# Patient Record
Sex: FEMALE | Race: WHITE | NOT HISPANIC OR LATINO | Employment: FULL TIME | URBAN - METROPOLITAN AREA
[De-identification: names, ages, dates, MRNs, and addresses within clinical notes are randomized per-mention and may not be internally consistent; named-entity substitution may affect disease eponyms.]

---

## 2022-02-15 ENCOUNTER — HOME CARE VISIT (OUTPATIENT)
Dept: HOME HEALTH SERVICES | Facility: HOME HEALTHCARE | Age: 47
End: 2022-02-15

## 2022-03-28 ENCOUNTER — HOSPITAL ENCOUNTER (INPATIENT)
Facility: HOSPITAL | Age: 47
LOS: 1 days | DRG: 598 | End: 2022-03-29
Attending: EMERGENCY MEDICINE | Admitting: INTERNAL MEDICINE
Payer: COMMERCIAL

## 2022-03-28 ENCOUNTER — APPOINTMENT (EMERGENCY)
Dept: RADIOLOGY | Facility: HOSPITAL | Age: 47
DRG: 598 | End: 2022-03-28
Payer: COMMERCIAL

## 2022-03-28 DIAGNOSIS — C79.51 OSSEOUS METASTASIS (HCC): ICD-10-CM

## 2022-03-28 DIAGNOSIS — C79.51 METASTATIC CANCER TO SPINE (HCC): ICD-10-CM

## 2022-03-28 DIAGNOSIS — N63.0 BREAST MASS IN FEMALE: Primary | ICD-10-CM

## 2022-03-28 LAB
BASOPHILS # BLD AUTO: 0.04 THOUSANDS/ΜL (ref 0–0.1)
BASOPHILS NFR BLD AUTO: 1 % (ref 0–1)
EOSINOPHIL # BLD AUTO: 0.06 THOUSAND/ΜL (ref 0–0.61)
EOSINOPHIL NFR BLD AUTO: 1 % (ref 0–6)
ERYTHROCYTE [DISTWIDTH] IN BLOOD BY AUTOMATED COUNT: 12.4 % (ref 11.6–15.1)
HCT VFR BLD AUTO: 40.4 % (ref 34.8–46.1)
HGB BLD-MCNC: 13.5 G/DL (ref 11.5–15.4)
IMM GRANULOCYTES # BLD AUTO: 0.05 THOUSAND/UL (ref 0–0.2)
IMM GRANULOCYTES NFR BLD AUTO: 1 % (ref 0–2)
LYMPHOCYTES # BLD AUTO: 2.5 THOUSANDS/ΜL (ref 0.6–4.47)
LYMPHOCYTES NFR BLD AUTO: 30 % (ref 14–44)
MCH RBC QN AUTO: 30.2 PG (ref 26.8–34.3)
MCHC RBC AUTO-ENTMCNC: 33.4 G/DL (ref 31.4–37.4)
MCV RBC AUTO: 90 FL (ref 82–98)
MONOCYTES # BLD AUTO: 0.59 THOUSAND/ΜL (ref 0.17–1.22)
MONOCYTES NFR BLD AUTO: 7 % (ref 4–12)
NEUTROPHILS # BLD AUTO: 5.2 THOUSANDS/ΜL (ref 1.85–7.62)
NEUTS SEG NFR BLD AUTO: 60 % (ref 43–75)
NRBC BLD AUTO-RTO: 0 /100 WBCS
PLATELET # BLD AUTO: 346 THOUSANDS/UL (ref 149–390)
PMV BLD AUTO: 10 FL (ref 8.9–12.7)
RBC # BLD AUTO: 4.47 MILLION/UL (ref 3.81–5.12)
WBC # BLD AUTO: 8.44 THOUSAND/UL (ref 4.31–10.16)

## 2022-03-28 PROCEDURE — 84703 CHORIONIC GONADOTROPIN ASSAY: CPT

## 2022-03-28 PROCEDURE — 80053 COMPREHEN METABOLIC PANEL: CPT

## 2022-03-28 PROCEDURE — 71046 X-RAY EXAM CHEST 2 VIEWS: CPT

## 2022-03-28 PROCEDURE — 99285 EMERGENCY DEPT VISIT HI MDM: CPT

## 2022-03-28 PROCEDURE — 93005 ELECTROCARDIOGRAM TRACING: CPT

## 2022-03-28 PROCEDURE — 99285 EMERGENCY DEPT VISIT HI MDM: CPT | Performed by: EMERGENCY MEDICINE

## 2022-03-28 PROCEDURE — 36415 COLL VENOUS BLD VENIPUNCTURE: CPT

## 2022-03-28 PROCEDURE — 82550 ASSAY OF CK (CPK): CPT

## 2022-03-28 PROCEDURE — 85025 COMPLETE CBC W/AUTO DIFF WBC: CPT

## 2022-03-28 PROCEDURE — 96374 THER/PROPH/DIAG INJ IV PUSH: CPT

## 2022-03-28 RX ORDER — KETOROLAC TROMETHAMINE 30 MG/ML
30 INJECTION, SOLUTION INTRAMUSCULAR; INTRAVENOUS ONCE
Status: COMPLETED | OUTPATIENT
Start: 2022-03-28 | End: 2022-03-28

## 2022-03-28 RX ADMIN — KETOROLAC TROMETHAMINE 30 MG: 30 INJECTION, SOLUTION INTRAMUSCULAR at 23:32

## 2022-03-29 ENCOUNTER — APPOINTMENT (INPATIENT)
Dept: CT IMAGING | Facility: HOSPITAL | Age: 47
DRG: 598 | End: 2022-03-29
Payer: COMMERCIAL

## 2022-03-29 ENCOUNTER — APPOINTMENT (INPATIENT)
Dept: MRI IMAGING | Facility: HOSPITAL | Age: 47
DRG: 598 | End: 2022-03-29
Payer: COMMERCIAL

## 2022-03-29 ENCOUNTER — APPOINTMENT (EMERGENCY)
Dept: CT IMAGING | Facility: HOSPITAL | Age: 47
DRG: 598 | End: 2022-03-29
Payer: COMMERCIAL

## 2022-03-29 ENCOUNTER — HOSPITAL ENCOUNTER (INPATIENT)
Facility: HOSPITAL | Age: 47
LOS: 6 days | Discharge: HOME HEALTH | DRG: 598 | End: 2022-04-04
Attending: INTERNAL MEDICINE | Admitting: INTERNAL MEDICINE
Payer: COMMERCIAL

## 2022-03-29 ENCOUNTER — TELEPHONE (OUTPATIENT)
Dept: HEMATOLOGY ONCOLOGY | Facility: CLINIC | Age: 47
End: 2022-03-29

## 2022-03-29 VITALS
HEIGHT: 64 IN | OXYGEN SATURATION: 90 % | WEIGHT: 207.23 LBS | TEMPERATURE: 97.4 F | HEART RATE: 100 BPM | SYSTOLIC BLOOD PRESSURE: 145 MMHG | RESPIRATION RATE: 17 BRPM | DIASTOLIC BLOOD PRESSURE: 86 MMHG | BODY MASS INDEX: 35.38 KG/M2

## 2022-03-29 DIAGNOSIS — N63.0 BREAST MASS IN FEMALE: ICD-10-CM

## 2022-03-29 DIAGNOSIS — C79.51 SECONDARY MALIGNANT NEOPLASM OF BONE AND BONE MARROW (HCC): ICD-10-CM

## 2022-03-29 DIAGNOSIS — G89.3 CANCER ASSOCIATED PAIN: ICD-10-CM

## 2022-03-29 DIAGNOSIS — C79.51 OSSEOUS METASTASIS (HCC): ICD-10-CM

## 2022-03-29 DIAGNOSIS — C79.49 METASTASIS TO SPINAL CORD (HCC): Primary | ICD-10-CM

## 2022-03-29 DIAGNOSIS — R74.01 TRANSAMINITIS: ICD-10-CM

## 2022-03-29 DIAGNOSIS — T40.2X5A THERAPEUTIC OPIOID-INDUCED CONSTIPATION (OIC): ICD-10-CM

## 2022-03-29 DIAGNOSIS — K59.03 THERAPEUTIC OPIOID-INDUCED CONSTIPATION (OIC): ICD-10-CM

## 2022-03-29 DIAGNOSIS — R03.0 ELEVATED BP WITHOUT DIAGNOSIS OF HYPERTENSION: ICD-10-CM

## 2022-03-29 DIAGNOSIS — Z51.5 PALLIATIVE CARE PATIENT: ICD-10-CM

## 2022-03-29 DIAGNOSIS — K21.9 GASTROESOPHAGEAL REFLUX DISEASE WITHOUT ESOPHAGITIS: ICD-10-CM

## 2022-03-29 DIAGNOSIS — C79.52 SECONDARY MALIGNANT NEOPLASM OF BONE AND BONE MARROW (HCC): ICD-10-CM

## 2022-03-29 PROBLEM — N63.10 BREAST MASS, RIGHT: Status: ACTIVE | Noted: 2022-03-29

## 2022-03-29 PROBLEM — E87.6 HYPOKALEMIA: Status: ACTIVE | Noted: 2022-03-29

## 2022-03-29 LAB
ALBUMIN SERPL BCP-MCNC: 3.4 G/DL (ref 3.5–5)
ALBUMIN SERPL BCP-MCNC: 3.6 G/DL (ref 3.5–5)
ALP SERPL-CCNC: 311 U/L (ref 46–116)
ALP SERPL-CCNC: 314 U/L (ref 46–116)
ALT SERPL W P-5'-P-CCNC: 128 U/L (ref 12–78)
ALT SERPL W P-5'-P-CCNC: 130 U/L (ref 12–78)
ANION GAP SERPL CALCULATED.3IONS-SCNC: 10 MMOL/L (ref 4–13)
ANION GAP SERPL CALCULATED.3IONS-SCNC: 13 MMOL/L (ref 4–13)
AST SERPL W P-5'-P-CCNC: 78 U/L (ref 5–45)
AST SERPL W P-5'-P-CCNC: 80 U/L (ref 5–45)
ATRIAL RATE: 0 BPM
ATRIAL RATE: 103 BPM
BASOPHILS # BLD AUTO: 0.03 THOUSANDS/ΜL (ref 0–0.1)
BASOPHILS NFR BLD AUTO: 0 % (ref 0–1)
BILIRUB SERPL-MCNC: 0.49 MG/DL (ref 0.2–1)
BILIRUB SERPL-MCNC: 0.52 MG/DL (ref 0.2–1)
BUN SERPL-MCNC: 11 MG/DL (ref 5–25)
BUN SERPL-MCNC: 9 MG/DL (ref 5–25)
CALCIUM ALBUM COR SERPL-MCNC: 9.4 MG/DL (ref 8.3–10.1)
CALCIUM SERPL-MCNC: 8.9 MG/DL (ref 8.3–10.1)
CALCIUM SERPL-MCNC: 9.3 MG/DL (ref 8.3–10.1)
CANCER AG27-29 SERPL-ACNC: 110.3 U/ML (ref 0–42.3)
CEA SERPL-MCNC: 6.5 NG/ML (ref 0–3)
CHLORIDE SERPL-SCNC: 101 MMOL/L (ref 100–108)
CHLORIDE SERPL-SCNC: 99 MMOL/L (ref 100–108)
CK SERPL-CCNC: 35 U/L (ref 26–192)
CO2 SERPL-SCNC: 22 MMOL/L (ref 21–32)
CO2 SERPL-SCNC: 26 MMOL/L (ref 21–32)
CREAT SERPL-MCNC: 0.72 MG/DL (ref 0.6–1.3)
CREAT SERPL-MCNC: 0.74 MG/DL (ref 0.6–1.3)
EOSINOPHIL # BLD AUTO: 0.03 THOUSAND/ΜL (ref 0–0.61)
EOSINOPHIL NFR BLD AUTO: 0 % (ref 0–6)
ERYTHROCYTE [DISTWIDTH] IN BLOOD BY AUTOMATED COUNT: 12.3 % (ref 11.6–15.1)
GFR SERPL CREATININE-BSD FRML MDRD: 100 ML/MIN/1.73SQ M
GFR SERPL CREATININE-BSD FRML MDRD: 96 ML/MIN/1.73SQ M
GLUCOSE SERPL-MCNC: 105 MG/DL (ref 65–140)
GLUCOSE SERPL-MCNC: 113 MG/DL (ref 65–140)
HAV IGM SER QL: NORMAL
HBV CORE IGM SER QL: NORMAL
HBV SURFACE AG SER QL: NORMAL
HCG SERPL QL: NEGATIVE
HCT VFR BLD AUTO: 38.6 % (ref 34.8–46.1)
HCV AB SER QL: NORMAL
HGB BLD-MCNC: 12.9 G/DL (ref 11.5–15.4)
IMM GRANULOCYTES # BLD AUTO: 0.08 THOUSAND/UL (ref 0–0.2)
IMM GRANULOCYTES NFR BLD AUTO: 1 % (ref 0–2)
LACTATE SERPL-SCNC: 1.1 MMOL/L (ref 0.5–2)
LYMPHOCYTES # BLD AUTO: 1.42 THOUSANDS/ΜL (ref 0.6–4.47)
LYMPHOCYTES NFR BLD AUTO: 15 % (ref 14–44)
MAGNESIUM SERPL-MCNC: 1.8 MG/DL (ref 1.6–2.6)
MCH RBC QN AUTO: 30.5 PG (ref 26.8–34.3)
MCHC RBC AUTO-ENTMCNC: 33.4 G/DL (ref 31.4–37.4)
MCV RBC AUTO: 91 FL (ref 82–98)
MONOCYTES # BLD AUTO: 0.22 THOUSAND/ΜL (ref 0.17–1.22)
MONOCYTES NFR BLD AUTO: 2 % (ref 4–12)
NEUTROPHILS # BLD AUTO: 7.85 THOUSANDS/ΜL (ref 1.85–7.62)
NEUTS SEG NFR BLD AUTO: 82 % (ref 43–75)
NRBC BLD AUTO-RTO: 0 /100 WBCS
P AXIS: 45 DEGREES
PLATELET # BLD AUTO: 328 THOUSANDS/UL (ref 149–390)
PMV BLD AUTO: 9.7 FL (ref 8.9–12.7)
POTASSIUM SERPL-SCNC: 3.3 MMOL/L (ref 3.5–5.3)
POTASSIUM SERPL-SCNC: 4.1 MMOL/L (ref 3.5–5.3)
PR INTERVAL: 146 MS
PROT SERPL-MCNC: 7.7 G/DL (ref 6.4–8.2)
PROT SERPL-MCNC: 8.1 G/DL (ref 6.4–8.2)
QRS AXIS: -1 DEGREES
QRS AXIS: 0 DEGREES
QRSD INTERVAL: 0 MS
QRSD INTERVAL: 72 MS
QT INTERVAL: 0 MS
QT INTERVAL: 338 MS
QTC INTERVAL: 0 MS
QTC INTERVAL: 442 MS
RBC # BLD AUTO: 4.23 MILLION/UL (ref 3.81–5.12)
SODIUM SERPL-SCNC: 135 MMOL/L (ref 136–145)
SODIUM SERPL-SCNC: 136 MMOL/L (ref 136–145)
T WAVE AXIS: 0 DEGREES
T WAVE AXIS: 48 DEGREES
VENTRICULAR RATE: 0 BPM
VENTRICULAR RATE: 103 BPM
WBC # BLD AUTO: 9.63 THOUSAND/UL (ref 4.31–10.16)

## 2022-03-29 PROCEDURE — 85025 COMPLETE CBC W/AUTO DIFF WBC: CPT | Performed by: FAMILY MEDICINE

## 2022-03-29 PROCEDURE — 74177 CT ABD & PELVIS W/CONTRAST: CPT

## 2022-03-29 PROCEDURE — G1004 CDSM NDSC: HCPCS

## 2022-03-29 PROCEDURE — 80053 COMPREHEN METABOLIC PANEL: CPT | Performed by: FAMILY MEDICINE

## 2022-03-29 PROCEDURE — 93010 ELECTROCARDIOGRAM REPORT: CPT | Performed by: INTERNAL MEDICINE

## 2022-03-29 PROCEDURE — 88341 IMHCHEM/IMCYTCHM EA ADD ANTB: CPT | Performed by: PATHOLOGY

## 2022-03-29 PROCEDURE — 71260 CT THORAX DX C+: CPT

## 2022-03-29 PROCEDURE — 88342 IMHCHEM/IMCYTCHM 1ST ANTB: CPT | Performed by: PATHOLOGY

## 2022-03-29 PROCEDURE — 87040 BLOOD CULTURE FOR BACTERIA: CPT

## 2022-03-29 PROCEDURE — 86300 IMMUNOASSAY TUMOR CA 15-3: CPT | Performed by: PHYSICIAN ASSISTANT

## 2022-03-29 PROCEDURE — 99223 1ST HOSP IP/OBS HIGH 75: CPT | Performed by: INTERNAL MEDICINE

## 2022-03-29 PROCEDURE — 36415 COLL VENOUS BLD VENIPUNCTURE: CPT

## 2022-03-29 PROCEDURE — 72158 MRI LUMBAR SPINE W/O & W/DYE: CPT

## 2022-03-29 PROCEDURE — 88305 TISSUE EXAM BY PATHOLOGIST: CPT | Performed by: PATHOLOGY

## 2022-03-29 PROCEDURE — 88361 TUMOR IMMUNOHISTOCHEM/COMPUT: CPT | Performed by: PATHOLOGY

## 2022-03-29 PROCEDURE — A9585 GADOBUTROL INJECTION: HCPCS

## 2022-03-29 PROCEDURE — RECHECK: Performed by: INTERNAL MEDICINE

## 2022-03-29 PROCEDURE — NC001 PR NO CHARGE: Performed by: RADIOLOGY

## 2022-03-29 PROCEDURE — 99222 1ST HOSP IP/OBS MODERATE 55: CPT | Performed by: SURGERY

## 2022-03-29 PROCEDURE — 99223 1ST HOSP IP/OBS HIGH 75: CPT | Performed by: RADIOLOGY

## 2022-03-29 PROCEDURE — 80074 ACUTE HEPATITIS PANEL: CPT | Performed by: FAMILY MEDICINE

## 2022-03-29 PROCEDURE — 83735 ASSAY OF MAGNESIUM: CPT

## 2022-03-29 PROCEDURE — 99222 1ST HOSP IP/OBS MODERATE 55: CPT | Performed by: PHYSICIAN ASSISTANT

## 2022-03-29 PROCEDURE — NC001 PR NO CHARGE: Performed by: SURGERY

## 2022-03-29 PROCEDURE — 99221 1ST HOSP IP/OBS SF/LOW 40: CPT | Performed by: PHYSICIAN ASSISTANT

## 2022-03-29 PROCEDURE — 72125 CT NECK SPINE W/O DYE: CPT

## 2022-03-29 PROCEDURE — 0HBT3ZX EXCISION OF RIGHT BREAST, PERCUTANEOUS APPROACH, DIAGNOSTIC: ICD-10-PCS | Performed by: SURGERY

## 2022-03-29 PROCEDURE — 82378 CARCINOEMBRYONIC ANTIGEN: CPT | Performed by: PHYSICIAN ASSISTANT

## 2022-03-29 PROCEDURE — 72157 MRI CHEST SPINE W/O & W/DYE: CPT

## 2022-03-29 PROCEDURE — 72156 MRI NECK SPINE W/O & W/DYE: CPT

## 2022-03-29 PROCEDURE — 83605 ASSAY OF LACTIC ACID: CPT

## 2022-03-29 PROCEDURE — 97760 ORTHOTIC MGMT&TRAING 1ST ENC: CPT

## 2022-03-29 RX ORDER — ACETAMINOPHEN 325 MG/1
975 TABLET ORAL EVERY 8 HOURS SCHEDULED
Status: DISCONTINUED | OUTPATIENT
Start: 2022-03-29 | End: 2022-03-29 | Stop reason: HOSPADM

## 2022-03-29 RX ORDER — MORPHINE SULFATE 4 MG/ML
4 INJECTION, SOLUTION INTRAMUSCULAR; INTRAVENOUS ONCE
Status: COMPLETED | OUTPATIENT
Start: 2022-03-29 | End: 2022-03-29

## 2022-03-29 RX ORDER — OXYCODONE HYDROCHLORIDE 5 MG/1
5 TABLET ORAL EVERY 4 HOURS PRN
Status: CANCELLED | OUTPATIENT
Start: 2022-03-29

## 2022-03-29 RX ORDER — ACETAMINOPHEN 325 MG/1
975 TABLET ORAL EVERY 8 HOURS SCHEDULED
Status: CANCELLED | OUTPATIENT
Start: 2022-03-29

## 2022-03-29 RX ORDER — PANTOPRAZOLE SODIUM 40 MG/1
40 TABLET, DELAYED RELEASE ORAL
Status: DISCONTINUED | OUTPATIENT
Start: 2022-03-29 | End: 2022-03-29 | Stop reason: HOSPADM

## 2022-03-29 RX ORDER — PANTOPRAZOLE SODIUM 40 MG/1
40 TABLET, DELAYED RELEASE ORAL
Status: DISCONTINUED | OUTPATIENT
Start: 2022-03-30 | End: 2022-04-04 | Stop reason: HOSPADM

## 2022-03-29 RX ORDER — POTASSIUM CHLORIDE 20 MEQ/1
20 TABLET, EXTENDED RELEASE ORAL ONCE
Status: COMPLETED | OUTPATIENT
Start: 2022-03-29 | End: 2022-03-29

## 2022-03-29 RX ORDER — ONDANSETRON 2 MG/ML
4 INJECTION INTRAMUSCULAR; INTRAVENOUS EVERY 4 HOURS PRN
Status: DISCONTINUED | OUTPATIENT
Start: 2022-03-29 | End: 2022-03-29 | Stop reason: HOSPADM

## 2022-03-29 RX ORDER — OXYCODONE HYDROCHLORIDE 10 MG/1
10 TABLET ORAL EVERY 4 HOURS PRN
Status: DISCONTINUED | OUTPATIENT
Start: 2022-03-29 | End: 2022-03-29 | Stop reason: HOSPADM

## 2022-03-29 RX ORDER — DEXAMETHASONE SODIUM PHOSPHATE 4 MG/ML
4 INJECTION, SOLUTION INTRA-ARTICULAR; INTRALESIONAL; INTRAMUSCULAR; INTRAVENOUS; SOFT TISSUE EVERY 6 HOURS SCHEDULED
Status: DISCONTINUED | OUTPATIENT
Start: 2022-03-29 | End: 2022-04-04 | Stop reason: HOSPADM

## 2022-03-29 RX ORDER — AMLODIPINE BESYLATE 5 MG/1
5 TABLET ORAL DAILY
Status: DISCONTINUED | OUTPATIENT
Start: 2022-03-29 | End: 2022-04-04 | Stop reason: HOSPADM

## 2022-03-29 RX ORDER — PANTOPRAZOLE SODIUM 40 MG/1
40 TABLET, DELAYED RELEASE ORAL
Status: CANCELLED | OUTPATIENT
Start: 2022-03-30

## 2022-03-29 RX ORDER — DEXAMETHASONE SODIUM PHOSPHATE 4 MG/ML
4 INJECTION, SOLUTION INTRA-ARTICULAR; INTRALESIONAL; INTRAMUSCULAR; INTRAVENOUS; SOFT TISSUE EVERY 4 HOURS
Status: DISCONTINUED | OUTPATIENT
Start: 2022-03-29 | End: 2022-03-29

## 2022-03-29 RX ORDER — DEXAMETHASONE SODIUM PHOSPHATE 4 MG/ML
4 INJECTION, SOLUTION INTRA-ARTICULAR; INTRALESIONAL; INTRAMUSCULAR; INTRAVENOUS; SOFT TISSUE EVERY 6 HOURS SCHEDULED
Status: CANCELLED | OUTPATIENT
Start: 2022-03-29

## 2022-03-29 RX ORDER — LIDOCAINE HYDROCHLORIDE 10 MG/ML
30 INJECTION, SOLUTION EPIDURAL; INFILTRATION; INTRACAUDAL; PERINEURAL ONCE
Status: DISCONTINUED | OUTPATIENT
Start: 2022-03-29 | End: 2022-03-29 | Stop reason: HOSPADM

## 2022-03-29 RX ORDER — LORAZEPAM 1 MG/1
1 TABLET ORAL ONCE
Status: COMPLETED | OUTPATIENT
Start: 2022-03-29 | End: 2022-03-29

## 2022-03-29 RX ORDER — AMOXICILLIN 250 MG
1 CAPSULE ORAL
Status: DISCONTINUED | OUTPATIENT
Start: 2022-03-29 | End: 2022-03-29 | Stop reason: HOSPADM

## 2022-03-29 RX ORDER — HYDROMORPHONE HCL/PF 1 MG/ML
0.5 SYRINGE (ML) INJECTION
Status: DISCONTINUED | OUTPATIENT
Start: 2022-03-29 | End: 2022-03-30

## 2022-03-29 RX ORDER — HYDROMORPHONE HCL/PF 1 MG/ML
0.5 SYRINGE (ML) INJECTION
Status: DISCONTINUED | OUTPATIENT
Start: 2022-03-29 | End: 2022-03-29 | Stop reason: HOSPADM

## 2022-03-29 RX ORDER — ACETAMINOPHEN 325 MG/1
975 TABLET ORAL EVERY 8 HOURS SCHEDULED
Status: DISCONTINUED | OUTPATIENT
Start: 2022-03-29 | End: 2022-04-04 | Stop reason: HOSPADM

## 2022-03-29 RX ORDER — ACETAMINOPHEN 325 MG/1
650 TABLET ORAL EVERY 4 HOURS PRN
Status: DISCONTINUED | OUTPATIENT
Start: 2022-03-29 | End: 2022-03-29

## 2022-03-29 RX ORDER — AMOXICILLIN 250 MG
1 CAPSULE ORAL
Status: CANCELLED | OUTPATIENT
Start: 2022-03-29

## 2022-03-29 RX ORDER — OXYCODONE HYDROCHLORIDE 5 MG/1
5 TABLET ORAL EVERY 4 HOURS PRN
Status: DISCONTINUED | OUTPATIENT
Start: 2022-03-29 | End: 2022-03-29 | Stop reason: HOSPADM

## 2022-03-29 RX ORDER — LIDOCAINE HYDROCHLORIDE AND EPINEPHRINE 10; 10 MG/ML; UG/ML
20 INJECTION, SOLUTION INFILTRATION; PERINEURAL ONCE
Status: COMPLETED | OUTPATIENT
Start: 2022-03-29 | End: 2022-03-29

## 2022-03-29 RX ORDER — OXYCODONE HYDROCHLORIDE 5 MG/1
5 TABLET ORAL EVERY 4 HOURS PRN
Status: DISCONTINUED | OUTPATIENT
Start: 2022-03-29 | End: 2022-04-04 | Stop reason: HOSPADM

## 2022-03-29 RX ORDER — HYDROMORPHONE HCL/PF 1 MG/ML
0.5 SYRINGE (ML) INJECTION
Status: CANCELLED | OUTPATIENT
Start: 2022-03-29

## 2022-03-29 RX ORDER — ONDANSETRON 2 MG/ML
4 INJECTION INTRAMUSCULAR; INTRAVENOUS EVERY 4 HOURS PRN
Status: DISCONTINUED | OUTPATIENT
Start: 2022-03-29 | End: 2022-04-04 | Stop reason: HOSPADM

## 2022-03-29 RX ORDER — AMOXICILLIN 250 MG
1 CAPSULE ORAL
Status: DISCONTINUED | OUTPATIENT
Start: 2022-03-29 | End: 2022-03-30

## 2022-03-29 RX ORDER — OXYCODONE HYDROCHLORIDE 10 MG/1
10 TABLET ORAL EVERY 4 HOURS PRN
Status: DISCONTINUED | OUTPATIENT
Start: 2022-03-29 | End: 2022-04-04 | Stop reason: HOSPADM

## 2022-03-29 RX ORDER — DOCUSATE SODIUM 100 MG/1
100 CAPSULE, LIQUID FILLED ORAL 2 TIMES DAILY
Status: DISCONTINUED | OUTPATIENT
Start: 2022-03-29 | End: 2022-03-29

## 2022-03-29 RX ORDER — LABETALOL 20 MG/4 ML (5 MG/ML) INTRAVENOUS SYRINGE
10 EVERY 6 HOURS PRN
Status: CANCELLED | OUTPATIENT
Start: 2022-03-29

## 2022-03-29 RX ORDER — LABETALOL 20 MG/4 ML (5 MG/ML) INTRAVENOUS SYRINGE
10 EVERY 6 HOURS PRN
Status: DISCONTINUED | OUTPATIENT
Start: 2022-03-29 | End: 2022-04-04 | Stop reason: HOSPADM

## 2022-03-29 RX ORDER — DEXAMETHASONE SODIUM PHOSPHATE 4 MG/ML
4 INJECTION, SOLUTION INTRA-ARTICULAR; INTRALESIONAL; INTRAMUSCULAR; INTRAVENOUS; SOFT TISSUE EVERY 6 HOURS SCHEDULED
Status: DISCONTINUED | OUTPATIENT
Start: 2022-03-29 | End: 2022-03-29 | Stop reason: HOSPADM

## 2022-03-29 RX ORDER — OXYCODONE HYDROCHLORIDE 10 MG/1
10 TABLET ORAL EVERY 4 HOURS PRN
Status: CANCELLED | OUTPATIENT
Start: 2022-03-29

## 2022-03-29 RX ORDER — NORGESTIMATE AND ETHINYL ESTRADIOL 0.25-0.035
1 KIT ORAL DAILY
COMMUNITY
End: 2022-04-07

## 2022-03-29 RX ORDER — LABETALOL 20 MG/4 ML (5 MG/ML) INTRAVENOUS SYRINGE
10 EVERY 6 HOURS PRN
Status: DISCONTINUED | OUTPATIENT
Start: 2022-03-29 | End: 2022-03-29 | Stop reason: HOSPADM

## 2022-03-29 RX ORDER — ONDANSETRON 2 MG/ML
4 INJECTION INTRAMUSCULAR; INTRAVENOUS EVERY 4 HOURS PRN
Status: CANCELLED | OUTPATIENT
Start: 2022-03-29

## 2022-03-29 RX ADMIN — ONDANSETRON 4 MG: 2 INJECTION INTRAMUSCULAR; INTRAVENOUS at 08:40

## 2022-03-29 RX ADMIN — OXYCODONE HYDROCHLORIDE 10 MG: 10 TABLET ORAL at 15:51

## 2022-03-29 RX ADMIN — ACETAMINOPHEN 975 MG: 325 TABLET ORAL at 20:40

## 2022-03-29 RX ADMIN — AMLODIPINE BESYLATE 5 MG: 5 TABLET ORAL at 20:20

## 2022-03-29 RX ADMIN — ENOXAPARIN SODIUM 40 MG: 40 INJECTION SUBCUTANEOUS at 20:37

## 2022-03-29 RX ADMIN — LABETALOL HYDROCHLORIDE 10 MG: 5 INJECTION, SOLUTION INTRAVENOUS at 03:57

## 2022-03-29 RX ADMIN — POTASSIUM CHLORIDE 20 MEQ: 1500 TABLET, EXTENDED RELEASE ORAL at 01:57

## 2022-03-29 RX ADMIN — IOHEXOL 100 ML: 350 INJECTION, SOLUTION INTRAVENOUS at 00:36

## 2022-03-29 RX ADMIN — MORPHINE SULFATE 4 MG: 4 INJECTION INTRAVENOUS at 01:57

## 2022-03-29 RX ADMIN — ACETAMINOPHEN 975 MG: 325 TABLET ORAL at 03:57

## 2022-03-29 RX ADMIN — GADOBUTROL 9 ML: 604.72 INJECTION INTRAVENOUS at 12:05

## 2022-03-29 RX ADMIN — DEXAMETHASONE SODIUM PHOSPHATE 4 MG: 4 INJECTION INTRA-ARTICULAR; INTRALESIONAL; INTRAMUSCULAR; INTRAVENOUS; SOFT TISSUE at 20:47

## 2022-03-29 RX ADMIN — OXYCODONE HYDROCHLORIDE 10 MG: 10 TABLET ORAL at 20:39

## 2022-03-29 RX ADMIN — DEXAMETHASONE SODIUM PHOSPHATE 4 MG: 4 INJECTION INTRA-ARTICULAR; INTRALESIONAL; INTRAMUSCULAR; INTRAVENOUS; SOFT TISSUE at 11:21

## 2022-03-29 RX ADMIN — OXYCODONE HYDROCHLORIDE 10 MG: 10 TABLET ORAL at 08:40

## 2022-03-29 RX ADMIN — PANTOPRAZOLE SODIUM 40 MG: 40 TABLET, DELAYED RELEASE ORAL at 09:18

## 2022-03-29 RX ADMIN — HYDROMORPHONE HYDROCHLORIDE 0.5 MG: 1 INJECTION, SOLUTION INTRAMUSCULAR; INTRAVENOUS; SUBCUTANEOUS at 11:22

## 2022-03-29 RX ADMIN — LORAZEPAM 1 MG: 1 TABLET ORAL at 10:51

## 2022-03-29 RX ADMIN — OXYCODONE HYDROCHLORIDE 5 MG: 5 TABLET ORAL at 05:18

## 2022-03-29 RX ADMIN — HYDROMORPHONE HYDROCHLORIDE 0.5 MG: 1 INJECTION, SOLUTION INTRAMUSCULAR; INTRAVENOUS; SUBCUTANEOUS at 21:00

## 2022-03-29 RX ADMIN — LIDOCAINE HYDROCHLORIDE,EPINEPHRINE BITARTRATE 20 ML: 10; .01 INJECTION, SOLUTION INFILTRATION; PERINEURAL at 21:52

## 2022-03-29 NOTE — ED ATTENDING ATTESTATION
3/28/2022  IShade MD, saw and evaluated the patient  I have discussed the patient with the resident/non-physician practitioner and agree with the resident's/non-physician practitioner's findings, Plan of Care, and MDM as documented in the resident's/non-physician practitioner's note, except where noted  All available labs and Radiology studies were reviewed  I was present for key portions of any procedure(s) performed by the resident/non-physician practitioner and I was immediately available to provide assistance  At this point I agree with the current assessment done in the Emergency Department  I have conducted an independent evaluation of this patient a history and physical is as follows:  Patient is a 51year old female with 2 weeks of lower back pain and now upper back pain with radiation into the neck  Sits at a computer at home for work 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  No trauma  No numbness or weakness  No incontinence  No fever  Is on birth control and LMP - about 3 weeks ago  Has been using advil and last used at about 1400 today  No recent old records from this ED seen on computer system  [facility] checked on this patient and no Rx found  (+) upper back paravertebral and posterior shoulder tenderness with spasms  NVI  Lungs clear  Heart regular without murmur  Nontender abdomen  Good bowel sounds  Strength and sensation intact  No rash noted  DDx including but not limited to: herniated disc, arthritis, spinal stenosis, spasm, strain, sprain, HTN; doubt ACS or MI; doubt PE, PTX, pneumonia, rhabdomyolysis; doubt fracture, epidural abscess, AAA, dissection  Will check EKG, CXR and labs and give toradol for pain and monitor BP        ED Course         Critical Care Time  Procedures

## 2022-03-29 NOTE — ASSESSMENT & PLAN NOTE
Right-sided fungating breast mass with metastasis to axilla  Surgery Oncology, Medical Oncology, Radiation Oncology, Neurosurgery aware of patient and following  See osseous metastasis for further details      Plan:  · Wound culture and gram stain ordered and pending  · Surgical-oncology consulted for biopsy

## 2022-03-29 NOTE — ED PROVIDER NOTES
History  Chief Complaint   Patient presents with    Neck Pain     pt c/o neck/back/stiffness xcouple days, denies any prior injury/trauma     60-year-old female with no past medical history presenting with increasing upper and lower back pain  Patient states that she has been working from home for the past 3 months and spends anywhere between 6 and 14 hours per day at her desk  Admits that her table and desk chair are probably misalignment leading to her slouching over her desk for multiple hours at a time  For past couple months she has been having intermittent lower back pain that radiates to her hips and upper thighs  For past couple weeks is been having upper back and neck pain that radiates to her shoulders  Pain is worse when she moves after being seated for a long period of time or when she overuses the affected extremities  Pain was initially relieved by Advil and topical heat/pascale's vapor rub, but has becoming less and less effective  No distal numbness or weakness  No saddle anesthesia  No urinary or fecal incontinence/difficulty  Denies possibility of trauma  On arrival to ED instantly noticed that patient's blood pressure was elevated at 202/99  Patient does not regularly follow with a PCP and does not have any known diagnosis of hypertension but says is she often gets anxious around healthcare providers which has increased her blood pressure the past   Denies any headache or blurry vision  Prior to Admission Medications   Prescriptions Last Dose Informant Patient Reported? Taking?   norgestimate-ethinyl estradiol (ORTHO-CYCLEN) 0 25-35 MG-MCG per tablet 3/28/2022 at Unknown time  Yes Yes   Sig: Take 1 tablet by mouth daily      Facility-Administered Medications: None       History reviewed  No pertinent past medical history  History reviewed  No pertinent surgical history  History reviewed  No pertinent family history    I have reviewed and agree with the history as documented  E-Cigarette/Vaping    E-Cigarette Use Never User      E-Cigarette/Vaping Substances     Social History     Tobacco Use    Smoking status: Never Smoker    Smokeless tobacco: Not on file   Vaping Use    Vaping Use: Never used   Substance Use Topics    Alcohol use: Yes     Comment: rare, social     Drug use: Never        Review of Systems   Constitutional: Negative for activity change, fever and unexpected weight change  HENT: Negative for postnasal drip and rhinorrhea  Eyes: Negative for visual disturbance  Respiratory: Negative for cough, chest tightness and shortness of breath  Cardiovascular: Negative for chest pain and palpitations  Gastrointestinal: Negative for abdominal pain, blood in stool, constipation, diarrhea, nausea and vomiting  Endocrine: Negative for cold intolerance and heat intolerance  Genitourinary: Negative for difficulty urinating and hematuria  Musculoskeletal: Positive for arthralgias, back pain, neck pain and neck stiffness  Negative for joint swelling  Skin: Negative for color change and wound  Neurological: Negative for dizziness, syncope, weakness and headaches  Psychiatric/Behavioral: Negative for dysphoric mood  The patient is not nervous/anxious  All other systems reviewed and are negative        Physical Exam  ED Triage Vitals   Temperature Pulse Respirations Blood Pressure SpO2   03/28/22 1936 03/28/22 1936 03/28/22 1936 03/28/22 1936 03/28/22 1936   97 7 °F (36 5 °C) 105 18 (!) 202/99 96 %      Temp Source Heart Rate Source Patient Position - Orthostatic VS BP Location FiO2 (%)   03/28/22 1936 03/28/22 1936 03/28/22 1936 03/28/22 1936 --   Oral Monitor Sitting Left arm       Pain Score       03/28/22 1937       8             Orthostatic Vital Signs  Vitals:    03/28/22 1936 03/29/22 0052 03/29/22 0356 03/29/22 0510   BP: (!) 202/99 (!) 177/97 (!) 166/102 141/82   Pulse: 105 100 101 83   Patient Position - Orthostatic VS: Sitting Physical Exam  Vitals and nursing note reviewed  Chaperone present: Declined offer of chaperone for breast exam    Constitutional:       General: She is not in acute distress  Appearance: She is obese  She is not diaphoretic  HENT:      Head: Normocephalic and atraumatic  Right Ear: External ear normal       Left Ear: External ear normal       Nose: Nose normal       Mouth/Throat:      Mouth: Mucous membranes are moist    Eyes:      General: No scleral icterus  Extraocular Movements: Extraocular movements intact  Conjunctiva/sclera: Conjunctivae normal    Cardiovascular:      Rate and Rhythm: Normal rate and regular rhythm  Pulses: Normal pulses  Radial pulses are 2+ on the right side  Dorsalis pedis pulses are 2+ on the right side and 2+ on the left side  Heart sounds: Normal heart sounds, S1 normal and S2 normal  No murmur heard  Pulmonary:      Effort: Pulmonary effort is normal  No respiratory distress  Breath sounds: Normal breath sounds  No stridor  No wheezing  Chest:      Chest wall: Mass present  Breasts: Monster Score is 5  Breasts are asymmetrical       Left: Normal         Comments: Right breast has large malodorous fungating mass with open area measuring 5 cm in diameter however hard mass palpated an additional 5 cm laterally  Right areola is thickened with rough surface  No nipple drainage or discharge  No lumps or abnormalities of left breast   See pictures below  Abdominal:      General: Bowel sounds are normal       Palpations: Abdomen is soft  Tenderness: There is no abdominal tenderness  Musculoskeletal:         General: Normal range of motion  Cervical back: Normal range of motion  Comments: Paraspinal muscle tenderness and tightness throughout and back  No bony tenderness or step-offs  Muscle knots palpated in her upper and lower back   B/l decreased shoulder abduction strength 2/2 pain, otherwise no neuro weakness/numbness   Skin:     General: Skin is warm and dry  Coloration: Skin is not jaundiced  Neurological:      General: No focal deficit present  Mental Status: She is alert and oriented to person, place, and time  Psychiatric:         Mood and Affect: Mood normal               ED Medications  Medications   Labetalol HCl (NORMODYNE) injection 10 mg (10 mg Intravenous Given 3/29/22 0357)   ondansetron (ZOFRAN) injection 4 mg (has no administration in time range)   oxyCODONE (ROXICODONE) IR tablet 5 mg (5 mg Oral Given 3/29/22 0518)   HYDROmorphone (DILAUDID) injection 0 5 mg (has no administration in time range)   oxyCODONE (ROXICODONE) immediate release tablet 10 mg (has no administration in time range)   acetaminophen (TYLENOL) tablet 975 mg (975 mg Oral Given 3/29/22 0357)   senna-docusate sodium (SENOKOT S) 8 6-50 mg per tablet 1 tablet (has no administration in time range)   dexamethasone (DECADRON) injection 4 mg (has no administration in time range)   ketorolac (TORADOL) injection 30 mg (30 mg Intravenous Given 3/28/22 2332)   potassium chloride (K-DUR,KLOR-CON) CR tablet 20 mEq (20 mEq Oral Given 3/29/22 0157)   iohexol (OMNIPAQUE) 350 MG/ML injection (SINGLE-DOSE) 100 mL (100 mL Intravenous Given 3/29/22 0036)   morphine (PF) 4 mg/mL injection 4 mg (4 mg Intravenous Given 3/29/22 0157)       Diagnostic Studies  Results Reviewed     Procedure Component Value Units Date/Time    Blood culture #1 [[id number]] Collected: 03/29/22 0206    Lab Status: Preliminary result Specimen: Blood from Arm, Left Updated: 03/29/22 0801     Blood Culture Received in Microbiology Lab  Culture in Progress  Blood culture #2 [[id number]] Collected: 03/29/22 0206    Lab Status: Preliminary result Specimen: Blood from Arm, Left Updated: 03/29/22 0801     Blood Culture Received in Microbiology Lab  Culture in Progress      Magnesium [[id number]]  (Normal) Collected: 03/29/22 0642    Lab Status: Final result Specimen: Blood from Arm, Right Updated: 03/29/22 0722     Magnesium 1 8 mg/dL     CBC and differential [[id number]]     Lab Status: No result Specimen: Blood     Comprehensive metabolic panel [[id number]]     Lab Status: No result Specimen: Blood     Hepatitis panel, acute [[id number]]     Lab Status: No result Specimen: Blood     Platelet count [[id number]]     Lab Status: No result Specimen: Blood     Lactic acid [[id number]]  (Normal) Collected: 03/29/22 0206    Lab Status: Final result Specimen: Blood from Arm, Left Updated: 03/29/22 0242     LACTIC ACID 1 1 mmol/L     Narrative:      Result may be elevated if tourniquet was used during collection      Wound culture and Gram stain [[id number]]     Lab Status: No result Specimen: Wound from Breast, Right     CK Total with Reflex CKMB [[id number]]  (Normal) Collected: 03/28/22 2330    Lab Status: Final result Specimen: Blood from Arm, Left Updated: 03/29/22 0024     Total CK 35 U/L     hCG, qualitative pregnancy [[id number]]  (Normal) Collected: 03/28/22 2330    Lab Status: Final result Specimen: Blood from Arm, Left Updated: 03/29/22 0013     Preg, Serum Negative    Comprehensive metabolic panel [[id number]]  (Abnormal) Collected: 03/28/22 2330    Lab Status: Final result Specimen: Blood from Arm, Left Updated: 03/29/22 0001     Sodium 136 mmol/L      Potassium 3 3 mmol/L      Chloride 101 mmol/L      CO2 22 mmol/L      ANION GAP 13 mmol/L      BUN 9 mg/dL      Creatinine 0 74 mg/dL      Glucose 105 mg/dL      Calcium 9 3 mg/dL      AST 80 U/L       U/L      Alkaline Phosphatase 314 U/L      Total Protein 8 1 g/dL      Albumin 3 6 g/dL      Total Bilirubin 0 49 mg/dL      eGFR 96 ml/min/1 73sq m     Narrative:      Brenda guidelines for Chronic Kidney Disease (CKD):     Stage 1 with normal or high GFR (GFR > 90 mL/min/1 73 square meters)    Stage 2 Mild CKD (GFR = 60-89 mL/min/1 73 square meters)    Stage 3A Moderate CKD (GFR = 45-59 mL/min/1 73 square meters)    Stage 3B Moderate CKD (GFR = 30-44 mL/min/1 73 square meters)    Stage 4 Severe CKD (GFR = 15-29 mL/min/1 73 square meters)    Stage 5 End Stage CKD (GFR <15 mL/min/1 73 square meters)  Note: GFR calculation is accurate only with a steady state creatinine    CBC and differential [[id number]] Collected: 03/28/22 2330    Lab Status: Final result Specimen: Blood from Arm, Left Updated: 03/28/22 2351     WBC 8 44 Thousand/uL      RBC 4 47 Million/uL      Hemoglobin 13 5 g/dL      Hematocrit 40 4 %      MCV 90 fL      MCH 30 2 pg      MCHC 33 4 g/dL      RDW 12 4 %      MPV 10 0 fL      Platelets 313 Thousands/uL      nRBC 0 /100 WBCs      Neutrophils Relative 60 %      Immat GRANS % 1 %      Lymphocytes Relative 30 %      Monocytes Relative 7 %      Eosinophils Relative 1 %      Basophils Relative 1 %      Neutrophils Absolute 5 20 Thousands/µL      Immature Grans Absolute 0 05 Thousand/uL      Lymphocytes Absolute 2 50 Thousands/µL      Monocytes Absolute 0 59 Thousand/µL      Eosinophils Absolute 0 06 Thousand/µL      Basophils Absolute 0 04 Thousands/µL                  CT chest abdomen pelvis w contrast   ED Interpretation by Bladimir Thomson MD (03/29 0045)   Right breast mass with increased contrast uptake in axillary nodes bilaterally  Left renal cyst       Final Result by Nelly Burger MD (03/29 0128)      Large right breast mass measuring up to 6 9 cm  Right axillary metastatic adenopathy  Surgical consult recommended  Innumerable osseous metastasis  7 cm septated cystic left adnexal structure  According to current guidelines Larraine Sour Radiol 2020; 00:364-259) in this premenopausal woman, this should be followed up in 6 to 12 months by pelvic ultrasound  Workstation performed: [id number]         XR chest 2 views   ED Interpretation by Bladimir Thomson MD (03/28 2340)   No acute cardiopulmonary disease    No apparent spinal deformities      Final Result by Kathy Thompson MD (03/29 6341)      No acute cardiopulmonary disease  Workstation performed: [id number]         MRI cervical spine w wo contrast    (Results Pending)   MRI thoracic spine w wo contrast    (Results Pending)   MRI lumbar spine w wo contrast    (Results Pending)   MRI inpatient order    (Results Pending)         Procedures  ECG 12 Lead Documentation Only    Date/Time: 3/28/2022 11:54 PM  Performed by: Kristina Triplett MD  Authorized by: Kristnia Triplett MD     ECG reviewed by me, the ED Provider: yes    Patient location:  ED  Previous ECG:     Previous ECG:  Unavailable  Interpretation:     Interpretation: non-specific    Rate:     ECG rate:  103    ECG rate assessment: tachycardic    Rhythm:     Rhythm: sinus tachycardia    Ectopy:     Ectopy: none    QRS:     QRS axis:  Left    QRS intervals:  Normal  Conduction:     Conduction: normal    ST segments:     ST segments:  Normal  T waves:     T waves: normal            ED Course  ED Course as of 03/29/22 0834   Mon Mar 28, 2022   2247 Blood Pressure(!): 202/99   2340 XR chest 2 views  No acute cardiopulmonary disease  No apparent spinal deformities     2355 WBC: 8 44   Tue Mar 29, 2022   0019 PREGNANCY, SERUM: Negative   0019 Potassium(!): 3 3   0037 When coming to reassess patient, she discusses that she has been having a growing mass on her right breast for the past 4 months  Mass never had any pain but in the past couple days has started having malodorous drainage  On assessment, patient does have a large fungating mass  Inform patient that she likely has breast cancer  Will get CT chest abdomen pelvis with contrast to evaluate for possible metastases and consult surgery for further evaluation   0123 Blood Pressure(!): 177/97   0134 CT chest abdomen pelvis w contrast     Large right breast mass measuring up to 6 9 cm  Right axillary metastatic adenopathy   Surgical consult recommended      Innumerable osseous metastasis      7 cm septated cystic left adnexal structure  According to current guidelines Jagdish Joseph Radiol 2020; 95:337-925) in this premenopausal woman, this should be followed up in 6 to 12 months by pelvic ultrasound  MDM  Number of Diagnoses or Management Options  Breast mass in female: new and requires workup  Metastatic cancer to [facility]): new and requires workup  Diagnosis management comments: Initial impression:  Most likely does have being musculoskeletal tightness and pain from poor positioning during her long work hours  No sign of cauda equina or other acute spinal issue  In terms of her high blood pressure, possible she is having white coat syndrome will recheck her blood pressure after she sits for a bit  Even if her blood pressure does remain elevated this would be classified as asymptomatic hypertension  I everted discussed with her that she needs to establish primary care and will need further evaluation of her high blood pressure    Initial work up:  EKG, CK, pregnancy test, x-ray    Final impression:  EKG revealed sinus tachycardia, CK normal, chest x-ray showed no signs of cardiopulmonary disease  Fungating breast mass concerning for breast cancer  CT scan revealed metastases to the right axilla and numerous Mets to the spine which is probably what is causing her pain  Patient is requiring IV opioids to help control pain  Will have patient admitted for further evaluation and care  Will need slim to help coordinate oncology care  Explained all this to patient who agrees with our assessment and plan            Disposition  Final diagnoses:   Breast mass in female   Metastatic cancer to [facility])     Time reflects when diagnosis was documented in both MDM as applicable and the Disposition within this note     Time User Action Codes Description Comment    3/29/2022 12:30 AM Gino Paget Add [N63 0] Breast mass in female     3/29/2022  1:41 AM Gino Paget Add [C79 51] Metastatic cancer to spine [facility])       ED Disposition     ED Disposition Condition Date/Time Comment    Admit Stable Tue Mar 29, 2022  1:41 AM Case was discussed with [facility] resident and the patient's admission status was agreed to be Admission Status: inpatient status to the service of Dr Gerry Sánchez   Follow-up Information    None         Patient's Medications   Discharge Prescriptions    No medications on file     No discharge procedures on file  PDMP Review       Value Time User    PDMP Reviewed  Yes 3/28/2022 10:55 PM Erick English MD           ED Provider  Attending physically available and evaluated Sylvester Ormond I managed the patient along with the ED Attending      Electronically Signed by         Aaron Whitney MD  03/29/22 9128

## 2022-03-29 NOTE — ASSESSMENT & PLAN NOTE
Lab Results   Component Value Date    K 3 3 (L) 03/28/2022    MG 1 8 03/29/2022     Plan:  · K/Mg repletion completed on admission  · Replete as needed  · Monitor with BMP and repeat Mg2+

## 2022-03-29 NOTE — CONSULTS
Consultation - Radiation Oncology   Joy Henson 51 y o  female MRN: [id number]  Unit/Bed#: FT 03 Encounter: [id number]        History of Present Illness   Physician Requesting Consult: Bernarda Feliz MD  Reason for Consult / Principal Problem: Breast mass, axillary adenopathy and spine metastases, pathology is pending  Hx and PE limited by:   HPI: Joy Henson is a 51y o  year old female who presented after experiencing pain radiating along the posterior left shoulder into the lower neck and down the right shoulder for approximately 2 months  This was sporadic and would resolve with acetaminophen or ibuprofen  She attributed this to neck positioning while working at her desk  However, now the pain has progressed and is not relenting  It is worsened by coughing and particularly sneezing, traveling down both arms  She denies numbness, weakness or tingling at rest   She is continent of bowel and bladder  When standing from supine position, she does have tingling "all over my body "  This resolves quickly after movement  She otherwise detected a lump in the medial aspect of the right breast around 12/2021  This grew but began to ulcerate only over the past week  It is nonpainful  She does not have routine mammograms  LMP was 1 month ago  She denies significant weight loss  She is without additional acute concerns  CT chest/abdomen and pelvis on 3/29/2022 demonstrated innumerable lytic lesions throughout the osseous structures/spine  The largest was within the sacrum measuring 4 4 cm with destructive changes and encroachment upon the presacral space  There was a 6 9 cm large right breast mass and right axillary adenopathy  There was also a 7 cm septated cystic left adnexal structured  Consults    Review of Systems Negative except as described above  Historical Information   Previous Oncology History: As above  History reviewed  No pertinent past medical history    History reviewed  No pertinent surgical history  OB/GYN History:   First menstrual period around age 5  Last menstrual period: 1 month ago  OCPs: Yes for 5  Years  , first live birth around age [age]  No history of breast cancer  Prior mammogram was in her 25s  History reviewed  No pertinent family history  Social History   Social History     Substance and Sexual Activity   Alcohol Use Yes    Comment: rare, social      Social History     Substance and Sexual Activity   Drug Use Never     Social History     Tobacco Use   Smoking Status Never Smoker   Smokeless Tobacco Not on file   She works for [employer] [address]  She is a never smoker  She drinks alcohol occasionally      Meds/Allergies     Current Facility-Administered Medications:     acetaminophen (TYLENOL) tablet 975 mg, 975 mg, Oral, Q8H [facility], Beverly Hassan MD, 975 mg at 22 0357    dexamethasone (DECADRON) injection 4 mg, 4 mg, Intravenous, Q4H, Tara Olivas DO    HYDROmorphone (DILAUDID) injection 0 5 mg, 0 5 mg, Intravenous, Q1H PRN, Beverly Hassan MD    Labetalol HCl (NORMODYNE) injection 10 mg, 10 mg, Intravenous, Q6H PRN, Beverly Hassan MD, 10 mg at 22 0357    ondansetron (ZOFRAN) injection 4 mg, 4 mg, Intravenous, Q4H PRN, Beverly Hassan MD    oxyCODONE (ROXICODONE) immediate release tablet 10 mg, 10 mg, Oral, Q4H PRN, Beverly Hassan MD    oxyCODONE (ROXICODONE) IR tablet 5 mg, 5 mg, Oral, Q4H PRN, Beverly Hassan MD, 5 mg at 22 0518    senna-docusate sodium (SENOKOT S) 8 6-50 mg per tablet 1 tablet, 1 tablet, Oral, HS PRN, Beverly Hassan MD    Current Outpatient Medications:     norgestimate-ethinyl estradiol (ORTHO-CYCLEN) 0 25-35 MG-MCG per tablet, Take 1 tablet by mouth daily, Disp: , Rfl:     No Known Allergies    Objective   No intake or output data in the 24 hours ending 22 0806  Invasive Devices  Report    Peripheral Intravenous Line            Peripheral IV 22 Left Antecubital <1 day              Physical Exam  Constitutional:       General: She is not in acute distress  Appearance: She is not toxic-appearing  HENT:      Head: Normocephalic and atraumatic  Nose: Nose normal       Mouth/Throat:      Mouth: Mucous membranes are moist       Pharynx: Oropharynx is clear  Eyes:      General: No scleral icterus  Extraocular Movements: Extraocular movements intact  Pupils: Pupils are equal, round, and reactive to light  Cardiovascular:      Rate and Rhythm: Normal rate  Pulmonary:      Effort: Pulmonary effort is normal    Abdominal:      General: Abdomen is flat  There is no distension  Musculoskeletal:         General: Normal range of motion  Cervical back: Normal range of motion  Lymphadenopathy:      Cervical: No cervical adenopathy  Skin:     General: Skin is warm and dry  Neurological:      Mental Status: She is alert  Comments: CN II-XII intact  5/5 strength in bilateral upper and lower extremities  Sensation intact to light touch throughout  Psychiatric:         Mood and Affect: Mood normal             Lab Results: I have personally reviewed pertinent reports  Imaging Studies: I have personally reviewed pertinent films in PACS  EKG, Pathology, and Other Studies: I have personally reviewed pertinent reports  Assessment/Plan     Assessment and Plan:  Ms Missy Harley is a 51year old woman presenting with upper back pain and an ulcerated/fungating right breast mass with axillary adenopathy and innumerable lytic osseous lesions  This constellation of findings is most congruent with metastatic disease, 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  I reviewed her imaging and concerns regarding encroachment upon the spinal canal in the upper thoracic spine  There is also extensive involvement of the cervical spine  While she is neurologically intact at rest, she does have radicular pain likely attributable to these areas of concern    I have requested dexamethasone 4 mg every 6 hours with PPI   MRI of the total spine/sacrum and neurosurgery consult are also recommended  In regards to establishing a diagnosis, she has a known breast mass which would likely be amenable to biopsy  I have asked the inpatient team to expedite obtaining a pathologic diagnosis  I reviewed the treatment of spinal metastases  I stated that surgery is sometimes performed to relieve compression or provide stabilization  If surgery is not an option, radiotherapy can be provided in the palliative setting in an attempt to relieve pain and prevent additional neurologic deficits or progression  I described the logistics of radiotherapy to the spine including the need for CT simulation and an anticipated 10 fraction (daily M-F) treatment course  If she does proceed to surgery, postoperative spine XRT is recommended  She has agreed to obtaining additional information including MRI and histologic diagnosis  She should have serial neurologic examinations and the radiation oncology service should be notified with new findings or deficits  I discussed that patient's requiring transport via stretcher typically receive inpatient XRT at [facility]  All questions were answered to the patient's satisfaction and recommendations were communicated to the inpatient team, Drs  Cardio and [employer]  Thank you for allowing me to participate in Ms Love's care  Code Status: Level 1 - Full Code  Advance Directive and Living Will:      Power of :    POLST:      Counseling / Coordination of Care  Total floor / unit time spent today 50 minutes  Greater than 50% of total time was spent with the patient and / or family counseling and / or coordination of care   A description of the counseling / coordination of care: Counseled regarding likely diagnosis, need for additional imaging and path confirmation and potential treatment of spinal disease

## 2022-03-29 NOTE — ASSESSMENT & PLAN NOTE
Blood Pressure: 141/77    No history of hypertension but patient self reports higher blood pressures in hospital/clinic setting    Anxiety/stress also contributing factor as new diagnoses of cancer with osseous metastasis     Plan:  · Monitor blood pressure while inpatient  · Labetalol PRN and will be continued on transfer

## 2022-03-29 NOTE — PROGRESS NOTES
I reviewed MRI findings and discussed with neurosurgery  The patient is being transferred to [facility] with plans for surgical stabilization/decompression of the cervical and upper thoracic spine  Postoperative XRT will be recommended after appropriate time for healing

## 2022-03-29 NOTE — CONSULTS
Consultation - Surgical Oncology  Sylvester Ormond 51 y o  female MRN: [id number]  Unit/Bed#: [facility] Encounter: [id number]        Assessment/Plan     Assessment:  51 F with fungating right breast mass and likely metastatic breast cancer    Plan:  Admission to medicine  Will obtain biopsy for tissue diagnosis  Recommend medical oncology and radiation oncology consultation    History of Present Illness     HPI:  Sylvester Ormond is a 51 y o  female who presents to the hospital with back pain  She states it started a few months ago and became progressively worse  It is located in the upper back  She also notes radiation to her bilateral shoulders  She states that when she coughs or sneezes, she has a lightning-like sensation down her bilateral arms and legs  Of note, she also noted a right breast mass which has been enlarging since January  The mass is not painful  However it has eroded through the skin and is now malodorous and weeps  She has not sought medical attention for these  She states she is otherwise healthy  Denies any family history of breast, uterine, ovarian, or any other cancers, aside from possibly an aunt with skin cancer  Review of Systems   Constitutional: Negative for appetite change, chills, fever and unexpected weight change  HENT: Negative  Eyes: Negative  Respiratory: Negative  Cardiovascular: Negative  Gastrointestinal: Negative  Endocrine: Negative  Genitourinary: Negative  Musculoskeletal: Positive for back pain  Skin: Positive for wound  Allergic/Immunologic: Negative  Neurological: Negative  Negative for dizziness, tremors, syncope, weakness, light-headedness, numbness and headaches  Hematological: Negative  Psychiatric/Behavioral: Negative  Historical Information   History reviewed  No pertinent past medical history  History reviewed  No pertinent surgical history    Social History   Social History     Substance and Sexual Activity   Alcohol Use None     Social History     Substance and Sexual Activity   Drug Use Not on file     Social History     Tobacco Use   Smoking Status Not on file   Smokeless Tobacco Not on file     Family History: History reviewed  No pertinent family history  Meds/Allergies   PTA meds:   None     No Known Allergies    Objective   First Vitals:   Blood Pressure: (!) 202/99 (03/28/22 1936)  Pulse: 105 (03/28/22 1936)  Temperature: 97 7 °F (36 5 °C) (03/28/22 1936)  Temp Source: Oral (03/28/22 1936)  Respirations: 18 (03/28/22 1936)  SpO2: 96 % (03/28/22 1936)    Current Vitals:   Blood Pressure: (!) 177/97 (03/29/22 0052)  Pulse: 100 (03/29/22 0052)  Temperature: 97 7 °F (36 5 °C) (03/28/22 1936)  Temp Source: Oral (03/28/22 1936)  Respirations: 18 (03/29/22 0052)  SpO2: 100 % (03/29/22 0052)    No intake or output data in the 24 hours ending 03/29/22 0154    Invasive Devices  Report    Peripheral Intravenous Line            Peripheral IV 03/28/22 Left Antecubital <1 day                Physical Exam  Constitutional:       General: She is not in acute distress  Appearance: She is not toxic-appearing  HENT:      Head: Normocephalic  Nose: Nose normal       Mouth/Throat:      Mouth: Mucous membranes are moist    Eyes:      Pupils: Pupils are equal, round, and reactive to light  Cardiovascular:      Rate and Rhythm: Normal rate  Pulmonary:      Effort: Pulmonary effort is normal  No respiratory distress  Chest:      Chest wall: Mass present  Breasts:      Right: Axillary adenopathy present  Comments: 8 cm fungating right breast mass, no significant erythema    Abdominal:      General: There is no distension  Palpations: Abdomen is soft  Tenderness: There is no abdominal tenderness  Musculoskeletal:         General: No tenderness  Cervical back: Neck supple  Lymphadenopathy:      Upper Body:      Right upper body: Axillary adenopathy present     Skin:     General: Skin is warm and dry       Capillary Refill: Capillary refill takes less than 2 seconds  Neurological:      General: No focal deficit present  Mental Status: She is alert  Psychiatric:         Mood and Affect: Mood normal          Behavior: Behavior normal          Lab Results:   CBC:   Lab Results   Component Value Date    WBC 8 44 03/28/2022    HGB 13 5 03/28/2022    HCT 40 4 03/28/2022    MCV 90 03/28/2022     03/28/2022    MCH 30 2 03/28/2022    MCHC 33 4 03/28/2022    RDW 12 4 03/28/2022    MPV 10 0 03/28/2022    NRBC 0 03/28/2022   , CMP:   Lab Results   Component Value Date    SODIUM 136 03/28/2022    K 3 3 (L) 03/28/2022     03/28/2022    CO2 22 03/28/2022    BUN 9 03/28/2022    CREATININE 0 74 03/28/2022    CALCIUM 9 3 03/28/2022    AST 80 (H) 03/28/2022     (H) 03/28/2022    ALKPHOS 314 (H) 03/28/2022    EGFR 96 03/28/2022     Imaging: I have personally reviewed pertinent reports  EKG, Pathology, and Other Studies: I have personally reviewed pertinent reports        Code Status: No Order  Advance Directive and Living Will:      Power of :    POLST:

## 2022-03-29 NOTE — ASSESSMENT & PLAN NOTE
Recent Labs     03/28/22  2330   AST 80*   *   ALKPHOS 314*   TBILI 0 49     Etiology:  Likely secondary to metastatic cancer, newly discovered    EtOH: rare, socially   Drug use: none   Acetaminophen: none, has been taking motrin as outpt    Imaging  CT Abd/Pev: see above     Plan:  - trend as needed  - avoid hepatotoxic agents

## 2022-03-29 NOTE — ASSESSMENT & PLAN NOTE
Right-sided fungating breast mass with Mets to axilla  Surgery aware of patient and following  See osseous metastasis for further details      Plan:  - Wound culture and gram stain ordered   - F/u surgery rec

## 2022-03-29 NOTE — ASSESSMENT & PLAN NOTE
Patient presented to the ED for back pain  Workup revealed findings listed below  Patient notified and aware  Discussed patient's medical history in detail  No maternal or paternal history of malignancy of any kind  Patient never smoker, does take birth control "mono-linyah" that she receives from [employer]  For her support, boyfriend gym at bedside; also has multiple siblings and her 59-year-old son  Suspected primary:  Breast cancer, tissue not yet obtained, staging TBD    CT chest abdomen pelvis w contrast: Large right breast mass measuring up to 6 9 cm  Right axillary metastatic adenopathy  Surgical consult recommended  Innumerable osseous metastasis  7 cm septated cystic left adnexal structure  According to current guidelines in this premenopausal woman, this should be followed up in 6 to 12 months by pelvic ultrasound  CT C spine: Innumerable bony metastases, Displaced fracture through the right posterior arch of C1, C4 metastasis causing a severe compression fracture and extension into the canal causing severe central canal stenosis and likely cord compression , T2 metastasis causing a moderate compression fracture and extension into the canal causing severe central canal stenosis and likely cord compression      Plan:  · Reassurance and support provided  · Pain regimen ordered and will be continued on transfer  · Surgery-oncology following, appreciate recommendations  · Radiation oncology consult placed; recommend dexamethasone 4 mg Q6H, rush biopsy, neurosurgical consult, and MRIs of C/T/L spine  · Neurosurgery consulted; recommend CT of C spine, MRI w/wo contrast of brain, C/T/L spine  · Radiology contacted primary team with acute findings on CT C spine including compression fracture, neck brace ordered and applied to patient   · Case management consulted; appreciate recommendations  · Monitor vitals

## 2022-03-29 NOTE — ASSESSMENT & PLAN NOTE
Patient presented to the ED for back pain  Workup revealed findings listed below  Patient notified and aware  Discussed patient's medical history in detail  No maternal or paternal history of malignancy of any kind  Patient never smoker, does take birth control "mono-linyah" that she receives from [employer]  For her support, boyfriend gym at bedside; also has multiple siblings and her 26-year-old son  Suspected primary:  Breast cancer, tissue not yet obtained, staging TBD    CT chest abdomen pelvis w contrast  Result Date: 3/29/2022  Impression: Large right breast mass measuring up to 6 9 cm  Right axillary metastatic adenopathy  Surgical consult recommended  Innumerable osseous metastasis  7 cm septated cystic left adnexal structure  According to current guidelines [place] [facility] 2020; [id number]) in this premenopausal woman, this should be followed up in 6 to 12 months by pelvic ultrasound   Workstation performed: [id number]     Plan:  - reassurance and support provided  - pain regimen  - surgery following, appreciate recommendations  - radiation oncology consult placed  - case management consult  - monitor vitals

## 2022-03-29 NOTE — ASSESSMENT & PLAN NOTE
Blood Pressure: (!) 177/97    No history of hypertension but pt self reports higher bps in hospital/clinic setting    Anxiety/stress also contributing factor as new diagnoses of cancer with osseous metastasis     Plan:  - Monitor blood pressure  - Labetalol PRN

## 2022-03-29 NOTE — PROGRESS NOTES
[facility]  Progress Note - Bradford Leyden 1975, 51 y o  female MRN: [id number]  Unit/Bed#: FT 03 Encounter: [id number]  Primary Care Provider: No primary care provider on file  Date and time admitted to hospital: 3/28/2022 10:45 PM    Transaminitis  Assessment & Plan  Recent Labs     03/28/22  2330   AST 80*   *   ALKPHOS 314*   TBILI 0 49     Etiology:  Likely secondary to metastatic cancer, newly discovered    EtOH: rare, socially   Drug use: none   Acetaminophen: none, has been taking motrin as outpt    Imaging  CT Abd/Pev: see above     Plan:  - trend as needed  - avoid hepatotoxic agents    Hypokalemia  Assessment & Plan  Recent Labs     03/28/22  2330   K 3 3*     Plan:  - K/Mg repletion  - Check magnesium, BMP tomorrow a m  Elevated BP without diagnosis of hypertension  Assessment & Plan  Blood Pressure: 159/95    No history of hypertension but pt self reports higher bps in hospital/clinic setting  Anxiety/stress also contributing factor as new diagnoses of cancer with osseous metastasis     Plan:  - Monitor blood pressure  - Labetalol PRN       Osseous metastasis ([address] )  Assessment & Plan  Patient presented to the ED for back pain  Workup revealed findings listed below  Patient notified and aware  Discussed patient's medical history in detail  No maternal or paternal history of malignancy of any kind  Patient never smoker, does take birth control "mono-linyah" that she receives from [employer] website  For her support, boyfriend gym at bedside; also has multiple siblings and her 51-year-old son  Suspected primary:  Breast cancer, tissue not yet obtained, staging TBD    CT chest abdomen pelvis w contrast  Result Date: 3/29/2022  Impression: Large right breast mass measuring up to 6 9 cm  Right axillary metastatic adenopathy  Surgical consult recommended  Innumerable osseous metastasis  7 cm septated cystic left adnexal structure   According to current guidelines Jagdish Joseph GVZZKW 6839; 06:819-807) in this premenopausal woman, this should be followed up in 6 to 12 months by pelvic ultrasound  Workstation performed: [id number]     Plan:  - reassurance and support provided  - pain regimen  - surgery following, appreciate recommendations  - radiation oncology consult placed  - case management consult  - monitor vitals    * Breast mass  Assessment & Plan  Right-sided fungating breast mass with Mets to axilla  Surgery aware of patient and following  See osseous metastasis for further details  Plan:  - Wound culture and gram stain ordered   - F/u surgery rec          VTE Pharmacologic Prophylaxis: VTE Score: 3 {VTE Risk:11089}    Patient Centered Rounds: {Pt Centered Care Rounds:10781}  Discussions with Specialists or Other Care Team Provider: ***    Education and Discussions with Family / Patient: {Family Communication:83948}    Current Length of Stay: 0 day(s)  Current Patient Status: Inpatient   Discharge Plan: {Discharge APSW:88873}    Code Status: Level 1 - Full Code    Subjective:   Elza Nieves is resting comfortably in bed upon examination  Boyfriend Zulema Preciado joined during the examination  Patient reports her pain is starting to return in her neck and across her shoulders  The lower back pain is worse when she goes to stand up  She also experiences a whole body "tingling" feeling when she stands up and sometimes has pain shoot down her legs  These symptoms lessen as she walks more  She denies saddle anesthesia and does not have incontinence of bowels or urine  She also describes numbness and tingling for a few seconds in her hands and feet when she sneezes  Patient shows understanding of medical findings so far       Objective:     Vitals:   Temp (24hrs), Av 7 °F (36 5 °C), Min:97 7 °F (36 5 °C), Max:97 7 °F (36 5 °C)    Temp:  [97 7 °F (36 5 °C)] 97 7 °F (36 5 °C)  HR:  [] 77  Resp:  [16-18] 16  BP: (141-202)/() 159/95  SpO2:  [93 %-100 %] 93 %  Body mass index is 35 57 kg/m²  Input and Output Summary (last 24 hours):   No intake or output data in the 24 hours ending 03/29/22 0930    Physical Exam:   Physical Exam  Vitals and nursing note reviewed  Exam conducted with a chaperone present  Constitutional:       General: She is not in acute distress  Appearance: She is obese  She is not diaphoretic  HENT:      Head: Normocephalic and atraumatic  Mouth/Throat:      Mouth: Mucous membranes are moist       Pharynx: Oropharynx is clear  No posterior oropharyngeal erythema  Eyes:      General: No scleral icterus  Extraocular Movements: Extraocular movements intact  Conjunctiva/sclera: Conjunctivae normal       Pupils: Pupils are equal, round, and reactive to light  Cardiovascular:      Rate and Rhythm: Normal rate and regular rhythm  Pulses: Normal pulses  Heart sounds: Normal heart sounds  No murmur heard  Pulmonary:      Effort: Pulmonary effort is normal       Breath sounds: Normal breath sounds  Chest:   Breasts:      Right: Mass, skin change and axillary adenopathy present  Comments: Large, ulcerated mass on medial aspect of R breast    Abdominal:      General: Abdomen is flat  Bowel sounds are normal  There is no distension  Palpations: Abdomen is soft  Tenderness: There is no abdominal tenderness  Musculoskeletal:      Cervical back: Neck supple  Lymphadenopathy:      Cervical: No cervical adenopathy  Upper Body:      Right upper body: Axillary adenopathy present  Skin:     General: Skin is warm and dry  Neurological:      General: No focal deficit present  Mental Status: She is alert and oriented to person, place, and time  Sensory: No sensory deficit  Motor: No weakness  Psychiatric:         Mood and Affect: Mood normal          Behavior: Behavior normal          Thought Content:  Thought content normal           Additional Data:     Labs:  Results from last 7 days   Lab Units 03/28/22  2330   WBC Thousand/uL 8 44   HEMOGLOBIN g/dL 13 5   HEMATOCRIT % 40 4   PLATELETS Thousands/uL 346   NEUTROS PCT % 60   LYMPHS PCT % 30   MONOS PCT % 7   EOS PCT % 1     Results from last 7 days   Lab Units 03/28/22  2330   SODIUM mmol/L 136   POTASSIUM mmol/L 3 3*   CHLORIDE mmol/L 101   CO2 mmol/L 22   BUN mg/dL 9   CREATININE mg/dL 0 74   ANION GAP mmol/L 13   CALCIUM mg/dL 9 3   ALBUMIN g/dL 3 6   TOTAL BILIRUBIN mg/dL 0 49   ALK PHOS U/L 314*   ALT U/L 128*   AST U/L 80*   GLUCOSE RANDOM mg/dL 105                 Results from last 7 days   Lab Units 03/29/22  0206   LACTIC ACID mmol/L 1 1       Lines/Drains:  Invasive Devices  Report    Peripheral Intravenous Line            Peripheral IV 03/28/22 Left Antecubital <1 day                      Imaging: {Radiology Review:62019}    Recent Cultures (last 7 days):   Results from last 7 days   Lab Units 03/29/22  0206   BLOOD CULTURE  Received in Microbiology Lab  Culture in Progress  Received in Microbiology Lab  Culture in Progress  Last 24 Hours Medication List:   Current Facility-Administered Medications   Medication Dose Route Frequency Provider Last Rate    acetaminophen  975 mg Oral [facility] Elena Hills MD      dexamethasone  4 mg Intravenous Q6H [address] Tara Olivas,       HYDROmorphone  0 5 mg Intravenous Q1H PRN Elena Hills MD      Labetalol HCl  10 mg Intravenous Q6H PRN Elena Hills MD      ondansetron  4 mg Intravenous Q4H PRN Elena Hills MD      oxyCODONE  10 mg Oral Q4H PRN Elena Hills MD      oxyCODONE  5 mg Oral Q4H PRN Elena Hills MD      pantoprazole  40 mg Oral Early Morning Jae Meals, DO      senna-docusate sodium  1 tablet Oral HS PRN Elena Hills MD          Today, Patient Was Seen By: Zondra Bence    **Please Note: This note may have been constructed using a voice recognition system  **

## 2022-03-29 NOTE — PLAN OF CARE
Problem: PAIN - ADULT  Goal: Verbalizes/displays adequate comfort level or baseline comfort level  Description: Interventions:  - Encourage patient to monitor pain and request assistance  - Assess pain using appropriate pain scale  - Administer analgesics based on type and severity of pain and evaluate response  - Implement non-pharmacological measures as appropriate and evaluate response  - Consider cultural and social influences on pain and pain management  - Notify physician/advanced practitioner if interventions unsuccessful or patient reports new pain  Outcome: Progressing     Problem: INFECTION - ADULT  Goal: Absence or prevention of progression during hospitalization  Description: INTERVENTIONS:  - Assess and monitor for signs and symptoms of infection  - Monitor lab/diagnostic results  - Monitor all insertion sites, i e  indwelling lines, tubes, and drains  - Monitor endotracheal if appropriate and nasal secretions for changes in amount and color  - Benedict appropriate cooling/warming therapies per order  - Administer medications as ordered  - Instruct and encourage patient and family to use good hand hygiene technique  - Identify and instruct in appropriate isolation precautions for identified infection/condition  Outcome: Progressing  Goal: Absence of fever/infection during neutropenic period  Description: INTERVENTIONS:  - Monitor WBC    Outcome: Progressing     Problem: SAFETY ADULT  Goal: Patient will remain free of falls  Description: INTERVENTIONS:  - Educate patient/family on patient safety including physical limitations  - Instruct patient to call for assistance with activity   - Consult OT/PT to assist with strengthening/mobility   - Keep Call bell within reach  - Keep bed low and locked with side rails adjusted as appropriate  - Keep care items and personal belongings within reach  - Initiate and maintain comfort rounds  - Make Fall Risk Sign visible to staff  - Apply yellow socks and bracelet for high fall risk patients  - Consider moving patient to room near nurses station  Outcome: Progressing  Goal: Maintain or return to baseline ADL function  Description: INTERVENTIONS:  -  Assess patient's ability to carry out ADLs; assess patient's baseline for ADL function and identify physical deficits which impact ability to perform ADLs (bathing, care of mouth/teeth, toileting, grooming, dressing, etc )  - Assess/evaluate cause of self-care deficits   - Assess range of motion  - Assess patient's mobility; develop plan if impaired  - Assess patient's need for assistive devices and provide as appropriate  - Encourage maximum independence but intervene and supervise when necessary  - Involve family in performance of ADLs  - Assess for home care needs following discharge   - Consider OT consult to assist with ADL evaluation and planning for discharge  - Provide patient education as appropriate  Outcome: Progressing  Goal: Maintains/Returns to pre admission functional level  Description: INTERVENTIONS:  - Perform BMAT or MOVE assessment daily    - Set and communicate daily mobility goal to care team and patient/family/caregiver     - Collaborate with rehabilitation services on mobility goals if consulted  - Out of bed for toileting  - Record patient progress and toleration of activity level   Outcome: Progressing     Problem: DISCHARGE PLANNING  Goal: Discharge to home or other facility with appropriate resources  Description: INTERVENTIONS:  - Identify barriers to discharge w/patient and caregiver  - Arrange for needed discharge resources and transportation as appropriate  - Identify discharge learning needs (meds, wound care, etc )  - Arrange for interpretive services to assist at discharge as needed  - Refer to Case Management Department for coordinating discharge planning if the patient needs post-hospital services based on physician/advanced practitioner order or complex needs related to functional status, cognitive ability, or social support system  Outcome: Progressing     Problem: Knowledge Deficit  Goal: Patient/family/caregiver demonstrates understanding of disease process, treatment plan, medications, and discharge instructions  Description: Complete learning assessment and assess knowledge base    Interventions:  - Provide teaching at level of understanding  - Provide teaching via preferred learning methods  Outcome: Progressing

## 2022-03-29 NOTE — H&P
[facility]  H&P- Jackie Plant 1975, 51 y o  female MRN: [id number]  Unit/Bed#: FT 03 Encounter: [id number]  Primary Care Provider: No primary care provider on file  Date and time admitted to hospital: 3/28/2022 10:45 PM    * Osseous metastasis [facility])  Assessment & Plan  Patient presented to the ED for back pain  Workup revealed findings listed below  Patient notified and aware  Discussed patient's medical history in detail  No maternal or paternal history of malignancy of any kind  Patient never smoker, does take birth control "mono-linyah" that she receives from [employer]  For her support, boyfriend gym at bedside; also has multiple siblings and her 26-year-old son  Suspected primary:  Breast cancer, tissue not yet obtained, staging TBD    CT chest abdomen pelvis w contrast  Result Date: 3/29/2022  Impression: Large right breast mass measuring up to 6 9 cm  Right axillary metastatic adenopathy  Surgical consult recommended  Innumerable osseous metastasis  7 cm septated cystic left adnexal structure  According to current guidelines Ashley Edwards Radiol 2020; 94:189-521) in this premenopausal woman, this should be followed up in 6 to 12 months by pelvic ultrasound  Workstation performed: [id number]     Plan:  - reassurance and support provided  - pain regimen  - surgery following, appreciate recommendations  - radiation oncology consult placed  - case management consult  - monitor vitals    Breast mass  Assessment & Plan  Right-sided fungating breast mass with Mets to axilla  Surgery aware of patient and following  See osseous metastasis for further details      Plan:  - Wound culture and gram stain ordered   - F/u surgery rec        Transaminitis  Assessment & Plan  Recent Labs     03/28/22  2330   AST 80*   *   ALKPHOS 314*   TBILI 0 49     Etiology:  Likely secondary to metastatic cancer, newly discovered    EtOH: rare, socially   Drug use: none   Acetaminophen: none, has been taking motrin as outpt    Imaging  CT Abd/Pev: see above     Plan:  - trend as needed  - avoid hepatotoxic agents    Hypokalemia  Assessment & Plan  Recent Labs     03/28/22  2330   K 3 3*     Plan:  - K/Mg repletion  - Check magnesium, BMP tomorrow a m  Elevated BP without diagnosis of hypertension  Assessment & Plan  Blood Pressure: (!) 177/97    No history of hypertension but pt self reports higher bps in hospital/clinic setting  Anxiety/stress also contributing factor as new diagnoses of cancer with osseous metastasis     Plan:  - Monitor blood pressure  - Labetalol PRN       VTE Pharmacologic Prophylaxis: VTE Score: 3 Moderate Risk (Score 3-4) - Pharmacological DVT Prophylaxis Ordered: enoxaparin (Lovenox)  Code Status: Level 1 - Full Code   Discussion with family: Updated  (significant other) at bedside  Anticipated Length of Stay: Patient will be admitted on an inpatient basis with an anticipated length of stay of greater than 2 midnights secondary to Newly diagnosed breast cancer, osseous metastases  Chief Complaint:  Back pain    History of Present Illness:  Sheree Mayorga is a 51 y o  female with no known pmhx who is presenting with back and neck pain for about a week and a half  Patient reports over the past few months, around Lizette time,  she noticed intermittent upper and lower back , neck pain that resolved with a Motrin/Tylenol/heating pad/topical agents  However, this past week and a half, pain has become severe and not responding to above measures which prompted her to come to ER  States she initially thought it was from hunching over her computer/desk, poor posture, etc  Also within this last week, she noticed that whenever she coughed or sneezed, she felt a lightening sensation" that radiated down her shoulders/arms  Otherwise no neurologic symptoms, no bowel or bladder incontinence      On ED arrival, patient noted to have elevated blood pressure 200/99  Patient with history of inconsistent medical follow-up  Does not have PCP and has been many years since she has been to doctors  Only medication has been Mono-Linyah birth control that she obtains from the "[employer]" website  Patient's social, family history reviewed in detail  Upon further workup in emergency department, laboratory studies revealed elevated ALP and ALT, slightly low potassium which was repleted  Imaging studies revealed new right fungating breast mass with Mets to axilla in addition to innumerable osseous mets  Patient informed of this by ED team   Patient then reported right breast mass which has been enlarging since January  Mass initially was a lump under the skin and patient thought it was usual for her as she has always had fibrous/"lumpy" breasts  However, it was not until the past 3 weeks that she noticed the lump erode the skin becoming malodorous and weeping  For her support, myroniensubha Crocker at bedside; also has multiple siblings and her 20-year-old son  Review of Systems:  Review of Systems   Constitutional: Negative for fever and unexpected weight change  Cardiovascular: Negative for chest pain, palpitations and leg swelling  Gastrointestinal: Negative for blood in stool, nausea and vomiting  Genitourinary: Negative for pelvic pain and vaginal bleeding  Musculoskeletal: Positive for arthralgias and back pain  "Lightening" sensation down b/l arms when she sneezes or coughs   Neurological: Negative for headaches  Psychiatric/Behavioral: The patient is nervous/anxious  All other systems reviewed and are negative  Past Medical and Surgical History:   History reviewed  No pertinent past medical history  History reviewed  No pertinent surgical history  Meds/Allergies:  Prior to Admission medications    Not on File     I have reviewed home medications with patient personally      Allergies: No Known Allergies    Social History:  Marital Status: Single (has ARTHUR Santos who was at bedside)   Occupation: HR at [address]   Patient Pre-hospital Living Situation: Home  Patient Pre-hospital Level of Mobility: walks  Patient Pre-hospital Diet Restrictions: None   Substance Use History:   Social History     Substance and Sexual Activity   Alcohol Use Yes    Comment: rare, social      Social History     Tobacco Use   Smoking Status Never Smoker   Smokeless Tobacco Not on file     Social History     Substance and Sexual Activity   Drug Use Never       Family History:  History reviewed  No pertinent family history  Physical Exam:     Vitals:   Blood Pressure: (!) 166/102 (03/29/22 0356)  Pulse: 101 (03/29/22 0356)  Temperature: 97 7 °F (36 5 °C) (03/28/22 1936)  Temp Source: Oral (03/28/22 1936)  Respirations: 18 (03/29/22 0356)  SpO2: 98 % (03/29/22 0356)    Physical Exam  Vitals reviewed  Constitutional:       General: She is not in acute distress  Appearance: She is not ill-appearing, toxic-appearing or diaphoretic  HENT:      Head: Normocephalic and atraumatic  Eyes:      General: No scleral icterus  Conjunctiva/sclera: Conjunctivae normal       Pupils: Pupils are equal, round, and reactive to light  Cardiovascular:      Rate and Rhythm: Normal rate and regular rhythm  Pulses: Normal pulses  Heart sounds: Normal heart sounds  Pulmonary:      Effort: Pulmonary effort is normal  No respiratory distress  Breath sounds: Normal breath sounds  No wheezing or rales  Abdominal:      General: Bowel sounds are normal  There is no distension  Palpations: Abdomen is soft  Tenderness: There is no abdominal tenderness  There is no guarding  Musculoskeletal:      Cervical back: Bony tenderness present  Thoracic back: No swelling, signs of trauma, tenderness or bony tenderness  Lumbar back: Bony tenderness present  No swelling or signs of trauma   Negative right straight leg raise test and negative left straight leg raise test       Right lower leg: No edema  Left lower leg: No edema  Comments: Patient with muscle fatigue when holding arms in front of her though when asked to hold against resistance strength intact    Midline tenderness C-spine  Slight paraspinal tenderness thoracic and lumbar spine    Skin:     General: Skin is warm  Coloration: Skin is not jaundiced  Comments: Fungating mass R breast (see clinical image obtained by ed provider)    Neurological:      Mental Status: She is alert and oriented to person, place, and time  Sensory: No sensory deficit  Psychiatric:         Mood and Affect: Mood normal          Behavior: Behavior normal       Comments: Mood appropriate to situation mild anxiety          Additional Data:     Lab Results:  Results from last 7 days   Lab Units 03/28/22  2330   WBC Thousand/uL 8 44   HEMOGLOBIN g/dL 13 5   HEMATOCRIT % 40 4   PLATELETS Thousands/uL 346   NEUTROS PCT % 60   LYMPHS PCT % 30   MONOS PCT % 7   EOS PCT % 1     Results from last 7 days   Lab Units 03/28/22  2330   SODIUM mmol/L 136   POTASSIUM mmol/L 3 3*   CHLORIDE mmol/L 101   CO2 mmol/L 22   BUN mg/dL 9   CREATININE mg/dL 0 74   ANION GAP mmol/L 13   CALCIUM mg/dL 9 3   ALBUMIN g/dL 3 6   TOTAL BILIRUBIN mg/dL 0 49   ALK PHOS U/L 314*   ALT U/L 128*   AST U/L 80*   GLUCOSE RANDOM mg/dL 105                 Results from last 7 days   Lab Units 03/29/22  0206   LACTIC ACID mmol/L 1 1       Imaging: Reviewed radiology reports from this admission including: chest xray and chest CT scan  CT chest abdomen pelvis w contrast   ED Interpretation by Mandy Knight MD (03/29 0045)   Right breast mass with increased contrast uptake in axillary nodes bilaterally  Left renal cyst       Final Result by Wallace Enamorado MD (03/29 0128)      Large right breast mass measuring up to 6 9 cm  Right axillary metastatic adenopathy  Surgical consult recommended  Innumerable osseous metastasis        7 cm septated cystic left adnexal structure  According to current guidelines Velton Elks Radiol 2020; 73:026-910) in this premenopausal woman, this should be followed up in 6 to 12 months by pelvic ultrasound  Workstation performed: [id number]         XR chest 2 views   ED Interpretation by Pilar Loya MD (03/28 2340)   No acute cardiopulmonary disease  No apparent spinal deformities          EKG and Other Studies Reviewed on Admission:   · EKG: pending   ** Please Note: This note has been constructed using a voice recognition system   **

## 2022-03-29 NOTE — ASSESSMENT & PLAN NOTE
Recent Labs     03/28/22  2330   AST 80*   *   ALKPHOS 314*   TBILI 0 49     Etiology:  Likely secondary to metastatic cancer, newly discovered    EtOH: rare, socially   Drug use: none   Acetaminophen: none, has been taking motrin as outpt    Imaging  CT Abd/Pev: see above     Plan:  · Trend as needed  · Avoid hepatotoxic agents

## 2022-03-29 NOTE — INCIDENTAL FINDINGS
The following findings require follow up:  Radiographic finding   Finding: Large right breast mass measuring up to 6 9 cm  Right axillary metastatic  adenopathy  Surgical consult recommended  Innumerable osseous metastasis  7 cm  septated cystic left adnexal structure  According to current guidelines Trang Crow Radiol 2020;  27:989-927) in this premenopausal woman, this should be followed up in 6 to 12 months by  pelvic ultrasound  Follow up required: yes    Follow up should be done within 6 month(s)    Please notify the following clinician to assist with the follow up: PCP, will place referral pawn eventual discharge  Can also discuss with provider team that she saw during this admission

## 2022-03-29 NOTE — PLAN OF CARE
Problem: Prexisting or High Potential for Compromised Skin Integrity  Goal: Skin integrity is maintained or improved  Description: INTERVENTIONS:  - Identify patients at risk for skin breakdown  - Assess and monitor skin integrity  - Assess and monitor nutrition and hydration status  - Monitor labs   - Assess for incontinence   - Turn and reposition patient  - Assist with mobility/ambulation  - Relieve pressure over bony prominences  - Avoid friction and shearing  - Provide appropriate hygiene as needed including keeping skin clean and dry  - Evaluate need for skin moisturizer/barrier cream  - Collaborate with interdisciplinary team   - Patient/family teaching  - Consider wound care consult   Outcome: Progressing     Problem: COPING  Goal: Pt/Family able to verbalize concerns and demonstrate effective coping strategies  Description: INTERVENTIONS:  - Assist patient/family to identify coping skills, available support systems and cultural and spiritual values  - Provide emotional support, including active listening and acknowledgement of concerns of patient and caregivers  - Reduce environmental stimuli, as able  - Provide patient education  - Assess for spiritual pain/suffering and initiate spiritual care, including notification of Pastoral Care or alivia based community as needed  - Assess effectiveness of coping strategies  Outcome: Progressing     Problem: DECISION MAKING  Goal: Pt/Family able to effectively weigh alternatives and participate in decision making related to treatment and care  Description: INTERVENTIONS:  - Identify decision maker  - Determine when there are differences among patient's view, family's view, and healthcare provider's view of patient condition and care goals  - Facilitate patient/family articulation of goals for care  - Help patient/family identify pros/cons of alternative solutions  - Provide information as requested by patient/family  - Respect patient/family rights related to privacy and sharing information   - Serve as a liaison between patient, family and health care team  - Initiate consults as appropriate (Ethics Team, Palliative Care, Family Care Conference, etc )  Outcome: Progressing     Problem: PAIN - ADULT  Goal: Verbalizes/displays adequate comfort level or baseline comfort level  Description: Interventions:  - Encourage patient to monitor pain and request assistance  - Assess pain using appropriate pain scale  - Administer analgesics based on type and severity of pain and evaluate response  - Implement non-pharmacological measures as appropriate and evaluate response  - Consider cultural and social influences on pain and pain management  - Notify physician/advanced practitioner if interventions unsuccessful or patient reports new pain  Outcome: Progressing     Problem: SAFETY ADULT  Goal: Patient will remain free of falls  Description: INTERVENTIONS:  - Educate patient/family on patient safety including physical limitations  - Instruct patient to call for assistance with activity   - Consult OT/PT to assist with strengthening/mobility   - Keep Call bell within reach  - Keep bed low and locked with side rails adjusted as appropriate  - Keep care items and personal belongings within reach  - Initiate and maintain comfort rounds  - Make Fall Risk Sign visible to staff  - Offer Toileting  in advance of need  - Apply yellow socks and bracelet for high fall risk patients  - Consider moving patient to room near nurses station  Outcome: Progressing     Problem: Knowledge Deficit  Goal: Patient/family/caregiver demonstrates understanding of disease process, treatment plan, medications, and discharge instructions  Description: Complete learning assessment and assess knowledge base    Interventions:  - Provide teaching at level of understanding  - Provide teaching via preferred learning methods  Outcome: Progressing

## 2022-03-29 NOTE — ASSESSMENT & PLAN NOTE
7cm right fungating breast mass with associated axillary adenopathy  · Patient discovered mass in January which steadily grew, was initially not concerned due to h/o cystic breasts  · Denies weight loss, abdominal pain, nausea, vomiting, diarrhea  · Presented with worsening neck and shoulder pain    Plan:  · Med onc and rad onc following  · Plan for emergent breast biopsy to establish diagnosis  · Further treatment pending biopsy results

## 2022-03-29 NOTE — PHYSICAL THERAPY NOTE
PHYSICAL THERAPY ORTHOTIC    Patient Name: Joy Henson  [id number] Date: 3/29/2022     03/29/22 1557   PT Last Visit   PT Visit Date 03/29/22   Note Type   Note type Orthotic Management/Training   Type of Brace   Brace Applied [place]   Additional Brace Ordered No   Patient Position When Brace Applied Supine   Bracing Recommendations None   Education   Education Provided Yes  (handout provided; spinal precautions)   End of Consult   Patient Position at End of Consult Seated edge of bed  (with nursing staff)   Nurse Communication Nurse aware of consult, application of brace   Assessment   Assessment Pt fit with [email or web address]  Anterior chin height left at lowest setting  Pt reports feeling better, however reports pain during donning  Handout provided and education provided on spinal precautions  All questions answered at bedside        Alycia Apodaca, PT,DPT

## 2022-03-29 NOTE — UTILIZATION REVIEW
Initial Clinical Review    Admission: Date/Time/Statement:   Admission Orders (From admission, onward)     Ordered        03/29/22 0141  Inpatient Admission  Once                      Orders Placed This Encounter   Procedures    Inpatient Admission     Standing Status:   Standing     Number of Occurrences:   1     Order Specific Question:   Level of Care     Answer:   Med Surg [16]     Order Specific Question:   Estimated length of stay     Answer:   More than 2 Midnights     Order Specific Question:   Certification     Answer:   I certify that inpatient services are medically necessary for this patient for a duration of greater than two midnights  See H&P and MD Progress Notes for additional information about the patient's course of treatment  ED Arrival Information     Expected Arrival Acuity    - 3/28/2022 19:02 Urgent         Means of arrival Escorted by Service Admission type    Walk-In Friend Hospitalist Urgent         Arrival complaint    neck/backshoulder pain        Chief Complaint   Patient presents with    Neck Pain     pt c/o neck/back/stiffness xcouple days, denies any prior injury/trauma       Initial Presentation: 51 y o  female from home to ED 3/28/22 and inpatient order placed 3/29/22 due to Osseous metastasis/breast mass/transaminitis  Presented due to worsening back pain starting about 2 months ago, initially lower back and for last 2 weeks upper back and neck pain  Admits to right breast mass for last 4 months, with foul drainage last 2 days  On exam hypertensive  Obese  Right breat has large malodorous fungating mass with open area measuring 5 cm in diameter and hard mass palpated additional 5 cm laterally  Right areola is thickened with rough surface  Paraspinal muscle tenderness and tightness throughout back  Bilateral decreased shoulder abduction secondary to pain  K 3 3  AST 80    Ct chest and abdomen showed large right breast mass, metastatic adenopathy    Osseous metastasis  Ct cervical spine showed bony metastases  Displaced fracture through right posterior arch of C1  C4 and T 2  metastasis  Causing cord compression  In the ED given Toradol, KCL,  2 does IV analgesia, Labetalol and Zofran  Plan is pain control, consult surgical oncology, radiation oncology  Trend LFTs, replete K  Trend BP with no history of hpt      3/29/22 per surgical oncology - patient has fungating right breast mass and likely metastatic breast cancer  Plan is biopsy of mass  Medical and radiation oncology consults  3/29/22 per radiation oncology - patient with metastatic disease and suspect breast primary  Plan is start Decadron  MRI spine, consult neuro surgery  If no surgery then radiotherapy in attempt to relieve pain  If surgery then post op spine XRT  Serial neuro exams  3/29/22 per neuro surgery - patient has osseous metastasis  Disease burden is largest in cervical and upper thoracic spine  Recommend MRI brain, cervical, thoracic, lumbar and sacrum  Decadron  [place] collar  Pain control  Neuro checks  Recommend transfer to [facility] as will need cervical fusion prior to start of radiation due to disease/cord compression     3/29/22 per oncology medical - patient has fungating breast mass - needs emergent breast biopsy  Dc OCP    Need path to determine treatment        ED Triage Vitals   Temperature Pulse Respirations Blood Pressure SpO2   03/28/22 1936 03/28/22 1936 03/28/22 1936 03/28/22 1936 03/28/22 1936   97 7 °F (36 5 °C) 105 18 (!) 202/99 96 %      Temp Source Heart Rate Source Patient Position - Orthostatic VS BP Location FiO2 (%)   03/28/22 1936 03/28/22 1936 03/28/22 1936 03/28/22 1936 --   Oral Monitor Sitting Left arm       Pain Score       03/28/22 1937       8          Wt Readings from Last 1 Encounters:   03/29/22 94 kg (207 lb 3 7 oz)     Additional Vital Signs:   03/29/22 12:41:24 -- 82 16 141/77 -- 90 % -- --   03/29/22 0906 -- 77 16 159/95 -- 93 % None (Room air) Sitting   03/29/22 0510 -- 83 18 141/82 -- 95 % -- --   03/29/22 0356 -- 101 18 166/102 Abnormal  -- 98 % -- --   03/29/22 0052 -- 100 18 177/97 Abnormal  -- 100 %         Pertinent Labs/Diagnostic Test Results:   MRI cervical spine w wo contrast   Final Result by Gen Senior DO (03/29 1301)      Diffuse, primarily lytic osseous metastatic disease is seen throughout the cervical vertebral bodies from C1 into the upper thoracic spine  There is a significant amount of epidural tumor arising from pathologic compression fractures of C4 and T2  At    the C4 level there is moderate to severe canal stenosis with flattening of the cord which maintains normal signal   Moderate canal stenosis at the T2 level  There is corresponding foraminal narrowing as a result of epidural and foraminal tumor  Workstation performed: [id number]         MRI thoracic spine w wo contrast   Final Result by Gen Senior DO (03/29 1307)      Diffuse, lytic, osseous metastasis is seen throughout the thoracic spine more prominently within the upper thoracic spine where there is epidural tumor and involvement of the posterior elements from T2 through T5  Moderate canal stenosis at the T2    level  Foraminal narrowing as described above  No abnormal cord signal identified  Workstation performed: [id number]         MRI lumbar spine w wo contrast   Final Result by Gen Senior DO (03/29 1316)      Diffuse lytic osseous metastasis within the lower thoracic spine, lumbar spine and sacrum  There is a moderate amount of epidural tumor as a result of the expansile sacral lesion resulting in moderate sacral canal stenosis  Tumor extends into the S2-3    and S3-4 neural foramen on the left  Correlate for corresponding radiculopathy  Bilobed cystic mass within the left pelvis incompletely evaluated on this examination    Follow-up pelvic ultrasound imaging is recommended  Workstation performed: [id number]         CT spine cervical wo contrast   Final Result by Gunjan Triplett MD (03/29 1002)      1  Innumerable bony metastases  2   Displaced fracture through the right posterior arch of C1    3   C4 metastasis causing a severe compression fracture and extension into the canal causing severe central canal stenosis and likely cord compression  4   T2 metastasis causing a moderate compression fracture and extension into the canal causing severe central canal stenosis and likely cord compression  I sent a tiger text to Bernarda Feliz on 3/29/2022 at 9:57 AM       The study was marked in [facility] for immediate notification  Workstation performed: [id number]         CT chest abdomen pelvis w contrast   ED Interpretation by Prasad Chávez MD (03/29 0045)   Right breast mass with increased contrast uptake in axillary nodes bilaterally  Left renal cyst       Final Result by Mariela Melo MD (03/29 0128)      Large right breast mass measuring up to 6 9 cm  Right axillary metastatic adenopathy  Surgical consult recommended  Innumerable osseous metastasis  7 cm septated cystic left adnexal structure  According to current guidelines [employer] Radiol 2020; 18:563-269) in this premenopausal woman, this should be followed up in 6 to 12 months by pelvic ultrasound  Workstation performed: [email or web address]         XR chest 2 views   ED Interpretation by Prasad Chváez MD (03/28 2340)   No acute cardiopulmonary disease  No apparent spinal deformities      Final Result by Ruby Carr MD (03/29 1178)      No acute cardiopulmonary disease                    Workstation performed: [id number]           3/28/22 ecg Sinus tachycardia   Minimal voltage criteria for LVH, may be normal variant   Borderline ECG   Results from last 7 days   Lab Units 03/29/22  1430 03/28/22  2330   WBC Thousand/uL 9 63 8 44   HEMOGLOBIN g/dL 12 9 13  5   HEMATOCRIT % 38 6 40 4   PLATELETS Thousands/uL 328 346   NEUTROS ABS Thousands/µL 7 85* 5 20     Results from last 7 days   Lab Units 03/29/22  1430 03/29/22  0642 03/28/22  2330   SODIUM mmol/L 135*  --  136   POTASSIUM mmol/L 4 1  --  3 3*   CHLORIDE mmol/L 99*  --  101   CO2 mmol/L 26  --  22   ANION GAP mmol/L 10  --  13   BUN mg/dL 11  --  9   CREATININE mg/dL 0 72  --  0 74   EGFR ml/min/1 73sq m 100  --  96   CALCIUM mg/dL 8 9  --  9 3   MAGNESIUM mg/dL  --  1 8  --      Results from last 7 days   Lab Units 03/29/22  1430 03/28/22  2330   AST U/L 78* 80*   ALT U/L 130* 128*   ALK PHOS U/L 311* 314*   TOTAL PROTEIN g/dL 7 7 8 1   ALBUMIN g/dL 3 4* 3 6   TOTAL BILIRUBIN mg/dL 0 52 0 49     Results from last 7 days   Lab Units 03/29/22  1430 03/28/22  2330   GLUCOSE RANDOM mg/dL 113 105     Results from last 7 days   Lab Units 03/28/22  2330   CK TOTAL U/L 35     Results from last 7 days   Lab Units 03/29/22  0206   LACTIC ACID mmol/L 1 1     Results from last 7 days   Lab Units 03/29/22  0206   BLOOD CULTURE  Received in Microbiology Lab  Culture in Progress  Received in Microbiology Lab  Culture in Progress         ED Treatment:   Medication Administration from 03/28/2022 1902 to 03/29/2022 1240       Date/Time Order Dose Route Action Comments     03/28/2022 2332 ketorolac (TORADOL) injection 30 mg 30 mg Intravenous Given      03/29/2022 0157 potassium chloride (K-DUR,KLOR-CON) CR tablet 20 mEq 20 mEq Oral Given      03/29/2022 0157 morphine (PF) 4 mg/mL injection 4 mg 4 mg Intravenous Given      03/29/2022 0357 Labetalol HCl (NORMODYNE) injection 10 mg 10 mg Intravenous Given      03/29/2022 0840 ondansetron (ZOFRAN) injection 4 mg 4 mg Intravenous Given      03/29/2022 0518 oxyCODONE (ROXICODONE) IR tablet 5 mg 5 mg Oral Given      03/29/2022 1122 HYDROmorphone (DILAUDID) injection 0 5 mg 0 5 mg Intravenous Given      03/29/2022 0840 oxyCODONE (ROXICODONE) immediate release tablet 10 mg 10 mg Oral Given      03/29/2022 0357 acetaminophen (TYLENOL) tablet 975 mg 975 mg Oral Given      03/29/2022 1121 dexamethasone (DECADRON) injection 4 mg 4 mg Intravenous Given      03/29/2022 0918 pantoprazole (PROTONIX) EC tablet 40 mg 40 mg Oral Given      03/29/2022 1051 LORazepam (ATIVAN) tablet 1 mg 1 mg Oral Given         History reviewed  No pertinent past medical history  Present on Admission:   Breast mass, right      Admitting Diagnosis: Neck pain [M54 2]  Back pain [M54 9]  Osseous metastasis (HCC) [C79 51]  Metastatic cancer to spine ([facility] [address] ) [C79 51]  Breast mass in female [N63 0]  Age/Sex: 51 y o  female  Admission Orders: 3/29/22 0141 inpatient   Scheduled Medications:  acetaminophen, 975 mg, Oral, Q8H [facility]  dexamethasone, 4 mg, Intravenous, Q6H FABIANA  lidocaine (PF), 30 mL, Infiltration, Once  pantoprazole, 40 mg, Oral, Early Morning      Continuous IV Infusions:  No current facility-administered medications for this encounter  PRN Meds:  HYDROmorphone, 0 5 mg, Intravenous, Q1H PRN - used x 1 3/29  Labetalol HCl, 10 mg, Intravenous, Q6H PRN - used x 1 3/29  ondansetron, 4 mg, Intravenous, Q4H PRN - used x 1 3/29  oxyCODONE, 10 mg, Oral, Q4H PRN - used x 1 3/29  oxyCODONE, 5 mg, Oral, Q4H PRN - used x 1 3/29  senna-docusate sodium, 1 tablet, Oral, HS PRN    Cervical brace - [place]     IP CONSULT TO RADIATION ONCOLOGY  IP CONSULT TO NEUROSURGERY  IP CONSULT TO ONCOLOGY  IP CONSULT TO SURGICAL ONCOLOGY    Network Utilization Review Department  ATTENTION: Please call with any questions or concerns to [phone] and carefully listen to the prompts so that you are directed to the right person  All voicemails are confidential   Kim Rodrigues all requests for admission clinical reviews, approved or denied determinations and any other requests to dedicated fax number below belonging to the campus where the patient is receiving treatment   List of dedicated fax numbers for the Facilities:  [facility] ADMISSION DENIALS (Administrative/Medical Necessity) [phone]   [address] (Maternity/NICU/Pediatrics) 261 [facility] [facility] 40 125 [facility]  [phone]   Lance Savage 50 [address] [place] [id number] [address] Vaibhav Reed [address] [facility] [address] [facility] [phone]

## 2022-03-29 NOTE — ASSESSMENT & PLAN NOTE
Recent Labs     03/28/22  2330   K 3 3*     Plan:  - K/Mg repletion  - Check magnesium, BMP tomorrow a m

## 2022-03-29 NOTE — CONSULTS
Medical Oncology/Hematology Consult Note  Emily Grady, female, 51 y o , 1975,  FT 03/FT 03, [id number]     Reason for admission: Breast mass  Reason for consultation: Breast mass      ASSESSMENT AND PLAN:     1  Fungating breast mass   Please see media tab for reference, approximately 4cm surface lesion with approx  6cm mass deeper with irregular borders   CT C AP with contrast 03/29/2022 significant for 6 9 x 6 2 x 6 4 cm mass extending no overlying skin surface and right pectoralis major muscle with overlying skin induration  Right axillary adenopathy measuring up to 3 8 cm  Innumerable lytic lesions throughout the visualized osseous structures largest within left sacrum measuring up to 4 4 cm with destructive changes and encroachment upon presacral space  Additional findings 7 cm septated cystic left adnexal structure recommend follow-up 6-12 months by pelvic ultrasound   Radiation oncology input appreciated, recommended dexamethasone 4 mg q 6 hours with PPI for GI prophylaxis as well as MRI total spine/sacrum and Neurosurgery consultation  If patient is a surgical candidate they recommended postoperative spine XRT, if not a surgical candidate recommend palliative radiation therapy   Appreciate neurosurgery input, patient will be transferred to [facility] for further surgical discussion, pending MRI believe patient will likely need cervical fusion prior to RT given extent of cord compression   Plan for emergent breast biopsy to expedite diagnosis and treatment due to extent of disease   Hopeful if surgical intervention is needed to also obtain intraoperative bone bx for confirmation of metastatic disease    -Educated about discontinuing OCP and pursuing non-hormonal contraceptive methods   -notify team covering [facility] the patient will be transferred to their care  -[facility] so plan to get patient scheduled [facility] in approximately 2 weeks    Will adjust as necessary  Patient understands and is in agreement with this plan  Boyfriend in room for entire visit with patient's permission  Thank you for the opportunity to participate in this patient's care  Interval History: Feeling understandably overwhelmed but handling likely diagnosis well  Stated our therapy from medical oncology perspective is dependent on pathology results  Educated patient on my MRI brain was necessary and why patient needs to avoid estrogen/progeterone/derivatives  Patient denies any headaches, changes in vision including blurred or double vision, lightheadedness, weakness, gait changes, falls, personal history of any prior malignancies or bleeding or clotting disorders  Rest of 14 point ROS as below  ECO prior to arrival     History of present illness:  Patient is a 43-year-old female presenting to the hospital with chief complaint of back pain  Stated that this started a few months prior and has become progressively worse  Used to have relief with over-the-counter acetaminophen ibuprofen, bengay, tiger balm, heat packs but last few days cannot control it regardless of what she used  Pain radiates to bilateral shoulders  When coughing or sneezing she has tingling mostly b/l upper extremities, occasionally b/l lower extremities  Right breast mass has been enlarging since December, thought it was related to hx of fibrocystic breast disease  Now ulcerative, malodorous, discharge patient has never been evaluated for this breast mass previously  Patient has not been receiving yearly mammograms  No family history of breast, ovarian, uterine, endometrial, colon, melanoma, thyroid, etc      No family history of any bleeding or clotting disorders  LMP - about a month ago end of      On [place] combination OCP counseled patient to stop this medication       Smoking History: Never    ETOH history: Social     Review of Systems:   Review of Systems Constitutional: Negative for appetite change, chills, fatigue, fever and unexpected weight change  HENT: Negative for ear pain and sore throat  Eyes: Negative for pain and visual disturbance  Respiratory: Negative for cough and shortness of breath  Cardiovascular: Negative for chest pain and palpitations  Gastrointestinal: Negative for abdominal pain, blood in stool, constipation, diarrhea, nausea and vomiting  Genitourinary: Negative for dysuria and hematuria  Musculoskeletal: Positive for back pain  Negative for arthralgias  Skin: Positive for wound (Right breast)  Negative for color change and rash  Neurological: Negative for dizziness, seizures, syncope, facial asymmetry, weakness, light-headedness and headaches  Hematological: Positive for adenopathy (right axillary)  Does not bruise/bleed easily  All other systems reviewed and are negative  PHYSICAL EXAM:    /95 (BP Location: Right arm)   Pulse 77   Temp 97 7 °F (36 5 °C) (Oral)   Resp 16   Ht 5' 4" (1 626 m)   Wt 94 kg (207 lb 3 7 oz)   LMP 02/28/2022   SpO2 93%   BMI 35 57 kg/m²     Physical Exam  Vitals and nursing note reviewed  Constitutional:       General: She is not in acute distress  Appearance: Normal appearance  She is not ill-appearing  HENT:      Head: Normocephalic and atraumatic  Eyes:      General: No scleral icterus  Cardiovascular:      Rate and Rhythm: Normal rate and regular rhythm  Pulses: Normal pulses  Heart sounds: No murmur heard  Pulmonary:      Effort: Pulmonary effort is normal       Breath sounds: Normal breath sounds  No wheezing or rhonchi  Chest:   Breasts:      Right: Inverted nipple, mass, nipple discharge, skin change and axillary adenopathy present  No tenderness or supraclavicular adenopathy  Left: No inverted nipple, mass, nipple discharge, skin change, tenderness, axillary adenopathy or supraclavicular adenopathy              Comments: Right breast mass, ulcerative, surrounding ecchymosis, right axillary lymphadenopathy present  Abdominal:      General: Abdomen is flat  Bowel sounds are normal       Palpations: Abdomen is soft  Tenderness: There is no abdominal tenderness  There is no guarding  Musculoskeletal:      Cervical back: Normal range of motion and neck supple  Right lower leg: No edema  Left lower leg: No edema  Lymphadenopathy:      Cervical: No cervical adenopathy  Upper Body:      Right upper body: Axillary adenopathy present  No supraclavicular or pectoral adenopathy  Left upper body: No supraclavicular, axillary or pectoral adenopathy  Skin:     General: Skin is warm and dry  Findings: No bruising  Neurological:      Mental Status: She is alert and oriented to person, place, and time           LABS:     Recent Results (from the past 48 hour(s))   CBC and differential    Collection Time: 03/28/22 11:30 PM   Result Value Ref Range    WBC 8 44 4 31 - 10 16 Thousand/uL    RBC 4 47 3 81 - 5 12 Million/uL    Hemoglobin 13 5 11 5 - 15 4 g/dL    Hematocrit 40 4 34 8 - 46 1 %    MCV 90 82 - 98 fL    MCH 30 2 26 8 - 34 3 pg    MCHC 33 4 31 4 - 37 4 g/dL    RDW 12 4 11 6 - 15 1 %    MPV 10 0 8 9 - 12 7 fL    Platelets 560 437 - 661 Thousands/uL    nRBC 0 /100 WBCs    Neutrophils Relative 60 43 - 75 %    Immat GRANS % 1 0 - 2 %    Lymphocytes Relative 30 14 - 44 %    Monocytes Relative 7 4 - 12 %    Eosinophils Relative 1 0 - 6 %    Basophils Relative 1 0 - 1 %    Neutrophils Absolute 5 20 1 85 - 7 62 Thousands/µL    Immature Grans Absolute 0 05 0 00 - 0 20 Thousand/uL    Lymphocytes Absolute 2 50 0 60 - 4 47 Thousands/µL    Monocytes Absolute 0 59 0 17 - 1 22 Thousand/µL    Eosinophils Absolute 0 06 0 00 - 0 61 Thousand/µL    Basophils Absolute 0 04 0 00 - 0 10 Thousands/µL   Comprehensive metabolic panel    Collection Time: 03/28/22 11:30 PM   Result Value Ref Range    Sodium 136 136 - 145 mmol/L    Potassium 3 3 (L) 3 5 - 5 3 mmol/L    Chloride 101 100 - 108 mmol/L    CO2 22 21 - 32 mmol/L    ANION GAP 13 4 - 13 mmol/L    BUN 9 5 - 25 mg/dL    Creatinine 0 74 0 60 - 1 30 mg/dL    Glucose 105 65 - 140 mg/dL    Calcium 9 3 8 3 - 10 1 mg/dL    AST 80 (H) 5 - 45 U/L     (H) 12 - 78 U/L    Alkaline Phosphatase 314 (H) 46 - 116 U/L    Total Protein 8 1 6 4 - 8 2 g/dL    Albumin 3 6 3 5 - 5 0 g/dL    Total Bilirubin 0 49 0 20 - 1 00 mg/dL    eGFR 96 ml/min/1 73sq m   CK Total with Reflex CKMB    Collection Time: 03/28/22 11:30 PM   Result Value Ref Range    Total CK 35 26 - 192 U/L   hCG, qualitative pregnancy    Collection Time: 03/28/22 11:30 PM   Result Value Ref Range    Preg, Serum Negative Negative   ECG 12 lead    Collection Time: 03/28/22 11:49 PM   Result Value Ref Range    Ventricular Rate 0 BPM    Atrial Rate 0 BPM    MS Interval  ms    QRSD Interval 0 ms    QT Interval 0 ms    QTC Interval 0 ms    P Axis  degrees    QRS Axis 0 degrees    T Wave Axis 0 degrees   ECG 12 lead    Collection Time: 03/28/22 11:54 PM   Result Value Ref Range    Ventricular Rate 103 BPM    Atrial Rate 103 BPM    MS Interval 146 ms    QRSD Interval 72 ms    QT Interval 338 ms    QTC Interval 442 ms    P Axis 45 degrees    QRS Axis -1 degrees    T Wave Axis 48 degrees   Blood culture #1    Collection Time: 03/29/22  2:06 AM    Specimen: Arm, Left; Blood   Result Value Ref Range    Blood Culture Received in Microbiology Lab  Culture in Progress  Blood culture #2    Collection Time: 03/29/22  2:06 AM    Specimen: Arm, Left; Blood   Result Value Ref Range    Blood Culture Received in Microbiology Lab  Culture in Progress      Lactic acid    Collection Time: 03/29/22  2:06 AM   Result Value Ref Range    LACTIC ACID 1 1 0 5 - 2 0 mmol/L   Magnesium    Collection Time: 03/29/22  6:42 AM   Result Value Ref Range    Magnesium 1 8 1 6 - 2 6 mg/dL       XR chest 2 views    Result Date: 3/29/2022  Narrative: CHEST INDICATION:   htn, upper back pain  COMPARISON:  None EXAM PERFORMED/VIEWS:  XR CHEST PA & LATERAL FINDINGS: Cardiomediastinal silhouette appears unremarkable  The lungs are clear  No pneumothorax or pleural effusion  Osseous structures appear within normal limits for patient age  Impression: No acute cardiopulmonary disease  Workstation performed: [id number]     CT chest abdomen pelvis w contrast    Result Date: 3/29/2022  Narrative: CT CHEST, ABDOMEN AND PELVIS WITH IV CONTRAST INDICATION:   Evaluate for metastases  COMPARISON:  None  TECHNIQUE: CT examination of the chest, abdomen and pelvis was performed  Axial, sagittal, and coronal 2D reformatted images were created from the source data and submitted for interpretation  Radiation dose length product (DLP) for this visit:  1086 mGy-cm   This examination, like all CT scans performed in the [facility], was performed utilizing techniques to minimize radiation dose exposure, including the use of iterative reconstruction and automated exposure control  IV Contrast:  100 mL of iohexol (OMNIPAQUE) Enteric Contrast: Enteric contrast was administered  FINDINGS: CHEST LUNGS:  Lungs are clear  There is no tracheal or endobronchial lesion  PLEURA:  Unremarkable  HEART/GREAT VESSELS: Heart is unremarkable for patient's age  No thoracic aortic aneurysm  MEDIASTINUM AND ALISON:  Unremarkable  CHEST WALL AND LOWER NECK:   6 9 x 6 2 x 6 4 cm mass extending to the overlying skin surface and right pectoralis major muscle with overlying skin induration  Right axillary adenopathy measuring up to 3 8 cm  ABDOMEN LIVER/BILIARY TREE:  Unremarkable  GALLBLADDER:  No calcified gallstones  No pericholecystic inflammatory change  SPLEEN:  Unremarkable  PANCREAS:  Unremarkable  ADRENAL GLANDS:  Unremarkable  KIDNEYS/URETERS:  One or more simple renal cyst(s) is noted  Nonobstructive calculus at the left inferior pole     No hydronephrosis  STOMACH AND BOWEL:  Unremarkable   APPENDIX:  No findings to suggest appendicitis  ABDOMINOPELVIC CAVITY:  No ascites  No pneumoperitoneum  No lymphadenopathy  VESSELS:  Unremarkable for patient's age  PELVIS REPRODUCTIVE ORGANS:  7 cm septated cystic left adnexal structure  URINARY BLADDER:  Unremarkable  ABDOMINAL WALL/INGUINAL REGIONS:  Unremarkable  OSSEOUS STRUCTURES:  Innumerable lytic lesions throughout the visualized osseous structures  The largest within the left sacrum measures up to 4 4 cm with destructive changes and encroachment upon the presacral space  Impression: Large right breast mass measuring up to 6 9 cm  Right axillary metastatic adenopathy  Surgical consult recommended  Innumerable osseous metastasis  7 cm septated cystic left adnexal structure  According to current guidelines [place] Jackelyn Radiol 2020; 96:396-512) in this premenopausal woman, this should be followed up in 6 to 12 months by pelvic ultrasound  Workstation performed: [id number]         HISTORY:    History reviewed  No pertinent past medical history  History reviewed  No pertinent surgical history  History reviewed  No pertinent family history      Social History     Socioeconomic History    Marital status: Single     Spouse name: None    Number of children: None    Years of education: None    Highest education level: None   Occupational History    None   Tobacco Use    Smoking status: Never Smoker    Smokeless tobacco: None   Vaping Use    Vaping Use: Never used   Substance and Sexual Activity    Alcohol use: Yes     Comment: rare, social     Drug use: Never    Sexual activity: None   Other Topics Concern    None   Social History Narrative    None     Social Determinants of Health     Financial Resource Strain: Not on file   Food Insecurity: Not on file   Transportation Needs: Not on file   Physical Activity: Not on file   Stress: Not on file   Social Connections: Not on file   Intimate Partner Violence: Not on file   Housing Stability: Not on file         Current Facility-Administered Medications:     acetaminophen (TYLENOL) tablet 975 mg, 975 mg, Oral, Q8H [facility], Elena Hills MD, 975 mg at 03/29/22 0357    dexamethasone (DECADRON) injection 4 mg, 4 mg, Intravenous, Q6H [facility], Tara Olivas,     HYDROmorphone (DILAUDID) injection 0 5 mg, 0 5 mg, Intravenous, Q1H PRN, Elena Hills MD    Labetalol HCl (NORMODYNE) injection 10 mg, 10 mg, Intravenous, Q6H PRN, Elena Hills MD, 10 mg at 03/29/22 0357    ondansetron (ZOFRAN) injection 4 mg, 4 mg, Intravenous, Q4H PRN, Elena Hills MD, 4 mg at 03/29/22 0840    oxyCODONE (ROXICODONE) immediate release tablet 10 mg, 10 mg, Oral, Q4H PRN, Elena Hills MD, 10 mg at 03/29/22 0840    oxyCODONE (ROXICODONE) IR tablet 5 mg, 5 mg, Oral, Q4H PRN, Elena Hills MD, 5 mg at 03/29/22 0518    pantoprazole (PROTONIX) EC tablet 40 mg, 40 mg, Oral, [facility], Jae Meals, DO    senna-docusate sodium (SENOKOT S) 8 6-50 mg per tablet 1 tablet, 1 tablet, Oral, HS PRN, Elena Hills MD    Current Outpatient Medications:     norgestimate-ethinyl estradiol (ORTHO-CYCLEN) 0 25-35 MG-MCG per tablet, Take 1 tablet by mouth daily, Disp: , Rfl:     (Not in a hospital admission)      No Known Allergies    Labs and pertinent reports reviewed  This note has been generated by voice recognition software system  Therefore, there may be spelling, grammar, and or syntax errors  Please contact if questions arise

## 2022-03-29 NOTE — CONSULTS
Please see Consult Note by Briseyda Martin MD on 3/29/22 for full consultation details      Jacey Limon MD  PGY-4, General Surgery  3/29/2022

## 2022-03-29 NOTE — CONSULTS
Aydee 43 1975, 51 y o  female MRN: [id number]  Unit/Bed#: S -01 Encounter: [id number]  Primary Care Provider: No primary care provider on file  Date and time admitted to hospital: 3/28/2022 10:45 PM    Inpatient consult to Neurosurgery  Consult performed by: Nataliia Bajwa PA-C  Consult ordered by: Sharda Aj DO      Imaging personally reviewed with attending 3/29/22 at 8:30am  Patient examined at bedside 03/29/2022 at 10:45 am    * Osseous metastasis [facility])  Assessment & Plan  Diffuse osseous mets, 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  Largest disease burden in cervical and upper thoracic spine  · Patient presented with worsening neck pain and shoulder pain for the past 3 days, not relieved with medication  · Pain started several weeks ago  · Found to have large fungating right breast mass  · No FH cancer, no unintentional weight loss  · On exam bilateral deltoid weakness, otherwise nonfocal  · SIN score 14-16    Imaging:  · CTCAP w/o, 3/29/22: Innumerable lytic lesions throughout the visualized osseous structures  The largest within the left sacrum measures up to 4 4 cm with destructive changes and encroachment upon the presacral space  · CT cervical spine w/o, 3/29/22: Innumerable bony metastases  Displaced fracture through the right posterior arch of C1  C4 metastasis causing a severe compression fracture and extension into the canal causing severe central canal stenosis and likely cord compression  T2 metastasis causing a moderate compression fracture and extension into the canal causing severe central canal stenosis and likely cord compression      Plan:  · MRI brain, cervical, thoracic, lumbar, and sacrum w/wo contrast pending for staging and to evaluate for extent of disease  · Will order cervical flexion/extension imaging for surgical planning  · Agree with decadron 4q6  · VISTA tx collar at all times  · Pain control per primary team  · Routine neurological checks  · Medical management per primary team  · Will ultimately need PT/OT pending treatment  · Medical oncology following, will obtain biopsy to determine pathology  · Radiation oncology following, will need palliative radiation pending MRI results  · DVT ppx: SCDs, ok for pharm ppx if indicated    Neurosurgery will continue to closely follow  Recommend transfer to the [address] [address] for further surgical discussion - patient will likely need cervical fusion prior to initiating radiation treatment given extent of disease/cord compression  Elevated BP without diagnosis of hypertension  Assessment & Plan  Medical management per primary team    Breast mass, right  Assessment & Plan  7cm right fungating breast mass with associated axillary adenopathy  · Patient discovered mass in January which steadily grew, was initially not concerned due to h/o cystic breasts  · Denies weight loss, abdominal pain, nausea, vomiting, diarrhea  · Presented with worsening neck and shoulder pain    Plan:  · Med onc and rad onc following  · Plan for emergent breast biopsy to establish diagnosis  · Further treatment pending biopsy results    History of Present Illness     HPI: Brady Hein is a 51y o  year old female with no significant PMH who presents with complaint of worsening neck and shoulder pain for 3 days  She states that she has had neck and shoulder pain along with occasional back pain for the past several months  It is generally relieved with over-the-counter medications but over the past 3 days has not been relieved with medication or position changes  She states that she has pain both with movement and at rest   She needs to change positions frequently  She admits to bilateral deltoid weakness and left greater than right shoulder pain  When she sneezes she describes electric shock sensation radiating down her bilateral arms and legs    Otherwise, she denies numbness, tingling, pain, or weakness in her bilateral arms or legs  She denies urinary incontinence, bowel incontinence, or saddle anesthesia  She denies difficulty with balance or falling  She denies any difficulty with fine motor skills of her hands including typing, buttons, zippers, or using a knife and fork to eat  She works in front of a computer all day and has had no difficulty with typing  She denies any cognitive deficits such as short-term memory loss, word-finding difficulty, difficulty with adding or spelling, or visual complaints  Her boyfriend also denies any abnormal behavior  CT imaging was obtained of her spine which was significant for diffuse osseous metastasis as well as a large 7 cm right fungating breast mass  Picture is scanned into the media tab of the breast mass  She states that she noticed a lump in her breast in January but thought it was a cyst she has "lumpy, cystic breasts " She did not seek evaluation for this  She denies any unintentional weight loss, abdominal pain, diarrhea, nausea, vomiting  She has no family history of cancer  She has 1 son who is 25years old and is in college in [place]  Review of Systems   Constitutional: Negative for chills, fever and unexpected weight change  HENT: Negative for ear pain and sore throat  Eyes: Negative for photophobia, pain and visual disturbance  Respiratory: Negative for cough and shortness of breath  Cardiovascular: Negative for chest pain and palpitations  Gastrointestinal: Negative for abdominal pain, diarrhea, nausea and vomiting  Genitourinary: Negative for dysuria and hematuria  Musculoskeletal: Positive for back pain and neck pain  Negative for arthralgias  Skin: Positive for color change  Negative for rash  Right lateral breast ulceration   Neurological: Positive for weakness (bilateral shoulder)  Negative for dizziness, seizures, syncope, speech difficulty, light-headedness, numbness and headaches     Psychiatric/Behavioral: Negative  All other systems reviewed and are negative  Historical Information   History reviewed  No pertinent past medical history  History reviewed  No pertinent surgical history  Social History     Substance and Sexual Activity   Alcohol Use Yes    Comment: rare, social      Social History     Substance and Sexual Activity   Drug Use Never     Social History     Tobacco Use   Smoking Status Never Smoker   Smokeless Tobacco Not on file     History reviewed  No pertinent family history  Meds/Allergies   all current active meds have been reviewed, current meds:   Current Facility-Administered Medications   Medication Dose Route Frequency    acetaminophen (TYLENOL) tablet 975 mg  975 mg Oral Q8H [facility]    dexamethasone (DECADRON) injection 4 mg  4 mg Intravenous Q6H [facility]    HYDROmorphone (DILAUDID) injection 0 5 mg  0 5 mg Intravenous Q1H PRN    Labetalol HCl (NORMODYNE) injection 10 mg  10 mg Intravenous Q6H PRN    lidocaine (PF) (XYLOCAINE-MPF) 1 % injection 30 mL  30 mL Infiltration Once    ondansetron (ZOFRAN) injection 4 mg  4 mg Intravenous Q4H PRN    oxyCODONE (ROXICODONE) immediate release tablet 10 mg  10 mg Oral Q4H PRN    oxyCODONE (ROXICODONE) IR tablet 5 mg  5 mg Oral Q4H PRN    pantoprazole (PROTONIX) EC tablet 40 mg  40 mg Oral Early Morning    senna-docusate sodium (SENOKOT S) 8 6-50 mg per tablet 1 tablet  1 tablet Oral HS PRN    and PTA meds:   Prior to Admission Medications   Prescriptions Last Dose Informant Patient Reported? Taking?   norgestimate-ethinyl estradiol (ORTHO-CYCLEN) 0 25-35 MG-MCG per tablet 3/28/2022 at Unknown time  Yes Yes   Sig: Take 1 tablet by mouth daily      Facility-Administered Medications: None     No Known Allergies    Objective   I/O     None          Physical Exam  Vitals and nursing note reviewed  Constitutional:       Appearance: Normal appearance  She is well-developed and normal weight  HENT:      Head: Normocephalic and atraumatic     Eyes: Extraocular Movements: Extraocular movements intact  Pupils: Pupils are equal, round, and reactive to light  Neck:      Comments: Pain with cervical ROM  Cardiovascular:      Rate and Rhythm: Normal rate  Pulmonary:      Effort: Pulmonary effort is normal  No respiratory distress  Abdominal:      Palpations: Abdomen is soft  Musculoskeletal:         General: Normal range of motion  Cervical back: Normal range of motion  Skin:     General: Skin is warm and dry  Neurological:      Mental Status: She is alert and oriented to person, place, and time  Coordination: Finger-Nose-Finger Test and Heel to [facility] Test normal       Deep Tendon Reflexes:      Reflex Scores:       Brachioradialis reflexes are 2+ on the right side and 2+ on the left side  Patellar reflexes are 2+ on the right side and 2+ on the left side  Psychiatric:         Mood and Affect: Mood normal          Speech: Speech normal          Behavior: Behavior normal          Thought Content: Thought content normal          Judgment: Judgment normal        Neurologic Exam     Mental Status   Oriented to person, place, and time  Follows 2 step commands  Attention: normal  Concentration: normal    Speech: speech is normal   Level of consciousness: alert  Knowledge: good  Able to perform simple calculations  Able to name object  Able to repeat  Normal comprehension  Cranial Nerves   Cranial nerves II through XII intact  CN II   Visual fields full to confrontation  CN III, IV, VI   Pupils are equal, round, and reactive to light  Motor Exam   Muscle bulk: normal  Overall muscle tone: normal  Right arm pronator drift: absent  Left arm pronator drift: absent    Strength   Strength 5/5 except as noted     Right deltoid: 3/5  Left deltoid: 3/5    Sensory Exam   Light touch normal    DST and JPS intact bilaterally     Gait, Coordination, and Reflexes     Coordination   Finger to nose coordination: normal  Heel to shin coordination: normal    Tremor   Resting tremor: absent    Reflexes   Right brachioradialis: 2+  Left brachioradialis: 2+  Right patellar: 2+  Left patellar: 2+  Right Luke: absent  Left Luke: absent  Right ankle clonus: absent  Left ankle clonus: absent        Vitals:Blood pressure 141/77, pulse 82, temperature 97 7 °F (36 5 °C), temperature source Oral, resp  rate 16, height 5' 4" (1 626 m), weight 94 kg (207 lb 3 7 oz), last menstrual period 02/28/2022, SpO2 90 %  ,Body mass index is 35 57 kg/m²  Lab Results:   Results from last 7 days   Lab Units 03/28/22  2330   WBC Thousand/uL 8 44   HEMOGLOBIN g/dL 13 5   HEMATOCRIT % 40 4   PLATELETS Thousands/uL 346   NEUTROS PCT % 60   MONOS PCT % 7     Results from last 7 days   Lab Units 03/28/22  2330   POTASSIUM mmol/L 3 3*   CHLORIDE mmol/L 101   CO2 mmol/L 22   BUN mg/dL 9   CREATININE mg/dL 0 74   CALCIUM mg/dL 9 3   ALK PHOS U/L 314*   ALT U/L 128*   AST U/L 80*     Results from last 7 days   Lab Units 03/29/22  0642   MAGNESIUM mg/dL 1 8             No results found for: TROPONINT  ABG:No results found for: PHART, GLB1XTF, PO2ART, DKJ7MVY, O2YVYCIL, BEART, SOURCE    Imaging Studies: I have personally reviewed pertinent reports  and I have personally reviewed pertinent films in PACS     XR chest 2 views    Result Date: 3/29/2022  Narrative: CHEST INDICATION:   htn, upper back pain  COMPARISON:  None EXAM PERFORMED/VIEWS:  XR CHEST PA & LATERAL FINDINGS: Cardiomediastinal silhouette appears unremarkable  The lungs are clear  No pneumothorax or pleural effusion  Osseous structures appear within normal limits for patient age  Impression: No acute cardiopulmonary disease  Workstation performed: [id number]     CT spine cervical wo contrast    Result Date: 3/29/2022  Narrative: CT CERVICAL SPINE - WITHOUT CONTRAST INDICATION:   Bone mass or bone pain, cervical spine, aggressive features on xray osseous mets  COMPARISON:  None   TECHNIQUE:  CT examination of the cervical spine was performed without intravenous contrast   Contiguous axial images were obtained  Sagittal and coronal reconstructions were performed  Radiation dose length product (DLP) for this visit:  718 mGy-cm   This examination, like all CT scans performed in the [facility], was performed utilizing techniques to minimize radiation dose exposure, including the use of iterative reconstruction and automated exposure control  IMAGE QUALITY:  Diagnostic  FINDINGS: ALIGNMENT:  There is reversal of normal cervical lordosis  There is no significant subluxation  No compression deformity  VERTEBRAL BODIES:  Innumerable lytic lesions throughout the cervical spine  There is a displaced fracture through the right posterior arch of C1 (series 2 image 46)  Displacement is approximately 4 mm  Large lytic process involving the right lateral mass of C1 is noted with expansion and extension involving the anterior arch  There is severe compression of the C4 vertebral body and moderate compression of the T2 vertebral body  The soft tissue windows demonstrate a large soft tissue mass extending from the C4 level into the spinal canal measuring approximately 2 2 x 1 3 cm (series 603 image 57)  This causes a severe central canal stenosis and likely cord compression  This extends from the mid C3 level to the lower C5 level  An additional large soft tissue mass extending from the T2 level into the spinal canal measures approximate 1 1 cm and likely causes severe central canal stenosis /cord compression  (Series 3 image 59)  DEGENERATIVE CHANGES:  No significant cervical degenerative changes are noted  PREVERTEBRAL AND PARASPINAL SOFT TISSUES:  Unremarkable  THORACIC INLET:  Normal      Impression: 1  Innumerable bony metastases   2   Displaced fracture through the right posterior arch of C1  3   C4 metastasis causing a severe compression fracture and extension into the canal causing severe central canal stenosis and likely cord compression  4   T2 metastasis causing a moderate compression fracture and extension into the canal causing severe central canal stenosis and likely cord compression  I sent a tiger text to Kristy Kuo on 3/29/2022 at 9:57 AM  The study was marked in [facility] for immediate notification  Workstation performed: [id number]     CT chest abdomen pelvis w contrast    Result Date: 3/29/2022  Narrative: CT CHEST, ABDOMEN AND PELVIS WITH IV CONTRAST INDICATION:   Evaluate for metastases  COMPARISON:  None  TECHNIQUE: CT examination of the chest, abdomen and pelvis was performed  Axial, sagittal, and coronal 2D reformatted images were created from the source data and submitted for interpretation  Radiation dose length product (DLP) for this visit:  1086 mGy-cm   This examination, like all CT scans performed in the [facility], was performed utilizing techniques to minimize radiation dose exposure, including the use of iterative reconstruction and automated exposure control  IV Contrast:  100 mL of iohexol (OMNIPAQUE) Enteric Contrast: Enteric contrast was administered  FINDINGS: CHEST LUNGS:  Lungs are clear  There is no tracheal or endobronchial lesion  PLEURA:  Unremarkable  HEART/GREAT VESSELS: Heart is unremarkable for patient's age  No thoracic aortic aneurysm  MEDIASTINUM AND ALISON:  Unremarkable  CHEST WALL AND LOWER NECK:   6 9 x 6 2 x 6 4 cm mass extending to the overlying skin surface and right pectoralis major muscle with overlying skin induration  Right axillary adenopathy measuring up to 3 8 cm  ABDOMEN LIVER/BILIARY TREE:  Unremarkable  GALLBLADDER:  No calcified gallstones  No pericholecystic inflammatory change  SPLEEN:  Unremarkable  PANCREAS:  Unremarkable  ADRENAL GLANDS:  Unremarkable  KIDNEYS/URETERS:  One or more simple renal cyst(s) is noted  Nonobstructive calculus at the left inferior pole     No hydronephrosis   STOMACH AND BOWEL: Unremarkable  APPENDIX:  No findings to suggest appendicitis  ABDOMINOPELVIC CAVITY:  No ascites  No pneumoperitoneum  No lymphadenopathy  VESSELS:  Unremarkable for patient's age  PELVIS REPRODUCTIVE ORGANS:  7 cm septated cystic left adnexal structure  URINARY BLADDER:  Unremarkable  ABDOMINAL WALL/INGUINAL REGIONS:  Unremarkable  OSSEOUS STRUCTURES:  Innumerable lytic lesions throughout the visualized osseous structures  The largest within the left sacrum measures up to 4 4 cm with destructive changes and encroachment upon the presacral space  Impression: Large right breast mass measuring up to 6 9 cm  Right axillary metastatic adenopathy  Surgical consult recommended  Innumerable osseous metastasis  7 cm septated cystic left adnexal structure  According to current guidelines Dorothey Gull Radiol 2020; 30:584-656) in this premenopausal woman, this should be followed up in 6 to 12 months by pelvic ultrasound  Workstation performed: [id number]     EKG, Pathology, and Other Studies: I have personally reviewed pertinent reports  VTE Prophylaxis: Sequential compression device Lis Duke)     Code Status: Level 1 - Full Code  Advance Directive and Living Will:      Power of :    POLST:      Counseling / Coordination of Care  I spent 45 minutes with the patient

## 2022-03-29 NOTE — PROGRESS NOTES
Reviewed MRI findings with the patient via phone  Also discussed neurosurgical evaluation with Dr Vianney Joseph  The neurosurgical team at [facility] will evaluate and likely offer surgery if patient is agreeable  Ms Sharifa Rubio stated she would entertain potential surgical intervention followed by XRT  Will await final decision

## 2022-03-29 NOTE — TELEPHONE ENCOUNTER
New Patient Intake Form   Patient Details:    Papa Delgadillo  1975    Appointment Information   Who is calling to schedule? In-Basket   If not self, what is the caller's name? Please put name of RBC nurse as well  In-[place]   Referring provider Hospital Follow up    What is the diagnosis? Fungating breast mass  Likely metastatic breast cancer - bx pending      Is there a confirmed tissue diagnosis? No   Is there a biopsy ordered or pending? Please specify dates   Patient currently admitted in the hospital   Biopsy pending      Is patient aware of diagnosis? Did Not Speak to Patient / Received via In Basket   Have you had any imaging or labs done? If yes, where? (If imaging done outside of [facility], please remind patient to bring a disk ) Did Not Speak to Patient / Received via In Basket     If imaging done at outside facility, did you instruct patient to obtain discs and bring to visit? N/a   Have you been seen by another Oncologist/Hematologist?  If so, who and where? no   Are the records in [facility] or Care Everywhere? yes   Does the patient have records at another facility/hospital? no   If yes, Name of facility, city and state where facility is located  Did you instruct patient to have records faxed to [facility] and provide rightfax number? N/A   Preferred [facility]   Is the patient willing to be seen by another provider? (This is for breast patients only) N/A     Did you send new patient paperwork? Email or mail? N/A   Miscellaneous Information: Hospital follow up   [facility]:  [place]   Provider: Minda Burgos or any physician (metastatic breast ca likely)   Timeframe: 2 weeks   Diagnosis: Likely metastatic breast cancer - bx pending    Joe Mendoza  Scheduled appointment 4/13/22 11:20 am

## 2022-03-29 NOTE — DISCHARGE SUMMARY
[facility]  Discharge- Marina [address] 1975, 51 y o  female MRN: [id number]  Unit/Bed#: S -01 Encounter: [id number]  Primary Care Provider: No primary care provider on file  Date and time admitted to hospital: 3/28/2022 10:45 PM    Transaminitis  Assessment & Plan  Recent Labs     03/28/22  2330   AST 80*   *   ALKPHOS 314*   TBILI 0 49     Etiology:  Likely secondary to metastatic cancer, newly discovered    EtOH: rare, socially   Drug use: none   Acetaminophen: none, has been taking motrin as outpt    Imaging  CT Abd/Pev: see above     Plan:  · Trend as needed  · Avoid hepatotoxic agents    Hypokalemia  Assessment & Plan  Lab Results   Component Value Date    K 3 3 (L) 03/28/2022    MG 1 8 03/29/2022     Plan:  · K/Mg repletion completed on admission  · Replete as needed  · Monitor with BMP and repeat Mg2+      Elevated BP without diagnosis of hypertension  Assessment & Plan  Blood Pressure: 141/77    No history of hypertension but patient self reports higher blood pressures in hospital/clinic setting  Anxiety/stress also contributing factor as new diagnoses of cancer with osseous metastasis     Plan:  · Monitor blood pressure while inpatient  · Labetalol PRN and will be continued on transfer      Breast mass, right  Assessment & Plan  Right-sided fungating breast mass with metastasis to axilla  Surgery Oncology, Medical Oncology, Radiation Oncology, Neurosurgery aware of patient and following  See osseous metastasis for further details  Plan:  · Wound culture and gram stain ordered and pending  · Surgical-oncology consulted for biopsy        * Osseous metastasis [facility])  Assessment & Plan  Patient presented to the ED for back pain  Workup revealed findings listed below  Patient notified and aware  Discussed patient's medical history in detail  No maternal or paternal history of malignancy of any kind    Patient never smoker, does take birth control "mono-linyah" that she receives from [facility]  For her support, boyfriend gym at bedside; also has multiple siblings and her 51-year-old son  Suspected primary:  Breast cancer, tissue not yet obtained, staging TBD    CT chest abdomen pelvis w contrast: Large right breast mass measuring up to 6 9 cm  Right axillary metastatic adenopathy  Surgical consult recommended  Innumerable osseous metastasis  7 cm septated cystic left adnexal structure  According to current guidelines in this premenopausal woman, this should be followed up in 6 to 12 months by pelvic ultrasound  CT C spine: Innumerable bony metastases, Displaced fracture through the right posterior arch of C1, C4 metastasis causing a severe compression fracture and extension into the canal causing severe central canal stenosis and likely cord compression , T2 metastasis causing a moderate compression fracture and extension into the canal causing severe central canal stenosis and likely cord compression  Plan:  · Reassurance and support provided  · Pain regimen ordered and will be continued on transfer  · Surgery-oncology following, appreciate recommendations  · Radiation oncology consult placed; recommend dexamethasone 4 mg Q6H, rush biopsy, neurosurgical consult, and MRIs of C/T/L spine  · Neurosurgery consulted; recommend CT of C spine, MRI w/wo contrast of brain, C/T/L spine  · Radiology contacted primary team with acute findings on CT C spine including compression fracture, neck brace ordered and applied to patient   · Case management consulted; appreciate recommendations  · Monitor vitals    Medical Problems             Resolved Problems  Date Reviewed: 3/29/2022    None              Discharging Resident: Trav Zhu MD  Discharging Attending: Jose Cali MD  PCP: No primary care provider on file    Admission Date:   Admission Orders (From admission, onward)     Ordered        03/29/22 0141  Inpatient Admission  Once Discharge/Transfer Date: 03/29/22    Disposition:   Transfer to: [facility]  Reason for Transfer: Patient with osteous metastasis and at risk for spinal cord compression, transfer requested by radiation oncology for possible inpatient radiation and by neurosurgery for possible surgical intervention  Accepting Provider at Receiving [place]: Dr Rivero Stage Stay:  · Radiation oncology  · Neuro surgery  · Medical oncology  · Surgical Oncology  · Case management    Procedures Performed:   · None    Significant Findings / Test Results:   MRI cervical spine w wo contrast   Final Result by Gen Trejo DO (03/29 1301)      Diffuse, primarily lytic osseous metastatic disease is seen throughout the cervical vertebral bodies from C1 into the upper thoracic spine  There is a significant amount of epidural tumor arising from pathologic compression fractures of C4 and T2  At    the C4 level there is moderate to severe canal stenosis with flattening of the cord which maintains normal signal   Moderate canal stenosis at the T2 level  There is corresponding foraminal narrowing as a result of epidural and foraminal tumor  MRI thoracic spine w wo contrast   Final Result by Gen Trejo DO (03/29 1307)      Diffuse, lytic, osseous metastasis is seen throughout the thoracic spine more prominently within the upper thoracic spine where there is epidural tumor and involvement of the posterior elements from T2 through T5  Moderate canal stenosis at the T2    level  Foraminal narrowing as described above  No abnormal cord signal identified  MRI lumbar spine w wo contrast   Final Result by eGn Trejo DO (03/29 1316)      Diffuse lytic osseous metastasis within the lower thoracic spine, lumbar spine and sacrum    There is a moderate amount of epidural tumor as a result of the expansile sacral lesion resulting in moderate sacral canal stenosis  Tumor extends into the S2-3    and S3-4 neural foramen on the left  Correlate for corresponding radiculopathy  Bilobed cystic mass within the left pelvis incompletely evaluated on this examination  Follow-up pelvic ultrasound imaging is recommended  CT spine cervical wo contrast   Final Result by Worley Hamman, MD (03/29 1002)      1  Innumerable bony metastases  2   Displaced fracture through the right posterior arch of C1    3   C4 metastasis causing a severe compression fracture and extension into the canal causing severe central canal stenosis and likely cord compression  4   T2 metastasis causing a moderate compression fracture and extension into the canal causing severe central canal stenosis and likely cord compression  I sent a tiger text to [employer] on 3/29/2022 at 9:57 AM       The study was marked in [facility] for immediate notification  CT chest abdomen pelvis w contrast   ED Interpretation by Yeison Riddle MD (03/29 0045)   Right breast mass with increased contrast uptake in axillary nodes bilaterally  Left renal cyst       Final Result by Kadie Rangel MD (03/29 0128)      Large right breast mass measuring up to 6 9 cm  Right axillary metastatic adenopathy  Surgical consult recommended  Innumerable osseous metastasis  7 cm septated cystic left adnexal structure  According to current guidelines Jagdish Joseph Radiol 2020; 22:015-399) in this premenopausal woman, this should be followed up in 6 to 12 months by pelvic ultrasound  XR chest 2 views   ED Interpretation by Yeison Riddle MD (03/28 1981)   No acute cardiopulmonary disease  No apparent spinal deformities      Final Result by Ruddy Bae MD (03/29 1490)      No acute cardiopulmonary disease          · Patient found to be hypokalemic, replete as needed  · Patient found to have elevated liver function enzymes, likely in setting of malignancy, continue to monitor after transfer    Incidental Findings:   ·  CT chest, abdomen, pelvis:  7 cm septated cystic left adnexal structure  Should be followed up in 6-12 months by pelvic ultrasound  · Incidental finding no completed    Test Results Pending at Discharge (will require follow up):   · X-ray cervical spine - order continued on transfer  · MRI brain without contrast - order continued on transfer  · Cancer antigen 15-3, 27 29, Ca, hepatitis panel  · Wound culture and Gram stain  · Blood cultures in process     Complications:  None    Reason for Admission:  Right breast mass, neck and back pain    Hospital Course:   Marylou Bridges is a 51 y o  female patient with no significant past medical history who originally presented to the hospital on 3/28/2022 due to neck and back pain  Patient reported neck and back pain for about 1 and half weeks  Initially, this was the only reported complaint of patient, on further evaluation review of systems, patient reported that over the past few months, around end of December/January, she noticed a hard breast mass in her right breast as well as intermittent upper and lower back pain  The pain resolved with Motrin, Tylenol, heating pad, topical agents  She initially thought it was from hunting over the computer, poor posture while she is working long hours at home  The patient also noted a lightening sensation, and that would radiate down her shoulders and into her arms whenever she coughs or sneezes during the last week  Patient reported that the lump in her breast began to appear red about 1 month ago, and reports that 1 week ago the lumbar rotate through the skin and had become malodorous and was weeping  Patient has not seen an outpatient or inpatient provider for this mass prior to this admission  In the emergency room, patient was hypertensive at 200/99 mm per mercury  Laboratory studies revealed elevated ALP and ALT, and decreased potassium which was repleted orally    Magnesium was assessed and was within normal limits  Imaging studies revealed right fungating breast mass with metastasis to axilla in addition to numeral osseous metastasis  Radiation oncology was consulted, upon review of patient's chart I recommended dexamethasone 4 mg q 6 hours as well as proton pump inhibitor prophylaxis  Patient was started on both  They also recommended additional imaging of the spine including MRI with and without contrast of the C-spine, T-spine, and L-spine  They also recommended neurosurgical consultation  Neurosurgery recommended additional imaging of MRI brain with and without contrast   They agreed with steroid treatment and recommended patient be transferred to [facility] for possible surgical intervention as well as a likely inpatient radiation therapy  General surgery and surgical Oncology were consulted to evaluate patient due to right breast mass and for possible biopsy  Interventional Radiology was notified of patient and upon review of chart the right felt that the most appropriate service for biopsy of right breast mass was surgery as mass had eroded through the skin  The biopsy was not completed prior to transfer to [facility] and will need to be completed once she arrives at [facility]  During admission at [facility], patient remained stable with vital signs within normal limits  Patient did present with some hypertension on arrival to the emergency room, patient reported no history of diagnosed hypertension but has noted increased blood pressure readings when in doctor's offices or on home checks in the past  DVT prophylaxis was not initiated as patient was for possible biopsy/surgery, will need to be started if indicated after transfer  Please see above list of diagnoses and related plan for additional information  Condition at Discharge: stable    Discharge Day Visit / Exam:   Subjective:  Patient seen and evaluated at bedside    Patient reported continued neck pain, noted that p r n  Oxycodone helped to alleviate the pain  Patient reported some anxiety concerning recent likely breast cancer diagnosis  Reviewed image results that were available with patient and answered all questions to the best of my ability  Discussed consultations pending and recommendations by consultants  Patient was also seen and evaluated by attending physician who had continued conversations about treatment plan and transfer with patient  Vitals: Blood Pressure: 145/86 (03/29/22 1520)  Pulse: 100 (03/29/22 1520)  Temperature: (!) 97 4 °F (36 3 °C) (03/29/22 1520)  Temp Source: Axillary (03/29/22 1520)  Respirations: 17 (03/29/22 1520)  Height: 5' 4" (162 6 cm) (03/29/22 0512)  Weight - Scale: 94 kg (207 lb 3 7 oz) (03/29/22 0512)  SpO2: 90 % (03/29/22 1520)      Discussion with Family: Patient declined call to   ** Please Note: This note may have been constructed using a voice recognition system  **

## 2022-03-29 NOTE — QUICK NOTE
Patient seen and evaluated at bedside  Patient was also seen by attending physician during rounds  Radiation oncology was consulted, upon review of patient's chart they recommended dexamethasone 4 mg q 6 hours as well as proton pump inhibitor prophylaxis  Patient was started on both  They also recommended additional imaging of the spine including MRI with and without contrast of the C-spine, T-spine, and L-spine  They also recommended neurosurgical consultation  Neurosurgery recommended additional imaging of MRI brain with and without contrast   They agreed with steroid treatment and recommended patient be transferred to [facility] for possible surgical intervention as well as a likely inpatient radiation therapy  Medical oncology was consulted and assess patient  They recommended additional testing for cancer and insight were ordered and are pending  They agreed with emergent breast biopsy next today diagnosis and treatment due to extent of disease  There indicating of surgical intervention is needed it may be appropriate and helpful to obtain intraoperative bone sample for confirmation of metastatic disease  General surgery and surgical Oncology were consulted to evaluate patient due to right breast mass and for possible biopsy  Will attempt to complete biopsy prior to transfer  Interventional Radiology was notified of patient and upon review of chart the right felt that the most appropriate service for biopsy of right breast mass was surgery as mass had eroded through the skin  Patient accepted to [facility] under the service of Dr Bryson Fung  Discussed pending transfer with patient

## 2022-03-29 NOTE — ASSESSMENT & PLAN NOTE
Diffuse osseous mets, 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  Largest disease burden in cervical and upper thoracic spine  · Patient presented with worsening neck pain and shoulder pain for the past 3 days, not relieved with medication  · Pain started several weeks ago  · Found to have large fungating right breast mass  · No FH cancer, no unintentional weight loss  · On exam bilateral deltoid weakness, otherwise nonfocal  · SIN score 14-16    Imaging:  · CTCAP w/o, 3/29/22: Innumerable lytic lesions throughout the visualized osseous structures  The largest within the left sacrum measures up to 4 4 cm with destructive changes and encroachment upon the presacral space  · CT cervical spine w/o, 3/29/22: Innumerable bony metastases  Displaced fracture through the right posterior arch of C1  C4 metastasis causing a severe compression fracture and extension into the canal causing severe central canal stenosis and likely cord compression  T2 metastasis causing a moderate compression fracture and extension into the canal causing severe central canal stenosis and likely cord compression  Plan:  · MRI brain, cervical, thoracic, lumbar, and sacrum w/wo contrast pending for staging and to evaluate for extent of disease  · Will order cervical flexion/extension imaging for surgical planning  · Agree with decadron 4q6  · VISTA tx collar at all times  · Pain control per primary team  · Routine neurological checks  · Medical management per primary team  · Will ultimately need PT/OT pending treatment  · Medical oncology following, will obtain biopsy to determine pathology  · Radiation oncology following, will need palliative radiation pending MRI results  · DVT ppx: SCDs, ok for pharm ppx if indicated    Neurosurgery will continue to closely follow  Recommend transfer to the [facility] for further surgical discussion - patient will likely need cervical fusion prior to initiating radiation treatment given extent of disease/cord compression

## 2022-03-30 ENCOUNTER — TELEPHONE (OUTPATIENT)
Dept: RADIOLOGY | Facility: HOSPITAL | Age: 47
End: 2022-03-30

## 2022-03-30 ENCOUNTER — APPOINTMENT (INPATIENT)
Dept: RADIOLOGY | Facility: HOSPITAL | Age: 47
DRG: 598 | End: 2022-03-30
Payer: COMMERCIAL

## 2022-03-30 ENCOUNTER — TRANSCRIBE ORDERS (OUTPATIENT)
Dept: RADIATION ONCOLOGY | Facility: HOSPITAL | Age: 47
End: 2022-03-30

## 2022-03-30 ENCOUNTER — DOCUMENTATION (OUTPATIENT)
Dept: NEUROSURGERY | Facility: CLINIC | Age: 47
End: 2022-03-30

## 2022-03-30 DIAGNOSIS — C79.51 SECONDARY MALIGNANT NEOPLASM OF BONE AND BONE MARROW (HCC): Primary | ICD-10-CM

## 2022-03-30 DIAGNOSIS — C79.52 SECONDARY MALIGNANT NEOPLASM OF BONE AND BONE MARROW (HCC): Primary | ICD-10-CM

## 2022-03-30 DIAGNOSIS — N63.0 BREAST MASS IN FEMALE: Primary | ICD-10-CM

## 2022-03-30 LAB — CANCER AG15-3 SERPL-ACNC: 91.6 U/ML (ref 0–25)

## 2022-03-30 PROCEDURE — G1004 CDSM NDSC: HCPCS

## 2022-03-30 PROCEDURE — NC001 PR NO CHARGE: Performed by: INTERNAL MEDICINE

## 2022-03-30 PROCEDURE — 99232 SBSQ HOSP IP/OBS MODERATE 35: CPT | Performed by: NURSE PRACTITIONER

## 2022-03-30 PROCEDURE — 77263 THER RADIOLOGY TX PLNG CPLX: CPT | Performed by: RADIOLOGY

## 2022-03-30 PROCEDURE — 99222 1ST HOSP IP/OBS MODERATE 55: CPT | Performed by: INTERNAL MEDICINE

## 2022-03-30 PROCEDURE — NC001 PR NO CHARGE: Performed by: SURGERY

## 2022-03-30 PROCEDURE — 99223 1ST HOSP IP/OBS HIGH 75: CPT | Performed by: SURGERY

## 2022-03-30 PROCEDURE — NC001 PR NO CHARGE: Performed by: RADIOLOGY

## 2022-03-30 PROCEDURE — 97163 PT EVAL HIGH COMPLEX 45 MIN: CPT

## 2022-03-30 PROCEDURE — NC001 PR NO CHARGE: Performed by: NEUROLOGICAL SURGERY

## 2022-03-30 PROCEDURE — 97167 OT EVAL HIGH COMPLEX 60 MIN: CPT

## 2022-03-30 PROCEDURE — 70553 MRI BRAIN STEM W/O & W/DYE: CPT

## 2022-03-30 PROCEDURE — 99232 SBSQ HOSP IP/OBS MODERATE 35: CPT | Performed by: INTERNAL MEDICINE

## 2022-03-30 PROCEDURE — A9585 GADOBUTROL INJECTION: HCPCS | Performed by: NURSE PRACTITIONER

## 2022-03-30 PROCEDURE — 99233 SBSQ HOSP IP/OBS HIGH 50: CPT | Performed by: NEUROLOGICAL SURGERY

## 2022-03-30 RX ORDER — LISINOPRIL 10 MG/1
10 TABLET ORAL DAILY
Status: DISCONTINUED | OUTPATIENT
Start: 2022-03-30 | End: 2022-04-02

## 2022-03-30 RX ORDER — POLYETHYLENE GLYCOL 3350 17 G/17G
17 POWDER, FOR SOLUTION ORAL DAILY PRN
Status: DISCONTINUED | OUTPATIENT
Start: 2022-03-30 | End: 2022-04-04 | Stop reason: HOSPADM

## 2022-03-30 RX ORDER — HYDROMORPHONE HCL/PF 1 MG/ML
0.5 SYRINGE (ML) INJECTION
Status: DISCONTINUED | OUTPATIENT
Start: 2022-03-30 | End: 2022-04-04 | Stop reason: HOSPADM

## 2022-03-30 RX ORDER — HYDROMORPHONE HCL/PF 1 MG/ML
0.5 SYRINGE (ML) INJECTION ONCE
Status: COMPLETED | OUTPATIENT
Start: 2022-03-30 | End: 2022-03-30

## 2022-03-30 RX ORDER — SENNOSIDES 8.6 MG
2 TABLET ORAL 2 TIMES DAILY
Status: DISCONTINUED | OUTPATIENT
Start: 2022-03-30 | End: 2022-03-31

## 2022-03-30 RX ADMIN — ACETAMINOPHEN 975 MG: 325 TABLET ORAL at 21:36

## 2022-03-30 RX ADMIN — HYDROMORPHONE HYDROCHLORIDE 0.5 MG: 1 INJECTION, SOLUTION INTRAMUSCULAR; INTRAVENOUS; SUBCUTANEOUS at 11:27

## 2022-03-30 RX ADMIN — ACETAMINOPHEN 975 MG: 325 TABLET ORAL at 03:19

## 2022-03-30 RX ADMIN — DEXAMETHASONE SODIUM PHOSPHATE 4 MG: 4 INJECTION INTRA-ARTICULAR; INTRALESIONAL; INTRAMUSCULAR; INTRAVENOUS; SOFT TISSUE at 14:50

## 2022-03-30 RX ADMIN — ENOXAPARIN SODIUM 40 MG: 40 INJECTION SUBCUTANEOUS at 08:22

## 2022-03-30 RX ADMIN — DEXAMETHASONE SODIUM PHOSPHATE 4 MG: 4 INJECTION INTRA-ARTICULAR; INTRALESIONAL; INTRAMUSCULAR; INTRAVENOUS; SOFT TISSUE at 21:36

## 2022-03-30 RX ADMIN — OXYCODONE HYDROCHLORIDE 5 MG: 5 TABLET ORAL at 03:18

## 2022-03-30 RX ADMIN — OXYCODONE HYDROCHLORIDE 5 MG: 5 TABLET ORAL at 23:08

## 2022-03-30 RX ADMIN — PANTOPRAZOLE SODIUM 40 MG: 40 TABLET, DELAYED RELEASE ORAL at 05:18

## 2022-03-30 RX ADMIN — LISINOPRIL 10 MG: 10 TABLET ORAL at 14:50

## 2022-03-30 RX ADMIN — AMLODIPINE BESYLATE 5 MG: 5 TABLET ORAL at 08:22

## 2022-03-30 RX ADMIN — STANDARDIZED SENNA CONCENTRATE 17.2 MG: 8.6 TABLET ORAL at 18:22

## 2022-03-30 RX ADMIN — ACETAMINOPHEN 975 MG: 325 TABLET ORAL at 14:47

## 2022-03-30 RX ADMIN — DEXAMETHASONE SODIUM PHOSPHATE 4 MG: 4 INJECTION INTRA-ARTICULAR; INTRALESIONAL; INTRAMUSCULAR; INTRAVENOUS; SOFT TISSUE at 08:21

## 2022-03-30 RX ADMIN — GADOBUTROL 9 ML: 604.72 INJECTION INTRAVENOUS at 12:08

## 2022-03-30 RX ADMIN — DEXAMETHASONE SODIUM PHOSPHATE 4 MG: 4 INJECTION INTRA-ARTICULAR; INTRALESIONAL; INTRAMUSCULAR; INTRAVENOUS; SOFT TISSUE at 03:00

## 2022-03-30 RX ADMIN — HYDROMORPHONE HYDROCHLORIDE 0.5 MG: 1 INJECTION, SOLUTION INTRAMUSCULAR; INTRAVENOUS; SUBCUTANEOUS at 13:02

## 2022-03-30 RX ADMIN — OXYCODONE HYDROCHLORIDE 5 MG: 5 TABLET ORAL at 14:50

## 2022-03-30 NOTE — ASSESSMENT & PLAN NOTE
Patient initially noted a right-sided heart breast mass in the beginning of this year  Patient states that the mass in her breast began to PE read about a month ago and the mass break through the skin and has become malodorous and weeping about a week ago  Patient was also associated with intermittent upper back pain  Pain was relieved by Motrin and Tylenol  Patient initially thought back pain was due to her position while using her computer  CT chest revealed  Large right breast mass measuring up to 6 9 cm  Right axillary metastatic adenopathy  Innumerable osseous metastasis     Surgical Oncology and Medical Oncology consulted  Plan for biopsy  Inpatient consult to palliative care to discuss goal of care

## 2022-03-30 NOTE — PLAN OF CARE
Problem: OCCUPATIONAL THERAPY ADULT  Goal: Performs self-care activities at highest level of function for planned discharge setting  See evaluation for individualized goals  Description: Treatment Interventions: ADL retraining,Functional transfer training,UE strengthening/ROM,Endurance training,Patient/family training,Equipment evaluation/education,Compensatory technique education,Continued evaluation,Energy conservation,Activityengagement          See flowsheet documentation for full assessment, interventions and recommendations  Note: Limitation: Decreased ADL status,Decreased UE strength,Decreased endurance,Decreased self-care trans,Decreased high-level ADLs  Prognosis: Good  Assessment: Pt is a 51 y o  female admitted to [facility] on 3/29/2022 w/ Breast mass, right with 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  Pt found to have pathologic compression fxs to C4 and T2, as well as tumor involvement of T2-T5 and S2-3 and S3-4  Pt also with transaminitis  has no past medical history on file  Pt with active OT orders and up and OOB as tolerated orders  Pt seen as a co-evaluation with PT due to the patient's co-morbidities, clinically unstable presentation/clinical complexity, and present impairments  As per pt report, pta, resides with her boyfriend in a 2STH, 3STE  Pt was I w/  ADLS and IADLS, (+) drove  Upon evaluation, pt currently requires MIN A with RW for transfers and mobility  Pt currently requires S eating, S grooming, MIN A UB ADLs, MOD A LB ADLs, and MOD A toileting  Pt is limited at this time 2*: pain, endurance, activity tolerance, functional mobility, balance, functional standing tolerance, decreased I w/ ADLS/IADLS and strength  The following Occupational Performance Areas to address include: eating, grooming, bathing/shower, toilet hygiene, dressing, health maintenance, functional mobility, community mobility and clothing management   Pt to benefit from immediate acute skilled OT to address above deficits, improve overall functional independence, maximize fnxl mobility and reduce caregiver burden  From OT standpoint, recommendation at time of d/c would be home with skilled therapy and increased social support - pending progress  Pt was left after session with all current needs met  The patient's raw score on the AM-PAC Daily Activity inpatient short form is 15, standardized score is 34 69, less than 39 4  Patients at this level are likely to benefit from discharge to post-acute rehabilitation services  Please refer to the recommendation of the Occupational Therapist for safe discharge planning       OT Discharge Recommendation: Home with home health rehabilitation (and increased social support - pending progress)

## 2022-03-30 NOTE — ASSESSMENT & PLAN NOTE
Surgery following  · States she has always had "lumpy" breasts secondary to fibroids but noticed around Lizette time that this was worsening with gradual fungating mass that remained pain less until recently with malodorous discharge which somewhat prompted the ED visit along with complaints of neck and back pain  · Underwent breast bx 3/29  · Pathology sent STAT, pending results

## 2022-03-30 NOTE — CASE MANAGEMENT
Case Management Progress Note    Patient name Farzaneh Parcel  Location [address] 9[address] 836-96 MRN [id number]  : 1975 Date 3/30/2022       LOS (days): 1  Geometric Mean LOS (GMLOS) (days):   Days to GMLOS:        OBJECTIVE:        Current admission status: Inpatient  Preferred Pharmacy:   [id number] [place], [address],   [address]  [place] [address]  Phone: [phone] Fax: [phone]    Primary Care Provider: No primary care provider on file  Primary Insurance: BLUE CROSS  Secondary Insurance:     PROGRESS NOTE:        CM attempted to complete initial assessment, however, pt was not in room  CM will follow

## 2022-03-30 NOTE — PHYSICAL THERAPY NOTE
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 [facility])       Past Medical History  No past medical history on file  Past Surgical History  No past surgical history on file  03/30/22 1524   PT Last Visit   PT Visit Date 03/30/22   Note Type   Note type Evaluation   End of Consult   Patient Position at End of Consult Supine; All needs within reach   Pain Assessment   Pain Assessment Tool 0-10   Pain Score 7   Pain Location/Orientation Location: Neck; Location: Shoulder   Hospital Pain Intervention(s) Ambulation/increased activity;Repositioned   Restrictions/Precautions   Weight Bearing Precautions Per Order No   Braces or Orthoses C/S Collar  ( )   Other Precautions Pain; Fall Risk;Spinal precautions   Home Living   Type of Home House  (3 CARLA )   Home Layout Two level;1/2 bath on main level;Bed/bath upstairs   Bathroom Shower/Tub Tub/shower unit   Bathroom Toilet Standard   Home Equipment   (none )   Prior Function   Level of [place] Independent with ADLs and functional mobility   Lives With Significant other   Receives Help From Family;Friend(s)   ADL Assistance Independent   IADLs Independent   Falls in the last 6 months 0   Vocational Full time employment   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    General   Family/Caregiver Present Yes  (boyfriend )   Cognition   Overall Cognitive Status WFL   Arousal/Participation Cooperative   Orientation Level Oriented X4   Following Commands Follows all commands and directions without difficulty   Comments pt very pleasant and cooperative t/o, apprehensive to move 2* to pain   RLE Assessment   RLE Assessment WFL   LLE Assessment   LLE Assessment WFL   Light Touch   RLE Light Touch Grossly intact   LLE Light Touch Grossly intact   Bed Mobility   Supine to Sit 4  Minimal assistance   Additional items Assist x 1;HOB elevated; Bedrails   Sit to Supine 4  Minimal assistance   Additional items Assist x 1;HOB elevated; Bedrails; Increased time required   Additional Comments pt educated on bed mobility with spinal precautions    Transfers   Sit to Stand 4  Minimal assistance   Additional items Assist x 1; Increased time required;Verbal cues   Stand to Sit 4  Minimal assistance   Additional items Assist x 1; Increased time required;Verbal cues   Additional Comments transfers with RW    Ambulation/Elevation   Gait pattern Narrow KIKA;Shuffling; Step to;Short stride; Excessively slow   Gait Assistance 4  Minimal assist   Additional items Assist x 1   Assistive Device Rolling walker   Distance 3 ft fwd, 3 ft backwards; limited 2* to pain    Balance   Static Sitting Fair   Dynamic Sitting Fair   Static Standing Fair -   Dynamic Standing Poor +   Ambulatory Poor   Endurance Deficit   Endurance Deficit Yes   Endurance Deficit Description pain   Activity Tolerance   Activity Tolerance Patient limited by pain   Medical Staff Made Aware OT, restorative    Nurse Made Aware RN cleared pt to participate in therapy session   Assessment   Prognosis Good   Problem List Decreased mobility;Pain   Assessment Pt seen for high complexity PT evaluation to assess potential discharge needs  Pt has active PT eval/treatment orders as well as up OOB as tolerated orders  Pt is a 52 y/o female admitted on 3/29/2022 with back pain, which was followed up with MRI showing metastatic lesions  Comorbidities affecting pt upon evaluation are as follows: R breast mass, osseous metastasis, elevated BP w/o diagnosis of HTN, transaminitis, and 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  Personal factors affecting patient at time of initial evaluation include steps to enter home and limited home support   Prior to admission, patient was residing with significant other in a 2 SH with 3 steps to enter and was performing ADLs/IADLS independently  Upon evaluation, they have current limitations with strength, endurance, balance, range of motion, and overall functional mobility, putting patient at increased risk for falls  Pt currently performing bed mobility tasks with min A x1, functional transfers with min A x1, and ambulation with min A x1  Ambulation distance was limited today 2* to pain  Pt was educated on spinal precautions, and she verbalized understanding  Pt would benefit from continued PT services while in hospital to address the remaining deficits  PT to continue to follow pt and recommends home with HHPT upon discharge  The patient's AM-PAC Basic Mobility Inpatient Short Form Raw Score is 16  A Raw score of less than or equal to 16 suggests the patient may benefit from discharge to post-acute rehabilitation services  Please refer to the recommendation of the Physical Therapist for safe discharge planning  Barriers to Discharge Inaccessible home environment   Goals   Patient Goals to sit up    STG Expiration Date 04/13/22   Short Term Goal #1 STG 1  Pt will be able to perform bed mobility independently to improve independence and functional mobility  STG 2  Pt will be able to sit EOB for 25 min independently to strengthen abdominal musculature and assist in transfers  STG 3  Pt will be able to perform transfers independently with least restrictive AD to improve safety and functional mobility  STG 4  Pt will demonstrate improved endurance by ambulating 400 feet with least restrictive AD to improve functional mobility and assist in safe d/c STG 5  Pt will improve LE strength as demonstrated by negotiating 3 stairs with modified independence and least restrictive AD to improve overall function and improve safety for d/c      PT Treatment Day 0   Plan   Treatment/Interventions Functional transfer training;Patient/family training;Equipment eval/education; Bed mobility;Spoke to nursing;OT   PT Frequency 3-5x/wk   Recommendation   PT Discharge Recommendation Home with home health rehabilitation  (pending progress )   Equipment Recommended Walker   Additional Comments not cleared for d/c home, pending further ambulation + stair trial    AM-PAC Basic Mobility Inpatient   Turning in Bed Without Bedrails 3   Lying on Back to Sitting on Edge of Flat Bed 3   Moving Bed to Chair 3   Standing Up From Chair 3   Walk in Room 2   Climb 3-5 Stairs 2   Basic Mobility Inpatient Raw Score 16   Basic Mobility Standardized Score 38 32   Highest Level Of Mobility   -HL Goal 5: Stand one or more mins   -[facility] Highest Level of Mobility 6: Walk 10 steps or more   -HLM Goal Achieved Yes   Modified Margaret Scale   Modified Margaret Scale 4   End of Consult   Patient Position at End of Consult Supine; All needs within reach       [facility], [facility]

## 2022-03-30 NOTE — CONSULTS
Inpatient Consult to Surgical Oncology  Consult performed by: Ag Burger MD  Consult ordered by: Vin Peter MD        Surgical Oncology Consult    Assessment:    Aleisha Rivera is a 51 y o  female who presents with fungating right breast mass  Plan:  - Bedside breast biopsy  - Follow up pathology  - No additional acute surgical oncology intervention  - Rest of care per primary team    H&P:   Aleisha Rivera is a 51 y o  female who presents with fungating right breast mass  Patient states she first noted the mass this past December  She states she has a history of dense breasts with fibrocystic disease so it was not particularly alarming to her  She states the mass slowly increased in size and ultimately protruded through the skin  In the past week she states it began draining fluid  Additionally she began experiencing severe back pain with radiation of pain down her bilateral arms  She was evaluated at [facility] where she was found to have additional right axillary lymphadenopathy, diffuse lytic lesions, C4/T2 compression fractures, as well as septated cystic left adnexal mass  Plan from surgical oncology perspective was to perform breast biopsy, however, the patient was transferred to [facility] for neurosurgical evaluation the same day before a biopsy could be obtained  Right breast lesion is fungating, approx 8cm, fixed and firm, without active drainage or erythema/induration/fluctuance  Photo in patient's chart for reference  Additional palpable right axillary nontender lymphadenopathy  VSS  Afebrile  CA27 29: 111 3, CEA: 6 5    Please see full consult note obtained from surgical team prior as below         -------------------------------------------------------------------------------------------------      Consultation - Surgical Oncology  Aleisha Rivera 51 y o  female MRN: [id number]  Unit/Bed#: [facility]PHP 928-01 Encounter: [id number]        Assessment/Plan     Assessment:  51 F with fungating right breast mass and likely metastatic breast cancer    Plan:  Admission to medicine  Will obtain biopsy for tissue diagnosis  Recommend medical oncology and radiation oncology consultation    History of Present Illness     HPI:  Zina Fontaine is a 51 y o  female who presents to the hospital with back pain  She states it started a few months ago and became progressively worse  It is located in the upper back  She also notes radiation to her bilateral shoulders  She states that when she coughs or sneezes, she has a lightning-like sensation down her bilateral arms and legs  Of note, she also noted a right breast mass which has been enlarging since January  The mass is not painful  However it has eroded through the skin and is now malodorous and weeps  She has not sought medical attention for these  She states she is otherwise healthy  Denies any family history of breast, uterine, ovarian, or any other cancers, aside from possibly an aunt with skin cancer  Review of Systems   Constitutional: Negative for appetite change, chills, fever and unexpected weight change  HENT: Negative  Eyes: Negative  Respiratory: Negative  Cardiovascular: Negative  Gastrointestinal: Negative  Endocrine: Negative  Genitourinary: Negative  Musculoskeletal: Positive for back pain  Skin: Positive for wound (right breast)  Allergic/Immunologic: Negative  Neurological: Negative  Negative for dizziness, tremors, syncope, weakness, light-headedness, numbness and headaches  Hematological: Negative  Psychiatric/Behavioral: Negative  All other systems reviewed and are negative  Historical Information   No past medical history on file  No past surgical history on file    Social History   Social History     Substance and Sexual Activity   Alcohol Use Yes    Comment: rare, social      Social History     Substance and Sexual Activity   Drug Use Never     Social History     Tobacco Use Smoking Status Never Smoker   Smokeless Tobacco Not on file     Family History:   Family History   Problem Relation Age of Onset    Coronary artery disease Mother     Heart attack Father     Diabetes type II Father        Meds/Allergies   PTA meds:   Prior to Admission Medications   Prescriptions Last Dose Informant Patient Reported? Taking?   norgestimate-ethinyl estradiol (ORTHO-CYCLEN) 0 25-35 MG-MCG per tablet   Yes No   Sig: Take 1 tablet by mouth daily      Facility-Administered Medications: None     No Known Allergies    Objective   First Vitals:   Blood Pressure: (!) 154/113 (03/29/22 1729)  Pulse: 105 (03/29/22 1729)  Temperature: 97 8 °F (36 6 °C) (03/29/22 1729)  Height: 5' 4" (162 6 cm) (03/29/22 1729)  Weight - Scale: 94 kg (207 lb 3 7 oz) (03/29/22 1729)  SpO2: 91 % (03/29/22 1729)    Current Vitals:   Blood Pressure: (!) 156/110 (03/29/22 2011)  Pulse: (!) 108 (03/29/22 2011)  Temperature: 97 8 °F (36 6 °C) (03/29/22 1800)  Height: 5' 4" (162 6 cm) (03/29/22 1800)  Weight - Scale: 94 kg (207 lb 3 7 oz) (03/29/22 1800)  SpO2: 94 % (03/29/22 2011)    No intake or output data in the 24 hours ending 03/29/22 2200    Invasive Devices  Report    Peripheral Intravenous Line            Peripheral IV 03/28/22 Left Antecubital <1 day                Physical Exam  Vitals reviewed  Constitutional:       General: She is not in acute distress  Appearance: She is not toxic-appearing  HENT:      Head: Normocephalic  Nose: Nose normal       Mouth/Throat:      Mouth: Mucous membranes are moist    Eyes:      Extraocular Movements: Extraocular movements intact  Conjunctiva/sclera: Conjunctivae normal    Cardiovascular:      Rate and Rhythm: Normal rate  Pulmonary:      Effort: Pulmonary effort is normal  No respiratory distress  Chest:      Chest wall: Mass present  Breasts:      Right: Axillary adenopathy present              Comments: 8 cm fungating right breast mass, no significant erythema, nontender to palpation, firm and fixed    Abdominal:      General: There is no distension  Palpations: Abdomen is soft  Tenderness: There is no abdominal tenderness  Musculoskeletal:         General: No tenderness  Cervical back: Neck supple  Lymphadenopathy:      Upper Body:      Right upper body: Axillary adenopathy present  Skin:     General: Skin is warm and dry  Capillary Refill: Capillary refill takes less than 2 seconds  Neurological:      General: No focal deficit present  Mental Status: She is alert  Psychiatric:         Mood and Affect: Mood normal          Behavior: Behavior normal          Lab Results:   CBC:   Lab Results   Component Value Date    WBC 9 63 03/29/2022    HGB 12 9 03/29/2022    HCT 38 6 03/29/2022    MCV 91 03/29/2022     03/29/2022    MCH 30 5 03/29/2022    MCHC 33 4 03/29/2022    RDW 12 3 03/29/2022    MPV 9 7 03/29/2022    NRBC 0 03/29/2022   , CMP:   Lab Results   Component Value Date    SODIUM 135 (L) 03/29/2022    K 4 1 03/29/2022    CL 99 (L) 03/29/2022    CO2 26 03/29/2022    BUN 11 03/29/2022    CREATININE 0 72 03/29/2022    CALCIUM 8 9 03/29/2022    AST 78 (H) 03/29/2022     (H) 03/29/2022    ALKPHOS 311 (H) 03/29/2022    EGFR 100 03/29/2022     Imaging: I have personally reviewed pertinent reports  EKG, Pathology, and Other Studies: I have personally reviewed pertinent reports        Code Status: Level 1 - Full Code  Advance Directive and Living Will:      Power of :    POLST:

## 2022-03-30 NOTE — PROGRESS NOTES
[address] [facility]  Progress Note - Sheree Tierra 1975, 51 y o  female MRN: [id number]  Unit/Bed#: [facility] 928-01 Encounter: [id number]  Primary Care Provider: No primary care provider on file  Date and time admitted to hospital: 3/29/2022  5:26 PM    Osseous metastasis (HCC)  Assessment & Plan  Diffuse osseous mets, 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  Largest disease burden in cervical and upper thoracic spine  · Patient presented with worsening neck pain and shoulder pain for the past 3 days, not relieved with medication  · Pain started several weeks ago  · Found to have large fungating right breast mass  · No FH cancer, no unintentional weight loss  · On exam bilateral deltoid weakness, otherwise nonfocal  · SIN score 14-16    Imaging:  · MRI cervical spine with and without contrast 03/29/2022: Diffuse, primarily lytic osseous metastatic disease is seen throughout the cervical vertebral bodies from C1 into the upper thoracic spine  There is a significant amount of epidural tumor arising from pathologic compression fractures of C4 and T2  At the C4 level there is moderate to severe canal stenosis with flattening of the cord which maintains normal signal   Moderate canal stenosis at the T2 level  There is corresponding foraminal narrowing as a result of epidural and foraminal tumor  · MRI thoracic spine with and without contrast 03/29/2022: Diffuse, lytic, osseous metastasis is seen throughout the thoracic spine more prominently within the upper thoracic spine where there is epidural tumor and involvement of the posterior elements from T2 through T5  Moderate canal stenosis at the T2 level  Foraminal narrowing as described above  No abnormal cord signal identified  · MRI lumbar spine with and without contrast 03/29/2022:  Diffuse lytic osseous metastasis within the lower thoracic spine, lumbar spine and sacrum    There is a moderate amount of epidural tumor as a result of the expansile sacral lesion resulting in moderate sacral canal stenosis  Tumor extends into the S2-3 and S3-4 neural foramen on the left  Correlate for corresponding radiculopathy  Bilobed cystic mass within the left pelvis incompletely evaluated on this examination  Follow-up pelvic ultrasound imaging is recommended  Plan:  · Continue to monitor neurological exam  · Discussion with Neurosurgery attendings via [address] [place] [address], Radiation Oncology, Medical Oncology, SOD and palliative care:  · At this time no surgical intervention is warranted  Unfortunately patient's tumor burden is significant throughout the entire spine and it is very likely that an extensive spine surgery for fixation and likely tumor debulking will result in hardware failure and worsening complications  It is recommended at this time that patient undergo palliative radiation therapy to control disease burden    · Will order MRI brain with and without contrast to further evaluate possible intracranial disease/staging  · Will also order CTA head and neck with and without contrast to better evaluate vasculature/vertebral artery involvement given tumor burden the cervical spine  · She underwent breast biopsy yesterday, stat pathology sent, pending results  · Radiation Oncology plans for CT simulation likely today with tentative plan for 1st course of radiation therapy tomorrow, would defer the management of steroids to their team, for now she continues on Decadron 4 mg q 6 hours  · Palliative Care is consulted  · For added stability, patient will be placed in a  brace, to be fit today prior to imaging  · Medical management and pain control per primary team  · DVT ppx: SCDs, lovenox  · Mobilize as tolerated with  brace in place, PT/OT evaluation pending now that surgical intervention is not warranted  · Patient is okay to eat from my standpoint as there is no surgery warranted  · She is also okay to shower with assistance    Neurosurgery will follow in the periphery and review updated imaging once completed  Oncology nurse navigator aware of patient and will assist with outpatient follow-up if warranted  Signed off for now, please call with questions or concerns  * Breast mass, right  Assessment & Plan  Surgery following  · States she has always had "lumpy" breasts secondary to fibroids but noticed around [place] time that this was worsening with gradual fungating mass that remained pain less until recently with malodorous discharge which somewhat prompted the ED visit along with complaints of neck and back pain  · Underwent breast bx 3/29  · Pathology sent STAT, pending results      Subjective/Objective   Chief Complaint: "I am okay today"     Subjective:  Patient states she is doing well today  She has some soreness in her neck in bilateral deltoids but states this is a little bit better with the collar in place as it allows her to have some more stability and not cause pain down into her arms and her legs  She continues with weakness in the deltoids due to soreness  She is urinating per baseline  Passing gas but no bowel movement  No saddle anesthesia, bowel or bladder issues  She has no numbness or tingling in arms or her legs  She states Radiation Oncology came to see her this morning with plans for radiation therapy hopefully tomorrow  She is aware that surgical intervention is not warranted at this time secondary to diffuse osseous Mets and poor bone quality  She seems to understand that her prognosis is not good however she still wants to remain hopeful  She does admit that she has always had "lumpy" breasts but noticed that things were getting worse in December although there was no skin lesions noted  This gradually began to worsen and only became painful recently with weeping which somewhat prompted her visit to the emergency room along with severe neck and back pain      Objective:  Sitting up in bed, collar in place, NAD    I/O 03/28 0701  03/29 0700 03/29 0701  03/30 0700 03/30 0701  03/31 0700    Urine (mL/kg/hr)  950     Total Output  950     Net  -950            Unmeasured Urine Occurrence  1 x           Invasive Devices  Report    Peripheral Intravenous Line            Peripheral IV 03/28/22 Left Antecubital 1 day                Physical Exam:  Vitals: Blood pressure (!) 158/110, pulse 95, temperature 98 2 °F (36 8 °C), resp  rate 18, height 5' 4" (1 626 m), weight 94 kg (207 lb 3 7 oz), last menstrual period 02/28/2022, SpO2 93 %  ,Body mass index is 35 57 kg/m²        General appearance: alert, appears stated age, cooperative and no distress  Head: Normocephalic, without obvious abnormality, atraumatic  Eyes:  Conjugate  Neck: supple, symmetrical, trachea midline, collar in place  Lungs: non labored breathing  Heart: regular heart rate  Neurologic:   Mental status: Alert, oriented x3, follows commands, odd affect in that she is smiling in room and is very objective about everything going on although her prognosis may be very poor but did not seek immediate medical attention when she noted breast changes  Cranial nerves: grossly intact (Cranial nerves II-XII)  Sensory: normal to light touch and pinprick  Motor: moving all extremities with about 3/5 deltoid weakness bilaterally  Reflexes: 2-3+ throughout without [place] or clonus noted  Coordination: finger to nose normal bilaterally, no drift bilaterally      Lab Results:  Results from last 7 days   Lab Units 03/29/22  1430 03/28/22  2330   WBC Thousand/uL 9 63 8 44   HEMOGLOBIN g/dL 12 9 13 5   HEMATOCRIT % 38 6 40 4   PLATELETS Thousands/uL 328 346   NEUTROS PCT % 82* 60   MONOS PCT % 2* 7     Results from last 7 days   Lab Units 03/29/22  1430 03/28/22  2330   POTASSIUM mmol/L 4 1 3 3*   CHLORIDE mmol/L 99* 101   CO2 mmol/L 26 22   BUN mg/dL 11 9   CREATININE mg/dL 0 72 0 74   CALCIUM mg/dL 8 9 9 3   ALK PHOS U/L 311* 314*   ALT U/L 130* 128*   AST U/L 78* 80*     Results from last 7 days   Lab Units 03/29/22  0642   MAGNESIUM mg/dL 1 8       Imaging Studies: I have personally reviewed pertinent reports  and I have personally reviewed pertinent films in PACS    CT spine cervical wo contrast    Result Date: 3/29/2022  Impression: 1  Innumerable bony metastases  2   Displaced fracture through the right posterior arch of C1  3   C4 metastasis causing a severe compression fracture and extension into the canal causing severe central canal stenosis and likely cord compression  4   T2 metastasis causing a moderate compression fracture and extension into the canal causing severe central canal stenosis and likely cord compression  I sent a tiger text to [employer] on 3/29/2022 at 9:57 AM  The study was marked in [facility] for immediate notification  Workstation performed: [id number]     MRI cervical spine w wo contrast    Result Date: 3/29/2022  Impression: Diffuse, primarily lytic osseous metastatic disease is seen throughout the cervical vertebral bodies from C1 into the upper thoracic spine  There is a significant amount of epidural tumor arising from pathologic compression fractures of C4 and T2  At the C4 level there is moderate to severe canal stenosis with flattening of the cord which maintains normal signal   Moderate canal stenosis at the T2 level  There is corresponding foraminal narrowing as a result of epidural and foraminal tumor  Workstation performed: [id number]     MRI thoracic spine w wo contrast    Result Date: 3/29/2022  Impression: Diffuse, lytic, osseous metastasis is seen throughout the thoracic spine more prominently within the upper thoracic spine where there is epidural tumor and involvement of the posterior elements from T2 through T5  Moderate canal stenosis at the T2 level  Foraminal narrowing as described above  No abnormal cord signal identified   Workstation performed: [id number]     MRI lumbar spine w wo contrast    Result Date: 3/29/2022  Impression: Diffuse lytic osseous metastasis within the lower thoracic spine, lumbar spine and sacrum  There is a moderate amount of epidural tumor as a result of the expansile sacral lesion resulting in moderate sacral canal stenosis  Tumor extends into the S2-3 and S3-4 neural foramen on the left  Correlate for corresponding radiculopathy  Bilobed cystic mass within the left pelvis incompletely evaluated on this examination  Follow-up pelvic ultrasound imaging is recommended  Workstation performed: [id number]     CT chest abdomen pelvis w contrast    Result Date: 3/29/2022  Impression: Large right breast mass measuring up to 6 9 cm  Right axillary metastatic adenopathy  Surgical consult recommended  Innumerable osseous metastasis  7 cm septated cystic left adnexal structure  According to current guidelines Mahin Janey Radiol 2020; 74:468-675) in this premenopausal woman, this should be followed up in 6 to 12 months by pelvic ultrasound  Workstation performed: [id number]       EKG, Pathology, and Other Studies: I have personally reviewed pertinent reports        VTE Pharmacologic Prophylaxis: Enoxaparin (Lovenox)    VTE Mechanical Prophylaxis: sequential compression device

## 2022-03-30 NOTE — PLAN OF CARE
Problem: PHYSICAL THERAPY ADULT  Goal: Performs mobility at highest level of function for planned discharge setting  See evaluation for individualized goals  Description: Treatment/Interventions: Functional transfer training,Patient/family training,Equipment eval/education,Bed mobility,Spoke to nursing,OT  Equipment Recommended: Mat Prader       See flowsheet documentation for full assessment, interventions and recommendations  3/30/2022 1549 by Sebastián Rudolph  Note: Prognosis: Good  Problem List: Decreased mobility,Pain  Assessment: Pt seen for high complexity PT evaluation to assess potential discharge needs  Pt has active PT eval/treatment orders as well as up OOB as tolerated orders  Pt is a 50 y/o female admitted on 3/29/2022 with back pain, which was followed up with MRI showing metastatic lesions  Comorbidities affecting pt upon evaluation are as follows: R breast mass, osseous metastasis, elevated BP w/o diagnosis of HTN, transaminitis, and 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  Personal factors affecting patient at time of initial evaluation include steps to enter home and limited home support  Prior to admission, patient was residing with significant other in a 2  with 3 steps to enter and was performing ADLs/IADLS independently  Upon evaluation, they have current limitations with strength, endurance, balance, range of motion, and overall functional mobility, putting patient at increased risk for falls  Pt currently performing bed mobility tasks with min A x1, functional transfers with min A x1, and ambulation with min A x1  Ambulation distance was limited today 2* to pain  Pt was educated on spinal precautions, and she verbalized understanding  Pt would benefit from continued PT services while in hospital to address the remaining deficits  PT to continue to follow pt and recommends home with HHPT upon discharge  The patient's AM-PAC Basic Mobility Inpatient Short Form Raw Score is 16   A Raw score of less than or equal to 16 suggests the patient may benefit from discharge to post-acute rehabilitation services  Please refer to the recommendation of the Physical Therapist for safe discharge planning  Barriers to Discharge: Inaccessible home environment        PT Discharge Recommendation: Home with home health rehabilitation (pending progress )          See flowsheet documentation for full assessment

## 2022-03-30 NOTE — OCCUPATIONAL THERAPY NOTE
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[facility])    Past Medical History  No past medical history on file  Past Surgical History  No past surgical history on file  03/30/22 1523   OT Last Visit   OT Visit Date 03/30/22   Note Type   Note type Evaluation   Restrictions/Precautions   Weight Bearing Precautions Per Order No   Braces or Orthoses C/S Collar  ( brace)   Other Precautions Fall Risk;Pain;Spinal precautions   Pain Assessment   Pain Assessment Tool 0-10   Pain Score 7   Pain Location/Orientation Location: Neck   Hospital Pain Intervention(s) Repositioned; Ambulation/increased activity   Home Living   Type of [address] [place] [address] Two level;1/2 bath on main level  (3STE)   Bathroom Shower/Tub Tub/shower unit   Bathroom Toilet Standard   Additional Comments Denies AD/DME    Prior Function   Level of [place] Independent with ADLs and functional mobility   Lives With Significant other   Receives Help From Family;Friend(s)   ADL Assistance Independent   IADLs Independent   Falls in the last 6 months 0   Vocational Full time employment   Lifestyle   Autonomy PTA, pt reports being I with ADLs/IADLs, fnxl mobility, (+)    Reciprocal Relationships Boyfriend   Service to Others FTE - works as a  for [employer] Enjoys walking her dogs, going for rides, breweries/wineries   Psychosocial   Psychosocial (WDL) WDL   ADL   Where Assessed Edge of bed   Eating Assistance 5  Supervision/Setup   Grooming Assistance 5  [address] 3  Moderate Assistance   [address] 3  Moderate Assistance   Toileting Assistance  3  Moderate Assistance   Bed Mobility   Supine to Sit 4  Minimal assistance   Additional items Assist x 1;HOB elevated; Bedrails; Increased time required;LE management   Sit to Supine 4  Minimal assistance   Additional items Assist x 1;HOB elevated; Bedrails; Increased time required;LE management   Transfers   Sit to Stand 4  Minimal assistance   Additional items Assist x 1; Increased time required;Verbal cues   Stand to Sit 4  Minimal assistance   Additional items Assist x 1; Increased time required;Verbal cues   Additional Comments Transfers with RW    Functional Mobility   Functional Mobility 4  Minimal assistance   Additional Comments Ax1, ~ 2-3 steps fwd and back    Additional items Rolling walker   Balance   Static Sitting Fair   Dynamic Sitting Fair   Static Standing Fair -   Dynamic Standing Fair -   Ambulatory Poor +   Activity Tolerance   Activity Tolerance Patient limited by fatigue;Patient limited by pain   Medical Staff Made Aware PT, restorative   Nurse Made Aware RN clearance for session    RUE Assessment   RUE Assessment WFL   LUE Assessment   LUE Assessment WFL   Hand Function   Gross Motor Coordination Functional   Fine Motor Coordination Functional   Sensation   Light Touch No apparent deficits   Vision - Complex Assessment   Ocular Range of Motion WFL   Head Position WDL   Tracking Able to track stimulus in all quads without difficulty   Cognition   Overall Cognitive Status [facility]   Arousal/Participation Alert; Responsive; Cooperative   Attention Within functional limits   Orientation Level Oriented X4   Memory Within functional limits   Following Commands Follows all commands and directions without difficulty   Comments Pt very pleasant and cooperative t/o session    Assessment   Limitation Decreased ADL status; Decreased UE strength;Decreased endurance;Decreased self-care trans;Decreased high-level ADLs   Prognosis Good   Assessment Pt is a 51 y o  female admitted to [facility] on 3/29/2022 w/ Breast mass, right with 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  Pt found to have pathologic compression fxs to C4 and T2, as well as tumor involvement of T2-T5 and S2-3 and S3-4  Pt also with transaminitis  has no past medical history on file  Pt with active OT orders and up and OOB as tolerated orders  Pt seen as a co-evaluation with PT due to the patient's co-morbidities, clinically unstable presentation/clinical complexity, and present impairments  As per pt report, pta, resides with her boyfriend in a 2STH, 3STE  Pt was I w/  ADLS and IADLS, (+) drove  Upon evaluation, pt currently requires MIN A with RW for transfers and mobility  Pt currently requires S eating, S grooming, MIN A UB ADLs, MOD A LB ADLs, and MOD A toileting  Pt is limited at this time 2*: pain, endurance, activity tolerance, functional mobility, balance, functional standing tolerance, decreased I w/ ADLS/IADLS and strength  The following Occupational Performance Areas to address include: eating, grooming, bathing/shower, toilet hygiene, dressing, health maintenance, functional mobility, community mobility and clothing management  Pt to benefit from immediate acute skilled OT to address above deficits, improve overall functional independence, maximize fnxl mobility and reduce caregiver burden  From OT standpoint, recommendation at time of d/c would be home with skilled therapy and increased social support - pending progress  Pt was left after session with all current needs met  The patient's raw score on the AM-PAC Daily Activity inpatient short form is 15, standardized score is 34 69, less than 39 4  Patients at this level are likely to benefit from discharge to post-acute rehabilitation services  Please refer to the recommendation of the Occupational Therapist for safe discharge planning     Goals   Patient Goals to decrease her pain and sit up   LTG Time Frame 10-14   Plan   Treatment Interventions ADL retraining;Functional transfer training;UE strengthening/ROM; Endurance training;Patient/family training;Equipment evaluation/education; Compensatory technique education;Continued evaluation; Energy conservation; Activityengagement   Goal Expiration Date 04/13/22   OT Frequency 3-5x/wk   Recommendation   OT Discharge Recommendation Home with home health rehabilitation  (and increased social support - pending progress)   AM-PAC Daily Activity Inpatient   Lower Body Dressing 2   Bathing 2   Toileting 2   Upper Body Dressing 3   Grooming 3   Eating 3   Daily Activity Raw Score 15   Daily Activity Standardized Score (Calc for Raw Score >=11) 34 69   AM-PAC Applied Cognition Inpatient   Following a Speech/Presentation 4   Understanding Ordinary Conversation 4   Taking Medications 4   Remembering Where Things Are Placed or Put Away 4   Remembering List of 4-5 Errands 4   Taking Care of Complicated Tasks 4   Applied Cognition Raw Score 24   Applied Cognition Standardized Score 62 21      GOALS    - Pt will complete UB dressing/self care/bathing w/ mod I using adaptive device and DME as needed    - Pt will complete LB dressing/self care/bathing w/ mod I using adaptive device and DME as needed    - Pt will complete toileting w/ mod I w/ G hygiene/thoroughness using DME as needed    - Pt will improve functional transfers to Mod I on/off all surfaces using DME as needed w/ G balance/safety     - Pt will improve functional mobility during ADL/IADL/leisure tasks to Mod I using DME as needed w/ G balance/safety     - Pt will demonstrate G carryover of pt/caregiver education and training as appropriate      - Pt will demonstrate 100% carryover of energy conservation techniques t/o functional I/ADL/leisure tasks w/o cues s/p skilled education    - Pt will increase static and dynamic standing/sitting balance to F+  using AD/DME as needed to increase safety and I during fnxl transfers and ADLs    - Pt will maintain upright sitting position for at least 20 min during dynamic fnxl activity with F+  balance and endurance to improve ADL performance and independence, as well as reduce risk of falls     - Pt will participate in simulated IADL management task with DME as needed to increase independence to  w/ G safety and endurance      Barb Merino MS, OTR/L

## 2022-03-30 NOTE — ASSESSMENT & PLAN NOTE
Patient presented with right breast mass associated with upper back pain  Patient also complained of needle like sensation on her upper extremities  Patient denies weakness or numbness in extremities  No neuro deficits on examination  MRI cervical, thoracic and lumbar spine revealed Diffuse, primarily lytic osseous metastatic disease is seen throughout the cervical vertebral bodies from C1 into the upper thoracic spine  [place] Cue is a significant amount of epidural tumor arising from pathologic compression fractures of C4 and T2  Diffuse, lytic, osseous metastasis is seen throughout the thoracic spine more prominently within the upper thoracic spine where there is epidural tumor and involvement of the posterior elements from T2 through T5   Moderate canal stenosis at the T2  There is a moderate amount of epidural tumor as a result of the expansile sacral lesion resulting in moderate sacral canal stenosis   Tumor extends into the S2-3 and S3-4 neural foramen on the left  Inpatient consult to Neurosurgery and input appreciated  No surgical intervention at this time due to extensive spinal involvement  Plan to have palliative radiation by radiation oncology  Risks and benefits have been discussed with patient by radiation oncology  Patient agree to proceed     Continue patient on Decadron 4 mg q 6h  Neuro check q 4 hours

## 2022-03-30 NOTE — PROGRESS NOTES
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 (most marked at C4 and T2)  There is also significant involvement of multiple vertebral levels including C1  I reviewed the importance of wearing the brace provided by neurosurgery  In addition, there is a prominent, expansile lesion in the sacrum with involvement of the S2-3 and S3-4 neural foramina on the left  Tissue is in process to establish a pathologic diagnosis, 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  I reviewed the management of spinal cord compression/spine bone metastases  Radiotherapy is provided for palliation rather than cure, with the intent of delaying further neurologic compromise  I discussed the risks of radiotherapy to the spine which include but are not limited to fatigue, cytopenias, esophagitis, oral cavity mucositis, parotitis, nausea/vomiting, diarrhea, skin irritation , shortness of breath/cough, skin pigment changes, loss of appetite, fracture, esophageal stricture, nerve or spinal cord injury, or transient radiation myelopathy  There is also a risk of secondary malignancy  I discussed that in some scenarios treatment of the tumor may lead to pathologic fracture which could result in spinal cord injury and/or paralysis  I also reviewed the findings of disease in the sacrum with involving of the neural foramina  I have also proposed palliative treatment to this site    I discussed the risks of pelvic radiotherapy, which include but are not limited to fatigue, cytopenias, skin reaction/dermatitis, hair loss in the treated area, hyperpigmentation, dysuria, diarrhea, nausea/vomiting and abdominal fullness/bloating  In the long term, she could experience a permanent change in bowel habits, bowel obstruction, premature menopause, infertility, vaginal stenosis, cystitis, proctitis, risk of fistulas/adhesions, fracture, injury to nerves, and risk of secondary malignancy  After considering the risks, benefits and alternatives to palliative radiotherapy, she has elected to proceed  I have proposed treatment of the cervical and upper thoracic spine as well as the sacrum, 30 Gy in 10 fractions  Informed consent was obtained today  CT simulation will be arranged today  She would prefer to wait for a preliminary pathologic diagnosis and brain MRI before proceeding with treatment, and I will aim for starting treatment on 3/31/2022  There is a risk of neurologic compromise in the interim, but she is willing to accept this risk  She will continue ongoing neurologic checks and dexamethasone 4 mg every 6 hours  All questions were answered to her satisfaction

## 2022-03-30 NOTE — CONSULTS
Medical Oncology/Hematology Consult Note  Chana Ridley, female, 51 y o , 1975,  [facility], [id number]     Assessment and Plan    1  Likely metastatic breast cancer (bones)   2  Spinal cord compression, cervical  - [facility]    - Pt transferred here for neursurgery/radiation evaluation    - Neurosurgery evaluated patient for surgery  Likely no surgical intervention  Continue C-collar  - agree with continuation of dexamethasone 4 mg q 6  - radiation oncology to radiate ASAP   - surgical oncology obtained biopsy on 03/29/2022; treatment for this likely breast cancer would be palliative intent, given in oupatient setting  - Reviewed imaging and staging at this time; we still need MRI of brain to r/o brain metastases  - I reviewed that this is likely metastatic disease, as it as moved into the bones  It is likely treatable but not curable, and she likely will be living with this for the rest of her life  She is motivated to get treatment and get this under control; she understands this is a poor diagnosis to have, and that maybe she should have tried to get care sooner  She wants to make sure her care team is not giving up on her  - I did inform her all systemic directed therapy would be initiated in the outpatien setting, after radiation was completed  - Patient should be seen by genetics, ambulatory referral placed  - Given her bone metastases, patient will likely get bisphosphonate in outpatient settin gas well; no need to give inpatient as no hypercalcemia at this time  - Of note, LFTs are elevated (AST 78, , Alk Phos 311)  Alk phos probably from bone metastases, no evidence of liver metastases on imaging, but these LFTs should be monitored from outpatient setting     - Pt should be presented at tumor board- I will let Breast Nurse Navigator Conchis Lawson know about this patient and her expected course        Outpatient follow up plan: Follow up with Dr Brooke Pichardo in [place] in 7-14 days  Communication with patient/family:  I did update the patient, as well as her boyfriend at bedside  Communication with team:  Did communicate with Dr Dante Worthy with Palliative Care, and Emre Stokes with Neurosurg  I did review this patient with Dr Roderick Velazquez  and he is in agreement with this plan  Ita Rodrigez, [facility]-BC  Hematology/Oncology Nurse Practitioner       Reason for consultation: metastatic treatment    History of present illness:  Aleisha Rivera is a 51 y o  female with no significant past medical history- has been very healthy  She is , has a 21year old son  No known history of cancer  No family history of cancer  She presented to ER with neck pain, for about 2 weeks- originally alleviated with ibuprofen and tiger balm  She also notes she has always had "lumpy" breasts, and didn't think anything of it until her skin opened up about 2 weeks ago  She denies pain anywhere  Denies any fevers, chills, nausea, vomiting, headaches, abdominal pain, chest pain, shortness of breath, cough, or genitourinary complaints  She has no significant weakness in bilateral arms, just some deltoid weakness  She knows she maybe should have sought care sooner, she thought maybe this would be cancer, but didn't think it would be this extensive  She understands this is likely a bad news, but she is motivated to get treatment and get this under control; she understands this is a poor diagnosis to have, but she wants to make sure her care team is not giving up on her  Patient has not been receiving yearly mammograms  [facility] end of 2022, knows not to take OCP any longer  Review of Systems:    A complete review of systems is negative other than that noted in the HPI  Oncology History:   Cancer Staging  No matching staging information was found for the patient  Oncology History    No history exists         No past medical history on file     No past surgical history on file      Family History   Problem Relation Age of Onset    Coronary artery disease Mother     Heart attack Father     Diabetes type II Father        Social History     Socioeconomic History    Marital status: Single     Spouse name: Not on file    Number of children: Not on file    Years of education: Not on file    Highest education level: Not on file   Occupational History    Not on file   Tobacco Use    Smoking status: Never Smoker    Smokeless tobacco: Not on file   Vaping Use    Vaping Use: Never used   Substance and Sexual Activity    Alcohol use: Yes     Comment: rare, social     Drug use: Never    Sexual activity: Not on file   Other Topics Concern    Not on file   Social History Narrative    Not on file     Social Determinants of Health     Financial Resource Strain: Not on file   Food Insecurity: Not on file   Transportation Needs: Not on file   Physical Activity: Not on file   Stress: Not on file   Social Connections: Not on file   Intimate Partner Violence: Not on file   Housing Stability: Not on file         Current Facility-Administered Medications:     acetaminophen (TYLENOL) tablet 975 mg, 975 mg, Oral, Q8H [address], Tara Olivas DO, 975 mg at 03/30/22 0319    amLODIPine (NORVASC) tablet 5 mg, 5 mg, Oral, Daily, Jodee Torres MD, 5 mg at 03/30/22 0822    dexamethasone (DECADRON) injection 4 mg, 4 mg, Intravenous, Q6H [address], Tara Olivas DO, 4 mg at 03/30/22 0821    enoxaparin (LOVENOX) subcutaneous injection 40 mg, 40 mg, Subcutaneous, Q24H [address], Jodee Torres MD, 40 mg at 03/30/22 5200    HYDROmorphone (DILAUDID) injection 0 5 mg, 0 5 mg, Intravenous, Q1H PRN, Tara Olivas DO, 0 5 mg at 03/29/22 2100    Labetalol HCl (NORMODYNE) injection 10 mg, 10 mg, Intravenous, Q6H PRN, Tara Olivas DO    ondansetron (ZOFRAN) injection 4 mg, 4 mg, Intravenous, Q4H PRN, Tara Olivas DO    oxyCODONE (ROXICODONE) immediate release tablet 10 mg, 10 mg, Oral, Q4H PRN, Tara Spojerilyni, DO, 10 mg at 03/29/22 2039    oxyCODONE (ROXICODONE) IR tablet 5 mg, 5 mg, Oral, Q4H PRN, Tara Spoleti, DO, 5 mg at 03/30/22 0318    pantoprazole (PROTONIX) EC tablet 40 mg, 40 mg, Oral, Early Morning, Tara Rollei, DO, 40 mg at 03/30/22 0518    senna-docusate sodium (SENOKOT S) 8 6-50 mg per tablet 1 tablet, 1 tablet, Oral, HS PRN, Tara Spoleti, DO    Medications Prior to Admission   Medication    norgestimate-ethinyl estradiol (ORTHO-CYCLEN) 0 25-35 MG-MCG per tablet       No Known Allergies      Physical Exam:     BP (!) 158/110   Pulse 95   Temp 98 2 °F (36 8 °C)   Resp 18   Ht 5' 4" (1 626 m)   Wt 94 kg (207 lb 3 7 oz)   LMP 02/28/2022   SpO2 93%   BMI 35 57 kg/m²     Physical Exam  General: Appearance: alert, cooperative, no distress  HEENT: Normocephalic, atraumatic  No scleral icterus  conjunctivae clear  No sinus drainage or tenderness  Chest: Right breast with fungating breast mass, medial right upper quadrant, about 3cmx3 skin break through, 7cm mass below  Difficult to assess right axillary adenopathy  Lungs:  Respirations unlabored  Cardiac: RRR  Abdomen: Soft, non-tender, non-distended  Bowel sounds are normal  No masses, no organomegaly  Extremities:  No edema, cyanosis, clubbing  Skin: Skin color, turgor are normal  No rashes  Lymphatics: no palpable adenopathy  Neurologic: Alert and oriented X 3  No focal neuro deficits       Recent Results (from the past 48 hour(s))   CBC and differential    Collection Time: 03/28/22 11:30 PM   Result Value Ref Range    WBC 8 44 4 31 - 10 16 Thousand/uL    RBC 4 47 3 81 - 5 12 Million/uL    Hemoglobin 13 5 11 5 - 15 4 g/dL    Hematocrit 40 4 34 8 - 46 1 %    MCV 90 82 - 98 fL    MCH 30 2 26 8 - 34 3 pg    MCHC 33 4 31 4 - 37 4 g/dL    RDW 12 4 11 6 - 15 1 %    MPV 10 0 8 9 - 12 7 fL    Platelets 187 141 - 943 Thousands/uL    nRBC 0 /100 WBCs    Neutrophils Relative 60 43 - 75 % Immat GRANS % 1 0 - 2 %    Lymphocytes Relative 30 14 - 44 %    Monocytes Relative 7 4 - 12 %    Eosinophils Relative 1 0 - 6 %    Basophils Relative 1 0 - 1 %    Neutrophils Absolute 5 20 1 85 - 7 62 Thousands/µL    Immature Grans Absolute 0 05 0 00 - 0 20 Thousand/uL    Lymphocytes Absolute 2 50 0 60 - 4 47 Thousands/µL    Monocytes Absolute 0 59 0 17 - 1 22 Thousand/µL    Eosinophils Absolute 0 06 0 00 - 0 61 Thousand/µL    Basophils Absolute 0 04 0 00 - 0 10 Thousands/µL   Comprehensive metabolic panel    Collection Time: 03/28/22 11:30 PM   Result Value Ref Range    Sodium 136 136 - 145 mmol/L    Potassium 3 3 (L) 3 5 - 5 3 mmol/L    Chloride 101 100 - 108 mmol/L    CO2 22 21 - 32 mmol/L    ANION GAP 13 4 - 13 mmol/L    BUN 9 5 - 25 mg/dL    Creatinine 0 74 0 60 - 1 30 mg/dL    Glucose 105 65 - 140 mg/dL    Calcium 9 3 8 3 - 10 1 mg/dL    AST 80 (H) 5 - 45 U/L     (H) 12 - 78 U/L    Alkaline Phosphatase 314 (H) 46 - 116 U/L    Total Protein 8 1 6 4 - 8 2 g/dL    Albumin 3 6 3 5 - 5 0 g/dL    Total Bilirubin 0 49 0 20 - 1 00 mg/dL    eGFR 96 ml/min/1 73sq m   CK Total with Reflex CKMB    Collection Time: 03/28/22 11:30 PM   Result Value Ref Range    Total CK 35 26 - 192 U/L   hCG, qualitative pregnancy    Collection Time: 03/28/22 11:30 PM   Result Value Ref Range    Preg, Serum Negative Negative   ECG 12 lead    Collection Time: 03/28/22 11:49 PM   Result Value Ref Range    Ventricular Rate 0 BPM    Atrial Rate 0 BPM    MT Interval  ms    QRSD Interval 0 ms    QT Interval 0 ms    QTC Interval 0 ms    P Axis  degrees    QRS Axis 0 degrees    T Wave Axis 0 degrees   ECG 12 lead    Collection Time: 03/28/22 11:54 PM   Result Value Ref Range    Ventricular Rate 103 BPM    Atrial Rate 103 BPM    MT Interval 146 ms    QRSD Interval 72 ms    QT Interval 338 ms    QTC Interval 442 ms    P Axis 45 degrees    QRS Axis -1 degrees    T Wave Axis 48 degrees   Blood culture #1    Collection Time: 03/29/22  2:06 AM Specimen: Arm, Left; Blood   Result Value Ref Range    Blood Culture No Growth at 24 hrs  Blood culture #2    Collection Time: 03/29/22  2:06 AM    Specimen: Arm, Left; Blood   Result Value Ref Range    Blood Culture No Growth at 24 hrs      Lactic acid    Collection Time: 03/29/22  2:06 AM   Result Value Ref Range    LACTIC ACID 1 1 0 5 - 2 0 mmol/L   Magnesium    Collection Time: 03/29/22  6:42 AM   Result Value Ref Range    Magnesium 1 8 1 6 - 2 6 mg/dL   CBC and differential    Collection Time: 03/29/22  2:30 PM   Result Value Ref Range    WBC 9 63 4 31 - 10 16 Thousand/uL    RBC 4 23 3 81 - 5 12 Million/uL    Hemoglobin 12 9 11 5 - 15 4 g/dL    Hematocrit 38 6 34 8 - 46 1 %    MCV 91 82 - 98 fL    MCH 30 5 26 8 - 34 3 pg    MCHC 33 4 31 4 - 37 4 g/dL    RDW 12 3 11 6 - 15 1 %    MPV 9 7 8 9 - 12 7 fL    Platelets 517 688 - 052 Thousands/uL    nRBC 0 /100 WBCs    Neutrophils Relative 82 (H) 43 - 75 %    Immat GRANS % 1 0 - 2 %    Lymphocytes Relative 15 14 - 44 %    Monocytes Relative 2 (L) 4 - 12 %    Eosinophils Relative 0 0 - 6 %    Basophils Relative 0 0 - 1 %    Neutrophils Absolute 7 85 (H) 1 85 - 7 62 Thousands/µL    Immature Grans Absolute 0 08 0 00 - 0 20 Thousand/uL    Lymphocytes Absolute 1 42 0 60 - 4 47 Thousands/µL    Monocytes Absolute 0 22 0 17 - 1 22 Thousand/µL    Eosinophils Absolute 0 03 0 00 - 0 61 Thousand/µL    Basophils Absolute 0 03 0 00 - 0 10 Thousands/µL   Comprehensive metabolic panel    Collection Time: 03/29/22  2:30 PM   Result Value Ref Range    Sodium 135 (L) 136 - 145 mmol/L    Potassium 4 1 3 5 - 5 3 mmol/L    Chloride 99 (L) 100 - 108 mmol/L    CO2 26 21 - 32 mmol/L    ANION GAP 10 4 - 13 mmol/L    BUN 11 5 - 25 mg/dL    Creatinine 0 72 0 60 - 1 30 mg/dL    Glucose 113 65 - 140 mg/dL    Calcium 8 9 8 3 - 10 1 mg/dL    Corrected Calcium 9 4 8 3 - 10 1 mg/dL    AST 78 (H) 5 - 45 U/L     (H) 12 - 78 U/L    Alkaline Phosphatase 311 (H) 46 - 116 U/L    Total Protein 7 7 6 4 - 8 2 g/dL    Albumin 3 4 (L) 3 5 - 5 0 g/dL    Total Bilirubin 0 52 0 20 - 1 00 mg/dL    eGFR 100 ml/min/1 73sq m   Hepatitis panel, acute    Collection Time: 03/29/22  2:30 PM   Result Value Ref Range    Hepatitis B Surface Ag Non-reactive Non-reactive, NonReactive - Confirmed    Hep A IgM Non-reactive Non-reactive, Equivocal-Suggest Recollect    Hepatitis C Ab Non-reactive Non-reactive    Hep B C IgM Non-reactive Non-reactive   CEA    Collection Time: 03/29/22  2:30 PM   Result Value Ref Range    CEA 6 5 (H) 0 0 - 3 0 ng/mL   Cancer antigen 15-3    Collection Time: 03/29/22  2:30 PM   Result Value Ref Range    CA 15-3 91 6 (H) 0 0 - 25 0 U/mL   Cancer antigen 27 29    Collection Time: 03/29/22  2:30 PM   Result Value Ref Range    CA 27 29 110 3 (H) 0 0 - 42 3 U/mL       XR chest 2 views    Result Date: 3/29/2022  Narrative: CHEST INDICATION:   htn, upper back pain  COMPARISON:  None EXAM PERFORMED/VIEWS:  XR CHEST PA & LATERAL FINDINGS: Cardiomediastinal silhouette appears unremarkable  The lungs are clear  No pneumothorax or pleural effusion  Osseous structures appear within normal limits for patient age  Impression: No acute cardiopulmonary disease  Workstation performed: [id number]     CT spine cervical wo contrast    Result Date: 3/29/2022  Narrative: CT CERVICAL SPINE - WITHOUT CONTRAST INDICATION:   Bone mass or bone pain, cervical spine, aggressive features on xray osseous mets  COMPARISON:  None  TECHNIQUE:  CT examination of the cervical spine was performed without intravenous contrast   Contiguous axial images were obtained  Sagittal and coronal reconstructions were performed  Radiation dose length product (DLP) for this visit:  718 mGy-cm   This examination, like all CT scans performed in the [facility], was performed utilizing techniques to minimize radiation dose exposure, including the use of iterative reconstruction and automated exposure control    IMAGE QUALITY: Diagnostic  FINDINGS: ALIGNMENT:  There is reversal of normal cervical lordosis  There is no significant subluxation  No compression deformity  VERTEBRAL BODIES:  Innumerable lytic lesions throughout the cervical spine  There is a displaced fracture through the right posterior arch of C1 (series 2 image 46)  Displacement is approximately 4 mm  Large lytic process involving the right lateral mass of C1 is noted with expansion and extension involving the anterior arch  There is severe compression of the C4 vertebral body and moderate compression of the T2 vertebral body  The soft tissue windows demonstrate a large soft tissue mass extending from the C4 level into the spinal canal measuring approximately 2 2 x 1 3 cm (series 603 image 57)  This causes a severe central canal stenosis and likely cord compression  This extends from the mid C3 level to the lower C5 level  An additional large soft tissue mass extending from the T2 level into the spinal canal measures approximate 1 1 cm and likely causes severe central canal stenosis /cord compression  (Series 3 image 59)  DEGENERATIVE CHANGES:  No significant cervical degenerative changes are noted  PREVERTEBRAL AND PARASPINAL SOFT TISSUES:  Unremarkable  THORACIC INLET:  Normal      Impression: 1  Innumerable bony metastases  2   Displaced fracture through the right posterior arch of C1  3   C4 metastasis causing a severe compression fracture and extension into the canal causing severe central canal stenosis and likely cord compression  4   T2 metastasis causing a moderate compression fracture and extension into the canal causing severe central canal stenosis and likely cord compression  I sent a tiger text to [employer] on 3/29/2022 at 9:57 AM  The study was marked in [facility] for immediate notification    Workstation performed: [id number]     MRI cervical spine w wo contrast    Result Date: 3/29/2022  Narrative: MRI CERVICAL SPINE WITH AND WITHOUT CONTRAST INDICATION: Breast carcinoma with osseous metastasis    COMPARISON:  CT scan dated 3/29/2022  TECHNIQUE:  Sagittal T1, sagittal T2, sagittal inversion recovery, axial T2  Sagittal T1 and axial T1 postcontrast   IV Contrast:  9 mL of Gadobutrol injection (SINGLE-DOSE) IMAGE QUALITY:  Diagnostic  FINDINGS: ALIGNMENT:  There is reversal of the normal cervical lordosis centered at C4  There is anterior subluxation of C3 in relation to C4  There is a severe pathologic compression fracture of the C4 vertebral body  There is a moderate pathologic compression fracture of T2  MARROW SIGNAL:  Diffusely heterogeneous marrow signal in particular on T1-weighted imaging consistent with diffuse lytic metastasis  This involves nearly every visualized vertebral body within the cervical and upper thoracic spine  There is involvement  of the posterior elements as well, in particular on the left at the C4, C5 and C6 levels  There is posterior element involvement noted within the upper thoracic spine as well including T2 on the left as well as T3 and T4 on the left  Osseous metastatic disease is seen within the right aspect of the C1 vertebral body replacing the lateral mass and anterior ring of C1  There is extensive epidural tumor present at the C4 level extending posteriorly from the vertebral body into the anterior epidural space and into both neural foramen  Similar findings to a slightly lesser degree at the level of T2 with areas of epidural  tumor extending into the anterior epidural space bilaterally  CERVICAL AND VISUALIZED UPPER THORACIC CORD:  Although the cord is compressed in particular at the level of the C4 epidural tumor, the cord appears to maintain normal signal with no definite cord edema present  PREVERTEBRAL AND PARASPINAL SOFT TISSUES:  Mild anterior /prevertebral tumor noted at the C4 and T2 levels   VISUALIZED POSTERIOR FOSSA:  The visualized posterior fossa demonstrates no abnormal signal  CERVICAL DISC SPACES: C2-C3:  Normal  C3-C4:  Anterior subluxation of C3 upon C4  Loss of disc height with annular bulging  As described above there is extensive epidural tumor arising C4 vertebral body into the anterior epidural space and neural foramen bilaterally  Moderate canal stenosis with mild flattening of the cord  Moderate to severe bilateral foraminal narrowing with epidural tumor extending into the neural foramen  C4-C5:  Findings are similar to the C3-4 level with epidural tumor resulting in moderate canal stenosis and severe bilateral foraminal narrowing  C5-C6:  Mild annular bulging with a small central disc protrusion  Mild canal stenosis and left foraminal narrowing  C6-C7:  Mild annular bulging  No disc herniation, canal stenosis or foraminal narrowing  C7-T1:  Normal  UPPER THORACIC DISC SPACES:  Moderate canal stenosis at the level of T2 as a result of epidural tumor bilaterally  Mild to moderate foraminal narrowing at both T1-T2 and T2-T3 levels  POSTCONTRAST IMAGING:  The tumor replacing the vertebral bodies and posterior elements as well as the tumor extending into the epidural space is described above demonstrates relatively homogeneous enhancement  Impression: Diffuse, primarily lytic osseous metastatic disease is seen throughout the cervical vertebral bodies from C1 into the upper thoracic spine  There is a significant amount of epidural tumor arising from pathologic compression fractures of C4 and T2  At the C4 level there is moderate to severe canal stenosis with flattening of the cord which maintains normal signal   Moderate canal stenosis at the T2 level  There is corresponding foraminal narrowing as a result of epidural and foraminal tumor  Workstation performed: [id number]     MRI thoracic spine w wo contrast    Result Date: 3/29/2022  Narrative: MRI THORACIC SPINE WITH AND WITHOUT CONTRAST INDICATION: Breast carcinoma with diffuse osseous metastasis    COMPARISON:  None   TECHNIQUE: Sagittal T1, sagittal T2, sagittal inversion recovery, axial T2,  axial 2D MERGE  Sagittal and axial T1 postcontrast  IV Contrast:  9 mL of Gadobutrol injection (SINGLE-DOSE) IMAGE QUALITY:  Diagnostic  FINDINGS: ALIGNMENT:  Slightly exaggerated smooth kyphosis of the thoracic spine  No scoliosis  MARROW SIGNAL: Diffuse osseous metastatic disease is seen throughout the thoracic spine  There is a pathologic compression fracture of T2 with moderate collapse of the vertebral body and epidural tumor extending into the anterior epidural space and into  the neural foramen at the T1-2 and T2-3 levels  Additional osseous metastasis is seen within upper thoracic vertebral bodies and posterior elements including T3, T4  The posterior element involvement within these vertebral bodies are primarily left-sided  There is moderate resulting foraminal narrowing and a small amount of epidural tumor noted within the left posterior lateral aspect of the canal at the T3-4 level  Additional osseous metastasis are scattered within the mid to lower thoracic spine with no other pathologic compression fractures  No other epidural tumor is seen within the mid or lower thoracic levels  THORACIC CORD:  Normal signal within the thoracic cord  PREVERTEBRAL AND PARASPINAL SOFT TISSUES:   2 well-circumscribed nodules are seen adjacent to the spleen which are isointense to splenic tissue, possibly splenule's  THORACIC DEGENERATIVE CHANGE:  A few small disc herniations are seen within the thoracic spine  See above description of canal stenosis and foraminal narrowing in the upper thoracic spine primarily result of epidural tumor   POSTCONTRAST:  The osseous metastasis demonstrate relatively homogeneous enhancement after administration of contrast      Impression: Diffuse, lytic, osseous metastasis is seen throughout the thoracic spine more prominently within the upper thoracic spine where there is epidural tumor and involvement of the posterior elements from T2 through T5  Moderate canal stenosis at the T2 level  Foraminal narrowing as described above  No abnormal cord signal identified  Workstation performed: [id number]     MRI lumbar spine w wo contrast    Result Date: 3/29/2022  Narrative: MRI LUMBAR SPINE WITH AND WITHOUT CONTRAST INDICATION: Breast carcinoma with osseous metastasis  COMPARISON:  None  TECHNIQUE:  Sagittal T1, sagittal T2, sagittal inversion recovery, axial T2, coronal T2  Sagittal and axial T1 postcontrast  IV Contrast:  9 mL of Gadobutrol injection (SINGLE-DOSE) IMAGE QUALITY:  Diagnostic FINDINGS: VERTEBRAL BODIES:  There are 5 lumbar type vertebral bodies  Mild smooth levoscoliosis of the lumbar spine  No spondylolisthesis or spondylolysis  Diffuse lytic metastasis are identified within the lower thoracic spine, and lumbar spine demonstrating homogeneous enhancement after administration of contrast   There is a minor compression deformity of the T12 superior endplate which may represent a pathologic compression fracture  SACRUM:  There is an expansile lytic/destructive metastatic lesion within the sacrum involving the S2-S5 segments with tumor extending anteriorly into the presacral space and posteriorly into the sacral canal resulting in severe sacral canal stenosis  DISTAL CORD AND CONUS:  Normal size and signal within the distal cord and conus  PARASPINAL SOFT TISSUES:  Numerous nabothian cysts are seen within the lower uterine canal   2 larger cysts are seen within the left posterior pelvis, likely adnexal in origin  See prior CT abdomen pelvis result  LOWER THORACIC DISC SPACES:  No lower thoracic disc herniation, canal stenosis or foraminal narrowing  No lower thoracic epidural tumor  LUMBAR DISC SPACES:  No evidence of epidural tumor within the lumbar canal   At L5-S1 there is annular bulging with a small left foraminal disc protrusion  There is no canal stenosis and only mild left foraminal narrowing   As described above there is severe sacral canal stenosis as a result of epidural tumor  Tumor appears to extend into the left sacral foramen at S2-3 and S3-4  POSTCONTRAST IMAGING:  The lytic osseous metastasis demonstrate relatively homogeneous enhancement  Impression: Diffuse lytic osseous metastasis within the lower thoracic spine, lumbar spine and sacrum  There is a moderate amount of epidural tumor as a result of the expansile sacral lesion resulting in moderate sacral canal stenosis  Tumor extends into the S2-3 and S3-4 neural foramen on the left  Correlate for corresponding radiculopathy  Bilobed cystic mass within the left pelvis incompletely evaluated on this examination  Follow-up pelvic ultrasound imaging is recommended  Workstation performed: [id number]     CT chest abdomen pelvis w contrast    Result Date: 3/29/2022  Narrative: CT CHEST, ABDOMEN AND PELVIS WITH IV CONTRAST INDICATION:   Evaluate for metastases  COMPARISON:  None  TECHNIQUE: CT examination of the chest, abdomen and pelvis was performed  Axial, sagittal, and coronal 2D reformatted images were created from the source data and submitted for interpretation  Radiation dose length product (DLP) for this visit:  1086 mGy-cm   This examination, like all CT scans performed in the [facility], was performed utilizing techniques to minimize radiation dose exposure, including the use of iterative reconstruction and automated exposure control  IV Contrast:  100 mL of iohexol (OMNIPAQUE) Enteric Contrast: Enteric contrast was administered  FINDINGS: CHEST LUNGS:  Lungs are clear  There is no tracheal or endobronchial lesion  PLEURA:  Unremarkable  HEART/GREAT VESSELS: Heart is unremarkable for patient's age  No thoracic aortic aneurysm  MEDIASTINUM AND ALISON:  Unremarkable  CHEST WALL AND LOWER NECK:   6 9 x 6 2 x 6 4 cm mass extending to the overlying skin surface and right pectoralis major muscle with overlying skin induration   Right axillary adenopathy measuring up to 3 8 cm  ABDOMEN LIVER/BILIARY TREE:  Unremarkable  GALLBLADDER:  No calcified gallstones  No pericholecystic inflammatory change  SPLEEN:  Unremarkable  PANCREAS:  Unremarkable  ADRENAL GLANDS:  Unremarkable  KIDNEYS/URETERS:  One or more simple renal cyst(s) is noted  Nonobstructive calculus at the left inferior pole     No hydronephrosis  STOMACH AND BOWEL:  Unremarkable  APPENDIX:  No findings to suggest appendicitis  ABDOMINOPELVIC CAVITY:  No ascites  No pneumoperitoneum  No lymphadenopathy  VESSELS:  Unremarkable for patient's age  PELVIS REPRODUCTIVE ORGANS:  7 cm septated cystic left adnexal structure  URINARY BLADDER:  Unremarkable  ABDOMINAL WALL/INGUINAL REGIONS:  Unremarkable  OSSEOUS STRUCTURES:  Innumerable lytic lesions throughout the visualized osseous structures  The largest within the left sacrum measures up to 4 4 cm with destructive changes and encroachment upon the presacral space  Impression: Large right breast mass measuring up to 6 9 cm  Right axillary metastatic adenopathy  Surgical consult recommended  Innumerable osseous metastasis  7 cm septated cystic left adnexal structure  According to current guidelines [employer] Radiol 2020; 69:180-059) in this premenopausal woman, this should be followed up in 6 to 12 months by pelvic ultrasound  Workstation performed: [id number]       Labs and pertinent reports reviewed  This note has been generated by voice recognition software system  Therefore, there may be spelling, grammar, and or syntax errors  Please contact if questions arise

## 2022-03-30 NOTE — ASSESSMENT & PLAN NOTE
Patient presents with right breast mass associated with back pain  CT chest, abdomen and pelvis revealed innumerable osseous metastasis    CT C spine: Innumerable bony metastases, Displaced fracture through the right posterior arch of C1, C4 metastasis causing a severe compression fracture and extension into the canal causing severe central canal stenosis and likely cord compression , T2 metastasis causing a moderate compression fracture and extension into the canal causing severe central canal stenosis and likely cord compression    Radiation oncology consulted and recommend dexamethasone 4 mg q 6h  Plan for palliative radiation tomorrow  Pain management

## 2022-03-30 NOTE — PROGRESS NOTES
Progress Note - Zina Fontaine 51 y o  female MRN: [id number]    [facility] Encounter: [id number]      Assessment:  Zina Fontaine is a 51 y o  female with fungating right breast mass in setting of septated adnexal mass and diffuse lytic lesions  S/p bedside biopsy of breast mass last night    Plan:  F/u pathology - sent as STAT  Wound care PRN, gauze for drainage to R breast  No additional surgical oncology intervention  Appreciate NSG/med-onc/rad-onc/palliative recs  Rest of care per primary team    Subjective:   States she did not sleep well due to back pain and continued numbness down mostly left arm  No pain with breast mass  No bleeding overnight s/p bx  Objective:     Vitals: Blood pressure (!) 156/110, pulse (!) 108, temperature 97 8 °F (36 6 °C), resp  rate 18, height 5' 4" (1 626 m), weight 94 kg (207 lb 3 7 oz), last menstrual period 02/28/2022, SpO2 94 %  ,Body mass index is 35 57 kg/m²  Intake/Output Summary (Last 24 hours) at 3/30/2022 0615  Last data filed at 3/30/2022 0501  Gross per 24 hour   Intake --   Output 600 ml   Net -600 ml       Physical Exam:   General - no acute distress, responsive and cooperative  CV - warm, regular rate  Pulm - normal work of breathing, no respiratory distress  Breast - right fungating breast lesion with minimal drainage  Neuro - m/s grossly intact, cn grossly intact  Ext - moving all extremities, C collar in place       Invasive Devices  Report    Peripheral Intravenous Line            Peripheral IV 03/28/22 Left Antecubital 1 day                Lab, Imaging and other studies: I have personally reviewed pertinent reports

## 2022-03-30 NOTE — CONSULTS
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[facility])     Active issues specifically addressed today include:   Breast mass  Osseous metastases  Uncontrolled pain  Goals of care  Palliative care patient    Plan:  1  Symptom management -    Pain  35mg of oxycodone in last 24 hours  1mg of iv dilaudid in last 24 hours  Total OME is 72 5  Pt states that pain is controlled right now  All she did have high amount of bony yesterday, she also had medical procedure as well as imaging done  Will see how she does in the next 24 hours  Encouraged use of her pain medication as needed  Will continue current pain regimen    -continue oxycodone 5mg/10mg prn for moderate and sever pain respectively   -05  mg of dilaudid IV q 3 hours prn for BT    OIC  -start senna  -Miralax prn     Nausea and vomiting  Not an issue  -continue Zofran p r n  Anxiety  Currently not an issue  She is on dexamethasone, this can cause increased anxiety as can her new diagnosis  -defer treatment at this time  -continue to monitor, low threshold to start p r n  lorazepam      2  Goals -   - patient is currently disease focused care  In her own words she states she has a fighter  That being said she is realistic of her illness  Be goal for her is to have her pain better controlled, and her goal is to treat her illness  Patient does have a large family including a 70-year-old son, 3 sisters, 2 brothers, a mother, stepmother with 3 stepsiblings  She currently has a significant other named Russell Edmond  They are not     Discussed with patient that she could does at night because she wants to make her medical decisions however if anything happened to her, we discussed that it would be her son who is make her medical decisions  Encourage patient to have ongoing discussion within the family regarding who would be her medical decision maker  Discussed with patient that we could fill out paperwork prior to discharge does admitting who she wanted to be her medical decision maker  Goals of care discussion will be ongoing  Oncology is still reviewing imaging, she has MRI ordered  Biopsies done but has yet to come back  Prognosis and treatment options still unclear secondary to this  Patient is aware that this will take time  Patient is aware that there will be ongoing discussion regarding what she truly wants in the setting of the medical realities that will be uncovered with these test results  Code Status: Full Code - Level 1   Decisional apparatus:  Patient is competent on my exam today  If competence is lost, patient's substitute decision maker would default to Son by [place] 169  Advance Directive / Living Will / POLST:  Not on file     I have reviewed the patient's controlled substance dispensing history in the Prescription Drug Monitoring Program in compliance with the [facility] regulations before prescribing any controlled substances  We appreciate the invitation to be involved in this patient's care  We will continue to follow  Please do not hesitate to reach our on call provider through our clinic answering service at  should you have acute symptom control concerns  Lillian Fraga MD  Palliative and Supportive Care  Clinic/Answering Service: [phone]  You can find me on [employer]!        IDENTIFICATION:  Inpatient consult to Palliative Care  Consult performed by: Lillian Fraga MD  Consult ordered by: Bijal Edmonds MD        Physician Requesting Consult: Karrie Jewell MD  Reason for Consult / Principal Problem:  Likely stage IV breast cancer  Hx and PE limited by:  Nothing    HISTORY OF PRESENT ILLNESS:       Norm Valentine is a 51 y o  female who presented to the hospital yesterday with increasing neck and shoulder pain  In the course of that workup, she was found to have fungating breast mass with what looks like osseous metastases  Patient states that she has always had lumpy breasts since her pregnancy  She noticed a lump that began growing around December January timeframe  Is got worse, and then she noticed that it broke the skin a few days ago  She was initially attributing her pain in her neck and shoulders 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  On workup in the emergency department, patient was found to have multiple bony metastases with various fractures including displaced C1 fracture and multiple compression fractures  Neuro surgery:  Recommendation to keep patient in collar and brace  No surgical intervention    Surg-Onc: No additional oncology intervention    Surgical Oncology:  Consulted, will need further imaging an biopsy results to understand what treatment options are available  Radiation oncology:  Recommend radiation treatments however will need more information from imaging prior to starting treatment  Visit was started with the oncology advanced practitioner  Patient was able to explain her story wants to both of us  Oncology advanced practitioner explained that there will be more information needed prior to initiation of specific treatments or therapies  Introduced palliative medicine to the patient, explained our role in the patient's care including symptom management goals of care  Symptom management:   Patient is having ongoing pain in her neck and shoulders  She is having a hard time lying flat both for scans as well as in the hospital bed  She states that morphine that was given in the emergency department did help but did not last long enough  Since she has been here, the oxycodone with intermittent use of Dilaudid has been helpful    She has been reluctant to ask for pain medication as she is worried about addiction to opioid pain medication  Discussed with patient that opioids are a safe treatment when taken appropriately  Also discussed with patient that bone metastases often very painful  She has no chest pain she has no shortness of breath  She has no nausea vomiting, but she does endorse constipation  Last BM was 4 days ago  She normally goes 1-2 times per day  Discussed bowel regimen options with her  She is reluctant to start any aggressive bowel regimen  From a mood standpoint, patient is currently doing well  Goals:  Patient states that she is currently disease focused care  Unclear who her decision maker would be  Discussed [place] 169 and by law if she does not make a choice if she lost the ability to tell us, would default to her son  She leaning towards making her significant other, Emmie Rosas, her decision maker however she does note this time  Decision will be made later, discussion will be ongoing  Review of Systems   Constitutional: Negative for chills and decreased appetite  HENT: Negative for congestion and ear discharge  Eyes: Negative for blurred vision and discharge  Cardiovascular: Negative for chest pain and claudication  Respiratory: Negative for cough  Endocrine: Negative for cold intolerance and heat intolerance  Hematologic/Lymphatic: Negative for adenopathy  Skin: Negative for color change and nail changes  Musculoskeletal: Positive for back pain and neck pain  Gastrointestinal: Positive for constipation  Negative for bloating, abdominal pain, nausea and vomiting  Genitourinary: Negative for bladder incontinence, hematuria and pelvic pain  Neurological: Negative for aphonia and brief paralysis  Psychiatric/Behavioral: Negative for altered mental status and depression  No past medical history on file  No past surgical history on file    Social History     Socioeconomic History    Marital status: Single     Spouse name: Not on file    Number of children: Not on file    Years of education: Not on file    Highest education level: Not on file   Occupational History    Not on file   Tobacco Use    Smoking status: Never Smoker    Smokeless tobacco: Not on file   Vaping Use    Vaping Use: Never used   Substance and Sexual Activity    Alcohol use: Yes     Comment: rare, social     Drug use: Never    Sexual activity: Not on file   Other Topics Concern    Not on file   Social History Narrative    Not on file     Social Determinants of Health     Financial Resource Strain: Not on file   Food Insecurity: Not on file   Transportation Needs: Not on file   Physical Activity: Not on file   Stress: Not on file   Social Connections: Not on file   Intimate Partner Violence: Not on file   Housing Stability: Not on file     Family History   Problem Relation Age of Onset    Coronary artery disease Mother     Heart attack Father     Diabetes type II Father        MEDICATIONS / ALLERGIES:    all current active meds have been reviewed and current meds:   Current Facility-Administered Medications   Medication Dose Route Frequency    acetaminophen (TYLENOL) tablet 975 mg  975 mg Oral Q8H [facility]    amLODIPine (NORVASC) tablet 5 mg  5 mg Oral Daily    dexamethasone (DECADRON) injection 4 mg  4 mg Intravenous Q6H [facility]    enoxaparin (LOVENOX) subcutaneous injection 40 mg  40 mg Subcutaneous Q24H [facility]    HYDROmorphone (DILAUDID) injection 0 5 mg  0 5 mg Intravenous Q1H PRN    Labetalol HCl (NORMODYNE) injection 10 mg  10 mg Intravenous Q6H PRN    ondansetron (ZOFRAN) injection 4 mg  4 mg Intravenous Q4H PRN    oxyCODONE (ROXICODONE) immediate release tablet 10 mg  10 mg Oral Q4H PRN    oxyCODONE (ROXICODONE) IR tablet 5 mg  5 mg Oral Q4H PRN    pantoprazole (PROTONIX) EC tablet 40 mg  40 mg Oral Early Morning    senna-docusate sodium (SENOKOT S) 8 6-50 mg per tablet 1 tablet  1 tablet Oral HS PRN       No Known Allergies    OBJECTIVE:    Physical Exam  Physical Exam  Constitutional:       Appearance: She is not ill-appearing  HENT:      Head: Normocephalic and atraumatic  Right Ear: External ear normal       Left Ear: External ear normal       Nose: Nose normal       Mouth/Throat:      Mouth: Mucous membranes are moist    Eyes:      General:         Right eye: No discharge  Left eye: No discharge  Pupils: Pupils are equal, round, and reactive to light  Neck:      Comments: Neck brace  Cardiovascular:      Rate and Rhythm: Normal rate  Pulmonary:      Effort: Pulmonary effort is normal  No respiratory distress  Abdominal:      General: Abdomen is flat  There is no distension  Musculoskeletal:         General: No deformity  Normal range of motion  Skin:     General: Skin is warm and dry  Neurological:      General: No focal deficit present  Mental Status: She is alert  Mental status is at baseline  Psychiatric:         Mood and Affect: Mood normal          Lab Results:   I have personally reviewed pertinent labs  , CBC:   Lab Results   Component Value Date    WBC 9 63 03/29/2022    HGB 12 9 03/29/2022    HCT 38 6 03/29/2022    MCV 91 03/29/2022     03/29/2022    MCH 30 5 03/29/2022    MCHC 33 4 03/29/2022    RDW 12 3 03/29/2022    MPV 9 7 03/29/2022    NRBC 0 03/29/2022   , CMP:   Lab Results   Component Value Date    SODIUM 135 (L) 03/29/2022    K 4 1 03/29/2022    CL 99 (L) 03/29/2022    CO2 26 03/29/2022    BUN 11 03/29/2022    CREATININE 0 72 03/29/2022    CALCIUM 8 9 03/29/2022    AST 78 (H) 03/29/2022     (H) 03/29/2022    ALKPHOS 311 (H) 03/29/2022    EGFR 100 03/29/2022   , BMP:  Lab Results   Component Value Date    SODIUM 135 (L) 03/29/2022    K 4 1 03/29/2022    CL 99 (L) 03/29/2022    CO2 26 03/29/2022    BUN 11 03/29/2022    CREATININE 0 72 03/29/2022    GLUC 113 03/29/2022    CALCIUM 8 9 03/29/2022    AGAP 10 03/29/2022    EGFR 100 03/29/2022   , PT/PTT:No results found for: PT, PTT  Imaging Studies: Reviewed  EKG, Pathology, and Other Studies:  Reviewed    Counseling / Coordination of Care    Total floor / unit time spent today 60+ minutes  Greater than 50% of total time was spent with the patient and / or family counseling and / or coordination of care  A description of the counseling / coordination of care:  Symptom management, goals of care, compassionate listening, support

## 2022-03-30 NOTE — RESTORATIVE TECHNICIAN NOTE
Restorative Technician Note      Patient Name: Zina Fontaine     Attempted to don pt in , pt is currently meeting with providers  Please call #[phone] when pt is able to be fit  Please call Mobility Coordinator at ext  [phone] in regards to bracing instruction and/or adjustment    Edwardo Glass Restorative Technician, BS

## 2022-03-30 NOTE — ASSESSMENT & PLAN NOTE
Patient presents with right breast mass associated with back pain  CT chest, abdomen and pelvis revealed innumerable osseous metastasis    CT C spine: Innumerable bony metastases, Displaced fracture through the right posterior arch of C1, C4 metastasis causing a severe compression fracture and extension into the canal causing severe central canal stenosis and likely cord compression , T2 metastasis causing a moderate compression fracture and extension into the canal causing severe central canal stenosis and likely cord compression    Radiation oncology consulted and recommend dexamethasone 4 mg q 6h  Will likely need to have palliative radiation  Pain management

## 2022-03-30 NOTE — PROGRESS NOTES
[address] 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 [facility])  Assessment & Plan  Patient presented with right breast mass associated with upper back pain  Patient also complained of needle like sensation on her upper extremities  Patient denies weakness or numbness in extremities  No neuro deficits on examination  MRI cervical, thoracic and lumbar spine revealed Diffuse, primarily lytic osseous metastatic disease is seen throughout the cervical vertebral bodies from C1 into the upper thoracic spine  Shay Rower is a significant amount of epidural tumor arising from pathologic compression fractures of C4 and T2  Diffuse, lytic, osseous metastasis is seen throughout the thoracic spine more prominently within the upper thoracic spine where there is epidural tumor and involvement of the posterior elements from T2 through T5   Moderate canal stenosis at the T2  There is a moderate amount of epidural tumor as a result of the expansile sacral lesion resulting in moderate sacral canal stenosis   Tumor extends into the S2-3 and S3-4 neural foramen on the left  Inpatient consult to Neurosurgery and input appreciated  No surgical intervention at this time due to extensive spinal involvement  Plan to have palliative radiation by radiation oncology  Risks and benefits have been discussed with patient by radiation oncology  Patient agree to proceed     Continue patient on Decadron 4 mg q 6h  Neuro check q 4 hours    Transaminitis  Assessment & Plan  Results from last 7 days   Lab Units 03/29/22  1430 03/28/22  2330   AST U/L 78* 80*   ALT U/L 130* 128*   ALK PHOS U/L 311* 314*     Rarely use alcohol, no drug use, acute hepatitis panel negative  Likely due to metastatic cancer  CT abdomen reviewed unremarkable liver  Trend as needed  Avoid hepatotoxic agents      Elevated BP without diagnosis of hypertension  Assessment & Plan  No known history of hypertension  Patient self reports that she has high blood pressure in hospital/clinical setting  Possible due to white coat syndrome or anxiety/stress  Blood pressure has been consistently high  Continue amlodipine 5 mg p o  Daily  Will initiate lisinopril 10 mg po qd    Osseous metastasis ([address] )  Assessment & Plan  Patient presents with right breast mass associated with back pain  CT chest, abdomen and pelvis revealed innumerable osseous metastasis  CT C spine: Innumerable bony metastases, Displaced fracture through the right posterior arch of C1, C4 metastasis causing a severe compression fracture and extension into the canal causing severe central canal stenosis and likely cord compression , T2 metastasis causing a moderate compression fracture and extension into the canal causing severe central canal stenosis and likely cord compression    Radiation oncology consulted and recommend dexamethasone 4 mg q 6h  Plan for palliative radiation tomorrow  Pain management    * Breast mass, right  Assessment & Plan  Patient initially noted a right-sided heart breast mass in the beginning of this year  Patient states that the mass in her breast began to PE read about a month ago and the mass break through the skin and has become malodorous and weeping about a week ago  Patient was also associated with intermittent upper back pain  Pain was relieved by Motrin and Tylenol  Patient initially thought back pain was due to her position while using her computer  CT chest revealed  Large right breast mass measuring up to 6 9 cm  Right axillary metastatic adenopathy  Innumerable osseous metastasis  Surgical Oncology performed breast mass biopsy yesterday   Stat tissue exam has been placed  MRI brain and CTA of head to investigate the tumor burden Medical Oncology consulted  Inpatient consult to palliative care to discuss goal of care        VTE Pharmacologic Prophylaxis: VTE Score: 4  Enoxaparin (Lovenox)  / sequential compression device     Patient Centered Rounds: I performed bedside rounds with nursing staff today  Discussions with Specialists or Other Care Team Provider: yes    Education and Discussions with Family / Patient:    Current Length of Stay: 1 day(s)  Current Patient Status: Inpatient   Discharge Plan: To Be Determined    Code Status: Level 1 - Full Code    Subjective:   Patient has back pain, especially move around  Objective:     Vitals:   Temp (24hrs), Av 8 °F (36 6 °C), Min:97 4 °F (36 3 °C), Max:98 2 °F (36 8 °C)    Temp:  [97 4 °F (36 3 °C)-98 2 °F (36 8 °C)] 98 2 °F (36 8 °C)  HR:  [] 95  Resp:  [16-18] 18  BP: (141-158)/() 158/110  SpO2:  [90 %-94 %] 93 %  Body mass index is 35 57 kg/m²  Input and Output Summary (last 24 hours): Intake/Output Summary (Last 24 hours) at 3/30/2022 1205  Last data filed at 3/30/2022 0601  Gross per 24 hour   Intake --   Output 950 ml   Net -950 ml       Physical Exam:   Physical Exam  Constitutional:       Appearance: She is obese  HENT:      Head: Normocephalic  Eyes:      Pupils: Pupils are equal, round, and reactive to light  Neck:      Comments: Cervical collar in place  Cardiovascular:      Rate and Rhythm: Normal rate and regular rhythm  Heart sounds: No murmur heard  Pulmonary:      Effort: Pulmonary effort is normal  No respiratory distress  Breath sounds: No wheezing or rales  Chest:   Breasts:      Right: Mass and skin change present  Left: Normal          Abdominal:      Palpations: Abdomen is soft  Tenderness: There is no abdominal tenderness  Musculoskeletal:         General: No swelling  Normal range of motion  Cervical back: Normal range of motion  Skin:     General: Skin is warm     Neurological:      General: No focal deficit present  Mental Status: She is alert and oriented to person, place, and time  Sensory: No sensory deficit  Motor: No weakness  Psychiatric:         Mood and Affect: Mood normal           Additional Data:     Labs:  Results from last 7 days   Lab Units 03/29/22  1430   WBC Thousand/uL 9 63   HEMOGLOBIN g/dL 12 9   HEMATOCRIT % 38 6   PLATELETS Thousands/uL 328   NEUTROS PCT % 82*   LYMPHS PCT % 15   MONOS PCT % 2*   EOS PCT % 0     Results from last 7 days   Lab Units 03/29/22  1430   SODIUM mmol/L 135*   POTASSIUM mmol/L 4 1   CHLORIDE mmol/L 99*   CO2 mmol/L 26   BUN mg/dL 11   CREATININE mg/dL 0 72   ANION GAP mmol/L 10   CALCIUM mg/dL 8 9   ALBUMIN g/dL 3 4*   TOTAL BILIRUBIN mg/dL 0 52   ALK PHOS U/L 311*   ALT U/L 130*   AST U/L 78*   GLUCOSE RANDOM mg/dL 113                 Results from last 7 days   Lab Units 03/29/22  0206   LACTIC ACID mmol/L 1 1       Lines/Drains:  Invasive Devices  Report    Peripheral Intravenous Line            Peripheral IV 03/28/22 Left Antecubital 1 day                      Imaging: Reviewed radiology reports from this admission including: chest CT scan, abdominal/pelvic CT, MRI spine and CT Cervical Spine wo contrast    Recent Cultures (last 7 days):   Results from last 7 days   Lab Units 03/29/22  0206   BLOOD CULTURE  No Growth at 24 hrs  No Growth at 24 hrs         Last 24 Hours Medication List:   Current Facility-Administered Medications   Medication Dose Route Frequency Provider Last Rate    acetaminophen  975 mg Oral Q8H [address] Tara Olivas DO      amLODIPine  5 mg Oral Daily Tesfaye Dang MD      dexamethasone  4 mg Intravenous Q6H [address] Tara Olivas DO      enoxaparin  40 mg Subcutaneous Q24H [address] Tesfaye Dang MD      HYDROmorphone  0 5 mg Intravenous Q3H PRN Ima Dancer, MD      Labetalol HCl  10 mg Intravenous Q6H PRN Tara Olivas DO      lisinopril  10 mg Oral Daily Tesfaye Dang MD      ondansetron  4 mg Intravenous Q4H PRN Tara Olivas, DO      oxyCODONE  10 mg Oral Q4H PRN Tara Olivas, DO      oxyCODONE  5 mg Oral Q4H PRN Tara Olivas, DO      pantoprazole  40 mg Oral Early Morning Tara Olivas, DO      polyethylene glycol  17 g Oral Daily PRN Ashlyn Lepe MD      senna  2 tablet Oral BID Ashlyn Lepe MD          Today, Patient Was Seen By: Clemente Garcia MD    **Please Note: This note may have been constructed using a voice recognition system  **

## 2022-03-30 NOTE — ASSESSMENT & PLAN NOTE
Results from last 7 days   Lab Units 03/29/22  1430 03/28/22  2330   AST U/L 78* 80*   ALT U/L 130* 128*   ALK PHOS U/L 311* 314*     Rarely use alcohol, no drug use, acute hepatitis panel negative  Likely due to metastatic cancer  CT abdomen reviewed unremarkable liver  Trend as needed  Avoid hepatotoxic agents

## 2022-03-30 NOTE — ASSESSMENT & PLAN NOTE
Results from last 7 days   Lab Units 03/29/22  1430 03/28/22  2330   AST U/L 78* 80*   ALT U/L 130* 128*   ALK PHOS U/L 311* 314*     Rarely use alcohol, no drug use  Likely due to metastatic cancer  CT abdomen reviewed unremarkable liver  Trend as needed  Avoid hepatotoxic agents

## 2022-03-30 NOTE — ASSESSMENT & PLAN NOTE
Patient presented with right breast mass associated with upper back pain  Patient also complained of needle like sensation on her upper extremities  Patient denies weakness or numbness in extremities  No neuro deficits on examination  MRI cervical, thoracic and lumbar spine revealed Diffuse, primarily lytic osseous metastatic disease is seen throughout the cervical vertebral bodies from C1 into the upper thoracic spine  Teryl Block is a significant amount of epidural tumor arising from pathologic compression fractures of C4 and T2  Diffuse, lytic, osseous metastasis is seen throughout the thoracic spine more prominently within the upper thoracic spine where there is epidural tumor and involvement of the posterior elements from T2 through T5   Moderate canal stenosis at the T2  There is a moderate amount of epidural tumor as a result of the expansile sacral lesion resulting in moderate sacral canal stenosis   Tumor extends into the S2-3 and S3-4 neural foramen on the left       Inpatient consult to Neurosurgery  Continue patient on Decadron 4 mg q 6h  Neuro check q 4 hours

## 2022-03-30 NOTE — ASSESSMENT & PLAN NOTE
Diffuse osseous mets, 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  Largest disease burden in cervical and upper thoracic spine  · Patient presented with worsening neck pain and shoulder pain for the past 3 days, not relieved with medication  · Pain started several weeks ago  · Found to have large fungating right breast mass  · No FH cancer, no unintentional weight loss  · On exam bilateral deltoid weakness, otherwise nonfocal  · SIN score 14-16    Imaging:  · MRI cervical spine with and without contrast 03/29/2022: Diffuse, primarily lytic osseous metastatic disease is seen throughout the cervical vertebral bodies from C1 into the upper thoracic spine  There is a significant amount of epidural tumor arising from pathologic compression fractures of C4 and T2  At the C4 level there is moderate to severe canal stenosis with flattening of the cord which maintains normal signal   Moderate canal stenosis at the T2 level  There is corresponding foraminal narrowing as a result of epidural and foraminal tumor  · MRI thoracic spine with and without contrast 03/29/2022: Diffuse, lytic, osseous metastasis is seen throughout the thoracic spine more prominently within the upper thoracic spine where there is epidural tumor and involvement of the posterior elements from T2 through T5  Moderate canal stenosis at the T2 level  Foraminal narrowing as described above  No abnormal cord signal identified  · MRI lumbar spine with and without contrast 03/29/2022:  Diffuse lytic osseous metastasis within the lower thoracic spine, lumbar spine and sacrum  There is a moderate amount of epidural tumor as a result of the expansile sacral lesion resulting in moderate sacral canal stenosis  Tumor extends into the S2-3 and S3-4 neural foramen on the left  Correlate for corresponding radiculopathy  Bilobed cystic mass within the left pelvis incompletely evaluated on this examination  Follow-up pelvic ultrasound imaging is recommended      Plan:  · Continue to monitor neurological exam  · Discussion with Neurosurgery attendings via [address] [place] [address], Radiation Oncology, Medical Oncology, SOD and palliative care:  · At this time no surgical intervention is warranted  Unfortunately patient's tumor burden is significant throughout the entire spine and it is very likely that an extensive spine surgery for fixation and likely tumor debulking will result in hardware failure and worsening complications  It is recommended at this time that patient undergo palliative radiation therapy to control disease burden  · Will order MRI brain with and without contrast to further evaluate possible intracranial disease/staging  · Will also order CTA head and neck with and without contrast to better evaluate vasculature/vertebral artery involvement given tumor burden the cervical spine  · She underwent breast biopsy yesterday, stat pathology sent, pending results  · Radiation Oncology plans for CT simulation likely today with tentative plan for 1st course of radiation therapy tomorrow, would defer the management of steroids to their team, for now she continues on Decadron 4 mg q 6 hours  · Palliative Care is consulted  · For added stability, patient will be placed in a  brace, to be fit today prior to imaging  · Medical management and pain control per primary team  · DVT ppx: SCDs, lovenox  · Mobilize as tolerated with  brace in place, PT/OT evaluation pending now that surgical intervention is not warranted  · Patient is okay to eat from my standpoint as there is no surgery warranted  · She is also okay to shower with assistance    Neurosurgery will follow in the periphery and review updated imaging once completed  Oncology nurse navigator aware of patient and will assist with outpatient follow-up if warranted  Signed off for now, please call with questions or concerns

## 2022-03-30 NOTE — UTILIZATION REVIEW
Inpatient Admission Authorization Request   NOTIFICATION OF INPATIENT ADMISSION/INPATIENT AUTHORIZATION REQUEST   SERVICING FACILITY:   [facility]  Address: [address] [place] [address], [address] [place], [place] [id number]  Tax ID: [id number]  NPI: [id number]  Place of Service: Inpatient [address] [facility]  [address]  Place of Service Code: 24     ATTENDING PROVIDER:  Attending Name and NPI#: Marcus Martin Md [[id number]]  Address: [address] [place] [address], [address] [place] [address]  Phone: [phone]     UTILIZATION REVIEW CONTACT:  [facility] Utilization   Network Utilization Review Department  Phone: [phone]  Fax: [phone]  Email: [email or web address]     PHYSICIAN ADVISORY SERVICES:  FOR [facility] REVIEW - MEDICAL NECESSITY DENIAL  Phone: [phone]  Fax: [phone]  Email: [email or web address]     TYPE OF REQUEST:  Inpatient Status     ADMISSION INFORMATION:  ADMISSION DATE/TIME: 3/29/22  5:26 PM  PATIENT DIAGNOSIS CODE/DESCRIPTION:  Osseous metastasis ([facility] [address] ) [C79 51]  DISCHARGE DATE/TIME: No discharge date for patient encounter  IMPORTANT INFORMATION:  Please contact the [facility] directly with any questions or concerns regarding this request  Department voicemails are confidential     Send requests for admission clinical reviews, concurrent reviews, approvals, and administrative denials due to lack of clinical to fax [phone]

## 2022-03-30 NOTE — PROGRESS NOTES
Patient's case was presented this morning at the Neuro Oncology Case Review as requested by ANA PAYNE  After discussion the group advised against surgery at this time and suggested patient continue in a cervical collar and complete RT  The group also recommended patient be started on systemic therapy  MRI brain was also suggested for further workup  The final treatment plan will be left at the discretion of the patient and the treating physician  DISCLAIMERS:  TO THE TREATING PHYSICIAN:  This conference is a meeting of clinicians from various specialty areas who evaluate and discuss patients for whom a multidisciplinary treatment approach is being considered  Please note that the above opinion was a consensus of the conference attendees and is intended only to assist in quality care of the discussed patient  The responsibility for follow up on the input given during the conference, along with any final decisions regarding plan of care, is that of the patient and the patient's provider  Accordingly, appointments have only been recommended based on this information; and have NOT been scheduled unless otherwise noted  TO THE PATIENT:  This summary is a brief record of major aspects of your cancer treatment  You may choose to can share a your copy with any of your doctors or nurses  However, this is not a detailed or comprehensive record of your care

## 2022-03-30 NOTE — ASSESSMENT & PLAN NOTE
Patient initially noted a right-sided heart breast mass in the beginning of this year  Patient states that the mass in her breast began to PE read about a month ago and the mass break through the skin and has become malodorous and weeping about a week ago  Patient was also associated with intermittent upper back pain  Pain was relieved by Motrin and Tylenol  Patient initially thought back pain was due to her position while using her computer  CT chest revealed  Large right breast mass measuring up to 6 9 cm  Right axillary metastatic adenopathy  Innumerable osseous metastasis  Surgical Oncology performed breast mass biopsy yesterday   Stat tissue exam has been placed  MRI brain and CTA of head to investigate the tumor burden   Medical Oncology consulted  Inpatient consult to palliative care to discuss goal of care

## 2022-03-30 NOTE — PROCEDURES
Surgical Oncology Procedure Note:    Informed consent was obtained pre-procedure  The right breast was prepped and draped in the usual sterile fashion  The area was injected with 1% lidocaine with epi  Five core needle biopsies were obtained and submitted for pathology in formalin  Tissue exam was ordered STAT and tubed to the lab after confirming the correct orders were placed as well as the correct recipient location  Patient tolerated procedure well with no acute complications      Alexandra Opitz, MD

## 2022-03-30 NOTE — RESTORATIVE TECHNICIAN NOTE
Restorative Technician Note      Patient Name: Rajiv Dior     Restorative Tech Visit Date: 03/30/22  Note Type: Bracing, Initial consult  Patient Position Upon Consult: Other (Comment) (long sitting in the bed)  Brace Applied: [place]  (chin plate at level 2)  Additional Brace Ordered: No  Patient Position When Brace Applied: Seated  Education Provided: Family or social support of family present for education by provider; Yes  Patient Position at End of Consult: Seated edge of bed  Nurse Communication: Nurse aware of consult, application of brace    Doffed [place] collar and donned  with pt seated EOB ( part of PT/OT eval)  Reviewed all elements of the brace with patient , pt's  and pt's nurse, Maxi Blakely  Stressed importance of removing the back metal bar when pt is laying lower than 45* in the bed  Pt continued PT/OT eval  Once back in the bed the pt stated the neck collar was pushing up into her ears/cheeks and was not comfortable  Had the pt lean for and made sure the waist belt in the back was pulled down onto the pt's hips for the metal bar did not push the back portion of the neck brace upwards  Lowered the front dial to level 11 so the front bar did not push the anterior portion of the neck brace upwards  Pt states she feels much more comfortable  Will follow pt daily  Please call the [employer] tech, ext [phone], with any bracing questions and/or adjustments       [employer],

## 2022-03-30 NOTE — ASSESSMENT & PLAN NOTE
No known history of hypertension  Patient self reports that she has high blood pressure in hospital/clinical setting  Possible due to white coat syndrome or anxiety/stress  Blood pressure has been consistently high  Continue amlodipine 5 mg p o   Daily  Will initiate lisinopril 10 mg po qd

## 2022-03-30 NOTE — ASSESSMENT & PLAN NOTE
No known history of hypertension  Patient self reports that she has high blood pressure in hospital/clinical setting  Possible due to white coat syndrome or anxiety/stress  Blood pressure has been consistently high  Will initiate amlodipine 5 mg p o   Daily

## 2022-03-30 NOTE — RESTORATIVE TECHNICIAN NOTE
Restorative Technician Note      Patient Name: Zach Robledo     Attempted to see pt for fitting of   Pt currently off the floor will cotinue to follow  Please call #[phone] when pt is able to be fit  Please call Mobility Coordinator at ext  [phone] in regards to bracing instruction and/or adjustment    Juaquin Xie Restorative Technician, BS

## 2022-03-30 NOTE — H&P
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 [facility])  Assessment & Plan  Patient presented with right breast mass associated with upper back pain  Patient also complained of needle like sensation on her upper extremities  Patient denies weakness or numbness in extremities  No neuro deficits on examination  MRI cervical, thoracic and lumbar spine revealed Diffuse, primarily lytic osseous metastatic disease is seen throughout the cervical vertebral bodies from C1 into the upper thoracic spine  Verla Mix is a significant amount of epidural tumor arising from pathologic compression fractures of C4 and T2  Diffuse, lytic, osseous metastasis is seen throughout the thoracic spine more prominently within the upper thoracic spine where there is epidural tumor and involvement of the posterior elements from T2 through T5   Moderate canal stenosis at the T2  There is a moderate amount of epidural tumor as a result of the expansile sacral lesion resulting in moderate sacral canal stenosis   Tumor extends into the S2-3 and S3-4 neural foramen on the left  Inpatient consult to Neurosurgery  Continue patient on Decadron 4 mg q 6h  Neuro check q 4 hours    Transaminitis  Assessment & Plan  Results from last 7 days   Lab Units 03/29/22  1430 03/28/22  2330   AST U/L 78* 80*   ALT U/L 130* 128*   ALK PHOS U/L 311* 314*     Rarely use alcohol, no drug use  Likely due to metastatic cancer  CT abdomen reviewed unremarkable liver  Trend as needed  Avoid hepatotoxic agents      Elevated BP without diagnosis of hypertension  Assessment & Plan  No known history of hypertension  Patient self reports that she has high blood pressure in hospital/clinical setting    Possible due to white coat syndrome or anxiety/stress  Blood pressure has been consistently high  Will initiate amlodipine 5 mg p o  Daily    Osseous metastasis ([facility] [address] )  Assessment & Plan  Patient presents with right breast mass associated with back pain  CT chest, abdomen and pelvis revealed innumerable osseous metastasis  CT C spine: Innumerable bony metastases, Displaced fracture through the right posterior arch of C1, C4 metastasis causing a severe compression fracture and extension into the canal causing severe central canal stenosis and likely cord compression , T2 metastasis causing a moderate compression fracture and extension into the canal causing severe central canal stenosis and likely cord compression    Radiation oncology consulted and recommend dexamethasone 4 mg q 6h  Will likely need to have palliative radiation  Pain management    * Breast mass, right  Assessment & Plan  Patient initially noted a right-sided heart breast mass in the beginning of this year  Patient states that the mass in her breast began to PE read about a month ago and the mass break through the skin and has become malodorous and weeping about a week ago  Patient was also associated with intermittent upper back pain  Pain was relieved by Motrin and Tylenol  Patient initially thought back pain was due to her position while using her computer  CT chest revealed  Large right breast mass measuring up to 6 9 cm  Right axillary metastatic adenopathy  Innumerable osseous metastasis  Surgical Oncology and Medical Oncology consulted  Plan for biopsy  Inpatient consult to palliative care to discuss goal of care      VTE Prophylaxis: Enoxaparin (Lovenox)  / sequential compression device   Code Status:  Level 1  POLST: There is no POLST form on file for this patient (pre-hospital)  Discussion with family:  Patient    Anticipated Length of Stay:  Patient will be admitted on an Inpatient basis with an anticipated length of stay of 2 midnights     Justification for Hospital Stay:  Breast mass with spinal metastasis        Chief Complaint:   Right breast mass and upper back pain    History of Present Illness:    Clay Pollard is a 51 y o  female without known past medical history who presents with right breast mass and upper back pain  Patient initially noted a right-sided heart breast mass in the beginning of this year  Patient states that the mass in her breast began to PE read about a month ago and the mass break through the skin and has become malodorous and weeping about a week ago  Patient was also associated with intermittent upper back pain and needle like sensation on her upper extremities  Pain was relieved by Motrin and Tylenol  Patient initially thought back pain was due to her position while using her computer  Patient denies fever, chills, chest pain or abdominal pain  Pain denies dysuria or bowel movement changes  Patient denies weakness or numbness in extremities  Patient was initially presented to [place] [address]  Patient was found to have right mass likely breast cancer with extensive osseous metastasis and spinal metastasis  Surgical Oncology, medical oncology and neurosurgery was consulted  Decision was made to have breast mass biopsy done and follow-up with neurosurgery intervention due to spinal cord metastasis  Review of Systems:    Review of Systems   Musculoskeletal: Positive for back pain  Past Medical and Surgical History:     No past medical history on file  No past surgical history on file  Meds/Allergies:    Prior to Admission medications    Medication Sig Start Date End Date Taking? Authorizing Provider   norgestimate-ethinyl estradiol (ORTHO-CYCLEN) 0 25-35 MG-MCG per tablet Take 1 tablet by mouth daily    Historical Provider, MD     I have reviewed home medications with patient personally      Allergies: No Known Allergies    Social History:     Marital Status: Single   Occupation:  Works at [address] [employer] [address]  Patient Pre-hospital Living Situation:   Patient Pre-hospital Level of Mobility:  Independent  Patient Pre-hospital Diet Restrictions:  None  Substance Use History:   Social History     Substance and Sexual Activity   Alcohol Use Yes    Comment: rare, social      Social History     Tobacco Use   Smoking Status Never Smoker   Smokeless Tobacco Not on file     Social History     Substance and Sexual Activity   Drug Use Never       Family History:    Family History   Problem Relation Age of Onset    Coronary artery disease Mother     Heart attack Father     Diabetes type II Father        Physical Exam:     Vitals:   Blood Pressure: (!) 156/110 (03/29/22 2011)  Pulse: (!) 108 (03/29/22 2011)  Temperature: 97 8 °F (36 6 °C) (03/29/22 1800)  Height: 5' 4" (162 6 cm) (03/29/22 1800)  Weight - Scale: 94 kg (207 lb 3 7 oz) (03/29/22 1800)  SpO2: 94 % (03/29/22 2011)    Physical Exam  Constitutional:       Appearance: She is obese  HENT:      Head: Normocephalic  Eyes:      Pupils: Pupils are equal, round, and reactive to light  Cardiovascular:      Rate and Rhythm: Normal rate and regular rhythm  Heart sounds: No murmur heard  Pulmonary:      Effort: Pulmonary effort is normal  No respiratory distress  Breath sounds: No wheezing or rales  Chest:   Breasts:      Right: Mass and skin change present  Left: Normal          Abdominal:      Palpations: Abdomen is soft  Tenderness: There is no abdominal tenderness  Musculoskeletal:         General: No swelling  Normal range of motion  Cervical back: Normal range of motion  Skin:     General: Skin is warm  Neurological:      General: No focal deficit present  Mental Status: She is alert and oriented to person, place, and time  Sensory: No sensory deficit  Motor: No weakness     Psychiatric:         Mood and Affect: Mood normal            Additional Data:     Lab Results: I have personally reviewed pertinent reports  Results from last 7 days   Lab Units 03/29/22  1430   WBC Thousand/uL 9 63   HEMOGLOBIN g/dL 12 9   HEMATOCRIT % 38 6   PLATELETS Thousands/uL 328   NEUTROS PCT % 82*   LYMPHS PCT % 15   MONOS PCT % 2*   EOS PCT % 0     Results from last 7 days   Lab Units 03/29/22  1430   SODIUM mmol/L 135*   POTASSIUM mmol/L 4 1   CHLORIDE mmol/L 99*   CO2 mmol/L 26   BUN mg/dL 11   CREATININE mg/dL 0 72   ANION GAP mmol/L 10   CALCIUM mg/dL 8 9   ALBUMIN g/dL 3 4*   TOTAL BILIRUBIN mg/dL 0 52   ALK PHOS U/L 311*   ALT U/L 130*   AST U/L 78*   GLUCOSE RANDOM mg/dL 113                 Results from last 7 days   Lab Units 03/29/22  0206   LACTIC ACID mmol/L 1 1       Imaging: I have personally reviewed pertinent reports  No orders to display       EKG, Pathology, and Other Studies Reviewed on Admission:   · EKG:  Sinus tachycardia    Allscripts / Epic Records Reviewed: Yes     ** Please Note: This note has been constructed using a voice recognition system   **

## 2022-03-30 NOTE — PROGRESS NOTES
Order placed for oncology genetics consult, so that this can be arranged outpatient  Sent message to [employer] regarding this       BRIAN CallahanP-BC  Hematology/Oncology Nurse Practitioner

## 2022-03-31 ENCOUNTER — DOCUMENTATION (OUTPATIENT)
Dept: HEMATOLOGY ONCOLOGY | Facility: CLINIC | Age: 47
End: 2022-03-31

## 2022-03-31 ENCOUNTER — APPOINTMENT (INPATIENT)
Dept: RADIOLOGY | Facility: HOSPITAL | Age: 47
DRG: 598 | End: 2022-03-31
Payer: COMMERCIAL

## 2022-03-31 LAB
ALBUMIN SERPL BCP-MCNC: 3.6 G/DL (ref 3.5–5)
ALP SERPL-CCNC: 338 U/L (ref 46–116)
ALT SERPL W P-5'-P-CCNC: 96 U/L (ref 12–78)
ANION GAP SERPL CALCULATED.3IONS-SCNC: 7 MMOL/L (ref 4–13)
AST SERPL W P-5'-P-CCNC: 50 U/L (ref 5–45)
BASOPHILS # BLD AUTO: 0.02 THOUSANDS/ΜL (ref 0–0.1)
BASOPHILS NFR BLD AUTO: 0 % (ref 0–1)
BILIRUB DIRECT SERPL-MCNC: 0.1 MG/DL (ref 0–0.2)
BILIRUB SERPL-MCNC: 0.52 MG/DL (ref 0.2–1)
BUN SERPL-MCNC: 18 MG/DL (ref 5–25)
CALCIUM SERPL-MCNC: 9.8 MG/DL (ref 8.3–10.1)
CHLORIDE SERPL-SCNC: 104 MMOL/L (ref 100–108)
CO2 SERPL-SCNC: 24 MMOL/L (ref 21–32)
CREAT SERPL-MCNC: 0.68 MG/DL (ref 0.6–1.3)
EOSINOPHIL # BLD AUTO: 0.01 THOUSAND/ΜL (ref 0–0.61)
EOSINOPHIL NFR BLD AUTO: 0 % (ref 0–6)
ERYTHROCYTE [DISTWIDTH] IN BLOOD BY AUTOMATED COUNT: 12.6 % (ref 11.6–15.1)
GFR SERPL CREATININE-BSD FRML MDRD: 104 ML/MIN/1.73SQ M
GLUCOSE SERPL-MCNC: 113 MG/DL (ref 65–140)
HCT VFR BLD AUTO: 39.7 % (ref 34.8–46.1)
HGB BLD-MCNC: 13.1 G/DL (ref 11.5–15.4)
IMM GRANULOCYTES # BLD AUTO: 0.16 THOUSAND/UL (ref 0–0.2)
IMM GRANULOCYTES NFR BLD AUTO: 1 % (ref 0–2)
LYMPHOCYTES # BLD AUTO: 1.96 THOUSANDS/ΜL (ref 0.6–4.47)
LYMPHOCYTES NFR BLD AUTO: 16 % (ref 14–44)
MCH RBC QN AUTO: 30 PG (ref 26.8–34.3)
MCHC RBC AUTO-ENTMCNC: 33 G/DL (ref 31.4–37.4)
MCV RBC AUTO: 91 FL (ref 82–98)
MONOCYTES # BLD AUTO: 0.42 THOUSAND/ΜL (ref 0.17–1.22)
MONOCYTES NFR BLD AUTO: 3 % (ref 4–12)
NEUTROPHILS # BLD AUTO: 9.66 THOUSANDS/ΜL (ref 1.85–7.62)
NEUTS SEG NFR BLD AUTO: 80 % (ref 43–75)
NRBC BLD AUTO-RTO: 0 /100 WBCS
PLATELET # BLD AUTO: 422 THOUSANDS/UL (ref 149–390)
PMV BLD AUTO: 10.1 FL (ref 8.9–12.7)
POTASSIUM SERPL-SCNC: 4 MMOL/L (ref 3.5–5.3)
PROT SERPL-MCNC: 8.1 G/DL (ref 6.4–8.2)
RBC # BLD AUTO: 4.36 MILLION/UL (ref 3.81–5.12)
SODIUM SERPL-SCNC: 135 MMOL/L (ref 136–145)
WBC # BLD AUTO: 12.23 THOUSAND/UL (ref 4.31–10.16)

## 2022-03-31 PROCEDURE — 80076 HEPATIC FUNCTION PANEL: CPT | Performed by: INTERNAL MEDICINE

## 2022-03-31 PROCEDURE — 99232 SBSQ HOSP IP/OBS MODERATE 35: CPT | Performed by: INTERNAL MEDICINE

## 2022-03-31 PROCEDURE — 70498 CT ANGIOGRAPHY NECK: CPT

## 2022-03-31 PROCEDURE — 80048 BASIC METABOLIC PNL TOTAL CA: CPT | Performed by: INTERNAL MEDICINE

## 2022-03-31 PROCEDURE — G6002 STEREOSCOPIC X-RAY GUIDANCE: HCPCS | Performed by: RADIOLOGY

## 2022-03-31 PROCEDURE — 77300 RADIATION THERAPY DOSE PLAN: CPT | Performed by: RADIOLOGY

## 2022-03-31 PROCEDURE — 77290 THER RAD SIMULAJ FIELD CPLX: CPT | Performed by: RADIOLOGY

## 2022-03-31 PROCEDURE — 77427 RADIATION TX MANAGEMENT X5: CPT | Performed by: RADIOLOGY

## 2022-03-31 PROCEDURE — G1004 CDSM NDSC: HCPCS

## 2022-03-31 PROCEDURE — 70496 CT ANGIOGRAPHY HEAD: CPT

## 2022-03-31 PROCEDURE — 77334 RADIATION TREATMENT AID(S): CPT | Performed by: RADIOLOGY

## 2022-03-31 PROCEDURE — 85025 COMPLETE CBC W/AUTO DIFF WBC: CPT | Performed by: INTERNAL MEDICINE

## 2022-03-31 PROCEDURE — 77412 RADIATION TX DELIVERY LVL 3: CPT | Performed by: RADIOLOGY

## 2022-03-31 PROCEDURE — 77295 3-D RADIOTHERAPY PLAN: CPT | Performed by: RADIOLOGY

## 2022-03-31 PROCEDURE — 77387 GUIDANCE FOR RADJ TX DLVR: CPT | Performed by: RADIOLOGY

## 2022-03-31 RX ORDER — POLYETHYLENE GLYCOL 3350 17 G/17G
17 POWDER, FOR SOLUTION ORAL ONCE
Status: COMPLETED | OUTPATIENT
Start: 2022-03-31 | End: 2022-03-31

## 2022-03-31 RX ORDER — SENNOSIDES 8.6 MG
3 TABLET ORAL 2 TIMES DAILY
Status: DISCONTINUED | OUTPATIENT
Start: 2022-03-31 | End: 2022-04-04 | Stop reason: HOSPADM

## 2022-03-31 RX ADMIN — HYDROMORPHONE HYDROCHLORIDE 0.5 MG: 1 INJECTION, SOLUTION INTRAMUSCULAR; INTRAVENOUS; SUBCUTANEOUS at 21:18

## 2022-03-31 RX ADMIN — OXYCODONE HYDROCHLORIDE 10 MG: 10 TABLET ORAL at 23:31

## 2022-03-31 RX ADMIN — PANTOPRAZOLE SODIUM 40 MG: 40 TABLET, DELAYED RELEASE ORAL at 05:01

## 2022-03-31 RX ADMIN — OXYCODONE HYDROCHLORIDE 10 MG: 10 TABLET ORAL at 05:00

## 2022-03-31 RX ADMIN — DEXAMETHASONE SODIUM PHOSPHATE 4 MG: 4 INJECTION INTRA-ARTICULAR; INTRALESIONAL; INTRAMUSCULAR; INTRAVENOUS; SOFT TISSUE at 02:37

## 2022-03-31 RX ADMIN — ENOXAPARIN SODIUM 40 MG: 40 INJECTION SUBCUTANEOUS at 10:57

## 2022-03-31 RX ADMIN — HYDROMORPHONE HYDROCHLORIDE 0.5 MG: 1 INJECTION, SOLUTION INTRAMUSCULAR; INTRAVENOUS; SUBCUTANEOUS at 08:16

## 2022-03-31 RX ADMIN — OXYCODONE HYDROCHLORIDE 10 MG: 10 TABLET ORAL at 19:21

## 2022-03-31 RX ADMIN — DEXAMETHASONE SODIUM PHOSPHATE 4 MG: 4 INJECTION INTRA-ARTICULAR; INTRALESIONAL; INTRAMUSCULAR; INTRAVENOUS; SOFT TISSUE at 15:54

## 2022-03-31 RX ADMIN — STANDARDIZED SENNA CONCENTRATE 17.2 MG: 8.6 TABLET ORAL at 10:57

## 2022-03-31 RX ADMIN — AMLODIPINE BESYLATE 5 MG: 5 TABLET ORAL at 10:56

## 2022-03-31 RX ADMIN — DEXAMETHASONE SODIUM PHOSPHATE 4 MG: 4 INJECTION INTRA-ARTICULAR; INTRALESIONAL; INTRAMUSCULAR; INTRAVENOUS; SOFT TISSUE at 08:20

## 2022-03-31 RX ADMIN — ACETAMINOPHEN 975 MG: 325 TABLET ORAL at 04:55

## 2022-03-31 RX ADMIN — POLYETHYLENE GLYCOL 3350 17 G: 17 POWDER, FOR SOLUTION ORAL at 21:18

## 2022-03-31 RX ADMIN — LISINOPRIL 10 MG: 10 TABLET ORAL at 10:56

## 2022-03-31 RX ADMIN — ACETAMINOPHEN 975 MG: 325 TABLET ORAL at 13:47

## 2022-03-31 RX ADMIN — SENNOSIDES 25.8 MG: 8.6 TABLET, FILM COATED ORAL at 21:18

## 2022-03-31 RX ADMIN — DEXAMETHASONE SODIUM PHOSPHATE 4 MG: 4 INJECTION INTRA-ARTICULAR; INTRALESIONAL; INTRAMUSCULAR; INTRAVENOUS; SOFT TISSUE at 21:18

## 2022-03-31 RX ADMIN — ACETAMINOPHEN 975 MG: 325 TABLET ORAL at 21:18

## 2022-03-31 RX ADMIN — HYDROMORPHONE HYDROCHLORIDE 0.5 MG: 1 INJECTION, SOLUTION INTRAMUSCULAR; INTRAVENOUS; SUBCUTANEOUS at 18:03

## 2022-03-31 RX ADMIN — IOHEXOL 85 ML: 350 INJECTION, SOLUTION INTRAVENOUS at 11:17

## 2022-03-31 RX ADMIN — OXYCODONE HYDROCHLORIDE 10 MG: 10 TABLET ORAL at 13:47

## 2022-03-31 NOTE — PROGRESS NOTES
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 [facility])     Active issues specifically addressed today include:   Breast mass  Osseous metastases  Uncontrolled pain  Goals of care  Palliative care patient    Plan:  1  Symptom management -     Pain  Patient states that pain is controlled  She is having incident pain with movement especially when going down for imaging or for radiation  She does stated she had times her opioid use to prior to movement, pain is controlled  -continue oxycodone 5 mg/10 mg for moderate and severe pain respectively  -continue 0 5 mg hydromorphone q 3 hours p r n  breakthrough pain  -continue scheduled Tylenol    OIC  Is constipated  Last BM 5 days ago  Will up titrate bowel regimen   -senna 3 tablets twice daily  -MiraLax today  -MiraLax daily PRN    Anxiety  Will continue monitor as patient is on steroids  She was anxious yesterday but attributed it to drinking coffee late   -continue to monitor    N/v  -zofran prn    2  Goals -  Treat ment focused  Pt would want her son and partner Nimo Coffey to make her decisions  Code Status: Full code - Level 1   Decisional apparatus:  Patient is competent on my exam today  If competence is lost, patient's substitute decision maker would default to Son by [place]  Advance Directive / Living Will / POLST:  Not on file  Pt wants to do 5 wishes  Interval history:       No acute events overnight  Incident pain with movement and transport to and from radiation  MEDICATIONS / ALLERGIES:     all current active meds have been reviewed    No Known Allergies    OBJECTIVE:    Physical Exam  Physical Exam  Constitutional:       Appearance: She is not ill-appearing     HENT:      Head: Normocephalic and atraumatic  Right Ear: External ear normal       Left Ear: External ear normal       Nose: Nose normal       Mouth/Throat:      Mouth: Mucous membranes are moist    Eyes:      General:         Right eye: No discharge  Left eye: No discharge  Pupils: Pupils are equal, round, and reactive to light  Cardiovascular:      Rate and Rhythm: Normal rate  Pulmonary:      Effort: Pulmonary effort is normal  No respiratory distress  Abdominal:      General: Abdomen is flat  There is no distension  Musculoskeletal:         General: No deformity  Normal range of motion  Cervical back: Normal range of motion and neck supple  Skin:     General: Skin is warm and dry  Neurological:      General: No focal deficit present  Mental Status: She is alert  Mental status is at baseline  Psychiatric:         Mood and Affect: Mood normal          Lab Results:   I have personally reviewed pertinent labs  , CBC:   Lab Results   Component Value Date    WBC 12 23 (H) 03/31/2022    HGB 13 1 03/31/2022    HCT 39 7 03/31/2022    MCV 91 03/31/2022     (H) 03/31/2022    MCH 30 0 03/31/2022    MCHC 33 0 03/31/2022    RDW 12 6 03/31/2022    MPV 10 1 03/31/2022    NRBC 0 03/31/2022   , CMP:   Lab Results   Component Value Date    SODIUM 135 (L) 03/31/2022    K 4 0 03/31/2022     03/31/2022    CO2 24 03/31/2022    BUN 18 03/31/2022    CREATININE 0 68 03/31/2022    CALCIUM 9 8 03/31/2022    AST 50 (H) 03/31/2022    ALT 96 (H) 03/31/2022    ALKPHOS 338 (H) 03/31/2022    EGFR 104 03/31/2022     Imaging Studies: reviewed  EKG, Pathology, and Other Studies: reviewed     Counseling / Coordination of Care    Total floor / unit time spent today 45 minutes  Greater than 50% of total time was spent with the patient and / or family counseling and / or coordination of care  A description of the counseling / coordination of care: symptom management and goals of care

## 2022-03-31 NOTE — ASSESSMENT & PLAN NOTE
Patient initially noted a right-sided heart breast mass in the beginning of this year  Patient states that the mass in her breast began to PE read about a month ago and the mass break through the skin and has become malodorous and weeping about a week ago  Patient was also associated with intermittent upper back pain  Pain was relieved by Motrin and Tylenol  Patient initially thought back pain was due to her position while using her computer  CT chest revealed  Large right breast mass measuring up to 6 9 cm  Right axillary metastatic adenopathy  Innumerable osseous metastasis  Surgical Oncology performed breast mass biopsy  Stat tissue exam has been placed, pending results  MRI brain revealed No evidence of intracranial metastasis  Left parietal and right frontal calvarial enhancing lesions, suspicious for calvarial osseous metastasis     CTA of head and neck pending  Medical Oncology consulted and will initiate systemic treatment outpatient and referred for cancer genetics  Inpatient consult to palliative care to discuss goal of care and currently patient is currently disease focused care

## 2022-03-31 NOTE — RESTORATIVE TECHNICIAN NOTE
Restorative Technician Note      Patient Name: Nelson Mancilla     Restorative Tech Visit Date: 03/31/22  Note Type: Bracing, Follow-up fitting  Brace Applied: [employer]   Education Provided: Family or social support of family present for education by provider; Yes; Other (comment) (Educated and reviewed donning and doffing procedure with pt  Pt had all questions answered at this time )    Yarelis deal at bedside  Please call Mobility Coordinator at ext  [phone] in regards to bracing instruction and/or adjustment    Kirsten Riley Restorative Technician, BS

## 2022-03-31 NOTE — ASSESSMENT & PLAN NOTE
No known history of hypertension  Patient self reports that she has high blood pressure in hospital/clinical setting  Possible due to white coat syndrome or anxiety/stress  Blood pressure has been improved  Continue amlodipine 5 mg p o   Daily  Continue lisinopril 10 mg po qd

## 2022-03-31 NOTE — OCCUPATIONAL THERAPY NOTE
OT CANCEL NOTE:    Unable to see patient for OT treatment this AM as she was off the floor  Will continue to follow and treat as able

## 2022-03-31 NOTE — RESTORATIVE TECHNICIAN NOTE
Restorative Technician Note      Patient Name: Marian Lane     Restorative Tech Visit Date: 03/31/22  Note Type: Bracing, Follow-up fitting  Brace Applied: Pura Kenyon       Follow up with pt's  that was fit yesterday  Pt currently off the unit  Will continue to follow  Please call the ortho tech, ext [phone], with any bracing questions and/or adjustments       [employer],

## 2022-03-31 NOTE — TREATMENT PLAN
Neurosurgery treatment plan  Osseous metastasis ([address] )  Assessment & Plan  Diffuse osseous mets, 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  Largest disease burden in cervical and upper thoracic spine  · Patient presented with worsening neck pain and shoulder pain for the past 3 days, not relieved with medication  · Pain started several weeks ago  · Found to have large fungating right breast mass  · No FH cancer, no unintentional weight loss  · On exam bilateral deltoid weakness, otherwise nonfocal  · SIN score 14-16     Imaging:  · MRI cervical spine with and without contrast 03/29/2022: Diffuse, primarily lytic osseous metastatic disease is seen throughout the cervical vertebral bodies from C1 into the upper thoracic spine   There is a significant amount of epidural tumor arising from pathologic compression fractures of C4 and T2   At the C4 level there is moderate to severe canal stenosis with flattening of the cord which maintains normal signal   Moderate canal stenosis at the T2 level   There is corresponding foraminal narrowing as a result of epidural and foraminal tumor  · MRI thoracic spine with and without contrast 03/29/2022: Diffuse, lytic, osseous metastasis is seen throughout the thoracic spine more prominently within the upper thoracic spine where there is epidural tumor and involvement of the posterior elements from T2 through T5   Moderate canal stenosis at the T2 level   Foraminal narrowing as described above  No abnormal cord signal identified  · MRI lumbar spine with and without contrast 03/29/2022:  Diffuse lytic osseous metastasis within the lower thoracic spine, lumbar spine and sacrum  Tayler Rich is a moderate amount of epidural tumor as a result of the expansile sacral lesion resulting in moderate sacral canal stenosis   Tumor extends into the S2-3 and S3-4 neural foramen on the left   Correlate for corresponding radiculopathy   Bilobed cystic mass within the left pelvis incompletely evaluated on this examination   Follow-up pelvic ultrasound imaging is recommended  · MRI brain w wo 3/30/22: Left parietal and right frontal calvarial enhancing lesions, suspicious for calvarial osseous metastasis  · CTA head and neck w wo 3/30/22:  Patent bilateral vertebral arteries without high-grade stenosis  There are multilevel abutment/mild displacement or encasement of the vertebral arteries by extraosseous tumor  Redemonstrated diffuse cervical and visualized thoracic spine metastatic lesions with severe C4 and T2 pathologic fractures  Multilevel extraosseous tumor resulting in severe canal stenosis at C4 and moderate canal stenosis at T2, better evaluated on recent cervical spine MRI  Redemonstrated left parietal and right frontal calvarial lesions         Plan:  · Continue to monitor neurological exam  · Discussion with Neurosurgery attendings via [address] [place] [address], Radiation Oncology, Medical Oncology, SOD and palliative care:  ? At this time no surgical intervention is warranted  Unfortunately patient's tumor burden is significant throughout the entire spine and it is very likely that an extensive spine surgery for fixation and likely tumor debulking will result in hardware failure and worsening complications  It is recommended at this time that patient undergo palliative radiation therapy to control disease burden  ? She underwent breast biopsy on 3/29/22, stat pathology sent, pending results  ? Radiation Oncology  began 1st course of radiation therapy today, would defer the management of steroids to their team, for now she continues on Decadron 4 mg q 6 hours  ? Palliative Care is consulted  · For added stability, patient will be placed in a  brace  · Medical management and pain control per primary team  · DVT ppx: SCDs, lovenox  · Mobilize as tolerated with  brace in place,  · PT/OT evaluation and treat       Neurosurgery will see pt PRN during the remainder of this hospitalization   Neuro-Oncology nurse navigator aware of patient and will assist with outpatient follow-up if warranted    Signed off for now, please call with questions or concerns      * Breast mass, right  Assessment & Plan  · States she has always had "lumpy" breasts secondary to fibroids but noticed around Lizette time that this was worsening with gradual fungating mass that remained pain less until recently with malodorous discharge which somewhat prompted the ED visit along with complaints of neck and back pain  · Underwent breast bx 3/29  · Pathology sent STAT, pending results

## 2022-03-31 NOTE — ASSESSMENT & PLAN NOTE
Patient arrives for Covid-19/SARS testing.    Swab collected using [facility] and CDC guidelines.    Patient given information on receiving results and left with mask in place.   Patient presented with right breast mass associated with upper back pain  Patient also complained of needle like sensation on her upper extremities  Patient denies weakness or numbness in extremities  No neuro deficits on examination  MRI cervical, thoracic and lumbar spine revealed Diffuse, primarily lytic osseous metastatic disease is seen throughout the cervical vertebral bodies from C1 into the upper thoracic spine  Teryl Block is a significant amount of epidural tumor arising from pathologic compression fractures of C4 and T2  Diffuse, lytic, osseous metastasis is seen throughout the thoracic spine more prominently within the upper thoracic spine where there is epidural tumor and involvement of the posterior elements from T2 through T5   Moderate canal stenosis at the T2  There is a moderate amount of epidural tumor as a result of the expansile sacral lesion resulting in moderate sacral canal stenosis   Tumor extends into the S2-3 and S3-4 neural foramen on the left  Inpatient consult to Neurosurgery and input appreciated  No surgical intervention at this time due to extensive spinal involvement  palliative radiation by radiation oncology  Risks and benefits have been discussed with patient by radiation oncology  Patient agree to proceed     Continue patient on Decadron 4 mg q 6h  Neuro check q 4 hours  Continue

## 2022-03-31 NOTE — ASSESSMENT & PLAN NOTE
Results from last 7 days   Lab Units 03/31/22  0532 03/29/22  1430 03/28/22  2330   AST U/L 50* 78* 80*   ALT U/L 96* 130* 128*   ALK PHOS U/L 338* 311* 314*     Rarely use alcohol, no drug use, acute hepatitis panel negative  Likely due to metastatic cancer  CT abdomen reviewed unremarkable liver  Trend as needed  Avoid hepatotoxic agents

## 2022-03-31 NOTE — PROGRESS NOTES
[address] 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 [facility])  Assessment & Plan  Patient presented with right breast mass associated with upper back pain  Patient also complained of needle like sensation on her upper extremities  Patient denies weakness or numbness in extremities  No neuro deficits on examination  MRI cervical, thoracic and lumbar spine revealed Diffuse, primarily lytic osseous metastatic disease is seen throughout the cervical vertebral bodies from C1 into the upper thoracic spine  Shay Rower is a significant amount of epidural tumor arising from pathologic compression fractures of C4 and T2  Diffuse, lytic, osseous metastasis is seen throughout the thoracic spine more prominently within the upper thoracic spine where there is epidural tumor and involvement of the posterior elements from T2 through T5   Moderate canal stenosis at the T2  There is a moderate amount of epidural tumor as a result of the expansile sacral lesion resulting in moderate sacral canal stenosis   Tumor extends into the S2-3 and S3-4 neural foramen on the left  Inpatient consult to Neurosurgery and input appreciated  No surgical intervention at this time due to extensive spinal involvement  palliative radiation by radiation oncology  Risks and benefits have been discussed with patient by radiation oncology  Patient agree to proceed     Continue patient on Decadron 4 mg q 6h  Neuro check q 4 hours  Continue      Transaminitis  Assessment & Plan  Results from last 7 days   Lab Units 03/31/22  0532 03/29/22  1430 03/28/22  2330   AST U/L 50* 78* 80*   ALT U/L 96* 130* 128*   ALK PHOS U/L 338* 311* 314*     Rarely use alcohol, no drug use, acute hepatitis panel negative  Likely due to metastatic cancer  CT abdomen reviewed unremarkable liver  Trend as needed  Avoid hepatotoxic agents      Elevated BP without diagnosis of hypertension  Assessment & Plan  No known history of hypertension  Patient self reports that she has high blood pressure in hospital/clinical setting  Possible due to white coat syndrome or anxiety/stress  Blood pressure has been improved  Continue amlodipine 5 mg p o  Daily  Continue lisinopril 10 mg po qd    Osseous metastasis ([address] )  Assessment & Plan  Patient presents with right breast mass associated with back pain  CT chest, abdomen and pelvis revealed innumerable osseous metastasis  CT C spine: Innumerable bony metastases, Displaced fracture through the right posterior arch of C1, C4 metastasis causing a severe compression fracture and extension into the canal causing severe central canal stenosis and likely cord compression , T2 metastasis causing a moderate compression fracture and extension into the canal causing severe central canal stenosis and likely cord compression    Radiation oncology consulted and recommend dexamethasone 4 mg q 6h  palliative radiation today  Pain management    * Breast mass, right  Assessment & Plan  Patient initially noted a right-sided heart breast mass in the beginning of this year  Patient states that the mass in her breast began to PE read about a month ago and the mass break through the skin and has become malodorous and weeping about a week ago  Patient was also associated with intermittent upper back pain  Pain was relieved by Motrin and Tylenol  Patient initially thought back pain was due to her position while using her computer  CT chest revealed  Large right breast mass measuring up to 6 9 cm  Right axillary metastatic adenopathy  Innumerable osseous metastasis  Surgical Oncology performed breast mass biopsy   Stat tissue exam has been placed, pending results  MRI brain revealed No evidence of intracranial metastasis  Left parietal and right frontal calvarial enhancing lesions, suspicious for calvarial osseous metastasis  CTA of head and neck pending  Medical Oncology consulted and will initiate systemic treatment outpatient and referred for cancer genetics  Inpatient consult to palliative care to discuss goal of care and currently patient is currently disease focused care           VTE Pharmacologic Prophylaxis:   VTE Score: 4 Moderate Risk (Score 3-4) - Pharmacological DVT Prophylaxis Ordered: Enoxaparin (Lovenox)  Mechanical VTE Prophylaxis in Place: Yes    Patient Centered Rounds: I have performed bedside rounds with nursing staff today  Discussions with Specialists or Other Care Team Provider:       Current Length of Stay: 2 day(s)    Current Patient Status: Inpatient     Discharge Plan / Estimated Discharge Date: To be determined    Code Status: Level 1 - Full Code      Subjective:   Patient complained of back pain when she moves around or lie flat    Objective:     Vitals:   Temp (24hrs), Av °F (36 7 °C), Min:97 9 °F (36 6 °C), Max:98 1 °F (36 7 °C)    Temp:  [97 9 °F (36 6 °C)-98 1 °F (36 7 °C)] 97 9 °F (36 6 °C)  HR:  [] 88  Resp:  [18-20] 19  BP: (132-157)/() 157/113  SpO2:  [94 %] 94 %  Body mass index is 35 57 kg/m²  Input and Output Summary (last 24 hours): Intake/Output Summary (Last 24 hours) at 3/31/2022 1109  Last data filed at 3/30/2022 2150  Gross per 24 hour   Intake --   Output 900 ml   Net -900 ml       Physical Exam:     Physical Exam  Constitutional:       Appearance: She is obese  HENT:      Head: Normocephalic  Eyes:      Pupils: Pupils are equal, round, and reactive to light  Neck:      Comments: Cervical collar and  in place  Cardiovascular:      Rate and Rhythm: Normal rate and regular rhythm  Heart sounds: No murmur heard  Pulmonary:      Effort: Pulmonary effort is normal  No respiratory distress  Breath sounds:  No wheezing or rales  Chest:   Breasts:      Right: Mass and skin change present  Left: Normal          Abdominal:      Palpations: Abdomen is soft  Tenderness: There is no abdominal tenderness  Musculoskeletal:         General: No swelling  Normal range of motion  Cervical back: Normal range of motion  Skin:     General: Skin is warm  Neurological:      General: No focal deficit present  Mental Status: She is alert and oriented to person, place, and time  Sensory: No sensory deficit  Motor: No weakness  Psychiatric:         Mood and Affect: Mood normal           Additional Data:     Labs:  Results from last 7 days   Lab Units 03/31/22  0532   WBC Thousand/uL 12 23*   HEMOGLOBIN g/dL 13 1   HEMATOCRIT % 39 7   PLATELETS Thousands/uL 422*   NEUTROS PCT % 80*   LYMPHS PCT % 16   MONOS PCT % 3*   EOS PCT % 0     Results from last 7 days   Lab Units 03/31/22  0532   SODIUM mmol/L 135*   POTASSIUM mmol/L 4 0   CHLORIDE mmol/L 104   CO2 mmol/L 24   BUN mg/dL 18   CREATININE mg/dL 0 68   ANION GAP mmol/L 7   CALCIUM mg/dL 9 8   ALBUMIN g/dL 3 6   TOTAL BILIRUBIN mg/dL 0 52   ALK PHOS U/L 338*   ALT U/L 96*   AST U/L 50*   GLUCOSE RANDOM mg/dL 113                 Results from last 7 days   Lab Units 03/29/22  0206   LACTIC ACID mmol/L 1 1       Imaging: Reviewed radiology reports from this admission including: MRI brain    Recent Cultures (last 7 days):     Results from last 7 days   Lab Units 03/29/22  0206   BLOOD CULTURE  No Growth at 48 hrs  No Growth at 48 hrs         Lines/Drains:  Invasive Devices  Report    Peripheral Intravenous Line            Peripheral IV 03/28/22 Left Antecubital 2 days                Telemetry:        Last 24 Hours Medication List:   Current Facility-Administered Medications   Medication Dose Route Frequency Provider Last Rate    acetaminophen  975 mg Oral Q8H [address] Tara Olivas DO      amLODIPine  5 mg Oral Daily Tesfaye Dang MD      dexamethasone 4 mg Intravenous Q6H [facility] Tara Olivas, DO      enoxaparin  40 mg Subcutaneous Q24H [facility] Valarie Goyal MD      HYDROmorphone  0 5 mg Intravenous Q3H PRN Dick Ramsey MD      Labetalol HCl  10 mg Intravenous Q6H PRN Tara Olivas, DO      lisinopril  10 mg Oral Daily Valarie Goyal MD      ondansetron  4 mg Intravenous Q4H PRN Tara Olivas, DO      oxyCODONE  10 mg Oral Q4H PRN Tara Olivas, DO      oxyCODONE  5 mg Oral Q4H PRN Tara Olivas, DO      pantoprazole  40 mg Oral Early Morning Tara Olivas, DO      polyethylene glycol  17 g Oral Daily PRN Dick Ramsey MD      senna  2 tablet Oral BID Dick Ramsey MD          Today, Patient Was Seen By: Valarie Goyal MD    ** Please Note: This note has been constructed using a voice recognition system   **

## 2022-03-31 NOTE — ASSESSMENT & PLAN NOTE
Patient presents with right breast mass associated with back pain  CT chest, abdomen and pelvis revealed innumerable osseous metastasis    CT C spine: Innumerable bony metastases, Displaced fracture through the right posterior arch of C1, C4 metastasis causing a severe compression fracture and extension into the canal causing severe central canal stenosis and likely cord compression , T2 metastasis causing a moderate compression fracture and extension into the canal causing severe central canal stenosis and likely cord compression    Radiation oncology consulted and recommend dexamethasone 4 mg q 6h  palliative radiation today  Pain management

## 2022-04-01 ENCOUNTER — APPOINTMENT (OUTPATIENT)
Dept: RADIATION ONCOLOGY | Facility: HOSPITAL | Age: 47
End: 2022-04-01
Attending: RADIOLOGY

## 2022-04-01 ENCOUNTER — APPOINTMENT (INPATIENT)
Dept: RADIOLOGY | Facility: HOSPITAL | Age: 47
DRG: 598 | End: 2022-04-01
Payer: COMMERCIAL

## 2022-04-01 LAB
ANION GAP SERPL CALCULATED.3IONS-SCNC: 7 MMOL/L (ref 4–13)
BASOPHILS # BLD AUTO: 0.01 THOUSANDS/ΜL (ref 0–0.1)
BASOPHILS NFR BLD AUTO: 0 % (ref 0–1)
BUN SERPL-MCNC: 19 MG/DL (ref 5–25)
CALCIUM SERPL-MCNC: 9.4 MG/DL (ref 8.3–10.1)
CHLORIDE SERPL-SCNC: 105 MMOL/L (ref 100–108)
CO2 SERPL-SCNC: 24 MMOL/L (ref 21–32)
CREAT SERPL-MCNC: 0.6 MG/DL (ref 0.6–1.3)
EOSINOPHIL # BLD AUTO: 0 THOUSAND/ΜL (ref 0–0.61)
EOSINOPHIL NFR BLD AUTO: 0 % (ref 0–6)
ERYTHROCYTE [DISTWIDTH] IN BLOOD BY AUTOMATED COUNT: 12.7 % (ref 11.6–15.1)
GFR SERPL CREATININE-BSD FRML MDRD: 108 ML/MIN/1.73SQ M
GLUCOSE SERPL-MCNC: 107 MG/DL (ref 65–140)
HCT VFR BLD AUTO: 38.5 % (ref 34.8–46.1)
HGB BLD-MCNC: 12.2 G/DL (ref 11.5–15.4)
IMM GRANULOCYTES # BLD AUTO: 0.13 THOUSAND/UL (ref 0–0.2)
IMM GRANULOCYTES NFR BLD AUTO: 1 % (ref 0–2)
LYMPHOCYTES # BLD AUTO: 1.59 THOUSANDS/ΜL (ref 0.6–4.47)
LYMPHOCYTES NFR BLD AUTO: 14 % (ref 14–44)
MCH RBC QN AUTO: 29.6 PG (ref 26.8–34.3)
MCHC RBC AUTO-ENTMCNC: 31.7 G/DL (ref 31.4–37.4)
MCV RBC AUTO: 93 FL (ref 82–98)
MONOCYTES # BLD AUTO: 0.45 THOUSAND/ΜL (ref 0.17–1.22)
MONOCYTES NFR BLD AUTO: 4 % (ref 4–12)
NEUTROPHILS # BLD AUTO: 9.25 THOUSANDS/ΜL (ref 1.85–7.62)
NEUTS SEG NFR BLD AUTO: 81 % (ref 43–75)
NRBC BLD AUTO-RTO: 0 /100 WBCS
PLATELET # BLD AUTO: 428 THOUSANDS/UL (ref 149–390)
PMV BLD AUTO: 10 FL (ref 8.9–12.7)
POTASSIUM SERPL-SCNC: 4.3 MMOL/L (ref 3.5–5.3)
RBC # BLD AUTO: 4.12 MILLION/UL (ref 3.81–5.12)
SODIUM SERPL-SCNC: 136 MMOL/L (ref 136–145)
WBC # BLD AUTO: 11.43 THOUSAND/UL (ref 4.31–10.16)

## 2022-04-01 PROCEDURE — 97110 THERAPEUTIC EXERCISES: CPT

## 2022-04-01 PROCEDURE — 99232 SBSQ HOSP IP/OBS MODERATE 35: CPT | Performed by: INTERNAL MEDICINE

## 2022-04-01 PROCEDURE — G6002 STEREOSCOPIC X-RAY GUIDANCE: HCPCS | Performed by: INTERNAL MEDICINE

## 2022-04-01 PROCEDURE — 97530 THERAPEUTIC ACTIVITIES: CPT

## 2022-04-01 PROCEDURE — 85025 COMPLETE CBC W/AUTO DIFF WBC: CPT | Performed by: INTERNAL MEDICINE

## 2022-04-01 PROCEDURE — 99232 SBSQ HOSP IP/OBS MODERATE 35: CPT

## 2022-04-01 PROCEDURE — 97535 SELF CARE MNGMENT TRAINING: CPT

## 2022-04-01 PROCEDURE — 77387 GUIDANCE FOR RADJ TX DLVR: CPT | Performed by: INTERNAL MEDICINE

## 2022-04-01 PROCEDURE — 72040 X-RAY EXAM NECK SPINE 2-3 VW: CPT

## 2022-04-01 PROCEDURE — 80048 BASIC METABOLIC PNL TOTAL CA: CPT | Performed by: INTERNAL MEDICINE

## 2022-04-01 PROCEDURE — 77412 RADIATION TX DELIVERY LVL 3: CPT | Performed by: INTERNAL MEDICINE

## 2022-04-01 RX ORDER — SENNOSIDES 8.6 MG
25.8 TABLET ORAL 2 TIMES DAILY
Qty: 180 TABLET | Refills: 0 | Status: SHIPPED | OUTPATIENT
Start: 2022-04-01 | End: 2022-04-03

## 2022-04-01 RX ORDER — SENNOSIDES 8.6 MG
25.8 TABLET ORAL 2 TIMES DAILY
Qty: 180 TABLET | Refills: 0 | Status: CANCELLED | OUTPATIENT
Start: 2022-04-01 | End: 2022-05-01

## 2022-04-01 RX ORDER — OXYCODONE HYDROCHLORIDE 10 MG/1
10 TABLET ORAL EVERY 4 HOURS PRN
Qty: 180 TABLET | Refills: 0 | Status: SHIPPED | OUTPATIENT
Start: 2022-04-01 | End: 2022-05-02 | Stop reason: SDUPTHER

## 2022-04-01 RX ORDER — POLYETHYLENE GLYCOL 3350 17 G/17G
17 POWDER, FOR SOLUTION ORAL DAILY PRN
Qty: 20 EACH | Refills: 0 | Status: CANCELLED | OUTPATIENT
Start: 2022-04-01 | End: 2022-04-21

## 2022-04-01 RX ORDER — POLYETHYLENE GLYCOL 3350 17 G/17G
17 POWDER, FOR SOLUTION ORAL DAILY
Qty: 30 EACH | Refills: 0 | Status: SHIPPED | OUTPATIENT
Start: 2022-04-01 | End: 2022-04-03

## 2022-04-01 RX ADMIN — OXYCODONE HYDROCHLORIDE 10 MG: 10 TABLET ORAL at 13:15

## 2022-04-01 RX ADMIN — SENNOSIDES 25.8 MG: 8.6 TABLET, FILM COATED ORAL at 17:37

## 2022-04-01 RX ADMIN — DEXAMETHASONE SODIUM PHOSPHATE 4 MG: 4 INJECTION INTRA-ARTICULAR; INTRALESIONAL; INTRAMUSCULAR; INTRAVENOUS; SOFT TISSUE at 20:49

## 2022-04-01 RX ADMIN — PANTOPRAZOLE SODIUM 40 MG: 40 TABLET, DELAYED RELEASE ORAL at 04:47

## 2022-04-01 RX ADMIN — ACETAMINOPHEN 975 MG: 325 TABLET ORAL at 04:10

## 2022-04-01 RX ADMIN — HYDROMORPHONE HYDROCHLORIDE 0.5 MG: 1 INJECTION, SOLUTION INTRAMUSCULAR; INTRAVENOUS; SUBCUTANEOUS at 11:54

## 2022-04-01 RX ADMIN — ENOXAPARIN SODIUM 40 MG: 40 INJECTION SUBCUTANEOUS at 08:45

## 2022-04-01 RX ADMIN — AMLODIPINE BESYLATE 5 MG: 5 TABLET ORAL at 08:45

## 2022-04-01 RX ADMIN — OXYCODONE HYDROCHLORIDE 10 MG: 10 TABLET ORAL at 08:45

## 2022-04-01 RX ADMIN — OXYCODONE HYDROCHLORIDE 10 MG: 10 TABLET ORAL at 04:04

## 2022-04-01 RX ADMIN — OXYCODONE HYDROCHLORIDE 10 MG: 10 TABLET ORAL at 23:09

## 2022-04-01 RX ADMIN — LISINOPRIL 10 MG: 10 TABLET ORAL at 08:45

## 2022-04-01 RX ADMIN — OXYCODONE HYDROCHLORIDE 10 MG: 10 TABLET ORAL at 18:22

## 2022-04-01 RX ADMIN — HYDROMORPHONE HYDROCHLORIDE 0.5 MG: 1 INJECTION, SOLUTION INTRAMUSCULAR; INTRAVENOUS; SUBCUTANEOUS at 02:06

## 2022-04-01 RX ADMIN — ACETAMINOPHEN 975 MG: 325 TABLET ORAL at 13:15

## 2022-04-01 RX ADMIN — DEXAMETHASONE SODIUM PHOSPHATE 4 MG: 4 INJECTION INTRA-ARTICULAR; INTRALESIONAL; INTRAMUSCULAR; INTRAVENOUS; SOFT TISSUE at 08:45

## 2022-04-01 RX ADMIN — ACETAMINOPHEN 975 MG: 325 TABLET ORAL at 21:04

## 2022-04-01 RX ADMIN — HYDROMORPHONE HYDROCHLORIDE 0.5 MG: 1 INJECTION, SOLUTION INTRAMUSCULAR; INTRAVENOUS; SUBCUTANEOUS at 15:29

## 2022-04-01 RX ADMIN — HYDROMORPHONE HYDROCHLORIDE 0.5 MG: 1 INJECTION, SOLUTION INTRAMUSCULAR; INTRAVENOUS; SUBCUTANEOUS at 20:43

## 2022-04-01 RX ADMIN — DEXAMETHASONE SODIUM PHOSPHATE 4 MG: 4 INJECTION INTRA-ARTICULAR; INTRALESIONAL; INTRAMUSCULAR; INTRAVENOUS; SOFT TISSUE at 15:29

## 2022-04-01 RX ADMIN — DEXAMETHASONE SODIUM PHOSPHATE 4 MG: 4 INJECTION INTRA-ARTICULAR; INTRALESIONAL; INTRAMUSCULAR; INTRAVENOUS; SOFT TISSUE at 02:06

## 2022-04-01 RX ADMIN — SENNOSIDES 25.8 MG: 8.6 TABLET, FILM COATED ORAL at 08:45

## 2022-04-01 NOTE — OCCUPATIONAL THERAPY NOTE
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 [facility])          04/01/22 1222   OT Last Visit   OT Visit Date 04/01/22   Note Type   Note Type Treatment   Restrictions/Precautions   Weight Bearing Precautions Per Order No   Braces or Orthoses   ( brace)   Other Precautions Fall Risk;Pain;Spinal precautions   Lifestyle   Autonomy PTA, pt reports being I with ADLs/IADLs, fnxl mobility, (+)    Reciprocal Relationships Boyfriend   Service to Others FTE - works as a  for [employer] Enjoys walking her dogs, going for rides, breweries/wineries   Pain Assessment   Pain Assessment Tool 0-10   Pain Score 5   Pain Location/Orientation Location: Neck;Orientation: Bilateral;Location: Shoulder   Hospital Pain Intervention(s) Repositioned; Ambulation/increased activity   ADL   Where Assessed Edge of bed   LB Dressing Assistance 4  Minimal Assistance   LB Dressing Deficit Don/doff R sock; Don/doff L sock; Thread RLE into pants; Thread LLE into pants;Pull up over hips;Use of adaptive equipment   LB Dressing Comments [address]  4  Minimal Assistance   Toileting Deficit Clothing management up;Clothing management down   Toileting Comments S for perineal hygiene    Bed Mobility   Supine to Sit 5  Supervision   Additional items Assist x 1;HOB elevated; Bedrails; Increased time required   Sit to Supine 4  Minimal assistance   Additional items Assist x 1; Increased time required; Bedrails;HOB elevated;LE management   Transfers   Sit to Stand 4  Minimal assistance   Additional items Assist x 1; Increased time required   Stand to Sit 4  Minimal assistance   Additional items Assist x 1; Increased time required   Toilet transfer 4 Minimal assistance   Additional items Assist x 1; Increased time required   Additional Comments Transfers with RW    Functional Mobility   Functional Mobility   (CGA)   Additional Comments Ax1   Additional items Rolling walker   Cognition   Overall Cognitive Status WFL   Arousal/Participation Alert; Responsive; Cooperative   Attention Within functional limits   Orientation Level Oriented X4   Memory Within functional limits;Decreased recall of precautions   Following Commands Follows all commands and directions without difficulty   Comments Pt very pleasant and cooperative t/o session  Did require repetition of spinal precautions    Activity Tolerance   Activity Tolerance Patient limited by pain   Medical Staff Made Aware RN clearance for session    Assessment   Assessment Patient participated in Skilled OT session this date with interventions consisting of ADL re training with the use of correct body mechnaics, Energy Conservation techniques, deep breathing technique, safety awareness and fall prevention techniques,  long handle equipment,  therapeutic activities to: increase activity tolerance, increase dynamic sit/ stand balance during functional activity  and increase OOB/ sitting tolerance   Upon arrival patient was found supine in bed  Pt demonstrated the following tasks: S sup to sit, MIN A STS and toilet transfers, CGA fnxl mobility with RW  Pt required repetition of spinal precautions, also educated on [address]  Pt performed LBD using LHAE with MIN A, MIN A for toileting  Pt with increased pain when upright  Patient continues to be functioning below baseline level, occupational performance remains limited secondary to factors listed above and increased risk for falls and injury  From OT standpoint, recommendation at time of d/c would be home with skilled therapy and increased social support    Patient to benefit from continued Occupational Therapy treatment while in the hospital to address deficits as defined above and maximize level of functional independence with ADLs and functional mobility  Pt was left after session with all current needs met  The patient's raw score on the AM-PAC Daily Activity inpatient short form is 18, standardized score is 38 66, less than 39 4  Patients at this level are likely to benefit from discharge to post-acute rehabilitation services  Please refer to the recommendation of the Occupational Therapist for safe discharge planning  Plan   Treatment Interventions ADL retraining;Functional transfer training;UE strengthening/ROM; Endurance training;Patient/family training;Equipment evaluation/education; Fine motor coordination activities;Continued evaluation; Energy conservation; Activityengagement   Goal Expiration Date 04/13/22   OT Treatment Day 1   OT Frequency 3-5x/wk   Recommendation   OT Discharge Recommendation Home with home health rehabilitation  (and increased social support)   AM-PAC Daily Activity Inpatient   Lower Body Dressing 3   Bathing 3   Toileting 3   Upper Body Dressing 3   Grooming 3   Eating 3   Daily Activity Raw Score 18   Daily Activity Standardized Score (Calc for Raw Score >=11) 38 66   AM-[facility] Applied Cognition Inpatient   Following a Speech/Presentation 4   Understanding Ordinary Conversation 4   Taking Medications 4   Remembering Where Things Are Placed or Put Away 4   Remembering List of 4-5 Errands 4   Taking Care of Complicated Tasks 4   Applied Cognition Raw Score 24   Applied Cognition Standardized Score 62 21       Edd Kim MS, OTR/L

## 2022-04-01 NOTE — ASSESSMENT & PLAN NOTE
Patient presents with right breast mass associated with back pain  CT chest, abdomen and pelvis revealed innumerable osseous metastasis    CT C spine: Innumerable bony metastases, Displaced fracture through the right posterior arch of C1, C4 metastasis causing a severe compression fracture and extension into the canal causing severe central canal stenosis and likely cord compression , T2 metastasis causing a moderate compression fracture and extension into the canal causing severe central canal stenosis and likely cord compression    Radiation oncology consulted and recommend dexamethasone 4 mg q 6h  palliative radiation (day2)  Pain management

## 2022-04-01 NOTE — PHYSICAL THERAPY NOTE
PHYSICAL THERAPY NOTE          Patient Name: Alex CUTLER Date: 4/1/2022 04/01/22 1002   PT Last Visit   PT Visit Date 04/01/22   Note Type   Note Type Treatment   End of Consult   Patient Position at End of Consult Supine; All needs within reach   Pain Assessment   Pain Assessment Tool 0-10   Pain Score 5   Pain Location/Orientation Orientation: Bilateral;Location: Neck; Location: Back; Location: Arm   Hospital Pain Intervention(s) Ambulation/increased activity;Repositioned   Restrictions/Precautions   Weight Bearing Precautions Per Order No   Braces or Orthoses C/S Collar  ( )   Other Precautions Pain; Fall Risk;Spinal precautions   General   Chart Reviewed Yes   Response to Previous Treatment Patient with no complaints from previous session  Family/Caregiver Present No   Cognition   Overall Cognitive Status WFL   Arousal/Participation Alert; Responsive; Cooperative   Attention Within functional limits   Orientation Level Oriented X4   Memory Within functional limits   Following Commands Follows all commands and directions without difficulty   Comments Pt pleasant and cooperative t/o, very motivated but guarded due to pain   Bed Mobility   Supine to Sit 4  Minimal assistance   Additional items Assist x 1;HOB elevated; Bedrails; Increased time required   Sit to Supine 4  Minimal assistance   Additional items Assist x 1;HOB elevated;LE management; Increased time required   Transfers   Sit to Stand 4  Minimal assistance   Additional items Assist x 1; Increased time required   Stand to Sit 4  Minimal assistance   Additional items Assist x 1; Increased time required   Additional Comments transfers with RW    Ambulation/Elevation   Gait pattern Narrow KIKA;Shuffling;Excessively slow; Short stride; Step to   Gait Assistance 4  Minimal assist   Additional items Assist x 1   Assistive Device Rolling walker   Distance 8 ft fwd, 8 ft backwards   Stair Management Assistance   (pt declined to trial 2* to pain)   Balance   Static Sitting Fair   Dynamic Sitting Fair   Static Standing Fair -   Dynamic Standing Poor +   Ambulatory Poor +   Endurance Deficit   Endurance Deficit Yes   Endurance Deficit Description pain   Activity Tolerance   Activity Tolerance Patient limited by pain   Nurse Made Aware RN cleared pt to participate in therapy session   Exercises   Knee AROM Long Arc Quad Sitting;10 reps;AROM; Bilateral  (2x)   Ankle Pumps Sitting;10 reps;AROM; Bilateral  (2x)   Marching Sitting;10 reps;Bilateral;AROM  (2x)   Assessment   Prognosis Good   Problem List Decreased mobility;Pain   Assessment Pt seen for PT session today  Treatment session included bed mobility, functional transfers, therapeutic exercises, gait training, and pt education to improve overall mobility and independence  Pt requires min Ax1 for bed mobility, transfers, and ambulation 2* to pain  Pt is making slow towards goals, as demonstrated by decreased activity tolerance and increased pain when standing  When standing, pt was only able to walk ~8 feet before needing to return to sitting, and chose to ambulate backwards as opposed to turning with RW  Pt was reluctant to try wheelchair mobility, as she says she is motivated to walk and does not want to become reliant on w/c at home  Pt was educated on repositioning in bed and in chair while maintaining spinal precautions, which she verbalized and demonstrated understanding  Pt was left supine in bed at end of session, all needs within reach  Pt would benefit from continued skilled physical therapy to address remaining limitations  PT to continue to follow pt, recommending HHPT vs rehab pending progress  The patient's AM-PAC Basic Mobility Inpatient Short Form Raw Score is 15  A Raw score of less than or equal to 16 suggests the patient may benefit from discharge to home   Please also refer to the recommendation of the Physical Therapist for safe discharge planning  Barriers to Discharge Inaccessible home environment   Goals   Patient Goals to have less pain    STG Expiration Date 04/13/22   PT Treatment Day 1   Plan   Treatment/Interventions Functional transfer training;LE strengthening/ROM; Therapeutic exercise;Patient/family training;Equipment eval/education; Bed mobility;Gait training;Spoke to nursing;Spoke to MD   Progress Slow progress, decreased activity tolerance  (2* to pain)   PT Frequency 3-5x/wk   Recommendation   PT Discharge Recommendation Home with home health rehabilitation  (vs rehab pending progress )   Equipment Recommended Walker   Additional Comments not cleared for D/C home pending stair trial + further ambulation    AM-PAC Basic Mobility Inpatient   Turning in Bed Without Bedrails 3   Lying on Back to Sitting on Edge of Flat Bed 3   Moving Bed to Chair 3   Standing Up From Chair 3   Walk in Room 2   Climb 3-5 Stairs 1   Basic Mobility Inpatient Raw Score 15   Basic Mobility Standardized Score 36 97   Highest Level Of Mobility   -HL Goal 4: Move to chair/commode   JH-HLM Highest Level of Mobility 5: Stand (1 or more minutes)   -HL Goal Achieved Yes   Education   Education Provided Mobility training;Home exercise program;Assistive device   Patient Demonstrates acceptance/verbal understanding   End of Consult   Patient Position at End of Consult Supine; All needs within reach     [facility], [facility]

## 2022-04-01 NOTE — PROGRESS NOTES
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 [facility])  Assessment & Plan  Patient presented with right breast mass associated with upper back pain  Patient also complained of needle like sensation on her upper extremities  Patient denies weakness or numbness in extremities  No neuro deficits on examination  MRI cervical, thoracic and lumbar spine revealed Diffuse, primarily lytic osseous metastatic disease is seen throughout the cervical vertebral bodies from C1 into the upper thoracic spine  [place]ill Dais is a significant amount of epidural tumor arising from pathologic compression fractures of C4 and T2  Diffuse, lytic, osseous metastasis is seen throughout the thoracic spine more prominently within the upper thoracic spine where there is epidural tumor and involvement of the posterior elements from T2 through T5   Moderate canal stenosis at the T2  There is a moderate amount of epidural tumor as a result of the expansile sacral lesion resulting in moderate sacral canal stenosis   Tumor extends into the S2-3 and S3-4 neural foramen on the left  Inpatient consult to Neurosurgery and input appreciated  No surgical intervention at this time due to extensive spinal involvement  palliative radiation by radiation oncology  Risks and benefits have been discussed with patient by radiation oncology  Patient agree to proceed     Continue patient on Decadron 4 mg q 6h  Neuro check q 4 hours  Continue      Transaminitis  Assessment & Plan  Results from last 7 days   Lab Units 03/31/22  0532 03/29/22  1430 03/28/22  2330   AST U/L 50* 78* 80*   ALT U/L 96* 130* 128*   ALK PHOS U/L 338* 311* 314*     Rarely use alcohol, no drug use, acute hepatitis panel negative  Likely due to metastatic cancer  CT abdomen reviewed unremarkable liver  Trend as needed  Avoid hepatotoxic agents      Elevated BP without diagnosis of hypertension  Assessment & Plan  No known history of hypertension  Patient self reports that she has high blood pressure in hospital/clinical setting  Possible due to white coat syndrome or anxiety/stress  Blood pressure has been improved  Continue amlodipine 5 mg p o  Daily  Continue lisinopril 10 mg po qd    Osseous metastasis ([address] )  Assessment & Plan  Patient presents with right breast mass associated with back pain  CT chest, abdomen and pelvis revealed innumerable osseous metastasis  CT C spine: Innumerable bony metastases, Displaced fracture through the right posterior arch of C1, C4 metastasis causing a severe compression fracture and extension into the canal causing severe central canal stenosis and likely cord compression , T2 metastasis causing a moderate compression fracture and extension into the canal causing severe central canal stenosis and likely cord compression    Radiation oncology consulted and recommend dexamethasone 4 mg q 6h  palliative radiation (day2)  Pain management    * Breast mass, right  Assessment & Plan  Patient initially noted a right-sided heart breast mass in the beginning of this year  Patient states that the mass in her breast began to PE read about a month ago and the mass break through the skin and has become malodorous and weeping about a week ago  Patient was also associated with intermittent upper back pain  Pain was relieved by Motrin and Tylenol  Patient initially thought back pain was due to her position while using her computer  CT chest revealed  Large right breast mass measuring up to 6 9 cm  Right axillary metastatic adenopathy  Innumerable osseous metastasis  Surgical Oncology performed breast mass biopsy  Stat tissue exam has been placed, pending results   No further surgical intervention  MRI brain revealed No evidence of intracranial metastasis  Left parietal and right frontal calvarial enhancing lesions, suspicious for calvarial osseous metastasis  CTA of head and neck revealed Patent bilateral vertebral arteries without high-grade stenosis   There are multilevel abutment/mild displacement or encasement of the vertebral arteries by extraosseous tumor as described  No hemodynamically significant stenosis of cervical carotid arteries  Medical Oncology consulted and will initiate systemic treatment outpatient and referred for cancer genetics  Inpatient consult to palliative care to discuss goal of care and currently patient is currently disease focused care  Patient is on palliative radiation for osseous metastasis  Pain management           VTE Pharmacologic Prophylaxis:   VTE Score: 4 Moderate Risk (Score 3-4) - Pharmacological DVT Prophylaxis Ordered: Enoxaparin (Lovenox)  Mechanical VTE Prophylaxis in Place: Yes    Patient Centered Rounds: I have performed bedside rounds with nursing staff today  Discussions with Specialists or Other Care Team Provider:     Education and Discussions with Family / Patient:     Current Length of Stay: 3 day(s)    Current Patient Status: Inpatient     Discharge Plan / Estimated Discharge Date: To be determined    Code Status: Level 1 - Full Code      Subjective:   Patient complained of pain in the upper back and neck  Objective:     Vitals:   Temp (24hrs), Av 7 °F (37 1 °C), Min:98 3 °F (36 8 °C), Max:99 °F (37 2 °C)    Temp:  [98 3 °F (36 8 °C)-99 °F (37 2 °C)] 98 3 °F (36 8 °C)  HR:  [106] 106  BP: (109-154)/(80-98) 154/98  SpO2:  [94 %] 94 %  Body mass index is 35 57 kg/m²  Input and Output Summary (last 24 hours):        Intake/Output Summary (Last 24 hours) at 2022 1100  Last data filed at 2022 0350  Gross per 24 hour   Intake --   Output 500 ml   Net -500 ml       Physical Exam:     Physical Exam  Constitutional:       Appearance: She is obese  She is not ill-appearing  HENT:      Head: Normocephalic and atraumatic  Right Ear: External ear normal       Left Ear: External ear normal       Nose: Nose normal       Mouth/Throat:      Mouth: Mucous membranes are moist    Eyes:      General:         Right eye: No discharge  Left eye: No discharge  Pupils: Pupils are equal, round, and reactive to light  Neck:      Comments: Cervical collar and  in place  Cardiovascular:      Rate and Rhythm: Normal rate and regular rhythm  Heart sounds: No murmur heard  Pulmonary:      Effort: Pulmonary effort is normal  No respiratory distress  Breath sounds: No wheezing or rales  Chest:   Breasts:      Right: Mass and skin change present  Left: Normal          Abdominal:      General: Abdomen is flat  There is no distension  Palpations: Abdomen is soft  Tenderness: There is no abdominal tenderness  Musculoskeletal:         General: No swelling or deformity  Normal range of motion  Cervical back: Normal range of motion and neck supple  Skin:     General: Skin is warm and dry  Neurological:      General: No focal deficit present  Mental Status: She is alert and oriented to person, place, and time  Mental status is at baseline  Sensory: No sensory deficit  Motor: No weakness     Psychiatric:         Mood and Affect: Mood normal           Additional Data:     Labs:  Results from last 7 days   Lab Units 03/31/22  0532   WBC Thousand/uL 12 23*   HEMOGLOBIN g/dL 13 1   HEMATOCRIT % 39 7   PLATELETS Thousands/uL 422*   NEUTROS PCT % 80*   LYMPHS PCT % 16   MONOS PCT % 3*   EOS PCT % 0     Results from last 7 days   Lab Units 04/01/22  0526 03/31/22  0532 03/31/22  0532   SODIUM mmol/L 136   < > 135*   POTASSIUM mmol/L 4 3   < > 4 0   CHLORIDE mmol/L 105   < > 104   CO2 mmol/L 24   < > 24   BUN mg/dL 19   < > 18   CREATININE mg/dL 0 60   < > 0 68   ANION GAP mmol/L 7   < > 7   CALCIUM mg/dL 9 4   < > 9 8   ALBUMIN g/dL  --   --  3 6   TOTAL BILIRUBIN mg/dL  --   --  0 52   ALK PHOS U/L  --   --  338*   ALT U/L  --   --  96*   AST U/L  --   --  50*   GLUCOSE RANDOM mg/dL 107   < > 113    < > = values in this interval not displayed  Results from last 7 days   Lab Units 03/29/22  0206   LACTIC ACID mmol/L 1 1       Imaging: Reviewed radiology reports from this admission including: CTA head and neck    Recent Cultures (last 7 days):     Results from last 7 days   Lab Units 03/29/22  0206   BLOOD CULTURE  No Growth at 72 hrs  No Growth at 72 hrs  Lines/Drains:  Invasive Devices  Report    Peripheral Intravenous Line            Peripheral IV 03/31/22 Dorsal (posterior); Right Hand <1 day                Telemetry:        Last 24 Hours Medication List:   Current Facility-Administered Medications   Medication Dose Route Frequency Provider Last Rate    acetaminophen  975 mg Oral Q8H [facility] Tara Olivas, DO      amLODIPine  5 mg Oral Daily Vin Peter MD      dexamethasone  4 mg Intravenous Q6H [facility] Tara Olivas, DO      enoxaparin  40 mg Subcutaneous Q24H [facility] Vin Peter MD      HYDROmorphone  0 5 mg Intravenous Q3H PRN Vi Palma MD      Labetalol HCl  10 mg Intravenous Q6H PRN Tara Olivas, DO      lisinopril  10 mg Oral Daily Vin Peter MD      ondansetron  4 mg Intravenous Q4H PRN Tara Olivas, DO      oxyCODONE  10 mg Oral Q4H PRN Tara Olivas, DO      oxyCODONE  5 mg Oral Q4H PRN Tara Olivas, DO      pantoprazole  40 mg Oral Early Morning Tara Olivas, DO      polyethylene glycol  17 g Oral Daily PRN Vi Palma MD      senna  3 tablet Oral BID Vi Palma MD          Today, Patient Was Seen By: Vin Peter MD    ** Please Note: This note has been constructed using a voice recognition system   **

## 2022-04-01 NOTE — ASSESSMENT & PLAN NOTE
Patient presents with right breast mass associated with back pain  CT chest, abdomen and pelvis revealed innumerable osseous metastasis    CT C spine: Innumerable bony metastases, Displaced fracture through the right posterior arch of C1, C4 metastasis causing a severe compression fracture and extension into the canal causing severe central canal stenosis and likely cord compression , T2 metastasis causing a moderate compression fracture and extension into the canal causing severe central canal stenosis and likely cord compression    Radiation oncology consulted and recommend dexamethasone 4 mg q 6h  palliative radiation (day2), with have radiation outpatient next week  Pain management per palliative care

## 2022-04-01 NOTE — DISCHARGE SUMMARY
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)  Assessment & Plan  Patient presented with right breast mass associated with upper back pain  Patient also complained of needle like sensation on her upper extremities  Patient denies weakness or numbness in extremities  No neuro deficits on examination  MRI cervical, thoracic and lumbar spine revealed Diffuse, primarily lytic osseous metastatic disease is seen throughout the cervical vertebral bodies from C1 into the upper thoracic spine  [place] is a significant amount of epidural tumor arising from pathologic compression fractures of C4 and T2  Diffuse, lytic, osseous metastasis is seen throughout the thoracic spine more prominently within the upper thoracic spine where there is epidural tumor and involvement of the posterior elements from T2 through T5   Moderate canal stenosis at the T2  There is a moderate amount of epidural tumor as a result of the expansile sacral lesion resulting in moderate sacral canal stenosis   Tumor extends into the S2-3 and S3-4 neural foramen on the left  Inpatient consult to Neurosurgery and input appreciated  No surgical intervention at this time due to extensive spinal involvement  palliative radiation by radiation oncology  Risks and benefits have been discussed with patient by radiation oncology  Patient agree to proceed  Spoke to radiation oncology, patient can have radiation outpatient    Discharge patient with Decadron 4 mg q 8h, if symptoms worsened, increased to q6h  Continue      Transaminitis  Assessment & Plan  Results from last 7 days   Lab Units 03/31/22  0532 03/29/22  1430 03/28/22  2330   AST U/L 50* 78* 80*   ALT U/L 96* 130* 128*   ALK PHOS U/L 338* 311* 314*     Rarely use alcohol, no drug use, acute hepatitis panel negative  Likely due to metastatic cancer  CT abdomen reviewed unremarkable liver  Trend as needed outpatient  Avoid hepatotoxic agents      Elevated BP without diagnosis of hypertension  Assessment & Plan  No known history of hypertension  Patient self reports that she has high blood pressure in hospital/clinical setting  Possible due to white coat syndrome or anxiety/stress  Blood pressure has been improved  Continue amlodipine 5 mg p o  Daily  Increased lisinopril to 20 mg po qd  Advised patient to measure BP at home    Osseous metastasis [facility])  Assessment & Plan  Patient presents with right breast mass associated with back pain  CT chest, abdomen and pelvis revealed innumerable osseous metastasis  CT C spine: Innumerable bony metastases, Displaced fracture through the right posterior arch of C1, C4 metastasis causing a severe compression fracture and extension into the canal causing severe central canal stenosis and likely cord compression , T2 metastasis causing a moderate compression fracture and extension into the canal causing severe central canal stenosis and likely cord compression    Radiation oncology consulted and recommend dexamethasone 4 mg q 6h  palliative radiation (day2), with have radiation outpatient next week  Pain management per palliative care    * Breast mass, right  Assessment & Plan  Patient initially noted a right-sided heart breast mass in the beginning of this year  Patient states that the mass in her breast began to PE read about a month ago and the mass break through the skin and has become malodorous and weeping about a week ago  Patient was also associated with intermittent upper back pain  Pain was relieved by Motrin and Tylenol  Patient initially thought back pain was due to her position while using her computer  CT chest revealed  Large right breast mass measuring up to 6 9 cm   Right axillary metastatic adenopathy  Innumerable osseous metastasis  Surgical Oncology performed breast mass biopsy  No further surgical intervention  MRI brain revealed No evidence of intracranial metastasis  Left parietal and right frontal calvarial enhancing lesions, suspicious for calvarial osseous metastasis  CTA of head and neck revealed Patent bilateral vertebral arteries without high-grade stenosis   There are multilevel abutment/mild displacement or encasement of the vertebral arteries by extraosseous tumor as described  No hemodynamically significant stenosis of cervical carotid arteries  Breast biopsy revealed Invasive mammary carcinoma of no special type  Further immunohistochemistry pending  Medical Oncology consulted and will initiate systemic treatment outpatient and referred for cancer genetics  Inpatient consult to palliative care to discuss goal of care and currently patient is currently disease focused care  Patient is on palliative radiation for osseous metastasis  Will have radiation next week outpatient  Pain management per palliative care  Will give oxycodone 10 mg p o  Q 4h and Dilaudid tablet 4 mg p o  Post radiation   Patient states that after radiation therapy yesterday, she has worsening pain  She wants to stay overnight  Plan to discharge today  Outpatient follow-up with Neurosurgery, Oncology and palliative Care          Discharging Resident Physician: Jodee Torres MD  Attending: Laura Becerril MD  PCP: Marisela Pham MD  Admission Date: 3/29/2022  Discharge Date: 04/02/22    Disposition:     Home    Reason for Admission: right breast mass and neck pain    Consultations During Hospital Stay:  · Neurosurgery, Medical oncology, radiation oncology, surgical oncology  Procedures Performed:     · FNA biopsy    Significant Findings / Test Results:     XR spine cervical 2 or 3 vw injury   Final Result by Yossi Friend MD (04/01 6146)      No acute change compared to recent prior studies  Workstation performed: [id number]         CTA head and neck w wo contrast   Final Result by Danny Canada MD (03/31 1213)      1  No acute intracranial CT abnormality  2   Patent bilateral vertebral arteries without high-grade stenosis  There are multilevel abutment/mild displacement or encasement of the vertebral arteries by extraosseous tumor as described  3   No hemodynamically significant stenosis of cervical carotid arteries  4   Redemonstrated diffuse cervical and visualized thoracic spine metastatic lesions with severe C4 and T2 pathologic fractures  Multilevel extraosseous tumor resulting in severe canal stenosis at C4 and moderate canal stenosis at T2, better evaluated    on recent cervical spine MRI  5   Redemonstrated left parietal and right frontal calvarial lesions  Workstation performed: [id number]         CT RADIATION THERAPY   Final Result by Rebeca Higgins MD (03/31 1626)   No acute findings in the brain, neck, chest, abdomen or pelvis compared to recent prior studies  Study for radiation planning  Workstation performed: [id number]         MRI brain w wo contrast   Final Result by Lakshmi Keating MD (03/30 1246)      No evidence of intracranial metastasis  Left parietal and right frontal calvarial enhancing lesions, suspicious for calvarial osseous metastasis  Unchanged enhancing osseous metastatic lesions of C1, C2, C4, and partially imaged C5 vertebra with pathologic compression fracture of C4 vertebral body that also has surrounding anterior paravertebral disease and anterior epidural extension of disease    resulting in severe canal stenosis at C4 vertebral body level as described on yesterday's MRI cervical spine with and without contrast       The study was marked in EPIC for immediate notification           Workstation performed: [id number]         XR spine cervical 2 or 3 vw injury    (Results Pending)       Incidental Findings:   · None    Test Results Pending at Discharge (will require follow up): · None     Outpatient Tests Requested:  · None    Complications:  None    Hospital Course:     Rohan Haywood is a 51 y o  female patient who originally presented to the hospital on 3/29/2022 due to breast mass and upper back pain  Patient was initially presented to [facility]  Patient was transferred to [address] due to extensive osseous metastasis to the vertebrates  Neurosurgery was consulted  No further surgical intervention indicated at this time due to extensive spinal involvement  Patient was placed on  collar  Surgical Oncology was consulted and performed right breast mass biopsy and reviewed invasive mammary carcinoma and no surgical intervention was performed  Medical Oncology was consulted  Patient was referred for cancer genetics  Patient will follow by me Oncology outpatient to initiate systemic therapy or immunotherapy  Radiation Oncology was consulted and recommended palliative radiation  Risks and benefits has been discussed with patient by radiation oncology  Patient underwent 2 radiation therapy on her neck during this hospitalization  Patient will have 10 radiation therapy on her sacral regions  Palliative Care was consulted for go over care and pain management  Patient wants disease focused care right now  Patient is stable for discharge  Will discharge patient with oxycodone 10 mg p o  Q 4 and Dilaudid 4 mg post radiation along with bowel regimen  Advised patient to outpatient follow-up with PCP, Neurosurgery, Medical Oncology, Radiation Oncology and palliative Care      Condition at Discharge: stable     Discharge Day Visit / Exam:     Subjective:  Patient complained of neck pain and upper back pain after radiation  Vitals: Blood Pressure: 141/84 (04/02/22 0845)  Pulse: 79 (04/02/22 0845)  Temperature: 98 °F (36 7 °C) (04/02/22 0845)  Respirations: 18 (04/02/22 0845)  Height: 5' 4" (162 6 cm) (03/29/22 1800)  Weight - Scale: 94 kg (207 lb 3 7 oz) (03/29/22 1800)  SpO2: 93 % (04/02/22 0845)  Exam:   Physical Exam  Physical Exam  Constitutional:       Appearance: She is obese  She is not ill-appearing  HENT:      Head: Normocephalic and atraumatic  Right Ear: External ear normal       Left Ear: External ear normal       Nose: Nose normal       Mouth/Throat:      Mouth: Mucous membranes are moist    Eyes:      General:         Right eye: No discharge  Left eye: No discharge  Pupils: Pupils are equal, round, and reactive to light  Neck:      Comments: Cervical collar and  in place  Cardiovascular:      Rate and Rhythm: Normal rate and regular rhythm  Heart sounds: No murmur heard        Pulmonary:      Effort: Pulmonary effort is normal  No respiratory distress  Breath sounds: No wheezing or rales  Chest:   Breasts:      Right: Mass and skin change present  Left: Normal           Abdominal:      General: Abdomen is flat  There is no distension  Palpations: Abdomen is soft  Tenderness: There is no abdominal tenderness  Musculoskeletal:         General: No swelling or deformity  Normal range of motion  Cervical back: Normal range of motion and neck supple  Skin:     General: Skin is warm and dry  Neurological:      General: No focal deficit present  Mental Status: She is alert and oriented to person, place, and time  Mental status is at baseline  Sensory: No sensory deficit  Motor: No weakness  Psychiatric:         Mood and Affect: Mood normal      Discussion with Family: Yes    Discharge instructions/Information to patient and family:   See after visit summary for information provided to patient and family  Provisions for Follow-Up Care:  See after visit summary for information related to follow-up care and any pertinent home health orders        Planned Readmission: No     Discharge Medications:  See after visit summary for reconciled discharge medications provided to patient and family        ** Please Note: This note has been constructed using a voice recognition system **

## 2022-04-01 NOTE — UTILIZATION REVIEW
Notification of Discharge   This is a Notification of Discharge from our facility [address]  Please be advised that this patient has been discharge from our facility  Below you will find the admission and discharge date and time including the patients disposition  UTILIZATION REVIEW CONTACT:  Beatriz Oliva  Utilization   Network Utilization Review Department  Phone: [phone] x carefully listen to the prompts  All voicemails are confidential   Email: [email or web address]     PHYSICIAN ADVISORY SERVICES:  FOR CBDO-OC-WAIJ REVIEW - MEDICAL NECESSITY DENIAL  Phone: [phone]  Fax: [phone]  Email: [email or web address]     PRESENTATION DATE: 3/28/2022 10:45 PM  OBERVATION ADMISSION DATE:   INPATIENT ADMISSION DATE: 3/29/22  1:41 AM   DISCHARGE DATE: 3/29/2022  5:07 PM  DISPOSITION: [address] [facility]      IMPORTANT INFORMATION:  Send all requests for admission clinical reviews, approved or denied determinations and any other requests to dedicated fax number below belonging to the campus where the patient is receiving treatment   List of dedicated fax numbers:  [address] DENIALS (Administrative/Medical Necessity) [phone]   [address]  (Maternity/NICU/Pediatrics) [phone]   Ruchi Silvestre [phone]   [address] [facility] [phone]   [address] [place] [address] [phone]   [address] [place] [address] [phone]   [facility]  [phone]   [address] [facility]  [address] [facility] [address] [facility] [phone]

## 2022-04-01 NOTE — ASSESSMENT & PLAN NOTE
Patient initially noted a right-sided heart breast mass in the beginning of this year  Patient states that the mass in her breast began to PE read about a month ago and the mass break through the skin and has become malodorous and weeping about a week ago  Patient was also associated with intermittent upper back pain  Pain was relieved by Motrin and Tylenol  Patient initially thought back pain was due to her position while using her computer  CT chest revealed  Large right breast mass measuring up to 6 9 cm  Right axillary metastatic adenopathy  Innumerable osseous metastasis  Surgical Oncology performed breast mass biopsy  Stat tissue exam has been placed, pending results  No further surgical intervention  MRI brain revealed No evidence of intracranial metastasis  Left parietal and right frontal calvarial enhancing lesions, suspicious for calvarial osseous metastasis  CTA of head and neck revealed Patent bilateral vertebral arteries without high-grade stenosis   There are multilevel abutment/mild displacement or encasement of the vertebral arteries by extraosseous tumor as described   No hemodynamically significant stenosis of cervical carotid arteries  Medical Oncology consulted and will initiate systemic treatment outpatient and referred for cancer genetics  Inpatient consult to palliative care to discuss goal of care and currently patient is currently disease focused care  Patient is on palliative radiation for osseous metastasis  Pain management

## 2022-04-01 NOTE — PLAN OF CARE
Problem: OCCUPATIONAL THERAPY ADULT  Goal: Performs self-care activities at highest level of function for planned discharge setting  See evaluation for individualized goals  Description: Treatment Interventions: ADL retraining,Functional transfer training,UE strengthening/ROM,Endurance training,Patient/family training,Equipment evaluation/education,Compensatory technique education,Continued evaluation,Energy conservation,Activityengagement          See flowsheet documentation for full assessment, interventions and recommendations  Outcome: Progressing  Note: Limitation: Decreased ADL status,Decreased UE strength,Decreased endurance,Decreased self-care trans,Decreased high-level ADLs  Prognosis: Good  Assessment: Patient participated in Skilled OT session this date with interventions consisting of ADL re training with the use of correct body mechnaics, Energy Conservation techniques, deep breathing technique, safety awareness and fall prevention techniques,  long handle equipment,  therapeutic activities to: increase activity tolerance, increase dynamic sit/ stand balance during functional activity  and increase OOB/ sitting tolerance   Upon arrival patient was found supine in bed  Pt demonstrated the following tasks: S sup to sit, MIN A STS and toilet transfers, CGA fnxl mobility with RW  Pt required repetition of spinal precautions, also educated on [address]  Pt performed LBD using LHAE with MIN A, MIN A for toileting  Pt with increased pain when upright  Patient continues to be functioning below baseline level, occupational performance remains limited secondary to factors listed above and increased risk for falls and injury  From OT standpoint, recommendation at time of d/c would be home with skilled therapy and increased social support    Patient to benefit from continued Occupational Therapy treatment while in the hospital to address deficits as defined above and maximize level of functional independence with ADLs and functional mobility  Pt was left after session with all current needs met  The patient's raw score on the AM-PAC Daily Activity inpatient short form is 18, standardized score is 38 66, less than 39 4  Patients at this level are likely to benefit from discharge to post-acute rehabilitation services  Please refer to the recommendation of the Occupational Therapist for safe discharge planning       OT Discharge Recommendation: Home with home health rehabilitation (and increased social support)

## 2022-04-01 NOTE — PLAN OF CARE
Problem: PHYSICAL THERAPY ADULT  Goal: Performs mobility at highest level of function for planned discharge setting  See evaluation for individualized goals  Description: Treatment/Interventions: Functional transfer training,Patient/family training,Equipment eval/education,Bed mobility,Spoke to nursing,OT  Equipment Recommended: Yeimy Ramirez       See flowsheet documentation for full assessment, interventions and recommendations  Note: Prognosis: Good  Problem List: Decreased mobility,Pain  Assessment: Pt seen for PT session today  Treatment session included bed mobility, functional transfers, therapeutic exercises, gait training, and pt education to improve overall mobility and independence  Pt requires min Ax1 for bed mobility, transfers, and ambulation 2* to pain  Pt is making slow towards goals, as demonstrated by decreased activity tolerance and increased pain when standing  When standing, pt was only able to walk ~8 feet before needing to return to sitting, and chose to ambulate backwards as opposed to turning with RW  Pt was reluctant to try wheelchair mobility, as she says she is motivated to walk and does not want to become reliant on w/c at home  Pt was educated on repositioning in bed and in chair while maintaining spinal precautions, which she verbalized and demonstrated understanding  Pt was left supine in bed at end of session, all needs within reach  Pt would benefit from continued skilled physical therapy to address remaining limitations  PT to continue to follow pt, recommending HHPT vs rehab pending progress  The patient's AM-PAC Basic Mobility Inpatient Short Form Raw Score is 15  A Raw score of less than or equal to 16 suggests the patient may benefit from discharge to home  Please also refer to the recommendation of the Physical Therapist for safe discharge planning    Barriers to Discharge: Inaccessible home environment        PT Discharge Recommendation: Home with home health rehabilitation (vs rehab pending progress )          See flowsheet documentation for full assessment

## 2022-04-01 NOTE — ASSESSMENT & PLAN NOTE
Patient presented with right breast mass associated with upper back pain  Patient also complained of needle like sensation on her upper extremities  Patient denies weakness or numbness in extremities  No neuro deficits on examination  MRI cervical, thoracic and lumbar spine revealed Diffuse, primarily lytic osseous metastatic disease is seen throughout the cervical vertebral bodies from C1 into the upper thoracic spine  Verla Mix is a significant amount of epidural tumor arising from pathologic compression fractures of C4 and T2  Diffuse, lytic, osseous metastasis is seen throughout the thoracic spine more prominently within the upper thoracic spine where there is epidural tumor and involvement of the posterior elements from T2 through T5   Moderate canal stenosis at the T2  There is a moderate amount of epidural tumor as a result of the expansile sacral lesion resulting in moderate sacral canal stenosis   Tumor extends into the S2-3 and S3-4 neural foramen on the left  Inpatient consult to Neurosurgery and input appreciated  No surgical intervention at this time due to extensive spinal involvement  palliative radiation by radiation oncology  Risks and benefits have been discussed with patient by radiation oncology  Patient agree to proceed  Spoke to radiation oncology, patient can have radiation outpatient    Discharge patient with Decadron 4 mg q 8h, if symptoms worsened, increased to q6h  Continue

## 2022-04-01 NOTE — ASSESSMENT & PLAN NOTE
Results from last 7 days   Lab Units 03/31/22  0532 03/29/22  1430 03/28/22  2330   AST U/L 50* 78* 80*   ALT U/L 96* 130* 128*   ALK PHOS U/L 338* 311* 314*     Rarely use alcohol, no drug use, acute hepatitis panel negative  Likely due to metastatic cancer  CT abdomen reviewed unremarkable liver  Trend as needed outpatient  Avoid hepatotoxic agents

## 2022-04-01 NOTE — RESTORATIVE TECHNICIAN NOTE
Restorative Technician Note      Patient Name: Verona Haddad     Note Type: Bracing, Follow-up fitting  Brace Applied: [employer]   Education Provided: Yes; Family or social support of family present for education by provider; Other (comment) (Provided education on adjustments of  to pt  All questions answered to pt satisfaction )            Please call Mobility Coordinator at ext  [phone] in regards to bracing instruction and/or adjustment    Ellis Novak Restorative Technician, BS

## 2022-04-01 NOTE — ASSESSMENT & PLAN NOTE
Patient initially noted a right-sided heart breast mass in the beginning of this year  Patient states that the mass in her breast began to PE read about a month ago and the mass break through the skin and has become malodorous and weeping about a week ago  Patient was also associated with intermittent upper back pain  Pain was relieved by Motrin and Tylenol  Patient initially thought back pain was due to her position while using her computer  CT chest revealed  Large right breast mass measuring up to 6 9 cm  Right axillary metastatic adenopathy  Innumerable osseous metastasis  Surgical Oncology performed breast mass biopsy  Stat tissue exam has been placed, pending results  No further surgical intervention  MRI brain revealed No evidence of intracranial metastasis  Left parietal and right frontal calvarial enhancing lesions, suspicious for calvarial osseous metastasis  CTA of head and neck revealed Patent bilateral vertebral arteries without high-grade stenosis   There are multilevel abutment/mild displacement or encasement of the vertebral arteries by extraosseous tumor as described  No hemodynamically significant stenosis of cervical carotid arteries  Medical Oncology consulted and will initiate systemic treatment outpatient and referred for cancer genetics  Inpatient consult to palliative care to discuss goal of care and currently patient is currently disease focused care  Patient is on palliative radiation for osseous metastasis  Will have radiation next week outpatient  Pain management per palliative care  Patient states that after radiation therapy yesterday, she has worsening pain  She wants to stay overnight    Plan to discharge tomorrow  Outpatient follow-up with Neurosurgery, Oncology and palliative Care

## 2022-04-01 NOTE — PROGRESS NOTES
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[facility])     Active issues specifically addressed today include:   Breast mass  Osseous metastases  Uncontrolled pain  Goals of care  Palliative care patient    Plan:  1  Symptom management -     Pain  - Oxycodone 5mg po q 4h PRN  - Oxycodone 10 mg po q 4h PRN  - Dilaudid 0 5mg IV q 3 h PRN breakthrough pain  - Tylenol 975 mg po q 8 h    OIC  - Continue Senna 25 8 mg tab 3 tabs BID  - Continue Miralax 17 grams packet daily     Anxiety - possibly r/t steroids - continue to monitor  N/V  - Continue ondansetron 4mg IV q 4h PRN n/v available but pt has not taken any  2  Goals -   Disease focused care  Will continue discussions regarding [address] if patient's clinical presentation evolves  Code Status: Full Code - Level 1   Decisional apparatus:  Patient is competent on my exam today  If competence is lost, patient's substitute decision maker would default to her son by [place]  She has also verbalized her desire to have her partner Braydon Hannon involved with any care decisions if she would lose competence  Advance Directive / Living Will / POLST:  She requested 5 Wishes and this document was provided to her today to consider and complete when she is ready to put her preferences in writing  D/C to home planned for 4/2 or 4/3 (if pain well managed) with outpatient RT  Referral placed for outpatient palliative follow up - discussed with pt and she agrees  Palliative medications sent to pharmacy for home use  Interval history:      Nayeli's prior pain level had hovered between 7- 8/10  However, today she reports pain at 2-3/10 when in bed    She had just completed PT prior to my visit and pain has remained at 2-3/10  Last evening was difficult and painful due to RT moving from estimated treatment time of 3 pm to 6:30 PM, so her timed premedication effectiveness had lapsed  She reports current regimen to be effective but includes PRN dilaudid IV prior to RT and for BT pain  Since discharge is anticipated 4/2, she was encouraged to request the 10mg oxycodone as she had done on 3/31 vs 5 mg to be sure po meds will hold her pain level down once home  OME 3/31 = 100  PRN 3/31 = hydromorphone 0 5 mg x4 and oxycodone 10 mg x 4  Location: neck, back  Exacerbated by movement      Paloma reports holding back on the Miralax for fear of having diarrhea when at RT or not being able to make it to the bathroom  She has NOT had a bowel movement in the past 4 days  Encouragement and reassurance provided to use full dose of Miralax  Mechanism of action for senna-ducosate and miralax described to allay fears  She verbalizes understanding  Bowel sounds present with flatus  Extensive supportive listening provided with acknowledgement of her many healthy coping skills, self awareness, boundaries and gathering support from significant others while trying to maintain her focus and energy level as she navigates this new diagnosis and treatment plan  She welcomes outpatient follow up with [facility]       MEDICATIONS / ALLERGIES:     all current active meds have been reviewed and current meds:   Current Facility-Administered Medications   Medication Dose Route Frequency    acetaminophen (TYLENOL) tablet 975 mg  975 mg Oral Q8H [facility]    amLODIPine (NORVASC) tablet 5 mg  5 mg Oral Daily    dexamethasone (DECADRON) injection 4 mg  4 mg Intravenous Q6H [facility]    enoxaparin (LOVENOX) subcutaneous injection 40 mg  40 mg Subcutaneous Q24H [facility]    HYDROmorphone (DILAUDID) injection 0 5 mg  0 5 mg Intravenous Q3H PRN    Labetalol HCl (NORMODYNE) injection 10 mg  10 mg Intravenous Q6H PRN    lisinopril (ZESTRIL) tablet 10 mg  10 mg Oral Daily    ondansetron (ZOFRAN) injection 4 mg  4 mg Intravenous Q4H PRN    oxyCODONE (ROXICODONE) immediate release tablet 10 mg  10 mg Oral Q4H PRN    oxyCODONE (ROXICODONE) IR tablet 5 mg  5 mg Oral Q4H PRN    pantoprazole (PROTONIX) EC tablet 40 mg  40 mg Oral Early Morning    polyethylene glycol (MIRALAX) packet 17 g  17 g Oral Daily PRN    senna (SENOKOT) tablet 25 8 mg  3 tablet Oral BID       No Known Allergies    OBJECTIVE:    Physical Exam  Physical Exam  Constitutional:       General: She is not in acute distress  HENT:      Head: Normocephalic and atraumatic  Right Ear: External ear normal       Left Ear: External ear normal       Nose: Nose normal    Eyes:      Extraocular Movements: Extraocular movements intact  Neck:      Comments: Wearing supportive neck brace at all times  Cardiovascular:      Rate and Rhythm: Normal rate and regular rhythm  Pulses: Normal pulses  Pulmonary:      Effort: Pulmonary effort is normal    Abdominal:      General: Bowel sounds are normal  There is no distension  Tenderness: There is no abdominal tenderness  Musculoskeletal:      Comments: Movement limited with neck brace  Able to lift arms to shoulder level only but can not sustain that position for very long  Skin:     General: Skin is warm and dry  Neurological:      Mental Status: She is alert and oriented to person, place, and time  Psychiatric:         Mood and Affect: Mood normal          Behavior: Behavior normal          Thought Content: Thought content normal          Judgment: Judgment normal          Lab Results:   I have personally reviewed pertinent labs  , CBC: No results found for: WBC, HGB, HCT, MCV, PLT, ADJUSTEDWBC, MCH, MCHC, RDW, MPV, NRBC, BMP:  Lab Results   Component Value Date    SODIUM 136 04/01/2022    K 4 3 04/01/2022     04/01/2022    CO2 24 04/01/2022    BUN 19 04/01/2022    CREATININE 0 60 04/01/2022    GLUC 107 04/01/2022    CALCIUM 9 4 04/01/2022    AGAP 7 04/01/2022    EGFR 108 04/01/2022     Imaging Studies: I have personally reviewed pertinent imaging for 3/31 and 3/30/2022  EKG, Pathology, and Other Studies: No new studies  Counseling / Coordination of Care    Total floor / unit time spent today 50 minutes  Greater than 50% of total time was spent with the patient and / or family counseling and / or coordination of care  A description of the counseling / coordination of care: Chart reviewed,  provided medical updates, determined goals, discussed palliative care and symptom management, determined competency and POA/HCA, determined social/family support, provided psychosocial support  Reviewed with VENANCIO and RN        Huy Cornell, MSN, [address] [place] [address]

## 2022-04-01 NOTE — ASSESSMENT & PLAN NOTE
Patient presented with right breast mass associated with upper back pain  Patient also complained of needle like sensation on her upper extremities  Patient denies weakness or numbness in extremities  No neuro deficits on examination  MRI cervical, thoracic and lumbar spine revealed Diffuse, primarily lytic osseous metastatic disease is seen throughout the cervical vertebral bodies from C1 into the upper thoracic spine  Akshataysha Jurado is a significant amount of epidural tumor arising from pathologic compression fractures of C4 and T2  Diffuse, lytic, osseous metastasis is seen throughout the thoracic spine more prominently within the upper thoracic spine where there is epidural tumor and involvement of the posterior elements from T2 through T5   Moderate canal stenosis at the T2  There is a moderate amount of epidural tumor as a result of the expansile sacral lesion resulting in moderate sacral canal stenosis   Tumor extends into the S2-3 and S3-4 neural foramen on the left  Inpatient consult to Neurosurgery and input appreciated  No surgical intervention at this time due to extensive spinal involvement  palliative radiation by radiation oncology  Risks and benefits have been discussed with patient by radiation oncology  Patient agree to proceed     Continue patient on Decadron 4 mg q 6h  Neuro check q 4 hours  Continue

## 2022-04-01 NOTE — CASE MANAGEMENT
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 ([facility] ) 2022      LOS (days): 3  Geometric Mean LOS (GMLOS) (days):   Days to GMLOS:     OBJECTIVE:  Risk of Unplanned Readmission Score: 12         Current admission status: Inpatient   Preferred Pharmacy:   [id number] [facility], [facility] [address],  [address]  [address] [place] [address]  Phone: [phone] Fax: [phone]    Primary Care Provider: No primary care provider on file  Primary Insurance: BLUE CROSS  Secondary Insurance:     DISCHARGE DETAILS:    Discharge planning discussed with[de-identified] Pt and SO  Freedom of Choice: Yes  Comments - Freedom of Choice: Pt requested VNA and DME ordered  CM contacted family/caregiver?: Yes  Were Treatment Team discharge recommendations reviewed with patient/caregiver?: Yes  Did patient/caregiver verbalize understanding of patient care needs?: Yes  Were patient/caregiver advised of the risks associated with not following Treatment Team discharge recommendations?: Yes    Contacts  Patient Contacts: Anjelica Reynaga (SO)  Relationship to Patient[de-identified] Other (Comment) (SO)  Contact Method:  In Person  Reason/Outcome: Discharge Planning,Continuity of [facility]         Is the patient interested in [address] at discharge?: Yes  [address] requested[de-identified] [address] Name[de-identified] Other  [address] Provider[de-identified] PCP  Home Health Services Needed[de-identified] Evaluate Functional Status and Safety,Gait/ADL Training,Strengthening/Theraputic Exercises to Improve Function,Post-Op Care and Assessment  Homebound Criteria Met[de-identified] Requires the Assistance of Another Person for Safe Ambulation or to Leave the Home,Uses an Assist Device (i e  cane, walker, etc)  Supporting Clincal Findings[de-identified] Limited Endurance    DME Referral Provided  Referral made for DME?: Yes  DME referral completed for the following items[de-identified] Mine Joseph  DME Supplier Name[de-identified] [facility]    Other Referral/Resources/Interventions Provided:  Interventions: Bakari Pepper         Treatment Team Recommendation: Home with [address] [place] [address]  Discharge Destination Plan[de-identified] Home with [place] at Discharge : Family

## 2022-04-01 NOTE — ASSESSMENT & PLAN NOTE
No known history of hypertension  Patient self reports that she has high blood pressure in hospital/clinical setting  Possible due to white coat syndrome or anxiety/stress  Blood pressure has been improved  Continue amlodipine 5 mg p o   Daily  Increased lisinopril to 20 mg po qd  Advised patient to measure BP at home

## 2022-04-01 NOTE — CASE MANAGEMENT
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 ([facility] [address] ) 2022      LOS (days): 3  Geometric Mean LOS (GMLOS) (days):   Days to GMLOS:     OBJECTIVE:  PATIENT READMITTED TO HOSPITAL  Risk of Unplanned Readmission Score: 12         Current admission status: Inpatient       Preferred Pharmacy:   [id number] [address] [facility], [facility] [address],  [address]  [address] [place] [address]  Phone: [phone] Fax: [phone]    Primary Care Provider: No primary care provider on file  Primary Insurance: BLUE CROSS  Secondary Insurance:     ASSESSMENT:  Active Health Care Proxies    There are no active Health Care Proxies on file  Advance Directives  Does patient have a Health Care POA?: Yes (per pt she made a 5 wishes)  Does patient currently have a Health Care decision maker?: Yes, please see Health Care Proxy section  Does patient have Advance Directives?: Yes  Advance Directives: Living will (Per pt she made a 5 wishes)         Readmission Root Cause  30 Day Readmission: No    Patient Information  Admitted from[de-identified] Home  Mental Status: Alert  During Assessment patient was accompanied by: Not accompanied during assessment  Assessment information provided by[de-identified] Patient  Primary Caregiver: Self  Support Systems: Self,Spouse/significant other,Family members  [place] of Residence: [address] [place] [address] do you live in?: Antavo entry access options   Select all that apply : Stairs  Number of steps to enter home : 3  Type of Current Residence: 2 story home  Upon entering residence, is there a bedroom on the main floor (no further steps)?: No  A bedroom is located on the following floor levels of residence (select all that apply):: 2nd Floor  Upon entering residence, is there a bathroom on the main floor (no further steps)?: Yes (1/2 bath 1st floor; full bath 2nd floor)  Number of steps to 2nd floor from main floor: One Flight  In the last 12 months, was there a time when you were not able to pay the mortgage or rent on time?: No  In the last 12 months, how many places have you lived?: 1  In the last 12 months, was there a time when you did not have a steady place to sleep or slept in a shelter (including now)?: No  Homeless/housing insecurity resource given?: N/A  Living Arrangements: Lives w/ Spouse/significant other    Activities of Daily Living Prior to Admission  Functional Status: Independent  Completes ADLs independently?: Yes  Ambulates independently?: Yes  Does patient use assisted devices?: No  Does patient currently own DME?: No  Does patient have a history of Outpatient Therapy (PT/OT)?: No  Does the patient have a history of Short-Term Rehab?: No  Does patient have a history of HHC?: No  Does patient currently have [employer] ?: No       Patient Information Continued  Income Source: Employed  Does patient have prescription coverage?: Yes  Within the past 12 months, you worried that your food would run out before you got the money to buy more : Never true  Within the past 12 months, the food you bought just didnt last and you didnt have money to get more : Never true  Food insecurity resource given?: N/A  Does patient receive dialysis treatments?: No  Does patient have a history of substance abuse?: No  Does patient have a history of Mental Health Diagnosis?: No    Means of Transportation  Means of Transport to Appts[de-identified] Drives Self  In the past 12 months, has lack of transportation kept you from medical appointments or from getting medications?: No  In the past 12 months, has lack of transportation kept you from meetings, work, or from getting things needed for daily living?: No  Was application for public transport provided?: N/A        DISCHARGE DETAILS:    Discharge planning discussed with[de-identified] Pt  Freedom of Choice: Yes       Patient/caregiver received discharge checklist   Content reviewed  Patient/caregiver encouraged to participate in discharge plan of care prior to discharge home  CM reviewed d/c planning process including the following: identifying help at home, patient preference for d/c planning needs, Discharge Lounge, [facility] Meds to Bed program, availability of treatment team to discuss questions or concerns patient and/or family may have regarding understanding medications and recognizing signs and symptoms once discharged  CM also encouraged patient to follow up with all recommended appointments after discharge  Patient advised of importance for patient and family to participate in managing patients medical well being

## 2022-04-02 LAB
ANION GAP SERPL CALCULATED.3IONS-SCNC: 6 MMOL/L (ref 4–13)
BUN SERPL-MCNC: 20 MG/DL (ref 5–25)
CALCIUM SERPL-MCNC: 9.4 MG/DL (ref 8.3–10.1)
CHLORIDE SERPL-SCNC: 105 MMOL/L (ref 100–108)
CO2 SERPL-SCNC: 24 MMOL/L (ref 21–32)
CREAT SERPL-MCNC: 0.6 MG/DL (ref 0.6–1.3)
ERYTHROCYTE [DISTWIDTH] IN BLOOD BY AUTOMATED COUNT: 12.8 % (ref 11.6–15.1)
GFR SERPL CREATININE-BSD FRML MDRD: 108 ML/MIN/1.73SQ M
GLUCOSE SERPL-MCNC: 90 MG/DL (ref 65–140)
HCT VFR BLD AUTO: 39.1 % (ref 34.8–46.1)
HGB BLD-MCNC: 12.5 G/DL (ref 11.5–15.4)
MCH RBC QN AUTO: 29.6 PG (ref 26.8–34.3)
MCHC RBC AUTO-ENTMCNC: 32 G/DL (ref 31.4–37.4)
MCV RBC AUTO: 92 FL (ref 82–98)
PLATELET # BLD AUTO: 458 THOUSANDS/UL (ref 149–390)
PMV BLD AUTO: 9.9 FL (ref 8.9–12.7)
POTASSIUM SERPL-SCNC: 4.2 MMOL/L (ref 3.5–5.3)
RBC # BLD AUTO: 4.23 MILLION/UL (ref 3.81–5.12)
SODIUM SERPL-SCNC: 135 MMOL/L (ref 136–145)
WBC # BLD AUTO: 11.08 THOUSAND/UL (ref 4.31–10.16)

## 2022-04-02 PROCEDURE — 99232 SBSQ HOSP IP/OBS MODERATE 35: CPT | Performed by: INTERNAL MEDICINE

## 2022-04-02 PROCEDURE — 85027 COMPLETE CBC AUTOMATED: CPT | Performed by: INTERNAL MEDICINE

## 2022-04-02 PROCEDURE — 80048 BASIC METABOLIC PNL TOTAL CA: CPT | Performed by: INTERNAL MEDICINE

## 2022-04-02 RX ORDER — LISINOPRIL 10 MG/1
10 TABLET ORAL DAILY
Qty: 30 TABLET | Refills: 0 | Status: CANCELLED | OUTPATIENT
Start: 2022-04-02 | End: 2022-05-02

## 2022-04-02 RX ORDER — LISINOPRIL 20 MG/1
20 TABLET ORAL DAILY
Status: DISCONTINUED | OUTPATIENT
Start: 2022-04-02 | End: 2022-04-04 | Stop reason: HOSPADM

## 2022-04-02 RX ORDER — DEXAMETHASONE SODIUM PHOSPHATE 4 MG/ML
4 INJECTION, SOLUTION INTRA-ARTICULAR; INTRALESIONAL; INTRAMUSCULAR; INTRAVENOUS; SOFT TISSUE EVERY 8 HOURS SCHEDULED
Qty: 90 ML | Refills: 0 | Status: CANCELLED | OUTPATIENT
Start: 2022-04-02 | End: 2022-05-02

## 2022-04-02 RX ORDER — PANTOPRAZOLE SODIUM 40 MG/1
40 TABLET, DELAYED RELEASE ORAL
Qty: 30 TABLET | Refills: 0 | Status: SHIPPED | OUTPATIENT
Start: 2022-04-02 | End: 2022-04-03

## 2022-04-02 RX ORDER — DEXAMETHASONE 4 MG/1
4 TABLET ORAL 3 TIMES DAILY
Qty: 45 TABLET | Refills: 0 | Status: SHIPPED | OUTPATIENT
Start: 2022-04-02 | End: 2022-04-03 | Stop reason: SDUPTHER

## 2022-04-02 RX ORDER — LISINOPRIL 20 MG/1
20 TABLET ORAL DAILY
Qty: 30 TABLET | Refills: 0 | Status: SHIPPED | OUTPATIENT
Start: 2022-04-03 | End: 2022-04-03

## 2022-04-02 RX ORDER — HYDROMORPHONE HYDROCHLORIDE 4 MG/1
TABLET ORAL
Qty: 10 TABLET | Refills: 0 | Status: SHIPPED | OUTPATIENT
Start: 2022-04-02 | End: 2022-04-11 | Stop reason: SDUPTHER

## 2022-04-02 RX ORDER — AMLODIPINE BESYLATE 5 MG/1
5 TABLET ORAL DAILY
Qty: 30 TABLET | Refills: 0 | Status: SHIPPED | OUTPATIENT
Start: 2022-04-02 | End: 2022-04-03

## 2022-04-02 RX ADMIN — DEXAMETHASONE SODIUM PHOSPHATE 4 MG: 4 INJECTION INTRA-ARTICULAR; INTRALESIONAL; INTRAMUSCULAR; INTRAVENOUS; SOFT TISSUE at 16:08

## 2022-04-02 RX ADMIN — ENOXAPARIN SODIUM 40 MG: 40 INJECTION SUBCUTANEOUS at 08:51

## 2022-04-02 RX ADMIN — PANTOPRAZOLE SODIUM 40 MG: 40 TABLET, DELAYED RELEASE ORAL at 05:34

## 2022-04-02 RX ADMIN — POLYETHYLENE GLYCOL 3350 17 G: 17 POWDER, FOR SOLUTION ORAL at 16:08

## 2022-04-02 RX ADMIN — ACETAMINOPHEN 975 MG: 325 TABLET ORAL at 13:21

## 2022-04-02 RX ADMIN — HYDROMORPHONE HYDROCHLORIDE 0.5 MG: 1 INJECTION, SOLUTION INTRAMUSCULAR; INTRAVENOUS; SUBCUTANEOUS at 06:52

## 2022-04-02 RX ADMIN — AMLODIPINE BESYLATE 5 MG: 5 TABLET ORAL at 08:51

## 2022-04-02 RX ADMIN — ACETAMINOPHEN 975 MG: 325 TABLET ORAL at 21:42

## 2022-04-02 RX ADMIN — HYDROMORPHONE HYDROCHLORIDE 0.5 MG: 1 INJECTION, SOLUTION INTRAMUSCULAR; INTRAVENOUS; SUBCUTANEOUS at 23:20

## 2022-04-02 RX ADMIN — SENNOSIDES 25.8 MG: 8.6 TABLET, FILM COATED ORAL at 17:47

## 2022-04-02 RX ADMIN — OXYCODONE HYDROCHLORIDE 10 MG: 10 TABLET ORAL at 08:51

## 2022-04-02 RX ADMIN — OXYCODONE HYDROCHLORIDE 5 MG: 5 TABLET ORAL at 13:22

## 2022-04-02 RX ADMIN — HYDROMORPHONE HYDROCHLORIDE 0.5 MG: 1 INJECTION, SOLUTION INTRAMUSCULAR; INTRAVENOUS; SUBCUTANEOUS at 12:25

## 2022-04-02 RX ADMIN — LISINOPRIL 20 MG: 20 TABLET ORAL at 08:50

## 2022-04-02 RX ADMIN — DEXAMETHASONE SODIUM PHOSPHATE 4 MG: 4 INJECTION INTRA-ARTICULAR; INTRALESIONAL; INTRAMUSCULAR; INTRAVENOUS; SOFT TISSUE at 21:42

## 2022-04-02 RX ADMIN — SENNOSIDES 25.8 MG: 8.6 TABLET, FILM COATED ORAL at 08:51

## 2022-04-02 RX ADMIN — DEXAMETHASONE SODIUM PHOSPHATE 4 MG: 4 INJECTION INTRA-ARTICULAR; INTRALESIONAL; INTRAMUSCULAR; INTRAVENOUS; SOFT TISSUE at 04:00

## 2022-04-02 RX ADMIN — HYDROMORPHONE HYDROCHLORIDE 0.5 MG: 1 INJECTION, SOLUTION INTRAMUSCULAR; INTRAVENOUS; SUBCUTANEOUS at 01:34

## 2022-04-02 RX ADMIN — DEXAMETHASONE SODIUM PHOSPHATE 4 MG: 4 INJECTION INTRA-ARTICULAR; INTRALESIONAL; INTRAMUSCULAR; INTRAVENOUS; SOFT TISSUE at 08:51

## 2022-04-02 RX ADMIN — OXYCODONE HYDROCHLORIDE 10 MG: 10 TABLET ORAL at 21:41

## 2022-04-02 RX ADMIN — OXYCODONE HYDROCHLORIDE 10 MG: 10 TABLET ORAL at 04:04

## 2022-04-02 RX ADMIN — ACETAMINOPHEN 975 MG: 325 TABLET ORAL at 05:34

## 2022-04-02 RX ADMIN — OXYCODONE HYDROCHLORIDE 10 MG: 10 TABLET ORAL at 17:10

## 2022-04-02 NOTE — CASE MANAGEMENT
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 ([facility] [address] ) 2022      LOS (days): 4  Geometric Mean LOS (GMLOS) (days):   Days to GMLOS:     OBJECTIVE:  Risk of Unplanned Readmission Score: 13         Current admission status: Inpatient   Preferred Pharmacy:   [id number] [place], [facility] [address],   [address]  [place] [address]  Phone: [phone] Fax: [phone]    Primary Care Provider: No primary care provider on file  Primary Insurance: BLUE CROSS  Secondary Insurance:     DISCHARGE DETAILS:    Discharge planning discussed with[de-identified] Pt  Freedom of Choice: Yes  Comments - Freedom of Choice: Pt requested wheelchair to help with far distances       DME Referral Provided  DME referral completed for the following items[de-identified] Wheelchair  DME Supplier Name[de-identified] [employer]    Other Referral/Resources/Interventions Provided:  Referral Comments: Pt requesting wheelchair order to assist with mobilizing far distances  Cm advised would most likely not receive on the weekend and [employer] would follow-up on monday with status on hospital bed as well   Cm to deliver Rw bedside

## 2022-04-02 NOTE — DISCHARGE INSTR - AVS FIRST PAGE
Please outpatient follow-up with PCP, Neurosurgery, Medical Oncology and Radiation Oncology and palliative Care  Please measure your blood pressure at home  We start you on 2 new medications for blood pressure amlodipine and lisinopril  Please take dilaudid tablet 4 mg post radiation  Outpatient follow-up with palliative Care in a week for pain management

## 2022-04-02 NOTE — PLAN OF CARE
Problem: DECISION MAKING  Goal: Pt/Family able to effectively weigh alternatives and participate in decision making related to treatment and care  Description: INTERVENTIONS:  - Identify decision maker  - Determine when there are differences among patient's view, family's view, and healthcare provider's view of patient condition and care goals  - Facilitate patient/family articulation of goals for care  - Help patient/family identify pros/cons of alternative solutions  - Provide information as requested by patient/family  - Respect patient/family rights related to privacy and sharing information   - Serve as a liaison between patient, family and health care team  - Initiate consults as appropriate (Ethics Team, Palliative Care, [address], etc )  Outcome: Progressing

## 2022-04-02 NOTE — ASSESSMENT & PLAN NOTE
Patient presented with right breast mass associated with upper back pain  Patient also complained of needle like sensation on her upper extremities  Patient denies weakness or numbness in extremities  No neuro deficits on examination  MRI cervical, thoracic and lumbar spine revealed Diffuse, primarily lytic osseous metastatic disease is seen throughout the cervical vertebral bodies from C1 into the upper thoracic spine  Shay Rower is a significant amount of epidural tumor arising from pathologic compression fractures of C4 and T2  Diffuse, lytic, osseous metastasis is seen throughout the thoracic spine more prominently within the upper thoracic spine where there is epidural tumor and involvement of the posterior elements from T2 through T5   Moderate canal stenosis at the T2  There is a moderate amount of epidural tumor as a result of the expansile sacral lesion resulting in moderate sacral canal stenosis   Tumor extends into the S2-3 and S3-4 neural foramen on the left  · Inpatient consult to Neurosurgery and input appreciated  No surgical intervention at this time due to extensive spinal involvement  palliative radiation by radiation oncology  Risks and benefits have been discussed with patient by radiation oncology  Patient agree to proceed  · Spoke to radiation oncology, patient can have radiation outpatient    · Discharge patient with Decadron 4 mg q 8h, if symptoms worsened, increased to q6h  Continue

## 2022-04-02 NOTE — ASSESSMENT & PLAN NOTE
Patient initially noted a right-sided heart breast mass in the beginning of this year  Patient states that the mass in her breast began to PE read about a month ago and the mass break through the skin and has become malodorous and weeping about a week ago  Patient was also associated with intermittent upper back pain  Pain was relieved by Motrin and Tylenol  Patient initially thought back pain was due to her position while using her computer  CT chest revealed  Large right breast mass measuring up to 6 9 cm  Right axillary metastatic adenopathy  Innumerable osseous metastasis  MRI brain revealed No evidence of intracranial metastasis  Left parietal and right frontal calvarial enhancing lesions, suspicious for calvarial osseous metastasis  CTA of head and neck revealed Patent bilateral vertebral arteries without high-grade stenosis   There are multilevel abutment/mild displacement or encasement of the vertebral arteries by extraosseous tumor as described  No hemodynamically significant stenosis of cervical carotid arteries  Breast biopsy revealed Invasive mammary carcinoma of no special type  Further immunohistochemistry pending  · Surgical Oncology performed breast mass biopsy  No further surgical intervention  · Medical Oncology consulted and will initiate systemic treatment outpatient and referred for cancer genetics  · Inpatient consult to palliative care to discuss goal of care and currently patient is currently disease focused care  · Patient is on palliative radiation for osseous metastasis  Will have radiation next week outpatient  · Pain management per palliative care  Will give oxycodone 10 mg p o  Q 4h and Dilaudid tablet 4 mg p o   Post radiation   · Plan to discharge today  · Outpatient follow-up with Neurosurgery, Oncology and palliative Care

## 2022-04-02 NOTE — PROGRESS NOTES
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 [facility])  Assessment & Plan  Patient presented with right breast mass associated with upper back pain  Patient also complained of needle like sensation on her upper extremities  Patient denies weakness or numbness in extremities  No neuro deficits on examination  MRI cervical, thoracic and lumbar spine revealed Diffuse, primarily lytic osseous metastatic disease is seen throughout the cervical vertebral bodies from C1 into the upper thoracic spine  Peggy Sox is a significant amount of epidural tumor arising from pathologic compression fractures of C4 and T2  Diffuse, lytic, osseous metastasis is seen throughout the thoracic spine more prominently within the upper thoracic spine where there is epidural tumor and involvement of the posterior elements from T2 through T5   Moderate canal stenosis at the T2  There is a moderate amount of epidural tumor as a result of the expansile sacral lesion resulting in moderate sacral canal stenosis   Tumor extends into the S2-3 and S3-4 neural foramen on the left  Inpatient consult to Neurosurgery and input appreciated  No surgical intervention at this time due to extensive spinal involvement  palliative radiation by radiation oncology  Risks and benefits have been discussed with patient by radiation oncology  Patient agree to proceed  Spoke to radiation oncology, patient can have radiation outpatient    Discharge patient with Decadron 4 mg q 8h, if symptoms worsened, increased to q6h  Continue      Transaminitis  Assessment & Plan  Results from last 7 days   Lab Units 03/31/22  0532 03/29/22  1430 03/28/22  2330   AST U/L 50* 78* 80*   ALT U/L 96* 130* 128*   ALK PHOS U/L 338* 311* 314*     Rarely use alcohol, no drug use, acute hepatitis panel negative  Likely due to metastatic cancer  CT abdomen reviewed unremarkable liver  Trend as needed outpatient  Avoid hepatotoxic agents      Elevated BP without diagnosis of hypertension  Assessment & Plan  No known history of hypertension  Patient self reports that she has high blood pressure in hospital/clinical setting  Possible due to white coat syndrome or anxiety/stress  Blood pressure has been improved  Continue amlodipine 5 mg p o  Daily  Increased lisinopril to 20 mg po qd  Advised patient to measure BP at home    Osseous metastasis [facility])  Assessment & Plan  Patient presents with right breast mass associated with back pain  CT chest, abdomen and pelvis revealed innumerable osseous metastasis  CT C spine: Innumerable bony metastases, Displaced fracture through the right posterior arch of C1, C4 metastasis causing a severe compression fracture and extension into the canal causing severe central canal stenosis and likely cord compression , T2 metastasis causing a moderate compression fracture and extension into the canal causing severe central canal stenosis and likely cord compression    Radiation oncology consulted and recommend dexamethasone 4 mg q 6h  palliative radiation (day2), with have radiation outpatient next week  Pain management per palliative care    * Breast mass, right  Assessment & Plan  Patient initially noted a right-sided heart breast mass in the beginning of this year  Patient states that the mass in her breast began to PE read about a month ago and the mass break through the skin and has become malodorous and weeping about a week ago  Patient was also associated with intermittent upper back pain  Pain was relieved by Motrin and Tylenol  Patient initially thought back pain was due to her position while using her computer  CT chest revealed  Large right breast mass measuring up to 6 9 cm   Right axillary metastatic adenopathy  Innumerable osseous metastasis  Surgical Oncology performed breast mass biopsy  Stat tissue exam has been placed, pending results  No further surgical intervention  MRI brain revealed No evidence of intracranial metastasis  Left parietal and right frontal calvarial enhancing lesions, suspicious for calvarial osseous metastasis  CTA of head and neck revealed Patent bilateral vertebral arteries without high-grade stenosis   There are multilevel abutment/mild displacement or encasement of the vertebral arteries by extraosseous tumor as described  No hemodynamically significant stenosis of cervical carotid arteries  Medical Oncology consulted and will initiate systemic treatment outpatient and referred for cancer genetics  Inpatient consult to palliative care to discuss goal of care and currently patient is currently disease focused care  Patient is on palliative radiation for osseous metastasis  Will have radiation next week outpatient  Pain management per palliative care  Patient states that after radiation therapy yesterday, she has worsening pain  She wants to stay overnight  Plan to discharge tomorrow  Outpatient follow-up with Neurosurgery, Oncology and palliative Care              VTE Pharmacologic Prophylaxis:   VTE Score: 4 Moderate Risk (Score 3-4) - Pharmacological DVT Prophylaxis Ordered: Enoxaparin (Lovenox)  Mechanical VTE Prophylaxis in Place: Yes    Patient Centered Rounds: I have performed bedside rounds with nursing staff today  Discussions with Specialists or Other Care Team Provider:  Palliative care    Education and Discussions with Family / Patient: Updated  (significant other) at bedside  Current Length of Stay: 4 day(s)    Current Patient Status: Inpatient     Discharge Plan / Estimated Discharge Date: Anticipate discharge tomorrow to home with home services      Code Status: Level 1 - Full Code      Subjective:   Patient complained of pain, especially she moves around  7/10 in intensity in upper back    Objective:     Vitals:   Temp (24hrs), Av 9 °F (36 6 °C), Min:97 7 °F (36 5 °C), Max:98 °F (36 7 °C)    Temp:  [97 7 °F (36 5 °C)-98 °F (36 7 °C)] 98 °F (36 7 °C)  HR:  [79-84] 79  Resp:  [18] 18  BP: (141-150)/(84-96) 141/84  SpO2:  [93 %-97 %] 93 %  Body mass index is 35 57 kg/m²  Input and Output Summary (last 24 hours): Intake/Output Summary (Last 24 hours) at 2022 1324  Last data filed at 2022 0301  Gross per 24 hour   Intake 240 ml   Output 950 ml   Net -710 ml       Physical Exam:     Physical Exam  Constitutional:       Appearance: She is obese  She is not ill-appearing  HENT:      Head: Normocephalic and atraumatic  Right Ear: External ear normal       Left Ear: External ear normal       Nose: Nose normal       Mouth/Throat:      Mouth: Mucous membranes are moist    Eyes:      General:         Right eye: No discharge  Left eye: No discharge  Pupils: Pupils are equal, round, and reactive to light  Neck:      Comments: Cervical collar and  in place  Cardiovascular:      Rate and Rhythm: Normal rate and regular rhythm  Heart sounds: No murmur heard  Pulmonary:      Effort: Pulmonary effort is normal  No respiratory distress  Breath sounds: No wheezing or rales  Chest:   Breasts:      Right: Mass and skin change present  Left: Normal          Abdominal:      General: Abdomen is flat  There is no distension  Palpations: Abdomen is soft  Tenderness: There is no abdominal tenderness  Musculoskeletal:         General: No swelling or deformity  Normal range of motion  Cervical back: Normal range of motion and neck supple  Skin:     General: Skin is warm and dry  Neurological:      General: No focal deficit present  Mental Status: She is alert and oriented to person, place, and time  Mental status is at baseline  Sensory: No sensory deficit        Motor: No weakness  Psychiatric:         Mood and Affect: Mood normal           Additional Data:     Labs:  Results from last 7 days   Lab Units 04/02/22 0445 04/01/22  0526 04/01/22  0526   WBC Thousand/uL 11 08*   < > 11 43*   HEMOGLOBIN g/dL 12 5   < > 12 2   HEMATOCRIT % 39 1   < > 38 5   PLATELETS Thousands/uL 458*   < > 428*   NEUTROS PCT %  --   --  81*   LYMPHS PCT %  --   --  14   MONOS PCT %  --   --  4   EOS PCT %  --   --  0    < > = values in this interval not displayed  Results from last 7 days   Lab Units 04/02/22 0445 04/01/22  0526 03/31/22  0532   SODIUM mmol/L 135*   < > 135*   POTASSIUM mmol/L 4 2   < > 4 0   CHLORIDE mmol/L 105   < > 104   CO2 mmol/L 24   < > 24   BUN mg/dL 20   < > 18   CREATININE mg/dL 0 60   < > 0 68   ANION GAP mmol/L 6   < > 7   CALCIUM mg/dL 9 4   < > 9 8   ALBUMIN g/dL  --   --  3 6   TOTAL BILIRUBIN mg/dL  --   --  0 52   ALK PHOS U/L  --   --  338*   ALT U/L  --   --  96*   AST U/L  --   --  50*   GLUCOSE RANDOM mg/dL 90   < > 113    < > = values in this interval not displayed  Results from last 7 days   Lab Units 03/29/22  0206   LACTIC ACID mmol/L 1 1       Imaging: No pertinent imaging reviewed  Recent Cultures (last 7 days):     Results from last 7 days   Lab Units 03/29/22  0206   BLOOD CULTURE  No Growth After 4 Days  No Growth After 4 Days  Lines/Drains:  Invasive Devices  Report    Peripheral Intravenous Line            Peripheral IV 03/31/22 Dorsal (posterior); Right Hand 1 day                Telemetry:        Last 24 Hours Medication List:   Current Facility-Administered Medications   Medication Dose Route Frequency Provider Last Rate    acetaminophen  975 mg Oral Q8H [address] Tara Olivas DO      amLODIPine  5 mg Oral Daily Toyin Esteban MD      dexamethasone  4 mg Intravenous Q6H [address] Tara Olivas DO      enoxaparin  40 mg Subcutaneous Q24H [address] Toyin Esteban MD      HYDROmorphone  0 5 mg Intravenous Q3H PRN Walker Acevedo MD      Labetalol HCl  10 mg Intravenous Q6H PRN Tara Olivas, DO      lisinopril  20 mg Oral Daily Vin Peter MD      ondansetron  4 mg Intravenous Q4H PRN Tara Olivas, DO      oxyCODONE  10 mg Oral Q4H PRN Tara Olivas, DO      oxyCODONE  5 mg Oral Q4H PRN Tara Olivas, DO      pantoprazole  40 mg Oral Early Morning Tara Olivas, DO      polyethylene glycol  17 g Oral Daily PRN Vi Palma MD      senna  3 tablet Oral BID Vi Palma MD          Today, Patient Was Seen By: Vin Peter MD    ** Please Note: This note has been constructed using a voice recognition system   **

## 2022-04-02 NOTE — ASSESSMENT & PLAN NOTE
Patient presents with right breast mass associated with back pain  CT chest, abdomen and pelvis revealed innumerable osseous metastasis    CT C spine: Innumerable bony metastases, Displaced fracture through the right posterior arch of C1, C4 metastasis causing a severe compression fracture and extension into the canal causing severe central canal stenosis and likely cord compression , T2 metastasis causing a moderate compression fracture and extension into the canal causing severe central canal stenosis and likely cord compression  · Radiation oncology consulted and recommend dexamethasone 4 mg q 6h  · Palliative radiation (day2), with have radiation outpatient next week  · Pain management per palliative care

## 2022-04-02 NOTE — INCIDENTAL FINDINGS
The following findings require follow up:  Radiographic finding   Finding: Left parietal and right frontal calvarial enhancing lesions, suspicious for calvarial osseous metastasis  , Unchanged enhancing osseous metastatic lesions of C1, C2, C4, and partially imaged C5 vertebra with pathologic compression fracture of C4 vertebral body that also has surrounding anterior paravertebral disease and anterior epidural extension of disease , resulting in severe canal stenosis at C4 vertebral body level    Follow up required:  Yes   Follow up should be done within 1 month(s)    Please notify the following clinician to assist with the follow up:   With neurosurgery

## 2022-04-02 NOTE — ASSESSMENT & PLAN NOTE
No known history of hypertension  Patient self reports that she has high blood pressure in hospital/clinical setting  Possible due to white coat syndrome or anxiety/stress  Currently stable  · Continue amlodipine 5 mg p o   Daily  · Continue lisinopril to 20 mg po qd  · Advised patient to measure BP at home

## 2022-04-03 VITALS
DIASTOLIC BLOOD PRESSURE: 76 MMHG | HEART RATE: 84 BPM | OXYGEN SATURATION: 93 % | BODY MASS INDEX: 35.38 KG/M2 | SYSTOLIC BLOOD PRESSURE: 110 MMHG | RESPIRATION RATE: 16 BRPM | TEMPERATURE: 97.5 F | HEIGHT: 64 IN | WEIGHT: 207.23 LBS

## 2022-04-03 LAB
ANION GAP SERPL CALCULATED.3IONS-SCNC: 3 MMOL/L (ref 4–13)
BACTERIA BLD CULT: NORMAL
BACTERIA BLD CULT: NORMAL
BUN SERPL-MCNC: 20 MG/DL (ref 5–25)
CALCIUM SERPL-MCNC: 9.5 MG/DL (ref 8.3–10.1)
CHLORIDE SERPL-SCNC: 105 MMOL/L (ref 100–108)
CO2 SERPL-SCNC: 26 MMOL/L (ref 21–32)
CREAT SERPL-MCNC: 0.62 MG/DL (ref 0.6–1.3)
ERYTHROCYTE [DISTWIDTH] IN BLOOD BY AUTOMATED COUNT: 12.7 % (ref 11.6–15.1)
GFR SERPL CREATININE-BSD FRML MDRD: 107 ML/MIN/1.73SQ M
GLUCOSE SERPL-MCNC: 97 MG/DL (ref 65–140)
HCT VFR BLD AUTO: 39.5 % (ref 34.8–46.1)
HGB BLD-MCNC: 12.4 G/DL (ref 11.5–15.4)
MCH RBC QN AUTO: 29.5 PG (ref 26.8–34.3)
MCHC RBC AUTO-ENTMCNC: 31.4 G/DL (ref 31.4–37.4)
MCV RBC AUTO: 94 FL (ref 82–98)
PLATELET # BLD AUTO: 446 THOUSANDS/UL (ref 149–390)
PMV BLD AUTO: 9.9 FL (ref 8.9–12.7)
POTASSIUM SERPL-SCNC: 4.5 MMOL/L (ref 3.5–5.3)
RBC # BLD AUTO: 4.2 MILLION/UL (ref 3.81–5.12)
SODIUM SERPL-SCNC: 134 MMOL/L (ref 136–145)
WBC # BLD AUTO: 10.83 THOUSAND/UL (ref 4.31–10.16)

## 2022-04-03 PROCEDURE — 99232 SBSQ HOSP IP/OBS MODERATE 35: CPT | Performed by: INTERNAL MEDICINE

## 2022-04-03 PROCEDURE — 80048 BASIC METABOLIC PNL TOTAL CA: CPT | Performed by: INTERNAL MEDICINE

## 2022-04-03 PROCEDURE — 85027 COMPLETE CBC AUTOMATED: CPT | Performed by: INTERNAL MEDICINE

## 2022-04-03 PROCEDURE — NC001 PR NO CHARGE: Performed by: INTERNAL MEDICINE

## 2022-04-03 RX ORDER — POLYETHYLENE GLYCOL 3350 17 G/17G
17 POWDER, FOR SOLUTION ORAL DAILY
Qty: 30 EACH | Refills: 0 | Status: SHIPPED | OUTPATIENT
Start: 2022-04-03

## 2022-04-03 RX ORDER — PANTOPRAZOLE SODIUM 40 MG/1
40 TABLET, DELAYED RELEASE ORAL
Qty: 30 TABLET | Refills: 0 | Status: SHIPPED | OUTPATIENT
Start: 2022-04-03 | End: 2022-04-27

## 2022-04-03 RX ORDER — DEXAMETHASONE 4 MG/1
4 TABLET ORAL 3 TIMES DAILY
Qty: 45 TABLET | Refills: 0 | Status: SHIPPED | OUTPATIENT
Start: 2022-04-03 | End: 2022-07-15 | Stop reason: ALTCHOICE

## 2022-04-03 RX ORDER — SENNOSIDES 8.6 MG
25.8 TABLET ORAL 2 TIMES DAILY
Qty: 180 TABLET | Refills: 0 | Status: SHIPPED | OUTPATIENT
Start: 2022-04-03

## 2022-04-03 RX ORDER — LISINOPRIL 20 MG/1
20 TABLET ORAL DAILY
Qty: 30 TABLET | Refills: 0 | Status: SHIPPED | OUTPATIENT
Start: 2022-04-03 | End: 2022-04-27

## 2022-04-03 RX ORDER — AMLODIPINE BESYLATE 5 MG/1
5 TABLET ORAL DAILY
Qty: 30 TABLET | Refills: 0 | Status: SHIPPED | OUTPATIENT
Start: 2022-04-03 | End: 2022-04-27

## 2022-04-03 RX ADMIN — SENNOSIDES 25.8 MG: 8.6 TABLET, FILM COATED ORAL at 18:02

## 2022-04-03 RX ADMIN — PANTOPRAZOLE SODIUM 40 MG: 40 TABLET, DELAYED RELEASE ORAL at 05:00

## 2022-04-03 RX ADMIN — DEXAMETHASONE SODIUM PHOSPHATE 4 MG: 4 INJECTION INTRA-ARTICULAR; INTRALESIONAL; INTRAMUSCULAR; INTRAVENOUS; SOFT TISSUE at 21:16

## 2022-04-03 RX ADMIN — DEXAMETHASONE SODIUM PHOSPHATE 4 MG: 4 INJECTION INTRA-ARTICULAR; INTRALESIONAL; INTRAMUSCULAR; INTRAVENOUS; SOFT TISSUE at 08:52

## 2022-04-03 RX ADMIN — OXYCODONE HYDROCHLORIDE 10 MG: 10 TABLET ORAL at 22:14

## 2022-04-03 RX ADMIN — AMLODIPINE BESYLATE 5 MG: 5 TABLET ORAL at 08:52

## 2022-04-03 RX ADMIN — ACETAMINOPHEN 975 MG: 325 TABLET ORAL at 21:16

## 2022-04-03 RX ADMIN — ACETAMINOPHEN 975 MG: 325 TABLET ORAL at 05:00

## 2022-04-03 RX ADMIN — OXYCODONE HYDROCHLORIDE 10 MG: 10 TABLET ORAL at 02:53

## 2022-04-03 RX ADMIN — SENNOSIDES 25.8 MG: 8.6 TABLET, FILM COATED ORAL at 08:52

## 2022-04-03 RX ADMIN — OXYCODONE HYDROCHLORIDE 5 MG: 5 TABLET ORAL at 18:03

## 2022-04-03 RX ADMIN — HYDROMORPHONE HYDROCHLORIDE 0.5 MG: 1 INJECTION, SOLUTION INTRAMUSCULAR; INTRAVENOUS; SUBCUTANEOUS at 05:01

## 2022-04-03 RX ADMIN — ENOXAPARIN SODIUM 40 MG: 40 INJECTION SUBCUTANEOUS at 08:51

## 2022-04-03 RX ADMIN — OXYCODONE HYDROCHLORIDE 10 MG: 10 TABLET ORAL at 08:52

## 2022-04-03 RX ADMIN — HYDROMORPHONE HYDROCHLORIDE 0.5 MG: 1 INJECTION, SOLUTION INTRAMUSCULAR; INTRAVENOUS; SUBCUTANEOUS at 15:11

## 2022-04-03 RX ADMIN — HYDROMORPHONE HYDROCHLORIDE 0.5 MG: 1 INJECTION, SOLUTION INTRAMUSCULAR; INTRAVENOUS; SUBCUTANEOUS at 10:01

## 2022-04-03 RX ADMIN — LISINOPRIL 20 MG: 20 TABLET ORAL at 08:52

## 2022-04-03 RX ADMIN — DEXAMETHASONE SODIUM PHOSPHATE 4 MG: 4 INJECTION INTRA-ARTICULAR; INTRALESIONAL; INTRAMUSCULAR; INTRAVENOUS; SOFT TISSUE at 15:12

## 2022-04-03 RX ADMIN — ACETAMINOPHEN 975 MG: 325 TABLET ORAL at 12:55

## 2022-04-03 RX ADMIN — DEXAMETHASONE SODIUM PHOSPHATE 4 MG: 4 INJECTION INTRA-ARTICULAR; INTRALESIONAL; INTRAMUSCULAR; INTRAVENOUS; SOFT TISSUE at 02:53

## 2022-04-03 RX ADMIN — OXYCODONE HYDROCHLORIDE 10 MG: 10 TABLET ORAL at 12:55

## 2022-04-03 NOTE — ASSESSMENT & PLAN NOTE
Results from last 7 days   Lab Units 03/31/22  0532 03/29/22  1430 03/28/22  2330   AST U/L 50* 78* 80*   ALT U/L 96* 130* 128*   ALK PHOS U/L 338* 311* 314*     Rarely use alcohol, no drug use, acute hepatitis panel negative  Likely due to metastatic cancer  CT abdomen reviewed unremarkable liver  · Trend as needed outpatient  · Follow-up CMP in 1 week  · Avoid hepatotoxic agents

## 2022-04-03 NOTE — CASE MANAGEMENT
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      LOS (days): 5  Geometric Mean LOS (GMLOS) (days):   Days to GMLOS:     OBJECTIVE:  Risk of Unplanned Readmission Score: 13         Current admission status: Inpatient   Preferred Pharmacy:   [id number] [place], [address] [place] [address],   [address]  [facility] [address]  Phone: [phone] Fax: [phone]    Primary Care Provider: Koki Eaton MD    Primary Insurance: BLUE CROSS  Secondary Insurance:     DISCHARGE DETAILS:    DME Referral Provided  Referral made for DME?: Yes  DME referral completed for the following items[de-identified] Walker,Wheelchair,Hospital Bed  DME Supplier Name[de-identified] [employer] Back delivered to pt room  Additional Comments: [facility] Stains delivered to pt bedside  CM reviewed DME orders again at pts request  Confirmed order for [facility] and hospital bed were placed through [employer]  [employer] to follow up w pt on Monday to determine any out of pocket costs that may be associated and delivery expectation  Pt stated she's still pending the date for her next RAD/ONC appointment which was scheduled for noon Monday at [facility] but with dc today, she is unsure if this will still be in place  No additional concerns or needs identified at this time

## 2022-04-03 NOTE — DISCHARGE INSTRUCTIONS
Neurosurgery discharge instructions following neck fracture:       brace at all times except for showering change to carla (peach) collar   No bending, twisting or heavy lifting  No pushing or pulling over 10lbs  No strenuous activities  NO DRIVING  **Please notify MD immediately if you have increased neck or arm pain  New numbness and/or weakness in your arm  Difficulty swallowing or breathing especially while lying down  Numbness or weakness in arms or legs  Increased difficulty walking **    Obtain blood work in 1 week  Follow-up with Medical and Radiation Oncology, Neurosurgery, palliative Care  Breast Cancer   WHAT YOU SHOULD KNOW:   Breast cancer is a cancer that starts in the tissue of the female or male breast  It can cause a lump, deformity of the breast, or discharge from the nipple  Breast cancer cells may spread to other parts of the body, such as the liver, lung, and brain  INSTRUCTIONS:   Medicines:   · Antinausea medicine: This medicine may be given to calm your stomach and prevent vomiting  · Pain medicine: You may be given a prescription medicine to decrease pain  Do not wait until the pain is severe before you take this medicine  · Take your medicine as directed  Call your healthcare provider if you think your medicine is not helping or if you have side effects  Tell him if you are allergic to any medicine  Keep a list of the medicines, vitamins, and herbs you take  Include the amounts, and when and why you take them  Bring the list or the pill bottles to follow-up visits  Carry your medicine list with you in case of an emergency  Follow up with your oncologist as directed: You will need to see your oncologist for ongoing treatment and follow-up  Write down your questions so you remember to ask them during your visits  Drink liquids as directed:  Ask how much liquid to drink each day and which liquids are best for you  Drink extra liquids to avoid dehydration   You will also need to replace fluid if you are vomiting or have diarrhea from cancer treatments  Limit alcohol:  Your oncologist may tell you to limit or not drink alcohol  Alcohol may increase the risk that your breast cancer will come back  Do not smoke: If you smoke, it is never too late to quit  Smoking may increase the risk that your breast cancer will come back  Ask for information if you need help quitting  Contact your oncologist if:   · You have a fever  · You cannot make it to your radiation or chemotherapy visit  · You are vomiting and cannot keep food or liquids down  · You are depressed and you feel that you cannot cope any longer  · You have questions or concerns about your condition or care  Return to the emergency department if:   · Your arm swells up and is painful  · Your arm or leg feels warm, tender, and painful  It may look swollen and red  · You suddenly feel lightheaded or short of breath  · You have chest pain when you take a deep breath or cough  You may cough up blood  © 2014 [address] is for End User's use only and may not be sold, redistributed or otherwise used for commercial purposes  All illustrations and images included in CareNotes® are the copyrighted property of [employer]  or Reji Matos  The above information is an  only  It is not intended as medical advice for individual conditions or treatments  Talk to your doctor, nurse or pharmacist before following any medical regimen to see if it is safe and effective for you

## 2022-04-03 NOTE — ASSESSMENT & PLAN NOTE
Patient presented with right breast mass associated with upper back pain  Patient also complained of needle like sensation on her upper extremities  Patient denies weakness or numbness in extremities  No neuro deficits on examination  MRI cervical, thoracic and lumbar spine revealed Diffuse, primarily lytic osseous metastatic disease is seen throughout the cervical vertebral bodies from C1 into the upper thoracic spine  Shalini Spice is a significant amount of epidural tumor arising from pathologic compression fractures of C4 and T2  Diffuse, lytic, osseous metastasis is seen throughout the thoracic spine more prominently within the upper thoracic spine where there is epidural tumor and involvement of the posterior elements from T2 through T5   Moderate canal stenosis at the T2  There is a moderate amount of epidural tumor as a result of the expansile sacral lesion resulting in moderate sacral canal stenosis   Tumor extends into the S2-3 and S3-4 neural foramen on the left  · Inpatient consult to Neurosurgery and input appreciated  No surgical intervention at this time due to extensive spinal involvement  palliative radiation by radiation oncology  Risks and benefits have been discussed with patient by radiation oncology  Patient agree to proceed  · Spoke to radiation oncology, patient can have radiation outpatient    · Discharge patient with Decadron 4 mg q 8h, if symptoms worsened, increased to q6h  Continue

## 2022-04-03 NOTE — PLAN OF CARE
Problem: Prexisting or High Potential for Compromised Skin Integrity  Goal: Skin integrity is maintained or improved  Description: INTERVENTIONS:  - Identify patients at risk for skin breakdown  - Assess and monitor skin integrity  - Assess and monitor nutrition and hydration status  - Monitor labs   - Assess for incontinence   - Turn and reposition patient  - Assist with mobility/ambulation  - Relieve pressure over bony prominences  - Avoid friction and shearing  - Provide appropriate hygiene as needed including keeping skin clean and dry  - Evaluate need for skin moisturizer/barrier cream  - Collaborate with interdisciplinary team   - Patient/family teaching  - Consider wound care consult   Outcome: Progressing     Problem: COPING  Goal: Pt/Family able to verbalize concerns and demonstrate effective coping strategies  Description: INTERVENTIONS:  - Assist patient/family to identify coping skills, available support systems and cultural and spiritual values  - Provide emotional support, including active listening and acknowledgement of concerns of patient and caregivers  - Reduce environmental stimuli, as able  - Provide patient education  - Assess for spiritual pain/suffering and initiate spiritual care, including notification of Pastoral Care or alivia based community as needed  - Assess effectiveness of coping strategies  Outcome: Progressing     Problem: DECISION MAKING  Goal: Pt/Family able to effectively weigh alternatives and participate in decision making related to treatment and care  Description: INTERVENTIONS:  - Identify decision maker  - Determine when there are differences among patient's view, family's view, and healthcare provider's view of patient condition and care goals  - Facilitate patient/family articulation of goals for care  - Help patient/family identify pros/cons of alternative solutions  - Provide information as requested by patient/family  - Respect patient/family rights related to privacy and sharing information   - Serve as a liaison between patient, family and health care team  - Initiate consults as appropriate (Ethics Team, Palliative Care, Family Care Conference, etc )  Outcome: Progressing

## 2022-04-03 NOTE — PROGRESS NOTES
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)  Assessment & Plan  Patient presented with right breast mass associated with upper back pain  Patient also complained of needle like sensation on her upper extremities  Patient denies weakness or numbness in extremities  No neuro deficits on examination  MRI cervical, thoracic and lumbar spine revealed Diffuse, primarily lytic osseous metastatic disease is seen throughout the cervical vertebral bodies from C1 into the upper thoracic spine  Teryl Block is a significant amount of epidural tumor arising from pathologic compression fractures of C4 and T2  Diffuse, lytic, osseous metastasis is seen throughout the thoracic spine more prominently within the upper thoracic spine where there is epidural tumor and involvement of the posterior elements from T2 through T5   Moderate canal stenosis at the T2  There is a moderate amount of epidural tumor as a result of the expansile sacral lesion resulting in moderate sacral canal stenosis   Tumor extends into the S2-3 and S3-4 neural foramen on the left  · Inpatient consult to Neurosurgery and input appreciated  No surgical intervention at this time due to extensive spinal involvement  palliative radiation by radiation oncology  Risks and benefits have been discussed with patient by radiation oncology  Patient agree to proceed  · Spoke to radiation oncology, patient can have radiation outpatient    · Discharge patient with Decadron 4 mg q 8h, if symptoms worsened, increased to q6h  Continue      Transaminitis  Assessment & Plan  Results from last 7 days   Lab Units 03/31/22  0532 03/29/22  1430 03/28/22  2330   AST U/L 50* 78* 80*   ALT U/L 96* 130* 128*   ALK PHOS U/L 338* 311* 314*     Rarely use alcohol, no drug use, acute hepatitis panel negative  Likely due to metastatic cancer  CT abdomen reviewed unremarkable liver  · Trend as needed outpatient  · Follow-up CMP in 1 week  · Avoid hepatotoxic agents      Elevated BP without diagnosis of hypertension  Assessment & Plan  No known history of hypertension  Patient self reports that she has high blood pressure in hospital/clinical setting  Possible due to white coat syndrome or anxiety/stress  Currently stable  · Continue amlodipine 5 mg p o  Daily  · Continue lisinopril to 20 mg po qd  · Advised patient to measure BP at home    Osseous metastasis [facility])  Assessment & Plan  Patient presents with right breast mass associated with back pain  CT chest, abdomen and pelvis revealed innumerable osseous metastasis  CT C spine: Innumerable bony metastases, Displaced fracture through the right posterior arch of C1, C4 metastasis causing a severe compression fracture and extension into the canal causing severe central canal stenosis and likely cord compression , T2 metastasis causing a moderate compression fracture and extension into the canal causing severe central canal stenosis and likely cord compression  · Radiation oncology consulted and recommend dexamethasone 4 mg q 6h  · Palliative radiation (day2), with have radiation outpatient next week  · Pain management per palliative care    * Breast mass, right  Assessment & Plan  Patient initially noted a right-sided heart breast mass in the beginning of this year  Patient states that the mass in her breast began to PE read about a month ago and the mass break through the skin and has become malodorous and weeping about a week ago    Patient was also associated with intermittent upper back pain  Pain was relieved by Motrin and Tylenol  Patient initially thought back pain was due to her position while using her computer  CT chest revealed  Large right breast mass measuring up to 6 9 cm  Right axillary metastatic adenopathy  Innumerable osseous metastasis  MRI brain revealed No evidence of intracranial metastasis  Left parietal and right frontal calvarial enhancing lesions, suspicious for calvarial osseous metastasis  CTA of head and neck revealed Patent bilateral vertebral arteries without high-grade stenosis   There are multilevel abutment/mild displacement or encasement of the vertebral arteries by extraosseous tumor as described  No hemodynamically significant stenosis of cervical carotid arteries  Breast biopsy revealed Invasive mammary carcinoma of no special type  Further immunohistochemistry pending  · Surgical Oncology performed breast mass biopsy  No further surgical intervention  · Medical Oncology consulted and will initiate systemic treatment outpatient and referred for cancer genetics  · Inpatient consult to palliative care to discuss goal of care and currently patient is currently disease focused care  · Patient is on palliative radiation for osseous metastasis  Will have radiation next week outpatient  · Pain management per palliative care  Will give oxycodone 10 mg p o  Q 4h and Dilaudid tablet 4 mg p o  Post radiation   · Plan to discharge today  · Outpatient follow-up with Neurosurgery, Oncology and palliative Care            Disposition: Medically stable for discharge home tomorrow as patient's pharmacy is closed  SUBJECTIVE     Patient seen and examined  No acute events overnight  Continues to have neck pain but controlled on medications  Denies chest pain, SOB, N/V  Wants to have radiation treatments moved to [facility]  Rest of ROS negative      OBJECTIVE     Vitals:    04/02/22 1459 04/02/22 2310 04/03/22 0248 04/03/22 0733   BP: 135/83 129/86 118/70 104/63   Pulse: 82 86 66 72   Resp: 18 18 16 18   Temp: 97 9 °F (36 6 °C) (!) 97 °F (36 1 °C) 97 6 °F (36 4 °C) 97 8 °F (36 6 °C)   SpO2: 92% 94% 94% 93%   Weight:       Height:          Temperature:   Temp (24hrs), Av 5 °F (36 4 °C), Min:97 °F (36 1 °C), Max:97 8 °F (36 6 °C)    Temperature: 97 8 °F (36 6 °C)  Intake & Output:  I/O       04/ 0701  04/02 0700 04/ 0701  / 0700 / 0701  / 0700    P  O  360 700 240    Total Intake(mL/kg) 360 (3 8) 700 (7 4) 240 (2 6)    Urine (mL/kg/hr) 950 (0 4)      Total Output 950      Net -590 +700 +240           Unmeasured Urine Occurrence 3 x 2 x 1 x    Unmeasured Stool Occurrence  1 x         Weights:        Body mass index is 35 57 kg/m²  Weight (last 2 days)     None        Physical Exam  Vitals and nursing note reviewed  Constitutional:       General: She is not in acute distress  Appearance: Normal appearance  She is well-developed  She is obese  She is not ill-appearing  Interventions: Cervical collar in place  HENT:      Head: Normocephalic and atraumatic  Mouth/Throat:      Mouth: Mucous membranes are moist    Eyes:      Extraocular Movements: Extraocular movements intact  Conjunctiva/sclera: Conjunctivae normal       Pupils: Pupils are equal, round, and reactive to light  Cardiovascular:      Rate and Rhythm: Normal rate and regular rhythm  Pulses: Normal pulses  Heart sounds: Normal heart sounds  No murmur heard  No friction rub  No gallop  Pulmonary:      Effort: Pulmonary effort is normal  No respiratory distress  Breath sounds: Normal breath sounds  No wheezing or rales  Abdominal:      General: Abdomen is flat  Bowel sounds are normal  There is no distension  Palpations: Abdomen is soft  Tenderness: There is no abdominal tenderness  There is no guarding  Musculoskeletal:      Cervical back: Neck supple  Right lower leg: No edema  Left lower leg: No edema  Skin:     General: Skin is warm and dry  Neurological:      General: No focal deficit present  Mental Status: She is alert and oriented to person, place, and time  Psychiatric:         Mood and Affect: Mood normal          Behavior: Behavior normal        LABORATORY DATA     Labs: I have personally reviewed pertinent reports  Results from last 7 days   Lab Units 04/03/22  0641 04/02/22 0445 04/01/22  0526 03/31/22  0532 03/31/22  0532 03/29/22  1430 03/29/22  1430   WBC Thousand/uL 10 83* 11 08* 11 43*   < > 12 23*   < > 9 63   HEMOGLOBIN g/dL 12 4 12 5 12 2   < > 13 1   < > 12 9   HEMATOCRIT % 39 5 39 1 38 5   < > 39 7   < > 38 6   PLATELETS Thousands/uL 446* 458* 428*   < > 422*   < > 328   NEUTROS PCT %  --   --  81*  --  80*  --  82*   MONOS PCT %  --   --  4  --  3*  --  2*    < > = values in this interval not displayed  Results from last 7 days   Lab Units 04/03/22  0641 04/02/22 0445 04/01/22  0526 03/31/22  0532 03/31/22  0532 03/29/22  1430 03/29/22  1430 03/28/22  2330 03/28/22  2330   POTASSIUM mmol/L 4 5 4 2 4 3   < > 4 0   < > 4 1   < > 3 3*   CHLORIDE mmol/L 105 105 105   < > 104   < > 99*   < > 101   CO2 mmol/L 26 24 24   < > 24   < > 26   < > 22   BUN mg/dL 20 20 19   < > 18   < > 11   < > 9   CREATININE mg/dL 0 62 0 60 0 60   < > 0 68   < > 0 72   < > 0 74   CALCIUM mg/dL 9 5 9 4 9 4   < > 9 8   < > 8 9   < > 9 3   ALK PHOS U/L  --   --   --   --  338*  --  311*  --  314*   ALT U/L  --   --   --   --  96*  --  130*  --  128*   AST U/L  --   --   --   --  50*  --  78*  --  80*    < > = values in this interval not displayed  Results from last 7 days   Lab Units 03/29/22  0642   MAGNESIUM mg/dL 1 8              Results from last 7 days   Lab Units 03/29/22  0206   LACTIC ACID mmol/L 1 1           IMAGING & DIAGNOSTIC TESTING     Radiology Results: I have personally reviewed pertinent reports  CTA head and neck w wo contrast    Result Date: 3/31/2022  Impression: 1    No acute intracranial CT abnormality  2   Patent bilateral vertebral arteries without high-grade stenosis  There are multilevel abutment/mild displacement or encasement of the vertebral arteries by extraosseous tumor as described  3   No hemodynamically significant stenosis of cervical carotid arteries  4   Redemonstrated diffuse cervical and visualized thoracic spine metastatic lesions with severe C4 and T2 pathologic fractures  Multilevel extraosseous tumor resulting in severe canal stenosis at C4 and moderate canal stenosis at T2, better evaluated on recent cervical spine MRI  5   Redemonstrated left parietal and right frontal calvarial lesions  Workstation performed: [id number]     XR spine cervical 2 or 3 vw injury    Result Date: 4/1/2022  Impression: No acute change compared to recent prior studies  Workstation performed: [id number]     CT spine cervical wo contrast    Result Date: 3/29/2022  Impression: 1  Innumerable bony metastases  2   Displaced fracture through the right posterior arch of C1  3   C4 metastasis causing a severe compression fracture and extension into the canal causing severe central canal stenosis and likely cord compression  4   T2 metastasis causing a moderate compression fracture and extension into the canal causing severe central canal stenosis and likely cord compression  I sent a tiger text to Jose Cali on 3/29/2022 at 9:57 AM  The study was marked in [facility] for immediate notification  Workstation performed: [id number]     MRI brain w wo contrast    Result Date: 3/30/2022  Impression: No evidence of intracranial metastasis  Left parietal and right frontal calvarial enhancing lesions, suspicious for calvarial osseous metastasis   Unchanged enhancing osseous metastatic lesions of C1, C2, C4, and partially imaged C5 vertebra with pathologic compression fracture of C4 vertebral body that also has surrounding anterior paravertebral disease and anterior epidural extension of disease resulting in severe canal stenosis at C4 vertebral body level as described on yesterday's MRI cervical spine with and without contrast  The study was marked in EPIC for immediate notification  Workstation performed: [id number]     MRI cervical spine w wo contrast    Result Date: 3/29/2022  Impression: Diffuse, primarily lytic osseous metastatic disease is seen throughout the cervical vertebral bodies from C1 into the upper thoracic spine  There is a significant amount of epidural tumor arising from pathologic compression fractures of C4 and T2  At the C4 level there is moderate to severe canal stenosis with flattening of the cord which maintains normal signal   Moderate canal stenosis at the T2 level  There is corresponding foraminal narrowing as a result of epidural and foraminal tumor  Workstation performed: [id number]     MRI thoracic spine w wo contrast    Result Date: 3/29/2022  Impression: Diffuse, lytic, osseous metastasis is seen throughout the thoracic spine more prominently within the upper thoracic spine where there is epidural tumor and involvement of the posterior elements from T2 through T5  Moderate canal stenosis at the T2 level  Foraminal narrowing as described above  No abnormal cord signal identified  Workstation performed: [id number]     MRI lumbar spine w wo contrast    Result Date: 3/29/2022  Impression: Diffuse lytic osseous metastasis within the lower thoracic spine, lumbar spine and sacrum  There is a moderate amount of epidural tumor as a result of the expansile sacral lesion resulting in moderate sacral canal stenosis  Tumor extends into the S2-3 and S3-4 neural foramen on the left  Correlate for corresponding radiculopathy  Bilobed cystic mass within the left pelvis incompletely evaluated on this examination  Follow-up pelvic ultrasound imaging is recommended   Workstation performed: [id number]     CT chest abdomen pelvis w contrast    Result Date: 3/29/2022  Impression: Large right breast mass measuring up to 6 9 cm  Right axillary metastatic adenopathy  Surgical consult recommended  Innumerable osseous metastasis  7 cm septated cystic left adnexal structure  According to current guidelines [employer] Radiol 2020; 03:175-807) in this premenopausal woman, this should be followed up in 6 to 12 months by pelvic ultrasound  Workstation performed: [id number]     CT RADIATION THERAPY    Result Date: 3/31/2022  Impression: No acute findings in the brain, neck, chest, abdomen or pelvis compared to recent prior studies  Study for radiation planning  Workstation performed: [id number]     Other Diagnostic Testing: I have personally reviewed pertinent reports  ACTIVE MEDICATIONS     Current Facility-Administered Medications   Medication Dose Route Frequency    acetaminophen (TYLENOL) tablet 975 mg  975 mg Oral Q8H [facility]    amLODIPine (NORVASC) tablet 5 mg  5 mg Oral Daily    dexamethasone (DECADRON) injection 4 mg  4 mg Intravenous Q6H [facility]    enoxaparin (LOVENOX) subcutaneous injection 40 mg  40 mg Subcutaneous Q24H FABIANA    HYDROmorphone (DILAUDID) injection 0 5 mg  0 5 mg Intravenous Q3H PRN    Labetalol HCl (NORMODYNE) injection 10 mg  10 mg Intravenous Q6H PRN    lisinopril (ZESTRIL) tablet 20 mg  20 mg Oral Daily    ondansetron (ZOFRAN) injection 4 mg  4 mg Intravenous Q4H PRN    oxyCODONE (ROXICODONE) immediate release tablet 10 mg  10 mg Oral Q4H PRN    oxyCODONE (ROXICODONE) IR tablet 5 mg  5 mg Oral Q4H PRN    pantoprazole (PROTONIX) EC tablet 40 mg  40 mg Oral Early Morning    polyethylene glycol (MIRALAX) packet 17 g  17 g Oral Daily PRN    senna (SENOKOT) tablet 25 8 mg  3 tablet Oral BID       VTE Pharmacologic Prophylaxis: Enoxaparin (Lovenox)  VTE Mechanical Prophylaxis: sequential compression device    Portions of the record may have been created with voice recognition software    Occasional wrong word or "sound a like" substitutions may have occurred due to the inherent limitations of voice recognition software    Read the chart carefully and recognize, using context, where substitutions have occurred   ==  Ava Antunez, [address] [facility]  Internal Medicine Residency PGY-3

## 2022-04-03 NOTE — DISCHARGE SUMMARY
INTERNAL MEDICINE RESIDENCY DISCHARGE SUMMARY     Chana Ridley   51 y o  female  MRN: [id number]  Room/Bed: [place] [address]/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 ([facility] [address] )      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 [facility])  Assessment & Plan  Patient presented with right breast mass associated with upper back pain  Patient also complained of needle like sensation on her upper extremities  Patient denies weakness or numbness in extremities  No neuro deficits on examination  MRI cervical, thoracic and lumbar spine revealed Diffuse, primarily lytic osseous metastatic disease is seen throughout the cervical vertebral bodies from C1 into the upper thoracic spine  Cathren Garrard is a significant amount of epidural tumor arising from pathologic compression fractures of C4 and T2  Diffuse, lytic, osseous metastasis is seen throughout the thoracic spine more prominently within the upper thoracic spine where there is epidural tumor and involvement of the posterior elements from T2 through T5   Moderate canal stenosis at the T2  There is a moderate amount of epidural tumor as a result of the expansile sacral lesion resulting in moderate sacral canal stenosis   Tumor extends into the S2-3 and S3-4 neural foramen on the left  · Inpatient consult to Neurosurgery and input appreciated  No surgical intervention at this time due to extensive spinal involvement  palliative radiation by radiation oncology  Risks and benefits have been discussed with patient by radiation oncology  Patient agree to proceed  · Spoke to radiation oncology, patient can have radiation outpatient    · Discharge patient with Decadron 4 mg q 8h, if symptoms worsened, increased to q6h  Continue      Transaminitis  Assessment & Plan  Results from last 7 days   Lab Units 03/31/22  0532 03/29/22  1430 03/28/22  2330   AST U/L 50* 78* 80*   ALT U/L 96* 130* 128*   ALK PHOS U/L 338* 311* 314*     Rarely use alcohol, no drug use, acute hepatitis panel negative  Likely due to metastatic cancer  CT abdomen reviewed unremarkable liver  · Trend as needed outpatient  · Follow-up CMP in 1 week  · Avoid hepatotoxic agents      Elevated BP without diagnosis of hypertension  Assessment & Plan  No known history of hypertension  Patient self reports that she has high blood pressure in hospital/clinical setting  Possible due to white coat syndrome or anxiety/stress  Currently stable  · Continue amlodipine 5 mg p o  Daily  · Continue lisinopril to 20 mg po qd  · Advised patient to measure BP at home    Osseous metastasis [facility])  Assessment & Plan  Patient presents with right breast mass associated with back pain  CT chest, abdomen and pelvis revealed innumerable osseous metastasis  CT C spine: Innumerable bony metastases, Displaced fracture through the right posterior arch of C1, C4 metastasis causing a severe compression fracture and extension into the canal causing severe central canal stenosis and likely cord compression , T2 metastasis causing a moderate compression fracture and extension into the canal causing severe central canal stenosis and likely cord compression  · Radiation oncology consulted and recommend dexamethasone 4 mg q 6h  · Palliative radiation (day2), with have radiation outpatient next week  · Pain management per palliative care    * Breast mass, right  Assessment & Plan  Patient initially noted a right-sided heart breast mass in the beginning of this year  Patient states that the mass in her breast began to PE read about a month ago and the mass break through the skin and has become malodorous and weeping about a week ago  Patient was also associated with intermittent upper back pain  Pain was relieved by Motrin and Tylenol  Patient initially thought back pain was due to her position while using her computer  CT chest revealed  Large right breast mass measuring up to 6 9 cm  Right axillary metastatic adenopathy  Innumerable osseous metastasis  MRI brain revealed No evidence of intracranial metastasis  Left parietal and right frontal calvarial enhancing lesions, suspicious for calvarial osseous metastasis  CTA of head and neck revealed Patent bilateral vertebral arteries without high-grade stenosis   There are multilevel abutment/mild displacement or encasement of the vertebral arteries by extraosseous tumor as described  No hemodynamically significant stenosis of cervical carotid arteries  Breast biopsy revealed Invasive mammary carcinoma of no special type  Further immunohistochemistry pending  · Surgical Oncology performed breast mass biopsy  No further surgical intervention  · Medical Oncology consulted and will initiate systemic treatment outpatient and referred for cancer genetics  · Inpatient consult to palliative care to discuss goal of care and currently patient is currently disease focused care  · Patient is on palliative radiation for osseous metastasis  Will have radiation next week outpatient  · Pain management per palliative care  Will give oxycodone 10 mg p o  Q 4h and Dilaudid tablet 4 mg p o  Post radiation   · Plan to discharge today  · Outpatient follow-up with Neurosurgery, Oncology and [address] [place] [address]     Marylou Bridges is a 51 y o  female patient who originally presented to the hospital on 3/29/2022 due to breast mass and upper back pain  Patient was initially presented to [facility] 8  Patient was transferred to [address] due to extensive osseous metastasis to the [facility]  Neurosurgery was consulted  No further surgical intervention indicated at this time due to extensive spinal involvement  Patient was placed on  collar  Surgical Oncology was consulted and performed right breast mass biopsy and reviewed invasive mammary carcinoma and no surgical intervention was performed  Medical Oncology was consulted  Patient was referred for cancer genetics  Patient will follow by me Oncology outpatient to initiate systemic therapy or immunotherapy  Radiation Oncology was consulted and recommended palliative radiation  Risks and benefits has been discussed with patient by radiation oncology  Patient underwent 2 radiation therapy on her neck during this hospitalization  Patient will have 10 radiation therapy on her sacral regions  Palliative Care was consulted for go over care and pain management  Patient wants disease focused care right now  Patient is stable for discharge  Will discharge patient with oxycodone 10 mg p o  Q 4 and Dilaudid 4 mg post radiation along with bowel regimen  Advised patient to outpatient follow-up with PCP, Neurosurgery, Medical Oncology, Radiation Oncology and palliative Care  DISCHARGE INFORMATION     PCP at Discharge: Deepa Kearns MD    Admitting Provider: Lisa Cruz MD  Admission Date: 3/29/2022    Discharge Provider: Lisa Cruz MD  Discharge Date: 4/3/2022    [facility]  Discharge Condition: stable  Discharge with Lines: no    Discharge Diet: cardiac diet  Activity Restrictions: none  Test Results Pending at Discharge: [address]    Discharge Diagnoses:  Principal Problem:    Breast mass, right  Active Problems:    Osseous metastasis (HCC)    Elevated BP without diagnosis of hypertension    Transaminitis    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 [facility])  Resolved Problems:    * No resolved hospital problems  *      Consulting Providers:      Diagnostic & Therapeutic Procedures Performed:  CTA head and neck w wo contrast    Result Date: 3/31/2022  Impression: 1  No acute intracranial CT abnormality   2   Patent bilateral vertebral arteries without high-grade stenosis  There are multilevel abutment/mild displacement or encasement of the vertebral arteries by extraosseous tumor as described  3   No hemodynamically significant stenosis of cervical carotid arteries  4   Redemonstrated diffuse cervical and visualized thoracic spine metastatic lesions with severe C4 and T2 pathologic fractures  Multilevel extraosseous tumor resulting in severe canal stenosis at C4 and moderate canal stenosis at T2, better evaluated on recent cervical spine MRI  5   Redemonstrated left parietal and right frontal calvarial lesions  Workstation performed: [id number]     XR spine cervical 2 or 3 vw injury    Result Date: 4/1/2022  Impression: No acute change compared to recent prior studies  Workstation performed: [id number]     CT spine cervical wo contrast    Result Date: 3/29/2022  Impression: 1  Innumerable bony metastases  2   Displaced fracture through the right posterior arch of C1  3   C4 metastasis causing a severe compression fracture and extension into the canal causing severe central canal stenosis and likely cord compression  4   T2 metastasis causing a moderate compression fracture and extension into the canal causing severe central canal stenosis and likely cord compression  I sent a tiger text to Rashida Rivera on 3/29/2022 at 9:57 AM  The study was marked in [facility] for immediate notification  Workstation performed: [id number]     MRI brain w wo contrast    Result Date: 3/30/2022  Impression: No evidence of intracranial metastasis  Left parietal and right frontal calvarial enhancing lesions, suspicious for calvarial osseous metastasis   Unchanged enhancing osseous metastatic lesions of C1, C2, C4, and partially imaged C5 vertebra with pathologic compression fracture of C4 vertebral body that also has surrounding anterior paravertebral disease and anterior epidural extension of disease resulting in severe canal stenosis at C4 vertebral body level as described on yesterday's MRI cervical spine with and without contrast  The study was marked in [employer] for immediate notification  Workstation performed: [id number]     MRI cervical spine w wo contrast    Result Date: 3/29/2022  Impression: Diffuse, primarily lytic osseous metastatic disease is seen throughout the cervical vertebral bodies from C1 into the upper thoracic spine  There is a significant amount of epidural tumor arising from pathologic compression fractures of C4 and T2  At the C4 level there is moderate to severe canal stenosis with flattening of the cord which maintains normal signal   Moderate canal stenosis at the T2 level  There is corresponding foraminal narrowing as a result of epidural and foraminal tumor  Workstation performed: [id number]     MRI thoracic spine w wo contrast    Result Date: 3/29/2022  Impression: Diffuse, lytic, osseous metastasis is seen throughout the thoracic spine more prominently within the upper thoracic spine where there is epidural tumor and involvement of the posterior elements from T2 through T5  Moderate canal stenosis at the T2 level  Foraminal narrowing as described above  No abnormal cord signal identified  Workstation performed: [id number]     MRI lumbar spine w wo contrast    Result Date: 3/29/2022  Impression: Diffuse lytic osseous metastasis within the lower thoracic spine, lumbar spine and sacrum  There is a moderate amount of epidural tumor as a result of the expansile sacral lesion resulting in moderate sacral canal stenosis  Tumor extends into the S2-3 and S3-4 neural foramen on the left  Correlate for corresponding radiculopathy  Bilobed cystic mass within the left pelvis incompletely evaluated on this examination  Follow-up pelvic ultrasound imaging is recommended  Workstation performed: [id number]     CT chest abdomen pelvis w contrast    Result Date: 3/29/2022  Impression: Large right breast mass measuring up to 6 9 cm  Right axillary metastatic adenopathy   Surgical consult recommended  Innumerable osseous metastasis  7 cm septated cystic left adnexal structure  According to current guidelines Trang Crow Radiol 2020; 57:829-047) in this premenopausal woman, this should be followed up in 6 to 12 months by pelvic ultrasound  Workstation performed: [id number]     CT RADIATION THERAPY    Result Date: 3/31/2022  Impression: No acute findings in the brain, neck, chest, abdomen or pelvis compared to recent prior studies  Study for radiation planning  Workstation performed: [id number]       Code Status: Level 1 - Full Code  Advance Directive & Living Will: <no information>  Power of :    POLST:      Medications:  Current Discharge Medication List        Current Discharge Medication List      START taking these medications    Details   amLODIPine (NORVASC) 5 mg tablet Take 1 tablet (5 mg total) by mouth daily  Qty: 30 tablet, Refills: 0    Associated Diagnoses: Elevated BP without diagnosis of hypertension      dexamethasone (DECADRON) 4 mg tablet Take 1 tablet (4 mg total) by mouth 3 (three) times a day  Qty: 45 tablet, Refills: 0    Associated Diagnoses: Secondary malignant neoplasm of bone and bone marrow ([facility] [address] ); Palliative care patient; Cancer associated pain      HYDROmorphone (DILAUDID) 4 mg tablet Take 1 tablet daily as needed following radiation therapy  Qty: 10 tablet, Refills: 0    Associated Diagnoses: Secondary malignant neoplasm of bone and bone marrow ([address] ); 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 ([facility] [address] ); Palliative care patient; Cancer associated pain      lisinopril (ZESTRIL) 20 mg tablet Take 1 tablet (20 mg total) by mouth daily  Qty: 30 tablet, Refills: 0    Associated Diagnoses: Elevated BP without diagnosis of hypertension      oxyCODONE (ROXICODONE) 10 MG TABS Take 1 tablet (10 mg total) by mouth every 4 (four) hours as needed for severe pain May split pill in half (5 mg) every 4 hours as needed for moderate pain (5 mg)   Max Daily Amount: 60 mg  Qty: 180 tablet, Refills: 0 Associated Diagnoses: 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 (HCC)      pantoprazole (PROTONIX) 40 mg tablet Take 1 tablet (40 mg total) by mouth daily in the early morning  Qty: 30 tablet, Refills: 0    Associated Diagnoses: Gastroesophageal reflux disease without esophagitis      polyethylene glycol (MIRALAX) 17 g packet Take 17 g by mouth daily  Qty: 30 each, Refills: 0    Associated Diagnoses: Therapeutic opioid-induced constipation (OIC)      senna (SENOKOT) 8 6 mg Take 3 tablets (25 8 mg total) by mouth 2 (two) times a day Hold for loose stool  Qty: 180 tablet, Refills: 0    Associated Diagnoses: Therapeutic opioid-induced constipation (OIC)           Current Discharge Medication List      CONTINUE these medications which have NOT CHANGED    Details   norgestimate-ethinyl estradiol (ORTHO-CYCLEN) 0 25-35 MG-MCG per tablet Take 1 tablet by mouth daily             Allergies:   No Known Allergies       FOLLOW-UP     PCP Outpatient Follow-up:  Follow-up in 1-2 weeks  Consulting Providers Follow-up:  Follow-up with Radiation Oncology, Medical Oncology, palliative Care, Neurosurgery    Active Issues Requiring Follow-up:   CMP in 1 week  Radiation    Discharge Statement:   I spent 30 minutes minutes discharging the patient  This time was spent on the day of discharge  I had direct contact with the patient on the day of discharge  Additional documentation is required if more than 30 minutes were spent on discharge  Portions of the record may have been created with voice recognition software  Occasional wrong word or "sound a like" substitutions may have occurred due to the inherent limitations of voice recognition software    Read the chart carefully and recognize, using context, where substitutions have occurred     ==  Sonia Sawyer, [facility]  Internal Medicine Resident PGY-3

## 2022-04-04 LAB
DME PARACHUTE DELIVERY DATE ACTUAL: NORMAL
DME PARACHUTE DELIVERY DATE EXPECTED: NORMAL
DME PARACHUTE DELIVERY DATE REQUESTED: NORMAL
DME PARACHUTE ITEM DESCRIPTION: NORMAL
DME PARACHUTE ORDER STATUS: NORMAL
DME PARACHUTE SUPPLIER NAME: NORMAL
DME PARACHUTE SUPPLIER PHONE: NORMAL

## 2022-04-04 PROCEDURE — G6002 STEREOSCOPIC X-RAY GUIDANCE: HCPCS | Performed by: INTERNAL MEDICINE

## 2022-04-04 PROCEDURE — 77412 RADIATION TX DELIVERY LVL 3: CPT | Performed by: INTERNAL MEDICINE

## 2022-04-04 PROCEDURE — 99238 HOSP IP/OBS DSCHRG MGMT 30/<: CPT | Performed by: INTERNAL MEDICINE

## 2022-04-04 PROCEDURE — 77387 GUIDANCE FOR RADJ TX DLVR: CPT | Performed by: INTERNAL MEDICINE

## 2022-04-04 RX ADMIN — OXYCODONE HYDROCHLORIDE 10 MG: 10 TABLET ORAL at 12:16

## 2022-04-04 RX ADMIN — ACETAMINOPHEN 975 MG: 325 TABLET ORAL at 14:46

## 2022-04-04 RX ADMIN — ENOXAPARIN SODIUM 40 MG: 40 INJECTION SUBCUTANEOUS at 08:32

## 2022-04-04 RX ADMIN — OXYCODONE HYDROCHLORIDE 10 MG: 10 TABLET ORAL at 15:33

## 2022-04-04 RX ADMIN — OXYCODONE HYDROCHLORIDE 5 MG: 5 TABLET ORAL at 07:30

## 2022-04-04 RX ADMIN — ACETAMINOPHEN 975 MG: 325 TABLET ORAL at 05:00

## 2022-04-04 RX ADMIN — LISINOPRIL 20 MG: 20 TABLET ORAL at 08:32

## 2022-04-04 RX ADMIN — DEXAMETHASONE SODIUM PHOSPHATE 4 MG: 4 INJECTION INTRA-ARTICULAR; INTRALESIONAL; INTRAMUSCULAR; INTRAVENOUS; SOFT TISSUE at 08:32

## 2022-04-04 RX ADMIN — HYDROMORPHONE HYDROCHLORIDE 0.5 MG: 1 INJECTION, SOLUTION INTRAMUSCULAR; INTRAVENOUS; SUBCUTANEOUS at 04:57

## 2022-04-04 RX ADMIN — AMLODIPINE BESYLATE 5 MG: 5 TABLET ORAL at 08:32

## 2022-04-04 RX ADMIN — HYDROMORPHONE HYDROCHLORIDE 0.5 MG: 1 INJECTION, SOLUTION INTRAMUSCULAR; INTRAVENOUS; SUBCUTANEOUS at 13:40

## 2022-04-04 RX ADMIN — SENNOSIDES 25.8 MG: 8.6 TABLET, FILM COATED ORAL at 08:32

## 2022-04-04 RX ADMIN — DEXAMETHASONE SODIUM PHOSPHATE 4 MG: 4 INJECTION INTRA-ARTICULAR; INTRALESIONAL; INTRAMUSCULAR; INTRAVENOUS; SOFT TISSUE at 02:38

## 2022-04-04 RX ADMIN — OXYCODONE HYDROCHLORIDE 10 MG: 10 TABLET ORAL at 02:38

## 2022-04-04 RX ADMIN — PANTOPRAZOLE SODIUM 40 MG: 40 TABLET, DELAYED RELEASE ORAL at 05:00

## 2022-04-04 RX ADMIN — DEXAMETHASONE SODIUM PHOSPHATE 4 MG: 4 INJECTION INTRA-ARTICULAR; INTRALESIONAL; INTRAMUSCULAR; INTRAVENOUS; SOFT TISSUE at 14:46

## 2022-04-04 NOTE — CASE MANAGEMENT
Case Management Discharge Planning Note    [facility] name Sheree Tierra  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 ([facility] [address] ) 2022      LOS (days): 6  Geometric Mean LOS (GMLOS) (days):   Days to GMLOS:     OBJECTIVE:  Risk of Unplanned Readmission Score: 13         Current admission status: Inpatient   Preferred Pharmacy:   [id number] [address] [place] [address],  [address]  [address] [place] [address]  Phone: [phone] Fax: [phone]    Primary Care Provider: Bhavya Oliva MD    Primary Insurance: BLUE CROSS  Secondary Insurance:     DISCHARGE DETAILS:       Additional Comments: CM was notified that pt is medically cleared for d/c today  CM spoke to pt about DME and reached out to [facility] to confirm DME delivery   Per adapt DME will be delivered between -, CM notified pt and SO

## 2022-04-04 NOTE — ASSESSMENT & PLAN NOTE
Patient presented with right breast mass associated with upper back pain  Patient also complained of needle like sensation on her upper extremities  Patient denies weakness or numbness in extremities  No neuro deficits on examination  MRI cervical, thoracic and lumbar spine revealed Diffuse, primarily lytic osseous metastatic disease is seen throughout the cervical vertebral bodies from C1 into the upper thoracic spine  Fayne Footman is a significant amount of epidural tumor arising from pathologic compression fractures of C4 and T2  Diffuse, lytic, osseous metastasis is seen throughout the thoracic spine more prominently within the upper thoracic spine where there is epidural tumor and involvement of the posterior elements from T2 through T5   Moderate canal stenosis at the T2  There is a moderate amount of epidural tumor as a result of the expansile sacral lesion resulting in moderate sacral canal stenosis   Tumor extends into the S2-3 and S3-4 neural foramen on the left  · Inpatient consult to Neurosurgery and input appreciated  No surgical intervention at this time due to extensive spinal involvement  palliative radiation by radiation oncology  Risks and benefits have been discussed with patient by radiation oncology  Patient agree to proceed  · Spoke to radiation oncology, patient can have radiation outpatient    · Discharge patient with Decadron 4 mg q 8h, if symptoms worsened, increased to q6h  Continue

## 2022-04-04 NOTE — DISCHARGE SUMMARY
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 [facility])  Assessment & Plan  Patient presented with right breast mass associated with upper back pain  Patient also complained of needle like sensation on her upper extremities  Patient denies weakness or numbness in extremities  No neuro deficits on examination  MRI cervical, thoracic and lumbar spine revealed Diffuse, primarily lytic osseous metastatic disease is seen throughout the cervical vertebral bodies from C1 into the upper thoracic spine  Devere Jovon is a significant amount of epidural tumor arising from pathologic compression fractures of C4 and T2  Diffuse, lytic, osseous metastasis is seen throughout the thoracic spine more prominently within the upper thoracic spine where there is epidural tumor and involvement of the posterior elements from T2 through T5   Moderate canal stenosis at the T2  There is a moderate amount of epidural tumor as a result of the expansile sacral lesion resulting in moderate sacral canal stenosis   Tumor extends into the S2-3 and S3-4 neural foramen on the left  · Inpatient consult to Neurosurgery and input appreciated  No surgical intervention at this time due to extensive spinal involvement  palliative radiation by radiation oncology  Risks and benefits have been discussed with patient by radiation oncology  Patient agree to proceed  · Spoke to radiation oncology, patient can have radiation outpatient    · Discharge patient with Decadron 4 mg q 8h, if symptoms worsened, increased to q6h  Continue      Transaminitis  Assessment & Plan  Results from last 7 days   Lab Units 03/31/22  0532 03/29/22  1430 03/28/22  2330   AST U/L 50* 78* 80*   ALT U/L 96* 130* 128*   ALK PHOS U/L 338* 311* 314*     Rarely use alcohol, no drug use, acute hepatitis panel negative  Likely due to metastatic cancer  CT abdomen reviewed unremarkable liver  · Trend as needed outpatient  · Follow-up CMP in 1 week  · Avoid hepatotoxic agents      Elevated BP without diagnosis of hypertension  Assessment & Plan  No known history of hypertension  Patient self reports that she has high blood pressure in hospital/clinical setting  Possible due to white coat syndrome or anxiety/stress  Currently stable  · Continue amlodipine 5 mg p o  Daily  · Continue lisinopril to 20 mg po qd  · Advised patient to measure BP at home    Osseous metastasis [facility])  Assessment & Plan  Patient presents with right breast mass associated with back pain  CT chest, abdomen and pelvis revealed innumerable osseous metastasis  CT C spine: Innumerable bony metastases, Displaced fracture through the right posterior arch of C1, C4 metastasis causing a severe compression fracture and extension into the canal causing severe central canal stenosis and likely cord compression , T2 metastasis causing a moderate compression fracture and extension into the canal causing severe central canal stenosis and likely cord compression  · Radiation oncology consulted and recommend dexamethasone 4 mg q 6h  · Palliative radiation (day2), with have radiation outpatient next week  · Pain management per palliative care    * Breast mass, right  Assessment & Plan  Patient initially noted a right-sided heart breast mass in the beginning of this year  Patient states that the mass in her breast began to PE read about a month ago and the mass break through the skin and has become malodorous and weeping about a week ago  Patient was also associated with intermittent upper back pain  Pain was relieved by Motrin and Tylenol  Patient initially thought back pain was due to her position while using her computer  CT chest revealed  Large right breast mass measuring up to 6 9 cm  Right axillary metastatic adenopathy  Innumerable osseous metastasis  MRI brain revealed No evidence of intracranial metastasis  Left parietal and right frontal calvarial enhancing lesions, suspicious for calvarial osseous metastasis  CTA of head and neck revealed Patent bilateral vertebral arteries without high-grade stenosis   There are multilevel abutment/mild displacement or encasement of the vertebral arteries by extraosseous tumor as described  No hemodynamically significant stenosis of cervical carotid arteries  Breast biopsy revealed Invasive mammary carcinoma of no special type  Further immunohistochemistry pending  · Surgical Oncology performed breast mass biopsy  No further surgical intervention  · Medical Oncology consulted and will initiate systemic treatment outpatient and referred for cancer genetics  · Inpatient consult to palliative care to discuss goal of care and currently patient is currently disease focused care  · Patient is on palliative radiation for osseous metastasis  Will have radiation next week outpatient  · Pain management per palliative care  Will give oxycodone 10 mg p o  Q 4h and Dilaudid tablet 4 mg p o  Post radiation   · Plan to discharge today  · Outpatient follow-up with Neurosurgery, Oncology and palliative Care            Discharging Resident Physician: Valarie Goyal MD  Attending: Jaycee Leiva MD  PCP: Benji Encinas MD  Admission Date: 3/29/2022  Discharge Date: 04/04/22    Disposition:     Home with [facility] Services (Reminder: Complete face to face encounter)    Reason for Admission:  Breast mass and upper back pain    Consultations During Hospital Stay:  · Neurosurgery, palliative care, medical oncology and radiation oncology    Procedures Performed:     · Breast biopsy    Significant Findings / Test Results:     XR spine cervical 2 or 3 vw injury   Final Result by Keyon Solorio MD (04/01 4167)      No acute change compared to recent prior studies  Workstation performed: [id number]         CTA head and neck w wo contrast   Final Result by Molly Cabrera MD (03/31 1213)      1  No acute intracranial CT abnormality  2   Patent bilateral vertebral arteries without high-grade stenosis  There are multilevel abutment/mild displacement or encasement of the vertebral arteries by extraosseous tumor as described  3   No hemodynamically significant stenosis of cervical carotid arteries  4   Redemonstrated diffuse cervical and visualized thoracic spine metastatic lesions with severe C4 and T2 pathologic fractures  Multilevel extraosseous tumor resulting in severe canal stenosis at C4 and moderate canal stenosis at T2, better evaluated    on recent cervical spine MRI  5   Redemonstrated left parietal and right frontal calvarial lesions  Workstation performed: [id number]         CT RADIATION THERAPY   Final Result by Eric Ruiz MD (03/31 1626)   No acute findings in the brain, neck, chest, abdomen or pelvis compared to recent prior studies  Study for radiation planning  Workstation performed: [id number]         MRI brain w wo contrast   Final Result by Calvin Whitaker MD (03/30 1246)      No evidence of intracranial metastasis  Left parietal and right frontal calvarial enhancing lesions, suspicious for calvarial osseous metastasis  Unchanged enhancing osseous metastatic lesions of C1, C2, C4, and partially imaged C5 vertebra with pathologic compression fracture of C4 vertebral body that also has surrounding anterior paravertebral disease and anterior epidural extension of disease    resulting in severe canal stenosis at C4 vertebral body level as described on yesterday's MRI cervical spine with and without contrast       The study was marked in EPIC for immediate notification           Workstation performed: [id number]         XR spine cervical 2 or 3 vw injury    (Results Pending) ·     Incidental Findings:   · Documented    Test Results Pending at Discharge (will require follow up): · None     Outpatient Tests Requested:  · None    Complications:  None    Hospital Course:     Megan Henry a 51 y o  female patient who originally presented to the hospital on 3/29/2022 due to breast mass and upper back pain   Patient was initially presented to sent [facility] was transferred to sent [facility] due to extensive osseous metastasis to the vertebrates   Neurosurgery was consulted   No further surgical intervention indicated at this time due to extensive spinal involvement   Patient was placed on  collar   Surgical Oncology was consulted and performed right breast mass biopsy and reviewed invasive mammary carcinoma and no surgical intervention was performed  [facility] Oncology was consulted  Camila Denney was referred for cancer genetics   Patient will follow by me Oncology outpatient to initiate systemic therapy or immunotherapy   Radiation Oncology was consulted and recommended palliative radiation   Risks and benefits has been discussed with patient by radiation oncology  Camila Denney underwent 2 radiation therapy on her neck during this hospitalization   Patient will have 10 radiation therapy on her sacral regions   Palliative Care was consulted for go over care and pain management   Patient wants disease focused care right now    Patient is stable for discharge   Will discharge patient with oxycodone 10 mg p o  Q 4 and Dilaudid 4 mg post radiation along with bowel regimen   Advised patient to outpatient follow-up with PCP, Neurosurgery, Medical Oncology, Radiation Oncology and palliative Care  Condition at Discharge: stable     Discharge Day Visit / Exam:     Subjective:  Patient complained of mild tingling in her hands  Patient said that pain is under control    Vitals: Blood Pressure: 110/76 (04/03/22 2208)  Pulse: 84 (04/03/22 2208)  Temperature: 97 5 °F (36 4 °C) (04/03/22 2208)  Respirations: 16 (04/03/22 2208)  Height: 5' 4" (162 6 cm) (03/29/22 1800)  Weight - Scale: 94 kg (207 lb 3 7 oz) (03/29/22 1800)  SpO2: 93 % (04/03/22 0733)  Exam:   Physical Exam  Physical Exam  Constitutional:       Appearance: She is obese  She is not ill-appearing  HENT:      Head: Normocephalic and atraumatic  Right Ear: External ear normal       Left Ear: External ear normal       Nose: Nose normal       Mouth/Throat:      Mouth: Mucous membranes are moist    Eyes:      General:         Right eye: No discharge  Left eye: No discharge  Pupils: Pupils are equal, round, and reactive to light  Neck:      Comments: Cervical collar and  in place  Cardiovascular:      Rate and Rhythm: Normal rate and regular rhythm  Heart sounds: No murmur heard        Pulmonary:      Effort: Pulmonary effort is normal  No respiratory distress  Breath sounds: No wheezing or rales  Chest:   Breasts:      Right: Mass and skin change present  Left: Normal           Abdominal:      General: Abdomen is flat  There is no distension  Palpations: Abdomen is soft  Tenderness: There is no abdominal tenderness  Musculoskeletal:         General: No swelling or deformity  Normal range of motion  Cervical back: Normal range of motion and neck supple  Skin:     General: Skin is warm and dry  Neurological:      General: No focal deficit present  Mental Status: She is alert and oriented to person, place, and time  Mental status is at baseline  Sensory: No sensory deficit  Motor: No weakness  Psychiatric:         Mood and Affect: Mood normal      Discussion with Family:  Yes    Discharge instructions/Information to patient and family:   See after visit summary for information provided to patient and family        Provisions for Follow-Up Care:  See after visit summary for information related to follow-up care and any pertinent home health orders  Planned Readmission:  No     Discharge Medications:  See after visit summary for reconciled discharge medications provided to patient and family        ** Please Note: This note has been constructed using a voice recognition system **

## 2022-04-04 NOTE — UTILIZATION REVIEW
Continued Stay Review    Date: 4/3/2022                        Current Patient Class: inpatient  Current Level of Care: med/surg  HPI:46 y o  female initially admitted on 3/29 with R breast mass with mets to spinal cord  Assessment/Plan: No acute events overnight  Continues to have neck pain but controlled on medications  [facility] delivered to pts room  Plan for [facility] and hospital bed to be delivered to pt home  Pt medially stable for d/c but pharmacy not open today (Sunday) to get meds  Plan to continue current tx plan; po meds, supportive care with d/c home tomorrow 4/4  Will continue op tx with palliative radiation by radiation oncology  Continue IV decadron with change to po on d/c  Vital Signs:   Date/Time Temp Pulse Resp BP MAP (mmHg) SpO2 O2 Device   04/03/22 22:08:11 97 5 °F (36 4 °C) 84 16 110/76 87 -- --   04/03/22 19:18:50 97 5 °F (36 4 °C) 88 20 128/77 94 -- --   04/03/22 1917 -- -- -- -- -- -- None (Room air)   04/03/22 0845 -- -- -- -- -- -- None (Room air)   04/03/22 07:33:09 97 8 °F (36 6 °C) 72 18 104/63 77 93 % --   04/03/22 02:48:08 97 6 °F (36 4 °C) 66 16 118/70 86 94 % --   04/02/22 23:10:31 97 °F (36 1 °C) Abnormal  86 18 129/86 100 94 % --   04/02/22 1925 -- -- -- -- -- -- None (Room air)   04/02/22 14:59:44 97 9 °F (36 6 °C) 82 18 135/83 100 92 % --   04/02/22 08:45:47 98 °F (36 7 °C) 79 18 141/84 103 93 % --   04/02/22 0730 -- -- -- -- -- -- None (Room air)       Pertinent Labs/Diagnostic Results:     Results from last 7 days   Lab Units 04/03/22  0641 04/02/22  0445 04/01/22  0526 03/31/22  0532 03/31/22  0532 03/29/22  1430 03/29/22  1430   WBC Thousand/uL 10 83* 11 08* 11 43*   < > 12 23*   < > 9 63   HEMOGLOBIN g/dL 12 4 12 5 12 2  --  13 1  --  12 9   HEMATOCRIT % 39 5 39 1 38 5  --  39 7  --  38 6   PLATELETS Thousands/uL 446* 458* 428*   < > 422*   < > 328   NEUTROS ABS Thousands/µL  --   --  9 25*  --  9 66*  --  7 85*    < > = values in this interval not displayed       Results from last 7 days   Lab Units 04/03/22  0641 04/02/22  0445 04/01/22  0526 03/31/22  0532 03/29/22  1430 03/29/22  0642 03/28/22  2330   SODIUM mmol/L 134* 135* 136 135* 135*  --    < >   POTASSIUM mmol/L 4 5 4 2 4 3 4 0 4 1  --    < >   CHLORIDE mmol/L 105 105 105 104 99*  --    < >   CO2 mmol/L 26 24 24 24 26  --    < >   ANION GAP mmol/L 3* 6 7 7 10  --    < >   BUN mg/dL 20 20 19 18 11  --    < >   CREATININE mg/dL 0 62 0 60 0 60 0 68 0 72  --    < >   EGFR ml/min/1 73sq m 107 108 108 104 100  --    < >   CALCIUM mg/dL 9 5 9 4 9 4 9 8 8 9  --    < >   MAGNESIUM mg/dL  --   --   --   --   --  1 8  --     < > = values in this interval not displayed  Results from last 7 days   Lab Units 03/31/22  0532 03/29/22  1430 03/28/22  2330   AST U/L 50* 78* 80*   ALT U/L 96* 130* 128*   ALK PHOS U/L 338* 311* 314*   TOTAL PROTEIN g/dL 8 1 7 7 8 1   ALBUMIN g/dL 3 6 3 4* 3 6   TOTAL BILIRUBIN mg/dL 0 52 0 52 0 49   BILIRUBIN DIRECT mg/dL 0 10  --   --      Results from last 7 days   Lab Units 04/03/22  0641 04/02/22  0445 04/01/22  0526 03/31/22  0532 03/29/22  1430 03/28/22  2330   GLUCOSE RANDOM mg/dL 97 90 107 113 113 105     Results from last 7 days   Lab Units 03/29/22  0206   BLOOD CULTURE  No Growth After 5 Days  No Growth After 5 Days         Medications:   Scheduled Medications:  acetaminophen, 975 mg, Oral, Q8H [address]  amLODIPine, 5 mg, Oral, Daily  dexamethasone, 4 mg, Intravenous, Q6H FABIANA  enoxaparin, 40 mg, Subcutaneous, Q24H FABIANA  lisinopril, 20 mg, Oral, Daily  pantoprazole, 40 mg, Oral, Early Morning  senna, 3 tablet, Oral, BID     PRN Meds:  HYDROmorphone, 0 5 mg, Intravenous, Q3H PRN 4/2 x4, 4/3 x3  Labetalol HCl, 10 mg, Intravenous, Q6H PRN  ondansetron, 4 mg, Intravenous, Q4H PRN  oxyCODONE, 10 mg, Oral, Q4H PRN 4/2 x4, 4/3 x4  oxyCODONE, 5 mg, Oral, Q4H PRN 4/2 x1, 4/3 x1  polyethylene glycol, 17 g, Oral, Daily PRN 4/2 x1        Discharge Plan: home    Network Utilization Review Department  ATTENTION: Please call with any questions or concerns to [phone] and carefully listen to the prompts so that you are directed to the right person  All voicemails are confidential   Stefano Keys all requests for admission clinical reviews, approved or denied determinations and any other requests to dedicated fax number below belonging to the campus where the patient is receiving treatment   List of dedicated fax numbers for the Facilities:  [address] DENIALS (Administrative/Medical Necessity) [phone]   [address] (Maternity/NICU/Pediatrics) [phone]   [address]  [address] [facility] [place] [id number] [address] [place] [address] [phone]

## 2022-04-05 ENCOUNTER — TELEPHONE (OUTPATIENT)
Dept: HEMATOLOGY ONCOLOGY | Facility: CLINIC | Age: 47
End: 2022-04-05

## 2022-04-05 ENCOUNTER — TRANSITIONAL CARE MANAGEMENT (OUTPATIENT)
Dept: INTERNAL MEDICINE CLINIC | Facility: OTHER | Age: 47
End: 2022-04-05

## 2022-04-05 ENCOUNTER — APPOINTMENT (OUTPATIENT)
Dept: RADIATION ONCOLOGY | Facility: HOSPITAL | Age: 47
End: 2022-04-05
Attending: RADIOLOGY
Payer: COMMERCIAL

## 2022-04-05 ENCOUNTER — TELEPHONE (OUTPATIENT)
Dept: NEUROSURGERY | Facility: CLINIC | Age: 47
End: 2022-04-05

## 2022-04-05 ENCOUNTER — APPOINTMENT (OUTPATIENT)
Dept: RADIATION ONCOLOGY | Facility: HOSPITAL | Age: 47
End: 2022-04-05

## 2022-04-05 ENCOUNTER — APPOINTMENT (OUTPATIENT)
Dept: RADIATION ONCOLOGY | Facility: HOSPITAL | Age: 47
End: 2022-04-05
Payer: COMMERCIAL

## 2022-04-05 ENCOUNTER — TRANSCRIBE ORDERS (OUTPATIENT)
Dept: RADIATION ONCOLOGY | Facility: HOSPITAL | Age: 47
End: 2022-04-05

## 2022-04-05 ENCOUNTER — TELEPHONE (OUTPATIENT)
Dept: PALLIATIVE MEDICINE | Age: 47
End: 2022-04-05

## 2022-04-05 ENCOUNTER — TELEPHONE (OUTPATIENT)
Dept: INTERNAL MEDICINE CLINIC | Facility: OTHER | Age: 47
End: 2022-04-05

## 2022-04-05 DIAGNOSIS — C79.49 METASTASIS TO SPINAL CORD (HCC): Primary | ICD-10-CM

## 2022-04-05 PROCEDURE — 77280 THER RAD SIMULAJ FIELD SMPL: CPT | Performed by: RADIOLOGY

## 2022-04-05 PROCEDURE — 77387 GUIDANCE FOR RADJ TX DLVR: CPT | Performed by: RADIOLOGY

## 2022-04-05 PROCEDURE — 77412 RADIATION TX DELIVERY LVL 3: CPT | Performed by: RADIOLOGY

## 2022-04-05 NOTE — TELEPHONE ENCOUNTER
ARTUROM to patient in regards to scheduling her an appointment with Hematology Office  Instructed patient to give the office a call back to schedule at [phone]

## 2022-04-05 NOTE — UTILIZATION REVIEW
Notification of Discharge   This is a Notification of Discharge from our facility [address]  Please be advised that this patient has been discharge from our facility  Below you will find the admission and discharge date and time including the patients disposition  UTILIZATION REVIEW CONTACT:  Malissa Ledezma  Utilization   Network Utilization Review Department  Phone: [phone] x carefully listen to the prompts  All voicemails are confidential   Email: [email or web address]     PHYSICIAN ADVISORY SERVICES:  FOR PWRD-RD-RHOW REVIEW - MEDICAL NECESSITY DENIAL  Phone: [phone]  Fax: [phone]  Email: [email or web address]     PRESENTATION DATE: 3/29/2022  5:26 PM  OBERVATION ADMISSION DATE:   INPATIENT ADMISSION DATE: 3/29/22  5:26 PM   DISCHARGE DATE: 4/4/2022  5:50 PM  DISPOSITION: Home with [place] with [address] INFORMATION:  Send all requests for admission clinical reviews, approved or denied determinations and any other requests to dedicated fax number below belonging to the campus where the patient is receiving treatment   List of dedicated fax numbers:  [address] [place] [address] DENIALS (Administrative/Medical Necessity) [phone]   [address] (Maternity/NICU/Pediatrics) [phone]   [facility] [phone]   130 [facility] [phone]   [address] [place] [address] [phone]   2000 [facility] [address] [place] [address],[address] [phone]   [facility]  [phone]   [address] [place] [address], [place] [address] [facility] 1000 [facility] [phone]

## 2022-04-05 NOTE — TELEPHONE ENCOUNTER
4/5/22- PT New Pratik F/U SCHEDULED 4/15/22  PT WILL NEED CSPINE XRAYS  *LMOM TO CONFIRM F/U APT AND XRAYS 4-5 DAYS PRIOR    ----- Message from Kira Hopper sent at 4/1/2022  3:49 PM EDT -----  Hi ladies,    This lady will need a 2-4 week post hospital follow up as snpx with spine surgeon (Wil Sotelo)  I will order x-rays  Thanks!

## 2022-04-05 NOTE — TELEPHONE ENCOUNTER
Left message for patient to return call to the office to schedule a hospital follow up appointment with Palliative care

## 2022-04-06 ENCOUNTER — APPOINTMENT (OUTPATIENT)
Dept: RADIATION ONCOLOGY | Facility: HOSPITAL | Age: 47
End: 2022-04-06

## 2022-04-06 ENCOUNTER — OFFICE VISIT (OUTPATIENT)
Dept: PALLIATIVE MEDICINE | Facility: CLINIC | Age: 47
End: 2022-04-06
Payer: COMMERCIAL

## 2022-04-06 ENCOUNTER — TELEPHONE (OUTPATIENT)
Dept: PALLIATIVE MEDICINE | Facility: CLINIC | Age: 47
End: 2022-04-06

## 2022-04-06 ENCOUNTER — PATIENT OUTREACH (OUTPATIENT)
Dept: CASE MANAGEMENT | Facility: HOSPITAL | Age: 47
End: 2022-04-06

## 2022-04-06 ENCOUNTER — APPOINTMENT (OUTPATIENT)
Dept: RADIATION ONCOLOGY | Facility: HOSPITAL | Age: 47
End: 2022-04-06
Attending: RADIOLOGY
Payer: COMMERCIAL

## 2022-04-06 ENCOUNTER — TELEPHONE (OUTPATIENT)
Dept: SURGICAL ONCOLOGY | Facility: CLINIC | Age: 47
End: 2022-04-06

## 2022-04-06 ENCOUNTER — TELEPHONE (OUTPATIENT)
Dept: GENETICS | Facility: CLINIC | Age: 47
End: 2022-04-06

## 2022-04-06 VITALS
HEART RATE: 116 BPM | SYSTOLIC BLOOD PRESSURE: 116 MMHG | DIASTOLIC BLOOD PRESSURE: 80 MMHG | OXYGEN SATURATION: 98 % | TEMPERATURE: 99.3 F | RESPIRATION RATE: 16 BRPM

## 2022-04-06 DIAGNOSIS — Z51.5 PALLIATIVE CARE PATIENT: ICD-10-CM

## 2022-04-06 DIAGNOSIS — G89.3 CANCER ASSOCIATED PAIN: ICD-10-CM

## 2022-04-06 DIAGNOSIS — C79.49 METASTASIS TO SPINAL CORD (HCC): ICD-10-CM

## 2022-04-06 DIAGNOSIS — N63.10 BREAST MASS, RIGHT: Primary | ICD-10-CM

## 2022-04-06 DIAGNOSIS — K13.79 MOUTH PAIN: Primary | ICD-10-CM

## 2022-04-06 DIAGNOSIS — C79.51 OSSEOUS METASTASIS (HCC): ICD-10-CM

## 2022-04-06 PROCEDURE — 77387 GUIDANCE FOR RADJ TX DLVR: CPT | Performed by: STUDENT IN AN ORGANIZED HEALTH CARE EDUCATION/TRAINING PROGRAM

## 2022-04-06 PROCEDURE — 1111F DSCHRG MED/CURRENT MED MERGE: CPT | Performed by: INTERNAL MEDICINE

## 2022-04-06 PROCEDURE — G6002 STEREOSCOPIC X-RAY GUIDANCE: HCPCS | Performed by: STUDENT IN AN ORGANIZED HEALTH CARE EDUCATION/TRAINING PROGRAM

## 2022-04-06 PROCEDURE — 77412 RADIATION TX DELIVERY LVL 3: CPT | Performed by: STUDENT IN AN ORGANIZED HEALTH CARE EDUCATION/TRAINING PROGRAM

## 2022-04-06 PROCEDURE — 99215 OFFICE O/P EST HI 40 MIN: CPT | Performed by: INTERNAL MEDICINE

## 2022-04-06 PROCEDURE — 77336 RADIATION PHYSICS CONSULT: CPT | Performed by: STUDENT IN AN ORGANIZED HEALTH CARE EDUCATION/TRAINING PROGRAM

## 2022-04-06 RX ORDER — MORPHINE SULFATE 15 MG/1
15 TABLET, FILM COATED, EXTENDED RELEASE ORAL 3 TIMES DAILY
Qty: 90 TABLET | Refills: 0 | Status: SHIPPED | OUTPATIENT
Start: 2022-04-06 | End: 2022-04-28 | Stop reason: SDUPTHER

## 2022-04-06 NOTE — PROGRESS NOTES
LSW received referral from [address] [place] [address], no specific reasons noted  LSW attempted to contact pt via telephone, no answer  LSW left message with contact information and requested a call back  LSW will attempt 2nd outreach

## 2022-04-06 NOTE — TELEPHONE ENCOUNTER
I introduced myself to Estela Chávez who who was with her (Red) and I reviewed the following:     While the majority of cancer occurs by chance, approximately 5-10% of breast cancer has an underlying genetic cause  Genetic testing is available which can determine if there is an underlying genetic cause to your cancer  Understanding if there is an underlying genetic cause can:  o Provide your surgeon with additional information to help with surgical decisions, treatment decisions and eligibility for clinical trials  o It can determine if you have an increased risk for any additional cancers  o Help family members understand their cancer risk   We work closely with the [place] [address] breast surgeons and are reaching out to see if you have interest in genetic testing  The reason we are reaching out at this time is that this result may help your surgeon determine the appropriate type of surgery (i e  lumpectomy vs mastectomy)  This test is not a requirement but can take 5-10 days to complete so we would like to start the process as soon as possible so the results are ready for your appointment with your surgeon   If you are interested in genetic testing, I can collect your family history and initiate genetic testing for you     Patient elected to pursue testing      Diagnosis Details:  o Invasive mammary carcinoma   o Right  o ER/TN negative, HER2 positive   Personal History:  o Do you have a personal history of any other cancer? No  - If yes type/age of diagnosis:    Family history:   o Do you have Ashkenazi Evangelical ancestry? No  - If yes, maternal, paternal, or both?  o Do you have any children? Yes  - How many sons? 1  - How many daughters? 0  - Do any of your children have a history of cancer? No  - If yes type/age of diagnosis:     o Do you have any brothers or sisters? Yes  - How many brothers? 2  - How many sisters? 3  - Are they from the same parents?  No  - If no how maternal/paternal half-siblings: maternal half brothers x 2  - Do any of your brothers or sisters have a history of cancer? No  - If yes who and the type/age of diagnosis:           Do you have nieces or nephews? Yes  - Do any of them have a history of cancer? No  - If yes type/age of diagnosis:    o Does your mother have a history of cancer? No  - If yes, cancer type and age of diagnosis:   - Is your mother still living? Yes  - Age/Age of death: 79    o Thinking about your mother's family (aunts, uncles, cousins, grandparents) is there anyone with a history of cancer? No  - If yes, list relationship, cancer type and age of diagnosis:    o Does your father have a history of cancer? No  - If yes, cancer type and age of diagnosis:  - Is your father still living? No  - Age/age of death: 67    o Thinking about your father's family (aunts, uncles, cousins, grandparents) is there anyone with a history of cancer? No  - If yes, list relationship, cancer type and age of diagnosis:       Types of Results- positive, negative, VUS  o Positive result- may explain personal diagnosis/family history  Can give surgeon information on treatment plan, inform future screening/management or tell a person about other possible risks  Positive results can initiate testing for other family members who may be at risk (children, siblings, etc)  o Negative result- does not give an explanation  Surgical/treatment plan will be based on clinical presentation and will be part of discussion with surgeon  Negative result cannot be passed down to children, but they are still at elevated risk  o Uncertain result- common, but treated like a negative result clinically  90% are downgraded over time  [facility] billing policy   o Most insurance plans cover the cost of genetic testing    The out-of-pocket cost varies due to the differences in deductibles, co-payments and co-insurance defined by individual plans but 90% of people pay $100 or less for a genetic test     We have genetic counselors available, if you have any additional questions or would like to speak with them we can schedule you a 15 minute phone appointment  Plan:  A blood kit was collected and shipped  Genetic Testing Preformed: [facility] BRCAplus STAT Panel (8 genes): MISBAH, BRCA1, BRCA2, CDH1, CHEK2, PALB2, PTEN, TP53 with reflex to [facility] CancerNext (28 additional genes): APC, MISBAH, AXIN2 BARD1, BRCA1, BRCA2, BRIP1, BMPR1A, CDH1, CDK4, CDKN2A, CHEK2, DICER1, EPCAM, GREM1, HOXB13, MLH1, MSH2, MSH3, MSH6, MUTYH, NBN, NF1, NTHL1, PALB2, PMS2, POLD1, POLE, PTEN, RAD51C, RAD51D, RECQL SMAD4, SMARCA4, STK11, TP53    Initial results will take approximately 5-12 days to return     Additional results may take up to 2-3 weeks to complete once test is started  Patient does not have any further questions, declined meeting with genetic counselor    When your results are ready, someone from the genetics team will call you, review the results, and contact your breast surgeon  You will be contacted with any type of result- positive, negative, or uncertain

## 2022-04-06 NOTE — PROGRESS NOTES
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 ([facility] [address] )    4  Cancer associated pain    5  Palliative care patient      Requested Prescriptions      No prescriptions requested or ordered in this encounter     There are no discontinued medications  PLAN:  1  Symptom management-    -start MS Contin 15 mg t i d    -continue oxycodone 10 mg p r n  with instructions to continue to keep a pain log   -continue Dilaudid 4 mg post radiation treatments   -continue Decadron at current dosing and will start to wean after radiation is completed   -senna and MiraLax for opioid induced constipation   -provided information on medical marijuana   -provided information on nutrition     2  Goals of care: Full cares at this time  Patient is currently undergoing palliative radiation to her cervical spine and sacrum and she will meet with Medical Oncology next week to discuss disease focus cares  3    Psychosocial support:  Time spent providing supportive listening  Will have social work team reach out to her as she has many questions about services and support  4    ACP:  Did not addressed at this visit    5  Return to care:  2 weeks, call with uncontrolled symptoms  Representatives have queried the patient's controlled substance dispensing history in the Prescription Drug Monitoring Program in compliance with regulations before I have prescribed any controlled substances  The prescription history is consistent with prescribed therapy and our practice policies  No follow-ups on file  Subjective: In attendance at this visit: Sylvester Ormond and her   Chief Complaint  Follow up visit for:  symptom management, pain, neoplasm related, assessment of goals of care, disease process education and discussion of prognosis, emotional support  HPI     Sylvester Ormond is a 51 y o  female with a new diagnosis of metastatic breast cancer  She had presented to [facility] for worsening neck pain and was found to have a fungating right breast mass and diffuse metastatic disease  Is undergoing palliative radiation and will be meeting with Medical Oncology to discuss treatment options  In the hospital she was started on oxycodone and steroids who presents today in outpatient clinic for follow-up on symptoms related to her breast cancer  Since hospital discharge she has continued issues with ongoing pain  She states that she is using her oxycodone almost every 4 hours consistently  She states her pain is worse at night and ranks it as 8/10 on a pain scale  She is unable to determine if the oxycodone with the Dilaudid works better for her but she does note that they both provide improvement in her symptoms  She feels that some of the best relief she is getting is from the steroids but no she cannot be on this long-term  She does admit to feeling very overwhelmed with all the changes in her life  She feels that the support she has is not enough  Her mother-in-law continues to help her but she does not feel that she is able to meet her care needs at this time  She has questions about other services that are available in is also understanding that she may need to go to a rehab facility for short term while she undergoes her treatments  She has several questions about nutrition and will refer her to see Oncology nutrition  She also has questions about medical marijuana and provided information and instructed her to get a state ID  Finally, she asked about handicap placard and form was completed by myself and presented to them to take to the [facility]  Oncology History    No history exists  The following portions of the medical history were reviewed: past medical history, problem list, medication list, and social history      Current Outpatient Medications:     amLODIPine (NORVASC) 5 mg tablet, Take 1 tablet (5 mg total) by mouth daily, Disp: 30 tablet, Rfl: 0    dexamethasone (DECADRON) 4 mg tablet, Take 1 tablet (4 mg total) by mouth 3 (three) times a day, Disp: 45 tablet, Rfl: 0    HYDROmorphone (DILAUDID) 4 mg tablet, Take 1 tablet daily as needed following radiation therapy  , Disp: 10 tablet, Rfl: 0    lisinopril (ZESTRIL) 20 mg tablet, Take 1 tablet (20 mg total) by mouth daily, Disp: 30 tablet, Rfl: 0    norgestimate-ethinyl estradiol (ORTHO-CYCLEN) 0 25-35 MG-MCG per tablet, Take 1 tablet by mouth daily, Disp: , Rfl:     oxyCODONE (ROXICODONE) 10 MG TABS, Take 1 tablet (10 mg total) by mouth every 4 (four) hours as needed for severe pain May split pill in half (5 mg) every 4 hours as needed for moderate pain (5 mg)  Max Daily Amount: 60 mg, Disp: 180 tablet, Rfl: 0    pantoprazole (PROTONIX) 40 mg tablet, Take 1 tablet (40 mg total) by mouth daily in the early morning, Disp: 30 tablet, Rfl: 0    polyethylene glycol (MIRALAX) 17 g packet, Take 17 g by mouth daily, Disp: 30 each, Rfl: 0    senna (SENOKOT) 8 6 mg, Take 3 tablets (25 8 mg total) by mouth 2 (two) times a day Hold for loose stool , Disp: 180 tablet, Rfl: 0  Review of Systems   Constitutional: Positive for activity change  Gastrointestinal: Positive for constipation  Musculoskeletal: Positive for back pain, gait problem, myalgias, neck pain and neck stiffness  Neurological: Positive for weakness  Psychiatric/Behavioral: The patient is nervous/anxious  All other systems reviewed and are negative  All other systems negative    Objective:  Vital Signs  There were no vitals taken for this visit  Physical Exam    Constitutional: Appears chronically ill   In no acute physical or emotional distress  Head: Normocephalic and atraumatic  Eyes: EOM are normal  No ocular discharge  No scleral icterus     Neck: No visible adenopathy or masses  Cardiovascular: Regular rate and rhythm, palpable distal pulses   Respiratory: Effort normal  No stridor  No respiratory distress  Gastrointestinal: No abdominal distension  Abdomen is soft  Musculoskeletal: No edema  Neurological: Alert, oriented and appropriately conversant  Skin: No diaphoresis, dry and warm to touch, no rashes seen on exposed areas of skin  Psychiatric: Displays a normal mood and affect  Behavior, judgement and thought content appear normal      Portions of this document may have been created using dictation software and as such some "sound alike" terms may have been generated by the system  Do not hesitate to contact me with any questions or clarifications

## 2022-04-07 ENCOUNTER — APPOINTMENT (OUTPATIENT)
Dept: RADIATION ONCOLOGY | Facility: HOSPITAL | Age: 47
End: 2022-04-07
Attending: RADIOLOGY

## 2022-04-07 ENCOUNTER — OFFICE VISIT (OUTPATIENT)
Dept: INTERNAL MEDICINE CLINIC | Facility: CLINIC | Age: 47
End: 2022-04-07
Payer: COMMERCIAL

## 2022-04-07 ENCOUNTER — RADIATION THERAPY TREATMENT (OUTPATIENT)
Dept: RADIATION ONCOLOGY | Facility: HOSPITAL | Age: 47
End: 2022-04-07
Attending: RADIOLOGY
Payer: COMMERCIAL

## 2022-04-07 ENCOUNTER — APPOINTMENT (OUTPATIENT)
Dept: RADIATION ONCOLOGY | Facility: HOSPITAL | Age: 47
End: 2022-04-07

## 2022-04-07 VITALS
BODY MASS INDEX: 35.34 KG/M2 | TEMPERATURE: 98 F | HEIGHT: 64 IN | HEART RATE: 88 BPM | OXYGEN SATURATION: 99 % | SYSTOLIC BLOOD PRESSURE: 134 MMHG | WEIGHT: 207 LBS | DIASTOLIC BLOOD PRESSURE: 82 MMHG

## 2022-04-07 DIAGNOSIS — N63.10 MASS OF RIGHT BREAST, UNSPECIFIED QUADRANT: ICD-10-CM

## 2022-04-07 DIAGNOSIS — I10 PRIMARY HYPERTENSION: ICD-10-CM

## 2022-04-07 DIAGNOSIS — Z76.89 ENCOUNTER FOR SUPPORT AND COORDINATION OF TRANSITION OF CARE: Primary | ICD-10-CM

## 2022-04-07 DIAGNOSIS — C79.51 OSSEOUS METASTASIS (HCC): ICD-10-CM

## 2022-04-07 PROCEDURE — 99496 TRANSJ CARE MGMT HIGH F2F 7D: CPT | Performed by: INTERNAL MEDICINE

## 2022-04-07 PROCEDURE — 77427 RADIATION TX MANAGEMENT X5: CPT | Performed by: RADIOLOGY

## 2022-04-07 PROCEDURE — 77387 GUIDANCE FOR RADJ TX DLVR: CPT | Performed by: RADIOLOGY

## 2022-04-07 PROCEDURE — G6002 STEREOSCOPIC X-RAY GUIDANCE: HCPCS | Performed by: RADIOLOGY

## 2022-04-07 PROCEDURE — 1111F DSCHRG MED/CURRENT MED MERGE: CPT | Performed by: INTERNAL MEDICINE

## 2022-04-07 PROCEDURE — 77412 RADIATION TX DELIVERY LVL 3: CPT | Performed by: RADIOLOGY

## 2022-04-07 RX ORDER — DIPHENHYDRAMINE HYDROCHLORIDE AND LIDOCAINE HYDROCHLORIDE AND ALUMINUM HYDROXIDE AND MAGNESIUM HYDRO
KIT
COMMUNITY
Start: 2022-04-06

## 2022-04-07 NOTE — PROGRESS NOTES
Assessment/Plan:     No problem-specific Assessment & Plan notes found for this encounter  Diagnoses and all orders for this visit:    Encounter for support and coordination of transition of care    Mass of right breast, unspecified quadrant  Comments:  ER/[place] negative her 2 positive  Scheduled to see medical and surgical oncology the next week    Osseous metastasis ([facility] [address] )  Comments:  Receiving radiotherapy    Primary hypertension  Comments: Well controlled, continue amlodipine and lisinopril    Other orders  -     DPH-Lido-AlHydr-MgHydr-Simeth ([facility] BLM) SUSP         Subjective:     Patient ID: Saida Cabral is a 51 y o  female  Patient come for transition of care following recent hospitalization for right breast mass, with metal stasis to see spine and sacrum, receiving radiation therapy, patient is scheduled to see Medical and Surgical Oncology in the next week  Palliative care has been established and patient is on pain medications with adequate pain control  Review of Systems   Constitutional: Negative for appetite change, chills, diaphoresis, fatigue, fever and unexpected weight change  Respiratory: Negative for apnea, cough, choking, chest tightness, shortness of breath, wheezing and stridor  Cardiovascular: Negative for chest pain, palpitations and leg swelling  Gastrointestinal: Positive for constipation  Negative for abdominal distention, abdominal pain, anal bleeding, blood in stool, diarrhea, nausea and vomiting  Genitourinary: Negative for decreased urine volume, difficulty urinating, frequency and urgency  Musculoskeletal: Positive for back pain and neck pain  Negative for arthralgias and myalgias  Neurological: Negative for dizziness, light-headedness, numbness and headaches  Objective:     Physical Exam  Vitals reviewed  Constitutional:       General: She is not in acute distress  Appearance: Normal appearance   She is not ill-appearing, toxic-appearing or diaphoretic  Cardiovascular:      Rate and Rhythm: Normal rate and regular rhythm  Pulses: Normal pulses  Heart sounds: Normal heart sounds  No murmur heard  No friction rub  No gallop  Pulmonary:      Effort: Pulmonary effort is normal  No respiratory distress  Breath sounds: Normal breath sounds  No stridor  No wheezing, rhonchi or rales  Chest:      Chest wall: No tenderness  Musculoskeletal:         General: No swelling or tenderness  Right lower leg: No edema  Left lower leg: No edema  Skin:     General: Skin is warm and dry  Findings: No lesion or rash  Neurological:      Mental Status: She is alert and oriented to person, place, and time  Vitals:    04/07/22 0916   BP: 134/82   BP Location: Left arm   Patient Position: Sitting   Cuff Size: Large   Pulse: 88   Temp: 98 °F (36 7 °C)   SpO2: 99%   Weight: 93 9 kg (207 lb)   Height: 5' 4" (1 626 m)       Transitional Care Management Review:  Clay Pollard is a 51 y o  female here for TCM follow up  During the TCM phone call patient stated:    TCM Call (since 3/7/2022)     Date and time call was made  4/5/2022 10:17 AM    Hospital care reviewed  Records reviewed        Patient was hospitial[facility] at  [facility]        Date of Admission  03/29/22    Date of discharge  04/04/22    Diagnosis  right breast mass    Disposition  Home    Current Symptoms  Constipation  breast pain      TCM Call (since 3/7/2022)     Post hospital issues  Reduced activity; Poor ADL (Activities of Daily Living) performance    Scheduled for follow up?   Yes    Did you obtain your prescribed medications  Yes    Do you need help managing your prescriptions or medications  Yes    Why type of assitance do you need  someone helps    I have advised the patient to call PCP with any new or worsening symptoms  Wilma Page MD

## 2022-04-08 ENCOUNTER — APPOINTMENT (OUTPATIENT)
Dept: RADIATION ONCOLOGY | Facility: HOSPITAL | Age: 47
End: 2022-04-08

## 2022-04-08 ENCOUNTER — TELEPHONE (OUTPATIENT)
Dept: PALLIATIVE MEDICINE | Facility: CLINIC | Age: 47
End: 2022-04-08

## 2022-04-08 ENCOUNTER — APPOINTMENT (OUTPATIENT)
Dept: RADIATION ONCOLOGY | Facility: HOSPITAL | Age: 47
End: 2022-04-08
Attending: RADIOLOGY
Payer: COMMERCIAL

## 2022-04-08 LAB
DME PARACHUTE DELIVERY DATE ACTUAL: NORMAL
DME PARACHUTE DELIVERY DATE REQUESTED: NORMAL
DME PARACHUTE ITEM DESCRIPTION: NORMAL
DME PARACHUTE ORDER STATUS: NORMAL
DME PARACHUTE SUPPLIER NAME: NORMAL
DME PARACHUTE SUPPLIER PHONE: NORMAL

## 2022-04-08 PROCEDURE — 77412 RADIATION TX DELIVERY LVL 3: CPT | Performed by: RADIOLOGY

## 2022-04-08 PROCEDURE — 77387 GUIDANCE FOR RADJ TX DLVR: CPT | Performed by: RADIOLOGY

## 2022-04-08 PROCEDURE — G6002 STEREOSCOPIC X-RAY GUIDANCE: HCPCS | Performed by: RADIOLOGY

## 2022-04-08 NOTE — TELEPHONE ENCOUNTER
[facility] called they will see Cherelle Owens for the next 2 to 3 week twice a week also need verbal order for   Looking in the file looks like it was done by Dr Hi Joseph  But the therapist asked for verbal order

## 2022-04-09 ENCOUNTER — LAB REQUISITION (OUTPATIENT)
Dept: LAB | Facility: HOSPITAL | Age: 47
End: 2022-04-09
Payer: COMMERCIAL

## 2022-04-09 DIAGNOSIS — C79.49 SECONDARY MALIGNANT NEOPLASM OF OTHER PARTS OF NERVOUS SYSTEM (HCC): ICD-10-CM

## 2022-04-09 LAB
ALBUMIN SERPL BCP-MCNC: 3.2 G/DL (ref 3.5–5)
ALP SERPL-CCNC: 406 U/L (ref 46–116)
ALT SERPL W P-5'-P-CCNC: 47 U/L (ref 12–78)
ANION GAP SERPL CALCULATED.3IONS-SCNC: 9 MMOL/L (ref 4–13)
AST SERPL W P-5'-P-CCNC: 27 U/L (ref 5–45)
BILIRUB SERPL-MCNC: 1.26 MG/DL (ref 0.2–1)
BUN SERPL-MCNC: 18 MG/DL (ref 5–25)
CALCIUM ALBUM COR SERPL-MCNC: 9.8 MG/DL (ref 8.3–10.1)
CALCIUM SERPL-MCNC: 9.2 MG/DL (ref 8.3–10.1)
CHLORIDE SERPL-SCNC: 99 MMOL/L (ref 100–108)
CO2 SERPL-SCNC: 24 MMOL/L (ref 21–32)
CREAT SERPL-MCNC: 0.55 MG/DL (ref 0.6–1.3)
GFR SERPL CREATININE-BSD FRML MDRD: 112 ML/MIN/1.73SQ M
GLUCOSE SERPL-MCNC: 90 MG/DL (ref 65–140)
POTASSIUM SERPL-SCNC: 5.2 MMOL/L (ref 3.5–5.3)
PROT SERPL-MCNC: 6.8 G/DL (ref 6.4–8.2)
SODIUM SERPL-SCNC: 132 MMOL/L (ref 136–145)

## 2022-04-09 PROCEDURE — 80053 COMPREHEN METABOLIC PANEL: CPT | Performed by: STUDENT IN AN ORGANIZED HEALTH CARE EDUCATION/TRAINING PROGRAM

## 2022-04-11 ENCOUNTER — RADIATION THERAPY TREATMENT (OUTPATIENT)
Dept: RADIATION ONCOLOGY | Facility: HOSPITAL | Age: 47
End: 2022-04-11
Attending: RADIOLOGY
Payer: COMMERCIAL

## 2022-04-11 ENCOUNTER — APPOINTMENT (OUTPATIENT)
Dept: RADIATION ONCOLOGY | Facility: HOSPITAL | Age: 47
End: 2022-04-11

## 2022-04-11 DIAGNOSIS — G89.3 CANCER ASSOCIATED PAIN: ICD-10-CM

## 2022-04-11 DIAGNOSIS — Z51.5 PALLIATIVE CARE PATIENT: ICD-10-CM

## 2022-04-11 DIAGNOSIS — C79.52 SECONDARY MALIGNANT NEOPLASM OF BONE AND BONE MARROW (HCC): ICD-10-CM

## 2022-04-11 DIAGNOSIS — C79.49 METASTASIS TO SPINAL CORD (HCC): ICD-10-CM

## 2022-04-11 DIAGNOSIS — C79.51 SECONDARY MALIGNANT NEOPLASM OF BONE AND BONE MARROW (HCC): ICD-10-CM

## 2022-04-11 PROCEDURE — 77387 GUIDANCE FOR RADJ TX DLVR: CPT | Performed by: RADIOLOGY

## 2022-04-11 PROCEDURE — G6002 STEREOSCOPIC X-RAY GUIDANCE: HCPCS | Performed by: RADIOLOGY

## 2022-04-11 PROCEDURE — 77412 RADIATION TX DELIVERY LVL 3: CPT | Performed by: RADIOLOGY

## 2022-04-11 RX ORDER — HYDROMORPHONE HYDROCHLORIDE 4 MG/1
TABLET ORAL
Qty: 10 TABLET | Refills: 0 | Status: ON HOLD | OUTPATIENT
Start: 2022-04-11 | End: 2022-04-20

## 2022-04-11 NOTE — TELEPHONE ENCOUNTER
Primary palliative medicine provider: Dr Viet Fernando  Medication requested:  Dilaudid 4 mg     If for pain, how has the patient been taking their pain medicine? Last appointment: 4/6     Next scheduled appointment: 4/20    PDMP review:    [id number] 04/06/2022 04/06/2022 Morphine Sulfate (Tablet, Extended Release) 90 0 30 15 MG 45 0 [facility]  [facility]  00 / 0 Michellema     2 [id number] 04/02/2022 04/02/2022 HYDROmorphone HCL (Tablet) 10 0 7 4 MG 22 86 [facility] Commercial Insurance 00 / 00 [place]    2 [id number] 04/01/2022 04/01/2022 oxyCODONE HCL (Tablet) 180 0 30 10 MG 90 0 181 [facility] [facility] Private Pay 00 / 00 PA        Patient stated "I had enough to last me  until my last treatment on wednesday but now they schedule more treatments and will need medication, if is not okay for the Dr to fill this medication I will like to speak to the Dr "    Please advise

## 2022-04-11 NOTE — TELEPHONE ENCOUNTER
Hammad from our [facility] PT is asking for the verbal order for a Medical SW to come out to the home for this patient  Marcelino Galindo states the original PT order was from an  inpatient hospitalist and he would take a verbal, you could just let me know

## 2022-04-12 ENCOUNTER — TELEMEDICINE (OUTPATIENT)
Dept: PALLIATIVE MEDICINE | Age: 47
End: 2022-04-12

## 2022-04-12 ENCOUNTER — APPOINTMENT (OUTPATIENT)
Dept: RADIATION ONCOLOGY | Facility: HOSPITAL | Age: 47
End: 2022-04-12
Attending: RADIOLOGY
Payer: COMMERCIAL

## 2022-04-12 ENCOUNTER — HOSPITAL ENCOUNTER (OUTPATIENT)
Dept: RADIOLOGY | Facility: HOSPITAL | Age: 47
Discharge: HOME | End: 2022-04-12
Payer: COMMERCIAL

## 2022-04-12 ENCOUNTER — APPOINTMENT (OUTPATIENT)
Dept: RADIATION ONCOLOGY | Facility: HOSPITAL | Age: 47
End: 2022-04-12

## 2022-04-12 DIAGNOSIS — Z71.89 COUNSELING AND COORDINATION OF CARE: Primary | ICD-10-CM

## 2022-04-12 DIAGNOSIS — C79.51 OSSEOUS METASTASIS (HCC): ICD-10-CM

## 2022-04-12 PROCEDURE — 77387 GUIDANCE FOR RADJ TX DLVR: CPT | Performed by: RADIOLOGY

## 2022-04-12 PROCEDURE — NC001 PR NO CHARGE

## 2022-04-12 PROCEDURE — 77412 RADIATION TX DELIVERY LVL 3: CPT | Performed by: RADIOLOGY

## 2022-04-12 PROCEDURE — G6002 STEREOSCOPIC X-RAY GUIDANCE: HCPCS | Performed by: RADIOLOGY

## 2022-04-12 PROCEDURE — 72040 X-RAY EXAM NECK SPINE 2-3 VW: CPT

## 2022-04-12 NOTE — PROGRESS NOTES
Palliative Outpatient Assessment of Need    MSW completed an assessment of need which was completed with patient in the via phone  Family dynamics: Supportive Network  Relationship status: Engaged   Duration of relationship:   Name of significant other:  Children and Ages: 1 son  Nura-age 21  Pets: 2 dogs   Other important family information:  Living situation: With fiance    Patient's primary caregiver:  Self and future MIL is assisting  Any limitations of caregiver:  Environmental concerns or barriers:   history: N/A  Employment history/source of income: Works at [employer] [address] in [place]  Disability:  Currently on Ritot  Spirituality/ Yazdanism:    Patient's strengths, social supports, and resources: Has great support from family and her fiance  We spoke about private caregiver agencies and I sent out a resource guide  She is also going to utilize care com  I also suggested [employer]   Cultural information:   Mental Health current or previous: positive- taking things day by day  Substance use or history:   Sleep: Poor  Exercise: Physical and occupational therapy  Diet/nutrition: Adequate  Durable Medical Equipment needs: None- she has a walker, wheelchair and a hospital bed  Transportation: family  Financial concerns:  Advanced Directive: Other medical or social work providers involved: Colleen Stephens called and left a message  Patient/caregiver current level of coping: Pt is coping appropriately  Understanding: Pt is understanding of her dx  Patient/family concerns and areas of need: Pts main concern was resources  Patient's Interests: Patient enjoys spending time with her dogs, cooking, and going on traveling adventures    *All questions may not be answered due to constraints    Follow-up discussions may need to occur

## 2022-04-13 ENCOUNTER — OFFICE VISIT (OUTPATIENT)
Dept: HEMATOLOGY ONCOLOGY | Facility: CLINIC | Age: 47
End: 2022-04-13
Payer: COMMERCIAL

## 2022-04-13 ENCOUNTER — TELEPHONE (OUTPATIENT)
Dept: HEMATOLOGY ONCOLOGY | Facility: CLINIC | Age: 47
End: 2022-04-13

## 2022-04-13 ENCOUNTER — APPOINTMENT (OUTPATIENT)
Dept: RADIATION ONCOLOGY | Facility: HOSPITAL | Age: 47
End: 2022-04-13
Attending: RADIOLOGY
Payer: COMMERCIAL

## 2022-04-13 ENCOUNTER — APPOINTMENT (OUTPATIENT)
Dept: RADIATION ONCOLOGY | Facility: HOSPITAL | Age: 47
End: 2022-04-13

## 2022-04-13 VITALS
OXYGEN SATURATION: 98 % | DIASTOLIC BLOOD PRESSURE: 80 MMHG | SYSTOLIC BLOOD PRESSURE: 110 MMHG | RESPIRATION RATE: 14 BRPM | TEMPERATURE: 97.6 F | HEART RATE: 120 BPM

## 2022-04-13 DIAGNOSIS — C50.811 MALIGNANT NEOPLASM OF OVERLAPPING SITES OF RIGHT BREAST IN FEMALE, ESTROGEN RECEPTOR NEGATIVE (HCC): Primary | ICD-10-CM

## 2022-04-13 DIAGNOSIS — C79.51 OSSEOUS METASTASIS (HCC): Primary | ICD-10-CM

## 2022-04-13 DIAGNOSIS — Z17.1 MALIGNANT NEOPLASM OF OVERLAPPING SITES OF RIGHT BREAST IN FEMALE, ESTROGEN RECEPTOR NEGATIVE (HCC): Primary | ICD-10-CM

## 2022-04-13 DIAGNOSIS — C79.51 OSSEOUS METASTASIS (HCC): ICD-10-CM

## 2022-04-13 DIAGNOSIS — Z17.1 MALIGNANT NEOPLASM OF OVERLAPPING SITES OF RIGHT BREAST IN FEMALE, ESTROGEN RECEPTOR NEGATIVE (HCC): ICD-10-CM

## 2022-04-13 DIAGNOSIS — C50.811 MALIGNANT NEOPLASM OF OVERLAPPING SITES OF RIGHT BREAST IN FEMALE, ESTROGEN RECEPTOR NEGATIVE (HCC): ICD-10-CM

## 2022-04-13 DIAGNOSIS — Z95.828 PORT-A-CATH IN PLACE: Primary | ICD-10-CM

## 2022-04-13 DIAGNOSIS — C79.49 METASTASIS TO SPINAL CORD (HCC): ICD-10-CM

## 2022-04-13 PROCEDURE — 77412 RADIATION TX DELIVERY LVL 3: CPT | Performed by: STUDENT IN AN ORGANIZED HEALTH CARE EDUCATION/TRAINING PROGRAM

## 2022-04-13 PROCEDURE — 77336 RADIATION PHYSICS CONSULT: CPT | Performed by: RADIOLOGY

## 2022-04-13 PROCEDURE — 99205 OFFICE O/P NEW HI 60 MIN: CPT | Performed by: INTERNAL MEDICINE

## 2022-04-13 PROCEDURE — 77387 GUIDANCE FOR RADJ TX DLVR: CPT | Performed by: STUDENT IN AN ORGANIZED HEALTH CARE EDUCATION/TRAINING PROGRAM

## 2022-04-13 PROCEDURE — G6002 STEREOSCOPIC X-RAY GUIDANCE: HCPCS | Performed by: STUDENT IN AN ORGANIZED HEALTH CARE EDUCATION/TRAINING PROGRAM

## 2022-04-13 PROCEDURE — 1111F DSCHRG MED/CURRENT MED MERGE: CPT | Performed by: INTERNAL MEDICINE

## 2022-04-13 RX ORDER — SODIUM CHLORIDE 9 MG/ML
20 INJECTION, SOLUTION INTRAVENOUS ONCE
Status: CANCELLED | OUTPATIENT
Start: 2022-04-28

## 2022-04-13 RX ORDER — SODIUM CHLORIDE 9 MG/ML
20 INJECTION, SOLUTION INTRAVENOUS ONCE
Status: CANCELLED | OUTPATIENT
Start: 2022-06-09

## 2022-04-13 RX ORDER — LIDOCAINE AND PRILOCAINE 25; 25 MG/G; MG/G
CREAM TOPICAL AS NEEDED
Qty: 30 G | Refills: 0 | Status: SHIPPED | OUTPATIENT
Start: 2022-04-13 | End: 2022-05-18

## 2022-04-13 RX ORDER — SODIUM CHLORIDE 9 MG/ML
20 INJECTION, SOLUTION INTRAVENOUS ONCE
Status: CANCELLED | OUTPATIENT
Start: 2022-05-19

## 2022-04-13 NOTE — PROGRESS NOTES
Hematology / Oncology Outpatient Consult Note    Bradford Leyden 51 y o  female [id number]         Date:  4/13/2022    Assessment / Plan:  A 79-year-old premenopausal woman with newly diagnosed metastatic breast cancer, grade 2, ER negative, VA negative, HER2 3+ disease  She has large fungating mass in her right breast, palpable right axillary adenopathy as well as diffuse osseous metastasis with C4 stenosis  She is currently on palliative radiation therapy to her neck, shoulder as well as lower back which will be completed in early next week  She presents today with her mother and fiancee to discuss systemic therapy  We had extensive discussion regarding the diagnosis, stage IV disease, highly treatable but incurable disease, prognosis as well as treatment options  I recommended her to have Taxotere, trastuzumab and pertuzumab  Side effects of this regimen was thoroughly discussed, including but not limited to alopecia, nausea, small chance of vomiting, neutropenia, risk of infection, cardiotoxicity, allergic reaction as well as fatigue  She understood and wished to proceed  She may be eligible for clinical trial which has standard care of treatment with addition of checkpoint inhibitor  She is interested in clinical trial if she is eligible  I am going to contact clinical trial office  She has appointment with Dr Shelly Jordan tomorrow  I would like to ask him to place a Port-A-Cath  She is in agreement with my recommendation  All the patient and her family's questions were answered to their satisfaction  I will see her again toward end of May 2022 for follow-up  She was instructed to give us a call immediately if she has fever above 100 4  Subjective:     HPI:  A 79-year-old premenopausal woman who was recently hospitalized because of the progressive neck pain which radiated to the bilateral upper extremity as well as enlarging right breast mass    She was found to have fungating right breast mass  CT scan of chest abdomen pelvis showed diffuse bony metastasis, especially with C4 stenosis  In addition, CT scan showed 6 9 cm of right breast mass  She had right breast biopsy which showed invasive ductal carcinoma, grade 2, ER negative, SD negative, HER2 3+ disease  Her tumor marker were find to be mildly elevated  CT of the head was negative for brain metastasis  She is currently on palliative radiation to her neck, shoulder as well as lower back  She is going to complete palliative radiation therapy in early next week  She presents today with her mother and fiancee to discuss medical treatment for metastatic breast cancer  Her pain is reasonably controlled with narcotics medications  She has no recent weight loss  She denied any respiratory symptoms  She has no significant past medical history  Therefore, prior to this hospitalization, she was not on any medications  She denied family history of breast or ovarian cancer  She is a lifetime never smoker  Her performance status is 1/4 on ECOG scale  Interval History:          Objective:     Primary Diagnosis:    Metastatic breast cancer, grade 2, ER negative, SD negative, HER2 3+ disease  Diagnosed in March 2022  Cancer Staging:  Cancer Staging  No matching staging information was found for the patient  Previous Hematologic/ Oncologic Treatment:         Current Hematologic/ Oncologic Treatment:      Systemic chemotherapy with Taxotere, trastuzumab of and pertuzumab to be started in April 28, 2022  Or possibly clinical trial with addition of checkpoint inhibitor  Disease Status:     Not evaluated at this time yet  Test Results:    Pathology:    Right breast biopsy showed invasive ductal carcinoma, grade 2 ER negative, SD negative, HER2 3+ disease  Radiology:    CT scan of head was negative for metastatic disease in her brain      CT scan of chest abdomen pelvis showed C4 metastasis with diffuse osseous metastasis especially with C4 stenosis  6 9 cm of right breast mass  Laboratory:    See below for CBC and CMP  CA 27, 29 was 110  CA 15-3 was 91 6  Physical Exam:      General Appearance:    Alert, oriented        Eyes:    PERRL   Ears:    Normal external ear canals, both ears   Nose:   Nares normal, septum midline   Throat:   Mucosa moist  Pharynx without injection  Neck:   Supple       Lungs:     Clear to auscultation bilaterally   Chest Wall:    No tenderness or deformity    Heart:    Regular rate and rhythm       Abdomen:     Soft, non-tender, bowel sounds +, no organomegaly           Extremities:   Extremities no cyanosis or edema       Skin:   no rash or icterus  Lymph nodes:   Cervical, supraclavicular, and axillary nodes normal   Neurologic:   CNII-XII intact, normal strength, sensation and reflexes     Throughout          Breast exam:   11 x 11 cm of palpable mass at in the upper quadrant of her right breast with 5 cm of central ulceration  2 5 cm of palpable right axillary adenopathy  Left breast exam is negative  ROS: Review of Systems   All other systems reviewed and are negative  Imaging: CTA head and neck w wo contrast    Result Date: 3/31/2022  Narrative: CTA NECK AND BRAIN WITH AND WITHOUT CONTRAST INDICATION: eval vert given cervical mets and brain for mets and osseous lesions COMPARISON:   Cervical spine MRI 3/29/2022, brain MRI 3/30/2022 TECHNIQUE:  Routine CT imaging of the Brain without contrast   Post contrast imaging was performed after administration of iodinated contrast through the neck and brain  Post contrast axial 0 625 mm images timed to opacify the arterial system  3D rendering was performed on an independent workstation  MIP reconstructions performed  Coronal reconstructions were performed of the noncontrast portion of the brain  Radiation dose length product (DLP) for this visit:  1508 75 mGy-cm     This examination, like all CT scans performed in the [facility], was performed utilizing techniques to minimize radiation dose exposure, including the use of iterative reconstruction and automated exposure control  IV Contrast:  85 mL of iohexol (OMNIPAQUE)  IMAGE QUALITY:   Diagnostic FINDINGS: NONCONTRAST BRAIN PARENCHYMA:  No intracranial mass, mass effect or midline shift  No CT signs of acute infarction  No acute parenchymal hemorrhage  VENTRICLES AND EXTRA-AXIAL SPACES:  Normal for the patient's age  VISUALIZED ORBITS AND PARANASAL SINUSES:  Unremarkable  CERVICAL VASCULATURE AORTIC ARCH AND GREAT VESSELS: Aortic arch and great vessel origins are unremarkable  RIGHT VERTEBRAL ARTERY CERVICAL SEGMENT:The vessel origin is unremarkable  The vessel is patent throughout the neck without high-grade stenosis  At level C4 extraosseous tumor laterally displaces the vessel  The vessel is encased by tumor within C3 right transverse process  There is mild displacement of the vessel by extraosseous tumor at level C1-2  The tumor encases the vessel within the right transverse foramen of C1  LEFT VERTEBRAL ARTERY CERVICAL SEGMENT: The vessel origin is unremarkable  The vessel is patent throughout the neck without high-grade stenosis  At level C6 the extraosseous tumor contacts and mildly displaces the vessel  There is encasement and mild narrowing of the vessel by tumor at level C4  The tumor abuts the vessel where it exits left C2 transverse foramen  RIGHT EXTRACRANIAL CAROTID SEGMENT:The carotid bifurcation and cervical internal carotid artery are unremarkable  No stenosis or dissection  LEFT EXTRACRANIAL CAROTID SEGMENT: The carotid bifurcation and cervical internal carotid artery are unremarkable  No stenosis or dissection  INTRACRANIAL VASCULATURE INTERNAL CAROTID ARTERIES: The intracranial portions of the internal carotid arteries are unremarkable  Normal ophthalmic artery origins  ANTERIOR CIRCULATION:  Normal A1 segments  Bilateral anterior cerebral arteries are unremarkable  Normal anterior comminuting artery  MIDDLE CEREBRAL ARTERY CIRCULATION:  Bilateral M1 segments and major M2 branches are patent without significant stenosis  DISTAL VERTEBRAL ARTERIES:  Distal vertebral arteries are patent without high-grade stenosis  Posterior inferior cerebellar artery origins are patent  BASILAR ARTERY:  Basilar artery is unremarkable  Normal superior cerebellar arteries  POSTERIOR CEREBRAL ARTERIES:    Bilateral posterior cerebral arteries are patent without critical stenosis  Normal right posterior communicating artery  Hypoplastic left posterior communicating artery  DURAL VENOUS SINUSES:  Unremarkable  NON VASCULAR ANATOMY BONY STRUCTURES: Redemonstrated diffuse lytic metastatic lesions throughout cervical and visualized thoracic spine to the level of T5 with severe C4 and T2 pathologic fractures and multilevel extraosseous tumor resulting in severe canal stenosis at C4 and moderate canal stenosis at T2  Redemonstrated left parietal and right frontal calvarial lesions  SOFT TISSUES OF THE NECK:  Unremarkable  THORACIC INLET:  Unremarkable  Impression: 1  No acute intracranial CT abnormality  2   Patent bilateral vertebral arteries without high-grade stenosis  There are multilevel abutment/mild displacement or encasement of the vertebral arteries by extraosseous tumor as described  3   No hemodynamically significant stenosis of cervical carotid arteries  4   Redemonstrated diffuse cervical and visualized thoracic spine metastatic lesions with severe C4 and T2 pathologic fractures  Multilevel extraosseous tumor resulting in severe canal stenosis at C4 and moderate canal stenosis at T2, better evaluated on recent cervical spine MRI  5   Redemonstrated left parietal and right frontal calvarial lesions    Workstation performed: [id number]     XR chest 2 views    Result Date: 3/29/2022  Narrative: CHEST INDICATION:   htn, upper back pain  COMPARISON:  None EXAM PERFORMED/VIEWS:  XR CHEST PA & LATERAL FINDINGS: Cardiomediastinal silhouette appears unremarkable  The lungs are clear  No pneumothorax or pleural effusion  Osseous structures appear within normal limits for patient age  Impression: No acute cardiopulmonary disease  Workstation performed: [id number]     XR spine cervical 2 or 3 vw injury    Result Date: 4/1/2022  Narrative: CERVICAL SPINE INDICATION:   baseline uprights  diffuse osseous mets  COMPARISON:  3/29/2022 MR and CT will VIEWS:  XR SPINE CERVICAL 2 OR 3 VW INJURY FINDINGS: Numerous lytic lesions are better seen on the prior studies  Calvarial lucencies most compatible with metastases  Described in prior MR brain report  Similar marked C4 loss of height secondary to lytic lesion and pathologic fracture  Similar mildly reversed curvature centered on the fracture  Atlantoaxial interval and craniocervical junction are preserved  No new spondylolisthesis  Similar degenerative change  No acute soft tissue pathology  Visualized lungs are clear  Impression: No acute change compared to recent prior studies  Workstation performed: [id number]     CT spine cervical wo contrast    Result Date: 3/29/2022  Narrative: CT CERVICAL SPINE - WITHOUT CONTRAST INDICATION:   Bone mass or bone pain, cervical spine, aggressive features on xray osseous mets  COMPARISON:  None  TECHNIQUE:  CT examination of the cervical spine was performed without intravenous contrast   Contiguous axial images were obtained  Sagittal and coronal reconstructions were performed  Radiation dose length product (DLP) for this visit:  718 mGy-cm   This examination, like all CT scans performed in the [facility], was performed utilizing techniques to minimize radiation dose exposure, including the use of iterative reconstruction and automated exposure control  IMAGE QUALITY:  Diagnostic   FINDINGS: ALIGNMENT:  There is reversal of normal cervical lordosis  There is no significant subluxation  No compression deformity  VERTEBRAL BODIES:  Innumerable lytic lesions throughout the cervical spine  There is a displaced fracture through the right posterior arch of C1 (series 2 image 46)  Displacement is approximately 4 mm  Large lytic process involving the right lateral mass of C1 is noted with expansion and extension involving the anterior arch  There is severe compression of the C4 vertebral body and moderate compression of the T2 vertebral body  The soft tissue windows demonstrate a large soft tissue mass extending from the C4 level into the spinal canal measuring approximately 2 2 x 1 3 cm (series 603 image 57)  This causes a severe central canal stenosis and likely cord compression  This extends from the mid C3 level to the lower C5 level  An additional large soft tissue mass extending from the T2 level into the spinal canal measures approximate 1 1 cm and likely causes severe central canal stenosis /cord compression  (Series 3 image 59)  DEGENERATIVE CHANGES:  No significant cervical degenerative changes are noted  PREVERTEBRAL AND PARASPINAL SOFT TISSUES:  Unremarkable  THORACIC INLET:  Normal      Impression: 1  Innumerable bony metastases  2   Displaced fracture through the right posterior arch of C1  3   C4 metastasis causing a severe compression fracture and extension into the canal causing severe central canal stenosis and likely cord compression  4   T2 metastasis causing a moderate compression fracture and extension into the canal causing severe central canal stenosis and likely cord compression  I sent a tiger text to Maria Victoria Martinez on 3/29/2022 at 9:57 AM  The study was marked in [facility] for immediate notification  Workstation performed: [id number]     MRI brain w wo contrast    Result Date: 3/30/2022  Narrative: MRI BRAIN WITH AND WITHOUT CONTRAST INDICATION: breast ca, extensive mets, staging   COMPARISON:  MRI cervical, thoracic, and lumbar spine with and without contrast March 29, 2022  TECHNIQUE: Sagittal T1, axial T2, axial FLAIR, axial T1, axial Trinity, axial diffusion  Sagittal, axial T1 postcontrast   Axial bravo postcontrast with coronal reconstructions  IV Contrast:  9 mL of Gadobutrol injection (SINGLE-DOSE)  IMAGE QUALITY:   Diagnostic  FINDINGS: BRAIN PARENCHYMA:  There is no discrete mass, mass effect or midline shift  There is no intracranial hemorrhage  Normal posterior fossa  No diffusion-weighted signal abnormality to suggest acute infarction  There are no white matter changes in the cerebral hemispheres  Postcontrast imaging of the brain demonstrates no abnormal enhancement  VENTRICLES:  Normal for the patient's age  SELLA AND PITUITARY GLAND:  Normal  ORBITS:  Normal  PARANASAL SINUSES:  Normal  VASCULATURE:  Evaluation of the major intracranial vasculature demonstrates appropriate flow voids  CALVARIUM AND SKULL BASE:  Enhancing lesions in left parietal calvarium, right frontal calvarium, or suspicious for osseous calvarial metastasis  For reference: 1 5 cm left parietal calvarial enhancing lesion (12:109)  0 5 cm right frontal calvarial enhancing lesion (12:99)  EXTRACRANIAL SOFT TISSUES:  Normal  VISUALIZED CERVICAL SPINE: Unchanged enhancing osseous metastatic lesions of C1, C2, C4, and partially imaged C5 vertebra with pathologic compression fracture of C4 vertebral body that also has surrounding anterior paravertebral disease and anterior epidural extension of disease resulting in severe canal stenosis at C4 vertebral body level as described on yesterday's MRI cervical spine with and without contrast      Impression: No evidence of intracranial metastasis  Left parietal and right frontal calvarial enhancing lesions, suspicious for calvarial osseous metastasis   Unchanged enhancing osseous metastatic lesions of C1, C2, C4, and partially imaged C5 vertebra with pathologic compression fracture of C4 vertebral body that also has surrounding anterior paravertebral disease and anterior epidural extension of disease resulting in severe canal stenosis at C4 vertebral body level as described on yesterday's MRI cervical spine with and without contrast  The study was marked in [employer] for immediate notification  Workstation performed: [id number]     MRI cervical spine w wo contrast    Result Date: 3/29/2022  Narrative: MRI CERVICAL SPINE WITH AND WITHOUT CONTRAST INDICATION: Breast carcinoma with osseous metastasis    COMPARISON:  CT scan dated 3/29/2022  TECHNIQUE:  Sagittal T1, sagittal T2, sagittal inversion recovery, axial T2  Sagittal T1 and axial T1 postcontrast   IV Contrast:  9 mL of Gadobutrol injection (SINGLE-DOSE) IMAGE QUALITY:  Diagnostic  FINDINGS: ALIGNMENT:  There is reversal of the normal cervical lordosis centered at C4  There is anterior subluxation of C3 in relation to C4  There is a severe pathologic compression fracture of the C4 vertebral body  There is a moderate pathologic compression fracture of T2  MARROW SIGNAL:  Diffusely heterogeneous marrow signal in particular on T1-weighted imaging consistent with diffuse lytic metastasis  This involves nearly every visualized vertebral body within the cervical and upper thoracic spine  There is involvement  of the posterior elements as well, in particular on the left at the C4, C5 and C6 levels  There is posterior element involvement noted within the upper thoracic spine as well including T2 on the left as well as T3 and T4 on the left  Osseous metastatic disease is seen within the right aspect of the C1 vertebral body replacing the lateral mass and anterior ring of C1  There is extensive epidural tumor present at the C4 level extending posteriorly from the vertebral body into the anterior epidural space and into both neural foramen    Similar findings to a slightly lesser degree at the level of T2 with areas of epidural  tumor extending into the anterior epidural space bilaterally  CERVICAL AND VISUALIZED UPPER THORACIC CORD:  Although the cord is compressed in particular at the level of the C4 epidural tumor, the cord appears to maintain normal signal with no definite cord edema present  PREVERTEBRAL AND PARASPINAL SOFT TISSUES:  Mild anterior /prevertebral tumor noted at the C4 and T2 levels  VISUALIZED POSTERIOR FOSSA:  The visualized posterior fossa demonstrates no abnormal signal  CERVICAL DISC SPACES: C2-C3:  Normal  C3-C4:  Anterior subluxation of C3 upon C4  Loss of disc height with annular bulging  As described above there is extensive epidural tumor arising C4 vertebral body into the anterior epidural space and neural foramen bilaterally  Moderate canal stenosis with mild flattening of the cord  Moderate to severe bilateral foraminal narrowing with epidural tumor extending into the neural foramen  C4-C5:  Findings are similar to the C3-4 level with epidural tumor resulting in moderate canal stenosis and severe bilateral foraminal narrowing  C5-C6:  Mild annular bulging with a small central disc protrusion  Mild canal stenosis and left foraminal narrowing  C6-C7:  Mild annular bulging  No disc herniation, canal stenosis or foraminal narrowing  C7-T1:  Normal  UPPER THORACIC DISC SPACES:  Moderate canal stenosis at the level of T2 as a result of epidural tumor bilaterally  Mild to moderate foraminal narrowing at both T1-T2 and T2-T3 levels  POSTCONTRAST IMAGING:  The tumor replacing the vertebral bodies and posterior elements as well as the tumor extending into the epidural space is described above demonstrates relatively homogeneous enhancement  Impression: Diffuse, primarily lytic osseous metastatic disease is seen throughout the cervical vertebral bodies from C1 into the upper thoracic spine  There is a significant amount of epidural tumor arising from pathologic compression fractures of C4 and T2    At the C4 level there is moderate to severe canal stenosis with flattening of the cord which maintains normal signal   Moderate canal stenosis at the T2 level  There is corresponding foraminal narrowing as a result of epidural and foraminal tumor  Workstation performed: [id number]     MRI thoracic spine w wo contrast    Result Date: 3/29/2022  Narrative: MRI THORACIC SPINE WITH AND WITHOUT CONTRAST INDICATION: Breast carcinoma with diffuse osseous metastasis    COMPARISON:  None  TECHNIQUE:  Sagittal T1, sagittal T2, sagittal inversion recovery, axial T2,  axial 2D MERGE  Sagittal and axial T1 postcontrast  IV Contrast:  9 mL of Gadobutrol injection (SINGLE-DOSE) IMAGE QUALITY:  Diagnostic  FINDINGS: ALIGNMENT:  Slightly exaggerated smooth kyphosis of the thoracic spine  No scoliosis  MARROW SIGNAL: Diffuse osseous metastatic disease is seen throughout the thoracic spine  There is a pathologic compression fracture of T2 with moderate collapse of the vertebral body and epidural tumor extending into the anterior epidural space and into  the neural foramen at the T1-2 and T2-3 levels  Additional osseous metastasis is seen within upper thoracic vertebral bodies and posterior elements including T3, T4  The posterior element involvement within these vertebral bodies are primarily left-sided  There is moderate resulting foraminal narrowing and a small amount of epidural tumor noted within the left posterior lateral aspect of the canal at the T3-4 level  Additional osseous metastasis are scattered within the mid to lower thoracic spine with no other pathologic compression fractures  No other epidural tumor is seen within the mid or lower thoracic levels  THORACIC CORD:  Normal signal within the thoracic cord  PREVERTEBRAL AND PARASPINAL SOFT TISSUES:   2 well-circumscribed nodules are seen adjacent to the spleen which are isointense to splenic tissue, possibly splenule's   THORACIC DEGENERATIVE CHANGE:  A few small disc herniations are seen within the thoracic spine  See above description of canal stenosis and foraminal narrowing in the upper thoracic spine primarily result of epidural tumor  POSTCONTRAST:  The osseous metastasis demonstrate relatively homogeneous enhancement after administration of contrast      Impression: Diffuse, lytic, osseous metastasis is seen throughout the thoracic spine more prominently within the upper thoracic spine where there is epidural tumor and involvement of the posterior elements from T2 through T5  Moderate canal stenosis at the T2 level  Foraminal narrowing as described above  No abnormal cord signal identified  Workstation performed: [id number]     MRI lumbar spine w wo contrast    Result Date: 3/29/2022  Narrative: MRI LUMBAR SPINE WITH AND WITHOUT CONTRAST INDICATION: Breast carcinoma with osseous metastasis  COMPARISON:  None  TECHNIQUE:  Sagittal T1, sagittal T2, sagittal inversion recovery, axial T2, coronal T2  Sagittal and axial T1 postcontrast  IV Contrast:  9 mL of Gadobutrol injection (SINGLE-DOSE) IMAGE QUALITY:  Diagnostic FINDINGS: VERTEBRAL BODIES:  There are 5 lumbar type vertebral bodies  Mild smooth levoscoliosis of the lumbar spine  No spondylolisthesis or spondylolysis  Diffuse lytic metastasis are identified within the lower thoracic spine, and lumbar spine demonstrating homogeneous enhancement after administration of contrast   There is a minor compression deformity of the T12 superior endplate which may represent a pathologic compression fracture  SACRUM:  There is an expansile lytic/destructive metastatic lesion within the sacrum involving the S2-S5 segments with tumor extending anteriorly into the presacral space and posteriorly into the sacral canal resulting in severe sacral canal stenosis  DISTAL CORD AND CONUS:  Normal size and signal within the distal cord and conus   PARASPINAL SOFT TISSUES:  Numerous nabothian cysts are seen within the lower uterine canal   2 larger cysts are seen within the left posterior pelvis, likely adnexal in origin  See prior CT abdomen pelvis result  LOWER THORACIC DISC SPACES:  No lower thoracic disc herniation, canal stenosis or foraminal narrowing  No lower thoracic epidural tumor  LUMBAR DISC SPACES:  No evidence of epidural tumor within the lumbar canal   At L5-S1 there is annular bulging with a small left foraminal disc protrusion  There is no canal stenosis and only mild left foraminal narrowing  As described above there is severe sacral canal stenosis as a result of epidural tumor  Tumor appears to extend into the left sacral foramen at S2-3 and S3-4  POSTCONTRAST IMAGING:  The lytic osseous metastasis demonstrate relatively homogeneous enhancement  Impression: Diffuse lytic osseous metastasis within the lower thoracic spine, lumbar spine and sacrum  There is a moderate amount of epidural tumor as a result of the expansile sacral lesion resulting in moderate sacral canal stenosis  Tumor extends into the S2-3 and S3-4 neural foramen on the left  Correlate for corresponding radiculopathy  Bilobed cystic mass within the left pelvis incompletely evaluated on this examination  Follow-up pelvic ultrasound imaging is recommended  Workstation performed: [id number]     CT chest abdomen pelvis w contrast    Result Date: 3/29/2022  Narrative: CT CHEST, ABDOMEN AND PELVIS WITH IV CONTRAST INDICATION:   Evaluate for metastases  COMPARISON:  None  TECHNIQUE: CT examination of the chest, abdomen and pelvis was performed  Axial, sagittal, and coronal 2D reformatted images were created from the source data and submitted for interpretation  Radiation dose length product (DLP) for this visit:  1086 mGy-cm   This examination, like all CT scans performed in the [facility], was performed utilizing techniques to minimize radiation dose exposure, including the use of iterative reconstruction and automated exposure control   IV Contrast:  100 mL of iohexol (OMNIPAQUE) Enteric Contrast: Enteric contrast was administered  FINDINGS: CHEST LUNGS:  Lungs are clear  There is no tracheal or endobronchial lesion  PLEURA:  Unremarkable  HEART/GREAT VESSELS: Heart is unremarkable for patient's age  No thoracic aortic aneurysm  MEDIASTINUM AND ALISON:  Unremarkable  CHEST WALL AND LOWER NECK:   6 9 x 6 2 x 6 4 cm mass extending to the overlying skin surface and right pectoralis major muscle with overlying skin induration  Right axillary adenopathy measuring up to 3 8 cm  ABDOMEN LIVER/BILIARY TREE:  Unremarkable  GALLBLADDER:  No calcified gallstones  No pericholecystic inflammatory change  SPLEEN:  Unremarkable  PANCREAS:  Unremarkable  ADRENAL GLANDS:  Unremarkable  KIDNEYS/URETERS:  One or more simple renal cyst(s) is noted  Nonobstructive calculus at the left inferior pole     No hydronephrosis  STOMACH AND BOWEL:  Unremarkable  APPENDIX:  No findings to suggest appendicitis  ABDOMINOPELVIC CAVITY:  No ascites  No pneumoperitoneum  No lymphadenopathy  VESSELS:  Unremarkable for patient's age  PELVIS REPRODUCTIVE ORGANS:  7 cm septated cystic left adnexal structure  URINARY BLADDER:  Unremarkable  ABDOMINAL WALL/INGUINAL REGIONS:  Unremarkable  OSSEOUS STRUCTURES:  Innumerable lytic lesions throughout the visualized osseous structures  The largest within the left sacrum measures up to 4 4 cm with destructive changes and encroachment upon the presacral space  Impression: Large right breast mass measuring up to 6 9 cm  Right axillary metastatic adenopathy  Surgical consult recommended  Innumerable osseous metastasis  7 cm septated cystic left adnexal structure  According to current guidelines Dorothey Gull Radiol 2020; 56:396-341) in this premenopausal woman, this should be followed up in 6 to 12 months by pelvic ultrasound   Workstation performed: [id number]     CT RADIATION THERAPY    Result Date: 3/31/2022  Narrative: A CT scan of the brain, neck, chest, abdomen and pelvis was performed without intravenous contrast  This examination, like all CT scans performed in the [facility], was performed utilizing techniques to minimize radiation dose exposure, including the use of iterative reconstruction and automated exposure control  Examination was performed according to a protocol specifically designed for the purposes of radiation therapy planning and is of limited diagnostic sensitivity  CLINICAL HISTORY: Breast cancer with extensive metastases  COMPARISON: 3/30/2022 MR brain, 3/29/2022 CT C-Spine and 3/29/2022 CT of the chest, abdomen and pelvis FINDINGS: BRAIN: No acute parenchymal finding  No hydrocephalus  No lucent skull metastases  Globes and orbits are within normal limits  NECK: Similar multifocal bone metastases  Evidence of central canal and foraminal compromise  No acute finding  CHEST: Low lung volumes with atelectasis  Similar cardiomegaly  Similar large right breast mass with levels 1 2 and 3 right axillary lymphadenopathy  Similar multifocal bone metastases  ABDOMEN AND PELVIS: Similar left greater than right adnexal cyst and multifocal bone metastases  Impression: No acute findings in the brain, neck, chest, abdomen or pelvis compared to recent prior studies  Study for radiation planning  Workstation performed: [id number]         Labs:   Lab Results   Component Value Date    WBC 10 83 (H) 04/03/2022    HGB 12 4 04/03/2022    HCT 39 5 04/03/2022    MCV 94 04/03/2022     (H) 04/03/2022     Lab Results   Component Value Date    K 5 2 04/09/2022    CL 99 (L) 04/09/2022    CO2 24 04/09/2022    BUN 18 04/09/2022    CREATININE 0 55 (L) 04/09/2022    CALCIUM 9 2 04/09/2022    CORRECTEDCA 9 8 04/09/2022    AST 27 04/09/2022    ALT 47 04/09/2022    ALKPHOS 406 (H) 04/09/2022    EGFR 112 04/09/2022       Lab Results   Component Value Date    CEA 6 5 (H) 03/29/2022           Vital Sign:    There is no height or weight on file to calculate BSA      Wt Readings from Last 3 Encounters:   04/07/22 93 9 kg (207 lb)   03/29/22 94 kg (207 lb 3 7 oz)   03/29/22 94 kg (207 lb 3 7 oz)        Temp Readings from Last 3 Encounters:   04/07/22 98 °F (36 7 °C)   04/06/22 99 3 °F (37 4 °C) (Temporal)   04/03/22 97 5 °F (36 4 °C)        BP Readings from Last 3 Encounters:   04/07/22 134/82   04/06/22 116/80   04/03/22 110/76         Pulse Readings from Last 3 Encounters:   04/07/22 88   04/06/22 (!) 116   04/03/22 84     @LASTSAO2(3)@    Active Problems:   Patient Active Problem List   Diagnosis    Malignant neoplasm of overlapping sites of right breast in female, estrogen receptor negative ([facility] [address] )    Osseous metastasis ([facility] [address] )    Elevated BP without diagnosis of hypertension    Hypokalemia    Transaminitis    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 (HCC)    Cancer associated pain    Palliative care patient       Past Medical History:   Past Medical History:   Diagnosis Date    Cancer [facility])        Surgical History: No past surgical history on file  Family History:    Family History   Problem Relation Age of Onset    Coronary artery disease Mother     Heart attack Father     Diabetes type II Father        Cancer-related family history is not on file      Social History:   Social History     Socioeconomic History    Marital status: Single     Spouse name: Not on file    Number of children: Not on file    Years of education: Not on file    Highest education level: Not on file   Occupational History    Not on file   Tobacco Use    Smoking status: Never Smoker    Smokeless tobacco: Never Used   Vaping Use    Vaping Use: Never used   Substance and Sexual Activity    Alcohol use: Yes     Comment: rare, social     Drug use: Never    Sexual activity: Not on file   Other Topics Concern    Not on file   Social History Narrative    Not on file     Social Determinants of Health     Financial Resource Strain: Not on file   Food Insecurity: No Food Insecurity    Worried About Running Out of Food in the Last Year: Never true    [address] in the Last Year: Never true   Transportation Needs: No Transportation Needs    Lack of Transportation (Medical): No    Lack of Transportation (Non-Medical): No   Physical Activity: Not on file   Stress: Not on file   Social Connections: Not on file   Intimate Partner Violence: Not on file   Housing Stability: Low Risk     Unable to Pay for Housing in the Last Year: No    Number of Places Lived in the Last Year: 1    Unstable Housing in the Last Year: No       Current Medications:   Current Outpatient Medications   Medication Sig Dispense Refill    al mag oxide-diphenhydramine-lidocaine viscous (MAGIC MOUTHWASH) 1:1:1 suspension Swish and spit 10 mL every 4 (four) hours as needed for mouth pain or discomfort 500 mL 2    amLODIPine (NORVASC) 5 mg tablet Take 1 tablet (5 mg total) by mouth daily 30 tablet 0    dexamethasone (DECADRON) 4 mg tablet Take 1 tablet (4 mg total) by mouth 3 (three) times a day 45 tablet 0    DPH-Lido-AlHydr-MgHydr-Simeth (First-Mouthwash BLM) SUSP       HYDROmorphone (DILAUDID) 4 mg tablet Take 1 tablet daily as needed following radiation therapy  10 tablet 0    lisinopril (ZESTRIL) 20 mg tablet Take 1 tablet (20 mg total) by mouth daily 30 tablet 0    morphine (MS CONTIN) 15 mg 12 hr tablet Take 1 tablet (15 mg total) by mouth 3 (three) times a day Max Daily Amount: 45 mg 90 tablet 0    oxyCODONE (ROXICODONE) 10 MG TABS Take 1 tablet (10 mg total) by mouth every 4 (four) hours as needed for severe pain May split pill in half (5 mg) every 4 hours as needed for moderate pain (5 mg)   Max Daily Amount: 60 mg 180 tablet 0    pantoprazole (PROTONIX) 40 mg tablet Take 1 tablet (40 mg total) by mouth daily in the early morning 30 tablet 0    polyethylene glycol (MIRALAX) 17 g packet Take 17 g by mouth daily (Patient not taking: Reported on 4/7/2022 ) 30 each 0    senna (SENOKOT) 8 6 mg Take 3 tablets (25 8 mg total) by mouth 2 (two) times a day Hold for loose stool  180 tablet 0     No current facility-administered medications for this visit         Allergies: No Known Allergies

## 2022-04-14 ENCOUNTER — APPOINTMENT (OUTPATIENT)
Dept: RADIATION ONCOLOGY | Facility: HOSPITAL | Age: 47
End: 2022-04-14

## 2022-04-14 ENCOUNTER — APPOINTMENT (OUTPATIENT)
Dept: RADIATION ONCOLOGY | Facility: HOSPITAL | Age: 47
End: 2022-04-14
Attending: RADIOLOGY
Payer: COMMERCIAL

## 2022-04-14 ENCOUNTER — PATIENT OUTREACH (OUTPATIENT)
Dept: HEMATOLOGY ONCOLOGY | Facility: CLINIC | Age: 47
End: 2022-04-14

## 2022-04-14 ENCOUNTER — PATIENT OUTREACH (OUTPATIENT)
Dept: CASE MANAGEMENT | Facility: HOSPITAL | Age: 47
End: 2022-04-14

## 2022-04-14 PROCEDURE — 77387 GUIDANCE FOR RADJ TX DLVR: CPT | Performed by: STUDENT IN AN ORGANIZED HEALTH CARE EDUCATION/TRAINING PROGRAM

## 2022-04-14 PROCEDURE — 77412 RADIATION TX DELIVERY LVL 3: CPT | Performed by: STUDENT IN AN ORGANIZED HEALTH CARE EDUCATION/TRAINING PROGRAM

## 2022-04-14 PROCEDURE — G6002 STEREOSCOPIC X-RAY GUIDANCE: HCPCS | Performed by: STUDENT IN AN ORGANIZED HEALTH CARE EDUCATION/TRAINING PROGRAM

## 2022-04-14 NOTE — PROGRESS NOTES
Breast Oncology Nurse Navigator    Called patient for initial outreach from nurse navigator  Left a VM message with my contact information and requested a call back

## 2022-04-15 ENCOUNTER — PATIENT OUTREACH (OUTPATIENT)
Dept: HEMATOLOGY ONCOLOGY | Facility: CLINIC | Age: 47
End: 2022-04-15

## 2022-04-15 ENCOUNTER — HOSPITAL ENCOUNTER (OUTPATIENT)
Dept: NON INVASIVE DIAGNOSTICS | Facility: HOSPITAL | Age: 47
Discharge: HOME | End: 2022-04-15
Payer: COMMERCIAL

## 2022-04-15 ENCOUNTER — OFFICE VISIT (OUTPATIENT)
Dept: SURGICAL ONCOLOGY | Facility: CLINIC | Age: 47
End: 2022-04-15
Payer: COMMERCIAL

## 2022-04-15 ENCOUNTER — APPOINTMENT (OUTPATIENT)
Dept: RADIATION ONCOLOGY | Facility: HOSPITAL | Age: 47
End: 2022-04-15
Attending: RADIOLOGY
Payer: COMMERCIAL

## 2022-04-15 ENCOUNTER — OFFICE VISIT (OUTPATIENT)
Dept: NEUROSURGERY | Facility: CLINIC | Age: 47
End: 2022-04-15
Payer: COMMERCIAL

## 2022-04-15 VITALS
WEIGHT: 207 LBS | BODY MASS INDEX: 35.34 KG/M2 | HEIGHT: 64 IN | SYSTOLIC BLOOD PRESSURE: 134 MMHG | DIASTOLIC BLOOD PRESSURE: 86 MMHG | HEART RATE: 102 BPM

## 2022-04-15 VITALS
BODY MASS INDEX: 35.53 KG/M2 | HEART RATE: 107 BPM | RESPIRATION RATE: 16 BRPM | TEMPERATURE: 96.7 F | HEIGHT: 64 IN | OXYGEN SATURATION: 96 % | DIASTOLIC BLOOD PRESSURE: 80 MMHG | SYSTOLIC BLOOD PRESSURE: 130 MMHG

## 2022-04-15 VITALS
SYSTOLIC BLOOD PRESSURE: 134 MMHG | HEART RATE: 84 BPM | TEMPERATURE: 97.9 F | BODY MASS INDEX: 35.53 KG/M2 | HEIGHT: 64 IN | RESPIRATION RATE: 18 BRPM | DIASTOLIC BLOOD PRESSURE: 86 MMHG

## 2022-04-15 DIAGNOSIS — C79.49 METASTASIS TO SPINAL CORD (HCC): ICD-10-CM

## 2022-04-15 DIAGNOSIS — Z17.1 MALIGNANT NEOPLASM OF OVERLAPPING SITES OF RIGHT BREAST IN FEMALE, ESTROGEN RECEPTOR NEGATIVE (HCC): Primary | ICD-10-CM

## 2022-04-15 DIAGNOSIS — C50.811 MALIGNANT NEOPLASM OF OVERLAPPING SITES OF RIGHT BREAST IN FEMALE, ESTROGEN RECEPTOR NEGATIVE (HCC): Primary | ICD-10-CM

## 2022-04-15 DIAGNOSIS — S12.300A C4 CERVICAL FRACTURE (HCC): Primary | ICD-10-CM

## 2022-04-15 DIAGNOSIS — C79.51 OSSEOUS METASTASIS (HCC): ICD-10-CM

## 2022-04-15 DIAGNOSIS — C50.811 MALIGNANT NEOPLASM OF OVERLAPPING SITES OF RIGHT BREAST IN FEMALE, ESTROGEN RECEPTOR NEGATIVE (HCC): ICD-10-CM

## 2022-04-15 DIAGNOSIS — Z17.1 MALIGNANT NEOPLASM OF OVERLAPPING SITES OF RIGHT BREAST IN FEMALE, ESTROGEN RECEPTOR NEGATIVE (HCC): ICD-10-CM

## 2022-04-15 LAB
AORTIC ROOT: 2.2 CM
APICAL FOUR CHAMBER EJECTION FRACTION: 66 %
E WAVE DECELERATION TIME: 246 MS
FRACTIONAL SHORTENING: 37 % (ref 28–44)
INTERVENTRICULAR SEPTUM IN DIASTOLE (PARASTERNAL SHORT AXIS VIEW): 0.9 CM
INTERVENTRICULAR SEPTUM: 0.9 CM (ref 0.54–1.02)
LAAS-AP4: 17.9 CM2
LEFT ATRIUM SIZE: 2.4 CM
LEFT INTERNAL DIMENSION IN SYSTOLE: 2.4 CM (ref 3.24–4.91)
LEFT VENTRICULAR INTERNAL DIMENSION IN DIASTOLE: 3.8 CM (ref 5.35–7.97)
LEFT VENTRICULAR POSTERIOR WALL IN END DIASTOLE: 0.9 CM (ref 0.53–1)
LEFT VENTRICULAR STROKE VOLUME: 42 ML
LVSV (TEICH): 42 ML
MV E'TISSUE VEL-SEP: 9 CM/S
MV PEAK A VEL: 0.73 M/S
MV PEAK E VEL: 80 CM/S
MV STENOSIS PRESSURE HALF TIME: 71 MS
MV VALVE AREA P 1/2 METHOD: 3.1 CM2
RIGHT ATRIUM AREA SYSTOLE A4C: 10.6 CM2
RIGHT VENTRICLE ID DIMENSION: 3.4 CM
SL CV LV EF: 65
SL CV PED ECHO LEFT VENTRICLE DIASTOLIC VOLUME (MOD BIPLANE) 2D: 63 ML
SL CV PED ECHO LEFT VENTRICLE SYSTOLIC VOLUME (MOD BIPLANE) 2D: 21 ML
Z-SCORE OF INTERVENTRICULAR SEPTUM IN END DIASTOLE: 0.99
Z-SCORE OF LEFT VENTRICULAR DIMENSION IN END DIASTOLE: -5.41
Z-SCORE OF LEFT VENTRICULAR DIMENSION IN END SYSTOLE: -4.02
Z-SCORE OF LEFT VENTRICULAR POSTERIOR WALL IN END DIASTOLE: 1.1

## 2022-04-15 PROCEDURE — 93306 TTE W/DOPPLER COMPLETE: CPT | Performed by: INTERNAL MEDICINE

## 2022-04-15 PROCEDURE — 1111F DSCHRG MED/CURRENT MED MERGE: CPT | Performed by: SURGERY

## 2022-04-15 PROCEDURE — 99214 OFFICE O/P EST MOD 30 MIN: CPT | Performed by: SURGERY

## 2022-04-15 PROCEDURE — 93306 TTE W/DOPPLER COMPLETE: CPT

## 2022-04-15 PROCEDURE — G6002 STEREOSCOPIC X-RAY GUIDANCE: HCPCS | Performed by: RADIOLOGY

## 2022-04-15 PROCEDURE — 77387 GUIDANCE FOR RADJ TX DLVR: CPT | Performed by: RADIOLOGY

## 2022-04-15 PROCEDURE — 99213 OFFICE O/P EST LOW 20 MIN: CPT | Performed by: NEUROLOGICAL SURGERY

## 2022-04-15 PROCEDURE — 77412 RADIATION TX DELIVERY LVL 3: CPT | Performed by: RADIOLOGY

## 2022-04-15 NOTE — LETTER
April 15, 2022     Kennedy Arambula, [address] [address]    Patient: Braydon Yo   YOB: 1975   Date of Visit: 4/15/2022       Dear Dr Valente King: Thank you for referring Braydon Yo to me for evaluation  Below are my notes for this consultation  If you have questions, please do not hesitate to call me  I look forward to following your patient along with you  Sincerely,        Jose Bravo MD        CC: Reanna Franco DO  [place], [place]  4/15/2022  8:38 PM  Cosign Needed Addendum  Neurosurgery Office Note  Braydon Yo 51 y o  female MRN: [id number]      Assessment/Plan      Patient is a 51years old pleasant woman recently diagnosed with metastatic invasive breast carcinoma-grade 2 with cervical and thoracic including S3 bone metastasis and pathologic fracture of C4 and also Collapsed T2 #  Patient is here today for 2 weeks follow-up  Patient wearing , following up with Oncology & palliative care  Had 10 rounds of radiation therapy to her cervical and upper thoracic including the S3 region  She is to start immuno and chemotherapy on April 28/2022  Patient reports persistent moderate bilateral upper arm spasm and radicular symptoms, otherwise denies any numbness, weakness or paresthesia in the upper extremities  Denies fine motor dysfunction or dropping objects  No gait issues or bladder/dysfunction, except  constipations in the setting of taking narcotics  She is doing PT,  uses walker for ambulation  Exam-A&OX3, EOMI 2 mm conjugate bilaterally  SF  Moves all extremities  Finger-to-nose tests normal and without drift bilaterally  Sensation to light touch intact throughout  DTR 3+ but no clonus bilaterally  Tenderness noted on palpation of posterior cervical spine  Patient in wheelchair, wearing   Hx, PEx and images with the patient and her significant other  Management plan discussed    Patient's regular symptoms appears stable tolerable with pain meds  Motor and sensory function intact  No Significant gait issues or fall tendency  Normal bowel bladder function except occasional constipation due to narcotic medication  Imaging demonstrates C4 and T2 pathological fractures  I don't think patient need any procedure at this juncture  Advised to follow-up in 4 weeks with another upright cervical spine x-rays in   Continue wearing  all the time  Continue follow-up with palliative Care, Oncology, and physical therapy  Fall precaution, avoid lifting heavy objects, excessive bending or twisting  Questions and concerns were answered  Patient and her significant other expressed their understandings and agreed with the plan  Plan:  1  Upright cervical spine x-rays, in [place]/4 weeks  2  Continue wearing  all the time  3  Continue follow-up with oncology  4  Continue physical therapy  5  Fall precaution, avoid lifting heavy objects, excessive bending or twisting  6  Note to Dr Michael De Guzman - for muscle relaxer & membrane stabilizing agent scripts  7  Follow-up in 4 weeks, SNPx  8  Call with question or concern    C/C: " Here for follow up of C4 and T2 pathological fracture"    Chief Complaint   Patient presents with    Neck Pain           History of Present Illness     Patient is a 51years old pleasant woman recently diagnosed with metastatic invasive breast carcinoma-grade 2 with cervical and thoracic including S3 bone metastasis and pathologic fracture of C4 and also Collapsed T2 #  Patient is here today for 2 weeks follow-up  Patient wearing , following up with Oncology & palliative care  Had 10 rounds of radiation therapy to her cervical and upper thoracic including the S3 region  She is to start immuno and chemotherapy on April 28/2022  Patient reports permanent moderate bilateral upper arm spasm and radicular symptoms, otherwise denies any numbness, weakness or paresthesia in the upper extremity  Denies fine motor dysfunction or dropping objects  No gait issues or bladder/dysfunction, except  constipations in the setting of taking narcotics  She is doing PT,  uses walker for ambulation  Patient denies any fever, chills, rigors, cough or chest pain  Image findings as described in the assessment section above  REVIEW OF SYSTEMS  Review of system personally reviewed and updated  Review of Systems   Constitutional: Negative  HENT: Negative  Eyes: Negative  Respiratory: Negative  Cardiovascular: Negative  Gastrointestinal: Negative  H/o GERD   Endocrine: Negative  Genitourinary: Negative  Musculoskeletal: Positive for neck pain (b/l nack pain radiates to b/l shouders/arms ) and neck stiffness (has collar)  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   On Dilaudid/Oxycodone- Good relief   Skin: Negative  Allergic/Immunologic: Negative  Neurological: Negative  Hematological: Negative  Psychiatric/Behavioral: Positive for sleep disturbance (occasional due to pain)  All other systems reviewed and are negative  Meds/Allergies     Current Outpatient Medications   Medication Sig Dispense Refill    al mag oxide-diphenhydramine-lidocaine viscous (MAGIC MOUTHWASH) 1:1:1 suspension Swish and spit 10 mL every 4 (four) hours as needed for mouth pain or discomfort 500 mL 2    amLODIPine (NORVASC) 5 mg tablet Take 1 tablet (5 mg total) by mouth daily 30 tablet 0    dexamethasone (DECADRON) 4 mg tablet Take 1 tablet (4 mg total) by mouth 3 (three) times a day 45 tablet 0    DPH-Lido-AlHydr-MgHydr-Simeth (First-Mouthwash BLM) SUSP       HYDROmorphone (DILAUDID) 4 mg tablet Take 1 tablet daily as needed following radiation therapy   10 tablet 0    lidocaine-prilocaine (EMLA) cream Apply topically as needed for mild pain 30 g 0    lisinopril (ZESTRIL) 20 mg tablet Take 1 tablet (20 mg total) by mouth daily 30 tablet 0    morphine (MS CONTIN) 15 mg 12 hr tablet Take 1 tablet (15 mg total) by mouth 3 (three) times a day Max Daily Amount: 45 mg 90 tablet 0    oxyCODONE (ROXICODONE) 10 MG TABS Take 1 tablet (10 mg total) by mouth every 4 (four) hours as needed for severe pain May split pill in half (5 mg) every 4 hours as needed for moderate pain (5 mg)  Max Daily Amount: 60 mg 180 tablet 0    pantoprazole (PROTONIX) 40 mg tablet Take 1 tablet (40 mg total) by mouth daily in the early morning 30 tablet 0    polyethylene glycol (MIRALAX) 17 g packet Take 17 g by mouth daily (Patient not taking: Reported on 4/7/2022 ) 30 each 0    senna (SENOKOT) 8 6 mg Take 3 tablets (25 8 mg total) by mouth 2 (two) times a day Hold for loose stool  180 tablet 0     No current facility-administered medications for this visit  No Known Allergies    PAST HISTORY    Past Medical History:   Diagnosis Date    Cancer [facility])        History reviewed  No pertinent surgical history  Social History     Tobacco Use    Smoking status: Never Smoker    Smokeless tobacco: Never Used   Vaping Use    Vaping Use: Never used   Substance Use Topics    Alcohol use: Yes     Comment: rare, social     Drug use: Never       Family History   Problem Relation Age of Onset    Coronary artery disease Mother     Heart attack Father     Diabetes type II Father          Above history personally reviewed  EXAM    Vitals:not currently breastfeeding  ,There is no height or weight on file to calculate BMI  Physical Exam  Constitutional:       Appearance: She is obese  HENT:      Head: Normocephalic and atraumatic  Eyes:      Extraocular Movements: Extraocular movements intact  Cardiovascular:      Rate and Rhythm: Normal rate and regular rhythm  Musculoskeletal:      Cervical back: Tenderness present  Neurological:      General: No focal deficit present  Mental Status: She is alert and oriented to person, place, and time  GCS: GCS eye subscore is 4  GCS verbal subscore is 5   GCS motor subscore is 6       Cranial Nerves: Cranial nerves are intact  Sensory: Sensation is intact  Motor: Motor function is intact  Coordination: Finger-Nose-Finger Test normal       Deep Tendon Reflexes: Reflexes are normal and symmetric  Reflex Scores:       Tricep reflexes are 2+ on the right side and 2+ on the left side  Bicep reflexes are 2+ on the right side and 2+ on the left side  Brachioradialis reflexes are 2+ on the right side and 2+ on the left side  Patellar reflexes are 2+ on the right side and 2+ on the left side  Achilles reflexes are 2+ on the right side and 2+ on the left side  Psychiatric:         Speech: Speech normal          Neurologic Exam     Mental Status   Oriented to person, place, and time  Speech: speech is normal   Level of consciousness: alert    Cranial Nerves     CN III, IV, VI   Right pupil: Size: 2 mm  Shape: regular  Left pupil: Size: 2 mm  Shape: regular       Motor Exam   Muscle bulk: normal  Overall muscle tone: normal  Right arm tone: normal  Left arm tone: normal  Right arm pronator drift: absent  Left arm pronator drift: absent  Right leg tone: normal  Left leg tone: normal    Sensory Exam   Light touch normal      Gait, Coordination, and Reflexes     Coordination   Finger to nose coordination: normal    Reflexes   Right brachioradialis: 2+  Left brachioradialis: 2+  Right biceps: 2+  Left biceps: 2+  Right triceps: 2+  Left triceps: 2+  Right patellar: 2+  Left patellar: 2+  Right achilles: 2+  Left achilles: 2+  Right : 2+  Left : 2+  Right Luke: absent  Left Luke: absent  Right ankle clonus: absent  Left pendular knee jerk: absent        MEDICAL DECISION MAKING    Imaging Studies:     CTA head and neck w wo contrast    Result Date: 3/31/2022  Narrative: CTA NECK AND BRAIN WITH AND WITHOUT CONTRAST INDICATION: eval vert given cervical mets and brain for mets and osseous lesions COMPARISON:   Cervical spine MRI 3/29/2022, brain MRI 3/30/2022 TECHNIQUE:  Routine CT imaging of the Brain without contrast   Post contrast imaging was performed after administration of iodinated contrast through the neck and brain  Post contrast axial 0 625 mm images timed to opacify the arterial system  3D rendering was performed on an independent workstation  MIP reconstructions performed  Coronal reconstructions were performed of the noncontrast portion of the brain  Radiation dose length product (DLP) for this visit:  1508 75 mGy-cm   This examination, like all CT scans performed in the [facility], was performed utilizing techniques to minimize radiation dose exposure, including the use of iterative reconstruction and automated exposure control  IV Contrast:  85 mL of iohexol (OMNIPAQUE)  IMAGE QUALITY:   Diagnostic FINDINGS: NONCONTRAST BRAIN PARENCHYMA:  No intracranial mass, mass effect or midline shift  No CT signs of acute infarction  No acute parenchymal hemorrhage  VENTRICLES AND EXTRA-AXIAL SPACES:  Normal for the patient's age  VISUALIZED ORBITS AND PARANASAL SINUSES:  Unremarkable  CERVICAL VASCULATURE AORTIC ARCH AND GREAT VESSELS: Aortic arch and great vessel origins are unremarkable  RIGHT VERTEBRAL ARTERY CERVICAL SEGMENT:The vessel origin is unremarkable  The vessel is patent throughout the neck without high-grade stenosis  At level C4 extraosseous tumor laterally displaces the vessel  The vessel is encased by tumor within C3 right transverse process  There is mild displacement of the vessel by extraosseous tumor at level C1-2  The tumor encases the vessel within the right transverse foramen of C1  LEFT VERTEBRAL ARTERY CERVICAL SEGMENT: The vessel origin is unremarkable  The vessel is patent throughout the neck without high-grade stenosis  At level C6 the extraosseous tumor contacts and mildly displaces the vessel  There is encasement and mild narrowing of the vessel by tumor at level C4    The tumor abuts the vessel where it exits left C2 transverse foramen  RIGHT EXTRACRANIAL CAROTID SEGMENT:The carotid bifurcation and cervical internal carotid artery are unremarkable  No stenosis or dissection  LEFT EXTRACRANIAL CAROTID SEGMENT: The carotid bifurcation and cervical internal carotid artery are unremarkable  No stenosis or dissection  INTRACRANIAL VASCULATURE INTERNAL CAROTID ARTERIES: The intracranial portions of the internal carotid arteries are unremarkable  Normal ophthalmic artery origins  ANTERIOR CIRCULATION:  Normal A1 segments  Bilateral anterior cerebral arteries are unremarkable  Normal anterior comminuting artery  MIDDLE CEREBRAL ARTERY CIRCULATION:  Bilateral M1 segments and major M2 branches are patent without significant stenosis  DISTAL VERTEBRAL ARTERIES:  Distal vertebral arteries are patent without high-grade stenosis  Posterior inferior cerebellar artery origins are patent  BASILAR ARTERY:  Basilar artery is unremarkable  Normal superior cerebellar arteries  POSTERIOR CEREBRAL ARTERIES:    Bilateral posterior cerebral arteries are patent without critical stenosis  Normal right posterior communicating artery  Hypoplastic left posterior communicating artery  DURAL VENOUS SINUSES:  Unremarkable  NON VASCULAR ANATOMY BONY STRUCTURES: Redemonstrated diffuse lytic metastatic lesions throughout cervical and visualized thoracic spine to the level of T5 with severe C4 and T2 pathologic fractures and multilevel extraosseous tumor resulting in severe canal stenosis at C4 and moderate canal stenosis at T2  Redemonstrated left parietal and right frontal calvarial lesions  SOFT TISSUES OF THE NECK:  Unremarkable  THORACIC INLET:  Unremarkable  Impression: 1  No acute intracranial CT abnormality  2   Patent bilateral vertebral arteries without high-grade stenosis  There are multilevel abutment/mild displacement or encasement of the vertebral arteries by extraosseous tumor as described   3   No hemodynamically significant stenosis of cervical carotid arteries  4   Redemonstrated diffuse cervical and visualized thoracic spine metastatic lesions with severe C4 and T2 pathologic fractures  Multilevel extraosseous tumor resulting in severe canal stenosis at C4 and moderate canal stenosis at T2, better evaluated on recent cervical spine MRI  5   Redemonstrated left parietal and right frontal calvarial lesions  Workstation performed: [id number]     XR chest 2 views    Result Date: 3/29/2022  Narrative: CHEST INDICATION:   htn, upper back pain  COMPARISON:  None EXAM PERFORMED/VIEWS:  XR CHEST PA & LATERAL FINDINGS: Cardiomediastinal silhouette appears unremarkable  The lungs are clear  No pneumothorax or pleural effusion  Osseous structures appear within normal limits for patient age  Impression: No acute cardiopulmonary disease  Workstation performed: [id number]     XR spine cervical 2 or 3 vw injury    Result Date: 4/1/2022  Narrative: CERVICAL SPINE INDICATION:   baseline uprights  diffuse osseous mets  COMPARISON:  3/29/2022 MR and CT will VIEWS:  XR SPINE CERVICAL 2 OR 3 VW INJURY FINDINGS: Numerous lytic lesions are better seen on the prior studies  Calvarial lucencies most compatible with metastases  Described in prior MR brain report  Similar marked C4 loss of height secondary to lytic lesion and pathologic fracture  Similar mildly reversed curvature centered on the fracture  Atlantoaxial interval and craniocervical junction are preserved  No new spondylolisthesis  Similar degenerative change  No acute soft tissue pathology  Visualized lungs are clear  Impression: No acute change compared to recent prior studies  Workstation performed: [id number]     CT spine cervical wo contrast    Result Date: 3/29/2022  Narrative: CT CERVICAL SPINE - WITHOUT CONTRAST INDICATION:   Bone mass or bone pain, cervical spine, aggressive features on xray osseous mets  COMPARISON:  None   TECHNIQUE:  CT examination of the cervical spine was performed without intravenous contrast   Contiguous axial images were obtained  Sagittal and coronal reconstructions were performed  Radiation dose length product (DLP) for this visit:  718 mGy-cm   This examination, like all CT scans performed in the [facility], was performed utilizing techniques to minimize radiation dose exposure, including the use of iterative reconstruction and automated exposure control  IMAGE QUALITY:  Diagnostic  FINDINGS: ALIGNMENT:  There is reversal of normal cervical lordosis  There is no significant subluxation  No compression deformity  VERTEBRAL BODIES:  Innumerable lytic lesions throughout the cervical spine  There is a displaced fracture through the right posterior arch of C1 (series 2 image 46)  Displacement is approximately 4 mm  Large lytic process involving the right lateral mass of C1 is noted with expansion and extension involving the anterior arch  There is severe compression of the C4 vertebral body and moderate compression of the T2 vertebral body  The soft tissue windows demonstrate a large soft tissue mass extending from the C4 level into the spinal canal measuring approximately 2 2 x 1 3 cm (series 603 image 57)  This causes a severe central canal stenosis and likely cord compression  This extends from the mid C3 level to the lower C5 level  An additional large soft tissue mass extending from the T2 level into the spinal canal measures approximate 1 1 cm and likely causes severe central canal stenosis /cord compression  (Series 3 image 59)  DEGENERATIVE CHANGES:  No significant cervical degenerative changes are noted  PREVERTEBRAL AND PARASPINAL SOFT TISSUES:  Unremarkable  THORACIC INLET:  Normal      Impression: 1  Innumerable bony metastases   2   Displaced fracture through the right posterior arch of C1  3   C4 metastasis causing a severe compression fracture and extension into the canal causing severe central canal stenosis and likely cord compression  4   T2 metastasis causing a moderate compression fracture and extension into the canal causing severe central canal stenosis and likely cord compression  I sent a tiger text to Edward Dunbar on 3/29/2022 at 9:57 AM  The study was marked in [facility] for immediate notification  Workstation performed: [id number]     MRI brain w wo contrast    Result Date: 3/30/2022  Narrative: MRI BRAIN WITH AND WITHOUT CONTRAST INDICATION: breast ca, extensive mets, staging  COMPARISON:  MRI cervical, thoracic, and lumbar spine with and without contrast March 29, 2022  TECHNIQUE: Sagittal T1, axial T2, axial FLAIR, axial T1, axial [place], axial diffusion  Sagittal, axial T1 postcontrast   Axial bravo postcontrast with coronal reconstructions  IV Contrast:  9 mL of Gadobutrol injection (SINGLE-DOSE)  IMAGE QUALITY:   Diagnostic  FINDINGS: BRAIN PARENCHYMA:  There is no discrete mass, mass effect or midline shift  There is no intracranial hemorrhage  Normal posterior fossa  No diffusion-weighted signal abnormality to suggest acute infarction  There are no white matter changes in the cerebral hemispheres  Postcontrast imaging of the brain demonstrates no abnormal enhancement  VENTRICLES:  Normal for the patient's age  SELLA AND PITUITARY GLAND:  Normal  ORBITS:  Normal  PARANASAL SINUSES:  Normal  VASCULATURE:  Evaluation of the major intracranial vasculature demonstrates appropriate flow voids  CALVARIUM AND SKULL BASE:  Enhancing lesions in left parietal calvarium, right frontal calvarium, or suspicious for osseous calvarial metastasis  For reference: 1 5 cm left parietal calvarial enhancing lesion (12:109)  0 5 cm right frontal calvarial enhancing lesion (12:99)   EXTRACRANIAL SOFT TISSUES:  Normal  VISUALIZED CERVICAL SPINE: Unchanged enhancing osseous metastatic lesions of C1, C2, C4, and partially imaged C5 vertebra with pathologic compression fracture of C4 vertebral body that also has surrounding anterior paravertebral disease and anterior epidural extension of disease resulting in severe canal stenosis at C4 vertebral body level as described on yesterday's MRI cervical spine with and without contrast      Impression: No evidence of intracranial metastasis  Left parietal and right frontal calvarial enhancing lesions, suspicious for calvarial osseous metastasis  Unchanged enhancing osseous metastatic lesions of C1, C2, C4, and partially imaged C5 vertebra with pathologic compression fracture of C4 vertebral body that also has surrounding anterior paravertebral disease and anterior epidural extension of disease resulting in severe canal stenosis at C4 vertebral body level as described on yesterday's MRI cervical spine with and without contrast  The study was marked in EPIC for immediate notification  Workstation performed: [id number]     MRI cervical spine w wo contrast    Result Date: 3/29/2022  Narrative: MRI CERVICAL SPINE WITH AND WITHOUT CONTRAST INDICATION: Breast carcinoma with osseous metastasis    COMPARISON:  CT scan dated 3/29/2022  TECHNIQUE:  Sagittal T1, sagittal T2, sagittal inversion recovery, axial T2  Sagittal T1 and axial T1 postcontrast   IV Contrast:  9 mL of Gadobutrol injection (SINGLE-DOSE) IMAGE QUALITY:  Diagnostic  FINDINGS: ALIGNMENT:  There is reversal of the normal cervical lordosis centered at C4  There is anterior subluxation of C3 in relation to C4  There is a severe pathologic compression fracture of the C4 vertebral body  There is a moderate pathologic compression fracture of T2  MARROW SIGNAL:  Diffusely heterogeneous marrow signal in particular on T1-weighted imaging consistent with diffuse lytic metastasis  This involves nearly every visualized vertebral body within the cervical and upper thoracic spine  There is involvement  of the posterior elements as well, in particular on the left at the C4, C5 and C6 levels    There is posterior element involvement noted within the upper thoracic spine as well including T2 on the left as well as T3 and T4 on the left  Osseous metastatic disease is seen within the right aspect of the C1 vertebral body replacing the lateral mass and anterior ring of C1  There is extensive epidural tumor present at the C4 level extending posteriorly from the vertebral body into the anterior epidural space and into both neural foramen  Similar findings to a slightly lesser degree at the level of T2 with areas of epidural  tumor extending into the anterior epidural space bilaterally  CERVICAL AND VISUALIZED UPPER THORACIC CORD:  Although the cord is compressed in particular at the level of the C4 epidural tumor, the cord appears to maintain normal signal with no definite cord edema present  PREVERTEBRAL AND PARASPINAL SOFT TISSUES:  Mild anterior /prevertebral tumor noted at the C4 and T2 levels  VISUALIZED POSTERIOR FOSSA:  The visualized posterior fossa demonstrates no abnormal signal  CERVICAL DISC SPACES: C2-C3:  Normal  C3-C4:  Anterior subluxation of C3 upon C4  Loss of disc height with annular bulging  As described above there is extensive epidural tumor arising C4 vertebral body into the anterior epidural space and neural foramen bilaterally  Moderate canal stenosis with mild flattening of the cord  Moderate to severe bilateral foraminal narrowing with epidural tumor extending into the neural foramen  C4-C5:  Findings are similar to the C3-4 level with epidural tumor resulting in moderate canal stenosis and severe bilateral foraminal narrowing  C5-C6:  Mild annular bulging with a small central disc protrusion  Mild canal stenosis and left foraminal narrowing  C6-C7:  Mild annular bulging  No disc herniation, canal stenosis or foraminal narrowing  C7-T1:  Normal  UPPER THORACIC DISC SPACES:  Moderate canal stenosis at the level of T2 as a result of epidural tumor bilaterally    Mild to moderate foraminal narrowing at both T1-T2 and T2-T3 levels  POSTCONTRAST IMAGING:  The tumor replacing the vertebral bodies and posterior elements as well as the tumor extending into the epidural space is described above demonstrates relatively homogeneous enhancement  Impression: Diffuse, primarily lytic osseous metastatic disease is seen throughout the cervical vertebral bodies from C1 into the upper thoracic spine  There is a significant amount of epidural tumor arising from pathologic compression fractures of C4 and T2  At the C4 level there is moderate to severe canal stenosis with flattening of the cord which maintains normal signal   Moderate canal stenosis at the T2 level  There is corresponding foraminal narrowing as a result of epidural and foraminal tumor  Workstation performed: [id number]     MRI thoracic spine w wo contrast    Result Date: 3/29/2022  Narrative: MRI THORACIC SPINE WITH AND WITHOUT CONTRAST INDICATION: Breast carcinoma with diffuse osseous metastasis    COMPARISON:  None  TECHNIQUE:  Sagittal T1, sagittal T2, sagittal inversion recovery, axial T2,  axial 2D MERGE  Sagittal and axial T1 postcontrast  IV Contrast:  9 mL of Gadobutrol injection (SINGLE-DOSE) IMAGE QUALITY:  Diagnostic  FINDINGS: ALIGNMENT:  Slightly exaggerated smooth kyphosis of the thoracic spine  No scoliosis  MARROW SIGNAL: Diffuse osseous metastatic disease is seen throughout the thoracic spine  There is a pathologic compression fracture of T2 with moderate collapse of the vertebral body and epidural tumor extending into the anterior epidural space and into  the neural foramen at the T1-2 and T2-3 levels  Additional osseous metastasis is seen within upper thoracic vertebral bodies and posterior elements including T3, T4  The posterior element involvement within these vertebral bodies are primarily left-sided    There is moderate resulting foraminal narrowing and a small amount of epidural tumor noted within the left posterior lateral aspect of the canal at the T3-4 level  Additional osseous metastasis are scattered within the mid to lower thoracic spine with no other pathologic compression fractures  No other epidural tumor is seen within the mid or lower thoracic levels  THORACIC CORD:  Normal signal within the thoracic cord  PREVERTEBRAL AND PARASPINAL SOFT TISSUES:   2 well-circumscribed nodules are seen adjacent to the spleen which are isointense to splenic tissue, possibly splenule's  THORACIC DEGENERATIVE CHANGE:  A few small disc herniations are seen within the thoracic spine  See above description of canal stenosis and foraminal narrowing in the upper thoracic spine primarily result of epidural tumor  POSTCONTRAST:  The osseous metastasis demonstrate relatively homogeneous enhancement after administration of contrast      Impression: Diffuse, lytic, osseous metastasis is seen throughout the thoracic spine more prominently within the upper thoracic spine where there is epidural tumor and involvement of the posterior elements from T2 through T5  Moderate canal stenosis at the T2 level  Foraminal narrowing as described above  No abnormal cord signal identified  Workstation performed: [id number]     MRI lumbar spine w wo contrast    Result Date: 3/29/2022  Narrative: MRI LUMBAR SPINE WITH AND WITHOUT CONTRAST INDICATION: Breast carcinoma with osseous metastasis  COMPARISON:  None  TECHNIQUE:  Sagittal T1, sagittal T2, sagittal inversion recovery, axial T2, coronal T2  Sagittal and axial T1 postcontrast  IV Contrast:  9 mL of Gadobutrol injection (SINGLE-DOSE) IMAGE QUALITY:  Diagnostic FINDINGS: VERTEBRAL BODIES:  There are 5 lumbar type vertebral bodies  Mild smooth levoscoliosis of the lumbar spine  No spondylolisthesis or spondylolysis    Diffuse lytic metastasis are identified within the lower thoracic spine, and lumbar spine demonstrating homogeneous enhancement after administration of contrast   There is a minor compression deformity of the T12 superior endplate which may represent a pathologic compression fracture  SACRUM:  There is an expansile lytic/destructive metastatic lesion within the sacrum involving the S2-S5 segments with tumor extending anteriorly into the presacral space and posteriorly into the sacral canal resulting in severe sacral canal stenosis  DISTAL CORD AND CONUS:  Normal size and signal within the distal cord and conus  PARASPINAL SOFT TISSUES:  Numerous nabothian cysts are seen within the lower uterine canal   2 larger cysts are seen within the left posterior pelvis, likely adnexal in origin  See prior CT abdomen pelvis result  LOWER THORACIC DISC SPACES:  No lower thoracic disc herniation, canal stenosis or foraminal narrowing  No lower thoracic epidural tumor  LUMBAR DISC SPACES:  No evidence of epidural tumor within the lumbar canal   At L5-S1 there is annular bulging with a small left foraminal disc protrusion  There is no canal stenosis and only mild left foraminal narrowing  As described above there is severe sacral canal stenosis as a result of epidural tumor  Tumor appears to extend into the left sacral foramen at S2-3 and S3-4  POSTCONTRAST IMAGING:  The lytic osseous metastasis demonstrate relatively homogeneous enhancement  Impression: Diffuse lytic osseous metastasis within the lower thoracic spine, lumbar spine and sacrum  There is a moderate amount of epidural tumor as a result of the expansile sacral lesion resulting in moderate sacral canal stenosis  Tumor extends into the S2-3 and S3-4 neural foramen on the left  Correlate for corresponding radiculopathy  Bilobed cystic mass within the left pelvis incompletely evaluated on this examination  Follow-up pelvic ultrasound imaging is recommended  Workstation performed: [id number]     CT chest abdomen pelvis w contrast    Result Date: 3/29/2022  Narrative: CT CHEST, ABDOMEN AND PELVIS WITH IV CONTRAST INDICATION:   Evaluate for metastases   COMPARISON: None  TECHNIQUE: CT examination of the chest, abdomen and pelvis was performed  Axial, sagittal, and coronal 2D reformatted images were created from the source data and submitted for interpretation  Radiation dose length product (DLP) for this visit:  1086 mGy-cm   This examination, like all CT scans performed in the [facility], was performed utilizing techniques to minimize radiation dose exposure, including the use of iterative reconstruction and automated exposure control  IV Contrast:  100 mL of iohexol (OMNIPAQUE) Enteric Contrast: Enteric contrast was administered  FINDINGS: CHEST LUNGS:  Lungs are clear  There is no tracheal or endobronchial lesion  PLEURA:  Unremarkable  HEART/GREAT VESSELS: Heart is unremarkable for patient's age  No thoracic aortic aneurysm  MEDIASTINUM AND ALISON:  Unremarkable  CHEST WALL AND LOWER NECK:   6 9 x 6 2 x 6 4 cm mass extending to the overlying skin surface and right pectoralis major muscle with overlying skin induration  Right axillary adenopathy measuring up to 3 8 cm  ABDOMEN LIVER/BILIARY TREE:  Unremarkable  GALLBLADDER:  No calcified gallstones  No pericholecystic inflammatory change  SPLEEN:  Unremarkable  PANCREAS:  Unremarkable  ADRENAL GLANDS:  Unremarkable  KIDNEYS/URETERS:  One or more simple renal cyst(s) is noted  Nonobstructive calculus at the left inferior pole     No hydronephrosis  STOMACH AND BOWEL:  Unremarkable  APPENDIX:  No findings to suggest appendicitis  ABDOMINOPELVIC CAVITY:  No ascites  No pneumoperitoneum  No lymphadenopathy  VESSELS:  Unremarkable for patient's age  PELVIS REPRODUCTIVE ORGANS:  7 cm septated cystic left adnexal structure  URINARY BLADDER:  Unremarkable  ABDOMINAL WALL/INGUINAL REGIONS:  Unremarkable  OSSEOUS STRUCTURES:  Innumerable lytic lesions throughout the visualized osseous structures   The largest within the left sacrum measures up to 4 4 cm with destructive changes and encroachment upon the presacral space  Impression: Large right breast mass measuring up to 6 9 cm  Right axillary metastatic adenopathy  Surgical consult recommended  Innumerable osseous metastasis  7 cm septated cystic left adnexal structure  According to current guidelines Lexy Fret Radiol 2020; [id number]) in this premenopausal woman, this should be followed up in 6 to 12 months by pelvic ultrasound  Workstation performed: [id number]     CT RADIATION THERAPY    Result Date: 3/31/2022  Narrative: A CT scan of the brain, neck, chest, abdomen and pelvis was performed without intravenous contrast  This examination, like all CT scans performed in the [facility], was performed utilizing techniques to minimize radiation dose exposure, including the use of iterative reconstruction and automated exposure control  Examination was performed according to a protocol specifically designed for the purposes of radiation therapy planning and is of limited diagnostic sensitivity  CLINICAL HISTORY: Breast cancer with extensive metastases  COMPARISON: 3/30/2022 MR brain, 3/29/2022 CT C-Spine and 3/29/2022 CT of the chest, abdomen and pelvis FINDINGS: BRAIN: No acute parenchymal finding  No hydrocephalus  No lucent skull metastases  Globes and orbits are within normal limits  NECK: Similar multifocal bone metastases  Evidence of central canal and foraminal compromise  No acute finding  CHEST: Low lung volumes with atelectasis  Similar cardiomegaly  Similar large right breast mass with levels 1 2 and 3 right axillary lymphadenopathy  Similar multifocal bone metastases  ABDOMEN AND PELVIS: Similar left greater than right adnexal cyst and multifocal bone metastases  Impression: No acute findings in the brain, neck, chest, abdomen or pelvis compared to recent prior studies  Study for radiation planning   Workstation performed: [id number]       I have personally reviewed pertinent reports   , I have personally reviewed pertinent films in PACS and I have personally reviewed pertinent films in PACS with a Radiologist

## 2022-04-15 NOTE — PROGRESS NOTES
Breast Oncology Nurse Navigator    Patient returned my phone call yesterday afternoon while I was off site  Called her back and left a , requesting a call back

## 2022-04-15 NOTE — PROGRESS NOTES
Neurosurgery Office Note  Ivan Chan 51 y o  female MRN: [id number]      Assessment/Plan      Patient is a 51years old pleasant woman recently diagnosed with metastatic invasive breast carcinoma-grade 2 with cervical and thoracic including S3 bone metastasis and pathologic fracture of C4 and also Collapsed T2 #  Patient is here today for 2 weeks follow-up  Patient wearing , following up with Oncology & palliative care  Had 10 rounds of radiation therapy to her cervical and upper thoracic including the S3 region  She is to start immuno and chemotherapy on April 28/2022  Patient reports persistent moderate bilateral upper arm spasm and radicular symptoms, otherwise denies any numbness, weakness or paresthesia in the upper extremities  Denies fine motor dysfunction or dropping objects  No gait issues or bladder/dysfunction, except  constipations in the setting of taking narcotics  She is doing PT,  uses walker for ambulation  Exam-A&OX3, EOMI 2 mm conjugate bilaterally  SF  Moves all extremities  Finger-to-nose tests normal and without drift bilaterally  Sensation to light touch intact throughout  DTR 3+ but no clonus bilaterally  Tenderness noted on palpation of posterior cervical spine  Patient in wheelchair, wearing   Hx, PEx and images with the patient and her significant other  Management plan discussed  Patient's regular symptoms appears stable tolerable with pain meds  Motor and sensory function intact  No Significant gait issues or fall tendency  Normal bowel bladder function except occasional constipation due to narcotic medication  Imaging demonstrates C4 and T2 pathological fractures  I don't think patient need any procedure at this juncture  Advised to follow-up in 4 weeks with another upright cervical spine x-rays in   Continue wearing  all the time  Continue follow-up with palliative Care, Oncology, and physical therapy    Fall precaution, avoid lifting heavy objects, excessive bending or twisting  Questions and concerns were answered  Patient and her significant other expressed their understandings and agreed with the plan  Plan:  1  Upright cervical spine x-rays, in [place]/4 weeks  2  Continue wearing  all the time  3  Continue follow-up with oncology  4  Continue physical therapy  5  Fall precaution, avoid lifting heavy objects, excessive bending or twisting  6  Note to Dr Kelin Pete - for muscle relaxer & membrane stabilizing agent scripts  7  Follow-up in 4 weeks, SNPx  8  Call with question or concern    C/C: " Here for follow up of C4 and T2 pathological fracture"    Chief Complaint   Patient presents with    Neck Pain           History of Present Illness     Patient is a 51years old pleasant woman recently diagnosed with metastatic invasive breast carcinoma-grade 2 with cervical and thoracic including S3 bone metastasis and pathologic fracture of C4 and also Collapsed T2 #  Patient is here today for 2 weeks follow-up  Patient wearing , following up with Oncology & palliative care  Had 10 rounds of radiation therapy to her cervical and upper thoracic including the S3 region  She is to start immuno and chemotherapy on April 28/2022  Patient reports permanent moderate bilateral upper arm spasm and radicular symptoms, otherwise denies any numbness, weakness or paresthesia in the upper extremity  Denies fine motor dysfunction or dropping objects  No gait issues or bladder/dysfunction, except  constipations in the setting of taking narcotics  She is doing PT,  uses walker for ambulation  Patient denies any fever, chills, rigors, cough or chest pain  Image findings as described in the assessment section above  REVIEW OF SYSTEMS  Review of system personally reviewed and updated  Review of Systems   Constitutional: Negative  HENT: Negative  Eyes: Negative  Respiratory: Negative  Cardiovascular: Negative      Gastrointestinal: Negative  H/o GERD   Endocrine: Negative  Genitourinary: Negative  Musculoskeletal: Positive for neck pain (b/l nack pain radiates to b/l shouders/arms ) and neck stiffness (has collar)  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   On Dilaudid/Oxycodone- Good relief   Skin: Negative  Allergic/Immunologic: Negative  Neurological: Negative  Hematological: Negative  Psychiatric/Behavioral: Positive for sleep disturbance (occasional due to pain)  All other systems reviewed and are negative  Meds/Allergies     Current Outpatient Medications   Medication Sig Dispense Refill    al mag oxide-diphenhydramine-lidocaine viscous (MAGIC MOUTHWASH) 1:1:1 suspension Swish and spit 10 mL every 4 (four) hours as needed for mouth pain or discomfort 500 mL 2    amLODIPine (NORVASC) 5 mg tablet Take 1 tablet (5 mg total) by mouth daily 30 tablet 0    dexamethasone (DECADRON) 4 mg tablet Take 1 tablet (4 mg total) by mouth 3 (three) times a day 45 tablet 0    DPH-Lido-AlHydr-MgHydr-Simeth (First-Mouthwash BLM) SUSP       HYDROmorphone (DILAUDID) 4 mg tablet Take 1 tablet daily as needed following radiation therapy  10 tablet 0    lidocaine-prilocaine (EMLA) cream Apply topically as needed for mild pain 30 g 0    lisinopril (ZESTRIL) 20 mg tablet Take 1 tablet (20 mg total) by mouth daily 30 tablet 0    morphine (MS CONTIN) 15 mg 12 hr tablet Take 1 tablet (15 mg total) by mouth 3 (three) times a day Max Daily Amount: 45 mg 90 tablet 0    oxyCODONE (ROXICODONE) 10 MG TABS Take 1 tablet (10 mg total) by mouth every 4 (four) hours as needed for severe pain May split pill in half (5 mg) every 4 hours as needed for moderate pain (5 mg)   Max Daily Amount: 60 mg 180 tablet 0    pantoprazole (PROTONIX) 40 mg tablet Take 1 tablet (40 mg total) by mouth daily in the early morning 30 tablet 0    polyethylene glycol (MIRALAX) 17 g packet Take 17 g by mouth daily (Patient not taking: Reported on 4/7/2022 ) 30 each 0    senna (SENOKOT) 8 6 mg Take 3 tablets (25 8 mg total) by mouth 2 (two) times a day Hold for loose stool  180 tablet 0     No current facility-administered medications for this visit  No Known Allergies    PAST HISTORY    Past Medical History:   Diagnosis Date    Cancer [facility])        History reviewed  No pertinent surgical history  Social History     Tobacco Use    Smoking status: Never Smoker    Smokeless tobacco: Never Used   Vaping Use    Vaping Use: Never used   Substance Use Topics    Alcohol use: Yes     Comment: rare, social     Drug use: Never       Family History   Problem Relation Age of Onset    Coronary artery disease Mother     Heart attack Father     Diabetes type II Father          Above history personally reviewed  EXAM    Vitals:not currently breastfeeding  ,There is no height or weight on file to calculate BMI  Physical Exam  Constitutional:       Appearance: She is obese  HENT:      Head: Normocephalic and atraumatic  Eyes:      Extraocular Movements: Extraocular movements intact  Cardiovascular:      Rate and Rhythm: Normal rate and regular rhythm  Musculoskeletal:      Cervical back: Tenderness present  Neurological:      General: No focal deficit present  Mental Status: She is alert and oriented to person, place, and time  GCS: GCS eye subscore is 4  GCS verbal subscore is 5  GCS motor subscore is 6  Cranial Nerves: Cranial nerves are intact  Sensory: Sensation is intact  Motor: Motor function is intact  Coordination: Finger-Nose-Finger Test normal       Deep Tendon Reflexes: Reflexes are normal and symmetric  Reflex Scores:       Tricep reflexes are 2+ on the right side and 2+ on the left side  Bicep reflexes are 2+ on the right side and 2+ on the left side  Brachioradialis reflexes are 2+ on the right side and 2+ on the left side  Patellar reflexes are 2+ on the right side and 2+ on the left side  Achilles reflexes are 2+ on the right side and 2+ on the left side  Psychiatric:         Speech: Speech normal          Neurologic Exam     Mental Status   Oriented to person, place, and time  Speech: speech is normal   Level of consciousness: alert    Cranial Nerves     CN III, IV, VI   Right pupil: Size: 2 mm  Shape: regular  Left pupil: Size: 2 mm  Shape: regular  Motor Exam   Muscle bulk: normal  Overall muscle tone: normal  Right arm tone: normal  Left arm tone: normal  Right arm pronator drift: absent  Left arm pronator drift: absent  Right leg tone: normal  Left leg tone: normal    Sensory Exam   Light touch normal      Gait, Coordination, and Reflexes     Coordination   Finger to nose coordination: normal    Reflexes   Right brachioradialis: 2+  Left brachioradialis: 2+  Right biceps: 2+  Left biceps: 2+  Right triceps: 2+  Left triceps: 2+  Right patellar: 2+  Left patellar: 2+  Right achilles: 2+  Left achilles: 2+  Right : 2+  Left : 2+  Right Luke: absent  Left Luke: absent  Right ankle clonus: absent  Left pendular knee jerk: absent        MEDICAL DECISION MAKING    Imaging Studies:     CTA head and neck w wo contrast    Result Date: 3/31/2022  Narrative: CTA NECK AND BRAIN WITH AND WITHOUT CONTRAST INDICATION: eval vert given cervical mets and brain for mets and osseous lesions COMPARISON:   Cervical spine MRI 3/29/2022, brain MRI 3/30/2022 TECHNIQUE:  Routine CT imaging of the Brain without contrast   Post contrast imaging was performed after administration of iodinated contrast through the neck and brain  Post contrast axial 0 625 mm images timed to opacify the arterial system  3D rendering was performed on an independent workstation  MIP reconstructions performed  Coronal reconstructions were performed of the noncontrast portion of the brain  Radiation dose length product (DLP) for this visit:  1508 75 mGy-cm     This examination, like all CT scans performed in the [facility]  [address], was performed utilizing techniques to minimize radiation dose exposure, including the use of iterative reconstruction and automated exposure control  IV Contrast:  85 mL of iohexol (OMNIPAQUE)  IMAGE QUALITY:   Diagnostic FINDINGS: NONCONTRAST BRAIN PARENCHYMA:  No intracranial mass, mass effect or midline shift  No CT signs of acute infarction  No acute parenchymal hemorrhage  VENTRICLES AND EXTRA-AXIAL SPACES:  Normal for the patient's age  VISUALIZED ORBITS AND PARANASAL SINUSES:  Unremarkable  CERVICAL VASCULATURE AORTIC ARCH AND GREAT VESSELS: Aortic arch and great vessel origins are unremarkable  RIGHT VERTEBRAL ARTERY CERVICAL SEGMENT:The vessel origin is unremarkable  The vessel is patent throughout the neck without high-grade stenosis  At level C4 extraosseous tumor laterally displaces the vessel  The vessel is encased by tumor within C3 right transverse process  There is mild displacement of the vessel by extraosseous tumor at level C1-2  The tumor encases the vessel within the right transverse foramen of C1  LEFT VERTEBRAL ARTERY CERVICAL SEGMENT: The vessel origin is unremarkable  The vessel is patent throughout the neck without high-grade stenosis  At level C6 the extraosseous tumor contacts and mildly displaces the vessel  There is encasement and mild narrowing of the vessel by tumor at level C4  The tumor abuts the vessel where it exits left C2 transverse foramen  RIGHT EXTRACRANIAL CAROTID SEGMENT:The carotid bifurcation and cervical internal carotid artery are unremarkable  No stenosis or dissection  LEFT EXTRACRANIAL CAROTID SEGMENT: The carotid bifurcation and cervical internal carotid artery are unremarkable  No stenosis or dissection  INTRACRANIAL VASCULATURE INTERNAL CAROTID ARTERIES: The intracranial portions of the internal carotid arteries are unremarkable  Normal ophthalmic artery origins  ANTERIOR CIRCULATION:  Normal A1 segments    Bilateral anterior cerebral arteries are unremarkable  Normal anterior comminuting artery  MIDDLE CEREBRAL ARTERY CIRCULATION:  Bilateral M1 segments and major M2 branches are patent without significant stenosis  DISTAL VERTEBRAL ARTERIES:  Distal vertebral arteries are patent without high-grade stenosis  Posterior inferior cerebellar artery origins are patent  BASILAR ARTERY:  Basilar artery is unremarkable  Normal superior cerebellar arteries  POSTERIOR CEREBRAL ARTERIES:    Bilateral posterior cerebral arteries are patent without critical stenosis  Normal right posterior communicating artery  Hypoplastic left posterior communicating artery  DURAL VENOUS SINUSES:  Unremarkable  NON VASCULAR ANATOMY BONY STRUCTURES: Redemonstrated diffuse lytic metastatic lesions throughout cervical and visualized thoracic spine to the level of T5 with severe C4 and T2 pathologic fractures and multilevel extraosseous tumor resulting in severe canal stenosis at C4 and moderate canal stenosis at T2  Redemonstrated left parietal and right frontal calvarial lesions  SOFT TISSUES OF THE NECK:  Unremarkable  THORACIC INLET:  Unremarkable  Impression: 1  No acute intracranial CT abnormality  2   Patent bilateral vertebral arteries without high-grade stenosis  There are multilevel abutment/mild displacement or encasement of the vertebral arteries by extraosseous tumor as described  3   No hemodynamically significant stenosis of cervical carotid arteries  4   Redemonstrated diffuse cervical and visualized thoracic spine metastatic lesions with severe C4 and T2 pathologic fractures  Multilevel extraosseous tumor resulting in severe canal stenosis at C4 and moderate canal stenosis at T2, better evaluated on recent cervical spine MRI  5   Redemonstrated left parietal and right frontal calvarial lesions  Workstation performed: [id number]     XR chest 2 views    Result Date: 3/29/2022  Narrative: CHEST INDICATION:   htn, upper back pain  COMPARISON:  None EXAM PERFORMED/VIEWS:  XR CHEST PA & LATERAL FINDINGS: Cardiomediastinal silhouette appears unremarkable  The lungs are clear  No pneumothorax or pleural effusion  Osseous structures appear within normal limits for patient age  Impression: No acute cardiopulmonary disease  Workstation performed: [id number]     XR spine cervical 2 or 3 vw injury    Result Date: 4/1/2022  Narrative: CERVICAL SPINE INDICATION:   baseline uprights  diffuse osseous mets  COMPARISON:  3/29/2022 MR and CT will VIEWS:  XR SPINE CERVICAL 2 OR 3 VW INJURY FINDINGS: Numerous lytic lesions are better seen on the prior studies  Calvarial lucencies most compatible with metastases  Described in prior MR brain report  Similar marked C4 loss of height secondary to lytic lesion and pathologic fracture  Similar mildly reversed curvature centered on the fracture  Atlantoaxial interval and craniocervical junction are preserved  No new spondylolisthesis  Similar degenerative change  No acute soft tissue pathology  Visualized lungs are clear  Impression: No acute change compared to recent prior studies  Workstation performed: [id number]     CT spine cervical wo contrast    Result Date: 3/29/2022  Narrative: CT CERVICAL SPINE - WITHOUT CONTRAST INDICATION:   Bone mass or bone pain, cervical spine, aggressive features on xray osseous mets  COMPARISON:  None  TECHNIQUE:  CT examination of the cervical spine was performed without intravenous contrast   Contiguous axial images were obtained  Sagittal and coronal reconstructions were performed  Radiation dose length product (DLP) for this visit:  718 mGy-cm   This examination, like all CT scans performed in the [facility], was performed utilizing techniques to minimize radiation dose exposure, including the use of iterative reconstruction and automated exposure control  IMAGE QUALITY:  Diagnostic   FINDINGS: ALIGNMENT:  There is reversal of normal cervical lordosis  There is no significant subluxation  No compression deformity  VERTEBRAL BODIES:  Innumerable lytic lesions throughout the cervical spine  There is a displaced fracture through the right posterior arch of C1 (series 2 image 46)  Displacement is approximately 4 mm  Large lytic process involving the right lateral mass of C1 is noted with expansion and extension involving the anterior arch  There is severe compression of the C4 vertebral body and moderate compression of the T2 vertebral body  The soft tissue windows demonstrate a large soft tissue mass extending from the C4 level into the spinal canal measuring approximately 2 2 x 1 3 cm (series 603 image 57)  This causes a severe central canal stenosis and likely cord compression  This extends from the mid C3 level to the lower C5 level  An additional large soft tissue mass extending from the T2 level into the spinal canal measures approximate 1 1 cm and likely causes severe central canal stenosis /cord compression  (Series 3 image 59)  DEGENERATIVE CHANGES:  No significant cervical degenerative changes are noted  PREVERTEBRAL AND PARASPINAL SOFT TISSUES:  Unremarkable  THORACIC INLET:  Normal      Impression: 1  Innumerable bony metastases  2   Displaced fracture through the right posterior arch of C1  3   C4 metastasis causing a severe compression fracture and extension into the canal causing severe central canal stenosis and likely cord compression  4   T2 metastasis causing a moderate compression fracture and extension into the canal causing severe central canal stenosis and likely cord compression  I sent a tiger text to Emanuel Piña on 3/29/2022 at 9:57 AM  The study was marked in [facility] for immediate notification  Workstation performed: [id number]     MRI brain w wo contrast    Result Date: 3/30/2022  Narrative: MRI BRAIN WITH AND WITHOUT CONTRAST INDICATION: breast ca, extensive mets, staging   COMPARISON:  MRI cervical, thoracic, and lumbar spine with and without contrast March 29, 2022  TECHNIQUE: Sagittal T1, axial T2, axial FLAIR, axial T1, axial [place], axial diffusion  Sagittal, axial T1 postcontrast   Axial bravo postcontrast with coronal reconstructions  IV Contrast:  9 mL of Gadobutrol injection (SINGLE-DOSE)  IMAGE QUALITY:   Diagnostic  FINDINGS: BRAIN PARENCHYMA:  There is no discrete mass, mass effect or midline shift  There is no intracranial hemorrhage  Normal posterior fossa  No diffusion-weighted signal abnormality to suggest acute infarction  There are no white matter changes in the cerebral hemispheres  Postcontrast imaging of the brain demonstrates no abnormal enhancement  VENTRICLES:  Normal for the patient's age  SELLA AND PITUITARY GLAND:  Normal  ORBITS:  Normal  PARANASAL SINUSES:  Normal  VASCULATURE:  Evaluation of the major intracranial vasculature demonstrates appropriate flow voids  CALVARIUM AND SKULL BASE:  Enhancing lesions in left parietal calvarium, right frontal calvarium, or suspicious for osseous calvarial metastasis  For reference: 1 5 cm left parietal calvarial enhancing lesion (12:109)  0 5 cm right frontal calvarial enhancing lesion (12:99)  EXTRACRANIAL SOFT TISSUES:  Normal  VISUALIZED CERVICAL SPINE: Unchanged enhancing osseous metastatic lesions of C1, C2, C4, and partially imaged C5 vertebra with pathologic compression fracture of C4 vertebral body that also has surrounding anterior paravertebral disease and anterior epidural extension of disease resulting in severe canal stenosis at C4 vertebral body level as described on yesterday's MRI cervical spine with and without contrast      Impression: No evidence of intracranial metastasis  Left parietal and right frontal calvarial enhancing lesions, suspicious for calvarial osseous metastasis   Unchanged enhancing osseous metastatic lesions of C1, C2, C4, and partially imaged C5 vertebra with pathologic compression fracture of C4 vertebral body that also has surrounding anterior paravertebral disease and anterior epidural extension of disease resulting in severe canal stenosis at C4 vertebral body level as described on yesterday's MRI cervical spine with and without contrast  The study was marked in [facility] for immediate notification  Workstation performed: [id number]     MRI cervical spine w wo contrast    Result Date: 3/29/2022  Narrative: MRI CERVICAL SPINE WITH AND WITHOUT CONTRAST INDICATION: Breast carcinoma with osseous metastasis    COMPARISON:  CT scan dated 3/29/2022  TECHNIQUE:  Sagittal T1, sagittal T2, sagittal inversion recovery, axial T2  Sagittal T1 and axial T1 postcontrast   IV Contrast:  9 mL of Gadobutrol injection (SINGLE-DOSE) IMAGE QUALITY:  Diagnostic  FINDINGS: ALIGNMENT:  There is reversal of the normal cervical lordosis centered at C4  There is anterior subluxation of C3 in relation to C4  There is a severe pathologic compression fracture of the C4 vertebral body  There is a moderate pathologic compression fracture of T2  MARROW SIGNAL:  Diffusely heterogeneous marrow signal in particular on T1-weighted imaging consistent with diffuse lytic metastasis  This involves nearly every visualized vertebral body within the cervical and upper thoracic spine  There is involvement  of the posterior elements as well, in particular on the left at the C4, C5 and C6 levels  There is posterior element involvement noted within the upper thoracic spine as well including T2 on the left as well as T3 and T4 on the left  Osseous metastatic disease is seen within the right aspect of the C1 vertebral body replacing the lateral mass and anterior ring of C1  There is extensive epidural tumor present at the C4 level extending posteriorly from the vertebral body into the anterior epidural space and into both neural foramen    Similar findings to a slightly lesser degree at the level of T2 with areas of epidural  tumor extending into the anterior epidural space bilaterally  CERVICAL AND VISUALIZED UPPER THORACIC CORD:  Although the cord is compressed in particular at the level of the C4 epidural tumor, the cord appears to maintain normal signal with no definite cord edema present  PREVERTEBRAL AND PARASPINAL SOFT TISSUES:  Mild anterior /prevertebral tumor noted at the C4 and T2 levels  VISUALIZED POSTERIOR FOSSA:  The visualized posterior fossa demonstrates no abnormal signal  CERVICAL DISC SPACES: C2-C3:  Normal  C3-C4:  Anterior subluxation of C3 upon C4  Loss of disc height with annular bulging  As described above there is extensive epidural tumor arising C4 vertebral body into the anterior epidural space and neural foramen bilaterally  Moderate canal stenosis with mild flattening of the cord  Moderate to severe bilateral foraminal narrowing with epidural tumor extending into the neural foramen  C4-C5:  Findings are similar to the C3-4 level with epidural tumor resulting in moderate canal stenosis and severe bilateral foraminal narrowing  C5-C6:  Mild annular bulging with a small central disc protrusion  Mild canal stenosis and left foraminal narrowing  C6-C7:  Mild annular bulging  No disc herniation, canal stenosis or foraminal narrowing  C7-T1:  Normal  UPPER THORACIC DISC SPACES:  Moderate canal stenosis at the level of T2 as a result of epidural tumor bilaterally  Mild to moderate foraminal narrowing at both T1-T2 and T2-T3 levels  POSTCONTRAST IMAGING:  The tumor replacing the vertebral bodies and posterior elements as well as the tumor extending into the epidural space is described above demonstrates relatively homogeneous enhancement  Impression: Diffuse, primarily lytic osseous metastatic disease is seen throughout the cervical vertebral bodies from C1 into the upper thoracic spine  There is a significant amount of epidural tumor arising from pathologic compression fractures of C4 and T2    At the C4 level there is moderate to severe canal stenosis with flattening of the cord which maintains normal signal   Moderate canal stenosis at the T2 level  There is corresponding foraminal narrowing as a result of epidural and foraminal tumor  Workstation performed: [id number]     MRI thoracic spine w wo contrast    Result Date: 3/29/2022  Narrative: MRI THORACIC SPINE WITH AND WITHOUT CONTRAST INDICATION: Breast carcinoma with diffuse osseous metastasis    COMPARISON:  None  TECHNIQUE:  Sagittal T1, sagittal T2, sagittal inversion recovery, axial T2,  axial 2D MERGE  Sagittal and axial T1 postcontrast  IV Contrast:  9 mL of Gadobutrol injection (SINGLE-DOSE) IMAGE QUALITY:  Diagnostic  FINDINGS: ALIGNMENT:  Slightly exaggerated smooth kyphosis of the thoracic spine  No scoliosis  MARROW SIGNAL: Diffuse osseous metastatic disease is seen throughout the thoracic spine  There is a pathologic compression fracture of T2 with moderate collapse of the vertebral body and epidural tumor extending into the anterior epidural space and into  the neural foramen at the T1-2 and T2-3 levels  Additional osseous metastasis is seen within upper thoracic vertebral bodies and posterior elements including T3, T4  The posterior element involvement within these vertebral bodies are primarily left-sided  There is moderate resulting foraminal narrowing and a small amount of epidural tumor noted within the left posterior lateral aspect of the canal at the T3-4 level  Additional osseous metastasis are scattered within the mid to lower thoracic spine with no other pathologic compression fractures  No other epidural tumor is seen within the mid or lower thoracic levels  THORACIC CORD:  Normal signal within the thoracic cord  PREVERTEBRAL AND PARASPINAL SOFT TISSUES:   2 well-circumscribed nodules are seen adjacent to the spleen which are isointense to splenic tissue, possibly splenule's  THORACIC DEGENERATIVE CHANGE:  A few small disc herniations are seen within the thoracic spine   See above description of canal stenosis and foraminal narrowing in the upper thoracic spine primarily result of epidural tumor  POSTCONTRAST:  The osseous metastasis demonstrate relatively homogeneous enhancement after administration of contrast      Impression: Diffuse, lytic, osseous metastasis is seen throughout the thoracic spine more prominently within the upper thoracic spine where there is epidural tumor and involvement of the posterior elements from T2 through T5  Moderate canal stenosis at the T2 level  Foraminal narrowing as described above  No abnormal cord signal identified  Workstation performed: [id number]     MRI lumbar spine w wo contrast    Result Date: 3/29/2022  Narrative: MRI LUMBAR SPINE WITH AND WITHOUT CONTRAST INDICATION: Breast carcinoma with osseous metastasis  COMPARISON:  None  TECHNIQUE:  Sagittal T1, sagittal T2, sagittal inversion recovery, axial T2, coronal T2  Sagittal and axial T1 postcontrast  IV Contrast:  9 mL of Gadobutrol injection (SINGLE-DOSE) IMAGE QUALITY:  Diagnostic FINDINGS: VERTEBRAL BODIES:  There are 5 lumbar type vertebral bodies  Mild smooth levoscoliosis of the lumbar spine  No spondylolisthesis or spondylolysis  Diffuse lytic metastasis are identified within the lower thoracic spine, and lumbar spine demonstrating homogeneous enhancement after administration of contrast   There is a minor compression deformity of the T12 superior endplate which may represent a pathologic compression fracture  SACRUM:  There is an expansile lytic/destructive metastatic lesion within the sacrum involving the S2-S5 segments with tumor extending anteriorly into the presacral space and posteriorly into the sacral canal resulting in severe sacral canal stenosis  DISTAL CORD AND CONUS:  Normal size and signal within the distal cord and conus   PARASPINAL SOFT TISSUES:  Numerous nabothian cysts are seen within the lower uterine canal   2 larger cysts are seen within the left posterior pelvis, likely adnexal in origin  See prior CT abdomen pelvis result  LOWER THORACIC DISC SPACES:  No lower thoracic disc herniation, canal stenosis or foraminal narrowing  No lower thoracic epidural tumor  LUMBAR DISC SPACES:  No evidence of epidural tumor within the lumbar canal   At L5-S1 there is annular bulging with a small left foraminal disc protrusion  There is no canal stenosis and only mild left foraminal narrowing  As described above there is severe sacral canal stenosis as a result of epidural tumor  Tumor appears to extend into the left sacral foramen at S2-3 and S3-4  POSTCONTRAST IMAGING:  The lytic osseous metastasis demonstrate relatively homogeneous enhancement  Impression: Diffuse lytic osseous metastasis within the lower thoracic spine, lumbar spine and sacrum  There is a moderate amount of epidural tumor as a result of the expansile sacral lesion resulting in moderate sacral canal stenosis  Tumor extends into the S2-3 and S3-4 neural foramen on the left  Correlate for corresponding radiculopathy  Bilobed cystic mass within the left pelvis incompletely evaluated on this examination  Follow-up pelvic ultrasound imaging is recommended  Workstation performed: [id number]     CT chest abdomen pelvis w contrast    Result Date: 3/29/2022  Narrative: CT CHEST, ABDOMEN AND PELVIS WITH IV CONTRAST INDICATION:   Evaluate for metastases  COMPARISON:  None  TECHNIQUE: CT examination of the chest, abdomen and pelvis was performed  Axial, sagittal, and coronal 2D reformatted images were created from the source data and submitted for interpretation  Radiation dose length product (DLP) for this visit:  1086 mGy-cm   This examination, like all CT scans performed in the [facility], was performed utilizing techniques to minimize radiation dose exposure, including the use of iterative reconstruction and automated exposure control   IV Contrast:  100 mL of iohexol (OMNIPAQUE) Enteric Contrast: Enteric contrast was administered  FINDINGS: CHEST LUNGS:  Lungs are clear  There is no tracheal or endobronchial lesion  PLEURA:  Unremarkable  HEART/GREAT VESSELS: Heart is unremarkable for patient's age  No thoracic aortic aneurysm  MEDIASTINUM AND ALISON:  Unremarkable  CHEST WALL AND LOWER NECK:   6 9 x 6 2 x 6 4 cm mass extending to the overlying skin surface and right pectoralis major muscle with overlying skin induration  Right axillary adenopathy measuring up to 3 8 cm  ABDOMEN LIVER/BILIARY TREE:  Unremarkable  GALLBLADDER:  No calcified gallstones  No pericholecystic inflammatory change  SPLEEN:  Unremarkable  PANCREAS:  Unremarkable  ADRENAL GLANDS:  Unremarkable  KIDNEYS/URETERS:  One or more simple renal cyst(s) is noted  Nonobstructive calculus at the left inferior pole     No hydronephrosis  STOMACH AND BOWEL:  Unremarkable  APPENDIX:  No findings to suggest appendicitis  ABDOMINOPELVIC CAVITY:  No ascites  No pneumoperitoneum  No lymphadenopathy  VESSELS:  Unremarkable for patient's age  PELVIS REPRODUCTIVE ORGANS:  7 cm septated cystic left adnexal structure  URINARY BLADDER:  Unremarkable  ABDOMINAL WALL/INGUINAL REGIONS:  Unremarkable  OSSEOUS STRUCTURES:  Innumerable lytic lesions throughout the visualized osseous structures  The largest within the left sacrum measures up to 4 4 cm with destructive changes and encroachment upon the presacral space  Impression: Large right breast mass measuring up to 6 9 cm  Right axillary metastatic adenopathy  Surgical consult recommended  Innumerable osseous metastasis  7 cm septated cystic left adnexal structure  According to current guidelines [employer] 2020; [id number]) in this premenopausal woman, this should be followed up in 6 to 12 months by pelvic ultrasound   Workstation performed: [id number]     CT RADIATION THERAPY    Result Date: 3/31/2022  Narrative: A CT scan of the brain, neck, chest, abdomen and pelvis was performed without intravenous contrast  This examination, like all CT scans performed in the [facility], was performed utilizing techniques to minimize radiation dose exposure, including the use of iterative reconstruction and automated exposure control  Examination was performed according to a protocol specifically designed for the purposes of radiation therapy planning and is of limited diagnostic sensitivity  CLINICAL HISTORY: Breast cancer with extensive metastases  COMPARISON: 3/30/2022 MR brain, 3/29/2022 CT C-Spine and 3/29/2022 CT of the chest, abdomen and pelvis FINDINGS: BRAIN: No acute parenchymal finding  No hydrocephalus  No lucent skull metastases  Globes and orbits are within normal limits  NECK: Similar multifocal bone metastases  Evidence of central canal and foraminal compromise  No acute finding  CHEST: Low lung volumes with atelectasis  Similar cardiomegaly  Similar large right breast mass with levels 1 2 and 3 right axillary lymphadenopathy  Similar multifocal bone metastases  ABDOMEN AND PELVIS: Similar left greater than right adnexal cyst and multifocal bone metastases  Impression: No acute findings in the brain, neck, chest, abdomen or pelvis compared to recent prior studies  Study for radiation planning   Workstation performed: [id number]       I have personally reviewed pertinent reports   , I have personally reviewed pertinent films in PACS and I have personally reviewed pertinent films in PACS with a Radiologist

## 2022-04-15 NOTE — PROGRESS NOTES
Surgical Oncology Follow Up       [address] [facility] [place]  [address] [facility] [address]  [facility] [address]  [phone]    Joy Henson  1975  [id number]  [address] [facility] CANCER [facility] [address] [id number] [phone]    Diagnoses and all orders for this visit:    Malignant neoplasm of overlapping sites of right breast in female, estrogen receptor negative [facility])        Chief Complaint   Patient presents with    Follow-up       Return in about 4 months (around 8/15/2022) for Office Visit  Oncology History   Malignant neoplasm of overlapping sites of right breast in female, estrogen receptor negative ([facility] [address] )   3/29/2022 Initial Diagnosis    Malignant neoplasm of overlapping sites of right breast in female, estrogen receptor negative ([facility] [address] )     4/28/2022 -  Chemotherapy    pertuzumab (PERJETA) IVPB, 840 mg, Intravenous, Once, 0 of 6 cycles  DOCEtaxel (TAXOTERE) chemo infusion, 75 mg/m2 = 148 6 mg, Intravenous, Once, 0 of 6 cycles  trastuzumab (HERCEPTIN) chemo infusion, 8 mg/kg = 751 mg, Intravenous, Once, 0 of 6 cycles     Osseous metastasis ([facility] [facility] )   3/29/2022 Initial Diagnosis    Osseous metastasis ([facility] )     4/28/2022 -  Chemotherapy    pertuzumab (PERJETA) IVPB, 840 mg, Intravenous, Once, 0 of 6 cycles  DOCEtaxel (TAXOTERE) chemo infusion, 75 mg/m2 = 148 6 mg, Intravenous, Once, 0 of 6 cycles  trastuzumab (HERCEPTIN) chemo infusion, 8 mg/kg = 751 mg, Intravenous, Once, 0 of 6 cycles         Staging:  metastatic breast cancer to the bones  ER/VT negative, HER2 Kelle positive  Treatment history:  Chemotherapy pending  Current treatment:  Chemotherapy pending  Disease status:      History of Present Illness:  Patient returns in follow-up of her hospitalization  She was found to have metastatic breast cancer to the bones    She has already met with Medical Oncology and is starting chemotherapy within the next 2 weeks  She has already set up for a port with IR secondary to my absence  Review of Systems  Complete ROS Surg Onc:   Complete ROS Surg Onc:   Constitutional: The patient denies new or recent history of general fatigue, no recent weight loss, no change in appetite  Eyes: No complaints of visual problems, no scleral icterus  ENT: no complaints of ear pain, no hoarseness, no difficulty swallowing,  no tinnitus and no new masses in head, oral cavity, or neck  Cardiovascular: No complaints of chest pain, no palpitations, no ankle edema  Respiratory: No complaints of shortness of breath, no cough  Gastrointestinal: No complaints of jaundice, no bloody stools, no pale stools  Genitourinary: No complaints of dysuria, no hematuria, no nocturia, no frequent urination, no urethral discharge  Musculoskeletal: No complaints of weakness, paralysis, joint stiffness or arthralgias  Integumentary: No complaints of rash, no new lesions  Neurological: No complaints of convulsions, no seizures, no dizziness  Hematologic/Lymphatic: No complaints of easy bruising  Endocrine:  No hot or cold intolerance  No polydipsia, polyphagia, or polyuria  Allergy/immunology:  No environmental allergies  No food allergies  Not immunocompromised  Skin:  No pallor or rash  No wound  Patient Active Problem List   Diagnosis    Malignant neoplasm of overlapping sites of right breast in female, estrogen receptor negative ([address] )    Osseous metastasis ([address] )    Elevated BP without diagnosis of hypertension    Hypokalemia    Transaminitis    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)      History reviewed  No pertinent surgical history    Family History   Problem Relation Age of Onset    Coronary artery disease Mother     Heart attack Father     Diabetes type II Father Social History     Socioeconomic History    Marital status: Single     Spouse name: Not on file    Number of children: Not on file    Years of education: Not on file    Highest education level: Not on file   Occupational History    Not on file   Tobacco Use    Smoking status: Never Smoker    Smokeless tobacco: Never Used   Vaping Use    Vaping Use: Never used   Substance and Sexual Activity    Alcohol use: Yes     Comment: rare, social     Drug use: Never    Sexual activity: Not on file   Other Topics Concern    Not on file   Social History Narrative    Not on file     Social Determinants of Health     Financial Resource Strain: Not on file   Food Insecurity: No Food Insecurity    Worried About Running Out of Food in the Last Year: Never true    Miguel of Food in the Last Year: Never true   Transportation Needs: No Transportation Needs    Lack of Transportation (Medical): No    Lack of Transportation (Non-Medical): No   Physical Activity: Not on file   Stress: Not on file   Social Connections: Not on file   Intimate Partner Violence: Not on file   Housing Stability: Low Risk     Unable to Pay for Housing in the Last Year: No    Number of Places Lived in the Last Year: 1    Unstable Housing in the Last Year: No       Current Outpatient Medications:     al mag oxide-diphenhydramine-lidocaine viscous (MAGIC MOUTHWASH) 1:1:1 suspension, Swish and spit 10 mL every 4 (four) hours as needed for mouth pain or discomfort, Disp: 500 mL, Rfl: 2    amLODIPine (NORVASC) 5 mg tablet, Take 1 tablet (5 mg total) by mouth daily, Disp: 30 tablet, Rfl: 0    dexamethasone (DECADRON) 4 mg tablet, Take 1 tablet (4 mg total) by mouth 3 (three) times a day, Disp: 45 tablet, Rfl: 0    DPH-Lido-AlHydr-MgHydr-Simeth (First-Mouthwash BLM) SUSP, , Disp: , Rfl:     HYDROmorphone (DILAUDID) 4 mg tablet, Take 1 tablet daily as needed following radiation therapy  , Disp: 10 tablet, Rfl: 0    lidocaine-prilocaine (EMLA) cream, Apply topically as needed for mild pain, Disp: 30 g, Rfl: 0    lisinopril (ZESTRIL) 20 mg tablet, Take 1 tablet (20 mg total) by mouth daily, Disp: 30 tablet, Rfl: 0    morphine (MS CONTIN) 15 mg 12 hr tablet, Take 1 tablet (15 mg total) by mouth 3 (three) times a day Max Daily Amount: 45 mg, Disp: 90 tablet, Rfl: 0    oxyCODONE (ROXICODONE) 10 MG TABS, Take 1 tablet (10 mg total) by mouth every 4 (four) hours as needed for severe pain May split pill in half (5 mg) every 4 hours as needed for moderate pain (5 mg)  Max Daily Amount: 60 mg, Disp: 180 tablet, Rfl: 0    pantoprazole (PROTONIX) 40 mg tablet, Take 1 tablet (40 mg total) by mouth daily in the early morning, Disp: 30 tablet, Rfl: 0    polyethylene glycol (MIRALAX) 17 g packet, Take 17 g by mouth daily, Disp: 30 each, Rfl: 0    senna (SENOKOT) 8 6 mg, Take 3 tablets (25 8 mg total) by mouth 2 (two) times a day Hold for loose stool , Disp: 180 tablet, Rfl: 0  No Known Allergies  Vitals:    04/15/22 1455   BP: 130/80   Pulse: (!) 107   Resp: 16   Temp: (!) 96 7 °F (35 9 °C)   SpO2: 96%       Physical Exam  Constitutional: General appearance: The Patient is well-developed and well-nourished who appears the stated age in no acute distress  Patient is pleasant and talkative  HEENT:  Normocephalic  Sclerae are anicteric  Mucous membranes are moist      Extremities: There is no clubbing or cyanosis  There is no edema  Symmetric  Neuro:  She is in a cervical brace and in a wheelchair  Skin: Warm, anicteric  Psych:  Patient is pleasant and talkative    Breasts:        Pathology:  [unfilled]    Labs:      Imaging  CTA head and neck w wo contrast    Result Date: 3/31/2022  Narrative: CTA NECK AND BRAIN WITH AND WITHOUT CONTRAST INDICATION: eval vert given cervical mets and brain for mets and osseous lesions COMPARISON:   Cervical spine MRI 3/29/2022, brain MRI 3/30/2022 TECHNIQUE:  Routine CT imaging of the Brain without contrast   Post contrast imaging was performed after administration of iodinated contrast through the neck and brain  Post contrast axial 0 625 mm images timed to opacify the arterial system  3D rendering was performed on an independent workstation  MIP reconstructions performed  Coronal reconstructions were performed of the noncontrast portion of the brain  Radiation dose length product (DLP) for this visit:  1508 75 mGy-cm   This examination, like all CT scans performed in the [facility], was performed utilizing techniques to minimize radiation dose exposure, including the use of iterative reconstruction and automated exposure control  IV Contrast:  85 mL of iohexol (OMNIPAQUE)  IMAGE QUALITY:   Diagnostic FINDINGS: NONCONTRAST BRAIN PARENCHYMA:  No intracranial mass, mass effect or midline shift  No CT signs of acute infarction  No acute parenchymal hemorrhage  VENTRICLES AND EXTRA-AXIAL SPACES:  Normal for the patient's age  VISUALIZED ORBITS AND PARANASAL SINUSES:  Unremarkable  CERVICAL VASCULATURE AORTIC ARCH AND GREAT VESSELS: Aortic arch and great vessel origins are unremarkable  RIGHT VERTEBRAL ARTERY CERVICAL SEGMENT:The vessel origin is unremarkable  The vessel is patent throughout the neck without high-grade stenosis  At level C4 extraosseous tumor laterally displaces the vessel  The vessel is encased by tumor within C3 right transverse process  There is mild displacement of the vessel by extraosseous tumor at level C1-2  The tumor encases the vessel within the right transverse foramen of C1  LEFT VERTEBRAL ARTERY CERVICAL SEGMENT: The vessel origin is unremarkable  The vessel is patent throughout the neck without high-grade stenosis  At level C6 the extraosseous tumor contacts and mildly displaces the vessel  There is encasement and mild narrowing of the vessel by tumor at level C4  The tumor abuts the vessel where it exits left C2 transverse foramen   RIGHT EXTRACRANIAL CAROTID SEGMENT:The carotid bifurcation and cervical internal carotid artery are unremarkable  No stenosis or dissection  LEFT EXTRACRANIAL CAROTID SEGMENT: The carotid bifurcation and cervical internal carotid artery are unremarkable  No stenosis or dissection  INTRACRANIAL VASCULATURE INTERNAL CAROTID ARTERIES: The intracranial portions of the internal carotid arteries are unremarkable  Normal ophthalmic artery origins  ANTERIOR CIRCULATION:  Normal A1 segments  Bilateral anterior cerebral arteries are unremarkable  Normal anterior comminuting artery  MIDDLE CEREBRAL ARTERY CIRCULATION:  Bilateral M1 segments and major M2 branches are patent without significant stenosis  DISTAL VERTEBRAL ARTERIES:  Distal vertebral arteries are patent without high-grade stenosis  Posterior inferior cerebellar artery origins are patent  BASILAR ARTERY:  Basilar artery is unremarkable  Normal superior cerebellar arteries  POSTERIOR CEREBRAL ARTERIES:    Bilateral posterior cerebral arteries are patent without critical stenosis  Normal right posterior communicating artery  Hypoplastic left posterior communicating artery  DURAL VENOUS SINUSES:  Unremarkable  NON VASCULAR ANATOMY BONY STRUCTURES: Redemonstrated diffuse lytic metastatic lesions throughout cervical and visualized thoracic spine to the level of T5 with severe C4 and T2 pathologic fractures and multilevel extraosseous tumor resulting in severe canal stenosis at C4 and moderate canal stenosis at T2  Redemonstrated left parietal and right frontal calvarial lesions  SOFT TISSUES OF THE NECK:  Unremarkable  THORACIC INLET:  Unremarkable  Impression: 1  No acute intracranial CT abnormality  2   Patent bilateral vertebral arteries without high-grade stenosis  There are multilevel abutment/mild displacement or encasement of the vertebral arteries by extraosseous tumor as described  3   No hemodynamically significant stenosis of cervical carotid arteries   4  Redemonstrated diffuse cervical and visualized thoracic spine metastatic lesions with severe C4 and T2 pathologic fractures  Multilevel extraosseous tumor resulting in severe canal stenosis at C4 and moderate canal stenosis at T2, better evaluated on recent cervical spine MRI  5   Redemonstrated left parietal and right frontal calvarial lesions  Workstation performed: [id number]     XR chest 2 views    Result Date: 3/29/2022  Narrative: CHEST INDICATION:   htn, upper back pain  COMPARISON:  None EXAM PERFORMED/VIEWS:  XR CHEST PA & LATERAL FINDINGS: Cardiomediastinal silhouette appears unremarkable  The lungs are clear  No pneumothorax or pleural effusion  Osseous structures appear within normal limits for patient age  Impression: No acute cardiopulmonary disease  Workstation performed: [id number]     XR spine cervical 2 or 3 vw injury    Result Date: 4/1/2022  Narrative: CERVICAL SPINE INDICATION:   baseline uprights  diffuse osseous mets  COMPARISON:  3/29/2022 MR and CT will VIEWS:  XR SPINE CERVICAL 2 OR 3 VW INJURY FINDINGS: Numerous lytic lesions are better seen on the prior studies  Calvarial lucencies most compatible with metastases  Described in prior MR brain report  Similar marked C4 loss of height secondary to lytic lesion and pathologic fracture  Similar mildly reversed curvature centered on the fracture  Atlantoaxial interval and craniocervical junction are preserved  No new spondylolisthesis  Similar degenerative change  No acute soft tissue pathology  Visualized lungs are clear  Impression: No acute change compared to recent prior studies  Workstation performed: [id number]     CT spine cervical wo contrast    Result Date: 3/29/2022  Narrative: CT CERVICAL SPINE - WITHOUT CONTRAST INDICATION:   Bone mass or bone pain, cervical spine, aggressive features on xray osseous mets  COMPARISON:  None   TECHNIQUE:  CT examination of the cervical spine was performed without intravenous contrast  Contiguous axial images were obtained  Sagittal and coronal reconstructions were performed  Radiation dose length product (DLP) for this visit:  718 mGy-cm   This examination, like all CT scans performed in the [facility], was performed utilizing techniques to minimize radiation dose exposure, including the use of iterative reconstruction and automated exposure control  IMAGE QUALITY:  Diagnostic  FINDINGS: ALIGNMENT:  There is reversal of normal cervical lordosis  There is no significant subluxation  No compression deformity  VERTEBRAL BODIES:  Innumerable lytic lesions throughout the cervical spine  There is a displaced fracture through the right posterior arch of C1 (series 2 image 46)  Displacement is approximately 4 mm  Large lytic process involving the right lateral mass of C1 is noted with expansion and extension involving the anterior arch  There is severe compression of the C4 vertebral body and moderate compression of the T2 vertebral body  The soft tissue windows demonstrate a large soft tissue mass extending from the C4 level into the spinal canal measuring approximately 2 2 x 1 3 cm (series 603 image 57)  This causes a severe central canal stenosis and likely cord compression  This extends from the mid C3 level to the lower C5 level  An additional large soft tissue mass extending from the T2 level into the spinal canal measures approximate 1 1 cm and likely causes severe central canal stenosis /cord compression  (Series 3 image 59)  DEGENERATIVE CHANGES:  No significant cervical degenerative changes are noted  PREVERTEBRAL AND PARASPINAL SOFT TISSUES:  Unremarkable  THORACIC INLET:  Normal      Impression: 1  Innumerable bony metastases  2   Displaced fracture through the right posterior arch of C1  3   C4 metastasis causing a severe compression fracture and extension into the canal causing severe central canal stenosis and likely cord compression   4   T2 metastasis causing a moderate compression fracture and extension into the canal causing severe central canal stenosis and likely cord compression  I sent a tiger text to Lainey Alex on 3/29/2022 at 9:57 AM  The study was marked in [facility] for immediate notification  Workstation performed: [id number]     MRI brain w wo contrast    Result Date: 3/30/2022  Narrative: MRI BRAIN WITH AND WITHOUT CONTRAST INDICATION: breast ca, extensive mets, staging  COMPARISON:  MRI cervical, thoracic, and lumbar spine with and without contrast March 29, 2022  TECHNIQUE: Sagittal T1, axial T2, axial FLAIR, axial T1, axial [place], axial diffusion  Sagittal, axial T1 postcontrast   Axial bravo postcontrast with coronal reconstructions  IV Contrast:  9 mL of Gadobutrol injection (SINGLE-DOSE)  IMAGE QUALITY:   Diagnostic  FINDINGS: BRAIN PARENCHYMA:  There is no discrete mass, mass effect or midline shift  There is no intracranial hemorrhage  Normal posterior fossa  No diffusion-weighted signal abnormality to suggest acute infarction  There are no white matter changes in the cerebral hemispheres  Postcontrast imaging of the brain demonstrates no abnormal enhancement  VENTRICLES:  Normal for the patient's age  SELLA AND PITUITARY GLAND:  Normal  ORBITS:  Normal  PARANASAL SINUSES:  Normal  VASCULATURE:  Evaluation of the major intracranial vasculature demonstrates appropriate flow voids  CALVARIUM AND SKULL BASE:  Enhancing lesions in left parietal calvarium, right frontal calvarium, or suspicious for osseous calvarial metastasis  For reference: 1 5 cm left parietal calvarial enhancing lesion (12:109)  0 5 cm right frontal calvarial enhancing lesion (12:99)   EXTRACRANIAL SOFT TISSUES:  Normal  VISUALIZED CERVICAL SPINE: Unchanged enhancing osseous metastatic lesions of C1, C2, C4, and partially imaged C5 vertebra with pathologic compression fracture of C4 vertebral body that also has surrounding anterior paravertebral disease and anterior epidural extension of disease resulting in severe canal stenosis at C4 vertebral body level as described on yesterday's MRI cervical spine with and without contrast      Impression: No evidence of intracranial metastasis  Left parietal and right frontal calvarial enhancing lesions, suspicious for calvarial osseous metastasis  Unchanged enhancing osseous metastatic lesions of C1, C2, C4, and partially imaged C5 vertebra with pathologic compression fracture of C4 vertebral body that also has surrounding anterior paravertebral disease and anterior epidural extension of disease resulting in severe canal stenosis at C4 vertebral body level as described on yesterday's MRI cervical spine with and without contrast  The study was marked in EPIC for immediate notification  Workstation performed: [id number]     MRI cervical spine w wo contrast    Result Date: 3/29/2022  Narrative: MRI CERVICAL SPINE WITH AND WITHOUT CONTRAST INDICATION: Breast carcinoma with osseous metastasis    COMPARISON:  CT scan dated 3/29/2022  TECHNIQUE:  Sagittal T1, sagittal T2, sagittal inversion recovery, axial T2  Sagittal T1 and axial T1 postcontrast   IV Contrast:  9 mL of Gadobutrol injection (SINGLE-DOSE) IMAGE QUALITY:  Diagnostic  FINDINGS: ALIGNMENT:  There is reversal of the normal cervical lordosis centered at C4  There is anterior subluxation of C3 in relation to C4  There is a severe pathologic compression fracture of the C4 vertebral body  There is a moderate pathologic compression fracture of T2  MARROW SIGNAL:  Diffusely heterogeneous marrow signal in particular on T1-weighted imaging consistent with diffuse lytic metastasis  This involves nearly every visualized vertebral body within the cervical and upper thoracic spine  There is involvement  of the posterior elements as well, in particular on the left at the C4, C5 and C6 levels    There is posterior element involvement noted within the upper thoracic spine as well including T2 on the left as well as T3 and T4 on the left  Osseous metastatic disease is seen within the right aspect of the C1 vertebral body replacing the lateral mass and anterior ring of C1  There is extensive epidural tumor present at the C4 level extending posteriorly from the vertebral body into the anterior epidural space and into both neural foramen  Similar findings to a slightly lesser degree at the level of T2 with areas of epidural  tumor extending into the anterior epidural space bilaterally  CERVICAL AND VISUALIZED UPPER THORACIC CORD:  Although the cord is compressed in particular at the level of the C4 epidural tumor, the cord appears to maintain normal signal with no definite cord edema present  PREVERTEBRAL AND PARASPINAL SOFT TISSUES:  Mild anterior /prevertebral tumor noted at the C4 and T2 levels  VISUALIZED POSTERIOR FOSSA:  The visualized posterior fossa demonstrates no abnormal signal  CERVICAL DISC SPACES: C2-C3:  Normal  C3-C4:  Anterior subluxation of C3 upon C4  Loss of disc height with annular bulging  As described above there is extensive epidural tumor arising C4 vertebral body into the anterior epidural space and neural foramen bilaterally  Moderate canal stenosis with mild flattening of the cord  Moderate to severe bilateral foraminal narrowing with epidural tumor extending into the neural foramen  C4-C5:  Findings are similar to the C3-4 level with epidural tumor resulting in moderate canal stenosis and severe bilateral foraminal narrowing  C5-C6:  Mild annular bulging with a small central disc protrusion  Mild canal stenosis and left foraminal narrowing  C6-C7:  Mild annular bulging  No disc herniation, canal stenosis or foraminal narrowing  C7-T1:  Normal  UPPER THORACIC DISC SPACES:  Moderate canal stenosis at the level of T2 as a result of epidural tumor bilaterally  Mild to moderate foraminal narrowing at both T1-T2 and T2-T3 levels   POSTCONTRAST IMAGING:  The tumor replacing the vertebral bodies and posterior elements as well as the tumor extending into the epidural space is described above demonstrates relatively homogeneous enhancement  Impression: Diffuse, primarily lytic osseous metastatic disease is seen throughout the cervical vertebral bodies from C1 into the upper thoracic spine  There is a significant amount of epidural tumor arising from pathologic compression fractures of C4 and T2  At the C4 level there is moderate to severe canal stenosis with flattening of the cord which maintains normal signal   Moderate canal stenosis at the T2 level  There is corresponding foraminal narrowing as a result of epidural and foraminal tumor  Workstation performed: [id number]     MRI thoracic spine w wo contrast    Result Date: 3/29/2022  Narrative: MRI THORACIC SPINE WITH AND WITHOUT CONTRAST INDICATION: Breast carcinoma with diffuse osseous metastasis    COMPARISON:  None  TECHNIQUE:  Sagittal T1, sagittal T2, sagittal inversion recovery, axial T2,  axial 2D MERGE  Sagittal and axial T1 postcontrast  IV Contrast:  9 mL of Gadobutrol injection (SINGLE-DOSE) IMAGE QUALITY:  Diagnostic  FINDINGS: ALIGNMENT:  Slightly exaggerated smooth kyphosis of the thoracic spine  No scoliosis  MARROW SIGNAL: Diffuse osseous metastatic disease is seen throughout the thoracic spine  There is a pathologic compression fracture of T2 with moderate collapse of the vertebral body and epidural tumor extending into the anterior epidural space and into  the neural foramen at the T1-2 and T2-3 levels  Additional osseous metastasis is seen within upper thoracic vertebral bodies and posterior elements including T3, T4  The posterior element involvement within these vertebral bodies are primarily left-sided  There is moderate resulting foraminal narrowing and a small amount of epidural tumor noted within the left posterior lateral aspect of the canal at the T3-4 level    Additional osseous metastasis are scattered within the mid to lower thoracic spine with no other pathologic compression fractures  No other epidural tumor is seen within the mid or lower thoracic levels  THORACIC CORD:  Normal signal within the thoracic cord  PREVERTEBRAL AND PARASPINAL SOFT TISSUES:   2 well-circumscribed nodules are seen adjacent to the spleen which are isointense to splenic tissue, possibly splenule's  THORACIC DEGENERATIVE CHANGE:  A few small disc herniations are seen within the thoracic spine  See above description of canal stenosis and foraminal narrowing in the upper thoracic spine primarily result of epidural tumor  POSTCONTRAST:  The osseous metastasis demonstrate relatively homogeneous enhancement after administration of contrast      Impression: Diffuse, lytic, osseous metastasis is seen throughout the thoracic spine more prominently within the upper thoracic spine where there is epidural tumor and involvement of the posterior elements from T2 through T5  Moderate canal stenosis at the T2 level  Foraminal narrowing as described above  No abnormal cord signal identified  Workstation performed: [id number]     MRI lumbar spine w wo contrast    Result Date: 3/29/2022  Narrative: MRI LUMBAR SPINE WITH AND WITHOUT CONTRAST INDICATION: Breast carcinoma with osseous metastasis  COMPARISON:  None  TECHNIQUE:  Sagittal T1, sagittal T2, sagittal inversion recovery, axial T2, coronal T2  Sagittal and axial T1 postcontrast  IV Contrast:  9 mL of Gadobutrol injection (SINGLE-DOSE) IMAGE QUALITY:  Diagnostic FINDINGS: VERTEBRAL BODIES:  There are 5 lumbar type vertebral bodies  Mild smooth levoscoliosis of the lumbar spine  No spondylolisthesis or spondylolysis    Diffuse lytic metastasis are identified within the lower thoracic spine, and lumbar spine demonstrating homogeneous enhancement after administration of contrast   There is a minor compression deformity of the T12 superior endplate which may represent a pathologic compression fracture  SACRUM:  There is an expansile lytic/destructive metastatic lesion within the sacrum involving the S2-S5 segments with tumor extending anteriorly into the presacral space and posteriorly into the sacral canal resulting in severe sacral canal stenosis  DISTAL CORD AND CONUS:  Normal size and signal within the distal cord and conus  PARASPINAL SOFT TISSUES:  Numerous nabothian cysts are seen within the lower uterine canal   2 larger cysts are seen within the left posterior pelvis, likely adnexal in origin  See prior CT abdomen pelvis result  LOWER THORACIC DISC SPACES:  No lower thoracic disc herniation, canal stenosis or foraminal narrowing  No lower thoracic epidural tumor  LUMBAR DISC SPACES:  No evidence of epidural tumor within the lumbar canal   At L5-S1 there is annular bulging with a small left foraminal disc protrusion  There is no canal stenosis and only mild left foraminal narrowing  As described above there is severe sacral canal stenosis as a result of epidural tumor  Tumor appears to extend into the left sacral foramen at S2-3 and S3-4  POSTCONTRAST IMAGING:  The lytic osseous metastasis demonstrate relatively homogeneous enhancement  Impression: Diffuse lytic osseous metastasis within the lower thoracic spine, lumbar spine and sacrum  There is a moderate amount of epidural tumor as a result of the expansile sacral lesion resulting in moderate sacral canal stenosis  Tumor extends into the S2-3 and S3-4 neural foramen on the left  Correlate for corresponding radiculopathy  Bilobed cystic mass within the left pelvis incompletely evaluated on this examination  Follow-up pelvic ultrasound imaging is recommended  Workstation performed: [id number]     CT chest abdomen pelvis w contrast    Result Date: 3/29/2022  Narrative: CT CHEST, ABDOMEN AND PELVIS WITH IV CONTRAST INDICATION:   Evaluate for metastases  COMPARISON:  None   TECHNIQUE: CT examination of the chest, abdomen and pelvis was performed  Axial, sagittal, and coronal 2D reformatted images were created from the source data and submitted for interpretation  Radiation dose length product (DLP) for this visit:  1086 mGy-cm   This examination, like all CT scans performed in the [facility], was performed utilizing techniques to minimize radiation dose exposure, including the use of iterative reconstruction and automated exposure control  IV Contrast:  100 mL of iohexol (OMNIPAQUE) Enteric Contrast: Enteric contrast was administered  FINDINGS: CHEST LUNGS:  Lungs are clear  There is no tracheal or endobronchial lesion  PLEURA:  Unremarkable  HEART/GREAT VESSELS: Heart is unremarkable for patient's age  No thoracic aortic aneurysm  MEDIASTINUM AND ALISON:  Unremarkable  CHEST WALL AND LOWER NECK:   6 9 x 6 2 x 6 4 cm mass extending to the overlying skin surface and right pectoralis major muscle with overlying skin induration  Right axillary adenopathy measuring up to 3 8 cm  ABDOMEN LIVER/BILIARY TREE:  Unremarkable  GALLBLADDER:  No calcified gallstones  No pericholecystic inflammatory change  SPLEEN:  Unremarkable  PANCREAS:  Unremarkable  ADRENAL GLANDS:  Unremarkable  KIDNEYS/URETERS:  One or more simple renal cyst(s) is noted  Nonobstructive calculus at the left inferior pole     No hydronephrosis  STOMACH AND BOWEL:  Unremarkable  APPENDIX:  No findings to suggest appendicitis  ABDOMINOPELVIC CAVITY:  No ascites  No pneumoperitoneum  No lymphadenopathy  VESSELS:  Unremarkable for patient's age  PELVIS REPRODUCTIVE ORGANS:  7 cm septated cystic left adnexal structure  URINARY BLADDER:  Unremarkable  ABDOMINAL WALL/INGUINAL REGIONS:  Unremarkable  OSSEOUS STRUCTURES:  Innumerable lytic lesions throughout the visualized osseous structures  The largest within the left sacrum measures up to 4 4 cm with destructive changes and encroachment upon the presacral space       Impression: Large right breast mass measuring up to 6 9 cm  Right axillary metastatic adenopathy  Surgical consult recommended  Innumerable osseous metastasis  7 cm septated cystic left adnexal structure  According to current guidelines Sherley Esquiveltune Radiol 2020; [id number]) in this premenopausal woman, this should be followed up in 6 to 12 months by pelvic ultrasound  Workstation performed: [id number]     CT RADIATION THERAPY    Result Date: 3/31/2022  Narrative: A CT scan of the brain, neck, chest, abdomen and pelvis was performed without intravenous contrast  This examination, like all CT scans performed in the [facility], was performed utilizing techniques to minimize radiation dose exposure, including the use of iterative reconstruction and automated exposure control  Examination was performed according to a protocol specifically designed for the purposes of radiation therapy planning and is of limited diagnostic sensitivity  CLINICAL HISTORY: Breast cancer with extensive metastases  COMPARISON: 3/30/2022 MR brain, 3/29/2022 CT C-Spine and 3/29/2022 CT of the chest, abdomen and pelvis FINDINGS: BRAIN: No acute parenchymal finding  No hydrocephalus  No lucent skull metastases  Globes and orbits are within normal limits  NECK: Similar multifocal bone metastases  Evidence of central canal and foraminal compromise  No acute finding  CHEST: Low lung volumes with atelectasis  Similar cardiomegaly  Similar large right breast mass with levels 1 2 and 3 right axillary lymphadenopathy  Similar multifocal bone metastases  ABDOMEN AND PELVIS: Similar left greater than right adnexal cyst and multifocal bone metastases  Impression: No acute findings in the brain, neck, chest, abdomen or pelvis compared to recent prior studies  Study for radiation planning  Workstation performed: [id number]     I reviewed the above laboratory and imaging data  Discussion/Summary:  50-year-old female with metastatic breast cancer, ER/AK negative, HER2 positive  She has a palpable upper inner right breast maths with axillary adenopathy and multiple osseous metastasis  She is going to be completing radiation shortly  I have recommended that she start chemotherapy  We discussed that there is conflicting data regarding surgical treatment of the primary with bone only metastasis  Some studies show improve survival, while other show no change in survival   I have recommended that she start her chemotherapy  I will see her again in 3 months  If her disease show stability, I would consider resection of the primary site if this can be done with a less extensive surgery  She is agreeable to this plan  All her questions were answered

## 2022-04-15 NOTE — LETTER
April 15, 2022     Bo Murray MD  [address]   [address]  [address] [facility] [address]    Patient: Norm Valentine   YOB: 1975   Date of Visit: 4/15/2022       Dear Dr Chelsi Spann: Thank you for referring Norm Valentine to me for evaluation  Below are my notes for this consultation  If you have questions, please do not hesitate to call me  I look forward to following your patient along with you  Sincerely,        Olivia Anderson MD        CC: DO Luis Daniel Esqueda MD Kay Sin, MD  4/15/2022  3:13 PM  Sign when Signing Visit               Surgical Oncology Follow Up       [facility] [address] [address]  [phone]    Norm Valentine  1975  [id number]  [facility] [address] [facility]  [phone]    Diagnoses and all orders for this visit:    Malignant neoplasm of overlapping sites of right breast in female, estrogen receptor negative [facility])        Chief Complaint   Patient presents with    Follow-up       Return in about 4 months (around 8/15/2022) for Office Visit        Oncology History   Malignant neoplasm of overlapping sites of right breast in female, estrogen receptor negative ([facility] [address] )   3/29/2022 Initial Diagnosis    Malignant neoplasm of overlapping sites of right breast in female, estrogen receptor negative ([facility] [address] )     4/28/2022 -  Chemotherapy    pertuzumab (PERJETA) IVPB, 840 mg, Intravenous, Once, 0 of 6 cycles  DOCEtaxel (TAXOTERE) chemo infusion, 75 mg/m2 = 148 6 mg, Intravenous, Once, 0 of 6 cycles  trastuzumab (HERCEPTIN) chemo infusion, 8 mg/kg = 751 mg, Intravenous, Once, 0 of 6 cycles     Osseous metastasis ([address] )   3/29/2022 Initial Diagnosis    Osseous metastasis ([facility] [address] )     4/28/2022 -  Chemotherapy    pertuzumab (PERJETA) IVPB, 840 mg, Intravenous, Once, 0 of 6 cycles  DOCEtaxel (TAXOTERE) chemo infusion, 75 mg/m2 = 148 6 mg, Intravenous, Once, 0 of 6 cycles  trastuzumab (HERCEPTIN) chemo infusion, 8 mg/kg = 751 mg, Intravenous, Once, 0 of 6 cycles         Staging:  metastatic breast cancer to the bones  ER/DE negative, HER2 Kelle positive  Treatment history:  Chemotherapy pending  Current treatment:  Chemotherapy pending  Disease status:      History of Present Illness:  Patient returns in follow-up of her hospitalization  She was found to have metastatic breast cancer to the bones  She has already met with Medical Oncology and is starting chemotherapy within the next 2 weeks  She has already set up for a port with IR secondary to my absence  Review of Systems  Complete ROS Surg Onc:   Complete ROS Surg Onc:   Constitutional: The patient denies new or recent history of general fatigue, no recent weight loss, no change in appetite  Eyes: No complaints of visual problems, no scleral icterus  ENT: no complaints of ear pain, no hoarseness, no difficulty swallowing,  no tinnitus and no new masses in head, oral cavity, or neck  Cardiovascular: No complaints of chest pain, no palpitations, no ankle edema  Respiratory: No complaints of shortness of breath, no cough  Gastrointestinal: No complaints of jaundice, no bloody stools, no pale stools  Genitourinary: No complaints of dysuria, no hematuria, no nocturia, no frequent urination, no urethral discharge  Musculoskeletal: No complaints of weakness, paralysis, joint stiffness or arthralgias  Integumentary: No complaints of rash, no new lesions  Neurological: No complaints of convulsions, no seizures, no dizziness  Hematologic/Lymphatic: No complaints of easy bruising  Endocrine:  No hot or cold intolerance  No polydipsia, polyphagia, or polyuria  Allergy/immunology:  No environmental allergies  No food allergies  Not immunocompromised  Skin:  No pallor or rash  No wound          Patient Active Problem List   Diagnosis    Malignant neoplasm of overlapping sites of right breast in female, estrogen receptor negative ([facility] [address] )    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    Cancer associated pain    Palliative care patient     Past Medical History:   Diagnosis Date    Cancer [facility])      History reviewed  No pertinent surgical history  Family History   Problem Relation Age of Onset    Coronary artery disease Mother     Heart attack Father     Diabetes type II Father      Social History     Socioeconomic History    Marital status: Single     Spouse name: Not on file    Number of children: Not on file    Years of education: Not on file    Highest education level: Not on file   Occupational History    Not on file   Tobacco Use    Smoking status: Never Smoker    Smokeless tobacco: Never Used   Vaping Use    Vaping Use: Never used   Substance and Sexual Activity    Alcohol use: Yes     Comment: rare, social     Drug use: Never    Sexual activity: Not on file   Other Topics Concern    Not on file   Social History Narrative    Not on file     Social Determinants of Health     Financial Resource Strain: Not on file   Food Insecurity: No Food Insecurity    Worried About Running Out of Food in the Last Year: Never true    Miguel of Food in the Last Year: Never true   Transportation Needs: No Transportation Needs    Lack of Transportation (Medical): No    Lack of Transportation (Non-Medical):  No   Physical Activity: Not on file   Stress: Not on file   Social Connections: Not on file   Intimate Partner Violence: Not on file   Housing Stability: Low Risk     Unable to Pay for Housing in the Last Year: No    Number of Places Lived in the Last Year: 1    Unstable Housing in the Last Year: No       Current Outpatient Medications:     al mag oxide-diphenhydramine-lidocaine viscous (MAGIC MOUTHWASH) 1:1:1 suspension, Swish and spit 10 mL every 4 (four) hours as needed for mouth pain or discomfort, Disp: 500 mL, Rfl: 2    amLODIPine (NORVASC) 5 mg tablet, Take 1 tablet (5 mg total) by mouth daily, Disp: 30 tablet, Rfl: 0    dexamethasone (DECADRON) 4 mg tablet, Take 1 tablet (4 mg total) by mouth 3 (three) times a day, Disp: 45 tablet, Rfl: 0    DPH-Lido-AlHydr-MgHydr-Simeth (First-Mouthwash BLM) SUSP, , Disp: , Rfl:     HYDROmorphone (DILAUDID) 4 mg tablet, Take 1 tablet daily as needed following radiation therapy  , Disp: 10 tablet, Rfl: 0    lidocaine-prilocaine (EMLA) cream, Apply topically as needed for mild pain, Disp: 30 g, Rfl: 0    lisinopril (ZESTRIL) 20 mg tablet, Take 1 tablet (20 mg total) by mouth daily, Disp: 30 tablet, Rfl: 0    morphine (MS CONTIN) 15 mg 12 hr tablet, Take 1 tablet (15 mg total) by mouth 3 (three) times a day Max Daily Amount: 45 mg, Disp: 90 tablet, Rfl: 0    oxyCODONE (ROXICODONE) 10 MG TABS, Take 1 tablet (10 mg total) by mouth every 4 (four) hours as needed for severe pain May split pill in half (5 mg) every 4 hours as needed for moderate pain (5 mg)  Max Daily Amount: 60 mg, Disp: 180 tablet, Rfl: 0    pantoprazole (PROTONIX) 40 mg tablet, Take 1 tablet (40 mg total) by mouth daily in the early morning, Disp: 30 tablet, Rfl: 0    polyethylene glycol (MIRALAX) 17 g packet, Take 17 g by mouth daily, Disp: 30 each, Rfl: 0    senna (SENOKOT) 8 6 mg, Take 3 tablets (25 8 mg total) by mouth 2 (two) times a day Hold for loose stool , Disp: 180 tablet, Rfl: 0  No Known Allergies  Vitals:    04/15/22 1455   BP: 130/80   Pulse: (!) 107   Resp: 16   Temp: (!) 96 7 °F (35 9 °C)   SpO2: 96%       Physical Exam  Constitutional: General appearance: The Patient is well-developed and well-nourished who appears the stated age in no acute distress  Patient is pleasant and talkative  HEENT:  Normocephalic  Sclerae are anicteric   Mucous membranes are moist  Extremities: There is no clubbing or cyanosis  There is no edema  Symmetric  Neuro:  She is in a cervical brace and in a wheelchair  Skin: Warm, anicteric  Psych:  Patient is pleasant and talkative  Breasts:        Pathology:  [unfilled]    Labs:      Imaging  CTA head and neck w wo contrast    Result Date: 3/31/2022  Narrative: CTA NECK AND BRAIN WITH AND WITHOUT CONTRAST INDICATION: eval vert given cervical mets and brain for mets and osseous lesions COMPARISON:   Cervical spine MRI 3/29/2022, brain MRI 3/30/2022 TECHNIQUE:  Routine CT imaging of the Brain without contrast   Post contrast imaging was performed after administration of iodinated contrast through the neck and brain  Post contrast axial 0 625 mm images timed to opacify the arterial system  3D rendering was performed on an independent workstation  MIP reconstructions performed  Coronal reconstructions were performed of the noncontrast portion of the brain  Radiation dose length product (DLP) for this visit:  1508 75 mGy-cm   This examination, like all CT scans performed in the [facility], was performed utilizing techniques to minimize radiation dose exposure, including the use of iterative reconstruction and automated exposure control  IV Contrast:  85 mL of iohexol (OMNIPAQUE)  IMAGE QUALITY:   Diagnostic FINDINGS: NONCONTRAST BRAIN PARENCHYMA:  No intracranial mass, mass effect or midline shift  No CT signs of acute infarction  No acute parenchymal hemorrhage  VENTRICLES AND EXTRA-AXIAL SPACES:  Normal for the patient's age  VISUALIZED ORBITS AND PARANASAL SINUSES:  Unremarkable  CERVICAL VASCULATURE AORTIC ARCH AND GREAT VESSELS: Aortic arch and great vessel origins are unremarkable  RIGHT VERTEBRAL ARTERY CERVICAL SEGMENT:The vessel origin is unremarkable  The vessel is patent throughout the neck without high-grade stenosis  At level C4 extraosseous tumor laterally displaces the vessel    The vessel is encased by tumor within C3 right transverse process  There is mild displacement of the vessel by extraosseous tumor at level C1-2  The tumor encases the vessel within the right transverse foramen of C1  LEFT VERTEBRAL ARTERY CERVICAL SEGMENT: The vessel origin is unremarkable  The vessel is patent throughout the neck without high-grade stenosis  At level C6 the extraosseous tumor contacts and mildly displaces the vessel  There is encasement and mild narrowing of the vessel by tumor at level C4  The tumor abuts the vessel where it exits left C2 transverse foramen  RIGHT EXTRACRANIAL CAROTID SEGMENT:The carotid bifurcation and cervical internal carotid artery are unremarkable  No stenosis or dissection  LEFT EXTRACRANIAL CAROTID SEGMENT: The carotid bifurcation and cervical internal carotid artery are unremarkable  No stenosis or dissection  INTRACRANIAL VASCULATURE INTERNAL CAROTID ARTERIES: The intracranial portions of the internal carotid arteries are unremarkable  Normal ophthalmic artery origins  ANTERIOR CIRCULATION:  Normal A1 segments  Bilateral anterior cerebral arteries are unremarkable  Normal anterior comminuting artery  MIDDLE CEREBRAL ARTERY CIRCULATION:  Bilateral M1 segments and major M2 branches are patent without significant stenosis  DISTAL VERTEBRAL ARTERIES:  Distal vertebral arteries are patent without high-grade stenosis  Posterior inferior cerebellar artery origins are patent  BASILAR ARTERY:  Basilar artery is unremarkable  Normal superior cerebellar arteries  POSTERIOR CEREBRAL ARTERIES:    Bilateral posterior cerebral arteries are patent without critical stenosis  Normal right posterior communicating artery  Hypoplastic left posterior communicating artery  DURAL VENOUS SINUSES:  Unremarkable   NON VASCULAR ANATOMY BONY STRUCTURES: Redemonstrated diffuse lytic metastatic lesions throughout cervical and visualized thoracic spine to the level of T5 with severe C4 and T2 pathologic fractures and multilevel extraosseous tumor resulting in severe canal stenosis at C4 and moderate canal stenosis at T2  Redemonstrated left parietal and right frontal calvarial lesions  SOFT TISSUES OF THE NECK:  Unremarkable  THORACIC INLET:  Unremarkable  Impression: 1  No acute intracranial CT abnormality  2   Patent bilateral vertebral arteries without high-grade stenosis  There are multilevel abutment/mild displacement or encasement of the vertebral arteries by extraosseous tumor as described  3   No hemodynamically significant stenosis of cervical carotid arteries  4   Redemonstrated diffuse cervical and visualized thoracic spine metastatic lesions with severe C4 and T2 pathologic fractures  Multilevel extraosseous tumor resulting in severe canal stenosis at C4 and moderate canal stenosis at T2, better evaluated on recent cervical spine MRI  5   Redemonstrated left parietal and right frontal calvarial lesions  Workstation performed: [id number]     XR chest 2 views    Result Date: 3/29/2022  Narrative: CHEST INDICATION:   htn, upper back pain  COMPARISON:  None EXAM PERFORMED/VIEWS:  XR CHEST PA & LATERAL FINDINGS: Cardiomediastinal silhouette appears unremarkable  The lungs are clear  No pneumothorax or pleural effusion  Osseous structures appear within normal limits for patient age  Impression: No acute cardiopulmonary disease  Workstation performed: [id number]     XR spine cervical 2 or 3 vw injury    Result Date: 4/1/2022  Narrative: CERVICAL SPINE INDICATION:   baseline uprights  diffuse osseous mets  COMPARISON:  3/29/2022 MR and CT will VIEWS:  XR SPINE CERVICAL 2 OR 3 VW INJURY FINDINGS: Numerous lytic lesions are better seen on the prior studies  Calvarial lucencies most compatible with metastases  Described in prior MR brain report  Similar marked C4 loss of height secondary to lytic lesion and pathologic fracture   Similar mildly reversed curvature centered on the fracture  Atlantoaxial interval and craniocervical junction are preserved  No new spondylolisthesis  Similar degenerative change  No acute soft tissue pathology  Visualized lungs are clear  Impression: No acute change compared to recent prior studies  Workstation performed: [id number]     CT spine cervical wo contrast    Result Date: 3/29/2022  Narrative: CT CERVICAL SPINE - WITHOUT CONTRAST INDICATION:   Bone mass or bone pain, cervical spine, aggressive features on xray osseous mets  COMPARISON:  None  TECHNIQUE:  CT examination of the cervical spine was performed without intravenous contrast   Contiguous axial images were obtained  Sagittal and coronal reconstructions were performed  Radiation dose length product (DLP) for this visit:  718 mGy-cm   This examination, like all CT scans performed in the [facility], was performed utilizing techniques to minimize radiation dose exposure, including the use of iterative reconstruction and automated exposure control  IMAGE QUALITY:  Diagnostic  FINDINGS: ALIGNMENT:  There is reversal of normal cervical lordosis  There is no significant subluxation  No compression deformity  VERTEBRAL BODIES:  Innumerable lytic lesions throughout the cervical spine  There is a displaced fracture through the right posterior arch of C1 (series 2 image 46)  Displacement is approximately 4 mm  Large lytic process involving the right lateral mass of C1 is noted with expansion and extension involving the anterior arch  There is severe compression of the C4 vertebral body and moderate compression of the T2 vertebral body  The soft tissue windows demonstrate a large soft tissue mass extending from the C4 level into the spinal canal measuring approximately 2 2 x 1 3 cm (series 603 image 57)  This causes a severe central canal stenosis and likely cord compression  This extends from the mid C3 level to the lower C5 level    An additional large soft tissue mass extending from the T2 level into the spinal canal measures approximate 1 1 cm and likely causes severe central canal stenosis /cord compression  (Series 3 image 59)  DEGENERATIVE CHANGES:  No significant cervical degenerative changes are noted  PREVERTEBRAL AND PARASPINAL SOFT TISSUES:  Unremarkable  THORACIC INLET:  Normal      Impression: 1  Innumerable bony metastases  2   Displaced fracture through the right posterior arch of C1  3   C4 metastasis causing a severe compression fracture and extension into the canal causing severe central canal stenosis and likely cord compression  4   T2 metastasis causing a moderate compression fracture and extension into the canal causing severe central canal stenosis and likely cord compression  I sent a tiger text to Prince Watkins on 3/29/2022 at 9:57 AM  The study was marked in [facility] for immediate notification  Workstation performed: [id number]     MRI brain w wo contrast    Result Date: 3/30/2022  Narrative: MRI BRAIN WITH AND WITHOUT CONTRAST INDICATION: breast ca, extensive mets, staging  COMPARISON:  MRI cervical, thoracic, and lumbar spine with and without contrast March 29, 2022  TECHNIQUE: Sagittal T1, axial T2, axial FLAIR, axial T1, axial Cosmos, axial diffusion  Sagittal, axial T1 postcontrast   Axial bravo postcontrast with coronal reconstructions  IV Contrast:  9 mL of Gadobutrol injection (SINGLE-DOSE)  IMAGE QUALITY:   Diagnostic  FINDINGS: BRAIN PARENCHYMA:  There is no discrete mass, mass effect or midline shift  There is no intracranial hemorrhage  Normal posterior fossa  No diffusion-weighted signal abnormality to suggest acute infarction  There are no white matter changes in the cerebral hemispheres  Postcontrast imaging of the brain demonstrates no abnormal enhancement  VENTRICLES:  Normal for the patient's age   SELLA AND PITUITARY GLAND:  Normal  ORBITS:  Normal  PARANASAL SINUSES:  Normal  VASCULATURE:  Evaluation of the major intracranial vasculature demonstrates appropriate flow voids  CALVARIUM AND SKULL BASE:  Enhancing lesions in left parietal calvarium, right frontal calvarium, or suspicious for osseous calvarial metastasis  For reference: 1 5 cm left parietal calvarial enhancing lesion (12:109)  0 5 cm right frontal calvarial enhancing lesion (12:99)  EXTRACRANIAL SOFT TISSUES:  Normal  VISUALIZED CERVICAL SPINE: Unchanged enhancing osseous metastatic lesions of C1, C2, C4, and partially imaged C5 vertebra with pathologic compression fracture of C4 vertebral body that also has surrounding anterior paravertebral disease and anterior epidural extension of disease resulting in severe canal stenosis at C4 vertebral body level as described on yesterday's MRI cervical spine with and without contrast      Impression: No evidence of intracranial metastasis  Left parietal and right frontal calvarial enhancing lesions, suspicious for calvarial osseous metastasis  Unchanged enhancing osseous metastatic lesions of C1, C2, C4, and partially imaged C5 vertebra with pathologic compression fracture of C4 vertebral body that also has surrounding anterior paravertebral disease and anterior epidural extension of disease resulting in severe canal stenosis at C4 vertebral body level as described on yesterday's MRI cervical spine with and without contrast  The study was marked in [employer] for immediate notification  Workstation performed: [id number]     MRI cervical spine w wo contrast    Result Date: 3/29/2022  Narrative: MRI CERVICAL SPINE WITH AND WITHOUT CONTRAST INDICATION: Breast carcinoma with osseous metastasis    COMPARISON:  CT scan dated 3/29/2022  TECHNIQUE:  Sagittal T1, sagittal T2, sagittal inversion recovery, axial T2  Sagittal T1 and axial T1 postcontrast   IV Contrast:  9 mL of Gadobutrol injection (SINGLE-DOSE) IMAGE QUALITY:  Diagnostic  FINDINGS: ALIGNMENT:  There is reversal of the normal cervical lordosis centered at C4    There is anterior subluxation of C3 in relation to C4  There is a severe pathologic compression fracture of the C4 vertebral body  There is a moderate pathologic compression fracture of T2  MARROW SIGNAL:  Diffusely heterogeneous marrow signal in particular on T1-weighted imaging consistent with diffuse lytic metastasis  This involves nearly every visualized vertebral body within the cervical and upper thoracic spine  There is involvement  of the posterior elements as well, in particular on the left at the C4, C5 and C6 levels  There is posterior element involvement noted within the upper thoracic spine as well including T2 on the left as well as T3 and T4 on the left  Osseous metastatic disease is seen within the right aspect of the C1 vertebral body replacing the lateral mass and anterior ring of C1  There is extensive epidural tumor present at the C4 level extending posteriorly from the vertebral body into the anterior epidural space and into both neural foramen  Similar findings to a slightly lesser degree at the level of T2 with areas of epidural  tumor extending into the anterior epidural space bilaterally  CERVICAL AND VISUALIZED UPPER THORACIC CORD:  Although the cord is compressed in particular at the level of the C4 epidural tumor, the cord appears to maintain normal signal with no definite cord edema present  PREVERTEBRAL AND PARASPINAL SOFT TISSUES:  Mild anterior /prevertebral tumor noted at the C4 and T2 levels  VISUALIZED POSTERIOR FOSSA:  The visualized posterior fossa demonstrates no abnormal signal  CERVICAL DISC SPACES: C2-C3:  Normal  C3-C4:  Anterior subluxation of C3 upon C4  Loss of disc height with annular bulging  As described above there is extensive epidural tumor arising C4 vertebral body into the anterior epidural space and neural foramen bilaterally  Moderate canal stenosis with mild flattening of the cord  Moderate to severe bilateral foraminal narrowing with epidural tumor extending into the neural foramen  C4-C5: Findings are similar to the C3-4 level with epidural tumor resulting in moderate canal stenosis and severe bilateral foraminal narrowing  C5-C6:  Mild annular bulging with a small central disc protrusion  Mild canal stenosis and left foraminal narrowing  C6-C7:  Mild annular bulging  No disc herniation, canal stenosis or foraminal narrowing  C7-T1:  Normal  UPPER THORACIC DISC SPACES:  Moderate canal stenosis at the level of T2 as a result of epidural tumor bilaterally  Mild to moderate foraminal narrowing at both T1-T2 and T2-T3 levels  POSTCONTRAST IMAGING:  The tumor replacing the vertebral bodies and posterior elements as well as the tumor extending into the epidural space is described above demonstrates relatively homogeneous enhancement  Impression: Diffuse, primarily lytic osseous metastatic disease is seen throughout the cervical vertebral bodies from C1 into the upper thoracic spine  There is a significant amount of epidural tumor arising from pathologic compression fractures of C4 and T2  At the C4 level there is moderate to severe canal stenosis with flattening of the cord which maintains normal signal   Moderate canal stenosis at the T2 level  There is corresponding foraminal narrowing as a result of epidural and foraminal tumor  Workstation performed: [id number]     MRI thoracic spine w wo contrast    Result Date: 3/29/2022  Narrative: MRI THORACIC SPINE WITH AND WITHOUT CONTRAST INDICATION: Breast carcinoma with diffuse osseous metastasis    COMPARISON:  None  TECHNIQUE:  Sagittal T1, sagittal T2, sagittal inversion recovery, axial T2,  axial 2D MERGE  Sagittal and axial T1 postcontrast  IV Contrast:  9 mL of Gadobutrol injection (SINGLE-DOSE) IMAGE QUALITY:  Diagnostic  FINDINGS: ALIGNMENT:  Slightly exaggerated smooth kyphosis of the thoracic spine  No scoliosis  MARROW SIGNAL: Diffuse osseous metastatic disease is seen throughout the thoracic spine    There is a pathologic compression fracture of T2 with moderate collapse of the vertebral body and epidural tumor extending into the anterior epidural space and into  the neural foramen at the T1-2 and T2-3 levels  Additional osseous metastasis is seen within upper thoracic vertebral bodies and posterior elements including T3, T4  The posterior element involvement within these vertebral bodies are primarily left-sided  There is moderate resulting foraminal narrowing and a small amount of epidural tumor noted within the left posterior lateral aspect of the canal at the T3-4 level  Additional osseous metastasis are scattered within the mid to lower thoracic spine with no other pathologic compression fractures  No other epidural tumor is seen within the mid or lower thoracic levels  THORACIC CORD:  Normal signal within the thoracic cord  PREVERTEBRAL AND PARASPINAL SOFT TISSUES:   2 well-circumscribed nodules are seen adjacent to the spleen which are isointense to splenic tissue, possibly splenule's  THORACIC DEGENERATIVE CHANGE:  A few small disc herniations are seen within the thoracic spine  See above description of canal stenosis and foraminal narrowing in the upper thoracic spine primarily result of epidural tumor  POSTCONTRAST:  The osseous metastasis demonstrate relatively homogeneous enhancement after administration of contrast      Impression: Diffuse, lytic, osseous metastasis is seen throughout the thoracic spine more prominently within the upper thoracic spine where there is epidural tumor and involvement of the posterior elements from T2 through T5  Moderate canal stenosis at the T2 level  Foraminal narrowing as described above  No abnormal cord signal identified  Workstation performed: [id number]     MRI lumbar spine w wo contrast    Result Date: 3/29/2022  Narrative: MRI LUMBAR SPINE WITH AND WITHOUT CONTRAST INDICATION: Breast carcinoma with osseous metastasis  COMPARISON:  None   TECHNIQUE:  Sagittal T1, sagittal T2, sagittal inversion recovery, axial T2, coronal T2  Sagittal and axial T1 postcontrast  IV Contrast:  9 mL of Gadobutrol injection (SINGLE-DOSE) IMAGE QUALITY:  Diagnostic FINDINGS: VERTEBRAL BODIES:  There are 5 lumbar type vertebral bodies  Mild smooth levoscoliosis of the lumbar spine  No spondylolisthesis or spondylolysis  Diffuse lytic metastasis are identified within the lower thoracic spine, and lumbar spine demonstrating homogeneous enhancement after administration of contrast   There is a minor compression deformity of the T12 superior endplate which may represent a pathologic compression fracture  SACRUM:  There is an expansile lytic/destructive metastatic lesion within the sacrum involving the S2-S5 segments with tumor extending anteriorly into the presacral space and posteriorly into the sacral canal resulting in severe sacral canal stenosis  DISTAL CORD AND CONUS:  Normal size and signal within the distal cord and conus  PARASPINAL SOFT TISSUES:  Numerous nabothian cysts are seen within the lower uterine canal   2 larger cysts are seen within the left posterior pelvis, likely adnexal in origin  See prior CT abdomen pelvis result  LOWER THORACIC DISC SPACES:  No lower thoracic disc herniation, canal stenosis or foraminal narrowing  No lower thoracic epidural tumor  LUMBAR DISC SPACES:  No evidence of epidural tumor within the lumbar canal   At L5-S1 there is annular bulging with a small left foraminal disc protrusion  There is no canal stenosis and only mild left foraminal narrowing  As described above there is severe sacral canal stenosis as a result of epidural tumor  Tumor appears to extend into the left sacral foramen at S2-3 and S3-4  POSTCONTRAST IMAGING:  The lytic osseous metastasis demonstrate relatively homogeneous enhancement  Impression: Diffuse lytic osseous metastasis within the lower thoracic spine, lumbar spine and sacrum    There is a moderate amount of epidural tumor as a result of the expansile sacral lesion resulting in moderate sacral canal stenosis  Tumor extends into the S2-3 and S3-4 neural foramen on the left  Correlate for corresponding radiculopathy  Bilobed cystic mass within the left pelvis incompletely evaluated on this examination  Follow-up pelvic ultrasound imaging is recommended  Workstation performed: [id number]     CT chest abdomen pelvis w contrast    Result Date: 3/29/2022  Narrative: CT CHEST, ABDOMEN AND PELVIS WITH IV CONTRAST INDICATION:   Evaluate for metastases  COMPARISON:  None  TECHNIQUE: CT examination of the chest, abdomen and pelvis was performed  Axial, sagittal, and coronal 2D reformatted images were created from the source data and submitted for interpretation  Radiation dose length product (DLP) for this visit:  1086 mGy-cm   This examination, like all CT scans performed in the [facility], was performed utilizing techniques to minimize radiation dose exposure, including the use of iterative reconstruction and automated exposure control  IV Contrast:  100 mL of iohexol (OMNIPAQUE) Enteric Contrast: Enteric contrast was administered  FINDINGS: CHEST LUNGS:  Lungs are clear  There is no tracheal or endobronchial lesion  PLEURA:  Unremarkable  HEART/GREAT VESSELS: Heart is unremarkable for patient's age  No thoracic aortic aneurysm  MEDIASTINUM AND ALISON:  Unremarkable  CHEST WALL AND LOWER NECK:   6 9 x 6 2 x 6 4 cm mass extending to the overlying skin surface and right pectoralis major muscle with overlying skin induration  Right axillary adenopathy measuring up to 3 8 cm  ABDOMEN LIVER/BILIARY TREE:  Unremarkable  GALLBLADDER:  No calcified gallstones  No pericholecystic inflammatory change  SPLEEN:  Unremarkable  PANCREAS:  Unremarkable  ADRENAL GLANDS:  Unremarkable  KIDNEYS/URETERS:  One or more simple renal cyst(s) is noted  Nonobstructive calculus at the left inferior pole     No hydronephrosis   STOMACH AND BOWEL: Unremarkable  APPENDIX:  No findings to suggest appendicitis  ABDOMINOPELVIC CAVITY:  No ascites  No pneumoperitoneum  No lymphadenopathy  VESSELS:  Unremarkable for patient's age  PELVIS REPRODUCTIVE ORGANS:  7 cm septated cystic left adnexal structure  URINARY BLADDER:  Unremarkable  ABDOMINAL WALL/INGUINAL REGIONS:  Unremarkable  OSSEOUS STRUCTURES:  Innumerable lytic lesions throughout the visualized osseous structures  The largest within the left sacrum measures up to 4 4 cm with destructive changes and encroachment upon the presacral space  Impression: Large right breast mass measuring up to 6 9 cm  Right axillary metastatic adenopathy  Surgical consult recommended  Innumerable osseous metastasis  7 cm septated cystic left adnexal structure  According to current guidelines Doyce Monday Radiol 2020; 86:657-661) in this premenopausal woman, this should be followed up in 6 to 12 months by pelvic ultrasound  Workstation performed: [id number]     CT RADIATION THERAPY    Result Date: 3/31/2022  Narrative: A CT scan of the brain, neck, chest, abdomen and pelvis was performed without intravenous contrast  This examination, like all CT scans performed in the [facility], was performed utilizing techniques to minimize radiation dose exposure, including the use of iterative reconstruction and automated exposure control  Examination was performed according to a protocol specifically designed for the purposes of radiation therapy planning and is of limited diagnostic sensitivity  CLINICAL HISTORY: Breast cancer with extensive metastases  COMPARISON: 3/30/2022 MR brain, 3/29/2022 CT C-Spine and 3/29/2022 CT of the chest, abdomen and pelvis FINDINGS: BRAIN: No acute parenchymal finding  No hydrocephalus  No lucent skull metastases  Globes and orbits are within normal limits  NECK: Similar multifocal bone metastases  Evidence of central canal and foraminal compromise  No acute finding   CHEST: Low lung volumes with atelectasis  Similar cardiomegaly  Similar large right breast mass with levels 1 2 and 3 right axillary lymphadenopathy  Similar multifocal bone metastases  ABDOMEN AND PELVIS: Similar left greater than right adnexal cyst and multifocal bone metastases  Impression: No acute findings in the brain, neck, chest, abdomen or pelvis compared to recent prior studies  Study for radiation planning  Workstation performed: [id number]     I reviewed the above laboratory and imaging data  Discussion/Summary:  76-year-old female with metastatic breast cancer, ER/IL negative, HER2 positive  She has a palpable upper inner right breast maths with axillary adenopathy and multiple osseous metastasis  She is going to be completing radiation shortly  I have recommended that she start chemotherapy  We discussed that there is conflicting data regarding surgical treatment of the primary with bone only metastasis  Some studies show improve survival, while other show no change in survival   I have recommended that she start her chemotherapy  I will see her again in 3 months  If her disease show stability, I would consider resection of the primary site if this can be done with a less extensive surgery  She is agreeable to this plan  All her questions were answered

## 2022-04-16 ENCOUNTER — PATIENT MESSAGE (OUTPATIENT)
Dept: PALLIATIVE MEDICINE | Facility: CLINIC | Age: 47
End: 2022-04-16

## 2022-04-16 DIAGNOSIS — K13.79 MOUTH PAIN: ICD-10-CM

## 2022-04-18 ENCOUNTER — DOCUMENTATION (OUTPATIENT)
Dept: OTHER | Facility: HOSPITAL | Age: 47
End: 2022-04-18

## 2022-04-18 ENCOUNTER — PATIENT OUTREACH (OUTPATIENT)
Dept: HEMATOLOGY ONCOLOGY | Facility: CLINIC | Age: 47
End: 2022-04-18

## 2022-04-18 ENCOUNTER — PATIENT OUTREACH (OUTPATIENT)
Dept: CASE MANAGEMENT | Facility: HOSPITAL | Age: 47
End: 2022-04-18

## 2022-04-18 ENCOUNTER — TELEPHONE (OUTPATIENT)
Dept: PALLIATIVE MEDICINE | Facility: CLINIC | Age: 47
End: 2022-04-18

## 2022-04-18 ENCOUNTER — APPOINTMENT (OUTPATIENT)
Dept: RADIATION ONCOLOGY | Facility: HOSPITAL | Age: 47
End: 2022-04-18
Attending: RADIOLOGY
Payer: COMMERCIAL

## 2022-04-18 DIAGNOSIS — Z17.1 MALIGNANT NEOPLASM OF OVERLAPPING SITES OF RIGHT BREAST IN FEMALE, ESTROGEN RECEPTOR NEGATIVE (HCC): Primary | ICD-10-CM

## 2022-04-18 DIAGNOSIS — C50.811 MALIGNANT NEOPLASM OF OVERLAPPING SITES OF RIGHT BREAST IN FEMALE, ESTROGEN RECEPTOR NEGATIVE (HCC): Primary | ICD-10-CM

## 2022-04-18 PROBLEM — I10 HTN (HYPERTENSION): Status: ACTIVE | Noted: 2022-04-18

## 2022-04-18 PROCEDURE — G6002 STEREOSCOPIC X-RAY GUIDANCE: HCPCS | Performed by: RADIOLOGY

## 2022-04-18 PROCEDURE — 77412 RADIATION TX DELIVERY LVL 3: CPT | Performed by: RADIOLOGY

## 2022-04-18 PROCEDURE — 77387 GUIDANCE FOR RADJ TX DLVR: CPT | Performed by: RADIOLOGY

## 2022-04-18 PROCEDURE — 77336 RADIATION PHYSICS CONSULT: CPT | Performed by: RADIOLOGY

## 2022-04-18 NOTE — PROGRESS NOTES
LSW attempted contact with pt, no answer  LSW is closing referral and will remain available for new referrals as needed

## 2022-04-18 NOTE — TELEPHONE ENCOUNTER
Medical Marijuana Pre-Visit Screening for Palliative & Supportive Care    Referral Source:  Dr Michelle Jones  Diagnosis:  Cancer  Is the diagnosis an approved serious medical condition as outlined by [place] Act [place]: Yes    History/Symptoms: Cancer pain    Does the patient's diagnosis fall within the current scope of our Palliative & Supportive Care practice - Yes     Does the patient intend to use MMJ with palliative intent- Yes    Does the patient currently have a signed controlled substances contract with another provider : no    Is the patient a resident of [place] with a valid state ID or 's license:  Yes    Has the patient registered on the [address] [place] [address]  website: Yes  [email or web address]     Prior to any scheduled visit, the patient has been informed of the following:  · [address] [place] [address] providers are knowledgeable about many ways to help people  A visit to discuss MMJ does not mean that the provider agrees that this is the best way to help you and may make other recommendations  · There is an expectation and requirement by the [place] MMJ law for continuity of care if your certification is completed  · You will be expected to sign an informed consent  · A PDMP report will be reviewed before your visit  · This may effect your ability to purchase a handgun  · Medical marijuana products are not covered by insurance  The dispensaries do not accept credit cards  · The certification does not exempt you from any employer based drug screening programs and may effect your ability to participate in federally funded programs  · [facility] does not allow for medical marijuana possession or use at any of it's inpatient facilities  If it is brought it you will be asked to send it home with a designated representative (friend or family member)  If not one is available to take the product(s) home they will be stored in a secure location until you are discharged    · Please spend time becoming familiar with the information on the website before your visit: [employer]    Schedule Visit with Dr Prudence Hernández 4/22/22

## 2022-04-18 NOTE — PROGRESS NOTES
Breast Oncology Nurse Navigator    Called patient for initial outreach from nurse navigator  Left voicemail message with contact information  Requested a call back  This is the third attempt to reach patient  Patient's mother, Ayla Later, called back about 10 minutes later  Asked if I could call back around 2pm today  Will attempt a phone call then

## 2022-04-18 NOTE — PROGRESS NOTES
Breast Oncology Nurse Navigator    Called patient for initial outreach from nurse navigator  Introduced myself and my role  Will mail out our business cards  Patient looking to speak with oncology social worker  Looking for resources such as a wig, a head covering, meals, someone to cut her long hair short while she is wearing a neck brace, yoga classes, etc   Will mail out information from Magdalene Mack, and any other information available  Reached out to [employer] for more resources  Will place another referral to OSW, since patient is asking to speak with one  She states she needs support with home care, too  Patient lives with her supportive fisara Ross  Has a supportive family as well  States she filled out application for [employer] transport last week while at Dr Bela Holden office  Patient states she completed RT today  Had palliative treatments to several areas of the spine  States when she completed treatment to the cervical spine, she started with difficulties swallowing/eating/sore throat  States she receives some relief from MMW and home health nurse placed refill today  Advised her to call me if she has any difficulty obtaining the refill for MMW  Patient to see palliative care on 4/20/22  States she is interested in obtaining MMJ to decreased the amount of pain medications she is taking  Patient states she has pain down both arms, in her neck and her back  Rates at 3-4/10 at this time with use of pain medication  Knows of all upcoming appointments  Will await response from OSW and advised patient that someone would call her back today or tomorrow  Patient has my contact information and knows she can reach out with questions

## 2022-04-18 NOTE — PROGRESS NOTES
Pt was seen by Dr Edenilson Roque on 4/13 at [facility], he spoke to her regarding the NRG- trial that she may be eligible for  Pt signed consent today, and HIPPA form  Subject ID is --[id number]  Questions were answered  Screening materials and timelines were discussed  Screening labs and tests were ordered  Pt will have a procedure for a port on 4/19  Pt will start treatment on 4/28 at [facility] infusion  Will send tissue sample for central testing  Pt will call me with any questions or concerns

## 2022-04-18 NOTE — TELEPHONE ENCOUNTER
Patient called our Office stating she has successfully registered for Medical Marijuana- I did leave her a message to return my call

## 2022-04-18 NOTE — PROGRESS NOTES
Documentation of Informed Consent Process for Clinical Research Study    Study Title: NRG-  Patient Name: Purvi Greenwood  YOB: 1975    This signed and dated document shall serve as certification that all of the below listed required elements of informed consent were provided to the subject or legally authorized representative signing the actual Informed Consent Document, both in written and verbal format  The HIPAA consent is contained within the Informed Consent document, and has also been discussed  A copy of the actual signed and dated Informed Consent has also been provided to the subject or legally authorized representative, and the original signed document shall be maintained in the research shadow chart  Element of Informed Consent Discussed Date Initials/ Person obtaining consent   A statement that the study involves research, an explanation of the purposes of the research and the expected duration of the subject's participation, a description of the procedures to be followed, and identification of any procedures which are experimental 4/18/2022 HS   A description of any reasonably foreseeable risks or discomforts to the subject  4/18/2022 HS   A description of any benefits to the subject or to other4/18/2022s which may reasonably be expected from the research  4/18/2022 HS   A disclosure of appropriate alternative procedures or courses of treatment, if any, that might be advantageous to the subject   4/18/2022 HS   A statement describing the extent, if any, to which confidentiality of records identifying the subject will be maintained and that notes the possibility that the Food and Drug Administration may inspect the records 4/18/2022 HS   For research involving more than minimal risk, an explanation as to whether any compensation and an explanation as to whether any medical treatments are available if injury occurs and, if so, what they consist of, or where further information may be obtained  4/18/2022    An explanation of whom to contact for answers to pertinent questions about the research and research subjects' rights, and whom to contact in the event of a research-related injury to the subject  4/18/2022 HS   A statement that participation is voluntary, that refusal to participate will involve no penalty or loss of benefits to which the subject is otherwise entitled, and that the subject may discontinue participation at any time without penalty or loss of benefits to which the subject is otherwise entitled  4/18/2022    A statement that the particular treatment or procedure may involve risks to the subject (or to the embryo or fetus, if the subject is or may become pregnant) which are currently unforeseeable 4/18/2022    Anticipated circumstances under which the subject's participation may be terminated by the investigator without regard to the subject's consent  4/18/2022    Any additional costs to the subject that may result from participation in the research 4/18/2022 HS   The consequences of a subjects' decision to withdraw from the research and procedures for orderly termination of participation by the subject  4/18/2022 HS   A statement that significant new findings developed during the course of the research which may relate to the subject's willingness to continue participation will be provided to the subject  4/18/2022 HS   The approximate number of subjects involved in the study   4/18/2022 HS

## 2022-04-19 ENCOUNTER — APPOINTMENT (OUTPATIENT)
Dept: RADIATION ONCOLOGY | Facility: HOSPITAL | Age: 47
End: 2022-04-19
Payer: COMMERCIAL

## 2022-04-19 ENCOUNTER — HOSPITAL ENCOUNTER (OUTPATIENT)
Facility: AMBULARY SURGERY CENTER | Age: 47
Setting detail: OUTPATIENT SURGERY
Discharge: HOME | End: 2022-04-19
Attending: RADIOLOGY | Admitting: RADIOLOGY
Payer: COMMERCIAL

## 2022-04-19 ENCOUNTER — APPOINTMENT (OUTPATIENT)
Dept: LAB | Facility: CLINIC | Age: 47
End: 2022-04-19
Payer: COMMERCIAL

## 2022-04-19 ENCOUNTER — APPOINTMENT (OUTPATIENT)
Dept: RADIOLOGY | Facility: AMBULARY SURGERY CENTER | Age: 47
End: 2022-04-19
Payer: COMMERCIAL

## 2022-04-19 DIAGNOSIS — Z17.1 MALIGNANT NEOPLASM OF OVERLAPPING SITES OF RIGHT BREAST IN FEMALE, ESTROGEN RECEPTOR NEGATIVE (HCC): ICD-10-CM

## 2022-04-19 DIAGNOSIS — C50.811 MALIGNANT NEOPLASM OF OVERLAPPING SITES OF RIGHT BREAST IN FEMALE, ESTROGEN RECEPTOR NEGATIVE (HCC): ICD-10-CM

## 2022-04-19 DIAGNOSIS — Z17.1 MALIGNANT NEOPLASM OF OVERLAPPING SITES OF RIGHT BREAST IN FEMALE, ESTROGEN RECEPTOR NEGATIVE (HCC): Primary | ICD-10-CM

## 2022-04-19 DIAGNOSIS — C50.811 MALIGNANT NEOPLASM OF OVERLAPPING SITES OF RIGHT BREAST IN FEMALE, ESTROGEN RECEPTOR NEGATIVE (HCC): Primary | ICD-10-CM

## 2022-04-19 LAB
ALBUMIN SERPL BCP-MCNC: 2.9 G/DL (ref 3.5–5)
ALP SERPL-CCNC: 195 U/L (ref 46–116)
ALT SERPL W P-5'-P-CCNC: 31 U/L (ref 12–78)
ANION GAP SERPL CALCULATED.3IONS-SCNC: 14 MMOL/L (ref 4–13)
AST SERPL W P-5'-P-CCNC: 15 U/L (ref 5–45)
BASOPHILS # BLD MANUAL: 0 THOUSAND/UL (ref 0–0.1)
BASOPHILS NFR MAR MANUAL: 0 % (ref 0–1)
BILIRUB SERPL-MCNC: 0.83 MG/DL (ref 0.2–1)
BUN SERPL-MCNC: 27 MG/DL (ref 5–25)
CALCIUM ALBUM COR SERPL-MCNC: 10.3 MG/DL (ref 8.3–10.1)
CALCIUM SERPL-MCNC: 9.4 MG/DL (ref 8.3–10.1)
CHLORIDE SERPL-SCNC: 103 MMOL/L (ref 100–108)
CO2 SERPL-SCNC: 23 MMOL/L (ref 21–32)
CREAT SERPL-MCNC: 0.79 MG/DL (ref 0.6–1.3)
EOSINOPHIL # BLD MANUAL: 0.02 THOUSAND/UL (ref 0–0.4)
EOSINOPHIL NFR BLD MANUAL: 1 % (ref 0–6)
ERYTHROCYTE [DISTWIDTH] IN BLOOD BY AUTOMATED COUNT: 13.4 % (ref 11.6–15.1)
GFR SERPL CREATININE-BSD FRML MDRD: 89 ML/MIN/1.73SQ M
GLUCOSE P FAST SERPL-MCNC: 97 MG/DL (ref 65–99)
HBV CORE AB SER QL: NORMAL
HBV CORE IGM SER QL: NORMAL
HBV SURFACE AG SER QL: NORMAL
HCT VFR BLD AUTO: 36.2 % (ref 34.8–46.1)
HCV AB SER QL: NORMAL
HGB BLD-MCNC: 11.4 G/DL (ref 11.5–15.4)
INR PPP: 1.08 (ref 0.84–1.19)
LYMPHOCYTES # BLD AUTO: 0.09 THOUSAND/UL (ref 0.6–4.47)
LYMPHOCYTES # BLD AUTO: 4 % (ref 14–44)
MCH RBC QN AUTO: 30 PG (ref 26.8–34.3)
MCHC RBC AUTO-ENTMCNC: 31.5 G/DL (ref 31.4–37.4)
MCV RBC AUTO: 95 FL (ref 82–98)
MONOCYTES # BLD AUTO: 0.44 THOUSAND/UL (ref 0–1.22)
MONOCYTES NFR BLD: 19 % (ref 4–12)
NEUTROPHILS # BLD MANUAL: 1.76 THOUSAND/UL (ref 1.85–7.62)
NEUTS BAND NFR BLD MANUAL: 9 % (ref 0–8)
NEUTS SEG NFR BLD AUTO: 67 % (ref 43–75)
PLATELET # BLD AUTO: 185 THOUSANDS/UL (ref 149–390)
PLATELET BLD QL SMEAR: ADEQUATE
PMV BLD AUTO: 10.2 FL (ref 8.9–12.7)
POTASSIUM SERPL-SCNC: 4.4 MMOL/L (ref 3.5–5.3)
PROT SERPL-MCNC: 7.3 G/DL (ref 6.4–8.2)
PROTHROMBIN TIME: 14 SECONDS (ref 11.6–14.5)
RBC # BLD AUTO: 3.8 MILLION/UL (ref 3.81–5.12)
RBC MORPH BLD: NORMAL
SODIUM SERPL-SCNC: 140 MMOL/L (ref 136–145)
T4 FREE SERPL-MCNC: 1.94 NG/DL (ref 0.76–1.46)
TSH SERPL DL<=0.05 MIU/L-ACNC: 0.04 UIU/ML (ref 0.45–4.5)
WBC # BLD AUTO: 2.32 THOUSAND/UL (ref 4.31–10.16)

## 2022-04-19 PROCEDURE — 86803 HEPATITIS C AB TEST: CPT

## 2022-04-19 PROCEDURE — 84439 ASSAY OF FREE THYROXINE: CPT

## 2022-04-19 PROCEDURE — 87340 HEPATITIS B SURFACE AG IA: CPT

## 2022-04-19 PROCEDURE — 36415 COLL VENOUS BLD VENIPUNCTURE: CPT

## 2022-04-19 PROCEDURE — 85027 COMPLETE CBC AUTOMATED: CPT

## 2022-04-19 PROCEDURE — 85007 BL SMEAR W/DIFF WBC COUNT: CPT

## 2022-04-19 PROCEDURE — 86704 HEP B CORE ANTIBODY TOTAL: CPT

## 2022-04-19 PROCEDURE — 86705 HEP B CORE ANTIBODY IGM: CPT

## 2022-04-19 PROCEDURE — 84443 ASSAY THYROID STIM HORMONE: CPT

## 2022-04-19 PROCEDURE — 80053 COMPREHEN METABOLIC PANEL: CPT

## 2022-04-19 PROCEDURE — 85610 PROTHROMBIN TIME: CPT

## 2022-04-19 RX ORDER — CEFAZOLIN SODIUM 2 G/50ML
2000 SOLUTION INTRAVENOUS ONCE
Status: DISCONTINUED | OUTPATIENT
Start: 2022-04-19 | End: 2022-04-20 | Stop reason: HOSPADM

## 2022-04-19 NOTE — TELEPHONE ENCOUNTER
I left patient a message to return our call to Shun today and I will be notified to reach out to her for the Screening process

## 2022-04-20 ENCOUNTER — OFFICE VISIT (OUTPATIENT)
Dept: PALLIATIVE MEDICINE | Facility: CLINIC | Age: 47
End: 2022-04-20

## 2022-04-20 ENCOUNTER — TELEPHONE (OUTPATIENT)
Dept: GENETICS | Facility: CLINIC | Age: 47
End: 2022-04-20

## 2022-04-20 ENCOUNTER — TELEMEDICINE (OUTPATIENT)
Dept: INTERVENTIONAL RADIOLOGY/VASCULAR | Facility: CLINIC | Age: 47
End: 2022-04-20
Payer: COMMERCIAL

## 2022-04-20 VITALS
HEART RATE: 107 BPM | SYSTOLIC BLOOD PRESSURE: 90 MMHG | DIASTOLIC BLOOD PRESSURE: 70 MMHG | OXYGEN SATURATION: 96 % | RESPIRATION RATE: 16 BRPM | TEMPERATURE: 97.4 F

## 2022-04-20 DIAGNOSIS — M62.838 MUSCLE SPASMS OF NECK: ICD-10-CM

## 2022-04-20 DIAGNOSIS — B37.0 ORAL THRUSH: ICD-10-CM

## 2022-04-20 DIAGNOSIS — G89.3 CANCER ASSOCIATED PAIN: ICD-10-CM

## 2022-04-20 DIAGNOSIS — Z51.5 PALLIATIVE CARE PATIENT: ICD-10-CM

## 2022-04-20 DIAGNOSIS — C79.49 METASTASIS TO SPINAL CORD (HCC): ICD-10-CM

## 2022-04-20 DIAGNOSIS — Z17.1 MALIGNANT NEOPLASM OF OVERLAPPING SITES OF RIGHT BREAST IN FEMALE, ESTROGEN RECEPTOR NEGATIVE (HCC): Primary | ICD-10-CM

## 2022-04-20 DIAGNOSIS — C79.51 OSSEOUS METASTASIS (HCC): Primary | ICD-10-CM

## 2022-04-20 DIAGNOSIS — T40.2X5A THERAPEUTIC OPIOID INDUCED CONSTIPATION: ICD-10-CM

## 2022-04-20 DIAGNOSIS — K59.03 THERAPEUTIC OPIOID INDUCED CONSTIPATION: ICD-10-CM

## 2022-04-20 DIAGNOSIS — C50.811 MALIGNANT NEOPLASM OF OVERLAPPING SITES OF RIGHT BREAST IN FEMALE, ESTROGEN RECEPTOR NEGATIVE (HCC): Primary | ICD-10-CM

## 2022-04-20 PROCEDURE — 1111F DSCHRG MED/CURRENT MED MERGE: CPT | Performed by: INTERNAL MEDICINE

## 2022-04-20 PROCEDURE — 99214 OFFICE O/P EST MOD 30 MIN: CPT | Performed by: INTERNAL MEDICINE

## 2022-04-20 PROCEDURE — 99448 NTRPROF PH1/NTRNET/EHR 21-30: CPT | Performed by: RADIOLOGY

## 2022-04-20 RX ORDER — LACTULOSE 10 G/10G
10 SOLUTION ORAL 3 TIMES DAILY PRN
Qty: 30 EACH | Refills: 0 | Status: SHIPPED | OUTPATIENT
Start: 2022-04-20

## 2022-04-20 RX ORDER — BACLOFEN 10 MG/1
5 TABLET ORAL 3 TIMES DAILY PRN
Qty: 45 TABLET | Refills: 0 | Status: SHIPPED | OUTPATIENT
Start: 2022-04-20 | End: 2022-05-17 | Stop reason: SDUPTHER

## 2022-04-20 NOTE — PROGRESS NOTES
INTERPROFESSIONAL (ELECTRONIC OR PHONE) CONSULTATION - Interventional Radiology  Darryle Reding 51 y o  female MRN: [id number]  Unit/Bed#:  Encounter: [id number]    IR has been consulted regarding the care of Darryle Reding  We were consulted by Dr Mandeep Kumar concerning this patient with cancer and upcoming chemotherapy    Consults  04/20/22      Assessment/Recommendation:   51 w/ metastatic breast cancer, plans for upcoming chemo  unfortunately due to osseous disease has a cervical collar which cannot come off per neurosurgery  Pt arrived for port but jugular access not possible due to collar, procedure cancelled  Other options for ports:  - arm ports not available at this time  - Subclavian port cannot be placed due to access from collar (cannot prep area)    Options include:  - peripheral IV  - PICC    D/w Dr Mandeep Kumar via [facility], he will plan for peripheral access  Also discussed with multiple IR colleagues  - please recontact IR when patient   Has collar off  Can move neck left and right to allow jugular access    Total time spent in review of data, discussion with requesting provider and rendering advice was 27 minutes    I did not examine or speak to the patient     Thank you for allowing Interventional Radiology to participate in the care of Darryle Reding  Please don't hesitate to call or TigerText us with any questions  Nav Salas MD      Past Medical History:  Past Medical History:   Diagnosis Date    Cancer [facility])    breast cancer  Cervical mets      Past Surgical History:  No past surgical history on file      Social History:  Social History     Substance and Sexual Activity   Alcohol Use Yes    Comment: rare, social      Social History     Substance and Sexual Activity   Drug Use Never     Social History     Tobacco Use   Smoking Status Never Smoker   Smokeless Tobacco Never Used       Family History:  Family History   Problem Relation Age of Onset    Coronary artery disease Mother     Heart attack Father     Diabetes type II Father        Allergies:  No Known Allergies    Medications:  (Not in a hospital admission)    No current facility-administered medications for this visit  Facility-Administered Medications Ordered in Other Visits   Medication Dose Route Frequency    ceFAZolin (ANCEF) IVPB (premix in dextrose) 2,000 mg 50 mL  2,000 mg Intravenous Once       Vitals: There were no vitals taken for this visit  There is no height or weight on file to calculate BMI  Weight (last 2 days)     None          I/Os:  [unfilled]    Lab Results and Cultures:   CBC with diff:   Lab Results   Component Value Date    WBC 2 32 (L) 04/19/2022    HGB 11 4 (L) 04/19/2022    HCT 36 2 04/19/2022    MCV 95 04/19/2022     04/19/2022    MCH 30 0 04/19/2022    MCHC 31 5 04/19/2022    RDW 13 4 04/19/2022    MPV 10 2 04/19/2022    NRBC 0 04/01/2022      BMP/CMP:  Lab Results   Component Value Date    K 4 4 04/19/2022     04/19/2022    CO2 23 04/19/2022    BUN 27 (H) 04/19/2022    CREATININE 0 79 04/19/2022    CALCIUM 9 4 04/19/2022    AST 15 04/19/2022    ALT 31 04/19/2022    ALKPHOS 195 (H) 04/19/2022    EGFR 89 04/19/2022   ,     Coags:   Lab Results   Component Value Date    INR 1 08 04/19/2022   ,   Results from last 7 days   Lab Units 04/19/22  1149   INR  1 08        HgbA1c: No results found for: HGBA1C    Blood Culture:   Lab Results   Component Value Date    BLOODCX No Growth After 5 Days  03/29/2022    BLOODCX No Growth After 5 Days   03/29/2022   ,   Urinalysis: No results found for: Doris Safer, SPECGRAV, PHUR, LEUKOCYTESUR, NITRITE, PROTEINUA, GLUCOSEU, KETONESU, BILIRUBINUR, BLOODU,   Urine Culture: No results found for: URINECX,   Wound Culure:  No results found for: WOUNDCULT    Imaging Studies: I have personally reviewed pertinent films in PACS

## 2022-04-20 NOTE — PROGRESS NOTES
Palliative and Supportive Care   Prudence Sy 51 y o  female [id number]    Assessment/Plan:  1  Malignant neoplasm of overlapping sites of right breast in female, estrogen receptor negative ([address] )    2  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 (HCC)    3  Cancer associated pain    4  Palliative care patient    5  Muscle spasms of neck    6  Oral thrush    7  Therapeutic opioid induced constipation      Requested Prescriptions     Signed Prescriptions Disp Refills    nystatin (MYCOSTATIN) 500,000 units/5 mL suspension 200 mL 0     Sig: Apply 5 mL (500,000 Units total) to the mouth or throat 4 (four) times a day    baclofen 10 mg tablet 45 tablet 0     Sig: Take 0 5 tablets (5 mg total) by mouth 3 (three) times a day as needed for muscle spasms    lactulose (CEPHULAC) 10 g packet 30 each 0     Sig: Take 1 packet (10 g total) by mouth 3 (three) times a day as needed (constipation)     There are no discontinued medications  PLAN:  1  Symptom management-   Pain:  Continue MS Contin and oxycodone with no changes in dosing  Will DC oral Dilaudid as radiation is complete  Muscles spasms:  Will add baclofen 5 mg t i d  p r n  Opioid induced constipation:  Will add lactulose t i d  p r n  Oral thrush:  Initiate nystatin treatment for 10 days    2  Goals of care:  Patient was unable to get her port placed but plans to start her chemotherapy with either IV or PICC line    3  Psychosocial support:  Time spent providing supportive listening  All questions and concerns were addressed to her satisfaction  4    ACP:  Did not address    5  Return to care:  1 month    Representatives have queried the patient's controlled substance dispensing history in the Prescription Drug Monitoring Program in compliance with regulations before I have prescribed any controlled substances  The prescription history is consistent with prescribed therapy and our practice policies  No follow-ups on file  Subjective:   In attendance at this visit: Radha Devine and her stepmother  Chief Complaint  Follow up visit for:  symptom management, pain, neoplasm related, assessment of goals of care, disease process education and discussion of prognosis, advance care planning  HPI     Radha Devine is a 51 y o  female with new diagnosis of metastatic breast cancer with osseous Mets  Patient seen today in follow-up for her cancer related symptoms  Patient has recently completed her radiation treatments and is starting chemotherapy on April 28th  In regards to her pain control she is doing much better at this time  She feels the regimen is working well for her and does not feel like she has any changes at this time  She is no longer using the Dilaudid as she is on with radiation in this we will stop this medication  She does note some muscle spasms along her neck and down her arms the left side is worse than the right  She also does continue to struggle with constipation and has started using prune juice in addition to her other laxative therapies  She notes a weak voice and mouth pain and on examination it does appear that she has oral thrush  Discussed importance of maintaining good hydration and good nutrition especially as she is starting her chemotherapy treatments next week      Oncology History   Malignant neoplasm of overlapping sites of right breast in female, estrogen receptor negative ([facility] [address] )   3/29/2022 Initial Diagnosis    Malignant neoplasm of overlapping sites of right breast in female, estrogen receptor negative ([address] )     4/28/2022 -  Chemotherapy    pertuzumab (PERJETA) IVPB, 840 mg, Intravenous, Once, 0 of 6 cycles  DOCEtaxel (TAXOTERE) chemo infusion, 75 mg/m2 = 148 6 mg, Intravenous, Once, 0 of 6 cycles  trastuzumab (HERCEPTIN) chemo infusion, 8 mg/kg = 751 mg, Intravenous, Once, 0 of 6 cycles     Osseous metastasis ([facility] [address] )   3/29/2022 Initial Diagnosis    Osseous metastasis ([facility] [address] )     4/28/2022 -  Chemotherapy pertuzumab (PERJETA) IVPB, 840 mg, Intravenous, Once, 0 of 6 cycles  DOCEtaxel (TAXOTERE) chemo infusion, 75 mg/m2 = 148 6 mg, Intravenous, Once, 0 of 6 cycles  trastuzumab (HERCEPTIN) chemo infusion, 8 mg/kg = 751 mg, Intravenous, Once, 0 of 6 cycles         The following portions of the medical history were reviewed: past medical history, problem list, medication list, and social history  No current facility-administered medications for this visit  Current Outpatient Medications:     baclofen 10 mg tablet, Take 0 5 tablets (5 mg total) by mouth 3 (three) times a day as needed for muscle spasms, Disp: 45 tablet, Rfl: 0    lactulose (CEPHULAC) 10 g packet, Take 1 packet (10 g total) by mouth 3 (three) times a day as needed (constipation), Disp: 30 each, Rfl: 0    nystatin (MYCOSTATIN) 500,000 units/5 mL suspension, Apply 5 mL (500,000 Units total) to the mouth or throat 4 (four) times a day, Disp: 200 mL, Rfl: 0    Facility-Administered Medications Ordered in Other Visits:     ceFAZolin (ANCEF) IVPB (premix in dextrose) 2,000 mg 50 mL, 2,000 mg, Intravenous, Once, [place],   Review of Systems   Constitutional: Positive for appetite change  HENT: Positive for mouth sores and voice change  Gastrointestinal: Positive for constipation  Musculoskeletal: Positive for myalgias, neck pain and neck stiffness  Neurological: Positive for weakness  All other systems reviewed and are negative  All other systems negative    Objective:  Vital Signs  BP 90/70 (BP Location: Right arm, Patient Position: Sitting, Cuff Size: Standard)   Pulse (!) 107   Temp (!) 97 4 °F (36 3 °C) (Temporal)   Resp 16   SpO2 96%    Physical Exam    Constitutional: Appears chronically ill   In no acute physical or emotional distress  Head: Normocephalic and atraumatic  Mouth: oral thrush  Eyes: EOM are normal  No ocular discharge  No scleral icterus     Neck: No visible adenopathy or masses,C-collar in place  Cardiovascular: Regular rate and rhythm, palpable distal pulses   Respiratory: Effort normal  No stridor  No respiratory distress  Gastrointestinal: No abdominal distension  Abdomen is soft  Musculoskeletal: No edema  Tissue texture changes along cervical spine and right upper extremity   Neurological: Alert, oriented and appropriately conversant  Skin: No diaphoresis, dry and warm to touch, no rashes seen on exposed areas of skin  Psychiatric: Displays a normal mood and affect  Behavior, judgement and thought content appear normal      Portions of this document may have been created using dictation software and as such some "sound alike" terms may have been generated by the system  Do not hesitate to contact me with any questions or clarifications

## 2022-04-21 ENCOUNTER — PATIENT OUTREACH (OUTPATIENT)
Dept: HEMATOLOGY ONCOLOGY | Facility: CLINIC | Age: 47
End: 2022-04-21

## 2022-04-21 ENCOUNTER — TELEPHONE (OUTPATIENT)
Dept: GENETICS | Facility: CLINIC | Age: 47
End: 2022-04-21

## 2022-04-21 ENCOUNTER — TELEPHONE (OUTPATIENT)
Dept: RADIATION ONCOLOGY | Facility: HOSPITAL | Age: 47
End: 2022-04-21

## 2022-04-21 ENCOUNTER — TELEMEDICINE (OUTPATIENT)
Dept: INTERNAL MEDICINE CLINIC | Facility: CLINIC | Age: 47
End: 2022-04-21
Payer: COMMERCIAL

## 2022-04-21 DIAGNOSIS — C79.49 METASTASIS TO SPINAL CORD (HCC): ICD-10-CM

## 2022-04-21 DIAGNOSIS — G89.3 CANCER ASSOCIATED PAIN: ICD-10-CM

## 2022-04-21 DIAGNOSIS — C50.811 MALIGNANT NEOPLASM OF OVERLAPPING SITES OF RIGHT BREAST IN FEMALE, ESTROGEN RECEPTOR NEGATIVE (HCC): ICD-10-CM

## 2022-04-21 DIAGNOSIS — Z17.1 MALIGNANT NEOPLASM OF OVERLAPPING SITES OF RIGHT BREAST IN FEMALE, ESTROGEN RECEPTOR NEGATIVE (HCC): ICD-10-CM

## 2022-04-21 PROCEDURE — 1111F DSCHRG MED/CURRENT MED MERGE: CPT | Performed by: INTERNAL MEDICINE

## 2022-04-21 PROCEDURE — 99214 OFFICE O/P EST MOD 30 MIN: CPT | Performed by: INTERNAL MEDICINE

## 2022-04-21 RX ORDER — NORGESTIMATE AND ETHINYL ESTRADIOL 0.25-0.035
KIT ORAL
COMMUNITY
Start: 2022-04-18 | End: 2022-05-18

## 2022-04-21 NOTE — TELEPHONE ENCOUNTER
----- Message from Carla Shepherd sent at 4/21/2022 10:28 AM EDT -----  Regarding: complete  GC Completed Chart     Result Type: Negative    Result Delivery: [email or web address] Message    Monthly Review: Does not need monthly review- COMPLETE

## 2022-04-21 NOTE — PROGRESS NOTES
MSW received referral from BAR Thomas   MSW made outreach this day and received the pt's voicemail  A detailed message was left, along with a contact number and the pt was encouraged to return the call

## 2022-04-21 NOTE — PROGRESS NOTES
Virtual Regular Visit    Verification of patient location:    Patient is located in the following state in which I hold an active license [place]      Assessment/Plan:    Problem List Items Addressed This Visit        Nervous and Auditory    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 ([facility] [address] ) - Primary       Other    Malignant neoplasm of overlapping sites of right breast in female, estrogen receptor negative ([facility] [address] )    Cancer associated pain               Reason for visit is   Chief Complaint   Patient presents with    Follow-up     Pt is here for 2 weeks follow-up   Virtual Regular Visit     face time          Encounter provider Kurtis Yang MD    Provider located at [address]  [facility] [place] [address] [phone]      Recent Visits  No visits were found meeting these conditions  Showing recent visits within past 7 days and meeting all other requirements  Today's Visits  Date Type Provider Dept   04/21/22 Telemedicine Kurtis Yang MD [facility] Internal Med   Showing today's visits and meeting all other requirements  Future Appointments  No visits were found meeting these conditions  Showing future appointments within next 150 days and meeting all other requirements       The patient was identified by name and date of birth  Socorro Melton was informed that this is a telemedicine visit and that the visit is being conducted through [employer]   and patient was informed that this is not a secure, HIPAA-compliant platform  She agrees to proceed     My office door was closed  No one else was in the room  She acknowledged consent and understanding of privacy and security of the video platform  The patient has agreed to participate and understands they can discontinue the visit at any time  Patient is aware this is a billable service  Subjective  Socorro Melton is a 51 y o  female    Pt c/o for follow up   Pain is improved and completed RT, will start chemo next week  Past Medical History:   Diagnosis Date    Cancer [facility])        History reviewed  No pertinent surgical history  Current Outpatient Medications   Medication Sig Dispense Refill    al mag oxide-diphenhydramine-lidocaine viscous (MAGIC MOUTHWASH) 1:1:1 suspension Swish and spit 10 mL every 4 (four) hours as needed for mouth pain or discomfort 500 mL 2    amLODIPine (NORVASC) 5 mg tablet Take 1 tablet (5 mg total) by mouth daily 30 tablet 0    baclofen 10 mg tablet Take 0 5 tablets (5 mg total) by mouth 3 (three) times a day as needed for muscle spasms 45 tablet 0    dexamethasone (DECADRON) 4 mg tablet Take 1 tablet (4 mg total) by mouth 3 (three) times a day 45 tablet 0    DPH-Lido-AlHydr-MgHydr-Simeth (First-Mouthwash BLM) SUSP       lactulose (CEPHULAC) 10 g packet Take 1 packet (10 g total) by mouth 3 (three) times a day as needed (constipation) 30 each 0    lidocaine-prilocaine (EMLA) cream Apply topically as needed for mild pain (Patient taking differently: Apply topically as needed for mild pain Not using it yet ) 30 g 0    lisinopril (ZESTRIL) 20 mg tablet Take 1 tablet (20 mg total) by mouth daily 30 tablet 0    Mono-Linyah 0 25-35 MG-MCG per tablet       morphine (MS CONTIN) 15 mg 12 hr tablet Take 1 tablet (15 mg total) by mouth 3 (three) times a day Max Daily Amount: 45 mg 90 tablet 0    nystatin (MYCOSTATIN) 500,000 units/5 mL suspension Apply 5 mL (500,000 Units total) to the mouth or throat 4 (four) times a day 200 mL 0    oxyCODONE (ROXICODONE) 10 MG TABS Take 1 tablet (10 mg total) by mouth every 4 (four) hours as needed for severe pain May split pill in half (5 mg) every 4 hours as needed for moderate pain (5 mg)   Max Daily Amount: 60 mg 180 tablet 0    pantoprazole (PROTONIX) 40 mg tablet Take 1 tablet (40 mg total) by mouth daily in the early morning 30 tablet 0    polyethylene glycol (MIRALAX) 17 g packet Take 17 g by mouth daily 30 each 0    senna (SENOKOT) 8 6 mg Take 3 tablets (25 8 mg total) by mouth 2 (two) times a day Hold for loose stool  180 tablet 0     No current facility-administered medications for this visit  No Known Allergies    Review of Systems   Constitutional: Negative for appetite change, chills, diaphoresis, fatigue, fever and unexpected weight change  Respiratory: Negative for apnea, cough, choking, chest tightness, shortness of breath, wheezing and stridor  Cardiovascular: Negative for chest pain, palpitations and leg swelling  Gastrointestinal: Negative for abdominal distention, abdominal pain, anal bleeding, blood in stool, constipation, diarrhea, nausea and vomiting  Genitourinary: Negative for decreased urine volume, difficulty urinating, frequency and urgency  Musculoskeletal: Positive for back pain and neck pain  Negative for arthralgias and myalgias  Neurological: Negative for dizziness, light-headedness, numbness and headaches  Video Exam    Vitals:       Physical Exam  Constitutional:       General: She is not in acute distress  Appearance: Normal appearance  She is obese  She is not toxic-appearing or diaphoretic  Neurological:      Mental Status: She is alert and oriented to person, place, and time  I spent 25 minutes directly with the patient during this visit    VIRTUAL VISIT [facility] verbally agrees to participate in [employer]  Pt is aware that [employer] could be limited without vital signs or the ability to perform a full hands-on physical exam  Nayeli Whatley understands she or the provider may request at any time to terminate the video visit and request the patient to seek care or treatment in person

## 2022-04-21 NOTE — TELEPHONE ENCOUNTER
Genetic Test Result Note:    Today I left a voicemail for [facility] reviewing the results of her genetic test for hereditary cancer  She underwent genetic testing through our program on 4/6 due to her recent diagnosis of breast cancer  I encouraged her to call [phone] to discuss this result further  A copy of this consult note and test result will be shared with the patient  SUMMARY:    Test(s): [facility] BRCAplus STAT Panel (8 genes): MISBAH, BRCA1, BRCA2, CDH1, CHEK2, PALB2, PTEN, TP53 with reflex to [employer] CancerNext (28 additional genes): APC, MISBAH, AXIN2 BARD1, BRCA1, BRCA2, BRIP1, BMPR1A, CDH1, CDK4, CDKN2A, CHEK2, DICER1, EPCAM, GREM1, HOXB13, MLH1, MSH2, MSH3, MSH6, MUTYH, NBN, NF1, NTHL1, PALB2, PMS2, POLD1, POLE, PTEN, RAD51C, RAD51D, RECQL SMAD4, SMARCA4, STK11, TP53    Result: Negative - No Clinically Significant Variants Detected      Assessment:   A negative result significantly reduces the likelihood that [facility] has a hereditary cancer syndrome  However, this testing is unable to completely rule out the presence of hereditary cancer  It remains possible that:  - There is a variant in an area of a gene which was not tested or there is a variant not detectable due to technical limitations of this test      - There is a variant in another gene that was not included in this test or in a gene not known to be linked to cancer or tumors  - A family member has a genetic variant that the patient did not inherit  - The cancer in the family is sporadic and is related to non-hereditary factors  Risks and Testing for Family Members:  [facility] was made aware that all of her first-degree relatives are at increased risk to develop breast cancer based on her recent diagnosis  We recommend that her first-degree relatives make their healthcare providers aware of the family history and discuss their options for screening and risk-reduction      At this time we do not recommend testing for [facility] 's son based on her negative test result  Alverto Beckwith 's son still needs to consider the history of cancer on the other side of their family when determining their risks  If Alverto Beckwith has any affected family members with a cancer diagnosis, especially at a young age, they may still consider genetic testing  Relatives who wish to pursue genetic testing can reach out to the [facility] [id number]) to schedule an appointment or visit www [facility] org to identify a local genetic counselor  Plan:     Negative Result: This result does not implications for medical management and treatment for Alverto Beckwith  This result will be shared with her oncologist Dr Oscar Escalona

## 2022-04-21 NOTE — TELEPHONE ENCOUNTER
Pt's spouse Cassius Diaz called back, he confirmed she is taking Dex 4 mg in AM, 2 mg in PM but stopped taking it yesterday  Provided steroid taper per Dr Shane Ibrahim, she can take 2 mg in AM, 2 mg in PM x 5 days, then 2 mg in AM only x 5 days  Then stop  Cassius Diaz said she has 15, 4 mg tabs at home  She will take 1/2 pill BID starting today, and follow taper as instructed  They will call with any concerns

## 2022-04-22 ENCOUNTER — OFFICE VISIT (OUTPATIENT)
Dept: PALLIATIVE MEDICINE | Facility: CLINIC | Age: 47
End: 2022-04-22
Payer: COMMERCIAL

## 2022-04-22 ENCOUNTER — TELEPHONE (OUTPATIENT)
Dept: LAB | Facility: HOSPITAL | Age: 47
End: 2022-04-22

## 2022-04-22 VITALS
HEART RATE: 104 BPM | DIASTOLIC BLOOD PRESSURE: 75 MMHG | TEMPERATURE: 97.2 F | RESPIRATION RATE: 18 BRPM | OXYGEN SATURATION: 96 % | SYSTOLIC BLOOD PRESSURE: 118 MMHG

## 2022-04-22 DIAGNOSIS — Z17.1 MALIGNANT NEOPLASM OF OVERLAPPING SITES OF RIGHT BREAST IN FEMALE, ESTROGEN RECEPTOR NEGATIVE (HCC): ICD-10-CM

## 2022-04-22 DIAGNOSIS — Z79.899 MEDICAL MARIJUANA USE: ICD-10-CM

## 2022-04-22 DIAGNOSIS — C79.51 OSSEOUS METASTASIS (HCC): ICD-10-CM

## 2022-04-22 DIAGNOSIS — G89.3 CANCER ASSOCIATED PAIN: ICD-10-CM

## 2022-04-22 DIAGNOSIS — C50.811 MALIGNANT NEOPLASM OF OVERLAPPING SITES OF RIGHT BREAST IN FEMALE, ESTROGEN RECEPTOR NEGATIVE (HCC): ICD-10-CM

## 2022-04-22 DIAGNOSIS — C79.49 METASTASIS TO SPINAL CORD (HCC): Primary | ICD-10-CM

## 2022-04-22 DIAGNOSIS — Z51.5 PALLIATIVE CARE PATIENT: ICD-10-CM

## 2022-04-22 PROCEDURE — 99214 OFFICE O/P EST MOD 30 MIN: CPT | Performed by: INTERNAL MEDICINE

## 2022-04-22 NOTE — TELEPHONE ENCOUNTER
4/22 called LM @118pm that we cannot do that test through the mobile lab - told pt to call back if she had any further questions

## 2022-04-22 NOTE — PROGRESS NOTES
Outpatient Follow-Up - Palliative and Supportive Care   Daved Cover 51 y o  female [id number]    Assessment & Plan  Problem List Items Addressed This Visit                                  Malignant neoplasm of overlapping sites of right breast in female, estrogen receptor negative ([facility] [address] )  Osseous metastasis ([facility] [address] )  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 [facility]) - Primary    Cancer associated pain    Palliative care patient  Medical marijuana use         The patient qualifies for use of MMJ in the state [facility] by having the following medical condition - breast cancer  From a palliative care stand point the patient is suffering with cancer related pain  These symptoms and side effects might be alleviated with use of MMJ products  The patient registered online  The patient read and I reviewed the informed consent document with the patient  I answered all questions related to it before the patient signed it  The patient's medical certification was completed on this date  The patient was given a signed copy of the informed consent and medical certification  I issued a certification for MMJ use with palliative intent -  To help alleviate cancer related symptoms and cancer treatment related side effects  I do not endorse the belief that MMJ can treat cancer and strongly encouraged the patient to continue treatments and surveillance as recommend by cancer specialists  Medications adjusted this encounter:  Requested Prescriptions      No prescriptions requested or ordered in this encounter     No orders of the defined types were placed in this encounter  There are no discontinued medications  Daved Cover was seen today for symptoms and planning cares related to above illnesses  I have reviewed the patient's controlled substance dispensing history in the Prescription Drug Monitoring Program in compliance with the [facility] regulations before prescribing any controlled substances      They are invited to continue to follow with us  If there are questions or concerns, please contact us through our clinic/answering service 24 hours a day, seven days a week  Amarjit Paniagua MD  [facility] Palliative and Supportive Care  [phone]      History of Present Illness      Radha Devine is a 51 y o  female who presents in follow up of symptoms related to metastatic breast cancer  Pertinent issues include: symptom management, pain, neoplasm related, disease process education and discussion of prognosis  Patient presents to office for initial [employer] certification  She follows with Dr Ambrose Waters for palliative care  She is accompanied with her fiance Víctor Santos today and reports she is hoping to use MMJ for pain control along with opioid meds  She is also hoping to get relief in symptoms of anxiety, depression, nausea and appetite stimulation  We discussed the process of obtaining MMJ in-depth and answered all her questions  She is hoping her fiance would be here caregiver and we discussed the process of registering him as a caregiver as well  In regards to [employer] products, advised her for a consultation with pharmacist at the dispensary  Review of Systems   Constitutional: Positive for decreased appetite  Musculoskeletal: Positive for back pain and myalgias  Gastrointestinal: Positive for change in bowel habit and nausea  Vital Signs    /75 (BP Location: Left arm, Patient Position: Sitting, Cuff Size: Standard)   Pulse 104   Temp (!) 97 2 °F (36 2 °C) (Temporal)   Resp 18   SpO2 96%     Physical Exam and Objective Data  Physical Exam  Constitutional:       General: She is not in acute distress  Appearance: Normal appearance  She is not ill-appearing  Comments: Wheelchair bound, wearing a brace, pleasant and interactive   HENT:      Head: Normocephalic and atraumatic  Mouth/Throat:      Mouth: Mucous membranes are moist       Pharynx: No oropharyngeal exudate or posterior oropharyngeal erythema  Eyes:      General:         Right eye: No discharge  Left eye: No discharge  Pulmonary:      Effort: No respiratory distress  Skin:     Capillary Refill: Capillary refill takes less than 2 seconds  Coloration: Skin is not jaundiced or pale  Neurological:      Mental Status: She is alert and oriented to person, place, and time     Psychiatric:         Mood and Affect: Mood normal

## 2022-04-23 ENCOUNTER — APPOINTMENT (OUTPATIENT)
Dept: LAB | Facility: CLINIC | Age: 47
End: 2022-04-23
Payer: COMMERCIAL

## 2022-04-23 DIAGNOSIS — R74.01 TRANSAMINITIS: ICD-10-CM

## 2022-04-23 DIAGNOSIS — C50.811 MALIGNANT NEOPLASM OF OVERLAPPING SITES OF RIGHT BREAST IN FEMALE, ESTROGEN RECEPTOR NEGATIVE (HCC): ICD-10-CM

## 2022-04-23 DIAGNOSIS — C79.51 OSSEOUS METASTASIS (HCC): ICD-10-CM

## 2022-04-23 DIAGNOSIS — C79.49 METASTASIS TO SPINAL CORD (HCC): ICD-10-CM

## 2022-04-23 DIAGNOSIS — Z17.1 MALIGNANT NEOPLASM OF OVERLAPPING SITES OF RIGHT BREAST IN FEMALE, ESTROGEN RECEPTOR NEGATIVE (HCC): ICD-10-CM

## 2022-04-23 LAB
ALBUMIN SERPL BCP-MCNC: 2.9 G/DL (ref 3.5–5)
ALP SERPL-CCNC: 183 U/L (ref 46–116)
ALT SERPL W P-5'-P-CCNC: 45 U/L (ref 12–78)
ANION GAP SERPL CALCULATED.3IONS-SCNC: 9 MMOL/L (ref 4–13)
AST SERPL W P-5'-P-CCNC: 22 U/L (ref 5–45)
BILIRUB SERPL-MCNC: 0.65 MG/DL (ref 0.2–1)
BUN SERPL-MCNC: 48 MG/DL (ref 5–25)
CALCIUM ALBUM COR SERPL-MCNC: 10.9 MG/DL (ref 8.3–10.1)
CALCIUM SERPL-MCNC: 10 MG/DL (ref 8.3–10.1)
CHLORIDE SERPL-SCNC: 102 MMOL/L (ref 100–108)
CO2 SERPL-SCNC: 26 MMOL/L (ref 21–32)
CORTIS AM PEAK SERPL-MCNC: 1.1 UG/DL (ref 4.2–22.4)
CREAT SERPL-MCNC: 0.85 MG/DL (ref 0.6–1.3)
GFR SERPL CREATININE-BSD FRML MDRD: 81 ML/MIN/1.73SQ M
GLUCOSE SERPL-MCNC: 100 MG/DL (ref 65–140)
POTASSIUM SERPL-SCNC: 5.1 MMOL/L (ref 3.5–5.3)
PROT SERPL-MCNC: 7.3 G/DL (ref 6.4–8.2)
SODIUM SERPL-SCNC: 137 MMOL/L (ref 136–145)

## 2022-04-23 PROCEDURE — 36415 COLL VENOUS BLD VENIPUNCTURE: CPT

## 2022-04-23 PROCEDURE — 80053 COMPREHEN METABOLIC PANEL: CPT

## 2022-04-23 PROCEDURE — 82533 TOTAL CORTISOL: CPT

## 2022-04-25 ENCOUNTER — HOSPITAL ENCOUNTER (OUTPATIENT)
Dept: NUCLEAR MEDICINE | Facility: HOSPITAL | Age: 47
Discharge: HOME | End: 2022-04-25
Attending: INTERNAL MEDICINE
Payer: COMMERCIAL

## 2022-04-25 DIAGNOSIS — Z17.1 MALIGNANT NEOPLASM OF OVERLAPPING SITES OF RIGHT BREAST IN FEMALE, ESTROGEN RECEPTOR NEGATIVE (HCC): ICD-10-CM

## 2022-04-25 DIAGNOSIS — C50.811 MALIGNANT NEOPLASM OF OVERLAPPING SITES OF RIGHT BREAST IN FEMALE, ESTROGEN RECEPTOR NEGATIVE (HCC): ICD-10-CM

## 2022-04-25 PROCEDURE — 78306 BONE IMAGING WHOLE BODY: CPT

## 2022-04-25 PROCEDURE — G1004 CDSM NDSC: HCPCS

## 2022-04-25 PROCEDURE — A9503 TC99M MEDRONATE: HCPCS

## 2022-04-26 ENCOUNTER — APPOINTMENT (OUTPATIENT)
Dept: LAB | Facility: CLINIC | Age: 47
End: 2022-04-26
Payer: COMMERCIAL

## 2022-04-26 DIAGNOSIS — C50.811 MALIGNANT NEOPLASM OF OVERLAPPING SITES OF RIGHT BREAST IN FEMALE, ESTROGEN RECEPTOR NEGATIVE (HCC): ICD-10-CM

## 2022-04-26 DIAGNOSIS — K21.9 GASTROESOPHAGEAL REFLUX DISEASE WITHOUT ESOPHAGITIS: ICD-10-CM

## 2022-04-26 DIAGNOSIS — Z17.1 MALIGNANT NEOPLASM OF OVERLAPPING SITES OF RIGHT BREAST IN FEMALE, ESTROGEN RECEPTOR NEGATIVE (HCC): ICD-10-CM

## 2022-04-26 DIAGNOSIS — R03.0 ELEVATED BP WITHOUT DIAGNOSIS OF HYPERTENSION: ICD-10-CM

## 2022-04-26 LAB
ALBUMIN SERPL BCP-MCNC: 2.8 G/DL (ref 3.5–5)
ALP SERPL-CCNC: 212 U/L (ref 46–116)
ALT SERPL W P-5'-P-CCNC: 45 U/L (ref 12–78)
ANION GAP SERPL CALCULATED.3IONS-SCNC: 8 MMOL/L (ref 4–13)
AST SERPL W P-5'-P-CCNC: 25 U/L (ref 5–45)
BASOPHILS # BLD AUTO: 0 THOUSANDS/ΜL (ref 0–0.1)
BASOPHILS NFR BLD AUTO: 0 % (ref 0–1)
BILIRUB SERPL-MCNC: 0.33 MG/DL (ref 0.2–1)
BUN SERPL-MCNC: 18 MG/DL (ref 5–25)
CALCIUM ALBUM COR SERPL-MCNC: 10.1 MG/DL (ref 8.3–10.1)
CALCIUM SERPL-MCNC: 9.1 MG/DL (ref 8.3–10.1)
CHLORIDE SERPL-SCNC: 102 MMOL/L (ref 100–108)
CO2 SERPL-SCNC: 26 MMOL/L (ref 21–32)
CREAT SERPL-MCNC: 0.69 MG/DL (ref 0.6–1.3)
EOSINOPHIL # BLD AUTO: 0 THOUSAND/ΜL (ref 0–0.61)
EOSINOPHIL NFR BLD AUTO: 0 % (ref 0–6)
ERYTHROCYTE [DISTWIDTH] IN BLOOD BY AUTOMATED COUNT: 13.3 % (ref 11.6–15.1)
GFR SERPL CREATININE-BSD FRML MDRD: 103 ML/MIN/1.73SQ M
GLUCOSE SERPL-MCNC: 88 MG/DL (ref 65–140)
HCG SERPL QL: NEGATIVE
HCT VFR BLD AUTO: 34.1 % (ref 34.8–46.1)
HGB BLD-MCNC: 11 G/DL (ref 11.5–15.4)
IMM GRANULOCYTES # BLD AUTO: 0.08 THOUSAND/UL (ref 0–0.2)
IMM GRANULOCYTES NFR BLD AUTO: 2 % (ref 0–2)
LYMPHOCYTES # BLD AUTO: 0.32 THOUSANDS/ΜL (ref 0.6–4.47)
LYMPHOCYTES NFR BLD AUTO: 8 % (ref 14–44)
MCH RBC QN AUTO: 30.8 PG (ref 26.8–34.3)
MCHC RBC AUTO-ENTMCNC: 32.3 G/DL (ref 31.4–37.4)
MCV RBC AUTO: 96 FL (ref 82–98)
MONOCYTES # BLD AUTO: 0.5 THOUSAND/ΜL (ref 0.17–1.22)
MONOCYTES NFR BLD AUTO: 13 % (ref 4–12)
NEUTROPHILS # BLD AUTO: 2.96 THOUSANDS/ΜL (ref 1.85–7.62)
NEUTS SEG NFR BLD AUTO: 77 % (ref 43–75)
NRBC BLD AUTO-RTO: 0 /100 WBCS
PLATELET # BLD AUTO: 191 THOUSANDS/UL (ref 149–390)
PMV BLD AUTO: 9.5 FL (ref 8.9–12.7)
POTASSIUM SERPL-SCNC: 4.4 MMOL/L (ref 3.5–5.3)
PROT SERPL-MCNC: 6.6 G/DL (ref 6.4–8.2)
RBC # BLD AUTO: 3.57 MILLION/UL (ref 3.81–5.12)
SODIUM SERPL-SCNC: 136 MMOL/L (ref 136–145)
WBC # BLD AUTO: 3.86 THOUSAND/UL (ref 4.31–10.16)

## 2022-04-26 PROCEDURE — 36415 COLL VENOUS BLD VENIPUNCTURE: CPT

## 2022-04-26 PROCEDURE — 85025 COMPLETE CBC W/AUTO DIFF WBC: CPT

## 2022-04-26 PROCEDURE — 84703 CHORIONIC GONADOTROPIN ASSAY: CPT

## 2022-04-26 PROCEDURE — 80053 COMPREHEN METABOLIC PANEL: CPT

## 2022-04-27 ENCOUNTER — DOCUMENTATION (OUTPATIENT)
Dept: OTHER | Facility: HOSPITAL | Age: 47
End: 2022-04-27

## 2022-04-27 ENCOUNTER — OFFICE VISIT (OUTPATIENT)
Dept: HEMATOLOGY ONCOLOGY | Facility: CLINIC | Age: 47
End: 2022-04-27
Payer: COMMERCIAL

## 2022-04-27 VITALS
HEIGHT: 64 IN | WEIGHT: 189.15 LBS | BODY MASS INDEX: 32.29 KG/M2 | OXYGEN SATURATION: 97 % | DIASTOLIC BLOOD PRESSURE: 74 MMHG | RESPIRATION RATE: 18 BRPM | TEMPERATURE: 96.6 F | HEART RATE: 113 BPM | SYSTOLIC BLOOD PRESSURE: 136 MMHG

## 2022-04-27 DIAGNOSIS — C50.811 MALIGNANT NEOPLASM OF OVERLAPPING SITES OF RIGHT BREAST IN FEMALE, ESTROGEN RECEPTOR NEGATIVE (HCC): Primary | ICD-10-CM

## 2022-04-27 DIAGNOSIS — C79.51 OSSEOUS METASTASIS (HCC): Primary | ICD-10-CM

## 2022-04-27 DIAGNOSIS — Z17.1 MALIGNANT NEOPLASM OF OVERLAPPING SITES OF RIGHT BREAST IN FEMALE, ESTROGEN RECEPTOR NEGATIVE (HCC): ICD-10-CM

## 2022-04-27 DIAGNOSIS — C50.811 MALIGNANT NEOPLASM OF OVERLAPPING SITES OF RIGHT BREAST IN FEMALE, ESTROGEN RECEPTOR NEGATIVE (HCC): ICD-10-CM

## 2022-04-27 DIAGNOSIS — Z17.1 MALIGNANT NEOPLASM OF OVERLAPPING SITES OF RIGHT BREAST IN FEMALE, ESTROGEN RECEPTOR NEGATIVE (HCC): Primary | ICD-10-CM

## 2022-04-27 PROCEDURE — 3008F BODY MASS INDEX DOCD: CPT | Performed by: INTERNAL MEDICINE

## 2022-04-27 PROCEDURE — 99214 OFFICE O/P EST MOD 30 MIN: CPT | Performed by: INTERNAL MEDICINE

## 2022-04-27 PROCEDURE — 1036F TOBACCO NON-USER: CPT | Performed by: INTERNAL MEDICINE

## 2022-04-27 RX ORDER — LISINOPRIL 20 MG/1
TABLET ORAL
Qty: 30 TABLET | Refills: 0 | Status: SHIPPED | OUTPATIENT
Start: 2022-04-27 | End: 2022-04-29 | Stop reason: SDUPTHER

## 2022-04-27 RX ORDER — ONDANSETRON 4 MG/1
4 TABLET, FILM COATED ORAL EVERY 8 HOURS PRN
Qty: 30 TABLET | Refills: 1 | Status: SHIPPED | OUTPATIENT
Start: 2022-04-27 | End: 2022-05-18 | Stop reason: SDUPTHER

## 2022-04-27 RX ORDER — AMLODIPINE BESYLATE 5 MG/1
TABLET ORAL
Qty: 30 TABLET | Refills: 0 | Status: SHIPPED | OUTPATIENT
Start: 2022-04-27 | End: 2022-04-29 | Stop reason: SDUPTHER

## 2022-04-27 RX ORDER — PANTOPRAZOLE SODIUM 40 MG/1
TABLET, DELAYED RELEASE ORAL
Qty: 30 TABLET | Refills: 0 | Status: SHIPPED | OUTPATIENT
Start: 2022-04-27 | End: 2022-04-29 | Stop reason: SDUPTHER

## 2022-04-27 RX ORDER — SODIUM CHLORIDE 9 MG/ML
20 INJECTION, SOLUTION INTRAVENOUS CONTINUOUS
Status: DISCONTINUED | OUTPATIENT
Start: 2022-04-28 | End: 2022-05-01 | Stop reason: HOSPADM

## 2022-04-27 NOTE — PROGRESS NOTES
Labs have been reviewed and signed by Dr Pippa Lr on 4/27/2022  Per protocol patient is ok to proceed with cycle 1 day 1 treatment on 4/28/2022

## 2022-04-27 NOTE — PROGRESS NOTES
Hematology / Oncology Outpatient Follow Up Note    Veronica Moon 51 y o  female Noam Husbands YHL:[id number]         Date:  4/27/2022    Assessment / Plan:  A 54-year-old premenopausal woman with metastatic breast cancer, grade 2, ER negative, SD negative, HER2 3+ disease  She has large fungating mass in her right breast, palpable right axillary adenopathy as well as diffuse osseous metastasis with C4 stenosis  She completed palliative radiation therapy to the spine  She is agreeable to participate in clinical trial with THP with a without atezolizumab  She is going to start 1st cycle of palliative chemotherapy with THP tomorrow  After the central review of pathology, she will be a sign to have either atezolizumab or not  We discussed the risk of checkpoint inhibitor which include immune mediated organ toxicity as well as very small risk of treatment related mortality  She understood and wished to proceed  She was instructed to give us a call immediately if she has fever above 100 4  She will be seen by our physician assistant in 3 weeks prior to the 2nd cycle of systemic chemotherapy           Subjective:      HPI:  A 54-year-old premenopausal woman who was recently hospitalized because of the progressive neck pain which radiated to the bilateral upper extremity as well as enlarging right breast mass  She was found to have fungating right breast mass  CT scan of chest abdomen pelvis showed diffuse bony metastasis, especially with C4 stenosis  In addition, CT scan showed 6 9 cm of right breast mass  She had right breast biopsy which showed invasive ductal carcinoma, grade 2, ER negative, SD negative, HER2 3+ disease  Her tumor marker were find to be mildly elevated  CT of the head was negative for brain metastasis  She is currently on palliative radiation to her neck, shoulder as well as lower back  She is going to complete palliative radiation therapy in early next week    She presents today with her mother and fiancee to discuss medical treatment for metastatic breast cancer  Her pain is reasonably controlled with narcotics medications  She has no recent weight loss  She denied any respiratory symptoms  She has no significant past medical history  Therefore, prior to this hospitalization, she was not on any medications  She denied family history of breast or ovarian cancer  She is a lifetime never smoker  Her performance status is 1/4 on ECOG scale            Interval History:  A 69-year-old premenopausal woman with metastatic breast cancer, grade 2, ER negative, NJ negative, HER2 3+ disease  She has large fungating mass in her right breast, palpable right axillary adenopathy as well as diffuse osseous metastasis with C4 stenosis  She completed palliative radiation therapy to the spine  We previously had extensive discussion with standard chemotherapy with Taxotere, trastuzumab and pertuzumab versus clinical trial with same regimen with or without atezolizumab  She chose to be on clinical trial   She presents today prior to the 1st cycle of palliative chemotherapy which is set up for tomorrow  She feels well  She completed palliative radiation therapy to the spine approximately 10 days ago  Her back pain is well controlled with narcotic pain medications  She has no respiratory symptoms  Her activity has improved somewhat  Her performance status is 1/4 on ECOG scale            Objective:      Primary Diagnosis:     Metastatic breast cancer, grade 2, ER negative, NJ negative, HER2 3+ disease  Diagnosed in March 2022      Cancer Staging:  Cancer Staging  No matching staging information was found for the patient         Previous Hematologic/ Oncologic Treatment:            Current Hematologic/ Oncologic Treatment:       Clinical trial with Taxotere, trastuzumab of and pertuzumab with or without atezolizumab    1st cycle to be given in April 28, 2022       Disease Status:      Not evaluated at this time yet      Test Results:     Pathology:     Right breast biopsy showed invasive ductal carcinoma, grade 2 ER negative, NM negative, HER2 3+ disease      Radiology:     CT scan of head was negative for metastatic disease in her brain      CT scan of chest abdomen pelvis showed C4 metastasis with diffuse osseous metastasis especially with C4 stenosis  6 9 cm of right breast mass  Bone scan showed widespread osseous metastasis      Laboratory:     See below for CBC and CMP  CA 27, 29 was 110  CA 15-3 was 91 6      Physical Exam:        General Appearance:    Alert, oriented          Eyes:    PERRL   Ears:    Normal external ear canals, both ears   Nose:   Nares normal, septum midline   Throat:   Mucosa moist  Pharynx without injection  Neck:   Supple         Lungs:     Clear to auscultation bilaterally   Chest Wall:    No tenderness or deformity    Heart:    Regular rate and rhythm         Abdomen:     Soft, non-tender, bowel sounds +, no organomegaly               Extremities:   Extremities no cyanosis or edema         Skin:   no rash or icterus  Lymph nodes:   Cervical, supraclavicular, and axillary nodes normal   Neurologic:   CNII-XII intact, normal strength, sensation and reflexes     Throughout             Breast exam:   11 x 11 cm of palpable mass at in the upper quadrant of her right breast with 5 cm of central ulceration  2 5 cm of palpable right axillary adenopathy  Left breast exam is negative  ROS: Review of Systems   All other systems reviewed and are negative            Imaging: CTA head and neck w wo contrast    Result Date: 3/31/2022  Narrative: CTA NECK AND BRAIN WITH AND WITHOUT CONTRAST INDICATION: eval vert given cervical mets and brain for mets and osseous lesions COMPARISON:   Cervical spine MRI 3/29/2022, brain MRI 3/30/2022 TECHNIQUE:  Routine CT imaging of the Brain without contrast   Post contrast imaging was performed after administration of iodinated contrast through the neck and brain  Post contrast axial 0 625 mm images timed to opacify the arterial system  3D rendering was performed on an independent workstation  MIP reconstructions performed  Coronal reconstructions were performed of the noncontrast portion of the brain  Radiation dose length product (DLP) for this visit:  1508 75 mGy-cm   This examination, like all CT scans performed in the [facility], was performed utilizing techniques to minimize radiation dose exposure, including the use of iterative reconstruction and automated exposure control  IV Contrast:  85 mL of iohexol (OMNIPAQUE)  IMAGE QUALITY:   Diagnostic FINDINGS: NONCONTRAST BRAIN PARENCHYMA:  No intracranial mass, mass effect or midline shift  No CT signs of acute infarction  No acute parenchymal hemorrhage  VENTRICLES AND EXTRA-AXIAL SPACES:  Normal for the patient's age  VISUALIZED ORBITS AND PARANASAL SINUSES:  Unremarkable  CERVICAL VASCULATURE AORTIC ARCH AND GREAT VESSELS: Aortic arch and great vessel origins are unremarkable  RIGHT VERTEBRAL ARTERY CERVICAL SEGMENT:The vessel origin is unremarkable  The vessel is patent throughout the neck without high-grade stenosis  At level C4 extraosseous tumor laterally displaces the vessel  The vessel is encased by tumor within C3 right transverse process  There is mild displacement of the vessel by extraosseous tumor at level C1-2  The tumor encases the vessel within the right transverse foramen of C1  LEFT VERTEBRAL ARTERY CERVICAL SEGMENT: The vessel origin is unremarkable  The vessel is patent throughout the neck without high-grade stenosis  At level C6 the extraosseous tumor contacts and mildly displaces the vessel  There is encasement and mild narrowing of the vessel by tumor at level C4  The tumor abuts the vessel where it exits left C2 transverse foramen   RIGHT EXTRACRANIAL CAROTID SEGMENT:The carotid bifurcation and cervical internal carotid artery are unremarkable  No stenosis or dissection  LEFT EXTRACRANIAL CAROTID SEGMENT: The carotid bifurcation and cervical internal carotid artery are unremarkable  No stenosis or dissection  INTRACRANIAL VASCULATURE INTERNAL CAROTID ARTERIES: The intracranial portions of the internal carotid arteries are unremarkable  Normal ophthalmic artery origins  ANTERIOR CIRCULATION:  Normal A1 segments  Bilateral anterior cerebral arteries are unremarkable  Normal anterior comminuting artery  MIDDLE CEREBRAL ARTERY CIRCULATION:  Bilateral M1 segments and major M2 branches are patent without significant stenosis  DISTAL VERTEBRAL ARTERIES:  Distal vertebral arteries are patent without high-grade stenosis  Posterior inferior cerebellar artery origins are patent  BASILAR ARTERY:  Basilar artery is unremarkable  Normal superior cerebellar arteries  POSTERIOR CEREBRAL ARTERIES:    Bilateral posterior cerebral arteries are patent without critical stenosis  Normal right posterior communicating artery  Hypoplastic left posterior communicating artery  DURAL VENOUS SINUSES:  Unremarkable  NON VASCULAR ANATOMY BONY STRUCTURES: Redemonstrated diffuse lytic metastatic lesions throughout cervical and visualized thoracic spine to the level of T5 with severe C4 and T2 pathologic fractures and multilevel extraosseous tumor resulting in severe canal stenosis at C4 and moderate canal stenosis at T2  Redemonstrated left parietal and right frontal calvarial lesions  SOFT TISSUES OF THE NECK:  Unremarkable  THORACIC INLET:  Unremarkable  Impression: 1  No acute intracranial CT abnormality  2   Patent bilateral vertebral arteries without high-grade stenosis  There are multilevel abutment/mild displacement or encasement of the vertebral arteries by extraosseous tumor as described  3   No hemodynamically significant stenosis of cervical carotid arteries   4   Redemonstrated diffuse cervical and visualized thoracic spine metastatic lesions with severe C4 and T2 pathologic fractures  Multilevel extraosseous tumor resulting in severe canal stenosis at C4 and moderate canal stenosis at T2, better evaluated on recent cervical spine MRI  5   Redemonstrated left parietal and right frontal calvarial lesions  Workstation performed: [id number]     XR chest 2 views    Result Date: 3/29/2022  Narrative: CHEST INDICATION:   htn, upper back pain  COMPARISON:  None EXAM PERFORMED/VIEWS:  XR CHEST PA & LATERAL FINDINGS: Cardiomediastinal silhouette appears unremarkable  The lungs are clear  No pneumothorax or pleural effusion  Osseous structures appear within normal limits for patient age  Impression: No acute cardiopulmonary disease  Workstation performed: [id number]     XR spine cervical 2 or 3 vw injury    Result Date: 4/15/2022  Narrative: CERVICAL SPINE INDICATION:   C79 51: Secondary malignant neoplasm of bone  COMPARISON:  April 1, 2022 VIEWS:  XR SPINE CERVICAL 2 OR 3 VW INJURY Images: 3 FINDINGS: No fracture or subluxation  Alignment is anatomic Severe right compression deformity of the C4 vertebra seen Lucency seen in the C3 vertebral,, unchanged The prevertebral soft tissues are within normal limits  Reversal of the cervical lordosis The lung apices are clear  Metastatic disease C-spine the    Impression: Severe wedge compression deformity of the C4 vertebra with stable alignment Workstation performed: [id number]     XR spine cervical 2 or 3 vw injury    Result Date: 4/1/2022  Narrative: CERVICAL SPINE INDICATION:   baseline uprights  diffuse osseous mets  COMPARISON:  3/29/2022 MR and CT will VIEWS:  XR SPINE CERVICAL 2 OR 3 VW INJURY FINDINGS: Numerous lytic lesions are better seen on the prior studies  Calvarial lucencies most compatible with metastases  Described in prior MR brain report  Similar marked C4 loss of height secondary to lytic lesion and pathologic fracture   Similar mildly reversed curvature centered on the fracture  Atlantoaxial interval and craniocervical junction are preserved  No new spondylolisthesis  Similar degenerative change  No acute soft tissue pathology  Visualized lungs are clear  Impression: No acute change compared to recent prior studies  Workstation performed: [id number]     CT spine cervical wo contrast    Result Date: 3/29/2022  Narrative: CT CERVICAL SPINE - WITHOUT CONTRAST INDICATION:   Bone mass or bone pain, cervical spine, aggressive features on xray osseous mets  COMPARISON:  None  TECHNIQUE:  CT examination of the cervical spine was performed without intravenous contrast   Contiguous axial images were obtained  Sagittal and coronal reconstructions were performed  Radiation dose length product (DLP) for this visit:  718 mGy-cm   This examination, like all CT scans performed in the [facility], was performed utilizing techniques to minimize radiation dose exposure, including the use of iterative reconstruction and automated exposure control  IMAGE QUALITY:  Diagnostic  FINDINGS: ALIGNMENT:  There is reversal of normal cervical lordosis  There is no significant subluxation  No compression deformity  VERTEBRAL BODIES:  Innumerable lytic lesions throughout the cervical spine  There is a displaced fracture through the right posterior arch of C1 (series 2 image 46)  Displacement is approximately 4 mm  Large lytic process involving the right lateral mass of C1 is noted with expansion and extension involving the anterior arch  There is severe compression of the C4 vertebral body and moderate compression of the T2 vertebral body  The soft tissue windows demonstrate a large soft tissue mass extending from the C4 level into the spinal canal measuring approximately 2 2 x 1 3 cm (series 603 image 57)  This causes a severe central canal stenosis and likely cord compression  This extends from the mid C3 level to the lower C5 level    An additional large soft tissue mass extending from the T2 level into the spinal canal measures approximate 1 1 cm and likely causes severe central canal stenosis /cord compression  (Series 3 image 59)  DEGENERATIVE CHANGES:  No significant cervical degenerative changes are noted  PREVERTEBRAL AND PARASPINAL SOFT TISSUES:  Unremarkable  THORACIC INLET:  Normal      Impression: 1  Innumerable bony metastases  2   Displaced fracture through the right posterior arch of C1  3   C4 metastasis causing a severe compression fracture and extension into the canal causing severe central canal stenosis and likely cord compression  4   T2 metastasis causing a moderate compression fracture and extension into the canal causing severe central canal stenosis and likely cord compression  I sent a tiger text to [employer] on 3/29/2022 at 9:57 AM  The study was marked in [facility] for immediate notification  Workstation performed: [id number]     MRI brain w wo contrast    Result Date: 3/30/2022  Narrative: MRI BRAIN WITH AND WITHOUT CONTRAST INDICATION: breast ca, extensive mets, staging  COMPARISON:  MRI cervical, thoracic, and lumbar spine with and without contrast March 29, 2022  TECHNIQUE: Sagittal T1, axial T2, axial FLAIR, axial T1, axial [place], axial diffusion  Sagittal, axial T1 postcontrast   Axial bravo postcontrast with coronal reconstructions  IV Contrast:  9 mL of Gadobutrol injection (SINGLE-DOSE)  IMAGE QUALITY:   Diagnostic  FINDINGS: BRAIN PARENCHYMA:  There is no discrete mass, mass effect or midline shift  There is no intracranial hemorrhage  Normal posterior fossa  No diffusion-weighted signal abnormality to suggest acute infarction  There are no white matter changes in the cerebral hemispheres  Postcontrast imaging of the brain demonstrates no abnormal enhancement  VENTRICLES:  Normal for the patient's age   SELLA AND PITUITARY GLAND:  Normal  ORBITS:  Normal  PARANASAL SINUSES:  Normal  VASCULATURE:  Evaluation of the major intracranial vasculature demonstrates appropriate flow voids  CALVARIUM AND SKULL BASE:  Enhancing lesions in left parietal calvarium, right frontal calvarium, or suspicious for osseous calvarial metastasis  For reference: 1 5 cm left parietal calvarial enhancing lesion (12:109)  0 5 cm right frontal calvarial enhancing lesion (12:99)  EXTRACRANIAL SOFT TISSUES:  Normal  VISUALIZED CERVICAL SPINE: Unchanged enhancing osseous metastatic lesions of C1, C2, C4, and partially imaged C5 vertebra with pathologic compression fracture of C4 vertebral body that also has surrounding anterior paravertebral disease and anterior epidural extension of disease resulting in severe canal stenosis at C4 vertebral body level as described on yesterday's MRI cervical spine with and without contrast      Impression: No evidence of intracranial metastasis  Left parietal and right frontal calvarial enhancing lesions, suspicious for calvarial osseous metastasis  Unchanged enhancing osseous metastatic lesions of C1, C2, C4, and partially imaged C5 vertebra with pathologic compression fracture of C4 vertebral body that also has surrounding anterior paravertebral disease and anterior epidural extension of disease resulting in severe canal stenosis at C4 vertebral body level as described on yesterday's MRI cervical spine with and without contrast  The study was marked in [employer] for immediate notification  Workstation performed: [id number]     MRI cervical spine w wo contrast    Result Date: 3/29/2022  Narrative: MRI CERVICAL SPINE WITH AND WITHOUT CONTRAST INDICATION: Breast carcinoma with osseous metastasis    COMPARISON:  CT scan dated 3/29/2022  TECHNIQUE:  Sagittal T1, sagittal T2, sagittal inversion recovery, axial T2  Sagittal T1 and axial T1 postcontrast   IV Contrast:  9 mL of Gadobutrol injection (SINGLE-DOSE) IMAGE QUALITY:  Diagnostic  FINDINGS: ALIGNMENT:  There is reversal of the normal cervical lordosis centered at C4    There is anterior subluxation of C3 in relation to C4  There is a severe pathologic compression fracture of the C4 vertebral body  There is a moderate pathologic compression fracture of T2  MARROW SIGNAL:  Diffusely heterogeneous marrow signal in particular on T1-weighted imaging consistent with diffuse lytic metastasis  This involves nearly every visualized vertebral body within the cervical and upper thoracic spine  There is involvement  of the posterior elements as well, in particular on the left at the C4, C5 and C6 levels  There is posterior element involvement noted within the upper thoracic spine as well including T2 on the left as well as T3 and T4 on the left  Osseous metastatic disease is seen within the right aspect of the C1 vertebral body replacing the lateral mass and anterior ring of C1  There is extensive epidural tumor present at the C4 level extending posteriorly from the vertebral body into the anterior epidural space and into both neural foramen  Similar findings to a slightly lesser degree at the level of T2 with areas of epidural  tumor extending into the anterior epidural space bilaterally  CERVICAL AND VISUALIZED UPPER THORACIC CORD:  Although the cord is compressed in particular at the level of the C4 epidural tumor, the cord appears to maintain normal signal with no definite cord edema present  PREVERTEBRAL AND PARASPINAL SOFT TISSUES:  Mild anterior /prevertebral tumor noted at the C4 and T2 levels  VISUALIZED POSTERIOR FOSSA:  The visualized posterior fossa demonstrates no abnormal signal  CERVICAL DISC SPACES: C2-C3:  Normal  C3-C4:  Anterior subluxation of C3 upon C4  Loss of disc height with annular bulging  As described above there is extensive epidural tumor arising C4 vertebral body into the anterior epidural space and neural foramen bilaterally  Moderate canal stenosis with mild flattening of the cord  Moderate to severe bilateral foraminal narrowing with epidural tumor extending into the neural foramen  C4-C5: Findings are similar to the C3-4 level with epidural tumor resulting in moderate canal stenosis and severe bilateral foraminal narrowing  C5-C6:  Mild annular bulging with a small central disc protrusion  Mild canal stenosis and left foraminal narrowing  C6-C7:  Mild annular bulging  No disc herniation, canal stenosis or foraminal narrowing  C7-T1:  Normal  UPPER THORACIC DISC SPACES:  Moderate canal stenosis at the level of T2 as a result of epidural tumor bilaterally  Mild to moderate foraminal narrowing at both T1-T2 and T2-T3 levels  POSTCONTRAST IMAGING:  The tumor replacing the vertebral bodies and posterior elements as well as the tumor extending into the epidural space is described above demonstrates relatively homogeneous enhancement  Impression: Diffuse, primarily lytic osseous metastatic disease is seen throughout the cervical vertebral bodies from C1 into the upper thoracic spine  There is a significant amount of epidural tumor arising from pathologic compression fractures of C4 and T2  At the C4 level there is moderate to severe canal stenosis with flattening of the cord which maintains normal signal   Moderate canal stenosis at the T2 level  There is corresponding foraminal narrowing as a result of epidural and foraminal tumor  Workstation performed: [id number]     MRI thoracic spine w wo contrast    Result Date: 3/29/2022  Narrative: MRI THORACIC SPINE WITH AND WITHOUT CONTRAST INDICATION: Breast carcinoma with diffuse osseous metastasis    COMPARISON:  None  TECHNIQUE:  Sagittal T1, sagittal T2, sagittal inversion recovery, axial T2,  axial 2D MERGE  Sagittal and axial T1 postcontrast  IV Contrast:  9 mL of Gadobutrol injection (SINGLE-DOSE) IMAGE QUALITY:  Diagnostic  FINDINGS: ALIGNMENT:  Slightly exaggerated smooth kyphosis of the thoracic spine  No scoliosis  MARROW SIGNAL: Diffuse osseous metastatic disease is seen throughout the thoracic spine    There is a pathologic compression fracture of T2 with moderate collapse of the vertebral body and epidural tumor extending into the anterior epidural space and into  the neural foramen at the T1-2 and T2-3 levels  Additional osseous metastasis is seen within upper thoracic vertebral bodies and posterior elements including T3, T4  The posterior element involvement within these vertebral bodies are primarily left-sided  There is moderate resulting foraminal narrowing and a small amount of epidural tumor noted within the left posterior lateral aspect of the canal at the T3-4 level  Additional osseous metastasis are scattered within the mid to lower thoracic spine with no other pathologic compression fractures  No other epidural tumor is seen within the mid or lower thoracic levels  THORACIC CORD:  Normal signal within the thoracic cord  PREVERTEBRAL AND PARASPINAL SOFT TISSUES:   2 well-circumscribed nodules are seen adjacent to the spleen which are isointense to splenic tissue, possibly splenule's  THORACIC DEGENERATIVE CHANGE:  A few small disc herniations are seen within the thoracic spine  See above description of canal stenosis and foraminal narrowing in the upper thoracic spine primarily result of epidural tumor  POSTCONTRAST:  The osseous metastasis demonstrate relatively homogeneous enhancement after administration of contrast      Impression: Diffuse, lytic, osseous metastasis is seen throughout the thoracic spine more prominently within the upper thoracic spine where there is epidural tumor and involvement of the posterior elements from T2 through T5  Moderate canal stenosis at the T2 level  Foraminal narrowing as described above  No abnormal cord signal identified  Workstation performed: [id number]     MRI lumbar spine w wo contrast    Result Date: 3/29/2022  Narrative: MRI LUMBAR SPINE WITH AND WITHOUT CONTRAST INDICATION: Breast carcinoma with osseous metastasis  COMPARISON:  None   TECHNIQUE:  Sagittal T1, sagittal T2, sagittal inversion recovery, axial T2, coronal T2  Sagittal and axial T1 postcontrast  IV Contrast:  9 mL of Gadobutrol injection (SINGLE-DOSE) IMAGE QUALITY:  Diagnostic FINDINGS: VERTEBRAL BODIES:  There are 5 lumbar type vertebral bodies  Mild smooth levoscoliosis of the lumbar spine  No spondylolisthesis or spondylolysis  Diffuse lytic metastasis are identified within the lower thoracic spine, and lumbar spine demonstrating homogeneous enhancement after administration of contrast   There is a minor compression deformity of the T12 superior endplate which may represent a pathologic compression fracture  SACRUM:  There is an expansile lytic/destructive metastatic lesion within the sacrum involving the S2-S5 segments with tumor extending anteriorly into the presacral space and posteriorly into the sacral canal resulting in severe sacral canal stenosis  DISTAL CORD AND CONUS:  Normal size and signal within the distal cord and conus  PARASPINAL SOFT TISSUES:  Numerous nabothian cysts are seen within the lower uterine canal   2 larger cysts are seen within the left posterior pelvis, likely adnexal in origin  See prior CT abdomen pelvis result  LOWER THORACIC DISC SPACES:  No lower thoracic disc herniation, canal stenosis or foraminal narrowing  No lower thoracic epidural tumor  LUMBAR DISC SPACES:  No evidence of epidural tumor within the lumbar canal   At L5-S1 there is annular bulging with a small left foraminal disc protrusion  There is no canal stenosis and only mild left foraminal narrowing  As described above there is severe sacral canal stenosis as a result of epidural tumor  Tumor appears to extend into the left sacral foramen at S2-3 and S3-4  POSTCONTRAST IMAGING:  The lytic osseous metastasis demonstrate relatively homogeneous enhancement  Impression: Diffuse lytic osseous metastasis within the lower thoracic spine, lumbar spine and sacrum    There is a moderate amount of epidural tumor as a result of the expansile sacral lesion resulting in moderate sacral canal stenosis  Tumor extends into the S2-3 and S3-4 neural foramen on the left  Correlate for corresponding radiculopathy  Bilobed cystic mass within the left pelvis incompletely evaluated on this examination  Follow-up pelvic ultrasound imaging is recommended  Workstation performed: [id number]     NM bone scan whole body    Result Date: 4/26/2022  Narrative: BONE SCAN  WHOLE BODY INDICATION: C50 811: Malignant neoplasm of overlapping sites of right female breast Z17 1: Estrogen receptor negative status (ER-) PREVIOUS FILM CORRELATION:    CT chest abdomen pelvis 3/29/2022 TECHNIQUE:   This study was performed following the intravenous administration of 26 8 mCi Tc-99m labeled MDP  Delayed, anterior and posterior whole body images were acquired, 2-3 hours after radiopharmaceutical administration  Additional delayed oblique static images acquired of the bilateral ribs and pelvis  FINDINGS:  Multiple foci of abnormally increased radiotracer uptake noted in the axial and appendicular skeleton compatible with widespread osseous metastasis  Many of these foci correspond to lytic lesions on CT  Scattered calvarial foci of uptake noted bilaterally  Small focus of radiotracer uptake at the right proximal humerus suspicious for metastasis  Focal increased radiotracer uptake at the inferior sternum compatible with metastasis  Multiple foci of increased radiotracer uptake in the spine most extensive in the cervical and upper thoracic spine compatible with metastasis  Numerous bilateral rib foci compatible with metastasis  Multiple foci of abnormal radiotracer uptake at the sacrum and bony pelvis  This is most extensive at the sacrum centrally  Symmetric physiologic renal activity  Impression: 1  Multiple foci of abnormally increased radiotracer uptake noted in the axial and appendicular skeleton compatible with widespread osseous metastasis  Workstation performed: [id number]     CT chest abdomen pelvis w contrast    Result Date: 3/29/2022  Narrative: CT CHEST, ABDOMEN AND PELVIS WITH IV CONTRAST INDICATION:   Evaluate for metastases  COMPARISON:  None  TECHNIQUE: CT examination of the chest, abdomen and pelvis was performed  Axial, sagittal, and coronal 2D reformatted images were created from the source data and submitted for interpretation  Radiation dose length product (DLP) for this visit:  1086 mGy-cm   This examination, like all CT scans performed in the [facility], was performed utilizing techniques to minimize radiation dose exposure, including the use of iterative reconstruction and automated exposure control  IV Contrast:  100 mL of iohexol (OMNIPAQUE) Enteric Contrast: Enteric contrast was administered  FINDINGS: CHEST LUNGS:  Lungs are clear  There is no tracheal or endobronchial lesion  PLEURA:  Unremarkable  HEART/GREAT VESSELS: Heart is unremarkable for patient's age  No thoracic aortic aneurysm  MEDIASTINUM AND ALISON:  Unremarkable  CHEST WALL AND LOWER NECK:   6 9 x 6 2 x 6 4 cm mass extending to the overlying skin surface and right pectoralis major muscle with overlying skin induration  Right axillary adenopathy measuring up to 3 8 cm  ABDOMEN LIVER/BILIARY TREE:  Unremarkable  GALLBLADDER:  No calcified gallstones  No pericholecystic inflammatory change  SPLEEN:  Unremarkable  PANCREAS:  Unremarkable  ADRENAL GLANDS:  Unremarkable  KIDNEYS/URETERS:  One or more simple renal cyst(s) is noted  Nonobstructive calculus at the left inferior pole     No hydronephrosis  STOMACH AND BOWEL:  Unremarkable  APPENDIX:  No findings to suggest appendicitis  ABDOMINOPELVIC CAVITY:  No ascites  No pneumoperitoneum  No lymphadenopathy  VESSELS:  Unremarkable for patient's age  PELVIS REPRODUCTIVE ORGANS:  7 cm septated cystic left adnexal structure  URINARY BLADDER:  Unremarkable  ABDOMINAL WALL/INGUINAL REGIONS:  Unremarkable  OSSEOUS STRUCTURES:  Innumerable lytic lesions throughout the visualized osseous structures  The largest within the left sacrum measures up to 4 4 cm with destructive changes and encroachment upon the presacral space  Impression: Large right breast mass measuring up to 6 9 cm  Right axillary metastatic adenopathy  Surgical consult recommended  Innumerable osseous metastasis  7 cm septated cystic left adnexal structure  According to current guidelines Jagdish Joseph Radiol 2020; 23:350-629) in this premenopausal woman, this should be followed up in 6 to 12 months by pelvic ultrasound  Workstation performed: [id number]     CT RADIATION THERAPY    Result Date: 3/31/2022  Narrative: A CT scan of the brain, neck, chest, abdomen and pelvis was performed without intravenous contrast  This examination, like all CT scans performed in the [facility], was performed utilizing techniques to minimize radiation dose exposure, including the use of iterative reconstruction and automated exposure control  Examination was performed according to a protocol specifically designed for the purposes of radiation therapy planning and is of limited diagnostic sensitivity  CLINICAL HISTORY: Breast cancer with extensive metastases  COMPARISON: 3/30/2022 MR brain, 3/29/2022 CT C-Spine and 3/29/2022 CT of the chest, abdomen and pelvis FINDINGS: BRAIN: No acute parenchymal finding  No hydrocephalus  No lucent skull metastases  Globes and orbits are within normal limits  NECK: Similar multifocal bone metastases  Evidence of central canal and foraminal compromise  No acute finding  CHEST: Low lung volumes with atelectasis  Similar cardiomegaly  Similar large right breast mass with levels 1 2 and 3 right axillary lymphadenopathy  Similar multifocal bone metastases  ABDOMEN AND PELVIS: Similar left greater than right adnexal cyst and multifocal bone metastases       Impression: No acute findings in the brain, neck, chest, abdomen or pelvis compared to recent prior studies  Study for radiation planning  Workstation performed: [id number]     Echo complete w/ contrast if indicated    Result Date: 4/15/2022  Narrative: [place] [facility]  Left Ventricle: Left ventricular cavity size is normal  Wall thickness is normal  The left ventricular ejection fraction is 65%  Systolic function is normal  Wall motion is normal  Diastolic function is normal    Right Ventricle: Right ventricular cavity size is normal  Systolic function is normal          Labs:   Lab Results   Component Value Date    WBC 3 86 (L) 04/26/2022    HGB 11 0 (L) 04/26/2022    HCT 34 1 (L) 04/26/2022    MCV 96 04/26/2022     04/26/2022     Lab Results   Component Value Date    K 4 4 04/26/2022     04/26/2022    CO2 26 04/26/2022    BUN 18 04/26/2022    CREATININE 0 69 04/26/2022    GLUF 97 04/19/2022    CALCIUM 9 1 04/26/2022    CORRECTEDCA 10 1 04/26/2022    AST 25 04/26/2022    ALT 45 04/26/2022    ALKPHOS 212 (H) 04/26/2022    EGFR 103 04/26/2022           Lab Results   Component Value Date    CEA 6 5 (H) 03/29/2022         Current Medications: Reviewed  Allergies: Reviewed  PMH/FH/SH:  Reviewed      Vital Sign:    Body surface area is 1 98 meters squared      Wt Readings from Last 3 Encounters:   04/15/22 93 9 kg (207 lb)   04/07/22 93 9 kg (207 lb)   03/29/22 94 kg (207 lb 3 7 oz)        Temp Readings from Last 3 Encounters:   04/27/22 (!) 96 6 °F (35 9 °C)   04/22/22 (!) 97 2 °F (36 2 °C) (Temporal)   04/20/22 (!) 97 4 °F (36 3 °C) (Temporal)        BP Readings from Last 3 Encounters:   04/27/22 136/74   04/22/22 118/75   04/20/22 90/70         Pulse Readings from Last 3 Encounters:   04/27/22 (!) 113   04/22/22 104   04/20/22 (!) 107     @XPJPWVX9(5)@

## 2022-04-28 ENCOUNTER — HOSPITAL ENCOUNTER (OUTPATIENT)
Dept: INFUSION CENTER | Facility: CLINIC | Age: 47
Discharge: HOME | End: 2022-04-28
Payer: COMMERCIAL

## 2022-04-28 ENCOUNTER — DOCUMENTATION (OUTPATIENT)
Dept: OTHER | Facility: HOSPITAL | Age: 47
End: 2022-04-28

## 2022-04-28 ENCOUNTER — HOSPITAL ENCOUNTER (OUTPATIENT)
Dept: INFUSION CENTER | Facility: HOSPITAL | Age: 47
Discharge: HOME | End: 2022-04-28
Attending: INTERNAL MEDICINE

## 2022-04-28 VITALS
DIASTOLIC BLOOD PRESSURE: 64 MMHG | TEMPERATURE: 98.1 F | SYSTOLIC BLOOD PRESSURE: 94 MMHG | BODY MASS INDEX: 31.24 KG/M2 | RESPIRATION RATE: 18 BRPM | HEART RATE: 112 BPM | WEIGHT: 182.98 LBS | HEIGHT: 64 IN

## 2022-04-28 DIAGNOSIS — G89.3 CANCER ASSOCIATED PAIN: ICD-10-CM

## 2022-04-28 DIAGNOSIS — Z51.5 PALLIATIVE CARE PATIENT: ICD-10-CM

## 2022-04-28 DIAGNOSIS — C50.811 MALIGNANT NEOPLASM OF OVERLAPPING SITES OF RIGHT BREAST IN FEMALE, ESTROGEN RECEPTOR NEGATIVE (HCC): Primary | ICD-10-CM

## 2022-04-28 DIAGNOSIS — B37.0 ORAL THRUSH: ICD-10-CM

## 2022-04-28 DIAGNOSIS — C79.49 METASTASIS TO SPINAL CORD (HCC): ICD-10-CM

## 2022-04-28 DIAGNOSIS — Z17.1 MALIGNANT NEOPLASM OF OVERLAPPING SITES OF RIGHT BREAST IN FEMALE, ESTROGEN RECEPTOR NEGATIVE (HCC): Primary | ICD-10-CM

## 2022-04-28 DIAGNOSIS — C79.51 OSSEOUS METASTASIS (HCC): ICD-10-CM

## 2022-04-28 PROCEDURE — 96417 CHEMO IV INFUS EACH ADDL SEQ: CPT

## 2022-04-28 PROCEDURE — 96367 TX/PROPH/DG ADDL SEQ IV INF: CPT

## 2022-04-28 PROCEDURE — 96413 CHEMO IV INFUSION 1 HR: CPT

## 2022-04-28 RX ORDER — MORPHINE SULFATE 15 MG/1
15 TABLET, FILM COATED, EXTENDED RELEASE ORAL 3 TIMES DAILY
Qty: 90 TABLET | Refills: 0 | Status: SHIPPED | OUTPATIENT
Start: 2022-04-28 | End: 2022-05-06 | Stop reason: SDUPTHER

## 2022-04-28 RX ORDER — SODIUM CHLORIDE 9 MG/ML
20 INJECTION, SOLUTION INTRAVENOUS ONCE
Status: CANCELLED | OUTPATIENT
Start: 2022-07-21

## 2022-04-28 RX ORDER — SODIUM CHLORIDE 9 MG/ML
20 INJECTION, SOLUTION INTRAVENOUS ONCE
Status: DISCONTINUED | OUTPATIENT
Start: 2022-04-28 | End: 2022-05-01 | Stop reason: HOSPADM

## 2022-04-28 RX ADMIN — SODIUM CHLORIDE 20 ML/HR: 0.9 INJECTION, SOLUTION INTRAVENOUS at 09:17

## 2022-04-28 RX ADMIN — ZOLEDRONIC ACID 4 MG: 4 INJECTION INTRAVENOUS at 14:31

## 2022-04-28 RX ADMIN — FAMOTIDINE 20 MG: 10 INJECTION INTRAVENOUS at 10:11

## 2022-04-28 RX ADMIN — TRASTUZUMAB 686 MG: 150 INJECTION, POWDER, LYOPHILIZED, FOR SOLUTION INTRAVENOUS at 11:52

## 2022-04-28 RX ADMIN — DIPHENHYDRAMINE HYDROCHLORIDE 25 MG: 50 INJECTION, SOLUTION INTRAMUSCULAR; INTRAVENOUS at 09:48

## 2022-04-28 RX ADMIN — DOCETAXEL 143 MG: 20 INJECTION, SOLUTION, CONCENTRATE INTRAVENOUS at 13:31

## 2022-04-28 RX ADMIN — PERTUZUMAB 840 MG: 30 INJECTION, SOLUTION, CONCENTRATE INTRAVENOUS at 10:47

## 2022-04-28 RX ADMIN — DEXAMETHASONE SODIUM PHOSPHATE 8 MG: 10 INJECTION, SOLUTION INTRAMUSCULAR; INTRAVENOUS at 09:18

## 2022-04-28 NOTE — TELEPHONE ENCOUNTER
Primary palliative medicine provider:   Ayala    Medication requested:  MS Contin 15 mg  Nystatin  [id number] units/5 ml     If for pain, how has the patient been taking their pain medicine? Last appointment: 4/22/22    Next scheduled appointment: 5/17/22    PDMP review:    04/14/2022 04/14/2022 oxyCODONE HCL (Solution) 473 0 12 5 MG/5  [address] [facility] DUTTON [facility]         Per note in separate encounter pt requesting refills on   Lisinopril  Amlodipine  Pantoprazole  All wer ordered by pcp 4/27/22      Also asking for Nystatin

## 2022-04-28 NOTE — PROGRESS NOTES
Patient was seen in [address] [place] [address] for Cycle 1 Day 1 treatment  Patient returned baseline QOL  Patient denied any AE'S at the moment and conmeds have not changed   She states that she is feeling well overall  Patient was told to call with any questions or concerns  Patient is  tolerating treatment

## 2022-04-28 NOTE — PROGRESS NOTES
Patient arrived to unit without concern  Patient tolerated chemotherapy and Zometa infusion without incident  AVS provided and patient aware of next appointment  Patient left in stable condition

## 2022-04-29 ENCOUNTER — TELEPHONE (OUTPATIENT)
Dept: INTERNAL MEDICINE CLINIC | Facility: CLINIC | Age: 47
End: 2022-04-29

## 2022-04-29 ENCOUNTER — PATIENT OUTREACH (OUTPATIENT)
Dept: HEMATOLOGY ONCOLOGY | Facility: CLINIC | Age: 47
End: 2022-04-29

## 2022-04-29 ENCOUNTER — PATIENT MESSAGE (OUTPATIENT)
Dept: INTERNAL MEDICINE CLINIC | Facility: CLINIC | Age: 47
End: 2022-04-29

## 2022-04-29 DIAGNOSIS — K21.9 GASTROESOPHAGEAL REFLUX DISEASE WITHOUT ESOPHAGITIS: ICD-10-CM

## 2022-04-29 DIAGNOSIS — R03.0 ELEVATED BP WITHOUT DIAGNOSIS OF HYPERTENSION: ICD-10-CM

## 2022-04-29 NOTE — TELEPHONE ENCOUNTER
[facility] OT calling to let you know that pt has been discharged from home Occupational Therapy, she has met the goals

## 2022-04-29 NOTE — PROGRESS NOTES
MSW made 2nd attempt to reach pt this day and received her voicemail  A detailed message was left, along with a contact number and the pt was encouraged to return the call

## 2022-05-02 DIAGNOSIS — C79.49 METASTASIS TO SPINAL CORD (HCC): ICD-10-CM

## 2022-05-02 DIAGNOSIS — R21 RASH: Primary | ICD-10-CM

## 2022-05-02 RX ORDER — LISINOPRIL 20 MG/1
20 TABLET ORAL DAILY
Qty: 90 TABLET | Refills: 1 | Status: SHIPPED | OUTPATIENT
Start: 2022-05-02 | End: 2022-05-31 | Stop reason: SDUPTHER

## 2022-05-02 RX ORDER — OXYCODONE HYDROCHLORIDE 10 MG/1
10 TABLET ORAL EVERY 4 HOURS PRN
Qty: 180 TABLET | Refills: 0 | Status: SHIPPED | OUTPATIENT
Start: 2022-05-02 | End: 2022-06-01

## 2022-05-02 RX ORDER — AMLODIPINE BESYLATE 5 MG/1
5 TABLET ORAL DAILY
Qty: 90 TABLET | Refills: 1 | Status: SHIPPED | OUTPATIENT
Start: 2022-05-02 | End: 2022-05-17 | Stop reason: ALTCHOICE

## 2022-05-02 RX ORDER — NYSTATIN 100000 [USP'U]/G
POWDER TOPICAL 3 TIMES DAILY
Qty: 15 G | Refills: 0 | Status: SHIPPED | OUTPATIENT
Start: 2022-05-02 | End: 2022-05-17 | Stop reason: SDUPTHER

## 2022-05-02 RX ORDER — PANTOPRAZOLE SODIUM 40 MG/1
40 TABLET, DELAYED RELEASE ORAL
Qty: 90 TABLET | Refills: 1 | Status: SHIPPED | OUTPATIENT
Start: 2022-05-02 | End: 2022-05-06 | Stop reason: SDUPTHER

## 2022-05-02 NOTE — TELEPHONE ENCOUNTER
Primary palliative medicine provider:   Ayala     Medication requested:  Oxycodone 10 mg IR     If for pain, how has the patient been taking their pain medicine? 1 tablet every 4 hours as needed   Hydromorphone has been stopped per last office visit note  Last appointment: 4/22/22    Next scheduled appointment: 5/17/22    PDMP review:    [id number] 04/11/2022 04/11/2022 HYDROmorphone HCL (Tablet) 10 0 10 4 MG 16 0 MANUEL [facility] Us 00 / 0 Michellema     2 [id number] 04/06/2022 04/06/2022 Morphine Sulfate (Tablet, Extended Release) 90 0 30 15 MG 45 0 MANUEL, [facility] Us 00 / 0 [place]    1 [id number] 04/02/2022 04/02/2022 HYDROmorphone HCL (Tablet) 10 0 7 4 MG 22 86 Kimberlee Ziegler [facility] [employer] 00 / 00 PA    1 [id number] 04/01/2022 04/01/2022 oxyCODONE HCL (Tablet) 180 0 30 10 MG 90 0 FRANCO MARIANO [facility]         Will be due end of week based on count of 57 tablets 04/28/22

## 2022-05-03 ENCOUNTER — PATIENT OUTREACH (OUTPATIENT)
Dept: HEMATOLOGY ONCOLOGY | Facility: CLINIC | Age: 47
End: 2022-05-03

## 2022-05-03 NOTE — PROGRESS NOTES
MSW made 3rd attempt to reach pt  MSW was requested to call pt as she reported had questions about resources for wigs and head coverings  Pt's voicemail was received and MSW again left detailed message and contact information  Given this is the third time this information was left for the pt, this referral will now be closed

## 2022-05-04 ENCOUNTER — TELEPHONE (OUTPATIENT)
Dept: INTERNAL MEDICINE CLINIC | Facility: CLINIC | Age: 47
End: 2022-05-04

## 2022-05-04 NOTE — TELEPHONE ENCOUNTER
Called pt and left vm explaining the the 3 medications that were sent to her pharmacy that got denied were because the insurance company wont authorize the refill because it was too soon to fill until the 19th

## 2022-05-05 ENCOUNTER — TELEMEDICINE (OUTPATIENT)
Dept: INTERNAL MEDICINE CLINIC | Facility: CLINIC | Age: 47
End: 2022-05-05
Payer: COMMERCIAL

## 2022-05-05 ENCOUNTER — APPOINTMENT (OUTPATIENT)
Dept: LAB | Facility: CLINIC | Age: 47
End: 2022-05-05
Payer: COMMERCIAL

## 2022-05-05 ENCOUNTER — TELEPHONE (OUTPATIENT)
Dept: INTERNAL MEDICINE CLINIC | Facility: CLINIC | Age: 47
End: 2022-05-05

## 2022-05-05 VITALS — WEIGHT: 182 LBS | HEIGHT: 63 IN | BODY MASS INDEX: 32.25 KG/M2 | TEMPERATURE: 99.7 F

## 2022-05-05 DIAGNOSIS — I10 PRIMARY HYPERTENSION: ICD-10-CM

## 2022-05-05 DIAGNOSIS — N39.0 URINARY TRACT INFECTION WITHOUT HEMATURIA, SITE UNSPECIFIED: Primary | ICD-10-CM

## 2022-05-05 DIAGNOSIS — G89.3 CANCER ASSOCIATED PAIN: ICD-10-CM

## 2022-05-05 DIAGNOSIS — B37.81 CANDIDA INFECTION, ESOPHAGEAL (HCC): ICD-10-CM

## 2022-05-05 LAB
BACTERIA UR QL AUTO: ABNORMAL /HPF
BILIRUB UR QL STRIP: ABNORMAL
CLARITY UR: ABNORMAL
COLOR UR: YELLOW
GLUCOSE UR STRIP-MCNC: NEGATIVE MG/DL
HGB UR QL STRIP.AUTO: ABNORMAL
KETONES UR STRIP-MCNC: ABNORMAL MG/DL
LEUKOCYTE ESTERASE UR QL STRIP: ABNORMAL
NITRITE UR QL STRIP: NEGATIVE
NON-SQ EPI CELLS URNS QL MICRO: ABNORMAL /HPF
PH UR STRIP.AUTO: 5.5 [PH]
PROT UR STRIP-MCNC: >=300 MG/DL
RBC #/AREA URNS AUTO: ABNORMAL /HPF
SP GR UR STRIP.AUTO: >=1.03 (ref 1–1.03)
URINE COMMENT: ABNORMAL
UROBILINOGEN UR QL STRIP.AUTO: 1 E.U./DL
WBC #/AREA URNS AUTO: ABNORMAL /HPF

## 2022-05-05 PROCEDURE — 81001 URINALYSIS AUTO W/SCOPE: CPT | Performed by: INTERNAL MEDICINE

## 2022-05-05 PROCEDURE — 99214 OFFICE O/P EST MOD 30 MIN: CPT | Performed by: INTERNAL MEDICINE

## 2022-05-05 RX ORDER — AMOXICILLIN 500 MG/1
500 CAPSULE ORAL EVERY 8 HOURS SCHEDULED
Qty: 15 CAPSULE | Refills: 0 | Status: SHIPPED | OUTPATIENT
Start: 2022-05-05 | End: 2022-05-10

## 2022-05-05 RX ORDER — FLUCONAZOLE 200 MG/1
200 TABLET ORAL DAILY
Qty: 7 TABLET | Refills: 0 | Status: SHIPPED | OUTPATIENT
Start: 2022-05-05 | End: 2022-05-12 | Stop reason: SDUPTHER

## 2022-05-05 NOTE — PROGRESS NOTES
Virtual Regular Visit    Verification of patient location:    Patient is located in the following state in which I hold an active license [place]      Assessment/Plan:    Problem List Items Addressed This Visit        Digestive    Candida infection, esophageal ([address] )      Oropharyngeal lesions improved with nystatin mouthwash, has sore throat and lost her voice for 2 days  Will empirically treat for esophageal candidiasis, fup in 1 week    Relevant Medications    fluconazole (DIFLUCAN) 200 mg tablet       Cardiovascular and Mediastinum    HTN (hypertension)    Needs refills  Continue home monitoring and follow-up in 1 week       Genitourinary    Urinary tract infection without hematuria - Primary    Relevant Medications    amoxicillin (AMOXIL) 500 mg capsule    Other Relevant Orders    UA w Reflex to Microscopic w Reflex to Culture       Other    Cancer associated pain            Follow-up  virtually in 1 week for symptom resolution      BMI Counseling: Body mass index is 32 24 kg/m²  The BMI is above normal  Nutrition recommendations include decreasing portion sizes, encouraging healthy choices of fruits and vegetables, decreasing fast food intake, consuming healthier snacks, limiting drinks that contain sugar, moderation in carbohydrate intake, increasing intake of lean protein, reducing intake of saturated and trans fat and reducing intake of cholesterol  Exercise recommendations include moderate physical activity 150 minutes/week  No pharmacotherapy was ordered  Rationale for BMI follow-up plan is due to patient being overweight or obese           Reason for visit is   Chief Complaint   Patient presents with    Medication Problem     Pt is here to discuss med refill - MA did call pharmacy BP meds are ready for      Urinary Tract Infection     possible urine infection    Sore Throat     lost voice in last two days     Virtual Regular Visit     facetime [phone]    Virtual Regular Visit        Encounter provider Taylor Cordero MD    Provider located at [address] [address] [phone]      Recent Visits  Date Type Provider Dept   05/04/22 Telephone MD [address] Internal Med   04/29/22 Telephone [address] Internal Med   Showing recent visits within past 7 days and meeting all other requirements  Today's Visits  Date Type Provider Dept   05/05/22 Telemedicine MD [address] Internal Med   Showing today's visits and meeting all other requirements  Future Appointments  No visits were found meeting these conditions  Showing future appointments within next 150 days and meeting all other requirements       The patient was identified by name and date of birth  Boo Wills was informed that this is a telemedicine visit and that the visit is being conducted through [address] [place] [address] and patient was informed that this is not a secure, HIPAA-compliant platform  She agrees to proceed     My office door was closed  No one else was in the room  She acknowledged consent and understanding of privacy and security of the video platform  The patient has agreed to participate and understands they can discontinue the visit at any time  Patient is aware this is a billable service  Subjective  Boo Wills is a 51 y o  female    Patient complains decreased urination increased urinary frequency and burning since yesterday  Also her oral candidiasis has improved but notes that her throat is sore and she has lost her voice in the last few day  Denies any fever chills shortness of breath belly pain or flank pain chest pain blood in her urine, painful swallowing  Able to eat and drink okay       Past Medical History:   Diagnosis Date    Cancer [facility])        History reviewed  No pertinent surgical history      Current Outpatient Medications   Medication Sig Dispense Refill    amLODIPine (NORVASC) 5 mg tablet Take 1 tablet (5 mg total) by mouth daily 90 tablet 1    baclofen 10 mg tablet Take 0 5 tablets (5 mg total) by mouth 3 (three) times a day as needed for muscle spasms 45 tablet 0    lactulose (CEPHULAC) 10 g packet Take 1 packet (10 g total) by mouth 3 (three) times a day as needed (constipation) 30 each 0    lisinopril (ZESTRIL) 20 mg tablet Take 1 tablet (20 mg total) by mouth daily 90 tablet 1    morphine (MS CONTIN) 15 mg 12 hr tablet Take 1 tablet (15 mg total) by mouth 3 (three) times a day Max Daily Amount: 45 mg 90 tablet 0    nystatin (MYCOSTATIN) 500,000 units/5 mL suspension Apply 5 mL (500,000 Units total) to the mouth or throat 4 (four) times a day 200 mL 0    nystatin (MYCOSTATIN) powder Apply topically 3 (three) times a day 15 g 0    oxyCODONE (ROXICODONE) 10 MG TABS Take 1 tablet (10 mg total) by mouth every 4 (four) hours as needed for severe pain May split pill in half (5 mg) every 4 hours as needed for moderate pain (5 mg)  Max Daily Amount: 60 mg 180 tablet 0    pantoprazole (PROTONIX) 40 mg tablet Take 1 tablet (40 mg total) by mouth daily in the early morning 90 tablet 1    senna (SENOKOT) 8 6 mg Take 3 tablets (25 8 mg total) by mouth 2 (two) times a day Hold for loose stool   180 tablet 0    al mag oxide-diphenhydramine-lidocaine viscous (MAGIC MOUTHWASH) 1:1:1 suspension Swish and spit 10 mL every 4 (four) hours as needed for mouth pain or discomfort (Patient not taking: Reported on 5/5/2022 ) 500 mL 2    amoxicillin (AMOXIL) 500 mg capsule Take 1 capsule (500 mg total) by mouth every 8 (eight) hours for 5 days 15 capsule 0    dexamethasone (DECADRON) 4 mg tablet Take 1 tablet (4 mg total) by mouth 3 (three) times a day (Patient not taking: Reported on 4/27/2022 ) 45 tablet 0    DPH-Lido-AlHydr-MgHydr-Simeth (First-Mouthwash BLM) SUSP       fluconazole (DIFLUCAN) 200 mg tablet Take 1 tablet (200 mg total) by mouth daily for 7 days 7 tablet 0    lidocaine-prilocaine (EMLA) cream Apply topically as needed for mild pain (Patient not taking: Reported on 5/5/2022 ) 30 g 0    Mono-Linyah 0 25-35 MG-MCG per tablet  (Patient not taking: Reported on 4/28/2022 )      ondansetron (ZOFRAN) 4 mg tablet Take 1 tablet (4 mg total) by mouth every 8 (eight) hours as needed for nausea or vomiting (Patient not taking: Reported on 5/5/2022 ) 30 tablet 1    polyethylene glycol (MIRALAX) 17 g packet Take 17 g by mouth daily (Patient not taking: Reported on 4/22/2022 ) 30 each 0     No current facility-administered medications for this visit  No Known Allergies    Review of Systems   Constitutional: Negative for activity change, appetite change, chills, diaphoresis, fatigue, fever and unexpected weight change  HENT: Positive for voice change  Negative for congestion, drooling, ear discharge, ear pain, facial swelling, hearing loss, postnasal drip, rhinorrhea, sinus pressure, sinus pain, sneezing, sore throat, tinnitus and trouble swallowing  Eyes: Negative for discharge  Respiratory: Negative for apnea, cough, choking, chest tightness, shortness of breath, wheezing and stridor  Cardiovascular: Negative for chest pain, palpitations and leg swelling  Gastrointestinal: Negative for abdominal distention, abdominal pain, anal bleeding, blood in stool, constipation, diarrhea, nausea and vomiting  Genitourinary: Positive for dysuria  Negative for decreased urine volume, difficulty urinating, flank pain, frequency, hematuria, pelvic pain and urgency  Musculoskeletal: Negative for arthralgias, back pain and myalgias  Skin: Negative for color change  Neurological: Negative for dizziness, light-headedness, numbness and headaches         Video Exam    Vitals:    05/05/22 1126   Temp: 99 7 °F (37 6 °C)   TempSrc: Temporal   Weight: 82 6 kg (182 lb)   Height: 5' 3" (1 6 m)       Physical Exam  Constitutional:       General: She is not in acute distress  Appearance: She is well-developed  She is obese  She is not ill-appearing, toxic-appearing or diaphoretic  HENT:      Mouth/Throat:      Mouth: Mucous membranes are moist       Pharynx: Oropharynx is clear  Skin:     Capillary Refill: Capillary refill takes more than 3 seconds  Neurological:      Mental Status: She is alert  I spent 25 minutes directly with the patient during this visit    VIRTUAL VISIT [address] verbally agrees to participate in [employer]  Pt is aware that [employer] could be limited without vital signs or the ability to perform a full hands-on physical exam  Nayeli Whatley understands she or the provider may request at any time to terminate the video visit and request the patient to seek care or treatment in person

## 2022-05-06 ENCOUNTER — TELEPHONE (OUTPATIENT)
Dept: PALLIATIVE MEDICINE | Facility: CLINIC | Age: 47
End: 2022-05-06

## 2022-05-06 DIAGNOSIS — Z51.5 PALLIATIVE CARE PATIENT: ICD-10-CM

## 2022-05-06 DIAGNOSIS — C79.49 METASTASIS TO SPINAL CORD (HCC): ICD-10-CM

## 2022-05-06 DIAGNOSIS — K21.9 GASTROESOPHAGEAL REFLUX DISEASE WITHOUT ESOPHAGITIS: ICD-10-CM

## 2022-05-06 DIAGNOSIS — G89.3 CANCER ASSOCIATED PAIN: ICD-10-CM

## 2022-05-06 RX ORDER — MORPHINE SULFATE 15 MG/1
15 TABLET, FILM COATED, EXTENDED RELEASE ORAL 3 TIMES DAILY
Qty: 90 TABLET | Refills: 0 | Status: SHIPPED | OUTPATIENT
Start: 2022-05-06 | End: 2022-05-06 | Stop reason: SDUPTHER

## 2022-05-06 NOTE — TELEPHONE ENCOUNTER
Patient requesting refill Morphine 15mg 12 hour tablet    Patient has 5 tablets left    PDMP last fill 4/6/22 Qty 90    Next scheduled appt 5/17/2022

## 2022-05-06 NOTE — TELEPHONE ENCOUNTER
Gerard calling to ask if Dr Arthur Coleman can call to give a verbal order physical therapy for next week for 2 times a week for the next 2 weeks  He can be reached at [phone]

## 2022-05-06 NOTE — TELEPHONE ENCOUNTER
Patient states that the Pharmacy they usually use doesn't have this medication in stock and would like it to be sent to [facility]

## 2022-05-09 DIAGNOSIS — B37.0 ORAL THRUSH: ICD-10-CM

## 2022-05-09 RX ORDER — PANTOPRAZOLE SODIUM 40 MG/1
40 TABLET, DELAYED RELEASE ORAL
Qty: 90 TABLET | Refills: 1 | Status: SHIPPED | OUTPATIENT
Start: 2022-05-09

## 2022-05-09 NOTE — TELEPHONE ENCOUNTER
Dr Howell Means can I close out this task? Was this taken care of whoever was covering her on Friday? Please advise  Jadyn Pond

## 2022-05-09 NOTE — TELEPHONE ENCOUNTER
Hammad from [address] [place] [address] [facility] called office requesting a verbal order for PT for 2x a week for 2 weeks     Ramya Saab [phone]

## 2022-05-09 NOTE — TELEPHONE ENCOUNTER
Patient stated she has none left, asking for it to be done today  Reminded patient of our Palliative care refill policy

## 2022-05-12 ENCOUNTER — OFFICE VISIT (OUTPATIENT)
Dept: INTERNAL MEDICINE CLINIC | Facility: CLINIC | Age: 47
End: 2022-05-12
Payer: COMMERCIAL

## 2022-05-12 ENCOUNTER — HOSPITAL ENCOUNTER (OUTPATIENT)
Dept: RADIOLOGY | Facility: HOSPITAL | Age: 47
Discharge: HOME | End: 2022-05-12
Payer: COMMERCIAL

## 2022-05-12 ENCOUNTER — TELEPHONE (OUTPATIENT)
Dept: NEUROSURGERY | Facility: CLINIC | Age: 47
End: 2022-05-12

## 2022-05-12 VITALS
BODY MASS INDEX: 32.24 KG/M2 | DIASTOLIC BLOOD PRESSURE: 60 MMHG | HEIGHT: 63 IN | HEART RATE: 107 BPM | TEMPERATURE: 97.7 F | SYSTOLIC BLOOD PRESSURE: 104 MMHG | OXYGEN SATURATION: 97 %

## 2022-05-12 DIAGNOSIS — M79.89 LEG SWELLING: ICD-10-CM

## 2022-05-12 DIAGNOSIS — Z17.1 MALIGNANT NEOPLASM OF OVERLAPPING SITES OF RIGHT BREAST IN FEMALE, ESTROGEN RECEPTOR NEGATIVE (HCC): ICD-10-CM

## 2022-05-12 DIAGNOSIS — C79.51 OSSEOUS METASTASIS (HCC): ICD-10-CM

## 2022-05-12 DIAGNOSIS — R21 RASH: ICD-10-CM

## 2022-05-12 DIAGNOSIS — F11.20 CONTINUOUS OPIOID DEPENDENCE (HCC): ICD-10-CM

## 2022-05-12 DIAGNOSIS — B37.81 CANDIDA INFECTION, ESOPHAGEAL (HCC): Primary | ICD-10-CM

## 2022-05-12 DIAGNOSIS — S12.300A C4 CERVICAL FRACTURE (HCC): ICD-10-CM

## 2022-05-12 DIAGNOSIS — I10 PRIMARY HYPERTENSION: ICD-10-CM

## 2022-05-12 DIAGNOSIS — C50.811 MALIGNANT NEOPLASM OF OVERLAPPING SITES OF RIGHT BREAST IN FEMALE, ESTROGEN RECEPTOR NEGATIVE (HCC): ICD-10-CM

## 2022-05-12 DIAGNOSIS — B37.81 ESOPHAGEAL CANDIDIASIS (HCC): ICD-10-CM

## 2022-05-12 PROCEDURE — 72040 X-RAY EXAM NECK SPINE 2-3 VW: CPT

## 2022-05-12 PROCEDURE — 99214 OFFICE O/P EST MOD 30 MIN: CPT | Performed by: INTERNAL MEDICINE

## 2022-05-12 RX ORDER — FLUCONAZOLE 200 MG/1
200 TABLET ORAL DAILY
Qty: 3 TABLET | Refills: 0 | Status: SHIPPED | OUTPATIENT
Start: 2022-05-12 | End: 2022-05-17 | Stop reason: SDUPTHER

## 2022-05-12 NOTE — PROGRESS NOTES
Assessment/Plan:    Diagnoses and all orders for this visit:    Candida infection, esophageal ([facility] )  Comments:  Or lesions improved with nystatin mouthwash, has sore throat and lost her voice for 2 days  Will empirically treat for esophageal candidiasis, fup in 1 week  Orders:  -     fluconazole (DIFLUCAN) 200 mg tablet; Take 1 tablet (200 mg total) by mouth in the morning for 7 days  Primary hypertension  Comments: Well controlled, continue current medications advised low-salt diet    Osseous metastasis (HCC)  Comments:  Wears spinal brace    Malignant neoplasm of overlapping sites of right breast in female, estrogen receptor negative ([facility] )  Comments: Follows up with Oncology  Esophageal candidiasis (HCC)  Comments:  Complete fluconazole course for total of 10 days  Symptoms are now improved  No oral lesions on exam     Rash  Comments:  Involving bilateral upper extremity of 1 week, unclear etiology  Orders:  -     Ambulatory Referral to Dermatology; Future    Leg swelling  Comments:  Euvolemic, possible due to chemotherapy  Will check with Oncology  Encourage ambulation as tolerated, limb elevation              There are no Patient Instructions on file for this visit  Subjective:      Patient ID: Trung Larson is a 51 y o  female    Patient comes for follow-up of esophageal candidiasis  Her symptoms have now improved and she is able to eat and drink okay  She has new onset macular rash on her upper extremity and also bilateral leg for the last 1 week  Denies any fever chills chest pain shortness of breath or palpitations          Current Outpatient Medications:     amLODIPine (NORVASC) 5 mg tablet, Take 1 tablet (5 mg total) by mouth daily, Disp: 90 tablet, Rfl: 1    baclofen 10 mg tablet, Take 0 5 tablets (5 mg total) by mouth 3 (three) times a day as needed for muscle spasms, Disp: 45 tablet, Rfl: 0    DPH-Lido-AlHydr-MgHydr-Simeth (First-Mouthwash BLM) SUSP, , Disp: , Rfl:    fluconazole (DIFLUCAN) 200 mg tablet, Take 1 tablet (200 mg total) by mouth in the morning for 7 days  , Disp: 3 tablet, Rfl: 0    lactulose (CEPHULAC) 10 g packet, Take 1 packet (10 g total) by mouth 3 (three) times a day as needed (constipation), Disp: 30 each, Rfl: 0    lisinopril (ZESTRIL) 20 mg tablet, Take 1 tablet (20 mg total) by mouth daily, Disp: 90 tablet, Rfl: 1    morphine (MS CONTIN) 15 mg 12 hr tablet, Take 1 tablet (15 mg total) by mouth 3 (three) times a day Max Daily Amount: 45 mg, Disp: 90 tablet, Rfl: 0    nystatin (MYCOSTATIN) 500,000 units/5 mL suspension, Apply 5 mL (500,000 Units total) to the mouth or throat 4 (four) times a day, Disp: 200 mL, Rfl: 0    nystatin (MYCOSTATIN) powder, Apply topically 3 (three) times a day, Disp: 15 g, Rfl: 0    oxyCODONE (ROXICODONE) 10 MG TABS, Take 1 tablet (10 mg total) by mouth every 4 (four) hours as needed for severe pain May split pill in half (5 mg) every 4 hours as needed for moderate pain (5 mg)   Max Daily Amount: 60 mg, Disp: 180 tablet, Rfl: 0    pantoprazole (PROTONIX) 40 mg tablet, Take 1 tablet (40 mg total) by mouth daily in the early morning, Disp: 90 tablet, Rfl: 1    senna (SENOKOT) 8 6 mg, Take 3 tablets (25 8 mg total) by mouth 2 (two) times a day Hold for loose stool , Disp: 180 tablet, Rfl: 0    al mag oxide-diphenhydramine-lidocaine viscous (MAGIC MOUTHWASH) 1:1:1 suspension, Swish and spit 10 mL every 4 (four) hours as needed for mouth pain or discomfort (Patient not taking: No sig reported), Disp: 500 mL, Rfl: 2    dexamethasone (DECADRON) 4 mg tablet, Take 1 tablet (4 mg total) by mouth 3 (three) times a day (Patient not taking: No sig reported), Disp: 45 tablet, Rfl: 0    lidocaine-prilocaine (EMLA) cream, Apply topically as needed for mild pain (Patient not taking: Reported on 5/5/2022 ), Disp: 30 g, Rfl: 0    Mono-Linyah 0 25-35 MG-MCG per tablet, , Disp: , Rfl:     ondansetron (ZOFRAN) 4 mg tablet, Take 1 tablet (4 mg total) by mouth every 8 (eight) hours as needed for nausea or vomiting (Patient not taking: No sig reported), Disp: 30 tablet, Rfl: 1    polyethylene glycol (MIRALAX) 17 g packet, Take 17 g by mouth daily (Patient not taking: No sig reported), Disp: 30 each, Rfl: 0    Recent Results (from the past 1008 hour(s))   Basic metabolic panel    Collection Time: 04/01/22  5:26 AM   Result Value Ref Range    Sodium 136 136 - 145 mmol/L    Potassium 4 3 3 5 - 5 3 mmol/L    Chloride 105 100 - 108 mmol/L    CO2 24 21 - 32 mmol/L    ANION GAP 7 4 - 13 mmol/L    BUN 19 5 - 25 mg/dL    Creatinine 0 60 0 60 - 1 30 mg/dL    Glucose 107 65 - 140 mg/dL    Calcium 9 4 8 3 - 10 1 mg/dL    eGFR 108 ml/min/1 73sq m   CBC and differential    Collection Time: 04/01/22  5:26 AM   Result Value Ref Range    WBC 11 43 (H) 4 31 - 10 16 Thousand/uL    RBC 4 12 3 81 - 5 12 Million/uL    Hemoglobin 12 2 11 5 - 15 4 g/dL    Hematocrit 38 5 34 8 - 46 1 %    MCV 93 82 - 98 fL    MCH 29 6 26 8 - 34 3 pg    MCHC 31 7 31 4 - 37 4 g/dL    RDW 12 7 11 6 - 15 1 %    MPV 10 0 8 9 - 12 7 fL    Platelets 318 (H) 828 - 390 Thousands/uL    nRBC 0 /100 WBCs    Neutrophils Relative 81 (H) 43 - 75 %    Immat GRANS % 1 0 - 2 %    Lymphocytes Relative 14 14 - 44 %    Monocytes Relative 4 4 - 12 %    Eosinophils Relative 0 0 - 6 %    Basophils Relative 0 0 - 1 %    Neutrophils Absolute 9 25 (H) 1 85 - 7 62 Thousands/µL    Immature Grans Absolute 0 13 0 00 - 0 20 Thousand/uL    Lymphocytes Absolute 1 59 0 60 - 4 47 Thousands/µL    Monocytes Absolute 0 45 0 17 - 1 22 Thousand/µL    Eosinophils Absolute 0 00 0 00 - 0 61 Thousand/µL    Basophils Absolute 0 01 0 00 - 0 10 Thousands/µL   Wheeled Walker    Collection Time: 04/01/22  5:20 PM   Result Value Ref Range    Supplier Name [facility]     Supplier Phone Number [phone]     Order Status Delivery Successful     Delivery Note      Delivery Request Date 04/01/2022     Date Delivered 04/03/2022     Item Description Mary Damon, Adult    Semi Electric Bed Package    Collection Time: 04/01/22  5:20 PM   Result Value Ref Range    Supplier Name [address]     Supplier Phone Number [phone]     Order Status Delivery Successful     Delivery Note      Delivery Request Date 04/01/2022     Date Delivered  04/04/2022     Supplier Name 04/04/2022     Item Description Semi-Electric Hospital Bed, Gel Overlay     Item Description Standard Mattress     Item Description Gel Overlay     Item Description Half Bed Rails    Basic metabolic panel    Collection Time: 04/02/22  4:45 AM   Result Value Ref Range    Sodium 135 (L) 136 - 145 mmol/L    Potassium 4 2 3 5 - 5 3 mmol/L    Chloride 105 100 - 108 mmol/L    CO2 24 21 - 32 mmol/L    ANION GAP 6 4 - 13 mmol/L    BUN 20 5 - 25 mg/dL    Creatinine 0 60 0 60 - 1 30 mg/dL    Glucose 90 65 - 140 mg/dL    Calcium 9 4 8 3 - 10 1 mg/dL    eGFR 108 ml/min/1 73sq m   CBC    Collection Time: 04/02/22  4:45 AM   Result Value Ref Range    WBC 11 08 (H) 4 31 - 10 16 Thousand/uL    RBC 4 23 3 81 - 5 12 Million/uL    Hemoglobin 12 5 11 5 - 15 4 g/dL    Hematocrit 39 1 34 8 - 46 1 %    MCV 92 82 - 98 fL    MCH 29 6 26 8 - 34 3 pg    MCHC 32 0 31 4 - 37 4 g/dL    RDW 12 8 11 6 - 15 1 %    Platelets 308 (H) 555 - 390 Thousands/uL    MPV 9 9 8 9 - 12 7 fL   Standard Wheelchair Package (Under 250 lbs)    Collection Time: 04/02/22 12:34 PM   Result Value Ref Range    Supplier Name [address]     Supplier Phone Number [phone]     Order Status Delivery Successful     Delivery Note      Delivery Request Date 04/04/2022     Date Delivered  04/05/2022     Supplier Name 04/05/2022     Item Description Standard Wheelchair, 16 in x 16 in     Item Description Standard Seat Width, 16 in     Item Description Standard Seat Depth, 16 in     Item Description Wheelchair Anti Tippers     Item Description       Wheelchair Armrests, Adjustable Height, Desk Length    Item Description Wheelchair Back Cushion, 16 in     Item Description Wheelchair Brake Extenders     Item Description Wheelchair Heel Dubose / Loops     Item Description Swing-Away Foot Rests     Item Description Wheelchair Seat Cushion, 16 in, General Use    Basic metabolic panel    Collection Time: 04/03/22  6:41 AM   Result Value Ref Range    Sodium 134 (L) 136 - 145 mmol/L    Potassium 4 5 3 5 - 5 3 mmol/L    Chloride 105 100 - 108 mmol/L    CO2 26 21 - 32 mmol/L    ANION GAP 3 (L) 4 - 13 mmol/L    BUN 20 5 - 25 mg/dL    Creatinine 0 62 0 60 - 1 30 mg/dL    Glucose 97 65 - 140 mg/dL    Calcium 9 5 8 3 - 10 1 mg/dL    eGFR 107 ml/min/1 73sq m   CBC    Collection Time: 04/03/22  6:41 AM   Result Value Ref Range    WBC 10 83 (H) 4 31 - 10 16 Thousand/uL    RBC 4 20 3 81 - 5 12 Million/uL    Hemoglobin 12 4 11 5 - 15 4 g/dL    Hematocrit 39 5 34 8 - 46 1 %    MCV 94 82 - 98 fL    MCH 29 5 26 8 - 34 3 pg    MCHC 31 4 31 4 - 37 4 g/dL    RDW 12 7 11 6 - 15 1 %    Platelets 662 (H) 214 - 390 Thousands/uL    MPV 9 9 8 9 - 12 7 fL   Comprehensive metabolic panel    Collection Time: 04/09/22 11:15 AM   Result Value Ref Range    Sodium 132 (L) 136 - 145 mmol/L    Potassium 5 2 3 5 - 5 3 mmol/L    Chloride 99 (L) 100 - 108 mmol/L    CO2 24 21 - 32 mmol/L    ANION GAP 9 4 - 13 mmol/L    BUN 18 5 - 25 mg/dL    Creatinine 0 55 (L) 0 60 - 1 30 mg/dL    Glucose 90 65 - 140 mg/dL    Calcium 9 2 8 3 - 10 1 mg/dL    Corrected Calcium 9 8 8 3 - 10 1 mg/dL    AST 27 5 - 45 U/L    ALT 47 12 - 78 U/L    Alkaline Phosphatase 406 (H) 46 - 116 U/L    Total Protein 6 8 6 4 - 8 2 g/dL    Albumin 3 2 (L) 3 5 - 5 0 g/dL    Total Bilirubin 1 26 (H) 0 20 - 1 00 mg/dL    eGFR 112 ml/min/1 73sq m   Echo complete w/ contrast if indicated    Collection Time: 04/15/22  2:31 PM   Result Value Ref Range    LA size 2 4 cm    LVPWd 0 90 0 53 - 1 00 cm    Left Atrium Area-systolic Four Chamber 73 3 cm2    MV E' Tissue Velocity Septal 9 cm/s    IVSd 3 47 cm    LV DIASTOLIC VOLUME (MOD BIPLANE) 2D 63 mL    LEFT VENTRICLE SYSTOLIC VOLUME (MOD BIPLANE) 2D 21 mL    Left ventricular stroke volume (2D) 42 00 mL    A4C EF 66 %    LVIDd 3 80 5 35 - 7 97 cm    IVS 0 9 0 54 - 1 02 cm    LVIDS 2 40 3 24 - 4 91 cm    FS 37 28 - 44 %    Ao root 2 20 cm    RVID d 3 4 cm    MV valve area p 1/2 method 3 10 cm2    E wave deceleration time 246 ms    MV Peak E Spike 80 cm/s    MV Peak A Spike 0 73 m/s    RAA A4C 10 6 cm2    MV stenosis pressure 1/2 time 71 ms    LVSV, 2D 42 mL    ZLVPWD 1 10     ZLVIDS -4 02     ZLVIDD -5 41     ZIVSD 0 99     LV EF 65    CBC and differential    Collection Time: 04/19/22 11:49 AM   Result Value Ref Range    WBC 2 32 (L) 4 31 - 10 16 Thousand/uL    RBC 3 80 (L) 3 81 - 5 12 Million/uL    Hemoglobin 11 4 (L) 11 5 - 15 4 g/dL    Hematocrit 36 2 34 8 - 46 1 %    MCV 95 82 - 98 fL    MCH 30 0 26 8 - 34 3 pg    MCHC 31 5 31 4 - 37 4 g/dL    RDW 13 4 11 6 - 15 1 %    MPV 10 2 8 9 - 12 7 fL    Platelets 280 331 - 275 Thousands/uL   Comprehensive metabolic panel    Collection Time: 04/19/22 11:49 AM   Result Value Ref Range    Sodium 140 136 - 145 mmol/L    Potassium 4 4 3 5 - 5 3 mmol/L    Chloride 103 100 - 108 mmol/L    CO2 23 21 - 32 mmol/L    ANION GAP 14 (H) 4 - 13 mmol/L    BUN 27 (H) 5 - 25 mg/dL    Creatinine 0 79 0 60 - 1 30 mg/dL    Glucose, Fasting 97 65 - 99 mg/dL    Calcium 9 4 8 3 - 10 1 mg/dL    Corrected Calcium 10 3 (H) 8 3 - 10 1 mg/dL    AST 15 5 - 45 U/L    ALT 31 12 - 78 U/L    Alkaline Phosphatase 195 (H) 46 - 116 U/L    Total Protein 7 3 6 4 - 8 2 g/dL    Albumin 2 9 (L) 3 5 - 5 0 g/dL    Total Bilirubin 0 83 0 20 - 1 00 mg/dL    eGFR 89 ml/min/1 73sq m   TSH, 3rd generation with Free T4 reflex    Collection Time: 04/19/22 11:49 AM   Result Value Ref Range    TSH 3RD GENERATON 0 035 (L) 0 450 - 4 500 uIU/mL   Protime-INR    Collection Time: 04/19/22 11:49 AM   Result Value Ref Range    Protime 14 0 11 6 - 14 5 seconds    INR 1 08 0 84 - 1 19   Chronic Hepatitis Panel    Collection Time: 04/19/22 11:49 AM   Result Value Ref Range    Hepatitis B Surface Ag Non-reactive Non-reactive, NonReactive - Confirmed    Hepatitis C Ab Non-reactive Non-reactive    Hep B C IgM Non-reactive Non-reactive    Hep B Core Total Ab Non-reactive Non-reactive   Manual Differential(PHLEBS Do Not Order)    Collection Time: 04/19/22 11:49 AM   Result Value Ref Range    Segmented % 67 43 - 75 %    Bands % 9 (H) 0 - 8 %    Lymphocytes % 4 (L) 14 - 44 %    Monocytes % 19 (H) 4 - 12 %    Eosinophils, % 1 0 - 6 %    Basophils % 0 0 - 1 %    Absolute Neutrophils 1 76 (L) 1 85 - 7 62 Thousand/uL    Lymphocytes Absolute 0 09 (L) 0 60 - 4 47 Thousand/uL    Monocytes Absolute 0 44 0 00 - 1 22 Thousand/uL    Eosinophils Absolute 0 02 0 00 - 0 40 Thousand/uL    Basophils Absolute 0 00 0 00 - 0 10 Thousand/uL    Total Counted      RBC Morphology Normal     Platelet Estimate Adequate Adequate   T4, free    Collection Time: 04/19/22 11:49 AM   Result Value Ref Range    Free T4 1 94 (H) 0 76 - 1 46 ng/dL   Cortisol Level, AM Specimen    Collection Time: 04/23/22  8:14 AM   Result Value Ref Range    Cortisol - AM 1 1 (LL) 4 2 - 22 4 ug/dL   Comprehensive metabolic panel    Collection Time: 04/23/22  8:14 AM   Result Value Ref Range    Sodium 137 136 - 145 mmol/L    Potassium 5 1 3 5 - 5 3 mmol/L    Chloride 102 100 - 108 mmol/L    CO2 26 21 - 32 mmol/L    ANION GAP 9 4 - 13 mmol/L    BUN 48 (H) 5 - 25 mg/dL    Creatinine 0 85 0 60 - 1 30 mg/dL    Glucose 100 65 - 140 mg/dL    Calcium 10 0 8 3 - 10 1 mg/dL    Corrected Calcium 10 9 (H) 8 3 - 10 1 mg/dL    AST 22 5 - 45 U/L    ALT 45 12 - 78 U/L    Alkaline Phosphatase 183 (H) 46 - 116 U/L    Total Protein 7 3 6 4 - 8 2 g/dL    Albumin 2 9 (L) 3 5 - 5 0 g/dL    Total Bilirubin 0 65 0 20 - 1 00 mg/dL    eGFR 81 ml/min/1 73sq m   CBC and differential    Collection Time: 04/26/22 11:24 AM   Result Value Ref Range    WBC 3 86 (L) 4 31 - 10 16 Thousand/uL    RBC 3 57 (L) 3 81 - 5 12 Million/uL    Hemoglobin 11 0 (L) 11 5 - 15 4 g/dL    Hematocrit 34 1 (L) 34 8 - 46 1 %    MCV 96 82 - 98 fL    MCH 30 8 26 8 - 34 3 pg    MCHC 32 3 31 4 - 37 4 g/dL    RDW 13 3 11 6 - 15 1 %    MPV 9 5 8 9 - 12 7 fL    Platelets 447 334 - 187 Thousands/uL    nRBC 0 /100 WBCs    Neutrophils Relative 77 (H) 43 - 75 %    Immat GRANS % 2 0 - 2 %    Lymphocytes Relative 8 (L) 14 - 44 %    Monocytes Relative 13 (H) 4 - 12 %    Eosinophils Relative 0 0 - 6 %    Basophils Relative 0 0 - 1 %    Neutrophils Absolute 2 96 1 85 - 7 62 Thousands/µL    Immature Grans Absolute 0 08 0 00 - 0 20 Thousand/uL    Lymphocytes Absolute 0 32 (L) 0 60 - 4 47 Thousands/µL    Monocytes Absolute 0 50 0 17 - 1 22 Thousand/µL    Eosinophils Absolute 0 00 0 00 - 0 61 Thousand/µL    Basophils Absolute 0 00 0 00 - 0 10 Thousands/µL   Comprehensive metabolic panel    Collection Time: 04/26/22 11:24 AM   Result Value Ref Range    Sodium 136 136 - 145 mmol/L    Potassium 4 4 3 5 - 5 3 mmol/L    Chloride 102 100 - 108 mmol/L    CO2 26 21 - 32 mmol/L    ANION GAP 8 4 - 13 mmol/L    BUN 18 5 - 25 mg/dL    Creatinine 0 69 0 60 - 1 30 mg/dL    Glucose 88 65 - 140 mg/dL    Calcium 9 1 8 3 - 10 1 mg/dL    Corrected Calcium 10 1 8 3 - 10 1 mg/dL    AST 25 5 - 45 U/L    ALT 45 12 - 78 U/L    Alkaline Phosphatase 212 (H) 46 - 116 U/L    Total Protein 6 6 6 4 - 8 2 g/dL    Albumin 2 8 (L) 3 5 - 5 0 g/dL    Total Bilirubin 0 33 0 20 - 1 00 mg/dL    eGFR 103 ml/min/1 73sq m   Pregnancy Test (HCG Qualitative)    Collection Time: 04/26/22 11:24 AM   Result Value Ref Range    Preg, Serum Negative Negative   UA w Reflex to Microscopic w Reflex to Culture    Collection Time: 05/05/22  2:32 PM    Specimen: Urine   Result Value Ref Range    Color, UA Yellow     Clarity, UA Cloudy     Specific Gravity, UA >=1 030 1 003 - 1 030    pH, UA 5 5 4 5, 5 0, 5 5, 6 0, 6 5, 7 0, 7 5, 8 0    Leukocytes, UA Trace (A) Negative    Nitrite, UA Negative Negative    Protein, UA >=300 (A) Negative mg/dl    Glucose, UA Negative Negative mg/dl    Ketones, UA Trace (A) Negative mg/dl    Urobilinogen, UA 1 0 0 2, 1 0 E U /dl E U /dl    Bilirubin, UA Moderate (A) Negative    Blood, UA Small (A) Negative   Urine Microscopic    Collection Time: 05/05/22  2:32 PM   Result Value Ref Range    RBC, UA 0-1 None Seen, 0-1, 1-2, 2-4, 0-5 /hpf    WBC, UA 4-10 (A) None Seen, 0-1, 1-2, 0-5, 2-4 /hpf    Epithelial Cells Moderate (A) None Seen, Occasional /hpf    Bacteria, UA Innumerable (A) None Seen, Occasional /hpf    URINE COMMENT Concentrated microscopic on low volume urine  The following portions of the patient's history were reviewed and updated as appropriate: allergies, current medications, past family history, past medical history, past social history, past surgical history and problem list      Review of Systems   Constitutional: Negative for appetite change, chills, diaphoresis, fatigue, fever and unexpected weight change  Respiratory: Negative for apnea, cough, choking, chest tightness, shortness of breath, wheezing and stridor  Cardiovascular: Negative for chest pain, palpitations and leg swelling  Gastrointestinal: Negative for abdominal distention, abdominal pain, anal bleeding, blood in stool, constipation, diarrhea, nausea and vomiting  Genitourinary: Negative for decreased urine volume, difficulty urinating, frequency and urgency  Musculoskeletal: Negative for arthralgias, back pain and myalgias  Skin: Positive for rash  Neurological: Negative for dizziness, light-headedness, numbness and headaches  Objective:      Vitals:    05/12/22 1421   BP: 104/60   Pulse: (!) 107   Temp: 97 7 °F (36 5 °C)   SpO2: 97%                      Physical Exam  Vitals reviewed  Constitutional:       General: She is not in acute distress  Appearance: Normal appearance   She is not ill-appearing, toxic-appearing or diaphoretic  HENT:      Mouth/Throat:      Mouth: Mucous membranes are moist    Cardiovascular:      Rate and Rhythm: Normal rate and regular rhythm  Pulses: Normal pulses  Heart sounds: Normal heart sounds  No murmur heard  No friction rub  No gallop  Pulmonary:      Effort: Pulmonary effort is normal  No respiratory distress  Breath sounds: Normal breath sounds  No stridor  No wheezing, rhonchi or rales  Chest:      Chest wall: No tenderness  Abdominal:      General: There is no distension  Palpations: Abdomen is soft  There is no mass  Tenderness: There is no abdominal tenderness  There is no rebound  Musculoskeletal:         General: No swelling or tenderness  Right lower leg: No edema  Left lower leg: No edema  Skin:     General: Skin is warm and dry  Findings: Rash present  No lesion  Neurological:      Mental Status: She is alert and oriented to person, place, and time

## 2022-05-13 ENCOUNTER — OFFICE VISIT (OUTPATIENT)
Dept: NEUROSURGERY | Facility: CLINIC | Age: 47
End: 2022-05-13
Payer: COMMERCIAL

## 2022-05-13 VITALS
WEIGHT: 182 LBS | HEART RATE: 68 BPM | TEMPERATURE: 97.9 F | DIASTOLIC BLOOD PRESSURE: 72 MMHG | RESPIRATION RATE: 18 BRPM | BODY MASS INDEX: 32.25 KG/M2 | HEIGHT: 63 IN | SYSTOLIC BLOOD PRESSURE: 110 MMHG

## 2022-05-13 DIAGNOSIS — R21 RASH: ICD-10-CM

## 2022-05-13 DIAGNOSIS — Z17.1 MALIGNANT NEOPLASM OF OVERLAPPING SITES OF RIGHT BREAST IN FEMALE, ESTROGEN RECEPTOR NEGATIVE (HCC): Primary | ICD-10-CM

## 2022-05-13 DIAGNOSIS — C79.51 OSSEOUS METASTASIS (HCC): ICD-10-CM

## 2022-05-13 DIAGNOSIS — C79.49 METASTASIS TO SPINAL CORD (HCC): Primary | ICD-10-CM

## 2022-05-13 DIAGNOSIS — C50.811 MALIGNANT NEOPLASM OF OVERLAPPING SITES OF RIGHT BREAST IN FEMALE, ESTROGEN RECEPTOR NEGATIVE (HCC): Primary | ICD-10-CM

## 2022-05-13 PROCEDURE — 99213 OFFICE O/P EST LOW 20 MIN: CPT | Performed by: PHYSICIAN ASSISTANT

## 2022-05-13 RX ORDER — PREDNISONE 10 MG/1
10 TABLET ORAL DAILY
Qty: 18 TABLET | Refills: 0 | Status: ON HOLD | OUTPATIENT
Start: 2022-05-13 | End: 2022-05-29 | Stop reason: ALTCHOICE

## 2022-05-13 NOTE — PROGRESS NOTES
Neurosurgery office note      Osseous Metastatis  Assessment and plan     · Pt presents for follow up for osseous mets to cervical/thoracic spine with pathologic fractures at C4/T2  Presents for approximately 4 week follow up  ·  Pt with metastatic invasive breast carcinoma   · Pt is s/p 10 sessions of palliative radiations therapy to the cervical, thoracic and S3 region  · S/p one session of chemotherapy  · Imaging reviewed personally and by attending  Final results below discussed with the patient  · Xray Cervical spine 5/12/22: Severe wedge compression deformity at C4 with stable alignment  Plan  · Pain control with prescribed medications as needed  · Pt should have cervical/thoracic spine precautions and safety precautions  · Pt to continue to use the  brace at this time  Cervical brace portion to be worn at all times, for showers switch to [place] collar  Thoracic portion to be worn when OOB/HOB > 45 degrees  · Ongoing management and follow up by Heme Onc, Rad Onc, Surgical Oncology and Palliative care  · At this time, no neurosurgical intervention is anticipated  Neurosurgery will continue to monitor patient  Patient will have a follow-up appointment with neurosurgery in 4- 6 weeks with repeat xrays of the Cervical spine  · Discussed plan of care with patient  Patient is agreeable  · Patient made aware to contact neurosurgery with any questions or concerns         Diagnosis ICD-10-CM Associated Orders   1  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 (HCC)  C79 49 XR spine cervical 2 or 3 vw injury   2  Osseous metastasis (HCC)  C79 51 XR spine cervical 2 or 3 vw injury          Chief Complaint   Patient presents with    Follow-up    Neck Pain      History of Present Illness    Patient is a pleasant 14-year-old female recently diagnosed with metastatic invasive breast carcinoma cervical, thoracic, S3 bone metastases and pathologic fractures at C4 and T2    Pt is s/p 10 sessions of palliative radiations therapy to the cervical, thoracic and S3 region  Today patient reports neck and upper back pain rated as 3/10 on the pain scale  Reports pain is located in BUE posteriorly up to elbows mainly  Reports her pain has improved with XRT and pain management  Reports had initial session of Chemo therapy about 2 weeks ago and got a rash that will be treated with prednisone  Reports occasional numbness in BUE  Pt denies any new weakness in BUE/BLE  Pt reports that she has been progressing well with home PT  She reports that at home she use a walker for mobility and reports that per PT she could probably just use a cane  She reports that she is able to walk up the stairs that lead to the 2nd floor  She denies bowel or bladder incontinence  She reports she takes dilaudid and Oxycodone that provide her good pain relief  Pt reports that she has been compliant with use of the  brace  Pt was accompanied by her fiance to this visit  Review of Systems   Constitutional: Negative  HENT: Negative  Eyes: Negative  Respiratory: Negative  Cardiovascular: Negative  Gastrointestinal: Negative  H/o GERD   Endocrine: Negative  Genitourinary: Negative  Musculoskeletal: Positive for neck pain (b/l nack pain radiates to b/l shouders/arms ) and neck stiffness (has collar)  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   On Dilaudid/Oxycodone- Good relief   Skin: Negative  Allergic/Immunologic: Negative  Neurological: Negative  Hematological: Negative  Psychiatric/Behavioral: Positive for sleep disturbance (occasional due to pain)  All other systems reviewed and are negative  Vitals:    /72   Pulse 68   Temp 97 9 °F (36 6 °C) (Temporal)   Resp 18   Ht 5' 3" (1 6 m)   Wt 82 6 kg (182 lb)   BMI 32 24 kg/m²       MEDICAL HISTORY  Past Medical History:   Diagnosis Date    Cancer [facility])      History reviewed  No pertinent surgical history    Social History     Tobacco Use    Smoking status: Never Smoker    Smokeless tobacco: Never Used   Vaping Use    Vaping Use: Never used   Substance Use Topics    Alcohol use: Yes     Comment: rare, social     Drug use: Never        Current Outpatient Medications:     amLODIPine (NORVASC) 5 mg tablet, Take 1 tablet (5 mg total) by mouth daily, Disp: 90 tablet, Rfl: 1    baclofen 10 mg tablet, Take 0 5 tablets (5 mg total) by mouth 3 (three) times a day as needed for muscle spasms, Disp: 45 tablet, Rfl: 0    DPH-Lido-AlHydr-MgHydr-Simeth (First-Mouthwash BLM) SUSP, , Disp: , Rfl:     fluconazole (DIFLUCAN) 200 mg tablet, Take 1 tablet (200 mg total) by mouth in the morning for 7 days  , Disp: 3 tablet, Rfl: 0    lactulose (CEPHULAC) 10 g packet, Take 1 packet (10 g total) by mouth 3 (three) times a day as needed (constipation), Disp: 30 each, Rfl: 0    lidocaine-prilocaine (EMLA) cream, Apply topically as needed for mild pain, Disp: 30 g, Rfl: 0    lisinopril (ZESTRIL) 20 mg tablet, Take 1 tablet (20 mg total) by mouth daily, Disp: 90 tablet, Rfl: 1    Mono-Linyah 0 25-35 MG-MCG per tablet, , Disp: , Rfl:     morphine (MS CONTIN) 15 mg 12 hr tablet, Take 1 tablet (15 mg total) by mouth 3 (three) times a day Max Daily Amount: 45 mg, Disp: 90 tablet, Rfl: 0    nystatin (MYCOSTATIN) 500,000 units/5 mL suspension, Apply 5 mL (500,000 Units total) to the mouth or throat 4 (four) times a day, Disp: 200 mL, Rfl: 0    nystatin (MYCOSTATIN) powder, Apply topically 3 (three) times a day, Disp: 15 g, Rfl: 0    ondansetron (ZOFRAN) 4 mg tablet, Take 1 tablet (4 mg total) by mouth every 8 (eight) hours as needed for nausea or vomiting, Disp: 30 tablet, Rfl: 1    oxyCODONE (ROXICODONE) 10 MG TABS, Take 1 tablet (10 mg total) by mouth every 4 (four) hours as needed for severe pain May split pill in half (5 mg) every 4 hours as needed for moderate pain (5 mg)   Max Daily Amount: 60 mg, Disp: 180 tablet, Rfl: 0    pantoprazole (PROTONIX) 40 mg tablet, Take 1 tablet (40 mg total) by mouth daily in the early morning, Disp: 90 tablet, Rfl: 1    polyethylene glycol (MIRALAX) 17 g packet, Take 17 g by mouth daily, Disp: 30 each, Rfl: 0    senna (SENOKOT) 8 6 mg, Take 3 tablets (25 8 mg total) by mouth 2 (two) times a day Hold for loose stool , Disp: 180 tablet, Rfl: 0    al mag oxide-diphenhydramine-lidocaine viscous (MAGIC MOUTHWASH) 1:1:1 suspension, Swish and spit 10 mL every 4 (four) hours as needed for mouth pain or discomfort (Patient not taking: No sig reported), Disp: 500 mL, Rfl: 2    dexamethasone (DECADRON) 4 mg tablet, Take 1 tablet (4 mg total) by mouth 3 (three) times a day (Patient not taking: No sig reported), Disp: 45 tablet, Rfl: 0    predniSONE 10 mg tablet, Take 1 tablet (10 mg total) by mouth in the morning , Disp: 18 tablet, Rfl: 0   No Known Allergies     The following portions of the patient's history were updated by MA and reviewed by AP: allergies, current medications, past family history, past medical history, past social history, past surgical history and problem list       Physical Exam  Constitutional:       General: She is not in acute distress  Appearance: She is well-developed  Comments: Mobilizing with a wheelchair   HENT:      Head: Normocephalic and atraumatic  Eyes:      Pupils: Pupils are equal, round, and reactive to light  Neck:      Trachea: No tracheal deviation  Cardiovascular:      Rate and Rhythm: Normal rate  Pulmonary:      Effort: Pulmonary effort is normal    Abdominal:      Palpations: Abdomen is soft  Tenderness: There is no abdominal tenderness  There is no guarding  Musculoskeletal:      Cervical back: Neck supple  Comments:  brace in place  Skin:     General: Skin is warm and dry  Coloration: Skin is not pale  Findings: No rash  Neurological:      Mental Status: She is alert and oriented to person, place, and time        Comments: GCS 15, Awake, Alert, Oriented x 3    Motor: JERONIMO, strength BUE SF/EF 4+/5, EE 4/5, IO 5/5  BLE 4+/5  Sensation:  intact to LT/PP X 4     Reflexes: 2+ and symmetric BUE, 3+ BLE, no magallon's, bilateral clonus  Coordination: no drift bilateral upper extremities   Psychiatric:         Behavior: Behavior normal            RESULTS/DATA  I have personally reviewed pertinent reports     and I have personally reviewed pertinent films in PACS

## 2022-05-13 NOTE — ASSESSMENT & PLAN NOTE
· Pt presents for follow up for osseous mets to cervical/thoracic spine with pathologic fractures at C4/T2  Presents for approximately 4 week follow up  ·  Pt with metastatic invasive breast carcinoma   · Pt is s/p 10 sessions of palliative radiations therapy to the cervical, thoracic and S3 region  · S/p one session of chemotherapy  · Imaging reviewed personally and by attending  Final results below discussed with the patient  · Xray Cervical spine 5/12/22: Severe wedge compression deformity at C4 with stable alignment  Plan  · Pain control with prescribed medications as needed  · Pt should have cervical/thoracic spine precautions and safety precautions  · Pt to continue to use the  brace at this time  Cervical brace portion to be worn at all times, for showers switch to [place] collar  Thoracic portion to be worn when OOB/HOB > 45 degrees  · Ongoing management and follow up by Heme Onc, Rad Onc, Surgical Oncology and Palliative care  · At this time, no neurosurgical intervention is anticipated  Neurosurgery will continue to monitor patient  Patient will have a follow-up appointment with neurosurgery in 4- 6 weeks with repeat xrays of the Cervical spine  · Discussed plan of care with patient  Patient is agreeable    · Patient made aware to contact neurosurgery with any questions or concerns

## 2022-05-15 ENCOUNTER — PATIENT MESSAGE (OUTPATIENT)
Dept: HEMATOLOGY ONCOLOGY | Facility: CLINIC | Age: 47
End: 2022-05-15

## 2022-05-16 ENCOUNTER — HOSPITAL ENCOUNTER (OUTPATIENT)
Dept: VASCULAR ULTRASOUND | Facility: HOSPITAL | Age: 47
Discharge: HOME | End: 2022-05-16
Attending: INTERNAL MEDICINE
Payer: COMMERCIAL

## 2022-05-16 ENCOUNTER — TELEPHONE (OUTPATIENT)
Dept: PALLIATIVE MEDICINE | Facility: CLINIC | Age: 47
End: 2022-05-16

## 2022-05-16 ENCOUNTER — TELEPHONE (OUTPATIENT)
Dept: HEMATOLOGY ONCOLOGY | Facility: CLINIC | Age: 47
End: 2022-05-16

## 2022-05-16 ENCOUNTER — APPOINTMENT (OUTPATIENT)
Dept: LAB | Facility: CLINIC | Age: 47
End: 2022-05-16
Payer: COMMERCIAL

## 2022-05-16 DIAGNOSIS — D50.9 IRON DEFICIENCY ANEMIA, UNSPECIFIED IRON DEFICIENCY ANEMIA TYPE: ICD-10-CM

## 2022-05-16 DIAGNOSIS — B37.0 ORAL THRUSH: ICD-10-CM

## 2022-05-16 DIAGNOSIS — M79.89 LEG SWELLING: Primary | ICD-10-CM

## 2022-05-16 DIAGNOSIS — M79.89 LEG SWELLING: ICD-10-CM

## 2022-05-16 DIAGNOSIS — C50.811 MALIGNANT NEOPLASM OF OVERLAPPING SITES OF RIGHT BREAST IN FEMALE, ESTROGEN RECEPTOR NEGATIVE (HCC): ICD-10-CM

## 2022-05-16 DIAGNOSIS — Z17.1 MALIGNANT NEOPLASM OF OVERLAPPING SITES OF RIGHT BREAST IN FEMALE, ESTROGEN RECEPTOR NEGATIVE (HCC): ICD-10-CM

## 2022-05-16 LAB
ALBUMIN SERPL BCP-MCNC: 2.5 G/DL (ref 3.5–5)
ALP SERPL-CCNC: 152 U/L (ref 46–116)
ALT SERPL W P-5'-P-CCNC: 25 U/L (ref 12–78)
ANION GAP SERPL CALCULATED.3IONS-SCNC: 0 MMOL/L (ref 4–13)
AST SERPL W P-5'-P-CCNC: 24 U/L (ref 5–45)
BASOPHILS # BLD MANUAL: 0 THOUSAND/UL (ref 0–0.1)
BASOPHILS NFR MAR MANUAL: 0 % (ref 0–1)
BILIRUB SERPL-MCNC: 0.36 MG/DL (ref 0.2–1)
BUN SERPL-MCNC: 10 MG/DL (ref 5–25)
CALCIUM ALBUM COR SERPL-MCNC: 10.2 MG/DL (ref 8.3–10.1)
CALCIUM SERPL-MCNC: 9 MG/DL (ref 8.3–10.1)
CHLORIDE SERPL-SCNC: 107 MMOL/L (ref 100–108)
CO2 SERPL-SCNC: 29 MMOL/L (ref 21–32)
CREAT SERPL-MCNC: 0.65 MG/DL (ref 0.6–1.3)
EOSINOPHIL # BLD MANUAL: 0 THOUSAND/UL (ref 0–0.4)
EOSINOPHIL NFR BLD MANUAL: 0 % (ref 0–6)
ERYTHROCYTE [DISTWIDTH] IN BLOOD BY AUTOMATED COUNT: 16.2 % (ref 11.6–15.1)
GFR SERPL CREATININE-BSD FRML MDRD: 106 ML/MIN/1.73SQ M
GLUCOSE P FAST SERPL-MCNC: 104 MG/DL (ref 65–99)
HCT VFR BLD AUTO: 28.4 % (ref 34.8–46.1)
HGB BLD-MCNC: 8.4 G/DL (ref 11.5–15.4)
LYMPHOCYTES # BLD AUTO: 0.35 THOUSAND/UL (ref 0.6–4.47)
LYMPHOCYTES # BLD AUTO: 6 % (ref 14–44)
MCH RBC QN AUTO: 28.7 PG (ref 26.8–34.3)
MCHC RBC AUTO-ENTMCNC: 29.6 G/DL (ref 31.4–37.4)
MCV RBC AUTO: 97 FL (ref 82–98)
MONOCYTES # BLD AUTO: 0.23 THOUSAND/UL (ref 0–1.22)
MONOCYTES NFR BLD: 4 % (ref 4–12)
NEUTROPHILS # BLD MANUAL: 5.1 THOUSAND/UL (ref 1.85–7.62)
NEUTS BAND NFR BLD MANUAL: 3 % (ref 0–8)
NEUTS SEG NFR BLD AUTO: 85 % (ref 43–75)
PLATELET # BLD AUTO: 491 THOUSANDS/UL (ref 149–390)
PLATELET BLD QL SMEAR: ABNORMAL
PMV BLD AUTO: 9.7 FL (ref 8.9–12.7)
POLYCHROMASIA BLD QL SMEAR: PRESENT
POTASSIUM SERPL-SCNC: 4.6 MMOL/L (ref 3.5–5.3)
PROT SERPL-MCNC: 6.9 G/DL (ref 6.4–8.2)
RBC # BLD AUTO: 2.93 MILLION/UL (ref 3.81–5.12)
RBC MORPH BLD: PRESENT
SODIUM SERPL-SCNC: 136 MMOL/L (ref 136–145)
VARIANT LYMPHS # BLD AUTO: 2 %
WBC # BLD AUTO: 5.8 THOUSAND/UL (ref 4.31–10.16)

## 2022-05-16 PROCEDURE — 83550 IRON BINDING TEST: CPT

## 2022-05-16 PROCEDURE — 80053 COMPREHEN METABOLIC PANEL: CPT

## 2022-05-16 PROCEDURE — 85007 BL SMEAR W/DIFF WBC COUNT: CPT

## 2022-05-16 PROCEDURE — 85027 COMPLETE CBC AUTOMATED: CPT

## 2022-05-16 PROCEDURE — 83540 ASSAY OF IRON: CPT

## 2022-05-16 PROCEDURE — 93970 EXTREMITY STUDY: CPT | Performed by: SURGERY

## 2022-05-16 PROCEDURE — 36415 COLL VENOUS BLD VENIPUNCTURE: CPT

## 2022-05-16 PROCEDURE — 82728 ASSAY OF FERRITIN: CPT

## 2022-05-16 PROCEDURE — 93970 EXTREMITY STUDY: CPT

## 2022-05-16 NOTE — TELEPHONE ENCOUNTER
Called and spoke with patient  She is aware of b/l doppler today at 2pm at [facility]  She is aware we will call with results

## 2022-05-16 NOTE — TELEPHONE ENCOUNTER
Appointment Confirmation (to confirm pre existing appointments - ONLY)     Appointment with  Phil Tan   Appointment date & time 5/18/22 @ 11:30am   Location [place]   Patient verbilized Understanding yes

## 2022-05-16 NOTE — TELEPHONE ENCOUNTER
Pt LMOM main office at 402 pm  Will wait for Alejandra to call to discuss symptoms and schedule appointment  No return call by this nurse

## 2022-05-16 NOTE — TELEPHONE ENCOUNTER
I called Cherelle Owens to reschedule her 5/17/22 apt with Dr Melissa Evans that is scheduled   Patient does want an apt this week preferably Tuesday 5/17 or wed 5/17 before her upcoming infusion  I could only find an apt with Dr Lenin Velez at [facility] this week that I wanted to discuss with her, I will try patient again Am of Tuesday 5/17/22

## 2022-05-17 ENCOUNTER — TELEMEDICINE (OUTPATIENT)
Dept: INTERNAL MEDICINE CLINIC | Facility: CLINIC | Age: 47
End: 2022-05-17
Payer: COMMERCIAL

## 2022-05-17 VITALS — TEMPERATURE: 97.1 F

## 2022-05-17 DIAGNOSIS — I10 PRIMARY HYPERTENSION: ICD-10-CM

## 2022-05-17 DIAGNOSIS — B37.81 CANDIDA INFECTION, ESOPHAGEAL (HCC): ICD-10-CM

## 2022-05-17 DIAGNOSIS — M62.838 MUSCLE SPASMS OF NECK: ICD-10-CM

## 2022-05-17 DIAGNOSIS — G89.3 CANCER ASSOCIATED PAIN: ICD-10-CM

## 2022-05-17 DIAGNOSIS — R21 RASH: ICD-10-CM

## 2022-05-17 DIAGNOSIS — B37.81 CANDIDA INFECTION, ESOPHAGEAL (HCC): Primary | ICD-10-CM

## 2022-05-17 DIAGNOSIS — R60.0 LEG EDEMA: ICD-10-CM

## 2022-05-17 PROBLEM — Z79.899 MEDICAL MARIJUANA USE: Status: ACTIVE | Noted: 2022-05-17

## 2022-05-17 PROCEDURE — 99214 OFFICE O/P EST MOD 30 MIN: CPT | Performed by: INTERNAL MEDICINE

## 2022-05-17 RX ORDER — NYSTATIN 100000 [USP'U]/G
POWDER TOPICAL 3 TIMES DAILY
Qty: 15 G | Refills: 0 | Status: SHIPPED | OUTPATIENT
Start: 2022-05-17 | End: 2022-06-18 | Stop reason: SDUPTHER

## 2022-05-17 RX ORDER — FUROSEMIDE 40 MG/1
40 TABLET ORAL DAILY
Qty: 30 TABLET | Refills: 1 | Status: SHIPPED | OUTPATIENT
Start: 2022-05-17 | End: 2022-07-21 | Stop reason: SDUPTHER

## 2022-05-17 RX ORDER — BACLOFEN 10 MG/1
5 TABLET ORAL 3 TIMES DAILY PRN
Qty: 45 TABLET | Refills: 0 | Status: SHIPPED | OUTPATIENT
Start: 2022-05-17 | End: 2022-06-09

## 2022-05-17 RX ORDER — FLUCONAZOLE 200 MG/1
200 TABLET ORAL DAILY
Qty: 4 TABLET | Refills: 0 | Status: SHIPPED | OUTPATIENT
Start: 2022-05-17 | End: 2022-05-21

## 2022-05-17 NOTE — PROGRESS NOTES
Virtual Regular Visit    Verification of patient location:    Patient is located in the following state in which I hold an active license [place]      Assessment/Plan:    Problem List Items Addressed This Visit        Digestive    Candida infection, esophageal ([facility] [address] ) - Primary    Relevant Medications    fluconazole (DIFLUCAN) 200 mg tablet    Other Relevant Orders    Ambulatory Referral to Gastroenterology       Cardiovascular and Mediastinum    HTN (hypertension)    Relevant Medications    furosemide (LASIX) 40 mg tablet    Other Relevant Orders    Basic metabolic panel      Other Visit Diagnoses     Leg edema        Likely due to amlodipine, bilateralLE duplex negative for DVT  Will stop amlodipine and start on Lasix 40 mg daily, encourage limb elevation    Relevant Medications    furosemide (LASIX) 40 mg tablet    Other Relevant Orders    Basic metabolic panel    Rash        Likely chemotherapy related  Prescribed tapering prednisone by Oncology with improvement of rash               Reason for visit is   Chief Complaint   Patient presents with    Follow-up     4 week follow up -Candida infection, esophageal ([facility] [address] )    Virtual Brief Visit     face time          Encounter provider Lasha Tipton MD    Provider located at [address] [address] [phone]      Recent Visits  Date Type Provider Dept   05/12/22 Office Visit Lasha Tipton MD [address] [facility] Internal Med   Showing recent visits within past 7 days and meeting all other requirements  Today's Visits  Date Type Provider Dept   05/17/22 Telemedicine MD Errol Moore [facility] Internal Med   Showing today's visits and meeting all other requirements  Future Appointments  No visits were found meeting these conditions    Showing future appointments within next 150 days and meeting all other requirements       The patient was identified by name and date of birth  Zhen Lane was informed that this is a telemedicine visit and that the visit is being conducted through [facility] and patient was informed that this is not a secure, HIPAA-compliant platform  She agrees to proceed     My office door was closed  No one else was in the room  She acknowledged consent and understanding of privacy and security of the video platform  The patient has agreed to participate and understands they can discontinue the visit at any time  Patient is aware this is a billable service  Subjective  Zhen Lane is a 51 y o  female    Patient presents for rash, oropharyngeal Candida, and leg swelling       Past Medical History:   Diagnosis Date    Cancer [facility])        History reviewed  No pertinent surgical history  Current Outpatient Medications   Medication Sig Dispense Refill    baclofen 10 mg tablet Take 0 5 tablets (5 mg total) by mouth as needed in the morning and 0 5 tablets (5 mg total) as needed at noon and 0 5 tablets (5 mg total) as needed in the evening for muscle spasms  45 tablet 0    DPH-Lido-AlHydr-MgHydr-Simeth (First-Mouthwash BLM) SUSP       fluconazole (DIFLUCAN) 200 mg tablet Take 1 tablet (200 mg total) by mouth in the morning for 4 days  4 tablet 0    furosemide (LASIX) 40 mg tablet Take 1 tablet (40 mg total) by mouth in the morning   30 tablet 1    lactulose (CEPHULAC) 10 g packet Take 1 packet (10 g total) by mouth 3 (three) times a day as needed (constipation) 30 each 0    lidocaine-prilocaine (EMLA) cream Apply topically as needed for mild pain 30 g 0    lisinopril (ZESTRIL) 20 mg tablet Take 1 tablet (20 mg total) by mouth daily 90 tablet 1    Mono-Linyah 0 25-35 MG-MCG per tablet       morphine (MS CONTIN) 15 mg 12 hr tablet Take 1 tablet (15 mg total) by mouth 3 (three) times a day Max Daily Amount: 45 mg 90 tablet 0    nystatin (MYCOSTATIN) 500,000 units/5 mL suspension Apply 5 mL (500,000 Units total) to the mouth or throat in the morning and 5 mL (500,000 Units total) at noon and 5 mL (500,000 Units total) in the evening and 5 mL (500,000 Units total) before bedtime  200 mL 0    nystatin (MYCOSTATIN) powder Apply topically 3 (three) times a day 15 g 0    ondansetron (ZOFRAN) 4 mg tablet Take 1 tablet (4 mg total) by mouth every 8 (eight) hours as needed for nausea or vomiting 30 tablet 1    oxyCODONE (ROXICODONE) 10 MG TABS Take 1 tablet (10 mg total) by mouth every 4 (four) hours as needed for severe pain May split pill in half (5 mg) every 4 hours as needed for moderate pain (5 mg)  Max Daily Amount: 60 mg 180 tablet 0    pantoprazole (PROTONIX) 40 mg tablet Take 1 tablet (40 mg total) by mouth daily in the early morning 90 tablet 1    polyethylene glycol (MIRALAX) 17 g packet Take 17 g by mouth daily 30 each 0    predniSONE 10 mg tablet Take 1 tablet (10 mg total) by mouth in the morning  18 tablet 0    senna (SENOKOT) 8 6 mg Take 3 tablets (25 8 mg total) by mouth 2 (two) times a day Hold for loose stool  180 tablet 0    al mag oxide-diphenhydramine-lidocaine viscous (MAGIC MOUTHWASH) 1:1:1 suspension Swish and spit 10 mL every 4 (four) hours as needed for mouth pain or discomfort (Patient not taking: No sig reported) 500 mL 2    dexamethasone (DECADRON) 4 mg tablet Take 1 tablet (4 mg total) by mouth 3 (three) times a day (Patient not taking: No sig reported) 45 tablet 0     No current facility-administered medications for this visit  No Known Allergies    Review of Systems   Constitutional: Negative for activity change, appetite change, chills, diaphoresis, fatigue, fever and unexpected weight change  HENT: Positive for sore throat and trouble swallowing  Negative for congestion, drooling, ear discharge, ear pain, facial swelling, hearing loss, postnasal drip, rhinorrhea, sinus pressure, sinus pain, sneezing, tinnitus and voice change  Eyes: Negative for discharge     Respiratory: Negative for apnea, cough, choking, chest tightness, shortness of breath, wheezing and stridor  Cardiovascular: Positive for leg swelling  Negative for chest pain and palpitations  Gastrointestinal: Negative for abdominal distention, abdominal pain, anal bleeding, blood in stool, constipation, diarrhea, nausea and vomiting  Genitourinary: Negative for decreased urine volume, difficulty urinating, frequency and urgency  Musculoskeletal: Negative for arthralgias, back pain and myalgias  Skin: Negative for color change  Neurological: Negative for dizziness, light-headedness, numbness and headaches  Video Exam    Vitals:    05/17/22 1142   Temp: (!) 97 1 °F (36 2 °C)   TempSrc: Temporal       Physical Exam  Constitutional:       General: She is not in acute distress  Appearance: Normal appearance  She is normal weight  She is not toxic-appearing or diaphoretic  Neurological:      Mental Status: She is alert and oriented to person, place, and time  I spent 30 minutes directly with the patient during this visit    VIRTUAL VISIT [address] verbally agrees to participate in [employer]  Pt is aware that [employer] could be limited without vital signs or the ability to perform a full hands-on physical exam  Nayeli Barnes understands she or the provider may request at any time to terminate the video visit and request the patient to seek care or treatment in person

## 2022-05-18 ENCOUNTER — OFFICE VISIT (OUTPATIENT)
Dept: HEMATOLOGY ONCOLOGY | Facility: CLINIC | Age: 47
End: 2022-05-18
Payer: COMMERCIAL

## 2022-05-18 ENCOUNTER — DOCUMENTATION (OUTPATIENT)
Dept: OTHER | Facility: HOSPITAL | Age: 47
End: 2022-05-18

## 2022-05-18 ENCOUNTER — OFFICE VISIT (OUTPATIENT)
Dept: PALLIATIVE MEDICINE | Facility: CLINIC | Age: 47
End: 2022-05-18
Payer: COMMERCIAL

## 2022-05-18 ENCOUNTER — TELEPHONE (OUTPATIENT)
Dept: INFUSION CENTER | Facility: CLINIC | Age: 47
End: 2022-05-18

## 2022-05-18 VITALS
WEIGHT: 185 LBS | HEIGHT: 63 IN | OXYGEN SATURATION: 96 % | RESPIRATION RATE: 14 BRPM | SYSTOLIC BLOOD PRESSURE: 102 MMHG | DIASTOLIC BLOOD PRESSURE: 70 MMHG | BODY MASS INDEX: 32.78 KG/M2 | TEMPERATURE: 97.4 F | HEART RATE: 85 BPM

## 2022-05-18 VITALS
RESPIRATION RATE: 16 BRPM | TEMPERATURE: 98.1 F | SYSTOLIC BLOOD PRESSURE: 132 MMHG | BODY MASS INDEX: 32.96 KG/M2 | HEART RATE: 88 BPM | DIASTOLIC BLOOD PRESSURE: 80 MMHG | WEIGHT: 186 LBS | HEIGHT: 63 IN

## 2022-05-18 DIAGNOSIS — Z17.1 MALIGNANT NEOPLASM OF OVERLAPPING SITES OF RIGHT BREAST IN FEMALE, ESTROGEN RECEPTOR NEGATIVE (HCC): ICD-10-CM

## 2022-05-18 DIAGNOSIS — Z17.1 MALIGNANT NEOPLASM OF OVERLAPPING SITES OF RIGHT BREAST IN FEMALE, ESTROGEN RECEPTOR NEGATIVE (HCC): Primary | ICD-10-CM

## 2022-05-18 DIAGNOSIS — T45.1X5A ANEMIA DUE TO CHEMOTHERAPY: ICD-10-CM

## 2022-05-18 DIAGNOSIS — C79.51 OSSEOUS METASTASIS (HCC): ICD-10-CM

## 2022-05-18 DIAGNOSIS — G89.3 CANCER ASSOCIATED PAIN: ICD-10-CM

## 2022-05-18 DIAGNOSIS — R53.0 NEOPLASTIC MALIGNANT RELATED FATIGUE: ICD-10-CM

## 2022-05-18 DIAGNOSIS — Z51.5 PALLIATIVE CARE PATIENT: ICD-10-CM

## 2022-05-18 DIAGNOSIS — C79.49 METASTASIS TO SPINAL CORD (HCC): ICD-10-CM

## 2022-05-18 DIAGNOSIS — C50.811 MALIGNANT NEOPLASM OF OVERLAPPING SITES OF RIGHT BREAST IN FEMALE, ESTROGEN RECEPTOR NEGATIVE (HCC): ICD-10-CM

## 2022-05-18 DIAGNOSIS — D64.81 ANEMIA DUE TO CHEMOTHERAPY: ICD-10-CM

## 2022-05-18 DIAGNOSIS — K11.7 XEROSTOMIA: ICD-10-CM

## 2022-05-18 DIAGNOSIS — C50.811 MALIGNANT NEOPLASM OF OVERLAPPING SITES OF RIGHT BREAST IN FEMALE, ESTROGEN RECEPTOR NEGATIVE (HCC): Primary | ICD-10-CM

## 2022-05-18 DIAGNOSIS — Z79.899 MEDICAL MARIJUANA USE: ICD-10-CM

## 2022-05-18 DIAGNOSIS — D50.9 IRON DEFICIENCY ANEMIA, UNSPECIFIED IRON DEFICIENCY ANEMIA TYPE: Primary | ICD-10-CM

## 2022-05-18 PROBLEM — R11.0 CHEMOTHERAPY-INDUCED NAUSEA: Status: ACTIVE | Noted: 2022-05-18

## 2022-05-18 LAB
FERRITIN SERPL-MCNC: 212 NG/ML (ref 8–388)
IRON SATN MFR SERPL: 40 % (ref 15–50)
IRON SERPL-MCNC: 83 UG/DL (ref 50–170)
TIBC SERPL-MCNC: 206 UG/DL (ref 250–450)

## 2022-05-18 PROCEDURE — 99215 OFFICE O/P EST HI 40 MIN: CPT | Performed by: PHYSICIAN ASSISTANT

## 2022-05-18 PROCEDURE — 99214 OFFICE O/P EST MOD 30 MIN: CPT | Performed by: INTERNAL MEDICINE

## 2022-05-18 RX ORDER — SODIUM CHLORIDE 9 MG/ML
20 INJECTION, SOLUTION INTRAVENOUS CONTINUOUS
Status: DISCONTINUED | OUTPATIENT
Start: 2022-05-19 | End: 2022-05-22 | Stop reason: HOSPADM

## 2022-05-18 RX ORDER — MORPHINE SULFATE 15 MG/1
15 TABLET, FILM COATED, EXTENDED RELEASE ORAL EVERY 12 HOURS SCHEDULED
Qty: 60 TABLET | Refills: 0
Start: 2022-05-18 | End: 2022-06-15 | Stop reason: SDUPTHER

## 2022-05-18 RX ORDER — ONDANSETRON 4 MG/1
4 TABLET, FILM COATED ORAL EVERY 8 HOURS PRN
Qty: 40 TABLET | Refills: 2 | Status: SHIPPED | OUTPATIENT
Start: 2022-05-18 | End: 2022-06-29 | Stop reason: SDUPTHER

## 2022-05-18 NOTE — PROGRESS NOTES
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 ([facility] [address] )    5  Neoplastic malignant related fatigue    6  Xerostomia    7  Medical marijuana use    8  Palliative care patient           Continue disease-directed cares   Patient has reduced her MSER dosing to q12h as it was causing some CNS depression; continue 15mg q12h  OxyIR 10mg remains effective for breakthrough pain; continue   Counseled on bowel regimen for OIC   Continue Zofran PRN N/V; may take qAM on day of cancer treatments and for 4-5 days afterwards, prophylactically  Instructed pharmacy NOT to dispense #9 tabs with 6 refills, as they had done; rather, dispense #40 tabs with 2 refills   Continue MMJ products for symptom relief   Patient has received 2 [address] vaccinations   Reviewed notes (SW, Medical Oncology, Surgical Oncology, Neurosurgery), labs (5/16/22 Cr 0 65, alb 2 5, Hb 8 4), imaging + procedures (5/16/22 lower limb US showing no DVTs, 5/12/22 XR C-spine, 4/25/22 bone scan)   Return in about 1 month (around 6/18/2022)   Emotional support provided   Medication safety issues addressed - no driving under the influence of narcotics, watch for adverse effects including AMS and respiratory depression, keep medications stored in a safe/locked environment        Requested Prescriptions     Signed Prescriptions Disp Refills    ondansetron (ZOFRAN) 4 mg tablet 40 tablet 2     Sig: Take 1 tablet (4 mg total) by mouth every 8 (eight) hours as needed for nausea or vomiting    morphine (MS CONTIN) 15 mg 12 hr tablet 60 tablet 0     Sig: Take 1 tablet (15 mg total) by mouth every 12 (twelve) hours Max Daily Amount: 30 mg       Medications Discontinued During This Encounter Medication Reason    ondansetron (ZOFRAN) 4 mg tablet Reorder    morphine (MS CONTIN) 15 mg 12 hr tablet Reorder       Representatives have queried the patient's controlled substance dispensing history in the Prescription Drug Monitoring Program in compliance with regulations before I have prescribed any controlled substances  The prescription history is consistent with prescribed therapy and our practice policies  40 minutes were spent in this ambulatory visit with greater than 50% of the time spent face to face with patient and family (significant other Tennie Fillers) in counseling or coordination of care including discussions of symptom assessment and management, medication review and adjustment, psychosocial support, chart review, imaging review, lab review, medical marijuana, supportive listening and anticipatory guidance  All of the patient's questions were answered during this discussion  SUBJECTIVE:  Chief Complaint   Patient presents with    Follow-up    Medication Refill    Cancer    Cancer Pain    Counseling    Fatigue    Nausea        HPI    Teena Duong is a 51 y o  female w/ ER-CA-HER2+ carcinoma of the R breast (diagnosed 3/29/22) metastatic to bone, s/p RT to apine; cancer-related pain  She follows w/ Dr Pippa Lr (Medical Oncology)  Certified for SureFire in the [facility] on 4/22/22 by Dr Umberto Helms, for cancer-related pain  She has had one round of systemic therapy with pertuzumab + trastuzumab + docetaxel  Patient is known to [facility] clinic; last seen 4/52/27 for SureFire certification  Patient is managing fairly well considering her significant health challenges  She c/o ongoing cancer-related pain, but this is fairly well controlled  Her baseline pain with her current regimen is about 3/10  She reduced frequency of her MSER 15mg dosing from q8h to q12h, as she felt is was causing "some grogginess" and fatigue; these symptoms improved with the change   She is taking oxyIR 10mg (never really using a half-tablet, always a full tablet) every 4-5 hours for breakthrough pain  Patient endorse chemo-induced nausea which improved w/ Zofran  She is taking Zofran PRN, and will take a prophylactic dose qAM on day of and for 4-5 days after her systemic treatments, with the result being well-managed nausea  Unfortunately her pharmacy filled the "30 tabs 1 refill" prescription as "9 tabs 6 refills"  Patient endorses constipation; she has not needed lactulose recently and is managing w/ OTC products  She is having bowel movements about every 2 days  Patient has been trying Dr Celi Beach TD MMJ, which helps "a little" with myalgia and arthralgia  She has purchased two PO tincture products ("Doze" and one other) which she has not tried yet as she felt she has not needed them  Sleep has improved w/ better pain control and w/ change in steroids  She is considering MMJ for fatigue  PDMP shows no concerns  The following portions of the medical history were reviewed: past medical history, surgical history, problem list, medication list, family history, and social history  Current Outpatient Medications:     al mag oxide-diphenhydramine-lidocaine viscous (MAGIC MOUTHWASH) 1:1:1 suspension, Swish and spit 10 mL every 4 (four) hours as needed for mouth pain or discomfort, Disp: 500 mL, Rfl: 2    baclofen 10 mg tablet, Take 0 5 tablets (5 mg total) by mouth as needed in the morning and 0 5 tablets (5 mg total) as needed at noon and 0 5 tablets (5 mg total) as needed in the evening for muscle spasms  , Disp: 45 tablet, Rfl: 0    DPH-Lido-AlHydr-MgHydr-Simeth (First-Mouthwash BLM) SUSP, , Disp: , Rfl:     fluconazole (DIFLUCAN) 200 mg tablet, Take 1 tablet (200 mg total) by mouth in the morning for 4 days  , Disp: 4 tablet, Rfl: 0    furosemide (LASIX) 40 mg tablet, Take 1 tablet (40 mg total) by mouth in the morning , Disp: 30 tablet, Rfl: 1    lactulose (CEPHULAC) 10 g packet, Take 1 packet (10 g total) by mouth 3 (three) times a day as needed (constipation), Disp: 30 each, Rfl: 0    lisinopril (ZESTRIL) 20 mg tablet, Take 1 tablet (20 mg total) by mouth daily, Disp: 90 tablet, Rfl: 1    morphine (MS CONTIN) 15 mg 12 hr tablet, Take 1 tablet (15 mg total) by mouth every 12 (twelve) hours Max Daily Amount: 30 mg, Disp: 60 tablet, Rfl: 0    nystatin (MYCOSTATIN) 500,000 units/5 mL suspension, Apply 5 mL (500,000 Units total) to the mouth or throat in the morning and 5 mL (500,000 Units total) at noon and 5 mL (500,000 Units total) in the evening and 5 mL (500,000 Units total) before bedtime  , Disp: 200 mL, Rfl: 0    nystatin (MYCOSTATIN) powder, Apply topically 3 (three) times a day, Disp: 15 g, Rfl: 0    ondansetron (ZOFRAN) 4 mg tablet, Take 1 tablet (4 mg total) by mouth every 8 (eight) hours as needed for nausea or vomiting, Disp: 40 tablet, Rfl: 2    oxyCODONE (ROXICODONE) 10 MG TABS, Take 1 tablet (10 mg total) by mouth every 4 (four) hours as needed for severe pain May split pill in half (5 mg) every 4 hours as needed for moderate pain (5 mg)   Max Daily Amount: 60 mg, Disp: 180 tablet, Rfl: 0    pantoprazole (PROTONIX) 40 mg tablet, Take 1 tablet (40 mg total) by mouth daily in the early morning, Disp: 90 tablet, Rfl: 1    polyethylene glycol (MIRALAX) 17 g packet, Take 17 g by mouth daily, Disp: 30 each, Rfl: 0    predniSONE 10 mg tablet, Take 1 tablet (10 mg total) by mouth in the morning , Disp: 18 tablet, Rfl: 0    senna (SENOKOT) 8 6 mg, Take 3 tablets (25 8 mg total) by mouth 2 (two) times a day Hold for loose stool , Disp: 180 tablet, Rfl: 0    dexamethasone (DECADRON) 4 mg tablet, Take 1 tablet (4 mg total) by mouth 3 (three) times a day (Patient not taking: No sig reported), Disp: 45 tablet, Rfl: 0    lidocaine-prilocaine (EMLA) cream, Apply topically as needed for mild pain (Patient not taking: Reported on 5/18/2022), Disp: 30 g, Rfl: 0    Mono-Linyah 0 25-35 MG-MCG per tablet, , Disp: , Rfl:     Review of Systems   Constitutional: Positive for activity change and fatigue  Eyes:        Xerophthalmia  Cardiovascular: Positive for leg swelling  Gastrointestinal: Positive for nausea and vomiting  Xerostomia  Candidiasis  Musculoskeletal: Positive for arthralgias, back pain, gait problem and myalgias  Painful muscle spasms  Neurological: Positive for weakness  Psychiatric/Behavioral: Positive for sleep disturbance (improved)  All other systems reviewed and are negative  OBJECTIVE:  /70 (BP Location: Left arm, Patient Position: Sitting, Cuff Size: Standard)   Pulse 85   Temp (!) 97 4 °F (36 3 °C) (Temporal)   Resp 14   Ht 5' 3" (1 6 m)   Wt 83 9 kg (185 lb)   SpO2 96%   BMI 32 77 kg/m²   Physical Exam  Vitals reviewed  Constitutional:       General: She is not in acute distress  Appearance: She is well-developed, well-groomed and overweight  She is ill-appearing (chronically)  She is not toxic-appearing  HENT:      Head: Normocephalic and atraumatic  Right Ear: External ear normal       Left Ear: External ear normal    Eyes:      General: No scleral icterus  Right eye: No discharge  Left eye: No discharge  Extraocular Movements: Extraocular movements intact  Conjunctiva/sclera: Conjunctivae normal       Pupils: Pupils are equal, round, and reactive to light  Cardiovascular:      Rate and Rhythm: Normal rate  Pulmonary:      Effort: Pulmonary effort is normal  No tachypnea, bradypnea, accessory muscle usage or respiratory distress  Abdominal:      General: There is no distension  Tenderness: There is no guarding  Musculoskeletal:      Cervical back: Decreased range of motion  Right lower leg: Edema present  Left lower leg: Edema present  Comments: Wearing cervical spine + back brace  Wearing compression stockings on b/l LE  Skin:     General: Skin is dry  Coloration: Skin is not pale  Neurological:      Mental Status: She is alert and oriented to person, place, and time  Cranial Nerves: No dysarthria or facial asymmetry  Gait: Gait abnormal (seen in wheelchair)  Psychiatric:         Attention and Perception: Attention normal          Mood and Affect: Mood and affect normal          Speech: Speech normal          Behavior: Behavior normal  Behavior is cooperative  Thought Content: Thought content normal          Cognition and Memory: Cognition and memory normal          Judgment: Judgment normal         Hayley Esquivel MD  [facility]      Portions of this document may have been created using dictation software and as such some "sound alike" terms may have been generated by the system  Do not hesitate to contact me with any questions or clarifications

## 2022-05-18 NOTE — TELEPHONE ENCOUNTER
Patient's clinical trials tx for tomorrow 5/19 had more medications added  Called pt to have her come in at 0730 am instead of 0830 am due to last minute changes  Left voicemail asking for call back to discuss

## 2022-05-18 NOTE — PATIENT INSTRUCTIONS
It was good to see you today  Thank you for coming in  Use the Zofran as needed for nausea or vomiting - I agree with the plan to take this medication the morning of your infusion, and once per day on the 4-5 days after infusion; may use 2 more times in a day as needed, about every 8 hours as needed  The medications I typically recommend for dry mouth are all over-the-counter:  Oracoat Xylimelts  Biotene dental products (they have drops and sprays)  Mouth Kote  Lemon drops (which stimulate saliva production)  Chewing gum (also stimulates saliva)  ACT Dry Mouth lozenges  Sridhar spray  Look for products containing xylitol or carboxymethylcellulose  Many of these can be found in most pharmacies, or the OTC pharmaceutical sections in stores like [employer], or online at [employer]  "Natural tears" style eye drops are a great idea  For constipation:  Drinking enough water is important to keep the gut moving  Try to front-load hydration in the day to reduce overnight urination  Some people have success w/ using prunes, prune juice, certain fruits, wheat germ, or fiber gummies  Try a probiotic  This could be yogurt or kefir, or fermented beverages such as kombucha, but probiotics are also available in capsule form  Aim for 10-15 billion colony-forming-units, w/ bacteria such as Lactobacillus / Saccharomyces / Actinomyces  Take Benefiber or Miralax (or Citrucel or Metamucil or Bananatrol, etc) daily  Colace is good for softening hard stools, but does not stimulate the bowel to move things along  You can use senna, 1 to 2 tabs, once or twice daily as needed for constipation  Use as directed on the box/bottle  Should that not be enough for your constipation, you can try Dulcolax, Milk of Magnesia, and/or magnesium citrate  Should that not be enough, consider an enema  All of these medications are available over-the-counter  Return in about 1 month  Call us for refills on medications that we supply, as needed    If something changes and you need to come in sooner, please call our office  PRESCRIPTION REFILL REMINDER:  All medication refills should be requested prior to [facility] on Friday  Any refill requests after noon on Friday would be addressed the following Monday

## 2022-05-18 NOTE — PROGRESS NOTES
Hematology/Oncology Outpatient Follow- up Note  Veronica Moon 51 y o  female MRN: @ Encounter: [id number]        Date:  5/18/2022        Assessment / Plan:   Metastatic breast cancer, primary right breast, grade 2, ER negative, MI negative, HER2 3+ disease diagnosed 3/22 when she presented with neck and back pain  She has large fungating mass in her right breast, palpable right axillary adenopathy as well as diffuse osseous metastasis with C4 stenosis  She completed palliative radiation therapy to the spine  She enrolled in clinical trial C1D1 of [address] [place] [address] 4/28/22  This is a 6 week cycle  Study drug with atezolizumab/placebo due C1D22 5/19/22  2   For bone metastases, she is receiving Zometa  3   Chemo induced anemia  Hemoglobin decreased from 11 4/26/22 to 8 4 5/16/22  Added iron panel  - normal     Due to her extent of disease, would like to proceed with chemotherapy as scheduled  4   Discussed utilization of Aranesp  She is agreeable  300 micrograms every 3 weeks requested  We discussed the possibility of transfusion of packed red blood cells  If she doesn't have improvement of hemoglobin with Aranesp, transfusion prbc could be considered  Signed informed consent obtained   5  History of thrush and cutaneous candida  She has nystatin powder, nystatin mouth rinse and oral Diflucan available         HPI: Jackie Braden is a 70-year-old premenopausal woman who presented to the hospital on 3/28/2022 due to 1 ½ weeks of neck and back pain  Pain would radiate down her shoulders and into her arms whenever she coughs or sneezes  Patient also noted a hard breast mass in her right breast around 12/2021, it became red about 1 month prior to presentation and 1 week prior had malodorous discharge from the breast mass  She was found to have fungating right breast mass  CT scan of chest abdomen pelvis 3/29/22 showed diffuse bony metastasis, largest 4 4cm in left sacrum    In addition, CT scan showed 6 9 cm of right breast mass  She had right breast biopsy 3/29/22 which showed invasive ductal carcinoma, grade 2, ER negative, IA negative, HER2 3+ disease  3/29/22 CA 27 29 was 110 3, CA 15 3 was 91 6, CEA 6 5  MRI cervical, thoracic and lumbar spine performed  At the C4 level there is moderate to severe canal stenosis with flattening of the cord which maintains normal signal  Diffuse metastases throughout spine  severe sacral canal stenosis as a result of epidural tumor  Small amount of epidural tumor noted within the left posterior lateral aspect of the canal at the T3-4 level  No evidence of intracranial metastasis on brain MRI 3/30/22  CT of the head was negative for brain metastasis  Evaluated by neurosurgery  Not a surgical candidate  She received palliative radiation to her neck, shoulder as well as lower back  She works for [employer]  She is a never smoker  She drinks alcohol occasionally  She denied family history of breast or ovarian cancer  Standard chemotherapy with Taxotere, trastuzumab and pertuzumab versus clinical trial with same regimen with or without atezolizumab discussed  She chose to be on clinical trial    C1D1 of [address] [place] [address] 4/28/22  Study drug with atezolizumab/placebo due C1D22 5/19/22    Interval History:  Here with her significant other, Fanta Lozano  She did have thrush as well as cutaneous Candida  Found benefit with Diflucan orally, nystatin powder and nystatin mouth rinse  She did have a rash on the back of her hands she is on a steroid taper  She has had mild lower extremity edema  She saw her PCP  She was given a diuretic but she has not yet taken it  Pain is currently controlled  No evidence of bleeding/ bruising  Moving bowels without difficulty  Denies any melena or hematochezia  Minimal nausea with 1st cycle of chemotherapy          Test Results:        Labs:   Lab Results   Component Value Date    HGB 8 4 (L) 05/16/2022    HCT 28 4 (L) 05/16/2022    MCV 97 05/16/2022     (H) 05/16/2022    WBC 5 80 05/16/2022    NRBC 0 04/26/2022    BANDSPCT 3 05/16/2022    ATYLMPCT 2 (H) 05/16/2022     Lab Results   Component Value Date    K 4 6 05/16/2022     05/16/2022    CO2 29 05/16/2022    BUN 10 05/16/2022    CREATININE 0 65 05/16/2022    GLUF 104 (H) 05/16/2022    CALCIUM 9 0 05/16/2022    CORRECTEDCA 10 2 (H) 05/16/2022    AST 24 05/16/2022    ALT 25 05/16/2022    ALKPHOS 152 (H) 05/16/2022    EGFR 106 05/16/2022       Imaging: XR spine cervical 2 or 3 vw injury    Result Date: 5/15/2022  Narrative: CERVICAL SPINE INDICATION:   S12 300A: Unspecified displaced fracture of fourth cervical vertebra, initial encounter for closed fracture  Osseous metastasis  COMPARISON: Radiograph performed April 12, 2022  CT performed March 31, 2022  VIEWS:  XR SPINE CERVICAL 2 OR 3 VW INJURY FINDINGS: Examination is performed with the patient in a brace, slightly limiting evaluation  There is cortical bony erosion involving the C3, C4, and C5 vertebral bodies, and this appears slightly progressed from April 12, 2022  There is reversal of cervical lordosis centered about this cortical lucency but alignment is not significantly changed from prior examination  C5-C6 disc space narrowing and endplate degenerative change is noted  Lung apices are clear  Incompletely visualized calvarial lucency also most consistent with bony metastatic disease  Impression: Apparent subtle progression of cortical bony erosion related to lytic metastatic disease most radiographically evident at the C3, C4, and C5 levels about which there is reversal of cervical lordosis  No significant change in alignment   Workstation performed: [id number]     NM bone scan whole body    Result Date: 4/26/2022  Narrative: BONE SCAN  WHOLE BODY INDICATION: C50 811: Malignant neoplasm of overlapping sites of right female breast Z17 1: Estrogen receptor negative status (ER-) PREVIOUS FILM CORRELATION:    CT chest abdomen pelvis 3/29/2022 TECHNIQUE:   This study was performed following the intravenous administration of 26 8 mCi Tc-99m labeled MDP  Delayed, anterior and posterior whole body images were acquired, 2-3 hours after radiopharmaceutical administration  Additional delayed oblique static images acquired of the bilateral ribs and pelvis  FINDINGS:  Multiple foci of abnormally increased radiotracer uptake noted in the axial and appendicular skeleton compatible with widespread osseous metastasis  Many of these foci correspond to lytic lesions on CT  Scattered calvarial foci of uptake noted bilaterally  Small focus of radiotracer uptake at the right proximal humerus suspicious for metastasis  Focal increased radiotracer uptake at the inferior sternum compatible with metastasis  Multiple foci of increased radiotracer uptake in the spine most extensive in the cervical and upper thoracic spine compatible with metastasis  Numerous bilateral rib foci compatible with metastasis  Multiple foci of abnormal radiotracer uptake at the sacrum and bony pelvis  This is most extensive at the sacrum centrally  Symmetric physiologic renal activity  Impression: 1  Multiple foci of abnormally increased radiotracer uptake noted in the axial and appendicular skeleton compatible with widespread osseous metastasis  Workstation performed: [id number]     VAS lower limb venous duplex study, complete bilateral    Result Date: 5/16/2022  Narrative:  [facility] REPORT CLINICAL: Indications: Patient presents with worsening swelling of bilateral lower extremities over the past 2-3 weeks  Operative History: Patient denies any cardiovascular procedures Risk Factors The patient has history of HTN  CONCLUSION: Impression: RIGHT LOWER LIMB: No evidence of acute or chronic deep vein thrombosis  No evidence of superficial thrombophlebitis noted   Doppler evaluation shows a normal response to augmentation maneuvers  Popliteal, posterior tibial and anterior tibial arterial Doppler waveforms are triphasic  LEFT LOWER LIMB: No evidence of acute or chronic deep vein thrombosis  No evidence of superficial thrombophlebitis noted  Doppler evaluation shows a normal response to augmentation maneuvers  Popliteal, posterior tibial and anterior tibial arterial Doppler waveforms are triphasic  Technical findings were given to Dr Onesimo Hanna via tiger text  SIGNATURE: Electronically Signed by: Anna Hall MD, RPVI on 2022-05-16 04:05:26 PM          ROS:  As mentioned in HPI & Interval History otherwise 14 point ROS negative  Allergies: No Known Allergies  Current Medications: Reviewed  PMH/FH/SH:  Reviewed      Physical Exam:    There is no height or weight on file to calculate BSA  Ht Readings from Last 3 Encounters:   05/18/22 5' 3" (1 6 m)   05/13/22 5' 3" (1 6 m)   05/12/22 5' 3" (1 6 m)        Wt Readings from Last 3 Encounters:   05/18/22 83 9 kg (185 lb)   05/13/22 82 6 kg (182 lb)   05/05/22 82 6 kg (182 lb)        Temp Readings from Last 3 Encounters:   05/18/22 (!) 97 4 °F (36 3 °C) (Temporal)   05/17/22 (!) 97 1 °F (36 2 °C) (Temporal)   05/13/22 97 9 °F (36 6 °C) (Temporal)        BP Readings from Last 3 Encounters:   05/18/22 102/70   05/13/22 110/72   05/12/22 104/60           Physical Exam  Vitals reviewed  Constitutional:       General: She is not in acute distress  Appearance: She is well-developed  She is not diaphoretic  HENT:      Head: Normocephalic and atraumatic  Eyes:      Conjunctiva/sclera: Conjunctivae normal    Neck:      Trachea: No tracheal deviation  Cardiovascular:      Rate and Rhythm: Normal rate and regular rhythm  Heart sounds: No murmur heard  No friction rub  No gallop  Pulmonary:      Effort: Pulmonary effort is normal  No respiratory distress  Breath sounds: Normal breath sounds  No wheezing or rales     Chest:      Chest wall: No tenderness  Abdominal:      General: There is no distension  Palpations: Abdomen is soft  Tenderness: There is no abdominal tenderness  Musculoskeletal:      Cervical back: Normal range of motion and neck supple  Comments: Aspen collar   Lymphadenopathy:      Cervical: No cervical adenopathy  Skin:     General: Skin is warm and dry  Coloration: Skin is pale  Findings: No erythema  Neurological:      Mental Status: She is alert and oriented to person, place, and time  Psychiatric:         Behavior: Behavior normal          Thought Content:  Thought content normal          Judgment: Judgment normal          ECO    Emergency Contacts:    Extended Emergency Contact Information  Primary Emergency Contact: Red Phan  Address: [address] Phone: [phone]  Mobile Phone: [phone]  Relation: Significant Other  Secondary Emergency Contact: Virginie Santos  Mobile Phone: [phone]  Relation: Son

## 2022-05-18 NOTE — PROGRESS NOTES
Labs have been reviewed and signed by Micah FREEMAN on 5/17/2022  Per protocol patient is ok to proceed with cycle 1 day 22 treatment on 05/19/2022

## 2022-05-19 ENCOUNTER — PATIENT OUTREACH (OUTPATIENT)
Dept: CASE MANAGEMENT | Facility: HOSPITAL | Age: 47
End: 2022-05-19

## 2022-05-19 ENCOUNTER — HOSPITAL ENCOUNTER (OUTPATIENT)
Dept: INFUSION CENTER | Facility: CLINIC | Age: 47
Discharge: HOME | End: 2022-05-19
Payer: COMMERCIAL

## 2022-05-19 ENCOUNTER — PATIENT MESSAGE (OUTPATIENT)
Dept: INTERNAL MEDICINE CLINIC | Facility: CLINIC | Age: 47
End: 2022-05-19

## 2022-05-19 ENCOUNTER — DOCUMENTATION (OUTPATIENT)
Dept: OTHER | Facility: HOSPITAL | Age: 47
End: 2022-05-19

## 2022-05-19 ENCOUNTER — TELEPHONE (OUTPATIENT)
Dept: OTHER | Facility: OTHER | Age: 47
End: 2022-05-19

## 2022-05-19 VITALS
WEIGHT: 183 LBS | OXYGEN SATURATION: 97 % | BODY MASS INDEX: 32.43 KG/M2 | RESPIRATION RATE: 22 BRPM | SYSTOLIC BLOOD PRESSURE: 101 MMHG | HEART RATE: 117 BPM | HEIGHT: 63 IN | TEMPERATURE: 97.4 F | DIASTOLIC BLOOD PRESSURE: 58 MMHG

## 2022-05-19 DIAGNOSIS — Z17.1 MALIGNANT NEOPLASM OF OVERLAPPING SITES OF RIGHT BREAST IN FEMALE, ESTROGEN RECEPTOR NEGATIVE (HCC): Primary | ICD-10-CM

## 2022-05-19 DIAGNOSIS — C50.811 MALIGNANT NEOPLASM OF OVERLAPPING SITES OF RIGHT BREAST IN FEMALE, ESTROGEN RECEPTOR NEGATIVE (HCC): Primary | ICD-10-CM

## 2022-05-19 DIAGNOSIS — T45.1X5A ANEMIA DUE TO CHEMOTHERAPY: Primary | ICD-10-CM

## 2022-05-19 DIAGNOSIS — D64.81 ANEMIA DUE TO CHEMOTHERAPY: Primary | ICD-10-CM

## 2022-05-19 PROCEDURE — 96376 TX/PRO/DX INJ SAME DRUG ADON: CPT

## 2022-05-19 PROCEDURE — 96413 CHEMO IV INFUSION 1 HR: CPT

## 2022-05-19 PROCEDURE — 96372 THER/PROPH/DIAG INJ SC/IM: CPT

## 2022-05-19 PROCEDURE — 96417 CHEMO IV INFUS EACH ADDL SEQ: CPT

## 2022-05-19 PROCEDURE — J9999Q0 INV ATEZOLIZUMAB OR PLACEBO 1200MG/20ML 20 ML: Performed by: INTERNAL MEDICINE

## 2022-05-19 PROCEDURE — 96367 TX/PROPH/DG ADDL SEQ IV INF: CPT

## 2022-05-19 PROCEDURE — 96375 TX/PRO/DX INJ NEW DRUG ADDON: CPT

## 2022-05-19 RX ORDER — DIPHENHYDRAMINE HYDROCHLORIDE 50 MG/ML
50 INJECTION INTRAMUSCULAR; INTRAVENOUS ONCE
Status: COMPLETED | OUTPATIENT
Start: 2022-05-19 | End: 2022-05-19

## 2022-05-19 RX ORDER — FAMOTIDINE 20 MG/50ML
20 INJECTION, SOLUTION INTRAVENOUS ONCE
Status: COMPLETED | OUTPATIENT
Start: 2022-05-19 | End: 2022-05-19

## 2022-05-19 RX ORDER — FAMOTIDINE 10 MG/ML
INJECTION, SOLUTION INTRAVENOUS
Status: DISPENSED
Start: 2022-05-19 | End: 2022-05-19

## 2022-05-19 RX ADMIN — FAMOTIDINE 20 MG: 20 INJECTION, SOLUTION INTRAVENOUS at 11:08

## 2022-05-19 RX ADMIN — Medication 1200 MG: at 13:12

## 2022-05-19 RX ADMIN — FAMOTIDINE 20 MG: 10 INJECTION INTRAVENOUS at 09:21

## 2022-05-19 RX ADMIN — PERTUZUMAB 420 MG: 30 INJECTION, SOLUTION, CONCENTRATE INTRAVENOUS at 09:48

## 2022-05-19 RX ADMIN — DIPHENHYDRAMINE HYDROCHLORIDE 25 MG: 50 INJECTION, SOLUTION INTRAMUSCULAR; INTRAVENOUS at 09:00

## 2022-05-19 RX ADMIN — DARBEPOETIN ALFA 300 MCG: 300 INJECTION, SOLUTION INTRAVENOUS; SUBCUTANEOUS at 13:21

## 2022-05-19 RX ADMIN — Medication 515 MG: at 10:21

## 2022-05-19 RX ADMIN — SODIUM CHLORIDE 20 ML/HR: 0.9 INJECTION, SOLUTION INTRAVENOUS at 08:28

## 2022-05-19 RX ADMIN — DEXAMETHASONE SODIUM PHOSPHATE 8 MG: 10 INJECTION, SOLUTION INTRAMUSCULAR; INTRAVENOUS at 08:28

## 2022-05-19 RX ADMIN — DIPHENHYDRAMINE HYDROCHLORIDE 50 MG: 50 INJECTION, SOLUTION INTRAMUSCULAR; INTRAVENOUS at 11:05

## 2022-05-19 RX ADMIN — DOCETAXEL 143 MG: 20 INJECTION, SOLUTION INTRAVENOUS at 10:53

## 2022-05-19 RX ADMIN — HYDROCORTISONE SODIUM SUCCINATE 100 MG: 100 INJECTION, POWDER, FOR SOLUTION INTRAMUSCULAR; INTRAVENOUS at 11:07

## 2022-05-19 NOTE — PROGRESS NOTES
[facility]  Patient returned Juju Trejo denied any AE'S at the moment and conmeds have not changed   She states that she is feeling well overall  Patient was told to call with any questions or concerns  Patient is tolerating treatment

## 2022-05-19 NOTE — PROGRESS NOTES
Pt here for Clinical trial treatment, C1, D22  Pt reports developing a rash on her upper extremities after her last treatment, addressed by provider, pt reports that this rash is improving with prescribed medications  Vitals stable upon admission  Labs reviewed from 5/16, reviewed by provider per Clinical Trials note    Call bell in reach, will continue to monitor

## 2022-05-19 NOTE — PROGRESS NOTES
Pt notified nurse @ 11am with warmth and tightness in her face, lips, and arms  Pt was flush upon assessment  Taxotere stopped promptly, hypersensitivity protocol initiated  Pt also developed back pain; 6/10, nausea and a raw/dry feeling in the back of her throat  Pt was temporarily on 2L NC for SpO2, low 90's  Has since improved on room air  Notified Andrew Llanes RN who s/w Udell Dubin PA-C- received order to re-challenge with resolution of hypersensitivity reaction  Pt is aware of this and agrees with plan of care

## 2022-05-19 NOTE — PROGRESS NOTES
Email sent to pt for local resources including Cancer Support Community and Look Good Feel Better programs, pt had requested some educational or self care type classes or events earlier today while meeting with Oncology MSW Gabe Boyd in the infusion center  No other needs at this time

## 2022-05-19 NOTE — PROGRESS NOTES
Remainder of treatment tolerated well without complications  Aranesp given in R arm today  Pt reports feeling well upon discharge today, just tired  Aware of next infusion appointment  AVS provided

## 2022-05-19 NOTE — PROGRESS NOTES
Biopsychosocial and Barriers Assessment    Cancer Diagnosis:Malignant neoplasm of overlapping sites of right breast in female, estrogen receptor negative [facility])      Home/Cell Phone: [phone]    Emergency Contact: KENNETH Tanner-[phone]  Marital Status:Single: single  Interpretation concerns, speaks another language, preferred language: English  Cultural concerns: none  Ability to read or write: yes    Caregiver/Support: Luanne Hemphill, Mother and siblings  Children: adult son Tj Conception [phone]  Child/Elder care: son is independent and lives in [facility]  Housing: house in [address]: 3 steps , two story house  Lives With: Luanne Hemphill  Daily Living Activities: has assistance from fiance - Anne-Marie Chain and family  Durable Medical Equipment: wears neck brace due to cancer related tumors in back of neck  Ambulation: independently     Preferred [facility]acy: [facility], [place] Pop co-pays with insurance: n/a  High co-pays with medication coverage:n/a  No medication coverage: n/a    Primary Care Provider: Dr Gudelia Lange  Hx of Home Health Care:no  Hx of Short term rehab: no  Mental Health Hx: no  Substance Abuse Hx: no  Employment: [employer] Status/Location:n/a  Ability to pay bills:yes applied for disability in [place]  POA/LW/AD: pt made 5 wishes  Transportation Plan/Concerns: none  Family transports to appointment  What do you know about your Cancer Diagnosis    What has your doctor told you about your cancer diagnosis: on clinical trial     What has your doctor told you about your cancer treatment: Breast CA mets to neck and has to wear neck brace 24/7 for a few months  What specific concerns do you have about your diagnosis and treatment: hair loss    Have you been made aware of any hair loss associated with treatment: yes    Additional Comments: This MSW and MSW- Romeo Duane met with patient in the infusion clinic   Patient is in good spirits and conversant  Belinda Blunt is present  Pt would like additional resources on activities to do related classes on education  Pt is encouraged to schedule nail appointment as part of self care  Pt is agreeable to this MSW checking in periodically  MSW to follow up on pt and continue supportive services

## 2022-05-20 DIAGNOSIS — U07.1 COVID: ICD-10-CM

## 2022-05-20 DIAGNOSIS — U07.1 COVID: Primary | ICD-10-CM

## 2022-05-20 PROCEDURE — U0003 INFECTIOUS AGENT DETECTION BY NUCLEIC ACID (DNA OR RNA); SEVERE ACUTE RESPIRATORY SYNDROME CORONAVIRUS 2 (SARS-COV-2) (CORONAVIRUS DISEASE [COVID-19]), AMPLIFIED PROBE TECHNIQUE, MAKING USE OF HIGH THROUGHPUT TECHNOLOGIES AS DESCRIBED BY CMS-2020-01-R: HCPCS | Performed by: INTERNAL MEDICINE

## 2022-05-20 PROCEDURE — U0005 INFEC AGEN DETEC AMPLI PROBE: HCPCS | Performed by: INTERNAL MEDICINE

## 2022-05-22 LAB — SARS-COV-2 RNA RESP QL NAA+PROBE: NEGATIVE

## 2022-05-23 ENCOUNTER — HOSPITAL ENCOUNTER (EMERGENCY)
Facility: HOSPITAL | Age: 47
Discharge: HOME | End: 2022-05-24
Attending: EMERGENCY MEDICINE
Payer: COMMERCIAL

## 2022-05-23 ENCOUNTER — TELEPHONE (OUTPATIENT)
Dept: OTHER | Facility: OTHER | Age: 47
End: 2022-05-23

## 2022-05-23 ENCOUNTER — APPOINTMENT (EMERGENCY)
Dept: RADIOLOGY | Facility: HOSPITAL | Age: 47
End: 2022-05-23
Payer: COMMERCIAL

## 2022-05-23 ENCOUNTER — TELEPHONE (OUTPATIENT)
Dept: INTERNAL MEDICINE CLINIC | Facility: CLINIC | Age: 47
End: 2022-05-23

## 2022-05-23 VITALS
HEART RATE: 100 BPM | RESPIRATION RATE: 18 BRPM | SYSTOLIC BLOOD PRESSURE: 106 MMHG | DIASTOLIC BLOOD PRESSURE: 56 MMHG | OXYGEN SATURATION: 96 % | TEMPERATURE: 98.3 F

## 2022-05-23 DIAGNOSIS — U07.1 COVID: Primary | ICD-10-CM

## 2022-05-23 DIAGNOSIS — Z20.822 CLOSE EXPOSURE TO COVID-19 VIRUS: Primary | ICD-10-CM

## 2022-05-23 LAB
ANION GAP SERPL CALCULATED.3IONS-SCNC: 6 MMOL/L (ref 4–13)
ANISOCYTOSIS BLD QL SMEAR: PRESENT
BACTERIA UR QL AUTO: ABNORMAL /HPF
BASOPHILS # BLD MANUAL: 0 THOUSAND/UL (ref 0–0.1)
BASOPHILS NFR MAR MANUAL: 0 % (ref 0–1)
BILIRUB UR QL STRIP: NEGATIVE
BUN SERPL-MCNC: 10 MG/DL (ref 5–25)
CALCIUM SERPL-MCNC: 8.2 MG/DL (ref 8.4–10.2)
CHLORIDE SERPL-SCNC: 98 MMOL/L (ref 96–108)
CLARITY UR: CLEAR
CO2 SERPL-SCNC: 28 MMOL/L (ref 21–32)
COLOR UR: YELLOW
CREAT SERPL-MCNC: 0.56 MG/DL (ref 0.6–1.3)
EOSINOPHIL # BLD MANUAL: 0 THOUSAND/UL (ref 0–0.4)
EOSINOPHIL NFR BLD MANUAL: 0 % (ref 0–6)
ERYTHROCYTE [DISTWIDTH] IN BLOOD BY AUTOMATED COUNT: 17.2 % (ref 11.6–15.1)
FLUAV RNA RESP QL NAA+PROBE: NEGATIVE
FLUBV RNA RESP QL NAA+PROBE: NEGATIVE
GFR SERPL CREATININE-BSD FRML MDRD: 111 ML/MIN/1.73SQ M
GLUCOSE SERPL-MCNC: 91 MG/DL (ref 65–140)
GLUCOSE UR STRIP-MCNC: NEGATIVE MG/DL
HCT VFR BLD AUTO: 29.5 % (ref 34.8–46.1)
HGB BLD-MCNC: 9 G/DL (ref 11.5–15.4)
HGB UR QL STRIP.AUTO: NEGATIVE
HYPERCHROMIA BLD QL SMEAR: PRESENT
KETONES UR STRIP-MCNC: NEGATIVE MG/DL
LEUKOCYTE ESTERASE UR QL STRIP: ABNORMAL
LYMPHOCYTES # BLD AUTO: 0.08 THOUSAND/UL (ref 0.6–4.47)
LYMPHOCYTES # BLD AUTO: 4 % (ref 14–44)
MCH RBC QN AUTO: 29.3 PG (ref 26.8–34.3)
MCHC RBC AUTO-ENTMCNC: 30.5 G/DL (ref 31.4–37.4)
MCV RBC AUTO: 96 FL (ref 82–98)
MONOCYTES # BLD AUTO: 0.1 THOUSAND/UL (ref 0–1.22)
MONOCYTES NFR BLD: 5 % (ref 4–12)
MUCOUS THREADS UR QL AUTO: ABNORMAL
MYELOCYTES NFR BLD MANUAL: 1 % (ref 0–1)
NEUTROPHILS # BLD MANUAL: 1.84 THOUSAND/UL (ref 1.85–7.62)
NEUTS BAND NFR BLD MANUAL: 20 % (ref 0–8)
NEUTS SEG NFR BLD AUTO: 70 % (ref 43–75)
NITRITE UR QL STRIP: NEGATIVE
NON-SQ EPI CELLS URNS QL MICRO: ABNORMAL /HPF
PH UR STRIP.AUTO: 6 [PH]
PLATELET # BLD AUTO: 303 THOUSANDS/UL (ref 149–390)
PLATELET BLD QL SMEAR: ADEQUATE
PMV BLD AUTO: 9.7 FL (ref 8.9–12.7)
POLYCHROMASIA BLD QL SMEAR: PRESENT
POTASSIUM SERPL-SCNC: 4.3 MMOL/L (ref 3.5–5.3)
PROT UR STRIP-MCNC: ABNORMAL MG/DL
RBC # BLD AUTO: 3.07 MILLION/UL (ref 3.81–5.12)
RBC #/AREA URNS AUTO: ABNORMAL /HPF
RSV RNA RESP QL NAA+PROBE: NEGATIVE
SARS-COV-2 RNA RESP QL NAA+PROBE: POSITIVE
SODIUM SERPL-SCNC: 132 MMOL/L (ref 135–147)
SP GR UR STRIP.AUTO: 1.01 (ref 1–1.03)
UROBILINOGEN UR QL STRIP.AUTO: 0.2 E.U./DL
WBC # BLD AUTO: 2.04 THOUSAND/UL (ref 4.31–10.16)
WBC #/AREA URNS AUTO: ABNORMAL /HPF

## 2022-05-23 PROCEDURE — 80048 BASIC METABOLIC PNL TOTAL CA: CPT | Performed by: EMERGENCY MEDICINE

## 2022-05-23 PROCEDURE — 87077 CULTURE AEROBIC IDENTIFY: CPT | Performed by: EMERGENCY MEDICINE

## 2022-05-23 PROCEDURE — 99291 CRITICAL CARE FIRST HOUR: CPT | Performed by: EMERGENCY MEDICINE

## 2022-05-23 PROCEDURE — 87186 SC STD MICRODIL/AGAR DIL: CPT | Performed by: EMERGENCY MEDICINE

## 2022-05-23 PROCEDURE — 87040 BLOOD CULTURE FOR BACTERIA: CPT | Performed by: EMERGENCY MEDICINE

## 2022-05-23 PROCEDURE — 85027 COMPLETE CBC AUTOMATED: CPT | Performed by: EMERGENCY MEDICINE

## 2022-05-23 PROCEDURE — 96366 THER/PROPH/DIAG IV INF ADDON: CPT

## 2022-05-23 PROCEDURE — 99284 EMERGENCY DEPT VISIT MOD MDM: CPT

## 2022-05-23 PROCEDURE — 0241U HB NFCT DS VIR RESP RNA 4 TRGT: CPT | Performed by: EMERGENCY MEDICINE

## 2022-05-23 PROCEDURE — 71045 X-RAY EXAM CHEST 1 VIEW: CPT

## 2022-05-23 PROCEDURE — 36415 COLL VENOUS BLD VENIPUNCTURE: CPT | Performed by: EMERGENCY MEDICINE

## 2022-05-23 PROCEDURE — 93005 ELECTROCARDIOGRAM TRACING: CPT

## 2022-05-23 PROCEDURE — 81001 URINALYSIS AUTO W/SCOPE: CPT | Performed by: EMERGENCY MEDICINE

## 2022-05-23 PROCEDURE — 85007 BL SMEAR W/DIFF WBC COUNT: CPT | Performed by: EMERGENCY MEDICINE

## 2022-05-23 PROCEDURE — 87086 URINE CULTURE/COLONY COUNT: CPT | Performed by: EMERGENCY MEDICINE

## 2022-05-23 PROCEDURE — 96365 THER/PROPH/DIAG IV INF INIT: CPT

## 2022-05-23 RX ORDER — ACETAMINOPHEN 325 MG/1
650 TABLET ORAL ONCE
Status: COMPLETED | OUTPATIENT
Start: 2022-05-23 | End: 2022-05-23

## 2022-05-23 RX ADMIN — SODIUM CHLORIDE, SODIUM LACTATE, POTASSIUM CHLORIDE, AND CALCIUM CHLORIDE 500 ML: .6; .31; .03; .02 INJECTION, SOLUTION INTRAVENOUS at 20:02

## 2022-05-23 RX ADMIN — SODIUM CHLORIDE, SODIUM LACTATE, POTASSIUM CHLORIDE, AND CALCIUM CHLORIDE 500 ML: .6; .31; .03; .02 INJECTION, SOLUTION INTRAVENOUS at 22:09

## 2022-05-23 RX ADMIN — ACETAMINOPHEN 650 MG: 325 TABLET ORAL at 20:00

## 2022-05-23 NOTE — TELEPHONE ENCOUNTER
She did a home COVID test today, looks like it may be positive  Would like to have a PCR test done to confirm  Can you please order one  She has one done on Friday that was negative

## 2022-05-23 NOTE — TELEPHONE ENCOUNTER
I have ordered the test   But if she had and exposure and now home COVID test is positive it may be a true positive    Please schedule virtual appointment

## 2022-05-23 NOTE — TELEPHONE ENCOUNTER
Patient just took her temperature and it was 100 6, she is going to the ER  She was told when she started cancer treatments to go to ER if her temperature goes above 100

## 2022-05-24 ENCOUNTER — TELEMEDICINE (OUTPATIENT)
Dept: INTERNAL MEDICINE CLINIC | Facility: CLINIC | Age: 47
End: 2022-05-24
Payer: COMMERCIAL

## 2022-05-24 VITALS — BODY MASS INDEX: 30.82 KG/M2 | HEIGHT: 65 IN | WEIGHT: 185 LBS | TEMPERATURE: 97.7 F

## 2022-05-24 DIAGNOSIS — Z17.1 MALIGNANT NEOPLASM OF OVERLAPPING SITES OF RIGHT BREAST IN FEMALE, ESTROGEN RECEPTOR NEGATIVE (HCC): ICD-10-CM

## 2022-05-24 DIAGNOSIS — C50.811 MALIGNANT NEOPLASM OF OVERLAPPING SITES OF RIGHT BREAST IN FEMALE, ESTROGEN RECEPTOR NEGATIVE (HCC): ICD-10-CM

## 2022-05-24 DIAGNOSIS — B37.81 CANDIDA INFECTION, ESOPHAGEAL (HCC): ICD-10-CM

## 2022-05-24 DIAGNOSIS — U07.1 COVID: Primary | ICD-10-CM

## 2022-05-24 LAB
ATRIAL RATE: 113 BPM
P AXIS: 20 DEGREES
PR INTERVAL: 126 MS
QRS AXIS: -9 DEGREES
QRSD INTERVAL: 68 MS
QT INTERVAL: 308 MS
QTC INTERVAL: 422 MS
T WAVE AXIS: 22 DEGREES
VENTRICULAR RATE: 113 BPM

## 2022-05-24 PROCEDURE — 93010 ELECTROCARDIOGRAM REPORT: CPT | Performed by: INTERNAL MEDICINE

## 2022-05-24 PROCEDURE — 99213 OFFICE O/P EST LOW 20 MIN: CPT | Performed by: INTERNAL MEDICINE

## 2022-05-24 RX ORDER — ALBUTEROL SULFATE 90 UG/1
3 AEROSOL, METERED RESPIRATORY (INHALATION) ONCE AS NEEDED
Status: CANCELLED | OUTPATIENT
Start: 2022-05-25

## 2022-05-24 RX ORDER — ONDANSETRON 2 MG/ML
4 INJECTION INTRAMUSCULAR; INTRAVENOUS ONCE AS NEEDED
Status: CANCELLED | OUTPATIENT
Start: 2022-05-25

## 2022-05-24 RX ORDER — ACETAMINOPHEN 325 MG/1
650 TABLET ORAL ONCE AS NEEDED
Status: CANCELLED | OUTPATIENT
Start: 2022-05-25

## 2022-05-24 RX ORDER — BEBTELOVIMAB 87.5 MG/ML
175 INJECTION, SOLUTION INTRAVENOUS ONCE
Status: CANCELLED | OUTPATIENT
Start: 2022-05-25

## 2022-05-24 RX ORDER — SODIUM CHLORIDE 9 MG/ML
20 INJECTION, SOLUTION INTRAVENOUS ONCE
Status: CANCELLED | OUTPATIENT
Start: 2022-05-25

## 2022-05-24 NOTE — PROGRESS NOTES
COVID-19 Outpatient Progress Note    Assessment/Plan:    Problem List Items Addressed This Visit        Digestive    Candida infection, esophageal ([facility] )       Other    Malignant neoplasm of overlapping sites of right breast in female, estrogen receptor negative ([facility] [address] )    Relevant Orders    CBC and differential    COVID - Primary    Relevant Orders    CBC and differential         Disposition:     Patient is fully vaccinated and I recommended self quarantine for 5 days followed by strict mask use for an additional 5 days  If patient were to develop symptoms, they should immediately self isolate and call our office for further guidance  Discussed symptom directed medication options with patient  Discussed vitamin D, vitamin C, and/or zinc supplementation with patient  Patient meets criteria for Bebtelovimab infusion  They were counseled in regards to risks, benefits, and side effects of this infusion  [place] is an investigational medicine used to treat mild-to-moderate symptoms of COVID-19 in adults and children (15years of age and older weighing at least 80 pounds (40 kg)) with positive results of direct SARS-CoV-2 viral testing, and who are at high risk of progression to severe COVID-19, including hospitalization or death, and for whom other COVID-19 treatment options approved or authorized by FDA are not available or clinically appropriate  Bebtelovimab is investigational because it is still being studied  There is limited information about the safety and effectiveness of using bebtelovimab to treat people with mild-to-moderate COVID-19  The FDA has authorized the emergency use of bebtelovimab for the treatment of COVID-19 under an Emergency Use Authorization (EUA)       [place] is not authorized for use in people who:  - are likely to be infected with a SARS-CoV-2 variant that is not able to be treated by bebtelovimab based on the circulating variants in your area (ask your health care provider about FDA and CDCs latest information on circulating variants by geographic area), or  - are hospitalized due to COVID-19, or  - require oxygen therapy and/or respiratory support due to COVID-19, or  - require an increase in baseline oxygen flow rate and/or respiratory support due to [address] and are on chronic oxygen therapy and/or respiratory support due to underlying nonCOVID-19 related comorbidity  How will I receive Bebtelovimab? Ada Lemon will be given as an injection through a vein (intravenously or IV) over at least 30 seconds  You will be observed by your healthcare provider for at least 1 hour after you receive bebtelovimab  Possible side effects of Bebtelovimab: Allergic reactions can happen during and after infusion with bebtelovimab  Possible reactions include: fever, chills, nausea, headache, shortness of breath, low or high blood pressure, rapid or slow heart rate, chest discomfort or pain, weakness, confusion, feeling tired, wheezing, swelling of your lips, face, or throat, rash including hives, itching, muscle aches, dizziness, and sweating  These reactions may be severe or life threatening  Worsening symptoms after treatment: You may experience new or worsening symptoms after infusion, including fever, difficulty breathing, rapid or slow heart rate, tiredness, weakness or confusion  If these occur, contact your healthcare provider or seek immediate medical attention as some of these events have required hospitalization  It is unknown if these events are related to treatment or are due to the progression of COVID19  The side effects of getting any medicine by vein may include brief pain, bleeding, bruising of the skin, soreness, swelling, and possible infection at the infusion site  These are not all the possible side effects of bebtelovimab  Not a lot of people have been given bebtelovimab  Serious and unexpected side effects may happen   Bebtelovimab are still being studied so it is possible that all of the risks are not known at this time  It is possible that bebtelovimab could interfere with your body's own ability to fight off a future infection of SARS-CoV-2  Similarly, bebtelovimab may reduce your bodys immune response to a vaccine for SARS-CoV-2  Specific studies have not been conducted to address these possible risks  Talk to your healthcare provider if you have any questions  Emergency Use Authorization:    The [facility] FDA has made bebtelovimab available under an emergency access mechanism called an EUA  The EUA is supported by a  of Health and Human Service ([facility]) declaration that circumstances exist to justify the emergency use of drugs and biological products during the COVID-19 pandemic  Bebtelovimab have not undergone the same type of review as an FDA-approved or cleared product  The FDA may issue an EUA when certain criteria are met, which includes that there are no adequate, approved, and available alternatives  In addition, the FDA decision is based on the totality of scientific evidence available showing that it is reasonable to believe that the product meets certain criteria for safety, performance, and labeling and may be effective in treatment of patients during the COVID-19 pandemic  All of these criteria must be met to allow for the product to be used in the treatment of patients during the COVID-19 pandemic  The EUA for bebtelovimab together is in effect for the duration of the COVID-19 declaration justifying emergency use of these products, unless terminated or revoked (after which the product may no longer be used)  What if I am pregnant or breastfeeding? There is no experience treating pregnant women or breastfeeding mothers with bebtelovimab  For a mother and unborn baby, the benefit of receiving bebtelovimab may be greater than the risk from the treatment   If you are pregnant or breastfeeding, discuss your options and specific situation with your healthcare provider  How do I report side effects with Bebtelovimab? Contact your healthcare provider if you have any side effects that bother you or do not go away  Report side effects to FDA MedWatch at www fda gov/medwatch, or call 9-527-ZFB-9461 or to [address]  as shown below  Email: Inna@[employer]   Fax number: 8-671.410.8362   Telephone number: 0-957-MXUQST88 ([phone])    Full fact sheet document for patients can be found at: Elvira hercules    The patient consents to proceed with bebtelovimab infusion  I have spent 25 minutes directly with the patient  Greater than 50% of this time was spent in counseling/coordination of care regarding: diagnostic results, prognosis, risks and benefits of treatment options, instructions for management, patient and family education, importance of treatment compliance, risk factor reductions and impressions  Encounter provider Kelvin Nicole MD    Provider located at [address] [address] [phone]    Recent Visits  Date Type Provider Dept   05/23/22 Telephone MD Errol Higgins [facility]  [facility] Internal Med   05/17/22 Telemedicine Kelvin Nicole MD  [address] [facility] Internal Med   Showing recent visits within past 7 days and meeting all other requirements  Today's Visits  Date Type Provider Dept   05/24/22 Telemedicine MD Errol Higgins [facility]  [facility] Internal Med   Showing today's visits and meeting all other requirements  Future Appointments  No visits were found meeting these conditions  Showing future appointments within next 150 days and meeting all other requirements       Patient agrees to participate in a virtual check in via telephone or video visit instead of presenting to the office to address urgent/immediate medical needs  Patient is aware this is a billable service  After connecting through [facility], the patient was identified by name and date of birth  Huyen Che was informed that this was a telemedicine visit and that the exam was being conducted confidentially over secure lines  My office door was closed  No one else was in the room  Huyen Che acknowledged consent and understanding of privacy and security of the telemedicine visit  I informed the patient that I have reviewed her record in Epic and presented the opportunity for her to ask any questions regarding the visit today  The patient agreed to participate  Verification of patient location:  Patient is located in the following state in which I hold an active license: [place]    Subjective:   Huyen Che is a 51 y o  female who is concerned about COVID-19  Patient's symptoms include fever and sore throat  Patient denies chills, fatigue, malaise, congestion, rhinorrhea, anosmia, loss of taste, cough, shortness of breath, chest tightness, abdominal pain, nausea, vomiting, diarrhea, myalgias and headaches       - Date of symptom onset: 5/23/2022  - Date of exposure: 5/19/2022      COVID-19 vaccination status: Fully vaccinated (primary series)    Exposure:   Contact with a person who is under investigation (PUI) for or who is positive for COVID-19 within the last 14 days?: Yes    Hospitalized recently for fever and/or lower respiratory symptoms?: No      Currently a healthcare worker that is involved in direct patient care?: No      Works in a special setting where the risk of COVID-19 transmission may be high? (this may include long-term care, correctional and long term facilities; homeless shelters; assisted-living facilities and group homes ): No      Resident in a special setting where the risk of COVID-19 transmission may be high? (this may include long-term care, correctional and long term facilities; homeless shelters; assisted-living facilities and group homes ): No      Lab Results   Component Value Date    SARSCOV2 Positive (A) 05/23/2022     Past Medical History:   Diagnosis Date    Cancer [facility])      History reviewed  No pertinent surgical history  Current Outpatient Medications   Medication Sig Dispense Refill    al mag oxide-diphenhydramine-lidocaine viscous (MAGIC MOUTHWASH) 1:1:1 suspension Swish and spit 10 mL every 4 (four) hours as needed for mouth pain or discomfort 500 mL 2    baclofen 10 mg tablet Take 0 5 tablets (5 mg total) by mouth as needed in the morning and 0 5 tablets (5 mg total) as needed at noon and 0 5 tablets (5 mg total) as needed in the evening for muscle spasms  45 tablet 0    dexamethasone (DECADRON) 4 mg tablet Take 1 tablet (4 mg total) by mouth 3 (three) times a day 45 tablet 0    DPH-Lido-AlHydr-MgHydr-Simeth (First-Mouthwash BLM) SUSP       furosemide (LASIX) 40 mg tablet Take 1 tablet (40 mg total) by mouth in the morning  30 tablet 1    lactulose (CEPHULAC) 10 g packet Take 1 packet (10 g total) by mouth 3 (three) times a day as needed (constipation) 30 each 0    lisinopril (ZESTRIL) 20 mg tablet Take 1 tablet (20 mg total) by mouth daily 90 tablet 1    morphine (MS CONTIN) 15 mg 12 hr tablet Take 1 tablet (15 mg total) by mouth every 12 (twelve) hours Max Daily Amount: 30 mg 60 tablet 0    nystatin (MYCOSTATIN) 500,000 units/5 mL suspension Apply 5 mL (500,000 Units total) to the mouth or throat in the morning and 5 mL (500,000 Units total) at noon and 5 mL (500,000 Units total) in the evening and 5 mL (500,000 Units total) before bedtime   200 mL 0    nystatin (MYCOSTATIN) powder Apply topically 3 (three) times a day 15 g 0    ondansetron (ZOFRAN) 4 mg tablet Take 1 tablet (4 mg total) by mouth every 8 (eight) hours as needed for nausea or vomiting 40 tablet 2    oxyCODONE (ROXICODONE) 10 MG TABS Take 1 tablet (10 mg total) by mouth every 4 (four) hours as needed for severe pain May split pill in half (5 mg) every 4 hours as needed for moderate pain (5 mg)  Max Daily Amount: 60 mg 180 tablet 0    pantoprazole (PROTONIX) 40 mg tablet Take 1 tablet (40 mg total) by mouth daily in the early morning 90 tablet 1    polyethylene glycol (MIRALAX) 17 g packet Take 17 g by mouth daily 30 each 0    predniSONE 10 mg tablet Take 1 tablet (10 mg total) by mouth in the morning  18 tablet 0    senna (SENOKOT) 8 6 mg Take 3 tablets (25 8 mg total) by mouth 2 (two) times a day Hold for loose stool  180 tablet 0     No current facility-administered medications for this visit  No Known Allergies    Review of Systems   Constitutional: Positive for fever  Negative for activity change, appetite change, chills, diaphoresis, fatigue and unexpected weight change  HENT: Positive for sore throat  Negative for congestion, drooling, ear discharge, ear pain, facial swelling, hearing loss, postnasal drip, rhinorrhea, sinus pressure, sinus pain, sneezing, tinnitus, trouble swallowing and voice change  Eyes: Negative for discharge  Respiratory: Negative for apnea, cough, choking, chest tightness, shortness of breath, wheezing and stridor  Cardiovascular: Negative for chest pain, palpitations and leg swelling  Gastrointestinal: Negative for abdominal distention, abdominal pain, anal bleeding, blood in stool, constipation, diarrhea, nausea and vomiting  Genitourinary: Negative for decreased urine volume, difficulty urinating, frequency and urgency  Musculoskeletal: Negative for arthralgias, back pain and myalgias  Skin: Negative for color change  Neurological: Negative for dizziness, light-headedness, numbness and headaches  Objective:    Vitals:    05/24/22 1009   Temp: 97 7 °F (36 5 °C)   TempSrc: Oral   Weight: 83 9 kg (185 lb)   Height: 5' 4 8" (1 646 m)       Physical Exam  Constitutional:       General: She is not in acute distress  Appearance: Normal appearance   She is not ill-appearing, toxic-appearing or diaphoretic  Neurological:      Mental Status: She is alert and oriented to person, place, and time  VIRTUAL VISIT DISCLAIMER    Boo Elma verbally agrees to participate in [employer]  Pt is aware that [employer] could be limited without vital signs or the ability to perform a full hands-on physical exam  Nayeli Wolff understands she or the provider may request at any time to terminate the video visit and request the patient to seek care or treatment in person

## 2022-05-24 NOTE — ED PROCEDURE NOTE
PROCEDURE  CriticalCare Time  Performed by: Cyrus Fuller MD  Authorized by: Cyrus Fuller MD     Critical care provider statement:     Critical care time (minutes):  35    Critical care start time:  5/23/2022 10:00 PM    Critical care end time:  5/23/2022 10:35 PM    Critical care time was exclusive of:  Separately billable procedures and treating other patients and teaching time    Critical care was necessary to treat or prevent imminent or life-threatening deterioration of the following conditions: fever in cancer -chemo pt- covid pos      Critical care was time spent personally by me on the following activities:  Examination of patient, evaluation of patient's response to treatment, development of treatment plan with patient or surrogate, obtaining history from patient or surrogate, discussions with primary provider, review of old charts, re-evaluation of patient's condition, ordering and review of laboratory studies, ordering and performing treatments and interventions and ordering and review of radiographic studies    I assumed direction of critical care for this patient from another provider in my specialty: no    Comments:      Discuss of treatment options with pt- all of pt medications- chemo tx checked for interactions with paxlovid-arrangments made with pmd for outpt covid antibody treatment          Cyrus Fuller MD  05/23/22 8020

## 2022-05-24 NOTE — DISCHARGE INSTRUCTIONS
Diagnosis; covid    - activity as tolerated     - please keep hydrated     - for any fevers- temp > 100 4-  over the counter generic tylenol 500 mg every 4 hrs     - if your urine or blood cultures grow out  any bacteria we will contact you     - I d id text dr Kanwal Wing and was able to get a hold of dr Enrique Combs - she will contact you tomorrow t o discuss antibody treatment     - please return to  the er for any increasing difficulty breathing or any new/ worsening/concerning symptoms to you

## 2022-05-24 NOTE — TELEPHONE ENCOUNTER
----- Message from Yandy Alexander sent at 3/16/2022  3:24 PM CDT -----  Regarding: Refill  Hi,  I'd like a refill on my Adderall please.    Yandy Alexander   Noted   Patient went to ED

## 2022-05-24 NOTE — ED PROVIDER NOTES
History  Chief Complaint   Patient presents with    Fever - 9 weeks to 74 years     Pt receiving chemo and had temp 100 9 and was told to come in with any fever higher than 100 4  patients  tested positive      51 yr female with hx of metastatic breast ca to sc-- wears cervical and back brace-- s/p investigational chemo 5/19-- covid vaccinated and boosted- boyfriend test pos for covid  On thursday - pt tested neg  Friday  And over  Weekend- -- pt being treated for oral thrush -- states yesterday and today / mild increase in sore throat- today with fever to 100 9  -- home pos covid test --  No cp/sog/cough - no other comps- no gu/gyn comps- no new ski n rash pt has rash believed to be from chemo recently  finished pred for this       History provided by:  Patient   used: No    Fever - 9 weeks to 74 years  Temp source:  Oral  Severity:  Mild  Associated symptoms: sore throat    Associated symptoms: no chills, no congestion, no ear pain and no rhinorrhea        Prior to Admission Medications   Prescriptions Last Dose Informant Patient Reported? Taking? DPH-Lido-AlHydr-MgHydr-Simeth (First-Mouthwash BLM) SUSP  Self Yes No   al mag oxide-diphenhydramine-lidocaine viscous (MAGIC MOUTHWASH) 1:1:1 suspension  Self No No   Sig: Swish and spit 10 mL every 4 (four) hours as needed for mouth pain or discomfort   baclofen 10 mg tablet  Self No No   Sig: Take 0 5 tablets (5 mg total) by mouth as needed in the morning and 0 5 tablets (5 mg total) as needed at noon and 0 5 tablets (5 mg total) as needed in the evening for muscle spasms  dexamethasone (DECADRON) 4 mg tablet  Self No No   Sig: Take 1 tablet (4 mg total) by mouth 3 (three) times a day   furosemide (LASIX) 40 mg tablet  Self No No   Sig: Take 1 tablet (40 mg total) by mouth in the morning     Patient not taking: Reported on 5/18/2022   lactulose (CEPHULAC) 10 g packet  Self No No   Sig: Take 1 packet (10 g total) by mouth 3 (three) times a day as needed (constipation)   lisinopril (ZESTRIL) 20 mg tablet  Self No No   Sig: Take 1 tablet (20 mg total) by mouth daily   morphine (MS CONTIN) 15 mg 12 hr tablet  Self No No   Sig: Take 1 tablet (15 mg total) by mouth every 12 (twelve) hours Max Daily Amount: 30 mg   nystatin (MYCOSTATIN) 500,000 units/5 mL suspension  Self No No   Sig: Apply 5 mL (500,000 Units total) to the mouth or throat in the morning and 5 mL (500,000 Units total) at noon and 5 mL (500,000 Units total) in the evening and 5 mL (500,000 Units total) before bedtime  nystatin (MYCOSTATIN) powder  Self No No   Sig: Apply topically 3 (three) times a day   ondansetron (ZOFRAN) 4 mg tablet  Self No No   Sig: Take 1 tablet (4 mg total) by mouth every 8 (eight) hours as needed for nausea or vomiting   oxyCODONE (ROXICODONE) 10 MG TABS  Self No No   Sig: Take 1 tablet (10 mg total) by mouth every 4 (four) hours as needed for severe pain May split pill in half (5 mg) every 4 hours as needed for moderate pain (5 mg)  Max Daily Amount: 60 mg   pantoprazole (PROTONIX) 40 mg tablet  Self No No   Sig: Take 1 tablet (40 mg total) by mouth daily in the early morning   polyethylene glycol (MIRALAX) 17 g packet  Self No No   Sig: Take 17 g by mouth daily   predniSONE 10 mg tablet  Self No No   Sig: Take 1 tablet (10 mg total) by mouth in the morning  senna (SENOKOT) 8 6 mg  Self No No   Sig: Take 3 tablets (25 8 mg total) by mouth 2 (two) times a day Hold for loose stool  Facility-Administered Medications: None       Past Medical History:   Diagnosis Date    Cancer [facility])        History reviewed  No pertinent surgical history  Family History   Problem Relation Age of Onset    Coronary artery disease Mother     Heart attack Father     Diabetes type II Father      I have reviewed and agree with the history as documented      E-Cigarette/Vaping    E-Cigarette Use Never User      E-Cigarette/Vaping Substances    Nicotine No     THC No     CBD No     Flavoring No     Other No     Unknown No      Social History     Tobacco Use    Smoking status: Never Smoker    Smokeless tobacco: Never Used   Vaping Use    Vaping Use: Never used   Substance Use Topics    Alcohol use: Yes     Comment: rare, social     Drug use: Never       Review of Systems   Constitutional: Positive for fatigue and fever  Negative for activity change, appetite change, chills, diaphoresis and unexpected weight change  HENT: Positive for sore throat  Negative for congestion, dental problem, drooling, ear discharge, ear pain, facial swelling, hearing loss, mouth sores, nosebleeds, postnasal drip, rhinorrhea, sinus pressure, sinus pain, sneezing, tinnitus, trouble swallowing and voice change  Eyes: Negative  Respiratory: Negative  Cardiovascular: Negative  Gastrointestinal: Negative  Endocrine: Negative  Genitourinary: Negative  Musculoskeletal: Negative  Skin: Negative  Allergic/Immunologic: Negative  Neurological: Negative  Hematological: Negative  Psychiatric/Behavioral: Negative  Physical Exam  Physical Exam  Vitals and nursing note reviewed  Constitutional:       General: She is not in acute distress  Appearance: Normal appearance  She is not ill-appearing, toxic-appearing or diaphoretic  Comments: avss- tachy which resolved in er- febrile- pulse ox  97 % on ra- interpretation is normal- no intervention-    HENT:      Head: Normocephalic and atraumatic  Right Ear: Tympanic membrane, ear canal and external ear normal       Left Ear: Tympanic membrane, ear canal and external ear normal       Nose: Nose normal  No congestion or rhinorrhea  Mouth/Throat:      Pharynx: Oropharynx is clear  No oropharyngeal exudate or posterior oropharyngeal erythema  Eyes:      General: No scleral icterus  Right eye: No discharge  Left eye: No discharge  Extraocular Movements: Extraocular movements intact  Conjunctiva/sclera: Conjunctivae normal       Pupils: Pupils are equal, round, and reactive to light  Comments: Mm pink   Neck:      Vascular: No carotid bruit  Comments: Pt in neck and low bck brAce- no new pain   Cardiovascular:      Rate and Rhythm: Regular rhythm  Tachycardia present  Pulses: Normal pulses  Heart sounds: Normal heart sounds  No murmur heard  No friction rub  No gallop  Pulmonary:      Effort: Pulmonary effort is normal  No respiratory distress  Breath sounds: Normal breath sounds  No stridor  No wheezing, rhonchi or rales  Chest:      Chest wall: No tenderness  Abdominal:      General: Bowel sounds are normal  There is no distension  Palpations: Abdomen is soft  There is no mass  Tenderness: There is no abdominal tenderness  There is no right CVA tenderness, left CVA tenderness, guarding or rebound  Hernia: No hernia is present  Comments: Soft nt/nd- no hsm- no cva tenderness- no ascites- no peritoneal signs-    Musculoskeletal:         General: No swelling, tenderness, deformity or signs of injury  Normal range of motion  Cervical back: No rigidity or tenderness  Right lower leg: No edema  Left lower leg: No edema  Comments: Equal bilateral radial/dp pulses- no ble edema/calf tenderness/asym/ erythema   Lymphadenopathy:      Cervical: No cervical adenopathy  Skin:     Capillary Refill: Capillary refill takes less than 2 seconds  Coloration: Skin is pale  Skin is not jaundiced  Findings: No bruising, erythema, lesion or rash  Neurological:      General: No focal deficit present  Mental Status: She is alert and oriented to person, place, and time  Mental status is at baseline  Cranial Nerves: No cranial nerve deficit  Sensory: No sensory deficit  Motor: No weakness        Coordination: Coordination normal       Comments: Non focal neuro exam    Psychiatric:         Mood and Affect: Mood normal          Behavior: Behavior normal          Thought Content: Thought content normal          Judgment: Judgment normal          Vital Signs  ED Triage Vitals   Temperature Pulse Respirations Blood Pressure SpO2   05/23/22 1824 05/23/22 1824 05/23/22 1824 05/23/22 1824 05/23/22 1824   (!) 100 9 °F (38 3 °C) (!) 128 18 108/55 97 %      Temp Source Heart Rate Source Patient Position - Orthostatic VS BP Location FiO2 (%)   05/23/22 1824 05/23/22 1824 05/23/22 1824 05/23/22 1824 --   Oral Monitor Standing Left arm       Pain Score       05/23/22 1901       No Pain           Vitals:    05/23/22 1959 05/23/22 2029 05/23/22 2129 05/23/22 2229   BP: 123/65 102/57 94/54 106/56   Pulse: (!) 112 (!) 108 104 100   Patient Position - Orthostatic VS:             Visual Acuity      ED Medications  Medications   acetaminophen (TYLENOL) tablet 650 mg (650 mg Oral Given 5/23/22 2000)   lactated ringers bolus 500 mL (0 mL Intravenous Stopped 5/23/22 2153)   lactated ringers bolus 500 mL (500 mL Intravenous New Bag 5/23/22 2209)       Diagnostic Studies  Results Reviewed     Procedure Component Value Units Date/Time    COVID/FLU/RSV [[id number]]  (Abnormal) Collected: 05/23/22 1949    Lab Status: Final result Specimen: Nares from Nose Updated: 05/23/22 2156     SARS-CoV-2 Positive     INFLUENZA A PCR Negative     INFLUENZA B PCR Negative     RSV PCR Negative    Narrative:      FOR PEDIATRIC PATIENTS - copy/paste COVID Guidelines URL to browser: [email or web address]    SARS-CoV-2 assay is a Nucleic Acid Amplification assay intended for the  qualitative detection of nucleic acid from SARS-CoV-2 in nasopharyngeal  swabs  Results are for the presumptive identification of SARS-CoV-2 RNA  Positive results are indicative of infection with SARS-CoV-2, the virus  causing COVID-19, but do not rule out bacterial infection or co-infection  with other viruses   Laboratories within the [facility] and its  [facility] are required to report all positive results to the appropriate  public health authorities  Negative results do not preclude SARS-CoV-2  infection and should not be used as the sole basis for treatment or other  patient management decisions  Negative results must be combined with  clinical observations, patient history, and epidemiological information  This test has not been FDA cleared or approved  This test has been authorized by FDA under an Emergency Use Authorization  (EUA)  This test is only authorized for the duration of time the  declaration that circumstances exist justifying the authorization of the  emergency use of an in vitro diagnostic tests for detection of SARS-CoV-2  virus and/or diagnosis of COVID-19 infection under section 564(b)(1) of  the Act, 21 U  S C  670YWM-2(V)(8), unless the authorization is terminated  or revoked sooner  The test has been validated but independent review by FDA  and CLIA is pending  Test performed using [employer] GeneXpert: This RT-PCR assay targets N2,  a region unique to SARS-CoV-2  A conserved region in the E-gene was chosen  for pan-Sarbecovirus detection which includes SARS-CoV-2  CBC and differential [[id number]]  (Abnormal) Collected: 05/23/22 1949    Lab Status: Final result Specimen: Blood from Arm, Left Updated: 05/23/22 2153     WBC 2 04 Thousand/uL      RBC 3 07 Million/uL      Hemoglobin 9 0 g/dL      Hematocrit 29 5 %      MCV 96 fL      MCH 29 3 pg      MCHC 30 5 g/dL      RDW 17 2 %      MPV 9 7 fL      Platelets 920 Thousands/uL     Narrative: This is an appended report  These results have been appended to a previously verified report      Manual Differential(PHLEBS Do Not Order) [[id number]]  (Abnormal) Collected: 05/23/22 1949    Lab Status: Final result Specimen: Blood from Arm, Left Updated: 05/23/22 2153     Segmented % 70 %      Bands % 20 %      Lymphocytes % 4 %      Monocytes % 5 %      Eosinophils, % 0 %      Basophils % 0 %      Myelocytes % 1 %      Absolute Neutrophils 1 84 Thousand/uL      Lymphocytes Absolute 0 08 Thousand/uL      Monocytes Absolute 0 10 Thousand/uL      Eosinophils Absolute 0 00 Thousand/uL      Basophils Absolute 0 00 Thousand/uL      Total Counted --     Anisocytosis Present     Hypochromia Present     Polychromasia Present     Platelet Estimate Adequate    Urine Microscopic [[id number]]  (Abnormal) Collected: 05/23/22 1954    Lab Status: Final result Specimen: Urine, Clean Catch Updated: 05/23/22 2037     RBC, UA 0-1 /hpf      WBC, UA 20-30 /hpf      Epithelial Cells Occasional /hpf      Bacteria, UA Occasional /hpf      MUCUS THREADS Occasional    Urine culture [[id number]] Collected: 05/23/22 1954    Lab Status:  In process Specimen: Urine, Clean Catch Updated: 05/23/22 5076    Basic metabolic panel [[id number]]  (Abnormal) Collected: 05/23/22 1949    Lab Status: Final result Specimen: Blood from Arm, Left Updated: 05/23/22 2025     Sodium 132 mmol/L      Potassium 4 3 mmol/L      Chloride 98 mmol/L      CO2 28 mmol/L      ANION GAP 6 mmol/L      BUN 10 mg/dL      Creatinine 0 56 mg/dL      Glucose 91 mg/dL      Calcium 8 2 mg/dL      eGFR 111 ml/min/1 73sq m     Narrative:      [place] guidelines for Chronic Kidney Disease (CKD):     Stage 1 with normal or high GFR (GFR > 90 mL/min/1 73 square meters)    Stage 2 Mild CKD (GFR = 60-89 mL/min/1 73 square meters)    Stage 3A Moderate CKD (GFR = 45-59 mL/min/1 73 square meters)    Stage 3B Moderate CKD (GFR = 30-44 mL/min/1 73 square meters)    Stage 4 Severe CKD (GFR = 15-29 mL/min/1 73 square meters)    Stage 5 End Stage CKD (GFR <15 mL/min/1 73 square meters)  Note: GFR calculation is accurate only with a steady state creatinine    UA w Reflex to Microscopic w Reflex to Culture [[id number]]  (Abnormal) Collected: 05/23/22 1954    Lab Status: Final result Specimen: Urine, Clean Catch Updated: 05/23/22 2007 Color, UA Yellow     Clarity, UA Clear     Specific Gravity, UA 1 015     pH, UA 6 0     Leukocytes, UA Small     Nitrite, UA Negative     Protein, UA Trace mg/dl      Glucose, UA Negative mg/dl      Ketones, UA Negative mg/dl      Urobilinogen, UA 0 2 E U /dl      Bilirubin, UA Negative     Blood, UA Negative    Blood culture #1 [[id number]] Collected: 05/23/22 1957    Lab Status: In process Specimen: Blood from Hand, Left Updated: 05/23/22 2003    Blood culture #2 [[id number]] Collected: 05/23/22 1949    Lab Status: In process Specimen: Blood from Arm, Left Updated: 05/23/22 2003                 XR chest 1 view portable    (Results Pending)              Procedures  Procedures         ED Course  ED Course as of 05/24/22 0003   Mon May 23, 2022   2137 Cxr portable- compared to previous 3/22- no sign changes- no infiltrate    2202 - er md note-  c bc diff reviewed - pt is not neutropenic    2220 - er md note- pt - re-evaluated - resting sbp in 110's- pulse in low 100's-  discussed covid pos status  -- main reaction with paxlovid  is with pt oxy and ms ir  for  bone mets -- today is presumed day one -   discussed  with pt that antbx- tx- bebtelovimab might be better option -- pt agrees with this is will alert pt  hem/onc physician and covid  team    02 40 12 20 89 Er md note- er md tried to contact covid response team - would not accept-  [facility]- he will contact pt and  discuss and arrange antibody tx -- pt is aware of this    2347 Er md note- - tiger text message left for pt oncologist -  dr Kareen Weinstein  about covid pos and antibody treatment being set up by pmd                                SBIRT 20yo+    Flowsheet Row Most Recent Value   SBIRT (25 yo +)    In order to provide better care to our patients, we are screening all of our patients for alcohol and drug use  Would it be okay to ask you these screening questions?  No Filed at: 05/23/2022 1916 MDM    Disposition  Final diagnoses:   COVID     Time reflects when diagnosis was documented in both MDM as applicable and the Disposition within this note     Time User Action Codes Description Comment    5/23/2022 11:49 PM Otto Stafford [U07 1] [facility]       ED Disposition     ED Disposition   Discharge    Condition   Stable    Date/Time   Mon May 23, 2022 [address] discharge to home/self care  Follow-up Information    None         Patient's Medications   Discharge Prescriptions    No medications on file       No discharge procedures on file      PDMP Review       Value Time User    PDMP Reviewed  Yes 5/18/2022  8:10 AM Iván Ellison MD          ED Provider  Electronically Signed by           Elvira Powell MD  05/24/22 0010       Elvira Powell MD  05/24/22 1229

## 2022-05-24 NOTE — ED PROCEDURE NOTE
PROCEDURE  ECG 12 Lead Documentation Only    Date/Time: 5/23/2022 9:56 PM  Performed by: Valentin Jimenez MD  Authorized by: Valentin Jimenez MD     Indications / Diagnosis:  Tachy   ECG reviewed by me, the ED Provider: yes    Patient location:  ED and bedside  Previous ECG:     Previous ECG:  Compared to current    Comparison ECG info:  3/28/22 no sign changes    Similarity:  No change    Comparison to cardiac monitor: Yes    Interpretation:     Interpretation: non-specific    Rate:     ECG rate:  113    ECG rate assessment: tachycardic    Rhythm:     Rhythm: sinus tachycardia    Ectopy:     Ectopy: none    QRS:     QRS axis:  Normal    QRS intervals:  Normal  Conduction:     Conduction: normal    ST segments:     ST segments:  Normal  T waves:     T waves: flattening      Flattening:  III, aVF and V1  Q waves:     Q waves:  V1  Other findings:     Other findings: LVH    Comments:      No ecg signs of ischemia/ injury          Valentin Jimenez MD  05/23/22 6723

## 2022-05-25 ENCOUNTER — HOSPITAL ENCOUNTER (OUTPATIENT)
Dept: INFUSION CENTER | Facility: HOSPITAL | Age: 47
Discharge: HOME | End: 2022-05-25
Payer: COMMERCIAL

## 2022-05-25 ENCOUNTER — TELEPHONE (OUTPATIENT)
Dept: INTERNAL MEDICINE CLINIC | Facility: CLINIC | Age: 47
End: 2022-05-25

## 2022-05-25 VITALS
DIASTOLIC BLOOD PRESSURE: 58 MMHG | TEMPERATURE: 97.4 F | SYSTOLIC BLOOD PRESSURE: 94 MMHG | HEART RATE: 91 BPM | OXYGEN SATURATION: 97 % | RESPIRATION RATE: 18 BRPM

## 2022-05-25 DIAGNOSIS — U07.1 COVID: ICD-10-CM

## 2022-05-25 DIAGNOSIS — B37.81 CANDIDA INFECTION, ESOPHAGEAL (HCC): Primary | ICD-10-CM

## 2022-05-25 LAB — BACTERIA UR CULT: ABNORMAL

## 2022-05-25 PROCEDURE — M0222 HB BEBTELOVIMAB INJECTION: HCPCS | Performed by: INTERNAL MEDICINE

## 2022-05-25 RX ORDER — ALBUTEROL SULFATE 90 UG/1
3 AEROSOL, METERED RESPIRATORY (INHALATION) ONCE AS NEEDED
Status: DISCONTINUED | OUTPATIENT
Start: 2022-05-25 | End: 2022-05-28 | Stop reason: HOSPADM

## 2022-05-25 RX ORDER — SODIUM CHLORIDE 9 MG/ML
20 INJECTION, SOLUTION INTRAVENOUS ONCE
Status: CANCELLED | OUTPATIENT
Start: 2022-05-25

## 2022-05-25 RX ORDER — BEBTELOVIMAB 87.5 MG/ML
175 INJECTION, SOLUTION INTRAVENOUS ONCE
Status: COMPLETED | OUTPATIENT
Start: 2022-05-25 | End: 2022-05-25

## 2022-05-25 RX ORDER — ACETAMINOPHEN 325 MG/1
650 TABLET ORAL ONCE AS NEEDED
Status: CANCELLED | OUTPATIENT
Start: 2022-05-25

## 2022-05-25 RX ORDER — BEBTELOVIMAB 87.5 MG/ML
175 INJECTION, SOLUTION INTRAVENOUS ONCE
Status: CANCELLED | OUTPATIENT
Start: 2022-05-25

## 2022-05-25 RX ORDER — ALBUTEROL SULFATE 90 UG/1
3 AEROSOL, METERED RESPIRATORY (INHALATION) ONCE AS NEEDED
Status: CANCELLED | OUTPATIENT
Start: 2022-05-25

## 2022-05-25 RX ORDER — SODIUM CHLORIDE 9 MG/ML
20 INJECTION, SOLUTION INTRAVENOUS ONCE
Status: DISCONTINUED | OUTPATIENT
Start: 2022-05-25 | End: 2022-05-28 | Stop reason: HOSPADM

## 2022-05-25 RX ORDER — ONDANSETRON 2 MG/ML
4 INJECTION INTRAMUSCULAR; INTRAVENOUS ONCE AS NEEDED
Status: CANCELLED | OUTPATIENT
Start: 2022-05-25

## 2022-05-25 RX ORDER — ACETAMINOPHEN 325 MG/1
650 TABLET ORAL ONCE AS NEEDED
Status: DISCONTINUED | OUTPATIENT
Start: 2022-05-25 | End: 2022-05-28 | Stop reason: HOSPADM

## 2022-05-25 RX ORDER — ONDANSETRON 2 MG/ML
4 INJECTION INTRAMUSCULAR; INTRAVENOUS ONCE AS NEEDED
Status: DISCONTINUED | OUTPATIENT
Start: 2022-05-25 | End: 2022-05-28 | Stop reason: HOSPADM

## 2022-05-25 RX ADMIN — BEBTELOVIMAB 175 MG: 87.5 INJECTION, SOLUTION INTRAVENOUS at 09:31

## 2022-05-25 NOTE — PROGRESS NOTES
Pt ending BP was 73/35 in the sitting position  Pt denies any dizziness or other symptoms  Pt states that her BP is normally low, but not that low  I reached out to Dr Elda Best to make him aware  He states to do orthostatic BP and if it continues to be low to send to ED  Pt is 91/45 in the sitting position and 94/58 standing  Pt still has no symptoms other than feeling tired  Dr Yuliet Romero made aware  OK to discharge pt at this time  IV removed and gauze dressing applied  PCP will follow up with pt later on today

## 2022-05-25 NOTE — TELEPHONE ENCOUNTER
Call the patient to check how she was doing after MAB therapy for COVID this morning as  blood pressures were little soft  Left voicemail to monitor her symptoms and blood pressure and call this office

## 2022-05-26 ENCOUNTER — TELEMEDICINE (OUTPATIENT)
Dept: INTERNAL MEDICINE CLINIC | Facility: CLINIC | Age: 47
End: 2022-05-26
Payer: COMMERCIAL

## 2022-05-26 VITALS
BODY MASS INDEX: 30.82 KG/M2 | DIASTOLIC BLOOD PRESSURE: 85 MMHG | TEMPERATURE: 99.4 F | HEIGHT: 65 IN | SYSTOLIC BLOOD PRESSURE: 135 MMHG | WEIGHT: 185 LBS

## 2022-05-26 DIAGNOSIS — U07.1 COVID: ICD-10-CM

## 2022-05-26 DIAGNOSIS — B37.0 ORAL THRUSH: ICD-10-CM

## 2022-05-26 DIAGNOSIS — R19.7 DIARRHEA OF PRESUMED INFECTIOUS ORIGIN: ICD-10-CM

## 2022-05-26 PROCEDURE — 1036F TOBACCO NON-USER: CPT | Performed by: INTERNAL MEDICINE

## 2022-05-26 PROCEDURE — 99213 OFFICE O/P EST LOW 20 MIN: CPT | Performed by: INTERNAL MEDICINE

## 2022-05-26 NOTE — PROGRESS NOTES
COVID-19 Outpatient Progress Note    Assessment/Plan:    Problem List Items Addressed This Visit        Other    COVID - Primary    Relevant Orders    Basic metabolic panel      Other Visit Diagnoses     Diarrhea of presumed infectious origin        Likely due to COVID, will check his C diff as patient is on chemotherapy    Relevant Orders    Clostridium difficile toxin by PCR with EIA    Basic metabolic panel    Oral thrush        Symptoms are improving, completed 2 weeks of p o  fluconazole, continue nystatin mouthwash    Relevant Medications    nystatin (MYCOSTATIN) 500,000 units/5 mL suspension         Disposition:     Patient has COVID-19 infection  Based off CDC guidelines, they were recommended to isolate for 5 days from the date of the positive test  If they remain asymptomatic, isolation may be ended followed by 5 days of wearing a mask when around othes to minimize risk of infecting others  If they have a fever, continue to stay home until fever resolves for at least 24 hours  I have spent 25 minutes directly with the patient  Greater than 50% of this time was spent in counseling/coordination of care regarding: diagnostic results, prognosis, risks and benefits of treatment options, instructions for management, patient and family education, importance of treatment compliance, risk factor reductions and impressions        Encounter provider Koki Eaton MD    Provider located at [address] [address] [phone]    Recent Visits  Date Type Provider Dept   05/25/22 Telephone Koki Eaton  [facility]   05/24/22 Telemedicine Koki Eaton MD [facility]  [facility] Internal Med   05/23/22 Telephone Koki Eaton MD [facility]   Showing recent visits within past 7 days and meeting all other requirements  Today's Visits  Date Type Provider Dept 05/26/22 Telemedicine Shazia Rizzo MD [facility]   Showing today's visits and meeting all other requirements  Future Appointments  No visits were found meeting these conditions  Showing future appointments within next 150 days and meeting all other requirements       Patient agrees to participate in a virtual check in via telephone or video visit instead of presenting to the office to address urgent/immediate medical needs  Patient is aware this is a billable service  After connecting through [facility], the patient was identified by name and date of birth  Leia Perez was informed that this was a telemedicine visit and that the exam was being conducted confidentially over secure lines  My office door was closed  No one else was in the room  Leia Perez acknowledged consent and understanding of privacy and security of the telemedicine visit  I informed the patient that I have reviewed her record in Epic and presented the opportunity for her to ask any questions regarding the visit today  The patient agreed to participate  Verification of patient location:  Patient is located in the following state in which I hold an active license: [place]    Subjective:   Leia Perez is a 51 y o  female who has been screened for COVID-19  Symptom change since last report: improving  Patient's symptoms include sore throat, cough and diarrhea  Patient denies fever, chills, fatigue, malaise, congestion, rhinorrhea, anosmia, loss of taste, shortness of breath, chest tightness, abdominal pain, nausea, vomiting, myalgias and headaches  - Date of symptom onset: 5/23/2022      COVID-19 vaccination status: Fully vaccinated (primary series)    Estela Chávez has been staying home and has isolated themselves in her home  She is taking care to not share personal items and is cleaning all surfaces that are touched often, like counters, tabletops, and doorknobs using household cleaning sprays or wipes  She is wearing a mask when she leaves her room  Monoclonal Antibody Follow-up Symptom Questionnaire  I feel overall: somewhat better  My breathing is: somewhat better  My fever is: same  My fatigue is: somewhat better    Lab Results   Component Value Date    SARSCOV2 Positive (A) 05/23/2022     Past Medical History:   Diagnosis Date    Cancer [facility])      History reviewed  No pertinent surgical history  Current Outpatient Medications   Medication Sig Dispense Refill    nystatin (MYCOSTATIN) 500,000 units/5 mL suspension Apply 5 mL (500,000 Units total) to the mouth or throat 4 (four) times a day 200 mL 0    al mag oxide-diphenhydramine-lidocaine viscous (MAGIC MOUTHWASH) 1:1:1 suspension Swish and spit 10 mL every 4 (four) hours as needed for mouth pain or discomfort 500 mL 2    baclofen 10 mg tablet Take 0 5 tablets (5 mg total) by mouth as needed in the morning and 0 5 tablets (5 mg total) as needed at noon and 0 5 tablets (5 mg total) as needed in the evening for muscle spasms  45 tablet 0    dexamethasone (DECADRON) 4 mg tablet Take 1 tablet (4 mg total) by mouth 3 (three) times a day 45 tablet 0    DPH-Lido-AlHydr-MgHydr-Simeth (First-Mouthwash BLM) SUSP       furosemide (LASIX) 40 mg tablet Take 1 tablet (40 mg total) by mouth in the morning   30 tablet 1    lactulose (CEPHULAC) 10 g packet Take 1 packet (10 g total) by mouth 3 (three) times a day as needed (constipation) 30 each 0    lisinopril (ZESTRIL) 20 mg tablet Take 1 tablet (20 mg total) by mouth daily 90 tablet 1    morphine (MS CONTIN) 15 mg 12 hr tablet Take 1 tablet (15 mg total) by mouth every 12 (twelve) hours Max Daily Amount: 30 mg 60 tablet 0    nystatin (MYCOSTATIN) powder Apply topically 3 (three) times a day 15 g 0    ondansetron (ZOFRAN) 4 mg tablet Take 1 tablet (4 mg total) by mouth every 8 (eight) hours as needed for nausea or vomiting 40 tablet 2    oxyCODONE (ROXICODONE) 10 MG TABS Take 1 tablet (10 mg total) by mouth every 4 (four) hours as needed for severe pain May split pill in half (5 mg) every 4 hours as needed for moderate pain (5 mg)  Max Daily Amount: 60 mg 180 tablet 0    pantoprazole (PROTONIX) 40 mg tablet Take 1 tablet (40 mg total) by mouth daily in the early morning 90 tablet 1    polyethylene glycol (MIRALAX) 17 g packet Take 17 g by mouth daily 30 each 0    predniSONE 10 mg tablet Take 1 tablet (10 mg total) by mouth in the morning  18 tablet 0    senna (SENOKOT) 8 6 mg Take 3 tablets (25 8 mg total) by mouth 2 (two) times a day Hold for loose stool  180 tablet 0     No current facility-administered medications for this visit  Facility-Administered Medications Ordered in Other Visits   Medication Dose Route Frequency Provider Last Rate Last Admin    acetaminophen (TYLENOL) tablet 650 mg  650 mg Oral Once PRN Jerrod Macdonald MD        albuterol (PROVENTIL HFA,VENTOLIN HFA) inhaler 3 puff  3 puff Inhalation Once PRN Jerrod Macdonald MD        ondansetron (ZOFRAN) injection 4 mg  4 mg Intravenous Once PRN Jerrod Macdonald MD        sodium chloride 0 9 % infusion  20 mL/hr Intravenous Once Koki Eaton MD         No Known Allergies    Review of Systems   Constitutional: Negative for activity change, appetite change, chills, diaphoresis, fatigue, fever and unexpected weight change  HENT: Positive for sore throat  Negative for congestion, drooling, ear discharge, ear pain, facial swelling, hearing loss, postnasal drip, rhinorrhea, sinus pressure, sinus pain, sneezing, tinnitus, trouble swallowing and voice change  Eyes: Negative for discharge  Respiratory: Positive for cough  Negative for apnea, choking, chest tightness, shortness of breath, wheezing and stridor  Cardiovascular: Negative for chest pain, palpitations and leg swelling  Gastrointestinal: Positive for diarrhea   Negative for abdominal distention, abdominal pain, anal bleeding, blood in stool, constipation, nausea and vomiting  Genitourinary: Negative for decreased urine volume, difficulty urinating, frequency and urgency  Musculoskeletal: Negative for arthralgias, back pain and myalgias  Skin: Negative for color change  Neurological: Negative for dizziness, light-headedness, numbness and headaches  Objective:    Vitals:    05/26/22 1049   BP: 135/85   BP Location: Left arm   Patient Position: Sitting   Cuff Size: Large   Temp: 99 4 °F (37 4 °C)   TempSrc: Temporal   Weight: 83 9 kg (185 lb)   Height: 5' 4 8" (1 646 m)       Physical Exam  Constitutional:       General: She is not in acute distress  Appearance: Normal appearance  She is not ill-appearing, toxic-appearing or diaphoretic  Neurological:      Mental Status: She is alert and oriented to person, place, and time  VIRTUAL VISIT DISCLAIMER    [place] Jarad verbally agrees to participate in [employer]  Pt is aware that [employer] could be limited without vital signs or the ability to perform a full hands-on physical exam  Nayeli Larson understands she or the provider may request at any time to terminate the video visit and request the patient to seek care or treatment in person

## 2022-05-27 ENCOUNTER — HOSPITAL ENCOUNTER (INPATIENT)
Facility: HOSPITAL | Age: 47
LOS: 1 days | Discharge: HOME HEALTH | DRG: 871 | End: 2022-05-29
Attending: EMERGENCY MEDICINE | Admitting: INTERNAL MEDICINE
Payer: COMMERCIAL

## 2022-05-27 ENCOUNTER — TELEPHONE (OUTPATIENT)
Dept: OTHER | Facility: OTHER | Age: 47
End: 2022-05-27

## 2022-05-27 ENCOUNTER — APPOINTMENT (EMERGENCY)
Dept: RADIOLOGY | Facility: HOSPITAL | Age: 47
DRG: 871 | End: 2022-05-27
Payer: COMMERCIAL

## 2022-05-27 ENCOUNTER — LAB (OUTPATIENT)
Dept: LAB | Facility: CLINIC | Age: 47
End: 2022-05-27
Payer: COMMERCIAL

## 2022-05-27 DIAGNOSIS — U07.1 COVID: ICD-10-CM

## 2022-05-27 DIAGNOSIS — U07.1 COVID-19: ICD-10-CM

## 2022-05-27 DIAGNOSIS — C50.811 MALIGNANT NEOPLASM OF OVERLAPPING SITES OF RIGHT BREAST IN FEMALE, ESTROGEN RECEPTOR NEGATIVE (HCC): ICD-10-CM

## 2022-05-27 DIAGNOSIS — A41.9 SEPSIS (HCC): Primary | ICD-10-CM

## 2022-05-27 DIAGNOSIS — Z17.1 MALIGNANT NEOPLASM OF OVERLAPPING SITES OF RIGHT BREAST IN FEMALE, ESTROGEN RECEPTOR NEGATIVE (HCC): ICD-10-CM

## 2022-05-27 DIAGNOSIS — R53.0 NEOPLASTIC MALIGNANT RELATED FATIGUE: ICD-10-CM

## 2022-05-27 DIAGNOSIS — U07.1 PNEUMONIA DUE TO COVID-19 VIRUS: ICD-10-CM

## 2022-05-27 DIAGNOSIS — R09.02 HYPOXIA: ICD-10-CM

## 2022-05-27 DIAGNOSIS — D70.9 NEUTROPENIA (HCC): ICD-10-CM

## 2022-05-27 DIAGNOSIS — C50.919 METASTATIC BREAST CANCER (HCC): ICD-10-CM

## 2022-05-27 DIAGNOSIS — J12.82 PNEUMONIA DUE TO COVID-19 VIRUS: ICD-10-CM

## 2022-05-27 DIAGNOSIS — R19.7 DIARRHEA: ICD-10-CM

## 2022-05-27 DIAGNOSIS — R19.7 DIARRHEA OF PRESUMED INFECTIOUS ORIGIN: ICD-10-CM

## 2022-05-27 LAB
ALBUMIN SERPL BCP-MCNC: 3.6 G/DL (ref 3.5–5)
ALP SERPL-CCNC: 86 U/L (ref 34–104)
ALT SERPL W P-5'-P-CCNC: 31 U/L (ref 7–52)
ANION GAP SERPL CALCULATED.3IONS-SCNC: 7 MMOL/L (ref 4–13)
ANION GAP SERPL CALCULATED.3IONS-SCNC: 8 MMOL/L (ref 4–13)
ANISOCYTOSIS BLD QL SMEAR: PRESENT
AST SERPL W P-5'-P-CCNC: 23 U/L (ref 13–39)
BASOPHILS # BLD MANUAL: 0 THOUSAND/UL (ref 0–0.1)
BASOPHILS NFR MAR MANUAL: 0 % (ref 0–1)
BILIRUB SERPL-MCNC: 0.5 MG/DL (ref 0.2–1)
BUN SERPL-MCNC: 7 MG/DL (ref 5–25)
BUN SERPL-MCNC: 8 MG/DL (ref 5–25)
CA-I BLD-SCNC: 1.11 MMOL/L (ref 1.12–1.32)
CALCIUM SERPL-MCNC: 8.8 MG/DL (ref 8.4–10.2)
CALCIUM SERPL-MCNC: 9 MG/DL (ref 8.3–10.1)
CARDIAC TROPONIN I PNL SERPL HS: 3 NG/L
CHLORIDE SERPL-SCNC: 101 MMOL/L (ref 96–108)
CHLORIDE SERPL-SCNC: 102 MMOL/L (ref 100–108)
CK SERPL-CCNC: 47 U/L (ref 26–192)
CO2 SERPL-SCNC: 25 MMOL/L (ref 21–32)
CO2 SERPL-SCNC: 25 MMOL/L (ref 21–32)
CREAT SERPL-MCNC: 0.46 MG/DL (ref 0.6–1.3)
CREAT SERPL-MCNC: 0.6 MG/DL (ref 0.6–1.3)
CRP SERPL QL: 63.1 MG/L
EOSINOPHIL # BLD MANUAL: 0 THOUSAND/UL (ref 0–0.4)
EOSINOPHIL NFR BLD MANUAL: 0 % (ref 0–6)
ERYTHROCYTE [DISTWIDTH] IN BLOOD BY AUTOMATED COUNT: 17.9 % (ref 11.6–15.1)
GFR SERPL CREATININE-BSD FRML MDRD: 108 ML/MIN/1.73SQ M
GFR SERPL CREATININE-BSD FRML MDRD: 118 ML/MIN/1.73SQ M
GLUCOSE P FAST SERPL-MCNC: 100 MG/DL (ref 65–99)
GLUCOSE SERPL-MCNC: 104 MG/DL (ref 65–140)
HCT VFR BLD AUTO: 28.8 % (ref 34.8–46.1)
HGB BLD-MCNC: 8.8 G/DL (ref 11.5–15.4)
LACTATE SERPL-SCNC: 0.6 MMOL/L (ref 0.5–2)
LYMPHOCYTES # BLD AUTO: 0.37 THOUSAND/UL (ref 0.6–4.47)
LYMPHOCYTES # BLD AUTO: 40 % (ref 14–44)
MACROCYTES BLD QL AUTO: PRESENT
MAGNESIUM SERPL-MCNC: 1.9 MG/DL (ref 1.9–2.7)
MCH RBC QN AUTO: 29.3 PG (ref 26.8–34.3)
MCHC RBC AUTO-ENTMCNC: 30.6 G/DL (ref 31.4–37.4)
MCV RBC AUTO: 96 FL (ref 82–98)
METAMYELOCYTES NFR BLD MANUAL: 2 % (ref 0–1)
MONOCYTES # BLD AUTO: 0.16 THOUSAND/UL (ref 0–1.22)
MONOCYTES NFR BLD: 17 % (ref 4–12)
MYELOCYTES NFR BLD MANUAL: 2 % (ref 0–1)
NEUTROPHILS # BLD MANUAL: 0.35 THOUSAND/UL (ref 1.85–7.62)
NEUTS BAND NFR BLD MANUAL: 9 % (ref 0–8)
NEUTS SEG NFR BLD AUTO: 29 % (ref 43–75)
NRBC BLD AUTO-RTO: 1 /100 WBC (ref 0–2)
OVALOCYTES BLD QL SMEAR: PRESENT
PLATELET # BLD AUTO: 251 THOUSANDS/UL (ref 149–390)
PLATELET BLD QL SMEAR: ADEQUATE
PMV BLD AUTO: 10.5 FL (ref 8.9–12.7)
POIKILOCYTOSIS BLD QL SMEAR: PRESENT
POLYCHROMASIA BLD QL SMEAR: PRESENT
POTASSIUM SERPL-SCNC: 4.2 MMOL/L (ref 3.5–5.3)
POTASSIUM SERPL-SCNC: 4.3 MMOL/L (ref 3.5–5.3)
PROCALCITONIN SERPL-MCNC: 0.05 NG/ML
PROT SERPL-MCNC: 6.6 G/DL (ref 6.4–8.4)
RBC # BLD AUTO: 3 MILLION/UL (ref 3.81–5.12)
RBC MORPH BLD: PRESENT
S PYO DNA THROAT QL NAA+PROBE: NOT DETECTED
SODIUM SERPL-SCNC: 134 MMOL/L (ref 135–147)
SODIUM SERPL-SCNC: 134 MMOL/L (ref 136–145)
TRIGL SERPL-MCNC: 135 MG/DL
VARIANT LYMPHS # BLD AUTO: 1 %
WBC # BLD AUTO: 0.92 THOUSAND/UL (ref 4.31–10.16)

## 2022-05-27 PROCEDURE — 82955 ASSAY OF G6PD ENZYME: CPT | Performed by: EMERGENCY MEDICINE

## 2022-05-27 PROCEDURE — 81003 URINALYSIS AUTO W/O SCOPE: CPT | Performed by: EMERGENCY MEDICINE

## 2022-05-27 PROCEDURE — 86140 C-REACTIVE PROTEIN: CPT | Performed by: EMERGENCY MEDICINE

## 2022-05-27 PROCEDURE — 87077 CULTURE AEROBIC IDENTIFY: CPT | Performed by: EMERGENCY MEDICINE

## 2022-05-27 PROCEDURE — 80048 BASIC METABOLIC PNL TOTAL CA: CPT

## 2022-05-27 PROCEDURE — 85007 BL SMEAR W/DIFF WBC COUNT: CPT

## 2022-05-27 PROCEDURE — 83880 ASSAY OF NATRIURETIC PEPTIDE: CPT | Performed by: EMERGENCY MEDICINE

## 2022-05-27 PROCEDURE — 82330 ASSAY OF CALCIUM: CPT | Performed by: EMERGENCY MEDICINE

## 2022-05-27 PROCEDURE — 36415 COLL VENOUS BLD VENIPUNCTURE: CPT

## 2022-05-27 PROCEDURE — 87086 URINE CULTURE/COLONY COUNT: CPT | Performed by: EMERGENCY MEDICINE

## 2022-05-27 PROCEDURE — 71045 X-RAY EXAM CHEST 1 VIEW: CPT

## 2022-05-27 PROCEDURE — 85379 FIBRIN DEGRADATION QUANT: CPT | Performed by: EMERGENCY MEDICINE

## 2022-05-27 PROCEDURE — 83735 ASSAY OF MAGNESIUM: CPT | Performed by: EMERGENCY MEDICINE

## 2022-05-27 PROCEDURE — 85610 PROTHROMBIN TIME: CPT | Performed by: EMERGENCY MEDICINE

## 2022-05-27 PROCEDURE — 84478 ASSAY OF TRIGLYCERIDES: CPT | Performed by: EMERGENCY MEDICINE

## 2022-05-27 PROCEDURE — 85027 COMPLETE CBC AUTOMATED: CPT

## 2022-05-27 PROCEDURE — 87651 STREP A DNA AMP PROBE: CPT | Performed by: EMERGENCY MEDICINE

## 2022-05-27 PROCEDURE — 80053 COMPREHEN METABOLIC PANEL: CPT | Performed by: EMERGENCY MEDICINE

## 2022-05-27 PROCEDURE — 82550 ASSAY OF CK (CPK): CPT | Performed by: EMERGENCY MEDICINE

## 2022-05-27 PROCEDURE — 99285 EMERGENCY DEPT VISIT HI MDM: CPT | Performed by: EMERGENCY MEDICINE

## 2022-05-27 PROCEDURE — 87186 SC STD MICRODIL/AGAR DIL: CPT | Performed by: EMERGENCY MEDICINE

## 2022-05-27 PROCEDURE — 84484 ASSAY OF TROPONIN QUANT: CPT | Performed by: EMERGENCY MEDICINE

## 2022-05-27 PROCEDURE — 84145 PROCALCITONIN (PCT): CPT | Performed by: EMERGENCY MEDICINE

## 2022-05-27 PROCEDURE — 96361 HYDRATE IV INFUSION ADD-ON: CPT

## 2022-05-27 PROCEDURE — 87040 BLOOD CULTURE FOR BACTERIA: CPT | Performed by: EMERGENCY MEDICINE

## 2022-05-27 PROCEDURE — 84443 ASSAY THYROID STIM HORMONE: CPT | Performed by: EMERGENCY MEDICINE

## 2022-05-27 PROCEDURE — 83605 ASSAY OF LACTIC ACID: CPT | Performed by: EMERGENCY MEDICINE

## 2022-05-27 PROCEDURE — 99285 EMERGENCY DEPT VISIT HI MDM: CPT

## 2022-05-27 PROCEDURE — 84703 CHORIONIC GONADOTROPIN ASSAY: CPT | Performed by: EMERGENCY MEDICINE

## 2022-05-27 PROCEDURE — 82728 ASSAY OF FERRITIN: CPT | Performed by: EMERGENCY MEDICINE

## 2022-05-27 RX ORDER — SODIUM CHLORIDE 9 MG/ML
125 INJECTION, SOLUTION INTRAVENOUS CONTINUOUS
Status: DISCONTINUED | OUTPATIENT
Start: 2022-05-27 | End: 2022-05-28

## 2022-05-27 RX ADMIN — SODIUM CHLORIDE 1000 ML: 0.9 INJECTION, SOLUTION INTRAVENOUS at 23:17

## 2022-05-28 ENCOUNTER — APPOINTMENT (EMERGENCY)
Dept: CT IMAGING | Facility: HOSPITAL | Age: 47
DRG: 871 | End: 2022-05-28
Payer: COMMERCIAL

## 2022-05-28 PROBLEM — A41.9 SEPSIS (HCC): Status: ACTIVE | Noted: 2022-05-28

## 2022-05-28 PROBLEM — D70.1 CHEMOTHERAPY-INDUCED NEUTROPENIA (HCC): Status: ACTIVE | Noted: 2022-05-28

## 2022-05-28 PROBLEM — B37.2 CANDIDAL SKIN INFECTION: Status: ACTIVE | Noted: 2022-05-28

## 2022-05-28 PROBLEM — T45.1X5A CHEMOTHERAPY-INDUCED NEUTROPENIA (HCC): Status: ACTIVE | Noted: 2022-05-28

## 2022-05-28 LAB
ANISOCYTOSIS BLD QL SMEAR: PRESENT
BASOPHILS # BLD MANUAL: 0 THOUSAND/UL (ref 0–0.1)
BASOPHILS NFR MAR MANUAL: 0 % (ref 0–1)
BILIRUB UR QL STRIP: NEGATIVE
CLARITY UR: CLEAR
COLOR UR: YELLOW
D DIMER PPP FEU-MCNC: 0.96 UG/ML FEU
EOSINOPHIL # BLD MANUAL: 0 THOUSAND/UL (ref 0–0.4)
EOSINOPHIL NFR BLD MANUAL: 0 % (ref 0–6)
ERYTHROCYTE [DISTWIDTH] IN BLOOD BY AUTOMATED COUNT: 17.5 % (ref 11.6–15.1)
FERRITIN SERPL-MCNC: 191 NG/ML (ref 8–388)
GLUCOSE UR STRIP-MCNC: NEGATIVE MG/DL
HCG SERPL QL: NEGATIVE
HCT VFR BLD AUTO: 26.2 % (ref 34.8–46.1)
HGB BLD-MCNC: 8.1 G/DL (ref 11.5–15.4)
HGB UR QL STRIP.AUTO: NEGATIVE
INR PPP: 0.96 (ref 0.84–1.19)
KETONES UR STRIP-MCNC: NEGATIVE MG/DL
LEUKOCYTE ESTERASE UR QL STRIP: NEGATIVE
LYMPHOCYTES # BLD AUTO: 0.15 THOUSAND/UL (ref 0.6–4.47)
LYMPHOCYTES # BLD AUTO: 19 % (ref 14–44)
MCH RBC QN AUTO: 29.3 PG (ref 26.8–34.3)
MCHC RBC AUTO-ENTMCNC: 30.9 G/DL (ref 31.4–37.4)
MCV RBC AUTO: 95 FL (ref 82–98)
MONOCYTES # BLD AUTO: 0.32 THOUSAND/UL (ref 0–1.22)
MONOCYTES NFR BLD: 41 % (ref 4–12)
NEUTROPHILS # BLD MANUAL: 0.29 THOUSAND/UL (ref 1.85–7.62)
NEUTS SEG NFR BLD AUTO: 37 % (ref 43–75)
NITRITE UR QL STRIP: NEGATIVE
NT-PROBNP SERPL-MCNC: 19 PG/ML
PH UR STRIP.AUTO: 6 [PH]
PLATELET # BLD AUTO: 247 THOUSANDS/UL (ref 149–390)
PLATELET BLD QL SMEAR: ADEQUATE
PMV BLD AUTO: 9.5 FL (ref 8.9–12.7)
POLYCHROMASIA BLD QL SMEAR: PRESENT
PROT UR STRIP-MCNC: NEGATIVE MG/DL
PROTHROMBIN TIME: 12.8 SECONDS (ref 11.6–14.5)
RBC # BLD AUTO: 2.76 MILLION/UL (ref 3.81–5.12)
RBC MORPH BLD: PRESENT
SP GR UR STRIP.AUTO: 1.01 (ref 1–1.03)
TSH SERPL DL<=0.05 MIU/L-ACNC: 1.87 UIU/ML (ref 0.45–4.5)
UROBILINOGEN UR QL STRIP.AUTO: 0.2 E.U./DL
VARIANT LYMPHS # BLD AUTO: 3 %
WBC # BLD AUTO: 0.79 THOUSAND/UL (ref 4.31–10.16)

## 2022-05-28 PROCEDURE — 99255 IP/OBS CONSLTJ NEW/EST HI 80: CPT | Performed by: INTERNAL MEDICINE

## 2022-05-28 PROCEDURE — G1004 CDSM NDSC: HCPCS

## 2022-05-28 PROCEDURE — NC001 PR NO CHARGE: Performed by: INTERNAL MEDICINE

## 2022-05-28 PROCEDURE — 96365 THER/PROPH/DIAG IV INF INIT: CPT

## 2022-05-28 PROCEDURE — 71275 CT ANGIOGRAPHY CHEST: CPT

## 2022-05-28 PROCEDURE — 96367 TX/PROPH/DG ADDL SEQ IV INF: CPT

## 2022-05-28 PROCEDURE — 93005 ELECTROCARDIOGRAM TRACING: CPT

## 2022-05-28 PROCEDURE — 87505 NFCT AGENT DETECTION GI: CPT | Performed by: EMERGENCY MEDICINE

## 2022-05-28 PROCEDURE — XW033E5 INTRODUCTION OF REMDESIVIR ANTI-INFECTIVE INTO PERIPHERAL VEIN, PERCUTANEOUS APPROACH, NEW TECHNOLOGY GROUP 5: ICD-10-PCS | Performed by: INTERNAL MEDICINE

## 2022-05-28 PROCEDURE — 74177 CT ABD & PELVIS W/CONTRAST: CPT

## 2022-05-28 PROCEDURE — 99223 1ST HOSP IP/OBS HIGH 75: CPT | Performed by: INTERNAL MEDICINE

## 2022-05-28 PROCEDURE — 87493 C DIFF AMPLIFIED PROBE: CPT | Performed by: EMERGENCY MEDICINE

## 2022-05-28 RX ORDER — MORPHINE SULFATE 15 MG/1
15 TABLET, FILM COATED, EXTENDED RELEASE ORAL EVERY 12 HOURS SCHEDULED
Status: DISCONTINUED | OUTPATIENT
Start: 2022-05-28 | End: 2022-05-29 | Stop reason: HOSPADM

## 2022-05-28 RX ORDER — ENOXAPARIN SODIUM 100 MG/ML
40 INJECTION SUBCUTANEOUS DAILY
Status: DISCONTINUED | OUTPATIENT
Start: 2022-05-28 | End: 2022-05-28

## 2022-05-28 RX ORDER — ENOXAPARIN SODIUM 100 MG/ML
1 INJECTION SUBCUTANEOUS EVERY 12 HOURS SCHEDULED
Status: DISCONTINUED | OUTPATIENT
Start: 2022-05-28 | End: 2022-05-29 | Stop reason: HOSPADM

## 2022-05-28 RX ORDER — GUAIFENESIN 600 MG
600 TABLET, EXTENDED RELEASE 12 HR ORAL EVERY 12 HOURS SCHEDULED
Status: DISCONTINUED | OUTPATIENT
Start: 2022-05-28 | End: 2022-05-29 | Stop reason: HOSPADM

## 2022-05-28 RX ORDER — NYSTATIN 100000 [USP'U]/G
POWDER TOPICAL 3 TIMES DAILY
Status: DISCONTINUED | OUTPATIENT
Start: 2022-05-28 | End: 2022-05-29 | Stop reason: HOSPADM

## 2022-05-28 RX ORDER — BENZONATATE 100 MG/1
200 CAPSULE ORAL 3 TIMES DAILY
Status: DISCONTINUED | OUTPATIENT
Start: 2022-05-28 | End: 2022-05-29 | Stop reason: HOSPADM

## 2022-05-28 RX ORDER — DEXAMETHASONE SODIUM PHOSPHATE 4 MG/ML
6 INJECTION, SOLUTION INTRA-ARTICULAR; INTRALESIONAL; INTRAMUSCULAR; INTRAVENOUS; SOFT TISSUE EVERY 24 HOURS
Status: DISCONTINUED | OUTPATIENT
Start: 2022-05-28 | End: 2022-05-29 | Stop reason: HOSPADM

## 2022-05-28 RX ORDER — OXYCODONE HYDROCHLORIDE 10 MG/1
10 TABLET ORAL EVERY 4 HOURS PRN
Status: DISCONTINUED | OUTPATIENT
Start: 2022-05-28 | End: 2022-05-29 | Stop reason: HOSPADM

## 2022-05-28 RX ORDER — PANTOPRAZOLE SODIUM 40 MG/1
40 TABLET, DELAYED RELEASE ORAL
Status: DISCONTINUED | OUTPATIENT
Start: 2022-05-28 | End: 2022-05-29 | Stop reason: HOSPADM

## 2022-05-28 RX ORDER — VANCOMYCIN HYDROCHLORIDE 1 G/200ML
15 INJECTION, SOLUTION INTRAVENOUS EVERY 8 HOURS
Status: DISCONTINUED | OUTPATIENT
Start: 2022-05-28 | End: 2022-05-28

## 2022-05-28 RX ORDER — HYDROCODONE BITARTRATE AND HOMATROPINE METHYLBROMIDE ORAL SOLUTION 5; 1.5 MG/5ML; MG/5ML
5 LIQUID ORAL EVERY 6 HOURS PRN
Status: DISCONTINUED | OUTPATIENT
Start: 2022-05-28 | End: 2022-05-29 | Stop reason: HOSPADM

## 2022-05-28 RX ORDER — ONDANSETRON 4 MG/1
4 TABLET, ORALLY DISINTEGRATING ORAL EVERY 6 HOURS SCHEDULED
Status: DISCONTINUED | OUTPATIENT
Start: 2022-05-28 | End: 2022-05-29 | Stop reason: HOSPADM

## 2022-05-28 RX ORDER — BACLOFEN 10 MG/1
5 TABLET ORAL 3 TIMES DAILY PRN
Status: DISCONTINUED | OUTPATIENT
Start: 2022-05-28 | End: 2022-05-29 | Stop reason: HOSPADM

## 2022-05-28 RX ORDER — BENZONATATE 100 MG/1
100 CAPSULE ORAL 3 TIMES DAILY
Status: DISCONTINUED | OUTPATIENT
Start: 2022-05-28 | End: 2022-05-28

## 2022-05-28 RX ADMIN — NYSTATIN: 100000 POWDER TOPICAL at 08:43

## 2022-05-28 RX ADMIN — NYSTATIN 500000 UNITS: 100000 SUSPENSION ORAL at 17:22

## 2022-05-28 RX ADMIN — CEFEPIME HYDROCHLORIDE 2000 MG: 2 INJECTION, POWDER, FOR SOLUTION INTRAVENOUS at 00:26

## 2022-05-28 RX ADMIN — SODIUM CHLORIDE 125 ML/HR: 0.9 INJECTION, SOLUTION INTRAVENOUS at 01:08

## 2022-05-28 RX ADMIN — NYSTATIN: 100000 POWDER TOPICAL at 20:53

## 2022-05-28 RX ADMIN — VANCOMYCIN HYDROCHLORIDE 1750 MG: 1 INJECTION, POWDER, LYOPHILIZED, FOR SOLUTION INTRAVENOUS at 01:03

## 2022-05-28 RX ADMIN — GUAIFENESIN 600 MG: 600 TABLET ORAL at 12:57

## 2022-05-28 RX ADMIN — ONDANSETRON 4 MG: 4 TABLET, ORALLY DISINTEGRATING ORAL at 17:22

## 2022-05-28 RX ADMIN — BACLOFEN 5 MG: 10 TABLET ORAL at 17:22

## 2022-05-28 RX ADMIN — OXYCODONE HYDROCHLORIDE 10 MG: 10 TABLET ORAL at 17:22

## 2022-05-28 RX ADMIN — REMDESIVIR 200 MG: 100 INJECTION, POWDER, LYOPHILIZED, FOR SOLUTION INTRAVENOUS at 12:57

## 2022-05-28 RX ADMIN — BENZONATATE 200 MG: 100 CAPSULE ORAL at 14:45

## 2022-05-28 RX ADMIN — BENZONATATE 200 MG: 100 CAPSULE ORAL at 20:52

## 2022-05-28 RX ADMIN — DEXAMETHASONE SODIUM PHOSPHATE 6 MG: 4 INJECTION INTRA-ARTICULAR; INTRALESIONAL; INTRAMUSCULAR; INTRAVENOUS; SOFT TISSUE at 12:59

## 2022-05-28 RX ADMIN — ONDANSETRON 4 MG: 4 TABLET, ORALLY DISINTEGRATING ORAL at 05:18

## 2022-05-28 RX ADMIN — ONDANSETRON 4 MG: 4 TABLET, ORALLY DISINTEGRATING ORAL at 12:58

## 2022-05-28 RX ADMIN — MORPHINE SULFATE 15 MG: 15 TABLET, EXTENDED RELEASE ORAL at 14:44

## 2022-05-28 RX ADMIN — OXYCODONE HYDROCHLORIDE 10 MG: 10 TABLET ORAL at 12:57

## 2022-05-28 RX ADMIN — IOHEXOL 75 ML: 350 INJECTION, SOLUTION INTRAVENOUS at 00:51

## 2022-05-28 RX ADMIN — ENOXAPARIN SODIUM 80 MG: 80 INJECTION SUBCUTANEOUS at 08:43

## 2022-05-28 RX ADMIN — ENOXAPARIN SODIUM 80 MG: 80 INJECTION SUBCUTANEOUS at 20:53

## 2022-05-28 RX ADMIN — PANTOPRAZOLE SODIUM 40 MG: 40 TABLET, DELAYED RELEASE ORAL at 05:18

## 2022-05-28 RX ADMIN — NYSTATIN: 100000 POWDER TOPICAL at 17:23

## 2022-05-28 RX ADMIN — NYSTATIN 500000 UNITS: 100000 SUSPENSION ORAL at 20:52

## 2022-05-28 RX ADMIN — NYSTATIN 500000 UNITS: 100000 SUSPENSION ORAL at 08:43

## 2022-05-28 RX ADMIN — BACLOFEN 5 MG: 10 TABLET ORAL at 12:58

## 2022-05-28 RX ADMIN — NYSTATIN 500000 UNITS: 100000 SUSPENSION ORAL at 12:58

## 2022-05-28 RX ADMIN — VANCOMYCIN HYDROCHLORIDE 1000 MG: 1 INJECTION, SOLUTION INTRAVENOUS at 08:46

## 2022-05-28 RX ADMIN — OXYCODONE HYDROCHLORIDE 10 MG: 10 TABLET ORAL at 22:24

## 2022-05-28 RX ADMIN — OXYCODONE HYDROCHLORIDE 10 MG: 10 TABLET ORAL at 08:44

## 2022-05-28 RX ADMIN — BACLOFEN 5 MG: 10 TABLET ORAL at 22:24

## 2022-05-28 NOTE — PLAN OF CARE
Problem: Nutrition/Hydration-ADULT  Goal: Nutrient/Hydration intake appropriate for improving, restoring or maintaining nutritional needs  Description: Monitor and assess patient's nutrition/hydration status for malnutrition  Collaborate with interdisciplinary team and initiate plan and interventions as ordered  Monitor patient's weight and dietary intake as ordered or per policy  Utilize nutrition screening tool and intervene as necessary  Determine patient's food preferences and provide high-protein, high-caloric foods as appropriate       INTERVENTIONS:  - Monitor oral intake, urinary output, labs, and treatment plans  - Assess nutrition and hydration status and recommend course of action  - Evaluate amount of meals eaten  - Assist patient with eating if necessary   - Allow adequate time for meals  - Recommend/ encourage appropriate diets, oral nutritional supplements, and vitamin/mineral supplements  - Order, calculate, and assess calorie counts as needed  - Recommend, monitor, and adjust tube feedings and TPN/PPN based on assessed needs  - Assess need for intravenous fluids  - Provide specific nutrition/hydration education as appropriate  - Include patient/family/caregiver in decisions related to nutrition  Outcome: Progressing     Problem: MOBILITY - ADULT  Goal: Maintain or return to baseline ADL function  Description: INTERVENTIONS:  -  Assess patient's ability to carry out ADLs; assess patient's baseline for ADL function and identify physical deficits which impact ability to perform ADLs (bathing, care of mouth/teeth, toileting, grooming, dressing, etc )  - Assess/evaluate cause of self-care deficits   - Assess range of motion  - Assess patient's mobility; develop plan if impaired  - Assess patient's need for assistive devices and provide as appropriate  - Encourage maximum independence but intervene and supervise when necessary  - Involve family in performance of ADLs  - Assess for home care needs following discharge   - Consider OT consult to assist with ADL evaluation and planning for discharge  - Provide patient education as appropriate  Outcome: Progressing  Goal: Maintains/Returns to pre admission functional level  Description: INTERVENTIONS:  - Perform BMAT or MOVE assessment daily    - Set and communicate daily mobility goal to care team and patient/family/caregiver  - Collaborate with rehabilitation services on mobility goals if consulted  - Perform Range of Motion  times a day  - Reposition patient every  hours    - Dangle patient  times a day  - Stand patient  times a day  - Ambulate patient  times a day  - Out of bed to chair  times a day   - Out of bed for meals  times a day  - Out of bed for toileting  - Record patient progress and toleration of activity level   Outcome: Progressing     Problem: Potential for Falls  Goal: Patient will remain free of falls  Description: INTERVENTIONS:  - Educate patient/family on patient safety including physical limitations  - Instruct patient to call for assistance with activity   - Consult OT/PT to assist with strengthening/mobility   - Keep Call bell within reach  - Keep bed low and locked with side rails adjusted as appropriate  - Keep care items and personal belongings within reach  - Initiate and maintain comfort rounds  - Make Fall Risk Sign visible to staff  - Offer Toileting every  Hours, in advance of need  - Initiate/Maintain alarm  - Obtain necessary fall risk management equipment:   - Apply yellow socks and bracelet for high fall risk patients  - Consider moving patient to room near nurses station  Outcome: Progressing     Problem: Prexisting or High Potential for Compromised Skin Integrity  Goal: Skin integrity is maintained or improved  Description: INTERVENTIONS:  - Identify patients at risk for skin breakdown  - Assess and monitor skin integrity  - Assess and monitor nutrition and hydration status  - Monitor labs   - Assess for incontinence   - Turn and reposition patient  - Assist with mobility/ambulation  - Relieve pressure over bony prominences  - Avoid friction and shearing  - Provide appropriate hygiene as needed including keeping skin clean and dry  - Evaluate need for skin moisturizer/barrier cream  - Collaborate with interdisciplinary team   - Patient/family teaching  - Consider wound care consult   Outcome: Progressing

## 2022-05-28 NOTE — PROGRESS NOTES
Vancomycin Assessment    Teetee Wooten is a 51 y o  female who is currently receiving vancomycin vancomycin 1250mg q12h for Pneumonia     Relevant clinical data and objective history reviewed:  Creatinine   Date Value Ref Range Status   05/27/2022 0 46 (L) 0 60 - 1 30 mg/dL Final     Comment:     Standardized to IDMS reference method   05/27/2022 0 60 0 60 - 1 30 mg/dL Final     Comment:     Standardized to IDMS reference method   05/23/2022 0 56 (L) 0 60 - 1 30 mg/dL Final     Comment:     Standardized to IDMS reference method     /76 (BP Location: Right arm)   Pulse (!) 114   Temp 98 5 °F (36 9 °C) (Oral)   Resp 18   LMP 03/28/2022 (Approximate)   SpO2 96%   No intake/output data recorded  Lab Results   Component Value Date/Time    BUN 7 05/27/2022 11:06 PM    WBC 0 79 (LL) 05/27/2022 11:40 PM    HGB 8 1 (L) 05/27/2022 11:40 PM    HCT 26 2 (L) 05/27/2022 11:40 PM    MCV 95 05/27/2022 11:40 PM     05/27/2022 11:40 PM     Temp Readings from Last 3 Encounters:   05/27/22 98 5 °F (36 9 °C) (Oral)   05/26/22 99 4 °F (37 4 °C) (Temporal)   05/25/22 (!) 97 4 °F (36 3 °C) (Tympanic)     Vancomycin Days of Therapy: 1    Assessment/Plan  The patient is currently on vancomycin utilizing scheduled dosing based on adjusted body weight (due to obesity)     The patient is currently receiving vancomycin 1250mg q12h and after clinical evaluation will be changed to vancomycin 1000mg q8h  Pharmacy will also follow closely for s/sx of nephrotoxicity, infusion reactions, and appropriateness of therapy  BMP and CBC will be ordered per protocol  Plan for trough as patient approaches steady state, prior to the 5th  dose at approximately 0730 5/29  Due to infection severity, will target a trough of 15-20 (appropriate for most indications)   Pharmacy will continue to follow the patients culture results and clinical progress daily      Anastasia Arias, Pharmacist

## 2022-05-28 NOTE — ASSESSMENT & PLAN NOTE
Lab Results   Component Value Date    SARSCOV2 Positive (A) 05/23/2022    SARSCOV2 Negative 05/20/2022     Symptoms of COVID-19 SYMPTOMS: cough and Diarrhea  Vaccine status:  Fully vaccinated and boosted  Recent Labs     05/27/22  2306   CRP 63 1*   DDIMER 0 96*   CKTOTAL 47     · Saturating 97% on 2 liters/min via nasal cannula    MILD Stage    Plan:  ·  Will hold starting remdesivir/dexamethasone due to patient getting monoclonal antibody yesterday pending Infectious Disease input  · Therapeutic Anticoagulation; Lovenox 1mg/kg  · OOB TID, PT and OT eval and treat, Self-proning if able, Incentive spirometry  · Trend labs as needed; Inflammatory markers, daily labs  · Infectious disease consult

## 2022-05-28 NOTE — SEPSIS NOTE
Sepsis Note   Abel Cummins 51 y o  female MRN: [id number]  Unit/Bed#: ED 12 Encounter: [id number]       qSOFA     Row Name 05/27/22 2208                Altered mental status GCS < 15 --        Respiratory Rate > / =54 0        Systolic BP < / =222 0        Q Sofa Score 0                   Initial Sepsis Screening     Row Name 05/28/22 0026                Is the patient's history suggestive of a new or worsening infection? Yes (Proceed)  -AO        Suspected source of infection pneumonia;acute abdominal infection  -AO        Are two or more of the following signs & symptoms of infection both present and new to the patient? Yes (Proceed)  -AO        Indicate SIRS criteria Tachycardia > 90 bpm;Leukopenia (WBC < 4000 IJL)  -AO        If the answer is yes to both questions, suspicion of sepsis is present --        If severe sepsis is present AND tissue hypoperfusion perists in the hour after fluid resuscitation or lactate > 4, the patient meets criteria for SEPTIC SHOCK --        Are any of the following organ dysfunction criteria present within 6 hours of suspected infection and SIRS criteria that are NOT considered to be chronic conditions?  No  -AO        Organ dysfunction --        Date of presentation of severe sepsis --        Time of presentation of severe sepsis --        Tissue hypoperfusion persists in the hour after crystalloid fluid administration, evidenced, by either: --        Was hypotension present within one hour of the conclusion of crystalloid fluid administration? --        Date of presentation of septic shock --        Time of presentation of septic shock --              User Key  (r) = Recorded By, (t) = Taken By, (c) = Cosigned By    [address] Name Provider Magdalene Baker MD Physician

## 2022-05-28 NOTE — ASSESSMENT & PLAN NOTE
· History of hypertension maintained on lisinopril and amlodipine  · Patient has not been taking antihypertensives the last few weeks due to hypotension  · Continue to hold antihypertensives  · Monitor blood pressure and reintroduce antihypertensives as needed

## 2022-05-28 NOTE — PROGRESS NOTES
Vancomycin IV Pharmacy-to-Dose Consultation    Zhen Lane is a 51 y o  female who is currently receiving Vancomycin IV with management by the Pharmacy Consult service for the treatment of Pneumonia  Assessment/Plan:    The patient's chart was reviewed  There are no signs or symptoms of nephrotoxicity and/or infusion reactions documented  Based on today's assessment, will continue current vancomycin dosing of 1000mg IV every 8 hours, with a plan for trough to be drawn at 0730 on 5/29  We will continue to follow the patient's culture results and clinical progress daily        Deana Nash, PharmD   Pharmacist

## 2022-05-28 NOTE — PROGRESS NOTES
[facility]  Progress Note - Yonathan Fields 1975, 51 y o  female MRN: [id number]  Unit/Bed#: S -01 Encounter: [id number]  Primary Care Provider: Juan Soto MD   Date and time admitted to hospital: 5/27/2022 10:17 PM    * Sepsis [facility])  Assessment & Plan  SIRS criteria met with tachycardia and neutropenia  Suspected source:  Pneumonia due to COVID-19  COVID-19:  Positive on 5/23  Lactic acid:  Normal  Procalcitonin:  Normal   No evidence of end organ damage  Strep negative  CT AP with PE study-no PE, ground-glass consolidation bilateral-post radiation changes with possible superimposed infection  Decreased right breast mass and right axillary metastatic adenopathy  Bone Mets to left sacral metastatic this similar to prior  Per ID sepsis is likely secondary to COVID  Will DC antibiotics for now  Start Decadron, remdesivir as patient is on 2 L of oxygen  Will attempt to discharge patient on room air if possible       Plan:   F/U BCX   F/U ID recs   F/U stool studies, C diff   DC IV fluids as patient is eating and drinking   Trend fever curve/ Monitor WBC- ANC 0 29   See COVID for treatment          COVID-19  Assessment & Plan  Lab Results   Component Value Date    SARSCOV2 Positive (A) 05/23/2022    SARSCOV2 Negative 05/20/2022     Symptoms of COVID-19 SYMPTOMS: cough and Diarrhea  Vaccine status:  Fully vaccinated and boosted  · Saturating 97% on 2 liters/min via nasal cannula         MILD Stage    Plan:  ·  Start  remdesivir/dexamethasone per Infectious Disease input  ·  Covid vaccinated x2  Would recommend full vaccination to 4 doses -when appropriate  · Tessalon scheduled, Mucinex scheduled, Hycodan p r n  · Therapeutic Anticoagulation; Lovenox 1mg/kg  · OOB TID, PT and OT eval and treat, Self-proning if able, Incentive spirometry  · Trend labs as needed; Inflammatory markers, daily labs  · Infectious disease consult        Candidal skin infection  Assessment & Plan  · Candidal skin infection under breast and groin  · Continue nystatin powder  Chemotherapy-induced neutropenia [facility])  Assessment & Plan  Patient presenting due to advise from PCP with abnormal labs - neutropenic after chemotherapy  Additionally could be secondary to recent COVID infection  Plan  · Management of acute infection/sepsis  · Continue to monitor labs  · Will monitor to see if Oncology consult risk vs benefits of Granix-do not believe appropriate this time  Antineoplastic chemotherapy induced anemia  Assessment & Plan  · Hemoglobin decreased from 12 4 on  4/03/22 to 8 1 today  · Iron panel normal   · Transfuse for hemoglobin< 7   Monitor CBC/H&H q daily  Chemotherapy-induced nausea  Assessment & Plan  · Continue p r n  Zofran    Candida infection, esophageal (HCC)  Assessment & Plan  · Continue nystatin swish and swallow  HTN (hypertension)  Assessment & Plan  · History of hypertension maintained on lisinopril and amlodipine  · Patient has not been taking antihypertensives the last few weeks due to hypotension  · Continue to hold antihypertensives  · Monitor blood pressure and reintroduce antihypertensives as needed  Cancer associated pain  Assessment & Plan  · Patient follows with palliative care  · Continue home pain regimen per palliative care recommendations  VTE Pharmacologic Prophylaxis: VTE Score: 9 High Risk (Score >/= 5) - Pharmacological DVT Prophylaxis Ordered: enoxaparin (Lovenox)  Sequential Compression Devices Ordered  Patient Centered Rounds: I performed bedside rounds with nursing staff today  Discussions with Specialists or Other Care Team Provider:  None    Education and Discussions with Family / Patient: Attempted to update  (significant other) via phone  Unable to contact      Current Length of Stay: 0 day(s)  Current Patient Status: Inpatient   Discharge Plan: Anticipate discharge in >72 hrs to home     Code Status: Level 1 - Full Code    Subjective:   Patient has a significant cough  No fevers, chills  Does not have diarrhea at this time would like to walk around  Is eating and drinking okay  Objective:     Vitals:   Temp (24hrs), Av 3 °F (36 8 °C), Min:98 1 °F (36 7 °C), Max:98 5 °F (36 9 °C)    Temp:  [98 1 °F (36 7 °C)-98 5 °F (36 9 °C)] 98 1 °F (36 7 °C)  HR:  [] 81  Resp:  [17-20] 20  BP: (110-140)/(61-76) 119/65  SpO2:  [91 %-97 %] 91 %  There is no height or weight on file to calculate BMI  Input and Output Summary (last 24 hours): Intake/Output Summary (Last 24 hours) at 2022 1420  Last data filed at 2022 0810  Gross per 24 hour   Intake 2100 ml   Output 350 ml   Net 1750 ml       Physical Exam:   Physical Exam  Constitutional:       Appearance: Normal appearance  She is not toxic-appearing or diaphoretic  Comments: Lungs clear  Significant cough +  Patient has a brace on   Eyes:      Conjunctiva/sclera: Conjunctivae normal    Cardiovascular:      Heart sounds: No murmur heard  Abdominal:      General: There is no distension  Tenderness: There is no abdominal tenderness  There is no guarding     Psychiatric:         Mood and Affect: Mood normal          Behavior: Behavior normal           Additional Data:     Labs:  Results from last 7 days   Lab Units 22  2340 22  1223   WBC Thousand/uL 0 79* 0 92*   HEMOGLOBIN g/dL 8 1* 8 8*   HEMATOCRIT % 26 2* 28 8*   PLATELETS Thousands/uL 247 251   BANDS PCT %  --  9*   LYMPHO PCT % 19 40   MONO PCT % 41* 17*   EOS PCT % 0 0     Results from last 7 days   Lab Units 22  2306   SODIUM mmol/L 134*   POTASSIUM mmol/L 4 2   CHLORIDE mmol/L 101   CO2 mmol/L 25   BUN mg/dL 7   CREATININE mg/dL 0 46*   ANION GAP mmol/L 8   CALCIUM mg/dL 8 8   ALBUMIN g/dL 3 6   TOTAL BILIRUBIN mg/dL 0 50   ALK PHOS U/L 86   ALT U/L 31   AST U/L 23   GLUCOSE RANDOM mg/dL 104     Results from last 7 days   Lab Units 05/27/22  2306   INR  0 96             Results from last 7 days   Lab Units 05/27/22  2306   LACTIC ACID mmol/L 0 6   PROCALCITONIN ng/ml 0 05       Lines/Drains:  Invasive Devices  Report    Peripheral Intravenous Line  Duration           Peripheral IV 05/27/22 Right Antecubital <1 day                      Imaging: Reviewed radiology reports from this admission including: abdominal/pelvic CT    Recent Cultures (last 7 days):   Results from last 7 days   Lab Units 05/27/22  2340 05/27/22  2306 05/23/22 1957 05/23/22 1954 05/23/22  1949   BLOOD CULTURE  Received in Microbiology Lab  Culture in Progress  Received in Microbiology Lab  Culture in Progress  No Growth After 4 Days  --  No Growth After 4 Days  URINE CULTURE   --   --   --  10,000-19,000 cfu/ml Escherichia coli*  --        Last 24 Hours Medication List:   Current Facility-Administered Medications   Medication Dose Route Frequency Provider Last Rate    baclofen  5 mg Oral TID PRN Samirmeghann Whitfield, DO      benzonatate  200 mg Oral TID Lizett Kapoor MD      dexamethasone  6 mg Intravenous Q24H Lizett Kapoor MD      enoxaparin  1 mg/kg Subcutaneous Q12H [address] [facility]      guaiFENesin  600 mg Oral Q12H [address] Lizett Kapoor MD      HYDROcodone Bit-Homatrop MBr  5 mL Oral Q6H PRN Lizett Kapoor MD      morphine  15 mg Oral Q12H [address] [facility], DO      nystatin  500,000 Units Swish & Swallow 4x Daily Samir Nahid, DO      nystatin   Topical TID Samir Nahid, DO      ondansetron  4 mg Oral Q6H [address] [facility], DO      oxyCODONE  10 mg Oral Q4H PRN Samir Nahid, DO      pantoprazole  40 mg Oral Early Morning Ludin Montanez DO      [START ON 5/29/2022] remdesivir  100 mg Intravenous Q24H Lizett Kapoor MD          Today, Patient Was Seen By: Lizett Kapoor MD    **Please Note: This note may have been constructed using a voice recognition system  **

## 2022-05-28 NOTE — RESULT ENCOUNTER NOTE
Left a voice mail on the both pt and partners phone number that she needs to got the ER right for low white counts

## 2022-05-28 NOTE — H&P
[facility]  H&P- Jennifer Marrow 1975, 51 y o  female MRN: [id number]  Unit/Bed#: S -01 Encounter: [id number]  Primary Care Provider: Bo Murray MD   Date and time admitted to hospital: 5/27/2022 10:17 PM    * Sepsis [facility])  Assessment & Plan  SIRS criteria met with tachycardia and neutropenia  Suspected source:  Pneumonia due to COVID-19  COVID-19:  Positive on 5/23  Lactic acid:  Normal  Procalcitonin:  Normal   No evidence of end organ damage  Recent Labs     05/27/22  1223 05/27/22  2340   WBC 0 92* 0 79*     Recent Labs     05/27/22  2306   LACTICACID 0 6       /76 (BP Location: Right arm)   Pulse (!) 114   Temp 98 5 °F (36 9 °C) (Oral)   Resp 18   LMP 03/28/2022 (Approximate)   SpO2 96%     PE Study with CT Abdomen and Pelvis with contrast  Impression: No evidence of acute pulmonary embolus  Symmetric groundglass consolidative opacity in the bilateral posterior lung apices which may reflect? Post radiation/treatment changes  Correlate clinically to exclude superimposed infectious  Interval decrease in size of right breast mass and right axillary metastatic adenopathy compatible with treatment response  Redemonstrated diffuse osseous metastatic disease with largest left sacral metastasis not significantly changed compared to prior study dated 3/29/2022 and also evaluated on prior recent bone scan examination dated 4/25/2022  Additional findings as above  No Chest XR results available for this patient  Plan:   Received 1000 ml fluid bolus in the ED   IVF:  Sodium chloride 0 9%  ml/hr   ABT:  Cefepime 2 g q 12 hours, vancomycin 1 q 12 hours   Follow-up pending blood cultures   Trend fever curve   Monitor WBC            COVID-19  Assessment & Plan  Lab Results   Component Value Date    SARSCOV2 Positive (A) 05/23/2022    SARSCOV2 Negative 05/20/2022     Symptoms of COVID-19 SYMPTOMS: cough and Diarrhea  Vaccine status: Fully vaccinated and boosted  Recent Labs     05/27/22  2306   CRP 63 1*   DDIMER 0 96*   CKTOTAL 47     · Saturating 97% on 2 liters/min via nasal cannula    MILD Stage    Plan:  ·  Will hold starting remdesivir/dexamethasone due to patient getting monoclonal antibody yesterday pending Infectious Disease input  · Therapeutic Anticoagulation; Lovenox 1mg/kg  · OOB TID, PT and OT eval and treat, Self-proning if able, Incentive spirometry  · Trend labs as needed; Inflammatory markers, daily labs  · Infectious disease consult  Chemotherapy-induced neutropenia [facility])  Assessment & Plan  Patient presenting due to advise from PCP with abnormal labs - neutropenic after chemotherapy  Plan  · Management of acute infection/sepsis  · Continue to monitor labs  · Consider Oncology consult risk vs benefits of Granix  HTN (hypertension)  Assessment & Plan  · History of hypertension maintained on lisinopril and amlodipine  · Patient has not been taking antihypertensives the last few weeks due to hypotension  · Continue to hold antihypertensives  · Monitor blood pressure and reintroduce antihypertensives as needed  Antineoplastic chemotherapy induced anemia  Assessment & Plan  · Hemoglobin decreased from 12 4 on  4/03/22 to 8 1 today  · Iron panel normal   · Transfuse for hemoglobin< 7   Monitor CBC/H&H q daily  Candidal skin infection  Assessment & Plan  · Candidal skin infection under breast and groin  · Continue nystatin powder  Chemotherapy-induced nausea  Assessment & Plan  · Continue p r n  Zofran    Candida infection, esophageal (HCC)  Assessment & Plan  · Continue nystatin swish and swallow  Cancer associated pain  Assessment & Plan  · Patient follows with palliative care  · Continue home pain regimen per palliative care recommendations  VTE Pharmacologic Prophylaxis: VTE Score: 9 High Risk (Score >/= 5) - Pharmacological DVT Prophylaxis Ordered: enoxaparin (Lovenox)   Sequential Compression Devices Ordered  Code Status: Level 1 - Full Code Discussed with patient and spouse  Discussion with family: Updated  () at bedside  Anticipated Length of Stay: Patient will be admitted on an inpatient basis with an anticipated length of stay of greater than 2 midnights secondary to Sepsis       Chief Complaint: abnormal lab - neutropenia  History of Present Illness:    Abel Cummins is a 51 y o  female with a PMH of breast cancer with metastasis to bone who presents with chemotherapy-induced neutropenia at the advice of her PCP  Patient was recently diagnosed with metastatic breast cancer to bone and wears cervical collar and back brace  Patient was recently in the ED on 05/23/2022 with a complain of fever after  was found to be COVID-19 positive  Patient also tested COVID-19 positive  Patient has been having mild COVID-19 symptoms cough and mild diarrhea loose stools once daily  Fevers resolved with Tylenol use  She denies shortness of breath  Patient received monoclonal antibody Bebtelovimab yesterday without complications  In the ED patient is afebrile tachycardic  Noted to be hypoxic in the low 90s  Patient was placed on 2 L nasal cannula oxygen  Labs significant for neutropenia, anemia, elevated D-dimer, elevated CRP, hyponatremia  CTA chest negative for PE  Showing ground-glass consolidation in bilateral lungs consistent with known COVID19 infection  Review of Systems:  Review of Systems   Constitutional: Negative for chills and fever  HENT: Negative for ear pain and sore throat  Eyes: Negative for pain and visual disturbance  Respiratory: Positive for cough  Negative for shortness of breath  Cardiovascular: Negative for chest pain and palpitations  Gastrointestinal: Positive for diarrhea  Negative for abdominal pain and vomiting  Genitourinary: Negative for dysuria and hematuria  Musculoskeletal: Positive for back pain  Negative for arthralgias  Skin: Negative for color change and rash  Neurological: Negative for seizures and syncope  All other systems reviewed and are negative  Past Medical and Surgical History:   Past Medical History:   Diagnosis Date    Cancer [facility])        History reviewed  No pertinent surgical history  Meds/Allergies:  Prior to Admission medications    Medication Sig Start Date End Date Taking? Authorizing Provider   jemal Hopkins Erichsen oxide-diphenhydramine-lidocaine viscous (MAGIC MOUTHWASH) 1:1:1 suspension Swish and spit 10 mL every 4 (four) hours as needed for mouth pain or discomfort 4/18/22   Jyotsna Cai DO   baclofen 10 mg tablet Take 0 5 tablets (5 mg total) by mouth as needed in the morning and 0 5 tablets (5 mg total) as needed at noon and 0 5 tablets (5 mg total) as needed in the evening for muscle spasms  5/17/22   Melinda Vasques DO   dexamethasone (DECADRON) 4 mg tablet Take 1 tablet (4 mg total) by mouth 3 (three) times a day 4/3/22   Isai Mirza MD   [facility] (First-Mouthwash [facility]) SUSP  4/6/22   Historical Provider, MD   furosemide (LASIX) 40 mg tablet Take 1 tablet (40 mg total) by mouth in the morning   5/17/22   Jerrod Macdonald MD   lactulose (CEPHULAC) 10 g packet Take 1 packet (10 g total) by mouth 3 (three) times a day as needed (constipation) 4/20/22   Jyotsna Cai DO   lisinopril (ZESTRIL) 20 mg tablet Take 1 tablet (20 mg total) by mouth daily 5/2/22   Jerrod Macdonald MD   morphine (MS CONTIN) 15 mg 12 hr tablet Take 1 tablet (15 mg total) by mouth every 12 (twelve) hours Max Daily Amount: 30 mg 5/18/22   Hayley Esquivel MD   nystatin (MYCOSTATIN) 500,000 units/5 mL suspension Apply 5 mL (500,000 Units total) to the mouth or throat 4 (four) times a day 5/27/22   Jyotsna Cai DO   nystatin (MYCOSTATIN) 500,000 units/5 mL suspension Apply 5 mL (500,000 Units total) to the mouth or throat 4 (four) times a day 5/26/22   Jerrod Macdonald MD   nystatin (MYCOSTATIN) powder Apply topically 3 (three) times a day 5/17/22   Jerrod Macdoanld MD   ondansetron (ZOFRAN) 4 mg tablet Take 1 tablet (4 mg total) by mouth every 8 (eight) hours as needed for nausea or vomiting 5/18/22   Priscilla Tang MD   oxyCODONE (ROXICODONE) 10 MG TABS Take 1 tablet (10 mg total) by mouth every 4 (four) hours as needed for severe pain May split pill in half (5 mg) every 4 hours as needed for moderate pain (5 mg)  Max Daily Amount: 60 mg 5/2/22 6/1/22  Peng Hernandez Cai DO   pantoprazole (PROTONIX) 40 mg tablet Take 1 tablet (40 mg total) by mouth daily in the early morning 5/9/22   Jerrod Macdonald MD   polyethylene glycol (MIRALAX) 17 g packet Take 17 g by mouth daily 4/3/22   Ruchi Antunez MD   predniSONE 10 mg tablet Take 1 tablet (10 mg total) by mouth in the morning  5/13/22   Larisa Lin MD   senna (SENOKOT) 8 6 mg Take 3 tablets (25 8 mg total) by mouth 2 (two) times a day Hold for loose stool  4/3/22   Ruchi Antunez MD     I have reviewed home medications with patient personally      Allergies: No Known Allergies    Social History:  Marital Status: Single   Occupation:  Unknown  Patient Pre-hospital Living Situation: Home  Patient Pre-hospital Level of Mobility: walks  Patient Pre-hospital Diet Restrictions:  None  Substance Use History:   Social History     Substance and Sexual Activity   Alcohol Use Yes    Comment: rare, social      Social History     Tobacco Use   Smoking Status Never Smoker   Smokeless Tobacco Never Used     Social History     Substance and Sexual Activity   Drug Use Never       Family History:  Family History   Problem Relation Age of Onset    Coronary artery disease Mother     Heart attack Father     Diabetes type II Father        Physical Exam:     Vitals:   Blood Pressure: 140/72 (05/28/22 0341)  Pulse: (!) 113 (05/28/22 0341)  Temperature: 98 3 °F (36 8 °C) (05/28/22 0341)  Temp Source: Oral (05/28/22 8356)  Respirations: 18 (05/28/22 0341)  SpO2: 93 % (05/28/22 0341)    Physical Exam  Vitals and nursing note reviewed  Constitutional:       General: She is not in acute distress  Appearance: She is well-developed  She is ill-appearing  HENT:      Head: Normocephalic and atraumatic  Eyes:      General:         Right eye: No discharge  Left eye: No discharge  Pupils: Pupils are equal, round, and reactive to light  Cardiovascular:      Rate and Rhythm: Normal rate and regular rhythm  Heart sounds: No murmur heard  Pulmonary:      Breath sounds: Decreased air movement present  Examination of the right-lower field reveals decreased breath sounds  Examination of the left-lower field reveals decreased breath sounds  Decreased breath sounds present  No wheezing, rhonchi or rales  Abdominal:      General: Bowel sounds are normal       Palpations: Abdomen is soft  Tenderness: There is no abdominal tenderness  There is no right CVA tenderness, left CVA tenderness, guarding or rebound  Musculoskeletal:      Cervical back: Neck supple  Right lower leg: Edema present  Left lower leg: Edema present  Skin:     General: Skin is warm and dry  Capillary Refill: Capillary refill takes less than 2 seconds  Neurological:      General: No focal deficit present  Mental Status: She is alert and oriented to person, place, and time     Psychiatric:         Mood and Affect: Mood normal          Behavior: Behavior normal          Additional Data:     Lab Results:  Results from last 7 days   Lab Units 05/27/22  2340 05/27/22  1223   WBC Thousand/uL 0 79* 0 92*   HEMOGLOBIN g/dL 8 1* 8 8*   HEMATOCRIT % 26 2* 28 8*   PLATELETS Thousands/uL 247 251   BANDS PCT %  --  9*   LYMPHO PCT % 19 40   MONO PCT % 41* 17*   EOS PCT % 0 0     Results from last 7 days   Lab Units 05/27/22  2306   SODIUM mmol/L 134*   POTASSIUM mmol/L 4 2   CHLORIDE mmol/L 101   CO2 mmol/L 25   BUN mg/dL 7 CREATININE mg/dL 0 46*   ANION GAP mmol/L 8   CALCIUM mg/dL 8 8   ALBUMIN g/dL 3 6   TOTAL BILIRUBIN mg/dL 0 50   ALK PHOS U/L 86   ALT U/L 31   AST U/L 23   GLUCOSE RANDOM mg/dL 104     Results from last 7 days   Lab Units 05/27/22  2306   INR  0 96             Results from last 7 days   Lab Units 05/27/22  2306   LACTIC ACID mmol/L 0 6   PROCALCITONIN ng/ml 0 05       Imaging: Reviewed radiology reports from this admission including: chest CT scan  PE Study with CT Abdomen and Pelvis with contrast   ED Interpretation by Thao Sutherland MD (05/28 0152)   FINDINGS:     CHEST     PULMONARY ARTERIAL TREE:  No pulmonary embolus is seen      LUNGS:  Subsegmental atelectasis  There is symmetric appearing patchy consolidative and groundglass opacity in the posterior apices bilaterally, nonspecific  Yefri Campanile There is no tracheal or endobronchial lesion      PLEURA:  Unremarkable      HEART/AORTA:  Heart is unremarkable for patient's age  No thoracic aortic aneurysm      MEDIASTINUM AND ALISON:  Unremarkable      CHEST WALL AND LOWER NECK:  Reidentified soft tissue breast mass within the superomedial right breast decreased in size compared to prior study now measuring 3 5 cm in maximal dimension (previously 7 0 cm)  Right axillary lymph node measures 3 cm      ABDOMEN     LIVER/BILIARY TREE:  Unremarkable      GALLBLADDER:  No calcified gallstones  No pericholecystic inflammatory change      SPLEEN:  Unremarkable      PANCREAS:  Unremarkable      ADRENAL GLANDS:  Unremarkable      KIDNEYS/URETERS:  One or more simple renal cyst(s) is noted  Unchang   ed left lower pole nonobstructing intrarenal calculus  No hydronephrosis      STOMACH AND BOWEL:  Unremarkable      APPENDIX:  No findings to suggest appendicitis      ABDOMINOPELVIC CAVITY:  No ascites  No pneumoperitoneum  No lymphadenopathy      VESSELS:  Unremarkable for patient's age      PELVIS     REPRODUCTIVE ORGANS:  Unremarkable for patient's age    Left adnexal paraovarian cyst measures 4 cm decreased in size compared to prior      URINARY BLADDER:  Unremarkable      ABDOMINAL WALL/INGUINAL REGIONS:  Unremarkable      OSSEOUS STRUCTURES:  No acute fracture  Reidentified diffuse osseous metastasis with largest left sacral metastatic lesion similar in appearance to prior      IMPRESSION:     No evidence of acute pulmonary embolus      Symmetric groundglass consolidative opacity in the bilateral posterior lung apices which may reflect? Post radiation/treatment changes  Correlate clinically to exclude superimposed infectious      Interval decrease in size of right breast m   ass and right axillary metastatic adenopathy compatible with treatment response      Redemonstrated diffuse osseous metastatic disease with largest left sacral metastasis not significantly changed compared to prior study dated 3/29/2022 and also evaluated on prior recent bone scan examination dated 4/25/2022      Additional findings as above      Workstation performed: [id number]         Final Result by Alivia Mccauley MD (05/28 0150)      No evidence of acute pulmonary embolus  Symmetric groundglass consolidative opacity in the bilateral posterior lung apices which may reflect? Post radiation/treatment changes  Correlate clinically to exclude superimposed infectious  Interval decrease in size of right breast mass and right axillary metastatic adenopathy compatible with treatment response  Redemonstrated diffuse osseous metastatic disease with largest left sacral metastasis not significantly changed compared to prior study dated 3/29/2022 and also evaluated on prior recent bone scan examination dated 4/25/2022  Additional findings as above  Workstation performed: [id number]         XR chest 1 view portable   ED Interpretation by Ole Lomas MD (05/27 4133)   LLL atx read by me  EKG and Other Studies Reviewed on Admission:   · EKG: Sinus Tachycardia   HR 104     ** Please Note: This note has been constructed using a voice recognition system   **

## 2022-05-28 NOTE — ASSESSMENT & PLAN NOTE
Lab Results   Component Value Date    SARSCOV2 Positive (A) 05/23/2022    SARSCOV2 Negative 05/20/2022     Symptoms of COVID-19 SYMPTOMS: cough and Diarrhea  Vaccine status:  Fully vaccinated and boosted  · Saturating 97% on 2 liters/min via nasal cannula         MILD Stage    Plan:  ·  Start  remdesivir/dexamethasone per Infectious Disease input  ·  Covid vaccinated x2  Would recommend full vaccination to 4 doses -when appropriate  · Tessalon scheduled, Mucinex scheduled, Hycodan p r n  · Therapeutic Anticoagulation; Lovenox 1mg/kg  · OOB TID, PT and OT eval and treat, Self-proning if able, Incentive spirometry  · Trend labs as needed; Inflammatory markers, daily labs  · Infectious disease consult

## 2022-05-28 NOTE — ED PROVIDER NOTES
History  Chief Complaint   Patient presents with    Abnormal Lab     Pt sent in by provider for low WBC  Pt reports having second treatment of chemo on Thursday  Pt tested positive for covid on Monday  Pt denies CP, SOB  Reports she's been having diarrhea     Patient is a 51year old female who was sent in to ED tonight for low WBC earlier today of 0 92 and low ANC  Patient denies taking neulasta  Last had chemo this past Thursday for metastatic breast cancer to bones  Patient is in a cervical collar and back brace  (+) feet and ankle swelling  (+) rash of hands from chemo and was on steroids  (+) sore throat  Has esophageal candidiasis  Is being treated for iron deficiency anemia as well  (+) cough  No sob or chest pain  No urinary sx  No N/V  (+) diarrhea  No travel  LMP - early April  Was last seen in this ED on 5/23/22 for COVID  [facility] website checked on this patient and last Rx filled was on 5/6/22 for morphine for 30 day supply  (+) intermittent fevers from covid this past week  History provided by:  Patient (dionicio)   used: No        Prior to Admission Medications   Prescriptions Last Dose Informant Patient Reported? Taking? DPH-Lido-AlHydr-MgHydr-Simeth (First-Mouthwash BLM) SUSP  Self Yes No   al mag oxide-diphenhydramine-lidocaine viscous (MAGIC MOUTHWASH) 1:1:1 suspension  Self No No   Sig: Swish and spit 10 mL every 4 (four) hours as needed for mouth pain or discomfort   baclofen 10 mg tablet  Self No No   Sig: Take 0 5 tablets (5 mg total) by mouth as needed in the morning and 0 5 tablets (5 mg total) as needed at noon and 0 5 tablets (5 mg total) as needed in the evening for muscle spasms  dexamethasone (DECADRON) 4 mg tablet  Self No No   Sig: Take 1 tablet (4 mg total) by mouth 3 (three) times a day   furosemide (LASIX) 40 mg tablet  Self No No   Sig: Take 1 tablet (40 mg total) by mouth in the morning     lactulose (CEPHULAC) 10 g packet  Self No No   Sig: Take 1 packet (10 g total) by mouth 3 (three) times a day as needed (constipation)   lisinopril (ZESTRIL) 20 mg tablet  Self No No   Sig: Take 1 tablet (20 mg total) by mouth daily   morphine (MS CONTIN) 15 mg 12 hr tablet  Self No No   Sig: Take 1 tablet (15 mg total) by mouth every 12 (twelve) hours Max Daily Amount: 30 mg   nystatin (MYCOSTATIN) 500,000 units/5 mL suspension   No No   Sig: Apply 5 mL (500,000 Units total) to the mouth or throat 4 (four) times a day   nystatin (MYCOSTATIN) 500,000 units/5 mL suspension   No No   Sig: Apply 5 mL (500,000 Units total) to the mouth or throat 4 (four) times a day   nystatin (MYCOSTATIN) powder  Self No No   Sig: Apply topically 3 (three) times a day   ondansetron (ZOFRAN) 4 mg tablet  Self No No   Sig: Take 1 tablet (4 mg total) by mouth every 8 (eight) hours as needed for nausea or vomiting   oxyCODONE (ROXICODONE) 10 MG TABS  Self No No   Sig: Take 1 tablet (10 mg total) by mouth every 4 (four) hours as needed for severe pain May split pill in half (5 mg) every 4 hours as needed for moderate pain (5 mg)  Max Daily Amount: 60 mg   pantoprazole (PROTONIX) 40 mg tablet  Self No No   Sig: Take 1 tablet (40 mg total) by mouth daily in the early morning   polyethylene glycol (MIRALAX) 17 g packet  Self No No   Sig: Take 17 g by mouth daily   predniSONE 10 mg tablet  Self No No   Sig: Take 1 tablet (10 mg total) by mouth in the morning  senna (SENOKOT) 8 6 mg  Self No No   Sig: Take 3 tablets (25 8 mg total) by mouth 2 (two) times a day Hold for loose stool  Facility-Administered Medications: None       Past Medical History:   Diagnosis Date    Cancer [facility])        History reviewed  No pertinent surgical history  Family History   Problem Relation Age of Onset    Coronary artery disease Mother     Heart attack Father     Diabetes type II Father      I have reviewed and agree with the history as documented      E-Cigarette/Vaping    E-Cigarette Use Never User E-Cigarette/Vaping Substances    Nicotine No     THC No     CBD No     Flavoring No     Other No     Unknown No      Social History     Tobacco Use    Smoking status: Never Smoker    Smokeless tobacco: Never Used   Vaping Use    Vaping Use: Never used   Substance Use Topics    Alcohol use: Yes     Comment: rare, social     Drug use: Never       Review of Systems   Constitutional: Positive for fever (intermittent)  HENT: Positive for sore throat  Respiratory: Positive for cough  Negative for shortness of breath  Cardiovascular: Positive for leg swelling  Negative for chest pain  Gastrointestinal: Positive for diarrhea  Negative for nausea and vomiting  Genitourinary: Negative for difficulty urinating  Musculoskeletal: Positive for back pain (chronic) and neck pain (chronic)  Skin: Positive for rash  All other systems reviewed and are negative  Physical Exam  Physical Exam  Vitals and nursing note reviewed  Constitutional:       General: She is in acute distress (moderate)  HENT:      Head: Normocephalic and atraumatic  Mouth/Throat:      Mouth: Mucous membranes are moist       Pharynx: Oropharynx is clear  No posterior oropharyngeal erythema  Eyes:      General: No scleral icterus  Right eye: No discharge  Left eye: No discharge  Neck:      Comments: Neck in neck brace  Cardiovascular:      Rate and Rhythm: Regular rhythm  Tachycardia present  Heart sounds: Normal heart sounds  No murmur heard  Pulmonary:      Effort: Pulmonary effort is normal  No respiratory distress  Breath sounds: Normal breath sounds  No stridor  No wheezing, rhonchi or rales  Abdominal:      General: Bowel sounds are normal       Palpations: Abdomen is soft  Tenderness: There is no abdominal tenderness  Musculoskeletal:         General: No deformity  Right lower leg: Edema present  Left lower leg: Edema present     Skin:     General: Skin is warm and dry       Findings: Rash (erythematous macular rash of both dorsal hands) present  Comments: No petechiae or vesicles   Neurological:      Mental Status: She is alert and oriented to person, place, and time  Psychiatric:         Mood and Affect: Mood normal          Vital Signs  ED Triage Vitals [05/27/22 2208]   Temperature Pulse Respirations Blood Pressure SpO2   98 5 °F (36 9 °C) (!) 112 18 123/61 93 %      Temp Source Heart Rate Source Patient Position - Orthostatic VS BP Location FiO2 (%)   Oral Monitor Sitting Right arm --      Pain Score       --           Vitals:    05/27/22 2208 05/28/22 0030 05/28/22 0130   BP: 123/61 110/61 120/76   Pulse: (!) 112 102 100   Patient Position - Orthostatic VS: Sitting  Sitting         Visual Acuity      ED Medications  Medications   sodium chloride 0 9 % infusion (125 mL/hr Intravenous New Bag 5/28/22 0108)   vancomycin (VANCOCIN) 1,750 mg in sodium chloride 0 9 % 500 mL IVPB (1,750 mg Intravenous New Bag 5/28/22 0103)   sodium chloride 0 9 % bolus 1,000 mL (0 mL Intravenous Stopped 5/28/22 0109)   cefepime (MAXIPIME) 2 g/50 mL dextrose IVPB (0 mg Intravenous Stopped 5/28/22 0109)   iohexol (OMNIPAQUE) 350 MG/ML injection (MULTI-DOSE) 75 mL (75 mL Intravenous Given 5/28/22 0051)       Diagnostic Studies  Results Reviewed     Procedure Component Value Units Date/Time    hCG, qualitative pregnancy [[id number]]  (Normal) Collected: 05/27/22 2306    Lab Status: Final result Specimen: Blood from Arm, Right Updated: 05/28/22 0108     Preg, Serum Negative    CBC and differential [[id number]]  (Abnormal) Collected: 05/27/22 2340    Lab Status: Final result Specimen: Blood from Arm, Left Updated: 05/28/22 0023     WBC 0 79 Thousand/uL      RBC 2 76 Million/uL      Hemoglobin 8 1 g/dL      Hematocrit 26 2 %      MCV 95 fL      MCH 29 3 pg      MCHC 30 9 g/dL      RDW 17 5 %      MPV 9 5 fL      Platelets 849 Thousands/uL     Narrative: This is an appended report    These results have been appended to a previously verified report  Manual Differential(PHLEBS Do Not Order) [[id number]]  (Abnormal) Collected: 05/27/22 2340    Lab Status: Final result Specimen: Blood from Arm, Left Updated: 05/28/22 0023     Segmented % 37 %      Lymphocytes % 19 %      Monocytes % 41 %      Eosinophils, % 0 %      Basophils % 0 %      Atypical Lymphocytes % 3 %      Absolute Neutrophils 0 29 Thousand/uL      Lymphocytes Absolute 0 15 Thousand/uL      Monocytes Absolute 0 32 Thousand/uL      Eosinophils Absolute 0 00 Thousand/uL      Basophils Absolute 0 00 Thousand/uL      Total Counted --     RBC Morphology Present     Anisocytosis Present     Polychromasia Present     Platelet Estimate Adequate    D-dimer, quantitative [[id number]]  (Abnormal) Collected: 05/27/22 2306    Lab Status: Final result Specimen: Blood from Arm, Right Updated: 05/28/22 0022     D-Dimer, Quant 0 96 ug/ml FEU     Protime-INR [[id number]]  (Normal) Collected: 05/27/22 2306    Lab Status: Final result Specimen: Blood from Arm, Right Updated: 05/28/22 0017     Protime 12 8 seconds      INR 0 96    UA (URINE) with reflex to Scope [[id number]] Collected: 05/27/22 2346    Lab Status: Final result Specimen: Urine, Clean Catch Updated: 05/28/22 0010     Color, UA Yellow     Clarity, UA Clear     Specific Gravity, UA 1 010     pH, UA 6 0     Leukocytes, UA Negative     Nitrite, UA Negative     Protein, UA Negative mg/dl      Glucose, UA Negative mg/dl      Ketones, UA Negative mg/dl      Urobilinogen, UA 0 2 E U /dl      Bilirubin, UA Negative     Blood, UA Negative    TSH, 3rd generation with Free T4 reflex [[id number]]  (Normal) Collected: 05/27/22 2306    Lab Status: Final result Specimen: Blood from Arm, Right Updated: 05/28/22 0005     TSH 3RD GENERATON 1 866 uIU/mL     Narrative:      Patients undergoing fluorescein dye angiography may retain small amounts of fluorescein in the body for 48-72 hours post procedure   Samples containing fluorescein can produce falsely depressed TSH values  If the patient had this procedure,a specimen should be resubmitted post fluorescein clearance  Procalcitonin [[id number]]  (Normal) Collected: 05/27/22 2306    Lab Status: Final result Specimen: Blood from Arm, Right Updated: 05/27/22 2358     Procalcitonin 0 05 ng/ml     HS Troponin 0hr (reflex protocol) [[id number]]  (Normal) Collected: 05/27/22 2306    Lab Status: Final result Specimen: Blood from Arm, Right Updated: 05/27/22 2357     hs TnI 0hr 3 ng/L     Strep A PCR [[id number]]  (Normal) Collected: 05/27/22 2315    Lab Status: Final result Specimen: Throat Updated: 05/27/22 2348     STREP A PCR Not Detected    Blood culture #2 [[id number]] Collected: 05/27/22 2340    Lab Status:  In process Specimen: Blood from Arm, Left Updated: 05/27/22 2348    Urine culture [[id number]] Collected: 05/27/22 2346    Lab Status: No result Specimen: Urine, Clean Catch     CK (with reflex to MB) [[id number]]  (Normal) Collected: 05/27/22 2306    Lab Status: Final result Specimen: Blood from Arm, Right Updated: 05/27/22 2340     Total CK 47 U/L     Comprehensive metabolic panel [[id number]]  (Abnormal) Collected: 05/27/22 2306    Lab Status: Final result Specimen: Blood from Arm, Right Updated: 05/27/22 2340     Sodium 134 mmol/L      Potassium 4 2 mmol/L      Chloride 101 mmol/L      CO2 25 mmol/L      ANION GAP 8 mmol/L      BUN 7 mg/dL      Creatinine 0 46 mg/dL      Glucose 104 mg/dL      Calcium 8 8 mg/dL      AST 23 U/L      ALT 31 U/L      Alkaline Phosphatase 86 U/L      Total Protein 6 6 g/dL      Albumin 3 6 g/dL      Total Bilirubin 0 50 mg/dL      eGFR 118 ml/min/1 73sq m     Tung:      [place] guidelines for Chronic Kidney Disease (CKD):     Stage 1 with normal or high GFR (GFR > 90 mL/min/1 73 square meters)    Stage 2 Mild CKD (GFR = 60-89 mL/min/1 73 square meters)    Stage 3A Moderate CKD (GFR = 45-59 mL/min/1 73 square meters)    Stage 3B Moderate CKD (GFR = 30-44 mL/min/1 73 square meters)    Stage 4 Severe CKD (GFR = 15-29 mL/min/1 73 square meters)    Stage 5 End Stage CKD (GFR <15 mL/min/1 73 square meters)  Note: GFR calculation is accurate only with a steady state creatinine    Magnesium [[id number]]  (Normal) Collected: 05/27/22 2306    Lab Status: Final result Specimen: Blood from Arm, Right Updated: 05/27/22 2340     Magnesium 1 9 mg/dL     C-reactive protein [[id number]]  (Abnormal) Collected: 05/27/22 2306    Lab Status: Final result Specimen: Blood from Arm, Right Updated: 05/27/22 2340     CRP 63 1 mg/L     Triglycerides [[id number]]  (Normal) Collected: 05/27/22 2306    Lab Status: Final result Specimen: Blood from Arm, Right Updated: 05/27/22 2340     Triglycerides 135 mg/dL     Lactic acid, plasma [[id number]]  (Normal) Collected: 05/27/22 2306    Lab Status: Final result Specimen: Blood from Arm, Right Updated: 05/27/22 2339     LACTIC ACID 0 6 mmol/L     Narrative:      Result may be elevated if tourniquet was used during collection  Blood culture #1 [[id number]] Collected: 05/27/22 2306    Lab Status: In process Specimen: Blood from Arm, Left Updated: 05/27/22 2321    Calcium, ionized [[id number]]  (Abnormal) Collected: 05/27/22 2306    Lab Status: Final result Specimen: Blood from Arm, Right Updated: 05/27/22 2321     Calcium, Ionized 1 11 mmol/L     Glucose 6 phosphate dehydrogenase [[id number]] Collected: 05/27/22 2306    Lab Status: In process Specimen: Blood from Arm, Right Updated: 05/27/22 2318    Ferritin [[id number]] Collected: 05/27/22 2306    Lab Status: In process Specimen: Blood from Arm, Right Updated: 05/27/22 2318    NT-BNP PRO [[id number]] Collected: 05/27/22 2306    Lab Status:  In process Specimen: Blood from Arm, Right Updated: 05/27/22 2318    Stool Enteric Bacterial Panel by PCR [[id number]]     Lab Status: No result Specimen: Stool from Rectum     Clostridium difficile toxin by PCR with EIA [[id number]]     Lab Status: No result Specimen: Stool from Per Rectum                  PE Study with CT Abdomen and Pelvis with contrast   ED Interpretation by Emma House MD (05/28 0152)   FINDINGS:     CHEST     PULMONARY ARTERIAL TREE:  No pulmonary embolus is seen      LUNGS:  Subsegmental atelectasis  There is symmetric appearing patchy consolidative and groundglass opacity in the posterior apices bilaterally, nonspecific  Francella Crooked There is no tracheal or endobronchial lesion      PLEURA:  Unremarkable      HEART/AORTA:  Heart is unremarkable for patient's age  No thoracic aortic aneurysm      MEDIASTINUM AND ALISON:  Unremarkable      CHEST WALL AND LOWER NECK:  Reidentified soft tissue breast mass within the superomedial right breast decreased in size compared to prior study now measuring 3 5 cm in maximal dimension (previously 7 0 cm)  Right axillary lymph node measures 3 cm      ABDOMEN     LIVER/BILIARY TREE:  Unremarkable      GALLBLADDER:  No calcified gallstones  No pericholecystic inflammatory change      SPLEEN:  Unremarkable      PANCREAS:  Unremarkable      ADRENAL GLANDS:  Unremarkable      KIDNEYS/URETERS:  One or more simple renal cyst(s) is noted  Unchang   ed left lower pole nonobstructing intrarenal calculus  No hydronephrosis      STOMACH AND BOWEL:  Unremarkable      APPENDIX:  No findings to suggest appendicitis      ABDOMINOPELVIC CAVITY:  No ascites  No pneumoperitoneum  No lymphadenopathy      VESSELS:  Unremarkable for patient's age      PELVIS     REPRODUCTIVE ORGANS:  Unremarkable for patient's age  Left adnexal paraovarian cyst measures 4 cm decreased in size compared to prior      URINARY BLADDER:  Unremarkable      ABDOMINAL WALL/INGUINAL REGIONS:  Unremarkable      OSSEOUS STRUCTURES:  No acute fracture    Reidentified diffuse osseous metastasis with largest left sacral metastatic lesion similar in appearance to prior      IMPRESSION:     No evidence of acute pulmonary embolus      Symmetric groundglass consolidative opacity in the bilateral posterior lung apices which may reflect? Post radiation/treatment changes  Correlate clinically to exclude superimposed infectious      Interval decrease in size of right breast m   ass and right axillary metastatic adenopathy compatible with treatment response      Redemonstrated diffuse osseous metastatic disease with largest left sacral metastasis not significantly changed compared to prior study dated 3/29/2022 and also evaluated on prior recent bone scan examination dated 4/25/2022      Additional findings as above      Workstation performed: [id number]         Final Result by Kadie Harper MD (05/28 0150)      No evidence of acute pulmonary embolus  Symmetric groundglass consolidative opacity in the bilateral posterior lung apices which may reflect? Post radiation/treatment changes  Correlate clinically to exclude superimposed infectious  Interval decrease in size of right breast mass and right axillary metastatic adenopathy compatible with treatment response  Redemonstrated diffuse osseous metastatic disease with largest left sacral metastasis not significantly changed compared to prior study dated 3/29/2022 and also evaluated on prior recent bone scan examination dated 4/25/2022  Additional findings as above  Workstation performed: [id number]         XR chest 1 view portable   ED Interpretation by Elijah Carey MD (05/27 9384)   LLL atx read by me                    Procedures  ECG 12 Lead Documentation Only    Date/Time: 5/28/2022 12:20 AM  Performed by: Elijah Carey MD  Authorized by: Elijah Carey MD     Indications / Diagnosis:  Neutropenia, covid  ECG reviewed by me, the ED Provider: yes    Patient location:  ED  Previous ECG:     Previous ECG:  Compared to current    Comparison ECG info:  5/23/22    Similarity:  Changes noted (more artifact now)  Quality:     Tracing quality: Limited by artifact  Rate:     ECG rate:  104    ECG rate assessment: tachycardic    Rhythm:     Rhythm: sinus tachycardia    Ectopy:     Ectopy: none    QRS:     QRS axis:  Normal    QRS intervals:  Normal  Conduction:     Conduction: normal    ST segments:     ST segments:  Normal  T waves:     T waves: normal    Other findings:     Other findings: LVH    Comments:      I do not agree with computer reading of accelerated junctional rhythm             ED Course  ED Course as of 05/28/22 0216   Sat May 28, 2022   0025 D-Dimer, Quant(!): 0 96  CT PE study ordered   0102 Labs and CXR d/w patient and fiance with patient's permission  Pulse ox decreased to 89-90% on RA and nasal cannula oxygen ordered  0153 CT d/w patient and fiance  0155 SpO2: 97 %  Pulse ox improved with nasal cannula oxygen  HEART Risk Score    Flowsheet Row Most Recent Value   Heart Score Risk Calculator    History 0 Filed at: 05/28/2022 0025   ECG 0 Filed at: 05/28/2022 0025   Age 0 Filed at: 05/28/2022 0025   Risk Factors 0 Filed at: 05/28/2022 0025   Troponin 0 Filed at: 05/28/2022 0025   HEART Score 0 Filed at: 05/28/2022 0025                PERC Rule for PE    Flowsheet Row Most Recent Value   PERC Rule for PE    Age >=50 --   HR >=100 1 Filed at: 05/28/2022 0027   O2 Sat on room air < 95% --   History of PE or DVT --   Recent trauma or surgery --   Hemoptysis --   Exogenous estrogen --   Unilateral leg swelling --   PERC Rule for PE Results 1 Filed at: 05/28/2022 9801           Initial Sepsis Screening     Row Name 05/28/22 0026                Is the patient's history suggestive of a new or worsening infection? Yes (Proceed)  -AO        Suspected source of infection pneumonia;acute abdominal infection  -AO        Are two or more of the following signs & symptoms of infection both present and new to the patient?  Yes (Proceed)  -AO        Indicate SIRS criteria Tachycardia > 90 bpm;Leukopenia (WBC < 4000 IJL)  -AO        If the answer is yes to both questions, suspicion of sepsis is present --        If severe sepsis is present AND tissue hypoperfusion perists in the hour after fluid resuscitation or lactate > 4, the patient meets criteria for SEPTIC SHOCK --        Are any of the following organ dysfunction criteria present within 6 hours of suspected infection and SIRS criteria that are NOT considered to be chronic conditions?  No  -AO        Organ dysfunction --        Date of presentation of severe sepsis --        Time of presentation of severe sepsis --        Tissue hypoperfusion persists in the hour after crystalloid fluid administration, evidenced, by either: --        Was hypotension present within one hour of the conclusion of crystalloid fluid administration? --        Date of presentation of septic shock --        Time of presentation of septic shock --              User Key  (r) = Recorded By, (t) = Taken By, (c) = Cosigned By    [address] Name Provider Willi Cordoba MD Physician                    Cedric Fatima' Criteria for PE    Flowsheet Row Most Recent Value   Wells' Criteria for PE    Clinical signs and symptoms of DVT 3 Filed at: 05/28/2022 0027   PE is primary diagnosis or equally likely 3 Filed at: 05/28/2022 0027   HR >100 1 5 Filed at: 05/28/2022 0027   Immobilization at least 3 days or Surgery in the previous 4 weeks 0 Filed at: 05/28/2022 0444   Previous, objectively diagnosed PE or DVT 0 Filed at: 05/28/2022 0027   Hemoptysis 0 Filed at: 05/28/2022 4536   Malignancy with treatment within 6 months or palliative 1 Filed at: 05/28/2022 5902   Wells' Criteria Total 8 5 Filed at: 05/28/2022 0027                MDM  Number of Diagnoses or Management Options  Diagnosis management comments: DDx including but not limited to: neutropenia, enteritis, colitis, gastroenteritis, viral illness, pneumonia, URI,  pharyngitis,  cellulitis, UTI; doubt meningitis, meningococcemia, sinusitis, Lyme disease, West Nile virus; COVID-19  Amount and/or Complexity of Data Reviewed  Clinical lab tests: ordered and reviewed  Tests in the radiology section of CPT®: ordered and reviewed  Decide to obtain previous medical records or to obtain history from someone other than the patient: yes  Review and summarize past medical records: yes  Discuss the patient with other providers: yes  Independent visualization of images, tracings, or specimens: yes        Disposition  Final diagnoses:   Neutropenia ([facility] )   Metastatic breast cancer ([facility] )   Sepsis ([facility] )   Hypoxia   Pneumonia due to COVID-19 virus   Diarrhea     Time reflects when diagnosis was documented in both MDM as applicable and the Disposition within this note     Time User Action Codes Description Comment    5/28/2022 12:05 AM Gene Bloom Add [D70 9] Neutropenia ([facility] )     5/28/2022 12:05 AM Gene Bloom Add [U07 1] COVID-19 virus infection     5/28/2022 12:05 AM Gene Bloom Add [C50 919] Metastatic breast cancer ([facility] )     5/28/2022 12:55 AM Gene Bloom Add [A41 9] Sepsis ([facility] )     5/28/2022  1:02 AM Gene Bloom Add [R09 02] Hypoxia     5/28/2022  1:53 AM Gene Bloom Add [U07 1,  J12 82] Pneumonia due to COVID-19 virus     5/28/2022  1:53 AM Gene Bloom Remove [U07 1] COVID-19 virus infection     5/28/2022  1:56 AM Gene Bloom Add [R19 7] Diarrhea       ED Disposition     ED Disposition   Admit    Condition   Stable    Date/Time   Sat May 28, 2022  2:16 AM    Comment   Case was discussed with [facility] resident Dr Wynette Kawasaki and the patient's admission status was agreed to be Admission Status: inpatient status to the service of Dr Alejandrina Logan   Follow-up Information    None         Patient's Medications   Discharge Prescriptions    No medications on file       No discharge procedures on file      PDMP Review       Value Time User    PDMP Reviewed  Yes 5/27/2022 10:25 PM Clinton Porter MD          ED Provider  Electronically Signed by           Clarissa Sharma MD  05/28/22 2834

## 2022-05-28 NOTE — PROGRESS NOTES
Consultation - Infectious Disease   Darryle Reding 51 y o  female MRN: [id number]  Unit/Bed#: S -01 Encounter: [id number]      IMPRESSION & RECOMMENDATIONS:   Impression/Recommendations: This is a 51 y o  female, with metastatic breast cancer, on chemotherapy, who is undervaccinated for COVID, developed symptomatic COVID  She also has febrile neutropenia  1  Febrile neutropenia, with patient having fever at home but no further fever on presentation here  Her neutropenia is relatively mild, with ANC of 290  Patient fever is most likely secondary to her COVID infection  Patient is clinically and systemically well, without evidence of sepsis or systemic toxicity  For these reasons, will discontinue antibiotics to avoid potential antibiotic toxicities  Will monitor patient closely off antibiotic  Admission blood cultures have no growth thus far  Discontinue vancomycin/cefepime  Monitor temperature closely  Monitor WBC/ANC  Follow-up on pending blood cultures  2  COVID, in immunosuppressed patient who is undervaccinated, having received only 2 initial injections of mRNA vaccine > 1 year ago  Patient did appropriately received bebtelovimab monoclonal antibody on 05/25  Despite this, her COVID is progressing, with her having mild hypoxia at present  No evidence of bacterial superinfection  Given her immunosuppressed state and risk of ongoing progression, it is reasonable to treat patient with remdesivir and systemic corticosteroid  I discussed with the patient regarding her incomplete COVID vaccination  Once patient recovers from this current episode of COVID, she should get COVID boosters  She is agreeable  Plan for remdesivir noted  Plan for dexamethasone noted  Monitor respiratory symptoms and O2 saturation  Recommend COVID boosters once patient recovers from current COVID infection  3  Mild hypoxia, likely secondary COVID    Chest CT is without consolidation or PE   COVID treatment as in above  O2 supplement per primary service  Monitor O2 saturation  4  Candida mucositis, with concern for possible esophagitis  Plan for HD was canceled due to Anne  For now, will continue topical antifungal   Continue topical antifungal for now  Recommend EGD once patient is stable to proceed  5  Metastatic breast cancer  Patient is currently on chemotherapy  Oncology follow-up  Outpatient records reviewed in detail  Discussed with patient in detail regarding the above plan  Discussed with Dr Rios from [facility] service  Thank you for this consultation  We will follow along with you  HISTORY OF PRESENT ILLNESS:  Reason for Consult:  Febrile neutropenia  HPI: Alvaro Hoffmann is a 51 y o  female, with metastatic breast cancer, on chemotherapy, who was exposed to  who developed COVID on 05/19  She had daily COVID testing until a became positive on 05/23  She did not have symptoms then  On 05/25, she was given bebtelovimab monoclonal antibody  Around this time, she started having fever and mild dyspnea  Per discussion with her oncologist, she came to ER yesterday  On presentation, patient was neutropenic  She did not have fever on presentation  She was mildly hypoxic, requiring being placed on 2 L O2 supplement  Patient was admitted and started on vancomycin/cefepime  We are asked to evaluate the patient  At present, patient is comfortable at rest   She has no dyspnea at rest but has mild dyspnea with ambulation  Cough mild and nonproductive  No further fever or chills  No other focal complaints  Patient did receive a 2 initial injections of mRNA vaccine back early 2021  Patient states she had a significant reaction to the 2nd injection and therefore did not pursue booster injections  With regards to patient's metastatic breast cancer, she was recently started on chemotherapy every 3 weeks  Her last chemo was on 05/19      Of note, patient has had problem with thrush and sore throat  There is concern for possible esophageal candidiasis  Patient was scheduled to undergo EGD this past week, which was canceled due to her development of COVID  REVIEW OF SYSTEMS:  A complete system-based review was done  Except for what is noted in HPI above, ROS of systems is otherwise negative  PAST MEDICAL HISTORY:  Past Medical History:   Diagnosis Date    Cancer [facility])      History reviewed  No pertinent surgical history  Problem list reviewed  FAMILY HISTORY:  Non-contributory    SOCIAL HISTORY:  Social History     Substance and Sexual Activity   Alcohol Use Yes    Comment: rare, social      Social History     Substance and Sexual Activity   Drug Use Never     Social History     Tobacco Use   Smoking Status Never Smoker   Smokeless Tobacco Never Used       ALLERGIES:  No Known Allergies    MEDICATIONS:  All current active medications have been reviewed  Patient is currently on vancomycin/cefepime  PHYSICAL EXAM:  Vitals:  Temp:  [98 1 °F (36 7 °C)-98 5 °F (36 9 °C)] 98 1 °F (36 7 °C)  HR:  [] 81  Resp:  [17-20] 20  BP: (110-140)/(61-76) 119/65  SpO2:  [91 %-97 %] 91 %  Temp (24hrs), Av 3 °F (36 8 °C), Min:98 1 °F (36 7 °C), Max:98 5 °F (36 9 °C)  Current: Temperature: 98 1 °F (36 7 °C)     Physical Exam:  General:  Well-nourished, well-developed, in no acute distress  Awake, alert and oriented x 3  Eyes:  Conjunctive clear with no hemorrhages or effusions  Oropharynx:  No ulcers, no lesions, pharynx benign, no tonsillitis, minimal thrush  Neck:  Supple, no lymphadenopathy, no mass, nontender  Lungs:  Expansion symmetric, no rales, no wheezing, no accessory muscle use  Cardiac:  Regular rate and rhythm, normal S1, normal S2, no murmurs  Abdomen:  Soft, nondistended, non-tender, no HSM  Extremities:  1+ leg edema, no erythema, nontender   No ulcers  Skin:  No rashes, no ulcers  Neurological:  Moves all four extremities spontaneously, sensation grossly intact    LABS, IMAGING, & OTHER STUDIES:  Lab Results:  I have personally reviewed pertinent labs  Results from last 7 days   Lab Units 05/27/22  2306 05/27/22  1223 05/23/22 1949   POTASSIUM mmol/L 4 2 4 3 4 3   CHLORIDE mmol/L 101 102 98   CO2 mmol/L 25 25 28   BUN mg/dL 7 8 10   CREATININE mg/dL 0 46* 0 60 0 56*   EGFR ml/min/1 73sq m 118 108 111   CALCIUM mg/dL 8 8 9 0 8 2*   AST U/L 23  --   --    ALT U/L 31  --   --    ALK PHOS U/L 86  --   --      Results from last 7 days   Lab Units 05/27/22  2340 05/27/22 1223 05/23/22 1949   WBC Thousand/uL 0 79* 0 92* 2 04*   HEMOGLOBIN g/dL 8 1* 8 8* 9 0*   PLATELETS Thousands/uL 247 251 303     Results from last 7 days   Lab Units 05/27/22  2340 05/27/22 2306 05/23/22 1957 05/23/22 1954 05/23/22 1949   BLOOD CULTURE  Received in Microbiology Lab  Culture in Progress  Received in Microbiology Lab  Culture in Progress  No Growth After 4 Days  --  No Growth After 4 Days  URINE CULTURE   --   --   --  10,000-19,000 cfu/ml Escherichia coli*  --        Imaging Studies:   I have personally reviewed pertinent imaging study reports and images in PACS  CXR reviewed personally  No infiltrates  Chest/abdomen/pelvis CT reviewed personally  No PE  Bilateral ground-glass opacities  Decreased size of breast mass  Diffuse bony metastases  EKG, Pathology, and Other Studies:   I have personally reviewed pertinent reports

## 2022-05-28 NOTE — ASSESSMENT & PLAN NOTE
SIRS criteria met with tachycardia and neutropenia  Suspected source:  Pneumonia due to COVID-19  COVID-19:  Positive on 5/23  Lactic acid:  Normal  Procalcitonin:  Normal   No evidence of end organ damage  Recent Labs     05/27/22  1223 05/27/22  2340   WBC 0 92* 0 79*     Recent Labs     05/27/22  2306   LACTICACID 0 6       /76 (BP Location: Right arm)   Pulse (!) 114   Temp 98 5 °F (36 9 °C) (Oral)   Resp 18   LMP 03/28/2022 (Approximate)   SpO2 96%     PE Study with CT Abdomen and Pelvis with contrast  Impression: No evidence of acute pulmonary embolus  Symmetric groundglass consolidative opacity in the bilateral posterior lung apices which may reflect? Post radiation/treatment changes  Correlate clinically to exclude superimposed infectious  Interval decrease in size of right breast mass and right axillary metastatic adenopathy compatible with treatment response  Redemonstrated diffuse osseous metastatic disease with largest left sacral metastasis not significantly changed compared to prior study dated 3/29/2022 and also evaluated on prior recent bone scan examination dated 4/25/2022  Additional findings as above  No Chest XR results available for this patient  Plan:   Received 1000 ml fluid bolus in the ED   IVF:  Sodium chloride 0 9%  ml/hr   ABT:  Cefepime 2 g q 12 hours, vancomycin 1 q 12 hours   Follow-up pending blood cultures   Trend fever curve   Monitor WBC

## 2022-05-28 NOTE — TELEPHONE ENCOUNTER
Lab Result: WBC 0 92    Date/Time Drawn: 05/27/22 @8010   Ordering Provider: Dr Sean Issa [facility]       The following critical/stat result was read back to the lab as stated above and [employer] to the on-call provider

## 2022-05-28 NOTE — ASSESSMENT & PLAN NOTE
· Hemoglobin decreased from 12 4 on  4/03/22 to 8 1 today  · Iron panel normal   · Transfuse for hemoglobin< 7   Monitor CBC/H&H q daily

## 2022-05-28 NOTE — ASSESSMENT & PLAN NOTE
· Patient follows with palliative care  · Continue home pain regimen per palliative care recommendations

## 2022-05-28 NOTE — ASSESSMENT & PLAN NOTE
Patient presenting due to advise from PCP with abnormal labs - neutropenic after chemotherapy  Plan  · Management of acute infection/sepsis  · Continue to monitor labs  · Consider Oncology consult risk vs benefits of Granix

## 2022-05-28 NOTE — ASSESSMENT & PLAN NOTE
Patient presenting due to advise from PCP with abnormal labs - neutropenic after chemotherapy  Additionally could be secondary to recent COVID infection  Plan  · Management of acute infection/sepsis  · Continue to monitor labs  · Will monitor to see if Oncology consult risk vs benefits of Granix-do not believe appropriate this time

## 2022-05-28 NOTE — ASSESSMENT & PLAN NOTE
SIRS criteria met with tachycardia and neutropenia  Suspected source:  Pneumonia due to COVID-19  COVID-19:  Positive on 5/23  Lactic acid:  Normal  Procalcitonin:  Normal   No evidence of end organ damage  Strep negative  CT AP with PE study-no PE, ground-glass consolidation bilateral-post radiation changes with possible superimposed infection  Decreased right breast mass and right axillary metastatic adenopathy  Bone Mets to left sacral metastatic this similar to prior  Per ID sepsis is likely secondary to COVID  Will DC antibiotics for now  Start Decadron, remdesivir as patient is on 2 L of oxygen  Will attempt to discharge patient on room air if possible       Plan:   F/U BCX   F/U ID recs   F/U stool studies, C diff   DC IV fluids as patient is eating and drinking     Trend fever curve/ Monitor WBC- ANC 0 29   See COVID for treatment

## 2022-05-29 VITALS
RESPIRATION RATE: 18 BRPM | OXYGEN SATURATION: 91 % | HEART RATE: 86 BPM | SYSTOLIC BLOOD PRESSURE: 147 MMHG | TEMPERATURE: 97.7 F | DIASTOLIC BLOOD PRESSURE: 89 MMHG

## 2022-05-29 LAB
ALBUMIN SERPL BCP-MCNC: 3.3 G/DL (ref 3.5–5)
ALP SERPL-CCNC: 68 U/L (ref 34–104)
ALT SERPL W P-5'-P-CCNC: 22 U/L (ref 7–52)
ANION GAP SERPL CALCULATED.3IONS-SCNC: 7 MMOL/L (ref 4–13)
ANISOCYTOSIS BLD QL SMEAR: PRESENT
AST SERPL W P-5'-P-CCNC: 15 U/L (ref 13–39)
BACTERIA BLD CULT: NORMAL
BACTERIA BLD CULT: NORMAL
BASOPHILS # BLD MANUAL: 0 THOUSAND/UL (ref 0–0.1)
BASOPHILS NFR MAR MANUAL: 0 % (ref 0–1)
BILIRUB SERPL-MCNC: 0.48 MG/DL (ref 0.2–1)
BUN SERPL-MCNC: 5 MG/DL (ref 5–25)
C DIFF TOX GENS STL QL NAA+PROBE: NEGATIVE
CALCIUM ALBUM COR SERPL-MCNC: 9.1 MG/DL (ref 8.3–10.1)
CALCIUM SERPL-MCNC: 8.5 MG/DL (ref 8.4–10.2)
CAMPYLOBACTER DNA SPEC NAA+PROBE: NORMAL
CHLORIDE SERPL-SCNC: 104 MMOL/L (ref 96–108)
CO2 SERPL-SCNC: 26 MMOL/L (ref 21–32)
CREAT SERPL-MCNC: 0.41 MG/DL (ref 0.6–1.3)
CRP SERPL QL: 102 MG/L
EOSINOPHIL # BLD MANUAL: 0 THOUSAND/UL (ref 0–0.4)
EOSINOPHIL NFR BLD MANUAL: 0 % (ref 0–6)
ERYTHROCYTE [DISTWIDTH] IN BLOOD BY AUTOMATED COUNT: 17.9 % (ref 11.6–15.1)
GFR SERPL CREATININE-BSD FRML MDRD: 123 ML/MIN/1.73SQ M
GLUCOSE SERPL-MCNC: 108 MG/DL (ref 65–140)
HCT VFR BLD AUTO: 25.5 % (ref 34.8–46.1)
HGB BLD-MCNC: 7.9 G/DL (ref 11.5–15.4)
HYPERCHROMIA BLD QL SMEAR: PRESENT
LYMPHOCYTES # BLD AUTO: 0.41 THOUSAND/UL (ref 0.6–4.47)
LYMPHOCYTES # BLD AUTO: 25 % (ref 14–44)
MCH RBC QN AUTO: 29.4 PG (ref 26.8–34.3)
MCHC RBC AUTO-ENTMCNC: 31 G/DL (ref 31.4–37.4)
MCV RBC AUTO: 95 FL (ref 82–98)
MONOCYTES # BLD AUTO: 0.45 THOUSAND/UL (ref 0–1.22)
MONOCYTES NFR BLD: 28 % (ref 4–12)
MYELOCYTES NFR BLD MANUAL: 1 % (ref 0–1)
NEUTROPHILS # BLD MANUAL: 0.75 THOUSAND/UL (ref 1.85–7.62)
NEUTS BAND NFR BLD MANUAL: 1 % (ref 0–8)
NEUTS SEG NFR BLD AUTO: 45 % (ref 43–75)
NRBC BLD AUTO-RTO: 2 /100 WBC (ref 0–2)
PLATELET # BLD AUTO: 302 THOUSANDS/UL (ref 149–390)
PLATELET BLD QL SMEAR: ADEQUATE
PMV BLD AUTO: 9.8 FL (ref 8.9–12.7)
POLYCHROMASIA BLD QL SMEAR: PRESENT
POTASSIUM SERPL-SCNC: 3.5 MMOL/L (ref 3.5–5.3)
PROCALCITONIN SERPL-MCNC: 0.05 NG/ML
PROT SERPL-MCNC: 6.3 G/DL (ref 6.4–8.4)
RBC # BLD AUTO: 2.69 MILLION/UL (ref 3.81–5.12)
RBC MORPH BLD: PRESENT
SALMONELLA DNA SPEC QL NAA+PROBE: NORMAL
SHIGA TOXIN STX GENE SPEC NAA+PROBE: NORMAL
SHIGELLA DNA SPEC QL NAA+PROBE: NORMAL
SODIUM SERPL-SCNC: 137 MMOL/L (ref 135–147)
WBC # BLD AUTO: 1.62 THOUSAND/UL (ref 4.31–10.16)

## 2022-05-29 PROCEDURE — 85027 COMPLETE CBC AUTOMATED: CPT | Performed by: INTERNAL MEDICINE

## 2022-05-29 PROCEDURE — 99239 HOSP IP/OBS DSCHRG MGMT >30: CPT | Performed by: INTERNAL MEDICINE

## 2022-05-29 PROCEDURE — 84145 PROCALCITONIN (PCT): CPT | Performed by: INTERNAL MEDICINE

## 2022-05-29 PROCEDURE — 85007 BL SMEAR W/DIFF WBC COUNT: CPT | Performed by: INTERNAL MEDICINE

## 2022-05-29 PROCEDURE — 86140 C-REACTIVE PROTEIN: CPT | Performed by: INTERNAL MEDICINE

## 2022-05-29 PROCEDURE — 80053 COMPREHEN METABOLIC PANEL: CPT | Performed by: INTERNAL MEDICINE

## 2022-05-29 PROCEDURE — 99232 SBSQ HOSP IP/OBS MODERATE 35: CPT | Performed by: INTERNAL MEDICINE

## 2022-05-29 RX ORDER — BENZONATATE 200 MG/1
200 CAPSULE ORAL 3 TIMES DAILY
Qty: 15 CAPSULE | Refills: 0 | Status: SHIPPED | OUTPATIENT
Start: 2022-05-29 | End: 2022-06-03

## 2022-05-29 RX ORDER — GUAIFENESIN 600 MG
600 TABLET, EXTENDED RELEASE 12 HR ORAL EVERY 12 HOURS SCHEDULED
Qty: 10 TABLET | Refills: 0 | Status: SHIPPED | OUTPATIENT
Start: 2022-05-29 | End: 2022-06-03

## 2022-05-29 RX ADMIN — ONDANSETRON 4 MG: 4 TABLET, ORALLY DISINTEGRATING ORAL at 17:47

## 2022-05-29 RX ADMIN — ONDANSETRON 4 MG: 4 TABLET, ORALLY DISINTEGRATING ORAL at 12:15

## 2022-05-29 RX ADMIN — ONDANSETRON 4 MG: 4 TABLET, ORALLY DISINTEGRATING ORAL at 01:00

## 2022-05-29 RX ADMIN — MORPHINE SULFATE 15 MG: 15 TABLET, EXTENDED RELEASE ORAL at 01:34

## 2022-05-29 RX ADMIN — MORPHINE SULFATE 15 MG: 15 TABLET, EXTENDED RELEASE ORAL at 13:57

## 2022-05-29 RX ADMIN — REMDESIVIR 100 MG: 100 INJECTION, POWDER, LYOPHILIZED, FOR SOLUTION INTRAVENOUS at 11:06

## 2022-05-29 RX ADMIN — NYSTATIN: 100000 POWDER TOPICAL at 09:21

## 2022-05-29 RX ADMIN — BENZONATATE 200 MG: 100 CAPSULE ORAL at 09:19

## 2022-05-29 RX ADMIN — OXYCODONE HYDROCHLORIDE 10 MG: 10 TABLET ORAL at 15:17

## 2022-05-29 RX ADMIN — BENZONATATE 200 MG: 100 CAPSULE ORAL at 17:47

## 2022-05-29 RX ADMIN — NYSTATIN 500000 UNITS: 100000 SUSPENSION ORAL at 12:15

## 2022-05-29 RX ADMIN — OXYCODONE HYDROCHLORIDE 10 MG: 10 TABLET ORAL at 09:19

## 2022-05-29 RX ADMIN — ENOXAPARIN SODIUM 80 MG: 80 INJECTION SUBCUTANEOUS at 09:19

## 2022-05-29 RX ADMIN — NYSTATIN 500000 UNITS: 100000 SUSPENSION ORAL at 17:47

## 2022-05-29 RX ADMIN — GUAIFENESIN 600 MG: 600 TABLET ORAL at 09:19

## 2022-05-29 RX ADMIN — OXYCODONE HYDROCHLORIDE 10 MG: 10 TABLET ORAL at 04:10

## 2022-05-29 RX ADMIN — NYSTATIN: 100000 POWDER TOPICAL at 17:47

## 2022-05-29 RX ADMIN — PANTOPRAZOLE SODIUM 40 MG: 40 TABLET, DELAYED RELEASE ORAL at 05:43

## 2022-05-29 RX ADMIN — ONDANSETRON 4 MG: 4 TABLET, ORALLY DISINTEGRATING ORAL at 05:43

## 2022-05-29 RX ADMIN — NYSTATIN 500000 UNITS: 100000 SUSPENSION ORAL at 09:19

## 2022-05-29 RX ADMIN — DEXAMETHASONE SODIUM PHOSPHATE 6 MG: 4 INJECTION INTRA-ARTICULAR; INTRALESIONAL; INTRAMUSCULAR; INTRAVENOUS; SOFT TISSUE at 11:06

## 2022-05-29 NOTE — ASSESSMENT & PLAN NOTE
· Hemoglobin decreased from 12 4 on 4/03/22 to 7 9 today, small down-trend this admission    · Iron panel normal   · Continue to follow w Heme-Onc o/p

## 2022-05-29 NOTE — ASSESSMENT & PLAN NOTE
Lab Results   Component Value Date    SARSCOV2 Positive (A) 05/23/2022    SARSCOV2 Negative 05/20/2022     Symptoms of COVID-19 SYMPTOMS: cough and Diarrhea  Vaccine status:  vaccinated x2 without booster    · Saturating high 90s on RA    MILD Stage    Plan:  ·  Covid vaccinated x2  Would recommend full vaccination to 4 doses when appropriate, starting after clearance of current infection  · Continue Tessalon, Mucinex x5 days at discharge  · Resume home dexamethasone 4 mg PO TID  · Received remdesivir x 2 days   No need to complete remdesivir as pt is off O2 and ready for DC to home  · Pt will need to quartantine x20 days from positive test (through June 12th) d/t immunocompromise

## 2022-05-29 NOTE — ASSESSMENT & PLAN NOTE
· History of hypertension maintained on lisinopril and amlodipine  · Patient has not been taking antihypertensives the last few weeks due to hypotension  · Continue to hold antihypertensives  · Monitor blood pressure and reintroduce antihypertensives as needed  --110s to 120s over last 24h

## 2022-05-29 NOTE — CONSULTS
Vancomycin IV Pharmacy-to-Dose Consultation    Hieu Gee is a 51 y o  female who was receiving Vancomycin IV with management by the Pharmacy Consult service for treatment of Pneumonia    The patient's Vancomycin therapy has been completed / discontinued  Thank you for allowing us to take part in this patient's care  Pharmacy will sign-off now; please call or re-consult if there are any questions          Cesia Reed, PharmD  Pharmacist

## 2022-05-29 NOTE — ASSESSMENT & PLAN NOTE
Patient presenting due to advice from PCP for abnormal labs - neutropenic after chemotherapy  Additionally could be secondary to recent COVID infection  Oncology consulted for risk vs benefits of Granix--do not believe appropriate this time      Plan  · Management of COVID as above  · F/u w Heme Onc o/p

## 2022-05-29 NOTE — PLAN OF CARE
Problem: Nutrition/Hydration-ADULT  Goal: Nutrient/Hydration intake appropriate for improving, restoring or maintaining nutritional needs  Description: Monitor and assess patient's nutrition/hydration status for malnutrition  Collaborate with interdisciplinary team and initiate plan and interventions as ordered  Monitor patient's weight and dietary intake as ordered or per policy  Utilize nutrition screening tool and intervene as necessary  Determine patient's food preferences and provide high-protein, high-caloric foods as appropriate       INTERVENTIONS:  - Monitor oral intake, urinary output, labs, and treatment plans  - Assess nutrition and hydration status and recommend course of action  - Evaluate amount of meals eaten  - Assist patient with eating if necessary   - Allow adequate time for meals  - Recommend/ encourage appropriate diets, oral nutritional supplements, and vitamin/mineral supplements  - Order, calculate, and assess calorie counts as needed  - Recommend, monitor, and adjust tube feedings and TPN/PPN based on assessed needs  - Assess need for intravenous fluids  - Provide specific nutrition/hydration education as appropriate  - Include patient/family/caregiver in decisions related to nutrition  5/29/2022 1617 by Matthew Claire RN  Outcome: Completed  5/29/2022 0819 by Matthew Claire RN  Outcome: Progressing  5/29/2022 0818 by Matthew Claire RN  Outcome: Progressing     Problem: MOBILITY - ADULT  Goal: Maintain or return to baseline ADL function  Description: INTERVENTIONS:  -  Assess patient's ability to carry out ADLs; assess patient's baseline for ADL function and identify physical deficits which impact ability to perform ADLs (bathing, care of mouth/teeth, toileting, grooming, dressing, etc )  - Assess/evaluate cause of self-care deficits   - Assess range of motion  - Assess patient's mobility; develop plan if impaired  - Assess patient's need for assistive devices and provide as appropriate  - Encourage maximum independence but intervene and supervise when necessary  - Involve family in performance of ADLs  - Assess for home care needs following discharge   - Consider OT consult to assist with ADL evaluation and planning for discharge  - Provide patient education as appropriate  5/29/2022 1617 by Antoinette Meade RN  Outcome: Completed  5/29/2022 0819 by Antoinette Meade RN  Outcome: Progressing  5/29/2022 0818 by Antoinette Meade RN  Outcome: Progressing  Goal: Maintains/Returns to pre admission functional level  Description: INTERVENTIONS:  - Perform BMAT or MOVE assessment daily    - Set and communicate daily mobility goal to care team and patient/family/caregiver  - Collaborate with rehabilitation services on mobility goals if consulted  - Perform Range of Motion  times a day  - Reposition patient every  hours    - Dangle patient  times a day  - Stand patient  times a day  - Ambulate patient  times a day  - Out of bed to chair  times a day   - Out of bed for meal times a day  - Out of bed for toileting  - Record patient progress and toleration of activity level   5/29/2022 1617 by Antoinette Meade RN  Outcome: Completed  5/29/2022 0819 by Antoinette Meade RN  Outcome: Progressing  5/29/2022 0818 by Antoinette Meade RN  Outcome: Progressing     Problem: Potential for Falls  Goal: Patient will remain free of falls  Description: INTERVENTIONS:  - Educate patient/family on patient safety including physical limitations  - Instruct patient to call for assistance with activity   - Consult OT/PT to assist with strengthening/mobility   - Keep Call bell within reach  - Keep bed low and locked with side rails adjusted as appropriate  - Keep care items and personal belongings within reach  - Initiate and maintain comfort rounds  - Make Fall Risk Sign visible to staff  - Offer Toileting every  Hours, in advance of need  - Initiate/Maintain alarm  - Obtain necessary fall risk management equipment:   - Apply yellow socks and bracelet for high fall risk patients  - Consider moving patient to room near nurses station  5/29/2022 1617 by Shanell Kc RN  Outcome: Completed  5/29/2022 0819 by Shanell Kc RN  Outcome: Progressing  5/29/2022 0818 by Shanell Kc RN  Outcome: Progressing     Problem: Prexisting or High Potential for Compromised Skin Integrity  Goal: Skin integrity is maintained or improved  Description: INTERVENTIONS:  - Identify patients at risk for skin breakdown  - Assess and monitor skin integrity  - Assess and monitor nutrition and hydration status  - Monitor labs   - Assess for incontinence   - Turn and reposition patient  - Assist with mobility/ambulation  - Relieve pressure over bony prominences  - Avoid friction and shearing  - Provide appropriate hygiene as needed including keeping skin clean and dry  - Evaluate need for skin moisturizer/barrier cream  - Collaborate with interdisciplinary team   - Patient/family teaching  - Consider wound care consult   5/29/2022 1617 by Shanell Kc RN  Outcome: Completed  5/29/2022 0819 by Shanell Kc RN  Outcome: Progressing  5/29/2022 0818 by Shanell Kc RN  Outcome: Progressing     Problem: RESPIRATORY - ADULT  Goal: Achieves optimal ventilation and oxygenation  Description: INTERVENTIONS:  - Assess for changes in respiratory status  - Assess for changes in mentation and behavior  - Position to facilitate oxygenation and minimize respiratory effort  - Oxygen administered by appropriate delivery if ordered  - Initiate smoking cessation education as indicated  - Encourage broncho-pulmonary hygiene including cough, deep breathe, Incentive Spirometry  - Assess the need for suctioning and aspirate as needed  - Assess and instruct to report SOB or any respiratory difficulty  - Respiratory Therapy support as indicated  5/29/2022 1617 by Shanell Kc RN  Outcome: Completed  5/29/2022 0819 by Shanell Kc RN  Outcome: Progressing     Problem: METABOLIC, FLUID AND ELECTROLYTES - ADULT  Goal: Electrolytes maintained within normal limits  Description: INTERVENTIONS:  - Monitor labs and assess patient for signs and symptoms of electrolyte imbalances  - Administer electrolyte replacement as ordered  - Monitor response to electrolyte replacements, including repeat lab results as appropriate  - Instruct patient on fluid and nutrition as appropriate  5/29/2022 1617 by Barrie Kauffman RN  Outcome: Completed  5/29/2022 0819 by Barrie Kauffman RN  Outcome: Progressing  Goal: Fluid balance maintained  Description: INTERVENTIONS:  - Monitor labs   - Monitor I/O and WT  - Instruct patient on fluid and nutrition as appropriate  - Assess for signs & symptoms of volume excess or deficit  5/29/2022 1617 by Barrie Kauffman RN  Outcome: Completed  5/29/2022 0819 by Barrie Kauffman RN  Outcome: Progressing  Goal: Glucose maintained within target range  Description: INTERVENTIONS:  - Monitor Blood Glucose as ordered  - Assess for signs and symptoms of hyperglycemia and hypoglycemia  - Administer ordered medications to maintain glucose within target range  - Assess nutritional intake and initiate nutrition service referral as needed  5/29/2022 1617 by Barrie Kauffman RN  Outcome: Completed  5/29/2022 0819 by Barrie Kauffman RN  Outcome: Progressing     Problem: HEMATOLOGIC - ADULT  Goal: Maintains hematologic stability  Description: INTERVENTIONS  - Assess for signs and symptoms of bleeding or hemorrhage  - Monitor labs  - Administer supportive blood products/factors as ordered and appropriate  5/29/2022 1617 by Barrie Kauffman RN  Outcome: Completed  5/29/2022 0819 by Barrie Kauffman RN  Outcome: Progressing     Problem: INFECTION - ADULT  Goal: Absence or prevention of progression during hospitalization  Description: INTERVENTIONS:  - Assess and monitor for signs and symptoms of infection  - Monitor lab/diagnostic results  - Monitor all insertion sites, i e  indwelling lines, tubes, and drains  - Monitor endotracheal if appropriate and nasal secretions for changes in amount and color  - [place] appropriate cooling/warming therapies per order  - Administer medications as ordered  - Instruct and encourage patient and family to use good hand hygiene technique  - Identify and instruct in appropriate isolation precautions for identified infection/condition  5/29/2022 1617 by Ritesh Marshall RN  Outcome: Completed  5/29/2022 0819 by Ritesh Marshall RN  Outcome: Progressing     Problem: DISCHARGE PLANNING  Goal: Discharge to home or other facility with appropriate resources  Description: INTERVENTIONS:  - Identify barriers to discharge w/patient and caregiver  - Arrange for needed discharge resources and transportation as appropriate  - Identify discharge learning needs (meds, wound care, etc )  - Arrange for interpretive services to assist at discharge as needed  - Refer to Case Management Department for coordinating discharge planning if the patient needs post-hospital services based on physician/advanced practitioner order or complex needs related to functional status, cognitive ability, or social support system  5/29/2022 1617 by Ritesh Marshall RN  Outcome: Completed  5/29/2022 0819 by Ritesh Marshall RN  Outcome: Progressing     Problem: Knowledge Deficit  Goal: Patient/family/caregiver demonstrates understanding of disease process, treatment plan, medications, and discharge instructions  Description: Complete learning assessment and assess knowledge base    Interventions:  - Provide teaching at level of understanding  - Provide teaching via preferred learning methods  5/29/2022 1617 by Ritesh Marshall RN  Outcome: Completed  5/29/2022 0819 by Ritesh Marshall RN  Outcome: Progressing

## 2022-05-29 NOTE — DISCHARGE INSTR - AVS FIRST PAGE
Dear Teetee Wooten,     It was our pleasure to care for you here at [facility]  It is our hope that we were always able to exceed the expected standards for your care during your stay  You were hospitalized due to febrile neutropenia and COVID-19 infection  You were cared for on the [facility] 3rd floor by Kassi Evans MD under the service of Tracy Brannon,  with the [address] [place] [address] [facility] who covers for your primary care physician (PCP), Kassi Batista MD, while you were hospitalized  If you have any questions or concerns related to this hospitalization, you may contact us at [phone]  For follow up as well as any medication refills, we recommend that you follow up with your primary care physician  A registered nurse will reach out to you by phone within a few days after your discharge to answer any additional questions that you may have after going home  However, at this time we provide for you here, the most important instructions / recommendations at discharge:     Notable Medication Adjustments -   Please continue to take Mucinex (1 tablet) every 12 hours for the next 5 days  Please continue to take Tessalon Perles (1 capsule) up to three times a day for 5 days as needed for cough  Testing Required after Discharge -   None  Important follow up information -   Please follow up with your primary care provider on May 31st to monitor clinical course  Please keep your previously scheduled appointments or call to reschedule due to Flo Recio, as directed by the provider's office  Other Instructions -   You should quarantine for a total of 20 days from symptom onset or first positive test (through June 12th)  Recommend obtaining vaccine booster when current infection has cleared  Should then obtain 4th shot at earliest available date in series thereafter    Please review this entire after visit summary as additional general instructions including medication list, appointments, activity, diet, any pertinent wound care, and other additional recommendations from your care team that may be provided for you        Sincerely,     Maged Portillo MD

## 2022-05-29 NOTE — ASSESSMENT & PLAN NOTE
Lab Results   Component Value Date    SARSCOV2 Positive (A) 05/23/2022    SARSCOV2 Negative 05/20/2022     Symptoms of COVID-19 SYMPTOMS: cough and Diarrhea  Vaccine status:  Fully vaccinated and boosted  · Saturating 97% on 2 liters/min via nasal cannula         MILD Stage    Plan:  Infectious disease on board, appreciate recommendations  ·  Complete remdesivir/dexamethasone   ·  Covid vaccinated x2  Would recommend full vaccination to 4 doses when appropriate  · Tessalon scheduled, Mucinex scheduled, Hycodan p r n  · Therapeutic Anticoagulation; Lovenox 1mg/kg  · OOB TID, PT and OT eval and treat, Self-proning if able, Incentive spirometry  · Trend labs as needed;  Inflammatory markers, daily labs  · Pt will need to quartantine x20 days d/t immunocompromise

## 2022-05-29 NOTE — ASSESSMENT & PLAN NOTE
· History of hypertension maintained on lisinopril and amlodipine  · Patient has not been taking antihypertensives the last few weeks due to hypotension  · Continue to hold antihypertensives  · Monitor blood pressure and reintroduce antihypertensives as needed  --110s to 120s over last 24h  · Scheduled for f/u w PCP on 5/31/22

## 2022-05-29 NOTE — ASSESSMENT & PLAN NOTE
SIRS criteria met with tachycardia and neutropenia  Suspected source:  COVID-19  COVID-19:  Positive on 5/23  Lactic acid:  Normal  Procalcitonin:  Normal   No evidence of end organ damage  Strep negative  CT AP with PE study--no PE, ground-glass consolidation bilateral-post radiation changes with possible superimposed infection  Decreased right breast mass and right axillary metastatic adenopathy  Bone Mets to left sacral spine similar to prior  Per ID, sepsis is likely secondary to COVID  Will DC antibiotics for now  Started Decadron, remdesivir as patient was on 2 L of oxygen  Now maintaining sats in high 90s on RA  UCx positive for E coli   Asymptomatic bacturia c/w bladder colonization    C  diff and stool enteric panel both negative     Plan:   F/U BCX--prelim result [email or web address]

## 2022-05-29 NOTE — PROGRESS NOTES
Progress Note - Infectious Disease   Carlos Enrique Burn 51 y o  female MRN: [id number]  Unit/Bed#: S -01 Encounter: [id number]      Impression/Recommendations:  1  Febrile neutropenia, with patient having fever at home but no further fever on presentation here  Her neutropenia is relatively mild, with ANC of 290  Patient's fever is most likely secondary to her COVID infection  Patient is clinically and systemically well, without evidence of sepsis or systemic toxicity  Patient had been on antibiotics earlier but these were discontinued  She remains clinically well and afebrile off antibiotic  Admission blood cultures have no growth thus far  Observe off further antibiotic  Monitor temperature closely  Monitor WBC/ANC  Follow-up on pending blood cultures      2  COVID, in immunosuppressed patient who is undervaccinated, having received only 2 initial injections of mRNA vaccine > 1 year ago  Patient did appropriately received bebtelovimab monoclonal antibody on 05/25  Despite this, her COVID is progressing, with her having mild hypoxia at present  No evidence of bacterial superinfection  Given her immunosuppressed state and risk of ongoing progression, it is reasonable to treat patient with remdesivir and systemic corticosteroid  I discussed with the patient regarding her incomplete COVID vaccination  Once patient recovers from this current episode of COVID, she should get COVID boosters  She is agreeable  Continue remdesivir  Continue dexamethasone  Monitor respiratory symptoms and O2 saturation  Recommend COVID boosters once patient recovers from current COVID infection      3  Mild hypoxia, likely secondary COVID  Chest CT is without consolidation or PE  Hypoxia is improved, with patient no longer on O2 supplement  COVID treatment as in above  O2 supplement per primary service  Monitor O2 saturation      4  Candida mucositis, with concern for possible esophagitis    Plan for HD was canceled due to Anne  For now, will continue topical antifungal   Continue topical antifungal for now  Recommend EGD once patient is stable to proceed      5  Asymptomatic bacteriuria  This is most likely bladder colonization  No antibiotic needed for this  6  Metastatic breast cancer  Patient is currently on chemotherapy  Oncology follow-up      Discussed with patient in detail regarding the above plan  Discussed with slim service  Antibiotics:  RDV # 2  No antibiotic    Subjective:  Patient feels better  She still has nonproductive cough but no further dyspnea at rest     Temperature stays down  No chills  No diarrhea  Objective:  Vitals:  Temp:  [97 7 °F (36 5 °C)-98 2 °F (36 8 °C)] 97 7 °F (36 5 °C)  HR:  [] 86  Resp:  [16-20] 20  BP: (124-129)/(65-76) 129/74  SpO2:  [92 %-95 %] 92 %  Temp (24hrs), Av °F (36 7 °C), Min:97 7 °F (36 5 °C), Max:98 2 °F (36 8 °C)  Current: Temperature: 97 7 °F (36 5 °C)    Physical Exam:     General: Awake, alert, cooperative, no distress  Neck:  Supple  No mass  No lymphadenopathy  Lungs: Expansion symmetric, no rales, no wheezing, respirations unlabored  Heart:  Regular rate and rhythm, S1 and S2 normal, no murmur  Abdomen: Soft, nondistended, non-tender, bowel sounds active all four quadrants, no masses, no organomegaly  Extremities: Trace leg edema  No erythema/warmth  No ulcer  Nontender to palpation  Skin:  No rash  Neuro: Moves all extremities  Invasive Devices  Report    Peripheral Intravenous Line  Duration           Peripheral IV 22 Right Antecubital 1 day                Labs studies:   I have personally reviewed pertinent labs    Results from last 7 days   Lab Units 22  0520 22  2306 22  1223   POTASSIUM mmol/L 3 5 4 2 4 3   CHLORIDE mmol/L 104 101 102   CO2 mmol/L 26 25 25   BUN mg/dL 5 7 8   CREATININE mg/dL 0 41* 0 46* 0 60   EGFR ml/min/1 73sq m 123 118 108   CALCIUM mg/dL 8 5 8 8 9 0   AST U/L 15 23  --    ALT U/L 22 31  --    ALK PHOS U/L 68 86  --      Results from last 7 days   Lab Units 05/29/22  0520 05/27/22  2340 05/27/22  1223   WBC Thousand/uL 1 62* 0 79* 0 92*   HEMOGLOBIN g/dL 7 9* 8 1* 8 8*   PLATELETS Thousands/uL 302 247 251     Results from last 7 days   Lab Units 05/27/22  2346 05/27/22  2340 05/27/22 2306 05/23/22 1957 05/23/22 1954 05/23/22 1949   BLOOD CULTURE   --  No Growth at 24 hrs  No Growth at 24 hrs  No Growth After 5 Days  --  No Growth After 5 Days  URINE CULTURE  >100,000 cfu/ml Gram Negative Salo resembling Escherichia coli*  20,000-29,000 cfu/ml   --   --   --  10,000-19,000 cfu/ml Escherichia coli*  --        Imaging Studies:   I have personally reviewed pertinent imaging study reports and images in PACS  EKG, Pathology, and Other Studies:   I have personally reviewed pertinent reports

## 2022-05-29 NOTE — PLAN OF CARE
Problem: Nutrition/Hydration-ADULT  Goal: Nutrient/Hydration intake appropriate for improving, restoring or maintaining nutritional needs  Description: Monitor and assess patient's nutrition/hydration status for malnutrition  Collaborate with interdisciplinary team and initiate plan and interventions as ordered  Monitor patient's weight and dietary intake as ordered or per policy  Utilize nutrition screening tool and intervene as necessary  Determine patient's food preferences and provide high-protein, high-caloric foods as appropriate       INTERVENTIONS:  - Monitor oral intake, urinary output, labs, and treatment plans  - Assess nutrition and hydration status and recommend course of action  - Evaluate amount of meals eaten  - Assist patient with eating if necessary   - Allow adequate time for meals  - Recommend/ encourage appropriate diets, oral nutritional supplements, and vitamin/mineral supplements  - Order, calculate, and assess calorie counts as needed  - Recommend, monitor, and adjust tube feedings and TPN/PPN based on assessed needs  - Assess need for intravenous fluids  - Provide specific nutrition/hydration education as appropriate  - Include patient/family/caregiver in decisions related to nutrition  5/29/2022 0819 by Tommy Valencia RN  Outcome: Progressing  5/29/2022 0818 by Tommy Valencia RN  Outcome: Progressing     Problem: MOBILITY - ADULT  Goal: Maintain or return to baseline ADL function  Description: INTERVENTIONS:  -  Assess patient's ability to carry out ADLs; assess patient's baseline for ADL function and identify physical deficits which impact ability to perform ADLs (bathing, care of mouth/teeth, toileting, grooming, dressing, etc )  - Assess/evaluate cause of self-care deficits   - Assess range of motion  - Assess patient's mobility; develop plan if impaired  - Assess patient's need for assistive devices and provide as appropriate  - Encourage maximum independence but intervene and supervise when necessary  - Involve family in performance of ADLs  - Assess for home care needs following discharge   - Consider OT consult to assist with ADL evaluation and planning for discharge  - Provide patient education as appropriate  5/29/2022 0819 by Brent Wang RN  Outcome: Progressing  5/29/2022 0818 by Brent Wang RN  Outcome: Progressing  Goal: Maintains/Returns to pre admission functional level  Description: INTERVENTIONS:  - Perform BMAT or MOVE assessment daily    - Set and communicate daily mobility goal to care team and patient/family/caregiver  - Collaborate with rehabilitation services on mobility goals if consulted  - Perform Range of Motion  times a day  - Reposition patient every  hours    - Dangle patient  times a day  - Stand patient  times a day  - Ambulate patient  times a day  - Out of bed to chair  times a day   - Out of bed for meals  times a day  - Out of bed for toileting  - Record patient progress and toleration of activity level   5/29/2022 0819 by Brent Wang RN  Outcome: Progressing  5/29/2022 0818 by Brent Wang RN  Outcome: Progressing     Problem: Potential for Falls  Goal: Patient will remain free of falls  Description: INTERVENTIONS:  - Educate patient/family on patient safety including physical limitations  - Instruct patient to call for assistance with activity   - Consult OT/PT to assist with strengthening/mobility   - Keep Call bell within reach  - Keep bed low and locked with side rails adjusted as appropriate  - Keep care items and personal belongings within reach  - Initiate and maintain comfort rounds  - Make Fall Risk Sign visible to staff  - Offer Toileting every  Hours, in advance of need  - Initiate/Maintainalarm  - Obtain necessary fall risk management equipment:   - Apply yellow socks and bracelet for high fall risk patients  - Consider moving patient to room near nurses station  5/29/2022 0819 by Brent Wang RN  Outcome: Progressing  5/29/2022 0818 by Binnie Dill, RN  Outcome: Progressing     Problem: Prexisting or High Potential for Compromised Skin Integrity  Goal: Skin integrity is maintained or improved  Description: INTERVENTIONS:  - Identify patients at risk for skin breakdown  - Assess and monitor skin integrity  - Assess and monitor nutrition and hydration status  - Monitor labs   - Assess for incontinence   - Turn and reposition patient  - Assist with mobility/ambulation  - Relieve pressure over bony prominences  - Avoid friction and shearing  - Provide appropriate hygiene as needed including keeping skin clean and dry  - Evaluate need for skin moisturizer/barrier cream  - Collaborate with interdisciplinary team   - Patient/family teaching  - Consider wound care consult   5/29/2022 0819 by Rubio Calix RN  Outcome: Progressing  5/29/2022 0818 by Rubio Calix RN  Outcome: Progressing     Problem: RESPIRATORY - ADULT  Goal: Achieves optimal ventilation and oxygenation  Description: INTERVENTIONS:  - Assess for changes in respiratory status  - Assess for changes in mentation and behavior  - Position to facilitate oxygenation and minimize respiratory effort  - Oxygen administered by appropriate delivery if ordered  - Initiate smoking cessation education as indicated  - Encourage broncho-pulmonary hygiene including cough, deep breathe, Incentive Spirometry  - Assess the need for suctioning and aspirate as needed  - Assess and instruct to report SOB or any respiratory difficulty  - Respiratory Therapy support as indicated  Outcome: Progressing     Problem: METABOLIC, FLUID AND ELECTROLYTES - ADULT  Goal: Electrolytes maintained within normal limits  Description: INTERVENTIONS:  - Monitor labs and assess patient for signs and symptoms of electrolyte imbalances  - Administer electrolyte replacement as ordered  - Monitor response to electrolyte replacements, including repeat lab results as appropriate  - Instruct patient on fluid and nutrition as appropriate  Outcome: Progressing  Goal: Fluid balance maintained  Description: INTERVENTIONS:  - Monitor labs   - Monitor I/O and WT  - Instruct patient on fluid and nutrition as appropriate  - Assess for signs & symptoms of volume excess or deficit  Outcome: Progressing  Goal: Glucose maintained within target range  Description: INTERVENTIONS:  - Monitor Blood Glucose as ordered  - Assess for signs and symptoms of hyperglycemia and hypoglycemia  - Administer ordered medications to maintain glucose within target range  - Assess nutritional intake and initiate nutrition service referral as needed  Outcome: Progressing     Problem: HEMATOLOGIC - ADULT  Goal: Maintains hematologic stability  Description: INTERVENTIONS  - Assess for signs and symptoms of bleeding or hemorrhage  - Monitor labs  - Administer supportive blood products/factors as ordered and appropriate  Outcome: Progressing     Problem: INFECTION - ADULT  Goal: Absence or prevention of progression during hospitalization  Description: INTERVENTIONS:  - Assess and monitor for signs and symptoms of infection  - Monitor lab/diagnostic results  - Monitor all insertion sites, i e  indwelling lines, tubes, and drains  - Monitor endotracheal if appropriate and nasal secretions for changes in amount and color  - [place] appropriate cooling/warming therapies per order  - Administer medications as ordered  - Instruct and encourage patient and family to use good hand hygiene technique  - Identify and instruct in appropriate isolation precautions for identified infection/condition  Outcome: Progressing     Problem: DISCHARGE PLANNING  Goal: Discharge to home or other facility with appropriate resources  Description: INTERVENTIONS:  - Identify barriers to discharge w/patient and caregiver  - Arrange for needed discharge resources and transportation as appropriate  - Identify discharge learning needs (meds, wound care, etc )  - Arrange for interpretive services to assist at discharge as needed  - Refer to Case Management Department for coordinating discharge planning if the patient needs post-hospital services based on physician/advanced practitioner order or complex needs related to functional status, cognitive ability, or social support system  Outcome: Progressing     Problem: Knowledge Deficit  Goal: Patient/family/caregiver demonstrates understanding of disease process, treatment plan, medications, and discharge instructions  Description: Complete learning assessment and assess knowledge base    Interventions:  - Provide teaching at level of understanding  - Provide teaching via preferred learning methods  Outcome: Progressing

## 2022-05-29 NOTE — PLAN OF CARE
Problem: Nutrition/Hydration-ADULT  Goal: Nutrient/Hydration intake appropriate for improving, restoring or maintaining nutritional needs  Description: Monitor and assess patient's nutrition/hydration status for malnutrition  Collaborate with interdisciplinary team and initiate plan and interventions as ordered  Monitor patient's weight and dietary intake as ordered or per policy  Utilize nutrition screening tool and intervene as necessary  Determine patient's food preferences and provide high-protein, high-caloric foods as appropriate       INTERVENTIONS:  - Monitor oral intake, urinary output, labs, and treatment plans  - Assess nutrition and hydration status and recommend course of action  - Evaluate amount of meals eaten  - Assist patient with eating if necessary   - Allow adequate time for meals  - Recommend/ encourage appropriate diets, oral nutritional supplements, and vitamin/mineral supplements  - Order, calculate, and assess calorie counts as needed  - Recommend, monitor, and adjust tube feedings and TPN/PPN based on assessed needs  - Assess need for intravenous fluids  - Provide specific nutrition/hydration education as appropriate  - Include patient/family/caregiver in decisions related to nutrition  Outcome: Progressing     Problem: MOBILITY - ADULT  Goal: Maintain or return to baseline ADL function  Description: INTERVENTIONS:  -  Assess patient's ability to carry out ADLs; assess patient's baseline for ADL function and identify physical deficits which impact ability to perform ADLs (bathing, care of mouth/teeth, toileting, grooming, dressing, etc )  - Assess/evaluate cause of self-care deficits   - Assess range of motion  - Assess patient's mobility; develop plan if impaired  - Assess patient's need for assistive devices and provide as appropriate  - Encourage maximum independence but intervene and supervise when necessary  - Involve family in performance of ADLs  - Assess for home care needs following discharge   - Consider OT consult to assist with ADL evaluation and planning for discharge  - Provide patient education as appropriate  Outcome: Progressing  Goal: Maintains/Returns to pre admission functional level  Description: INTERVENTIONS:  - Perform BMAT or MOVE assessment daily    - Set and communicate daily mobility goal to care team and patient/family/caregiver  - Collaborate with rehabilitation services on mobility goals if consulted  - Perform Range of Motion 2 times a day  - Reposition patient every 2 hours    - Dangle patient 2 times a day  - Stand patient 2 times a day  - Ambulate patient 2 times a day  - Out of bed to chair 2 times a day   - Out of bed for meals 2 times a day  - Out of bed for toileting  - Record patient progress and toleration of activity level   Outcome: Progressing     Problem: Potential for Falls  Goal: Patient will remain free of falls  Description: INTERVENTIONS:  - Educate patient/family on patient safety including physical limitations  - Instruct patient to call for assistance with activity   - Consult OT/PT to assist with strengthening/mobility   - Keep Call bell within reach  - Keep bed low and locked with side rails adjusted as appropriate  - Keep care items and personal belongings within reach  - Initiate and maintain comfort rounds  - Make Fall Risk Sign visible to staff  - Offer Toileting every 2 Hours, in advance of need  - Initiate/Maintain bed alarm  - Obtain necessary fall risk management equipment: bed alarm  - Apply yellow socks and bracelet for high fall risk patients  - Consider moving patient to room near nurses station  Outcome: Progressing     Problem: Prexisting or High Potential for Compromised Skin Integrity  Goal: Skin integrity is maintained or improved  Description: INTERVENTIONS:  - Identify patients at risk for skin breakdown  - Assess and monitor skin integrity  - Assess and monitor nutrition and hydration status  - Monitor labs   - Assess for incontinence   - Turn and reposition patient  - Assist with mobility/ambulation  - Relieve pressure over bony prominences  - Avoid friction and shearing  - Provide appropriate hygiene as needed including keeping skin clean and dry  - Evaluate need for skin moisturizer/barrier cream  - Collaborate with interdisciplinary team   - Patient/family teaching  - Consider wound care consult   Outcome: Progressing

## 2022-05-29 NOTE — UTILIZATION REVIEW
Initial Clinical Review    Admission: Date/Time/Statement:   Admission Orders (From admission, onward)     Ordered        05/28/22 0216  Inpatient Admission  Once                      Orders Placed This Encounter   Procedures    Inpatient Admission     covid 19, neutropenia     Standing Status:   Standing     Number of Occurrences:   1     Order Specific Question:   Level of Care     Answer:   Med Surg [16]     Order Specific Question:   Bed request comments     Answer:   covid 19, neutropenia     Order Specific Question:   Estimated length of stay     Answer:   More than 2 Midnights     Order Specific Question:   Certification     Answer:   I certify that inpatient services are medically necessary for this patient for a duration of greater than two midnights  See H&P and MD Progress Notes for additional information about the patient's course of treatment  ED Arrival Information     Expected   -    Arrival   5/27/2022 22:00    Acuity   Emergent            Means of arrival   Walk-In    Escorted by   [facility]    Admission type   Emergency            Arrival complaint   pt was told by  to come in [address] Blood count           Chief Complaint   Patient presents with    Abnormal Lab     Pt sent in by provider for low WBC  Pt reports having second treatment of chemo on Thursday  Pt tested positive for covid on Monday  Pt denies CP, SOB  Reports she's been having diarrhea       Initial Presentation: 51 y o  female from home to ED 5/27 and inpatient order placed 5/28/22 due to Sepsis related to COVID 19  PMH of breast cancer with metastasis to bone  Presented due to WBC of 0 92, bands 9% and absolute neutrophils 0 35  Has intermittent fevers week of arrival and tested + COVID 19 5/23/22, received monoclonal antibodies 5 25 22  Last chemo 5/24/22  On exam neck in brace  Tachycardic  Bilateral LE edema  Erythematous macular rash both dorsal hands  Blood and urine cultures done  Wbc 0 79, absolute neutrophils 0 29  H&H 8 1/26 2  D dimer 0 96  CRP 63 1  Ct showed groundglass opacity  In the ED given 1 liter IVF bolus, cefepime and vancomycin  Sat to 89% room air and placed on oxygen  Plan is  Hold antibiotics  Wean oxygen as able  Start Remdesivir and Decadron  Follow cultures  Consult ID       5/28/22 per ID:  Patient with febrile neutropenia/COVID in immunosuppressed patient, under vaccinated  Recommend to start Decadron and remdesivir  Monitor oxygen sats  Hold antibiotics  For candida mucositis, continue antifungal       Date: 5/29/22    Day 2:  Has nonproductive cough  Shortness of breath improving  On exam trace leg edema  Wbc 1 62   To continue remdesivir and decadron  Oxygen as needed       ED Triage Vitals   Temperature Pulse Respirations Blood Pressure SpO2   05/27/22 2208 05/27/22 2208 05/27/22 2208 05/27/22 2208 05/27/22 2208   98 5 °F (36 9 °C) (!) 112 18 123/61 93 %      Temp Source Heart Rate Source Patient Position - Orthostatic VS BP Location FiO2 (%)   05/27/22 2208 05/27/22 2208 05/27/22 2208 05/27/22 2208 --   Oral Monitor Sitting Right arm       Pain Score       05/28/22 0335       8          Wt Readings from Last 1 Encounters:   05/26/22 83 9 kg (185 lb)     Additional Vital Signs:   05/29/22 0834 97 7 °F (36 5 °C) 86 20 129/74 96 92 % -- -- None (Room air) Sitting   05/29/22 0814 -- -- -- -- -- -- -- -- None (Room air) --   05/28/22 2209 98 °F (36 7 °C) 103 20 124/76 95 92 % -- -- None (Room air) Sitting   05/28/22 2000 -- -- -- -- -- 95 % -- -- None (Room air) --   05/28/22 1722 98 2 °F (36 8 °C) 94 16 125/65 89 95 % 24 1 L/min Nasal cannula Sitting   05/28/22 0810 98 1 °F (36 7 °C) 81 20 119/65 86 91 % -- -- Nasal cannula Lying   05/28/22 0341 98 3 °F (36 8 °C) 113 Abnormal  18 140/72 97 93 % 28 2 L/min Nasal cannula Sitting   05/28/22 0230 -- 114 Abnormal  18 127/76 97 96 % 28 2 L/min None (Room air) Lying       Pertinent Labs/Diagnostic Test Results:   PE Study with CT Abdomen and Pelvis with contrast   ED Interpretation by Ole Lomas MD (05/28 4742)   FINDINGS:     CHEST     PULMONARY ARTERIAL TREE:  No pulmonary embolus is seen      LUNGS:  Subsegmental atelectasis  There is symmetric appearing patchy consolidative and groundglass opacity in the posterior apices bilaterally, nonspecific  Bernard Guise There is no tracheal or endobronchial lesion      PLEURA:  Unremarkable      HEART/AORTA:  Heart is unremarkable for patient's age  No thoracic aortic aneurysm      MEDIASTINUM AND ALISON:  Unremarkable      CHEST WALL AND LOWER NECK:  Reidentified soft tissue breast mass within the superomedial right breast decreased in size compared to prior study now measuring 3 5 cm in maximal dimension (previously 7 0 cm)  Right axillary lymph node measures 3 cm      ABDOMEN     LIVER/BILIARY TREE:  Unremarkable      GALLBLADDER:  No calcified gallstones  No pericholecystic inflammatory change      SPLEEN:  Unremarkable      PANCREAS:  Unremarkable      ADRENAL GLANDS:  Unremarkable      KIDNEYS/URETERS:  One or more simple renal cyst(s) is noted  Unchang   ed left lower pole nonobstructing intrarenal calculus  No hydronephrosis      STOMACH AND BOWEL:  Unremarkable      APPENDIX:  No findings to suggest appendicitis      ABDOMINOPELVIC CAVITY:  No ascites  No pneumoperitoneum  No lymphadenopathy      VESSELS:  Unremarkable for patient's age      PELVIS     REPRODUCTIVE ORGANS:  Unremarkable for patient's age  Left adnexal paraovarian cyst measures 4 cm decreased in size compared to prior      URINARY BLADDER:  Unremarkable      ABDOMINAL WALL/INGUINAL REGIONS:  Unremarkable      OSSEOUS STRUCTURES:  No acute fracture    Reidentified diffuse osseous metastasis with largest left sacral metastatic lesion similar in appearance to prior      IMPRESSION:     No evidence of acute pulmonary embolus      Symmetric groundglass consolidative opacity in the bilateral posterior lung apices which may reflect? Post radiation/treatment changes  Correlate clinically to exclude superimposed infectious      Interval decrease in size of right breast m   ass and right axillary metastatic adenopathy compatible with treatment response      Redemonstrated diffuse osseous metastatic disease with largest left sacral metastasis not significantly changed compared to prior study dated 3/29/2022 and also evaluated on prior recent bone scan examination dated 4/25/2022      Additional findings as above      Workstation performed: [id number]         Final Result by Javier Rodriguez MD (05/28 0150)      No evidence of acute pulmonary embolus  Symmetric groundglass consolidative opacity in the bilateral posterior lung apices which may reflect? Post radiation/treatment changes  Correlate clinically to exclude superimposed infectious  Interval decrease in size of right breast mass and right axillary metastatic adenopathy compatible with treatment response  Redemonstrated diffuse osseous metastatic disease with largest left sacral metastasis not significantly changed compared to prior study dated 3/29/2022 and also evaluated on prior recent bone scan examination dated 4/25/2022  Additional findings as above  Workstation performed: [id number]         XR chest 1 view portable   ED Interpretation by Dayana Dan MD (05/27 1358)   LLL atx read by me  Final Result by Cherelle Israel MD (05/28 1987)      No acute cardiopulmonary disease                    Workstation performed: [id number]           5/28/22 ecg - Previous ECG:  Compared to current     Comparison ECG info:  5/23/22     Similarity: Miguel noted (more artifact now)   Quality:     Tracing quality:  Limited by artifact   Rate:     ECG rate:  104     ECG rate assessment: tachycardic     Rhythm:     Rhythm: sinus tachycardia     Ectopy:     Ectopy: none     QRS:     QRS axis:  Normal     QRS intervals:  Normal Conduction:     Conduction: normal     ST segments:     ST segments:  Normal   T waves:     T waves: normal     Other findings:     Other findings: LVH     Comments:      I do not agree with computer reading of accelerated junctional rhythm  Results from last 7 days   Lab Units 05/23/22 1949   SARS-COV-2  Positive*     Results from last 7 days   Lab Units 05/29/22  0520 05/27/22  2340 05/27/22 1223 05/23/22 1949   WBC Thousand/uL 1 62* 0 79* 0 92* 2 04*   HEMOGLOBIN g/dL 7 9* 8 1* 8 8* 9 0*   HEMATOCRIT % 25 5* 26 2* 28 8* 29 5*   PLATELETS Thousands/uL 302 247 251 303   BANDS PCT % 1  --  9* 20*     Results from last 7 days   Lab Units 05/29/22 0520 05/27/22 2306 05/27/22 1223 05/23/22 1949   SODIUM mmol/L 137 134* 134* 132*   POTASSIUM mmol/L 3 5 4 2 4 3 4 3   CHLORIDE mmol/L 104 101 102 98   CO2 mmol/L 26 25 25 28   ANION GAP mmol/L 7 8 7 6   BUN mg/dL 5 7 8 10   CREATININE mg/dL 0 41* 0 46* 0 60 0 56*   EGFR ml/min/1 73sq m 123 118 108 111   CALCIUM mg/dL 8 5 8 8 9 0 8 2*   CALCIUM, IONIZED mmol/L  --  1 11*  --   --    MAGNESIUM mg/dL  --  1 9  --   --      Results from last 7 days   Lab Units 05/29/22 0520 05/27/22 2306   AST U/L 15 23   ALT U/L 22 31   ALK PHOS U/L 68 86   TOTAL PROTEIN g/dL 6 3* 6 6   ALBUMIN g/dL 3 3* 3 6   TOTAL BILIRUBIN mg/dL 0 48 0 50     Results from last 7 days   Lab Units 05/29/22 0520 05/27/22 2306 05/23/22 1949   GLUCOSE RANDOM mg/dL 108 104 91     Results from last 7 days   Lab Units 05/27/22 2306   CK TOTAL U/L 47     Results from last 7 days   Lab Units 05/27/22 2306   HS TNI 0HR ng/L 3     Results from last 7 days   Lab Units 05/27/22 2306   D-DIMER QUANTITATIVE ug/ml FEU 0 96*     Results from last 7 days   Lab Units 05/27/22  2306   PROTIME seconds 12 8   INR  0 96     Results from last 7 days   Lab Units 05/27/22  2306   TSH 3RD GENERATON uIU/mL 1 866     Results from last 7 days   Lab Units 05/29/22  0520 05/27/22  2306   PROCALCITONIN ng/ml 0 05 0 05 Results from last 7 days   Lab Units 05/27/22  2306   LACTIC ACID mmol/L 0 6     Results from last 7 days   Lab Units 05/27/22  2306   NT-PRO BNP pg/mL 19     Results from last 7 days   Lab Units 05/27/22  2306   FERRITIN ng/mL 191     Results from last 7 days   Lab Units 05/29/22  0520 05/27/22  2306   CRP mg/L 102 0* 63 1*     Results from last 7 days   Lab Units 05/27/22 2346 05/23/22 1954   CLARITY UA  Clear Clear   COLOR UA  Yellow Yellow   SPEC GRAV UA  1 010 1 015   PH UA  6 0 6 0   GLUCOSE UA mg/dl Negative Negative   KETONES UA mg/dl Negative Negative   BLOOD UA  Negative Negative   PROTEIN UA mg/dl Negative Trace*   NITRITE UA  Negative Negative   BILIRUBIN UA  Negative Negative   UROBILINOGEN UA E U /dl 0 2 0 2   LEUKOCYTES UA  Negative Small*   WBC UA /hpf  --  20-30*   RBC UA /hpf  --  0-1   BACTERIA UA /hpf  --  Occasional   EPITHELIAL CELLS WET PREP /hpf  --  Occasional   MUCUS THREADS   --  Occasional*     Results from last 7 days   Lab Units 05/23/22 1949   INFLUENZA A PCR  Negative   INFLUENZA B PCR  Negative   RSV PCR  Negative     Results from last 7 days   Lab Units 05/28/22  1155   C DIFF TOXIN B BY PCR  Negative     Results from last 7 days   Lab Units 05/28/22  1155   SALMONELLA SP PCR  None Detected   SHIGELLA SP/ENTEROINVASIVE E  COLI (EIEC)  None Detected   CAMPYLOBACTER SP (JEJUNI AND COLI)  None Detected   SHIGA TOXIN 1/SHIGA TOXIN 2  None Detected     Results from last 7 days   Lab Units 05/27/22  2346 05/27/22  2340 05/27/22 2306 05/23/22 1957 05/23/22 1954 05/23/22 1949   BLOOD CULTURE   --  No Growth at 24 hrs  No Growth at 24 hrs  No Growth After 5 Days  --  No Growth After 5 Days     URINE CULTURE  >100,000 cfu/ml Gram Negative Salo resembling Escherichia coli*  20,000-29,000 cfu/ml   --   --   --  10,000-19,000 cfu/ml Escherichia coli*  --        ED Treatment:   Medication Administration from 05/27/2022 2200 to 05/28/2022 3596       Date/Time Order Dose Route Action Comments     05/27/2022 2317 sodium chloride 0 9 % bolus 1,000 mL 1,000 mL Intravenous New Bag      05/28/2022 0108 sodium chloride 0 9 % infusion 125 mL/hr Intravenous New Bag      05/28/2022 0026 cefepime (MAXIPIME) 2 g/50 mL dextrose IVPB 2,000 mg Intravenous New Bag      05/28/2022 0103 vancomycin (VANCOCIN) 1,750 mg in sodium chloride 0 9 % 500 mL IVPB 1,750 mg Intravenous New Bag         Past Medical History:   Diagnosis Date    Cancer [facility])      Present on Admission:   COVID-19   HTN (hypertension)   Cancer associated pain   Candida infection, esophageal (HCC)   Chemotherapy-induced nausea      Admitting Diagnosis: Diarrhea [R19 7]  Hypoxia [R09 02]  Neutropenia (HCC) [D70 9]  Sepsis ([facility] [address] ) [A41 9]  Metastatic breast cancer ([facility] [address] ) [C50 919]  COVID [U07 1]  Pneumonia due to COVID-19 virus [U07 1, J12 82]  Age/Sex: 51 y o  female  Admission Orders:  5/28/22 0216 inpatient   Scheduled Medications:  benzonatate, 200 mg, Oral, TID  dexamethasone, 6 mg, Intravenous, Q24H  enoxaparin, 1 mg/kg, Subcutaneous, Q12H FABIANA  guaiFENesin, 600 mg, Oral, Q12H FABIANA  morphine, 15 mg, Oral, Q12H FABIANA  nystatin, 500,000 Units, Swish & Swallow, 4x Daily  nystatin, , Topical, TID  ondansetron, 4 mg, Oral, Q6H FABIANA  pantoprazole, 40 mg, Oral, Early Morning  remdesivir, 100 mg, Intravenous, Q24H    remdesivir (Veklury) 200 mg in sodium chloride 0 9 % 290 mL IVPB  Dose: 200 mg  Freq: Every 24 hours Route: IV  Indications of Use: INFECTION CAUSED BY 2019 NOVEL CORONAVIRUS  Start: 05/28/22 1145 End: 05/28/22 1327    vancomycin (VANCOCIN) IVPB (premix in dextrose) 1,000 mg 200 mL  Dose: 15 mg/kg  Weight Dosing Info: 67 5 kg (Adjusted)  Freq: Every 8 hours Route: IV  Last Dose: 1,000 mg (05/28/22 0846)  Start: 05/28/22 0900 End: 05/28/22 1102    Continuous IV Infusions:  sodium chloride 0 9 % infusion  Rate: 125 mL/hr Dose: 125 mL/hr  Freq: Continuous Route: IV  Indications of Use: IV Hydration  Last Dose: Stopped (05/28/22 1900)  Start: 05/27/22 2245 End: 05/28/22 1241     PRN Meds:  baclofen, 5 mg, Oral, TID PRN - used x 3 5/28/22   HYDROcodone Bit-Homatrop MBr, 5 mL, Oral, Q6H PRN  oxyCODONE, 10 mg, Oral, Q4H PRN - used x 4 5/28, x 2 5/29     Oxygen 2 liters  Incentive spirometry    IP CONSULT TO INFECTIOUS DISEASES    Network Utilization Review Department  ATTENTION: Please call with any questions or concerns to [phone] and carefully listen to the prompts so that you are directed to the right person  All voicemails are confidential   Leni Forrest all requests for admission clinical reviews, approved or denied determinations and any other requests to dedicated fax number below belonging to the campus where the patient is receiving treatment   List of dedicated fax numbers for the Facilities:  [address] DENIALS (Administrative/Medical Necessity) [phone]   [address] (Maternity/NICU/Pediatrics) [phone]   [address] [place] [address]  [address] [address] [place] [address] [address] [facility] [place] [address] [phone]

## 2022-05-29 NOTE — QUICK NOTE
Called Mr Reynaldo Valle ( patient's significant other ) Unfortunately no answer and unidentifiable voicemail, will attempt to contact later

## 2022-05-29 NOTE — DISCHARGE SUMMARY
[facility]  Discharge- Trung Larson 1975, 51 y o  female MRN: [id number]  Unit/Bed#: S -01 Encounter: [id number]  Primary Care Provider: Moose Ndiaye MD   Date and time admitted to hospital: 5/27/2022 10:17 PM    * COVID-19  Assessment & Plan  Lab Results   Component Value Date    SARSCOV2 Positive (A) 05/23/2022    SARSCOV2 Negative 05/20/2022     Symptoms of COVID-19 SYMPTOMS: cough and Diarrhea  Vaccine status:  vaccinated x2 without booster    · Saturating high 90s on RA    MILD Stage    Plan:  ·  Covid vaccinated x2  Would recommend full vaccination to 4 doses when appropriate, starting after clearance of current infection  · Continue Tessalon, Mucinex x5 days at discharge  · Resume home dexamethasone 4 mg PO TID  · Received remdesivir x 2 days  No need to complete remdesivir as pt is off O2 and ready for DC to home  · Pt will need to quartantine x20 days from positive test (through June 12th) d/t immunocompromise       Sepsis [facility])  Assessment & Plan  SIRS criteria met with tachycardia and neutropenia  Suspected source:  COVID-19  COVID-19:  Positive on 5/23  Lactic acid:  Normal  Procalcitonin:  Normal   No evidence of end organ damage  Strep negative  CT AP with PE study--no PE, ground-glass consolidation bilateral-post radiation changes with possible superimposed infection  Decreased right breast mass and right axillary metastatic adenopathy  Bone Mets to left sacral spine similar to prior  Per ID, sepsis is likely secondary to COVID  Will DC antibiotics for now  Started Decadron, remdesivir as patient was on 2 L of oxygen  Now maintaining sats in high 90s on RA  UCx positive for E coli  Asymptomatic bacturia c/w bladder colonization    C  diff and stool enteric panel both negative     Plan:   F/U BCX--prelim result [email or web address]        Candidal skin infection  Assessment & Plan  · Candidal skin infection under breast and groin    · Continue nystatin powder  Chemotherapy-induced neutropenia [facility])  Assessment & Plan  Patient presenting due to advice from PCP for abnormal labs - neutropenic after chemotherapy  Additionally could be secondary to recent COVID infection  Oncology consulted for risk vs benefits of Granix--do not believe appropriate this time  Plan  · Management of COVID as above  · F/u w Heme Onc o/p    Antineoplastic chemotherapy induced anemia  Assessment & Plan  · Hemoglobin decreased from 12 4 on 4/03/22 to 7 9 today, small down-trend this admission    · Iron panel normal   · Continue to follow w Heme-Onc o/p        Chemotherapy-induced nausea  Assessment & Plan  · Continue home p r n  Zofran    Candida infection, esophageal ([address] )  Assessment & Plan  · Continue home nystatin swish and swallow  HTN (hypertension)  Assessment & Plan  · History of hypertension maintained on lisinopril and amlodipine  · Patient has not been taking antihypertensives the last few weeks due to hypotension  · Continue to hold antihypertensives  · Monitor blood pressure and reintroduce antihypertensives as needed  --110s to 120s over last 24h  · Scheduled for f/u w PCP on 5/31/22    Cancer associated pain  Assessment & Plan  · Patient follows with palliative care  · Continue home pain regimen per palliative care recommendations        Medical Problems             Resolved Problems  Date Reviewed: 5/28/2022   None               Discharging Resident: Bernard Dorado MD  Discharging Attending: Josafat Wilson DO  PCP: Sofie Dhaliwal MD  Admission Date:   Admission Orders (From admission, onward)     Ordered        05/28/22 0216  Inpatient Admission  Once                      Discharge Date: 05/29/22    Consultations During Hospital Stay:  · Infectious Disease  · Pharmacy    Procedures Performed:   · None    Significant Findings / Test Results:   Neutropenia - WBC 0 92 POA, ANC 0 35  Anemia - RBC 8 8 POA  D-dimer 0 96, elevated  CRP elevated 63 1 --> 102  Procal negative x 2  LA w/in normal limits  PE Study with CT Abdomen and Pelvis with contrast 5/28/2022: No evidence of acute pulmonary embolus  Symmetric groundglass consolidative opacity in the bilateral posterior lung apices which may reflect ? Post radiation/treatment changes  Correlate clinically to exclude superimposed infection  Interval decrease in size of right breast mass and right axillary metastatic adenopathy compatible with treatment response  Redemonstrated diffuse osseous metastatic disease with largest left sacral metastasis not significantly changed compared to prior study dated 3/29/2022 and also evaluated on prior recent bone scan examination dated 4/25/2022  XR chest 1 view portable 5/28/2022: No acute cardiopulmonary disease  Incidental Findings:   · None     Test Results Pending at Discharge (will require follow up):   · BCx x 2 final results pending -- preliminary results show [email or web address]     Outpatient Tests Requested:  · None    Complications:  None    Reason for Admission: Sepsis criteria POA by neutropenia, tachycardia, and suspected pneumonia    Hospital Course:   Rut Joseph is a 51 y o  female patient who originally presented to the hospital on 5/27/2022 due to neutropenia noted on o/p labs by her PCP  Pt is undergoing chemotherapy for metastatic breast cancer, had recently tested positive for COVID and been febrile at home with diarrhea, received monoclonal Ab  This admission, initially started on vanc/cefepime out of concern for PNA in an immunosuppressed pt  Stool studies ordered to w/u diarrheal illness, however negative and diarrhea resolved spontaneously  ID consulted and do not have concern for active bacterial infection, sxs likely d/t to COVID alone  Abx discontinued and pt treated with two days remdesivir and dexamethasone  Symptomatically improved, satting well on room air, non-productive cough, no subjective SOB  GI sxs resolved   Pt is safe for discharge to home, will need to quarantine through June 12th for 20 days total from positive test       Please see above list of diagnoses and related plan for additional information  Condition at Discharge: fair    Discharge Day Visit / Exam:   * Please refer to separate progress note for these details *    Discussion with Family: Updated  (significant other) via phone  Discharge instructions/Information to patient and family:   See after visit summary for information provided to patient and family  Provisions for Follow-Up Care:  See after visit summary for information related to follow-up care and any pertinent home health orders  Disposition:   Home    Planned Readmission: None    Discharge Medications:  See after visit summary for reconciled discharge medications provided to patient and/or family        **Please Note: This note may have been constructed using a voice recognition system**

## 2022-05-29 NOTE — ASSESSMENT & PLAN NOTE
SIRS criteria met with tachycardia and neutropenia  Suspected source:  Pneumonia due to COVID-19  COVID-19:  Positive on 5/23  Lactic acid:  Normal  Procalcitonin:  Normal   No evidence of end organ damage  Strep negative  CT AP with PE study--no PE, ground-glass consolidation bilateral-post radiation changes with possible superimposed infection  Decreased right breast mass and right axillary metastatic adenopathy  Bone Mets to left sacral spine similar to prior  Per ID, sepsis is likely secondary to COVID  Will DC antibiotics for now  Started Decadron, remdesivir as patient was on 2 L of oxygen  Now maintaining sats in high 90s on RA  UCx positive for E coli   Asymptomatic bacturia        Plan:  ID on board; appreciate recommendations   F/U BCX--prelim result [email or web address]   F/U stool studies, C diff and enteric panel--low suspicion, as pt reports 1 liquid BM 5/27, 1 loose BM 5/28, no BM yet today   Trend fever curve/ Monitor WBC- ANC 0 29 --> 0 75   Continue to monitor off abx   See COVID for treatment

## 2022-05-29 NOTE — PROGRESS NOTES
[facility]  Progress Note - Kevin Nicolas 1975, 51 y o  female MRN: [id number]  Unit/Bed#: S -01 Encounter: [id number]  Primary Care Provider: Yousif Johnson MD   Date and time admitted to hospital: 5/27/2022 10:17 PM    Candidal skin infection  Assessment & Plan  · Candidal skin infection under breast and groin  · Continue nystatin powder  Chemotherapy-induced neutropenia [facility])  Assessment & Plan  Patient presenting due to advice from PCP for abnormal labs - neutropenic after chemotherapy  Additionally could be secondary to recent COVID infection  Plan  · Management of COVID  · Continue to monitor labs  · Oncology consulted for risk vs benefits of Granix--do not believe appropriate this time  COVID-19  Assessment & Plan  Lab Results   Component Value Date    SARSCOV2 Positive (A) 05/23/2022    SARSCOV2 Negative 05/20/2022     Symptoms of COVID-19 SYMPTOMS: cough and Diarrhea  Vaccine status:  Fully vaccinated and boosted  · Saturating 97% on 2 liters/min via nasal cannula         MILD Stage    Plan:  Infectious disease on board, appreciate recommendations  ·  Complete remdesivir/dexamethasone   ·  Covid vaccinated x2  Would recommend full vaccination to 4 doses when appropriate  · Tessalon scheduled, Mucinex scheduled, Hycodan p r n  · Therapeutic Anticoagulation; Lovenox 1mg/kg  · OOB TID, PT and OT eval and treat, Self-proning if able, Incentive spirometry  · Trend labs as needed; Inflammatory markers, daily labs  · Pt will need to quartantine x20 days d/t immunocompromise       Antineoplastic chemotherapy induced anemia  Assessment & Plan  · Hemoglobin decreased from 12 4 on  4/03/22 to 8 1 today  · Iron panel normal   · Transfuse for hemoglobin< 7   Monitor CBC/H&H q daily  Chemotherapy-induced nausea  Assessment & Plan  · Continue p r n   Zofran    Candida infection, esophageal (HCC)  Assessment & Plan  · Continue nystatin swish and swallow  HTN (hypertension)  Assessment & Plan  · History of hypertension maintained on lisinopril and amlodipine  · Patient has not been taking antihypertensives the last few weeks due to hypotension  · Continue to hold antihypertensives  · Monitor blood pressure and reintroduce antihypertensives as needed  --110s to 120s over last 24h    Cancer associated pain  Assessment & Plan  · Patient follows with palliative care  · Continue home pain regimen per palliative care recommendations  * Sepsis ([address] )  Assessment & Plan  SIRS criteria met with tachycardia and neutropenia  Suspected source:  Pneumonia due to COVID-19  COVID-19:  Positive on 5/23  Lactic acid:  Normal  Procalcitonin:  Normal   No evidence of end organ damage  Strep negative  CT AP with PE study--no PE, ground-glass consolidation bilateral-post radiation changes with possible superimposed infection  Decreased right breast mass and right axillary metastatic adenopathy  Bone Mets to left sacral spine similar to prior  Per ID, sepsis is likely secondary to COVID  Will DC antibiotics for now  Started Decadron, remdesivir as patient was on 2 L of oxygen  Now maintaining sats in high 90s on RA  UCx positive for E coli  Asymptomatic bacturia        Plan:  ID on board; appreciate recommendations   F/U BCX--prelim result [email or web address]   F/U stool studies, C diff and enteric panel--low suspicion, as pt reports 1 liquid BM 5/27, 1 loose BM 5/28, no BM yet today   Trend fever curve/ Monitor WBC- ANC 0 29 --> 0 75   Continue to monitor off abx   See COVID for treatment        VTE Pharmacologic Prophylaxis: VTE Score: 9 High Risk (Score >/= 5) - Pharmacological DVT Prophylaxis Ordered: enoxaparin (Lovenox)  Sequential Compression Devices Ordered  Patient Centered Rounds: I performed bedside rounds with nursing staff today  Discussions with Specialists or Other Care Team Provider: ID    Education and Discussions with Family / Patient:  Will call contact to update  Current Length of Stay: 1 day(s)  Current Patient Status: Inpatient   Discharge Plan: Anticipate discharge later today or tomorrow to home  Code Status: Level 1 - Full Code    Subjective: This morning, Ms Maritza Barnes is seen sitting up in bedside chair, alert, engaged, in no acute distress  She reports feeling okay this morning  C/o "rattling" cough, non-productive  Denies Fever, chills, CP, SOB, arthralgias, myalgias, diarrhea  Reports single loose BM yesterday, none yet today  Endorses swelling in the b/l LEs, which she states has been present since initiation of chemo  No other complaints at this time, no new or worsening sxs  Remainder of brief ROS is negative  Objective:     Vitals:   Temp (24hrs), Av °F (36 7 °C), Min:97 7 °F (36 5 °C), Max:98 2 °F (36 8 °C)    Temp:  [97 7 °F (36 5 °C)-98 2 °F (36 8 °C)] 97 7 °F (36 5 °C)  HR:  [] 86  Resp:  [16-20] 20  BP: (124-129)/(65-76) 129/74  SpO2:  [92 %-95 %] 92 %  There is no height or weight on file to calculate BMI  Input and Output Summary (last 24 hours): Intake/Output Summary (Last 24 hours) at 2022 1356  Last data filed at 2022 0900  Gross per 24 hour   Intake 800 ml   Output 450 ml   Net 350 ml       Physical Exam:   Physical Exam  Constitutional:       General: She is not in acute distress  Appearance: Normal appearance  She is obese  She is not ill-appearing, toxic-appearing or diaphoretic  HENT:      Head: Normocephalic and atraumatic  Neck:      Comments: Neck brace  Cardiovascular:      Rate and Rhythm: Normal rate and regular rhythm  Pulses: Normal pulses  Heart sounds: Normal heart sounds  No murmur heard  No friction rub  No gallop  Pulmonary:      Effort: Pulmonary effort is normal  No respiratory distress  Breath sounds: No stridor  Rales (bibasilar rales are scant on the R and prominent on the L) present  No wheezing or rhonchi     Chest:      Chest wall: No tenderness  Abdominal:      General: Bowel sounds are normal  There is no distension  Palpations: Abdomen is soft  Tenderness: There is no abdominal tenderness  There is no guarding or rebound  Musculoskeletal:         General: Swelling (of the b/l LEs w/o pitting) present  No tenderness, deformity or signs of injury  Skin:     General: Skin is warm and dry  Neurological:      Mental Status: She is alert  Psychiatric:         Mood and Affect: Mood normal          Behavior: Behavior normal         Additional Data:     Labs:  Results from last 7 days   Lab Units 05/29/22  0520   WBC Thousand/uL 1 62*   HEMOGLOBIN g/dL 7 9*   HEMATOCRIT % 25 5*   PLATELETS Thousands/uL 302   BANDS PCT % 1   LYMPHO PCT % 25   MONO PCT % 28*   EOS PCT % 0     Results from last 7 days   Lab Units 05/29/22  0520   SODIUM mmol/L 137   POTASSIUM mmol/L 3 5   CHLORIDE mmol/L 104   CO2 mmol/L 26   BUN mg/dL 5   CREATININE mg/dL 0 41*   ANION GAP mmol/L 7   CALCIUM mg/dL 8 5   ALBUMIN g/dL 3 3*   TOTAL BILIRUBIN mg/dL 0 48   ALK PHOS U/L 68   ALT U/L 22   AST U/L 15   GLUCOSE RANDOM mg/dL 108     Results from last 7 days   Lab Units 05/27/22  2306   INR  0 96             Results from last 7 days   Lab Units 05/29/22  0520 05/27/22  2306   LACTIC ACID mmol/L  --  0 6   PROCALCITONIN ng/ml 0 05 0 05       Lines/Drains:  Invasive Devices  Report    Peripheral Intravenous Line  Duration           Peripheral IV 05/27/22 Right Antecubital 1 day                      Imaging: Reviewed radiology reports from this admission including: chest xray, chest CT scan and abdominal/pelvic CT and Personally reviewed the following imaging: chest xray, chest CT scan and abdominal/pelvic CT    Recent Cultures (last 7 days):   Results from last 7 days   Lab Units 05/28/22  1155 05/27/22  2346 05/27/22  2340 05/27/22  2306 05/23/22 1957 05/23/22 1954 05/23/22 1949   BLOOD CULTURE   --   --  No Growth at 24 hrs  No Growth at 24 hrs   No Growth After 5 Days  --  No Growth After 5 Days  URINE CULTURE   --  >100,000 cfu/ml Gram Negative Salo resembling Escherichia coli*  20,000-29,000 cfu/ml   --   --   --  10,000-19,000 cfu/ml Escherichia coli*  --    C DIFF TOXIN B BY PCR  Negative  --   --   --   --   --   --        Last 24 Hours Medication List:   Current Facility-Administered Medications   Medication Dose Route Frequency Provider Last Rate    baclofen  5 mg Oral TID PRN Penelope Canales, DO      benzonatate  200 mg Oral TID Ana Luisa Wong MD      dexamethasone  6 mg Intravenous Q24H Ana Luisa Wong MD      enoxaparin  1 mg/kg Subcutaneous Q12H [address] Penelope Canales, DO      guaiFENesin  600 mg Oral Q12H [address] Ana Luisa Wong MD      HYDROcodone Bit-Homatrop MBr  5 mL Oral Q6H PRN Ana Luisa Wong MD      morphine  15 mg Oral Q12H [address] [facility], DO      nystatin  500,000 Units Swish & Swallow 4x Daily Penelope Canales, DO      nystatin   Topical TID Penelope Canales, DO      ondansetron  4 mg Oral Q6H [address] [facility], DO      oxyCODONE  10 mg Oral Q4H PRN Penelope Canales, DO      pantoprazole  40 mg Oral Early Morning Penelope Canales, DO      remdesivir  100 mg Intravenous Q24H Ana Luisa Wong MD          Today, Patient Was Seen By: Tunde Downing MD    **Please Note: This note may have been constructed using a voice recognition system  **

## 2022-05-29 NOTE — ASSESSMENT & PLAN NOTE
Patient presenting due to advice from PCP for abnormal labs - neutropenic after chemotherapy  Additionally could be secondary to recent COVID infection  Plan  · Management of COVID  · Continue to monitor labs  · Oncology consulted for risk vs benefits of Granix--do not believe appropriate this time

## 2022-05-30 ENCOUNTER — TRANSITIONAL CARE MANAGEMENT (OUTPATIENT)
Dept: INTERNAL MEDICINE CLINIC | Facility: OTHER | Age: 47
End: 2022-05-30

## 2022-05-30 LAB
BACTERIA UR CULT: ABNORMAL
BACTERIA UR CULT: ABNORMAL

## 2022-05-31 ENCOUNTER — TELEMEDICINE (OUTPATIENT)
Dept: INTERNAL MEDICINE CLINIC | Facility: CLINIC | Age: 47
End: 2022-05-31
Payer: COMMERCIAL

## 2022-05-31 ENCOUNTER — PATIENT OUTREACH (OUTPATIENT)
Dept: CASE MANAGEMENT | Facility: HOSPITAL | Age: 47
End: 2022-05-31

## 2022-05-31 VITALS
WEIGHT: 185 LBS | BODY MASS INDEX: 30.82 KG/M2 | SYSTOLIC BLOOD PRESSURE: 140 MMHG | DIASTOLIC BLOOD PRESSURE: 75 MMHG | HEIGHT: 65 IN | TEMPERATURE: 97.4 F

## 2022-05-31 DIAGNOSIS — U07.1 COVID-19: Primary | ICD-10-CM

## 2022-05-31 DIAGNOSIS — D70.3 NEUTROPENIA ASSOCIATED WITH INFECTION (HCC): ICD-10-CM

## 2022-05-31 DIAGNOSIS — N30.00 ACUTE CYSTITIS WITHOUT HEMATURIA: ICD-10-CM

## 2022-05-31 DIAGNOSIS — I10 PRIMARY HYPERTENSION: ICD-10-CM

## 2022-05-31 DIAGNOSIS — B37.81 CANDIDA INFECTION, ESOPHAGEAL (HCC): ICD-10-CM

## 2022-05-31 DIAGNOSIS — R03.0 ELEVATED BP WITHOUT DIAGNOSIS OF HYPERTENSION: ICD-10-CM

## 2022-05-31 DIAGNOSIS — Z76.89 ENCOUNTER FOR SUPPORT AND COORDINATION OF TRANSITION OF CARE: Primary | ICD-10-CM

## 2022-05-31 DIAGNOSIS — U07.1 COVID-19: ICD-10-CM

## 2022-05-31 LAB
G6PD BLD QN: 340 U/10E12 RBC (ref 127–427)
RBC # BLD AUTO: 2.8 X10E6/UL (ref 3.77–5.28)

## 2022-05-31 PROCEDURE — 3008F BODY MASS INDEX DOCD: CPT | Performed by: INTERNAL MEDICINE

## 2022-05-31 PROCEDURE — 99496 TRANSJ CARE MGMT HIGH F2F 7D: CPT | Performed by: INTERNAL MEDICINE

## 2022-05-31 PROCEDURE — 1111F DSCHRG MED/CURRENT MED MERGE: CPT | Performed by: INTERNAL MEDICINE

## 2022-05-31 RX ORDER — LISINOPRIL 20 MG/1
20 TABLET ORAL DAILY
Qty: 90 TABLET | Refills: 1 | Status: SHIPPED | OUTPATIENT
Start: 2022-05-31

## 2022-05-31 RX ORDER — CEPHALEXIN 500 MG/1
500 CAPSULE ORAL EVERY 6 HOURS SCHEDULED
Qty: 20 CAPSULE | Refills: 0 | Status: SHIPPED | OUTPATIENT
Start: 2022-05-31 | End: 2022-06-05

## 2022-05-31 NOTE — PROGRESS NOTES
Virtual Regular Visit    Verification of patient location:    Patient is located in the following state in which I hold an active license [place]      Assessment/Plan:    Problem List Items Addressed This Visit        Genitourinary    Urinary tract infection without hematuria    Relevant Medications    cephalexin (KEFLEX) 500 mg capsule       Other    Elevated BP without diagnosis of hypertension    Relevant Medications    lisinopril (ZESTRIL) 20 mg tablet    COVID-19      Other Visit Diagnoses     Encounter for support and coordination of transition of care    -  Primary               Reason for visit is   Chief Complaint   Patient presents with    COVID-19     Covid f/u, still coughing    Virtual Regular Visit    Virtual Tcm        Encounter provider Delaney De Oliveira MD    Provider located at [address]  [address] [place] [id number] [phone]      Recent Visits  Date Type Provider Dept   05/26/22 Telemedicine MD [address] Internal Med   05/25/22 Telephone Delaney De Oliveira  [facility]   05/24/22 Telemedicine Delaney De Oliveira MD [address] [facility] Med   Showing recent visits within past 7 days and meeting all other requirements  Today's Visits  Date Type Provider Dept   05/31/22 Telemedicine MD [address] Internal Med   Showing today's visits and meeting all other requirements  Future Appointments  No visits were found meeting these conditions  Showing future appointments within next 150 days and meeting all other requirements       The patient was identified by name and date of birth  Danilo Lopes was informed that this is a telemedicine visit and that the visit is being conducted through [employer] and patient was informed that this is not a secure, HIPAA-compliant platform  She agrees to proceed     My office door was closed   No one else was in the room  She acknowledged consent and understanding of privacy and security of the video platform  The patient has agreed to participate and understands they can discontinue the visit at any time  Patient is aware this is a billable service  Subjective  Moy Guerrero is a 51 y o  female ***   Pt present for TCM of recent        Past Medical History:   Diagnosis Date    Cancer [facility])        No past surgical history on file  Current Outpatient Medications   Medication Sig Dispense Refill    cephalexin (KEFLEX) 500 mg capsule Take 1 capsule (500 mg total) by mouth every 6 (six) hours for 5 days 20 capsule 0    lisinopril (ZESTRIL) 20 mg tablet Take 1 tablet (20 mg total) by mouth daily 90 tablet 1    al mag oxide-diphenhydramine-lidocaine viscous (MAGIC MOUTHWASH) 1:1:1 suspension Swish and spit 10 mL every 4 (four) hours as needed for mouth pain or discomfort 500 mL 2    baclofen 10 mg tablet Take 0 5 tablets (5 mg total) by mouth as needed in the morning and 0 5 tablets (5 mg total) as needed at noon and 0 5 tablets (5 mg total) as needed in the evening for muscle spasms  45 tablet 0    benzonatate (TESSALON) 200 MG capsule Take 1 capsule (200 mg total) by mouth 3 (three) times a day for 5 days 15 capsule 0    dexamethasone (DECADRON) 4 mg tablet Take 1 tablet (4 mg total) by mouth 3 (three) times a day 45 tablet 0    DPH-Lido-AlHydr-MgHydr-Simeth (First-Mouthwash BLM) SUSP       furosemide (LASIX) 40 mg tablet Take 1 tablet (40 mg total) by mouth in the morning   30 tablet 1    guaiFENesin (MUCINEX) 600 mg 12 hr tablet Take 1 tablet (600 mg total) by mouth every 12 (twelve) hours for 5 days 10 tablet 0    lactulose (CEPHULAC) 10 g packet Take 1 packet (10 g total) by mouth 3 (three) times a day as needed (constipation) 30 each 0    morphine (MS CONTIN) 15 mg 12 hr tablet Take 1 tablet (15 mg total) by mouth every 12 (twelve) hours Max Daily Amount: 30 mg 60 tablet 0    nystatin (MYCOSTATIN) 500,000 units/5 mL suspension Apply 5 mL (500,000 Units total) to the mouth or throat 4 (four) times a day 200 mL 0    nystatin (MYCOSTATIN) powder Apply topically 3 (three) times a day 15 g 0    ondansetron (ZOFRAN) 4 mg tablet Take 1 tablet (4 mg total) by mouth every 8 (eight) hours as needed for nausea or vomiting 40 tablet 2    oxyCODONE (ROXICODONE) 10 MG TABS Take 1 tablet (10 mg total) by mouth every 4 (four) hours as needed for severe pain May split pill in half (5 mg) every 4 hours as needed for moderate pain (5 mg)  Max Daily Amount: 60 mg 180 tablet 0    pantoprazole (PROTONIX) 40 mg tablet Take 1 tablet (40 mg total) by mouth daily in the early morning 90 tablet 1    polyethylene glycol (MIRALAX) 17 g packet Take 17 g by mouth daily 30 each 0    senna (SENOKOT) 8 6 mg Take 3 tablets (25 8 mg total) by mouth 2 (two) times a day Hold for loose stool  180 tablet 0     No current facility-administered medications for this visit  No Known Allergies    Review of Systems   Constitutional: Negative for appetite change, chills, diaphoresis, fatigue, fever and unexpected weight change  Respiratory: Positive for cough  Negative for apnea, choking, chest tightness, shortness of breath, wheezing and stridor  Cardiovascular: Negative for chest pain, palpitations and leg swelling  Gastrointestinal: Negative for abdominal distention, abdominal pain, anal bleeding, blood in stool, constipation, diarrhea, nausea and vomiting  Genitourinary: Negative for decreased urine volume, difficulty urinating, frequency and urgency  Musculoskeletal: Negative for arthralgias, back pain and myalgias  Neurological: Negative for dizziness, light-headedness, numbness and headaches         Video Exam    Vitals:    05/31/22 1112   BP: 140/75   BP Location: Right arm   Patient Position: Sitting   Cuff Size: Adult   Temp: (!) 97 4 °F (36 3 °C)   TempSrc: Oral   Weight: 83 9 kg (185 lb)   Height: 5' 4 8" (1 646 m)       Physical Exam  Constitutional:       General: She is not in acute distress  Appearance: Normal appearance  She is normal weight  She is not toxic-appearing or diaphoretic  Neurological:      Mental Status: She is alert and oriented to person, place, and time  I spent 25 minutes directly with the patient during this visit    VIRTUAL VISIT [address] verbally agrees to participate in [employer]  Pt is aware that [employer] could be limited without vital signs or the ability to perform a full hands-on physical exam  Nayeli Bill understands she or the provider may request at any time to terminate the video visit and request the patient to seek care or treatment in person

## 2022-05-31 NOTE — UTILIZATION REVIEW
Please note, all determinations MUST be either called into [phone] or faxed to [phone]    Inpatient Admission Authorization Request   NOTIFICATION OF INPATIENT ADMISSION/INPATIENT AUTHORIZATION REQUEST   SERVICING FACILITY:   [facility]  Neil PERSAUD, [address] [place] [address]  Tax ID: [id number]  NPI: [id number]  Place of Service: Inpatient [address] [facility]  [address]  Place of Service Code: 24     ATTENDING PROVIDER:  Attending Name and NPI#: Yaneli James [[id number]]  Address: Neil PERSAUD, [address] [place] [address]  Phone: [phone]     UTILIZATION REVIEW CONTACT:  [facility] Utilization   Network Utilization Review Department  Phone: [phone]  Fax: [phone]  Email: [email or web address]     PHYSICIAN ADVISORY SERVICES:  FOR UGEE-RW-IBKS REVIEW - MEDICAL NECESSITY DENIAL  Phone: [phone]  Fax: [phone]  Email: [email or web address]     TYPE OF REQUEST:  Inpatient Status     ADMISSION INFORMATION:  ADMISSION DATE/TIME: 5/28/22  2:16 AM  PATIENT DIAGNOSIS CODE/DESCRIPTION:  Diarrhea [R19 7]  Hypoxia [R09 02]  Neutropenia ([facility] [address] ) [D70 9]  Sepsis ([facility] [address] ) [A41 9]  Metastatic breast cancer ([facility] [address] ) [C50 919]  COVID [U07 1]  Pneumonia due to COVID-19 virus [U07 1, J12 82]  DISCHARGE DATE/TIME: 5/29/2022  7:57 PM   IMPORTANT INFORMATION:  Please contact the [place] [facility] directly with any questions or concerns regarding this request  Department voicemails are confidential     Send requests for admission clinical reviews, concurrent reviews, approvals, and administrative denials due to lack of clinical to fax [phone]             Initial Clinical Review    Admission: Date/Time/Statement:   Admission Orders (From admission, onward)     Ordered        05/28/22 0216  Inpatient Admission  Once                      Orders Placed This Encounter   Procedures    Inpatient Admission     covid 19, neutropenia     Standing Status:   Standing Number of Occurrences:   1     Order Specific Question:   Level of Care     Answer:   Med Surg [16]     Order Specific Question:   Bed request comments     Answer:   covid 19, neutropenia     Order Specific Question:   Estimated length of stay     Answer:   More than 2 Midnights     Order Specific Question:   Certification     Answer:   I certify that inpatient services are medically necessary for this patient for a duration of greater than two midnights  See H&P and MD Progress Notes for additional information about the patient's course of treatment  ED Arrival Information     Expected   -    Arrival   5/27/2022 22:00    Acuity   Emergent            Means of arrival   Walk-In    Escorted by   [facility]    Admission type   Emergency            Arrival complaint   pt was told by  to come in [address] Blood count           Chief Complaint   Patient presents with    Abnormal Lab     Pt sent in by provider for low WBC  Pt reports having second treatment of chemo on Thursday  Pt tested positive for covid on Monday  Pt denies CP, SOB  Reports she's been having diarrhea       Initial Presentation: 51 y o  female from home to ED 5/27 and inpatient order placed 5/28/22 due to Sepsis related to COVID 19  PMH of breast cancer with metastasis to bone  Presented due to WBC of 0 92, bands 9% and absolute neutrophils 0 35  Has intermittent fevers week of arrival and tested + COVID 19 5/23/22, received monoclonal antibodies 5 25 22  Last chemo 5/24/22  On exam neck in brace  Tachycardic  Bilateral LE edema  Erythematous macular rash both dorsal hands  Blood and urine cultures done  Wbc 0 79, absolute neutrophils 0 29  H&H 8 1/26 2  D dimer 0 96  CRP 63 1  Ct showed groundglass opacity  In the ED given 1 liter IVF bolus, cefepime and vancomycin  Sat to 89% room air and placed on oxygen  Plan is  Hold antibiotics  Wean oxygen as able  Start Remdesivir and Decadron  Follow cultures  Consult ID       5/28/22 per ID:  Patient with febrile neutropenia/COVID in immunosuppressed patient, under vaccinated  Recommend to start Decadron and remdesivir  Monitor oxygen sats  Hold antibiotics  For candida mucositis, continue antifungal       Date: 5/29/22    Day 2:  Has nonproductive cough  Shortness of breath improving  On exam trace leg edema  Wbc 1 62   To continue remdesivir and decadron  Oxygen as needed  ED Triage Vitals   Temperature Pulse Respirations Blood Pressure SpO2   05/27/22 2208 05/27/22 2208 05/27/22 2208 05/27/22 2208 05/27/22 2208   98 5 °F (36 9 °C) (!) 112 18 123/61 93 %      Temp Source Heart Rate Source Patient Position - Orthostatic VS BP Location FiO2 (%)   05/27/22 2208 05/27/22 2208 05/27/22 2208 05/27/22 2208 --   Oral Monitor Sitting Right arm       Pain Score       05/28/22 0335       8          Wt Readings from Last 1 Encounters:   05/26/22 83 9 kg (185 lb)     Additional Vital Signs:   05/29/22 0834 97 7 °F (36 5 °C) 86 20 129/74 96 92 % -- -- None (Room air) Sitting   05/29/22 0814 -- -- -- -- -- -- -- -- None (Room air) --   05/28/22 2209 98 °F (36 7 °C) 103 20 124/76 95 92 % -- -- None (Room air) Sitting   05/28/22 2000 -- -- -- -- -- 95 % -- -- None (Room air) --   05/28/22 1722 98 2 °F (36 8 °C) 94 16 125/65 89 95 % 24 1 L/min Nasal cannula Sitting   05/28/22 0810 98 1 °F (36 7 °C) 81 20 119/65 86 91 % -- -- Nasal cannula Lying   05/28/22 0341 98 3 °F (36 8 °C) 113 Abnormal  18 140/72 97 93 % 28 2 L/min Nasal cannula Sitting   05/28/22 0230 -- 114 Abnormal  18 127/76 97 96 % 28 2 L/min None (Room air) Lying       Pertinent Labs/Diagnostic Test Results:   PE Study with CT Abdomen and Pelvis with contrast   ED Interpretation by Rohini Love MD (05/28 2323)   FINDINGS:     CHEST     PULMONARY ARTERIAL TREE:  No pulmonary embolus is seen      LUNGS:  Subsegmental atelectasis    There is symmetric appearing patchy consolidative and groundglass opacity in the posterior apices bilaterally, nonspecific  Francella Crooked There is no tracheal or endobronchial lesion      PLEURA:  Unremarkable      HEART/AORTA:  Heart is unremarkable for patient's age  No thoracic aortic aneurysm      MEDIASTINUM AND ALISON:  Unremarkable      CHEST WALL AND LOWER NECK:  Reidentified soft tissue breast mass within the superomedial right breast decreased in size compared to prior study now measuring 3 5 cm in maximal dimension (previously 7 0 cm)  Right axillary lymph node measures 3 cm      ABDOMEN     LIVER/BILIARY TREE:  Unremarkable      GALLBLADDER:  No calcified gallstones  No pericholecystic inflammatory change      SPLEEN:  Unremarkable      PANCREAS:  Unremarkable      ADRENAL GLANDS:  Unremarkable      KIDNEYS/URETERS:  One or more simple renal cyst(s) is noted  Unchang   ed left lower pole nonobstructing intrarenal calculus  No hydronephrosis      STOMACH AND BOWEL:  Unremarkable      APPENDIX:  No findings to suggest appendicitis      ABDOMINOPELVIC CAVITY:  No ascites  No pneumoperitoneum  No lymphadenopathy      VESSELS:  Unremarkable for patient's age      PELVIS     REPRODUCTIVE ORGANS:  Unremarkable for patient's age  Left adnexal paraovarian cyst measures 4 cm decreased in size compared to prior      URINARY BLADDER:  Unremarkable      ABDOMINAL WALL/INGUINAL REGIONS:  Unremarkable      OSSEOUS STRUCTURES:  No acute fracture  Reidentified diffuse osseous metastasis with largest left sacral metastatic lesion similar in appearance to prior      IMPRESSION:     No evidence of acute pulmonary embolus      Symmetric groundglass consolidative opacity in the bilateral posterior lung apices which may reflect? Post radiation/treatment changes    Correlate clinically to exclude superimposed infectious      Interval decrease in size of right breast m   ass and right axillary metastatic adenopathy compatible with treatment response      Redemonstrated diffuse osseous metastatic disease with largest left sacral metastasis not significantly changed compared to prior study dated 3/29/2022 and also evaluated on prior recent bone scan examination dated 4/25/2022      Additional findings as above      Workstation performed: [id number]         Final Result by Carol Donahue MD (05/28 0150)      No evidence of acute pulmonary embolus  Symmetric groundglass consolidative opacity in the bilateral posterior lung apices which may reflect? Post radiation/treatment changes  Correlate clinically to exclude superimposed infectious  Interval decrease in size of right breast mass and right axillary metastatic adenopathy compatible with treatment response  Redemonstrated diffuse osseous metastatic disease with largest left sacral metastasis not significantly changed compared to prior study dated 3/29/2022 and also evaluated on prior recent bone scan examination dated 4/25/2022  Additional findings as above  Workstation performed: [id number]         XR chest 1 view portable   ED Interpretation by Marino Pinzon MD (05/27 7823)   LLL atx read by me  Final Result by Yoana Rodríguez MD (05/28 6612)      No acute cardiopulmonary disease                    Workstation performed: [id number]           5/28/22 ecg - Previous ECG:  Compared to current     Comparison ECG info:  5/23/22     Similarity: Miguel noted (more artifact now)   Quality:     Tracing quality:  Limited by artifact   Rate:     ECG rate:  104     ECG rate assessment: tachycardic     Rhythm:     Rhythm: sinus tachycardia     Ectopy:     Ectopy: none     QRS:     QRS axis:  Normal     QRS intervals:  Normal   Conduction:     Conduction: normal     ST segments:     ST segments:  Normal   T waves:     T waves: normal     Other findings:     Other findings: LVH     Comments:      I do not agree with computer reading of accelerated junctional rhythm      Results from last 7 days   Lab Units 05/29/22  0520 05/27/22  2554 05/27/22  1223   WBC Thousand/uL 1 62* 0 79* 0 92*   HEMOGLOBIN g/dL 7 9* 8 1* 8 8*   HEMATOCRIT % 25 5* 26 2* 28 8*   PLATELETS Thousands/uL 302 247 251   BANDS PCT % 1  --  9*     Results from last 7 days   Lab Units 05/29/22  0520 05/27/22  2306 05/27/22  1223   SODIUM mmol/L 137 134* 134*   POTASSIUM mmol/L 3 5 4 2 4 3   CHLORIDE mmol/L 104 101 102   CO2 mmol/L 26 25 25   ANION GAP mmol/L 7 8 7   BUN mg/dL 5 7 8   CREATININE mg/dL 0 41* 0 46* 0 60   EGFR ml/min/1 73sq m 123 118 108   CALCIUM mg/dL 8 5 8 8 9 0   CALCIUM, IONIZED mmol/L  --  1 11*  --    MAGNESIUM mg/dL  --  1 9  --      Results from last 7 days   Lab Units 05/29/22  0520 05/27/22  2306   AST U/L 15 23   ALT U/L 22 31   ALK PHOS U/L 68 86   TOTAL PROTEIN g/dL 6 3* 6 6   ALBUMIN g/dL 3 3* 3 6   TOTAL BILIRUBIN mg/dL 0 48 0 50     Results from last 7 days   Lab Units 05/29/22  0520 05/27/22  2306   GLUCOSE RANDOM mg/dL 108 104     Results from last 7 days   Lab Units 05/27/22  2306   CK TOTAL U/L 47     Results from last 7 days   Lab Units 05/27/22  2306   HS TNI 0HR ng/L 3     Results from last 7 days   Lab Units 05/27/22  2306   D-DIMER QUANTITATIVE ug/ml FEU 0 96*     Results from last 7 days   Lab Units 05/27/22  2306   PROTIME seconds 12 8   INR  0 96     Results from last 7 days   Lab Units 05/27/22  2306   TSH 3RD GENERATON uIU/mL 1 866     Results from last 7 days   Lab Units 05/29/22  0520 05/27/22  2306   PROCALCITONIN ng/ml 0 05 0 05     Results from last 7 days   Lab Units 05/27/22  2306   LACTIC ACID mmol/L 0 6     Results from last 7 days   Lab Units 05/27/22  2306   NT-PRO BNP pg/mL 19     Results from last 7 days   Lab Units 05/27/22  2306   FERRITIN ng/mL 191     Results from last 7 days   Lab Units 05/29/22  0520 05/27/22  2306   CRP mg/L 102 0* 63 1*     Results from last 7 days   Lab Units 05/27/22  2346   CLARITY UA  Clear   COLOR UA  Yellow   SPEC GRAV UA  1 010   PH UA  6 0   GLUCOSE UA mg/dl Negative   KETONES UA mg/dl Negative   BLOOD UA  Negative   PROTEIN UA mg/dl Negative   NITRITE UA  Negative   BILIRUBIN UA  Negative   UROBILINOGEN UA E U /dl 0 2   LEUKOCYTES UA  Negative         Results from last 7 days   Lab Units 05/28/22  1155   C DIFF TOXIN B BY PCR  Negative     Results from last 7 days   Lab Units 05/28/22  1155   SALMONELLA SP PCR  None Detected   SHIGELLA SP/ENTEROINVASIVE E  COLI (EIEC)  None Detected   CAMPYLOBACTER SP (JEJUNI AND COLI)  None Detected   SHIGA TOXIN 1/SHIGA TOXIN 2  None Detected     Results from last 7 days   Lab Units 05/27/22  2346 05/27/22  2340 05/27/22  2306   BLOOD CULTURE   --  No Growth at 72 hrs  No Growth at 72 hrs     URINE CULTURE  >100,000 cfu/ml Escherichia coli*  20,000-29,000 cfu/ml   --   --        ED Treatment:   Medication Administration from 05/27/2022 2200 to 05/28/2022 0331       Date/Time Order Dose Route Action Comments     05/27/2022 2317 sodium chloride 0 9 % bolus 1,000 mL 1,000 mL Intravenous New Bag      05/28/2022 0108 sodium chloride 0 9 % infusion 125 mL/hr Intravenous New Bag      05/28/2022 0026 cefepime (MAXIPIME) 2 g/50 mL dextrose IVPB 2,000 mg Intravenous New Bag      05/28/2022 0103 vancomycin (VANCOCIN) 1,750 mg in sodium chloride 0 9 % 500 mL IVPB 1,750 mg Intravenous New Bag         Past Medical History:   Diagnosis Date    Cancer [facility])      Present on Admission:   COVID-19   HTN (hypertension)   Cancer associated pain   Candida infection, esophageal (HCC)   Chemotherapy-induced nausea      Admitting Diagnosis: Diarrhea [R19 7]  Hypoxia [R09 02]  Neutropenia (HCC) [D70 9]  Sepsis ([facility] [address] ) [A41 9]  Metastatic breast cancer ([facility] [address] ) [C50 919]  COVID [U07 1]  Pneumonia due to COVID-19 virus [U07 1, J12 82]  Age/Sex: 51 y o  female  Admission Orders:  5/28/22 0216 inpatient   Scheduled Medications:  benzonatate, 200 mg, Oral, TID  dexamethasone, 6 mg, Intravenous, Q24H  enoxaparin, 1 mg/kg, Subcutaneous, Q12H FABIANA  guaiFENesin, 600 mg, Oral, Q12H [address]  morphine, 15 mg, Oral, Q12H [address]  nystatin, 500,000 Units, Swish & Swallow, 4x Daily  nystatin, , Topical, TID  ondansetron, 4 mg, Oral, Q6H FABIANA  pantoprazole, 40 mg, Oral, Early Morning  remdesivir, 100 mg, Intravenous, Q24H    remdesivir (Veklury) 200 mg in sodium chloride 0 9 % 290 mL IVPB  Dose: 200 mg  Freq: Every 24 hours Route: IV  Indications of Use: INFECTION CAUSED BY 2019 NOVEL CORONAVIRUS  Start: 05/28/22 1145 End: 05/28/22 1327    vancomycin (VANCOCIN) IVPB (premix in dextrose) 1,000 mg 200 mL  Dose: 15 mg/kg  Weight Dosing Info: 67 5 kg (Adjusted)  Freq: Every 8 hours Route: IV  Last Dose: 1,000 mg (05/28/22 0846)  Start: 05/28/22 0900 End: 05/28/22 1102    Continuous IV Infusions:  sodium chloride 0 9 % infusion  Rate: 125 mL/hr Dose: 125 mL/hr  Freq: Continuous Route: IV  Indications of Use: IV Hydration  Last Dose: Stopped (05/28/22 1900)  Start: 05/27/22 2245 End: 05/28/22 1241  No current facility-administered medications for this encounter  PRN Meds:  baclofen, 5 mg, Oral, TID PRN - used x 3 5/28/22   HYDROcodone Bit-Homatrop MBr, 5 mL, Oral, Q6H PRN  oxyCODONE, 10 mg, Oral, Q4H PRN - used x 4 5/28, x 2 5/29     Oxygen 2 liters  Incentive spirometry    IP CONSULT TO INFECTIOUS DISEASES    Network Utilization Review Department  ATTENTION: Please call with any questions or concerns to [phone] and carefully listen to the prompts so that you are directed to the right person  All voicemails are confidential   Cait Blanchard all requests for admission clinical reviews, approved or denied determinations and any other requests to dedicated fax number below belonging to the campus where the patient is receiving treatment   List of dedicated fax numbers for the Facilities:  [address] DENIALS (Administrative/Medical Necessity) [phone]   [address] (Maternity/NICU/Pediatrics) 261 [facility],7Th Floor [address] [facility] [address]  [phone]   [address] [facility] [place] [address] [address] [place] [address] [address] [facility] [phone]

## 2022-05-31 NOTE — PROGRESS NOTES
MSW received a phone call from pt  Pt is requesting list of nail salons in her area and an updated calendar for cancer support community  MSW sent pt an e-mail the updated calender for cancer support community and a list of three nail salons in her city  Pt states she became positive for COVID and had two ED visits and one hospital admission since our last visit together however she is doing much better now  MSW will continue emotional supportive services and assist as needed

## 2022-05-31 NOTE — PROGRESS NOTES
Virtual TCM Visit:    Verification of patient location:    Patient is located in the following state in which I hold an active license [place]        Assessment/Plan:        Problem List Items Addressed This Visit        Digestive    Candida infection, esophageal ([address] )       Cardiovascular and Mediastinum    HTN (hypertension)    Relevant Medications    lisinopril (ZESTRIL) 20 mg tablet       Genitourinary    Urinary tract infection without hematuria    Relevant Medications    cephalexin (KEFLEX) 500 mg capsule       Other    Elevated BP without diagnosis of hypertension    Relevant Medications    lisinopril (ZESTRIL) 20 mg tablet    COVID-19    Neutropenia associated with infection ([address] )      Other Visit Diagnoses     Encounter for support and coordination of transition of care    -  Primary    Recent hospital stay for COVID, and neutropenia             Reason for visit is     Encounter provider Cyn Sorensen MD       Provider located at [place] [id number] [phone]      Recent Visits  Date Type Provider Dept   05/26/22 Telemedicine MD [address] Internal Med   05/25/22 Telephone Cyn Sorensen MD [address] [place] [address]   05/24/22 Telemedicine MD [address] Internal Med   Showing recent visits within past 7 days and meeting all other requirements  Today's Visits  Date Type Provider Dept   05/31/22 Telemedicine MD [address] Internal Med   Showing today's visits and meeting all other requirements  Future Appointments  No visits were found meeting these conditions  Showing future appointments within next 150 days and meeting all other requirements       After connecting through [employer], the patient was identified by name and date of birth   Afton Kin was informed that this is a telemedicine visit and that the visit is being conducted through face time and patient was informed that this is not a secure, HIPAA-compliant platform  She agrees to proceed     My office door was closed  No one else was in the room  She acknowledged consent and understanding of privacy and security of the video platform  The patient has agreed to participate and understands they can discontinue the visit at any time  Patient is aware this is a billable service  Subjective:     Patient ID: Basil Martinez is a 51 y o  female  Patient presents 40 cm and recent hospital stay for COVID 19 and neutropenia  Patient initially received antibody therapy, subsequent blood count showed a neutropenia and was referred to the hospital   Evaluated by Infectious Disease and the neutropenia thought to be secondary to COVID, and counts improved  Treated with them just been x2 doses  Patient was mildly hypoxic but was discharged on room air  Urine culture positive for pansensitive E coli  Review of Systems   Constitutional: Negative for appetite change, chills, diaphoresis, fatigue, fever and unexpected weight change  Respiratory: Positive for cough  Negative for apnea, choking, chest tightness, shortness of breath, wheezing and stridor  Cardiovascular: Negative for chest pain, palpitations and leg swelling  Gastrointestinal: Negative for abdominal distention, abdominal pain, anal bleeding, blood in stool, constipation, diarrhea, nausea and vomiting  Genitourinary: Negative for decreased urine volume, difficulty urinating, frequency and urgency  Musculoskeletal: Negative for arthralgias, back pain and myalgias  Neurological: Negative for dizziness, light-headedness, numbness and headaches           Objective:    Vitals:    05/31/22 1112   BP: 140/75   BP Location: Right arm   Patient Position: Sitting   Cuff Size: Adult   Temp: (!) 97 4 °F (36 3 °C)   TempSrc: Oral   Weight: 83 9 kg (185 lb)   Height: 5' 4 8" (1 646 m) Physical Exam  Constitutional:       General: She is not in acute distress  Appearance: Normal appearance  She is normal weight  She is not toxic-appearing or diaphoretic  Neurological:      Mental Status: She is alert and oriented to person, place, and time  Transitional Care Management Review:  Ej Caballero is a 51 y o  female here for TCM follow up  During the TCM phone call patient stated:    TCM Call (since 4/30/2022)     Date and time call was made  5/30/2022 11:59 AM    Hospital care reviewed  Records reviewed    Patient was hospitialized at  [address] [place] [address]    Date of Admission  05/27/22    Date of discharge  05/29/22    Diagnosis  covid 19    Disposition  Home    Current Symptoms  Fatigue; Cough    Cough Severity  Mild    Fatigue severity  Mild      TCM Call (since 4/30/2022)     Post hospital issues  None    Scheduled for follow up? Yes    Did you obtain your prescribed medications  --  pt unsure if to be on decadrom    Do you need help managing your prescriptions or medications  No    Is transportation to your appointment needed  --  virtual    I have advised the patient to call PCP with any new or worsening symptoms  Akbar NEGRON I spent 30 minutes with the patient during this visit  Lasha Tipton MD      VIRTUAL VISIT Navneet verbally agrees to participate in [employer]  Pt is aware that [employer] could be limited without vital signs or the ability to perform a full hands-on physical exam  Nayeli Mcqueen understands she or the provider may request at any time to terminate the video visit and request the patient to seek care or treatment in person

## 2022-06-01 ENCOUNTER — HOME HEALTH ADMISSION (OUTPATIENT)
Dept: HOME HEALTH SERVICES | Facility: HOME HEALTHCARE | Age: 47
End: 2022-06-01
Payer: COMMERCIAL

## 2022-06-01 LAB
ATRIAL RATE: 106 BPM
QRS AXIS: -6 DEGREES
QRSD INTERVAL: 62 MS
QT INTERVAL: 306 MS
QTC INTERVAL: 402 MS
T WAVE AXIS: 2 DEGREES
VENTRICULAR RATE: 104 BPM

## 2022-06-01 PROCEDURE — 93010 ELECTROCARDIOGRAM REPORT: CPT | Performed by: INTERNAL MEDICINE

## 2022-06-02 LAB
BACTERIA BLD CULT: NORMAL
BACTERIA BLD CULT: NORMAL

## 2022-06-03 ENCOUNTER — HOME CARE VISIT (OUTPATIENT)
Dept: HOME HEALTH SERVICES | Facility: HOME HEALTHCARE | Age: 47
End: 2022-06-03
Payer: COMMERCIAL

## 2022-06-03 VITALS
DIASTOLIC BLOOD PRESSURE: 72 MMHG | TEMPERATURE: 98.1 F | OXYGEN SATURATION: 96 % | SYSTOLIC BLOOD PRESSURE: 106 MMHG | HEART RATE: 86 BPM | RESPIRATION RATE: 18 BRPM

## 2022-06-06 ENCOUNTER — TELEPHONE (OUTPATIENT)
Dept: PALLIATIVE MEDICINE | Facility: CLINIC | Age: 47
End: 2022-06-06

## 2022-06-06 ENCOUNTER — TELEPHONE (OUTPATIENT)
Dept: INTERNAL MEDICINE CLINIC | Facility: CLINIC | Age: 47
End: 2022-06-06

## 2022-06-06 ENCOUNTER — HOME CARE VISIT (OUTPATIENT)
Dept: HOME HEALTH SERVICES | Facility: HOME HEALTHCARE | Age: 47
End: 2022-06-06
Payer: COMMERCIAL

## 2022-06-06 ENCOUNTER — APPOINTMENT (OUTPATIENT)
Dept: LAB | Facility: CLINIC | Age: 47
End: 2022-06-06
Payer: COMMERCIAL

## 2022-06-06 VITALS
OXYGEN SATURATION: 99 % | TEMPERATURE: 97.5 F | SYSTOLIC BLOOD PRESSURE: 122 MMHG | HEART RATE: 92 BPM | DIASTOLIC BLOOD PRESSURE: 80 MMHG | RESPIRATION RATE: 18 BRPM

## 2022-06-06 DIAGNOSIS — C50.811 MALIGNANT NEOPLASM OF OVERLAPPING SITES OF RIGHT BREAST IN FEMALE, ESTROGEN RECEPTOR NEGATIVE (HCC): ICD-10-CM

## 2022-06-06 DIAGNOSIS — G89.3 CANCER ASSOCIATED PAIN: Primary | ICD-10-CM

## 2022-06-06 DIAGNOSIS — G89.3 CANCER ASSOCIATED PAIN: ICD-10-CM

## 2022-06-06 DIAGNOSIS — R26.2 AMBULATORY DYSFUNCTION: ICD-10-CM

## 2022-06-06 DIAGNOSIS — Z17.1 MALIGNANT NEOPLASM OF OVERLAPPING SITES OF RIGHT BREAST IN FEMALE, ESTROGEN RECEPTOR NEGATIVE (HCC): ICD-10-CM

## 2022-06-06 DIAGNOSIS — C79.51 OSSEOUS METASTASIS (HCC): Primary | ICD-10-CM

## 2022-06-06 DIAGNOSIS — Z51.5 PALLIATIVE CARE PATIENT: ICD-10-CM

## 2022-06-06 LAB
ALBUMIN SERPL BCP-MCNC: 3.3 G/DL (ref 3.5–5)
ALP SERPL-CCNC: 88 U/L (ref 46–116)
ALT SERPL W P-5'-P-CCNC: 48 U/L (ref 12–78)
ANION GAP SERPL CALCULATED.3IONS-SCNC: 8 MMOL/L (ref 4–13)
ANISOCYTOSIS BLD QL SMEAR: PRESENT
AST SERPL W P-5'-P-CCNC: 35 U/L (ref 5–45)
BASOPHILS # BLD MANUAL: 0.05 THOUSAND/UL (ref 0–0.1)
BASOPHILS NFR MAR MANUAL: 1 % (ref 0–1)
BILIRUB SERPL-MCNC: 0.69 MG/DL (ref 0.2–1)
BUN SERPL-MCNC: 6 MG/DL (ref 5–25)
CALCIUM ALBUM COR SERPL-MCNC: 9.7 MG/DL (ref 8.3–10.1)
CALCIUM SERPL-MCNC: 9.1 MG/DL (ref 8.3–10.1)
CHLORIDE SERPL-SCNC: 104 MMOL/L (ref 100–108)
CO2 SERPL-SCNC: 29 MMOL/L (ref 21–32)
CORTIS AM PEAK SERPL-MCNC: 10.8 UG/DL (ref 4.2–22.4)
CREAT SERPL-MCNC: 0.56 MG/DL (ref 0.6–1.3)
EOSINOPHIL # BLD MANUAL: 0 THOUSAND/UL (ref 0–0.4)
EOSINOPHIL NFR BLD MANUAL: 0 % (ref 0–6)
ERYTHROCYTE [DISTWIDTH] IN BLOOD BY AUTOMATED COUNT: 20.6 % (ref 11.6–15.1)
GFR SERPL CREATININE-BSD FRML MDRD: 111 ML/MIN/1.73SQ M
GLUCOSE P FAST SERPL-MCNC: 92 MG/DL (ref 65–99)
HCT VFR BLD AUTO: 33.1 % (ref 34.8–46.1)
HGB BLD-MCNC: 9.9 G/DL (ref 11.5–15.4)
LYMPHOCYTES # BLD AUTO: 0.49 THOUSAND/UL (ref 0.6–4.47)
LYMPHOCYTES # BLD AUTO: 10 % (ref 14–44)
MCH RBC QN AUTO: 29.7 PG (ref 26.8–34.3)
MCHC RBC AUTO-ENTMCNC: 29.9 G/DL (ref 31.4–37.4)
MCV RBC AUTO: 99 FL (ref 82–98)
METAMYELOCYTES NFR BLD MANUAL: 1 % (ref 0–1)
MONOCYTES # BLD AUTO: 0.63 THOUSAND/UL (ref 0–1.22)
MONOCYTES NFR BLD: 13 % (ref 4–12)
MYELOCYTES NFR BLD MANUAL: 1 % (ref 0–1)
NEUTROPHILS # BLD MANUAL: 3.6 THOUSAND/UL (ref 1.85–7.62)
NEUTS BAND NFR BLD MANUAL: 3 % (ref 0–8)
NEUTS SEG NFR BLD AUTO: 71 % (ref 43–75)
PLATELET # BLD AUTO: 391 THOUSANDS/UL (ref 149–390)
PLATELET BLD QL SMEAR: ADEQUATE
PMV BLD AUTO: 9.7 FL (ref 8.9–12.7)
POLYCHROMASIA BLD QL SMEAR: PRESENT
POTASSIUM SERPL-SCNC: 4.1 MMOL/L (ref 3.5–5.3)
PROT SERPL-MCNC: 6.6 G/DL (ref 6.4–8.2)
RBC # BLD AUTO: 3.33 MILLION/UL (ref 3.81–5.12)
RBC MORPH BLD: PRESENT
SODIUM SERPL-SCNC: 141 MMOL/L (ref 136–145)
TSH SERPL DL<=0.05 MIU/L-ACNC: 1.49 UIU/ML (ref 0.45–4.5)
WBC # BLD AUTO: 4.86 THOUSAND/UL (ref 4.31–10.16)

## 2022-06-06 PROCEDURE — 84443 ASSAY THYROID STIM HORMONE: CPT

## 2022-06-06 PROCEDURE — 36415 COLL VENOUS BLD VENIPUNCTURE: CPT

## 2022-06-06 PROCEDURE — 82533 TOTAL CORTISOL: CPT

## 2022-06-06 PROCEDURE — 400014 VN F/U

## 2022-06-06 PROCEDURE — G0300 HHS/HOSPICE OF LPN EA 15 MIN: HCPCS

## 2022-06-06 PROCEDURE — 85007 BL SMEAR W/DIFF WBC COUNT: CPT

## 2022-06-06 PROCEDURE — 85027 COMPLETE CBC AUTOMATED: CPT

## 2022-06-06 PROCEDURE — 80053 COMPREHEN METABOLIC PANEL: CPT

## 2022-06-06 RX ORDER — OXYCODONE HYDROCHLORIDE 10 MG/1
10 TABLET ORAL EVERY 4 HOURS PRN
Qty: 180 TABLET | Refills: 0 | Status: SHIPPED | OUTPATIENT
Start: 2022-06-06 | End: 2022-07-08 | Stop reason: SDUPTHER

## 2022-06-06 NOTE — TELEPHONE ENCOUNTER
Primary palliative medicine provider: Ayala    Medication requested: oxycodone 10 mg     If for pain, how has the patient been taking their pain medicine?      Last appointment:  5 18    Next scheduled appointment:  6 15         PDMP review:      Last filled on 5 3    180/30

## 2022-06-06 NOTE — CASE COMMUNICATION
Ship to  Pt   Home    Insurance [facility]    Wound 1     Partial          Hydrogel 1 oz  [id number]  2 or if we could order a 4oz

## 2022-06-06 NOTE — TELEPHONE ENCOUNTER
Regarding: FW: Lift chair/medical recliner  Can you please can you please print the order for this left chair and fax it to [facility]  Also call and let the patient know that order is in  ----- Message -----  From: Yoko Hardy  Sent: 6/6/2022  10:14 AM EDT  To: Kelvin Nicole MD  Subject: Lift chair/medical recliner                      ----- Message from Yoko Hardy sent at 6/6/2022 10:14 AM EDT -----       ----- Message from Gunjan Jackson to Kelvin Nicole MD sent at 6/6/2022  8:09 AM -----   Dr Yimi Garvey! As I am more mobile, I am spending less time in bed and less time in the wheelchair  However, the sofa in my home is too low and soft to be able to comfortably sit on  I reached our to [employer], who said that I could be approved for a lift chair/recliner if my doctor said it was medically necessary  Is this something you can approve as medically necessary and I can get into the home?

## 2022-06-06 NOTE — CASE COMMUNICATION
Called patient to schedule homecare  OT Evaluation , Patient claimed no homecare OT is needed at this point in time and requested to cancel OT referral

## 2022-06-07 ENCOUNTER — DOCUMENTATION (OUTPATIENT)
Dept: HOME HEALTH SERVICES | Facility: HOME HEALTHCARE | Age: 47
End: 2022-06-07

## 2022-06-08 ENCOUNTER — TELEPHONE (OUTPATIENT)
Dept: HEMATOLOGY ONCOLOGY | Facility: CLINIC | Age: 47
End: 2022-06-08

## 2022-06-08 ENCOUNTER — OFFICE VISIT (OUTPATIENT)
Dept: HEMATOLOGY ONCOLOGY | Facility: CLINIC | Age: 47
End: 2022-06-08
Payer: COMMERCIAL

## 2022-06-08 ENCOUNTER — PATIENT OUTREACH (OUTPATIENT)
Dept: CASE MANAGEMENT | Facility: HOSPITAL | Age: 47
End: 2022-06-08

## 2022-06-08 ENCOUNTER — DOCUMENTATION (OUTPATIENT)
Dept: OTHER | Facility: HOSPITAL | Age: 47
End: 2022-06-08

## 2022-06-08 ENCOUNTER — TELEPHONE (OUTPATIENT)
Dept: INTERNAL MEDICINE CLINIC | Facility: CLINIC | Age: 47
End: 2022-06-08

## 2022-06-08 VITALS
RESPIRATION RATE: 16 BRPM | BODY MASS INDEX: 29.82 KG/M2 | HEIGHT: 65 IN | HEART RATE: 98 BPM | DIASTOLIC BLOOD PRESSURE: 80 MMHG | WEIGHT: 179 LBS | OXYGEN SATURATION: 94 % | TEMPERATURE: 97 F | SYSTOLIC BLOOD PRESSURE: 138 MMHG

## 2022-06-08 DIAGNOSIS — D70.1 CHEMOTHERAPY-INDUCED NEUTROPENIA (HCC): Primary | ICD-10-CM

## 2022-06-08 DIAGNOSIS — C50.811 MALIGNANT NEOPLASM OF OVERLAPPING SITES OF RIGHT BREAST IN FEMALE, ESTROGEN RECEPTOR NEGATIVE: Primary | ICD-10-CM

## 2022-06-08 DIAGNOSIS — C79.51 OSSEOUS METASTASIS (HCC): ICD-10-CM

## 2022-06-08 DIAGNOSIS — T45.1X5A CHEMOTHERAPY-INDUCED NEUTROPENIA (HCC): Primary | ICD-10-CM

## 2022-06-08 DIAGNOSIS — Z17.1 MALIGNANT NEOPLASM OF OVERLAPPING SITES OF RIGHT BREAST IN FEMALE, ESTROGEN RECEPTOR NEGATIVE (HCC): ICD-10-CM

## 2022-06-08 DIAGNOSIS — C50.811 MALIGNANT NEOPLASM OF OVERLAPPING SITES OF RIGHT BREAST IN FEMALE, ESTROGEN RECEPTOR NEGATIVE (HCC): ICD-10-CM

## 2022-06-08 DIAGNOSIS — Z17.1 MALIGNANT NEOPLASM OF OVERLAPPING SITES OF RIGHT BREAST IN FEMALE, ESTROGEN RECEPTOR NEGATIVE: Primary | ICD-10-CM

## 2022-06-08 PROCEDURE — 99215 OFFICE O/P EST HI 40 MIN: CPT | Performed by: INTERNAL MEDICINE

## 2022-06-08 RX ORDER — SODIUM CHLORIDE 9 MG/ML
20 INJECTION, SOLUTION INTRAVENOUS ONCE
Status: CANCELLED | OUTPATIENT
Start: 2022-06-16

## 2022-06-08 RX ORDER — SODIUM CHLORIDE 9 MG/ML
20 INJECTION, SOLUTION INTRAVENOUS ONCE
Status: CANCELLED | OUTPATIENT
Start: 2022-07-07

## 2022-06-08 NOTE — TELEPHONE ENCOUNTER
Called patient and left message letting her know that a order was placed to [facility]    Monitor her diarrhea and if it does not get better in the next few days to give us a call back

## 2022-06-08 NOTE — PROGRESS NOTES
Hematology / Oncology Outpatient Follow Up Note    Alvaro Hoffmann 51 y o  female Adolfo Mcnulty OQE:[id number]         Date:  6/8/2022    Assessment / Plan:  A 44-year-old premenopausal woman with metastatic breast cancer, grade 2, ER negative, FL negative, HER2 3+ disease  [facility] large fungating mass in her right breast, palpable right axillary adenopathy as well as diffuse osseous metastasis with C4 stenosis  She completed palliative radiation therapy to the spine  She was treated with Taxotere, trastuzumab and pertuzumab with or without atezolizumab as part of clinical trial x2 cycles  After 2 cycles of treatment, she had COVID-19 infection of severe neutropenia for which she received antibody for COVID-19, remdesivir as well as dexamethasone  Based on this, she has to be taken off the study  She was on placebo for immunotherapy  I recommended her to continue current standard regimen with Taxotere and trastuzumab and pertuzumab which she will receive 4 cycle tomorrow with Neulasta support, due to prior neutropenia  She is in agreement with my recommendation    I will see her again in 3 weeks for follow-up        Subjective:      HPI:  A 44-year-old premenopausal woman who was recently hospitalized because of the progressive neck pain which radiated to the bilateral upper extremity as well as enlarging right breast mass  [facility] was found to have fungating right breast mass   CT scan of chest abdomen pelvis showed diffuse bony metastasis, especially with C4 stenosis   In addition, CT scan showed 6 9 cm of right breast mass   She had right breast biopsy which showed invasive ductal carcinoma, grade 2, ER negative, FL negative, HER2 3+ disease   Her tumor marker were find to be mildly elevated   CT of the head was negative for brain metastasis   She is currently on palliative radiation to her neck, shoulder as well as lower back   She is going to complete palliative radiation therapy in early next week  [facility] presents today with her mother and tamika to discuss medical treatment for metastatic breast cancer  Flaco Nyhastef pain is reasonably controlled with narcotics medications   She has no recent weight loss   She denied any respiratory symptoms   She has no significant past medical history   Therefore, prior to this hospitalization, she was not on any medications   She denied family history of breast or ovarian cancer  Rodrigue Mena is a lifetime never smoker   Her performance status is 1/4 on ECOG scale            Interval History:  A 58-year-old premenopausal woman with metastatic breast cancer, grade 2, ER negative, NC negative, HER2 3+ disease   She has large fungating mass in her right breast, palpable right axillary adenopathy as well as diffuse osseous metastasis with C4 stenosis  She completed palliative radiation therapy to the spine  Subsequently, she was treated with Taxotere, trastuzumab and pertuzumab with without atezolizumab based on a clinical trial   She received 2 cycle of treatment  After the 2nd cycle of treatment, she developed COVID-19 infection as well as severe neutropenia  She received antibody for COVID-19  When she was in hospital, she was treated with dexamethasone and remdesivir  Her respiratory symptom has much improved  She presents today prior to the 3rd cycle of treatment  With her severe neutropenia, COVID-19 infection as well as due to the treatment with antibody, she was taken off the clinical trial   On Ramakrishna Gray that information shared she was on placebo for immunotherapy  She feels well  She has no respiratory symptoms  Her pain is well controlled  She has noticed a major shrinkage of right breast mass  CT scan of chest abdomen and pelvis confirmed decreased right breast mass and right axillary adenopathy    Her performance status is 1/4 on ECOG scale           Objective:      Primary Diagnosis:     Metastatic breast cancer, grade 2, ER negative, NC negative, HER2 3+ disease   Diagnosed in March 2022      Cancer Staging:  Cancer Staging  No matching staging information was found for the patient         Previous Hematologic/ Oncologic Treatment:            Current Hematologic/ Oncologic Treatment:       THP x6 cycles  3rd cycle to be given tomorrow      Disease Status:      Clinical partial response      Test Results:     Pathology:     Right breast biopsy showed invasive ductal carcinoma, grade 2 ER negative, MN negative, HER2 3+ disease      Radiology:     CT scan of head was negative for metastatic disease in her brain      CT scan of chest abdomen pelvis showed C4 metastasis with diffuse osseous metastasis especially with C4 stenosis   6 9 cm of right breast mass      Bone scan showed widespread osseous metastasis      Laboratory:     See below for CBC and CMP   CA 27, 29 was 110   CA 15-3 was 91 6      Physical Exam:        General Appearance:    Alert, oriented          Eyes:    PERRL   Ears:    Normal external ear canals, both ears   Nose:   Nares normal, septum midline   Throat:   Mucosa moist  Pharynx without injection  Neck:   Supple         Lungs:     Clear to auscultation bilaterally   Chest Wall:    No tenderness or deformity    Heart:    Regular rate and rhythm         Abdomen:     Soft, non-tender, bowel sounds +, no organomegaly               Extremities:   Extremities no cyanosis or edema         Skin:   no rash or icterus  Lymph nodes:   Cervical, supraclavicular, and axillary nodes normal   Neurologic:   CNII-XII intact, normal strength, sensation and reflexes     Throughout             Breast exam:   6 x 6 cm of palpable mass at in the upper quadrant of her right breast with 3 cm of central ulceration  Previous right axillary adenopathy is no longer palpable   Left breast exam is negative  ROS: Review of Systems   All other systems reviewed and are negative            Imaging: XR chest 1 view portable    Result Date: 5/28/2022  Narrative: CHEST INDICATION:   cough, covid, neutropenia  Patient has confirmed COVID-19  COMPARISON:  5/23/2022 EXAM PERFORMED/VIEWS:  XR CHEST PORTABLE FINDINGS: Cardiomediastinal silhouette appears unremarkable  The lungs are clear  No pneumothorax or pleural effusion  Osseous structures appear within normal limits for patient age  Impression: No acute cardiopulmonary disease  Workstation performed: [id number]     XR chest 1 view portable    Result Date: 5/24/2022  Narrative: CHEST INDICATION:   fev er/ covid pos  COMPARISON:  03/28/2022  CT scan on 03/29/2022 EXAM PERFORMED/VIEWS:  XR CHEST PORTABLE 1 image FINDINGS: Cardiomediastinal silhouette appears unremarkable  A brace overlies the thoracic spine  The lungs are clear  No pneumothorax or pleural effusion  Osseous metastasis better demonstrated on the CT scan  Impression: No acute cardiopulmonary disease  Workstation performed: [id number]     XR spine cervical 2 or 3 vw injury    Result Date: 5/15/2022  Narrative: CERVICAL SPINE INDICATION:   S12 300A: Unspecified displaced fracture of fourth cervical vertebra, initial encounter for closed fracture  Osseous metastasis  COMPARISON: Radiograph performed April 12, 2022  CT performed March 31, 2022  VIEWS:  XR SPINE CERVICAL 2 OR 3 VW INJURY FINDINGS: Examination is performed with the patient in a brace, slightly limiting evaluation  There is cortical bony erosion involving the C3, C4, and C5 vertebral bodies, and this appears slightly progressed from April 12, 2022  There is reversal of cervical lordosis centered about this cortical lucency but alignment is not significantly changed from prior examination  C5-C6 disc space narrowing and endplate degenerative change is noted  Lung apices are clear  Incompletely visualized calvarial lucency also most consistent with bony metastatic disease       Impression: Apparent subtle progression of cortical bony erosion related to lytic metastatic disease most radiographically evident at the C3, C4, and C5 levels about which there is reversal of cervical lordosis  No significant change in alignment  Workstation performed: [id number]     PE Study with CT Abdomen and Pelvis with contrast    Result Date: 5/28/2022  Narrative: CT PULMONARY ANGIOGRAM OF THE CHEST AND CT ABDOMEN AND PELVIS WITH INTRAVENOUS CONTRAST INDICATION:   sepsis, neutropenia, diarrhea, covid, elevated D-dimer, metastatic breast cancer  COMPARISON:  3/29/2022  TECHNIQUE:  CT examination of the chest, abdomen and pelvis was performed  Thin section CT angiographic technique was used in the chest in order to evaluate for pulmonary embolus and coronal 3D MIP postprocessing was performed on the acquisition scanner  Axial, sagittal, and coronal 2D reformatted images were created from the source data and submitted for interpretation  Radiation dose length product (DLP) for this visit:  7161 mGy-cm   This examination, like all CT scans performed in the [facility], was performed utilizing techniques to minimize radiation dose exposure, including the use of iterative reconstruction and automated exposure control  IV Contrast:  75 mL of iohexol (OMNIPAQUE) Enteric Contrast:  Enteric contrast was not administered  FINDINGS: CHEST PULMONARY ARTERIAL TREE:  No pulmonary embolus is seen  LUNGS:  Subsegmental atelectasis  There is symmetric appearing patchy consolidative and groundglass opacity in the posterior apices bilaterally, nonspecific  Darletta Pac There is no tracheal or endobronchial lesion  PLEURA:  Unremarkable  HEART/AORTA:  Heart is unremarkable for patient's age  No thoracic aortic aneurysm  MEDIASTINUM AND ALISON:  Unremarkable  CHEST WALL AND LOWER NECK:  Reidentified soft tissue breast mass within the superomedial right breast decreased in size compared to prior study now measuring 3 5 cm in maximal dimension (previously 7 0 cm)  Right axillary lymph node measures 3 cm  ABDOMEN LIVER/BILIARY TREE:  Unremarkable   GALLBLADDER: No calcified gallstones  No pericholecystic inflammatory change  SPLEEN:  Unremarkable  PANCREAS:  Unremarkable  ADRENAL GLANDS:  Unremarkable  KIDNEYS/URETERS:  One or more simple renal cyst(s) is noted  Unchanged left lower pole nonobstructing intrarenal calculus  No hydronephrosis  STOMACH AND BOWEL:  Unremarkable  APPENDIX:  No findings to suggest appendicitis  ABDOMINOPELVIC CAVITY:  No ascites  No pneumoperitoneum  No lymphadenopathy  VESSELS:  Unremarkable for patient's age  PELVIS REPRODUCTIVE ORGANS:  Unremarkable for patient's age  Left adnexal paraovarian cyst measures 4 cm decreased in size compared to prior  URINARY BLADDER:  Unremarkable  ABDOMINAL WALL/INGUINAL REGIONS:  Unremarkable  OSSEOUS STRUCTURES:  No acute fracture  Reidentified diffuse osseous metastasis with largest left sacral metastatic lesion similar in appearance to prior  Impression: No evidence of acute pulmonary embolus  Symmetric groundglass consolidative opacity in the bilateral posterior lung apices which may reflect? Post radiation/treatment changes  Correlate clinically to exclude superimposed infectious  Interval decrease in size of right breast mass and right axillary metastatic adenopathy compatible with treatment response  Redemonstrated diffuse osseous metastatic disease with largest left sacral metastasis not significantly changed compared to prior study dated 3/29/2022 and also evaluated on prior recent bone scan examination dated 4/25/2022  Additional findings as above  Workstation performed: [id number]     VAS lower limb venous duplex study, complete bilateral    Result Date: 5/16/2022  Narrative:  [facility] REPORT CLINICAL: Indications: Patient presents with worsening swelling of bilateral lower extremities over the past 2-3 weeks  Operative History: Patient denies any cardiovascular procedures Risk Factors The patient has history of HTN     CONCLUSION: Impression: RIGHT LOWER LIMB: No evidence of acute or chronic deep vein thrombosis  No evidence of superficial thrombophlebitis noted  Doppler evaluation shows a normal response to augmentation maneuvers  Popliteal, posterior tibial and anterior tibial arterial Doppler waveforms are triphasic  LEFT LOWER LIMB: No evidence of acute or chronic deep vein thrombosis  No evidence of superficial thrombophlebitis noted  Doppler evaluation shows a normal response to augmentation maneuvers  Popliteal, posterior tibial and anterior tibial arterial Doppler waveforms are triphasic  Technical findings were given to Dr Kali Rhodes via tiger text  SIGNATURE: Electronically Signed by: Elissa Redmond MD, RPVI on 2022-05-16 04:05:26 PM        Labs:   Lab Results   Component Value Date    WBC 4 86 06/06/2022    HGB 9 9 (L) 06/06/2022    HCT 33 1 (L) 06/06/2022    MCV 99 (H) 06/06/2022     (H) 06/06/2022     Lab Results   Component Value Date    K 4 1 06/06/2022     06/06/2022    CO2 29 06/06/2022    BUN 6 06/06/2022    CREATININE 0 56 (L) 06/06/2022    GLUF 92 06/06/2022    CALCIUM 9 1 06/06/2022    CORRECTEDCA 9 7 06/06/2022    AST 35 06/06/2022    ALT 48 06/06/2022    ALKPHOS 88 06/06/2022    EGFR 111 06/06/2022         Lab Results   Component Value Date    CEA 6 5 (H) 03/29/2022         Lab Results   Component Value Date    IRON 83 05/16/2022    TIBC 206 (L) 05/16/2022    FERRITIN 191 05/27/2022         Current Medications: Reviewed  Allergies: Reviewed  PMH/FH/SH:  Reviewed      Vital Sign:    Body surface area is 1 88 meters squared      Wt Readings from Last 3 Encounters:   06/08/22 81 2 kg (179 lb)   05/31/22 83 9 kg (185 lb)   05/26/22 83 9 kg (185 lb)        Temp Readings from Last 3 Encounters:   06/08/22 (!) 97 °F (36 1 °C) (Temporal)   06/06/22 97 5 °F (36 4 °C) (Temporal)   06/03/22 98 1 °F (36 7 °C) (Tympanic)        BP Readings from Last 3 Encounters:   06/08/22 138/80   06/06/22 122/80   06/03/22 106/72         Pulse Readings from Last 3 Encounters:   06/08/22 98   06/06/22 92   06/03/22 86     @NAKIAO2(3)@

## 2022-06-08 NOTE — TELEPHONE ENCOUNTER
Pt is still looking for lift chair hasn't has any luck, Pt is also having loose stools for a couple days and wanting your advise, pt would like a phone call

## 2022-06-08 NOTE — TELEPHONE ENCOUNTER
Called and spoke with pt to let her know that her infusion appt for tomorrow was being postponed due to no auth from her insurance yet  Pt asked if there was anything she could do to obtain auth faster  Advised her our finance team was already diligently working on it  Advised her infusion would reach out to her with new date and time but to call us tomorrow if she hasnt heard anything  She verbalized understanding

## 2022-06-08 NOTE — PROGRESS NOTES
I spoke with Deysi from Clinical Trials at this time and pt is coming off trial  She will see Madonna Covarrubias at 11 am today and he will place orders in [place] as he finds appropriate  Deysi sent message to oncology finance to make aware of change   Sarmad Merino and [place] from pharmacy made aware

## 2022-06-08 NOTE — PROGRESS NOTES
Patient was seen in office today 6/8/2022 by Orville PASTRANA discussed with patient that at this time her study status would be unblinded  Patient was recently (5/25/2022) treated with a monoclonial antibody for Covid-19   This along with study announced end point on 5/20/2022 that they would no longer be providing Atezo/Placebo portion of the study any longer  Patient was informed that she would be unblinded and removed from study treatment   She will continue to receive standard of care under Dr Payne and clinical trials would still follow for survival  Patient verbalized understanding of this  AE  And ConMeds reviewed  Patient stated no change to meds but still had lingering cough from covid  Dr Payne explained that this is expected  Patient verbalized understanding and was told to call if she had any questions or concerns

## 2022-06-09 ENCOUNTER — PATIENT MESSAGE (OUTPATIENT)
Dept: HEMATOLOGY ONCOLOGY | Facility: CLINIC | Age: 47
End: 2022-06-09

## 2022-06-09 ENCOUNTER — HOSPITAL ENCOUNTER (OUTPATIENT)
Dept: INFUSION CENTER | Facility: CLINIC | Age: 47
Discharge: HOME | End: 2022-06-09

## 2022-06-09 ENCOUNTER — TELEPHONE (OUTPATIENT)
Dept: HEMATOLOGY ONCOLOGY | Facility: CLINIC | Age: 47
End: 2022-06-09

## 2022-06-09 NOTE — TELEPHONE ENCOUNTER
Rec'd call from pt regarding her insurance approval  Pt states she called her insurance and they advised her to let us know they open at 1100EST so the wait may be less if we call earlier  I advised pt I would pass her message along to the person that was working on her [address]  Advised pt she was rescheduled at [facility] infusion for next Thursday 6/16 at 1200  Pt is aware that if anything changes someone will reach out to her

## 2022-06-09 NOTE — TELEPHONE ENCOUNTER
Blood work should be drawn within a week of treatment  She should have repeat labs drawn next week prior to her infusion on Thursday

## 2022-06-10 ENCOUNTER — HOME CARE VISIT (OUTPATIENT)
Dept: HOME HEALTH SERVICES | Facility: HOME HEALTHCARE | Age: 47
End: 2022-06-10
Payer: COMMERCIAL

## 2022-06-10 VITALS
RESPIRATION RATE: 18 BRPM | DIASTOLIC BLOOD PRESSURE: 78 MMHG | OXYGEN SATURATION: 98 % | HEART RATE: 96 BPM | SYSTOLIC BLOOD PRESSURE: 118 MMHG | TEMPERATURE: 98 F

## 2022-06-10 PROCEDURE — G0299 HHS/HOSPICE OF RN EA 15 MIN: HCPCS

## 2022-06-12 DIAGNOSIS — B37.0 ORAL THRUSH: ICD-10-CM

## 2022-06-13 ENCOUNTER — APPOINTMENT (OUTPATIENT)
Dept: LAB | Facility: MEDICAL CENTER | Age: 47
End: 2022-06-13
Payer: COMMERCIAL

## 2022-06-13 ENCOUNTER — TELEPHONE (OUTPATIENT)
Dept: OTHER | Facility: OTHER | Age: 47
End: 2022-06-13

## 2022-06-13 ENCOUNTER — HOME CARE VISIT (OUTPATIENT)
Dept: HOME HEALTH SERVICES | Facility: HOME HEALTHCARE | Age: 47
End: 2022-06-13
Payer: COMMERCIAL

## 2022-06-13 ENCOUNTER — TELEMEDICINE (OUTPATIENT)
Dept: INTERNAL MEDICINE CLINIC | Facility: CLINIC | Age: 47
End: 2022-06-13
Payer: COMMERCIAL

## 2022-06-13 VITALS
WEIGHT: 180 LBS | SYSTOLIC BLOOD PRESSURE: 118 MMHG | BODY MASS INDEX: 30.73 KG/M2 | TEMPERATURE: 98 F | HEIGHT: 64 IN | DIASTOLIC BLOOD PRESSURE: 92 MMHG

## 2022-06-13 VITALS
DIASTOLIC BLOOD PRESSURE: 82 MMHG | RESPIRATION RATE: 16 BRPM | TEMPERATURE: 97.3 F | SYSTOLIC BLOOD PRESSURE: 106 MMHG | OXYGEN SATURATION: 94 % | HEART RATE: 90 BPM

## 2022-06-13 DIAGNOSIS — Z17.1 MALIGNANT NEOPLASM OF OVERLAPPING SITES OF RIGHT BREAST IN FEMALE, ESTROGEN RECEPTOR NEGATIVE (HCC): ICD-10-CM

## 2022-06-13 DIAGNOSIS — C79.51 OSSEOUS METASTASIS (HCC): ICD-10-CM

## 2022-06-13 DIAGNOSIS — R17 ELEVATED BILIRUBIN: ICD-10-CM

## 2022-06-13 DIAGNOSIS — T45.1X5A CHEMOTHERAPY-INDUCED NEUTROPENIA (HCC): Primary | ICD-10-CM

## 2022-06-13 DIAGNOSIS — U07.1 COVID-19: ICD-10-CM

## 2022-06-13 DIAGNOSIS — B37.0 ORAL THRUSH: ICD-10-CM

## 2022-06-13 DIAGNOSIS — D70.1 CHEMOTHERAPY-INDUCED NEUTROPENIA (HCC): Primary | ICD-10-CM

## 2022-06-13 DIAGNOSIS — C50.811 MALIGNANT NEOPLASM OF OVERLAPPING SITES OF RIGHT BREAST IN FEMALE, ESTROGEN RECEPTOR NEGATIVE (HCC): ICD-10-CM

## 2022-06-13 LAB
ALBUMIN SERPL BCP-MCNC: 3.2 G/DL (ref 3.5–5)
ALP SERPL-CCNC: 72 U/L (ref 46–116)
ALT SERPL W P-5'-P-CCNC: 36 U/L (ref 12–78)
ANION GAP SERPL CALCULATED.3IONS-SCNC: 7 MMOL/L (ref 4–13)
AST SERPL W P-5'-P-CCNC: 31 U/L (ref 5–45)
BASOPHILS # BLD AUTO: 0.01 THOUSANDS/ΜL (ref 0–0.1)
BASOPHILS NFR BLD AUTO: 0 % (ref 0–1)
BILIRUB SERPL-MCNC: 1.17 MG/DL (ref 0.2–1)
BUN SERPL-MCNC: 6 MG/DL (ref 5–25)
CALCIUM ALBUM COR SERPL-MCNC: 10.4 MG/DL (ref 8.3–10.1)
CALCIUM SERPL-MCNC: 9.8 MG/DL (ref 8.3–10.1)
CHLORIDE SERPL-SCNC: 102 MMOL/L (ref 100–108)
CO2 SERPL-SCNC: 28 MMOL/L (ref 21–32)
CORTIS AM PEAK SERPL-MCNC: 16 UG/DL (ref 4.2–22.4)
CREAT SERPL-MCNC: 0.57 MG/DL (ref 0.6–1.3)
EOSINOPHIL # BLD AUTO: 0.01 THOUSAND/ΜL (ref 0–0.61)
EOSINOPHIL NFR BLD AUTO: 0 % (ref 0–6)
ERYTHROCYTE [DISTWIDTH] IN BLOOD BY AUTOMATED COUNT: 20.4 % (ref 11.6–15.1)
GFR SERPL CREATININE-BSD FRML MDRD: 110 ML/MIN/1.73SQ M
GLUCOSE SERPL-MCNC: 97 MG/DL (ref 65–140)
HCT VFR BLD AUTO: 34.9 % (ref 34.8–46.1)
HGB BLD-MCNC: 10.6 G/DL (ref 11.5–15.4)
IMM GRANULOCYTES # BLD AUTO: 0.02 THOUSAND/UL (ref 0–0.2)
IMM GRANULOCYTES NFR BLD AUTO: 0 % (ref 0–2)
LYMPHOCYTES # BLD AUTO: 0.37 THOUSANDS/ΜL (ref 0.6–4.47)
LYMPHOCYTES NFR BLD AUTO: 8 % (ref 14–44)
MCH RBC QN AUTO: 30.5 PG (ref 26.8–34.3)
MCHC RBC AUTO-ENTMCNC: 30.4 G/DL (ref 31.4–37.4)
MCV RBC AUTO: 100 FL (ref 82–98)
MONOCYTES # BLD AUTO: 0.56 THOUSAND/ΜL (ref 0.17–1.22)
MONOCYTES NFR BLD AUTO: 12 % (ref 4–12)
NEUTROPHILS # BLD AUTO: 3.57 THOUSANDS/ΜL (ref 1.85–7.62)
NEUTS SEG NFR BLD AUTO: 80 % (ref 43–75)
NRBC BLD AUTO-RTO: 0 /100 WBCS
PLATELET # BLD AUTO: 279 THOUSANDS/UL (ref 149–390)
PMV BLD AUTO: 10.5 FL (ref 8.9–12.7)
POTASSIUM SERPL-SCNC: 3.9 MMOL/L (ref 3.5–5.3)
PROT SERPL-MCNC: 7.3 G/DL (ref 6.4–8.2)
RBC # BLD AUTO: 3.48 MILLION/UL (ref 3.81–5.12)
SODIUM SERPL-SCNC: 137 MMOL/L (ref 136–145)
T3FREE SERPL-MCNC: 3.14 PG/ML (ref 2.3–4.2)
TSH SERPL DL<=0.05 MIU/L-ACNC: 1.08 UIU/ML (ref 0.45–4.5)
WBC # BLD AUTO: 4.54 THOUSAND/UL (ref 4.31–10.16)

## 2022-06-13 PROCEDURE — 36415 COLL VENOUS BLD VENIPUNCTURE: CPT

## 2022-06-13 PROCEDURE — 82248 BILIRUBIN DIRECT: CPT

## 2022-06-13 PROCEDURE — 84443 ASSAY THYROID STIM HORMONE: CPT

## 2022-06-13 PROCEDURE — 85025 COMPLETE CBC W/AUTO DIFF WBC: CPT

## 2022-06-13 PROCEDURE — 99213 OFFICE O/P EST LOW 20 MIN: CPT | Performed by: INTERNAL MEDICINE

## 2022-06-13 PROCEDURE — 1111F DSCHRG MED/CURRENT MED MERGE: CPT | Performed by: INTERNAL MEDICINE

## 2022-06-13 PROCEDURE — 84481 FREE ASSAY (FT-3): CPT

## 2022-06-13 PROCEDURE — 82533 TOTAL CORTISOL: CPT

## 2022-06-13 PROCEDURE — 80053 COMPREHEN METABOLIC PANEL: CPT

## 2022-06-13 PROCEDURE — G0299 HHS/HOSPICE OF RN EA 15 MIN: HCPCS

## 2022-06-13 NOTE — PROGRESS NOTES
Virtual Regular Visit    Verification of patient location:    Patient is located in the following state in which I hold an active license [place]      Assessment/Plan:    Problem List Items Addressed This Visit        Other    Malignant neoplasm of overlapping sites of right breast in female, estrogen receptor negative ([facility] [address] )    COVID-19 - Primary      Other Visit Diagnoses     Oral thrush        Improved, has an upcoming appointment with the Gastroenterology evaluate for possible esophageal Candida     Relevant Medications    nystatin (MYCOSTATIN) 500,000 units/5 mL suspension               Reason for visit is   Chief Complaint   Patient presents with    COVID-19     face time  follow from covid , slight cough        Encounter provider Taylor Cordero MD    Provider located at [address] [address] [phone]      Recent Visits  Date Type Provider Dept   06/08/22 Telephone [id number] Internal Med   06/06/22 Telephone [address] Internal Med   Showing recent visits within past 7 days and meeting all other requirements  Today's Visits  Date Type Provider Dept   06/13/22 Telemedicine Taylor Cordero MD [facility] [address] Internal Med   Showing today's visits and meeting all other requirements  Future Appointments  No visits were found meeting these conditions  Showing future appointments within next 150 days and meeting all other requirements       The patient was identified by name and date of birth  Boo Wills was informed that this is a telemedicine visit and that the visit is being conducted through [facility] and patient was informed that this is not a secure, HIPAA-compliant platform  She agrees to proceed     My office door was closed  No one else was in the room  She acknowledged consent and understanding of privacy and security of the video platform   The patient has agreed to participate and understands they can discontinue the visit at any time  Patient is aware this is a billable service  Subjective  Moy Guerrero is a 51 y o  female    Follow-up of recent COVID, neutropenia   Recent labs reviewed with the patient       Past Medical History:   Diagnosis Date    Cancer [facility])        History reviewed  No pertinent surgical history  Current Outpatient Medications   Medication Sig Dispense Refill    baclofen 10 mg tablet TAKE 1/2 TABLET BY MOUTH 3 TIMES A DAY AS NEEDED FOR MUSCLE SPASMS 135 tablet 1    furosemide (LASIX) 40 mg tablet Take 1 tablet (40 mg total) by mouth in the morning  30 tablet 1    lisinopril (ZESTRIL) 20 mg tablet Take 1 tablet (20 mg total) by mouth daily 90 tablet 1    morphine (MS CONTIN) 15 mg 12 hr tablet Take 1 tablet (15 mg total) by mouth every 12 (twelve) hours Max Daily Amount: 30 mg 60 tablet 0    nystatin (MYCOSTATIN) 500,000 units/5 mL suspension Apply 5 mL (500,000 Units total) to the mouth or throat 4 (four) times a day 200 mL 0    nystatin (MYCOSTATIN) powder Apply topically 3 (three) times a day (Patient taking differently: Apply topically if needed) 15 g 0    ondansetron (ZOFRAN) 4 mg tablet Take 1 tablet (4 mg total) by mouth every 8 (eight) hours as needed for nausea or vomiting 40 tablet 2    oxyCODONE (ROXICODONE) 10 MG TABS Take 1 tablet (10 mg total) by mouth every 4 (four) hours as needed for moderate pain Max Daily Amount: 60 mg 180 tablet 0    pantoprazole (PROTONIX) 40 mg tablet Take 1 tablet (40 mg total) by mouth daily in the early morning 90 tablet 1    senna (SENOKOT) 8 6 mg Take 3 tablets (25 8 mg total) by mouth 2 (two) times a day Hold for loose stool   180 tablet 0    al mag oxide-diphenhydramine-lidocaine viscous (MAGIC MOUTHWASH) 1:1:1 suspension Swish and spit 10 mL every 4 (four) hours as needed for mouth pain or discomfort (Patient not taking: No sig reported) 500 mL 2    dexamethasone (DECADRON) 4 mg tablet Take 1 tablet (4 mg total) by mouth 3 (three) times a day (Patient not taking: No sig reported) 45 tablet 0    DPH-Lido-AlHydr-MgHydr-Simeth (First-Mouthwash BLM) SUSP  (Patient not taking: No sig reported)      lactulose (CEPHULAC) 10 g packet Take 1 packet (10 g total) by mouth 3 (three) times a day as needed (constipation) (Patient not taking: No sig reported) 30 each 0    polyethylene glycol (MIRALAX) 17 g packet Take 17 g by mouth daily (Patient not taking: No sig reported) 30 each 0     No current facility-administered medications for this visit  No Known Allergies    Review of Systems   Constitutional: Negative for appetite change, chills, diaphoresis, fatigue, fever and unexpected weight change  Respiratory: Positive for cough  Negative for apnea, choking, chest tightness, shortness of breath, wheezing and stridor  Cardiovascular: Negative for chest pain, palpitations and leg swelling  Gastrointestinal: Negative for abdominal distention, abdominal pain, anal bleeding, blood in stool, constipation, diarrhea, nausea and vomiting  Genitourinary: Negative for decreased urine volume, difficulty urinating, frequency and urgency  Musculoskeletal: Negative for arthralgias, back pain and myalgias  Neurological: Negative for dizziness, light-headedness, numbness and headaches  Video Exam    Vitals:    06/13/22 1427   BP: 118/92   BP Location: Right arm   Patient Position: Sitting   Cuff Size: Large   Temp: 98 °F (36 7 °C)   TempSrc: Oral   Weight: 81 6 kg (180 lb)   Height: 5' 4" (1 626 m)       Physical Exam  Constitutional:       General: She is not in acute distress  Appearance: Normal appearance  She is normal weight  She is not toxic-appearing or diaphoretic  Neurological:      Mental Status: She is alert and oriented to person, place, and time            I spent 25 minutes directly with the patient during this visit    VIRTUAL VISIT [address] verbally agrees to participate in [employer]  Pt is aware that [employer] could be limited without vital signs or the ability to perform a full hands-on physical exam  Nayeli Ramirez understands she or the provider may request at any time to terminate the video visit and request the patient to seek care or treatment in person

## 2022-06-13 NOTE — TELEPHONE ENCOUNTER
Patient calling to make sure that she has to get an xray prior to her appt this week with Dr Juancarlos Canales  Confirmed with patient that there is an xray ordered for her

## 2022-06-14 RX ORDER — SODIUM CHLORIDE 9 MG/ML
20 INJECTION, SOLUTION INTRAVENOUS ONCE
Status: CANCELLED | OUTPATIENT
Start: 2022-07-28

## 2022-06-14 NOTE — TELEPHONE ENCOUNTER
Reached out to patient to confirm she is aware of needing an xray for her upcoming appt  Patient stated an understanding and was appreciative of the call

## 2022-06-15 ENCOUNTER — HOSPITAL ENCOUNTER (OUTPATIENT)
Dept: RADIOLOGY | Facility: HOSPITAL | Age: 47
Discharge: HOME | End: 2022-06-15
Payer: COMMERCIAL

## 2022-06-15 ENCOUNTER — TELEPHONE (OUTPATIENT)
Dept: HEMATOLOGY ONCOLOGY | Facility: CLINIC | Age: 47
End: 2022-06-15

## 2022-06-15 ENCOUNTER — OFFICE VISIT (OUTPATIENT)
Dept: PALLIATIVE MEDICINE | Facility: CLINIC | Age: 47
End: 2022-06-15
Payer: COMMERCIAL

## 2022-06-15 ENCOUNTER — CONSULT (OUTPATIENT)
Dept: GASTROENTEROLOGY | Facility: CLINIC | Age: 47
End: 2022-06-15
Payer: COMMERCIAL

## 2022-06-15 VITALS
HEART RATE: 87 BPM | RESPIRATION RATE: 18 BRPM | BODY MASS INDEX: 29.71 KG/M2 | DIASTOLIC BLOOD PRESSURE: 86 MMHG | WEIGHT: 174 LBS | OXYGEN SATURATION: 96 % | HEIGHT: 64 IN | SYSTOLIC BLOOD PRESSURE: 119 MMHG | TEMPERATURE: 97.9 F

## 2022-06-15 VITALS
HEART RATE: 96 BPM | WEIGHT: 174 LBS | SYSTOLIC BLOOD PRESSURE: 119 MMHG | BODY MASS INDEX: 29.71 KG/M2 | HEIGHT: 64 IN | DIASTOLIC BLOOD PRESSURE: 86 MMHG

## 2022-06-15 DIAGNOSIS — R11.0 CHEMOTHERAPY-INDUCED NAUSEA: ICD-10-CM

## 2022-06-15 DIAGNOSIS — Z17.1 MALIGNANT NEOPLASM OF OVERLAPPING SITES OF RIGHT BREAST IN FEMALE, ESTROGEN RECEPTOR NEGATIVE (HCC): Primary | ICD-10-CM

## 2022-06-15 DIAGNOSIS — Z51.5 PALLIATIVE CARE PATIENT: ICD-10-CM

## 2022-06-15 DIAGNOSIS — Z79.899 MEDICAL MARIJUANA USE: ICD-10-CM

## 2022-06-15 DIAGNOSIS — T45.1X5A CHEMOTHERAPY-INDUCED NAUSEA: ICD-10-CM

## 2022-06-15 DIAGNOSIS — R53.0 NEOPLASTIC MALIGNANT RELATED FATIGUE: ICD-10-CM

## 2022-06-15 DIAGNOSIS — C79.51 OSSEOUS METASTASIS (HCC): ICD-10-CM

## 2022-06-15 DIAGNOSIS — K59.00 CONSTIPATION: ICD-10-CM

## 2022-06-15 DIAGNOSIS — K11.7 XEROSTOMIA: ICD-10-CM

## 2022-06-15 DIAGNOSIS — C79.49 METASTASIS TO SPINAL CORD (HCC): ICD-10-CM

## 2022-06-15 DIAGNOSIS — G89.3 CANCER ASSOCIATED PAIN: ICD-10-CM

## 2022-06-15 DIAGNOSIS — B37.81 CANDIDA INFECTION, ESOPHAGEAL (HCC): Primary | ICD-10-CM

## 2022-06-15 DIAGNOSIS — R17 ELEVATED BILIRUBIN: ICD-10-CM

## 2022-06-15 DIAGNOSIS — C50.811 MALIGNANT NEOPLASM OF OVERLAPPING SITES OF RIGHT BREAST IN FEMALE, ESTROGEN RECEPTOR NEGATIVE (HCC): Primary | ICD-10-CM

## 2022-06-15 LAB — BILIRUB DIRECT SERPL-MCNC: 0.09 MG/DL (ref 0–0.2)

## 2022-06-15 PROCEDURE — 1111F DSCHRG MED/CURRENT MED MERGE: CPT | Performed by: INTERNAL MEDICINE

## 2022-06-15 PROCEDURE — 99214 OFFICE O/P EST MOD 30 MIN: CPT | Performed by: INTERNAL MEDICINE

## 2022-06-15 PROCEDURE — 72040 X-RAY EXAM NECK SPINE 2-3 VW: CPT

## 2022-06-15 PROCEDURE — 99203 OFFICE O/P NEW LOW 30 MIN: CPT | Performed by: PHYSICIAN ASSISTANT

## 2022-06-15 RX ORDER — MORPHINE SULFATE 15 MG/1
15 TABLET, FILM COATED, EXTENDED RELEASE ORAL EVERY 12 HOURS SCHEDULED
Qty: 60 TABLET | Refills: 0 | Status: SHIPPED | OUTPATIENT
Start: 2022-06-15 | End: 2022-07-15 | Stop reason: SDUPTHER

## 2022-06-15 RX ORDER — FLUCONAZOLE 100 MG/1
100 TABLET ORAL DAILY
Qty: 22 TABLET | Refills: 0 | Status: SHIPPED | OUTPATIENT
Start: 2022-06-15 | End: 2022-07-06

## 2022-06-15 NOTE — PROGRESS NOTES
[facility] - Outpatient Consultation  Leia Perez 51 y o  female MRN: [id number]  Encounter: [id number]          ASSESSMENT AND PLAN:      1  Candida infection, esophageal ([address] )  Patient with recurrent Candida infection which is likely secondary to immunocompromised state  Patient currently denies any odynophagia but does complain of a sore throat which in turn could be related to recent COVID-19 infection with persistent cough  Would recommend to treat with prolonged course of fluconazole for a 21 day course due to immunosuppressed state and she is going to start chemotherapy tomorrow  Do not feel significant benefit of EGD at this time as she seems to be relatively asymptomatic from esophageal symptoms  If persistent symptoms develop, then may consider EGD in future but would need clearance for mobilization due to neck stabilizer in place  We will see back in 4-6 weeks  - Ambulatory Referral to Gastroenterology  - fluconazole (DIFLUCAN) 100 mg tablet; Take 1 tablet (100 mg total) by mouth daily for 21 days Take 2 tabs on day 1 followed by 100mg daily  Dispense: 22 tablet; Refill: 0    2  Elevated bilirubin  Patient with elevated bilirubin which may be secondary Gilbert's, as this was performed in a fasting state she did have prior elevation  Added direct bilirubin  Patient will have blood work in 3 weeks and we will monitor LFTs as patient will be taking course of Diflucan  - Bilirubin, direct; Future    ______________________________________________________________________    HPI:    This is a 50 y/o female with hx of breast cancer with metastasis  Patient has been treated multiple times with courses of antifungals  Patient did have oral thrush and she has been treated with Diflucan for 5 days and 7 days in early May and subsequently was hospitalized for COVID-19    Patient does have neck brace in place due to metastasis to her neck from her breast cancer and currently must wear the brace constantly due to risk of paralysis  Patient otherwise had blood work earlier this week and she is going to start chemo again tomorrow and she generally has been getting this every 4 weeks and was noted to have an elevated bilirubin which she did have at some time in the past but there was no direct bilirubin  Have a history of known bone Mets did have an elevated alkaline phosphatase  Patient currently denies any significant odynophagia or dysphagia  Has never had upper endoscopy  Patient has been taking nystatin for several months she believes and she still feels that her strong gets coated with yeast when she is drinking from a straw  REVIEW OF SYSTEMS:    CONSTITUTIONAL: Denies any fever, chills, rigors, and weight loss  HEENT: No earache or tinnitus  Denies hearing loss or visual disturbances  CARDIOVASCULAR: No chest pain or palpitations  RESPIRATORY: Denies any cough, hemoptysis, shortness of breath or dyspnea on exertion  GASTROINTESTINAL: As noted in the History of Present Illness  GENITOURINARY: No problems with urination  Denies any hematuria or dysuria  NEUROLOGIC: No dizziness or vertigo, denies headaches  MUSCULOSKELETAL: Denies any muscle or joint pain  SKIN: Denies skin rashes or itching  ENDOCRINE: Denies excessive thirst  Denies intolerance to heat or cold  PSYCHOSOCIAL: Denies depression or anxiety  Denies any recent memory loss  Historical Information   Past Medical History:   Diagnosis Date    Breast cancer ([facility] )     Cancer ([facility] )     Hypertension      History reviewed  No pertinent surgical history    Social History   Social History     Substance and Sexual Activity   Alcohol Use Not Currently    Comment: rare, social      Social History     Substance and Sexual Activity   Drug Use Never     Social History     Tobacco Use   Smoking Status Never Smoker   Smokeless Tobacco Never Used     Family History   Problem Relation Age of Onset    Coronary artery disease Mother     Heart attack Father     Diabetes type II Father        Meds/Allergies       Current Outpatient Medications:     baclofen 10 mg tablet    fluconazole (DIFLUCAN) 100 mg tablet    furosemide (LASIX) 40 mg tablet    lisinopril (ZESTRIL) 20 mg tablet    morphine (MS CONTIN) 15 mg 12 hr tablet    nystatin (MYCOSTATIN) 500,000 units/5 mL suspension    nystatin (MYCOSTATIN) powder    ondansetron (ZOFRAN) 4 mg tablet    oxyCODONE (ROXICODONE) 10 MG TABS    pantoprazole (PROTONIX) 40 mg tablet    senna (SENOKOT) 8 6 mg    al mag oxide-diphenhydramine-lidocaine viscous (MAGIC MOUTHWASH) 1:1:1 suspension    dexamethasone (DECADRON) 4 mg tablet    DPH-Lido-AlHydr-MgHydr-Simeth (First-Mouthwash BLM) SUSP    lactulose (CEPHULAC) 10 g packet    polyethylene glycol (MIRALAX) 17 g packet    No Known Allergies        Objective     Blood pressure 119/86, pulse 96, height 5' 4" (1 626 m), weight 78 9 kg (174 lb), not currently breastfeeding  Body mass index is 29 87 kg/m²  PHYSICAL EXAM:      General Appearance:   Alert, cooperative, no distress   HEENT:   + some thrush noted on posterior tongue but no significant thrush noted in pharyngeal area on limited exam   Neck:  Supple, symmetrical, trachea midline   Lungs:   Clear to auscultation bilaterally; no rales, rhonchi or wheezing; respirations unlabored    Heart[de-identified]   Regular rate and rhythm; no murmur, rub, or gallop  Abdomen:   Soft, non-tender, non-distended; normal bowel sounds; no masses, no organomegaly    Genitalia:   Deferred    Rectal:   Deferred    Extremities:  No cyanosis, clubbing or edema    Pulses:  2+ and symmetric    Skin:  No jaundice, rashes, or lesions    Lymph nodes:  No palpable cervical lymphadenopathy        Lab Results:   No visits with results within 1 Day(s) from this visit     Latest known visit with results is:   Appointment on 06/13/2022   Component Date Value    WBC 06/13/2022 4 54     RBC 06/13/2022 3 48 (A)    Hemoglobin 06/13/2022 10 6 (A)    Hematocrit 06/13/2022 34 9     MCV 06/13/2022 100 (A)    MCH 06/13/2022 30 5     MCHC 06/13/2022 30 4 (A)    RDW 06/13/2022 20 4 (A)    MPV 06/13/2022 10 5     Platelets 57/32/5353 279     nRBC 06/13/2022 0     Neutrophils Relative 06/13/2022 80 (A)    Immat GRANS % 06/13/2022 0     Lymphocytes Relative 06/13/2022 8 (A)    Monocytes Relative 06/13/2022 12     Eosinophils Relative 06/13/2022 0     Basophils Relative 06/13/2022 0     Neutrophils Absolute 06/13/2022 3 57     Immature Grans Absolute 06/13/2022 0 02     Lymphocytes Absolute 06/13/2022 0 37 (A)    Monocytes Absolute 06/13/2022 0 56     Eosinophils Absolute 06/13/2022 0 01     Basophils Absolute 06/13/2022 0 01     TSH 3RD GENERATON 06/13/2022 1 080     T3, Free 06/13/2022 3 14     Sodium 06/13/2022 137     Potassium 06/13/2022 3 9     Chloride 06/13/2022 102     CO2 06/13/2022 28     ANION GAP 06/13/2022 7     BUN 06/13/2022 6     Creatinine 06/13/2022 0 57 (A)    Glucose 06/13/2022 97     Calcium 06/13/2022 9 8     Corrected Calcium 06/13/2022 10 4 (A)    AST 06/13/2022 31     ALT 06/13/2022 36     Alkaline Phosphatase 06/13/2022 72     Total Protein 06/13/2022 7 3     Albumin 06/13/2022 3 2 (A)    Total Bilirubin 06/13/2022 1 17 (A)    eGFR 06/13/2022 110     Cortisol - AM 06/13/2022 16 0          Radiology Results:   XR chest 1 view portable    Result Date: 5/28/2022  Narrative: CHEST INDICATION:   cough, covid, neutropenia  Patient has confirmed COVID-19  COMPARISON:  5/23/2022 EXAM PERFORMED/VIEWS:  XR CHEST PORTABLE FINDINGS: Cardiomediastinal silhouette appears unremarkable  The lungs are clear  No pneumothorax or pleural effusion  Osseous structures appear within normal limits for patient age  Impression: No acute cardiopulmonary disease   Workstation performed: [id number]     XR chest 1 view portable    Result Date: 5/24/2022  Narrative: CHEST INDICATION:   fev er/ covid pos  COMPARISON:  03/28/2022  CT scan on 03/29/2022 EXAM PERFORMED/VIEWS:  XR CHEST PORTABLE 1 image FINDINGS: Cardiomediastinal silhouette appears unremarkable  A brace overlies the thoracic spine  The lungs are clear  No pneumothorax or pleural effusion  Osseous metastasis better demonstrated on the CT scan  Impression: No acute cardiopulmonary disease  Workstation performed: [id number]     PE Study with CT Abdomen and Pelvis with contrast    Result Date: 5/28/2022  Narrative: CT PULMONARY ANGIOGRAM OF THE CHEST AND CT ABDOMEN AND PELVIS WITH INTRAVENOUS CONTRAST INDICATION:   sepsis, neutropenia, diarrhea, covid, elevated D-dimer, metastatic breast cancer  COMPARISON:  3/29/2022  TECHNIQUE:  CT examination of the chest, abdomen and pelvis was performed  Thin section CT angiographic technique was used in the chest in order to evaluate for pulmonary embolus and coronal 3D MIP postprocessing was performed on the acquisition scanner  Axial, sagittal, and coronal 2D reformatted images were created from the source data and submitted for interpretation  Radiation dose length product (DLP) for this visit:  1998 mGy-cm   This examination, like all CT scans performed in the [facility], was performed utilizing techniques to minimize radiation dose exposure, including the use of iterative reconstruction and automated exposure control  IV Contrast:  75 mL of iohexol (OMNIPAQUE) Enteric Contrast:  Enteric contrast was not administered  FINDINGS: CHEST PULMONARY ARTERIAL TREE:  No pulmonary embolus is seen  LUNGS:  Subsegmental atelectasis  There is symmetric appearing patchy consolidative and groundglass opacity in the posterior apices bilaterally, nonspecific  Jocelyne Nomi There is no tracheal or endobronchial lesion  PLEURA:  Unremarkable  HEART/AORTA:  Heart is unremarkable for patient's age  No thoracic aortic aneurysm  MEDIASTINUM AND ALISON:  Unremarkable   CHEST WALL AND LOWER NECK:  Reidentified soft tissue breast mass within the superomedial right breast decreased in size compared to prior study now measuring 3 5 cm in maximal dimension (previously 7 0 cm)  Right axillary lymph node measures 3 cm  ABDOMEN LIVER/BILIARY TREE:  Unremarkable  GALLBLADDER:  No calcified gallstones  No pericholecystic inflammatory change  SPLEEN:  Unremarkable  PANCREAS:  Unremarkable  ADRENAL GLANDS:  Unremarkable  KIDNEYS/URETERS:  One or more simple renal cyst(s) is noted  Unchanged left lower pole nonobstructing intrarenal calculus  No hydronephrosis  STOMACH AND BOWEL:  Unremarkable  APPENDIX:  No findings to suggest appendicitis  ABDOMINOPELVIC CAVITY:  No ascites  No pneumoperitoneum  No lymphadenopathy  VESSELS:  Unremarkable for patient's age  PELVIS REPRODUCTIVE ORGANS:  Unremarkable for patient's age  Left adnexal paraovarian cyst measures 4 cm decreased in size compared to prior  URINARY BLADDER:  Unremarkable  ABDOMINAL WALL/INGUINAL REGIONS:  Unremarkable  OSSEOUS STRUCTURES:  No acute fracture  Reidentified diffuse osseous metastasis with largest left sacral metastatic lesion similar in appearance to prior  Impression: No evidence of acute pulmonary embolus  Symmetric groundglass consolidative opacity in the bilateral posterior lung apices which may reflect? Post radiation/treatment changes  Correlate clinically to exclude superimposed infectious  Interval decrease in size of right breast mass and right axillary metastatic adenopathy compatible with treatment response  Redemonstrated diffuse osseous metastatic disease with largest left sacral metastasis not significantly changed compared to prior study dated 3/29/2022 and also evaluated on prior recent bone scan examination dated 4/25/2022  Additional findings as above   Workstation performed: [id number]     VAS lower limb venous duplex study, complete bilateral    Result Date: 5/16/2022  Narrative:  [facility] REPORT CLINICAL: Indications: Patient presents with worsening swelling of bilateral lower extremities over the past 2-3 weeks  Operative History: Patient denies any cardiovascular procedures Risk Factors The patient has history of HTN  CONCLUSION: Impression: RIGHT LOWER LIMB: No evidence of acute or chronic deep vein thrombosis  No evidence of superficial thrombophlebitis noted  Doppler evaluation shows a normal response to augmentation maneuvers  Popliteal, posterior tibial and anterior tibial arterial Doppler waveforms are triphasic  LEFT LOWER LIMB: No evidence of acute or chronic deep vein thrombosis  No evidence of superficial thrombophlebitis noted  Doppler evaluation shows a normal response to augmentation maneuvers  Popliteal, posterior tibial and anterior tibial arterial Doppler waveforms are triphasic  Technical findings were given to Dr Kenny Merino via tiger text    SIGNATURE: Electronically Signed by: Roxanne Guillen MD, RPVI on 2022-05-16 04:05:26 PM

## 2022-06-15 NOTE — PROGRESS NOTES
Follow-up with Palliative and [address] [place] [address] 51 y o  female [id number]    ASSESSMENT & PLAN:  1  Malignant neoplasm of overlapping sites of right breast in female, estrogen receptor negative ([facility] )    2  Osseous metastasis ([facility] )    3  Cancer associated pain    4  Chemotherapy-induced nausea    5  Neoplastic malignant related fatigue    6  Xerostomia    7  Constipation    8  Medical marijuana use    9  Palliative care patient    10  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 ([facility] )           Continue disease-directed cares   Continue MSER (reduced to q12h ATC d/t fatigue/somnolence)  Continue oxyIR 10mg PRN, she is taking 6x/day  Effective pain control regimen   Counseled on bowel regimen for OIC   Continue Zofran PRN N/V; patient may take qAM on day of cancer treatments and for 4-5 days afterwards, prophylactically  Pharmacy and insurer refuse to cover more than 9 tabs per dispensation, which is frustrating  Medication is effective   Patient may continue MMJ products for symptom relief   Continue baclofen PRN   Continue fluconazole for candidiasis   Counseled on products to use for xerostomia   Patient has received [address] vaccinations   Reviewed notes (DC Summary, IM, Medical Oncology), labs (6/13/22 Cr 0 57, alb 3 2, Hb 10 6, TSH 1 080), imaging + procedures (5/28/22 CTA CAP, 5/27/22 CXR)   Return in about 1 month (around 7/15/2022)   Emotional support provided   Medication safety issues addressed - no driving under the influence of narcotics, watch for adverse effects including AMS and respiratory depression, keep medications stored in a safe/locked environment        Requested Prescriptions     Signed Prescriptions Disp Refills    morphine (MS CONTIN) 15 mg 12 hr tablet 60 tablet 0     Sig: Take 1 tablet (15 mg total) by mouth every 12 (twelve) hours Max Daily Amount: 30 mg       Medications Discontinued During This Encounter   Medication Reason    morphine (MS CONTIN) 15 mg 12 hr tablet Reorder       Representatives have queried the patient's controlled substance dispensing history in the Prescription Drug Monitoring Program in compliance with regulations before I have prescribed any controlled substances  The prescription history is consistent with prescribed therapy and our practice policies  30+ minutes were spent in this ambulatory visit with greater than 50% of the time spent face to face with patient and family (her mother) in counseling or coordination of care including discussions of symptom assessment and management, medication review, psychosocial support, chart review, imaging review, lab review, medical marijuana, supportive listening and anticipatory guidance  All of the patient's questions were answered during this discussion  SUBJECTIVE:  Chief Complaint   Patient presents with    Follow-up    Cancer    Cancer Pain    Counseling    Dry Mouth    Nausea    Constipation        HPI    Wayne Wang is a 51 y o  female w/ ER-NM-HER2+ carcinoma of the R breast (diagnosed 3/29/22) metastatic to bone, s/p RT to spine; cancer-related pain  She follows w/ Dr Ty Cristobal (Medical Oncology)  Certified for [facility] in the [facility] on 4/22/22 by Dr Tiffany Trinh, for cancer-related pain  Plan includes systemic therapy with pertuzumab + trastuzumab + docetaxel  Patient is known to [facility] [place] [facility]; last seen 5/18/22 for symptom management, psychosocial support  [facility] 5/27-29/22 for [address]  Patient states she is frustrated that she needs to continue to wear her neck and back brace, as it significantly limits her quality of life  She is hopeful that she may be able to take it off soon  She feels her pain is adequately controlled on her current regimen; after limiting her MSER to BID dosing (reduced for somnolence/fatigue) she is taking her oxyIR about every 4 hours  She is using baclofen PRN      Patient reports she has had some nausea but "not much"; she is nervous about how she might handle vomiting given her neck brace  Zofran has proven useful  She is able to move her bowels fairly regularly w/ OTC products such as senna  She was switched to Diflucan for oral thrush by GI today  She feels her LE edema is slowly improving  PDMP shows no concerns  The following portions of the medical history were reviewed: past medical history, surgical history, problem list, medication list, family history, and social history        Current Outpatient Medications:     baclofen 10 mg tablet, TAKE 1/2 TABLET BY MOUTH 3 TIMES A DAY AS NEEDED FOR MUSCLE SPASMS, Disp: 135 tablet, Rfl: 1    fluconazole (DIFLUCAN) 100 mg tablet, Take 1 tablet (100 mg total) by mouth daily for 21 days Take 2 tabs on day 1 followed by 100mg daily, Disp: 22 tablet, Rfl: 0    furosemide (LASIX) 40 mg tablet, Take 1 tablet (40 mg total) by mouth in the morning , Disp: 30 tablet, Rfl: 1    lisinopril (ZESTRIL) 20 mg tablet, Take 1 tablet (20 mg total) by mouth daily, Disp: 90 tablet, Rfl: 1    morphine (MS CONTIN) 15 mg 12 hr tablet, Take 1 tablet (15 mg total) by mouth every 12 (twelve) hours Max Daily Amount: 30 mg, Disp: 60 tablet, Rfl: 0    nystatin (MYCOSTATIN) 500,000 units/5 mL suspension, Apply 5 mL (500,000 Units total) to the mouth or throat 4 (four) times a day, Disp: 200 mL, Rfl: 0    nystatin (MYCOSTATIN) powder, Apply topically 3 (three) times a day (Patient taking differently: Apply topically if needed), Disp: 15 g, Rfl: 0    ondansetron (ZOFRAN) 4 mg tablet, Take 1 tablet (4 mg total) by mouth every 8 (eight) hours as needed for nausea or vomiting, Disp: 40 tablet, Rfl: 2    oxyCODONE (ROXICODONE) 10 MG TABS, Take 1 tablet (10 mg total) by mouth every 4 (four) hours as needed for moderate pain Max Daily Amount: 60 mg, Disp: 180 tablet, Rfl: 0    polyethylene glycol (MIRALAX) 17 g packet, Take 17 g by mouth daily, Disp: 30 each, Rfl: 0    senna (SENOKOT) 8 6 mg, Take 3 tablets (25 8 mg total) by mouth 2 (two) times a day Hold for loose stool , Disp: 180 tablet, Rfl: 0    al mag oxide-diphenhydramine-lidocaine viscous (MAGIC MOUTHWASH) 1:1:1 suspension, Swish and spit 10 mL every 4 (four) hours as needed for mouth pain or discomfort (Patient not taking: No sig reported), Disp: 500 mL, Rfl: 2    dexamethasone (DECADRON) 4 mg tablet, Take 1 tablet (4 mg total) by mouth 3 (three) times a day (Patient not taking: No sig reported), Disp: 45 tablet, Rfl: 0    DPH-Lido-AlHydr-MgHydr-Simeth (First-Mouthwash BLM) SUSP, , Disp: , Rfl:     lactulose (CEPHULAC) 10 g packet, Take 1 packet (10 g total) by mouth 3 (three) times a day as needed (constipation) (Patient not taking: No sig reported), Disp: 30 each, Rfl: 0    pantoprazole (PROTONIX) 40 mg tablet, Take 1 tablet (40 mg total) by mouth daily in the early morning, Disp: 90 tablet, Rfl: 1    Review of Systems   Constitutional: Positive for activity change and fatigue  Cardiovascular: Positive for leg swelling (improved)  Gastrointestinal: Positive for constipation and nausea  Xerostomia  Candidiasis  Musculoskeletal: Positive for arthralgias, back pain, gait problem and myalgias  Allergic/Immunologic: Positive for immunocompromised state  Neurological: Positive for weakness  Psychiatric/Behavioral: Positive for sleep disturbance  All other systems reviewed and are negative  OBJECTIVE:  /86 (BP Location: Left arm, Patient Position: Sitting, Cuff Size: Standard)   Pulse 87   Temp 97 9 °F (36 6 °C) (Temporal)   Resp 18   Ht 5' 4" (1 626 m)   Wt 78 9 kg (174 lb)   SpO2 96%   BMI 29 87 kg/m²   Physical Exam  Vitals reviewed  Constitutional:       General: She is not in acute distress  Appearance: She is ill-appearing (chronically)  She is not toxic-appearing  Interventions: Cervical collar in place  Comments: Wearing C-collar + back brace  HENT:      Head: Normocephalic and atraumatic        Right Ear: External ear normal       Left Ear: External ear normal       Mouth/Throat:      Comments: Sparse white plaques noted on oral mucosa  Eyes:      General: No scleral icterus  Right eye: No discharge  Left eye: No discharge  Extraocular Movements: Extraocular movements intact  Conjunctiva/sclera: Conjunctivae normal       Pupils: Pupils are equal, round, and reactive to light  Cardiovascular:      Rate and Rhythm: Normal rate  Pulmonary:      Effort: Pulmonary effort is normal  No tachypnea, bradypnea, accessory muscle usage or respiratory distress  Abdominal:      General: There is no distension  Tenderness: There is no guarding  Musculoskeletal:      Cervical back: Decreased range of motion  Right lower leg: Edema present  Left lower leg: Edema present  Comments: Wearing b/l compression socks  Skin:     General: Skin is dry  Coloration: Skin is not pale  Neurological:      Mental Status: She is alert and oriented to person, place, and time  Cranial Nerves: No dysarthria or facial asymmetry  Gait: Gait abnormal (seen in wheelchair)  Psychiatric:         Attention and Perception: Attention normal          Mood and Affect: Mood and affect normal          Speech: Speech normal          Behavior: Behavior normal  Behavior is cooperative  Thought Content: Thought content normal          Cognition and Memory: Cognition and memory normal          Judgment: Judgment normal         Diomedes Flanagan MD  [facility]      Portions of this document may have been created using dictation software and as such some "sound alike" terms may have been generated by the system  Do not hesitate to contact me with any questions or clarifications

## 2022-06-15 NOTE — TELEPHONE ENCOUNTER
06/15/22    Pt dropped off her FMLA form today  Completed, uploaded and faxed over as requested  Pt also receive a copy of the form

## 2022-06-15 NOTE — PATIENT INSTRUCTIONS
It was good to see you today  Thank you for coming in  Continue current medication regimen  For constipation:  Drink PLENTY of water  This is important to keep the gut moving  Some people have success w/ using prunes, prune juice, certain fruits, wheat germ, or fiber gummies  Try a probiotic  This could be yogurt or kefir, or fermented beverages such as kombucha, but probiotics are also available in capsule form  Aim for 10-15 billion colony-forming-units, w/ bacteria such as Lactobacillus / Saccharomyces / Actinomyces  Take Benefiber or Miralax (or Citrucel or Metamucil or Bananatrol, etc) daily  Colace is good for softening hard stools, but does not stimulate the bowel to move things along  You can use senna, 1 to 2 tabs, once or twice daily as needed for constipation  Use as directed on the box/bottle  Should that not be enough for your constipation, you can try Dulcolax, Milk of Magnesia, and/or magnesium citrate  Should that not be enough, consider an enema  All of these medications are available over-the-counter  The medications I typically recommend for dry mouth are all over-the-counter:  Oracoat Xylimelts  Biotene dental products (they have drops and sprays)  Mouth Kote  GC dry mouth gel  Lemon drops (which stimulate saliva production)  Chewing gum (also stimulates saliva)  ACT Dry Mouth lozenges  Sridhar spray  Look for products containing xylitol or carboxymethylcellulose  Many of these can be found in most pharmacies, or the OTC pharmaceutical sections in stores like [employer], or online at [address] includes the [facility] [address]  Return in about 1 month  Call us for refills on medications that we supply, as needed  If something changes and you need to come in sooner, please call our office  PRESCRIPTION REFILL REMINDER:  All medication refills should be requested prior to [facility] on Friday  Any refill requests after noon on Friday would be addressed the following Monday

## 2022-06-16 ENCOUNTER — HOSPITAL ENCOUNTER (OUTPATIENT)
Dept: INFUSION CENTER | Facility: CLINIC | Age: 47
Discharge: HOME | End: 2022-06-16
Payer: COMMERCIAL

## 2022-06-16 VITALS
OXYGEN SATURATION: 97 % | TEMPERATURE: 97.8 F | WEIGHT: 172 LBS | BODY MASS INDEX: 29.37 KG/M2 | HEART RATE: 93 BPM | RESPIRATION RATE: 18 BRPM | DIASTOLIC BLOOD PRESSURE: 75 MMHG | SYSTOLIC BLOOD PRESSURE: 139 MMHG | HEIGHT: 64 IN

## 2022-06-16 DIAGNOSIS — Z17.1 MALIGNANT NEOPLASM OF OVERLAPPING SITES OF RIGHT BREAST IN FEMALE, ESTROGEN RECEPTOR NEGATIVE (HCC): ICD-10-CM

## 2022-06-16 DIAGNOSIS — C50.811 MALIGNANT NEOPLASM OF OVERLAPPING SITES OF RIGHT BREAST IN FEMALE, ESTROGEN RECEPTOR NEGATIVE (HCC): ICD-10-CM

## 2022-06-16 DIAGNOSIS — D70.1 CHEMOTHERAPY-INDUCED NEUTROPENIA (HCC): ICD-10-CM

## 2022-06-16 DIAGNOSIS — T45.1X5A CHEMOTHERAPY-INDUCED NEUTROPENIA (HCC): ICD-10-CM

## 2022-06-16 DIAGNOSIS — C79.51 OSSEOUS METASTASIS (HCC): Primary | ICD-10-CM

## 2022-06-16 PROCEDURE — 96375 TX/PRO/DX INJ NEW DRUG ADDON: CPT

## 2022-06-16 PROCEDURE — 96367 TX/PROPH/DG ADDL SEQ IV INF: CPT

## 2022-06-16 PROCEDURE — 96417 CHEMO IV INFUS EACH ADDL SEQ: CPT

## 2022-06-16 PROCEDURE — 96411 CHEMO IV PUSH ADDL DRUG: CPT

## 2022-06-16 PROCEDURE — 96376 TX/PRO/DX INJ SAME DRUG ADON: CPT

## 2022-06-16 PROCEDURE — 96413 CHEMO IV INFUSION 1 HR: CPT

## 2022-06-16 RX ORDER — FAMOTIDINE 10 MG/ML
INJECTION, SOLUTION INTRAVENOUS
Status: COMPLETED
Start: 2022-06-16 | End: 2022-06-16

## 2022-06-16 RX ORDER — FAMOTIDINE 20 MG/50ML
20 INJECTION, SOLUTION INTRAVENOUS ONCE
Status: DISCONTINUED | OUTPATIENT
Start: 2022-06-16 | End: 2022-06-19 | Stop reason: HOSPADM

## 2022-06-16 RX ORDER — DIPHENHYDRAMINE HYDROCHLORIDE 50 MG/ML
50 INJECTION INTRAMUSCULAR; INTRAVENOUS ONCE
Status: COMPLETED | OUTPATIENT
Start: 2022-06-16 | End: 2022-06-16

## 2022-06-16 RX ORDER — SODIUM CHLORIDE 9 MG/ML
20 INJECTION, SOLUTION INTRAVENOUS ONCE
Status: COMPLETED | OUTPATIENT
Start: 2022-06-16 | End: 2022-06-16

## 2022-06-16 RX ADMIN — DIPHENHYDRAMINE HYDROCHLORIDE 25 MG: 50 INJECTION, SOLUTION INTRAMUSCULAR; INTRAVENOUS at 12:55

## 2022-06-16 RX ADMIN — DIPHENHYDRAMINE HYDROCHLORIDE 50 MG: 50 INJECTION, SOLUTION INTRAMUSCULAR; INTRAVENOUS at 15:27

## 2022-06-16 RX ADMIN — FAMOTIDINE 20 MG: 10 INJECTION INTRAVENOUS at 13:18

## 2022-06-16 RX ADMIN — Medication 487 MG: at 14:31

## 2022-06-16 RX ADMIN — DEXAMETHASONE SODIUM PHOSPHATE: 10 INJECTION, SOLUTION INTRAMUSCULAR; INTRAVENOUS at 12:35

## 2022-06-16 RX ADMIN — PERTUZUMAB 420 MG: 30 INJECTION, SOLUTION, CONCENTRATE INTRAVENOUS at 13:44

## 2022-06-16 RX ADMIN — HYDROCORTISONE SODIUM SUCCINATE 100 MG: 100 INJECTION, POWDER, FOR SOLUTION INTRAMUSCULAR; INTRAVENOUS at 15:26

## 2022-06-16 RX ADMIN — FAMOTIDINE 20 MG: 10 INJECTION, SOLUTION INTRAVENOUS at 15:30

## 2022-06-16 RX ADMIN — DOCETAXEL 112.8 MG: 20 INJECTION, SOLUTION INTRAVENOUS at 15:12

## 2022-06-16 RX ADMIN — SODIUM CHLORIDE 20 ML/HR: 0.9 INJECTION, SOLUTION INTRAVENOUS at 12:30

## 2022-06-16 NOTE — PROGRESS NOTES
Taxotere not restarted at this time as per previous note  Vitals stable, patient AAOx4, mentating appropriately  PIV removed intact for DC by AG RN, richa wrap in place  Patient aware of follow up appointment with Dr Masood Bennett, declines AVS  Patient DC with

## 2022-06-16 NOTE — PROGRESS NOTES
Pt to infusion center for Perjeta, Herceptin, and Taxotere  Pt offers no complaints, will continue to monitor

## 2022-06-16 NOTE — PROGRESS NOTES
Patient reports feeling symptom improvement, mild lingering nausea, states she does not feel 100%, "not right " Patient speaking in full sentences, sipping fluids without swallowing difficulty/throat pain  TEAMS message sent to Rachael Saleh RN by Perez Guardado RN - due to patient symptoms not being fully resolved at this time, DO NOT rechallenge and end treatment for today  Also noted in Kerry's note from 6/8 that Neulasta to be added to cycle - TEAMs message sent, Neulasta initially ordered but per [facility] RN Taxotere was dose reduced from 75 mg/m2 to 60 mg/m2 instead of adding Neulasta  Explained this to patient, patient aware  1611 - Patient ambulating to restroom with walker without issue, RN assisting with pump

## 2022-06-16 NOTE — PLAN OF CARE
Problem: Potential for Falls  Goal: Patient will remain free of falls  Description: INTERVENTIONS:  - Educate patient/family on patient safety including physical limitations  - Instruct patient to call for assistance with activity   - Consult OT/PT to assist with strengthening/mobility   - Keep Call bell within reach  - Keep bed low and locked with side rails adjusted as appropriate  - Keep care items and personal belongings within reach  - Initiate and maintain comfort rounds  - Apply yellow socks and bracelet for high fall risk patients  - Consider moving patient to room near nurses station  Outcome: Progressing     Problem: Knowledge Deficit  Goal: Patient/family/caregiver demonstrates understanding of disease process, treatment plan, medications, and discharge instructions  Description: Complete learning assessment and assess knowledge base    Interventions:  - Provide teaching at level of understanding  - Provide teaching via preferred learning methods  Outcome: Progressing

## 2022-06-16 NOTE — PROGRESS NOTES
8 minutes into Taxotere infusion pt reported flushing, low back pain, flank pain, and chest pressure  Infusion stopped and hypersensitivity protocol initiated  Pt reported resolution of symptoms after admin of solu-cortef, benadryl, and pepcid  Spoke with Bin Encinas in Dr Krysta Mckee office via phone, per Cesia Mares, once patient back to baseline may restart taxotere at same rate

## 2022-06-17 ENCOUNTER — OFFICE VISIT (OUTPATIENT)
Dept: NEUROSURGERY | Facility: CLINIC | Age: 47
End: 2022-06-17
Payer: COMMERCIAL

## 2022-06-17 ENCOUNTER — HOME CARE VISIT (OUTPATIENT)
Dept: HOME HEALTH SERVICES | Facility: HOME HEALTHCARE | Age: 47
End: 2022-06-17
Payer: COMMERCIAL

## 2022-06-17 VITALS
WEIGHT: 172 LBS | DIASTOLIC BLOOD PRESSURE: 82 MMHG | SYSTOLIC BLOOD PRESSURE: 118 MMHG | HEIGHT: 64 IN | TEMPERATURE: 98.1 F | BODY MASS INDEX: 29.37 KG/M2 | HEART RATE: 98 BPM

## 2022-06-17 VITALS
SYSTOLIC BLOOD PRESSURE: 136 MMHG | OXYGEN SATURATION: 95 % | DIASTOLIC BLOOD PRESSURE: 80 MMHG | RESPIRATION RATE: 20 BRPM | HEART RATE: 94 BPM | TEMPERATURE: 98.5 F

## 2022-06-17 DIAGNOSIS — M84.48XA: Primary | ICD-10-CM

## 2022-06-17 PROCEDURE — 99213 OFFICE O/P EST LOW 20 MIN: CPT | Performed by: NEUROLOGICAL SURGERY

## 2022-06-17 PROCEDURE — G0299 HHS/HOSPICE OF RN EA 15 MIN: HCPCS

## 2022-06-17 NOTE — PROGRESS NOTES
Neurosurgery Office Note  Abel Cummins 51 y o  female MRN: [id number]      Assessment/Plan      Patient is a 51 yrs old woman with  history of metastatic breast cancer to cervical and  thoracic spine, for being C4 and T2 Compression fracture with retropulsion and central canal stenosis  Patient awaiting  , today for 10 weeks follow-up with cervical spine x-rays which demonstrates multilevel DJD but stable alignment  Patient reports her pain is more on the shoulder blade and across bilateral shoulder and sometimes up to her occipital  Head  She attributes some of the shoulder pain to the brace, and she says she is uncomfortable with   She describes the pain as soreness, and estimated 3/10 at most, while on Morphine 15 mg x2 PRN,  Oxy 10 mg x4 PRN, and  Baclofen  Denies any radicular pain down to her UEs  No weakness, numbness, or paresthesia or fine motor dysfunction  Denies any  weakness or dropping objects  No gait issues, but occasionally uses cane for safety  Patient denies B B dysfunction  No fever , chills, rigors , cough or chest pain  Exam-A&OX3, Wearing   moves all extremities  Strength is 5/5 bilaterally sensation to light touch intact throughout  DTR 2+ without clonus bilaterally  Negative magallon's tests bilaterally  Slight discomfort on palpation of central posterior cervical spine  Hx, PEx, and images reviewed with the patient  Mx plan discussed  Dr Seng Meza went over her images and discussed future Mx plan  Recommends removal of the lower/thoracic part of the brace and continue wearing [place] collar all the time  [place] collar provided to the patient  Continue chemotherapy  F/U upright cervical spine xrays ordered  Advised to return to clinic in 3-4 weeks, SNPx with Dr Seng Meza  Fall precaution, avoid lifting heavy objects, excessive stretcing or axial loading  Questions and concerns were answered   Patient expressed her understandings and agreed with the Mx plan     Plan:  1  F/U upright  cervical spine xrays in [address] in 3-4 weeks  2  Remove the lower/ thoracic part of  and continue wearing [place] all the time  3  Fall precaution  4  F/U in 3-4 weeks, SNPx with Dr Liborio Payne  5  Call with question or concern  I spent 30 minutes with the patient today in which >50% of the time was spent counseling/coordination of care regarding diagnosis, imaging review, symptoms and treatment plan  C/C: " Pathologic Cx and Thorax VB #"/10 weeks follow up    Chief Complaint   Patient presents with    Follow-up    Neck Pain           History of Present Illness       Patient is a 51 yrs old woman with PMHx of sepsis, COVID-19 infection associated with infection and  metastatic breast cancer to cervical and  thoracic spine-with C4 and T2 Compression fracture & central canal stenosis  Patient awaiting  , today for 10 weeks follow-up with cervical spine x-rays which demonstrates multilevel DJD but stable alignment  Patient reports her pain is more on the shoulder blade and across bilateral shoulder and sometimes up to her occipital  Head  She attributes some of the shoulder pain to the brace, and she says she is uncomfortable with   She describes the pain as sore, and estimated severity 3/10 at most, while on Morphine 15 mg x2 PRN,  Oxy 10 mg x4 PRN, and  Baclofen  Following up with palliative care for pain meds  Denies any radicular pain down to her UEs  No weakness, numbness,  paresthesia or fine motor dysfunction  Denies any  weakness or dropping objects  No gait issues, but occasionally uses cane for safety  Patient denies B/B dysfunction  No fever , chills, rigors , cough or chest pain  Follow up Cervical spine xrays stable  REVIEW OF SYSTEMS  ROS   Review of Systems   Constitutional: Negative  HENT: Negative  Eyes: Negative  Respiratory: Negative  Cardiovascular: Negative  Gastrointestinal: Negative           H/o GERD   Endocrine: Negative  Genitourinary: Negative  Musculoskeletal: Positive for back pain, neck pain (b/l nack pain radiates to b/l shouders/arms ) and neck stiffness (has collar)  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, Osseous metastasis   Medical marijuana use      On Palliative care patient    Skin: Negative  Allergic/Immunologic: Negative  Neurological: Negative  Hematological: Negative  Psychiatric/Behavioral: Negative  Sleep disturbance: occasional due to pain  All other systems reviewed and are negative  Meds/Allergies     Current Outpatient Medications   Medication Sig Dispense Refill    al mag oxide-diphenhydramine-lidocaine viscous (MAGIC MOUTHWASH) 1:1:1 suspension Swish and spit 10 mL every 4 (four) hours as needed for mouth pain or discomfort (Patient not taking: No sig reported) 500 mL 2    baclofen 10 mg tablet TAKE 1/2 TABLET BY MOUTH 3 TIMES A DAY AS NEEDED FOR MUSCLE SPASMS 135 tablet 1    dexamethasone (DECADRON) 4 mg tablet Take 1 tablet (4 mg total) by mouth 3 (three) times a day (Patient not taking: No sig reported) 45 tablet 0    DPH-Lido-AlHydr-MgHydr-Simeth (First-Mouthwash BLM) SUSP  (Patient not taking: No sig reported)      fluconazole (DIFLUCAN) 100 mg tablet Take 1 tablet (100 mg total) by mouth daily for 21 days Take 2 tabs on day 1 followed by 100mg daily 22 tablet 0    furosemide (LASIX) 40 mg tablet Take 1 tablet (40 mg total) by mouth in the morning   30 tablet 1    lactulose (CEPHULAC) 10 g packet Take 1 packet (10 g total) by mouth 3 (three) times a day as needed (constipation) (Patient not taking: No sig reported) 30 each 0    lisinopril (ZESTRIL) 20 mg tablet Take 1 tablet (20 mg total) by mouth daily 90 tablet 1    morphine (MS CONTIN) 15 mg 12 hr tablet Take 1 tablet (15 mg total) by mouth every 12 (twelve) hours Max Daily Amount: 30 mg 60 tablet 0    nystatin (MYCOSTATIN) 500,000 units/5 mL suspension Apply 5 mL (500,000 Units total) to the mouth or throat 4 (four) times a day 200 mL 0    nystatin (MYCOSTATIN) powder Apply topically 3 (three) times a day (Patient taking differently: Apply topically if needed) 15 g 0    ondansetron (ZOFRAN) 4 mg tablet Take 1 tablet (4 mg total) by mouth every 8 (eight) hours as needed for nausea or vomiting 40 tablet 2    oxyCODONE (ROXICODONE) 10 MG TABS Take 1 tablet (10 mg total) by mouth every 4 (four) hours as needed for moderate pain Max Daily Amount: 60 mg 180 tablet 0    pantoprazole (PROTONIX) 40 mg tablet Take 1 tablet (40 mg total) by mouth daily in the early morning 90 tablet 1    polyethylene glycol (MIRALAX) 17 g packet Take 17 g by mouth daily 30 each 0    senna (SENOKOT) 8 6 mg Take 3 tablets (25 8 mg total) by mouth 2 (two) times a day Hold for loose stool  180 tablet 0     No current facility-administered medications for this visit  Facility-Administered Medications Ordered in Other Visits   Medication Dose Route Frequency Provider Last Rate Last Admin    famotidine (PEPCID) IVPB (premix) 20 mg  20 mg Intravenous Once Daxa Baires MD           No Known Allergies    PAST HISTORY    Past Medical History:   Diagnosis Date    [facility]     Cancer [facility]     Hypertension        History reviewed  No pertinent surgical history  Social History     Tobacco Use    Smoking status: Never Smoker    Smokeless tobacco: Never Used   Vaping Use    Vaping Use: Never used   Substance Use Topics    Alcohol use: Not Currently     Comment: rare, social     Drug use: Never       Family History   Problem Relation Age of Onset    Coronary artery disease Mother     Heart attack Father     Diabetes type II Father          Above history personally reviewed  EXAM    Vitals:not currently breastfeeding  ,There is no height or weight on file to calculate BMI  Physical Exam  Constitutional:       Appearance: Normal appearance  HENT:      Head: Normocephalic and atraumatic     Eyes:      Extraocular Movements: Extraocular movements intact  Neck:      Comments: Wearing   Cardiovascular:      Rate and Rhythm: Normal rate  Pulmonary:      Effort: Pulmonary effort is normal    Abdominal:      General: Abdomen is flat  Musculoskeletal:      Cervical back: Tenderness present  Neurological:      General: No focal deficit present  Mental Status: She is alert and oriented to person, place, and time  GCS: GCS eye subscore is 4  GCS verbal subscore is 5  GCS motor subscore is 6  Cranial Nerves: Cranial nerves are intact  Sensory: Sensation is intact  Motor: Motor function is intact  Coordination: Finger-Nose-Finger Test normal       Deep Tendon Reflexes: Reflexes are normal and symmetric  Reflex Scores:       Tricep reflexes are 2+ on the right side and 2+ on the left side  Bicep reflexes are 2+ on the right side and 2+ on the left side  Brachioradialis reflexes are 2+ on the right side and 2+ on the left side  Patellar reflexes are 2+ on the right side and 2+ on the left side  Achilles reflexes are 2+ on the right side and 2+ on the left side  Psychiatric:         Speech: Speech normal          Neurologic Exam     Mental Status   Oriented to person, place, and time     Speech: speech is normal   Level of consciousness: alert    Cranial Nerves     CN III, IV, VI   Nystagmus: none     CN XI   CN XI normal      Motor Exam   Muscle bulk: normal  Overall muscle tone: normal  Right arm tone: normal  Left arm tone: normal  Right arm pronator drift: absent  Left arm pronator drift: absent  Right leg tone: normal  Left leg tone: normal    Sensory Exam   Light touch normal      Gait, Coordination, and Reflexes     Coordination   Finger to nose coordination: normal    Reflexes   Right brachioradialis: 2+  Left brachioradialis: 2+  Right biceps: 2+  Left biceps: 2+  Right triceps: 2+  Left triceps: 2+  Right patellar: 2+  Left patellar: 2+  Right achilles: 2+  Left achilles: 2+  Right : 2+  Left : 2+  Right Luke: absent  Left Luke: absent  Right ankle clonus: absent  Left pendular knee jerk: absent        MEDICAL DECISION MAKING    Imaging Studies:     XR chest 1 view portable    Result Date: 5/28/2022  Narrative: CHEST INDICATION:   cough, covid, neutropenia  Patient has confirmed COVID-19  COMPARISON:  5/23/2022 EXAM PERFORMED/VIEWS:  XR CHEST PORTABLE FINDINGS: Cardiomediastinal silhouette appears unremarkable  The lungs are clear  No pneumothorax or pleural effusion  Osseous structures appear within normal limits for patient age  Impression: No acute cardiopulmonary disease  Workstation performed: [id number]     XR chest 1 view portable    Result Date: 5/24/2022  Narrative: CHEST INDICATION:   fev er/ covid pos  COMPARISON:  03/28/2022  CT scan on 03/29/2022 EXAM PERFORMED/VIEWS:  XR CHEST PORTABLE 1 image FINDINGS: Cardiomediastinal silhouette appears unremarkable  A brace overlies the thoracic spine  The lungs are clear  No pneumothorax or pleural effusion  Osseous metastasis better demonstrated on the CT scan  Impression: No acute cardiopulmonary disease  Workstation performed: [id number]     PE Study with CT Abdomen and Pelvis with contrast    Result Date: 5/28/2022  Narrative: CT PULMONARY ANGIOGRAM OF THE CHEST AND CT ABDOMEN AND PELVIS WITH INTRAVENOUS CONTRAST INDICATION:   sepsis, neutropenia, diarrhea, covid, elevated D-dimer, metastatic breast cancer  COMPARISON:  3/29/2022  TECHNIQUE:  CT examination of the chest, abdomen and pelvis was performed  Thin section CT angiographic technique was used in the chest in order to evaluate for pulmonary embolus and coronal 3D MIP postprocessing was performed on the acquisition scanner  Axial, sagittal, and coronal 2D reformatted images were created from the source data and submitted for interpretation  Radiation dose length product (DLP) for this visit:  0410 mGy-cm     This examination, like all CT scans performed in the [facility], was performed utilizing techniques to minimize radiation dose exposure, including the use of iterative reconstruction and automated exposure control  IV Contrast:  75 mL of iohexol (OMNIPAQUE) Enteric Contrast:  Enteric contrast was not administered  FINDINGS: CHEST PULMONARY ARTERIAL TREE:  No pulmonary embolus is seen  LUNGS:  Subsegmental atelectasis  There is symmetric appearing patchy consolidative and groundglass opacity in the posterior apices bilaterally, nonspecific  Momo Bruch There is no tracheal or endobronchial lesion  PLEURA:  Unremarkable  HEART/AORTA:  Heart is unremarkable for patient's age  No thoracic aortic aneurysm  MEDIASTINUM AND ALISON:  Unremarkable  CHEST WALL AND LOWER NECK:  Reidentified soft tissue breast mass within the superomedial right breast decreased in size compared to prior study now measuring 3 5 cm in maximal dimension (previously 7 0 cm)  Right axillary lymph node measures 3 cm  ABDOMEN LIVER/BILIARY TREE:  Unremarkable  GALLBLADDER:  No calcified gallstones  No pericholecystic inflammatory change  SPLEEN:  Unremarkable  PANCREAS:  Unremarkable  ADRENAL GLANDS:  Unremarkable  KIDNEYS/URETERS:  One or more simple renal cyst(s) is noted  Unchanged left lower pole nonobstructing intrarenal calculus  No hydronephrosis  STOMACH AND BOWEL:  Unremarkable  APPENDIX:  No findings to suggest appendicitis  ABDOMINOPELVIC CAVITY:  No ascites  No pneumoperitoneum  No lymphadenopathy  VESSELS:  Unremarkable for patient's age  PELVIS REPRODUCTIVE ORGANS:  Unremarkable for patient's age  Left adnexal paraovarian cyst measures 4 cm decreased in size compared to prior  URINARY BLADDER:  Unremarkable  ABDOMINAL WALL/INGUINAL REGIONS:  Unremarkable  OSSEOUS STRUCTURES:  No acute fracture  Reidentified diffuse osseous metastasis with largest left sacral metastatic lesion similar in appearance to prior       Impression: No evidence of acute pulmonary embolus  Symmetric groundglass consolidative opacity in the bilateral posterior lung apices which may reflect? Post radiation/treatment changes  Correlate clinically to exclude superimposed infectious  Interval decrease in size of right breast mass and right axillary metastatic adenopathy compatible with treatment response  Redemonstrated diffuse osseous metastatic disease with largest left sacral metastasis not significantly changed compared to prior study dated 3/29/2022 and also evaluated on prior recent bone scan examination dated 4/25/2022  Additional findings as above   Workstation performed: [id number]       I have personally reviewed pertinent reports   , I have personally reviewed pertinent films in PACS and I have personally reviewed pertinent films in PACS with a Radiologist

## 2022-06-18 DIAGNOSIS — R21 RASH: ICD-10-CM

## 2022-06-20 ENCOUNTER — HOME CARE VISIT (OUTPATIENT)
Dept: HOME HEALTH SERVICES | Facility: HOME HEALTHCARE | Age: 47
End: 2022-06-20
Payer: COMMERCIAL

## 2022-06-20 ENCOUNTER — PATIENT MESSAGE (OUTPATIENT)
Dept: INTERNAL MEDICINE CLINIC | Facility: CLINIC | Age: 47
End: 2022-06-20

## 2022-06-20 VITALS
RESPIRATION RATE: 20 BRPM | TEMPERATURE: 98.1 F | DIASTOLIC BLOOD PRESSURE: 82 MMHG | HEART RATE: 92 BPM | SYSTOLIC BLOOD PRESSURE: 122 MMHG | OXYGEN SATURATION: 99 %

## 2022-06-20 DIAGNOSIS — R21 RASH: ICD-10-CM

## 2022-06-20 PROCEDURE — G0299 HHS/HOSPICE OF RN EA 15 MIN: HCPCS

## 2022-06-20 RX ORDER — NYSTATIN 100000 [USP'U]/G
POWDER TOPICAL 3 TIMES DAILY
Qty: 15 G | Refills: 0 | Status: SHIPPED | OUTPATIENT
Start: 2022-06-20

## 2022-06-20 RX ORDER — NYSTATIN 100000 [USP'U]/G
POWDER TOPICAL 3 TIMES DAILY
Qty: 15 G | Refills: 0 | Status: SHIPPED | OUTPATIENT
Start: 2022-06-20 | End: 2022-06-20 | Stop reason: SDUPTHER

## 2022-06-23 ENCOUNTER — HOME CARE VISIT (OUTPATIENT)
Dept: HOME HEALTH SERVICES | Facility: HOME HEALTHCARE | Age: 47
End: 2022-06-23
Payer: COMMERCIAL

## 2022-06-23 VITALS
RESPIRATION RATE: 16 BRPM | OXYGEN SATURATION: 97 % | SYSTOLIC BLOOD PRESSURE: 118 MMHG | TEMPERATURE: 97.7 F | DIASTOLIC BLOOD PRESSURE: 86 MMHG | HEART RATE: 88 BPM

## 2022-06-23 PROCEDURE — G0299 HHS/HOSPICE OF RN EA 15 MIN: HCPCS

## 2022-06-24 ENCOUNTER — RADIATION ONCOLOGY FOLLOW-UP (OUTPATIENT)
Dept: RADIATION ONCOLOGY | Facility: HOSPITAL | Age: 47
End: 2022-06-24
Attending: RADIOLOGY
Payer: COMMERCIAL

## 2022-06-24 VITALS
WEIGHT: 169 LBS | SYSTOLIC BLOOD PRESSURE: 120 MMHG | HEART RATE: 102 BPM | TEMPERATURE: 98 F | RESPIRATION RATE: 18 BRPM | BODY MASS INDEX: 29.01 KG/M2 | OXYGEN SATURATION: 97 % | DIASTOLIC BLOOD PRESSURE: 68 MMHG

## 2022-06-24 DIAGNOSIS — C79.49 METASTASIS TO SPINAL CORD (HCC): Primary | ICD-10-CM

## 2022-06-24 DIAGNOSIS — B37.0 ORAL THRUSH: ICD-10-CM

## 2022-06-24 PROCEDURE — 99024 POSTOP FOLLOW-UP VISIT: CPT | Performed by: RADIOLOGY

## 2022-06-24 PROCEDURE — 99211 OFF/OP EST MAY X REQ PHY/QHP: CPT | Performed by: RADIOLOGY

## 2022-06-24 PROCEDURE — G0463 HOSPITAL OUTPT CLINIC VISIT: HCPCS | Performed by: RADIOLOGY

## 2022-06-24 NOTE — PROGRESS NOTES
Follow-up - Radiation Oncology   Tito Gomez 1975 51 y o  female [id number]      History of Present Illness   Cancer Staging  No matching staging information was found for the patient  Tito Gomez is a 51 y o  female with metastatic invasive ductal carcinoma, right breast, ER/DC negative, HER2 positive, who returns for routine follow-up s/p completion of palliative radiation to cervical/thoracic spine and lumbar spine/sacrum on 4/18/22  She was not a surgical candidate and was fitted for a C-collar and brace  She was provided with a dexamethasone taper at the end of her treatments  She is no longer requiring dexamethasone        4/22/22 Palliative care  MMJ certification initiated       4/25/22 Bone scan   Multiple foci of abnormally increased radiotracer uptake noted in the axial and appendicular skeleton compatible with widespread osseous metastasis      4/27/22 Med Onc, Dr Radha Wright  Pt agreeable to participate in clinical trial with THP with or without atezolizumab  Lisa Jose is going to start 1st cycle of palliative chemotherapy with THP tomorrow   After the central review of pathology, will either add atezolizumab or not        4/28/22 Cycle 1 chemo infusion     5/12/22 XR spine cervical   Apparent subtle progression of cortical bony erosion related to lytic metastatic disease most radiographically evident at the C3, C4, and C5 levels about which there is reversal of cervical lordosis   No significant change in alignment         5/13/22 Neurosurgery follow-up  Pt to continue to use the  brace at this time  Cervical brace portion to be worn at all times, for showers switch to [place] collar  Thoracic portion to be worn when OOB/HOB > 45 degrees  At this time, no neurosurgical intervention is anticipated  Neurosurgery will continue to monitor patient   Patient will have a follow-up appointment with neurosurgery in 4- 6 weeks with repeat xrays of the Cervical spine       5/18/22 Palliative care Continue MS-ER q 12 hrs, OxyIR for breakthrough pain, MMJ products       5/23/22 ER with fever - pt tested COVID +     5/25/22 Bebtelovimab monoclonal ab infusion for COVID     5/27 - 5/29/22 Hosp admission for neutropenia   Treated for sepsis secondary to [place]  Abx, remdesivir and dexamethasone  Pt is safe for discharge to home, will need to quarantine through June 12th for 20 days total from positive test      5/28/22 CTA chest ct abdomen and pelvis w contrast  -No evidence of acute pulmonary embolus   -Symmetric groundglass consolidative opacity in the bilateral posterior lung apices which may reflect?  Post radiation/treatment changes   Correlate clinically to exclude superimposed infectious   -Interval decrease in size of right breast mass and right axillary metastatic adenopathy compatible with treatment response   -Redemonstrated diffuse osseous metastatic disease with largest left sacral metastasis not significantly changed compared to prior study dated 3/29/2022 and also evaluated on prior recent bone scan examination dated 4/25/2022 6/8/22 Dr Melvin Samson follow-up  Pt was removed from clinical trial due to COVID infection then severe neutropenia  Pt to continue on current standard regimen with Taxotere, Trastuzumab, and Petuzumab with Neulasta support  Pt to follow up in 3 weeks     Following palliative care for symptom management            6/15/22 XR spine cervical 2 or 3 vw injury  Cortical destructive changes consistent with known metastatic disease most pronounced at the C3, C4 and C5 levels   Findings would be better assessed by CT or MR if clinically necessary      6/17/22 Neurosurgery - Dr Gorge Mora  Multilevel DJD but stable alinement  Pain in shoulder blades related to brace uncomfortable with  3/10 while on Morphine, oxycodone and baclofen     Cervical spine xray in [place] 3-4 weeks  Remove lower/thoracic part of  - continue wearing [place] all the time     Appts:  6/28/22 Hem/Onc - Dr Bailee Birmingham  7/5/22 NeuroSurg - Dr Wilfred Regan  7/7/22 Infusion      Upon interview, she endorses the above history  She has improved spine pain, now rated 3/10 compared to 8/10  She continues palliative care follow up for optimization of her analgesic regimen  She continues follow up with neurosurgery for nonsurgical management of her diffuse osseous / spine metastases  She continues to wear a C collar  She continues systemic treatment under the surpervision of her medical oncologist and recent CT imaging demonstrates treatment response  She is ambulatory with the assistance of a walker  She denies worsening numbness, weakness, tingling or incontinence  She is without additional acute concerns  Historical Information   Oncology History   Malignant neoplasm of overlapping sites of right breast in female, estrogen receptor negative ([facility] )   3/2022 Initial Diagnosis    Malignant neoplasm of overlapping sites of right breast in female, estrogen receptor negative ([facility] )     3/29/2022 Biopsy    Breast, Right, Biopsy:  - Invasive mammary carcinoma of no special type (ductal, not otherwise specified), [place] Grade II (3 + 2 + 1 = 6), spanning at least 6 mm     ER/LA negative, HER2 positive    Procedure  Needle biopsy    Specimen Laterality  Right    TUMOR   Histologic Type  Invasive carcinoma of no special type (ductal)    Histologic Grade ([place] Histologic Score)     Glandular (Acinar) / Tubular Differentiation  Score 3    Nuclear Pleomorphism  Score 2    Mitotic Rate  Score 1    Overall Grade  Grade 2 (scores of 6 or 7)    Tumor Size  Greatest dimension of largest invasive focus (Millimeters): 6 mm   Ductal Carcinoma In Situ (DCIS)  Not identified    Lymphovascular Invasion  Not identified         3/31/2022 - 4/18/2022 Radiation    Course: C1    Plan ID Energy Fractions Dose per Fraction (cGy) Dose Correction (cGy) Total Dose Delivered (cGy) Elapsed Days   C1 C7 And ix 10X/6X 7 / 7 300 0 2,100 8   C1 C7 Spine 10X/6X 3 / 10 300 0 900 4   L4 Sacrum 10X 10 / 10 300 0 3,000 13   T1 T6 And ix 10X 7 / 7 300 0 2,100 8   T1 T6 Spine 10X 3 / 10 300 0 900 4      Dr Chela Delgado     4/28/2022 -  Chemotherapy    clinical trial with THP with or without atezolizumab     4/28/2022 -  Chemotherapy    pegfilgrastim (Jeremy Dago), 6 mg, Subcutaneous, Once, 1 of 1 cycle  pertuzumab (PERJETA) IVPB, 840 mg, Intravenous, Once, 3 of 6 cycles  Administration: 420 mg (6/16/2022)  DOCEtaxel (TAXOTERE) chemo infusion, 75 mg/m2, Intravenous, Once, 3 of 6 cycles  Dose modification: 60 mg/m2 (original dose 75 mg/m2, Cycle 3, Reason: Anticipated Tolerance)  Administration: 112 8 mg (6/16/2022)  trastuzumab (HERCEPTIN) chemo infusion, 8 mg/kg, Intravenous, Once, 2 of 4 cycles  INV trastuzumab infusion, 6 mg/kg = 487 mg (100 % of original dose 6 mg/kg), Intravenous, Once, 1 of 2 cycles  Dose modification: 6 mg/kg (original dose 6 mg/kg, Cycle 3, Reason: Other (Must fill in a comment), Comment: investigational drug)  Administration: 487 mg (6/16/2022)     Osseous metastasis ([address] )   3/2022 Initial Diagnosis    Osseous metastasis ([facility] )     3/29/2022 Biopsy    Breast, Right, Biopsy:  - Invasive mammary carcinoma of no special type (ductal, not otherwise specified), [place] Grade II (3 + 2 + 1 = 6), spanning at least 6 mm     ER/MS negative, HER2 positive    Procedure  Needle biopsy    Specimen Laterality  Right    TUMOR   Histologic Type  Invasive carcinoma of no special type (ductal)    Histologic Grade ([place] Histologic Score)     Glandular (Acinar) / Tubular Differentiation  Score 3    Nuclear Pleomorphism  Score 2    Mitotic Rate  Score 1    Overall Grade  Grade 2 (scores of 6 or 7)    Tumor Size  Greatest dimension of largest invasive focus (Millimeters): 6 mm   Ductal Carcinoma In Situ (DCIS)  Not identified    Lymphovascular Invasion  Not identified         3/31/2022 - 4/18/2022 Radiation    Course: C1    Plan ID Energy Fractions Dose per Fraction (cGy) Dose Correction (cGy) Total Dose Delivered (cGy) Elapsed Days   C1 C7 And ix 10X/6X 7 / 7 300 0 2,100 8   C1 C7 Spine 10X/6X 3 / 10 300 0 900 4   L4 Sacrum 10X 10 / 10 300 0 3,000 13   T1 T6 And ix 10X 7 / 7 300 0 2,100 8   T1 T6 Spine 10X 3 / 10 300 0 900 4      Dr Shane Ibrahim     4/28/2022 -  Chemotherapy    clinical trial with THP with or without atezolizumab     4/28/2022 -  Chemotherapy    pegfilgrastim (Manj[place]), 6 mg, Subcutaneous, Once, 1 of 1 cycle  pertuzumab (PERJETA) IVPB, 840 mg, Intravenous, Once, 3 of 6 cycles  Administration: 420 mg (6/16/2022)  DOCEtaxel (TAXOTERE) chemo infusion, 75 mg/m2, Intravenous, Once, 3 of 6 cycles  Dose modification: 60 mg/m2 (original dose 75 mg/m2, Cycle 3, Reason: Anticipated Tolerance)  Administration: 112 8 mg (6/16/2022)  trastuzumab (HERCEPTIN) chemo infusion, 8 mg/kg, Intravenous, Once, 2 of 4 cycles  INV trastuzumab infusion, 6 mg/kg = 487 mg (100 % of original dose 6 mg/kg), Intravenous, Once, 1 of 2 cycles  Dose modification: 6 mg/kg (original dose 6 mg/kg, Cycle 3, Reason: Other (Must fill in a comment), Comment: investigational drug)  Administration: 487 mg (6/16/2022)         Past Medical History:   Diagnosis Date    [facility] )     Cancer ([facility] )     Hypertension      History reviewed  No pertinent surgical history      Social History   Social History     Substance and Sexual Activity   Alcohol Use Not Currently    Comment: rare, social      Social History     Substance and Sexual Activity   Drug Use Never     Social History     Tobacco Use   Smoking Status Never Smoker   Smokeless Tobacco Never Used         Meds/Allergies     Current Outpatient Medications:     baclofen 10 mg tablet, TAKE 1/2 TABLET BY MOUTH 3 TIMES A DAY AS NEEDED FOR MUSCLE SPASMS, Disp: 135 tablet, Rfl: 1    DPH-Lido-AlHydr-MgHydr-Simeth (First-Mouthwash BLM) SUSP, , Disp: , Rfl:     furosemide (LASIX) 40 mg tablet, Take 1 tablet (40 mg total) by mouth in the morning , Disp: 30 tablet, Rfl: 1    lactulose (CEPHULAC) 10 g packet, Take 1 packet (10 g total) by mouth 3 (three) times a day as needed (constipation), Disp: 30 each, Rfl: 0    lisinopril (ZESTRIL) 20 mg tablet, Take 1 tablet (20 mg total) by mouth daily, Disp: 90 tablet, Rfl: 1    morphine (MS CONTIN) 15 mg 12 hr tablet, Take 1 tablet (15 mg total) by mouth every 12 (twelve) hours Max Daily Amount: 30 mg, Disp: 60 tablet, Rfl: 0    nystatin (MYCOSTATIN) 500,000 units/5 mL suspension, Apply 5 mL (500,000 Units total) to the mouth or throat 4 (four) times a day, Disp: 200 mL, Rfl: 0    nystatin (MYCOSTATIN) powder, Apply topically 3 (three) times a day, Disp: 15 g, Rfl: 0    ondansetron (ZOFRAN) 4 mg tablet, Take 1 tablet (4 mg total) by mouth every 8 (eight) hours as needed for nausea or vomiting, Disp: 40 tablet, Rfl: 2    oxyCODONE (ROXICODONE) 10 MG TABS, Take 1 tablet (10 mg total) by mouth every 4 (four) hours as needed for moderate pain Max Daily Amount: 60 mg, Disp: 180 tablet, Rfl: 0    pantoprazole (PROTONIX) 40 mg tablet, Take 1 tablet (40 mg total) by mouth daily in the early morning, Disp: 90 tablet, Rfl: 1    polyethylene glycol (MIRALAX) 17 g packet, Take 17 g by mouth daily, Disp: 30 each, Rfl: 0    senna (SENOKOT) 8 6 mg, Take 3 tablets (25 8 mg total) by mouth 2 (two) times a day Hold for loose stool , Disp: 180 tablet, Rfl: 0    al mag oxide-diphenhydramine-lidocaine viscous (MAGIC MOUTHWASH) 1:1:1 suspension, Swish and spit 10 mL every 4 (four) hours as needed for mouth pain or discomfort (Patient not taking: Reported on 6/24/2022), Disp: 500 mL, Rfl: 2    dexamethasone (DECADRON) 4 mg tablet, Take 1 tablet (4 mg total) by mouth 3 (three) times a day (Patient not taking: Reported on 6/24/2022), Disp: 45 tablet, Rfl: 0    fluconazole (DIFLUCAN) 100 mg tablet, Take 1 tablet (100 mg total) by mouth daily for 21 days Take 2 tabs on day 1 followed by 100mg daily, Disp: 22 tablet, Rfl: 0  No Known Allergies      Review of Systems   Constitutional: Negative  HENT: Negative  Eyes: Negative  Respiratory: Negative  Cardiovascular: Negative  Gastrointestinal: Negative  Endocrine: Negative  Genitourinary: Negative  Musculoskeletal: Positive for back pain and neck pain  Allergic/Immunologic: Negative  Neurological: Negative  Wearing C-collar at all times  Denies numbness/tingling, she is ambulating with a walker   Hematological: Negative  Psychiatric/Behavioral: Negative            OBJECTIVE:   /68 (BP Location: Left arm)   Pulse 102   Temp 98 °F (36 7 °C) (Temporal)   Resp 18   Wt 76 7 kg (169 lb)   SpO2 97%   BMI 29 01 kg/m²   Pain Assessment:  3  Karn[place]sky: 90 - Able to carry on normal activity; minor signs or symptoms of disease     Physical Exam   Constitutional:       General: She is not in acute distress  Appearance: She is not toxic-appearing  HENT:      Head: Normocephalic and atraumatic  Eyes:      General: No scleral icterus  Extraocular Movements: Extraocular movements intact  Pupils: Pupils are equal, round, and reactive to light  Cardiovascular:      Rate and Rhythm: Normal rate  Pulmonary:      Effort: Pulmonary effort is normal    Abdominal:      General: Abdomen is flat  There is no distension  Skin:     General: Skin is warm and dry  Neurological:      Mental Status: She is alert  Comments: C collar in place  5/5 strength in bilateral upper and lower extremities  Sensation intact to light touch throughout     Psychiatric:         Mood and Affect: Mood normal          RESULTS    Lab Results:   Recent Results (from the past 672 hour(s))   Comprehensive metabolic panel    Collection Time: 05/27/22 11:06 PM   Result Value Ref Range    Sodium 134 (L) 135 - 147 mmol/L    Potassium 4 2 3 5 - 5 3 mmol/L    Chloride 101 96 - 108 mmol/L    CO2 25 21 - 32 mmol/L    ANION GAP 8 4 - 13 mmol/L    BUN 7 5 - 25 mg/dL    Creatinine 0 46 (L) 0 60 - 1 30 mg/dL    Glucose 104 65 - 140 mg/dL    Calcium 8 8 8 4 - 10 2 mg/dL    AST 23 13 - 39 U/L    ALT 31 7 - 52 U/L    Alkaline Phosphatase 86 34 - 104 U/L    Total Protein 6 6 6 4 - 8 4 g/dL    Albumin 3 6 3 5 - 5 0 g/dL    Total Bilirubin 0 50 0 20 - 1 00 mg/dL    eGFR 118 ml/min/1 73sq m   Lactic acid, plasma    Collection Time: 05/27/22 11:06 PM   Result Value Ref Range    LACTIC ACID 0 6 0 5 - 2 0 mmol/L   Blood culture #1    Collection Time: 05/27/22 11:06 PM    Specimen: Arm, Left; Blood   Result Value Ref Range    Blood Culture No Growth After 5 Days      Magnesium    Collection Time: 05/27/22 11:06 PM   Result Value Ref Range    Magnesium 1 9 1 9 - 2 7 mg/dL   Calcium, ionized    Collection Time: 05/27/22 11:06 PM   Result Value Ref Range    Calcium, Ionized 1 11 (L) 1 12 - 1 32 mmol/L   C-reactive protein    Collection Time: 05/27/22 11:06 PM   Result Value Ref Range    CRP 63 1 (H) <3 0 mg/L   Ferritin    Collection Time: 05/27/22 11:06 PM   Result Value Ref Range    Ferritin 191 8 - 388 ng/mL   NT-BNP PRO    Collection Time: 05/27/22 11:06 PM   Result Value Ref Range    NT-proBNP 19 <125 pg/mL   HS Troponin 0hr (reflex protocol)    Collection Time: 05/27/22 11:06 PM   Result Value Ref Range    hs TnI 0hr 3 "Refer to ACS Flowchart"- see link ng/L   Triglycerides    Collection Time: 05/27/22 11:06 PM   Result Value Ref Range    Triglycerides 135 See Comment mg/dL   Protime-INR    Collection Time: 05/27/22 11:06 PM   Result Value Ref Range    Protime 12 8 11 6 - 14 5 seconds    INR 0 96 0 84 - 1 19   CK (with reflex to MB)    Collection Time: 05/27/22 11:06 PM   Result Value Ref Range    Total CK 47 26 - 192 U/L   Procalcitonin    Collection Time: 05/27/22 11:06 PM   Result Value Ref Range    Procalcitonin 0 05 <=0 25 ng/ml   Glucose 6 phosphate dehydrogenase    Collection Time: 05/27/22 11:06 PM   Result Value Ref Range    G6PD, Quant 340 127 - 427 U/10E12 RBC    Red Blood Cell Count 2 80 (L) 3 77 - 5 28 x10E6/uL   D-dimer, quantitative    Collection Time: 05/27/22 11:06 PM   Result Value Ref Range    D-Dimer, Quant 0 96 (H) <0 50 ug/ml FEU   TSH, 3rd generation with Free T4 reflex    Collection Time: 05/27/22 11:06 PM   Result Value Ref Range    TSH 3RD GENERATON 1 866 0 450 - 4 500 uIU/mL   hCG, qualitative pregnancy    Collection Time: 05/27/22 11:06 PM   Result Value Ref Range    Preg, Serum Negative Negative   Strep A PCR    Collection Time: 05/27/22 11:15 PM    Specimen: Throat   Result Value Ref Range    STREP A PCR Not Detected Not Detected   Blood culture #2    Collection Time: 05/27/22 11:40 PM    Specimen: Arm, Left; Blood   Result Value Ref Range    Blood Culture No Growth After 5 Days      CBC and differential    Collection Time: 05/27/22 11:40 PM   Result Value Ref Range    WBC 0 79 (LL) 4 31 - 10 16 Thousand/uL    RBC 2 76 (L) 3 81 - 5 12 Million/uL    Hemoglobin 8 1 (L) 11 5 - 15 4 g/dL    Hematocrit 26 2 (L) 34 8 - 46 1 %    MCV 95 82 - 98 fL    MCH 29 3 26 8 - 34 3 pg    MCHC 30 9 (L) 31 4 - 37 4 g/dL    RDW 17 5 (H) 11 6 - 15 1 %    MPV 9 5 8 9 - 12 7 fL    Platelets 271 461 - 817 Thousands/uL   Manual Differential(PHLEBS Do Not Order)    Collection Time: 05/27/22 11:40 PM   Result Value Ref Range    Segmented % 37 (L) 43 - 75 %    Lymphocytes % 19 14 - 44 %    Monocytes % 41 (H) 4 - 12 %    Eosinophils, % 0 0 - 6 %    Basophils % 0 0 - 1 %    Atypical Lymphocytes % 3 (H) <=0 %    Absolute Neutrophils 0 29 (L) 1 85 - 7 62 Thousand/uL    Lymphocytes Absolute 0 15 (L) 0 60 - 4 47 Thousand/uL    Monocytes Absolute 0 32 0 00 - 1 22 Thousand/uL    Eosinophils Absolute 0 00 0 00 - 0 40 Thousand/uL    Basophils Absolute 0 00 0 00 - 0 10 Thousand/uL    Total Counted      RBC Morphology Present     Anisocytosis Present     Polychromasia Present     Platelet Estimate Adequate Adequate   UA (URINE) with reflex to Scope    Collection Time: 05/27/22 11:46 PM   Result Value Ref Range    Color, UA Yellow     Clarity, UA Clear     Specific [place], UA 1 010 1 003 - 1 030    pH, UA 6 0 4 5, 5 0, 5 5, 6 0, 6 5, 7 0, 7 5, 8 0    Leukocytes, UA Negative Negative    Nitrite, UA Negative Negative    Protein, UA Negative Negative mg/dl    Glucose, UA Negative Negative mg/dl    Ketones, UA Negative Negative mg/dl    Urobilinogen, UA 0 2 0 2, 1 0 E U /dl E U /dl    Bilirubin, UA Negative Negative    Occult Blood, UA Negative Negative   Urine culture    Collection Time: 05/27/22 11:46 PM    Specimen: Urine, Clean Catch   Result Value Ref Range    Urine Culture >100,000 cfu/ml Escherichia coli (A)     Urine Culture 20,000-29,000 cfu/ml         Susceptibility    Escherichia coli - JULITO     ZID Performed Yes       Amoxicillin + Clavulanate <=8/4 Susceptible ug/ml     Ampicillin ($$) >16 00 Resistant ug/ml     Ampicillin + Sulbactam ($) >16/8 Resistant ug/ml     Aztreonam ($$$)  <=4 Susceptible ug/ml     Cefazolin ($) <=2 00 Susceptible ug/ml     Ciprofloxacin ($)  <=0 25 Susceptible ug/ml     Ertapenem ($$$) <=0 5 Susceptible ug/ml     Gentamicin ($$) <=2 Susceptible ug/ml     Levofloxacin ($) <=0 50 Susceptible ug/ml     Nitrofurantoin <=32 Susceptible ug/ml     Piperacillin + Tazobactam ($$$) <=8 Susceptible ug/ml     Tetracycline <=4 Susceptible ug/ml     Tobramycin ($) <=2 Susceptible ug/ml     Trimethoprim + Sulfamethoxazole ($$$) <=0 5/9 5 Susceptible ug/ml   ECG 12 lead    Collection Time: 05/28/22 12:18 AM   Result Value Ref Range    Ventricular Rate 104 BPM    Atrial Rate 106 BPM    VA Interval  ms    QRSD Interval 62 ms    QT Interval 306 ms    QTC Interval 402 ms    P Axis  degrees    QRS Axis -6 degrees    T Wave Axis 2 degrees   Stool Enteric Bacterial Panel by PCR    Collection Time: 05/28/22 11:55 AM    Specimen: Rectum; Stool   Result Value Ref Range    Salmonella sp PCR None Detected None Detected    Shigella sp/Enteroinvasive E  coli (EIEC) PCR None Detected None Detected    Campylobacter sp (jejuni and coli) PCR None Detected None Detected    Shiga toxin 1/Shiga toxin 2 genes PCR None Detected None Detected   Clostridium difficile toxin by PCR with EIA    Collection Time: 05/28/22 11:55 AM    Specimen: Per Rectum; Stool   Result Value Ref Range    C difficile toxin by PCR Negative Negative   CBC and differential    Collection Time: 05/29/22  5:20 AM   Result Value Ref Range    WBC 1 62 (LL) 4 31 - 10 16 Thousand/uL    RBC 2 69 (L) 3 81 - 5 12 Million/uL    Hemoglobin 7 9 (L) 11 5 - 15 4 g/dL    Hematocrit 25 5 (L) 34 8 - 46 1 %    MCV 95 82 - 98 fL    MCH 29 4 26 8 - 34 3 pg    MCHC 31 0 (L) 31 4 - 37 4 g/dL    RDW 17 9 (H) 11 6 - 15 1 %    MPV 9 8 8 9 - 12 7 fL    Platelets 462 901 - 341 Thousands/uL   Comprehensive metabolic panel    Collection Time: 05/29/22  5:20 AM   Result Value Ref Range    Sodium 137 135 - 147 mmol/L    Potassium 3 5 3 5 - 5 3 mmol/L    Chloride 104 96 - 108 mmol/L    CO2 26 21 - 32 mmol/L    ANION GAP 7 4 - 13 mmol/L    BUN 5 5 - 25 mg/dL    Creatinine 0 41 (L) 0 60 - 1 30 mg/dL    Glucose 108 65 - 140 mg/dL    Calcium 8 5 8 4 - 10 2 mg/dL    Corrected Calcium 9 1 8 3 - 10 1 mg/dL    AST 15 13 - 39 U/L    ALT 22 7 - 52 U/L    Alkaline Phosphatase 68 34 - 104 U/L    Total Protein 6 3 (L) 6 4 - 8 4 g/dL    Albumin 3 3 (L) 3 5 - 5 0 g/dL    Total Bilirubin 0 48 0 20 - 1 00 mg/dL    eGFR 123 ml/min/1 73sq m   C-reactive protein    Collection Time: 05/29/22  5:20 AM   Result Value Ref Range     0 (H) <3 0 mg/L   Procalcitonin    Collection Time: 05/29/22  5:20 AM   Result Value Ref Range    Procalcitonin 0 05 <=0 25 ng/ml   Manual Differential(PHLEBS Do Not Order)    Collection Time: 05/29/22  5:20 AM   Result Value Ref Range    Segmented % 45 43 - 75 %    Bands % 1 0 - 8 %    Lymphocytes % 25 14 - 44 %    Monocytes % 28 (H) 4 - 12 %    Eosinophils, % 0 0 - 6 %    Basophils % 0 0 - 1 %    Myelocytes % 1 0 - 1 %    Absolute Neutrophils 0 75 (L) 1 85 - 7 62 Thousand/uL    Lymphocytes Absolute 0 41 (L) 0 60 - 4 47 Thousand/uL    Monocytes Absolute 0 45 0 00 - 1 22 Thousand/uL    Eosinophils Absolute 0 00 0 00 - 0 40 Thousand/uL    Basophils Absolute 0 00 0 00 - 0 10 Thousand/uL    Total Counted      nRBC 2 0 - 2 /100 WBC    RBC Morphology Present     Anisocytosis Present     Hypochromia Present     Polychromasia Present     Platelet Estimate Adequate Adequate   CBC and differential    Collection Time: 06/06/22 10:01 AM   Result Value Ref Range    WBC 4 86 4 31 - 10 16 Thousand/uL    RBC 3 33 (L) 3 81 - 5 12 Million/uL    Hemoglobin 9 9 (L) 11 5 - 15 4 g/dL    Hematocrit 33 1 (L) 34 8 - 46 1 %    MCV 99 (H) 82 - 98 fL    MCH 29 7 26 8 - 34 3 pg    MCHC 29 9 (L) 31 4 - 37 4 g/dL    RDW 20 6 (H) 11 6 - 15 1 %    MPV 9 7 8 9 - 12 7 fL    Platelets 625 (H) 780 - 390 Thousands/uL   Comprehensive metabolic panel    Collection Time: 06/06/22 10:01 AM   Result Value Ref Range    Sodium 141 136 - 145 mmol/L    Potassium 4 1 3 5 - 5 3 mmol/L    Chloride 104 100 - 108 mmol/L    CO2 29 21 - 32 mmol/L    ANION GAP 8 4 - 13 mmol/L    BUN 6 5 - 25 mg/dL    Creatinine 0 56 (L) 0 60 - 1 30 mg/dL    Glucose, Fasting 92 65 - 99 mg/dL    Calcium 9 1 8 3 - 10 1 mg/dL    Corrected Calcium 9 7 8 3 - 10 1 mg/dL    AST 35 5 - 45 U/L    ALT 48 12 - 78 U/L    Alkaline Phosphatase 88 46 - 116 U/L    Total Protein 6 6 6 4 - 8 2 g/dL    Albumin 3 3 (L) 3 5 - 5 0 g/dL    Total Bilirubin 0 69 0 20 - 1 00 mg/dL    eGFR 111 ml/min/1 73sq m   Cortisol Level, AM Specimen    Collection Time: 06/06/22 10:01 AM   Result Value Ref Range    Cortisol - AM 10 8 4 2 - 22 4 ug/dL   TSH, 3rd generation    Collection Time: 06/06/22 10:01 AM   Result Value Ref Range    TSH 3RD GENERATON 1 490 0 450 - 4 500 uIU/mL   Manual Differential(PHLEBS Do Not Order)    Collection Time: 06/06/22 10:01 AM   Result Value Ref Range    Segmented % 71 43 - 75 %    Bands % 3 0 - 8 %    Lymphocytes % 10 (L) 14 - 44 % Monocytes % 13 (H) 4 - 12 %    Eosinophils, % 0 0 - 6 %    Basophils % 1 0 - 1 %    Metamyelocytes% 1 0 - 1 %    Myelocytes % 1 0 - 1 %    Absolute Neutrophils 3 60 1 85 - 7 62 Thousand/uL    Lymphocytes Absolute 0 49 (L) 0 60 - 4 47 Thousand/uL    Monocytes Absolute 0 63 0 00 - 1 22 Thousand/uL    Eosinophils Absolute 0 00 0 00 - 0 40 Thousand/uL    Basophils Absolute 0 05 0 00 - 0 10 Thousand/uL    Total Counted      RBC Morphology Present     Anisocytosis Present     Polychromasia Present     Platelet Estimate Adequate Adequate   CBC and differential    Collection Time: 06/13/22  8:40 AM   Result Value Ref Range    WBC 4 54 4 31 - 10 16 Thousand/uL    RBC 3 48 (L) 3 81 - 5 12 Million/uL    Hemoglobin 10 6 (L) 11 5 - 15 4 g/dL    Hematocrit 34 9 34 8 - 46 1 %     (H) 82 - 98 fL    MCH 30 5 26 8 - 34 3 pg    MCHC 30 4 (L) 31 4 - 37 4 g/dL    RDW 20 4 (H) 11 6 - 15 1 %    MPV 10 5 8 9 - 12 7 fL    Platelets 260 855 - 152 Thousands/uL    nRBC 0 /100 WBCs    Neutrophils Relative 80 (H) 43 - 75 %    Immat GRANS % 0 0 - 2 %    Lymphocytes Relative 8 (L) 14 - 44 %    Monocytes Relative 12 4 - 12 %    Eosinophils Relative 0 0 - 6 %    Basophils Relative 0 0 - 1 %    Neutrophils Absolute 3 57 1 85 - 7 62 Thousands/µL    Immature Grans Absolute 0 02 0 00 - 0 20 Thousand/uL    Lymphocytes Absolute 0 37 (L) 0 60 - 4 47 Thousands/µL    Monocytes Absolute 0 56 0 17 - 1 22 Thousand/µL    Eosinophils Absolute 0 01 0 00 - 0 61 Thousand/µL    Basophils Absolute 0 01 0 00 - 0 10 Thousands/µL   TSH, 3rd generation with Free T4 reflex    Collection Time: 06/13/22  8:40 AM   Result Value Ref Range    TSH 3RD GENERATON 1 080 0 450 - 4 500 uIU/mL   T3, free    Collection Time: 06/13/22  8:40 AM   Result Value Ref Range    T3, Free 3 14 2 30 - 4 20 pg/mL   Comprehensive metabolic panel    Collection Time: 06/13/22  8:40 AM   Result Value Ref Range    Sodium 137 136 - 145 mmol/L    Potassium 3 9 3 5 - 5 3 mmol/L    Chloride 102 100 - 108 mmol/L    CO2 28 21 - 32 mmol/L    ANION GAP 7 4 - 13 mmol/L    BUN 6 5 - 25 mg/dL    Creatinine 0 57 (L) 0 60 - 1 30 mg/dL    Glucose 97 65 - 140 mg/dL    Calcium 9 8 8 3 - 10 1 mg/dL    Corrected Calcium 10 4 (H) 8 3 - 10 1 mg/dL    AST 31 5 - 45 U/L    ALT 36 12 - 78 U/L    Alkaline Phosphatase 72 46 - 116 U/L    Total Protein 7 3 6 4 - 8 2 g/dL    Albumin 3 2 (L) 3 5 - 5 0 g/dL    Total Bilirubin 1 17 (H) 0 20 - 1 00 mg/dL    eGFR 110 ml/min/1 73sq m   Cortisol Level, AM Specimen    Collection Time: 06/13/22  8:40 AM   Result Value Ref Range    Cortisol - AM 16 0 4 2 - 22 4 ug/dL   Bilirubin, direct    Collection Time: 06/13/22  8:40 AM   Result Value Ref Range    Bilirubin, Direct 0 09 0 00 - 0 20 mg/dL       Imaging Studies:XR chest 1 view portable    Result Date: 5/28/2022  Narrative: CHEST INDICATION:   cough, covid, neutropenia  Patient has confirmed COVID-19  COMPARISON:  5/23/2022 EXAM PERFORMED/VIEWS:  XR CHEST PORTABLE FINDINGS: Cardiomediastinal silhouette appears unremarkable  The lungs are clear  No pneumothorax or pleural effusion  Osseous structures appear within normal limits for patient age  Impression: No acute cardiopulmonary disease  Workstation performed: [id number]     XR spine cervical 2 or 3 vw injury    Result Date: 6/18/2022  Narrative: CERVICAL SPINE INDICATION:   C79 49: 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 C79 51: Secondary malignant neoplasm of bone  COMPARISON:  5/12/2022 VIEWS:  XR SPINE CERVICAL 2 OR 3 VW INJURY FINDINGS: Cortical erosive and destructive changes most notable at C3, C4, C5 vertebral bodies  Slight reversal of cervical lordosis again noted  Anatomic alignment  Intervertebral disc heights are preserved  Normal prevertebral soft tissues  Clear lung apices  Impression: Cortical destructive changes consistent with known metastatic disease most pronounced at the C3, C4 and C5 levels    Findings would be better assessed by CT or MR if clinically necessary  Workstation performed: [id number]     PE Study with CT Abdomen and Pelvis with contrast    Result Date: 5/28/2022  Narrative: CT PULMONARY ANGIOGRAM OF THE CHEST AND CT ABDOMEN AND PELVIS WITH INTRAVENOUS CONTRAST INDICATION:   sepsis, neutropenia, diarrhea, covid, elevated D-dimer, metastatic breast cancer  COMPARISON:  3/29/2022  TECHNIQUE:  CT examination of the chest, abdomen and pelvis was performed  Thin section CT angiographic technique was used in the chest in order to evaluate for pulmonary embolus and coronal 3D MIP postprocessing was performed on the acquisition scanner  Axial, sagittal, and coronal 2D reformatted images were created from the source data and submitted for interpretation  Radiation dose length product (DLP) for this visit:  6763 mGy-cm   This examination, like all CT scans performed in the [facility], was performed utilizing techniques to minimize radiation dose exposure, including the use of iterative reconstruction and automated exposure control  IV Contrast:  75 mL of iohexol (OMNIPAQUE) Enteric Contrast:  Enteric contrast was not administered  FINDINGS: CHEST PULMONARY ARTERIAL TREE:  No pulmonary embolus is seen  LUNGS:  Subsegmental atelectasis  There is symmetric appearing patchy consolidative and groundglass opacity in the posterior apices bilaterally, nonspecific  Jeannetta Given There is no tracheal or endobronchial lesion  PLEURA:  Unremarkable  HEART/AORTA:  Heart is unremarkable for patient's age  No thoracic aortic aneurysm  MEDIASTINUM AND ALISON:  Unremarkable  CHEST WALL AND LOWER NECK:  Reidentified soft tissue breast mass within the superomedial right breast decreased in size compared to prior study now measuring 3 5 cm in maximal dimension (previously 7 0 cm)  Right axillary lymph node measures 3 cm  ABDOMEN LIVER/BILIARY TREE:  Unremarkable  GALLBLADDER:  No calcified gallstones  No pericholecystic inflammatory change   SPLEEN: Unremarkable  PANCREAS:  Unremarkable  ADRENAL GLANDS:  Unremarkable  KIDNEYS/URETERS:  One or more simple renal cyst(s) is noted  Unchanged left lower pole nonobstructing intrarenal calculus  No hydronephrosis  STOMACH AND BOWEL:  Unremarkable  APPENDIX:  No findings to suggest appendicitis  ABDOMINOPELVIC CAVITY:  No ascites  No pneumoperitoneum  No lymphadenopathy  VESSELS:  Unremarkable for patient's age  PELVIS REPRODUCTIVE ORGANS:  Unremarkable for patient's age  Left adnexal paraovarian cyst measures 4 cm decreased in size compared to prior  URINARY BLADDER:  Unremarkable  ABDOMINAL WALL/INGUINAL REGIONS:  Unremarkable  OSSEOUS STRUCTURES:  No acute fracture  Reidentified diffuse osseous metastasis with largest left sacral metastatic lesion similar in appearance to prior  Impression: No evidence of acute pulmonary embolus  Symmetric groundglass consolidative opacity in the bilateral posterior lung apices which may reflect? Post radiation/treatment changes  Correlate clinically to exclude superimposed infectious  Interval decrease in size of right breast mass and right axillary metastatic adenopathy compatible with treatment response  Redemonstrated diffuse osseous metastatic disease with largest left sacral metastasis not significantly changed compared to prior study dated 3/29/2022 and also evaluated on prior recent bone scan examination dated 4/25/2022  Additional findings as above  Workstation performed: [id number]           Assessment/Plan:  No orders of the defined types were placed in this encounter  Rip Bhat is a 51 y o  female with metastatic invasive ductal carcinoma, right breast, ER/[place] negative, HER2 positive, who returns for routine follow-up status post completion of palliative radiation to the cervical/thoracic spine and lumbar spine/sacrum on 4/18/22  She has improved pain and continues palliative care follow up    She continues systemic treatment under the supervision of her medical oncologist   She is following neurosurgery recommendations and is continuing follow up with that office  Going forward, I will see her in 3 months  She was encouraged to call with questions or concerns  Repeat spine imaging will be considered at that time  Abel Conti MD  6/24/2022,3:08 PM    Portions of the record may have been created with voice recognition software   Occasional wrong word or "sound a like" substitutions may have occurred due to the inherent limitations of voice recognition software   Read the chart carefully and recognize, using context, where substitutions have occurred

## 2022-06-24 NOTE — PROGRESS NOTES
Carlos Enrique Harrison 1975 is a 51 y o  female with metastatic invasive ductal carcinoma, right breast, ER/[place] negative, HER2 positive, returns for routine one month follow-up s/p completion of palliative radiation to cervical, thoracic and lumbar spine on 4/18/22  She was not a surgical candidate and was fitted for a C-collar and brace  She was provided with a Dexamethasone taper at the end of her treatments  4/22/22 Palliative care  MMJ certification initiated  4/25/22 Bone scan   Multiple foci of abnormally increased radiotracer uptake noted in the axial and appendicular skeleton compatible with widespread osseous metastasis  4/27/22 Med Onc, Dr Edenilson Roque  Pt agreeable to participate in clinical trial with THP with or without atezolizumab  She is going to start 1st cycle of palliative chemotherapy with THP tomorrow  After the central review of pathology, will either add atezolizumab or not  4/28/22 Cycle 1 chemo infusion    5/12/22 XR spine cervical   Apparent subtle progression of cortical bony erosion related to lytic metastatic disease most radiographically evident at the C3, C4, and C5 levels about which there is reversal of cervical lordosis  No significant change in alignment  5/13/22 Neurosurgery follow-up  Pt to continue to use the  brace at this time  Cervical brace portion to be worn at all times, for showers switch to [place] collar  Thoracic portion to be worn when OOB/HOB > 45 degrees  At this time, no neurosurgical intervention is anticipated  Neurosurgery will continue to monitor patient  Patient will have a follow-up appointment with neurosurgery in 4- 6 weeks with repeat xrays of the Cervical spine  5/18/22 Palliative care   Continue MS-ER q 12 hrs, OxyIR for breakthrough pain, MMJ products       5/23/22 ER with fever - pt tested COVID +    5/25/22 Bebtelovimab monoclonal ab infusion for COVID    5/27 - 5/29/22 Hosp admission for neutropenia   Treated for sepsis secondary to COVID  Abx, remdesivir and dexamethasone  Pt is safe for discharge to home, will need to quarantine through June 12th for 20 days total from positive test     5/28/22 CTA chest ct abdomen and pelvis w contrast  -No evidence of acute pulmonary embolus   -Symmetric groundglass consolidative opacity in the bilateral posterior lung apices which may reflect? Post radiation/treatment changes  Correlate clinically to exclude superimposed infectious   -Interval decrease in size of right breast mass and right axillary metastatic adenopathy compatible with treatment response   -Redemonstrated diffuse osseous metastatic disease with largest left sacral metastasis not significantly changed compared to prior study dated 3/29/2022 and also evaluated on prior recent bone scan examination dated 4/25/2022 6/8/22 Dr Bailee Birmingham follow-up  Pt was removed from clinical trial due to COVID infection then severe neutropenia  Pt to continue on current standard regimen with Taxotere, Trastuzumab, and Petuzumab with Neulasta support  Pt to follow up in 3 weeks    Following palliative care for symptom management  6/15/22 XR spine cervical 2 or 3 vw injury  Cortical destructive changes consistent with known metastatic disease most pronounced at the C3, C4 and C5 levels  Findings would be better assessed by CT or MR if clinically necessary  6/17/22 Neurosurgery - Dr Wilfred Regan  Multilevel DJD but stable alinement  Pain in shoulder blades related to brace uncomfortable with  3/10 while on Morphine, oxycodone and baclofen     Cervical spine xray in [place] 3-4 weeks  Remove lower/thoracic part of  - continue wearing [place] all the time    Appts:  6/28/22 Hem/Onc - Dr Bailee Birmingham  7/5/22 NeuroSurg - Dr Wilfred Regan  7/7/22 Infusion                  Oncology History   Malignant neoplasm of overlapping sites of right breast in female, estrogen receptor negative ([facility] [address] )   3/2022 Initial Diagnosis    Malignant neoplasm of overlapping sites of right breast in female, estrogen receptor negative ([facility] [address] )     3/29/2022 Biopsy    Breast, Right, Biopsy:  - Invasive mammary carcinoma of no special type (ductal, not otherwise specified), [place] Grade II (3 + 2 + 1 = 6), spanning at least 6 mm     ER/AR negative, HER2 positive    Procedure  Needle biopsy    Specimen Laterality  Right    TUMOR   Histologic Type  Invasive carcinoma of no special type (ductal)    Histologic Grade (Fabrizio Histologic Score)     Glandular (Acinar) / Tubular Differentiation  Score 3    Nuclear Pleomorphism  Score 2    Mitotic Rate  Score 1    Overall Grade  Grade 2 (scores of 6 or 7)    Tumor Size  Greatest dimension of largest invasive focus (Millimeters): 6 mm   Ductal Carcinoma In Situ (DCIS)  Not identified    Lymphovascular Invasion  Not identified         3/31/2022 - 4/18/2022 Radiation    Course: C1    Plan ID Energy Fractions Dose per Fraction (cGy) Dose Correction (cGy) Total Dose Delivered (cGy) Elapsed Days   C1 C7 And ix 10X/6X 7 / 7 300 0 2,100 8   C1 C7 Spine 10X/6X 3 / 10 300 0 900 4   L4 Sacrum 10X 10 / 10 300 0 3,000 13   T1 T6 And ix 10X 7 / 7 300 0 2,100 8   T1 T6 Spine 10X 3 / 10 300 0 900 4      Dr Hi Joseph     4/28/2022 -  Chemotherapy    clinical trial with THP with or without atezolizumab     4/28/2022 -  Chemotherapy    pegfilgrastim (Maritza Gilmer), 6 mg, Subcutaneous, Once, 1 of 1 cycle  pertuzumab (PERJETA) IVPB, 840 mg, Intravenous, Once, 3 of 6 cycles  Administration: 420 mg (6/16/2022)  DOCEtaxel (TAXOTERE) chemo infusion, 75 mg/m2, Intravenous, Once, 3 of 6 cycles  Dose modification: 60 mg/m2 (original dose 75 mg/m2, Cycle 3, Reason: Anticipated Tolerance)  Administration: 112 8 mg (6/16/2022)  trastuzumab (HERCEPTIN) chemo infusion, 8 mg/kg, Intravenous, Once, 2 of 4 cycles  INV trastuzumab infusion, 6 mg/kg = 487 mg (100 % of original dose 6 mg/kg), Intravenous, Once, 1 of 2 cycles  Dose modification: 6 mg/kg (original dose 6 mg/kg, Cycle 3, Reason: Other (Must fill in a comment), Comment: investigational drug)  Administration: 487 mg (6/16/2022)     Osseous metastasis ([facility] [address] )   3/2022 Initial Diagnosis    Osseous metastasis ([facility] [address] )     3/29/2022 Biopsy    Breast, Right, Biopsy:  - Invasive mammary carcinoma of no special type (ductal, not otherwise specified), [place] Grade II (3 + 2 + 1 = 6), spanning at least 6 mm     ER/CT negative, HER2 positive    Procedure  Needle biopsy    Specimen Laterality  Right    TUMOR   Histologic Type  Invasive carcinoma of no special type (ductal)    Histologic Grade (Fabrizio Histologic Score)     Glandular (Acinar) / Tubular Differentiation  Score 3    Nuclear Pleomorphism  Score 2    Mitotic Rate  Score 1    Overall Grade  Grade 2 (scores of 6 or 7)    Tumor Size  Greatest dimension of largest invasive focus (Millimeters): 6 mm   Ductal Carcinoma In Situ (DCIS)  Not identified    Lymphovascular Invasion  Not identified         3/31/2022 - 4/18/2022 Radiation    Course: C1    Plan ID Energy Fractions Dose per Fraction (cGy) Dose Correction (cGy) Total Dose Delivered (cGy) Elapsed Days   C1 C7 And ix 10X/6X 7 / 7 300 0 2,100 8   C1 C7 Spine 10X/6X 3 / 10 300 0 900 4   L4 Sacrum 10X 10 / 10 300 0 3,000 13   T1 T6 And ix 10X 7 / 7 300 0 2,100 8   T1 T6 Spine 10X 3 / 10 300 0 900 4      Dr Chela Delgado     4/28/2022 -  Chemotherapy    clinical trial with THP with or without atezolizumab     4/28/2022 -  Chemotherapy    pegfilgrastim (Jeremy Dago), 6 mg, Subcutaneous, Once, 1 of 1 cycle  pertuzumab (PERJETA) IVPB, 840 mg, Intravenous, Once, 3 of 6 cycles  Administration: 420 mg (6/16/2022)  DOCEtaxel (TAXOTERE) chemo infusion, 75 mg/m2, Intravenous, Once, 3 of 6 cycles  Dose modification: 60 mg/m2 (original dose 75 mg/m2, Cycle 3, Reason: Anticipated Tolerance)  Administration: 112 8 mg (6/16/2022)  trastuzumab (HERCEPTIN) chemo infusion, 8 mg/kg, Intravenous, Once, 2 of 4 cycles  INV trastuzumab infusion, 6 mg/kg = 487 mg (100 % of original dose 6 mg/kg), Intravenous, Once, 1 of 2 cycles  Dose modification: 6 mg/kg (original dose 6 mg/kg, Cycle 3, Reason: Other (Must fill in a comment), Comment: investigational drug)  Administration: 487 mg (6/16/2022)         Review of Systems:  Review of Systems   Constitutional: Negative  HENT: Negative  Eyes: Negative  Respiratory: Negative  Cardiovascular: Negative  Gastrointestinal: Negative  Endocrine: Negative  Genitourinary: Negative  Musculoskeletal: Positive for back pain and neck pain  Allergic/Immunologic: Negative  Neurological: Negative  Wearing C-collar at all times  Denies numbness/tingling, she is ambulating with a walker   Hematological: Negative  Psychiatric/Behavioral: Negative          Clinical Trial: no    Covid Vaccine Status: vaccinated     Health Maintenance   Topic Date Due    Pneumococcal Vaccine: Pediatrics (0 to 5 Years) and At-Risk Patients (6 to 59 Years) (1 - PCV) Never done    HIV Screening  Never done    Annual Physical  Never done    DTaP,Tdap,and Td Vaccines (1 - Tdap) Never done    Cervical Cancer Screening  Never done    Colorectal Cancer Screening  Never done    COVID-19 Vaccine (3 - Pfizer risk series) 08/21/2021    Influenza Vaccine (Season Ended) 09/01/2022    Breast Cancer Screening: Mammogram  05/31/2023 (Originally 8/8/2014)    BMI: Followup Plan  05/05/2023    BMI: Adult  06/17/2023    Depression Screening  06/24/2023    Hepatitis C Screening  Completed    HIB Vaccine  Aged Out    Hepatitis B Vaccine  Aged Out    IPV Vaccine  Aged Out    Hepatitis A Vaccine  Aged Out    Meningococcal ACWY Vaccine  Aged Out    HPV Vaccine  Aged Out     Patient Active Problem List   Diagnosis    Malignant neoplasm of overlapping sites of right breast in female, estrogen receptor negative ([facility] [address] )    Osseous metastasis ([facility] [address] )    Elevated BP without diagnosis of hypertension    Hypokalemia    Transaminitis    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 (HCC)    Cancer associated pain    Palliative care patient    HTN (hypertension)    Cand[facility] infection, esophageal ([facility] )    Urinary tract infection without hematuria    Medical marijuana use    Chemotherapy-induced nausea    Neoplastic malignant related fatigue    Xerostomia    Antineoplastic chemotherapy induced anemia    COVID-19    Sepsis (HCC)    Chemotherapy-induced neutropenia (HCC)    Candidal skin infection    Neutropenia associated with infection (HCC)    Constipation     Past Medical History:   Diagnosis Date    Breast cancer ([place] [address] )     Cancer ([place] [address] )     Hypertension      History reviewed  No pertinent surgical history  Family History   Problem Relation Age of Onset    Coronary artery disease Mother     Heart attack Father     Diabetes type II Father      Social History     Socioeconomic History    Marital status: Single     Spouse name: Not on file    Number of children: Not on file    Years of education: Not on file    Highest education level: Not on file   Occupational History    Not on file   Tobacco Use    Smoking status: Never Smoker    Smokeless tobacco: Never Used   Vaping Use    Vaping Use: Never used   Substance and Sexual Activity    Alcohol use: Not Currently     Comment: rare, social     Drug use: Never    Sexual activity: Not on file   Other Topics Concern    Not on file   Social History Narrative    Significant other Rsusell Edmond is very supportive  Social Determinants of Health     Financial Resource Strain: Not on file   Food Insecurity: No Food Insecurity    Worried About Running Out of Food in the Last Year: Never true    Miguel of Food in the Last Year: Never true   Transportation Needs: No Transportation Needs    Lack of Transportation (Medical): No    Lack of Transportation (Non-Medical):  No   Physical Activity: Not on file   Stress: Not on file   Social Connections: Not on file   Intimate Partner Violence: Not on file   Housing Stability: [address] Unable to Pay for Housing in the Last Year: No    Number of Places Lived in the Last Year: 1    Unstable Housing in the Last Year: No       Current Outpatient Medications:     baclofen 10 mg tablet, TAKE 1/2 TABLET BY MOUTH 3 TIMES A DAY AS NEEDED FOR MUSCLE SPASMS, Disp: 135 tablet, Rfl: 1    DPH-Lido-AlHydr-MgHydr-Simeth (First-Mouthwash BLM) SUSP, , Disp: , Rfl:     furosemide (LASIX) 40 mg tablet, Take 1 tablet (40 mg total) by mouth in the morning , Disp: 30 tablet, Rfl: 1    lactulose (CEPHULAC) 10 g packet, Take 1 packet (10 g total) by mouth 3 (three) times a day as needed (constipation), Disp: 30 each, Rfl: 0    lisinopril (ZESTRIL) 20 mg tablet, Take 1 tablet (20 mg total) by mouth daily, Disp: 90 tablet, Rfl: 1    morphine (MS CONTIN) 15 mg 12 hr tablet, Take 1 tablet (15 mg total) by mouth every 12 (twelve) hours Max Daily Amount: 30 mg, Disp: 60 tablet, Rfl: 0    nystatin (MYCOSTATIN) 500,000 units/5 mL suspension, Apply 5 mL (500,000 Units total) to the mouth or throat 4 (four) times a day, Disp: 200 mL, Rfl: 0    nystatin (MYCOSTATIN) powder, Apply topically 3 (three) times a day, Disp: 15 g, Rfl: 0    ondansetron (ZOFRAN) 4 mg tablet, Take 1 tablet (4 mg total) by mouth every 8 (eight) hours as needed for nausea or vomiting, Disp: 40 tablet, Rfl: 2    oxyCODONE (ROXICODONE) 10 MG TABS, Take 1 tablet (10 mg total) by mouth every 4 (four) hours as needed for moderate pain Max Daily Amount: 60 mg, Disp: 180 tablet, Rfl: 0    pantoprazole (PROTONIX) 40 mg tablet, Take 1 tablet (40 mg total) by mouth daily in the early morning, Disp: 90 tablet, Rfl: 1    polyethylene glycol (MIRALAX) 17 g packet, Take 17 g by mouth daily, Disp: 30 each, Rfl: 0    senna (SENOKOT) 8 6 mg, Take 3 tablets (25 8 mg total) by mouth 2 (two) times a day Hold for loose stool , Disp: 180 tablet, Rfl: 0    al mag oxide-diphenhydramine-lidocaine viscous (MAGIC MOUTHWASH) 1:1:1 suspension, Swish and spit 10 mL every 4 (four) hours as needed for mouth pain or discomfort (Patient not taking: Reported on 6/24/2022), Disp: 500 mL, Rfl: 2    dexamethasone (DECADRON) 4 mg tablet, Take 1 tablet (4 mg total) by mouth 3 (three) times a day (Patient not taking: Reported on 6/24/2022), Disp: 45 tablet, Rfl: 0    fluconazole (DIFLUCAN) 100 mg tablet, Take 1 tablet (100 mg total) by mouth daily for 21 days Take 2 tabs on day 1 followed by 100mg daily, Disp: 22 tablet, Rfl: 0  No Known Allergies  Vitals:    06/24/22 1445   BP: 120/68   BP Location: Left arm   Pulse: 102   Resp: 18   Temp: 98 °F (36 7 °C)   TempSrc: Temporal   SpO2: 97%   Weight: 76 7 kg (169 lb)

## 2022-06-27 ENCOUNTER — HOME CARE VISIT (OUTPATIENT)
Dept: HOME HEALTH SERVICES | Facility: HOME HEALTHCARE | Age: 47
End: 2022-06-27
Payer: COMMERCIAL

## 2022-06-27 VITALS
RESPIRATION RATE: 16 BRPM | DIASTOLIC BLOOD PRESSURE: 88 MMHG | TEMPERATURE: 97.3 F | OXYGEN SATURATION: 99 % | HEART RATE: 88 BPM | SYSTOLIC BLOOD PRESSURE: 130 MMHG

## 2022-06-27 DIAGNOSIS — B37.0 ORAL THRUSH: ICD-10-CM

## 2022-06-27 PROCEDURE — G0299 HHS/HOSPICE OF RN EA 15 MIN: HCPCS

## 2022-06-28 ENCOUNTER — TELEPHONE (OUTPATIENT)
Dept: HEMATOLOGY ONCOLOGY | Facility: CLINIC | Age: 47
End: 2022-06-28

## 2022-06-28 ENCOUNTER — HOME CARE VISIT (OUTPATIENT)
Dept: HOME HEALTH SERVICES | Facility: HOME HEALTHCARE | Age: 47
End: 2022-06-28
Payer: COMMERCIAL

## 2022-06-28 ENCOUNTER — OFFICE VISIT (OUTPATIENT)
Dept: HEMATOLOGY ONCOLOGY | Facility: CLINIC | Age: 47
End: 2022-06-28
Payer: COMMERCIAL

## 2022-06-28 VITALS
HEART RATE: 93 BPM | DIASTOLIC BLOOD PRESSURE: 80 MMHG | OXYGEN SATURATION: 99 % | SYSTOLIC BLOOD PRESSURE: 136 MMHG | RESPIRATION RATE: 18 BRPM | HEIGHT: 64 IN | WEIGHT: 166 LBS | BODY MASS INDEX: 28.34 KG/M2 | TEMPERATURE: 97 F

## 2022-06-28 DIAGNOSIS — Z17.1 MALIGNANT NEOPLASM OF OVERLAPPING SITES OF RIGHT BREAST IN FEMALE, ESTROGEN RECEPTOR NEGATIVE (HCC): ICD-10-CM

## 2022-06-28 DIAGNOSIS — C50.811 MALIGNANT NEOPLASM OF OVERLAPPING SITES OF RIGHT BREAST IN FEMALE, ESTROGEN RECEPTOR NEGATIVE (HCC): ICD-10-CM

## 2022-06-28 DIAGNOSIS — D70.1 CHEMOTHERAPY-INDUCED NEUTROPENIA (HCC): ICD-10-CM

## 2022-06-28 DIAGNOSIS — C79.51 OSSEOUS METASTASIS (HCC): Primary | ICD-10-CM

## 2022-06-28 DIAGNOSIS — T45.1X5A CHEMOTHERAPY-INDUCED NEUTROPENIA (HCC): ICD-10-CM

## 2022-06-28 PROCEDURE — 1036F TOBACCO NON-USER: CPT | Performed by: INTERNAL MEDICINE

## 2022-06-28 PROCEDURE — 3008F BODY MASS INDEX DOCD: CPT | Performed by: INTERNAL MEDICINE

## 2022-06-28 PROCEDURE — 99214 OFFICE O/P EST MOD 30 MIN: CPT | Performed by: INTERNAL MEDICINE

## 2022-06-28 RX ORDER — SODIUM CHLORIDE 9 MG/ML
20 INJECTION, SOLUTION INTRAVENOUS ONCE
OUTPATIENT
Start: 2022-08-25

## 2022-06-28 RX ORDER — SODIUM CHLORIDE 9 MG/ML
20 INJECTION, SOLUTION INTRAVENOUS ONCE
OUTPATIENT
Start: 2022-09-01

## 2022-06-28 RX ORDER — SODIUM CHLORIDE 9 MG/ML
20 INJECTION, SOLUTION INTRAVENOUS ONCE
Status: CANCELLED | OUTPATIENT
Start: 2022-07-14

## 2022-06-28 RX ORDER — SODIUM CHLORIDE 9 MG/ML
20 INJECTION, SOLUTION INTRAVENOUS ONCE
Status: CANCELLED | OUTPATIENT
Start: 2022-08-04

## 2022-06-28 RX ORDER — SODIUM CHLORIDE 9 MG/ML
20 INJECTION, SOLUTION INTRAVENOUS ONCE
Status: CANCELLED | OUTPATIENT
Start: 2022-07-21

## 2022-06-28 RX ORDER — SODIUM CHLORIDE 9 MG/ML
20 INJECTION, SOLUTION INTRAVENOUS ONCE
OUTPATIENT
Start: 2022-08-11

## 2022-06-28 RX ORDER — SODIUM CHLORIDE 9 MG/ML
20 INJECTION, SOLUTION INTRAVENOUS ONCE
OUTPATIENT
Start: 2022-08-18

## 2022-06-28 NOTE — PROGRESS NOTES
Hematology / Oncology Outpatient Follow Up Note    Hieu Marlen 51 y o  female Esther Hamilton LPK:[id number]         Date:  6/28/2022    Assessment / Plan:  A 41-year-old premenopausal woman with metastatic breast cancer, grade 2, ER negative, RI negative, HER2 3+ disease  Jordy Mcclelland has large fungating mass in her right breast, palpable right axillary adenopathy as well as diffuse osseous metastasis with C4 stenosis   She completed palliative radiation therapy to the spine  She was treated with Taxotere, trastuzumab and pertuzumab with or without atezolizumab as part of clinical trial x2 cycles  After 2 cycles of treatment, she had COVID-19 infection of severe neutropenia for which she received antibody for COVID-19, remdesivir as well as dexamethasone  Mucosa of this complication, she was removed from clinical trial   She has 3 cycles of THP with significant infusion reaction with Taxotere  She could not complete Taxotere infusion  Based on physical examination, she has good partial response  I recommended her to continue trastuzumab and pertuzumab every 3 weeks  I also recommend referral to replace the Taxotere with Taxol weekly  Side effects of weekly paclitaxel with thoroughly discussed, including but not limited to neuropathy, mild neutropenia, very small risk of infection, infusion reactions  She understood and wished to proceed    I will see her again towards the end of July 2022 for follow-up         Subjective:      HPI:  A 41-year-old premenopausal woman who was recently hospitalized because of the progressive neck pain which radiated to the bilateral upper extremity as well as enlarging right breast mass  Jordy Mcclelland was found to have fungating right breast mass   CT scan of chest abdomen pelvis showed diffuse bony metastasis, especially with C4 stenosis   In addition, CT scan showed 6 9 cm of right breast mass   She had right breast biopsy which showed invasive ductal carcinoma, grade 2, ER negative, RI negative, HER2 3+ disease   Her tumor marker were find to be mildly elevated   CT of the head was negative for brain metastasis   She is currently on palliative radiation to her neck, shoulder as well as lower back   She is going to complete palliative radiation therapy in early next week   She presents today with her mother and tamika to discuss medical treatment for metastatic breast cancer   Her pain is reasonably controlled with narcotics medications   She has no recent weight loss   She denied any respiratory symptoms   She has no significant past medical history   Therefore, prior to this hospitalization, she was not on any medications   She denied family history of breast or ovarian cancer  Fernanda Conde is a lifetime never smoker   Her performance status is 1/4 on ECOG scale            Interval History:  A 25-year-old premenopausal woman with metastatic breast cancer, grade 2, ER negative, IN negative, HER2 3+ disease   She has large fungating mass in her right breast, palpable right axillary adenopathy as well as diffuse osseous metastasis with C4 stenosis   She completed palliative radiation therapy to the spine  Subsequently, she was treated with Taxotere, trastuzumab and pertuzumab with without atezolizumab based on a clinical trial   She received 2 cycle of treatment  After the 2nd cycle of treatment, she developed COVID-19 infection as well as severe neutropenia  She received antibody for COVID-19  When she was in hospital, she was treated with dexamethasone and remdesivir  Her respiratory symptom has much improved  She resumed her breast cancer treatment without delay  In the last 2 infusion of Taxotere, she had significant infusion reactions  When she had sore cycle of treatment, she could not complete Taxotere infusion  She had back pain, shortness of breath and face flushing when she had a reaction  She presents today for follow-up    Her bone pain is under control with same narcotic pain medications  She has no respiratory symptoms  Her weight is stable  Her performance status is 1/4 on ECOG scale      Objective:      Primary Diagnosis:     Metastatic breast cancer, grade 2, ER negative, CT negative, HER2 3+ disease   Diagnosed in March 2022      Cancer Staging:  Cancer Staging  No matching staging information was found for the patient         Previous Hematologic/ Oncologic Treatment:       THP x3 cycles, completed in early June 2022      Current Hematologic/ Oncologic Treatment:       3 more cycles of weekly paclitaxel, try weekly pertuzumab and trastuzumab to be started and July 7, 2022      Disease Status:      Clinical partial response      Test Results:     Pathology:     Right breast biopsy showed invasive ductal carcinoma, grade 2 ER negative, CT negative, HER2 3+ disease      Radiology:     CT scan of head was negative for metastatic disease in her brain      CT scan of chest abdomen pelvis showed C4 metastasis with diffuse osseous metastasis especially with C4 stenosis   6 9 cm of right breast mass      Bone scan showed widespread osseous metastasis      Laboratory:     See below for CBC and CMP   CA 27, 29 was 110   CA 15-3 was 91 6      Physical Exam:        General Appearance:    Alert, oriented          Eyes:    PERRL   Ears:    Normal external ear canals, both ears   Nose:   Nares normal, septum midline   Throat:   Mucosa moist  Pharynx without injection  Neck:   Supple         Lungs:     Clear to auscultation bilaterally   Chest Wall:    No tenderness or deformity    Heart:    Regular rate and rhythm         Abdomen:     Soft, non-tender, bowel sounds +, no organomegaly               Extremities:   Extremities no cyanosis or edema         Skin:   no rash or icterus      Lymph nodes:   Cervical, supraclavicular, and axillary nodes normal   Neurologic:   CNII-XII intact, normal strength, sensation and reflexes     Throughout             Breast exam:   4 5 x 4 5  cm of palpable mass at in the upper quadrant of her right breast with 2 cm of central ulceration  Previous right axillary adenopathy is no longer palpable   Left breast exam is negative             ROS: Review of Systems   All other systems reviewed and are negative  Imaging: XR spine cervical 2 or 3 vw injury    Result Date: 6/18/2022  Narrative: CERVICAL SPINE INDICATION:   C79 49: 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 C79 51: Secondary malignant neoplasm of bone  COMPARISON:  5/12/2022 VIEWS:  XR SPINE CERVICAL 2 OR 3 VW INJURY FINDINGS: Cortical erosive and destructive changes most notable at C3, C4, C5 vertebral bodies  Slight reversal of cervical lordosis again noted  Anatomic alignment  Intervertebral disc heights are preserved  Normal prevertebral soft tissues  Clear lung apices  Impression: Cortical destructive changes consistent with known metastatic disease most pronounced at the C3, C4 and C5 levels  Findings would be better assessed by CT or MR if clinically necessary  Workstation performed: [id number]         Labs:   Lab Results   Component Value Date    WBC 4 54 06/13/2022    HGB 10 6 (L) 06/13/2022    HCT 34 9 06/13/2022     (H) 06/13/2022     06/13/2022     Lab Results   Component Value Date    K 3 9 06/13/2022     06/13/2022    CO2 28 06/13/2022    BUN 6 06/13/2022    CREATININE 0 57 (L) 06/13/2022    GLUF 92 06/06/2022    CALCIUM 9 8 06/13/2022    CORRECTEDCA 10 4 (H) 06/13/2022    AST 31 06/13/2022    ALT 36 06/13/2022    ALKPHOS 72 06/13/2022    EGFR 110 06/13/2022         Lab Results   Component Value Date    CEA 6 5 (H) 03/29/2022         Lab Results   Component Value Date    IRON 83 05/16/2022    TIBC 206 (L) 05/16/2022    FERRITIN 191 05/27/2022         Current Medications: Reviewed  Allergies: Reviewed  PMH/FH/SH:  Reviewed      Vital Sign:    Body surface area is 1 81 meters squared      Wt Readings from Last 3 Encounters:   06/28/22 75 3 kg (166 lb)   06/24/22 76 7 kg (169 lb)   06/17/22 78 kg (172 lb)        Temp Readings from Last 3 Encounters:   06/28/22 (!) 97 °F (36 1 °C)   06/27/22 (!) 97 3 °F (36 3 °C) (Tympanic)   06/24/22 98 °F (36 7 °C) (Temporal)        BP Readings from Last 3 Encounters:   06/28/22 136/80   06/27/22 130/88   06/24/22 120/68         Pulse Readings from Last 3 Encounters:   06/28/22 93   06/27/22 88   06/24/22 102     @LASTSAO2(3)@

## 2022-06-28 NOTE — CASE COMMUNICATION
Ship to Pt   Home     Insurance [employer]    Wound 1 R chest        Partial x    Telfa pad 3x4 [id number] _6_

## 2022-06-28 NOTE — TELEPHONE ENCOUNTER
CALL RETURN FORM   Reason for patient call? Patient is calling in stating that her infusion treatments appointment times are schedule to late with the length of time for treatment  Treatment dates: 7/7,7/14, and 8/11      Patient's primary oncologist?  Dr Yevgeniy Lei    Name of person the patient was calling for? Marissa     Any additional information to add, if applicable? Patient's best call back number is        [phone]    Informed patient that the message will be forwarded to the team and someone will get back to them as soon as possible    Did you relay this information to the patient?   Yes

## 2022-06-28 NOTE — TELEPHONE ENCOUNTER
Spoke with patient to let her know that all of her treatments are scheduled within an appropriate time frame  Patient aware and okay

## 2022-06-28 NOTE — TELEPHONE ENCOUNTER
Can you confirm that these times are appropriate for infusion? Patient's treatment is about 4 5 hours with buffer time  She is scheduled at 12

## 2022-06-29 ENCOUNTER — PATIENT MESSAGE (OUTPATIENT)
Dept: PALLIATIVE MEDICINE | Facility: CLINIC | Age: 47
End: 2022-06-29

## 2022-06-29 DIAGNOSIS — R11.0 CHEMOTHERAPY-INDUCED NAUSEA: ICD-10-CM

## 2022-06-29 DIAGNOSIS — Z51.5 PALLIATIVE CARE PATIENT: ICD-10-CM

## 2022-06-29 DIAGNOSIS — T45.1X5A CHEMOTHERAPY-INDUCED NAUSEA: ICD-10-CM

## 2022-06-29 DIAGNOSIS — Z17.1 MALIGNANT NEOPLASM OF OVERLAPPING SITES OF RIGHT BREAST IN FEMALE, ESTROGEN RECEPTOR NEGATIVE (HCC): Primary | ICD-10-CM

## 2022-06-29 DIAGNOSIS — C79.51 OSSEOUS METASTASIS (HCC): ICD-10-CM

## 2022-06-29 DIAGNOSIS — C50.811 MALIGNANT NEOPLASM OF OVERLAPPING SITES OF RIGHT BREAST IN FEMALE, ESTROGEN RECEPTOR NEGATIVE (HCC): Primary | ICD-10-CM

## 2022-06-29 RX ORDER — ONDANSETRON 4 MG/1
4 TABLET, FILM COATED ORAL EVERY 8 HOURS PRN
Qty: 9 TABLET | Refills: 20 | Status: SHIPPED | OUTPATIENT
Start: 2022-06-29

## 2022-06-29 NOTE — PATIENT COMMUNICATION
Is there anything we can do to get the insurer to approve more than #9 tablets? She has to go to the pharmacy every few days to refill  Thanks

## 2022-07-01 ENCOUNTER — PATIENT OUTREACH (OUTPATIENT)
Dept: CASE MANAGEMENT | Facility: HOSPITAL | Age: 47
End: 2022-07-01

## 2022-07-01 ENCOUNTER — HOME CARE VISIT (OUTPATIENT)
Dept: HOME HEALTH SERVICES | Facility: HOME HEALTHCARE | Age: 47
End: 2022-07-01
Payer: COMMERCIAL

## 2022-07-01 ENCOUNTER — HOSPITAL ENCOUNTER (OUTPATIENT)
Dept: RADIOLOGY | Facility: HOSPITAL | Age: 47
Discharge: HOME | End: 2022-07-01
Payer: COMMERCIAL

## 2022-07-01 VITALS
RESPIRATION RATE: 18 BRPM | DIASTOLIC BLOOD PRESSURE: 78 MMHG | OXYGEN SATURATION: 97 % | TEMPERATURE: 98.1 F | HEART RATE: 80 BPM | SYSTOLIC BLOOD PRESSURE: 120 MMHG

## 2022-07-01 DIAGNOSIS — M84.48XA: ICD-10-CM

## 2022-07-01 DIAGNOSIS — R21 RASH: ICD-10-CM

## 2022-07-01 PROCEDURE — 72040 X-RAY EXAM NECK SPINE 2-3 VW: CPT

## 2022-07-01 PROCEDURE — G0299 HHS/HOSPICE OF RN EA 15 MIN: HCPCS

## 2022-07-01 RX ORDER — NYSTATIN 100000 [USP'U]/G
POWDER TOPICAL 3 TIMES DAILY
Qty: 15 G | Refills: 0 | Status: CANCELLED | OUTPATIENT
Start: 2022-07-01

## 2022-07-01 NOTE — TELEPHONE ENCOUNTER
Nystatin powder was sent 06/20/22, but there was no refill  Called pharmacy and added refills   Pharmacy getting powder ready for patient

## 2022-07-05 ENCOUNTER — HOSPITAL ENCOUNTER (OUTPATIENT)
Dept: RADIOLOGY | Facility: HOSPITAL | Age: 47
Discharge: HOME | End: 2022-07-05
Payer: COMMERCIAL

## 2022-07-05 ENCOUNTER — HOME CARE VISIT (OUTPATIENT)
Dept: HOME HEALTH SERVICES | Facility: HOME HEALTHCARE | Age: 47
End: 2022-07-05
Payer: COMMERCIAL

## 2022-07-05 ENCOUNTER — APPOINTMENT (OUTPATIENT)
Dept: LAB | Facility: CLINIC | Age: 47
End: 2022-07-05
Payer: COMMERCIAL

## 2022-07-05 ENCOUNTER — OFFICE VISIT (OUTPATIENT)
Dept: NEUROSURGERY | Facility: CLINIC | Age: 47
End: 2022-07-05
Payer: COMMERCIAL

## 2022-07-05 VITALS
SYSTOLIC BLOOD PRESSURE: 123 MMHG | WEIGHT: 166 LBS | TEMPERATURE: 98 F | DIASTOLIC BLOOD PRESSURE: 82 MMHG | HEIGHT: 64 IN | RESPIRATION RATE: 18 BRPM | BODY MASS INDEX: 28.34 KG/M2 | HEART RATE: 80 BPM

## 2022-07-05 VITALS
HEART RATE: 72 BPM | SYSTOLIC BLOOD PRESSURE: 132 MMHG | OXYGEN SATURATION: 98 % | DIASTOLIC BLOOD PRESSURE: 74 MMHG | RESPIRATION RATE: 18 BRPM | TEMPERATURE: 98.4 F

## 2022-07-05 DIAGNOSIS — Z17.1 MALIGNANT NEOPLASM OF OVERLAPPING SITES OF RIGHT BREAST IN FEMALE, ESTROGEN RECEPTOR NEGATIVE (HCC): ICD-10-CM

## 2022-07-05 DIAGNOSIS — S12.300A C4 CERVICAL FRACTURE (HCC): Primary | ICD-10-CM

## 2022-07-05 DIAGNOSIS — S12.300A C4 CERVICAL FRACTURE (HCC): ICD-10-CM

## 2022-07-05 DIAGNOSIS — C50.811 MALIGNANT NEOPLASM OF OVERLAPPING SITES OF RIGHT BREAST IN FEMALE, ESTROGEN RECEPTOR NEGATIVE (HCC): ICD-10-CM

## 2022-07-05 DIAGNOSIS — C79.51 OSSEOUS METASTASIS (HCC): ICD-10-CM

## 2022-07-05 DIAGNOSIS — D70.1 CHEMOTHERAPY-INDUCED NEUTROPENIA (HCC): ICD-10-CM

## 2022-07-05 DIAGNOSIS — T45.1X5A CHEMOTHERAPY-INDUCED NEUTROPENIA (HCC): ICD-10-CM

## 2022-07-05 LAB
ALBUMIN SERPL BCP-MCNC: 4 G/DL (ref 3.5–5)
ALP SERPL-CCNC: 51 U/L (ref 34–104)
ALT SERPL W P-5'-P-CCNC: 19 U/L (ref 7–52)
ANION GAP SERPL CALCULATED.3IONS-SCNC: 5 MMOL/L (ref 4–13)
AST SERPL W P-5'-P-CCNC: 19 U/L (ref 13–39)
BASOPHILS # BLD AUTO: 0.01 THOUSANDS/ΜL (ref 0–0.1)
BASOPHILS NFR BLD AUTO: 0 % (ref 0–1)
BILIRUB SERPL-MCNC: 0.58 MG/DL (ref 0.2–1)
BUN SERPL-MCNC: 7 MG/DL (ref 5–25)
CALCIUM SERPL-MCNC: 9.9 MG/DL (ref 8.4–10.2)
CHLORIDE SERPL-SCNC: 102 MMOL/L (ref 96–108)
CO2 SERPL-SCNC: 33 MMOL/L (ref 21–32)
CREAT SERPL-MCNC: 0.55 MG/DL (ref 0.6–1.3)
EOSINOPHIL # BLD AUTO: 0.05 THOUSAND/ΜL (ref 0–0.61)
EOSINOPHIL NFR BLD AUTO: 1 % (ref 0–6)
ERYTHROCYTE [DISTWIDTH] IN BLOOD BY AUTOMATED COUNT: 18 % (ref 11.6–15.1)
GFR SERPL CREATININE-BSD FRML MDRD: 112 ML/MIN/1.73SQ M
GLUCOSE SERPL-MCNC: 108 MG/DL (ref 65–140)
HCT VFR BLD AUTO: 37.6 % (ref 34.8–46.1)
HGB BLD-MCNC: 11.5 G/DL (ref 11.5–15.4)
IMM GRANULOCYTES # BLD AUTO: 0.01 THOUSAND/UL (ref 0–0.2)
IMM GRANULOCYTES NFR BLD AUTO: 0 % (ref 0–2)
LYMPHOCYTES # BLD AUTO: 0.5 THOUSANDS/ΜL (ref 0.6–4.47)
LYMPHOCYTES NFR BLD AUTO: 11 % (ref 14–44)
MCH RBC QN AUTO: 30.3 PG (ref 26.8–34.3)
MCHC RBC AUTO-ENTMCNC: 30.6 G/DL (ref 31.4–37.4)
MCV RBC AUTO: 99 FL (ref 82–98)
MONOCYTES # BLD AUTO: 0.49 THOUSAND/ΜL (ref 0.17–1.22)
MONOCYTES NFR BLD AUTO: 11 % (ref 4–12)
NEUTROPHILS # BLD AUTO: 3.4 THOUSANDS/ΜL (ref 1.85–7.62)
NEUTS SEG NFR BLD AUTO: 77 % (ref 43–75)
NRBC BLD AUTO-RTO: 0 /100 WBCS
PLATELET # BLD AUTO: 281 THOUSANDS/UL (ref 149–390)
PMV BLD AUTO: 9.6 FL (ref 8.9–12.7)
POTASSIUM SERPL-SCNC: 3.7 MMOL/L (ref 3.5–5.3)
PROT SERPL-MCNC: 6.8 G/DL (ref 6.4–8.4)
RBC # BLD AUTO: 3.8 MILLION/UL (ref 3.81–5.12)
SODIUM SERPL-SCNC: 140 MMOL/L (ref 135–147)
WBC # BLD AUTO: 4.46 THOUSAND/UL (ref 4.31–10.16)

## 2022-07-05 PROCEDURE — G0300 HHS/HOSPICE OF LPN EA 15 MIN: HCPCS

## 2022-07-05 PROCEDURE — 400014 VN F/U

## 2022-07-05 PROCEDURE — 85025 COMPLETE CBC W/AUTO DIFF WBC: CPT

## 2022-07-05 PROCEDURE — 72052 X-RAY EXAM NECK SPINE 6/>VWS: CPT

## 2022-07-05 PROCEDURE — 80053 COMPREHEN METABOLIC PANEL: CPT

## 2022-07-05 PROCEDURE — 99213 OFFICE O/P EST LOW 20 MIN: CPT | Performed by: NEUROLOGICAL SURGERY

## 2022-07-05 PROCEDURE — 36415 COLL VENOUS BLD VENIPUNCTURE: CPT

## 2022-07-05 NOTE — PROGRESS NOTES
Neurosurgery Office Note  Tres Daily 51 y o  female MRN: [id number]      Assessment/Plan        Patient is a 51 yrs old woman with  history of metastatic breast cancer to cervical and  thoracic spine, had Pathological  C4 and T2 Compression fracture with retropulsion and central canal stenosis  Patient took off lower part of  and wearing [place] collar  Here today for 3 months follow up with upright cervical spine xrays in [place]lar  Images  demonstrates multilevel DJD but stable alignment of C4 fracture and Cx spine when compared to previous images  Patient reports pain  on the left shoulder and arm  She describes the pain as soreness, and estimated 3/10 at most, on Morphine 15 mg x2 PRN,  Oxy 10 mg x4 PRN, and  Baclofen  Denies any radicular pain down to her UEs  No weakness, numbness, or paresthesia or fine motor dysfunction  Denies any  weakness or dropping objects  No gait issues, but occasionally uses cane for safety  Patient denies B B dysfunction  Exam:- Patient Alert and oriented, Communicates well  Livia, strength 5/5 and sensation to LT intact bilaterally  DTR 2+ in UEs, brisk in LEs  No clonus bialt  Negative Luke's bilaterally  Slight discomfort on palpation of posterior cervical spine on the left side and rigidity noted  [place] collar on  Patient had Flex/Ext x-rays of cervical spine which demonstrates more or less stable C4 fracture in both flexion and extension views, otherwise degenerative changes noted    Hx, PEx and images reviewed with the patient  Mx plan discussed  Flexion Extension Cx spine xrays demonstrates more or less stable pathologic C4 fracture with chronic degenerative change  Advised to wean off the collar 1-2 hours every day/for the first week and 4-6 hours/d in the next week and then wean off, Consider PT  Referral after two weeks  Follow up with flex/Ext x-rays in 4 weeks  Advised fall precaution   Avoid lifting heavy objects, it was a flexion or extension of the neck  Questions and concerns were answered and patient expressed her understanding is in agreement with the plan  Plan:    1  Flexion-extension cervical spine x-rays  2  Advised to wean off cervical colar, 1-2 hours/d first week, 4-6 hours/d 2nd week  3  Follow-up in 4-6 weeks, SNPx-probably requiring follow-up x-rays at that time based on today's x-ray results  4  Continue ROM exercise/d-consider referral to PT after two weeks  5  Called the patient and discussed the xray results and informed next 4-5 weeks Appt  6  She can call office after two weeks if she needs referral to PT  7  Call with question or concern    I spent 30  minutes with the patient today in which >50% of the time was spent counseling/coordination of care regarding diagnosis, imaging review, symptoms and treatment plan  C/C: " soreness in the left arm and shoulder"/ 3 months follow up         History of Present Illness     All patients medical histories were reviewed and updated as appropriate: Allergies, current medication list, past medical history, past surgical history, family history, social history, and current medical  Lists  Patient is here today for three-month follow-up of pathologic C4 fracture and also T2 VB #  Patient removed his lower part of her  and much feeling better, but reports 3/10 left-sided shoulder and soreness on her left arm  No radicular symptoms, numbness or paresthesia  Denies any gait issues but uses cane for safety  Denies any bowel/bladder dysfunction  Fever, chills, rigors, cough or chest pain  Patient had XRT to Cx spine, and currently on Morphine 15 mg x2 PRN,  Oxy 10 mg x4 PRN, and  Baclofen  Denies any fever, chills, rigors, cough or chest pain  REVIEW OF SYSTEMS  Review of system personally reviewed and updated  Review of Systems   Constitutional: Negative  HENT: Negative  Eyes: Negative  Respiratory: Negative  Cardiovascular: Negative  Gastrointestinal: Negative  H/o GERD   Endocrine: Negative  Genitourinary: Negative  Musculoskeletal: Positive for neck pain (b/l nack pain radiates to b/l shouders/arms ) and neck stiffness (has collar)  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, Osseous metastasis   Medical marijuana use      On Palliative care patient    Skin: Negative  Allergic/Immunologic: Negative  Neurological: Negative  Hematological: Negative  Psychiatric/Behavioral: Positive for sleep disturbance (occasional due to pain)  All other systems reviewed and are negative  Meds/Allergies     Current Outpatient Medications   Medication Sig Dispense Refill    al mag oxide-diphenhydramine-lidocaine viscous (MAGIC MOUTHWASH) 1:1:1 suspension Swish and spit 10 mL every 4 (four) hours as needed for mouth pain or discomfort (Patient not taking: No sig reported) 500 mL 2    baclofen 10 mg tablet TAKE 1/2 TABLET BY MOUTH 3 TIMES A DAY AS NEEDED FOR MUSCLE SPASMS 135 tablet 1    dexamethasone (DECADRON) 4 mg tablet Take 1 tablet (4 mg total) by mouth 3 (three) times a day (Patient not taking: No sig reported) 45 tablet 0    DPH-Lido-AlHydr-MgHydr-Simeth (First-Mouthwash BLM) SUSP       fluconazole (DIFLUCAN) 100 mg tablet Take 1 tablet (100 mg total) by mouth daily for 21 days Take 2 tabs on day 1 followed by 100mg daily 22 tablet 0    furosemide (LASIX) 40 mg tablet Take 1 tablet (40 mg total) by mouth in the morning   30 tablet 1    lactulose (CEPHULAC) 10 g packet Take 1 packet (10 g total) by mouth 3 (three) times a day as needed (constipation) 30 each 0    lisinopril (ZESTRIL) 20 mg tablet Take 1 tablet (20 mg total) by mouth daily 90 tablet 1    morphine (MS CONTIN) 15 mg 12 hr tablet Take 1 tablet (15 mg total) by mouth every 12 (twelve) hours Max Daily Amount: 30 mg 60 tablet 0    nystatin (MYCOSTATIN) 500,000 units/5 mL suspension Apply 5 mL (500,000 Units total) to the mouth or throat 4 (four) times a day 200 mL 0    nystatin (MYCOSTATIN) powder Apply topically 3 (three) times a day 15 g 0    ondansetron (ZOFRAN) 4 mg tablet Take 1 tablet (4 mg total) by mouth every 8 (eight) hours as needed for nausea or vomiting 9 tablet 20    oxyCODONE (ROXICODONE) 10 MG TABS Take 1 tablet (10 mg total) by mouth every 4 (four) hours as needed for moderate pain Max Daily Amount: 60 mg 180 tablet 0    pantoprazole (PROTONIX) 40 mg tablet Take 1 tablet (40 mg total) by mouth daily in the early morning 90 tablet 1    polyethylene glycol (MIRALAX) 17 g packet Take 17 g by mouth daily 30 each 0    senna (SENOKOT) 8 6 mg Take 3 tablets (25 8 mg total) by mouth 2 (two) times a day Hold for loose stool  180 tablet 0     No current facility-administered medications for this visit  No Known Allergies    PAST HISTORY    Past Medical History:   Diagnosis Date    Breast cancer ([facility] )     Cancer ([facility] )     Hypertension        History reviewed  No pertinent surgical history  Social History     Tobacco Use    Smoking status: Never Smoker    Smokeless tobacco: Never Used   Vaping Use    Vaping Use: Never used   Substance Use Topics    Alcohol use: Not Currently     Comment: rare, social     Drug use: Never       Family History   Problem Relation Age of Onset    Coronary artery disease Mother     Heart attack Father     Diabetes type II Father          Above history personally reviewed  EXAM    Vitals:not currently breastfeeding  ,There is no height or weight on file to calculate BMI  Physical Exam  Constitutional:       Appearance: Normal appearance  HENT:      Head: Normocephalic and atraumatic  Eyes:      Extraocular Movements: Extraocular movements intact  Cardiovascular:      Rate and Rhythm: Normal rate and regular rhythm  Pulmonary:      Effort: Pulmonary effort is normal    Musculoskeletal:         General: Normal range of motion  Cervical back: Normal range of motion   Rigidity and tenderness present  Neurological:      General: No focal deficit present  Mental Status: She is alert and oriented to person, place, and time  GCS: GCS eye subscore is 4  GCS verbal subscore is 5  GCS motor subscore is 6  Cranial Nerves: Cranial nerves are intact  Sensory: Sensation is intact  Motor: Motor function is intact  Deep Tendon Reflexes: Reflexes are normal and symmetric  Reflex Scores:       Tricep reflexes are 2+ on the right side and 2+ on the left side  Bicep reflexes are 2+ on the right side and 2+ on the left side  Brachioradialis reflexes are 2+ on the right side and 2+ on the left side  Patellar reflexes are 2+ on the right side and 2+ on the left side  Achilles reflexes are 2+ on the right side and 2+ on the left side  Psychiatric:         Speech: Speech normal          Neurologic Exam     Mental Status   Oriented to person, place, and time     Speech: speech is normal   Level of consciousness: alert    Cranial Nerves     CN III, IV, VI   Nystagmus: none     CN XI   CN XI normal      Motor Exam   Muscle bulk: normal  Overall muscle tone: normal  Right arm tone: normal  Left arm tone: normal  Right arm pronator drift: absent  Left arm pronator drift: absent  Right leg tone: normal  Left leg tone: normal    Sensory Exam   Light touch normal      Gait, Coordination, and Reflexes     Reflexes   Right brachioradialis: 2+  Left brachioradialis: 2+  Right biceps: 2+  Left biceps: 2+  Right triceps: 2+  Left triceps: 2+  Right patellar: 2+  Left patellar: 2+  Right achilles: 2+  Left achilles: 2+  Right : 2+  Left : 2+  Right Luke: absent  Left Luke: absent  Right ankle clonus: absent  Left pendular knee jerk: absent        MEDICAL DECISION MAKING    Imaging Studies:     XR spine cervical 2 or 3 vw injury    Result Date: 6/18/2022  Narrative: CERVICAL SPINE INDICATION:   C79 49: 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 C79 51: Secondary malignant neoplasm of bone  COMPARISON:  5/12/2022 VIEWS:  XR SPINE CERVICAL 2 OR 3 VW INJURY FINDINGS: Cortical erosive and destructive changes most notable at C3, C4, C5 vertebral bodies  Slight reversal of cervical lordosis again noted  Anatomic alignment  Intervertebral disc heights are preserved  Normal prevertebral soft tissues  Clear lung apices  Impression: Cortical destructive changes consistent with known metastatic disease most pronounced at the C3, C4 and C5 levels  Findings would be better assessed by CT or MR if clinically necessary   Workstation performed: [id number]       I have personally reviewed pertinent reports   , I have personally reviewed pertinent films in PACS and I have personally reviewed pertinent films in PACS with a Radiologist

## 2022-07-07 ENCOUNTER — HOSPITAL ENCOUNTER (OUTPATIENT)
Dept: INFUSION CENTER | Facility: CLINIC | Age: 47
Discharge: HOME | End: 2022-07-07
Payer: COMMERCIAL

## 2022-07-07 ENCOUNTER — PATIENT OUTREACH (OUTPATIENT)
Dept: HEMATOLOGY ONCOLOGY | Facility: CLINIC | Age: 47
End: 2022-07-07

## 2022-07-07 VITALS
OXYGEN SATURATION: 98 % | HEART RATE: 95 BPM | HEIGHT: 64 IN | RESPIRATION RATE: 18 BRPM | SYSTOLIC BLOOD PRESSURE: 116 MMHG | WEIGHT: 165 LBS | DIASTOLIC BLOOD PRESSURE: 86 MMHG | TEMPERATURE: 97.5 F | BODY MASS INDEX: 28.17 KG/M2

## 2022-07-07 DIAGNOSIS — C50.811 MALIGNANT NEOPLASM OF OVERLAPPING SITES OF RIGHT BREAST IN FEMALE, ESTROGEN RECEPTOR NEGATIVE (HCC): ICD-10-CM

## 2022-07-07 DIAGNOSIS — D70.1 CHEMOTHERAPY-INDUCED NEUTROPENIA (HCC): ICD-10-CM

## 2022-07-07 DIAGNOSIS — C79.51 OSSEOUS METASTASIS (HCC): Primary | ICD-10-CM

## 2022-07-07 DIAGNOSIS — T45.1X5A CHEMOTHERAPY-INDUCED NEUTROPENIA (HCC): ICD-10-CM

## 2022-07-07 DIAGNOSIS — Z17.1 MALIGNANT NEOPLASM OF OVERLAPPING SITES OF RIGHT BREAST IN FEMALE, ESTROGEN RECEPTOR NEGATIVE (HCC): ICD-10-CM

## 2022-07-07 PROCEDURE — 96413 CHEMO IV INFUSION 1 HR: CPT

## 2022-07-07 PROCEDURE — 96367 TX/PROPH/DG ADDL SEQ IV INF: CPT

## 2022-07-07 PROCEDURE — 96417 CHEMO IV INFUS EACH ADDL SEQ: CPT

## 2022-07-07 PROCEDURE — 96375 TX/PRO/DX INJ NEW DRUG ADDON: CPT

## 2022-07-07 RX ORDER — SODIUM CHLORIDE 9 MG/ML
20 INJECTION, SOLUTION INTRAVENOUS ONCE
Status: COMPLETED | OUTPATIENT
Start: 2022-07-07 | End: 2022-07-07

## 2022-07-07 RX ADMIN — SODIUM CHLORIDE 20 ML/HR: 0.9 INJECTION, SOLUTION INTRAVENOUS at 12:27

## 2022-07-07 RX ADMIN — FAMOTIDINE 20 MG: 10 INJECTION INTRAVENOUS at 13:12

## 2022-07-07 RX ADMIN — PACLITAXEL 150.6 MG: 6 INJECTION, SOLUTION, CONCENTRATE INTRAVENOUS at 14:56

## 2022-07-07 RX ADMIN — Medication 487 MG: at 14:21

## 2022-07-07 RX ADMIN — DIPHENHYDRAMINE HYDROCHLORIDE 25 MG: 50 INJECTION, SOLUTION INTRAMUSCULAR; INTRAVENOUS at 12:51

## 2022-07-07 RX ADMIN — DEXAMETHASONE SODIUM PHOSPHATE: 10 INJECTION, SOLUTION INTRAMUSCULAR; INTRAVENOUS at 12:27

## 2022-07-07 RX ADMIN — PERTUZUMAB 420 MG: 30 INJECTION, SOLUTION, CONCENTRATE INTRAVENOUS at 13:45

## 2022-07-07 NOTE — PROGRESS NOTES
Patient presents today for treatment with Perjeta, Herceptin and Taxol  Patient offers no complaints  VSS  Labs reviewed and within parameters to treat  Peripheral IV inserted after multiple attempts

## 2022-07-07 NOTE — PROGRESS NOTES
Patient tolerated treatment without incident  Peripheral IV removed  Next appointment confirmed  AVS declined

## 2022-07-07 NOTE — PROGRESS NOTES
I contacted patient to check in and see how everything is going  Patient stated she is feeling good overall  Patient states she is nervous to get Infusion today due to her previous reactions  Patient discussed the great support she has gotten while being a patient at [facility]   Patient states she get's lonely being at home while recovering   Patient states she does do some classes with [address] [place] [address]  I informed patient I would look into see if there is anything additional she can do virtually  Patient provided contact information for any further questions or concerns

## 2022-07-08 ENCOUNTER — HOME CARE VISIT (OUTPATIENT)
Dept: HOME HEALTH SERVICES | Facility: HOME HEALTHCARE | Age: 47
End: 2022-07-08
Payer: COMMERCIAL

## 2022-07-08 VITALS
OXYGEN SATURATION: 98 % | DIASTOLIC BLOOD PRESSURE: 80 MMHG | TEMPERATURE: 97.5 F | SYSTOLIC BLOOD PRESSURE: 128 MMHG | HEART RATE: 90 BPM | RESPIRATION RATE: 16 BRPM

## 2022-07-08 DIAGNOSIS — Z51.5 PALLIATIVE CARE PATIENT: ICD-10-CM

## 2022-07-08 DIAGNOSIS — G89.3 CANCER ASSOCIATED PAIN: ICD-10-CM

## 2022-07-08 PROCEDURE — G0299 HHS/HOSPICE OF RN EA 15 MIN: HCPCS

## 2022-07-08 RX ORDER — OXYCODONE HYDROCHLORIDE 10 MG/1
10 TABLET ORAL EVERY 4 HOURS PRN
Qty: 180 TABLET | Refills: 0 | Status: SHIPPED | OUTPATIENT
Start: 2022-07-08 | End: 2022-08-06 | Stop reason: SDUPTHER

## 2022-07-11 ENCOUNTER — TELEPHONE (OUTPATIENT)
Dept: INTERNAL MEDICINE CLINIC | Facility: CLINIC | Age: 47
End: 2022-07-11

## 2022-07-11 NOTE — TELEPHONE ENCOUNTER
Patient called she stated [facility] does not carry the lift chair , and she would rather have a hard plastic bridget chair (recliner ) script put in  , has to go through the [employer]ash[employer] portal to [employer]

## 2022-07-11 NOTE — TELEPHONE ENCOUNTER
I cannot locate the  recliner chair option in the DME orders    Please confirm with the [employer] as how it should be worded

## 2022-07-12 ENCOUNTER — HOME CARE VISIT (OUTPATIENT)
Dept: HOME HEALTH SERVICES | Facility: HOME HEALTHCARE | Age: 47
End: 2022-07-12
Payer: COMMERCIAL

## 2022-07-12 PROCEDURE — G0299 HHS/HOSPICE OF RN EA 15 MIN: HCPCS

## 2022-07-13 ENCOUNTER — TELEPHONE (OUTPATIENT)
Dept: CARDIAC SURGERY | Facility: CLINIC | Age: 47
End: 2022-07-13

## 2022-07-13 ENCOUNTER — LAB REQUISITION (OUTPATIENT)
Dept: LAB | Facility: HOSPITAL | Age: 47
End: 2022-07-13
Payer: COMMERCIAL

## 2022-07-13 VITALS
RESPIRATION RATE: 16 BRPM | TEMPERATURE: 98 F | SYSTOLIC BLOOD PRESSURE: 132 MMHG | OXYGEN SATURATION: 98 % | HEART RATE: 86 BPM | DIASTOLIC BLOOD PRESSURE: 76 MMHG

## 2022-07-13 DIAGNOSIS — D70.1 AGRANULOCYTOSIS SECONDARY TO CANCER CHEMOTHERAPY (CODE) (HCC): ICD-10-CM

## 2022-07-13 DIAGNOSIS — C79.51 SECONDARY MALIGNANT NEOPLASM OF BONE (HCC): ICD-10-CM

## 2022-07-13 DIAGNOSIS — T45.1X5A ADVERSE EFFECT OF ANTINEOPLASTIC AND IMMUNOSUPPRESSIVE DRUGS, INITIAL ENCOUNTER: ICD-10-CM

## 2022-07-13 LAB
ALBUMIN SERPL BCP-MCNC: 3.8 G/DL (ref 3.5–5)
ALP SERPL-CCNC: 70 U/L (ref 46–116)
ALT SERPL W P-5'-P-CCNC: 56 U/L (ref 12–78)
ANION GAP SERPL CALCULATED.3IONS-SCNC: 9 MMOL/L (ref 4–13)
ANISOCYTOSIS BLD QL SMEAR: PRESENT
AST SERPL W P-5'-P-CCNC: 63 U/L (ref 5–45)
BASOPHILS # BLD MANUAL: 0 THOUSAND/UL (ref 0–0.1)
BASOPHILS NFR MAR MANUAL: 0 % (ref 0–1)
BILIRUB SERPL-MCNC: 0.56 MG/DL (ref 0.2–1)
BUN SERPL-MCNC: 8 MG/DL (ref 5–25)
CALCIUM SERPL-MCNC: 9.4 MG/DL (ref 8.3–10.1)
CHLORIDE SERPL-SCNC: 104 MMOL/L (ref 100–108)
CO2 SERPL-SCNC: 25 MMOL/L (ref 21–32)
CREAT SERPL-MCNC: 0.65 MG/DL (ref 0.6–1.3)
EOSINOPHIL # BLD MANUAL: 0 THOUSAND/UL (ref 0–0.4)
EOSINOPHIL NFR BLD MANUAL: 0 % (ref 0–6)
ERYTHROCYTE [DISTWIDTH] IN BLOOD BY AUTOMATED COUNT: 18.7 % (ref 11.6–15.1)
GFR SERPL CREATININE-BSD FRML MDRD: 106 ML/MIN/1.73SQ M
GLUCOSE SERPL-MCNC: 63 MG/DL (ref 65–140)
HCT VFR BLD AUTO: 40 % (ref 34.8–46.1)
HGB BLD-MCNC: 11.5 G/DL (ref 11.5–15.4)
LYMPHOCYTES # BLD AUTO: 0.53 THOUSAND/UL (ref 0.6–4.47)
LYMPHOCYTES # BLD AUTO: 23 % (ref 14–44)
MCH RBC QN AUTO: 31.7 PG (ref 26.8–34.3)
MCHC RBC AUTO-ENTMCNC: 28.8 G/DL (ref 31.4–37.4)
MCV RBC AUTO: 110 FL (ref 82–98)
MONOCYTES # BLD AUTO: 0.16 THOUSAND/UL (ref 0–1.22)
MONOCYTES NFR BLD: 7 % (ref 4–12)
NEUTROPHILS # BLD MANUAL: 1.62 THOUSAND/UL (ref 1.85–7.62)
NEUTS SEG NFR BLD AUTO: 70 % (ref 43–75)
PLATELET # BLD AUTO: 160 THOUSANDS/UL (ref 149–390)
PLATELET BLD QL SMEAR: ADEQUATE
PMV BLD AUTO: 11.8 FL (ref 8.9–12.7)
POIKILOCYTOSIS BLD QL SMEAR: PRESENT
POTASSIUM SERPL-SCNC: 4.8 MMOL/L (ref 3.5–5.3)
PROT SERPL-MCNC: 7 G/DL (ref 6.4–8.2)
RBC # BLD AUTO: 3.63 MILLION/UL (ref 3.81–5.12)
RBC MORPH BLD: PRESENT
SODIUM SERPL-SCNC: 138 MMOL/L (ref 136–145)
WBC # BLD AUTO: 2.31 THOUSAND/UL (ref 4.31–10.16)

## 2022-07-13 PROCEDURE — 80053 COMPREHEN METABOLIC PANEL: CPT | Performed by: INTERNAL MEDICINE

## 2022-07-13 PROCEDURE — 85007 BL SMEAR W/DIFF WBC COUNT: CPT | Performed by: INTERNAL MEDICINE

## 2022-07-13 PROCEDURE — 85027 COMPLETE CBC AUTOMATED: CPT | Performed by: INTERNAL MEDICINE

## 2022-07-13 NOTE — TELEPHONE ENCOUNTER
Spoke topatient about rescheduling appt with Dr Virginia Abarca  Patient expressed understanding to new date and time

## 2022-07-14 ENCOUNTER — OFFICE VISIT (OUTPATIENT)
Dept: GASTROENTEROLOGY | Facility: CLINIC | Age: 47
End: 2022-07-14
Payer: COMMERCIAL

## 2022-07-14 ENCOUNTER — HOSPITAL ENCOUNTER (OUTPATIENT)
Dept: INFUSION CENTER | Facility: HOSPITAL | Age: 47
Discharge: HOME | End: 2022-07-14
Attending: INTERNAL MEDICINE
Payer: COMMERCIAL

## 2022-07-14 VITALS
HEIGHT: 64 IN | RESPIRATION RATE: 18 BRPM | WEIGHT: 164.68 LBS | BODY MASS INDEX: 28.12 KG/M2 | DIASTOLIC BLOOD PRESSURE: 68 MMHG | HEART RATE: 99 BPM | SYSTOLIC BLOOD PRESSURE: 138 MMHG

## 2022-07-14 VITALS
DIASTOLIC BLOOD PRESSURE: 95 MMHG | SYSTOLIC BLOOD PRESSURE: 135 MMHG | HEIGHT: 64 IN | BODY MASS INDEX: 28.17 KG/M2 | WEIGHT: 165 LBS | HEART RATE: 88 BPM

## 2022-07-14 DIAGNOSIS — B37.81 CANDIDA INFECTION, ESOPHAGEAL (HCC): Primary | ICD-10-CM

## 2022-07-14 DIAGNOSIS — Z12.12 SCREENING FOR COLORECTAL CANCER: ICD-10-CM

## 2022-07-14 DIAGNOSIS — R17 ELEVATED BILIRUBIN: ICD-10-CM

## 2022-07-14 DIAGNOSIS — Z12.11 SCREENING FOR COLORECTAL CANCER: ICD-10-CM

## 2022-07-14 DIAGNOSIS — T45.1X5A CHEMOTHERAPY-INDUCED NEUTROPENIA (HCC): ICD-10-CM

## 2022-07-14 DIAGNOSIS — C50.811 MALIGNANT NEOPLASM OF OVERLAPPING SITES OF RIGHT BREAST IN FEMALE, ESTROGEN RECEPTOR NEGATIVE (HCC): ICD-10-CM

## 2022-07-14 DIAGNOSIS — Z17.1 MALIGNANT NEOPLASM OF OVERLAPPING SITES OF RIGHT BREAST IN FEMALE, ESTROGEN RECEPTOR NEGATIVE (HCC): ICD-10-CM

## 2022-07-14 DIAGNOSIS — D70.1 CHEMOTHERAPY-INDUCED NEUTROPENIA (HCC): ICD-10-CM

## 2022-07-14 DIAGNOSIS — C79.51 OSSEOUS METASTASIS (HCC): Primary | ICD-10-CM

## 2022-07-14 PROCEDURE — 99214 OFFICE O/P EST MOD 30 MIN: CPT | Performed by: PHYSICIAN ASSISTANT

## 2022-07-14 PROCEDURE — 96367 TX/PROPH/DG ADDL SEQ IV INF: CPT

## 2022-07-14 PROCEDURE — 96413 CHEMO IV INFUSION 1 HR: CPT

## 2022-07-14 RX ORDER — SODIUM CHLORIDE 9 MG/ML
20 INJECTION, SOLUTION INTRAVENOUS ONCE
Status: COMPLETED | OUTPATIENT
Start: 2022-07-14 | End: 2022-07-14

## 2022-07-14 RX ADMIN — DIPHENHYDRAMINE HYDROCHLORIDE 25 MG: 50 INJECTION, SOLUTION INTRAMUSCULAR; INTRAVENOUS at 13:54

## 2022-07-14 RX ADMIN — DEXAMETHASONE SODIUM PHOSPHATE: 10 INJECTION, SOLUTION INTRAMUSCULAR; INTRAVENOUS at 13:33

## 2022-07-14 RX ADMIN — FAMOTIDINE 20 MG: 10 INJECTION INTRAVENOUS at 14:16

## 2022-07-14 RX ADMIN — SODIUM CHLORIDE 20 ML/HR: 0.9 INJECTION, SOLUTION INTRAVENOUS at 13:34

## 2022-07-14 RX ADMIN — PACLITAXEL 150.6 MG: 6 INJECTION, SOLUTION, CONCENTRATE INTRAVENOUS at 14:47

## 2022-07-14 NOTE — PROGRESS NOTES
[facility] - Outpatient Follow-up Note  Edelmira Smith 51 y o  female MRN: [id number]  Encounter: [id number]          ASSESSMENT AND PLAN:      1  Candida infection, esophageal ([address] )  Patient with recurrent Candida infections likely related to chronic immunosuppression due to chemotherapy  Patient is doing better after prolonged treatment with course of Diflucan  No significant yeast noted in mouth on exam today  She otherwise will continue to monitor for symptoms  If she would develop any dysphagia or odynophagia then would recommend EGD but otherwise would hold off due to neck immobility as well as immunosuppressed state  2  Elevated bilirubin  Patient with history of elevated bilirubin which subsequently normalized now with mild elevation of AST and would continue to monitor    3  Screening for colorectal cancer  Patient would like to defer colonoscopy due to history of metastatic breast cancer    Patient was advised to follow up with us as needed  Advised to call if any problems or questions occur in the interim  ______________________________________________________________________    SUBJECTIVE:   This is a 65-year-old female who presents today for follow-up visit  She has hx of breast cancer with metastasis  Patient has been treated multiple times with courses of antifungals  Patient did have oral thrush and she has been treated with Diflucan for 5 days and 7 days in early May and subsequently was hospitalized for COVID-19  Patient does have neck brace in place due to metastasis to her neck from her breast cancer and currently must wear the brace constantly due to risk of paralysis but now able to wean down for a few hr per day    Patient otherwise had blood work yesterday-previously was noted to have an elevated bilirubin which has subsequently normalized and now has mildly elevated AST of 63 but otherwise LFTs are normal   Have a history of known bone Mets and did have an elevated alkaline phosphatase  patient was treated with prolonged course of Diflucan for 21 days on last office visit and she is doing better  She does feel some throat irritation at times but she thinks this may be related to prolonged speaking  Patient currently denies any significant odynophagia or dysphagia  Has never had upper endoscopy  Patient had been taking nystatin for several months and was still taking this nightly  She otherwise denies any significant nausea and her appetite is stable  Denies any diarrheal symptoms  Wbc's yesterday were 2 31 on her blood work, did have significant neutropenia back in May  Patient reports she will stop chemo totally in September  REVIEW OF SYSTEMS IS OTHERWISE NEGATIVE  Historical Information   Past Medical History:   Diagnosis Date    [facility] )     Cancer ([facility] )     Hypertension      History reviewed  No pertinent surgical history    Social History   Social History     Substance and Sexual Activity   Alcohol Use Not Currently    Comment: rare, social      Social History     Substance and Sexual Activity   Drug Use Never     Social History     Tobacco Use   Smoking Status Never Smoker   Smokeless Tobacco Never Used     Family History   Problem Relation Age of Onset    Coronary artery disease Mother     Heart attack Father     Diabetes type II Father        Meds/Allergies       Current Outpatient Medications:     al mag oxide-diphenhydramine-lidocaine viscous (MAGIC MOUTHWASH) 1:1:1 suspension    baclofen 10 mg tablet    dexamethasone (DECADRON) 4 mg tablet    DPH-Lido-AlHydr-MgHydr-Simeth (First-Mouthwash BLM) SUSP    furosemide (LASIX) 40 mg tablet    lactulose (CEPHULAC) 10 g packet    lisinopril (ZESTRIL) 20 mg tablet    morphine (MS CONTIN) 15 mg 12 hr tablet    nystatin (MYCOSTATIN) 500,000 units/5 mL suspension    nystatin (MYCOSTATIN) powder    ondansetron (ZOFRAN) 4 mg tablet    oxyCODONE (ROXICODONE) 10 MG TABS    pantoprazole (PROTONIX) 40 mg tablet    polyethylene glycol (MIRALAX) 17 g packet    senna (SENOKOT) 8 6 mg    No Known Allergies        Objective     Blood pressure 135/95, pulse 88, height 5' 4" (1 626 m), weight 74 8 kg (165 lb), not currently breastfeeding  Body mass index is 28 32 kg/m²  PHYSICAL EXAM:      General Appearance:   Alert, cooperative, no distress   HEENT:   Normocephalic, atraumatic, anicteric      Neck:  Supple, symmetrical, trachea midline   Lungs:   Clear to auscultation bilaterally; no rales, rhonchi or wheezing; respirations unlabored    Heart[de-identified]   Regular rate and rhythm; no murmur, rub, or gallop  Abdomen:   Soft, non-tender, non-distended; normal bowel sounds; no masses, no organomegaly    Genitalia:   Deferred    Rectal:   Deferred    Extremities:  No cyanosis, clubbing or edema    Pulses:  2+ and symmetric    Skin:  No jaundice, rashes, or lesions    Lymph nodes:  No palpable cervical lymphadenopathy        Lab Results:   No visits with results within 1 Day(s) from this visit     Latest known visit with results is:   Lab Requisition on 07/13/2022   Component Date Value    WBC 07/13/2022 2 31 (A)    RBC 07/13/2022 3 63 (A)    Hemoglobin 07/13/2022 11 5     Hematocrit 07/13/2022 40 0     MCV 07/13/2022 110 (A)    MCH 07/13/2022 31 7     MCHC 07/13/2022 28 8 (A)    RDW 07/13/2022 18 7 (A)    MPV 07/13/2022 11 8     Platelets 93/29/4526 160     Sodium 07/13/2022 138     Potassium 07/13/2022 4 8     Chloride 07/13/2022 104     CO2 07/13/2022 25     ANION GAP 07/13/2022 9     BUN 07/13/2022 8     Creatinine 07/13/2022 0 65     Glucose 07/13/2022 63 (A)    Calcium 07/13/2022 9 4     AST 07/13/2022 63 (A)    ALT 07/13/2022 56     Alkaline Phosphatase 07/13/2022 70     Total Protein 07/13/2022 7 0     Albumin 07/13/2022 3 8     Total Bilirubin 07/13/2022 0 56     eGFR 07/13/2022 106     Segmented % 07/13/2022 70     Lymphocytes % 07/13/2022 23     Monocytes % 07/13/2022 7     Eosinophils, % 07/13/2022 0     Basophils % 07/13/2022 0     Absolute Neutrophils 07/13/2022 1 62 (A)    Lymphocytes Absolute 07/13/2022 0 53 (A)    Monocytes Absolute 07/13/2022 0 16     Eosinophils Absolute 07/13/2022 0 00     Basophils Absolute 07/13/2022 0 00     RBC Morphology 07/13/2022 Present     Anisocytosis 07/13/2022 Present     Poikilocytes 07/13/2022 Present     Platelet Estimate 17/88/8520 Adequate          Radiology Results:   XR spine cervical 2 or 3 vw injury    Result Date: 7/6/2022  Narrative: CERVICAL SPINE INDICATION:   M84 48XA: Pathological fracture, other site, initial encounter for fracture  COMPARISON:  Cervical spine radiographs 6/15/2022  VIEWS:  XR SPINE CERVICAL 2 OR 3 VW INJURY FINDINGS: Similar appearance of cortical erosive and destructive changes most notable at C3-C6  Stable slight reversal of the cervical lordosis  Intervertebral disc heights are otherwise preserved  Normal prevertebral soft tissues  Clear lung apices  Impression: Stable cortical destructive changes most pronounced from C3 through C6, consistent with known metastatic disease  Workstation performed: [id number]     XR spine cervical 2 or 3 vw injury    Result Date: 6/18/2022  Narrative: CERVICAL SPINE INDICATION:   C79 49: 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 C79 51: Secondary malignant neoplasm of bone  COMPARISON:  5/12/2022 VIEWS:  XR SPINE CERVICAL 2 OR 3 VW INJURY FINDINGS: Cortical erosive and destructive changes most notable at C3, C4, C5 vertebral bodies  Slight reversal of cervical lordosis again noted  Anatomic alignment  Intervertebral disc heights are preserved  Normal prevertebral soft tissues  Clear lung apices  Impression: Cortical destructive changes consistent with known metastatic disease most pronounced at the C3, C4 and C5 levels  Findings would be better assessed by CT or MR if clinically necessary   Workstation performed: [id number]     XR spine cervical complete 6+ vw flex/ext/obl    Result Date: 7/8/2022  Narrative: CERVICAL SPINE INDICATION:   S12 300A: Unspecified displaced fracture of fourth cervical vertebra, initial encounter for closed fracture  COMPARISON:  7/1/2022 VIEWS:  XR SPINE CERVICAL COMPLETE 6+ VW FLEX /EXT /OBL Images: 7 FINDINGS: Severe stable compression deformity of C4 with slight reversal of the cervical lordosis at this level  No evidence of dynamic instability seen during flexion/extension  Moderate disc space narrowing and degenerative change noted from C3-4 through C5-6  Narrowed neural foramina are noted bilaterally at multiple levels from C3-4 through C6-7  Soft tissue masses are identified arising from the severely compressed C4 level which appear to extend into the right C3-4 and C4-5 neural foramina  Lytic lesions noted in the calvarium, previously described  The lung apices are clear  Impression: Stable appearing cervical spine with degenerative and destructive findings noted as previously described  Soft tissue masses noted in around C4 where there is a stable marked compression fracture and known metastatic disease  No evidence for dynamic instability on flexion or extension  Lytic calvarial lesions, previously described   Workstation performed: [id number]

## 2022-07-15 ENCOUNTER — OFFICE VISIT (OUTPATIENT)
Dept: PALLIATIVE MEDICINE | Facility: CLINIC | Age: 47
End: 2022-07-15
Payer: COMMERCIAL

## 2022-07-15 ENCOUNTER — HOME CARE VISIT (OUTPATIENT)
Dept: HOME HEALTH SERVICES | Facility: HOME HEALTHCARE | Age: 47
End: 2022-07-15
Payer: COMMERCIAL

## 2022-07-15 VITALS
DIASTOLIC BLOOD PRESSURE: 96 MMHG | RESPIRATION RATE: 18 BRPM | HEART RATE: 72 BPM | TEMPERATURE: 98.5 F | SYSTOLIC BLOOD PRESSURE: 138 MMHG | OXYGEN SATURATION: 97 %

## 2022-07-15 VITALS
SYSTOLIC BLOOD PRESSURE: 130 MMHG | HEART RATE: 84 BPM | TEMPERATURE: 97.2 F | DIASTOLIC BLOOD PRESSURE: 84 MMHG | BODY MASS INDEX: 28.82 KG/M2 | WEIGHT: 168 LBS | OXYGEN SATURATION: 94 %

## 2022-07-15 DIAGNOSIS — B37.81 CANDIDA INFECTION, ESOPHAGEAL (HCC): ICD-10-CM

## 2022-07-15 DIAGNOSIS — T45.1X5A CHEMOTHERAPY-INDUCED NAUSEA: ICD-10-CM

## 2022-07-15 DIAGNOSIS — C79.51 OSSEOUS METASTASIS (HCC): ICD-10-CM

## 2022-07-15 DIAGNOSIS — Z17.1 MALIGNANT NEOPLASM OF OVERLAPPING SITES OF RIGHT BREAST IN FEMALE, ESTROGEN RECEPTOR NEGATIVE (HCC): Primary | ICD-10-CM

## 2022-07-15 DIAGNOSIS — Z79.899 MEDICAL MARIJUANA USE: ICD-10-CM

## 2022-07-15 DIAGNOSIS — R53.0 NEOPLASTIC MALIGNANT RELATED FATIGUE: ICD-10-CM

## 2022-07-15 DIAGNOSIS — R11.0 CHEMOTHERAPY-INDUCED NAUSEA: ICD-10-CM

## 2022-07-15 DIAGNOSIS — C79.49 METASTASIS TO SPINAL CORD (HCC): ICD-10-CM

## 2022-07-15 DIAGNOSIS — K59.00 CONSTIPATION: ICD-10-CM

## 2022-07-15 DIAGNOSIS — C50.811 MALIGNANT NEOPLASM OF OVERLAPPING SITES OF RIGHT BREAST IN FEMALE, ESTROGEN RECEPTOR NEGATIVE (HCC): Primary | ICD-10-CM

## 2022-07-15 DIAGNOSIS — G89.3 CANCER ASSOCIATED PAIN: ICD-10-CM

## 2022-07-15 DIAGNOSIS — Z51.5 PALLIATIVE CARE PATIENT: ICD-10-CM

## 2022-07-15 PROCEDURE — G0300 HHS/HOSPICE OF LPN EA 15 MIN: HCPCS

## 2022-07-15 PROCEDURE — 99214 OFFICE O/P EST MOD 30 MIN: CPT | Performed by: INTERNAL MEDICINE

## 2022-07-15 RX ORDER — MORPHINE SULFATE 15 MG/1
15 TABLET, FILM COATED, EXTENDED RELEASE ORAL EVERY 12 HOURS SCHEDULED
Qty: 60 TABLET | Refills: 0 | Status: SHIPPED | OUTPATIENT
Start: 2022-07-22 | End: 2022-08-15 | Stop reason: SDUPTHER

## 2022-07-15 NOTE — PROGRESS NOTES
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 ([facility] )    4  [facility] infection, esophageal ([facility] )    5  Cancer associated pain    6  Chemotherapy-induced nausea    7  Neoplastic malignant related fatigue    8  Constipation    9  Medical marijuana use    10  Palliative care patient           Patient is happy that Neurosurgery has approved (and provided guidelines for how to successfully accomplish) weaning off her neck brace  Her QOL has improved and she is quite eager for her time in the brace to be done   Continue MSER q12h ATC (TID dosing not tolerated d/t fatigue/somnolence)  Continue oxyIR 10mg PRN, she is taking 6x/day  Analgesic regimen is effective  Counseled on using oxyIR PRN, not ATC as she has been using   Continue OTC bowel regimen for OIC   Continue Zofran PRN N/V; patient may take qAM on day of cancer treatments and for 4-5 days afterwards, prophylactically, but otherwise does not need to take Zofran ATC  Counseled  Pharmacy and insurer refuse to cover more than 9 tabs per dispensation  Medication is effective   Continue MMJ products for symptom relief  Counseled on adverse effects and routes of administration   Continue baclofen PRN (not ATC)   Patient has received [address] vaccinations   Reviewed notes (Neurosurgery, Medical Oncology, Radiation Oncology), labs (7/5/22 Cr 0 55, alb 4 0, Hb 11 5), imaging + procedures (7/5/22 XR C-spine, 7/1/22 XR C-spine, 6/15/22 XR C-spine)  Return in about 1 month (around 8/15/2022)   Emotional support provided     Medication safety issues addressed - no driving under the influence of narcotics, watch for adverse effects including AMS and respiratory depression, keep medications stored in a safe/locked environment  Requested Prescriptions     Signed Prescriptions Disp Refills    morphine (MS CONTIN) 15 mg 12 hr tablet 60 tablet 0     Sig: Take 1 tablet (15 mg total) by mouth every 12 (twelve) hours Max Daily Amount: 30 mg       Medications Discontinued During This Encounter   Medication Reason    dexamethasone (DECADRON) 4 mg tablet Therapy completed    nystatin (MYCOSTATIN) 500,000 units/5 mL suspension Therapy completed    morphine (MS CONTIN) 15 mg 12 hr tablet Reorder       Representatives have queried the patient's controlled substance dispensing history in the Prescription Drug Monitoring Program in compliance with regulations before I have prescribed any controlled substances  The prescription history is consistent with prescribed therapy and our practice policies  40 minutes were spent in this ambulatory visit with greater than 50% of the time spent face to face with patient and family (significant other Serg Shown) in counseling or coordination of care including discussions of symptom assessment and management, medication review, psychosocial support, chart review, imaging review, lab review, medical marijuana, supportive listening and anticipatory guidance  All of the patient's questions were answered during this discussion  SUBJECTIVE:  Chief Complaint   Patient presents with    Cancer    Cancer Pain    Nausea    Constipation    Follow-up    Counseling        HPI    Vance Haney is a 51 y o  female w/ ER-WV-HER2+ carcinoma of the R breast (diagnosed 3/29/22) metastatic to bone, s/p RT to spine; cancer-related pain  She follows w/ Dr Ernesto Matute (Medical Oncology)  Certified for [facility] in the [facility] on 4/22/22 by Dr Eleonora Patricia, for cancer-related pain  Plan includes systemic therapy with pertuzumab + trastuzumab + docetaxel      Patient is known to [facility] clinic; last seen 6/15/22 for symptom assessment and management, medication review, psychosocial support, chart review, imaging review, lab review, medical marijuana, supportive listening and anticipatory guidance  Patient is in good spirits today  She is excited to report that Neurosurgery has discontinued the brace portion of her immobilization setup and has approved a slow wean of the cervical collar  Her QOL has already improved and she is eager for further freedom of movement  She reports her pain is well-managed (3/10 at present, in her neck and shoulder) though she is not only taking her MSER q12h ATC, she is also taking oxyIR 10mg q4h ATC and baclofen ATC  She accepts counseling on use of PRN medications  She states that sitting can be uncomfortable, and has rented a "HubNami chair" which helps resolve this  Patient states that now that she is more independent, she has much more time alone  This plus her desire to improve her function (and slow progress) causes some anxiety  She is getting support from online cancer support groups, from her loved ones  She is seeking healthy ways to distract herself and refocus  While her speech can be rapid at times, she states this is d/t her wanting to express all of her concerns and issues rather than a clinical sign; she has no disorganized thought or racing thoughts, no real manic behavior  Patient reports her appetite is "fine"  She is taking Zofran ATC  She is using senna PRN constipation with good results  Patient reports TD MMJ has been helpful for lower spinal pain and shoulder pain  She is apprehensive about using PO products d/t reported risks of hypotension, and accepts counseling on this today  Patient notes some mild ankle swelling, L>R  This has improved in recent weeks; she only rarely needs to wear compression socks  She is elevating her legs when at rest     PDMP shows no concerns  The following portions of the medical history were reviewed: past medical history, surgical history, problem list, medication list, family history, and social history        Current Outpatient Medications:     [START ON 7/22/2022] morphine (MS CONTIN) 15 mg 12 hr tablet, Take 1 tablet (15 mg total) by mouth every 12 (twelve) hours Max Daily Amount: 30 mg, Disp: 60 tablet, Rfl: 0    oxyCODONE (ROXICODONE) 10 MG TABS, Take 1 tablet (10 mg total) by mouth every 4 (four) hours as needed for moderate pain Max Daily Amount: 60 mg, Disp: 180 tablet, Rfl: 0    senna (SENOKOT) 8 6 mg, Take 3 tablets (25 8 mg total) by mouth 2 (two) times a day Hold for loose stool , Disp: 180 tablet, Rfl: 0    al mag oxide-diphenhydramine-lidocaine viscous (MAGIC MOUTHWASH) 1:1:1 suspension, Swish and spit 10 mL every 4 (four) hours as needed for mouth pain or discomfort, Disp: 500 mL, Rfl: 2    baclofen 10 mg tablet, TAKE 1/2 TABLET BY MOUTH 3 TIMES A DAY AS NEEDED FOR MUSCLE SPASMS, Disp: 135 tablet, Rfl: 1    DPH-Lido-AlHydr-MgHydr-Simeth (First-Mouthwash BLM) SUSP, , Disp: , Rfl:     furosemide (LASIX) 40 mg tablet, Take 1 tablet (40 mg total) by mouth in the morning , Disp: 30 tablet, Rfl: 1    lactulose (CEPHULAC) 10 g packet, Take 1 packet (10 g total) by mouth 3 (three) times a day as needed (constipation), Disp: 30 each, Rfl: 0    lisinopril (ZESTRIL) 20 mg tablet, Take 1 tablet (20 mg total) by mouth daily, Disp: 90 tablet, Rfl: 1    nystatin (MYCOSTATIN) powder, Apply topically 3 (three) times a day, Disp: 15 g, Rfl: 0    ondansetron (ZOFRAN) 4 mg tablet, Take 1 tablet (4 mg total) by mouth every 8 (eight) hours as needed for nausea or vomiting, Disp: 9 tablet, Rfl: 20    pantoprazole (PROTONIX) 40 mg tablet, Take 1 tablet (40 mg total) by mouth daily in the early morning, Disp: 90 tablet, Rfl: 1    polyethylene glycol (MIRALAX) 17 g packet, Take 17 g by mouth daily, Disp: 30 each, Rfl: 0  No current facility-administered medications for this visit  Review of Systems   Constitutional: Positive for activity change (improving) and fatigue (improving)     Cardiovascular: Positive for leg swelling (L>R, mild)  Gastrointestinal: Positive for constipation (improves w/ PRN senna)  Candidiasis resolved, therapy complete  Musculoskeletal: Positive for arthralgias, back pain and myalgias  Allergic/Immunologic: Positive for immunocompromised state  Psychiatric/Behavioral: The patient is nervous/anxious  All other systems reviewed and are negative  OBJECTIVE:  /84 (BP Location: Left arm)   Pulse 84   Temp (!) 97 2 °F (36 2 °C) (Temporal)   Wt 76 2 kg (168 lb)   SpO2 94%   BMI 28 82 kg/m²   Physical Exam  Vitals reviewed  Constitutional:       General: She is not in acute distress  Appearance: She is well-developed, well-groomed and overweight  She is not toxic-appearing  Interventions: Cervical collar in place  HENT:      Head: Normocephalic and atraumatic  Right Ear: External ear normal       Left Ear: External ear normal    Eyes:      General: No scleral icterus  Right eye: No discharge  Left eye: No discharge  Extraocular Movements: Extraocular movements intact  Conjunctiva/sclera: Conjunctivae normal       Pupils: Pupils are equal, round, and reactive to light  Cardiovascular:      Rate and Rhythm: Normal rate  Pulmonary:      Effort: Pulmonary effort is normal  No tachypnea, bradypnea, accessory muscle usage or respiratory distress  Abdominal:      General: There is no distension  Tenderness: There is no guarding  Musculoskeletal:      Cervical back: Decreased range of motion  Right lower leg: Edema (mild) present  Left lower leg: Edema (mild) present  Skin:     General: Skin is dry  Coloration: Skin is not pale  Neurological:      Mental Status: She is alert and oriented to person, place, and time  Cranial Nerves: No dysarthria or facial asymmetry     Psychiatric:         Attention and Perception: Attention normal          Mood and Affect: Mood and affect normal          Speech: Speech normal  Behavior: Behavior normal  Behavior is cooperative  Thought Content: Thought content normal          Cognition and Memory: Cognition and memory normal          Judgment: Judgment normal         Mary Huggins MD  [facility]  [phone]    Portions of this document may have been created using dictation software and as such some "sound alike" terms may have been generated by the system  Do not hesitate to contact me with any questions or clarifications

## 2022-07-15 NOTE — PATIENT INSTRUCTIONS
It was good to see you today  Thank you for coming in  Continue current medications  Oxycodone, Zofran, baclofen are "as needed" medications - you do not need to take these in a scheduled fashion  Distract yourself safely, in fun and interesting ways  Consider exploring your interests on Disc[employer]  [employer] offers free online courses! Check out [email or web address]   Use medical marijuana (MMJ) as needed / as directed  Return in about 1 month  Call us for refills on medications that we supply, as needed  If something changes and you need to come in sooner, please call our office  PRESCRIPTION REFILL REMINDER:  All medication refills should be requested prior to [facility] on Friday  Any refill requests after noon on Friday would be addressed the following Monday

## 2022-07-18 ENCOUNTER — APPOINTMENT (OUTPATIENT)
Dept: LAB | Facility: CLINIC | Age: 47
End: 2022-07-18
Payer: COMMERCIAL

## 2022-07-18 ENCOUNTER — HOME CARE VISIT (OUTPATIENT)
Dept: HOME HEALTH SERVICES | Facility: HOME HEALTHCARE | Age: 47
End: 2022-07-18
Payer: COMMERCIAL

## 2022-07-18 ENCOUNTER — TELEPHONE (OUTPATIENT)
Dept: NEUROSURGERY | Facility: CLINIC | Age: 47
End: 2022-07-18

## 2022-07-18 ENCOUNTER — TRANSCRIBE ORDERS (OUTPATIENT)
Dept: NEUROSURGERY | Facility: CLINIC | Age: 47
End: 2022-07-18

## 2022-07-18 VITALS
DIASTOLIC BLOOD PRESSURE: 80 MMHG | HEART RATE: 104 BPM | OXYGEN SATURATION: 98 % | RESPIRATION RATE: 18 BRPM | SYSTOLIC BLOOD PRESSURE: 126 MMHG | TEMPERATURE: 97.7 F

## 2022-07-18 DIAGNOSIS — Z98.890 STATUS POST SURGERY: ICD-10-CM

## 2022-07-18 DIAGNOSIS — C79.49 METASTASIS TO SPINAL CORD (HCC): Primary | ICD-10-CM

## 2022-07-18 DIAGNOSIS — Z17.1 MALIGNANT NEOPLASM OF OVERLAPPING SITES OF RIGHT BREAST IN FEMALE, ESTROGEN RECEPTOR NEGATIVE (HCC): ICD-10-CM

## 2022-07-18 DIAGNOSIS — C79.51 OSSEOUS METASTASIS (HCC): ICD-10-CM

## 2022-07-18 DIAGNOSIS — C50.811 MALIGNANT NEOPLASM OF OVERLAPPING SITES OF RIGHT BREAST IN FEMALE, ESTROGEN RECEPTOR NEGATIVE (HCC): ICD-10-CM

## 2022-07-18 DIAGNOSIS — D70.1 CHEMOTHERAPY-INDUCED NEUTROPENIA (HCC): ICD-10-CM

## 2022-07-18 DIAGNOSIS — T45.1X5A CHEMOTHERAPY-INDUCED NEUTROPENIA (HCC): ICD-10-CM

## 2022-07-18 LAB
ALBUMIN SERPL BCP-MCNC: 3.7 G/DL (ref 3.5–5)
ALP SERPL-CCNC: 60 U/L (ref 46–116)
ALT SERPL W P-5'-P-CCNC: 51 U/L (ref 12–78)
ANION GAP SERPL CALCULATED.3IONS-SCNC: 8 MMOL/L (ref 4–13)
AST SERPL W P-5'-P-CCNC: 26 U/L (ref 5–45)
BASOPHILS # BLD AUTO: 0.01 THOUSANDS/ΜL (ref 0–0.1)
BASOPHILS NFR BLD AUTO: 0 % (ref 0–1)
BILIRUB SERPL-MCNC: 1.14 MG/DL (ref 0.2–1)
BUN SERPL-MCNC: 9 MG/DL (ref 5–25)
CALCIUM SERPL-MCNC: 9.6 MG/DL (ref 8.3–10.1)
CHLORIDE SERPL-SCNC: 105 MMOL/L (ref 96–108)
CO2 SERPL-SCNC: 29 MMOL/L (ref 21–32)
CREAT SERPL-MCNC: 0.59 MG/DL (ref 0.6–1.3)
EOSINOPHIL # BLD AUTO: 0.03 THOUSAND/ΜL (ref 0–0.61)
EOSINOPHIL NFR BLD AUTO: 1 % (ref 0–6)
ERYTHROCYTE [DISTWIDTH] IN BLOOD BY AUTOMATED COUNT: 16.9 % (ref 11.6–15.1)
GFR SERPL CREATININE-BSD FRML MDRD: 109 ML/MIN/1.73SQ M
GLUCOSE SERPL-MCNC: 98 MG/DL (ref 65–140)
HCT VFR BLD AUTO: 36.1 % (ref 34.8–46.1)
HGB BLD-MCNC: 11.3 G/DL (ref 11.5–15.4)
IMM GRANULOCYTES # BLD AUTO: 0.01 THOUSAND/UL (ref 0–0.2)
IMM GRANULOCYTES NFR BLD AUTO: 0 % (ref 0–2)
LYMPHOCYTES # BLD AUTO: 0.31 THOUSANDS/ΜL (ref 0.6–4.47)
LYMPHOCYTES NFR BLD AUTO: 10 % (ref 14–44)
MCH RBC QN AUTO: 29.7 PG (ref 26.8–34.3)
MCHC RBC AUTO-ENTMCNC: 31.3 G/DL (ref 31.4–37.4)
MCV RBC AUTO: 95 FL (ref 82–98)
MONOCYTES # BLD AUTO: 0.11 THOUSAND/ΜL (ref 0.17–1.22)
MONOCYTES NFR BLD AUTO: 4 % (ref 4–12)
NEUTROPHILS # BLD AUTO: 2.5 THOUSANDS/ΜL (ref 1.85–7.62)
NEUTS SEG NFR BLD AUTO: 85 % (ref 43–75)
NRBC BLD AUTO-RTO: 0 /100 WBCS
PLATELET # BLD AUTO: 344 THOUSANDS/UL (ref 149–390)
PMV BLD AUTO: 10.8 FL (ref 8.9–12.7)
POTASSIUM SERPL-SCNC: 3.6 MMOL/L (ref 3.5–5.3)
PROT SERPL-MCNC: 6.9 G/DL (ref 6.4–8.4)
RBC # BLD AUTO: 3.8 MILLION/UL (ref 3.81–5.12)
SODIUM SERPL-SCNC: 142 MMOL/L (ref 135–147)
WBC # BLD AUTO: 2.97 THOUSAND/UL (ref 4.31–10.16)

## 2022-07-18 PROCEDURE — 85025 COMPLETE CBC W/AUTO DIFF WBC: CPT

## 2022-07-18 PROCEDURE — 36415 COLL VENOUS BLD VENIPUNCTURE: CPT

## 2022-07-18 PROCEDURE — G0300 HHS/HOSPICE OF LPN EA 15 MIN: HCPCS

## 2022-07-18 PROCEDURE — 80053 COMPREHEN METABOLIC PANEL: CPT

## 2022-07-18 NOTE — TELEPHONE ENCOUNTER
Patient called the nurse line  She stated that she is a patient of [facility] and she is weaning from her c-collar  This is her 2nd week of doing this and she said that she discussed home PT with [facility] and is interested in starting at this time  She is already receiving home nursing through [address] [place] [address], so I will put a script in to include PT  Patient appreciative for the coordination

## 2022-07-19 ENCOUNTER — TELEPHONE (OUTPATIENT)
Dept: HEMATOLOGY ONCOLOGY | Facility: CLINIC | Age: 47
End: 2022-07-19

## 2022-07-19 ENCOUNTER — OFFICE VISIT (OUTPATIENT)
Dept: HEMATOLOGY ONCOLOGY | Facility: CLINIC | Age: 47
End: 2022-07-19
Payer: COMMERCIAL

## 2022-07-19 VITALS
HEIGHT: 64 IN | HEART RATE: 97 BPM | SYSTOLIC BLOOD PRESSURE: 122 MMHG | TEMPERATURE: 97.1 F | RESPIRATION RATE: 16 BRPM | OXYGEN SATURATION: 98 % | DIASTOLIC BLOOD PRESSURE: 70 MMHG | BODY MASS INDEX: 28.85 KG/M2 | WEIGHT: 169 LBS

## 2022-07-19 DIAGNOSIS — D70.1 CHEMOTHERAPY-INDUCED NEUTROPENIA (HCC): ICD-10-CM

## 2022-07-19 DIAGNOSIS — C79.51 OSSEOUS METASTASIS (HCC): Primary | ICD-10-CM

## 2022-07-19 DIAGNOSIS — Z17.1 MALIGNANT NEOPLASM OF OVERLAPPING SITES OF RIGHT BREAST IN FEMALE, ESTROGEN RECEPTOR NEGATIVE (HCC): ICD-10-CM

## 2022-07-19 DIAGNOSIS — C50.811 MALIGNANT NEOPLASM OF OVERLAPPING SITES OF RIGHT BREAST IN FEMALE, ESTROGEN RECEPTOR NEGATIVE (HCC): ICD-10-CM

## 2022-07-19 DIAGNOSIS — T45.1X5A CHEMOTHERAPY-INDUCED NEUTROPENIA (HCC): ICD-10-CM

## 2022-07-19 PROCEDURE — 99214 OFFICE O/P EST MOD 30 MIN: CPT | Performed by: INTERNAL MEDICINE

## 2022-07-19 NOTE — TELEPHONE ENCOUNTER
Confirmed w/ patient appointments for CT scan on 8/9/22 and f/u w/ Dr Yves Morales for 9/27/22  Patient is on a wait list for a sooner appt  (4 week f/u) w/ Dr Yves Morales

## 2022-07-19 NOTE — PROGRESS NOTES
Hematology / Oncology Outpatient Follow Up Note    Cristy Che 51 y o  female Khadijah Bales LEZ:[id number]         Date:  7/19/2022    Assessment / Plan:  A 40-year-old premenopausal woman with metastatic breast cancer, grade 2, ER negative, OK negative, HER2 3+ disease  Kieran Mcintyre has large fungating mass in her right breast, palpable right axillary adenopathy as well as diffuse osseous metastasis with C4 stenosis   She completed palliative radiation therapy to the spine   She was treated with Taxotere, trastuzumab and pertuzumab with or without atezolizumab as part of clinical trial x2 cycles   After 2 cycles of treatment, she came off the clinical trial due to the HLDLF-00 related complications  Due to the infusion reaction to Taxotere, she is currently on paclitaxel, trastuzumab and pertuzumab with excellent tolerance  She already achieved partial response  Clinically, she has no evidence of progression  I recommended her to continue current regimen  I will see her again in a month with CT scan of chest abdomen and pelvis    She is in agreement with my recommendations         Subjective:      HPI:  A 40-year-old premenopausal woman who was recently hospitalized because of the progressive neck pain which radiated to the bilateral upper extremity as well as enlarging right breast mass  Kieran Mcintyre was found to have fungating right breast mass   CT scan of chest abdomen pelvis showed diffuse bony metastasis, especially with C4 stenosis   In addition, CT scan showed 6 9 cm of right breast mass   She had right breast biopsy which showed invasive ductal carcinoma, grade 2, ER negative, OK negative, HER2 3+ disease   Her tumor marker were find to be mildly elevated   CT of the head was negative for brain metastasis   She is currently on palliative radiation to her neck, shoulder as well as lower back   She is going to complete palliative radiation therapy in early next week   She presents today with her mother and tamika to discuss medical treatment for metastatic breast cancer   Her pain is reasonably controlled with narcotics medications   She has no recent weight loss   She denied any respiratory symptoms   She has no significant past medical history   Therefore, prior to this hospitalization, she was not on any medications   She denied family history of breast or ovarian cancer  Nelson Benitez is a lifetime never smoker  Julian Warren performance status is 1/4 on ECOG scale            Interval History:  A 79-year-old premenopausal woman with metastatic breast cancer, grade 2, ER negative, FL negative, HER2 3+ disease   She has large fungating mass in her right breast, palpable right axillary adenopathy as well as diffuse osseous metastasis with C4 stenosis   She completed palliative radiation therapy to the spine   Subsequently, she was treated with Taxotere, trastuzumab and pertuzumab with without atezolizumab based on a clinical trial   She received 2 cycle of treatment  She came off the study after 2 cycles of treatment due to the complication related to PSEAZ-18 infection  She had infusion reaction with Taxotere  Therefore, she is currently on paclitaxel, trastuzumab and pertuzumab  She already have partial response  She presents today for follow-up  Her pain is very well controlled  She is fully ambulatory  She expect her neck collar to be removed next week  Her weight is stable  She has no neuropathy    Her performance status is 0 to 1/4 on the ECOG scale         Objective:      Primary Diagnosis:     Metastatic breast cancer, grade 2, ER negative, FL negative, HER2 3+ disease   Diagnosed in March 2022      Cancer Staging:  Cancer Staging  No matching staging information was found for the patient         Previous Hematologic/ Oncologic Treatment:       THP x3 cycles, completed in early June 2022      Current Hematologic/ Oncologic Treatment:       Weekly paclitaxel, try weekly pertuzumab and trastuzumab since early July 2022      Disease Status:      Clinical partial response      Test Results:     Pathology:     Right breast biopsy showed invasive ductal carcinoma, grade 2 ER negative, OH negative, HER2 3+ disease      Radiology:     CT scan of head was negative for metastatic disease in her brain      CT scan of chest abdomen pelvis showed C4 metastasis with diffuse osseous metastasis especially with C4 stenosis   6 9 cm of right breast mass      Bone scan showed widespread osseous metastasis      Laboratory:     See below for CBC and CMP   CA 27, 29 was 110   CA 15-3 was 91 6      Physical Exam:        General Appearance:    Alert, oriented          Eyes:    PERRL   Ears:    Normal external ear canals, both ears   Nose:   Nares normal, septum midline   Throat:   Mucosa moist  Pharynx without injection  Neck:   Supple         Lungs:     Clear to auscultation bilaterally   Chest Wall:    No tenderness or deformity    Heart:    Regular rate and rhythm         Abdomen:     Soft, non-tender, bowel sounds +, no organomegaly               Extremities:   Extremities no cyanosis or edema         Skin:   no rash or icterus  Lymph nodes:   Cervical, supraclavicular, and axillary nodes normal   Neurologic:   CNII-XII intact, normal strength, sensation and reflexes     Throughout             Breast exam:   4 5 x 4 5  cm of palpable mass at in the upper quadrant of her right breast with 2 cm of central ulceration   Previous right axillary adenopathy is no longer palpable   Left breast exam is negative             ROS: Review of Systems   All other systems reviewed and are negative  Imaging: XR spine cervical 2 or 3 vw injury    Result Date: 7/6/2022  Narrative: CERVICAL SPINE INDICATION:   M84 48XA: Pathological fracture, other site, initial encounter for fracture  COMPARISON:  Cervical spine radiographs 6/15/2022   VIEWS:  XR SPINE CERVICAL 2 OR 3 VW INJURY FINDINGS: Similar appearance of cortical erosive and destructive changes most notable at C3-C6  Stable slight reversal of the cervical lordosis  Intervertebral disc heights are otherwise preserved  Normal prevertebral soft tissues  Clear lung apices  Impression: Stable cortical destructive changes most pronounced from C3 through C6, consistent with known metastatic disease  Workstation performed: [id number]     XR spine cervical complete 6+ vw flex/ext/obl    Result Date: 7/8/2022  Narrative: CERVICAL SPINE INDICATION:   S12 300A: Unspecified displaced fracture of fourth cervical vertebra, initial encounter for closed fracture  COMPARISON:  7/1/2022 VIEWS:  XR SPINE CERVICAL COMPLETE 6+ VW FLEX /EXT /OBL Images: 7 FINDINGS: Severe stable compression deformity of C4 with slight reversal of the cervical lordosis at this level  No evidence of dynamic instability seen during flexion/extension  Moderate disc space narrowing and degenerative change noted from C3-4 through C5-6  Narrowed neural foramina are noted bilaterally at multiple levels from C3-4 through C6-7  Soft tissue masses are identified arising from the severely compressed C4 level which appear to extend into the right C3-4 and C4-5 neural foramina  Lytic lesions noted in the calvarium, previously described  The lung apices are clear  Impression: Stable appearing cervical spine with degenerative and destructive findings noted as previously described  Soft tissue masses noted in around C4 where there is a stable marked compression fracture and known metastatic disease  No evidence for dynamic instability on flexion or extension  Lytic calvarial lesions, previously described   Workstation performed: [id number]         Labs:   Lab Results   Component Value Date    WBC 2 97 (L) 07/18/2022    HGB 11 3 (L) 07/18/2022    HCT 36 1 07/18/2022    MCV 95 07/18/2022     07/18/2022     Lab Results   Component Value Date    K 3 6 07/18/2022     07/18/2022    CO2 29 07/18/2022    BUN 9 07/18/2022    CREATININE 0 59 (L) 07/18/2022    GLUF 92 06/06/2022    CALCIUM 9 6 07/18/2022    CORRECTEDCA 10 4 (H) 06/13/2022    AST 26 07/18/2022    ALT 51 07/18/2022    ALKPHOS 60 07/18/2022    EGFR 109 07/18/2022       Lab Results   Component Value Date    CEA 6 5 (H) 03/29/2022         Lab Results   Component Value Date    IRON 83 05/16/2022    TIBC 206 (L) 05/16/2022    FERRITIN 191 05/27/2022         Current Medications: Reviewed  Allergies: Reviewed  PMH/FH/SH:  Reviewed      Vital Sign:    Body surface area is 1 82 meters squared      Wt Readings from Last 3 Encounters:   07/19/22 76 7 kg (169 lb)   07/15/22 76 2 kg (168 lb)   07/14/22 74 7 kg (164 lb 10 9 oz)        Temp Readings from Last 3 Encounters:   07/19/22 (!) 97 1 °F (36 2 °C) (Temporal)   07/18/22 97 7 °F (36 5 °C) (Temporal)   07/15/22 98 5 °F (36 9 °C) (Temporal)        BP Readings from Last 3 Encounters:   07/19/22 122/70   07/18/22 126/80   07/15/22 138/96         Pulse Readings from Last 3 Encounters:   07/19/22 97   07/18/22 104   07/15/22 72     @LASTSAO2(3)@

## 2022-07-21 ENCOUNTER — PATIENT OUTREACH (OUTPATIENT)
Dept: CASE MANAGEMENT | Facility: HOSPITAL | Age: 47
End: 2022-07-21

## 2022-07-21 ENCOUNTER — HOSPITAL ENCOUNTER (OUTPATIENT)
Dept: INFUSION CENTER | Facility: CLINIC | Age: 47
Discharge: HOME | End: 2022-07-21
Payer: COMMERCIAL

## 2022-07-21 VITALS
HEIGHT: 64 IN | DIASTOLIC BLOOD PRESSURE: 79 MMHG | SYSTOLIC BLOOD PRESSURE: 128 MMHG | BODY MASS INDEX: 28.68 KG/M2 | RESPIRATION RATE: 16 BRPM | TEMPERATURE: 97.1 F | HEART RATE: 87 BPM | OXYGEN SATURATION: 95 % | WEIGHT: 168 LBS

## 2022-07-21 DIAGNOSIS — C50.811 MALIGNANT NEOPLASM OF OVERLAPPING SITES OF RIGHT BREAST IN FEMALE, ESTROGEN RECEPTOR NEGATIVE (HCC): ICD-10-CM

## 2022-07-21 DIAGNOSIS — R60.0 LEG EDEMA: ICD-10-CM

## 2022-07-21 DIAGNOSIS — Z17.1 MALIGNANT NEOPLASM OF OVERLAPPING SITES OF RIGHT BREAST IN FEMALE, ESTROGEN RECEPTOR NEGATIVE (HCC): ICD-10-CM

## 2022-07-21 DIAGNOSIS — C79.51 OSSEOUS METASTASIS (HCC): Primary | ICD-10-CM

## 2022-07-21 DIAGNOSIS — D70.1 CHEMOTHERAPY-INDUCED NEUTROPENIA (HCC): ICD-10-CM

## 2022-07-21 DIAGNOSIS — T45.1X5A CHEMOTHERAPY-INDUCED NEUTROPENIA (HCC): ICD-10-CM

## 2022-07-21 DIAGNOSIS — I10 PRIMARY HYPERTENSION: ICD-10-CM

## 2022-07-21 PROCEDURE — 96375 TX/PRO/DX INJ NEW DRUG ADDON: CPT

## 2022-07-21 PROCEDURE — 96413 CHEMO IV INFUSION 1 HR: CPT

## 2022-07-21 PROCEDURE — 96367 TX/PROPH/DG ADDL SEQ IV INF: CPT

## 2022-07-21 RX ORDER — FUROSEMIDE 40 MG/1
40 TABLET ORAL DAILY
Qty: 30 TABLET | Refills: 0 | Status: SHIPPED | OUTPATIENT
Start: 2022-07-21 | End: 2022-08-16 | Stop reason: SDUPTHER

## 2022-07-21 RX ORDER — SODIUM CHLORIDE 9 MG/ML
20 INJECTION, SOLUTION INTRAVENOUS ONCE
Status: DISCONTINUED | OUTPATIENT
Start: 2022-07-21 | End: 2022-07-24 | Stop reason: HOSPADM

## 2022-07-21 RX ORDER — SODIUM CHLORIDE 9 MG/ML
20 INJECTION, SOLUTION INTRAVENOUS ONCE
Status: COMPLETED | OUTPATIENT
Start: 2022-07-21 | End: 2022-07-21

## 2022-07-21 RX ORDER — SODIUM CHLORIDE 9 MG/ML
20 INJECTION, SOLUTION INTRAVENOUS ONCE
Status: CANCELLED | OUTPATIENT
Start: 2022-10-20

## 2022-07-21 RX ADMIN — ZOLEDRONIC ACID 4 MG: 0.04 INJECTION, SOLUTION INTRAVENOUS at 10:30

## 2022-07-21 RX ADMIN — PACLITAXEL 150.6 MG: 6 INJECTION, SOLUTION, CONCENTRATE INTRAVENOUS at 09:27

## 2022-07-21 RX ADMIN — SODIUM CHLORIDE 20 ML/HR: 0.9 INJECTION, SOLUTION INTRAVENOUS at 08:20

## 2022-07-21 RX ADMIN — DEXAMETHASONE SODIUM PHOSPHATE: 10 INJECTION, SOLUTION INTRAMUSCULAR; INTRAVENOUS at 08:21

## 2022-07-21 RX ADMIN — DIPHENHYDRAMINE HYDROCHLORIDE 25 MG: 50 INJECTION, SOLUTION INTRAMUSCULAR; INTRAVENOUS at 08:41

## 2022-07-21 RX ADMIN — FAMOTIDINE 20 MG: 10 INJECTION INTRAVENOUS at 09:01

## 2022-07-21 NOTE — PROGRESS NOTES
Pt tolerated taxol and zometa infusion without any complications, aware if next appointment, declined avs

## 2022-07-21 NOTE — PROGRESS NOTES
MSW and MSW - Crystal Quevedo who is shadowing, met with patient and dionicio - Leela Fountain in the [facility]  Patient states her last chemo will be September 1st and she is optimistic all will go well  Patient will get her neck collar off soon  Patient states she is encouraged by how well she is doing  Patient is conversant and in good spirits  Ericasoumya Fountain states he is doing fine and all is going well  MSW encouraged good thoughts and provided emotional support services  Pt is agreeable to this MSW's follow up    MSW will continue to to follow

## 2022-07-22 ENCOUNTER — TELEPHONE (OUTPATIENT)
Dept: NEUROSURGERY | Facility: CLINIC | Age: 47
End: 2022-07-22

## 2022-07-22 ENCOUNTER — HOME CARE VISIT (OUTPATIENT)
Dept: HOME HEALTH SERVICES | Facility: HOME HEALTHCARE | Age: 47
End: 2022-07-22
Payer: COMMERCIAL

## 2022-07-22 ENCOUNTER — PATIENT OUTREACH (OUTPATIENT)
Dept: HEMATOLOGY ONCOLOGY | Facility: CLINIC | Age: 47
End: 2022-07-22

## 2022-07-22 VITALS
RESPIRATION RATE: 20 BRPM | TEMPERATURE: 98.2 F | DIASTOLIC BLOOD PRESSURE: 62 MMHG | OXYGEN SATURATION: 98 % | HEART RATE: 80 BPM | SYSTOLIC BLOOD PRESSURE: 118 MMHG

## 2022-07-22 DIAGNOSIS — S12.300A C4 CERVICAL FRACTURE (HCC): Primary | ICD-10-CM

## 2022-07-22 DIAGNOSIS — M84.48XA: ICD-10-CM

## 2022-07-22 PROCEDURE — G0299 HHS/HOSPICE OF RN EA 15 MIN: HCPCS

## 2022-07-22 NOTE — PROGRESS NOTES
I reached out to check in and see how patient is doing  She states she is doing good  Patient informed me this is the last week for her neck collar and then she will not need to wear it any longer  She stated she contacted [facility] and has not heard back  I informed patient I did not see a referral placed  She stated Dr Mcneal's office was to do this earlier this week  I also informed patient she cannot see outpatient PT and have [facility] come to the home  Patient understood, I discussed a few options for the patient as far as pt and labs  I asked patient if she would like me to coordinate scheduling outpatient PT and she agreed   I contacted Fredis Chen's office and spoke with Sosa whom placed PT order  I then spoke with patient's  Dave Iraheta who confirmed she will be discharging patient as of 7- from services  After I spoke with Clotilde Tan contacted [facility] and scheduled evaluation for 8-4-2022 @ 3:00pm  I then called patient back and informed her of appointment information and patient confirmed this would be ok  Patient provided my contact information for any further questions or concerns

## 2022-07-22 NOTE — TELEPHONE ENCOUNTER
Received a call from [facility] with oncology requesting for a PT referral so she can help schedule for patient  After review it was noted that patient may call the office for a PT referral after 2 weeks from 7/5 to be placed  This RN will place PT referral      Notified [facility] of the above  She was appreciative of the call back

## 2022-07-24 NOTE — CASE COMMUNICATION
Phys ther called pt on 7/24 to setup PT eval  Patient is already setup for [facility] on 8/4  Upon discussion with pt, she prefers to wait to go to [facility] as home RN d/c end of this wk  Cancel home PT eval at this time per pt request  Patient to go to [facility] for eval & treat    Thank you,   Sandra Sierra Meth PT

## 2022-07-25 ENCOUNTER — TELEPHONE (OUTPATIENT)
Dept: OTHER | Facility: OTHER | Age: 47
End: 2022-07-25

## 2022-07-25 ENCOUNTER — TELEMEDICINE (OUTPATIENT)
Dept: INTERNAL MEDICINE CLINIC | Facility: CLINIC | Age: 47
End: 2022-07-25
Payer: COMMERCIAL

## 2022-07-25 ENCOUNTER — HOME CARE VISIT (OUTPATIENT)
Dept: HOME HEALTH SERVICES | Facility: HOME HEALTHCARE | Age: 47
End: 2022-07-25
Payer: COMMERCIAL

## 2022-07-25 ENCOUNTER — APPOINTMENT (OUTPATIENT)
Dept: LAB | Facility: CLINIC | Age: 47
End: 2022-07-25
Payer: COMMERCIAL

## 2022-07-25 VITALS
WEIGHT: 168 LBS | TEMPERATURE: 97.7 F | BODY MASS INDEX: 28.68 KG/M2 | SYSTOLIC BLOOD PRESSURE: 133 MMHG | DIASTOLIC BLOOD PRESSURE: 98 MMHG | HEIGHT: 64 IN

## 2022-07-25 VITALS
OXYGEN SATURATION: 98 % | SYSTOLIC BLOOD PRESSURE: 132 MMHG | RESPIRATION RATE: 16 BRPM | DIASTOLIC BLOOD PRESSURE: 80 MMHG | TEMPERATURE: 97.9 F | HEART RATE: 90 BPM

## 2022-07-25 DIAGNOSIS — T45.1X5A CHEMOTHERAPY-INDUCED NEUTROPENIA (HCC): ICD-10-CM

## 2022-07-25 DIAGNOSIS — C50.811 MALIGNANT NEOPLASM OF OVERLAPPING SITES OF RIGHT BREAST IN FEMALE, ESTROGEN RECEPTOR NEGATIVE (HCC): ICD-10-CM

## 2022-07-25 DIAGNOSIS — Z17.1 MALIGNANT NEOPLASM OF OVERLAPPING SITES OF RIGHT BREAST IN FEMALE, ESTROGEN RECEPTOR NEGATIVE (HCC): ICD-10-CM

## 2022-07-25 DIAGNOSIS — C79.51 OSSEOUS METASTASIS (HCC): ICD-10-CM

## 2022-07-25 DIAGNOSIS — I10 PRIMARY HYPERTENSION: ICD-10-CM

## 2022-07-25 DIAGNOSIS — D70.1 CHEMOTHERAPY-INDUCED NEUTROPENIA (HCC): ICD-10-CM

## 2022-07-25 DIAGNOSIS — B37.0 ORAL THRUSH: ICD-10-CM

## 2022-07-25 DIAGNOSIS — C79.49 METASTASIS TO SPINAL CORD (HCC): ICD-10-CM

## 2022-07-25 LAB
ALBUMIN SERPL BCP-MCNC: 3.5 G/DL (ref 3.5–5)
ALP SERPL-CCNC: 47 U/L (ref 46–116)
ALT SERPL W P-5'-P-CCNC: 36 U/L (ref 12–78)
ANION GAP SERPL CALCULATED.3IONS-SCNC: 2 MMOL/L (ref 4–13)
AST SERPL W P-5'-P-CCNC: 19 U/L (ref 5–45)
BASOPHILS # BLD AUTO: 0.01 THOUSANDS/ΜL (ref 0–0.1)
BASOPHILS NFR BLD AUTO: 1 % (ref 0–1)
BILIRUB SERPL-MCNC: 0.74 MG/DL (ref 0.2–1)
BUN SERPL-MCNC: 7 MG/DL (ref 5–25)
CALCIUM SERPL-MCNC: 8.9 MG/DL (ref 8.3–10.1)
CHLORIDE SERPL-SCNC: 108 MMOL/L (ref 96–108)
CO2 SERPL-SCNC: 27 MMOL/L (ref 21–32)
CREAT SERPL-MCNC: 0.52 MG/DL (ref 0.6–1.3)
EOSINOPHIL # BLD AUTO: 0.01 THOUSAND/ΜL (ref 0–0.61)
EOSINOPHIL NFR BLD AUTO: 1 % (ref 0–6)
ERYTHROCYTE [DISTWIDTH] IN BLOOD BY AUTOMATED COUNT: 16.8 % (ref 11.6–15.1)
GFR SERPL CREATININE-BSD FRML MDRD: 114 ML/MIN/1.73SQ M
GLUCOSE SERPL-MCNC: 98 MG/DL (ref 65–140)
HCT VFR BLD AUTO: 37 % (ref 34.8–46.1)
HGB BLD-MCNC: 11.2 G/DL (ref 11.5–15.4)
IMM GRANULOCYTES # BLD AUTO: 0.01 THOUSAND/UL (ref 0–0.2)
IMM GRANULOCYTES NFR BLD AUTO: 1 % (ref 0–2)
LYMPHOCYTES # BLD AUTO: 0.47 THOUSANDS/ΜL (ref 0.6–4.47)
LYMPHOCYTES NFR BLD AUTO: 25 % (ref 14–44)
MCH RBC QN AUTO: 30.6 PG (ref 26.8–34.3)
MCHC RBC AUTO-ENTMCNC: 30.3 G/DL (ref 31.4–37.4)
MCV RBC AUTO: 101 FL (ref 82–98)
MONOCYTES # BLD AUTO: 0.19 THOUSAND/ΜL (ref 0.17–1.22)
MONOCYTES NFR BLD AUTO: 10 % (ref 4–12)
NEUTROPHILS # BLD AUTO: 1.17 THOUSANDS/ΜL (ref 1.85–7.62)
NEUTS SEG NFR BLD AUTO: 62 % (ref 43–75)
NRBC BLD AUTO-RTO: 0 /100 WBCS
PLATELET # BLD AUTO: 310 THOUSANDS/UL (ref 149–390)
PMV BLD AUTO: 10.8 FL (ref 8.9–12.7)
POTASSIUM SERPL-SCNC: 4.1 MMOL/L (ref 3.5–5.3)
PROT SERPL-MCNC: 7.2 G/DL (ref 6.4–8.4)
RBC # BLD AUTO: 3.66 MILLION/UL (ref 3.81–5.12)
SODIUM SERPL-SCNC: 137 MMOL/L (ref 135–147)
WBC # BLD AUTO: 1.86 THOUSAND/UL (ref 4.31–10.16)

## 2022-07-25 PROCEDURE — 99213 OFFICE O/P EST LOW 20 MIN: CPT | Performed by: INTERNAL MEDICINE

## 2022-07-25 PROCEDURE — G0299 HHS/HOSPICE OF RN EA 15 MIN: HCPCS

## 2022-07-25 PROCEDURE — 85025 COMPLETE CBC W/AUTO DIFF WBC: CPT

## 2022-07-25 PROCEDURE — 80053 COMPREHEN METABOLIC PANEL: CPT

## 2022-07-25 PROCEDURE — 36415 COLL VENOUS BLD VENIPUNCTURE: CPT

## 2022-07-25 NOTE — PROGRESS NOTES
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 ([facility] [address] )       Other    Malignant neoplasm of overlapping sites of right breast in female, estrogen receptor negative ([facility] [address] )      Other Visit Diagnoses     Oral thrush    -  Primary    Completed 3 weeks of fluconazole earlier symptoms have improved, uses nystatin swish and swallow as needed    Relevant Medications    nystatin (MYCOSTATIN) 500,000 units/5 mL suspension               Reason for visit is   Chief Complaint   Patient presents with    Follow-up     face time [id number] 2 month follow up    Other     CRC Due: going through chemo, does not want CRC right now        Encounter provider Andrew Dillon MD    Provider located at [address]  [address]  [facility] [address] [phone]      Recent Visits  No visits were found meeting these conditions  Showing recent visits within past 7 days and meeting all other requirements  Today's Visits  Date Type Provider Dept   07/25/22 Telemedicine Andrew Dillon MD [address] [facility]   Showing today's visits and meeting all other requirements  Future Appointments  No visits were found meeting these conditions  Showing future appointments within next 150 days and meeting all other requirements       The patient was identified by name and date of birth  Delano Ventura was informed that this is a telemedicine visit and that the visit is being conducted through [employer] and patient was informed that this is not a secure, HIPAA-compliant platform  She agrees to proceed     My office door was closed  No one else was in the room    She acknowledged consent and understanding of privacy and security of the video platform  The patient has agreed to participate and understands they can discontinue the visit at any time  Patient is aware this is a billable service  Subjective  Mignon Torrez is a 51 y o  female    Patient presents for follow-up, no presenting complaints today  Tolerating chemotherapy as scheduled  He has been weaning off from cervical collar per Neurosurgery instructions  Past Medical History:   Diagnosis Date    [facility] [address] [facility]     [facility]     Hypertension        History reviewed  No pertinent surgical history      Current Outpatient Medications   Medication Sig Dispense Refill    baclofen 10 mg tablet TAKE 1/2 TABLET BY MOUTH 3 TIMES A DAY AS NEEDED FOR MUSCLE SPASMS 135 tablet 1    furosemide (LASIX) 40 mg tablet Take 1 tablet (40 mg total) by mouth daily 30 tablet 0    lactulose (CEPHULAC) 10 g packet Take 1 packet (10 g total) by mouth 3 (three) times a day as needed (constipation) 30 each 0    lisinopril (ZESTRIL) 20 mg tablet Take 1 tablet (20 mg total) by mouth daily 90 tablet 1    morphine (MS CONTIN) 15 mg 12 hr tablet Take 1 tablet (15 mg total) by mouth every 12 (twelve) hours Max Daily Amount: 30 mg 60 tablet 0    nystatin (MYCOSTATIN) 500,000 units/5 mL suspension Apply 5 mL (500,000 Units total) to the mouth or throat 4 (four) times a day 473 mL 1    nystatin (MYCOSTATIN) powder Apply topically 3 (three) times a day 15 g 0    ondansetron (ZOFRAN) 4 mg tablet Take 1 tablet (4 mg total) by mouth every 8 (eight) hours as needed for nausea or vomiting 9 tablet 20    oxyCODONE (ROXICODONE) 10 MG TABS Take 1 tablet (10 mg total) by mouth every 4 (four) hours as needed for moderate pain Max Daily Amount: 60 mg 180 tablet 0    pantoprazole (PROTONIX) 40 mg tablet Take 1 tablet (40 mg total) by mouth daily in the early morning 90 tablet 1    polyethylene glycol (MIRALAX) 17 g packet Take 17 g by mouth daily 30 each 0    senna (SENOKOT) 8 6 mg Take 3 tablets (25 8 mg total) by mouth 2 (two) times a day Hold for loose stool  180 tablet 0    al mag oxide-diphenhydramine-lidocaine viscous (MAGIC MOUTHWASH) 1:1:1 suspension Swish and spit 10 mL every 4 (four) hours as needed for mouth pain or discomfort (Patient not taking: Reported on 7/25/2022) 500 mL 2    DPH-Lido-AlHydr-MgHydr-Simeth (First-Mouthwash BLM) SUSP  (Patient not taking: Reported on 7/25/2022)       No current facility-administered medications for this visit  No Known Allergies    Review of Systems   Constitutional: Negative for appetite change, chills, diaphoresis, fatigue, fever and unexpected weight change  Respiratory: Negative for apnea, cough, choking, chest tightness, shortness of breath, wheezing and stridor  Cardiovascular: Negative for chest pain, palpitations and leg swelling  Gastrointestinal: Negative for abdominal distention, abdominal pain, anal bleeding, blood in stool, constipation, diarrhea, nausea and vomiting  Genitourinary: Negative for decreased urine volume, difficulty urinating, frequency and urgency  Musculoskeletal: Negative for arthralgias, back pain and myalgias  Neurological: Negative for dizziness, light-headedness, numbness and headaches  Video Exam    Vitals:    07/25/22 1114   BP: 133/98   BP Location: Right arm   Patient Position: Sitting   Cuff Size: Standard   Temp: 97 7 °F (36 5 °C)   TempSrc: Oral   Weight: 76 2 kg (168 lb)   Height: 5' 4" (1 626 m)       Physical Exam  Constitutional:       General: She is not in acute distress  Appearance: Normal appearance  She is normal weight  She is not toxic-appearing or diaphoretic  Neurological:      Mental Status: She is alert and oriented to person, place, and time  I spent 25 minutes directly with the patient during this visit    VIRTUAL VISIT [address] [place] [address] verbally agrees to participate in [employer]   Pt is aware that [employer] could be limited without vital signs or the ability to perform a full hands-on physical exam  Nayeli Mtz understands she or the provider may request at any time to terminate the video visit and request the patient to seek care or treatment in person

## 2022-07-26 NOTE — TELEPHONE ENCOUNTER
Lab Result: WBC 1 86   Date/Time Drawn: 07/25/2022 @ 1200   Ordering Provider: Dr Sal James Name: Emma Lundberg  The following critical/stat result was read back to the lab as stated above and [employer] to the on-call provider

## 2022-07-28 ENCOUNTER — HOSPITAL ENCOUNTER (OUTPATIENT)
Dept: INFUSION CENTER | Facility: CLINIC | Age: 47
Discharge: HOME | End: 2022-07-28
Payer: COMMERCIAL

## 2022-07-28 VITALS
BODY MASS INDEX: 28.85 KG/M2 | DIASTOLIC BLOOD PRESSURE: 84 MMHG | TEMPERATURE: 97.4 F | OXYGEN SATURATION: 96 % | HEART RATE: 81 BPM | SYSTOLIC BLOOD PRESSURE: 118 MMHG | WEIGHT: 169 LBS | HEIGHT: 64 IN | RESPIRATION RATE: 18 BRPM

## 2022-07-28 DIAGNOSIS — Z17.1 MALIGNANT NEOPLASM OF OVERLAPPING SITES OF RIGHT BREAST IN FEMALE, ESTROGEN RECEPTOR NEGATIVE (HCC): ICD-10-CM

## 2022-07-28 DIAGNOSIS — C79.51 OSSEOUS METASTASIS (HCC): Primary | ICD-10-CM

## 2022-07-28 DIAGNOSIS — T45.1X5A CHEMOTHERAPY-INDUCED NEUTROPENIA (HCC): ICD-10-CM

## 2022-07-28 DIAGNOSIS — C50.811 MALIGNANT NEOPLASM OF OVERLAPPING SITES OF RIGHT BREAST IN FEMALE, ESTROGEN RECEPTOR NEGATIVE (HCC): ICD-10-CM

## 2022-07-28 DIAGNOSIS — D70.1 CHEMOTHERAPY-INDUCED NEUTROPENIA (HCC): ICD-10-CM

## 2022-07-28 PROCEDURE — 96367 TX/PROPH/DG ADDL SEQ IV INF: CPT

## 2022-07-28 PROCEDURE — 96417 CHEMO IV INFUS EACH ADDL SEQ: CPT

## 2022-07-28 PROCEDURE — 96413 CHEMO IV INFUSION 1 HR: CPT

## 2022-07-28 PROCEDURE — 96375 TX/PRO/DX INJ NEW DRUG ADDON: CPT

## 2022-07-28 RX ORDER — SODIUM CHLORIDE 9 MG/ML
20 INJECTION, SOLUTION INTRAVENOUS ONCE
Status: COMPLETED | OUTPATIENT
Start: 2022-07-28 | End: 2022-07-28

## 2022-07-28 RX ADMIN — PACLITAXEL 150.6 MG: 6 INJECTION, SOLUTION, CONCENTRATE INTRAVENOUS at 11:21

## 2022-07-28 RX ADMIN — PERTUZUMAB 420 MG: 30 INJECTION, SOLUTION, CONCENTRATE INTRAVENOUS at 10:00

## 2022-07-28 RX ADMIN — SODIUM CHLORIDE 20 ML/HR: 0.9 INJECTION, SOLUTION INTRAVENOUS at 08:38

## 2022-07-28 RX ADMIN — TRASTUZUMAB 487 MG: 150 INJECTION, POWDER, LYOPHILIZED, FOR SOLUTION INTRAVENOUS at 10:42

## 2022-07-28 RX ADMIN — FAMOTIDINE 20 MG: 10 INJECTION INTRAVENOUS at 09:21

## 2022-07-28 RX ADMIN — DIPHENHYDRAMINE HYDROCHLORIDE 25 MG: 50 INJECTION, SOLUTION INTRAMUSCULAR; INTRAVENOUS at 08:57

## 2022-07-28 RX ADMIN — DEXAMETHASONE SODIUM PHOSPHATE: 10 INJECTION, SOLUTION INTRAMUSCULAR; INTRAVENOUS at 08:37

## 2022-07-28 NOTE — PLAN OF CARE
Problem: Potential for Falls  Goal: Patient will remain free of falls  Description: INTERVENTIONS:  - Educate patient/family on patient safety including physical limitations    - Keep Call bell within reach    Outcome: Progressing

## 2022-07-29 ENCOUNTER — HOME CARE VISIT (OUTPATIENT)
Dept: HOME HEALTH SERVICES | Facility: HOME HEALTHCARE | Age: 47
End: 2022-07-29
Payer: COMMERCIAL

## 2022-07-29 PROCEDURE — G0299 HHS/HOSPICE OF RN EA 15 MIN: HCPCS

## 2022-07-30 VITALS
RESPIRATION RATE: 18 BRPM | SYSTOLIC BLOOD PRESSURE: 128 MMHG | DIASTOLIC BLOOD PRESSURE: 76 MMHG | TEMPERATURE: 98.3 F | HEART RATE: 76 BPM | OXYGEN SATURATION: 99 %

## 2022-08-01 ENCOUNTER — APPOINTMENT (OUTPATIENT)
Dept: LAB | Facility: CLINIC | Age: 47
End: 2022-08-01
Payer: COMMERCIAL

## 2022-08-01 ENCOUNTER — HOME CARE VISIT (OUTPATIENT)
Dept: HOME HEALTH SERVICES | Facility: HOME HEALTHCARE | Age: 47
End: 2022-08-01
Payer: COMMERCIAL

## 2022-08-01 VITALS
DIASTOLIC BLOOD PRESSURE: 80 MMHG | RESPIRATION RATE: 20 BRPM | HEART RATE: 86 BPM | SYSTOLIC BLOOD PRESSURE: 132 MMHG | OXYGEN SATURATION: 97 % | TEMPERATURE: 98.3 F

## 2022-08-01 DIAGNOSIS — C79.51 OSSEOUS METASTASIS (HCC): ICD-10-CM

## 2022-08-01 DIAGNOSIS — Z17.1 MALIGNANT NEOPLASM OF OVERLAPPING SITES OF RIGHT BREAST IN FEMALE, ESTROGEN RECEPTOR NEGATIVE (HCC): ICD-10-CM

## 2022-08-01 DIAGNOSIS — D70.1 CHEMOTHERAPY-INDUCED NEUTROPENIA (HCC): ICD-10-CM

## 2022-08-01 DIAGNOSIS — C50.811 MALIGNANT NEOPLASM OF OVERLAPPING SITES OF RIGHT BREAST IN FEMALE, ESTROGEN RECEPTOR NEGATIVE (HCC): ICD-10-CM

## 2022-08-01 DIAGNOSIS — T45.1X5A CHEMOTHERAPY-INDUCED NEUTROPENIA (HCC): ICD-10-CM

## 2022-08-01 LAB
ALBUMIN SERPL BCP-MCNC: 3.7 G/DL (ref 3.5–5)
ALP SERPL-CCNC: 52 U/L (ref 46–116)
ALT SERPL W P-5'-P-CCNC: 38 U/L (ref 12–78)
ANION GAP SERPL CALCULATED.3IONS-SCNC: 5 MMOL/L (ref 4–13)
AST SERPL W P-5'-P-CCNC: 22 U/L (ref 5–45)
BASOPHILS # BLD AUTO: 0.01 THOUSANDS/ΜL (ref 0–0.1)
BASOPHILS NFR BLD AUTO: 1 % (ref 0–1)
BILIRUB SERPL-MCNC: 0.95 MG/DL (ref 0.2–1)
BUN SERPL-MCNC: 10 MG/DL (ref 5–25)
CALCIUM SERPL-MCNC: 9.2 MG/DL (ref 8.3–10.1)
CHLORIDE SERPL-SCNC: 107 MMOL/L (ref 96–108)
CO2 SERPL-SCNC: 29 MMOL/L (ref 21–32)
CREAT SERPL-MCNC: 0.59 MG/DL (ref 0.6–1.3)
EOSINOPHIL # BLD AUTO: 0 THOUSAND/ΜL (ref 0–0.61)
EOSINOPHIL NFR BLD AUTO: 0 % (ref 0–6)
ERYTHROCYTE [DISTWIDTH] IN BLOOD BY AUTOMATED COUNT: 16.8 % (ref 11.6–15.1)
GFR SERPL CREATININE-BSD FRML MDRD: 109 ML/MIN/1.73SQ M
GLUCOSE SERPL-MCNC: 79 MG/DL (ref 65–140)
HCT VFR BLD AUTO: 38.8 % (ref 34.8–46.1)
HGB BLD-MCNC: 11.3 G/DL (ref 11.5–15.4)
IMM GRANULOCYTES # BLD AUTO: 0 THOUSAND/UL (ref 0–0.2)
IMM GRANULOCYTES NFR BLD AUTO: 0 % (ref 0–2)
LYMPHOCYTES # BLD AUTO: 0.31 THOUSANDS/ΜL (ref 0.6–4.47)
LYMPHOCYTES NFR BLD AUTO: 15 % (ref 14–44)
MCH RBC QN AUTO: 29.8 PG (ref 26.8–34.3)
MCHC RBC AUTO-ENTMCNC: 29.1 G/DL (ref 31.4–37.4)
MCV RBC AUTO: 102 FL (ref 82–98)
MONOCYTES # BLD AUTO: 0.14 THOUSAND/ΜL (ref 0.17–1.22)
MONOCYTES NFR BLD AUTO: 7 % (ref 4–12)
NEUTROPHILS # BLD AUTO: 1.63 THOUSANDS/ΜL (ref 1.85–7.62)
NEUTS SEG NFR BLD AUTO: 77 % (ref 43–75)
NRBC BLD AUTO-RTO: 0 /100 WBCS
PLATELET # BLD AUTO: 306 THOUSANDS/UL (ref 149–390)
PMV BLD AUTO: 10.8 FL (ref 8.9–12.7)
POTASSIUM SERPL-SCNC: 3.9 MMOL/L (ref 3.5–5.3)
PROT SERPL-MCNC: 6.8 G/DL (ref 6.4–8.4)
RBC # BLD AUTO: 3.79 MILLION/UL (ref 3.81–5.12)
SODIUM SERPL-SCNC: 141 MMOL/L (ref 135–147)
WBC # BLD AUTO: 2.09 THOUSAND/UL (ref 4.31–10.16)

## 2022-08-01 PROCEDURE — 85025 COMPLETE CBC W/AUTO DIFF WBC: CPT

## 2022-08-01 PROCEDURE — 36415 COLL VENOUS BLD VENIPUNCTURE: CPT

## 2022-08-01 PROCEDURE — G0299 HHS/HOSPICE OF RN EA 15 MIN: HCPCS

## 2022-08-01 PROCEDURE — 80053 COMPREHEN METABOLIC PANEL: CPT

## 2022-08-02 ENCOUNTER — HOME CARE VISIT (OUTPATIENT)
Dept: HOME HEALTH SERVICES | Facility: HOME HEALTHCARE | Age: 47
End: 2022-08-02
Payer: COMMERCIAL

## 2022-08-04 ENCOUNTER — EVALUATION (OUTPATIENT)
Dept: PHYSICAL THERAPY | Facility: CLINIC | Age: 47
End: 2022-08-04
Payer: COMMERCIAL

## 2022-08-04 ENCOUNTER — HOSPITAL ENCOUNTER (OUTPATIENT)
Dept: INFUSION CENTER | Facility: CLINIC | Age: 47
Discharge: HOME | End: 2022-08-04
Payer: COMMERCIAL

## 2022-08-04 VITALS
DIASTOLIC BLOOD PRESSURE: 83 MMHG | RESPIRATION RATE: 20 BRPM | WEIGHT: 167 LBS | HEIGHT: 64 IN | SYSTOLIC BLOOD PRESSURE: 142 MMHG | TEMPERATURE: 97.3 F | OXYGEN SATURATION: 97 % | BODY MASS INDEX: 28.51 KG/M2 | HEART RATE: 96 BPM

## 2022-08-04 DIAGNOSIS — M84.48XA PATHOLOGICAL FRACTURE OF CERVICAL VERTEBRA, INITIAL ENCOUNTER: ICD-10-CM

## 2022-08-04 DIAGNOSIS — D70.1 CHEMOTHERAPY-INDUCED NEUTROPENIA (HCC): ICD-10-CM

## 2022-08-04 DIAGNOSIS — C79.51 OSSEOUS METASTASIS (HCC): Primary | ICD-10-CM

## 2022-08-04 DIAGNOSIS — T45.1X5A CHEMOTHERAPY-INDUCED NEUTROPENIA (HCC): ICD-10-CM

## 2022-08-04 DIAGNOSIS — S12.301A CLOSED NONDISPLACED FRACTURE OF FOURTH CERVICAL VERTEBRA, UNSPECIFIED FRACTURE MORPHOLOGY, INITIAL ENCOUNTER (HCC): Primary | ICD-10-CM

## 2022-08-04 DIAGNOSIS — C50.811 MALIGNANT NEOPLASM OF OVERLAPPING SITES OF RIGHT BREAST IN FEMALE, ESTROGEN RECEPTOR NEGATIVE (HCC): ICD-10-CM

## 2022-08-04 DIAGNOSIS — Z17.1 MALIGNANT NEOPLASM OF OVERLAPPING SITES OF RIGHT BREAST IN FEMALE, ESTROGEN RECEPTOR NEGATIVE (HCC): ICD-10-CM

## 2022-08-04 PROCEDURE — 96375 TX/PRO/DX INJ NEW DRUG ADDON: CPT

## 2022-08-04 PROCEDURE — 96413 CHEMO IV INFUSION 1 HR: CPT

## 2022-08-04 PROCEDURE — 97162 PT EVAL MOD COMPLEX 30 MIN: CPT | Performed by: PHYSICAL THERAPIST

## 2022-08-04 PROCEDURE — 96367 TX/PROPH/DG ADDL SEQ IV INF: CPT

## 2022-08-04 PROCEDURE — 97112 NEUROMUSCULAR REEDUCATION: CPT | Performed by: PHYSICAL THERAPIST

## 2022-08-04 RX ORDER — SODIUM CHLORIDE 9 MG/ML
20 INJECTION, SOLUTION INTRAVENOUS ONCE
Status: COMPLETED | OUTPATIENT
Start: 2022-08-04 | End: 2022-08-04

## 2022-08-04 RX ADMIN — DIPHENHYDRAMINE HYDROCHLORIDE 25 MG: 50 INJECTION, SOLUTION INTRAMUSCULAR; INTRAVENOUS at 09:27

## 2022-08-04 RX ADMIN — FAMOTIDINE 20 MG: 10 INJECTION INTRAVENOUS at 09:46

## 2022-08-04 RX ADMIN — SODIUM CHLORIDE 20 ML/HR: 0.9 INJECTION, SOLUTION INTRAVENOUS at 09:02

## 2022-08-04 RX ADMIN — DEXAMETHASONE SODIUM PHOSPHATE: 10 INJECTION, SOLUTION INTRAMUSCULAR; INTRAVENOUS at 09:02

## 2022-08-04 RX ADMIN — PACLITAXEL 150.6 MG: 6 INJECTION, SOLUTION, CONCENTRATE INTRAVENOUS at 10:20

## 2022-08-04 NOTE — PROGRESS NOTES
Pt here for D8, chemotherapy  Pt offers no complaints, resting comfortably  Vitals stable  Labs reviewed from 8/1-within parameters for treatment  Call bell in reach

## 2022-08-04 NOTE — PROGRESS NOTES
Pt tolerated treatment well without any adverse reactions   Aware of next appointment next Thursday AM  Bethanie AVS

## 2022-08-06 DIAGNOSIS — Z51.5 PALLIATIVE CARE PATIENT: ICD-10-CM

## 2022-08-06 DIAGNOSIS — G89.3 CANCER ASSOCIATED PAIN: ICD-10-CM

## 2022-08-08 ENCOUNTER — HOSPITAL ENCOUNTER (OUTPATIENT)
Dept: RADIOLOGY | Facility: HOSPITAL | Age: 47
Discharge: HOME | End: 2022-08-08
Payer: COMMERCIAL

## 2022-08-08 DIAGNOSIS — Z51.5 PALLIATIVE CARE PATIENT: ICD-10-CM

## 2022-08-08 DIAGNOSIS — G89.3 CANCER ASSOCIATED PAIN: ICD-10-CM

## 2022-08-08 DIAGNOSIS — S12.300A C4 CERVICAL FRACTURE (HCC): ICD-10-CM

## 2022-08-08 PROCEDURE — 72052 X-RAY EXAM NECK SPINE 6/>VWS: CPT

## 2022-08-08 RX ORDER — OXYCODONE HYDROCHLORIDE 10 MG/1
10 TABLET ORAL EVERY 4 HOURS PRN
Qty: 180 TABLET | Refills: 0 | Status: SHIPPED | OUTPATIENT
Start: 2022-08-08 | End: 2022-08-10 | Stop reason: SDUPTHER

## 2022-08-08 RX ORDER — OXYCODONE HYDROCHLORIDE 10 MG/1
10 TABLET ORAL EVERY 4 HOURS PRN
Qty: 180 TABLET | Refills: 0 | OUTPATIENT
Start: 2022-08-08

## 2022-08-09 ENCOUNTER — APPOINTMENT (OUTPATIENT)
Dept: LAB | Facility: CLINIC | Age: 47
End: 2022-08-09
Payer: COMMERCIAL

## 2022-08-09 ENCOUNTER — HOSPITAL ENCOUNTER (OUTPATIENT)
Dept: CT IMAGING | Facility: HOSPITAL | Age: 47
Discharge: HOME | End: 2022-08-09
Payer: COMMERCIAL

## 2022-08-09 ENCOUNTER — OFFICE VISIT (OUTPATIENT)
Dept: PHYSICAL THERAPY | Facility: CLINIC | Age: 47
End: 2022-08-09
Payer: COMMERCIAL

## 2022-08-09 ENCOUNTER — OFFICE VISIT (OUTPATIENT)
Dept: SURGICAL ONCOLOGY | Facility: CLINIC | Age: 47
End: 2022-08-09
Payer: COMMERCIAL

## 2022-08-09 VITALS
SYSTOLIC BLOOD PRESSURE: 128 MMHG | RESPIRATION RATE: 18 BRPM | BODY MASS INDEX: 28.68 KG/M2 | HEART RATE: 104 BPM | DIASTOLIC BLOOD PRESSURE: 78 MMHG | OXYGEN SATURATION: 97 % | TEMPERATURE: 97.8 F | HEIGHT: 64 IN | WEIGHT: 168 LBS

## 2022-08-09 DIAGNOSIS — Z17.1 MALIGNANT NEOPLASM OF OVERLAPPING SITES OF RIGHT BREAST IN FEMALE, ESTROGEN RECEPTOR NEGATIVE (HCC): ICD-10-CM

## 2022-08-09 DIAGNOSIS — Z17.1 MALIGNANT NEOPLASM OF OVERLAPPING SITES OF RIGHT BREAST IN FEMALE, ESTROGEN RECEPTOR NEGATIVE (HCC): Primary | ICD-10-CM

## 2022-08-09 DIAGNOSIS — C79.51 OSSEOUS METASTASIS (HCC): ICD-10-CM

## 2022-08-09 DIAGNOSIS — C50.811 MALIGNANT NEOPLASM OF OVERLAPPING SITES OF RIGHT BREAST IN FEMALE, ESTROGEN RECEPTOR NEGATIVE (HCC): ICD-10-CM

## 2022-08-09 DIAGNOSIS — S12.301A CLOSED NONDISPLACED FRACTURE OF FOURTH CERVICAL VERTEBRA, UNSPECIFIED FRACTURE MORPHOLOGY, INITIAL ENCOUNTER (HCC): Primary | ICD-10-CM

## 2022-08-09 DIAGNOSIS — M84.48XA PATHOLOGICAL FRACTURE OF CERVICAL VERTEBRA, INITIAL ENCOUNTER: ICD-10-CM

## 2022-08-09 DIAGNOSIS — D70.1 CHEMOTHERAPY-INDUCED NEUTROPENIA (HCC): ICD-10-CM

## 2022-08-09 DIAGNOSIS — C50.811 MALIGNANT NEOPLASM OF OVERLAPPING SITES OF RIGHT BREAST IN FEMALE, ESTROGEN RECEPTOR NEGATIVE (HCC): Primary | ICD-10-CM

## 2022-08-09 DIAGNOSIS — T45.1X5A CHEMOTHERAPY-INDUCED NEUTROPENIA (HCC): ICD-10-CM

## 2022-08-09 LAB
BASOPHILS # BLD AUTO: 0.01 THOUSANDS/ΜL (ref 0–0.1)
BASOPHILS NFR BLD AUTO: 1 % (ref 0–1)
CANCER AG27-29 SERPL-ACNC: 33 U/ML (ref 0–42.3)
EOSINOPHIL # BLD AUTO: 0.01 THOUSAND/ΜL (ref 0–0.61)
EOSINOPHIL NFR BLD AUTO: 1 % (ref 0–6)
ERYTHROCYTE [DISTWIDTH] IN BLOOD BY AUTOMATED COUNT: 16.6 % (ref 11.6–15.1)
HCT VFR BLD AUTO: 37.5 % (ref 34.8–46.1)
HGB BLD-MCNC: 11.7 G/DL (ref 11.5–15.4)
IMM GRANULOCYTES # BLD AUTO: 0.01 THOUSAND/UL (ref 0–0.2)
IMM GRANULOCYTES NFR BLD AUTO: 1 % (ref 0–2)
LYMPHOCYTES # BLD AUTO: 0.28 THOUSANDS/ΜL (ref 0.6–4.47)
LYMPHOCYTES NFR BLD AUTO: 14 % (ref 14–44)
MCH RBC QN AUTO: 31 PG (ref 26.8–34.3)
MCHC RBC AUTO-ENTMCNC: 31.2 G/DL (ref 31.4–37.4)
MCV RBC AUTO: 99 FL (ref 82–98)
MONOCYTES # BLD AUTO: 0.12 THOUSAND/ΜL (ref 0.17–1.22)
MONOCYTES NFR BLD AUTO: 6 % (ref 4–12)
NEUTROPHILS # BLD AUTO: 1.51 THOUSANDS/ΜL (ref 1.85–7.62)
NEUTS SEG NFR BLD AUTO: 77 % (ref 43–75)
NRBC BLD AUTO-RTO: 0 /100 WBCS
PLATELET # BLD AUTO: 317 THOUSANDS/UL (ref 149–390)
PMV BLD AUTO: 10.3 FL (ref 8.9–12.7)
RBC # BLD AUTO: 3.78 MILLION/UL (ref 3.81–5.12)
WBC # BLD AUTO: 1.94 THOUSAND/UL (ref 4.31–10.16)

## 2022-08-09 PROCEDURE — 86300 IMMUNOASSAY TUMOR CA 15-3: CPT

## 2022-08-09 PROCEDURE — 99214 OFFICE O/P EST MOD 30 MIN: CPT | Performed by: SURGERY

## 2022-08-09 PROCEDURE — 97140 MANUAL THERAPY 1/> REGIONS: CPT | Performed by: PHYSICAL THERAPIST

## 2022-08-09 PROCEDURE — 85025 COMPLETE CBC W/AUTO DIFF WBC: CPT

## 2022-08-09 PROCEDURE — 97110 THERAPEUTIC EXERCISES: CPT | Performed by: PHYSICAL THERAPIST

## 2022-08-09 PROCEDURE — 97112 NEUROMUSCULAR REEDUCATION: CPT | Performed by: PHYSICAL THERAPIST

## 2022-08-09 PROCEDURE — 74177 CT ABD & PELVIS W/CONTRAST: CPT

## 2022-08-09 PROCEDURE — 36415 COLL VENOUS BLD VENIPUNCTURE: CPT

## 2022-08-09 PROCEDURE — 71260 CT THORAX DX C+: CPT

## 2022-08-09 RX ADMIN — IOHEXOL 70 ML: 350 INJECTION, SOLUTION INTRAVENOUS at 09:44

## 2022-08-09 NOTE — LETTER
August 9, 2022     Andrew Dillon MD  [address]   [address] [facility] [address]  [address] [facility] [address]    Patient: Delano Ventura   YOB: 1975   Date of Visit: 8/9/2022       Dear Dr Noreen Patel: Thank you for referring Evonne Weinstein to me for evaluation  Below are my notes for this consultation  If you have questions, please do not hesitate to call me  I look forward to following your patient along with you  Sincerely,        Tameka Shepherd MD        CC: MD Tristin Vidal MD Jordis Pert, MD  8/9/2022 10:40 AM  Sign when Signing Visit               Surgical Oncology Follow Up       [address] [id number]    Delano Ventura  1975  [id number]  [address] [facility]  [facility] [place]  [address] [facility] [address] [facility] [address]    Diagnoses and all orders for this visit:    Malignant neoplasm of overlapping sites of right breast in female, estrogen receptor negative ([facility] )    Osseous metastasis [facility])        Chief Complaint   Patient presents with    Breast Cancer       No follow-ups on file  Oncology History   Malignant neoplasm of overlapping sites of right breast in female, estrogen receptor negative ([facility] )   3/2022 Initial Diagnosis    Malignant neoplasm of overlapping sites of right breast in female, estrogen receptor negative ([facility] )     3/29/2022 Biopsy    Breast, Right, Biopsy:  - Invasive mammary carcinoma of no special type (ductal, not otherwise specified), [place] Grade II (3 + 2 + 1 = 6), spanning at least 6 mm     ER/LA negative, HER2 positive    Procedure  Needle biopsy    Specimen Laterality  Right    TUMOR   Histologic Type  Invasive carcinoma of no special type (ductal)    Histologic Grade (Fabrizio Histologic Score)     Glandular (Acinar) / Tubular Differentiation  Score 3    Nuclear Pleomorphism  Score 2 Mitotic Rate  Score 1    Overall Grade  Grade 2 (scores of 6 or 7)    Tumor Size  Greatest dimension of largest invasive focus (Millimeters): 6 mm   Ductal Carcinoma In Situ (DCIS)  Not identified    Lymphovascular Invasion  Not identified         3/31/2022 - 4/18/2022 Radiation    Course: C1    Plan ID Energy Fractions Dose per Fraction (cGy) Dose Correction (cGy) Total Dose Delivered (cGy) Elapsed Days   C1 C7 And ix 10X/6X 7 / 7 300 0 2,100 8   C1 C7 Spine 10X/6X 3 / 10 300 0 900 4   L4 Sacrum 10X 10 / 10 300 0 3,000 13   T1 T6 And ix 10X 7 / 7 300 0 2,100 8   T1 T6 Spine 10X 3 / 10 300 0 900 4      Dr Chun Mitchell     4/28/2022 -  Chemotherapy    clinical trial with THP with or without atezolizumab     4/28/2022 -  Chemotherapy    pegfilgrastim (NEULASTA ONPRO), 6 mg, Subcutaneous, Once, 1 of 1 cycle  pertuzumab (PERJETA) IVPB, 840 mg, Intravenous, Once, 5 of 6 cycles  Administration: 420 mg (6/16/2022), 420 mg (7/7/2022), 420 mg (7/28/2022)  DOCEtaxel (TAXOTERE) chemo infusion, 75 mg/m2, Intravenous, Once, 3 of 3 cycles  Dose modification: 60 mg/m2 (original dose 75 mg/m2, Cycle 3, Reason: Anticipated Tolerance)  Administration: 112 8 mg (6/16/2022)  PACLItaxel (TAXOL) chemo IVPB, 80 mg/m2 = 150 6 mg (100 % of original dose 80 mg/m2), Intravenous, Once, 2 of 3 cycles  Dose modification: 80 mg/m2 (original dose 80 mg/m2, Cycle 4, Reason: Anticipated Tolerance)  Administration: 150 6 mg (7/7/2022), 150 6 mg (7/14/2022), 150 6 mg (7/21/2022), 150 6 mg (7/28/2022), 150 6 mg (8/4/2022)  trastuzumab (HERCEPTIN) chemo infusion, 8 mg/kg, Intravenous, Once, 3 of 4 cycles  Administration: 487 mg (7/28/2022)  INV trastuzumab infusion, 6 mg/kg = 487 mg (100 % of original dose 6 mg/kg), Intravenous, Once, 2 of 2 cycles  Dose modification: 6 mg/kg (original dose 6 mg/kg, Cycle 3, Reason: Other (Must fill in a comment), Comment: investigational drug)  Administration: 487 mg (6/16/2022), 487 mg (7/7/2022)     Osseous metastasis ([facility] )   3/2022 Initial Diagnosis    Osseous metastasis ([facility] )     3/29/2022 Biopsy    Breast, Right, Biopsy:  - Invasive mammary carcinoma of no special type (ductal, not otherwise specified), [place] Grade II (3 + 2 + 1 = 6), spanning at least 6 mm     ER/OH negative, HER2 positive    Procedure  Needle biopsy    Specimen Laterality  Right    TUMOR   Histologic Type  Invasive carcinoma of no special type (ductal)    Histologic Grade (Fabrizio Histologic Score)     Glandular (Acinar) / Tubular Differentiation  Score 3    Nuclear Pleomorphism  Score 2    Mitotic Rate  Score 1    Overall Grade  Grade 2 (scores of 6 or 7)    Tumor Size  Greatest dimension of largest invasive focus (Millimeters): 6 mm   Ductal Carcinoma In Situ (DCIS)  Not identified    Lymphovascular Invasion  Not identified         3/31/2022 - 4/18/2022 Radiation    Course: C1    Plan ID Energy Fractions Dose per Fraction (cGy) Dose Correction (cGy) Total Dose Delivered (cGy) Elapsed Days   C1 C7 And ix 10X/6X 7 / 7 300 0 2,100 8   C1 C7 Spine 10X/6X 3 / 10 300 0 900 4   L4 Sacrum 10X 10 / 10 300 0 3,000 13   T1 T6 And ix 10X 7 / 7 300 0 2,100 8   T1 T6 Spine 10X 3 / 10 300 0 900 4      Dr Scout Almaraz     4/28/2022 -  Chemotherapy    clinical trial with THP with or without atezolizumab     4/28/2022 -  Chemotherapy    pegfilgrastim (NEULASTA ONPRO), 6 mg, Subcutaneous, Once, 1 of 1 cycle  pertuzumab (PERJETA) IVPB, 840 mg, Intravenous, Once, 5 of 6 cycles  Administration: 420 mg (6/16/2022), 420 mg (7/7/2022), 420 mg (7/28/2022)  DOCEtaxel (TAXOTERE) chemo infusion, 75 mg/m2, Intravenous, Once, 3 of 3 cycles  Dose modification: 60 mg/m2 (original dose 75 mg/m2, Cycle 3, Reason: Anticipated Tolerance)  Administration: 112 8 mg (6/16/2022)  PACLItaxel (TAXOL) chemo IVPB, 80 mg/m2 = 150 6 mg (100 % of original dose 80 mg/m2), Intravenous, Once, 2 of 3 cycles  Dose modification: 80 mg/m2 (original dose 80 mg/m2, Cycle 4, Reason: Anticipated Tolerance)  Administration: 150 6 mg (7/7/2022), 150 6 mg (7/14/2022), 150 6 mg (7/21/2022), 150 6 mg (7/28/2022), 150 6 mg (8/4/2022)  trastuzumab (HERCEPTIN) chemo infusion, 8 mg/kg, Intravenous, Once, 3 of 4 cycles  Administration: 487 mg (7/28/2022)  INV trastuzumab infusion, 6 mg/kg = 487 mg (100 % of original dose 6 mg/kg), Intravenous, Once, 2 of 2 cycles  Dose modification: 6 mg/kg (original dose 6 mg/kg, Cycle 3, Reason: Other (Must fill in a comment), Comment: investigational drug)  Administration: 487 mg (6/16/2022), 487 mg (7/7/2022)         Staging:  metastatic breast cancer to the bones  ER/AK negative, HER2 Kelle positive  Treatment history:   pertuzumab/Taxotere and Herceptin on trial  Current treatment:  pertuzumab/Taxotere and Herceptin  Disease status:         History of Present Illness:  Patient returns in follow-up  She is undergoing chemotherapy  She has 1 more month of chemotherapy left  She has noticed marked abduction in the size of her tumor  She still has ulceration of skin and is having to undergo local wound care  There is still occasional bleeding that is occurring but this is markedly improved  She had a CT today which my review continues to show bone only disease  We will await the final results of the CT  Review of Systems  Complete ROS Surg Onc:   Complete ROS Surg Onc:   Constitutional: The patient denies new or recent history of general fatigue, no recent weight loss, no change in appetite  Eyes: No complaints of visual problems, no scleral icterus  ENT: no complaints of ear pain, no hoarseness, no difficulty swallowing,  no tinnitus and no new masses in head, oral cavity, or neck  Cardiovascular: No complaints of chest pain, no palpitations, no ankle edema  Respiratory: No complaints of shortness of breath, no cough  Gastrointestinal: No complaints of jaundice, no bloody stools, no pale stools     Genitourinary: No complaints of dysuria, no hematuria, no nocturia, no frequent urination, no urethral discharge  Musculoskeletal: No complaints of weakness, paralysis, joint stiffness or arthralgias  Integumentary: No complaints of rash, no new lesions  Neurological: No complaints of convulsions, no seizures, no dizziness  Hematologic/Lymphatic: No complaints of easy bruising  Endocrine:  No hot or cold intolerance  No polydipsia, polyphagia, or polyuria  Allergy/immunology:  No environmental allergies  No food allergies  Not immunocompromised  Skin:  No pallor or rash  No wound  Patient Active Problem List   Diagnosis    Malignant neoplasm of overlapping sites of right breast in female, estrogen receptor negative ([place] [address] )    Osseous metastasis ([place] [address] )    Elevated BP without diagnosis of hypertension    Hypokalemia    Transaminitis    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 ([place] [address] )    Cancer associated pain    Palliative care patient    HTN (hypertension)    Candida infection, esophageal ([place] [address] )    Urinary tract infection without hematuria    Medical marijuana use    Chemotherapy-induced nausea    Neoplastic malignant related fatigue    Xerostomia    Antineoplastic chemotherapy induced anemia    COVID-19    Sepsis (HCC)    Chemotherapy-induced neutropenia (HCC)    Candidal skin infection    Neutropenia associated with infection ([place] [address] )    Constipation     Past Medical History:   Diagnosis Date    Breast cancer ([place] [address] )     Cancer ([place] [address] )     Hypertension      No past surgical history on file    Family History   Problem Relation Age of Onset    Coronary artery disease Mother     Heart attack Father     Diabetes type II Father      Social History     Socioeconomic History    Marital status: Single     Spouse name: Not on file    Number of children: Not on file    Years of education: Not on file    Highest education level: Not on file   Occupational History    Not on file   Tobacco Use    Smoking status: Never Smoker    Smokeless tobacco: Never Used Vaping Use    Vaping Use: Never used   Substance and Sexual Activity    Alcohol use: Not Currently     Comment: rare, social     Drug use: Never    Sexual activity: Not on file   Other Topics Concern    Not on file   Social History Narrative    Significant other Taina Josue is very supportive  Social Determinants of Health     Financial Resource Strain: Not on file   Food Insecurity: No Food Insecurity    Worried About Running Out of Food in the Last Year: Never true    Miguel of Food in the Last Year: Never true   Transportation Needs: No Transportation Needs    Lack of Transportation (Medical): No    Lack of Transportation (Non-Medical):  No   Physical Activity: Not on file   Stress: Not on file   Social Connections: Not on file   Intimate Partner Violence: Not on file   Housing Stability: Low Risk     Unable to Pay for Housing in the Last Year: No    Number of Places Lived in the Last Year: 1    Unstable Housing in the Last Year: No       Current Outpatient Medications:     al mag oxide-diphenhydramine-lidocaine viscous (MAGIC MOUTHWASH) 1:1:1 suspension, Swish and spit 10 mL every 4 (four) hours as needed for mouth pain or discomfort, Disp: 500 mL, Rfl: 2    baclofen 10 mg tablet, TAKE 1/2 TABLET BY MOUTH 3 TIMES A DAY AS NEEDED FOR MUSCLE SPASMS, Disp: 135 tablet, Rfl: 1    DPH-Lido-AlHydr-MgHydr-Simeth (First-Mouthwash BLM) SUSP, , Disp: , Rfl:     furosemide (LASIX) 40 mg tablet, Take 1 tablet (40 mg total) by mouth daily, Disp: 30 tablet, Rfl: 0    lactulose (CEPHULAC) 10 g packet, Take 1 packet (10 g total) by mouth 3 (three) times a day as needed (constipation), Disp: 30 each, Rfl: 0    lisinopril (ZESTRIL) 20 mg tablet, Take 1 tablet (20 mg total) by mouth daily, Disp: 90 tablet, Rfl: 1    morphine (MS CONTIN) 15 mg 12 hr tablet, Take 1 tablet (15 mg total) by mouth every 12 (twelve) hours Max Daily Amount: 30 mg, Disp: 60 tablet, Rfl: 0    nystatin (MYCOSTATIN) 500,000 units/5 mL suspension, Apply 5 mL (500,000 Units total) to the mouth or throat 4 (four) times a day, Disp: 473 mL, Rfl: 1    nystatin (MYCOSTATIN) powder, Apply topically 3 (three) times a day, Disp: 15 g, Rfl: 0    ondansetron (ZOFRAN) 4 mg tablet, Take 1 tablet (4 mg total) by mouth every 8 (eight) hours as needed for nausea or vomiting, Disp: 9 tablet, Rfl: 20    oxyCODONE (ROXICODONE) 10 MG TABS, Take 1 tablet (10 mg total) by mouth every 4 (four) hours as needed for moderate pain Max Daily Amount: 60 mg, Disp: 180 tablet, Rfl: 0    pantoprazole (PROTONIX) 40 mg tablet, Take 1 tablet (40 mg total) by mouth daily in the early morning, Disp: 90 tablet, Rfl: 1    polyethylene glycol (MIRALAX) 17 g packet, Take 17 g by mouth daily, Disp: 30 each, Rfl: 0    senna (SENOKOT) 8 6 mg, Take 3 tablets (25 8 mg total) by mouth 2 (two) times a day Hold for loose stool , Disp: 180 tablet, Rfl: 0  No current facility-administered medications for this visit  No Known Allergies  Vitals:    08/09/22 1017   BP: 128/78   Pulse: 104   Resp: 18   Temp: 97 8 °F (36 6 °C)   SpO2: 97%       Physical Exam  Constitutional: General appearance: The Patient is well-developed and well-nourished who appears the stated age in no acute distress  Patient is pleasant and talkative  HEENT:  Normocephalic  Sclerae are anicteric  Mucous membranes are moist  Neck is supple without adenopathy  No JVD  Chest: The lungs are clear to auscultation  Cardiac: Heart is regular rate  Abdomen: Abdomen is soft, non-tender, non-distended and without masses  Extremities: There is no clubbing or cyanosis  There is no edema  Symmetric  Neuro: Grossly nonfocal  Gait is normal      Lymphatic: No evidence of cervical adenopathy bilaterally  No evidence of axillary adenopathy bilaterally  Skin: Warm, anicteric  Psych:  Patient is pleasant and talkative  Breasts:   There is a 5 cm ulcerated area in the medial aspect of right breast   This does not appear to be fixed to the underlying musculature  There are no other satellite lesions  The left breast has no dominant masses, skin changes or nipple discharge  I axillary or supraclavicular adenopathy bilaterally  Pathology:  [unfilled]    Labs:      Imaging  No results found  I reviewed the above laboratory and imaging data  Discussion/Summary:  50-year-old female with metastatic breast cancer  This is ER/AZ negative, HER2 Kelle positive  She is undergoing chemotherapy  Initially she was on a clinical trial and this has stopped  She will continue her chemotherapy until September  I have recommended doing so  She still has wound issues from her tumor  I would consider surgical resection of her primary site just to alleviate these wound issues if there continues to be ulceration after she completes chemotherapy  We also discussed that studies are variable in terms of whether there is actually a survival advantage by removing the primary in the face of metastatic breast cancer  Since she is completing her chemotherapy in 1 month, I will see her again at that time for another clinical exam   If there continues to be local wound issues at the primary site I would consider surgical resection  She is agreeable to this  All her questions were answered

## 2022-08-09 NOTE — PROGRESS NOTES
Surgical Oncology Follow Up       [address]  [facility]  [address]  [facility] [address]    Adore Cosby  1975  [id number]  [address] [facility]  [facility]  [address] [facility] [address]  [facility] [address]    Diagnoses and all orders for this visit:    Malignant neoplasm of overlapping sites of right breast in female, estrogen receptor negative ([facility] [address] )    Osseous metastasis [facility])        Chief Complaint   Patient presents with    Breast Cancer       No follow-ups on file  Oncology History   Malignant neoplasm of overlapping sites of right breast in female, estrogen receptor negative ([facility] )   3/2022 Initial Diagnosis    Malignant neoplasm of overlapping sites of right breast in female, estrogen receptor [facility] )     3/29/2022 Biopsy    Breast, Right, Biopsy:  - Invasive mammary carcinoma of no special type (ductal, not otherwise specified), [place] Grade II (3 + 2 + 1 = 6), spanning at least 6 mm     ER/DE negative, HER2 positive    Procedure  Needle biopsy    Specimen Laterality  Right    TUMOR   Histologic Type  Invasive carcinoma of no special type (ductal)    Histologic Grade ([place] Histologic Score)     Glandular (Acinar) / Tubular Differentiation  Score 3    Nuclear Pleomorphism  Score 2    Mitotic Rate  Score 1    Overall Grade  Grade 2 (scores of 6 or 7)    Tumor Size  Greatest dimension of largest invasive focus (Millimeters): 6 mm   Ductal Carcinoma In Situ (DCIS)  Not identified    Lymphovascular Invasion  Not identified         3/31/2022 - 4/18/2022 Radiation    Course: C1    Plan ID Energy Fractions Dose per Fraction (cGy) Dose Correction (cGy) Total Dose Delivered (cGy) Elapsed Days   C1 C7 And ix 10X/6X 7 / 7 300 0 2,100 8   C1 C7 Spine 10X/6X 3 / 10 300 0 900 4   L4 Sacrum 10X 10 / 10 300 0 3,000 13   T1 T6 And ix 10X 7 / 7 300 0 2,100 8   T1 T6 Spine 10X 3 / 10 300 0 900 4      Dr Jason Garcia     4/28/2022 -  Chemotherapy    clinical trial with THP with or without atezolizumab     4/28/2022 -  Chemotherapy    pegfilgrastim (Amy Mcguire), 6 mg, Subcutaneous, Once, 1 of 1 cycle  pertuzumab (PERJETA) IVPB, 840 mg, Intravenous, Once, 5 of 6 cycles  Administration: 420 mg (6/16/2022), 420 mg (7/7/2022), 420 mg (7/28/2022)  DOCEtaxel (TAXOTERE) chemo infusion, 75 mg/m2, Intravenous, Once, 3 of 3 cycles  Dose modification: 60 mg/m2 (original dose 75 mg/m2, Cycle 3, Reason: Anticipated Tolerance)  Administration: 112 8 mg (6/16/2022)  PACLItaxel (TAXOL) chemo IVPB, 80 mg/m2 = 150 6 mg (100 % of original dose 80 mg/m2), Intravenous, Once, 2 of 3 cycles  Dose modification: 80 mg/m2 (original dose 80 mg/m2, Cycle 4, Reason: Anticipated Tolerance)  Administration: 150 6 mg (7/7/2022), 150 6 mg (7/14/2022), 150 6 mg (7/21/2022), 150 6 mg (7/28/2022), 150 6 mg (8/4/2022)  trastuzumab (HERCEPTIN) chemo infusion, 8 mg/kg, Intravenous, Once, 3 of 4 cycles  Administration: 487 mg (7/28/2022)  INV trastuzumab infusion, 6 mg/kg = 487 mg (100 % of original dose 6 mg/kg), Intravenous, Once, 2 of 2 cycles  Dose modification: 6 mg/kg (original dose 6 mg/kg, Cycle 3, Reason: Other (Must fill in a comment), Comment: investigational drug)  Administration: 487 mg (6/16/2022), 487 mg (7/7/2022)     Osseous metastasis ([address] )   3/2022 Initial Diagnosis    Osseous metastasis ([facility] [address] )     3/29/2022 Biopsy    Breast, Right, Biopsy:  - Invasive mammary carcinoma of no special type (ductal, not otherwise specified), [place] Grade II (3 + 2 + 1 = 6), spanning at least 6 mm     ER/IA negative, HER2 positive    Procedure  Needle biopsy    Specimen Laterality  Right    TUMOR   Histologic Type  Invasive carcinoma of no special type (ductal)    Histologic Grade ([place] Histologic Score)     Glandular (Acinar) / Tubular Differentiation  Score 3    Nuclear Pleomorphism  Score 2    Mitotic Rate  Score 1    Overall Grade Grade 2 (scores of 6 or 7)    Tumor Size  Greatest dimension of largest invasive focus (Millimeters): 6 mm   Ductal Carcinoma In Situ (DCIS)  Not identified    Lymphovascular Invasion  Not identified         3/31/2022 - 4/18/2022 Radiation    Course: C1    Plan ID Energy Fractions Dose per Fraction (cGy) Dose Correction (cGy) Total Dose Delivered (cGy) Elapsed Days   C1 C7 And ix 10X/6X 7 / 7 300 0 2,100 8   C1 C7 Spine 10X/6X 3 / 10 300 0 900 4   L4 Sacrum 10X 10 / 10 300 0 3,000 13   T1 T6 And ix 10X 7 / 7 300 0 2,100 8   T1 T6 Spine 10X 3 / 10 300 0 900 4      Dr Lenita Boas     4/28/2022 -  Chemotherapy    clinical trial with THP with or without atezolizumab     4/28/2022 -  Chemotherapy    pegfilgrastim (NEULASTA ONPRO), 6 mg, Subcutaneous, Once, 1 of 1 cycle  pertuzumab (PERJETA) IVPB, 840 mg, Intravenous, Once, 5 of 6 cycles  Administration: 420 mg (6/16/2022), 420 mg (7/7/2022), 420 mg (7/28/2022)  DOCEtaxel (TAXOTERE) chemo infusion, 75 mg/m2, Intravenous, Once, 3 of 3 cycles  Dose modification: 60 mg/m2 (original dose 75 mg/m2, Cycle 3, Reason: Anticipated Tolerance)  Administration: 112 8 mg (6/16/2022)  PACLItaxel (TAXOL) chemo IVPB, 80 mg/m2 = 150 6 mg (100 % of original dose 80 mg/m2), Intravenous, Once, 2 of 3 cycles  Dose modification: 80 mg/m2 (original dose 80 mg/m2, Cycle 4, Reason: Anticipated Tolerance)  Administration: 150 6 mg (7/7/2022), 150 6 mg (7/14/2022), 150 6 mg (7/21/2022), 150 6 mg (7/28/2022), 150 6 mg (8/4/2022)  trastuzumab (HERCEPTIN) chemo infusion, 8 mg/kg, Intravenous, Once, 3 of 4 cycles  Administration: 487 mg (7/28/2022)  INV trastuzumab infusion, 6 mg/kg = 487 mg (100 % of original dose 6 mg/kg), Intravenous, Once, 2 of 2 cycles  Dose modification: 6 mg/kg (original dose 6 mg/kg, Cycle 3, Reason: Other (Must fill in a comment), Comment: investigational drug)  Administration: 487 mg (6/16/2022), 487 mg (7/7/2022)         Staging:  metastatic breast cancer to the bones    ER/HI negative, HER2 Kelle positive  Treatment history:   pertuzumab/Taxotere and Herceptin on trial  Current treatment:  pertuzumab/Taxotere and Herceptin  Disease status:         History of Present Illness:  Patient returns in follow-up  She is undergoing chemotherapy  She has 1 more month of chemotherapy left  She has noticed marked abduction in the size of her tumor  She still has ulceration of skin and is having to undergo local wound care  There is still occasional bleeding that is occurring but this is markedly improved  She had a CT today which my review continues to show bone only disease  We will await the final results of the CT  Review of Systems  Complete ROS Surg Onc:   Complete ROS Surg Onc:   Constitutional: The patient denies new or recent history of general fatigue, no recent weight loss, no change in appetite  Eyes: No complaints of visual problems, no scleral icterus  ENT: no complaints of ear pain, no hoarseness, no difficulty swallowing,  no tinnitus and no new masses in head, oral cavity, or neck  Cardiovascular: No complaints of chest pain, no palpitations, no ankle edema  Respiratory: No complaints of shortness of breath, no cough  Gastrointestinal: No complaints of jaundice, no bloody stools, no pale stools  Genitourinary: No complaints of dysuria, no hematuria, no nocturia, no frequent urination, no urethral discharge  Musculoskeletal: No complaints of weakness, paralysis, joint stiffness or arthralgias  Integumentary: No complaints of rash, no new lesions  Neurological: No complaints of convulsions, no seizures, no dizziness  Hematologic/Lymphatic: No complaints of easy bruising  Endocrine:  No hot or cold intolerance  No polydipsia, polyphagia, or polyuria  Allergy/immunology:  No environmental allergies  No food allergies  Not immunocompromised  Skin:  No pallor or rash  No wound          Patient Active Problem List   Diagnosis    Malignant neoplasm of overlapping sites of right breast in female, estrogen receptor negative ([place] [address] )    Osseous metastasis ([place] [address] )    Elevated BP without diagnosis of hypertension    Hypokalemia    Transaminitis    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 (HCC)    Cancer associated pain    Palliative care patient    HTN (hypertension)    Candida infection, esophageal ([place] [address] )    Urinary tract infection without hematuria    Medical marijuana use    Chemotherapy-induced nausea    Neoplastic malignant related fatigue    Xerostomia    Antineoplastic chemotherapy induced anemia    COVID-19    Sepsis (HCC)    Chemotherapy-induced neutropenia (HCC)    Candidal skin infection    Neutropenia associated with infection (HCC)    Constipation     Past Medical History:   Diagnosis Date    Breast cancer ([place] [address] )     Cancer ([place] [address] )     Hypertension      No past surgical history on file  Family History   Problem Relation Age of Onset    Coronary artery disease Mother     Heart attack Father     Diabetes type II Father      Social History     Socioeconomic History    Marital status: Single     Spouse name: Not on file    Number of children: Not on file    Years of education: Not on file    Highest education level: Not on file   Occupational History    Not on file   Tobacco Use    Smoking status: Never Smoker    Smokeless tobacco: Never Used   Vaping Use    Vaping Use: Never used   Substance and Sexual Activity    Alcohol use: Not Currently     Comment: rare, social     Drug use: Never    Sexual activity: Not on file   Other Topics Concern    Not on file   Social History Narrative    Significant other Mare Pelt is very supportive  Social Determinants of Health     Financial Resource Strain: Not on file   Food Insecurity: No Food Insecurity    Worried About Running Out of Food in the Last Year: Never true    Miguel of Food in the Last Year: Never true   Transportation Needs: No Transportation Needs    Lack of Transportation (Medical):  No    Lack of Transportation (Non-Medical):  No   Physical Activity: Not on file   Stress: Not on file   Social Connections: Not on file   Intimate Partner Violence: Not on file   Housing Stability: Low Risk     Unable to Pay for Housing in the Last Year: No    Number of Places Lived in the Last Year: 1    Unstable Housing in the Last Year: No       Current Outpatient Medications:     al mag oxide-diphenhydramine-lidocaine viscous (MAGIC MOUTHWASH) 1:1:1 suspension, Swish and spit 10 mL every 4 (four) hours as needed for mouth pain or discomfort, Disp: 500 mL, Rfl: 2    baclofen 10 mg tablet, TAKE 1/2 TABLET BY MOUTH 3 TIMES A DAY AS NEEDED FOR MUSCLE SPASMS, Disp: 135 tablet, Rfl: 1    DPH-Lido-AlHydr-MgHydr-Simeth (First-Mouthwash BLM) SUSP, , Disp: , Rfl:     furosemide (LASIX) 40 mg tablet, Take 1 tablet (40 mg total) by mouth daily, Disp: 30 tablet, Rfl: 0    lactulose (CEPHULAC) 10 g packet, Take 1 packet (10 g total) by mouth 3 (three) times a day as needed (constipation), Disp: 30 each, Rfl: 0    lisinopril (ZESTRIL) 20 mg tablet, Take 1 tablet (20 mg total) by mouth daily, Disp: 90 tablet, Rfl: 1    morphine (MS CONTIN) 15 mg 12 hr tablet, Take 1 tablet (15 mg total) by mouth every 12 (twelve) hours Max Daily Amount: 30 mg, Disp: 60 tablet, Rfl: 0    nystatin (MYCOSTATIN) 500,000 units/5 mL suspension, Apply 5 mL (500,000 Units total) to the mouth or throat 4 (four) times a day, Disp: 473 mL, Rfl: 1    nystatin (MYCOSTATIN) powder, Apply topically 3 (three) times a day, Disp: 15 g, Rfl: 0    ondansetron (ZOFRAN) 4 mg tablet, Take 1 tablet (4 mg total) by mouth every 8 (eight) hours as needed for nausea or vomiting, Disp: 9 tablet, Rfl: 20    oxyCODONE (ROXICODONE) 10 MG TABS, Take 1 tablet (10 mg total) by mouth every 4 (four) hours as needed for moderate pain Max Daily Amount: 60 mg, Disp: 180 tablet, Rfl: 0    pantoprazole (PROTONIX) 40 mg tablet, Take 1 tablet (40 mg total) by mouth daily in the early morning, Disp: 90 tablet, Rfl: 1    polyethylene glycol (MIRALAX) 17 g packet, Take 17 g by mouth daily, Disp: 30 each, Rfl: 0    senna (SENOKOT) 8 6 mg, Take 3 tablets (25 8 mg total) by mouth 2 (two) times a day Hold for loose stool , Disp: 180 tablet, Rfl: 0  No current facility-administered medications for this visit  No Known Allergies  Vitals:    08/09/22 1017   BP: 128/78   Pulse: 104   Resp: 18   Temp: 97 8 °F (36 6 °C)   SpO2: 97%       Physical Exam  Constitutional: General appearance: The Patient is well-developed and well-nourished who appears the stated age in no acute distress  Patient is pleasant and talkative  HEENT:  Normocephalic  Sclerae are anicteric  Mucous membranes are moist  Neck is supple without adenopathy  No JVD  Chest: The lungs are clear to auscultation  Cardiac: Heart is regular rate  Abdomen: Abdomen is soft, non-tender, non-distended and without masses  Extremities: There is no clubbing or cyanosis  There is no edema  Symmetric  Neuro: Grossly nonfocal  Gait is normal      Lymphatic: No evidence of cervical adenopathy bilaterally  No evidence of axillary adenopathy bilaterally  Skin: Warm, anicteric  Psych:  Patient is pleasant and talkative  Breasts: There is a 5 cm ulcerated area in the medial aspect of right breast   This does not appear to be fixed to the underlying musculature  There are no other satellite lesions  The left breast has no dominant masses, skin changes or nipple discharge  I axillary or supraclavicular adenopathy bilaterally  Pathology:  [unfilled]    Labs:      Imaging  No results found  I reviewed the above laboratory and imaging data  Discussion/Summary:  70-year-old female with metastatic breast cancer  This is ER/OK negative, HER2 Kelle positive  She is undergoing chemotherapy  Initially she was on a clinical trial and this has stopped  She will continue her chemotherapy until September    I have recommended doing so  She still has wound issues from her tumor  I would consider surgical resection of her primary site just to alleviate these wound issues if there continues to be ulceration after she completes chemotherapy  We also discussed that studies are variable in terms of whether there is actually a survival advantage by removing the primary in the face of metastatic breast cancer  Since she is completing her chemotherapy in 1 month, I will see her again at that time for another clinical exam   If there continues to be local wound issues at the primary site I would consider surgical resection  She is agreeable to this  All her questions were answered

## 2022-08-09 NOTE — PROGRESS NOTES
Daily Note     Today's date: 2022  Patient name: Kenia Nettles  : 1975  MRN: [id number]  Referring provider: Mariella Saldana MD  Dx:   Encounter Diagnosis     ICD-10-CM    1  Closed nondisplaced fracture of fourth cervical vertebra, unspecified fracture morphology, [facility] ([facility]  S12 301A    2  Pathological fracture of cervical vertebra, initial encounter  M84 48XA        Start Time: 1435  Stop Time: 1525  Total time in clinic (min): 50 minutes    Subjective: Pt reports she was able to complete HEP without significant complication  Noted soreness after IE that decreased after a day or two  No new concerns noted at this time  Objective: See treatment diary below      Assessment: Initiated TE program  Tolerated treatment well  No significant pain reported with exercises  Noted discomfort when trying to lift the left UE overhead  Occasional cuing required for proper exercise technique  Advised to continue with initial HEP  Progress as tolerated  Patient would benefit from continued PT      Plan: Continue per plan of care  Precautions:  Active Breast CA w/ Metastasis to Spine, HTN      Manuals          STM B/L UT, LS, Rhomboids   KCB                               Neuro Re-Ed           C/S Retract   20x         Scap Retract   20x3"         TB B/L ER   20x RTB                                          Education  S/S, POC, HEP           Ther Ex           TB Row/Ext   20x RTB         T/S Ext   10x         T/S Rotation  10x B         Shrugs/Rolls  20x 2#         Bicep Curls  20x 2#         DB Rows   20x 2#         UT Stretch   10x5"         LS Stretch            C/S AROM           C/S Isometrics  10x3" ea dir                    Ther Activity                                 Gait Training                                 Modalities

## 2022-08-10 ENCOUNTER — TELEPHONE (OUTPATIENT)
Dept: HEMATOLOGY ONCOLOGY | Facility: CLINIC | Age: 47
End: 2022-08-10

## 2022-08-10 RX ORDER — OXYCODONE HYDROCHLORIDE 10 MG/1
10 TABLET ORAL EVERY 4 HOURS PRN
Qty: 180 TABLET | Refills: 0 | Status: SHIPPED | OUTPATIENT
Start: 2022-08-10 | End: 2022-09-30 | Stop reason: SDUPTHER

## 2022-08-10 NOTE — TELEPHONE ENCOUNTER
Pt called  Unable to fill Oxycodone script at [address]  Not in stock  Request to send to [facility]   Confirmed with pharmacy that they can fill       Thank you

## 2022-08-11 ENCOUNTER — HOSPITAL ENCOUNTER (OUTPATIENT)
Dept: INFUSION CENTER | Facility: CLINIC | Age: 47
Discharge: HOME | End: 2022-08-11
Payer: COMMERCIAL

## 2022-08-11 ENCOUNTER — OFFICE VISIT (OUTPATIENT)
Dept: PHYSICAL THERAPY | Facility: CLINIC | Age: 47
End: 2022-08-11
Payer: COMMERCIAL

## 2022-08-11 VITALS
SYSTOLIC BLOOD PRESSURE: 130 MMHG | DIASTOLIC BLOOD PRESSURE: 89 MMHG | HEIGHT: 64 IN | OXYGEN SATURATION: 97 % | WEIGHT: 167 LBS | HEART RATE: 99 BPM | TEMPERATURE: 97.6 F | RESPIRATION RATE: 18 BRPM | BODY MASS INDEX: 28.51 KG/M2

## 2022-08-11 DIAGNOSIS — C79.51 OSSEOUS METASTASIS (HCC): Primary | ICD-10-CM

## 2022-08-11 DIAGNOSIS — T45.1X5A CHEMOTHERAPY-INDUCED NEUTROPENIA (HCC): ICD-10-CM

## 2022-08-11 DIAGNOSIS — C50.811 MALIGNANT NEOPLASM OF OVERLAPPING SITES OF RIGHT BREAST IN FEMALE, ESTROGEN RECEPTOR NEGATIVE (HCC): ICD-10-CM

## 2022-08-11 DIAGNOSIS — Z17.1 MALIGNANT NEOPLASM OF OVERLAPPING SITES OF RIGHT BREAST IN FEMALE, ESTROGEN RECEPTOR NEGATIVE (HCC): ICD-10-CM

## 2022-08-11 DIAGNOSIS — S12.301A CLOSED NONDISPLACED FRACTURE OF FOURTH CERVICAL VERTEBRA, UNSPECIFIED FRACTURE MORPHOLOGY, INITIAL ENCOUNTER (HCC): Primary | ICD-10-CM

## 2022-08-11 DIAGNOSIS — D70.1 CHEMOTHERAPY-INDUCED NEUTROPENIA (HCC): ICD-10-CM

## 2022-08-11 DIAGNOSIS — M84.48XA PATHOLOGICAL FRACTURE OF CERVICAL VERTEBRA, INITIAL ENCOUNTER: ICD-10-CM

## 2022-08-11 PROCEDURE — 96367 TX/PROPH/DG ADDL SEQ IV INF: CPT

## 2022-08-11 PROCEDURE — 97140 MANUAL THERAPY 1/> REGIONS: CPT | Performed by: PHYSICAL THERAPIST

## 2022-08-11 PROCEDURE — 96413 CHEMO IV INFUSION 1 HR: CPT

## 2022-08-11 PROCEDURE — 97110 THERAPEUTIC EXERCISES: CPT | Performed by: PHYSICAL THERAPIST

## 2022-08-11 RX ORDER — SODIUM CHLORIDE 9 MG/ML
20 INJECTION, SOLUTION INTRAVENOUS ONCE
Status: COMPLETED | OUTPATIENT
Start: 2022-08-11 | End: 2022-08-11

## 2022-08-11 RX ADMIN — DEXAMETHASONE SODIUM PHOSPHATE: 10 INJECTION, SOLUTION INTRAMUSCULAR; INTRAVENOUS at 09:15

## 2022-08-11 RX ADMIN — SODIUM CHLORIDE 20 ML/HR: 0.9 INJECTION, SOLUTION INTRAVENOUS at 08:29

## 2022-08-11 RX ADMIN — DIPHENHYDRAMINE HYDROCHLORIDE 25 MG: 50 INJECTION, SOLUTION INTRAMUSCULAR; INTRAVENOUS at 08:29

## 2022-08-11 RX ADMIN — FAMOTIDINE 20 MG: 10 INJECTION INTRAVENOUS at 08:53

## 2022-08-11 RX ADMIN — PACLITAXEL 150.6 MG: 6 INJECTION, SOLUTION, CONCENTRATE INTRAVENOUS at 09:46

## 2022-08-11 NOTE — PROGRESS NOTES
Patient tolerated treatment without incident  Peripheral IV removed  Next appointment confirmed  AVS offered and declined

## 2022-08-11 NOTE — PROGRESS NOTES
Daily Note     Today's date: 2022  Patient name: Jose Flynn  : 1975  MRN: [id number]  Referring provider: Ita Daly MD  Dx:   Encounter Diagnosis     ICD-10-CM    1  Closed nondisplaced fracture of fourth cervical vertebra, unspecified fracture morphology, [facility]  S12 301A    2  Pathological fracture of cervical vertebra, initial encounter  M84 [id number]        Start Time: 1445  Stop Time: 1505  Total time in clinic (min): 20 minutes    Subjective: Pt reports she had a little soreness after last visit that lasted about a day then decreased  No new concerns noted at this time  Objective: See treatment diary below      Assessment: Tolerated treatment well  Adjusted POC 2* pt arriving late to appt  No pain reported with exercises performed  Reviewed HEP and added B/L TB ER to HEP with the patient verbalizing understanding  Progress as tolerated  Patient would benefit from continued PT      Plan: Continue per plan of care  Precautions:  Active Breast CA w/ Metastasis to Spine, HTN      Manuals         STM B/L UT, LS, Rhomboids   KCB KCB                              Neuro Re-Ed           C/S Retract   20x 20x         Scap Retract   20x3" HEP        TB B/L ER   20x RTB HEP                                         Education  S/S, POC, HEP           Ther Ex           TB Row/Ext   20x RTB 20x RTB        T/S Ext   10x 10x         T/S Rotation  10x B         Shrugs/Rolls  20x 2# 20x 2#         Bicep Curls  20x 2# 20x 2#        DB Rows   20x 2# 20x 2#        UT Stretch   10x5"         LS Stretch            C/S AROM           C/S Isometrics  10x3" ea dir                    Ther Activity                                 Gait Training                                 Modalities

## 2022-08-11 NOTE — PROGRESS NOTES
Patient presents today for treatment with Taxol  Patient offers no complaints  VSS  Labs from 8/9/2022 reviewed and within parameters to treat  Peripheral IV inserted without incident

## 2022-08-12 ENCOUNTER — OFFICE VISIT (OUTPATIENT)
Dept: HEMATOLOGY ONCOLOGY | Facility: CLINIC | Age: 47
End: 2022-08-12
Payer: COMMERCIAL

## 2022-08-12 ENCOUNTER — TELEPHONE (OUTPATIENT)
Dept: HEMATOLOGY ONCOLOGY | Facility: CLINIC | Age: 47
End: 2022-08-12

## 2022-08-12 ENCOUNTER — OFFICE VISIT (OUTPATIENT)
Dept: NEUROSURGERY | Facility: CLINIC | Age: 47
End: 2022-08-12
Payer: COMMERCIAL

## 2022-08-12 VITALS
DIASTOLIC BLOOD PRESSURE: 80 MMHG | SYSTOLIC BLOOD PRESSURE: 126 MMHG | OXYGEN SATURATION: 99 % | RESPIRATION RATE: 16 BRPM | TEMPERATURE: 97.8 F | BODY MASS INDEX: 28.51 KG/M2 | WEIGHT: 167 LBS | HEIGHT: 64 IN | HEART RATE: 92 BPM

## 2022-08-12 VITALS — SYSTOLIC BLOOD PRESSURE: 124 MMHG | HEART RATE: 88 BPM | TEMPERATURE: 97.9 F | DIASTOLIC BLOOD PRESSURE: 86 MMHG

## 2022-08-12 DIAGNOSIS — D70.1 CHEMOTHERAPY-INDUCED NEUTROPENIA (HCC): ICD-10-CM

## 2022-08-12 DIAGNOSIS — Z17.1 MALIGNANT NEOPLASM OF OVERLAPPING SITES OF RIGHT BREAST IN FEMALE, ESTROGEN RECEPTOR NEGATIVE (HCC): Primary | ICD-10-CM

## 2022-08-12 DIAGNOSIS — T45.1X5A CHEMOTHERAPY-INDUCED NEUTROPENIA (HCC): ICD-10-CM

## 2022-08-12 DIAGNOSIS — M84.48XD PATHOLOGICAL FRACTURE OF CERVICAL VERTEBRA WITH ROUTINE HEALING, SUBSEQUENT ENCOUNTER: Primary | ICD-10-CM

## 2022-08-12 DIAGNOSIS — C50.811 MALIGNANT NEOPLASM OF OVERLAPPING SITES OF RIGHT BREAST IN FEMALE, ESTROGEN RECEPTOR NEGATIVE (HCC): Primary | ICD-10-CM

## 2022-08-12 DIAGNOSIS — C79.51 OSSEOUS METASTASIS (HCC): ICD-10-CM

## 2022-08-12 PROCEDURE — 99213 OFFICE O/P EST LOW 20 MIN: CPT | Performed by: PHYSICIAN ASSISTANT

## 2022-08-12 PROCEDURE — 99214 OFFICE O/P EST MOD 30 MIN: CPT | Performed by: INTERNAL MEDICINE

## 2022-08-12 RX ORDER — SODIUM CHLORIDE 9 MG/ML
20 INJECTION, SOLUTION INTRAVENOUS ONCE
Status: CANCELLED | OUTPATIENT
Start: 2022-09-29

## 2022-08-12 RX ORDER — SODIUM CHLORIDE 9 MG/ML
20 INJECTION, SOLUTION INTRAVENOUS ONCE
Status: CANCELLED | OUTPATIENT
Start: 2022-09-08

## 2022-08-12 RX ORDER — SODIUM CHLORIDE 9 MG/ML
20 INJECTION, SOLUTION INTRAVENOUS ONCE
Status: CANCELLED | OUTPATIENT
Start: 2022-10-20

## 2022-08-12 RX ORDER — SODIUM CHLORIDE 9 MG/ML
20 INJECTION, SOLUTION INTRAVENOUS ONCE
OUTPATIENT
Start: 2022-11-10

## 2022-08-12 NOTE — PROGRESS NOTES
Neurosurgery Office Note  Gael Dupree 49 y o  female MRN: [id number]      Assessment/Plan    Patient is a 51 yrs old woman with  history of metastatic breast cancer to cervical and  thoracic spines, had Pathological  C4 and T2 Compression fracture with retropulsion and central canal stenosis  She is here today for 4 months clinical F/U with flex/EXT cervical; spine x-rays  The images demonstrate stable T4 fracture and degenerative changes without much dynamic instability on both flexion and extension views compared to previous images  Patient weaned of [facility] collar and she is feeling better, she has some throbbing intermittent left lateral neck, left shoulder and left upper arm estimated 4/10, taking baclofen  No numbness, weakness or paresthesia in the upper extremities  Patient also reports stiffness in her neck,  doing physical therapy including range of motion exercise  Gait improved after she removed [employer] collar  Bowel/bladder dysfunction  Patient completed radiation; taking chemotherapy and to continue antibodies for life  Exam A&OX3  Livia  Finger to nose test normal and without drift bilaterally   strength is 5/5 and sensation to light touch intact throughout  DTR 2+ bilaterally without clonus  Luke's test negative bilaterally  Moderate tenderness on palpation of left lower posterior lateral cervical spine and limited range of motion of cervical spine  Stable gait with walking  Hx, PEx and images reviewed with the patient significant other  Management plan discussed  Dr Emerson Betancur reviewed the images and since patient's symptoms improving and flexion-extension cervical spine done in the past and currently remains stable, no neurosurgical intervention is indicated at this juncture  Advised to follow-up on p r n  basis, call office with progressive neurological symptoms  Advised fall precaution and follow-up with oncology    Questions and concerns were answered to patient's satisfaction  Patient  Expressed their understandings and agreed with the plan  Plan:  1  Images stable, patient feeling better, some left shoulder/arm sore pain  2  Advised to F/U with Oncology service  3  Fall precaution  4  Call or go to ER with new or worsening neurological symptoms   5  Otherwise, follow up on PRN basis  I spent 30 minutes with the patient today in which >50% of the time was spent counseling/coordination of care regarding diagnosis, imaging review, symptoms and treatment plan  Chief Complaint   Patient presents with    Follow-up    Neck Pain       C/C: " Here for 4 months follow up of Pathological fracture of C4"    History of Present Illness   Patient is a 51 yrs old woman with  history of metastatic breast cancer to cervical and  thoracic spines, had Pathological  C4 and T2 Compression fracture with retropulsion and central canal stenosis  She is here today for 4 months clinical F/U with flex/EXT cervical; spine x-rays  The images demonstrate stable T4 fracture and degenerative changes without much dynamic instability on both flexion and extension views compared to previous images  Patient weaned of [employer] [place] collar and she is feeling better, she has some throbbing intermittent left lateral neck, left shoulder and left upper arm estimated 4/10, taking baclofen  No numbness, weakness or paresthesia in the upper extremities  Patient also reports stiffness in her neck,  doing physical therapy including range of motion exercise  Gait improved after she removed [employer] collar  Bowel/bladder dysfunction  Patient completed radiation; taking chemotherapy and to continue antibodies for life  Patient denies history of fever, chills, rigors, cough or chest pain  REVIEW OF SYSTEMS  Review of system personally reviewed and updated  Review of Systems   Constitutional: Negative  HENT: Negative  Eyes: Negative  Respiratory: Negative  Cardiovascular: Negative  Gastrointestinal: Negative  H/o GERD   Endocrine: Negative  Genitourinary: Negative  Musculoskeletal: Positive for neck pain (b/l nack pain radiates to b/l shouders/arms ) and neck stiffness (has collar)  PT currently on going-- Is helping  Pain 3/10      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, Osseous metastasis   Medical marijuana use      On Palliative care patient    Skin: Negative  Allergic/Immunologic: Negative  Neurological: Negative  Hematological: Negative  Psychiatric/Behavioral: Positive for sleep disturbance (occasional due to pain)  All other systems reviewed and are negative          Meds/Allergies     Current Outpatient Medications   Medication Sig Dispense Refill    al mag oxide-diphenhydramine-lidocaine viscous (MAGIC MOUTHWASH) 1:1:1 suspension Swish and spit 10 mL every 4 (four) hours as needed for mouth pain or discomfort 500 mL 2    baclofen 10 mg tablet TAKE 1/2 TABLET BY MOUTH 3 TIMES A DAY AS NEEDED FOR MUSCLE SPASMS 135 tablet 1    DPH-Lido-AlHydr-MgHydr-Simeth (First-Mouthwash BLM) SUSP       furosemide (LASIX) 40 mg tablet Take 1 tablet (40 mg total) by mouth daily 30 tablet 0    lactulose (CEPHULAC) 10 g packet Take 1 packet (10 g total) by mouth 3 (three) times a day as needed (constipation) 30 each 0    lisinopril (ZESTRIL) 20 mg tablet Take 1 tablet (20 mg total) by mouth daily 90 tablet 1    morphine (MS CONTIN) 15 mg 12 hr tablet Take 1 tablet (15 mg total) by mouth every 12 (twelve) hours Max Daily Amount: 30 mg 60 tablet 0    nystatin (MYCOSTATIN) 500,000 units/5 mL suspension Apply 5 mL (500,000 Units total) to the mouth or throat 4 (four) times a day 473 mL 1    nystatin (MYCOSTATIN) powder Apply topically 3 (three) times a day 15 g 0    ondansetron (ZOFRAN) 4 mg tablet Take 1 tablet (4 mg total) by mouth every 8 (eight) hours as needed for nausea or vomiting 9 tablet 20    oxyCODONE (ROXICODONE) 10 MG TABS Take 1 tablet (10 mg total) by mouth every 4 (four) hours as needed for moderate pain Max Daily Amount: 60 mg 180 tablet 0    pantoprazole (PROTONIX) 40 mg tablet Take 1 tablet (40 mg total) by mouth daily in the early morning 90 tablet 1    polyethylene glycol (MIRALAX) 17 g packet Take 17 g by mouth daily 30 each 0    senna (SENOKOT) 8 6 mg Take 3 tablets (25 8 mg total) by mouth 2 (two) times a day Hold for loose stool  180 tablet 0     No current facility-administered medications for this visit  No Known Allergies    PAST HISTORY    Past Medical History:   Diagnosis Date    [facility] [address] [facility]     [facility] ([facility]     Hypertension        History reviewed  No pertinent surgical history  Social History     Tobacco Use    Smoking status: Never Smoker    Smokeless tobacco: Never Used   Vaping Use    Vaping Use: Never used   Substance Use Topics    Alcohol use: Not Currently     Comment: rare, social     Drug use: Never       Family History   Problem Relation Age of Onset    Coronary artery disease Mother     Heart attack Father     Diabetes type II Father          Above history personally reviewed  EXAM    Vitals:Blood pressure 124/86, pulse 88, temperature 97 9 °F (36 6 °C), temperature source Temporal, last menstrual period 03/28/2022, not currently breastfeeding  ,There is no height or weight on file to calculate BMI  Physical Exam  Constitutional:       Appearance: Normal appearance  HENT:      Head: Normocephalic and atraumatic  Eyes:      Extraocular Movements: Extraocular movements intact  Cardiovascular:      Rate and Rhythm: Normal rate  Pulses: Normal pulses  Pulmonary:      Effort: Pulmonary effort is normal    Musculoskeletal:         General: Tenderness present  Cervical back: Rigidity and tenderness present  Neurological:      General: No focal deficit present  Mental Status: She is alert and oriented to person, place, and time        GCS: GCS eye subscore is 4  GCS verbal subscore is 5  GCS motor subscore is 6  Cranial Nerves: Cranial nerves are intact  Sensory: Sensation is intact  Motor: Motor function is intact  Deep Tendon Reflexes: Reflexes are normal and symmetric  Reflex Scores:       Tricep reflexes are 2+ on the right side and 2+ on the left side  Bicep reflexes are 2+ on the right side and 2+ on the left side  Brachioradialis reflexes are 2+ on the right side and 2+ on the left side  Patellar reflexes are 2+ on the right side and 2+ on the left side  Achilles reflexes are 2+ on the right side and 2+ on the left side  Psychiatric:         Speech: Speech normal          Neurologic Exam     Mental Status   Oriented to person, place, and time  Speech: speech is normal   Level of consciousness: alert    Cranial Nerves     CN III, IV, VI   Nystagmus: none     CN XI   CN XI normal      Motor Exam   Muscle bulk: normal  Overall muscle tone: normal  Right arm tone: normal  Left arm tone: normal  Right arm pronator drift: absent  Left arm pronator drift: absent  Right leg tone: normal  Left leg tone: normal    Sensory Exam   Light touch normal      Gait, Coordination, and Reflexes     Reflexes   Right brachioradialis: 2+  Left brachioradialis: 2+  Right biceps: 2+  Left biceps: 2+  Right triceps: 2+  Left triceps: 2+  Right patellar: 2+  Left patellar: 2+  Right achilles: 2+  Left achilles: 2+  Right : 2+  Left : 2+  Right Luke: absent  Left Lkue: absent  Right ankle clonus: absent  Left pendular knee jerk: absent        MEDICAL DECISION MAKING    Imaging Studies:     XR spine cervical complete 6+ vw flex/ext/obl    Result Date: 8/10/2022  Narrative: CERVICAL SPINE INDICATION:   S12 300A: Unspecified displaced fracture of fourth cervical vertebra, initial encounter for closed fracture   COMPARISON:  7/5/2022 VIEWS:  XR SPINE CERVICAL COMPLETE 6+ VW FLEX Deysija March /OBL FINDINGS: Severe C4 compression deformity with vertebra plana configuration  Findings are stable  Mild reversal of cervical lordosis  No significant subluxation  There is stable alignment on dynamic imaging  Multilevel endplate change with loss of disc height at C3-4, C4-5 and C5-6  Bilateral C3-4 severe foraminal stenosis  Mild left C5-6 foraminal narrowing  The prevertebral soft tissues are within normal limits  The lung apices are clear  Impression: Stable severe C4 compression fracture with vertebra plana configuration  Severe bilateral C3-4 foraminal stenosis  Stable alignment on dynamic imaging  Workstation performed: [id number]     CT chest abdomen pelvis w contrast    Result Date: 8/10/2022  Narrative: CT CHEST, ABDOMEN AND PELVIS WITH IV CONTRAST INDICATION:   C79 51: Secondary malignant neoplasm of bone C50 811: Malignant neoplasm of overlapping sites of right female breast Z17 1: Estrogen receptor negative status (ER-)  COMPARISON:  Multiple prior studies, most recent a CT scan dated 5/28/2022  TECHNIQUE: CT examination of the chest, abdomen and pelvis was performed  Axial, sagittal, and coronal 2D reformatted images were created from the source data and submitted for interpretation  Radiation dose length product (DLP) for this visit:  1017 mGy-cm   This examination, like all CT scans performed in the [facility], was performed utilizing techniques to minimize radiation dose exposure, including the use of iterative reconstruction and automated exposure control  IV Contrast:  70 mL of iohexol (OMNIPAQUE) Enteric Contrast: Enteric contrast was administered  FINDINGS: CHEST LUNGS:  Increased bilateral paramediastinal consolidation in the upper and lower lobes consistent with evolving postradiation changes  No nodules identified  There is no tracheal or endobronchial lesion  PLEURA:  Unremarkable  HEART/GREAT VESSELS: Heart is unremarkable for patient's age  No thoracic aortic aneurysm   MEDIASTINUM AND ALISON: Unremarkable  CHEST WALL AND LOWER NECK:  Slightly diminished right breast mass medially measuring 4 6 x 2 7 cm on image 2/38, previously 4 8 x 3 5 cm  Slightly decreased right axillary lymph node measuring 1 7 cm in short axis, previously 2 1 cm  No new adenopathy  ABDOMEN LIVER/BILIARY TREE:  Unremarkable  GALLBLADDER:  No calcified gallstones  No pericholecystic inflammatory change  SPLEEN:  Unremarkable  PANCREAS:  Unremarkable  ADRENAL GLANDS:  Unremarkable  KIDNEYS/URETERS:  No hydronephrosis  Unchanged tiny bilateral renal calculi  Unchanged 1 2 cm exophytic cyst at the posterior right midpole, and 2 0 cm exophytic cyst at the left lower pole  STOMACH AND BOWEL:  Unremarkable  APPENDIX:  No findings to suggest appendicitis  ABDOMINOPELVIC CAVITY:  No ascites  No pneumoperitoneum  No lymphadenopathy  VESSELS:  Unremarkable for patient's age  PELVIS REPRODUCTIVE ORGANS:  Unchanged left adnexal 4 3 cm simple cyst  URINARY BLADDER:  Unremarkable  ABDOMINAL WALL/INGUINAL REGIONS:  Unremarkable  OSSEOUS STRUCTURES:  New 1 0 cm sclerotic lesion in the intertrochanteric right proximal femur, image 2/113  No significant change in other previously seen innumerable metastatic lesions throughout the thoracolumbar spine, bilateral ribs, and bilateral hips  There are no acute fractures  Unchanged chronic severe compression deformity of T2  Impression: Slightly diminished known right breast tumor and right axillary adenopathy  Post radiation changes in the lungs  New 1 0 cm metastatic lesion in the intertrochanteric right femur  Otherwise, no significant change in diffuse widespread osseous metastatic disease   Workstation performed: [id number]       I have personally reviewed pertinent reports   , I have personally reviewed pertinent films in PACS and I have personally reviewed pertinent films in PACS with a Radiologist

## 2022-08-12 NOTE — TELEPHONE ENCOUNTER
Please schedule the rest of patients infusion treatments  Patient goes on Thursday's around 8am     Thanks!

## 2022-08-12 NOTE — PROGRESS NOTES
Hematology / Oncology Outpatient Follow Up Note    Gina Hutton 49 y o  female Tanvi Huntley URX:[id number]         Date:  8/12/2022    Assessment / Plan:  A 77-year-old premenopausal woman with metastatic breast cancer, grade 2, ER negative, MN negative, HER2 3+ disease  Con Vega has large fungating mass in her right breast, palpable right axillary adenopathy as well as diffuse osseous metastasis with C4 stenosis   She completed palliative radiation therapy to the spine   She was treated with Taxotere, trastuzumab and pertuzumab with or without atezolizumab as part of clinical trial x2 cycles   After 2 cycles of treatment, she came off the clinical trial due to the USLTA-60 related complications  Due to the infusion reaction to Taxotere, she is currently on paclitaxel, trastuzumab and pertuzumab with excellent tolerance  Clinically, as well as radiographically, she has partial response  I recommended her to continue weekly paclitaxel as well as trastuzumab and pertuzumab for 1 more cycle before she move on to the maintenance treatment with trastuzumab and pertuzumab  She will continue with Zometa every 3 months  We discussed the possible palliative right mastectomy in the near future since she still have open wound  I will see her again in 2 months for routine follow-up   She is in agreement with my recommendations        Subjective:      HPI:  A 77-year-old premenopausal woman who was recently hospitalized because of the progressive neck pain which radiated to the bilateral upper extremity as well as enlarging right breast mass  Con Vega was found to have fungating right breast mass   CT scan of chest abdomen pelvis showed diffuse bony metastasis, especially with C4 stenosis   In addition, CT scan showed 6 9 cm of right breast mass   She had right breast biopsy which showed invasive ductal carcinoma, grade 2, ER negative, MN negative, HER2 3+ disease   Her tumor marker were find to be mildly elevated   CT of the head was negative for brain metastasis   She is currently on palliative radiation to her neck, shoulder as well as lower back   She is going to complete palliative radiation therapy in early next week   She presents today with her mother and tamika to discuss medical treatment for metastatic breast cancer   Her pain is reasonably controlled with narcotics medications   She has no recent weight loss   She denied any respiratory symptoms   She has no significant past medical history   Therefore, prior to this hospitalization, she was not on any medications   She denied family history of breast or ovarian cancer  Mau Lopez is a lifetime never smoker  [place] performance status is 1/4 on ECOG scale            Interval History:  A 56-year-old premenopausal woman with metastatic breast cancer, grade 2, ER negative, IL negative, HER2 3+ disease   She has large fungating mass in her right breast, palpable right axillary adenopathy as well as diffuse osseous metastasis with C4 stenosis   She completed palliative radiation therapy to the spine   Subsequently, she was treated with Taxotere, trastuzumab and pertuzumab with without atezolizumab based on a clinical trial   She received 2 cycle of treatment  She came off the study after 2 cycles of treatment due to the complication related to PYTOW-54 infection  She had infusion reaction with Taxotere  Therefore, she is currently on paclitaxel, trastuzumab and pertuzumab  She already have partial response, clinically as well as radiographically  She presents today for follow-up  She feels well  Her neck collar was finally removed  She has better range of motion of her neck as well as bilateral upper extremities  Her pain is well controlled  Her weight is stable  She has no respiratory symptoms  She has no febrile episode    Her performance status is 0 to 1/4 on ECOG scale         Objective:      Primary Diagnosis:     Metastatic breast cancer, grade 2, ER negative, IL negative, HER2 3+ disease   Diagnosed in March 2022      Cancer Staging:  Cancer Staging  No matching staging information was found for the patient         Previous Hematologic/ Oncologic Treatment:       THP x3 cycles, completed in early June 2022      Current Hematologic/ Oncologic Treatment:       Weekly paclitaxel, try weekly pertuzumab and trastuzumab since early July 2022      Disease Status:      Clinical partial response      Test Results:     Pathology:     Right breast biopsy showed invasive ductal carcinoma, grade 2 ER negative, AK negative, HER2 3+ disease      Radiology:     CT scan of chest abdomen pelvis in August 2022 showed further diminished showed right breast mass and right axillary adenopathy      Bone scan showed widespread osseous metastasis      Laboratory:     See below for CBC and CMP  CA 27, 29 was 33 in August 2022      Physical Exam:        General Appearance:    Alert, oriented          Eyes:    PERRL   Ears:    Normal external ear canals, both ears   Nose:   Nares normal, septum midline   Throat:   Mucosa moist  Pharynx without injection  Neck:   Supple         Lungs:     Clear to auscultation bilaterally   Chest Wall:    No tenderness or deformity    Heart:    Regular rate and rhythm         Abdomen:     Soft, non-tender, bowel sounds +, no organomegaly               Extremities:   Extremities no cyanosis or edema         Skin:   no rash or icterus  Lymph nodes:   Cervical, supraclavicular, and axillary nodes normal   Neurologic:   CNII-XII intact, normal strength, sensation and reflexes     Throughout             Breast exam:  3 x 4  cm of palpable mass at in the upper quadrant of her right breast with 2 cm of central ulceration   Previous right axillary adenopathy is no longer palpable   Left breast exam is negative  ROS: Review of Systems   All other systems reviewed and are negative            Imaging: XR spine cervical complete 6+ vw flex/ext/obl    Result Date: 8/10/2022  Narrative: CERVICAL SPINE INDICATION:   S12 300A: Unspecified displaced fracture of fourth cervical vertebra, initial encounter for closed fracture  COMPARISON:  7/5/2022 VIEWS:  XR SPINE CERVICAL COMPLETE 6+ VW FLEX /EXT /OBL FINDINGS: Severe C4 compression deformity with vertebra plana configuration  Findings are stable  Mild reversal of cervical lordosis  No significant subluxation  There is stable alignment on dynamic imaging  Multilevel endplate change with loss of disc height at C3-4, C4-5 and C5-6  Bilateral C3-4 severe foraminal stenosis  Mild left C5-6 foraminal narrowing  The prevertebral soft tissues are within normal limits  The lung apices are clear  Impression: Stable severe C4 compression fracture with vertebra plana configuration  Severe bilateral C3-4 foraminal stenosis  Stable alignment on dynamic imaging  Workstation performed: [id number]     CT chest abdomen pelvis w contrast    Result Date: 8/10/2022  Narrative: CT CHEST, ABDOMEN AND PELVIS WITH IV CONTRAST INDICATION:   C79 51: Secondary malignant neoplasm of bone C50 811: Malignant neoplasm of overlapping sites of right female breast Z17 1: Estrogen receptor negative status (ER-)  COMPARISON:  Multiple prior studies, most recent a CT scan dated 5/28/2022  TECHNIQUE: CT examination of the chest, abdomen and pelvis was performed  Axial, sagittal, and coronal 2D reformatted images were created from the source data and submitted for interpretation  Radiation dose length product (DLP) for this visit:  1017 mGy-cm   This examination, like all CT scans performed in the [facility], was performed utilizing techniques to minimize radiation dose exposure, including the use of iterative reconstruction and automated exposure control  IV Contrast:  70 mL of iohexol (OMNIPAQUE) Enteric Contrast: Enteric contrast was administered   FINDINGS: CHEST LUNGS:  Increased bilateral paramediastinal consolidation in the upper and lower lobes consistent with evolving postradiation changes  No nodules identified  There is no tracheal or endobronchial lesion  PLEURA:  Unremarkable  HEART/GREAT VESSELS: Heart is unremarkable for patient's age  No thoracic aortic aneurysm  MEDIASTINUM AND ALISON:  Unremarkable  CHEST WALL AND LOWER NECK:  Slightly diminished right breast mass medially measuring 4 6 x 2 7 cm on image 2/38, previously 4 8 x 3 5 cm  Slightly decreased right axillary lymph node measuring 1 7 cm in short axis, previously 2 1 cm  No new adenopathy  ABDOMEN LIVER/BILIARY TREE:  Unremarkable  GALLBLADDER:  No calcified gallstones  No pericholecystic inflammatory change  SPLEEN:  Unremarkable  PANCREAS:  Unremarkable  ADRENAL GLANDS:  Unremarkable  KIDNEYS/URETERS:  No hydronephrosis  Unchanged tiny bilateral renal calculi  Unchanged 1 2 cm exophytic cyst at the posterior right midpole, and 2 0 cm exophytic cyst at the left lower pole  STOMACH AND BOWEL:  Unremarkable  APPENDIX:  No findings to suggest appendicitis  ABDOMINOPELVIC CAVITY:  No ascites  No pneumoperitoneum  No lymphadenopathy  VESSELS:  Unremarkable for patient's age  PELVIS REPRODUCTIVE ORGANS:  Unchanged left adnexal 4 3 cm simple cyst  URINARY BLADDER:  Unremarkable  ABDOMINAL WALL/INGUINAL REGIONS:  Unremarkable  OSSEOUS STRUCTURES:  New 1 0 cm sclerotic lesion in the intertrochanteric right proximal femur, image 2/113  No significant change in other previously seen innumerable metastatic lesions throughout the thoracolumbar spine, bilateral ribs, and bilateral hips  There are no acute fractures  Unchanged chronic severe compression deformity of T2  Impression: Slightly diminished known right breast tumor and right axillary adenopathy  Post radiation changes in the lungs  New 1 0 cm metastatic lesion in the intertrochanteric right femur  Otherwise, no significant change in diffuse widespread osseous metastatic disease   Workstation performed: [id number]         Labs:   Lab Results   Component Value Date    WBC 1 94 (LL) 08/09/2022    HGB 11 7 08/09/2022    HCT 37 5 08/09/2022    MCV 99 (H) 08/09/2022     08/09/2022     Lab Results   Component Value Date    K 3 9 08/01/2022     08/01/2022    CO2 29 08/01/2022    BUN 10 08/01/2022    CREATININE 0 59 (L) 08/01/2022    GLUF 92 06/06/2022    CALCIUM 9 2 08/01/2022    CORRECTEDCA 10 4 (H) 06/13/2022    AST 22 08/01/2022    ALT 38 08/01/2022    ALKPHOS 52 08/01/2022    EGFR 109 08/01/2022         Lab Results   Component Value Date    CEA 6 5 (H) 03/29/2022         Lab Results   Component Value Date    IRON 83 05/16/2022    TIBC 206 (L) 05/16/2022    FERRITIN 191 05/27/2022         Current Medications: Reviewed  Allergies: Reviewed  PMH/FH/SH:  Reviewed      Vital Sign:    There is no height or weight on file to calculate BSA      Wt Readings from Last 3 Encounters:   08/11/22 75 8 kg (167 lb)   08/09/22 76 2 kg (168 lb)   08/04/22 75 8 kg (167 lb)        Temp Readings from Last 3 Encounters:   08/11/22 97 6 °F (36 4 °C) (Temporal)   08/09/22 97 8 °F (36 6 °C)   08/04/22 (!) 97 3 °F (36 3 °C) (Temporal)        BP Readings from Last 3 Encounters:   08/11/22 130/89   08/09/22 128/78   08/04/22 142/83         Pulse Readings from Last 3 Encounters:   08/11/22 99   08/09/22 104   08/04/22 96     @LASTSAO2(3)@

## 2022-08-12 NOTE — TELEPHONE ENCOUNTER
Left message for patient regarding updated infusion schedule  Advised patient to call back if she has any questions  She can also view updates on her her MyChart

## 2022-08-15 ENCOUNTER — APPOINTMENT (OUTPATIENT)
Dept: LAB | Facility: CLINIC | Age: 47
End: 2022-08-15
Payer: COMMERCIAL

## 2022-08-15 DIAGNOSIS — T45.1X5A CHEMOTHERAPY-INDUCED NEUTROPENIA (HCC): ICD-10-CM

## 2022-08-15 DIAGNOSIS — Z51.5 PALLIATIVE CARE PATIENT: ICD-10-CM

## 2022-08-15 DIAGNOSIS — C79.49 METASTASIS TO SPINAL CORD (HCC): ICD-10-CM

## 2022-08-15 DIAGNOSIS — C79.51 OSSEOUS METASTASIS (HCC): ICD-10-CM

## 2022-08-15 DIAGNOSIS — D70.1 CHEMOTHERAPY-INDUCED NEUTROPENIA (HCC): ICD-10-CM

## 2022-08-15 DIAGNOSIS — C50.811 MALIGNANT NEOPLASM OF OVERLAPPING SITES OF RIGHT BREAST IN FEMALE, ESTROGEN RECEPTOR NEGATIVE (HCC): ICD-10-CM

## 2022-08-15 DIAGNOSIS — Z17.1 MALIGNANT NEOPLASM OF OVERLAPPING SITES OF RIGHT BREAST IN FEMALE, ESTROGEN RECEPTOR NEGATIVE (HCC): ICD-10-CM

## 2022-08-15 DIAGNOSIS — G89.3 CANCER ASSOCIATED PAIN: ICD-10-CM

## 2022-08-15 LAB
BASOPHILS # BLD AUTO: 0 THOUSANDS/ΜL (ref 0–0.1)
BASOPHILS NFR BLD AUTO: 0 % (ref 0–1)
EOSINOPHIL # BLD AUTO: 0.01 THOUSAND/ΜL (ref 0–0.61)
EOSINOPHIL NFR BLD AUTO: 0 % (ref 0–6)
ERYTHROCYTE [DISTWIDTH] IN BLOOD BY AUTOMATED COUNT: 16.2 % (ref 11.6–15.1)
HCT VFR BLD AUTO: 38.8 % (ref 34.8–46.1)
HGB BLD-MCNC: 12.5 G/DL (ref 11.5–15.4)
IMM GRANULOCYTES # BLD AUTO: 0 THOUSAND/UL (ref 0–0.2)
IMM GRANULOCYTES NFR BLD AUTO: 0 % (ref 0–2)
LYMPHOCYTES # BLD AUTO: 0.39 THOUSANDS/ΜL (ref 0.6–4.47)
LYMPHOCYTES NFR BLD AUTO: 17 % (ref 14–44)
MCH RBC QN AUTO: 31.5 PG (ref 26.8–34.3)
MCHC RBC AUTO-ENTMCNC: 32.2 G/DL (ref 31.4–37.4)
MCV RBC AUTO: 98 FL (ref 82–98)
MONOCYTES # BLD AUTO: 0.17 THOUSAND/ΜL (ref 0.17–1.22)
MONOCYTES NFR BLD AUTO: 7 % (ref 4–12)
NEUTROPHILS # BLD AUTO: 1.72 THOUSANDS/ΜL (ref 1.85–7.62)
NEUTS SEG NFR BLD AUTO: 76 % (ref 43–75)
NRBC BLD AUTO-RTO: 0 /100 WBCS
PLATELET # BLD AUTO: 319 THOUSANDS/UL (ref 149–390)
PMV BLD AUTO: 10 FL (ref 8.9–12.7)
RBC # BLD AUTO: 3.97 MILLION/UL (ref 3.81–5.12)
WBC # BLD AUTO: 2.29 THOUSAND/UL (ref 4.31–10.16)

## 2022-08-15 PROCEDURE — 36415 COLL VENOUS BLD VENIPUNCTURE: CPT

## 2022-08-15 PROCEDURE — 85025 COMPLETE CBC W/AUTO DIFF WBC: CPT

## 2022-08-15 RX ORDER — MORPHINE SULFATE 15 MG/1
15 TABLET, FILM COATED, EXTENDED RELEASE ORAL EVERY 12 HOURS SCHEDULED
Qty: 60 TABLET | Refills: 0 | Status: SHIPPED | OUTPATIENT
Start: 2022-08-19 | End: 2022-09-30 | Stop reason: SDUPTHER

## 2022-08-16 ENCOUNTER — OFFICE VISIT (OUTPATIENT)
Dept: PHYSICAL THERAPY | Facility: CLINIC | Age: 47
End: 2022-08-16
Payer: COMMERCIAL

## 2022-08-16 DIAGNOSIS — M84.48XA PATHOLOGICAL FRACTURE OF CERVICAL VERTEBRA, INITIAL ENCOUNTER: ICD-10-CM

## 2022-08-16 DIAGNOSIS — C50.811 MALIGNANT NEOPLASM OF OVERLAPPING SITES OF RIGHT BREAST IN FEMALE, ESTROGEN RECEPTOR NEGATIVE: Primary | ICD-10-CM

## 2022-08-16 DIAGNOSIS — R60.0 LEG EDEMA: ICD-10-CM

## 2022-08-16 DIAGNOSIS — S12.301A CLOSED NONDISPLACED FRACTURE OF FOURTH CERVICAL VERTEBRA, UNSPECIFIED FRACTURE MORPHOLOGY, INITIAL ENCOUNTER (HCC): Primary | ICD-10-CM

## 2022-08-16 DIAGNOSIS — Z17.1 MALIGNANT NEOPLASM OF OVERLAPPING SITES OF RIGHT BREAST IN FEMALE, ESTROGEN RECEPTOR NEGATIVE (HCC): Primary | ICD-10-CM

## 2022-08-16 DIAGNOSIS — C50.811 MALIGNANT NEOPLASM OF OVERLAPPING SITES OF RIGHT BREAST IN FEMALE, ESTROGEN RECEPTOR NEGATIVE (HCC): Primary | ICD-10-CM

## 2022-08-16 DIAGNOSIS — I10 PRIMARY HYPERTENSION: ICD-10-CM

## 2022-08-16 DIAGNOSIS — Z17.1 MALIGNANT NEOPLASM OF OVERLAPPING SITES OF RIGHT BREAST IN FEMALE, ESTROGEN RECEPTOR NEGATIVE: Primary | ICD-10-CM

## 2022-08-16 PROCEDURE — 97140 MANUAL THERAPY 1/> REGIONS: CPT | Performed by: PHYSICAL THERAPIST

## 2022-08-16 PROCEDURE — 97110 THERAPEUTIC EXERCISES: CPT | Performed by: PHYSICAL THERAPIST

## 2022-08-16 NOTE — PROGRESS NOTES
Daily Note     Today's date: 2022  Patient name: Pankaj Montalvo  : 1975  MRN: [id number]  Referring provider: Dillon Back MD  Dx:   Encounter Diagnosis     ICD-10-CM    1  Closed nondisplaced fracture of fourth cervical vertebra, unspecified fracture morphology, [facility]  S12 301A    2  Pathological fracture of cervical vertebra, initial encounter  M84 48XA        Start Time: 1445  Stop Time: 1520  Total time in clinic (min): 35 minutes    Subjective: Pt reports she still feels fatigue from chemo last Thursday  No new concerns noted related to the neck or upper back  Objective: See treatment diary below      Assessment: Tolerated treatment well  Progressed POC this visit with good tolerance from the patient  No significant pain reported with exercises  Continues to have increased tenderness with STM to the UTs, L>R  Continue to progress as tolerated  Patient would benefit from continued PT      Plan: Continue per plan of care  Precautions:  Active Breast CA w/ Metastasis to Spine, HTN      Manuals        STM B/L UT, LS, Rhomboids   KCB KCB KCB                             Neuro Re-Ed           C/S Retract   20x 20x  20x        Scap Retract   20x3" HEP        TB B/L ER   20x RTB HEP                                         Education  S/S, POC, HEP           Ther Ex           TB Row/Ext   20x RTB 20x RTB 20x RTB       T/S Ext   10x 10x  20x       T/S Rotation  10x B         Shrugs/Rolls  20x 2# 20x 2#  30x 2#       Bicep Curls  20x 2# 20x 2# 30x 2#       DB Rows   20x 2# 20x 2# 30x 2#       UT Stretch   10x5"  10x 10"       LS Stretch            C/S AROM           C/S Isometrics  10x3" ea dir  20x3" ea dir                  Ther Activity                                 Gait Training                                 Modalities

## 2022-08-17 RX ORDER — FUROSEMIDE 40 MG/1
40 TABLET ORAL DAILY
Qty: 30 TABLET | Refills: 0 | Status: SHIPPED | OUTPATIENT
Start: 2022-08-17 | End: 2022-09-20 | Stop reason: SDUPTHER

## 2022-08-18 ENCOUNTER — HOSPITAL ENCOUNTER (OUTPATIENT)
Dept: INFUSION CENTER | Facility: CLINIC | Age: 47
Discharge: HOME | End: 2022-08-18
Payer: COMMERCIAL

## 2022-08-18 ENCOUNTER — OFFICE VISIT (OUTPATIENT)
Dept: PHYSICAL THERAPY | Facility: CLINIC | Age: 47
End: 2022-08-18
Payer: COMMERCIAL

## 2022-08-18 VITALS
HEIGHT: 64 IN | RESPIRATION RATE: 16 BRPM | DIASTOLIC BLOOD PRESSURE: 80 MMHG | BODY MASS INDEX: 28.17 KG/M2 | HEART RATE: 83 BPM | SYSTOLIC BLOOD PRESSURE: 127 MMHG | OXYGEN SATURATION: 97 % | TEMPERATURE: 97.3 F | WEIGHT: 165 LBS

## 2022-08-18 DIAGNOSIS — T45.1X5A CHEMOTHERAPY-INDUCED NEUTROPENIA (HCC): ICD-10-CM

## 2022-08-18 DIAGNOSIS — C50.811 MALIGNANT NEOPLASM OF OVERLAPPING SITES OF RIGHT BREAST IN FEMALE, ESTROGEN RECEPTOR NEGATIVE (HCC): ICD-10-CM

## 2022-08-18 DIAGNOSIS — M84.48XA PATHOLOGICAL FRACTURE OF CERVICAL VERTEBRA, INITIAL ENCOUNTER: ICD-10-CM

## 2022-08-18 DIAGNOSIS — D70.1 CHEMOTHERAPY-INDUCED NEUTROPENIA (HCC): ICD-10-CM

## 2022-08-18 DIAGNOSIS — Z17.1 MALIGNANT NEOPLASM OF OVERLAPPING SITES OF RIGHT BREAST IN FEMALE, ESTROGEN RECEPTOR NEGATIVE (HCC): ICD-10-CM

## 2022-08-18 DIAGNOSIS — C79.51 OSSEOUS METASTASIS (HCC): Primary | ICD-10-CM

## 2022-08-18 DIAGNOSIS — S12.301A CLOSED NONDISPLACED FRACTURE OF FOURTH CERVICAL VERTEBRA, UNSPECIFIED FRACTURE MORPHOLOGY, INITIAL ENCOUNTER (HCC): Primary | ICD-10-CM

## 2022-08-18 PROCEDURE — 97112 NEUROMUSCULAR REEDUCATION: CPT | Performed by: PHYSICAL THERAPIST

## 2022-08-18 PROCEDURE — 97110 THERAPEUTIC EXERCISES: CPT | Performed by: PHYSICAL THERAPIST

## 2022-08-18 PROCEDURE — 96417 CHEMO IV INFUS EACH ADDL SEQ: CPT

## 2022-08-18 PROCEDURE — 97140 MANUAL THERAPY 1/> REGIONS: CPT | Performed by: PHYSICAL THERAPIST

## 2022-08-18 PROCEDURE — 96413 CHEMO IV INFUSION 1 HR: CPT

## 2022-08-18 PROCEDURE — 96367 TX/PROPH/DG ADDL SEQ IV INF: CPT

## 2022-08-18 RX ORDER — SODIUM CHLORIDE 9 MG/ML
20 INJECTION, SOLUTION INTRAVENOUS ONCE
Status: COMPLETED | OUTPATIENT
Start: 2022-08-18 | End: 2022-08-18

## 2022-08-18 RX ADMIN — PACLITAXEL 150.6 MG: 6 INJECTION, SOLUTION, CONCENTRATE INTRAVENOUS at 10:51

## 2022-08-18 RX ADMIN — PERTUZUMAB 420 MG: 30 INJECTION, SOLUTION, CONCENTRATE INTRAVENOUS at 09:44

## 2022-08-18 RX ADMIN — DEXAMETHASONE SODIUM PHOSPHATE: 10 INJECTION, SOLUTION INTRAMUSCULAR; INTRAVENOUS at 08:25

## 2022-08-18 RX ADMIN — FAMOTIDINE 20 MG: 10 INJECTION INTRAVENOUS at 09:07

## 2022-08-18 RX ADMIN — TRASTUZUMAB 487 MG: 150 INJECTION, POWDER, LYOPHILIZED, FOR SOLUTION INTRAVENOUS at 10:18

## 2022-08-18 RX ADMIN — SODIUM CHLORIDE 20 ML/HR: 0.9 INJECTION, SOLUTION INTRAVENOUS at 08:27

## 2022-08-18 RX ADMIN — DIPHENHYDRAMINE HYDROCHLORIDE 25 MG: 50 INJECTION, SOLUTION INTRAMUSCULAR; INTRAVENOUS at 08:46

## 2022-08-18 NOTE — PROGRESS NOTES
Patient here for perjeta, herceptin, and taxol  Offers no complaints other than fatigue  Labs ok for treatment today, CMP was not drawn by outpt lab but ok to proceed without  PIV placed without issue

## 2022-08-18 NOTE — PROGRESS NOTES
Daily Note     Today's date: 2022  Patient name: Gael Dupree  : 1975  MRN: [id number]  Referring provider: Andressa Sifuentes MD  Dx:   Encounter Diagnosis     ICD-10-CM    1  Closed nondisplaced fracture of fourth cervical vertebra, unspecified fracture morphology, [facility]  S12 301A    2  Pathological fracture of cervical vertebra, initial encounter  M84 48XA        Start Time: 1445  Stop Time: 1530  Total time in clinic (min): 45 minutes    Subjective: Pt reports she had some soreness after the last PT session but notes it feels more like a "good workout soreness"  Objective: See treatment diary below      Assessment: Tolerated treatment well  Continued with current POC with good tolerance from the patient  No pain reported with exercises  Occasional cuing required for proper exercise technique  Decreased soreness reported post tx  Continue to progress as tolerated  Patient would benefit from continued PT      Plan: Continue per plan of care  Precautions:  Active Breast CA w/ Metastasis to Spine, HTN      Manuals       STM B/L UT, LS, Rhomboids   KCB KCB KCB KCB                            Neuro Re-Ed           C/S Retract   20x 20x  20x  20x       Scap Retract   20x3" HEP        TB B/L ER   20x RTB HEP        TB T     20x RTB                            Education  S/S, POC, HEP     foto      Ther Ex           TB Row/Ext   20x RTB 20x RTB 20x RTB 20x RTB      T/S Ext   10x 10x  20x 20x      T/S Rotation  10x B         Shrugs/Rolls  20x 2# 20x 2#  30x 2# 30x 2#      Bicep Curls  20x 2# 20x 2# 30x 2# 30x 2#      DB Rows   20x 2# 20x 2# 30x 2# 30x 2#      UT Stretch   10x5"  10x 10" 10x 10"      LS Stretch            C/S AROM           C/S Isometrics  10x3" ea dir  20x3" ea dir 20x3" ea dir                 Ther Activity                                 Gait Training                                 Modalities

## 2022-08-18 NOTE — PROGRESS NOTES
Patient tolerated her treatment without incident  She is aware of her next appointment   Declined AVS

## 2022-08-19 ENCOUNTER — OFFICE VISIT (OUTPATIENT)
Dept: PALLIATIVE MEDICINE | Facility: CLINIC | Age: 47
End: 2022-08-19
Payer: COMMERCIAL

## 2022-08-19 VITALS
DIASTOLIC BLOOD PRESSURE: 90 MMHG | SYSTOLIC BLOOD PRESSURE: 118 MMHG | TEMPERATURE: 97.5 F | HEART RATE: 81 BPM | RESPIRATION RATE: 16 BRPM | OXYGEN SATURATION: 97 % | WEIGHT: 169.5 LBS | BODY MASS INDEX: 29.09 KG/M2

## 2022-08-19 DIAGNOSIS — R11.0 CHEMOTHERAPY-INDUCED NAUSEA: ICD-10-CM

## 2022-08-19 DIAGNOSIS — Z79.899 MEDICAL MARIJUANA USE: ICD-10-CM

## 2022-08-19 DIAGNOSIS — Z17.1 MALIGNANT NEOPLASM OF OVERLAPPING SITES OF RIGHT BREAST IN FEMALE, ESTROGEN RECEPTOR NEGATIVE (HCC): Primary | ICD-10-CM

## 2022-08-19 DIAGNOSIS — Z51.5 PALLIATIVE CARE PATIENT: ICD-10-CM

## 2022-08-19 DIAGNOSIS — T45.1X5A CHEMOTHERAPY-INDUCED NAUSEA: ICD-10-CM

## 2022-08-19 DIAGNOSIS — R53.0 NEOPLASTIC MALIGNANT RELATED FATIGUE: ICD-10-CM

## 2022-08-19 DIAGNOSIS — G89.3 CANCER ASSOCIATED PAIN: ICD-10-CM

## 2022-08-19 DIAGNOSIS — C50.811 MALIGNANT NEOPLASM OF OVERLAPPING SITES OF RIGHT BREAST IN FEMALE, ESTROGEN RECEPTOR NEGATIVE (HCC): Primary | ICD-10-CM

## 2022-08-19 DIAGNOSIS — K11.7 XEROSTOMIA: ICD-10-CM

## 2022-08-19 DIAGNOSIS — C79.49 METASTASIS TO SPINAL CORD (HCC): ICD-10-CM

## 2022-08-19 DIAGNOSIS — K59.00 CONSTIPATION: ICD-10-CM

## 2022-08-19 DIAGNOSIS — C79.51 OSSEOUS METASTASIS (HCC): ICD-10-CM

## 2022-08-19 PROCEDURE — 99214 OFFICE O/P EST MOD 30 MIN: CPT | Performed by: INTERNAL MEDICINE

## 2022-08-19 NOTE — PROGRESS NOTES
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 ([facility] [place] [facility] )    3  Osseous metastasis ([facility] [place] [facility] )    4  Cancer associated pain    5  Chemotherapy-induced nausea    6  Constipation    7  Neoplastic malignant related fatigue    8  Xerostomia    9  Medical marijuana use    10  Palliative care patient           Patient has weaned off and discontinued her neck brace, w/ approval from Neurosurgery  She is working w/ PT on ROM, mobility, strength and endurance and has made some positive changes   Patient's pain has improved  Will trial initiation of tapering opioids  o Reduce MSER from q12h ATC to QHS  Did not update eRx as it is ready for pickup today (ordered prior to visit) and making that change could delay refill; will update Rx if patient tolerated the taper  o Continue oxyIR 10mg PRN, she is taking about 3x/day  o Continue TD MMJ for pain  o Call in 2 weeks to discuss analgesia, may make further adjustments   Continue effective OTC bowel regimen for OIC   Continue Zofran PRN N/V; patient finds she needs to take at least one tablet daily as without it nausea returns  Pharmacy and insurer refuse to cover more than 9 tabs per dispensation   Patient may continue MMJ products for symptom relief   Patient may continue baclofen PRN muscle spasms   Patient noted mild xerostomia which improves w/ hydration   Patient has received [address] vaccinations   Reviewed notes (Medical Oncology, Surgical Oncology, Neurosurgery, PT), labs (8/15/22 Hb 12 5, WBC 2 29; 8/9/22 CA 27 29 33 0; 8/1/22 Cr 0 59, alb 3 7, WBC 2 09, Hb 11 3), imaging + procedures (8/9/22 CTCAP showing new met in R femur; 8/8/22 XR C-spine)  Return in about 6 weeks (around 9/30/2022)   Emotional support provided     Medication safety issues addressed - no driving under the influence of narcotics, watch for adverse effects including AMS and respiratory depression, keep medications stored in a safe/locked environment  Requested Prescriptions      No prescriptions requested or ordered in this encounter       There are no discontinued medications  Representatives have queried the patient's controlled substance dispensing history in the Prescription Drug Monitoring Program in compliance with regulations before I have prescribed any controlled substances  The prescription history is consistent with prescribed therapy and our practice policies  40 minutes were spent in this ambulatory visit with greater than 50% of the time spent face to face with patient and family (significant other Donita Clayotn) in counseling or coordination of care including discussions of symptom assessment and management, medication review, medication adjustment, psychosocial support, chart review, imaging review, lab review, medical marijuana, opioid titration, supportive listening and anticipatory guidance  All of the patient's questions were answered during this discussion  SUBJECTIVE:  Chief Complaint   Patient presents with    Cancer    Cancer Pain    Counseling    Follow-up    Constipation    Nausea        HPI    Mercy  is a 49 y o  female w/ ER-ND-HER2+ carcinoma of the R breast (diagnosed 3/29/22) metastatic to bone, s/p RT to spine; cancer-related pain  She follows w/ Dr Antwan Contreras (Medical Oncology)  Certified for SEA in the [facility] on 4/22/22 by Dr Luan Foreman, for cancer-related pain  Plan includes systemic therapy with pertuzumab + trastuzumab + paclitaxel (she had a reaction to Taxotere)  Patient [facility]; last seen 7/15/22 for symptom assessment and management, medication review, psychosocial support, chart review, imaging review, lab review, medical marijuana, supportive listening and anticipatory guidance       Patient reports her condition continues to slowly improve  She is no longer wearing a neck immobilization device, and is working w/ PT to improve ROM, as well as overall strength and endurance  She reports PT sessions are going well  Her pain has improved and she is now only taking about 3 tabs of oxyIR per day (in addition to BID MSER)  She will use TD MMJ (Dr Brantley's) to help w/ L shoulder and back pain, which can be worse after PT  Patient notes ongoing nausea  She will take Zofran prophylactically, as she experiences nausea when she skips a dose  She is able to move her bowels regularly w/ assistance from OTC products such as senna  She notes some intermittent xerostomia (usually improving w/ hydration)  No recent thrush  Fatigue is an ongoing issue (but overall her activity has increased since last visit)  Patient states her mood is "pretty good"  Otelia Apgar is supportive, and she is working with the cancer support community  She has met with a person providing "talk therapy" (not a psychiatrist, not a psychotherapist); she had one visit and felt it to be therapeutic  PDMP shows no concerns  The following portions of the medical history were reviewed: past medical history, surgical history, problem list, medication list, family history, and social history        Current Outpatient Medications:     al mag oxide-diphenhydramine-lidocaine viscous (MAGIC MOUTHWASH) 1:1:1 suspension, Swish and spit 10 mL every 4 (four) hours as needed for mouth pain or discomfort, Disp: 500 mL, Rfl: 2    baclofen 10 mg tablet, TAKE 1/2 TABLET BY MOUTH 3 TIMES A DAY AS NEEDED FOR MUSCLE SPASMS, Disp: 135 tablet, Rfl: 1    DPH-Lido-AlHydr-MgHydr-Simeth (First-Mouthwash BLM) SUSP, , Disp: , Rfl:     furosemide (LASIX) 40 mg tablet, Take 1 tablet (40 mg total) by mouth daily, Disp: 30 tablet, Rfl: 0    lactulose (CEPHULAC) 10 g packet, Take 1 packet (10 g total) by mouth 3 (three) times a day as needed (constipation), Disp: 30 each, Rfl: 0   lisinopril (ZESTRIL) 20 mg tablet, Take 1 tablet (20 mg total) by mouth daily, Disp: 90 tablet, Rfl: 1    morphine (MS CONTIN) 15 mg 12 hr tablet, Take 1 tablet (15 mg total) by mouth every 12 (twelve) hours Max Daily Amount: 30 mg, Disp: 60 tablet, Rfl: 0    nystatin (MYCOSTATIN) 500,000 units/5 mL suspension, Apply 5 mL (500,000 Units total) to the mouth or throat 4 (four) times a day, Disp: 473 mL, Rfl: 1    nystatin (MYCOSTATIN) powder, Apply topically 3 (three) times a day, Disp: 15 g, Rfl: 0    ondansetron (ZOFRAN) 4 mg tablet, Take 1 tablet (4 mg total) by mouth every 8 (eight) hours as needed for nausea or vomiting, Disp: 9 tablet, Rfl: 20    oxyCODONE (ROXICODONE) 10 MG TABS, Take 1 tablet (10 mg total) by mouth every 4 (four) hours as needed for moderate pain Max Daily Amount: 60 mg, Disp: 180 tablet, Rfl: 0    pantoprazole (PROTONIX) 40 mg tablet, Take 1 tablet (40 mg total) by mouth daily in the early morning, Disp: 90 tablet, Rfl: 1    polyethylene glycol (MIRALAX) 17 g packet, Take 17 g by mouth daily, Disp: 30 each, Rfl: 0    senna (SENOKOT) 8 6 mg, Take 3 tablets (25 8 mg total) by mouth 2 (two) times a day Hold for loose stool , Disp: 180 tablet, Rfl: 0    Review of Systems   Constitutional: Positive for activity change (continues to improve) and fatigue (improving)  Cardiovascular: Positive for leg swelling (mild)  Gastrointestinal: Positive for constipation (improves w/ OTC products) and nausea (improves w/ Zofran)  Mild occasional xerostomia  Musculoskeletal: Positive for arthralgias, back pain and myalgias  Allergic/Immunologic: Positive for immunocompromised state  All other systems reviewed and are negative        OBJECTIVE:  /90 (BP Location: Left arm, Patient Position: Sitting, Cuff Size: Standard)   Pulse 81   Temp 97 5 °F (36 4 °C) (Temporal)   Resp 16   Wt 76 9 kg (169 lb 8 oz)   LMP 03/28/2022 (Approximate)   SpO2 97%   BMI 29 09 kg/m²   Physical Exam  Vitals reviewed  Constitutional:       General: She is not in acute distress  Appearance: She is well-developed, well-groomed and overweight  She is not toxic-appearing  HENT:      Head: Normocephalic and atraumatic  Right Ear: External ear normal       Left Ear: External ear normal    Eyes:      General: No scleral icterus  Right eye: No discharge  Left eye: No discharge  Extraocular Movements: Extraocular movements intact  Conjunctiva/sclera: Conjunctivae normal       Pupils: Pupils are equal, round, and reactive to light  Cardiovascular:      Rate and Rhythm: Normal rate  Pulmonary:      Effort: Pulmonary effort is normal  No tachypnea, bradypnea, accessory muscle usage or respiratory distress  Abdominal:      General: There is no distension  Tenderness: There is no guarding  Musculoskeletal:      Cervical back: Decreased range of motion (improved compared to prior visit; no longer wearing immobilization device)  Right lower leg: Edema (mild) present  Left lower leg: Edema (mild) present  Skin:     General: Skin is dry  Coloration: Skin is not pale  Neurological:      Mental Status: She is alert and oriented to person, place, and time  Cranial Nerves: No dysarthria or facial asymmetry  Gait: Gait is intact  Psychiatric:         Attention and Perception: Attention normal          Mood and Affect: Mood and affect normal          Speech: Speech normal          Behavior: Behavior normal  Behavior is cooperative  Thought Content: Thought content normal          Cognition and Memory: Cognition and memory normal          Judgment: Judgment normal           Andreia Madrid MD  [facility]  [phone]    Portions of this document may have been created using dictation software and as such some "sound alike" terms may have been generated by the system   Do not hesitate to contact me with any questions or clarifications

## 2022-08-19 NOTE — PATIENT INSTRUCTIONS
It was good to see you today  Thank you for coming in  Let's try a slight opioid decrease in the next two weeks  Take the morphine long-acting 15mg tabs once per day (at bedtime) instead of twice per day, as pain may be improving  Continue oxycodone as needed, continue other medications including medical marijuana (MMERICA)  Call me in 2 weeks to discuss pain and other symptoms; we can make further adjustments in the coming weeks  Return in about 6 weeks  Call us for refills on medications that we supply, as needed  If something changes and you need to come in sooner, please call our office  PRESCRIPTION REFILL REMINDER:  All medication refills should be requested prior to [facility] on Friday  Any refill requests after noon on Friday would be addressed the following Monday

## 2022-08-22 ENCOUNTER — PATIENT OUTREACH (OUTPATIENT)
Dept: HEMATOLOGY ONCOLOGY | Facility: CLINIC | Age: 47
End: 2022-08-22

## 2022-08-22 NOTE — PROGRESS NOTES
I reached out to patient to check in and see how she is doing   A detailed message was left with my contact information  And reason for my call

## 2022-08-23 ENCOUNTER — OFFICE VISIT (OUTPATIENT)
Dept: PHYSICAL THERAPY | Facility: CLINIC | Age: 47
End: 2022-08-23
Payer: COMMERCIAL

## 2022-08-23 ENCOUNTER — APPOINTMENT (OUTPATIENT)
Dept: LAB | Facility: CLINIC | Age: 47
End: 2022-08-23
Payer: COMMERCIAL

## 2022-08-23 ENCOUNTER — PATIENT OUTREACH (OUTPATIENT)
Dept: HEMATOLOGY ONCOLOGY | Facility: CLINIC | Age: 47
End: 2022-08-23

## 2022-08-23 DIAGNOSIS — C79.51 OSSEOUS METASTASIS (HCC): ICD-10-CM

## 2022-08-23 DIAGNOSIS — C50.811 MALIGNANT NEOPLASM OF OVERLAPPING SITES OF RIGHT BREAST IN FEMALE, ESTROGEN RECEPTOR NEGATIVE (HCC): ICD-10-CM

## 2022-08-23 DIAGNOSIS — S12.301A CLOSED NONDISPLACED FRACTURE OF FOURTH CERVICAL VERTEBRA, UNSPECIFIED FRACTURE MORPHOLOGY, INITIAL ENCOUNTER (HCC): Primary | ICD-10-CM

## 2022-08-23 DIAGNOSIS — D70.1 CHEMOTHERAPY-INDUCED NEUTROPENIA (HCC): ICD-10-CM

## 2022-08-23 DIAGNOSIS — T45.1X5A CHEMOTHERAPY-INDUCED NEUTROPENIA (HCC): ICD-10-CM

## 2022-08-23 DIAGNOSIS — M84.48XA PATHOLOGICAL FRACTURE OF CERVICAL VERTEBRA, INITIAL ENCOUNTER: ICD-10-CM

## 2022-08-23 DIAGNOSIS — Z17.1 MALIGNANT NEOPLASM OF OVERLAPPING SITES OF RIGHT BREAST IN FEMALE, ESTROGEN RECEPTOR NEGATIVE (HCC): ICD-10-CM

## 2022-08-23 LAB
BASOPHILS # BLD AUTO: 0.02 THOUSANDS/ΜL (ref 0–0.1)
BASOPHILS NFR BLD AUTO: 1 % (ref 0–1)
EOSINOPHIL # BLD AUTO: 0.01 THOUSAND/ΜL (ref 0–0.61)
EOSINOPHIL NFR BLD AUTO: 0 % (ref 0–6)
ERYTHROCYTE [DISTWIDTH] IN BLOOD BY AUTOMATED COUNT: 16.3 % (ref 11.6–15.1)
HCT VFR BLD AUTO: 41.6 % (ref 34.8–46.1)
HGB BLD-MCNC: 12.6 G/DL (ref 11.5–15.4)
IMM GRANULOCYTES # BLD AUTO: 0.01 THOUSAND/UL (ref 0–0.2)
IMM GRANULOCYTES NFR BLD AUTO: 0 % (ref 0–2)
LYMPHOCYTES # BLD AUTO: 0.39 THOUSANDS/ΜL (ref 0.6–4.47)
LYMPHOCYTES NFR BLD AUTO: 14 % (ref 14–44)
MCH RBC QN AUTO: 30.9 PG (ref 26.8–34.3)
MCHC RBC AUTO-ENTMCNC: 30.3 G/DL (ref 31.4–37.4)
MCV RBC AUTO: 102 FL (ref 82–98)
MONOCYTES # BLD AUTO: 0.16 THOUSAND/ΜL (ref 0.17–1.22)
MONOCYTES NFR BLD AUTO: 6 % (ref 4–12)
NEUTROPHILS # BLD AUTO: 2.14 THOUSANDS/ΜL (ref 1.85–7.62)
NEUTS SEG NFR BLD AUTO: 79 % (ref 43–75)
NRBC BLD AUTO-RTO: 0 /100 WBCS
PLATELET # BLD AUTO: 335 THOUSANDS/UL (ref 149–390)
PMV BLD AUTO: 10.4 FL (ref 8.9–12.7)
RBC # BLD AUTO: 4.08 MILLION/UL (ref 3.81–5.12)
WBC # BLD AUTO: 2.73 THOUSAND/UL (ref 4.31–10.16)

## 2022-08-23 PROCEDURE — 97112 NEUROMUSCULAR REEDUCATION: CPT | Performed by: PHYSICAL THERAPIST

## 2022-08-23 PROCEDURE — 85025 COMPLETE CBC W/AUTO DIFF WBC: CPT

## 2022-08-23 PROCEDURE — 36415 COLL VENOUS BLD VENIPUNCTURE: CPT

## 2022-08-23 PROCEDURE — 97110 THERAPEUTIC EXERCISES: CPT | Performed by: PHYSICAL THERAPIST

## 2022-08-23 PROCEDURE — 97140 MANUAL THERAPY 1/> REGIONS: CPT | Performed by: PHYSICAL THERAPIST

## 2022-08-23 NOTE — PROGRESS NOTES
Daily Note     Today's date: 2022  Patient name: Kasia Jurado  : 1975  MRN: [id number]  Referring provider: Teresita Maldonado MD  Dx:   Encounter Diagnosis     ICD-10-CM    1  Closed nondisplaced fracture of fourth cervical vertebra, unspecified fracture morphology, initial encounter ([facility]  S12 301A    2  Pathological fracture of cervical vertebra, initial encounter  M84 48XA        Start Time: 1145  Stop Time: 1225  Total time in clinic (min): 40 minutes    Subjective: Pt reports generalized fatigue at the start of treatment, however, no new concerns related to the cervical region  Objective: See treatment diary below      Assessment: Tolerated treatment well  Continued with current POC with good tolerance from the patient  Added additional UE strengthening exercises with good tolerance from the patient  Continue to progress as tolerated  Patient would benefit from continued PT      Plan: Continue per plan of care  Precautions:  Active Breast CA w/ Metastasis to Spine, HTN      Manuals      STM B/L UT, LS, Rhomboids   KCB KCB KCB KCB KCB                           Neuro Re-Ed           C/S Retract   20x 20x  20x  20x  20x     Scap Retract   20x3" HEP        TB B/L ER   20x RTB HEP        TB T     20x RTB 20x RTB                           Education  S/S, POC, HEP     foto      Ther Ex           TB Row/Ext   20x RTB 20x RTB 20x RTB 20x RTB 20x GTB     T/S Ext   10x 10x  20x 20x 20x     T/S Rotation  10x B         Shrugs/Rolls  20x 2# 20x 2#  30x 2# 30x 2# 30x 2#     Bicep Curls  20x 2# 20x 2# 30x 2# 30x 2# 30x 2#     DB Rows   20x 2# 20x 2# 30x 2# 30x 2# 30x 2#     UT Stretch   10x5"  10x 10" 10x 10" 10x 10"     C/S Isometrics  10x3" ea dir  20x3" ea dir 20x3" ea dir 20x3" ea dir     Shoulder Flex/Scap      2x10 2#                Ther Activity                                 Gait Training                                 Modalities

## 2022-08-23 NOTE — PROGRESS NOTES
Patient returned my call  Patient states she is doing well  Patinet states she s still deciding on if she is going to do a mastectomy or not  Patient states she would like to speak to someone who has gone through the experience of a mastectomy  I did offer the [facility] but patient is looking for someone local   She states her wound does not seem to be getting any smaller  I would see what I can do as far as matching her with a mastectomy patient  She is also interested in possible reconstruction  Patient did not have any further questions or concerns at this time

## 2022-08-25 ENCOUNTER — HOSPITAL ENCOUNTER (OUTPATIENT)
Dept: INFUSION CENTER | Facility: CLINIC | Age: 47
Discharge: HOME | End: 2022-08-25
Payer: COMMERCIAL

## 2022-08-25 ENCOUNTER — OFFICE VISIT (OUTPATIENT)
Dept: PHYSICAL THERAPY | Facility: CLINIC | Age: 47
End: 2022-08-25
Payer: COMMERCIAL

## 2022-08-25 ENCOUNTER — PATIENT OUTREACH (OUTPATIENT)
Dept: HEMATOLOGY ONCOLOGY | Facility: CLINIC | Age: 47
End: 2022-08-25

## 2022-08-25 VITALS
DIASTOLIC BLOOD PRESSURE: 81 MMHG | WEIGHT: 163.5 LBS | RESPIRATION RATE: 17 BRPM | BODY MASS INDEX: 27.91 KG/M2 | SYSTOLIC BLOOD PRESSURE: 122 MMHG | HEART RATE: 95 BPM | TEMPERATURE: 97.3 F | OXYGEN SATURATION: 99 % | HEIGHT: 64 IN

## 2022-08-25 DIAGNOSIS — T45.1X5A CHEMOTHERAPY-INDUCED NEUTROPENIA (HCC): ICD-10-CM

## 2022-08-25 DIAGNOSIS — D70.1 CHEMOTHERAPY-INDUCED NEUTROPENIA (HCC): ICD-10-CM

## 2022-08-25 DIAGNOSIS — C79.51 OSSEOUS METASTASIS (HCC): Primary | ICD-10-CM

## 2022-08-25 DIAGNOSIS — M84.48XA PATHOLOGICAL FRACTURE OF CERVICAL VERTEBRA, INITIAL ENCOUNTER: ICD-10-CM

## 2022-08-25 DIAGNOSIS — Z17.1 MALIGNANT NEOPLASM OF OVERLAPPING SITES OF RIGHT BREAST IN FEMALE, ESTROGEN RECEPTOR NEGATIVE (HCC): ICD-10-CM

## 2022-08-25 DIAGNOSIS — S12.301A CLOSED NONDISPLACED FRACTURE OF FOURTH CERVICAL VERTEBRA, UNSPECIFIED FRACTURE MORPHOLOGY, INITIAL ENCOUNTER (HCC): Primary | ICD-10-CM

## 2022-08-25 DIAGNOSIS — C50.811 MALIGNANT NEOPLASM OF OVERLAPPING SITES OF RIGHT BREAST IN FEMALE, ESTROGEN RECEPTOR NEGATIVE (HCC): ICD-10-CM

## 2022-08-25 PROCEDURE — 96413 CHEMO IV INFUSION 1 HR: CPT

## 2022-08-25 PROCEDURE — 97110 THERAPEUTIC EXERCISES: CPT | Performed by: PHYSICAL THERAPIST

## 2022-08-25 PROCEDURE — 96367 TX/PROPH/DG ADDL SEQ IV INF: CPT

## 2022-08-25 PROCEDURE — 97140 MANUAL THERAPY 1/> REGIONS: CPT | Performed by: PHYSICAL THERAPIST

## 2022-08-25 PROCEDURE — 96375 TX/PRO/DX INJ NEW DRUG ADDON: CPT

## 2022-08-25 RX ORDER — SODIUM CHLORIDE 9 MG/ML
20 INJECTION, SOLUTION INTRAVENOUS ONCE
Status: COMPLETED | OUTPATIENT
Start: 2022-08-25 | End: 2022-08-25

## 2022-08-25 RX ADMIN — FAMOTIDINE 20 MG: 10 INJECTION INTRAVENOUS at 09:56

## 2022-08-25 RX ADMIN — SODIUM CHLORIDE 20 ML/HR: 0.9 INJECTION, SOLUTION INTRAVENOUS at 09:07

## 2022-08-25 RX ADMIN — DEXAMETHASONE SODIUM PHOSPHATE: 10 INJECTION, SOLUTION INTRAMUSCULAR; INTRAVENOUS at 09:11

## 2022-08-25 RX ADMIN — PACLITAXEL 150.6 MG: 6 INJECTION, SOLUTION, CONCENTRATE INTRAVENOUS at 10:34

## 2022-08-25 RX ADMIN — DIPHENHYDRAMINE HYDROCHLORIDE 25 MG: 50 INJECTION, SOLUTION INTRAMUSCULAR; INTRAVENOUS at 09:34

## 2022-08-25 NOTE — PROGRESS NOTES
I reached out to patient as she was looking for a contact on information regards to someone who had similar prognosis  Patient is looking for additional support for her surgical decision  I provided patient with a contact at the [facility]  [employer] with the ok from that contact  Patient had no other concerns at this time and has my contact information  Patient encouraged to contact me if anything should arise

## 2022-08-25 NOTE — PROGRESS NOTES
Patient is here for chemo  She offers no complaints at this time   Labs reviewed and meet parameters for treatment today

## 2022-08-25 NOTE — PROGRESS NOTES
Daily Note     Today's date: 2022  Patient name: Jose Flynn  : 1975  MRN: [id number]  Referring provider: Ita Daly MD  Dx:   Encounter Diagnosis     ICD-10-CM    1  Closed nondisplaced fracture of fourth cervical vertebra, unspecified fracture morphology, initial encounter ([facility]  S12 301A    2  Pathological fracture of cervical vertebra, initial encounter  M84 48XA        Start Time: 1445  Stop Time: 1530  Total time in clinic (min): 45 minutes    Subjective: Pt reports she did not have too much soreness following the previous session but reports generalized fatigue from chemo treatment earlier this morning  Objective: See treatment diary below      Assessment: Tolerated treatment well  Continued with current POC with good tolerance from the patient  Pt able to progress some exercises without increase in symptoms  Continue to progress as tolerated  Patient would benefit from continued PT      Plan: Continue per plan of care  Precautions:  Active Breast CA w/ Metastasis to Spine, HTN      Manuals     STM B/L UT, LS, Rhomboids   KCB KCB KCB KCB KCB KCB                          Neuro Re-Ed           C/S Retract   20x 20x  20x  20x  20x 20x     Scap Retract   20x3" HEP        TB B/L ER   20x RTB HEP        TB T     20x RTB 20x RTB 20x RTB                          Education  S/S, POC, HEP     foto      Ther Ex           TB Row/Ext   20x RTB 20x RTB 20x RTB 20x RTB 20x GTB 30x GTB    T/S Ext   10x 10x  20x 20x 20x 20x     T/S Rotation  10x B         Shrugs/Rolls  20x 2# 20x 2#  30x 2# 30x 2# 30x 2# 30x 3#    Bicep Curls  20x 2# 20x 2# 30x 2# 30x 2# 30x 2# 30x 3#    DB Rows   20x 2# 20x 2# 30x 2# 30x 2# 30x 2# 30x 3#    UT Stretch   10x5"  10x 10" 10x 10" 10x 10" 10x 10"    C/S Isometrics  10x3" ea dir  20x3" ea dir 20x3" ea dir 20x3" ea dir 20x3" ea dir    Shoulder Flex/Scap      2x10 2# 2x10 2#    Towel Rotation Stretch        10x 5" Ther Activity                                 Gait Training                                 Modalities

## 2022-08-29 ENCOUNTER — APPOINTMENT (OUTPATIENT)
Dept: LAB | Facility: CLINIC | Age: 47
End: 2022-08-29
Payer: COMMERCIAL

## 2022-08-29 DIAGNOSIS — C50.811 MALIGNANT NEOPLASM OF OVERLAPPING SITES OF RIGHT BREAST IN FEMALE, ESTROGEN RECEPTOR NEGATIVE (HCC): ICD-10-CM

## 2022-08-29 DIAGNOSIS — C79.51 OSSEOUS METASTASIS (HCC): ICD-10-CM

## 2022-08-29 DIAGNOSIS — T45.1X5A CHEMOTHERAPY-INDUCED NEUTROPENIA (HCC): ICD-10-CM

## 2022-08-29 DIAGNOSIS — Z17.1 MALIGNANT NEOPLASM OF OVERLAPPING SITES OF RIGHT BREAST IN FEMALE, ESTROGEN RECEPTOR NEGATIVE (HCC): ICD-10-CM

## 2022-08-29 DIAGNOSIS — D70.1 CHEMOTHERAPY-INDUCED NEUTROPENIA (HCC): ICD-10-CM

## 2022-08-29 LAB
BASOPHILS # BLD AUTO: 0.01 THOUSANDS/ΜL (ref 0–0.1)
BASOPHILS NFR BLD AUTO: 1 % (ref 0–1)
EOSINOPHIL # BLD AUTO: 0.01 THOUSAND/ΜL (ref 0–0.61)
EOSINOPHIL NFR BLD AUTO: 1 % (ref 0–6)
ERYTHROCYTE [DISTWIDTH] IN BLOOD BY AUTOMATED COUNT: 16.1 % (ref 11.6–15.1)
HCT VFR BLD AUTO: 40 % (ref 34.8–46.1)
HGB BLD-MCNC: 12.5 G/DL (ref 11.5–15.4)
IMM GRANULOCYTES # BLD AUTO: 0.01 THOUSAND/UL (ref 0–0.2)
IMM GRANULOCYTES NFR BLD AUTO: 1 % (ref 0–2)
LYMPHOCYTES # BLD AUTO: 0.29 THOUSANDS/ΜL (ref 0.6–4.47)
LYMPHOCYTES NFR BLD AUTO: 13 % (ref 14–44)
MCH RBC QN AUTO: 31.2 PG (ref 26.8–34.3)
MCHC RBC AUTO-ENTMCNC: 31.3 G/DL (ref 31.4–37.4)
MCV RBC AUTO: 100 FL (ref 82–98)
MONOCYTES # BLD AUTO: 0.13 THOUSAND/ΜL (ref 0.17–1.22)
MONOCYTES NFR BLD AUTO: 6 % (ref 4–12)
NEUTROPHILS # BLD AUTO: 1.77 THOUSANDS/ΜL (ref 1.85–7.62)
NEUTS SEG NFR BLD AUTO: 78 % (ref 43–75)
NRBC BLD AUTO-RTO: 0 /100 WBCS
PLATELET # BLD AUTO: 274 THOUSANDS/UL (ref 149–390)
PMV BLD AUTO: 10.3 FL (ref 8.9–12.7)
RBC # BLD AUTO: 4.01 MILLION/UL (ref 3.81–5.12)
WBC # BLD AUTO: 2.22 THOUSAND/UL (ref 4.31–10.16)

## 2022-08-29 PROCEDURE — 36415 COLL VENOUS BLD VENIPUNCTURE: CPT

## 2022-08-29 PROCEDURE — 85025 COMPLETE CBC W/AUTO DIFF WBC: CPT

## 2022-08-30 ENCOUNTER — OFFICE VISIT (OUTPATIENT)
Dept: PHYSICAL THERAPY | Facility: CLINIC | Age: 47
End: 2022-08-30
Payer: COMMERCIAL

## 2022-08-30 DIAGNOSIS — S12.301A CLOSED NONDISPLACED FRACTURE OF FOURTH CERVICAL VERTEBRA, UNSPECIFIED FRACTURE MORPHOLOGY, INITIAL ENCOUNTER (HCC): Primary | ICD-10-CM

## 2022-08-30 DIAGNOSIS — M84.48XA PATHOLOGICAL FRACTURE OF CERVICAL VERTEBRA, INITIAL ENCOUNTER: ICD-10-CM

## 2022-08-30 PROCEDURE — 97140 MANUAL THERAPY 1/> REGIONS: CPT | Performed by: PHYSICAL THERAPIST

## 2022-08-30 PROCEDURE — 97110 THERAPEUTIC EXERCISES: CPT | Performed by: PHYSICAL THERAPIST

## 2022-08-30 NOTE — PROGRESS NOTES
Daily Note     Today's date: 2022  Patient name: Durwood Kawasaki  : 1975  MRN: [id number]  Referring provider: Silver Mcclure MD  Dx:   Encounter Diagnosis     ICD-10-CM    1  Closed nondisplaced fracture of fourth cervical vertebra, unspecified fracture morphology, initial encounter ([facility] )  S12 301A    2  Pathological fracture of cervical vertebra, initial encounter  M84 48XA        Start Time: 1145  Stop Time: 1220  Total time in clinic (min): 35 minutes    Subjective: Pt reports she has noticed some improved mobility in the cervical region, however, still does not feel comfortable returning to driving due to lack of mobility  Objective: See treatment diary below      Assessment: Tolerated treatment well  Continued with current POC with good tolerance from the patient  No pain reported with exercises  Continues to gradually improve with mobility t/o the cervical region  Continue to progress as tolerated  Patient would benefit from continued PT      Plan: Continue per plan of care  Precautions:  Active Breast CA w/ Metastasis to Spine, HTN      Manuals    STM B/L UT, LS, Rhomboids   KCB KCB KCB KCB KCB KCB KCB                         Neuro Re-Ed           C/S Retract   20x 20x  20x  20x  20x 20x  20x   Scap Retract   20x3" HEP        TB B/L ER   20x RTB HEP        TB T     20x RTB 20x RTB 20x RTB 20x GTB                         Education  S/S, POC, HEP     foto      Ther Ex           TB Row/Ext   20x RTB 20x RTB 20x RTB 20x RTB 20x GTB 30x GTB 30x GTB   T/S Ext   10x 10x  20x 20x 20x 20x  20x   T/S Rotation  10x B         Shrugs/Rolls  20x 2# 20x 2#  30x 2# 30x 2# 30x 2# 30x 3# 30x 3#   Bicep Curls  20x 2# 20x 2# 30x 2# 30x 2# 30x 2# 30x 3# 30x 3#   DB Rows   20x 2# 20x 2# 30x 2# 30x 2# 30x 2# 30x 3# 30x 3#   UT Stretch   10x5"  10x 10" 10x 10" 10x 10" 10x 10" 10x 10"   C/S Isometrics  10x3" ea dir  20x3" ea dir 20x3" ea dir 20x3" ea dir 20x3" ea dir 20x3" ea dir    Shoulder Flex/Scap      2x10 2# 2x10 2# 2x10 2#   Towel Rotation Stretch        10x 5" 10x5"               Ther Activity                                 Gait Training                                 Modalities

## 2022-09-01 ENCOUNTER — OFFICE VISIT (OUTPATIENT)
Dept: PHYSICAL THERAPY | Facility: CLINIC | Age: 47
End: 2022-09-01
Payer: COMMERCIAL

## 2022-09-01 ENCOUNTER — HOSPITAL ENCOUNTER (OUTPATIENT)
Dept: INFUSION CENTER | Facility: CLINIC | Age: 47
Discharge: HOME | End: 2022-09-01
Payer: COMMERCIAL

## 2022-09-01 VITALS
DIASTOLIC BLOOD PRESSURE: 64 MMHG | OXYGEN SATURATION: 98 % | RESPIRATION RATE: 18 BRPM | HEART RATE: 110 BPM | HEIGHT: 64 IN | BODY MASS INDEX: 27.91 KG/M2 | SYSTOLIC BLOOD PRESSURE: 126 MMHG | TEMPERATURE: 98 F | WEIGHT: 163.5 LBS

## 2022-09-01 DIAGNOSIS — D70.1 CHEMOTHERAPY-INDUCED NEUTROPENIA (HCC): ICD-10-CM

## 2022-09-01 DIAGNOSIS — Z17.1 MALIGNANT NEOPLASM OF OVERLAPPING SITES OF RIGHT BREAST IN FEMALE, ESTROGEN RECEPTOR NEGATIVE (HCC): ICD-10-CM

## 2022-09-01 DIAGNOSIS — C79.51 OSSEOUS METASTASIS (HCC): Primary | ICD-10-CM

## 2022-09-01 DIAGNOSIS — C50.811 MALIGNANT NEOPLASM OF OVERLAPPING SITES OF RIGHT BREAST IN FEMALE, ESTROGEN RECEPTOR NEGATIVE (HCC): ICD-10-CM

## 2022-09-01 DIAGNOSIS — T45.1X5A CHEMOTHERAPY-INDUCED NEUTROPENIA (HCC): ICD-10-CM

## 2022-09-01 DIAGNOSIS — M84.48XA PATHOLOGICAL FRACTURE OF CERVICAL VERTEBRA, INITIAL ENCOUNTER: ICD-10-CM

## 2022-09-01 DIAGNOSIS — S12.301A CLOSED NONDISPLACED FRACTURE OF FOURTH CERVICAL VERTEBRA, UNSPECIFIED FRACTURE MORPHOLOGY, INITIAL ENCOUNTER (HCC): Primary | ICD-10-CM

## 2022-09-01 PROCEDURE — 96413 CHEMO IV INFUSION 1 HR: CPT

## 2022-09-01 PROCEDURE — 97140 MANUAL THERAPY 1/> REGIONS: CPT | Performed by: PHYSICAL THERAPIST

## 2022-09-01 PROCEDURE — 97112 NEUROMUSCULAR REEDUCATION: CPT | Performed by: PHYSICAL THERAPIST

## 2022-09-01 PROCEDURE — 96375 TX/PRO/DX INJ NEW DRUG ADDON: CPT

## 2022-09-01 PROCEDURE — 96367 TX/PROPH/DG ADDL SEQ IV INF: CPT

## 2022-09-01 PROCEDURE — 97110 THERAPEUTIC EXERCISES: CPT | Performed by: PHYSICAL THERAPIST

## 2022-09-01 RX ORDER — SODIUM CHLORIDE 9 MG/ML
20 INJECTION, SOLUTION INTRAVENOUS ONCE
Status: COMPLETED | OUTPATIENT
Start: 2022-09-01 | End: 2022-09-01

## 2022-09-01 RX ADMIN — SODIUM CHLORIDE 20 ML/HR: 0.9 INJECTION, SOLUTION INTRAVENOUS at 08:51

## 2022-09-01 RX ADMIN — DEXAMETHASONE SODIUM PHOSPHATE: 10 INJECTION, SOLUTION INTRAMUSCULAR; INTRAVENOUS at 08:52

## 2022-09-01 RX ADMIN — DIPHENHYDRAMINE HYDROCHLORIDE 25 MG: 50 INJECTION, SOLUTION INTRAMUSCULAR; INTRAVENOUS at 09:13

## 2022-09-01 RX ADMIN — PACLITAXEL 150.6 MG: 6 INJECTION, SOLUTION, CONCENTRATE INTRAVENOUS at 10:06

## 2022-09-01 RX ADMIN — FAMOTIDINE 20 MG: 10 INJECTION INTRAVENOUS at 09:36

## 2022-09-01 NOTE — PROGRESS NOTES
Daily Note     Today's date: 2022  Patient name: Joesph Maurer  : 1975  MRN: [id number]  Referring provider: Carlos Sampson MD  Dx:   Encounter Diagnosis     ICD-10-CM    1  Closed nondisplaced fracture of fourth cervical vertebra, unspecified fracture morphology, [facility] ([facility]  S12 301A    2  Pathological fracture of cervical vertebra, initial encounter  M84 48XA        Start Time: 1445  Stop Time: 1530  Total time in clinic (min): 45 minutes    Subjective: Pt reports she feels generalized fatigue after chemotherapy treatment earlier this morning  No new concerns noted related to the cervical region  Objective: See treatment diary below      Assessment: Tolerated treatment well  Continued with current POC with good tolerance from the patient  No pain reported with exercises  Occasional cuing required for proper exercise technique  Continue to progress as tolerated  Patient would benefit from continued PT      Plan: Continue per plan of care  Precautions:  Active Breast CA w/ Metastasis to Spine, HTN      Manuals    STM B/L UT, LS, Rhomboids  KCB  KCB KCB KCB KCB KCB KCB                         Neuro Re-Ed           C/S Retract  20x  20x  20x  20x  20x 20x  20x   Scap Retract    HEP        TB B/L ER    HEP        TB T 20x GTB    20x RTB 20x RTB 20x RTB 20x GTB                         Education      foto      Ther Ex           TB Row/Ext  30x GTB  20x RTB 20x RTB 20x RTB 20x GTB 30x GTB 30x GTB   T/S Ext  20x  10x  20x 20x 20x 20x  20x   T/S Rotation           Shrugs/Rolls 30x 3#  20x 2#  30x 2# 30x 2# 30x 2# 30x 3# 30x 3#   Bicep Curls 30x 3#  20x 2# 30x 2# 30x 2# 30x 2# 30x 3# 30x 3#   DB Rows  30x 3#  20x 2# 30x 2# 30x 2# 30x 2# 30x 3# 30x 3#   UT Stretch  10x 10"   10x 10" 10x 10" 10x 10" 10x 10" 10x 10"   C/S Isometrics 20x3" ea dir    20x3" ea dir 20x3" ea dir 20x3" ea dir 20x3" ea dir 20x3" ea dir    Shoulder Flex/Scap 2x10 2# 2x10 2# 2x10 2# 2x10 2#   Towel Rotation Stretch  10x5"      10x 5" 10x5"               Ther Activity                                 Gait Training                                 Modalities

## 2022-09-01 NOTE — PROGRESS NOTES
Patient tolerated treatment today without issues  Patient has completed Taxol infusion and will now continue with maintenance herceptin/Perteta every 3 weeks   Patient verified upcoming appointment and declined AVS

## 2022-09-06 ENCOUNTER — PATIENT MESSAGE (OUTPATIENT)
Dept: PALLIATIVE MEDICINE | Facility: CLINIC | Age: 47
End: 2022-09-06

## 2022-09-06 ENCOUNTER — APPOINTMENT (OUTPATIENT)
Dept: PHYSICAL THERAPY | Facility: CLINIC | Age: 47
End: 2022-09-06
Payer: COMMERCIAL

## 2022-09-06 DIAGNOSIS — C79.51 OSSEOUS METASTASIS (HCC): ICD-10-CM

## 2022-09-06 DIAGNOSIS — R11.0 CHEMOTHERAPY-INDUCED NAUSEA: ICD-10-CM

## 2022-09-06 DIAGNOSIS — Z51.5 PALLIATIVE CARE PATIENT: ICD-10-CM

## 2022-09-06 DIAGNOSIS — C50.811 MALIGNANT NEOPLASM OF OVERLAPPING SITES OF RIGHT BREAST IN FEMALE, ESTROGEN RECEPTOR NEGATIVE (HCC): ICD-10-CM

## 2022-09-06 DIAGNOSIS — T45.1X5A CHEMOTHERAPY-INDUCED NAUSEA: ICD-10-CM

## 2022-09-06 DIAGNOSIS — Z17.1 MALIGNANT NEOPLASM OF OVERLAPPING SITES OF RIGHT BREAST IN FEMALE, ESTROGEN RECEPTOR NEGATIVE (HCC): ICD-10-CM

## 2022-09-07 RX ORDER — ONDANSETRON 4 MG/1
4 TABLET, FILM COATED ORAL EVERY 8 HOURS PRN
Qty: 9 TABLET | Refills: 20 | Status: SHIPPED | OUTPATIENT
Start: 2022-09-07

## 2022-09-07 NOTE — PATIENT COMMUNICATION
Received patient MyChart message:  "Hi! Ive reduced my morphine down to just once a day in early/late evening, and am using the Oxycodone 3x a day  Im not feeling much more pain reducing it to once a day and only has to use an extra Oxycodone a few times in the first few days of reducing  I am still feeling some nausea however and my Ondansetron prescription has now run out  Can I get that refilled for a week or twos worth of refills?"    Will refill ondansetron

## 2022-09-08 ENCOUNTER — HOSPITAL ENCOUNTER (OUTPATIENT)
Dept: INFUSION CENTER | Facility: CLINIC | Age: 47
Discharge: HOME | End: 2022-09-08
Payer: COMMERCIAL

## 2022-09-08 ENCOUNTER — OFFICE VISIT (OUTPATIENT)
Dept: PHYSICAL THERAPY | Facility: CLINIC | Age: 47
End: 2022-09-08
Payer: COMMERCIAL

## 2022-09-08 VITALS
DIASTOLIC BLOOD PRESSURE: 70 MMHG | TEMPERATURE: 97.6 F | OXYGEN SATURATION: 98 % | BODY MASS INDEX: 28.09 KG/M2 | HEIGHT: 64 IN | RESPIRATION RATE: 18 BRPM | HEART RATE: 93 BPM | WEIGHT: 164.5 LBS | SYSTOLIC BLOOD PRESSURE: 124 MMHG

## 2022-09-08 DIAGNOSIS — D70.1 CHEMOTHERAPY-INDUCED NEUTROPENIA (HCC): ICD-10-CM

## 2022-09-08 DIAGNOSIS — C79.51 OSSEOUS METASTASIS (HCC): Primary | ICD-10-CM

## 2022-09-08 DIAGNOSIS — Z17.1 MALIGNANT NEOPLASM OF OVERLAPPING SITES OF RIGHT BREAST IN FEMALE, ESTROGEN RECEPTOR NEGATIVE (HCC): ICD-10-CM

## 2022-09-08 DIAGNOSIS — T45.1X5A CHEMOTHERAPY-INDUCED NEUTROPENIA (HCC): ICD-10-CM

## 2022-09-08 DIAGNOSIS — M84.48XA PATHOLOGICAL FRACTURE OF CERVICAL VERTEBRA, INITIAL ENCOUNTER: ICD-10-CM

## 2022-09-08 DIAGNOSIS — S12.301A CLOSED NONDISPLACED FRACTURE OF FOURTH CERVICAL VERTEBRA, UNSPECIFIED FRACTURE MORPHOLOGY, INITIAL ENCOUNTER (HCC): Primary | ICD-10-CM

## 2022-09-08 DIAGNOSIS — C50.811 MALIGNANT NEOPLASM OF OVERLAPPING SITES OF RIGHT BREAST IN FEMALE, ESTROGEN RECEPTOR NEGATIVE (HCC): ICD-10-CM

## 2022-09-08 PROCEDURE — 96413 CHEMO IV INFUSION 1 HR: CPT

## 2022-09-08 PROCEDURE — 97112 NEUROMUSCULAR REEDUCATION: CPT | Performed by: PHYSICAL THERAPIST

## 2022-09-08 PROCEDURE — 96417 CHEMO IV INFUS EACH ADDL SEQ: CPT

## 2022-09-08 PROCEDURE — 97110 THERAPEUTIC EXERCISES: CPT | Performed by: PHYSICAL THERAPIST

## 2022-09-08 PROCEDURE — 97140 MANUAL THERAPY 1/> REGIONS: CPT | Performed by: PHYSICAL THERAPIST

## 2022-09-08 RX ORDER — SODIUM CHLORIDE 9 MG/ML
20 INJECTION, SOLUTION INTRAVENOUS ONCE
Status: COMPLETED | OUTPATIENT
Start: 2022-09-08 | End: 2022-09-08

## 2022-09-08 RX ADMIN — TRASTUZUMAB 487 MG: 150 INJECTION, POWDER, LYOPHILIZED, FOR SOLUTION INTRAVENOUS at 09:58

## 2022-09-08 RX ADMIN — SODIUM CHLORIDE 20 ML/HR: 9 INJECTION, SOLUTION INTRAVENOUS at 08:52

## 2022-09-08 RX ADMIN — PERTUZUMAB 420 MG: 30 INJECTION, SOLUTION, CONCENTRATE INTRAVENOUS at 09:21

## 2022-09-08 NOTE — PROGRESS NOTES
Progress Note    Today's date: 2022  Patient name: Mignon Torrez  : 1975  MRN: [id number]  Referring provider: Martínez Dominguez MD  Dx:   Encounter Diagnosis     ICD-10-CM    1  Closed nondisplaced fracture of fourth cervical vertebra, unspecified fracture morphology, initial encounter ([facility] )  S12 301A    2  Pathological fracture of cervical vertebra, initial encounter  M84 [id number]        Start Time: 1700  Stop Time: 1745  Total time in clinic (min): 45 minutes    Subjective: The patient presents for re-evaluation today  The patient reports improvement in symptoms since beginning skilled physical therapy  The patient reports 3/10 pain at it's worst over the past 24 hours, and reports 65% improvement in overall condition since beginning formal PT care  Pt reports she has noticed an improvement in ROM and is less fearful of moving the neck  ADL performance has improved, as well as improved overall activity tolerance  Pt plans to begin acupuncture next week  The patient's chief remaining concern is decreased mobility in the cervical spine and stabilization in the scapular region        Objective: See treatment diary below    Active Range of Motion     Cervical    Flexion: 25 degrees   Extension: 30 degrees   Left lateral flexion: 35 degrees   Right lateral flexion: 35 degrees   Left rotation: 35 degrees  Right rotation: 35 degrees       Left Shoulder   Flexion: 155 degrees   External rotation BTH: T4   Internal rotation BTB: T10 with pain    Right Shoulder   Flexion: 160 degrees   External rotation BTH: T4   Internal rotation BTB: T7     Strength/Myotome Testing   Cervical Spine   Neck extension: 4+  Neck flexion: 4+    Left   Neck lateral flexion (C3): 4+    Right   Neck lateral flexion (C3): 4+    Left Shoulder     Planes of Motion   Flexion: 4+  Extension: 5   Abduction: 4+  External rotation at 0°: 5  Internal rotation at 0°: 5    Right Shoulder     Planes of Motion   Flexion: 4+   Extension: 5 Abduction: 4+  External rotation at 0°: 5  Internal rotation at 0°: 5       Assessment: Pop Patton is a pleasant 49 y o  female who has been receiving PT intervention for cervical region pain 2* metastatic fracture at C4  The patient has demonstrated decreased pain, increased strength, increased ROM, improved posture  and increased activity tolerance since beginning treatment  Primary remaining impairments include:    1)  Decreased ROM in the cervical and thoracic spine     2)  Impaired postural control and stabilization     Goals  Short Term Goals 2-4 wks   1  Pt will be independent with initial HEP - MET  2  Pt will improve C/S ROM 25% in all planes -MET  3  Pt will improve deficient mm strength 1/2 grade -MET    Long Term Goals 6-8 wks  1  Pt will improve cervical ROM to WNL -Progressing   2  Pt will return to driving without limitations -Progressing   3  Pt will improve FOTO score to >64 by d/c- Progressing     Plan:   Patient would benefit from: skilled physical therapy  Planned modality interventions: Modalities PRN  Planned therapy interventions: activity modification, manual therapy, neuromuscular re-education, patient education, therapeutic activities, therapeutic exercise, graded activity, home exercise program, behavior modification, self care, abdominal trunk stabilization and postural training  Frequency: 2x week  Duration in weeks: 6  Treatment plan discussed with: patient     Precautions:  Active Breast CA w/ Metastasis to Spine, HTN      Manuals 9/1 9/8 8/16 8/18 8/23 8/25 8/30   STM B/L UT, LS, Rhomboids  KCB BNH  KCB KCB KCB KCB KCB                         Neuro Re-Ed           C/S Retract  20x 20x  20x  20x  20x 20x  20x   Scap Retract            TB B/L ER            TB T 20x GTB 20x GTB   20x RTB 20x RTB 20x RTB 20x GTB                         Education      foto      Ther Ex           TB Row/Ext  30x GTB 30x GTB  20x RTB 20x RTB 20x GTB 30x GTB 30x GTB   T/S Ext  20x 20x  20x 20x 20x 20x  20x   T/S Rotation           Shrugs/Rolls 30x 3# 30x 4#  30x 2# 30x 2# 30x 2# 30x 3# 30x 3#   Bicep Curls 30x 3# 30x 4#  30x 2# 30x 2# 30x 2# 30x 3# 30x 3#   DB Rows  30x 3# 30x 4#  30x 2# 30x 2# 30x 2# 30x 3# 30x 3#   UT Stretch  10x 10" 10x 10"  10x 10" 10x 10" 10x 10" 10x 10" 10x 10"   C/S Isometrics 20x3" ea dir  20x3" ea dir   20x3" ea dir 20x3" ea dir 20x3" ea dir 20x3" ea dir 20x3" ea dir    Shoulder Flex/Scap 2x10 2# 3x10 3#    2x10 2# 2x10 2# 2x10 2#   Towel Rotation Stretch  10x5" 10x5"     10x 5" 10x5"               Ther Activity                                 Gait Training                                 Modalities

## 2022-09-09 ENCOUNTER — OFFICE VISIT (OUTPATIENT)
Dept: SURGICAL ONCOLOGY | Facility: CLINIC | Age: 47
End: 2022-09-09
Payer: COMMERCIAL

## 2022-09-09 VITALS
BODY MASS INDEX: 28.34 KG/M2 | RESPIRATION RATE: 21 BRPM | TEMPERATURE: 97.6 F | WEIGHT: 166 LBS | SYSTOLIC BLOOD PRESSURE: 118 MMHG | OXYGEN SATURATION: 97 % | DIASTOLIC BLOOD PRESSURE: 76 MMHG | HEIGHT: 64 IN | HEART RATE: 100 BPM

## 2022-09-09 DIAGNOSIS — C50.811 MALIGNANT NEOPLASM OF OVERLAPPING SITES OF RIGHT BREAST IN FEMALE, ESTROGEN RECEPTOR NEGATIVE (HCC): Primary | ICD-10-CM

## 2022-09-09 DIAGNOSIS — Z17.1 MALIGNANT NEOPLASM OF OVERLAPPING SITES OF RIGHT BREAST IN FEMALE, ESTROGEN RECEPTOR NEGATIVE (HCC): Primary | ICD-10-CM

## 2022-09-09 DIAGNOSIS — C79.51 OSSEOUS METASTASIS (HCC): ICD-10-CM

## 2022-09-09 PROCEDURE — 99215 OFFICE O/P EST HI 40 MIN: CPT | Performed by: SURGERY

## 2022-09-09 NOTE — PROGRESS NOTES
Surgical Oncology Follow Up       [address]  [facility]  [address] [facility] [address]  [facility] [address]    Cristy Ports  1975  [id number]  [address] [facility]  [facility]  [address] [facility] [address]  [facility] [address]    Diagnoses and all orders for this visit:    Malignant neoplasm of overlapping sites of right breast in female, estrogen receptor negative ([facility] )    Osseous metastasis [facility])        Chief Complaint   Patient presents with    Follow-up       No follow-ups on file  Oncology History   Malignant neoplasm of overlapping sites of right breast in female, estrogen receptor negative ([facility] )   3/2022 Initial Diagnosis    Malignant neoplasm of overlapping sites of right breast in female, estrogen receptor negative ([facility]     3/29/2022 Biopsy    Breast, Right, Biopsy:  - Invasive mammary carcinoma of no special type (ductal, not otherwise specified), Fabrizio Grade II (3 + 2 + 1 = 6), spanning at least 6 mm     ER/HI negative, HER2 positive    Procedure  Needle biopsy    Specimen Laterality  Right    TUMOR   Histologic Type  Invasive carcinoma of no special type (ductal)    Histologic Grade (Fabrizio Histologic Score)     Glandular (Acinar) / Tubular Differentiation  Score 3    Nuclear Pleomorphism  Score 2    Mitotic Rate  Score 1    Overall Grade  Grade 2 (scores of 6 or 7)    Tumor Size  Greatest dimension of largest invasive focus (Millimeters): 6 mm   Ductal Carcinoma In Situ (DCIS)  Not identified    Lymphovascular Invasion  Not identified         3/31/2022 - 4/18/2022 Radiation    Course: C1    Plan ID Energy Fractions Dose per Fraction (cGy) Dose Correction (cGy) Total Dose Delivered (cGy) Elapsed Days   C1 C7 And ix 10X/6X 7 / 7 300 0 2,100 8   C1 C7 Spine 10X/6X 3 / 10 300 0 900 4   L4 Sacrum 10X 10 / 10 300 0 3,000 13   T1 T6 And ix 10X 7 / 7 300 0 2,100 8   T1 T6 Spine 10X 3 / 10 300 0 900 4      Dr Dena Dumont     4/28/2022 -  Chemotherapy    clinical trial with THP with or without atezolizumab     4/28/2022 -  Chemotherapy    pegfilgrastim ([place]), 6 mg, Subcutaneous, Once, 1 of 1 cycle  pertuzumab (PERJETA) IVPB, 840 mg, Intravenous, Once, 7 of 12 cycles  Administration: 420 mg (6/16/2022), 420 mg (7/7/2022), 420 mg (7/28/2022), 420 mg (8/18/2022), 420 mg (9/8/2022)  DOCEtaxel (TAXOTERE) chemo infusion, 75 mg/m2, Intravenous, Once, 3 of 3 cycles  Dose modification: 60 mg/m2 (original dose 75 mg/m2, Cycle 3, Reason: Anticipated Tolerance)  Administration: 112 8 mg (6/16/2022)  PACLItaxel (TAXOL) chemo IVPB, 80 mg/m2 = 150 6 mg (100 % of original dose 80 mg/m2), Intravenous, Once, 3 of 3 cycles  Dose modification: 80 mg/m2 (original dose 80 mg/m2, Cycle 4, Reason: Anticipated Tolerance)  Administration: 150 6 mg (7/7/2022), 150 6 mg (7/14/2022), 150 6 mg (7/21/2022), 150 6 mg (7/28/2022), 150 6 mg (8/4/2022), 150 6 mg (8/11/2022), 150 6 mg (8/18/2022), 150 6 mg (8/25/2022), 150 6 mg (9/1/2022)  trastuzumab (HERCEPTIN) chemo infusion, 8 mg/kg, Intravenous, Once, 5 of 10 cycles  Administration: 487 mg (7/28/2022), 487 mg (8/18/2022), 487 mg (9/8/2022)  INV trastuzumab infusion, 6 mg/kg = 487 mg (100 % of original dose 6 mg/kg), Intravenous, Once, 2 of 2 cycles  Dose modification: 6 mg/kg (original dose 6 mg/kg, Cycle 3, Reason: Other (Must fill in a comment), Comment: investigational drug)  Administration: 487 mg (6/16/2022), 487 mg (7/7/2022)     Osseous metastasis ([facility] )   3/2022 Initial Diagnosis    Osseous metastasis ([facility] )     3/29/2022 Biopsy    Breast, Right, Biopsy:  - Invasive mammary carcinoma of no special type (ductal, not otherwise specified), Bro[place]us Grade II (3 + 2 + 1 = 6), spanning at least 6 mm     ER/HI negative, HER2 positive    Procedure  Needle biopsy    Specimen Laterality  Right    TUMOR   Histologic Type  Invasive carcinoma of no special type (ductal)    Histologic Grade (Purdys Histologic Score)     Glandular (Acinar) / Tubular Differentiation  Score 3    Nuclear Pleomorphism  Score 2    Mitotic Rate  Score 1    Overall Grade  Grade 2 (scores of 6 or 7)    Tumor Size  Greatest dimension of largest invasive focus (Millimeters): 6 mm   Ductal Carcinoma In Situ (DCIS)  Not identified    Lymphovascular Invasion  Not identified         3/31/2022 - 4/18/2022 Radiation    Course: C1    Plan ID Energy Fractions Dose per Fraction (cGy) Dose Correction (cGy) Total Dose Delivered (cGy) Elapsed Days   C1 C7 And ix 10X/6X 7 / 7 300 0 2,100 8   C1 C7 Spine 10X/6X 3 / 10 300 0 900 4   L4 Sacrum 10X 10 / 10 300 0 3,000 13   T1 T6 And ix 10X 7 / 7 300 0 2,100 8   T1 T6 Spine 10X 3 / 10 300 0 900 4      Dr Carlos Jett     4/28/2022 -  Chemotherapy    clinical trial with THP with or without atezolizumab     4/28/2022 -  Chemotherapy    pegfilgrastim (NEULASTA ONPRO), 6 mg, Subcutaneous, Once, 1 of 1 cycle  pertuzumab (PERJETA) IVPB, 840 mg, Intravenous, Once, 7 of 12 cycles  Administration: 420 mg (6/16/2022), 420 mg (7/7/2022), 420 mg (7/28/2022), 420 mg (8/18/2022), 420 mg (9/8/2022)  DOCEtaxel (TAXOTERE) chemo infusion, 75 mg/m2, Intravenous, Once, 3 of 3 cycles  Dose modification: 60 mg/m2 (original dose 75 mg/m2, Cycle 3, Reason: Anticipated Tolerance)  Administration: 112 8 mg (6/16/2022)  PACLItaxel (TAXOL) chemo IVPB, 80 mg/m2 = 150 6 mg (100 % of original dose 80 mg/m2), Intravenous, Once, 3 of 3 cycles  Dose modification: 80 mg/m2 (original dose 80 mg/m2, Cycle 4, Reason: Anticipated Tolerance)  Administration: 150 6 mg (7/7/2022), 150 6 mg (7/14/2022), 150 6 mg (7/21/2022), 150 6 mg (7/28/2022), 150 6 mg (8/4/2022), 150 6 mg (8/11/2022), 150 6 mg (8/18/2022), 150 6 mg (8/25/2022), 150 6 mg (9/1/2022)  trastuzumab (HERCEPTIN) chemo infusion, 8 mg/kg, Intravenous, Once, 5 of 10 cycles  Administration: 487 mg (7/28/2022), 487 mg (8/18/2022), 487 mg (9/8/2022)  INV trastuzumab infusion, 6 mg/kg = 487 mg (100 % of original dose 6 mg/kg), Intravenous, Once, 2 of 2 cycles  Dose modification: 6 mg/kg (original dose 6 mg/kg, Cycle 3, Reason: Other (Must fill in a comment), Comment: investigational drug)  Administration: 487 mg (6/16/2022), 487 mg (7/7/2022)         Staging:  metastatic breast cancer to the bones  ER/LA negative, HER2 Kelle positive  Treatment history:   pertuzumab/Taxotere and Herceptin on trial  Taxol/pertuzumab and Herceptin  Current treatment:  Taxol/pertuzumab and Herceptin  Disease status:  New bone metastasis    History of Present Illness:  Patient returns in follow-up  She has completed her chemotherapy  She still has an open wound on the right breast   She is treating this topically daily  CT from August 9th does reveal that the breast mass has decreased in size as well as some of her lorena adenopathy  There is a new 1 cm metastatic lesion in the right femur  Personally reviewed her films  Review of Systems  Complete ROS Surg Onc:   Complete ROS Surg Onc:   Constitutional: The patient denies new or recent history of general fatigue, no recent weight loss, no change in appetite  Eyes: No complaints of visual problems, no scleral icterus  ENT: no complaints of ear pain, no hoarseness, no difficulty swallowing,  no tinnitus and no new masses in head, oral cavity, or neck  Cardiovascular: No complaints of chest pain, no palpitations, no ankle edema  Respiratory: No complaints of shortness of breath, no cough  Gastrointestinal: No complaints of jaundice, no bloody stools, no pale stools  Genitourinary: No complaints of dysuria, no hematuria, no nocturia, no frequent urination, no urethral discharge  Musculoskeletal: No complaints of weakness, paralysis, joint stiffness or arthralgias  Integumentary: No complaints of rash, no new lesions  Neurological: No complaints of convulsions, no seizures, no dizziness     Hematologic/Lymphatic: No complaints of easy bruising  Endocrine:  No hot or cold intolerance  No polydipsia, polyphagia, or polyuria  Allergy/immunology:  No environmental allergies  No food allergies  Not immunocompromised  Skin:  No pallor or rash  No wound  Patient Active Problem List   Diagnosis    Malignant neoplasm of overlapping sites of right breast in female, estrogen receptor negative ([place] [address] )    Osseous metastasis ([place] [address] )    Elevated BP without diagnosis of hypertension    Hypokalemia    Transaminitis    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 ([place] [address] )    Cancer associated pain    Palliative care patient    HTN (hypertension)    Candida infection, esophageal ([place] [address] )    Urinary tract infection without hematuria    Medical marijuana use    Chemotherapy-induced nausea    Neoplastic malignant related fatigue    Xerostomia    Antineoplastic chemotherapy induced anemia    COVID-19    Sepsis (HCC)    Chemotherapy-induced neutropenia (HCC)    Candidal skin infection    Neutropenia associated with infection ([place] [address] )    Constipation     Past Medical History:   Diagnosis Date    Breast cancer ([place] [address] )     Cancer ([place] [address] )     Hypertension      No past surgical history on file  Family History   Problem Relation Age of Onset    Coronary artery disease Mother     Heart attack Father     Diabetes type II Father      Social History     Socioeconomic History    Marital status: Single     Spouse name: Not on file    Number of children: Not on file    Years of education: Not on file    Highest education level: Not on file   Occupational History    Not on file   Tobacco Use    Smoking status: Never Smoker    Smokeless tobacco: Never Used   Vaping Use    Vaping Use: Never used   Substance and Sexual Activity    Alcohol use: Not Currently     Comment: rare, social     Drug use: Never    Sexual activity: Not on file   Other Topics Concern    Not on file   Social History Narrative    Significant other Harjit Hughess is very supportive       Social Determinants of Health     Financial Resource Strain: Not on file   Food Insecurity: No Food Insecurity    Worried About Running Out of Food in the Last Year: Never true    Ran Out of Food in the Last Year: Never true   Transportation Needs: No Transportation Needs    Lack of Transportation (Medical): No    Lack of Transportation (Non-Medical):  No   Physical Activity: Not on file   Stress: Not on file   Social Connections: Not on file   Intimate Partner Violence: Not on file   Housing Stability: Low Risk     Unable to Pay for Housing in the Last Year: No    Number of Places Lived in the Last Year: 1    Unstable Housing in the Last Year: No       Current Outpatient Medications:     al mag oxide-diphenhydramine-lidocaine viscous (MAGIC MOUTHWASH) 1:1:1 suspension, Swish and spit 10 mL every 4 (four) hours as needed for mouth pain or discomfort, Disp: 500 mL, Rfl: 2    baclofen 10 mg tablet, TAKE 1/2 TABLET BY MOUTH 3 TIMES A DAY AS NEEDED FOR MUSCLE SPASMS, Disp: 135 tablet, Rfl: 1    DPH-Lido-AlHydr-MgHydr-Simeth (First-Mouthwash BLM) SUSP, , Disp: , Rfl:     furosemide (LASIX) 40 mg tablet, Take 1 tablet (40 mg total) by mouth daily, Disp: 30 tablet, Rfl: 0    lactulose (CEPHULAC) 10 g packet, Take 1 packet (10 g total) by mouth 3 (three) times a day as needed (constipation), Disp: 30 each, Rfl: 0    lisinopril (ZESTRIL) 20 mg tablet, Take 1 tablet (20 mg total) by mouth daily, Disp: 90 tablet, Rfl: 1    morphine (MS CONTIN) 15 mg 12 hr tablet, Take 1 tablet (15 mg total) by mouth every 12 (twelve) hours Max Daily Amount: 30 mg, Disp: 60 tablet, Rfl: 0    nystatin (MYCOSTATIN) 500,000 units/5 mL suspension, Apply 5 mL (500,000 Units total) to the mouth or throat 4 (four) times a day, Disp: 473 mL, Rfl: 1    nystatin (MYCOSTATIN) powder, Apply topically 3 (three) times a day, Disp: 15 g, Rfl: 0    ondansetron (ZOFRAN) 4 mg tablet, Take 1 tablet (4 mg total) by mouth every 8 (eight) hours as needed for nausea or vomiting, Disp: 9 tablet, Rfl: 20    oxyCODONE (ROXICODONE) 10 MG TABS, Take 1 tablet (10 mg total) by mouth every 4 (four) hours as needed for moderate pain Max Daily Amount: 60 mg, Disp: 180 tablet, Rfl: 0    pantoprazole (PROTONIX) 40 mg tablet, Take 1 tablet (40 mg total) by mouth daily in the early morning, Disp: 90 tablet, Rfl: 1    polyethylene glycol (MIRALAX) 17 g packet, Take 17 g by mouth daily, Disp: 30 each, Rfl: 0    senna (SENOKOT) 8 6 mg, Take 3 tablets (25 8 mg total) by mouth 2 (two) times a day Hold for loose stool , Disp: 180 tablet, Rfl: 0  No current facility-administered medications for this visit  No Known Allergies  Vitals:    09/09/22 0938   BP: 118/76   Pulse: 100   Resp: 21   Temp: 97 6 °F (36 4 °C)   SpO2: 97%       Physical Exam  Constitutional: General appearance: The Patient is well-developed and well-nourished who appears the stated age in no acute distress  Patient is pleasant and talkative  HEENT:  Normocephalic  Sclerae are anicteric  Mucous membranes are moist  Neck is supple without adenopathy  No JVD  Chest: The lungs are clear to auscultation  Cardiac: Heart is regular rate  Abdomen: Abdomen is soft, non-tender, non-distended and without masses  Extremities: There is no clubbing or cyanosis  There is no edema  Symmetric  Neuro: Grossly nonfocal  Gait is normal      Lymphatic: No evidence of cervical adenopathy bilaterally  No evidence of axillary adenopathy bilaterally  Skin: Warm, anicteric    there continues to be of 5 cm area in the medial aspect of the right breast   This appears to be like a flat red lesion  The  axillary adenopathy appears to have resolved  Psych:  Patient is pleasant and talkative  Breasts:        Pathology:  [unfilled]    Labs:      Imaging  No results found  I reviewed the above laboratory and imaging data  Discussion/Summary: 49year-old female with metastatic breast cancer    This is ER/CA negative, HER2 Kelle positive  She is completed chemotherapy  Initially she was on a clinical trial and this has stopped  She still has wound issues from her tumor, with a are improving  We once again discussed that studies are variable regarding survival benefit of mastectomy with metastatic disease or even bone only metastatic disease  Since there is still an open wound, I would consider mastectomy for palliative reasons  I explained the risks including bleeding, infection, recurrence, need for further surgery, wound complications, neurovascular damage and lymphedema  Informed consent was obtained  She is still going to think about this  She is meeting with Dr Guillermo Solis later this month  I have told her to see if the wound continues to improve  If not, he is leaning towards palliative mastectomy and possible axillary dissection  She will let us know after meeting Medical Oncology if she wishes to proceed  She is agreeable to this  All her questions were answered

## 2022-09-09 NOTE — LETTER
September 9, 2022     Angelina Molina   [address] [place] [address] St  [address] [place] [id number]    Patient: Дмитрий Maguire   YOB: 1975   Date of Visit: 9/9/2022       Dear Dr Jazzy Gauthier: Thank you for referring Rody Jorgensen to me for evaluation  Below are my notes for this consultation  If you have questions, please do not hesitate to call me  I look forward to following your patient along with you  Sincerely,        Ingrid Pino MD        CC: MD Jacquelyn Schmitz MD Kaylene Citizen, MD  9/9/2022 10:10 AM  Incomplete               Surgical Oncology Follow Up       [address] [facility] SURGICAL ONCOLOGY [place]  [address] Carol FREEMAN [address] [facility] Neris Amilcar  1975  [id number]  [address] [facility]  [facility] SURGICAL ONCOL[facility] [place]  [address] [facility] [id number]    Diagnoses and all orders for this visit:    Malignant neoplasm of overlapping sites of right breast in female, estrogen receptor negative ([facility] )    Osseous metastasis [facility])        Chief Complaint   Patient presents with    Follow-up       No follow-ups on file  Oncology History   Malignant neoplasm of overlapping sites of right breast in female, estrogen receptor negative ([facility] )   3/2022 Initial Diagnosis    Malignant neoplasm of overlapping sites of right breast in female, estrogen receptor negative ([facility] )     3/29/2022 Biopsy    Breast, Right, Biopsy:  - Invasive mammary carcinoma of no special type (ductal, not otherwise specified), Thelma Grade II (3 + 2 + 1 = 6), spanning at least 6 mm     ER/NM negative, HER2 positive    Procedure  Needle biopsy    Specimen Laterality  Right    TUMOR   Histologic Type  Invasive carcinoma of no special type (ductal)    Histologic Grade (Fabrizio Histologic Score)     Glandular (Acinar) / Tubular Differentiation  Score 3    Nuclear Pleomorphism  Score 2    Mitotic Rate Score 1    Overall Grade  Grade 2 (scores of 6 or 7)    Tumor Size  Greatest dimension of largest invasive focus (Millimeters): 6 mm   Ductal Carcinoma In Situ (DCIS)  Not identified    Lymphovascular Invasion  Not identified         3/31/2022 - 4/18/2022 Radiation    Course: C1    Plan ID Energy Fractions Dose per Fraction (cGy) Dose Correction (cGy) Total Dose Delivered (cGy) Elapsed Days   C1 C7 And ix 10X/6X 7 / 7 300 0 2,100 8   C1 C7 Spine 10X/6X 3 / 10 300 0 900 4   L4 Sacrum 10X 10 / 10 300 0 3,000 13   T1 T6 And ix 10X 7 / 7 300 0 2,100 8   T1 T6 Spine 10X 3 / 10 300 0 900 4      Dr Paul Ramirez     4/28/2022 -  Chemotherapy    clinical trial with THP with or without atezolizumab     4/28/2022 -  Chemotherapy    pegfilgrastim (Christyne Nance), 6 mg, Subcutaneous, Once, 1 of 1 cycle  pertuzumab (PERJETA) IVPB, 840 mg, Intravenous, Once, 7 of 12 cycles  Administration: 420 mg (6/16/2022), 420 mg (7/7/2022), 420 mg (7/28/2022), 420 mg (8/18/2022), 420 mg (9/8/2022)  DOCEtaxel (TAXOTERE) chemo infusion, 75 mg/m2, Intravenous, Once, 3 of 3 cycles  Dose modification: 60 mg/m2 (original dose 75 mg/m2, Cycle 3, Reason: Anticipated Tolerance)  Administration: 112 8 mg (6/16/2022)  PACLItaxel (TAXOL) chemo IVPB, 80 mg/m2 = 150 6 mg (100 % of original dose 80 mg/m2), Intravenous, Once, 3 of 3 cycles  Dose modification: 80 mg/m2 (original dose 80 mg/m2, Cycle 4, Reason: Anticipated Tolerance)  Administration: 150 6 mg (7/7/2022), 150 6 mg (7/14/2022), 150 6 mg (7/21/2022), 150 6 mg (7/28/2022), 150 6 mg (8/4/2022), 150 6 mg (8/11/2022), 150 6 mg (8/18/2022), 150 6 mg (8/25/2022), 150 6 mg (9/1/2022)  trastuzumab (HERCEPTIN) chemo infusion, 8 mg/kg, Intravenous, Once, 5 of 10 cycles  Administration: 487 mg (7/28/2022), 487 mg (8/18/2022), 487 mg (9/8/2022)  INV trastuzumab infusion, 6 mg/kg = 487 mg (100 % of original dose 6 mg/kg), Intravenous, Once, 2 of 2 cycles  Dose modification: 6 mg/kg (original dose 6 mg/kg, Cycle 3, Reason: Other (Must fill in a comment), Comment: investigational drug)  Administration: 487 mg (6/16/2022), 487 mg (7/7/2022)     Osseous metastasis ([facility] )   3/2022 Initial Diagnosis    Osseous metastasis ([facility] )     3/29/2022 Biopsy    Breast, Right, Biopsy:  - Invasive mammary carcinoma of no special type (ductal, not otherwise specified), Fabrizio Grade II (3 + 2 + 1 = 6), spanning at least 6 mm     ER/DC negative, HER2 positive    Procedure  Needle biopsy    Specimen Laterality  Right    TUMOR   Histologic Type  Invasive carcinoma of no special type (ductal)    Histologic Grade (Fabrizio Histologic Score)     Glandular (Acinar) / Tubular Differentiation  Score 3    Nuclear Pleomorphism  Score 2    Mitotic Rate  Score 1    Overall Grade  Grade 2 (scores of 6 or 7)    Tumor Size  Greatest dimension of largest invasive focus (Millimeters): 6 mm   Ductal Carcinoma In Situ (DCIS)  Not identified    Lymphovascular Invasion  Not identified         3/31/2022 - 4/18/2022 Radiation    Course: C1    Plan ID Energy Fractions Dose per Fraction (cGy) Dose Correction (cGy) Total Dose Delivered (cGy) Elapsed Days   C1 C7 And ix 10X/6X 7 / 7 300 0 2,100 8   C1 C7 Spine 10X/6X 3 / 10 300 0 900 4   L4 Sacrum 10X 10 / 10 300 0 3,000 13   T1 T6 And ix 10X 7 / 7 300 0 2,100 8   T1 T6 Spine 10X 3 / 10 300 0 900 4      Dr Emy Levi     4/28/2022 -  Chemotherapy    clinical trial with THP with or without atezolizumab     4/28/2022 -  Chemotherapy    pegfilgrastim (Erman See), 6 mg, Subcutaneous, Once, 1 of 1 cycle  pertuzumab (PERJETA) IVPB, 840 mg, Intravenous, Once, 7 of 12 cycles  Administration: 420 mg (6/16/2022), 420 mg (7/7/2022), 420 mg (7/28/2022), 420 mg (8/18/2022), 420 mg (9/8/2022)  DOCEtaxel (TAXOTERE) chemo infusion, 75 mg/m2, Intravenous, Once, 3 of 3 cycles  Dose modification: 60 mg/m2 (original dose 75 mg/m2, Cycle 3, Reason: Anticipated Tolerance)  Administration: 112 8 mg (6/16/2022)  PACLItaxel (TAXOL) chemo IVPB, 80 mg/m2 = 150 6 mg (100 % of original dose 80 mg/m2), Intravenous, Once, 3 of 3 cycles  Dose modification: 80 mg/m2 (original dose 80 mg/m2, Cycle 4, Reason: Anticipated Tolerance)  Administration: 150 6 mg (7/7/2022), 150 6 mg (7/14/2022), 150 6 mg (7/21/2022), 150 6 mg (7/28/2022), 150 6 mg (8/4/2022), 150 6 mg (8/11/2022), 150 6 mg (8/18/2022), 150 6 mg (8/25/2022), 150 6 mg (9/1/2022)  trastuzumab (HERCEPTIN) chemo infusion, 8 mg/kg, Intravenous, Once, 5 of 10 cycles  Administration: 487 mg (7/28/2022), 487 mg (8/18/2022), 487 mg (9/8/2022)  INV trastuzumab infusion, 6 mg/kg = 487 mg (100 % of original dose 6 mg/kg), Intravenous, Once, 2 of 2 cycles  Dose modification: 6 mg/kg (original dose 6 mg/kg, Cycle 3, Reason: Other (Must fill in a comment), Comment: investigational drug)  Administration: 487 mg (6/16/2022), 487 mg (7/7/2022)         Staging:  metastatic breast cancer to the bones  ER/[place] negative, HER2 Kelle positive  Treatment history:   pertuzumab/Taxotere and Herceptin on trial  Taxol/pertuzumab and Herceptin  Current treatment:  Taxol/pertuzumab and Herceptin  Disease status:  New bone metastasis    History of Present Illness:  Patient returns in follow-up  She has completed her chemotherapy  She still has an open wound on the right breast   She is treating this topically daily  CT from August 9th does reveal that the breast mass has decreased in size as well as some of her lorena adenopathy  There is a new 1 cm metastatic lesion in the right femur  Personally reviewed her films  Review of Systems  Complete ROS Surg Onc:   Complete ROS Surg Onc:   Constitutional: The patient denies new or recent history of general fatigue, no recent weight loss, no change in appetite  Eyes: No complaints of visual problems, no scleral icterus  ENT: no complaints of ear pain, no hoarseness, no difficulty swallowing,  no tinnitus and no new masses in head, oral cavity, or neck     Cardiovascular: No complaints of chest pain, no palpitations, no ankle edema  Respiratory: No complaints of shortness of breath, no cough  Gastrointestinal: No complaints of jaundice, no bloody stools, no pale stools  Genitourinary: No complaints of dysuria, no hematuria, no nocturia, no frequent urination, no urethral discharge  Musculoskeletal: No complaints of weakness, paralysis, joint stiffness or arthralgias  Integumentary: No complaints of rash, no new lesions  Neurological: No complaints of convulsions, no seizures, no dizziness  Hematologic/Lymphatic: No complaints of easy bruising  Endocrine:  No hot or cold intolerance  No polydipsia, polyphagia, or polyuria  Allergy/immunology:  No environmental allergies  No food allergies  Not immunocompromised  Skin:  No pallor or rash  No wound  Patient Active Problem List   Diagnosis    Malignant neoplasm of overlapping sites of right breast in female, estrogen receptor negative ([facility] )    Osseous metastasis ([facility] )    Elevated BP without diagnosis of hypertension    Hypokalemia    Transaminitis    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 ([place] [address] )    Cancer associated pain    Palliative care patient    HTN (hypertension)    Candida infection, esophageal ([facility] )    Urinary tract infection without hematuria    Medical marijuana use    Chemotherapy-induced nausea    Neoplastic malignant related fatigue    Xerostomia    Antineoplastic chemotherapy induced anemia    COVID-19    Sepsis (HCC)    Chemotherapy-induced neutropenia (HCC)    Candidal skin infection    Neutropenia associated with infection ([facility] )    Constipation     Past Medical History:   Diagnosis Date    Breast cancer ([facility] )     Cancer ([facility] )     Hypertension      No past surgical history on file    Family History   Problem Relation Age of Onset    Coronary artery disease Mother     Heart attack Father     Diabetes type II Father      Social History     Socioeconomic History    Marital status: Single     Spouse name: Not on file    Number of children: Not on file    Years of education: Not on file    Highest education level: Not on file   Occupational History    Not on file   Tobacco Use    Smoking status: Never Smoker    Smokeless tobacco: Never Used   Vaping Use    Vaping Use: Never used   Substance and Sexual Activity    Alcohol use: Not Currently     Comment: rare, social     Drug use: Never    Sexual activity: Not on file   Other Topics Concern    Not on file   Social History Narrative    Significant other Roxanna Bernal is very supportive  Social Determinants of Health     Financial Resource Strain: Not on file   Food Insecurity: No Food Insecurity    Worried About Running Out of Food in the Last Year: Never true    Miguel of Food in the Last Year: Never true   Transportation Needs: No Transportation Needs    Lack of Transportation (Medical): No    Lack of Transportation (Non-Medical):  No   Physical Activity: Not on file   Stress: Not on file   Social Connections: Not on file   Intimate Partner Violence: Not on file   Housing Stability: Low Risk     Unable to Pay for Housing in the Last Year: No    Number of Places Lived in the Last Year: 1    Unstable Housing in the Last Year: No       Current Outpatient Medications:     al mag oxide-diphenhydramine-lidocaine viscous (MAGIC MOUTHWASH) 1:1:1 suspension, Swish and spit 10 mL every 4 (four) hours as needed for mouth pain or discomfort, Disp: 500 mL, Rfl: 2    baclofen 10 mg tablet, TAKE 1/2 TABLET BY MOUTH 3 TIMES A DAY AS NEEDED FOR MUSCLE SPASMS, Disp: 135 tablet, Rfl: 1    DPH-Lido-AlHydr-MgHydr-Simeth (First-Mouthwash BLM) SUSP, , Disp: , Rfl:     furosemide (LASIX) 40 mg tablet, Take 1 tablet (40 mg total) by mouth daily, Disp: 30 tablet, Rfl: 0    lactulose (CEPHULAC) 10 g packet, Take 1 packet (10 g total) by mouth 3 (three) times a day as needed (constipation), Disp: 30 each, Rfl: 0    lisinopril (ZESTRIL) 20 mg tablet, Take 1 tablet (20 mg total) by mouth daily, Disp: 90 tablet, Rfl: 1    morphine (MS CONTIN) 15 mg 12 hr tablet, Take 1 tablet (15 mg total) by mouth every 12 (twelve) hours Max Daily Amount: 30 mg, Disp: 60 tablet, Rfl: 0    nystatin (MYCOSTATIN) 500,000 units/5 mL suspension, Apply 5 mL (500,000 Units total) to the mouth or throat 4 (four) times a day, Disp: 473 mL, Rfl: 1    nystatin (MYCOSTATIN) powder, Apply topically 3 (three) times a day, Disp: 15 g, Rfl: 0    ondansetron (ZOFRAN) 4 mg tablet, Take 1 tablet (4 mg total) by mouth every 8 (eight) hours as needed for nausea or vomiting, Disp: 9 tablet, Rfl: 20    oxyCODONE (ROXICODONE) 10 MG TABS, Take 1 tablet (10 mg total) by mouth every 4 (four) hours as needed for moderate pain Max Daily Amount: 60 mg, Disp: 180 tablet, Rfl: 0    pantoprazole (PROTONIX) 40 mg tablet, Take 1 tablet (40 mg total) by mouth daily in the early morning, Disp: 90 tablet, Rfl: 1    polyethylene glycol (MIRALAX) 17 g packet, Take 17 g by mouth daily, Disp: 30 each, Rfl: 0    senna (SENOKOT) 8 6 mg, Take 3 tablets (25 8 mg total) by mouth 2 (two) times a day Hold for loose stool , Disp: 180 tablet, Rfl: 0  No current facility-administered medications for this visit  No Known Allergies  Vitals:    09/09/22 0938   BP: 118/76   Pulse: 100   Resp: 21   Temp: 97 6 °F (36 4 °C)   SpO2: 97%       Physical Exam  Constitutional: General appearance: The Patient is well-developed and well-nourished who appears the stated age in no acute distress  Patient is pleasant and talkative  HEENT:  Normocephalic  Sclerae are anicteric  Mucous membranes are moist  Neck is supple without adenopathy  No JVD  Chest: The lungs are clear to auscultation  Cardiac: Heart is regular rate  Abdomen: Abdomen is soft, non-tender, non-distended and without masses  Extremities: There is no clubbing or cyanosis  There is no edema  Symmetric    Neuro: Grossly nonfocal  Gait is normal  Lymphatic: No evidence of cervical adenopathy bilaterally  No evidence of axillary adenopathy bilaterally  Skin: Warm, anicteric    there continues to be of 5 cm area in the medial aspect of the right breast   This appears to be like a flat red lesion  The  axillary adenopathy appears to have resolved  Psych:  Patient is pleasant and talkative  Breasts:        Pathology:  [unfilled]    Labs:      Imaging  No results found  I reviewed the above laboratory and imaging data  Discussion/Summary: 49year-old female with metastatic breast cancer  This is ER/[place] negative, HER2 Kelle positive  She is completed chemotherapy  Initially she was on a clinical trial and this has stopped  She still has wound issues from her tumor, with a are improving  We once again discussed that studies are variable regarding survival benefit of mastectomy with metastatic disease or even bone only metastatic disease  Since there is still an open wound, I would consider mastectomy for palliative reasons  I explained the risks including bleeding, infection, recurrence, need for further surgery, wound complications, neurovascular damage and lymphedema  Informed consent was obtained  She is still going to think about this  She is meeting with Dr Kiersten Fermin later this month  I have told her to see if the wound continues to improve  If not, he is leaning towards palliative mastectomy and possible axillary dissection  She will let us know after meeting Medical Oncology if she wishes to proceed  She is agreeable to this  All her questions were answered

## 2022-09-13 ENCOUNTER — OFFICE VISIT (OUTPATIENT)
Dept: PHYSICAL THERAPY | Facility: CLINIC | Age: 47
End: 2022-09-13
Payer: COMMERCIAL

## 2022-09-13 DIAGNOSIS — M84.48XA PATHOLOGICAL FRACTURE OF CERVICAL VERTEBRA, INITIAL ENCOUNTER: ICD-10-CM

## 2022-09-13 DIAGNOSIS — S12.301A CLOSED NONDISPLACED FRACTURE OF FOURTH CERVICAL VERTEBRA, UNSPECIFIED FRACTURE MORPHOLOGY, INITIAL ENCOUNTER (HCC): Primary | ICD-10-CM

## 2022-09-13 PROCEDURE — 97112 NEUROMUSCULAR REEDUCATION: CPT | Performed by: PHYSICAL THERAPIST

## 2022-09-13 PROCEDURE — 97110 THERAPEUTIC EXERCISES: CPT | Performed by: PHYSICAL THERAPIST

## 2022-09-13 PROCEDURE — 97140 MANUAL THERAPY 1/> REGIONS: CPT | Performed by: PHYSICAL THERAPIST

## 2022-09-13 NOTE — PROGRESS NOTES
Daily Note     Today's date: 2022  Patient name: Tres Daily  : 1975  MRN: [id number]  Referring provider: Art Cohn MD  Dx:   Encounter Diagnosis     ICD-10-CM    1  Closed nondisplaced fracture of fourth cervical vertebra, unspecified fracture morphology, initial encounter ([facility] )  S12 301A    2  Pathological fracture of cervical vertebra, initial encounter  M84 48XA        Start Time: 1115  Stop Time: 1200  Total time in clinic (min): 45 minutes    Subjective: Pt with no new concerns noted related to the cervical region  Pt had first acupuncture session yesterday and had no adverse reaction, but has noted no significant change thus far  Objective: See treatment diary below      Assessment: Tolerated treatment well  Continued with current POC with good tolerance from the patient  Able to progress exercises this visit without increase in symptoms  Continue to progress as tolerated  Patient would benefit from continued PT      Plan: Continue per plan of care  Precautions:  Active Breast CA w/ Metastasis to Spine, HTN      Manuals    STM B/L UT, LS, Rhomboids  KCB BNH KCB  KCB KCB KCB KCB                         Neuro Re-Ed           C/S Retract  20x 20x 20x  20x  20x 20x  20x   Scap Retract            TB B/L ER            TB T 20x GTB 20x GTB 30x GTB  20x RTB 20x RTB 20x RTB 20x GTB                         Education      foto      Ther Ex           TB Row/Ext  30x GTB 30x GTB 30x BTB  20x RTB 20x GTB 30x GTB 30x GTB   T/S Ext  20x 20x 20x  20x 20x 20x  20x   T/S Rotation           Shrugs/Rolls 30x 3# 30x 4# 30x 4#  30x 2# 30x 2# 30x 3# 30x 3#   Bicep Curls 30x 3# 30x 4# 30x 4#  30x 2# 30x 2# 30x 3# 30x 3#   DB Rows  30x 3# 30x 4# 30x 4#  30x 2# 30x 2# 30x 3# 30x 3#   UT Stretch  10x 10" 10x 10"   10x 10" 10x 10" 10x 10" 10x 10"   C/S Isometrics 20x3" ea dir  20x3" ea dir  20x3" ea dir  20x3" ea dir 20x3" ea dir 20x3" ea dir 20x3" ea dir Shoulder Flex/Scap 2x10 2# 3x10 3# 3x10 3#   2x10 2# 2x10 2# 2x10 2#   Towel Rotation Stretch  10x5" 10x5" 10x5"    10x 5" 10x5"               Ther Activity                                 Gait Training                                 Modalities

## 2022-09-15 ENCOUNTER — OFFICE VISIT (OUTPATIENT)
Dept: PHYSICAL THERAPY | Facility: CLINIC | Age: 47
End: 2022-09-15
Payer: COMMERCIAL

## 2022-09-15 DIAGNOSIS — S12.301A CLOSED NONDISPLACED FRACTURE OF FOURTH CERVICAL VERTEBRA, UNSPECIFIED FRACTURE MORPHOLOGY, INITIAL ENCOUNTER (HCC): Primary | ICD-10-CM

## 2022-09-15 DIAGNOSIS — M84.48XA PATHOLOGICAL FRACTURE OF CERVICAL VERTEBRA, INITIAL ENCOUNTER: ICD-10-CM

## 2022-09-15 PROCEDURE — 97110 THERAPEUTIC EXERCISES: CPT | Performed by: PHYSICAL THERAPIST

## 2022-09-15 PROCEDURE — 97112 NEUROMUSCULAR REEDUCATION: CPT | Performed by: PHYSICAL THERAPIST

## 2022-09-15 PROCEDURE — 97140 MANUAL THERAPY 1/> REGIONS: CPT | Performed by: PHYSICAL THERAPIST

## 2022-09-15 NOTE — PROGRESS NOTES
Daily Note     Today's date: 9/15/2022  Patient name: Kasia Jurado  : 1975  MRN: [id number]  Referring provider: Teresita Maldonado MD  Dx:   Encounter Diagnosis     ICD-10-CM    1  Closed nondisplaced fracture of fourth cervical vertebra, unspecified fracture morphology, [facility]  S12 301A    2  Pathological fracture of cervical vertebra, initial encounter  M84 48XA        Start Time: 1500  Stop Time: 1545  Total time in clinic (min): 45 minutes    Subjective: Pt with no new concerns noted at this time  Objective: See treatment diary below      Assessment: Tolerated treatment well  Continued with current POC with good tolerance from the patient  Added additional shoulder strengthening exercises to POC with good tolerance from the patient  No pain reported with exercises  Continue to progress as tolerated  Patient would benefit from continued PT      Plan: Continue per plan of care  Precautions:  Active Breast CA w/ Metastasis to Spine, HTN      Manuals 9/1 9/8 9/13 9/15  8/23 8/25 8/30   STM B/L UT, LS, Rhomboids  KCB BNH KCB BN  KCB KCB KCB                         Neuro Re-Ed           C/S Retract  20x 20x 20x 20x  20x 20x  20x   Scap Retract            TB B/L ER            TB T 20x GTB 20x GTB 30x GTB 30x GTB  20x RTB 20x RTB 20x GTB                         Education            Ther Ex           TB Row/Ext  30x GTB 30x GTB 30x BTB 30x BTB  20x GTB 30x GTB 30x GTB   T/S Ext  20x 20x 20x 20x   20x 20x  20x   T/S Rotation           Shrugs/Rolls 30x 3# 30x 4# 30x 4# 30x 5#  30x 2# 30x 3# 30x 3#   Bicep Curls 30x 3# 30x 4# 30x 4# 30x 5#  30x 2# 30x 3# 30x 3#   DB Rows  30x 3# 30x 4# 30x 4# 30x 5#  30x 2# 30x 3# 30x 3#   UT Stretch  10x 10" 10x 10" 10x 10" 10x 10"  10x 10" 10x 10" 10x 10"   C/S Isometrics 20x3" ea dir  20x3" ea dir  20x3" ea dir 20x3" ea dir  20x3" ea dir 20x3" ea dir 20x3" ea dir    Shoulder Flex/Scap 2x10 2# 3x10 3# 3x10 3# 3x10 3#  2x10 2# 2x10 2# 2x10 2# Towel Rotation Stretch  10x5" 10x5" 10x5" 10x5"   10x 5" 10x5"    Shoulder Abd    2x10 2#       OH Shoulder Press     2x10 2#                  Ther Activity                                 Gait Training                                 Modalities

## 2022-09-20 ENCOUNTER — PATIENT OUTREACH (OUTPATIENT)
Dept: HEMATOLOGY ONCOLOGY | Facility: CLINIC | Age: 47
End: 2022-09-20

## 2022-09-20 ENCOUNTER — OFFICE VISIT (OUTPATIENT)
Dept: PHYSICAL THERAPY | Facility: CLINIC | Age: 47
End: 2022-09-20
Payer: COMMERCIAL

## 2022-09-20 DIAGNOSIS — R60.0 LEG EDEMA: ICD-10-CM

## 2022-09-20 DIAGNOSIS — R21 RASH: ICD-10-CM

## 2022-09-20 DIAGNOSIS — M84.48XA PATHOLOGICAL FRACTURE OF CERVICAL VERTEBRA, INITIAL ENCOUNTER: ICD-10-CM

## 2022-09-20 DIAGNOSIS — S12.301A CLOSED NONDISPLACED FRACTURE OF FOURTH CERVICAL VERTEBRA, UNSPECIFIED FRACTURE MORPHOLOGY, INITIAL ENCOUNTER (HCC): Primary | ICD-10-CM

## 2022-09-20 DIAGNOSIS — I10 PRIMARY HYPERTENSION: ICD-10-CM

## 2022-09-20 PROCEDURE — 97140 MANUAL THERAPY 1/> REGIONS: CPT | Performed by: PHYSICAL THERAPIST

## 2022-09-20 PROCEDURE — 97110 THERAPEUTIC EXERCISES: CPT | Performed by: PHYSICAL THERAPIST

## 2022-09-20 PROCEDURE — 97112 NEUROMUSCULAR REEDUCATION: CPT | Performed by: PHYSICAL THERAPIST

## 2022-09-20 RX ORDER — NYSTATIN 100000 [USP'U]/G
POWDER TOPICAL 3 TIMES DAILY
Qty: 15 G | Refills: 0 | Status: SHIPPED | OUTPATIENT
Start: 2022-09-20

## 2022-09-20 RX ORDER — FUROSEMIDE 40 MG/1
40 TABLET ORAL DAILY
Qty: 30 TABLET | Refills: 0 | Status: SHIPPED | OUTPATIENT
Start: 2022-09-20 | End: 2022-10-24 | Stop reason: SDUPTHER

## 2022-09-20 NOTE — PROGRESS NOTES
Daily Note     Today's date: 2022  Patient name: Lizzy Bruner  : 1975  MRN: [id number]  Referring provider: Jolynn Jauregui MD  Dx:   Encounter Diagnosis     ICD-10-CM    1  Closed nondisplaced fracture of fourth cervical vertebra, unspecified fracture morphology, [facility]  S12 301A    2  Pathological fracture of cervical vertebra, initial encounter  M84 48XA        Start Time: 1115  Stop Time: 1200  Total time in clinic (min): 45 minutes    Subjective: Pt reports no significant increase in symptoms with progressions made during previous session  No new concerns noted at this tme  Objective: See treatment diary below      Assessment: Tolerated treatment well  Continued with current POC with good tolerance from the patient  No pain reported with exercises  Occasional cuing required for proper exercise technique  Continue to progress as tolerated  Patient would benefit from continued PT      Plan: Continue per plan of care  Precautions:  Active Breast CA w/ Metastasis to Spine, HTN      Manuals 9/1 9/8 9/13 9/15 9/20  8/25 8/30   STM B/L UT, LS, Rhomboids  KCB BNH KCB BNH KCB  KCB KCB                         Neuro Re-Ed           C/S Retract  20x 20x 20x 20x 20x  20x  20x   Scap Retract            TB B/L ER            TB T 20x GTB 20x GTB 30x GTB 30x GTB 30x GTB  20x RTB 20x GTB                         Education            Ther Ex           TB Row/Ext  30x GTB 30x GTB 30x BTB 30x BTB 30x BTB  30x GTB 30x GTB   T/S Ext  20x 20x 20x 20x  20x  20x  20x   T/S Rotation           Shrugs/Rolls 30x 3# 30x 4# 30x 4# 30x 5# 30x 5#  30x 3# 30x 3#   Bicep Curls 30x 3# 30x 4# 30x 4# 30x 5# 30x 5#  30x 3# 30x 3#   DB Rows  30x 3# 30x 4# 30x 4# 30x 5# 30x 5#  30x 3# 30x 3#   UT Stretch  10x 10" 10x 10" 10x 10" 10x 10" 10x 10"  10x 10" 10x 10"   C/S Isometrics 20x3" ea dir  20x3" ea dir  20x3" ea dir 20x3" ea dir   20x3" ea dir 20x3" ea dir    Shoulder Flex/Scap 2x10 2# 3x10 3# 3x10 3# 3x10 3# 3x10 3#  2x10 2# 2x10 2#   Towel Rotation Stretch  10x5" 10x5" 10x5" 10x5" 10x 5"  10x 5" 10x5"    Shoulder Abd    2x10 2# 2x10 2#      OH Shoulder Press     2x10 2# 2x10 2#                 Ther Activity                                 Gait Training                                 Modalities

## 2022-09-22 ENCOUNTER — OFFICE VISIT (OUTPATIENT)
Dept: PHYSICAL THERAPY | Facility: CLINIC | Age: 47
End: 2022-09-22
Payer: COMMERCIAL

## 2022-09-22 DIAGNOSIS — S12.301A CLOSED NONDISPLACED FRACTURE OF FOURTH CERVICAL VERTEBRA, UNSPECIFIED FRACTURE MORPHOLOGY, INITIAL ENCOUNTER (HCC): Primary | ICD-10-CM

## 2022-09-22 DIAGNOSIS — M84.48XA PATHOLOGICAL FRACTURE OF CERVICAL VERTEBRA, INITIAL ENCOUNTER: ICD-10-CM

## 2022-09-22 PROCEDURE — 97140 MANUAL THERAPY 1/> REGIONS: CPT | Performed by: PHYSICAL THERAPIST

## 2022-09-22 PROCEDURE — 97110 THERAPEUTIC EXERCISES: CPT | Performed by: PHYSICAL THERAPIST

## 2022-09-22 PROCEDURE — 97112 NEUROMUSCULAR REEDUCATION: CPT | Performed by: PHYSICAL THERAPIST

## 2022-09-22 NOTE — PROGRESS NOTES
Daily Note     Today's date: 2022  Patient name: Lizzy Bruner  : 1975  MRN: [id number]  Referring provider: Jolynn Jauregui MD  Dx:   Encounter Diagnosis     ICD-10-CM    1  Closed nondisplaced fracture of fourth cervical vertebra, unspecified fracture morphology, initial [facility] ([facility] )  S12 301A    2  Pathological fracture of cervical vertebra, initial encounter  M84 48XA        Start Time: 1445  Stop Time: 1525  Total time in clinic (min): 40 minutes    Subjective: Pt with no new concerns noted at this time  Pt continues to attend acupuncture 2x/wk and noted slight improvement thus far  Objective: See treatment diary below      Assessment: Tolerated treatment well  Continued with current POC with good tolerance from the patient  No pain reported with exercises  Occasional cuing required for proper exercise technique  Continue to progress as tolerated  Patient would benefit from continued PT      Plan: Continue per plan of care  Precautions:  Active Breast CA w/ Metastasis to Spine, HTN      Manuals 9/1 9/8 9/13 9/15 9/20 9/22  8/30   STM B/L UT, LS, Rhomboids  KCB BNH KCB BNH KCB KCB   KCB                         Neuro Re-Ed           C/S Retract  20x 20x 20x 20x 20x 20x  20x   Scap Retract            TB B/L ER            TB T 20x GTB 20x GTB 30x GTB 30x GTB 30x GTB 30x GTB  20x GTB                         Education            Ther Ex           TB Row/Ext  30x GTB 30x GTB 30x BTB 30x BTB 30x BTB 30x BTB  30x GTB   T/S Ext  20x 20x 20x 20x  20x 20x  20x   T/S Rotation           Shrugs/Rolls 30x 3# 30x 4# 30x 4# 30x 5# 30x 5# 30x 5#  30x 3#   Bicep Curls 30x 3# 30x 4# 30x 4# 30x 5# 30x 5# 30x 5#  30x 3#   DB Rows  30x 3# 30x 4# 30x 4# 30x 5# 30x 5# 30x 5#  30x 3#   UT Stretch  10x 10" 10x 10" 10x 10" 10x 10" 10x 10" 10x 10"  10x 10"   C/S Isometrics 20x3" ea dir  20x3" ea dir  20x3" ea dir 20x3" ea dir    20x3" ea dir    Shoulder Flex/Scap 2x10 2# 3x10 3# 3x10 3# 3x10 3# 3x10 3# 3x10 3#  2x10 2#   Towel Rotation Stretch  10x5" 10x5" 10x5" 10x5" 10x 5" 15x 5"  10x5"    Shoulder Abd    2x10 2# 2x10 2# 2x10 2#     OH Shoulder Press     2x10 2# 2x10 2# 2x10 2#                Ther Activity                                 Gait Training                                 Modalities

## 2022-09-27 ENCOUNTER — OFFICE VISIT (OUTPATIENT)
Dept: PHYSICAL THERAPY | Facility: CLINIC | Age: 47
End: 2022-09-27
Payer: COMMERCIAL

## 2022-09-27 ENCOUNTER — APPOINTMENT (OUTPATIENT)
Dept: LAB | Facility: CLINIC | Age: 47
End: 2022-09-27
Payer: COMMERCIAL

## 2022-09-27 DIAGNOSIS — S12.301A CLOSED NONDISPLACED FRACTURE OF FOURTH CERVICAL VERTEBRA, UNSPECIFIED FRACTURE MORPHOLOGY, INITIAL ENCOUNTER (HCC): Primary | ICD-10-CM

## 2022-09-27 DIAGNOSIS — M84.48XA PATHOLOGICAL FRACTURE OF CERVICAL VERTEBRA, INITIAL ENCOUNTER: ICD-10-CM

## 2022-09-27 DIAGNOSIS — C79.51 OSSEOUS METASTASIS (HCC): ICD-10-CM

## 2022-09-27 DIAGNOSIS — T45.1X5A CHEMOTHERAPY-INDUCED NEUTROPENIA (HCC): ICD-10-CM

## 2022-09-27 DIAGNOSIS — C50.811 MALIGNANT NEOPLASM OF OVERLAPPING SITES OF RIGHT BREAST IN FEMALE, ESTROGEN RECEPTOR NEGATIVE (HCC): ICD-10-CM

## 2022-09-27 DIAGNOSIS — Z17.1 MALIGNANT NEOPLASM OF OVERLAPPING SITES OF RIGHT BREAST IN FEMALE, ESTROGEN RECEPTOR NEGATIVE (HCC): ICD-10-CM

## 2022-09-27 DIAGNOSIS — D70.1 CHEMOTHERAPY-INDUCED NEUTROPENIA (HCC): ICD-10-CM

## 2022-09-27 LAB
ALBUMIN SERPL BCP-MCNC: 4.1 G/DL (ref 3.5–5)
ALP SERPL-CCNC: 53 U/L (ref 46–116)
ALT SERPL W P-5'-P-CCNC: 31 U/L (ref 12–78)
ANION GAP SERPL CALCULATED.3IONS-SCNC: 5 MMOL/L (ref 4–13)
AST SERPL W P-5'-P-CCNC: 14 U/L (ref 5–45)
BILIRUB SERPL-MCNC: 1.35 MG/DL (ref 0.2–1)
BUN SERPL-MCNC: 13 MG/DL (ref 5–25)
CALCIUM SERPL-MCNC: 10.3 MG/DL (ref 8.3–10.1)
CHLORIDE SERPL-SCNC: 107 MMOL/L (ref 96–108)
CO2 SERPL-SCNC: 29 MMOL/L (ref 21–32)
CREAT SERPL-MCNC: 0.79 MG/DL (ref 0.6–1.3)
GFR SERPL CREATININE-BSD FRML MDRD: 88 ML/MIN/1.73SQ M
GLUCOSE SERPL-MCNC: 80 MG/DL (ref 65–140)
POTASSIUM SERPL-SCNC: 4.2 MMOL/L (ref 3.5–5.3)
PROT SERPL-MCNC: 7.9 G/DL (ref 6.4–8.4)
SODIUM SERPL-SCNC: 141 MMOL/L (ref 135–147)

## 2022-09-27 PROCEDURE — 97110 THERAPEUTIC EXERCISES: CPT | Performed by: PHYSICAL THERAPIST

## 2022-09-27 PROCEDURE — 97112 NEUROMUSCULAR REEDUCATION: CPT | Performed by: PHYSICAL THERAPIST

## 2022-09-27 PROCEDURE — 97140 MANUAL THERAPY 1/> REGIONS: CPT | Performed by: PHYSICAL THERAPIST

## 2022-09-27 PROCEDURE — 80053 COMPREHEN METABOLIC PANEL: CPT

## 2022-09-27 PROCEDURE — 36415 COLL VENOUS BLD VENIPUNCTURE: CPT

## 2022-09-27 PROCEDURE — 86300 IMMUNOASSAY TUMOR CA 15-3: CPT

## 2022-09-27 NOTE — PROGRESS NOTES
Daily Note     Today's date: 2022  Patient name: Berta Duverney  : 1975  MRN: [id number]  Referring provider: Rajwinder Iverson MD  Dx:   Encounter Diagnosis     ICD-10-CM    1  Closed nondisplaced fracture of fourth cervical vertebra, unspecified fracture morphology, [facility]  S12 301A    2  Pathological fracture of cervical vertebra, initial encounter  M84 48XA        Start Time: 1115  Stop Time: 1200  Total time in clinic (min): 45 minutes    Subjective: Pt with no new concerns noted at this time  Objective: See treatment diary below      Assessment: Tolerated treatment well  Progressed POC with good tolerance from the patient  No pain reported with exercises  Occasional cuing required for proper exercise technique  Continue to progress as tolerated  Patient would benefit from continued PT      Plan: Continue per plan of care  Precautions:  Active Breast CA w/ Metastasis to Spine, HTN      Manuals 9/1 9/8 9/13 9/15 9/20 9/22 9/27    STM B/L UT, LS, Rhomboids  KCB BN KCB BN KCB KCB  KCB                          Neuro Re-Ed           C/S Retract  20x 20x 20x 20x 20x 20x 20x    Scap Retract            TB B/L ER            TB T 20x GTB 20x GTB 30x GTB 30x GTB 30x GTB 30x GTB 30x GTB    Scap Protract at [employer]       20x               Education            Ther Ex           TB Row/Ext  30x GTB 30x GTB 30x BTB 30x BTB 30x BTB 30x BTB 30x BTB    T/S Ext  20x 20x 20x 20x  20x 20x 20x    T/S Rotation           Shrugs/Rolls 30x 3# 30x 4# 30x 4# 30x 5# 30x 5# 30x 5# 30x 6#    Bicep Curls 30x 3# 30x 4# 30x 4# 30x 5# 30x 5# 30x 5# 30x 6#    DB Rows  30x 3# 30x 4# 30x 4# 30x 5# 30x 5# 30x 5# 30x 6#    UT Stretch  10x 10" 10x 10" 10x 10" 10x 10" 10x 10" 10x 10" 10x10"    C/S Isometrics 20x3" ea dir  20x3" ea dir  20x3" ea dir 20x3" ea dir       Shoulder Flex/Scap 2x10 2# 3x10 3# 3x10 3# 3x10 3# 3x10 3# 3x10 3# 3x10 3#    Towel Rotation Stretch  10x5" 10x5" 10x5" 10x5" 10x 5" 15x 5" 15x 5"    Shoulder Abd    2x10 2# 2x10 2# 2x10 2# 2x10 2#    OH Shoulder Press     2x10 2# 2x10 2# 2x10 2# 3x10 2#               Ther Activity                                 Gait Training                                 Modalities

## 2022-09-28 ENCOUNTER — OFFICE VISIT (OUTPATIENT)
Dept: HEMATOLOGY ONCOLOGY | Facility: CLINIC | Age: 47
End: 2022-09-28
Payer: COMMERCIAL

## 2022-09-28 ENCOUNTER — DOCUMENTATION (OUTPATIENT)
Dept: OTHER | Facility: HOSPITAL | Age: 47
End: 2022-09-28

## 2022-09-28 VITALS
HEART RATE: 80 BPM | DIASTOLIC BLOOD PRESSURE: 82 MMHG | HEIGHT: 64 IN | RESPIRATION RATE: 18 BRPM | TEMPERATURE: 96.9 F | SYSTOLIC BLOOD PRESSURE: 136 MMHG | WEIGHT: 161 LBS | OXYGEN SATURATION: 97 % | BODY MASS INDEX: 27.49 KG/M2

## 2022-09-28 DIAGNOSIS — C50.811 MALIGNANT NEOPLASM OF OVERLAPPING SITES OF RIGHT BREAST IN FEMALE, ESTROGEN RECEPTOR NEGATIVE (HCC): ICD-10-CM

## 2022-09-28 DIAGNOSIS — T45.1X5A CHEMOTHERAPY-INDUCED NEUTROPENIA (HCC): ICD-10-CM

## 2022-09-28 DIAGNOSIS — D70.1 CHEMOTHERAPY-INDUCED NEUTROPENIA (HCC): ICD-10-CM

## 2022-09-28 DIAGNOSIS — C79.51 OSSEOUS METASTASIS (HCC): Primary | ICD-10-CM

## 2022-09-28 DIAGNOSIS — Z17.1 MALIGNANT NEOPLASM OF OVERLAPPING SITES OF RIGHT BREAST IN FEMALE, ESTROGEN RECEPTOR NEGATIVE (HCC): ICD-10-CM

## 2022-09-28 LAB — CANCER AG27-29 SERPL-ACNC: 25.5 U/ML (ref 0–42.3)

## 2022-09-28 PROCEDURE — 99214 OFFICE O/P EST MOD 30 MIN: CPT | Performed by: INTERNAL MEDICINE

## 2022-09-28 RX ORDER — SODIUM CHLORIDE 9 MG/ML
20 INJECTION, SOLUTION INTRAVENOUS ONCE
OUTPATIENT
Start: 2023-02-02

## 2022-09-28 RX ORDER — SODIUM CHLORIDE 9 MG/ML
20 INJECTION, SOLUTION INTRAVENOUS ONCE
OUTPATIENT
Start: 2023-01-12

## 2022-09-28 RX ORDER — SODIUM CHLORIDE 9 MG/ML
20 INJECTION, SOLUTION INTRAVENOUS ONCE
OUTPATIENT
Start: 2022-12-22

## 2022-09-28 RX ORDER — SODIUM CHLORIDE 9 MG/ML
20 INJECTION, SOLUTION INTRAVENOUS ONCE
OUTPATIENT
Start: 2022-12-01

## 2022-09-28 NOTE — PROGRESS NOTES
Pt was seen by Dr Minda Burgos for her 3 month follow up  Pt is on f/u on NRG-  Pt was unblinded on 6/8/22 and was found that she was on the placebo arm  Pt had her 30 day safety visit on 7/19  Pt is doing well last chemo was given on 9/1/22  AE's and conmeds were reviewed with pt  Pt is doing well overall she has less pain and is eating better  Pt is agreeable to continue on study and she expressed her interest to see if there were new studies she was eligible for in the future  Pt had CT scan on 8/9/22   Per Dr Minda Burgos there is no evidence of progressive disease  Pt will see Dr Minda Burgos in 3 months for a follow up  Pt will contact me with any study related questions

## 2022-09-28 NOTE — PROGRESS NOTES
Hematology / Oncology Outpatient Follow Up Note    Piper Edmond 49 y o  female Earna Mail EZM:[id number]         Date:  9/28/2022    Assessment / Plan:  A 54-year-old premenopausal woman with metastatic breast cancer, grade 2, ER negative, MT negative, HER2 3+ disease  Lianna Guan has large fungating mass in her right breast, palpable right axillary adenopathy as well as diffuse osseous metastasis with C4 stenosis   She completed palliative radiation therapy to the spine   She was treated with taxane trastuzumab and pertuzumab x6 cycles, resulting in partial response  She is currently on trastuzumab and pertuzumab with excellent tolerance  She already have partial response  Based on physical examination, has no evidence of progressive disease  I recommended her to continue trastuzumab and pertuzumab every 3 weeks  She also will continue Zometa every 3 months  I will see her again in 3 months with CT scan of chest abdomen pelvis, CBC, CMP and CA 27, 29  I also would like to obtain echocardiogram for periodic monitoring    She is in agreement with my recommendations        Subjective:      HPI:  A 54-year-old premenopausal woman who was recently hospitalized because of the progressive neck pain which radiated to the bilateral upper extremity as well as enlarging right breast mass  Lianna Guan was found to have fungating right breast mass   CT scan of chest abdomen pelvis showed diffuse bony metastasis, especially with C4 stenosis   In addition, CT scan showed 6 9 cm of right breast mass   She had right breast biopsy which showed invasive ductal carcinoma, grade 2, ER negative, MT negative, HER2 3+ disease   Her tumor marker were find to be mildly elevated   CT of the head was negative for brain metastasis   She is currently on palliative radiation to her neck, shoulder as well as lower back   She is going to complete palliative radiation therapy in early next week   She presents today with her mother and fiancee to discuss medical treatment for metastatic breast cancer   Her pain is reasonably controlled with narcotics medications   She has no recent weight loss   She denied any respiratory symptoms   She has no significant past medical history   Therefore, prior to this hospitalization, she was not on any medications   She denied family history of breast or ovarian cancer  Sheree Canales is a lifetime never smoker   Her performance status is 1/4 on ECOG scale            Interval History:  A 40-year-old premenopausal woman with metastatic breast cancer, grade 2, ER negative, UT negative, HER2 3+ disease   She has large fungating mass in her right breast, palpable right axillary adenopathy as well as diffuse osseous metastasis with C4 stenosis   She completed palliative radiation therapy to the spine   Subsequently, she was treated with Taxotere, trastuzumab and pertuzumab with without atezolizumab based on a clinical trial   She received 2 cycle of treatment   She came off the study after 2 cycles of treatment due to the 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   She had infusion reaction with Taxotere   Therefore, she was subsequently treated with paclitaxel, trastuzumab and pertuzumab  She completed induction treatment in August 2022  She is currently on maintenance therapy with trastuzumab and pertuzumab   She already have partial response, clinically as well as radiographically  She presents today for follow-up  He does not require neck brace anymore  She is fully ambulatory  Her pain is well controlled with less narcotics medications  Her weight is stable  She has no respiratory symptoms    Her performance status is 0 to 1/4 on ECOG scale         Objective:      Primary Diagnosis:     Metastatic breast cancer, grade 2, ER negative, UT negative, HER2 3+ disease   Diagnosed in March 2022      Cancer Staging:  Cancer Staging  No matching staging information was found for the patient         Previous Hematologic/ Oncologic Treatment:       1  THP x3 cycles, completed in early June 2022     2  Weekly paclitaxel, try weekly pertuzumab trastuzumab from July 2022 through August 2022      Current Hematologic/ Oncologic Treatment:       Maintenance therapy with trastuzumab and pertuzumab since September 2022      Disease Status:      Clinical partial response      Test Results:     Pathology:     Right breast biopsy showed invasive ductal carcinoma, grade 2 ER negative, NJ negative, HER2 3+ disease      Radiology:     CT scan of chest abdomen pelvis in August 2022 showed further diminished showed right breast mass and right axillary adenopathy      Bone scan showed widespread osseous metastasis      Laboratory:     See below for CBC and CMP  CA 27, 29 was 33 in August 2022      Physical Exam:        General Appearance:    Alert, oriented          Eyes:    PERRL   Ears:    Normal external ear canals, both ears   Nose:   Nares normal, septum midline   Throat:   Mucosa moist  Pharynx without injection  Neck:   Supple         Lungs:     Clear to auscultation bilaterally   Chest Wall:    No tenderness or deformity    Heart:    Regular rate and rhythm         Abdomen:     Soft, non-tender, bowel sounds +, no organomegaly               Extremities:   Extremities no cyanosis or edema         Skin:   no rash or icterus  Lymph nodes:   Cervical, supraclavicular, and axillary nodes normal   Neurologic:   CNII-XII intact, normal strength, sensation and reflexes     Throughout             Breast exam:  3 x 4  cm of palpable mass at in the upper quadrant of her right breast with 2 cm of central ulceration   Previous right axillary adenopathy is no longer palpable   Left breast exam is negative  ROS: Review of Systems   All other systems reviewed and are negative  Imaging: No results found        Labs:   Lab Results   Component Value Date    WBC 2 22 (L) 08/29/2022    HGB 12 5 08/29/2022    HCT 40 0 08/29/2022     (H) 08/29/2022     08/29/2022     Lab Results   Component Value Date    K 4 2 09/27/2022     09/27/2022    CO2 29 09/27/2022    BUN 13 09/27/2022    CREATININE 0 79 09/27/2022    GLUF 92 06/06/2022    CALCIUM 10 3 (H) 09/27/2022    CORRECTEDCA 10 4 (H) 06/13/2022    AST 14 09/27/2022    ALT 31 09/27/2022    ALKPHOS 53 09/27/2022    EGFR 88 09/27/2022         Lab Results   Component Value Date    CEA 6 5 (H) 03/29/2022         Lab Results   Component Value Date    IRON 83 05/16/2022    TIBC 206 (L) 05/16/2022    FERRITIN 191 05/27/2022         Current Medications: Reviewed  Allergies: Reviewed  PMH/FH/SH:  Reviewed      Vital Sign:    Body surface area is 1 78 meters squared      Wt Readings from Last 3 Encounters:   09/28/22 73 kg (161 lb)   09/09/22 75 3 kg (166 lb)   09/08/22 74 6 kg (164 lb 8 oz)        Temp Readings from Last 3 Encounters:   09/28/22 (!) 96 9 °F (36 1 °C) (Tympanic)   09/09/22 97 6 °F (36 4 °C) (Temporal)   09/08/22 97 6 °F (36 4 °C) (Temporal)        BP Readings from Last 3 Encounters:   09/28/22 136/82   09/09/22 118/76   09/08/22 124/70         Pulse Readings from Last 3 Encounters:   09/28/22 80   09/09/22 100   09/08/22 93     @LASTSAO2(3)@

## 2022-09-29 ENCOUNTER — HOSPITAL ENCOUNTER (OUTPATIENT)
Dept: INFUSION CENTER | Facility: CLINIC | Age: 47
Discharge: HOME | End: 2022-09-29
Payer: COMMERCIAL

## 2022-09-29 ENCOUNTER — OFFICE VISIT (OUTPATIENT)
Dept: PHYSICAL THERAPY | Facility: CLINIC | Age: 47
End: 2022-09-29
Payer: COMMERCIAL

## 2022-09-29 VITALS
OXYGEN SATURATION: 98 % | WEIGHT: 163 LBS | TEMPERATURE: 97.8 F | BODY MASS INDEX: 27.98 KG/M2 | RESPIRATION RATE: 18 BRPM | HEART RATE: 91 BPM | DIASTOLIC BLOOD PRESSURE: 76 MMHG | SYSTOLIC BLOOD PRESSURE: 121 MMHG

## 2022-09-29 DIAGNOSIS — T45.1X5A CHEMOTHERAPY-INDUCED NEUTROPENIA (HCC): ICD-10-CM

## 2022-09-29 DIAGNOSIS — C50.811 MALIGNANT NEOPLASM OF OVERLAPPING SITES OF RIGHT BREAST IN FEMALE, ESTROGEN RECEPTOR NEGATIVE (HCC): ICD-10-CM

## 2022-09-29 DIAGNOSIS — S12.301A CLOSED NONDISPLACED FRACTURE OF FOURTH CERVICAL VERTEBRA, UNSPECIFIED FRACTURE MORPHOLOGY, INITIAL ENCOUNTER (HCC): Primary | ICD-10-CM

## 2022-09-29 DIAGNOSIS — Z17.1 MALIGNANT NEOPLASM OF OVERLAPPING SITES OF RIGHT BREAST IN FEMALE, ESTROGEN RECEPTOR NEGATIVE (HCC): ICD-10-CM

## 2022-09-29 DIAGNOSIS — M84.48XA PATHOLOGICAL FRACTURE OF CERVICAL VERTEBRA, INITIAL ENCOUNTER: ICD-10-CM

## 2022-09-29 DIAGNOSIS — C79.51 OSSEOUS METASTASIS (HCC): Primary | ICD-10-CM

## 2022-09-29 DIAGNOSIS — D70.1 CHEMOTHERAPY-INDUCED NEUTROPENIA (HCC): ICD-10-CM

## 2022-09-29 PROCEDURE — 96417 CHEMO IV INFUS EACH ADDL SEQ: CPT

## 2022-09-29 PROCEDURE — 96413 CHEMO IV INFUSION 1 HR: CPT

## 2022-09-29 PROCEDURE — 97140 MANUAL THERAPY 1/> REGIONS: CPT

## 2022-09-29 PROCEDURE — 97110 THERAPEUTIC EXERCISES: CPT

## 2022-09-29 PROCEDURE — 97112 NEUROMUSCULAR REEDUCATION: CPT

## 2022-09-29 RX ORDER — SODIUM CHLORIDE 9 MG/ML
20 INJECTION, SOLUTION INTRAVENOUS ONCE
Status: COMPLETED | OUTPATIENT
Start: 2022-09-29 | End: 2022-09-29

## 2022-09-29 RX ADMIN — SODIUM CHLORIDE 20 ML/HR: 0.9 INJECTION, SOLUTION INTRAVENOUS at 08:53

## 2022-09-29 RX ADMIN — PERTUZUMAB 420 MG: 30 INJECTION, SOLUTION, CONCENTRATE INTRAVENOUS at 08:53

## 2022-09-29 RX ADMIN — TRASTUZUMAB 487 MG: 150 INJECTION, POWDER, LYOPHILIZED, FOR SOLUTION INTRAVENOUS at 09:43

## 2022-09-29 NOTE — PROGRESS NOTES
Daily Note     Today's date: 2022  Patient name: Mirza Camarillo  : 1975  MRN: [id number]  Referring provider: Madhavi Reyes MD  Dx:   Encounter Diagnosis     ICD-10-CM    1  Closed nondisplaced fracture of fourth cervical vertebra, unspecified fracture morphology, [facility]  S12 301A    2  Pathological fracture of cervical vertebra, initial encounter  M84 48XA                   Subjective: Patient reports she is doing well today, states she had acupuncture earlier today  Objective: See treatment diary below      Assessment: Soft tissue restrictions in UT L>R, pt fatigued with tx but reports overall improvements  Plan: Continue per plan of care  Precautions:  Active Breast CA w/ Metastasis to Spine, HTN      Manuals 9/13 9/15 9/20 9/22 9/27 9/29   STM B/L UT, LS, Rhomboids  KCB BNH KCB KCB  KCB HA                     Neuro Re-Ed         C/S Retract  20x 20x 20x 20x 20x 20x   Scap Retract          TB B/L ER          TB T 30x GTB 30x GTB 30x GTB 30x GTB 30x GTB    Scap Protract at Wall     20x 20x            Education          Ther Ex         TB Row/Ext  30x BTB 30x BTB 30x BTB 30x BTB 30x BTB 30x BTB   T/S Ext  20x 20x  20x 20x 20x 20x   T/S Rotation         Shrugs/Rolls 30x 4# 30x 5# 30x 5# 30x 5# 30x 6# 30x 6#   Bicep Curls 30x 4# 30x 5# 30x 5# 30x 5# 30x 6# 30x 6#   DB Rows  30x 4# 30x 5# 30x 5# 30x 5# 30x 6# 30x 6#   UT Stretch  10x 10" 10x 10" 10x 10" 10x 10" 10x10" 10x10"   C/S Isometrics 20x3" ea dir 20x3" ea dir       Shoulder Flex/Scap 3x10 3# 3x10 3# 3x10 3# 3x10 3# 3x10 3# 3x10 3#   Towel Rotation Stretch  10x5" 10x5" 10x 5" 15x 5" 15x 5"    Shoulder Abd  2x10 2# 2x10 2# 2x10 2# 2x10 2# 2x10 2#   OH Shoulder Press   2x10 2# 2x10 2# 2x10 2# 3x10 2# 3x10 2#            Ther Activity                           Gait Training                           Modalities

## 2022-09-29 NOTE — PROGRESS NOTES
Patient presents today for treatment with Perjeta and Herceptin  Patient offers no complaints  VSS  Labs from 4/15/2022 reviewed and within parameters to treat  Peripheral IV inserted without incident

## 2022-09-30 ENCOUNTER — CLINICAL SUPPORT (OUTPATIENT)
Dept: RADIATION ONCOLOGY | Facility: HOSPITAL | Age: 47
End: 2022-09-30
Attending: RADIOLOGY
Payer: COMMERCIAL

## 2022-09-30 ENCOUNTER — OFFICE VISIT (OUTPATIENT)
Dept: PALLIATIVE MEDICINE | Facility: CLINIC | Age: 47
End: 2022-09-30
Payer: COMMERCIAL

## 2022-09-30 VITALS
OXYGEN SATURATION: 98 % | DIASTOLIC BLOOD PRESSURE: 82 MMHG | HEART RATE: 90 BPM | TEMPERATURE: 96.9 F | SYSTOLIC BLOOD PRESSURE: 118 MMHG

## 2022-09-30 VITALS
DIASTOLIC BLOOD PRESSURE: 82 MMHG | WEIGHT: 162.6 LBS | SYSTOLIC BLOOD PRESSURE: 130 MMHG | RESPIRATION RATE: 18 BRPM | HEIGHT: 64 IN | TEMPERATURE: 97.4 F | BODY MASS INDEX: 27.76 KG/M2 | HEART RATE: 90 BPM | OXYGEN SATURATION: 97 %

## 2022-09-30 DIAGNOSIS — C79.51 OSSEOUS METASTASIS (HCC): ICD-10-CM

## 2022-09-30 DIAGNOSIS — R53.0 NEOPLASTIC MALIGNANT RELATED FATIGUE: ICD-10-CM

## 2022-09-30 DIAGNOSIS — Z79.899 MEDICAL MARIJUANA USE: ICD-10-CM

## 2022-09-30 DIAGNOSIS — C79.49 METASTASIS TO SPINAL CORD (HCC): Primary | ICD-10-CM

## 2022-09-30 DIAGNOSIS — C79.49 METASTASIS TO SPINAL CORD (HCC): ICD-10-CM

## 2022-09-30 DIAGNOSIS — C50.811 MALIGNANT NEOPLASM OF OVERLAPPING SITES OF RIGHT BREAST IN FEMALE, ESTROGEN RECEPTOR NEGATIVE (HCC): Primary | ICD-10-CM

## 2022-09-30 DIAGNOSIS — G89.3 CANCER ASSOCIATED PAIN: ICD-10-CM

## 2022-09-30 DIAGNOSIS — Z17.1 MALIGNANT NEOPLASM OF OVERLAPPING SITES OF RIGHT BREAST IN FEMALE, ESTROGEN RECEPTOR NEGATIVE (HCC): Primary | ICD-10-CM

## 2022-09-30 DIAGNOSIS — Z51.5 PALLIATIVE CARE PATIENT: ICD-10-CM

## 2022-09-30 DIAGNOSIS — C79.51 OSSEOUS METASTASIS (HCC): Primary | ICD-10-CM

## 2022-09-30 PROCEDURE — G0463 HOSPITAL OUTPT CLINIC VISIT: HCPCS | Performed by: RADIOLOGY

## 2022-09-30 PROCEDURE — 99211 OFF/OP EST MAY X REQ PHY/QHP: CPT | Performed by: RADIOLOGY

## 2022-09-30 PROCEDURE — 99214 OFFICE O/P EST MOD 30 MIN: CPT | Performed by: INTERNAL MEDICINE

## 2022-09-30 PROCEDURE — 99213 OFFICE O/P EST LOW 20 MIN: CPT | Performed by: RADIOLOGY

## 2022-09-30 RX ORDER — OXYCODONE HYDROCHLORIDE 10 MG/1
5-10 TABLET ORAL EVERY 4 HOURS PRN
Qty: 120 TABLET | Refills: 0 | Status: SHIPPED | OUTPATIENT
Start: 2022-09-30

## 2022-09-30 RX ORDER — MORPHINE SULFATE 15 MG/1
15 TABLET, FILM COATED, EXTENDED RELEASE ORAL
Qty: 30 TABLET | Refills: 0 | Status: SHIPPED | OUTPATIENT
Start: 2022-10-07

## 2022-09-30 NOTE — PATIENT INSTRUCTIONS
It was good to see you today  Thank you for coming in  Updating long-acting morphine prescription to reflect taking it just at bedtime  Sending refill for that and for oxycodone to [facility], as there have been issues filling these meds at [facility]  Filling for 10/7/21  For constipation:  Drink PLENTY of water  This is important to keep the gut moving  Some people have success w/ using prunes, prune juice, certain fruits, wheat germ, or fiber gummies  Try a probiotic  This could be yogurt or kefir, or fermented beverages such as kombucha, but probiotics are also available in capsule form  Aim for 10-15 billion colony-forming-units, w/ bacteria such as Lactobacillus / Saccharomyces / Actinomyces  Take Benefiber or Miralax (or Citrucel or Metamucil or Bananatrol, etc) daily  Colace is good for softening hard stools, but does not stimulate the bowel to move things along  You can use senna, 1 to 2 tabs, once or twice daily as needed for constipation  Use as directed on the box/bottle  Senna is also available in a tea ("Smooth Move")  Should that not be enough for your constipation, you can try Dulcolax, Milk of Magnesia, and/or magnesium citrate  Should that not be enough, consider an enema  All of these medications are available over-the-counter  Continue other medications  Good luck returning to work in the coming months! I can fill out the physician portion of a [employer] HC placard; bring the document in and we can [id number] ([facility])  Return in about 2 months  Call us for refills on medications that we supply, as needed  If something changes and you need to come in sooner, please call our office  PRESCRIPTION REFILL REMINDER:  All medication refills should be requested prior to [facility] on Friday  Any refill requests after noon on Friday would be addressed the following Monday

## 2022-09-30 NOTE — PROGRESS NOTES
Anup Abreu 1975 is a 49 y o  female  with metastatic invasive ductal carcinoma, right breast, ER/PA negative, HER2 positive, returns for routine follow-up s/p completion of palliative radiation to cervical, thoracic and lumbar spine on 4/18/22  She was not a surgical candidate and was fitted for a C-collar and brace  Last seen for follow-up on 6/24/22 8/8/22 XR cervical spine  Stable severe C4 compression fracture with vertebra plana configuration  Severe bilateral C3-4 foraminal stenosis  Stable alignment on dynamic imaging  8/9/22 CT chest abd pelvis  Slightly diminished known right breast tumor and right axillary adenopathy  Post radiation changes in the lungs  New 1 0 cm metastatic lesion in the intertrochanteric right femur  Otherwise, no significant change in diffuse widespread osseous metastatic disease  8/12/22 Dr Abelardo Jules  currently on paclitaxel, trastuzumab and pertuzumab with excellent tolerance  She has partial response  Recommended her to continue weekly paclitaxel as well as trastuzumab and pertuzumab for 1 more cycle before she move on to the maintenance treatment with trastuzumab and pertuzumab  Continue with Zometa every 3 months  Discussed the possibility of palliative right mastectomy in the near future since she still have open wound  Follow-up in 2 months  8/12/22 Neurosurgery   4 months clinical F/U with flex/EXT cervical; spine x-rays  The images demonstrate stable T4 fracture and degenerative changes without much dynamic instability on both flexion and extension views compared to previous images  She is weaned off [employer] collar and she is feeling better, she has some throbbing intermittent left lateral neck, left shoulder and left upper arm estimated 4/10, taking baclofen  No numbness, weakness or paresthesia in the upper extremities    Patient also reports stiffness in her neck,  doing physical therapy including range of motion exercise  Gait improved after she removed [employer] collar  Follow-up as needed  9/9/22 Dr Alo Holman  She still has wound issues from her tumor, which are improving  Discussed that studies are variable regarding survival benefit of mastectomy with metastatic disease or even bone only metastatic disease  Since there is still an open wound, can consider mastectomy for palliative reasons  She is meeting with Dr Minda Burgos later this month then decide if she wishes to proceed with palliative mastectomy and possible axillary dissection  9/28/22 Dr Minda Burgos  9/29/22 Infusion  9/29/22 Physical Therapy  9/30/22  Palliative        Oncology History   Malignant neoplasm of overlapping sites of right breast in female, estrogen receptor negative ([facility] [address] )   3/2022 Initial Diagnosis    Malignant neoplasm of overlapping sites of right breast in female, estrogen receptor negative ([facility] [address] )     3/29/2022 Biopsy    Breast, Right, Biopsy:  - Invasive mammary carcinoma of no special type (ductal, not otherwise specified), Fabrizio Grade II (3 + 2 + 1 = 6), spanning at least 6 mm     ER/NE negative, HER2 positive    Procedure  Needle biopsy    Specimen Laterality  Right    TUMOR   Histologic Type  Invasive carcinoma of no special type (ductal)    Histologic Grade (Sebastian Histologic Score)     Glandular (Acinar) / Tubular Differentiation  Score 3    Nuclear Pleomorphism  Score 2    Mitotic Rate  Score 1    Overall Grade  Grade 2 (scores of 6 or 7)    Tumor Size  Greatest dimension of largest invasive focus (Millimeters): 6 mm   Ductal Carcinoma In Situ (DCIS)  Not identified    Lymphovascular Invasion  Not identified         3/31/2022 - 4/18/2022 Radiation    Course: C1    Plan ID Energy Fractions Dose per Fraction (cGy) Dose Correction (cGy) Total Dose Delivered (cGy) Elapsed Days   C1 C7 And ix 10X/6X 7 / 7 300 0 2,100 8   C1 C7 Spine 10X/6X 3 / 10 300 0 900 4   L4 Sacrum 10X 10 / 10 300 0 3,000 13   T1 T6 And ix 10X 7 / 7 300 0 2,100 8   T1 T6 Spine 10X 3 / 10 300 0 900 4      Dr Tami Parry     4/28/2022 -  Chemotherapy    clinical trial with THP with or without atezolizumab     4/28/2022 -  Chemotherapy    pegfilgrastim (Joice Artemio), 6 mg, Subcutaneous, Once, 1 of 1 cycle  pertuzumab (PERJETA) IVPB, 840 mg, Intravenous, Once, 8 of 15 cycles  Administration: 420 mg (6/16/2022), 420 mg (7/7/2022), 420 mg (7/28/2022), 420 mg (8/18/2022), 420 mg (9/8/2022), 420 mg (9/29/2022)  DOCEtaxel (TAXOTERE) chemo infusion, 75 mg/m2, Intravenous, Once, 3 of 3 cycles  Dose modification: 60 mg/m2 (original dose 75 mg/m2, Cycle 3, Reason: Anticipated Tolerance)  Administration: 112 8 mg (6/16/2022)  PACLItaxel (TAXOL) chemo IVPB, 80 mg/m2 = 150 6 mg (100 % of original dose 80 mg/m2), Intravenous, Once, 3 of 3 cycles  Dose modification: 80 mg/m2 (original dose 80 mg/m2, Cycle 4, Reason: Anticipated Tolerance)  Administration: 150 6 mg (7/7/2022), 150 6 mg (7/14/2022), 150 6 mg (7/21/2022), 150 6 mg (7/28/2022), 150 6 mg (8/4/2022), 150 6 mg (8/11/2022), 150 6 mg (8/18/2022), 150 6 mg (8/25/2022), 150 6 mg (9/1/2022)  trastuzumab (HERCEPTIN) chemo infusion, 8 mg/kg, Intravenous, Once, 6 of 13 cycles  Administration: 487 mg (7/28/2022), 487 mg (8/18/2022), 487 mg (9/8/2022), 487 mg (9/29/2022)  INV trastuzumab infusion, 6 mg/kg = 487 mg (100 % of original dose 6 mg/kg), Intravenous, Once, 2 of 2 cycles  Dose modification: 6 mg/kg (original dose 6 mg/kg, Cycle 3, Reason: Other (Must fill in a comment), Comment: investigational drug)  Administration: 487 mg (6/16/2022), 487 mg (7/7/2022)     Osseous metastasis ([facility] [address] )   3/2022 Initial Diagnosis    Osseous metastasis ([facility] [address] )     3/29/2022 Biopsy    Breast, Right, Biopsy:  - Invasive mammary carcinoma of no special type (ductal, not otherwise specified), [place] Grade II (3 + 2 + 1 = 6), spanning at least 6 mm     ER/NE negative, HER2 positive    Procedure  Needle biopsy    Specimen Laterality  Right    TUMOR   Histologic Type  Invasive carcinoma of no special type (ductal)    Histologic Grade (McCrory Histologic Score)     Glandular (Acinar) / Tubular Differentiation  Score 3    Nuclear Pleomorphism  Score 2    Mitotic Rate  Score 1    Overall Grade  Grade 2 (scores of 6 or 7)    Tumor Size  Greatest dimension of largest invasive focus (Millimeters): 6 mm   Ductal Carcinoma In Situ (DCIS)  Not identified    Lymphovascular Invasion  Not identified         3/31/2022 - 4/18/2022 Radiation    Course: C1    Plan ID Energy Fractions Dose per Fraction (cGy) Dose Correction (cGy) Total Dose Delivered (cGy) Elapsed Days   C1 C7 And ix 10X/6X 7 / 7 300 0 2,100 8   C1 C7 Spine 10X/6X 3 / 10 300 0 900 4   L4 Sacrum 10X 10 / 10 300 0 3,000 13   T1 T6 And ix 10X 7 / 7 300 0 2,100 8   T1 T6 Spine 10X 3 / 10 300 0 900 4      Dr Tessa Samuels     4/28/2022 -  Chemotherapy    clinical trial with THP with or without atezolizumab     4/28/2022 -  Chemotherapy    pegfilgrastim (NEULASTA ONPRO), 6 mg, Subcutaneous, Once, 1 of 1 cycle  pertuzumab (PERJETA) IVPB, 840 mg, Intravenous, Once, 8 of 15 cycles  Administration: 420 mg (6/16/2022), 420 mg (7/7/2022), 420 mg (7/28/2022), 420 mg (8/18/2022), 420 mg (9/8/2022), 420 mg (9/29/2022)  DOCEtaxel (TAXOTERE) chemo infusion, 75 mg/m2, Intravenous, Once, 3 of 3 cycles  Dose modification: 60 mg/m2 (original dose 75 mg/m2, Cycle 3, Reason: Anticipated Tolerance)  Administration: 112 8 mg (6/16/2022)  PACLItaxel (TAXOL) chemo IVPB, 80 mg/m2 = 150 6 mg (100 % of original dose 80 mg/m2), Intravenous, Once, 3 of 3 cycles  Dose modification: 80 mg/m2 (original dose 80 mg/m2, Cycle 4, Reason: Anticipated Tolerance)  Administration: 150 6 mg (7/7/2022), 150 6 mg (7/14/2022), 150 6 mg (7/21/2022), 150 6 mg (7/28/2022), 150 6 mg (8/4/2022), 150 6 mg (8/11/2022), 150 6 mg (8/18/2022), 150 6 mg (8/25/2022), 150 6 mg (9/1/2022)  trastuzumab (HERCEPTIN) chemo infusion, 8 mg/kg, Intravenous, Once, 6 of 13 cycles  Administration: 487 mg (7/28/2022), 487 mg (8/18/2022), 487 mg (9/8/2022), 487 mg (9/29/2022)  INV trastuzumab infusion, 6 mg/kg = 487 mg (100 % of original dose 6 mg/kg), Intravenous, Once, 2 of 2 cycles  Dose modification: 6 mg/kg (original dose 6 mg/kg, Cycle 3, Reason: Other (Must fill in a comment), Comment: investigational drug)  Administration: 487 mg (6/16/2022), 487 mg (7/7/2022)         Review of Systems:  Review of Systems   Constitutional: Positive for activity change (improving) and fatigue  Negative for appetite change  HENT: Negative  Eyes:        Blurry vision   Respiratory: Negative  Cardiovascular: Negative  Gastrointestinal: Negative  Endocrine: Negative  Genitourinary: Negative  Musculoskeletal: Positive for arthralgias (left shoulder and down spine, constant) and back pain  Skin: Negative  Allergic/Immunologic: Negative  Neurological: Negative  Hematological: Negative          Clinical Trial: no    Covid Vaccine Status: vaccinated    Health Maintenance   Topic Date Due    Pneumococcal Vaccine: Pediatrics (0 to 5 Years) and At-Risk Patients (6 to 59 Years) (1 - PCV) Never done    HIV Screening  Never done    Annual Physical  Never done    DTaP,Tdap,and Td Vaccines (1 - Tdap) Never done    Cervical Cancer Screening  Never done    Colorectal Cancer Screening  Never done    COVID-19 Vaccine (3 - Pfizer risk series) 08/21/2021    Influenza Vaccine (1) 09/01/2022    PT PLAN OF CARE  10/08/2022    Breast Cancer Screening: Mammogram  05/31/2023 (Originally 8/8/2014)    BMI: Followup Plan  05/05/2023    BMI: Adult  09/29/2023    Depression Screening  09/30/2023    Hepatitis C Screening  Completed    HIB Vaccine  Aged Out    Hepatitis B Vaccine  Aged Out    IPV Vaccine  Aged Out    Hepatitis A Vaccine  Aged Out    Meningococcal ACWY Vaccine  Aged Out    HPV Vaccine  Aged Out     Patient Active Problem List   Diagnosis    Malignant neoplasm of overlapping sites of right breast in female, estrogen receptor negative ([place] [address] )    Osseous metastasis ([place] [address] )    Elevated BP without diagnosis of hypertension    Hypokalemia    Transaminitis    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 (HCC)    Cancer associated pain    Palliative care patient    HTN (hypertension)    Candida infection, esophageal ([place] [address] )    Urinary tract infection without hematuria    Medical marijuana use    Chemotherapy-induced nausea    Neoplastic malignant related fatigue    Xerostomia    Antineoplastic chemotherapy induced anemia    COVID-19    Sepsis (HCC)    Chemotherapy-induced neutropenia (HCC)    Candidal skin infection    Neutropenia associated with infection (HCC)    Constipation     Past Medical History:   Diagnosis Date    Breast cancer ([place] [address] )     Cancer ([place] [address] )     Hypertension      History reviewed  No pertinent surgical history  Family History   Problem Relation Age of Onset    Coronary artery disease Mother     Heart attack Father     Diabetes type II Father      Social History     Socioeconomic History    Marital status: Single     Spouse name: Not on file    Number of children: Not on file    Years of education: Not on file    Highest education level: Not on file   Occupational History    Not on file   Tobacco Use    Smoking status: Never Smoker    Smokeless tobacco: Never Used   Vaping Use    Vaping Use: Never used   Substance and Sexual Activity    Alcohol use: Not Currently     Comment: rare, social     Drug use: Never    Sexual activity: Not on file   Other Topics Concern    Not on file   Social History Narrative    Significant other Kenneth Negus is very supportive       Social Determinants of Health     Financial Resource Strain: Not on file   Food Insecurity: No Food Insecurity    Worried About Running Out of Food in the Last Year: Never true    Miguel of Food in the Last Year: Never true   Transportation Needs: No Transportation Needs    Lack of Transportation (Medical): No    Lack of Transportation (Non-Medical):  No   Physical Activity: Not on file   Stress: Not on file   Social Connections: Not on file   Intimate Partner Violence: Not on file   Housing Stability: Low Risk     Unable to Pay for Housing in the Last Year: No    Number of Places Lived in the Last Year: 1    Unstable Housing in the Last Year: No       Current Outpatient Medications:     DPH-Lido-AlHydr-MgHydr-Simeth (First-Mouthwash BLM) SUSP, , Disp: , Rfl:     furosemide (LASIX) 40 mg tablet, Take 1 tablet (40 mg total) by mouth daily, Disp: 30 tablet, Rfl: 0    [START ON 10/7/2022] morphine (MS CONTIN) 15 mg 12 hr tablet, Take 1 tablet (15 mg total) by mouth daily at bedtime Max Daily Amount: 15 mg Do not start before October 7, 2022 , Disp: 30 tablet, Rfl: 0    nystatin (MYCOSTATIN) 500,000 units/5 mL suspension, Apply 5 mL (500,000 Units total) to the mouth or throat 4 (four) times a day, Disp: 473 mL, Rfl: 1    nystatin (MYCOSTATIN) powder, Apply topically 3 (three) times a day, Disp: 15 g, Rfl: 0    ondansetron (ZOFRAN) 4 mg tablet, Take 1 tablet (4 mg total) by mouth every 8 (eight) hours as needed for nausea or vomiting, Disp: 9 tablet, Rfl: 20    oxyCODONE (ROXICODONE) 10 MG TABS, Take 0 5-1 tablets (5-10 mg total) by mouth every 4 (four) hours as needed for moderate pain or severe pain Max Daily Amount: 60 mg, Disp: 120 tablet, Rfl: 0    pantoprazole (PROTONIX) 40 mg tablet, Take 1 tablet (40 mg total) by mouth daily in the early morning, Disp: 90 tablet, Rfl: 1    polyethylene glycol (MIRALAX) 17 g packet, Take 17 g by mouth daily, Disp: 30 each, Rfl: 0    senna (SENOKOT) 8 6 mg, Take 3 tablets (25 8 mg total) by mouth 2 (two) times a day Hold for loose stool , Disp: 180 tablet, Rfl: 0    al mag oxide-diphenhydramine-lidocaine viscous (MAGIC MOUTHWASH) 1:1:1 suspension, Swish and spit 10 mL every 4 (four) hours as needed for mouth pain or discomfort (Patient not taking: No sig reported), Disp: 500 mL, Rfl: 2    baclofen 10 mg tablet, TAKE 1/2 TABLET BY MOUTH 3 TIMES A DAY AS NEEDED FOR MUSCLE SPASMS (Patient not taking: No sig reported), Disp: 135 tablet, Rfl: 1    lactulose (CEPHULAC) 10 g packet, Take 1 packet (10 g total) by mouth 3 (three) times a day as needed (constipation) (Patient not taking: No sig reported), Disp: 30 each, Rfl: 0    lisinopril (ZESTRIL) 20 mg tablet, Take 1 tablet (20 mg total) by mouth daily (Patient not taking: No sig reported), Disp: 90 tablet, Rfl: 1  No Known Allergies  Vitals:    09/30/22 1352   BP: 130/82   BP Location: Left arm   Patient Position: Sitting   Cuff Size: Standard   Pulse: 90   Resp: 18   Temp: (!) 97 4 °F (36 3 °C)   TempSrc: Temporal   SpO2: 97%   Weight: 73 8 kg (162 lb 9 6 oz)   Height: 5' 4" (1 626 m)      Pain Score:   3

## 2022-09-30 NOTE — PROGRESS NOTES
Follow-up with Palliative and [address] 49 y o  female [phone]    ASSESSMENT & PLAN:  1  Malignant neoplasm of overlapping sites of right breast in female, estrogen receptor negative ([facility] )    2  Osseous metastasis ([facility] )    3  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 ([facility] )    4  Cancer associated pain    5  Neoplastic malignant related fatigue    6  Palliative care patient    7  Medical marijuana use           Patient reports good symptom control on her current regimen  o Continue MSER 15mg QHS  o Continue oxyIR 5-10mg q4h PRN (patient is taking 10mg about 3x/day)  o Continue TD MMJ for pain  o Continue dietary changes or OTC products for OIC  o Counseled on bowel regimen  o Continue Zofran PRN N/V  o Continue baclofen PRN   Patient may continue MMJ for relief of multiple symptoms   Opioid refills sent to [facility] as patient has had challenges getting these filled at [facility]    Patient is hopeful to return to work in the coming months; initially part-time   [facility] will complete the physician portion of a handicapped placard for the patient if they bring in the appropriate form   Patient has received [address] vaccinations   Reviewed notes (Surgical Oncology, Care Coordination, PT), labs (9/27/22 Cr 0 79, alb 4 1, Tbili 1 35; 8/29/22 WBC 2 22, Hb 12 5; 8/15/22 Hb 12 5, WBC 2 29; 8/9/22 CA 27 29 33 0), imaging + procedures (8/9/22 CTCAP showing new met in R femur; 8/8/22 XR C-spine)   Return in about 2 months (around 11/30/2022)   Emotional support provided   Medication safety issues addressed - no driving under the influence of narcotics, watch for adverse effects including AMS and respiratory depression, keep medications stored in a safe/locked environment        Requested Prescriptions     Signed Prescriptions Disp Refills    morphine (MS CONTIN) 15 mg 12 hr tablet 30 tablet 0     Sig: Take 1 tablet (15 mg total) by mouth daily at bedtime Max Daily Amount: 15 mg Do not start before October 7, 2022   oxyCODONE (ROXICODONE) 10 MG TABS 120 tablet 0     Sig: Take 0 5-1 tablets (5-10 mg total) by mouth every 4 (four) hours as needed for moderate pain or severe pain Max Daily Amount: 60 mg       Medications Discontinued During This Encounter   Medication Reason    oxyCODONE (ROXICODONE) 10 MG TABS Reorder    morphine (MS CONTIN) 15 mg 12 hr tablet Reorder       Representatives have queried the patient's controlled substance dispensing history in the Prescription Drug Monitoring Program in compliance with regulations before I have prescribed any controlled substances  The prescription history is consistent with prescribed therapy and our practice policies  30+ minutes were spent in this ambulatory visit with greater than 50% of the time spent face to face with patient and family (significant other Cassadaga Settles) in counseling or coordination of care including discussions of symptom assessment and management, medication review, medication adjustment, psychosocial support, chart review, imaging review, lab review, medical marijuana, supportive listening and anticipatory guidance  All of the patient's questions were answered during this discussion  SUBJECTIVE:  Chief Complaint   Patient presents with    Cancer    Cancer Pain    Spasms    Nausea    Follow-up    Counseling        CRISTHIAN    Soledad Dallas is a 49 y o  female w/ ER-VA-HER2+ carcinoma of the R breast (diagnosed 3/29/22) metastatic to bone, s/p RT to spine; cancer-related pain  She follows w/ Dr Mo Mercedes (Medical Oncology)  [facility] [address] on 4/22/22 by Dr Kortney Stanley, for cancer-related pain  Plan includes systemic therapy with pertuzumab + trastuzumab + paclitaxel (she had a reaction to Taxotere)      Patient is known to [facility]; last seen 8/19/22 for symptom assessment and management, medication review, medication adjustment, psychosocial support, chart review, imaging review, lab review, medical marijuana, opioid titration, supportive listening and anticipatory guidance  Patient reports her condition continues to slowly improve  She is tolerating her cancer therapy w/ mild nausea (which improves w/ Zofran)  Her pain is stable on her current regimen, with a controlled baseline of 3/10  She is able to enjoy light physical activity and is hopeful to return to work in the coming months, initially on a part-time basis  Fatigue has improved  Patient reports her appetite is good, and she is defending her weight  She notes some mild constipation which improves w/ dietary changes (such as eating fruit)  Her mood is positive  She is generally getting restorative sleep (though will wake at times to urinate, which may be related to her diuretic use)  PDMP shows no concerns  The following portions of the medical history were reviewed: past medical history, surgical history, problem list, medication list, family history, and social history        Current Outpatient Medications:     furosemide (LASIX) 40 mg tablet, Take 1 tablet (40 mg total) by mouth daily, Disp: 30 tablet, Rfl: 0    [START ON 10/7/2022] morphine (MS CONTIN) 15 mg 12 hr tablet, Take 1 tablet (15 mg total) by mouth daily at bedtime Max Daily Amount: 15 mg Do not start before October 7, 2022 , Disp: 30 tablet, Rfl: 0    nystatin (MYCOSTATIN) 500,000 units/5 mL suspension, Apply 5 mL (500,000 Units total) to the mouth or throat 4 (four) times a day, Disp: 473 mL, Rfl: 1    nystatin (MYCOSTATIN) powder, Apply topically 3 (three) times a day, Disp: 15 g, Rfl: 0    oxyCODONE (ROXICODONE) 10 MG TABS, Take 0 5-1 tablets (5-10 mg total) by mouth every 4 (four) hours as needed for moderate pain or severe pain Max Daily Amount: 60 mg, Disp: 120 tablet, Rfl: 0    pantoprazole (PROTONIX) 40 mg tablet, Take 1 tablet (40 mg total) by mouth daily in the early morning, Disp: 90 tablet, Rfl: 1    polyethylene glycol (MIRALAX) 17 g packet, Take 17 g by mouth daily, Disp: 30 each, Rfl: 0    senna (SENOKOT) 8 6 mg, Take 3 tablets (25 8 mg total) by mouth 2 (two) times a day Hold for loose stool , Disp: 180 tablet, Rfl: 0    al mag oxide-diphenhydramine-lidocaine viscous (MAGIC MOUTHWASH) 1:1:1 suspension, Swish and spit 10 mL every 4 (four) hours as needed for mouth pain or discomfort (Patient not taking: Reported on 9/30/2022), Disp: 500 mL, Rfl: 2    baclofen 10 mg tablet, TAKE 1/2 TABLET BY MOUTH 3 TIMES A DAY AS NEEDED FOR MUSCLE SPASMS (Patient not taking: Reported on 9/30/2022), Disp: 135 tablet, Rfl: 1    DPH-Lido-AlHydr-MgHydr-Simeth (First-Mouthwash BLM) SUSP, , Disp: , Rfl:     lactulose (CEPHULAC) 10 g packet, Take 1 packet (10 g total) by mouth 3 (three) times a day as needed (constipation) (Patient not taking: Reported on 9/30/2022), Disp: 30 each, Rfl: 0    lisinopril (ZESTRIL) 20 mg tablet, Take 1 tablet (20 mg total) by mouth daily (Patient not taking: Reported on 9/30/2022), Disp: 90 tablet, Rfl: 1    ondansetron (ZOFRAN) 4 mg tablet, Take 1 tablet (4 mg total) by mouth every 8 (eight) hours as needed for nausea or vomiting, Disp: 9 tablet, Rfl: 20    Review of Systems   Constitutional: Positive for activity change (improved) and fatigue (improved)  Cardiovascular: Positive for leg swelling (mild)  Gastrointestinal: Positive for constipation (improves w/ dietary changes) and nausea (intermittent, improves w/ Zofran)  Musculoskeletal: Positive for back pain, myalgias, neck pain (improved) and neck stiffness  Allergic/Immunologic: Positive for immunocompromised state  All other systems reviewed and are negative  OBJECTIVE:  /82 (BP Location: Left arm, Patient Position: Sitting, Cuff Size: Standard)   Pulse 90   Temp (!) 96 9 °F (36 1 °C) (Temporal)   LMP 03/28/2022 (Approximate)   SpO2 98%   Physical Exam  Vitals reviewed  Constitutional:       General: She is not in acute distress       Appearance: She is well-developed, well-groomed and overweight  She is not toxic-appearing  HENT:      Head: Normocephalic and atraumatic  Right Ear: External ear normal       Left Ear: External ear normal    Eyes:      General: No scleral icterus  Right eye: No discharge  Left eye: No discharge  Extraocular Movements: Extraocular movements intact  Conjunctiva/sclera: Conjunctivae normal       Pupils: Pupils are equal, round, and reactive to light  Cardiovascular:      Rate and Rhythm: Normal rate  Pulmonary:      Effort: Pulmonary effort is normal  No tachypnea, bradypnea, accessory muscle usage or respiratory distress  Abdominal:      General: There is no distension  Tenderness: There is no guarding  Musculoskeletal:      Cervical back: Decreased range of motion (continues to improve)  Right lower leg: Edema present  Left lower leg: Edema present  Skin:     General: Skin is dry  Coloration: Skin is not pale  Neurological:      Mental Status: She is alert and oriented to person, place, and time  Cranial Nerves: No dysarthria or facial asymmetry  Gait: Gait is intact  Psychiatric:         Attention and Perception: Attention normal          Mood and Affect: Mood and affect normal          Speech: Speech normal          Behavior: Behavior normal  Behavior is cooperative  Thought Content: Thought content normal          Cognition and Memory: Cognition and memory normal          Judgment: Judgment normal           Amina Hoover MD  [facility]      Portions of this document may have been created using dictation software and as such some "sound alike" terms may have been generated by the system  Do not hesitate to contact me with any questions or clarifications

## 2022-10-04 ENCOUNTER — OFFICE VISIT (OUTPATIENT)
Dept: PHYSICAL THERAPY | Facility: CLINIC | Age: 47
End: 2022-10-04
Payer: COMMERCIAL

## 2022-10-04 DIAGNOSIS — M84.48XA PATHOLOGICAL FRACTURE OF CERVICAL VERTEBRA, INITIAL ENCOUNTER: ICD-10-CM

## 2022-10-04 DIAGNOSIS — S12.301A CLOSED NONDISPLACED FRACTURE OF FOURTH CERVICAL VERTEBRA, UNSPECIFIED FRACTURE MORPHOLOGY, INITIAL ENCOUNTER (HCC): Primary | ICD-10-CM

## 2022-10-04 PROCEDURE — 97112 NEUROMUSCULAR REEDUCATION: CPT | Performed by: PHYSICAL THERAPIST

## 2022-10-04 PROCEDURE — 97110 THERAPEUTIC EXERCISES: CPT | Performed by: PHYSICAL THERAPIST

## 2022-10-04 PROCEDURE — 97140 MANUAL THERAPY 1/> REGIONS: CPT | Performed by: PHYSICAL THERAPIST

## 2022-10-04 NOTE — PROGRESS NOTES
Daily Note     Today's date: 10/4/2022  Patient name: Latasha Acosta  : 1975  MRN: [id number]  Referring provider: Mae Brito MD  Dx:   Encounter Diagnosis     ICD-10-CM    1  Closed nondisplaced fracture of fourth cervical vertebra, unspecified fracture morphology, initial encounter [facility]  S12 301A    2  Pathological fracture of cervical vertebra, initial encounter  M84 48XA        Start Time: 1120  Stop Time: 1205  Total time in clinic (min): 45 minutes    Subjective: Pt with no new concerns noted at this time  Objective: See treatment diary below      Assessment: Tolerated treatment well  Continued with current POC with good tolerance from the patient  Updated HEP with self stretches to perform outside of PT with the patient verbalizing understanding  Occasional cuing required for proper exercise technique  Continue to progress as tolerated  Patient would benefit from continued PT      Plan: Continue per plan of care  Precautions:  Active Breast CA w/ Metastasis to Spine, HTN    Access Code: [email or web address]       Manuals 10/4  9/20 9/22 9/27 9/29   STM B/L UT, LS, Rhomboids  IASTM KCB  KCB KCB  KCB HA                     Neuro Re-Ed         C/S Retract  20x   20x 20x 20x 20x   Scap Retract          TB B/L ER          TB T 30x BTB  30x GTB 30x GTB 30x GTB    Scap Protract at Wall 20x     20x 20x            Education          Ther Ex         TB Row/Ext  30x BTB  30x BTB 30x BTB 30x BTB 30x BTB   T/S Ext  20x HEP 20x 20x 20x 20x   T/S Rotation         Shrugs/Rolls 30x 6#  30x 5# 30x 5# 30x 6# 30x 6#   Bicep Curls 30x 6#  30x 5# 30x 5# 30x 6# 30x 6#   DB Rows  30x 6#  30x 5# 30x 5# 30x 6# 30x 6#   UT Stretch  10x 10" HEP 10x 10" 10x 10" 10x10" 10x10"   C/S Isometrics         Shoulder Flex/Scap 3x10 3#  3x10 3# 3x10 3# 3x10 3# 3x10 3#   Towel Rotation Stretch  15x 5" HEP 10x 5" 15x 5" 15x 5"    Shoulder Abd 2x10 2#  2x10 2# 2x10 2# 2x10 2# 2x10 2#   OH Shoulder Press  3x10 2#  2x10 2# 2x10 2# 3x10 2# 3x10 2#            Ther Activity                           Gait Training                           Modalities

## 2022-10-06 ENCOUNTER — OFFICE VISIT (OUTPATIENT)
Dept: PHYSICAL THERAPY | Facility: CLINIC | Age: 47
End: 2022-10-06
Payer: COMMERCIAL

## 2022-10-06 DIAGNOSIS — M84.48XA PATHOLOGICAL FRACTURE OF CERVICAL VERTEBRA, INITIAL ENCOUNTER: ICD-10-CM

## 2022-10-06 DIAGNOSIS — S12.301A CLOSED NONDISPLACED FRACTURE OF FOURTH CERVICAL VERTEBRA, UNSPECIFIED FRACTURE MORPHOLOGY, INITIAL ENCOUNTER (HCC): Primary | ICD-10-CM

## 2022-10-06 PROCEDURE — 97110 THERAPEUTIC EXERCISES: CPT | Performed by: PHYSICAL THERAPIST

## 2022-10-06 PROCEDURE — 97112 NEUROMUSCULAR REEDUCATION: CPT | Performed by: PHYSICAL THERAPIST

## 2022-10-06 PROCEDURE — 97140 MANUAL THERAPY 1/> REGIONS: CPT | Performed by: PHYSICAL THERAPIST

## 2022-10-06 NOTE — PROGRESS NOTES
Daily Note     Today's date: 10/6/2022  Patient name: Lizzy Bruner  : 1975  MRN: [id number]  Referring provider: Jolynn Jauregui MD  Dx:   Encounter Diagnosis     ICD-10-CM    1  Closed nondisplaced fracture of fourth cervical vertebra, unspecified fracture morphology, [facility]  S12 301A    2  Pathological fracture of cervical vertebra, initial encounter  M84 48XA        Start Time: 1510  Stop Time: 1552  Total time in clinic (min): 42 minutes    Subjective: Pt with no new concerns noted at this time  Objective: See treatment diary below      Assessment: Tolerated treatment well  Progressed POC with good tolerance from the patient  No pain reported with exercises  Occasional cuing required for proper exercise technique  Continue to progress as tolerated  Patient would benefit from continued PT      Plan: Continue per plan of care  Precautions:  Active Breast CA w/ Metastasis to Spine, HTN    Access Code: [email or web address]       Manuals 10/4 10/6 9/20 9/22 9/27 9/29   STM B/L UT, LS, Rhomboids  IASTM KCB IASTM KCB KCB KCB  KCB HA                     Neuro Re-Ed         C/S Retract  20x  20x  20x 20x 20x 20x   TB T 30x BTB 30x BTB 30x GTB 30x GTB 30x GTB    Scap Protract at Wall 20x  20x at plinth    20x 20x   OH Reach w/ Posture at wall  10x w/ cane                Education          Ther Ex         TB Row/Ext  30x BTB 30x BTB 30x BTB 30x BTB 30x BTB 30x BTB   T/S Ext  20x HEP 20x 20x 20x 20x   T/S Rotation  10x5" seated       Shrugs/Rolls 30x 6# 30x 6# 30x 5# 30x 5# 30x 6# 30x 6#   Bicep Curls 30x 6# 30x 6# 30x 5# 30x 5# 30x 6# 30x 6#   DB Rows  30x 6# 30x 6# 30x 5# 30x 5# 30x 6# 30x 6#   UT Stretch  10x 10" HEP 10x 10" 10x 10" 10x10" 10x10"   Shoulder Flex/Scap 3x10 3# 3x10 3# 3x10 3# 3x10 3# 3x10 3# 3x10 3#   Towel Rotation Stretch  15x 5" HEP 10x 5" 15x 5" 15x 5"    Shoulder Abd 2x10 2# 3x10 2# 2x10 2# 2x10 2# 2x10 2# 2x10 2#   OH Shoulder Press  3x10 2# 3x10 2# 2x10 2# 2x10 2# 3x10 2# 3x10 2#   OH Towel Slide  10x                 Ther Activity                           Gait Training                           Modalities

## 2022-10-11 ENCOUNTER — OFFICE VISIT (OUTPATIENT)
Dept: PHYSICAL THERAPY | Facility: CLINIC | Age: 47
End: 2022-10-11
Payer: COMMERCIAL

## 2022-10-11 DIAGNOSIS — M84.48XA PATHOLOGICAL FRACTURE OF CERVICAL VERTEBRA, INITIAL ENCOUNTER: ICD-10-CM

## 2022-10-11 DIAGNOSIS — S12.301A CLOSED NONDISPLACED FRACTURE OF FOURTH CERVICAL VERTEBRA, UNSPECIFIED FRACTURE MORPHOLOGY, INITIAL ENCOUNTER (HCC): Primary | ICD-10-CM

## 2022-10-11 PROBLEM — N39.0 URINARY TRACT INFECTION WITHOUT HEMATURIA: Status: RESOLVED | Noted: 2022-05-05 | Resolved: 2022-10-11

## 2022-10-11 PROBLEM — A41.9 SEPSIS (HCC): Status: RESOLVED | Noted: 2022-05-28 | Resolved: 2022-10-11

## 2022-10-11 PROCEDURE — 97140 MANUAL THERAPY 1/> REGIONS: CPT | Performed by: PHYSICAL THERAPIST

## 2022-10-11 PROCEDURE — 97110 THERAPEUTIC EXERCISES: CPT | Performed by: PHYSICAL THERAPIST

## 2022-10-11 PROCEDURE — 97112 NEUROMUSCULAR REEDUCATION: CPT | Performed by: PHYSICAL THERAPIST

## 2022-10-11 NOTE — PROGRESS NOTES
Daily Note     Today's date: 10/11/2022  Patient name: Jose Flynn  : 1975  MRN: [id number]  Referring provider: Ita Daly MD  Dx:   Encounter Diagnosis     ICD-10-CM    1  Closed nondisplaced fracture of fourth cervical vertebra, unspecified fracture morphology, [facility]  S12 301A    2  Pathological fracture of cervical vertebra, initial encounter  M84 48XA        Start Time: 1030  Stop Time: 1115  Total time in clinic (min): 45 minutes    Subjective: Pt with no new concerns noted at this time  Objective: See treatment diary below      Assessment: Tolerated treatment well  Progressed resistance with strengthening exercises this visit with good tolerance from the patient  Reported increased fatigue with increased resistance, however, no increase in pain  Occasional cuing required for proper exercise technique  Continue to progress as tolerated  Patient would benefit from continued PT      Plan: Continue per plan of care  Precautions:  Active Breast CA w/ Metastasis to Spine, HTN    Access Code: [email or web address]       Manuals 10/4 10/6 10/11  9/27 9/29   STM B/L UT, LS, Rhomboids  IASTM KCB IASTM KCB IASTM KCB  KCB HA                     Neuro Re-Ed         C/S Retract  20x  20x  20x   20x 20x   TB T 30x BTB 30x BTB 30x BTB  30x GTB    Scap Protract at Wall 20x  20x at plinth  20x at plinth   20x 20x   OH Reach w/ Posture at wall  10x w/ cane 10x w/ cane               Education          Ther Ex         TB Row/Ext  30x BTB 30x BTB 30x BTB  30x BTB 30x BTB   T/S Ext  20x HEP   20x 20x   T/S Rotation  10x5" seated 10x5" seated      Shrugs/Rolls 30x 6# 30x 6# 30x 6#  30x 6# 30x 6#   Bicep Curls 30x 6# 30x 6# 30x 6#  30x 6# 30x 6#   DB Rows  30x 6# 30x 6# 30x 6#  30x 6# 30x 6#   UT Stretch  10x 10" HEP   10x10" 10x10"   Shoulder Flex/Scap 3x10 3# 3x10 3# 3x10 4#  3x10 3# 3x10 3#   Towel Rotation Stretch  15x 5" HEP   15x 5"    Shoulder Abd 2x10 2# 3x10 2# 3x10 3#  2x10 2# 2x10 2# OH Shoulder Press  3x10 2# 3x10 2# 3x10 3#  3x10 2# 3x10 2#   OH Towel Slide  10x  10x                Ther Activity                           Gait Training                           Modalities

## 2022-10-14 ENCOUNTER — PATIENT OUTREACH (OUTPATIENT)
Dept: CASE MANAGEMENT | Facility: HOSPITAL | Age: 47
End: 2022-10-14

## 2022-10-14 NOTE — PROGRESS NOTES
MSW phoned and left a voice message for a call back  MSW awaits return call  In the voice message this MSW stated if patient chose not to return the call to keep MSW's number for future needs  MSW will close this case however will remain available

## 2022-10-18 ENCOUNTER — OFFICE VISIT (OUTPATIENT)
Dept: PHYSICAL THERAPY | Facility: CLINIC | Age: 47
End: 2022-10-18
Payer: COMMERCIAL

## 2022-10-18 DIAGNOSIS — M84.48XA PATHOLOGICAL FRACTURE OF CERVICAL VERTEBRA, INITIAL ENCOUNTER: ICD-10-CM

## 2022-10-18 DIAGNOSIS — S12.301A CLOSED NONDISPLACED FRACTURE OF FOURTH CERVICAL VERTEBRA, UNSPECIFIED FRACTURE MORPHOLOGY, INITIAL ENCOUNTER (HCC): Primary | ICD-10-CM

## 2022-10-18 PROCEDURE — 97110 THERAPEUTIC EXERCISES: CPT | Performed by: PHYSICAL THERAPIST

## 2022-10-18 PROCEDURE — 97140 MANUAL THERAPY 1/> REGIONS: CPT | Performed by: PHYSICAL THERAPIST

## 2022-10-18 PROCEDURE — 97112 NEUROMUSCULAR REEDUCATION: CPT | Performed by: PHYSICAL THERAPIST

## 2022-10-18 NOTE — PROGRESS NOTES
Daily Note     Today's date: 10/18/2022  Patient name: Ava Neil  : 1975  MRN: [id number]  Referring provider: Supriya Parker MD  Dx:   Encounter Diagnosis     ICD-10-CM    1  Closed nondisplaced fracture of fourth cervical vertebra, unspecified fracture morphology, initial encounter ([facility] )  S12 301A    2  Pathological fracture of cervical vertebra, initial encounter  M84 48XA        Start Time: 1030  Stop Time: 1110  Total time in clinic (min): 40 minutes    Subjective: Pt reports she has noticed some increased soreness after not having acupuncture over the last week  No new concerns noted at this time  Objective: See treatment diary below      Assessment: Tolerated treatment well  Continued with current POC with good tolerance from the patient  Occasional cuing required for proper exercise technique  No pain reported with exercises  Continue to progress as tolerated  Patient would benefit from continued PT      Plan: Continue per plan of care  Precautions:  Active Breast CA w/ Metastasis to Spine, HTN    Access Code: [email or web address]       Manuals 10/4 10/6 10/11 10/18  9/29   STM B/L UT, LS, Rhomboids  IASTM KCB IASTM KCB IASTM KCB IASTM KCB  HA                     Neuro Re-Ed         C/S Retract  20x  20x  20x  20x  20x   TB T 30x BTB 30x BTB 30x BTB 30x BTB     Scap Protract at Wall 20x  20x at plinth  20x at plinth  20x at plinth   20x   OH Reach w/ Posture at wall  10x w/ cane 10x w/ cane 10x w/ cane              Education          Ther Ex         TB Row/Ext  30x BTB 30x BTB 30x BTB 30x BTB  30x BTB   T/S Ext  20x HEP    20x   T/S Rotation  10x5" seated 10x5" seated 10x5" seated     Shrugs/Rolls 30x 6# 30x 6# 30x 6# 30x 6#  30x 6#   Bicep Curls 30x 6# 30x 6# 30x 6# 30x 6#  30x 6#   DB Rows  30x 6# 30x 6# 30x 6# 30x 6#  30x 6#   UT Stretch  10x 10" HEP    10x10"   Shoulder Flex/Scap 3x10 3# 3x10 3# 3x10 4# 3x10 4#  3x10 3#   Towel Rotation Stretch  15x 5" HEP       Shoulder Abd 2x10 2# 3x10 2# 3x10 3# 3x10 3#  2x10 2#   OH Shoulder Press  3x10 2# 3x10 2# 3x10 3# 3x10 3#  3x10 2#   OH Towel Slide  10x  10x  10x               Ther Activity                           Gait Training                           Modalities

## 2022-10-20 ENCOUNTER — OFFICE VISIT (OUTPATIENT)
Dept: PHYSICAL THERAPY | Facility: CLINIC | Age: 47
End: 2022-10-20
Payer: COMMERCIAL

## 2022-10-20 ENCOUNTER — HOSPITAL ENCOUNTER (OUTPATIENT)
Dept: INFUSION CENTER | Facility: CLINIC | Age: 47
Discharge: HOME | End: 2022-10-20
Payer: COMMERCIAL

## 2022-10-20 VITALS
TEMPERATURE: 97.6 F | RESPIRATION RATE: 18 BRPM | HEIGHT: 64 IN | BODY MASS INDEX: 27.49 KG/M2 | SYSTOLIC BLOOD PRESSURE: 118 MMHG | DIASTOLIC BLOOD PRESSURE: 88 MMHG | OXYGEN SATURATION: 98 % | HEART RATE: 94 BPM | WEIGHT: 161 LBS

## 2022-10-20 DIAGNOSIS — S12.301A CLOSED NONDISPLACED FRACTURE OF FOURTH CERVICAL VERTEBRA, UNSPECIFIED FRACTURE MORPHOLOGY, INITIAL ENCOUNTER (HCC): Primary | ICD-10-CM

## 2022-10-20 DIAGNOSIS — M84.48XA PATHOLOGICAL FRACTURE OF CERVICAL VERTEBRA, INITIAL ENCOUNTER: ICD-10-CM

## 2022-10-20 DIAGNOSIS — Z17.1 MALIGNANT NEOPLASM OF OVERLAPPING SITES OF RIGHT BREAST IN FEMALE, ESTROGEN RECEPTOR NEGATIVE (HCC): ICD-10-CM

## 2022-10-20 DIAGNOSIS — D70.1 CHEMOTHERAPY-INDUCED NEUTROPENIA (HCC): ICD-10-CM

## 2022-10-20 DIAGNOSIS — T45.1X5A CHEMOTHERAPY-INDUCED NEUTROPENIA (HCC): ICD-10-CM

## 2022-10-20 DIAGNOSIS — C50.811 MALIGNANT NEOPLASM OF OVERLAPPING SITES OF RIGHT BREAST IN FEMALE, ESTROGEN RECEPTOR NEGATIVE (HCC): ICD-10-CM

## 2022-10-20 DIAGNOSIS — C79.51 OSSEOUS METASTASIS (HCC): Primary | ICD-10-CM

## 2022-10-20 PROCEDURE — 97112 NEUROMUSCULAR REEDUCATION: CPT | Performed by: PHYSICAL THERAPIST

## 2022-10-20 PROCEDURE — 96367 TX/PROPH/DG ADDL SEQ IV INF: CPT

## 2022-10-20 PROCEDURE — 97110 THERAPEUTIC EXERCISES: CPT | Performed by: PHYSICAL THERAPIST

## 2022-10-20 PROCEDURE — 96417 CHEMO IV INFUS EACH ADDL SEQ: CPT

## 2022-10-20 PROCEDURE — 96413 CHEMO IV INFUSION 1 HR: CPT

## 2022-10-20 RX ORDER — SODIUM CHLORIDE 9 MG/ML
20 INJECTION, SOLUTION INTRAVENOUS ONCE
Status: COMPLETED | OUTPATIENT
Start: 2022-10-20 | End: 2022-10-20

## 2022-10-20 RX ORDER — SODIUM CHLORIDE 9 MG/ML
20 INJECTION, SOLUTION INTRAVENOUS ONCE
OUTPATIENT
Start: 2022-12-20

## 2022-10-20 RX ADMIN — SODIUM CHLORIDE 20 ML/HR: 0.9 INJECTION, SOLUTION INTRAVENOUS at 10:00

## 2022-10-20 RX ADMIN — ZOLEDRONIC ACID: 0.04 INJECTION, SOLUTION INTRAVENOUS at 09:20

## 2022-10-20 RX ADMIN — SODIUM CHLORIDE 20 ML/HR: 0.9 INJECTION, SOLUTION INTRAVENOUS at 08:45

## 2022-10-20 RX ADMIN — TRASTUZUMAB 487 MG: 150 INJECTION, POWDER, LYOPHILIZED, FOR SOLUTION INTRAVENOUS at 10:45

## 2022-10-20 RX ADMIN — PERTUZUMAB 420 MG: 30 INJECTION, SOLUTION, CONCENTRATE INTRAVENOUS at 10:05

## 2022-10-20 NOTE — PROGRESS NOTES
Patient to [facility] / Herceptin / Arthurine Drain: Offers no complaints at present time: Lab work ( 09/27/22 ) reviewed: Calcium - 10 3: Within parameters to treat: Right FA PIV initiated without incident

## 2022-10-20 NOTE — PROGRESS NOTES
Daily Note     Today's date: 10/20/2022  Patient name: Juancarlos Jackson  : 1975  MRN: [id number]  Referring provider: Wellington Lopez MD  Dx:   Encounter Diagnosis     ICD-10-CM    1  Closed nondisplaced fracture of fourth cervical vertebra, unspecified fracture morphology, initial encounter ([email or web address]  S12 301A    2  Pathological fracture of cervical vertebra, initial encounter  M84 48XA        Start Time: 1530  Stop Time: 1605  Total time in clinic (min): 35 minutes    Subjective: Pt with no new concerns noted at this time  Objective: See treatment diary below      Assessment: Tolerated treatment well  Continued with current POC with good tolerance from the patient  No pain reported with exercises  IASTM held this visit 2* pt having acupuncture with similar technique performed prior to PT session today  Continue to progress as tolerated  Patient would benefit from continued PT      Plan: Continue per plan of care  Precautions:  Active Breast CA w/ Metastasis to Spine, HTN    Access Code: [email or web address]       Manuals 10/4 10/6 10/11 10/18 10/20    STM B/L UT, LS, Rhomboids  IASTM KCB IASTM KCB IASTM KCB IASTM KCB NP                      Neuro Re-Ed         C/S Retract  20x  20x  20x  20x 20x     TB T 30x BTB 30x BTB 30x BTB 30x BTB 30x BTB    Scap Protract at Wall 20x  20x at plinth  20x at plinth  20x at plinth  20x at [address] w/ Posture at wall  10x w/ cane 10x w/ cane 10x w/ cane 10x w/ cane              Education          Ther Ex         TB Row/Ext  30x BTB 30x BTB 30x BTB 30x BTB 30x BTB    T/S Ext  20x HEP       T/S Rotation  10x5" seated 10x5" seated 10x5" seated 10x5" seated    Shrugs/Rolls 30x 6# 30x 6# 30x 6# 30x 6# 30x 6#    Bicep Curls 30x 6# 30x 6# 30x 6# 30x 6# 30x 6#    DB Rows  30x 6# 30x 6# 30x 6# 30x 6# 30x 6#    UT Stretch  10x 10" HEP       Shoulder Flex/Scap 3x10 3# 3x10 3# 3x10 4# 3x10 4# 3x10 4#    Towel Rotation Stretch  15x 5" HEP       Shoulder Abd 2x10 2# 3x10 2# 3x10 3# 3x10 3# 3x10 3#    OH Shoulder Press  3x10 2# 3x10 2# 3x10 3# 3x10 3# 3x10 3#    OH Towel Slide  10x  10x  10x  10x              Ther Activity                           Gait Training                           Modalities

## 2022-10-24 ENCOUNTER — PATIENT OUTREACH (OUTPATIENT)
Dept: HEMATOLOGY ONCOLOGY | Facility: CLINIC | Age: 47
End: 2022-10-24

## 2022-10-24 DIAGNOSIS — R60.0 LEG EDEMA: ICD-10-CM

## 2022-10-24 DIAGNOSIS — I10 PRIMARY HYPERTENSION: ICD-10-CM

## 2022-10-25 ENCOUNTER — OFFICE VISIT (OUTPATIENT)
Dept: PHYSICAL THERAPY | Facility: CLINIC | Age: 47
End: 2022-10-25
Payer: COMMERCIAL

## 2022-10-25 DIAGNOSIS — M84.48XA PATHOLOGICAL FRACTURE OF CERVICAL VERTEBRA, INITIAL ENCOUNTER: ICD-10-CM

## 2022-10-25 DIAGNOSIS — S12.301A CLOSED NONDISPLACED FRACTURE OF FOURTH CERVICAL VERTEBRA, UNSPECIFIED FRACTURE MORPHOLOGY, INITIAL ENCOUNTER (HCC): Primary | ICD-10-CM

## 2022-10-25 PROCEDURE — 97110 THERAPEUTIC EXERCISES: CPT | Performed by: PHYSICAL THERAPIST

## 2022-10-25 PROCEDURE — 97140 MANUAL THERAPY 1/> REGIONS: CPT | Performed by: PHYSICAL THERAPIST

## 2022-10-25 PROCEDURE — 97112 NEUROMUSCULAR REEDUCATION: CPT | Performed by: PHYSICAL THERAPIST

## 2022-10-25 RX ORDER — FUROSEMIDE 40 MG/1
40 TABLET ORAL DAILY
Qty: 90 TABLET | Refills: 0 | Status: SHIPPED | OUTPATIENT
Start: 2022-10-25

## 2022-10-25 NOTE — PROGRESS NOTES
Daily Note     Today's date: 10/25/2022  Patient name: Murali Meza  : 1975  MRN: [id number]  Referring provider: Lukas Hanson MD  Dx:   Encounter Diagnosis     ICD-10-CM    1  Closed nondisplaced fracture of fourth cervical vertebra, unspecified fracture morphology[facility]  S12 301A    2  Pathological fracture of cervical vertebra, initial encounter  M84 48XA        Start Time: 1030  Stop Time: 1115  Total time in clinic (min): 45 minutes    Subjective: Pt reports some soreness in the left UT region at the start of treatment  Objective: See treatment diary below      Assessment: Tolerated treatment well  Continued with current POC with good tolerance from the patient  No significant pain reported with exercises  Continue to progress as tolerated  Patient would benefit from continued PT      Plan: Continue per plan of care  Precautions:  Active Breast CA w/ Metastasis to Spine, HTN    Access Code: [email or web address]       Manuals 10/4 10/6 10/11 10/18 10/20 10/25   STM B/L UT, LS, Rhomboids  IASTM KCB IASTM KCB IASTM KCB IASTM KCB NP IASTM KCB                     Neuro Re-Ed         C/S Retract  20x  20x  20x  20x 20x  20x    TB T 30x BTB 30x BTB 30x BTB 30x BTB 30x BTB 30x BTB   Scap Protract at Wall 20x  20x at plinth  20x at plinth  20x at plinth  20x at plinth  20x at [address] w/ Posture at wall  10x w/ cane 10x w/ cane 10x w/ cane 10x w/ cane  10x w/ cane   Scap Set at Wall       10x3"            Education          Ther Ex         TB Row/Ext  30x BTB 30x BTB 30x BTB 30x BTB 30x BTB 30x BTB   T/S Ext  20x HEP       T/S Rotation  10x5" seated 10x5" seated 10x5" seated 10x5" seated 10x5" seated    Shrugs/Rolls 30x 6# 30x 6# 30x 6# 30x 6# 30x 6# 30x 7#   Bicep Curls 30x 6# 30x 6# 30x 6# 30x 6# 30x 6# 30x 7#   DB Rows  30x 6# 30x 6# 30x 6# 30x 6# 30x 6# 30x 7#    UT Stretch  10x 10" HEP       Shoulder Flex/Scap 3x10 3# 3x10 3# 3x10 4# 3x10 4# 3x10 4# 3x10 4#   Towel Rotation Stretch  15x 5" HEP       Shoulder Abd 2x10 2# 3x10 2# 3x10 3# 3x10 3# 3x10 3# 3x10 3#   OH Shoulder Press  3x10 2# 3x10 2# 3x10 3# 3x10 3# 3x10 3# 3x10 3#   OH Towel Slide  10x  10x  10x  10x  10x             Ther Activity                           Gait Training                           Modalities

## 2022-10-27 ENCOUNTER — OFFICE VISIT (OUTPATIENT)
Dept: PHYSICAL THERAPY | Facility: CLINIC | Age: 47
End: 2022-10-27
Payer: COMMERCIAL

## 2022-10-27 ENCOUNTER — OFFICE VISIT (OUTPATIENT)
Dept: INTERNAL MEDICINE CLINIC | Facility: CLINIC | Age: 47
End: 2022-10-27
Payer: COMMERCIAL

## 2022-10-27 VITALS
DIASTOLIC BLOOD PRESSURE: 78 MMHG | OXYGEN SATURATION: 97 % | BODY MASS INDEX: 27.35 KG/M2 | SYSTOLIC BLOOD PRESSURE: 132 MMHG | HEART RATE: 104 BPM | WEIGHT: 160.2 LBS | TEMPERATURE: 98.9 F | HEIGHT: 64 IN

## 2022-10-27 DIAGNOSIS — Z12.11 ENCOUNTER FOR SCREENING COLONOSCOPY: ICD-10-CM

## 2022-10-27 DIAGNOSIS — S12.301A CLOSED NONDISPLACED FRACTURE OF FOURTH CERVICAL VERTEBRA, UNSPECIFIED FRACTURE MORPHOLOGY, INITIAL ENCOUNTER (HCC): Primary | ICD-10-CM

## 2022-10-27 DIAGNOSIS — Z13.220 SCREENING FOR LIPID DISORDERS: ICD-10-CM

## 2022-10-27 DIAGNOSIS — Z12.11 COLON CANCER SCREENING: ICD-10-CM

## 2022-10-27 DIAGNOSIS — Z11.4 SCREENING FOR HIV (HUMAN IMMUNODEFICIENCY VIRUS): ICD-10-CM

## 2022-10-27 DIAGNOSIS — Z00.00 ANNUAL PHYSICAL EXAM: Primary | ICD-10-CM

## 2022-10-27 DIAGNOSIS — Z12.4 SCREENING FOR CERVICAL CANCER: ICD-10-CM

## 2022-10-27 DIAGNOSIS — I10 PRIMARY HYPERTENSION: ICD-10-CM

## 2022-10-27 DIAGNOSIS — M84.48XA PATHOLOGICAL FRACTURE OF CERVICAL VERTEBRA, INITIAL ENCOUNTER: ICD-10-CM

## 2022-10-27 DIAGNOSIS — Z17.1 MALIGNANT NEOPLASM OF OVERLAPPING SITES OF RIGHT BREAST IN FEMALE, ESTROGEN RECEPTOR NEGATIVE (HCC): ICD-10-CM

## 2022-10-27 DIAGNOSIS — C50.811 MALIGNANT NEOPLASM OF OVERLAPPING SITES OF RIGHT BREAST IN FEMALE, ESTROGEN RECEPTOR NEGATIVE (HCC): ICD-10-CM

## 2022-10-27 DIAGNOSIS — C79.49 METASTASIS TO SPINAL CORD (HCC): ICD-10-CM

## 2022-10-27 PROCEDURE — 97110 THERAPEUTIC EXERCISES: CPT | Performed by: PHYSICAL THERAPIST

## 2022-10-27 PROCEDURE — 99386 PREV VISIT NEW AGE 40-64: CPT | Performed by: INTERNAL MEDICINE

## 2022-10-27 PROCEDURE — 97140 MANUAL THERAPY 1/> REGIONS: CPT | Performed by: PHYSICAL THERAPIST

## 2022-10-27 PROCEDURE — 97112 NEUROMUSCULAR REEDUCATION: CPT | Performed by: PHYSICAL THERAPIST

## 2022-10-27 RX ORDER — ACETAMINOPHEN 325 MG/1
650 TABLET ORAL EVERY 6 HOURS PRN
COMMUNITY

## 2022-10-27 NOTE — PROGRESS NOTES
Daily Note     Today's date: 10/27/2022  Patient name: David Enriquez  : 1975  MRN: [id number]  Referring provider: Ledy Bunch MD  Dx:   Encounter Diagnosis     ICD-10-CM    1  Closed nondisplaced fracture of fourth cervical vertebra, unspecified fracture morphology, initial encounter ([facility] )  S12 301A    2  Pathological fracture of cervical vertebra, initial encounter  M84 48XA        Start Time: 1530  Stop Time: 1610  Total time in clinic (min): 40 minutes    Subjective: Pt with no new concerns noted at this time  Pt plans to return to work next Wednesday approx 20 hrs/wk from home  Objective: See treatment diary below      Assessment: Tolerated treatment well  Continued with current POC with good tolerance from the patient  No pain reported with exercises  Continue to progress as tolerated  Patient would benefit from continued PT      Plan: Continue per plan of care  Precautions:  Active Breast CA w/ Metastasis to Spine, HTN    Access Code: [email or web address]       Manuals 10/27  10/11 10/18 10/20 10/25   STM B/L UT, LS, Rhomboids  IASTM KCB  IASTM KCB IASTM KCB NP IASTM KCB                     Neuro Re-Ed         C/S Retract  20x  20x  20x 20x  20x    TB T 30x BTB  30x BTB 30x BTB 30x BTB 30x BTB   Scap Protract at Wall   20x at plinth  20x at plinth  20x at plinth  20x at [address] w/ Posture at wall 10x w/ cane   10x w/ cane 10x w/ cane 10x w/ cane  10x w/ cane   Scap Set at Wall  10x3"     10x3"            Education          Ther Ex         TB Row/Ext  30x BTB  30x BTB 30x BTB 30x BTB 30x BTB   T/S Ext          T/S Rotation 10x5" seated  10x5" seated 10x5" seated 10x5" seated 10x5" seated    Shrugs/Rolls 30x 7#  30x 6# 30x 6# 30x 6# 30x 7#   Bicep Curls 30x 7#  30x 6# 30x 6# 30x 6# 30x 7#   DB Rows  30x 7#  30x 6# 30x 6# 30x 6# 30x 7#    UT Stretch          Shoulder Flex/Scap 3x10 4#  3x10 4# 3x10 4# 3x10 4# 3x10 4#   Towel Rotation Stretch          Shoulder Abd 3x10 3#  3x10 3# 3x10 3# 3x10 3# 3x10 3#   OH Shoulder Press  3x10 3#  3x10 3# 3x10 3# 3x10 3# 3x10 3#   OH Towel Slide 10x   10x  10x  10x  10x             Ther Activity                           Gait Training                           Modalities

## 2022-10-27 NOTE — PROGRESS NOTES
[facility] INTERNAL MEDICINE    NAME: Diego Xiao  AGE: 49 y o  SEX: female  : 1975     DATE: 10/27/2022     Assessment and Plan:     Problem List Items Addressed This Visit        Cardiovascular and Mediastinum    HTN (hypertension)       Nervous and Auditory    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 ([facility] )       Other    Malignant neoplasm of overlapping sites of right breast in female, estrogen receptor negative ([facility] )      Other Visit Diagnoses     Annual physical exam    -  Primary    Encounter for screening colonoscopy        Colon cancer screening        Relevant Orders    Cologuard    Screening for lipid disorders        Relevant Orders    Lipid panel    Screening for cervical cancer        Relevant Orders    Ambulatory referral to Obstetrics / Gynecology    Screening for HIV (human immunodeficiency virus)        Relevant Orders    HIV 1/2 Antigen/Antibody (4th Generation) w Reflex [facility]          Immunizations and preventive care screenings were discussed with patient today  Appropriate education was printed on patient's after visit summary  Counseling:  · Exercise: the importance of regular exercise/physical activity was discussed  Recommend exercise 3-5 times per week for at least 30 minutes  Return in about 4 months (around 2023) for Next scheduled follow up  Chief Complaint:     Chief Complaint   Patient presents with   • Physical Exam     Pt here for a physical exam -- 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       History of Present Illness:     Adult Annual Physical   Patient here for a comprehensive physical exam  The patient reports no problems  Diet and Physical Activity  · Diet/Nutrition: well balanced diet  · Exercise: no formal exercise  Depression Screening  PHQ-2/9 Depression Screening         General Health  · Sleep: sleeps well     · Hearing: normal - bilateral   · Vision: no vision problems  · Dental: no dental visits for >1 year  /GYN Health  · Patient is: postmenopausal  · Last menstrual period:   · Contraceptive method: none  Review of Systems:     Review of Systems   Constitutional: Negative for appetite change, chills, diaphoresis, fatigue, fever and unexpected weight change  Respiratory: Negative for apnea, cough, choking, chest tightness, shortness of breath, wheezing and stridor  Cardiovascular: Negative for chest pain, palpitations and leg swelling  Gastrointestinal: Negative for abdominal distention, abdominal pain, anal bleeding, blood in stool, constipation, diarrhea, nausea and vomiting  Genitourinary: Negative for decreased urine volume, difficulty urinating, frequency and urgency  Musculoskeletal: Negative for arthralgias, back pain and myalgias  Neurological: Negative for dizziness, light-headedness, numbness and headaches  Past Medical History:     Past Medical History:   Diagnosis Date   • Breast cancer ([facility] )    • Cancer ([facility] )    • Hypertension       Past Surgical History:     History reviewed  No pertinent surgical history  Social History:     Social History     Socioeconomic History   • Marital status: Single     Spouse name: None   • Number of children: None   • Years of education: None   • Highest education level: None   Occupational History   • None   Tobacco Use   • Smoking status: Never Smoker   • Smokeless tobacco: Never Used   Vaping Use   • Vaping Use: Never used   Substance and Sexual Activity   • Alcohol use: Not Currently     Comment: rare, social    • Drug use: Never   • Sexual activity: None   Other Topics Concern   • None   Social History Narrative    Significant other Aracely Sanchez is very supportive       Social Determinants of Health     Financial Resource Strain: Not on file   Food Insecurity: No Food Insecurity   • Worried About [address] in the Last Year: Never true   • Ran Out of Food in the Last Year: Never true   Transportation Needs: No Transportation Needs   • Lack of Transportation (Medical): No   • Lack of Transportation (Non-Medical):  No   Physical Activity: Not on file   Stress: Not on file   Social Connections: Not on file   Intimate Partner Violence: Not on file   Housing Stability: Low Risk    • Unable to Pay for Housing in the Last Year: No   • Number of Places Lived in the Last Year: 1   • Unstable Housing in the Last Year: No      Family History:     Family History   Problem Relation Age of Onset   • Coronary artery disease Mother    • Heart attack Father    • Diabetes type II Father       Current Medications:     Current Outpatient Medications   Medication Sig Dispense Refill   • acetaminophen (TYLENOL) 325 mg tablet Take 650 mg by mouth every 6 (six) hours as needed for mild pain     • al mag oxide-diphenhydramine-lidocaine viscous (MAGIC MOUTHWASH) 1:1:1 suspension Swish and spit 10 mL every 4 (four) hours as needed for mouth pain or discomfort (Patient taking differently: Swish and spit 10 mL if needed for mouth pain or discomfort) 500 mL 2   • baclofen 10 mg tablet TAKE 1/2 TABLET BY MOUTH 3 TIMES A DAY AS NEEDED FOR MUSCLE SPASMS (Patient taking differently: if needed) 135 tablet 1   • furosemide (LASIX) 40 mg tablet Take 1 tablet (40 mg total) by mouth daily 90 tablet 0   • lactulose (CEPHULAC) 10 g packet Take 1 packet (10 g total) by mouth 3 (three) times a day as needed (constipation) (Patient taking differently: Take 10 g by mouth if needed (constipation)) 30 each 0   • lisinopril (ZESTRIL) 20 mg tablet Take 1 tablet (20 mg total) by mouth daily (Patient taking differently: Take 20 mg by mouth daily Only to take as needed when BP is >850 systolicallly) 90 tablet 1   • morphine (MS CONTIN) 15 mg 12 hr tablet Take 1 tablet (15 mg total) by mouth daily at bedtime Max Daily Amount: 15 mg Do not start before October 7, 2022  30 tablet 0   • nystatin (MYCOSTATIN) 500,000 units/5 mL suspension Apply 5 mL (500,000 Units total) to the mouth or throat 4 (four) times a day (Patient taking differently: Apply 500,000 Units to the mouth or throat if needed) 473 mL 1   • nystatin (MYCOSTATIN) powder Apply topically 3 (three) times a day 15 g 0   • ondansetron (ZOFRAN) 4 mg tablet Take 1 tablet (4 mg total) by mouth every 8 (eight) hours as needed for nausea or vomiting 9 tablet 20   • oxyCODONE (ROXICODONE) 10 MG TABS Take 0 5-1 tablets (5-10 mg total) by mouth every 4 (four) hours as needed for moderate pain or severe pain Max Daily Amount: 60 mg 120 tablet 0   • pantoprazole (PROTONIX) 40 mg tablet Take 1 tablet (40 mg total) by mouth daily in the early morning 90 tablet 1   • polyethylene glycol (MIRALAX) 17 g packet Take 17 g by mouth daily (Patient taking differently: Take 17 g by mouth if needed) 30 each 0   • senna (SENOKOT) 8 6 mg Take 3 tablets (25 8 mg total) by mouth 2 (two) times a day Hold for loose stool  (Patient taking differently: Take 25 8 mg by mouth if needed Hold for loose stool ) 180 tablet 0   • DPH-Lido-AlHydr-MgHydr-Simeth (First-Mouthwash BLM) SUSP        No current facility-administered medications for this visit  Allergies:     No Known Allergies   Physical Exam:     /78 (BP Location: Left arm, Patient Position: Sitting, Cuff Size: Standard)   Pulse 104   Temp 98 9 °F (37 2 °C) (Temporal)   Ht 5' 3 5" (1 613 m)   Wt 72 7 kg (160 lb 3 2 oz)   LMP 03/28/2022 (Approximate)   SpO2 97% Comment: room air  BMI 27 93 kg/m²     Physical Exam  Constitutional:       General: She is not in acute distress  Appearance: Normal appearance  She is normal weight  She is not ill-appearing, toxic-appearing or diaphoretic  Cardiovascular:      Rate and Rhythm: Normal rate and regular rhythm  Pulses: Normal pulses  Heart sounds: Normal heart sounds  No murmur heard  No gallop     Pulmonary:      Effort: Pulmonary effort is normal  No respiratory distress  Breath sounds: Normal breath sounds  No stridor  No wheezing, rhonchi or rales  Chest:      Chest wall: No tenderness  Abdominal:      General: Abdomen is flat  Bowel sounds are normal  There is no distension  Palpations: Abdomen is soft  There is no mass  Tenderness: There is no abdominal tenderness  There is no guarding  Hernia: No hernia is present  Musculoskeletal:         General: Normal range of motion  Skin:     General: Skin is warm and dry  Capillary Refill: Capillary refill takes less than 2 seconds  Neurological:      General: No focal deficit present  Mental Status: She is alert            Padmini Xiao MD  [facility] INTERNAL MEDICINE

## 2022-10-27 NOTE — PATIENT INSTRUCTIONS

## 2022-11-01 ENCOUNTER — OFFICE VISIT (OUTPATIENT)
Dept: PHYSICAL THERAPY | Facility: CLINIC | Age: 47
End: 2022-11-01

## 2022-11-01 DIAGNOSIS — S12.301A CLOSED NONDISPLACED FRACTURE OF FOURTH CERVICAL VERTEBRA, UNSPECIFIED FRACTURE MORPHOLOGY, INITIAL ENCOUNTER (HCC): Primary | ICD-10-CM

## 2022-11-01 DIAGNOSIS — M84.48XA PATHOLOGICAL FRACTURE OF CERVICAL VERTEBRA, INITIAL ENCOUNTER: ICD-10-CM

## 2022-11-01 NOTE — PROGRESS NOTES
Daily Note     Today's date: 2022  Patient name: Phong Duavl  : 1975  MRN: [id number]  Referring provider: Umu Perez MD  Dx:   Encounter Diagnosis     ICD-10-CM    1  Closed nondisplaced fracture of fourth cervical vertebra, unspecified fracture morphology, initial encounter ([facility] )  S12 301A    2  Pathological fracture of cervical vertebra, initial encounter  M84 48XA        Start Time: 1030  Stop Time: 1115  Total time in clinic (min): 45 minutes    Subjective: Pt with no new concerns noted at this time  Pt plans to RTW tomorrow 20hr/wk working from home  Objective: See treatment diary below      Assessment: Tolerated treatment well  Continued with current POC with good tolerance from the patient  No pain reported with exercises  Continue to progress as tolerated  Patient would benefit from continued PT      Plan: Continue per plan of care  Precautions:  Active Breast CA w/ Metastasis to Spine, HTN    Access Code: [email or web address]       Manuals 10/27 11/1  10/18 10/20 10/25   STM B/L UT, LS, Rhomboids  IASTM KCB IASTM KCB  IASTM KCB NP IASTM KCB                     Neuro Re-Ed         C/S Retract  20x 20x   20x 20x  20x    TB T 30x BTB 30x BTB  30x BTB 30x BTB 30x BTB   Scap Protract at Wall  20x at plinth  20x at plinth  20x at plinth  20x at [address] w/ Posture at wall 10x w/ cane  10x w/ cane   10x w/ cane 10x w/ cane  10x w/ cane   Scap Set at Wall  10x3" 10x3"    10x3"            Education          Ther Ex         TB Row/Ext  30x BTB 30x BTB  30x BTB 30x BTB 30x BTB   T/S Ext          T/S Rotation 10x5" seated   10x5" seated 10x5" seated 10x5" seated    Shrugs/Rolls 30x 7# 30x 7#  30x 6# 30x 6# 30x 7#   Bicep Curls 30x 7# 30x 7#  30x 6# 30x 6# 30x 7#   DB Rows  30x 7# 30x 7#  30x 6# 30x 6# 30x 7#    UT Stretch          Shoulder Flex/Scap 3x10 4# 3x10 4#  3x10 4# 3x10 4# 3x10 4#   Towel Rotation Stretch          Shoulder Abd 3x10 3# 3x10 3#  3x10 3# 3x10 3# 3x10 3#   OH Shoulder Press  3x10 3# 3x10 3#  3x10 3# 3x10 3# 3x10 3#   OH Towel Slide 10x  10x   10x  10x  10x             Ther Activity                           Gait Training                           Modalities

## 2022-11-01 NOTE — PROGRESS NOTES
Pt was seen by Dr Edenilson Roque at [facility] for her cycle 1 day 1 exam  Pt has completed all screening requirements  Awaiting central confirmation of tissue specimen in order to be randomized  Pt will start treatment tomorrow 4/28 at [facility] infusion  Pt's baseline weight is 85 8kg  AE's and conmeds were discussed  Dr Edenilson Roque reviewed and signed safety labs  Chemo orders were sent to pharmacy and infusion  Also mentioned cortisol levels were low due to pt taking dexamethasone, no endo referral needed per Dr Edenilson Roque  QOL's will be collected at infusion appt  Questions were answered and pt will contact me with any study related questions  Pt will see the PA on 5/18, due to Dr Edenilson Roque being ooo  Detail Level: Simple Render Risk Assessment In Note?: no Additional Notes: Patient to apply Vaseline for 1 week

## 2022-11-03 ENCOUNTER — EVALUATION (OUTPATIENT)
Dept: PHYSICAL THERAPY | Facility: CLINIC | Age: 47
End: 2022-11-03

## 2022-11-03 DIAGNOSIS — M84.48XA PATHOLOGICAL FRACTURE OF CERVICAL VERTEBRA, INITIAL ENCOUNTER: ICD-10-CM

## 2022-11-03 DIAGNOSIS — S12.301A CLOSED NONDISPLACED FRACTURE OF FOURTH CERVICAL VERTEBRA, UNSPECIFIED FRACTURE MORPHOLOGY, INITIAL ENCOUNTER (HCC): Primary | ICD-10-CM

## 2022-11-03 NOTE — PROGRESS NOTES
Progress Note      Today's date: 11/3/2022  Patient name: Sabrina Maldonado  : 1975  MRN: [id number]  Referring provider: Lita Jose MD  Dx:   Encounter Diagnosis     ICD-10-CM    1  Closed nondisplaced fracture of fourth cervical vertebra, unspecified fracture morphology, initial encounter ([facility] )  S12 301A    2  Pathological fracture of cervical vertebra, initial encounter  M84 48XA        Start Time:   Stop Time:   Total time in clinic (min): 40 minutes    Subjective: The patient presents for re-evaluation today  The patient reports improvement in symptoms since beginning skilled physical therapy  The patient reports 3/10 pain at it's worst over the past 24 hours, and reports 75-80% improvement in overall condition since beginning formal PT care  The patient's chief remaining concern is improving mobility in the cervical and thoracic region in order to return to all previous activities  Objective:    Active Range of Motion     Cervical    Flexion: 25 degrees   Extension: 35 degrees   Left lateral flexion: 35 degrees   Right lateral flexion: 35 degrees   Left rotation: 45 degrees  Right rotation: 45 degrees       Left Shoulder   Flexion: 155 degrees   External rotation BTH: T4   Internal rotation BTB: T10 with pain    Right Shoulder   Flexion: 160 degrees   External rotation BTH: T4   Internal rotation BTB: T7     Strength/Myotome Testing   Cervical Spine   Neck extension: 4+  Neck flexion: 4+    Left   Neck lateral flexion (C3): 4+    Right   Neck lateral flexion (C3): 4+    Left Shoulder     Planes of Motion   Flexion: 4+  Extension: 5   Abduction: 4+  External rotation at 0°: 5  Internal rotation at 0°: 5    Right Shoulder     Planes of Motion   Flexion: 4+   Extension: 5   Abduction: 4+  External rotation at 0°: 5  Internal rotation at 0°: 5       Assessment: Sabrina Maldonado is a pleasant 49 y o  female who has been receiving PT intervention for neck/thoracic pain following compression fractures due to metastatic cancer  The patient has demonstrated decreased pain, increased strength, increased ROM, improved body mechanics, improved posture  and increased activity tolerance since beginning treatment  Pt has progressed well with PT POC and continues to be compliant with HEP  Primary remaining impairments include:    1)  Decreased mobility in the cervical and thoracic region     2)  Impaired postural awareness and control     Goals  Short Term Goals 2-4 wks   1  Pt will be independent with initial HEP - MET  2  Pt will improve C/S ROM 25% in all planes -MET  3  Pt will improve deficient mm strength 1/2 grade -MET    Long Term Goals 6-8 wks  1  Pt will improve cervical ROM to WNL -Progressing   2  Pt will return to driving without limitations -Progressing   3  Pt will improve FOTO score to >64 by d/c- Progressing     Plan:   Patient would benefit from: skilled physical therapy  Planned modality interventions: Modalities PRN  Planned therapy interventions: activity modification, manual therapy, neuromuscular re-education, patient education, therapeutic activities, therapeutic exercise, graded activity, home exercise program, behavior modification, self care, abdominal trunk stabilization and postural training  Frequency: 2x week  Duration in weeks: 6  Treatment plan discussed with: patient     Precautions:  Active Breast CA w/ Metastasis to Spine, HTN    Access Code: [email or web address]       Manuals 10/27 11/1 11/3  10/20 10/25   STM B/L UT, LS, Rhomboids  IASTM KCB IASTM KCB IASTM KCB  NP IASTM KCB                     Neuro Re-Ed         C/S Retract  20x 20x  20x  20x  20x    TB T 30x BTB 30x BTB 30x BTB  30x BTB 30x BTB   Scap Protract at Wall  20x at plinth   20x at plinth  20x at [facility] w/ Posture at wall 10x w/ cane  10x w/ cane  10x w/ cane   10x w/ cane  10x w/ cane   Scap Set at Wall  10x3" 10x3" 10x3"   10x3"            Education          Ther Ex         TB Row/Ext  30x BTB 30x BTB 30x Blk TB  30x BTB 30x BTB   T/S Ext          T/S Rotation 10x5" seated    10x5" seated 10x5" seated    Shrugs/Rolls 30x 7# 30x 7# 30x 7#  30x 6# 30x 7#   Bicep Curls 30x 7# 30x 7# 30x 7#  30x 6# 30x 7#   DB Rows  30x 7# 30x 7# 30x 7#  30x 6# 30x 7#    UT Stretch          Shoulder Flex/Scap 3x10 4# 3x10 4# 3x10 4#  3x10 4# 3x10 4#   Towel Rotation Stretch          Shoulder Abd 3x10 3# 3x10 3# 3x10 3#  3x10 3# 3x10 3#   OH Shoulder Press  3x10 3# 3x10 3# 3x10 3#  3x10 3# 3x10 3#   OH Towel Slide 10x  10x  10x  10x  10x             Ther Activity                           Gait Training                           Modalities

## 2022-11-07 DIAGNOSIS — K21.9 GASTROESOPHAGEAL REFLUX DISEASE WITHOUT ESOPHAGITIS: ICD-10-CM

## 2022-11-07 RX ORDER — PANTOPRAZOLE SODIUM 40 MG/1
40 TABLET, DELAYED RELEASE ORAL
Qty: 90 TABLET | Refills: 1 | Status: SHIPPED | OUTPATIENT
Start: 2022-11-07

## 2022-11-08 ENCOUNTER — TELEPHONE (OUTPATIENT)
Dept: CT IMAGING | Facility: HOSPITAL | Age: 47
End: 2022-11-08

## 2022-11-08 ENCOUNTER — TELEPHONE (OUTPATIENT)
Dept: OTHER | Facility: OTHER | Age: 47
End: 2022-11-08

## 2022-11-08 ENCOUNTER — APPOINTMENT (OUTPATIENT)
Dept: PHYSICAL THERAPY | Facility: CLINIC | Age: 47
End: 2022-11-08

## 2022-11-08 ENCOUNTER — APPOINTMENT (EMERGENCY)
Dept: CT IMAGING | Facility: HOSPITAL | Age: 47
End: 2022-11-08

## 2022-11-08 ENCOUNTER — HOSPITAL ENCOUNTER (EMERGENCY)
Facility: HOSPITAL | Age: 47
Discharge: HOME | End: 2022-11-09
Attending: EMERGENCY MEDICINE

## 2022-11-08 DIAGNOSIS — R10.9 LEFT FLANK PAIN: ICD-10-CM

## 2022-11-08 DIAGNOSIS — N83.8 MASS OF LEFT OVARY: ICD-10-CM

## 2022-11-08 DIAGNOSIS — N39.0 UTI (URINARY TRACT INFECTION): Primary | ICD-10-CM

## 2022-11-08 LAB
ALBUMIN SERPL BCP-MCNC: 4.4 G/DL (ref 3.5–5)
ALBUMIN SERPL BCP-MCNC: 4.5 G/DL (ref 3.5–5)
ALP SERPL-CCNC: 50 U/L (ref 34–104)
ALP SERPL-CCNC: 55 U/L (ref 34–104)
ALT SERPL W P-5'-P-CCNC: 16 U/L (ref 7–52)
ALT SERPL W P-5'-P-CCNC: 16 U/L (ref 7–52)
ANION GAP SERPL CALCULATED.3IONS-SCNC: 10 MMOL/L (ref 4–13)
ANION GAP SERPL CALCULATED.3IONS-SCNC: 9 MMOL/L (ref 4–13)
AST SERPL W P-5'-P-CCNC: 13 U/L (ref 13–39)
AST SERPL W P-5'-P-CCNC: 29 U/L (ref 13–39)
BASOPHILS # BLD AUTO: 0.02 THOUSANDS/ÂΜL (ref 0–0.1)
BASOPHILS NFR BLD AUTO: 0 % (ref 0–1)
BILIRUB SERPL-MCNC: 1.21 MG/DL (ref 0.2–1)
BILIRUB SERPL-MCNC: 1.27 MG/DL (ref 0.2–1)
BUN SERPL-MCNC: 17 MG/DL (ref 5–25)
BUN SERPL-MCNC: 17 MG/DL (ref 5–25)
CALCIUM SERPL-MCNC: 9.4 MG/DL (ref 8.4–10.2)
CALCIUM SERPL-MCNC: 9.7 MG/DL (ref 8.4–10.2)
CARDIAC TROPONIN I PNL SERPL HS: <2 NG/L
CHLORIDE SERPL-SCNC: 104 MMOL/L (ref 96–108)
CHLORIDE SERPL-SCNC: 104 MMOL/L (ref 96–108)
CO2 SERPL-SCNC: 22 MMOL/L (ref 21–32)
CO2 SERPL-SCNC: 26 MMOL/L (ref 21–32)
CREAT SERPL-MCNC: 0.57 MG/DL (ref 0.6–1.3)
CREAT SERPL-MCNC: 0.73 MG/DL (ref 0.6–1.3)
EOSINOPHIL # BLD AUTO: 0.11 THOUSAND/ÂΜL (ref 0–0.61)
EOSINOPHIL NFR BLD AUTO: 1 % (ref 0–6)
ERYTHROCYTE [DISTWIDTH] IN BLOOD BY AUTOMATED COUNT: 13.3 % (ref 11.6–15.1)
GFR SERPL CREATININE-BSD FRML MDRD: 109 ML/MIN/1.73SQ M
GFR SERPL CREATININE-BSD FRML MDRD: 97 ML/MIN/1.73SQ M
GLUCOSE SERPL-MCNC: 113 MG/DL (ref 65–140)
GLUCOSE SERPL-MCNC: 120 MG/DL (ref 65–140)
HCT VFR BLD AUTO: 43 % (ref 34.8–46.1)
HGB BLD-MCNC: 13.6 G/DL (ref 11.5–15.4)
IMM GRANULOCYTES # BLD AUTO: 0.03 THOUSAND/UL (ref 0–0.2)
IMM GRANULOCYTES NFR BLD AUTO: 0 % (ref 0–2)
LIPASE SERPL-CCNC: 18 U/L (ref 11–82)
LYMPHOCYTES # BLD AUTO: 0.57 THOUSANDS/ÂΜL (ref 0.6–4.47)
LYMPHOCYTES NFR BLD AUTO: 5 % (ref 14–44)
MAGNESIUM SERPL-MCNC: 2 MG/DL (ref 1.9–2.7)
MCH RBC QN AUTO: 31.3 PG (ref 26.8–34.3)
MCHC RBC AUTO-ENTMCNC: 31.6 G/DL (ref 31.4–37.4)
MCV RBC AUTO: 99 FL (ref 82–98)
MONOCYTES # BLD AUTO: 0.36 THOUSAND/ÂΜL (ref 0.17–1.22)
MONOCYTES NFR BLD AUTO: 3 % (ref 4–12)
NEUTROPHILS # BLD AUTO: 10.46 THOUSANDS/ÂΜL (ref 1.85–7.62)
NEUTS SEG NFR BLD AUTO: 91 % (ref 43–75)
NRBC BLD AUTO-RTO: 0 /100 WBCS
PLATELET # BLD AUTO: 279 THOUSANDS/UL (ref 149–390)
PMV BLD AUTO: 9.9 FL (ref 8.9–12.7)
POTASSIUM SERPL-SCNC: 4.1 MMOL/L (ref 3.5–5.3)
POTASSIUM SERPL-SCNC: 6.4 MMOL/L (ref 3.5–5.3)
PROT SERPL-MCNC: 7.6 G/DL (ref 6.4–8.4)
PROT SERPL-MCNC: 7.8 G/DL (ref 6.4–8.4)
RBC # BLD AUTO: 4.34 MILLION/UL (ref 3.81–5.12)
SODIUM SERPL-SCNC: 136 MMOL/L (ref 135–147)
SODIUM SERPL-SCNC: 139 MMOL/L (ref 135–147)
WBC # BLD AUTO: 11.55 THOUSAND/UL (ref 4.31–10.16)

## 2022-11-08 RX ORDER — MORPHINE SULFATE 4 MG/ML
4 INJECTION, SOLUTION INTRAMUSCULAR; INTRAVENOUS ONCE
Status: COMPLETED | OUTPATIENT
Start: 2022-11-08 | End: 2022-11-08

## 2022-11-08 RX ORDER — METOCLOPRAMIDE HYDROCHLORIDE 5 MG/ML
10 INJECTION INTRAMUSCULAR; INTRAVENOUS ONCE
Status: COMPLETED | OUTPATIENT
Start: 2022-11-08 | End: 2022-11-08

## 2022-11-08 RX ORDER — DIPHENHYDRAMINE HYDROCHLORIDE 50 MG/ML
12.5 INJECTION INTRAMUSCULAR; INTRAVENOUS ONCE
Status: COMPLETED | OUTPATIENT
Start: 2022-11-08 | End: 2022-11-08

## 2022-11-08 RX ORDER — ONDANSETRON 4 MG/1
4 TABLET, ORALLY DISINTEGRATING ORAL ONCE
Status: COMPLETED | OUTPATIENT
Start: 2022-11-08 | End: 2022-11-08

## 2022-11-08 RX ADMIN — ONDANSETRON 4 MG: 4 TABLET, ORALLY DISINTEGRATING ORAL at 19:10

## 2022-11-08 RX ADMIN — DIPHENHYDRAMINE HYDROCHLORIDE 12.5 MG: 50 INJECTION, SOLUTION INTRAMUSCULAR; INTRAVENOUS at 22:35

## 2022-11-08 RX ADMIN — MORPHINE SULFATE 4 MG: 4 INJECTION INTRAVENOUS at 22:36

## 2022-11-08 RX ADMIN — METOCLOPRAMIDE 10 MG: 5 INJECTION, SOLUTION INTRAMUSCULAR; INTRAVENOUS at 22:35

## 2022-11-08 RX ADMIN — SODIUM CHLORIDE 1000 ML: 0.9 INJECTION, SOLUTION INTRAVENOUS at 22:34

## 2022-11-08 RX ADMIN — IOHEXOL 100 ML: 350 INJECTION, SOLUTION INTRAVENOUS at 22:41

## 2022-11-09 VITALS
TEMPERATURE: 99 F | DIASTOLIC BLOOD PRESSURE: 68 MMHG | HEART RATE: 74 BPM | RESPIRATION RATE: 16 BRPM | OXYGEN SATURATION: 99 % | SYSTOLIC BLOOD PRESSURE: 124 MMHG

## 2022-11-09 LAB
ATRIAL RATE: 98 BPM
BACTERIA UR QL AUTO: ABNORMAL /HPF
BILIRUB UR QL STRIP: NEGATIVE
CLARITY UR: ABNORMAL
COLOR UR: YELLOW
GLUCOSE UR STRIP-MCNC: NEGATIVE MG/DL
HGB UR QL STRIP.AUTO: ABNORMAL
KETONES UR STRIP-MCNC: ABNORMAL MG/DL
LEUKOCYTE ESTERASE UR QL STRIP: ABNORMAL
MUCOUS THREADS UR QL AUTO: ABNORMAL
NITRITE UR QL STRIP: NEGATIVE
NON-SQ EPI CELLS URNS QL MICRO: ABNORMAL /HPF
P AXIS: 42 DEGREES
PH UR STRIP.AUTO: 6 [PH]
PR INTERVAL: 152 MS
PROT UR STRIP-MCNC: ABNORMAL MG/DL
QRS AXIS: 11 DEGREES
QRSD INTERVAL: 66 MS
QT INTERVAL: 332 MS
QTC INTERVAL: 423 MS
RBC #/AREA URNS AUTO: ABNORMAL /HPF
SP GR UR STRIP.AUTO: 1.03 (ref 1–1.03)
T WAVE AXIS: 36 DEGREES
UROBILINOGEN UR STRIP-ACNC: <2 MG/DL
VENTRICULAR RATE: 98 BPM
WBC #/AREA URNS AUTO: ABNORMAL /HPF

## 2022-11-09 RX ORDER — CEPHALEXIN 250 MG/1
500 CAPSULE ORAL ONCE
Status: COMPLETED | OUTPATIENT
Start: 2022-11-09 | End: 2022-11-09

## 2022-11-09 RX ORDER — CEPHALEXIN 500 MG/1
500 CAPSULE ORAL EVERY 12 HOURS SCHEDULED
Qty: 14 CAPSULE | Refills: 0 | Status: SHIPPED | OUTPATIENT
Start: 2022-11-09 | End: 2022-11-10

## 2022-11-09 RX ADMIN — CEPHALEXIN 500 MG: 250 CAPSULE ORAL at 01:28

## 2022-11-09 NOTE — DISCHARGE INSTRUCTIONS
Take prescribed antibiotic as directed  Drink plenty of fluids  Follow up with PCP  Follow up with oncology  RETURN TO THE ER IF ANYTHING ACUTELY CHANGES

## 2022-11-09 NOTE — TELEPHONE ENCOUNTER
Pt stated wanted to let office know that she is currently at the Ed and stated is having abdominal pain

## 2022-11-09 NOTE — ED PROVIDER NOTES
History  Chief Complaint   Patient presents with   • Abdominal Pain     Pt arrives c/o LUQ pain and N/V, after a few hours radiating into L flank area  Patient is a 41-year-old female with history of stage four metastatic breast cancer, presenting to the emergency room for evaluation  Patient states this afternoon she developed left upper quadrant and left flank pain  She had associated vomiting and nausea  Patient thought she had food poisoning  Patient states the pain has been constant  Nothing makes the pain better or worse  She denies any chest pain, shortness of breath, diarrhea, headache, dizziness  History provided by:  Patient   used: No        Prior to Admission Medications   Prescriptions Last Dose Informant Patient Reported? Taking?    DPH-Lido-AlHydr-MgHydr-Simeth (First-Mouthwash BLM) SUSP  Self Yes No   acetaminophen (TYLENOL) 325 mg tablet  Self Yes No   Sig: Take 650 mg by mouth every 6 (six) hours as needed for mild pain   al mag oxide-diphenhydramine-lidocaine viscous (MAGIC MOUTHWASH) 1:1:1 suspension  Self No No   Sig: Swish and spit 10 mL every 4 (four) hours as needed for mouth pain or discomfort   Patient taking differently: Swish and spit 10 mL if needed for mouth pain or discomfort   baclofen 10 mg tablet  Self No No   Sig: TAKE 1/2 TABLET BY MOUTH 3 TIMES A DAY AS NEEDED FOR MUSCLE SPASMS   Patient taking differently: if needed   furosemide (LASIX) 40 mg tablet  Self No No   Sig: Take 1 tablet (40 mg total) by mouth daily   lactulose (CEPHULAC) 10 g packet  Self No No   Sig: Take 1 packet (10 g total) by mouth 3 (three) times a day as needed (constipation)   Patient taking differently: Take 10 g by mouth if needed (constipation)   lisinopril (ZESTRIL) 20 mg tablet  Self No No   Sig: Take 1 tablet (20 mg total) by mouth daily   Patient taking differently: Take 20 mg by mouth daily Only to take as needed when BP is >236 systolicallly   morphine (MS CONTIN) 15 mg 12 hr tablet  Self No No   Sig: Take 1 tablet (15 mg total) by mouth daily at bedtime Max Daily Amount: 15 mg Do not start before October 7, 2022  nystatin (MYCOSTATIN) 500,000 units/5 mL suspension  Self No No   Sig: Apply 5 mL (500,000 Units total) to the mouth or throat 4 (four) times a day   Patient taking differently: Apply 500,000 Units to the mouth or throat if needed   nystatin (MYCOSTATIN) powder  Self No No   Sig: Apply topically 3 (three) times a day   ondansetron (ZOFRAN) 4 mg tablet  Self No No   Sig: Take 1 tablet (4 mg total) by mouth every 8 (eight) hours as needed for nausea or vomiting   oxyCODONE (ROXICODONE) 10 MG TABS  Self No No   Sig: Take 0 5-1 tablets (5-10 mg total) by mouth every 4 (four) hours as needed for moderate pain or severe pain Max Daily Amount: 60 mg   pantoprazole (PROTONIX) 40 mg tablet   No No   Sig: Take 1 tablet (40 mg total) by mouth daily in the early morning   polyethylene glycol (MIRALAX) 17 g packet  Self No No   Sig: Take 17 g by mouth daily   Patient taking differently: Take 17 g by mouth if needed   senna (SENOKOT) 8 6 mg  Self No No   Sig: Take 3 tablets (25 8 mg total) by mouth 2 (two) times a day Hold for loose stool  Patient taking differently: Take 25 8 mg by mouth if needed Hold for loose stool  Facility-Administered Medications: None       Past Medical History:   Diagnosis Date   • Breast cancer ([facility] )    • Cancer ([facility] )    • Hypertension        History reviewed  No pertinent surgical history  Family History   Problem Relation Age of Onset   • Coronary artery disease Mother    • Heart attack Father    • Diabetes type II Father      I have reviewed and agree with the history as documented      E-Cigarette/Vaping   • E-Cigarette Use Never User      E-Cigarette/Vaping Substances   • Nicotine No    • THC No    • CBD No    • Flavoring No    • Other No    • Unknown No      Social History     Tobacco Use   • Smoking status: Never Smoker   • Smokeless tobacco: Never Used   Vaping Use   • Vaping Use: Never used   Substance Use Topics   • Alcohol use: Not Currently     Comment: rare, social    • Drug use: Never       Review of Systems   Constitutional: Negative for chills and fever  HENT: Negative for ear pain and sore throat  Eyes: Negative for pain and visual disturbance  Respiratory: Negative for cough and shortness of breath  Cardiovascular: Negative for chest pain and palpitations  Gastrointestinal: Positive for abdominal pain, nausea and vomiting  Genitourinary: Positive for flank pain  Negative for dysuria and hematuria  Musculoskeletal: Negative for arthralgias and back pain  Skin: Negative for color change and rash  Neurological: Negative for seizures and syncope  All other systems reviewed and are negative  Physical Exam  Physical Exam  Vitals and nursing note reviewed  Constitutional:       General: She is not in acute distress  Appearance: She is well-developed  HENT:      Head: Normocephalic and atraumatic  Eyes:      Conjunctiva/sclera: Conjunctivae normal    Cardiovascular:      Rate and Rhythm: Normal rate and regular rhythm  Heart sounds: No murmur heard  Pulmonary:      Effort: Pulmonary effort is normal  No respiratory distress  Breath sounds: Normal breath sounds  Abdominal:      Palpations: Abdomen is soft  Tenderness: There is abdominal tenderness  There is no right CVA tenderness or left CVA tenderness  Musculoskeletal:         General: Tenderness (left posterior ribs) present  Cervical back: Neck supple  Tenderness present  Skin:     General: Skin is warm and dry  Neurological:      Mental Status: She is alert           Vital Signs  ED Triage Vitals   Temperature Pulse Respirations Blood Pressure SpO2   11/08/22 1904 11/08/22 1903 11/08/22 1903 11/08/22 1903 11/08/22 1903   99 °F (37 2 °C) 86 17 131/76 98 %      Temp Source Heart Rate Source Patient Position - Orthostatic VS BP Location FiO2 (%)   11/08/22 1904 11/09/22 0138 -- -- --   Oral Monitor         Pain Score       11/08/22 1903       8           Vitals:    11/08/22 1903 11/09/22 0138   BP: 131/76 124/68   Pulse: 86 74           ED Medications  Medications   ondansetron (ZOFRAN-ODT) dispersible tablet 4 mg (4 mg Oral Given 11/8/22 1910)   morphine injection 4 mg (4 mg Intravenous Given 11/8/22 2236)   sodium chloride 0 9 % bolus 1,000 mL (0 mL Intravenous Stopped 11/9/22 0128)   metoclopramide (REGLAN) injection 10 mg (10 mg Intravenous Given 11/8/22 2235)   diphenhydrAMINE (BENADRYL) injection 12 5 mg (12 5 mg Intravenous Given 11/8/22 2235)   iohexol (OMNIPAQUE) 350 MG/ML injection (SINGLE-DOSE) 100 mL (100 mL Intravenous Given 11/8/22 2241)   cephalexin (KEFLEX) capsule 500 mg (500 mg Oral Given 11/9/22 0128)       Diagnostic Studies  Results Reviewed     Procedure Component Value Units Date/Time    Urine culture [[id number]] Collected: 11/09/22 0130    Lab Status:  In process Specimen: Urine, Clean Catch Updated: 11/09/22 0133    Urine Microscopic [[id number]]  (Abnormal) Collected: 11/09/22 0018    Lab Status: Final result Specimen: Urine Updated: 11/09/22 0116     RBC, UA 1-2 /hpf      WBC, UA Innumerable /hpf      Epithelial Cells Occasional /hpf      Bacteria, UA Occasional /hpf      MUCUS THREADS Moderate    UA (URINE) with reflex to Scope [[id number]]  (Abnormal) Collected: 11/09/22 0018    Lab Status: Final result Specimen: Urine Updated: 11/09/22 0033     Color, UA Yellow     Clarity, UA Turbid     Specific Gravity, UA 1 029     pH, UA 6 0     Leukocytes, UA Large     Nitrite, UA Negative     Protein,  (2+) mg/dl      Glucose, UA Negative mg/dl      Ketones,  (3+) mg/dl      Urobilinogen, UA <2 0 mg/dl      Bilirubin, UA Negative     Occult Blood, UA Small    Comprehensive metabolic panel [[id number]]  (Abnormal) Collected: 11/08/22 2236    Lab Status: Final result Specimen: Blood from Arm, Right Updated: 11/08/22 2330     Sodium 139 mmol/L      Potassium 4 1 mmol/L      Chloride 104 mmol/L      CO2 26 mmol/L      ANION GAP 9 mmol/L      BUN 17 mg/dL      Creatinine 0 73 mg/dL      Glucose 113 mg/dL      Calcium 9 4 mg/dL      AST 13 U/L      ALT 16 U/L      Alkaline Phosphatase 50 U/L      Total Protein 7 6 g/dL      Albumin 4 4 g/dL      Total Bilirubin 1 27 mg/dL      eGFR 97 ml/min/1 73sq m     Narrative:      [place] guidelines for Chronic Kidney Disease (CKD):   •  Stage 1 with normal or high GFR (GFR > 90 mL/min/1 73 square meters)  •  Stage 2 Mild CKD (GFR = 60-89 mL/min/1 73 square meters)  •  Stage 3A Moderate CKD (GFR = 45-59 mL/min/1 73 square meters)  •  Stage 3B Moderate CKD (GFR = 30-44 mL/min/1 73 square meters)  •  Stage 4 Severe CKD (GFR = 15-29 mL/min/1 73 square meters)  •  Stage 5 End Stage CKD (GFR <15 mL/min/1 73 square meters)  Note: GFR calculation is accurate only with a steady state creatinine    Magnesium [[id number]]  (Normal) Collected: 11/08/22 2236    Lab Status: Final result Specimen: Blood from Arm, Right Updated: 11/08/22 2330     Magnesium 2 0 mg/dL     HS Troponin 0hr (reflex protocol) [[id number]]  (Normal) Collected: 11/08/22 2236    Lab Status: Final result Specimen: Blood from Arm, Right Updated: 11/08/22 2311     hs TnI 0hr <2 ng/L     CBC and differential [[id number]]  (Abnormal) Collected: 11/08/22 1911    Lab Status: Final result Specimen: Blood from Arm, Right Updated: 11/08/22 1956     WBC 11 55 Thousand/uL      RBC 4 34 Million/uL      Hemoglobin 13 6 g/dL      Hematocrit 43 0 %      MCV 99 fL      MCH 31 3 pg      MCHC 31 6 g/dL      RDW 13 3 %      MPV 9 9 fL      Platelets 028 Thousands/uL      nRBC 0 /100 WBCs      Neutrophils Relative 91 %      Immat GRANS % 0 %      Lymphocytes Relative 5 %      Monocytes Relative 3 %      Eosinophils Relative 1 %      Basophils Relative 0 %      Neutrophils Absolute 10 46 Thousands/µL      Immature Grans Absolute 0 03 Thousand/uL      Lymphocytes Absolute 0 57 Thousands/µL      Monocytes Absolute 0 36 Thousand/µL      Eosinophils Absolute 0 11 Thousand/µL      Basophils Absolute 0 02 Thousands/µL     Narrative: This is an appended report  These results have been appended to a previously verified report  Comprehensive metabolic panel [[id number]]  (Abnormal) Collected: 11/08/22 1911    Lab Status: Final result Specimen: Blood from Arm, Right Updated: 11/08/22 1949     Sodium 136 mmol/L      Potassium 6 4 mmol/L      Chloride 104 mmol/L      CO2 22 mmol/L      ANION GAP 10 mmol/L      BUN 17 mg/dL      Creatinine 0 57 mg/dL      Glucose 120 mg/dL      Calcium 9 7 mg/dL      AST 29 U/L      ALT 16 U/L      Alkaline Phosphatase 55 U/L      Total Protein 7 8 g/dL      Albumin 4 5 g/dL      Total Bilirubin 1 21 mg/dL      eGFR 109 ml/min/1 73sq m     Narrative:      [place] guidelines for Chronic Kidney Disease (CKD):   •  Stage 1 with normal or high GFR (GFR > 90 mL/min/1 73 square meters)  •  Stage 2 Mild CKD (GFR = 60-89 mL/min/1 73 square meters)  •  Stage 3A Moderate CKD (GFR = 45-59 mL/min/1 73 square meters)  •  Stage 3B Moderate CKD (GFR = 30-44 mL/min/1 73 square meters)  •  Stage 4 Severe CKD (GFR = 15-29 mL/min/1 73 square meters)  •  Stage 5 End Stage CKD (GFR <15 mL/min/1 73 square meters)  Note: GFR calculation is accurate only with a steady state creatinine    Lipase [[id number]]  (Normal) Collected: 11/08/22 1911    Lab Status: Final result Specimen: Blood from Arm, Right Updated: 11/08/22 1947     Lipase 18 u/L                  PE Study with CT abdomen & pelvis with contrast   ED Interpretation by Maral Mcguire PA-C (11/09 0507)   PROCEDURE INFORMATION: Exam: CTA Chest With Contrast Exam date and time: 11/8/2022 10:39 PM Age: 49years old Clinical indication: Abdominal pain; Other: Chest pain; Patient HX: Stage 4 breast CA with bone mets   C/O left flank pain TECHNIQUE: Imaging protocol: Computed tomographic angiography of the chest with contrast  3D rendering (Not supervised by radiologist): MIP and/or 3D reconstructed images were created by the technologist  Contrast material: OMNI 350; COMPARISON: CTA CHEST (PE STUDY) ABDOMEN PELVIS ROUTINE CONTRAST 5/28/2022 12:49 AM FINDINGS: Pulmonary arteries: No pulmonary embolus or aortic dissection  Aorta: See "Pulmonary arteries" finding  Lungs: Probable scarring/atelectasis in the right posterior hilar paraspinous lung with crowded air bronchograms which could be sequela from radiation therapy/pulmonary fibrosis versus other chronic insult  Pleural spaces: Unremarkable  No pneumothorax  No pleural effusion  Heart: Unremarkable  No cardiomegaly  No pericardial effusio   n  Lymph nodes: Decreased size of 2 1 cm partially fatty right axillary lymph node which previously measured 3 1 cm without fatty tissue  Probable treated lymph node metastasis  Bones/joints: Unremarkable  No acute fracture  Soft tissues: Decreased size of 3 8 x 3 8 x 2 5 cm mass in the upper inner quadrant of the right breast with interval appearance of calcifications consistent with history of breast carcinoma  IMPRESSION: 1  Decreased size of 3 8 x 3 8 x 2 5 cm mass in the upper inner quadrant of the right breast with interval appearance of calcifications consistent with history of breast carcinoma  2  Decreased size of 2 1 cm partially fatty right axillary lymph node which previously measured 3 1 cm without fatty tissue  Probable treated lymph node metastasis  3  Probable scarring/atelectasis in the right posterior hilar paraspinous lung with crowded air bronchograms which could be sequela from radiation therapy/pulmonary fibrosis versus other chronic insult  Annmarie ceronon: [id number] MRN: [id number]  Preliminary Radiology Report Page 2 of 3 4   No pulmonary embolus or aortic dissection  ========================= PROCEDURE INFORMATION: Exam: CT Abdomen And Pelvis With Contrast Exam date and time: 11/8/2022 10:39 PM Age: 49years old Clinical indication: Abdominal pain; Other: Chest pain; Patient HX: Stage 4 breast CA with bone mets  C/O left flank pain TECHNIQUE: Imaging protocol: Computed tomography of the abdomen and pelvis with contrast  Contrast material: OMNI 350; COMPARISON: CT CHEST ABDOMEN PELVIS W CONTRAST 8/9/2022 9:41 AM FINDINGS: Liver: Normal  No mass  Gallbladder and bile ducts: Normal  No calcified stones  No ductal dilation  Pancreas: Normal  No ductal dilation  Spleen: Stable one or more accessory splenules  Adrenal glands: Normal  No mass  Kidneys and ureters: Stable simple left renal cyst measuring > 1 0 cm  Stable left nonobstructing renal calyceal stones  Interval appearance of mildly dilated left renal collecting system wit   h no obvious ureteral stone or bladder stone suggesting the patient may have recently passed a kidney stone versus tiny distal ureteral stone too small to see on 5 mm axial slices    Stomach and bowel: Unremarkable  No obstruction  No mucosal thickening  Appendix: No evidence of appendicitis  Intraperitoneal space: Unremarkable  No free air  No significant fluid collection  Vasculature: Unremarkable  No abdominal aortic aneurysm  Lymph nodes: Unremarkable  No enlarged lymph nodes  Urinary bladder: See "Kidneys and ureters" finding  Reproductive: Increased size of 4 9 x 4 9 x 3 1 cm egg-shaped water density lesion in the left ovary which previously measured 4 2 cm  Complex cyst versus neoplasm  Followup according to the staff radiologist's final report  Bones/joints: Stable grade 1 anterior spondylolytic spondylolisthesis of L5 on S1  Interval sclerotic healing of multiple bone metastasis with some residual mixed lytic sclerotic lesions  Soft tissues: Unremarkable  IMPRESSION: 1  Stable    left nonobstructing renal calyceal stones   Alexandra Wang Accession: [id number] MRN: [id number]  Preliminary Radiology Report  (QA) DISCREPANCY? If there is a discrepancy between the preliminary and final interpretation, please notify [facility] via [email or web address]  If you do not have access to our QA portal, call our QA team at [phone] This report is intended only for the use of the referring physician, and only in accordance with law, If you received this in error, call [phone] Page 3 of 3 2  Interval appearance of mildly dilated left renal collecting system with no obvious ureteral stone or bladder stone suggesting the patient may have recently passed a kidney stone versus tiny distal ureteral stone too small to see on 5 mm axial slices    3  Increased size of 4 9 x 4 9 x 3 1 cm egg-shaped water density lesion in the left ovary which previously measured 4 2 cm  Complex cyst versus neoplasm  Followup according to the staff    radiologist's final report  4  Interval sclerotic healing of multiple bone metastasis with some residual mixed lytic sclerotic lesions        CT spine cervical without contrast   ED Interpretation by Vero Dumont PA-C (11/09 3872)   PROCEDURE INFORMATION: Exam: CT Cervical Spine Without Contrast Exam date and time: 11/8/2022 10:36 PM Age: 49years old Clinical indication: Patient HX: Stage 4 breast CA with bone mets  C/O neck pain TECHNIQUE: Imaging protocol: Computed tomography of the cervical spine without contrast  COMPARISON: MRI CERVICAL SPINE W WO CONTRAST 3/29/2022 11:05 AM, CT cervical spine dated March 29, 2022  FINDINGS: Bones/joints: Interval healing of previously noted widespread large lytic bone metastasis throughout the cervical spine with predominantly sclerotic healing response at this time, including C2, C3, C4, C5, C6, C7, T1, T2, T3 and T4   Previously C4 was almost completely destroyed with very little remaining bone and now there appears to be 80% burst fracture with sclerotic bone with 5 mm posterior extension into the spinal canal and mild-to-moderate central spinal stenosis at C4  Previous 30% T2 compression fracture now appears to be 50% with sclerotic healing and 3 mm posterior extension    into the spinal canal with no central stenosis  Severe bilateral C3-C4 foraminal stenosis  Severe bilateral C4-C5 foraminal stenosis  Moderate to severe left C5-C6 foraminal stenosis  Moderate to severe C5-C6 degenerative disc disease and spondylosis  Mild C6-C7 degenerative disc disease and spondylosis  Lungs: Lung apices are normal  Soft tissues: Unremarkable  IMPRESSION: 1  Interval healing of previously noted widespread large lytic bone metastasis throughout the cervical spine with predominantly sclerotic healing response at this time, including C2, C3, C4, C5, C6, C7, T1, T2, T3 and T4  2  Previously C4 was almost completely destroyed with very little remaining bone and now there appears to be 80% burst fracture with sclerotic healed bone metastasis with 5 mm posterior extension into the spinal canal and mild-to-moderate central spinal stenosis at C4  3  Previous 30% T2 compression fracture now appears to be 50% with sclerotic healing and 3 mm posterior extension into the spinal    canal with no central stenosis  4  Severe bilateral C3-C4 foraminal stenosis  5  Severe bilateral C4-C5 foraminal stenosis  6  Moderate to severe left C5-C6 foraminal stenosis  7  Mild C6-C7 degenerative disc disease and spondylosis   Impression                   Procedures  ECG 12 Lead Documentation Only    Date/Time: 11/8/2022 7:21 PM  Performed by: Jerrod Ko PA-C  Authorized by: Jerrod Ko PA-C     ECG reviewed by me, the ED Provider: yes    Patient location:  ED  Interpretation:     Interpretation: normal    Rate:     ECG rate:  98    ECG rate assessment: normal    Rhythm:     Rhythm: sinus rhythm    Ectopy:     Ectopy: none    QRS:     QRS axis:  Normal  Conduction:     Conduction: normal    ST segments:     ST segments: Normal  T waves:     T waves: normal               ED Course  ED Course as of 11/09/22 0508   Wed Nov 09, 2022   0110 Re-evaluation at bedside, patient feels better, would like to go home  Updated patient on labs and imaging results  Counseling as below  The appropriate recommended follow up and warning signs for return to the nearest ED were explained  patient's questions answered; patient is competent and reliable, verbalized understanding of all that was explained, and agrees with the disposition and further plan of treatment  patient was additionally provided with detailed follow up care instructions, phone numbers to call within our institution for other concerns or inquiries  Advised to follow up with PCP and oncologist  Pt is in no acute distress and is stable for discharge at this time  MDM  Number of Diagnoses or Management Options  Left flank pain: new and requires workup  Mass of left ovary: new and requires workup  UTI (urinary tract infection): new and requires workup  Diagnosis management comments: Patient presenting with left flank pain, nausea, vomiting  Patient has stage IV breast cancer with Mets to the bone  On exam patient is tender to the abdomen in the left posterior ribs  Patient also tender along the C-spine  Patient was given Reglan, Zofran, morphine, fluids with significant relief of her pain  CBC is showing a minimal leukocytosis  CMP is unremarkable  Lipase normal   Troponin normal   Urine appears infected with innumerable white blood cells  CTA of the chest is unremarkable for any acute PE, is showing some already established Mets that patient is aware of   CT of the abdomen is showing a dilated left renal collecting system, consistent with a recently passed stone, which goes along with patient's symptoms    It also showed a mass on the left ovary, patient was made aware of this and was advised to follow-up with her oncologist   CT of the cervical spine is showing multiple Mets which patient is already aware of  Patient made appointment with PCP and Oncology  Patient felt significant relief after medications  She was advised to return to the ER if anything acutely changes  Amount and/or Complexity of Data Reviewed  Clinical lab tests: ordered and reviewed  Tests in the radiology section of CPT®: ordered and reviewed    Patient Progress  Patient progress: stable      Disposition  Final diagnoses:   UTI (urinary tract infection)   Left flank pain   Mass of left ovary     Time reflects when diagnosis was documented in both MDM as applicable and the Disposition within this note     Time User Action Codes Description Comment    11/9/2022  1:17 AM Terrilyn Calico Add [N39 0] UTI (urinary tract infection)     11/9/2022  1:18 AM Terrilyn Calico Add [R10 9] Left flank pain     11/9/2022  1:20 AM Terrilyn Calico Add [N83 8] Mass of left ovary       ED Disposition     ED Disposition   Discharge    Condition   Stable    Date/Time   Wed Nov 9, 2022  1:21 AM    Comment   Hieu Gee discharge to home/self care                 Follow-up Information     Follow up With Specialties Details Why Contact Info Additional Information    Reynold Menendez MD Internal Medicine Schedule an appointment as soon as possible for a visit   [place] [id number] [facility] Emergency Department Emergency Medicine  If symptoms worsen [address] [facility] [address] [facility] Emergency Department, [address], [place], [place], [address]          Discharge Medication List as of 11/9/2022  1:24 AM      START taking these medications    Details   cephalexin (KEFLEX) 500 mg capsule Take 1 capsule (500 mg total) by mouth every 12 (twelve) hours for 7 days, Starting Wed 11/9/2022, Until Wed 11/16/2022, Normal         CONTINUE these medications which have NOT CHANGED    Details   acetaminophen (TYLENOL) 325 mg tablet Take 650 mg by mouth every 6 (six) hours as needed for mild pain, Historical Med      al mag oxide-diphenhydramine-lidocaine viscous (MAGIC MOUTHWASH) 1:1:1 suspension Swish and spit 10 mL every 4 (four) hours as needed for mouth pain or discomfort, Starting Mon 4/18/2022, Normal      baclofen 10 mg tablet TAKE 1/2 TABLET BY MOUTH 3 TIMES A DAY AS NEEDED FOR MUSCLE SPASMS, Normal      DPH-Lido-AlHydr-MgHydr-Simeth (First-Mouthwash BLM) SUSP Starting Wed 4/6/2022, Historical Med      furosemide (LASIX) 40 mg tablet Take 1 tablet (40 mg total) by mouth daily, Starting Tue 10/25/2022, Normal      lactulose (CEPHULAC) 10 g packet Take 1 packet (10 g total) by mouth 3 (three) times a day as needed (constipation), Starting Wed 4/20/2022, Normal      lisinopril (ZESTRIL) 20 mg tablet Take 1 tablet (20 mg total) by mouth daily, Starting Tue 5/31/2022, Normal      morphine (MS CONTIN) 15 mg 12 hr tablet Take 1 tablet (15 mg total) by mouth daily at bedtime Max Daily Amount: 15 mg Do not start before October 7, 2022 , Starting Fri 10/7/2022, Normal      nystatin (MYCOSTATIN) 500,000 units/5 mL suspension Apply 5 mL (500,000 Units total) to the mouth or throat 4 (four) times a day, Starting Mon 7/25/2022, Normal      nystatin (MYCOSTATIN) powder Apply topically 3 (three) times a day, Starting Tue 9/20/2022, Normal      ondansetron (ZOFRAN) 4 mg tablet Take 1 tablet (4 mg total) by mouth every 8 (eight) hours as needed for nausea or vomiting, Starting Wed 9/7/2022, Normal      oxyCODONE (ROXICODONE) 10 MG TABS Take 0 5-1 tablets (5-10 mg total) by mouth every 4 (four) hours as needed for moderate pain or severe pain Max Daily Amount: 60 mg, Starting Fri 9/30/2022, Normal      pantoprazole (PROTONIX) 40 mg tablet Take 1 tablet (40 mg total) by mouth daily in the early morning, Starting Mon 11/7/2022, Normal      polyethylene glycol (MIRALAX) 17 g packet Take 17 g by mouth daily, Starting Sun 4/3/2022, Normal      senna (SENOKOT) 8 6 mg Take 3 tablets (25 8 mg total) by mouth 2 (two) times a day Hold for loose stool , Starting Sun 4/3/2022, Normal             No discharge procedures on file      PDMP Review       Value Time User    PDMP Reviewed  Yes 9/30/2022  1:02 PM Usman Romero MD          ED Provider  Electronically Signed by           Eb Acevedo PA-C  11/09/22 6477

## 2022-11-10 ENCOUNTER — TELEPHONE (OUTPATIENT)
Dept: INFUSION CENTER | Facility: CLINIC | Age: 47
End: 2022-11-10

## 2022-11-10 ENCOUNTER — TELEPHONE (OUTPATIENT)
Dept: OTHER | Facility: HOSPITAL | Age: 47
End: 2022-11-10

## 2022-11-10 ENCOUNTER — HOSPITAL ENCOUNTER (OUTPATIENT)
Dept: INFUSION CENTER | Facility: CLINIC | Age: 47
Discharge: HOME | End: 2022-11-10

## 2022-11-10 ENCOUNTER — APPOINTMENT (OUTPATIENT)
Dept: PHYSICAL THERAPY | Facility: CLINIC | Age: 47
End: 2022-11-10

## 2022-11-10 ENCOUNTER — TELEPHONE (OUTPATIENT)
Dept: OTHER | Facility: OTHER | Age: 47
End: 2022-11-10

## 2022-11-10 ENCOUNTER — HOSPITAL ENCOUNTER (EMERGENCY)
Facility: HOSPITAL | Age: 47
Discharge: HOME | End: 2022-11-10
Attending: EMERGENCY MEDICINE

## 2022-11-10 VITALS
OXYGEN SATURATION: 95 % | DIASTOLIC BLOOD PRESSURE: 56 MMHG | RESPIRATION RATE: 16 BRPM | TEMPERATURE: 98 F | HEART RATE: 108 BPM | SYSTOLIC BLOOD PRESSURE: 102 MMHG

## 2022-11-10 DIAGNOSIS — N39.0 UTI (URINARY TRACT INFECTION): Primary | ICD-10-CM

## 2022-11-10 DIAGNOSIS — R50.9 FEVER: ICD-10-CM

## 2022-11-10 LAB
ALBUMIN SERPL BCP-MCNC: 4 G/DL (ref 3.5–5)
ALP SERPL-CCNC: 54 U/L (ref 34–104)
ALT SERPL W P-5'-P-CCNC: 16 U/L (ref 7–52)
ANION GAP SERPL CALCULATED.3IONS-SCNC: 7 MMOL/L (ref 4–13)
AST SERPL W P-5'-P-CCNC: 15 U/L (ref 13–39)
BACTERIA UR QL AUTO: ABNORMAL /HPF
BASOPHILS # BLD AUTO: 0.03 THOUSANDS/ÂΜL (ref 0–0.1)
BASOPHILS NFR BLD AUTO: 0 % (ref 0–1)
BILIRUB SERPL-MCNC: 1.77 MG/DL (ref 0.2–1)
BILIRUB UR QL STRIP: NEGATIVE
BUN SERPL-MCNC: 15 MG/DL (ref 5–25)
CALCIUM SERPL-MCNC: 8.9 MG/DL (ref 8.4–10.2)
CHLORIDE SERPL-SCNC: 102 MMOL/L (ref 96–108)
CLARITY UR: ABNORMAL
CO2 SERPL-SCNC: 25 MMOL/L (ref 21–32)
COLOR UR: YELLOW
CREAT SERPL-MCNC: 0.83 MG/DL (ref 0.6–1.3)
EOSINOPHIL # BLD AUTO: 0 THOUSAND/ÂΜL (ref 0–0.61)
EOSINOPHIL NFR BLD AUTO: 0 % (ref 0–6)
ERYTHROCYTE [DISTWIDTH] IN BLOOD BY AUTOMATED COUNT: 13.5 % (ref 11.6–15.1)
FLUAV RNA RESP QL NAA+PROBE: NEGATIVE
FLUBV RNA RESP QL NAA+PROBE: NEGATIVE
GFR SERPL CREATININE-BSD FRML MDRD: 83 ML/MIN/1.73SQ M
GLUCOSE SERPL-MCNC: 122 MG/DL (ref 65–140)
GLUCOSE UR STRIP-MCNC: NEGATIVE MG/DL
GRAN CASTS #/AREA URNS LPF: ABNORMAL /[LPF]
HCT VFR BLD AUTO: 36 % (ref 34.8–46.1)
HGB BLD-MCNC: 11.6 G/DL (ref 11.5–15.4)
HGB UR QL STRIP.AUTO: ABNORMAL
HYALINE CASTS #/AREA URNS LPF: ABNORMAL /LPF
IMM GRANULOCYTES # BLD AUTO: 0.04 THOUSAND/UL (ref 0–0.2)
IMM GRANULOCYTES NFR BLD AUTO: 0 % (ref 0–2)
KETONES UR STRIP-MCNC: NEGATIVE MG/DL
LACTATE SERPL-SCNC: 0.5 MMOL/L (ref 0.5–2)
LEUKOCYTE ESTERASE UR QL STRIP: ABNORMAL
LYMPHOCYTES # BLD AUTO: 0.33 THOUSANDS/ÂΜL (ref 0.6–4.47)
LYMPHOCYTES NFR BLD AUTO: 3 % (ref 14–44)
MCH RBC QN AUTO: 31.6 PG (ref 26.8–34.3)
MCHC RBC AUTO-ENTMCNC: 32.2 G/DL (ref 31.4–37.4)
MCV RBC AUTO: 98 FL (ref 82–98)
MONOCYTES # BLD AUTO: 0.73 THOUSAND/ÂΜL (ref 0.17–1.22)
MONOCYTES NFR BLD AUTO: 7 % (ref 4–12)
MUCOUS THREADS UR QL AUTO: ABNORMAL
NEUTROPHILS # BLD AUTO: 9.51 THOUSANDS/ÂΜL (ref 1.85–7.62)
NEUTS SEG NFR BLD AUTO: 90 % (ref 43–75)
NITRITE UR QL STRIP: NEGATIVE
NON-SQ EPI CELLS URNS QL MICRO: ABNORMAL /HPF
NRBC BLD AUTO-RTO: 0 /100 WBCS
PH UR STRIP.AUTO: 5.5 [PH]
PLATELET # BLD AUTO: 228 THOUSANDS/UL (ref 149–390)
PMV BLD AUTO: 9.8 FL (ref 8.9–12.7)
POTASSIUM SERPL-SCNC: 3.6 MMOL/L (ref 3.5–5.3)
PROT SERPL-MCNC: 7.2 G/DL (ref 6.4–8.4)
PROT UR STRIP-MCNC: ABNORMAL MG/DL
RBC # BLD AUTO: 3.67 MILLION/UL (ref 3.81–5.12)
RBC #/AREA URNS AUTO: ABNORMAL /HPF
RSV RNA RESP QL NAA+PROBE: NEGATIVE
SARS-COV-2 RNA RESP QL NAA+PROBE: NEGATIVE
SODIUM SERPL-SCNC: 134 MMOL/L (ref 135–147)
SP GR UR STRIP.AUTO: 1.03 (ref 1–1.03)
UROBILINOGEN UR STRIP-ACNC: 2 MG/DL
WBC # BLD AUTO: 10.64 THOUSAND/UL (ref 4.31–10.16)
WBC #/AREA URNS AUTO: ABNORMAL /HPF
WBC CASTS URNS QL MICRO: ABNORMAL /LPF

## 2022-11-10 RX ORDER — CEFTRIAXONE 1 G/50ML
1000 INJECTION, SOLUTION INTRAVENOUS ONCE
Status: COMPLETED | OUTPATIENT
Start: 2022-11-10 | End: 2022-11-10

## 2022-11-10 RX ORDER — CEFPODOXIME PROXETIL 100 MG/1
100 TABLET, FILM COATED ORAL 2 TIMES DAILY
Qty: 14 TABLET | Refills: 0 | Status: SHIPPED | OUTPATIENT
Start: 2022-11-10 | End: 2022-11-17

## 2022-11-10 RX ADMIN — CEFTRIAXONE 1000 MG: 1 INJECTION, SOLUTION INTRAVENOUS at 09:59

## 2022-11-10 NOTE — TELEPHONE ENCOUNTER
Patient left voicemail stating she started with fever yesterday 11/9 and then woke up with diarrhea this morning  Current temp is 100 5     Patient would like call back to discuss symptoms and if she should come for treatment today 11/10 at 0830 am

## 2022-11-10 NOTE — TELEPHONE ENCOUNTER
Was informed by call center that Ms Stephen Early had called asking for advise regarding her febrile episodes  Chart reviewed: Ms Stephen Early is a 49 Y F w/ underlying metastatic HER2+ breast cancer, currently on trastuzumab and pertuzumab every 3 weeks, scheduled for infusion today  I called Ms Stephen Early @ [phone]  Ms Stephen Early explained that she has been experiencing generalized malaise and spiking fevers since yesterday evening, Tmax 101 4 deg F  Patient did present to the [facility] ED 2 days ago on 11/8/22 w/ c/o nausea/vomiting, flank and abdominal pain  Pt had a low grade temp of 99 deg F, rest of the vitals stable in ED  CT abd/pelvis was notable for findings suggestive of pyelonephritis of left kidney with no obstructing calculus  UA was also highly suggestive of UTI with pyuria (with innumerable WBCs) and large amount of leuk esterase  Patient was discharged from the ED with prescription for Keflex x 7 days  Patient told me that she was never able to  the abx as the pharmacy to which the prescription was transmitted had been closed  Given that patient is immunocompromised, has been spiking persistent fevers, and because she was never able to  the abx 2 days ago, I advised that she go to the ER for evaluation - will need to be started on abx for UTI and blood cultures need to be collected to ensure that she hasn't become bacteremic  Further evaluation as per ED evaluation  Patient was in agreement with the plan above  She was concerned about her scheduled infusion today and I reassured her that I'll let Dr Daniel Louis know as well so that infusion appt can be rescheduled  I did call [facility] ED and discussed the case with Dr Zoraida Doe (ED physician) to give a headsup of patient being sent in  Forwarding this message to pt's primary oncologist, Dr Daniel Louis, so that he is also aware of the situation

## 2022-11-10 NOTE — TELEPHONE ENCOUNTER
Message left for patient to call back to go over infusion schedule updates  My TEAMS number was left for call back

## 2022-11-10 NOTE — PROGRESS NOTES
Teams message sent to MADAN MAYERS [facility] RN in med onc office to relay information from patient's voice mail at infusion center  Patient going to ED for evaluation due to symptoms including diarrhea and fever  Patient's treatment to be canceled today

## 2022-11-10 NOTE — ED PROVIDER NOTES
History  Chief Complaint   Patient presents with   • Fever - 9 weeks to 74 years     Pt presents to the ED with c/o fever/diarrhea starting today  Pt took tylenol at 0700     This is a 54-year-old female patient who is undergoing treatment for stage IV breast cancer  She is currently not on chemo or radiation however she is immunocompromised  She was seen here 2 days ago for flank pain was diagnosed with a passed kidney stone UTI  She was unable to fill her Keflex prescription because of an issue with the pharmacy now presents with fever at home of T-max a 101°  Also several bouts of nonbloody diarrhea today no nausea vomiting or abdominal pain  Denies any headache blurred vision double vision no cough congestion sore throat or chest pain or shortness of breath  I did review previous labs and she did have a UTI culture is still pending  Patient does not have any abdominal pain do not feel that she needs a CT scan at this time for diarrhea  She will be treated with Rocephin for UTI and will have septic workup  She is nontoxic in no acute distress          Prior to Admission Medications   Prescriptions Last Dose Informant Patient Reported? Taking?    DPH-Lido-AlHydr-MgHydr-Simeth (First-Mouthwash BLM) SUSP  Self Yes No   acetaminophen (TYLENOL) 325 mg tablet  Self Yes No   Sig: Take 650 mg by mouth every 6 (six) hours as needed for mild pain   al mag oxide-diphenhydramine-lidocaine viscous (MAGIC MOUTHWASH) 1:1:1 suspension  Self No No   Sig: Swish and spit 10 mL every 4 (four) hours as needed for mouth pain or discomfort   Patient taking differently: Swish and spit 10 mL if needed for mouth pain or discomfort   baclofen 10 mg tablet  Self No No   Sig: TAKE 1/2 TABLET BY MOUTH 3 TIMES A DAY AS NEEDED FOR MUSCLE SPASMS   Patient taking differently: if needed   furosemide (LASIX) 40 mg tablet  Self No No   Sig: Take 1 tablet (40 mg total) by mouth daily   lactulose (CEPHULAC) 10 g packet  Self No No   Sig: Take 1 packet (10 g total) by mouth 3 (three) times a day as needed (constipation)   Patient taking differently: Take 10 g by mouth if needed (constipation)   lisinopril (ZESTRIL) 20 mg tablet  Self No No   Sig: Take 1 tablet (20 mg total) by mouth daily   Patient taking differently: Take 20 mg by mouth daily Only to take as needed when BP is >378 systolicallly   morphine (MS CONTIN) 15 mg 12 hr tablet  Self No No   Sig: Take 1 tablet (15 mg total) by mouth daily at bedtime Max Daily Amount: 15 mg Do not start before October 7, 2022  nystatin (MYCOSTATIN) 500,000 units/5 mL suspension  Self No No   Sig: Apply 5 mL (500,000 Units total) to the mouth or throat 4 (four) times a day   Patient taking differently: Apply 500,000 Units to the mouth or throat if needed   nystatin (MYCOSTATIN) powder  Self No No   Sig: Apply topically 3 (three) times a day   ondansetron (ZOFRAN) 4 mg tablet  Self No No   Sig: Take 1 tablet (4 mg total) by mouth every 8 (eight) hours as needed for nausea or vomiting   oxyCODONE (ROXICODONE) 10 MG TABS  Self No No   Sig: Take 0 5-1 tablets (5-10 mg total) by mouth every 4 (four) hours as needed for moderate pain or severe pain Max Daily Amount: 60 mg   pantoprazole (PROTONIX) 40 mg tablet   No No   Sig: Take 1 tablet (40 mg total) by mouth daily in the early morning   polyethylene glycol (MIRALAX) 17 g packet  Self No No   Sig: Take 17 g by mouth daily   Patient taking differently: Take 17 g by mouth if needed   senna (SENOKOT) 8 6 mg  Self No No   Sig: Take 3 tablets (25 8 mg total) by mouth 2 (two) times a day Hold for loose stool  Patient taking differently: Take 25 8 mg by mouth if needed Hold for loose stool  Facility-Administered Medications: None       Past Medical History:   Diagnosis Date   • Breast cancer ([facility] )    • Cancer ([facility] )    • Hypertension        History reviewed  No pertinent surgical history      Family History   Problem Relation Age of Onset   • Coronary artery disease Mother    • Heart attack Father    • Diabetes type II Father      I have reviewed and agree with the history as documented  E-Cigarette/Vaping   • E-Cigarette Use Never User      E-Cigarette/Vaping Substances   • Nicotine No    • THC No    • CBD No    • Flavoring No    • Other No    • Unknown No      Social History     Tobacco Use   • Smoking status: Never Smoker   • Smokeless tobacco: Never Used   Vaping Use   • Vaping Use: Never used   Substance Use Topics   • Alcohol use: Not Currently     Comment: rare, social    • Drug use: Never       Review of Systems   Constitutional: Positive for fever  Negative for diaphoresis and fatigue  HENT: Negative for congestion, ear pain, nosebleeds and sore throat  Eyes: Negative for photophobia, pain, discharge and visual disturbance  Respiratory: Negative for cough, choking, chest tightness, shortness of breath and wheezing  Cardiovascular: Negative for chest pain and palpitations  Gastrointestinal: Positive for diarrhea  Negative for abdominal distention, abdominal pain and vomiting  Genitourinary: Negative for dysuria, flank pain and frequency  Musculoskeletal: Negative for back pain, gait problem and joint swelling  Skin: Negative for color change and rash  Neurological: Negative for dizziness, syncope and headaches  Psychiatric/Behavioral: Negative for behavioral problems and confusion  The patient is not nervous/anxious  All other systems reviewed and are negative  Physical Exam  Physical Exam  Vitals and nursing note reviewed  Constitutional:       General: She is not in acute distress  Appearance: Normal appearance  She is not ill-appearing, toxic-appearing or diaphoretic  HENT:      Head: Normocephalic and atraumatic  Right Ear: Tympanic membrane, ear canal and external ear normal       Left Ear: Tympanic membrane, ear canal and external ear normal       Nose: Nose normal  No congestion or rhinorrhea        Mouth/Throat: Mouth: Mucous membranes are moist       Pharynx: Oropharynx is clear  No oropharyngeal exudate or posterior oropharyngeal erythema  Eyes:      Extraocular Movements: Extraocular movements intact  Conjunctiva/sclera: Conjunctivae normal       Pupils: Pupils are equal, round, and reactive to light  Cardiovascular:      Rate and Rhythm: Normal rate and regular rhythm  Pulmonary:      Effort: Pulmonary effort is normal  No respiratory distress  Breath sounds: Normal breath sounds  Abdominal:      General: Bowel sounds are normal       Palpations: Abdomen is soft  Tenderness: There is no abdominal tenderness  Musculoskeletal:         General: Normal range of motion  Cervical back: Normal range of motion and neck supple  No rigidity or tenderness  Right lower leg: No edema  Left lower leg: No edema  Lymphadenopathy:      Cervical: No cervical adenopathy  Skin:     General: Skin is warm and dry  Capillary Refill: Capillary refill takes less than 2 seconds  Findings: No rash  Neurological:      General: No focal deficit present  Mental Status: She is alert and oriented to person, place, and time  Mental status is at baseline     Psychiatric:         Mood and Affect: Mood normal          Behavior: Behavior normal          Vital Signs  ED Triage Vitals   Temperature Pulse Respirations Blood Pressure SpO2   11/10/22 0852 11/10/22 0851 11/10/22 0851 11/10/22 0851 11/10/22 0851   98 °F (36 7 °C) (!) 108 16 102/56 95 %      Temp Source Heart Rate Source Patient Position - Orthostatic VS BP Location FiO2 (%)   11/10/22 0852 11/10/22 0851 -- 11/10/22 0851 --   Oral Monitor  Right arm       Pain Score       --                  Vitals:    11/10/22 0851   BP: 102/56   Pulse: (!) 108         Visual Acuity      ED Medications  Medications   cefTRIAXone (ROCEPHIN) IVPB (premix in dextrose) 1,000 mg 50 mL (0 mg Intravenous Stopped 11/10/22 1102)       Diagnostic Studies  Results Reviewed     Procedure Component Value Units Date/Time    Blood culture #1 [[id number]] Collected: 11/10/22 0938    Lab Status: Preliminary result Specimen: Blood from Arm, Right Updated: 11/10/22 1504     Blood Culture Received in Microbiology Lab  Culture in Progress  Blood culture #2 [[id number]] Collected: 11/10/22 0947    Lab Status: Preliminary result Specimen: Blood from Hand, Right Updated: 11/10/22 1504     Blood Culture Received in Microbiology Lab  Culture in Progress  FLU/RSV/COVID - if FLU/RSV clinically relevant [[id number]]  (Normal) Collected: 11/10/22 0938    Lab Status: Final result Specimen: Nares from Nose Updated: 11/10/22 1050     SARS-CoV-2 Negative     INFLUENZA A PCR Negative     INFLUENZA B PCR Negative     RSV PCR Negative    Narrative:      FOR PEDIATRIC PATIENTS - copy/paste COVID Guidelines URL to browser: [email or web address]    SARS-CoV-2 assay is a Nucleic Acid Amplification assay intended for the  qualitative detection of nucleic acid from SARS-CoV-2 in nasopharyngeal  swabs  Results are for the presumptive identification of SARS-CoV-2 RNA  Positive results are indicative of infection with SARS-CoV-2, the virus  causing COVID-19, but do not rule out bacterial infection or co-infection  with other viruses  Laboratories within the [place] and its  territories are required to report all positive results to the appropriate  public health authorities  Negative results do not preclude SARS-CoV-2  infection and should not be used as the sole basis for treatment or other  patient management decisions  Negative results must be combined with  clinical observations, patient history, and epidemiological information  This test has not been FDA cleared or approved  This test has been authorized by FDA under an Emergency Use Authorization  (EUA)   This test is only authorized for the duration of time the  declaration that circumstances exist justifying the authorization of the  emergency use of an in vitro diagnostic tests for detection of SARS-CoV-2  virus and/or diagnosis of COVID-19 infection under section 564(b)(1) of  the Act, 21 U  S C  380DBC-5(Y)(2), unless the authorization is terminated  or revoked sooner  The test has been validated but independent review by FDA  and CLIA is pending  Test performed using [employer] GeneXpert: This RT-PCR assay targets N2,  a region unique to SARS-CoV-2  A conserved region in the E-gene was chosen  for pan-Sarbecovirus detection which includes SARS-CoV-2  According to CMS-2020-01-R, this platform meets the definition of high-throughput technology  CBC and differential [[id number]]  (Abnormal) Collected: 11/10/22 0938    Lab Status: Final result Specimen: Blood from Arm, Right Updated: 11/10/22 1033     WBC 10 64 Thousand/uL      RBC 3 67 Million/uL      Hemoglobin 11 6 g/dL      Hematocrit 36 0 %      MCV 98 fL      MCH 31 6 pg      MCHC 32 2 g/dL      RDW 13 5 %      MPV 9 8 fL      Platelets 152 Thousands/uL      nRBC 0 /100 WBCs      Neutrophils Relative 90 %      Immat GRANS % 0 %      Lymphocytes Relative 3 %      Monocytes Relative 7 %      Eosinophils Relative 0 %      Basophils Relative 0 %      Neutrophils Absolute 9 51 Thousands/µL      Immature Grans Absolute 0 04 Thousand/uL      Lymphocytes Absolute 0 33 Thousands/µL      Monocytes Absolute 0 73 Thousand/µL      Eosinophils Absolute 0 00 Thousand/µL      Basophils Absolute 0 03 Thousands/µL     Narrative: This is an appended report  These results have been appended to a previously verified report  Lactic acid [[id number]]  (Normal) Collected: 11/10/22 0938    Lab Status: Final result Specimen: Blood from Arm, Right Updated: 11/10/22 1014     LACTIC ACID 0 5 mmol/L     Narrative:      Result may be elevated if tourniquet was used during collection      Comprehensive metabolic panel [[id number]]  (Abnormal) Collected: 11/10/22 0938    Lab Status: Final result Specimen: Blood from Arm, Right Updated: 11/10/22 1013     Sodium 134 mmol/L      Potassium 3 6 mmol/L      Chloride 102 mmol/L      CO2 25 mmol/L      ANION GAP 7 mmol/L      BUN 15 mg/dL      Creatinine 0 83 mg/dL      Glucose 122 mg/dL      Calcium 8 9 mg/dL      AST 15 U/L      ALT 16 U/L      Alkaline Phosphatase 54 U/L      Total Protein 7 2 g/dL      Albumin 4 0 g/dL      Total Bilirubin 1 77 mg/dL      eGFR 83 ml/min/1 73sq m     Narrative:      [place] guidelines for Chronic Kidney Disease (CKD):   •  Stage 1 with normal or high GFR (GFR > 90 mL/min/1 73 square meters)  •  Stage 2 Mild CKD (GFR = 60-89 mL/min/1 73 square meters)  •  Stage 3A Moderate CKD (GFR = 45-59 mL/min/1 73 square meters)  •  Stage 3B Moderate CKD (GFR = 30-44 mL/min/1 73 square meters)  •  Stage 4 Severe CKD (GFR = 15-29 mL/min/1 73 square meters)  •  Stage 5 End Stage CKD (GFR <15 mL/min/1 73 square meters)  Note: GFR calculation is accurate only with a steady state creatinine    Urine Microscopic [[id number]]  (Abnormal) Collected: 11/10/22 0939    Lab Status: Final result Specimen: Urine, Clean Catch Updated: 11/10/22 1008     RBC, UA 4-10 /hpf      WBC, UA Innumerable /hpf      Epithelial Cells Occasional /hpf      Bacteria, UA Occasional /hpf      MUCUS THREADS Moderate     Hyaline Casts, UA 0-3 /lpf      WBC Casts, UA 10-25 /lpf      Granular Casts, UA 10-25    Urine culture [[id number]] Collected: 11/10/22 0939    Lab Status:  In process Specimen: Urine, Clean Catch Updated: 11/10/22 1008    UA w Reflex to Microscopic w Reflex to Culture [[id number]]  (Abnormal) Collected: 11/10/22 0939    Lab Status: Final result Specimen: Urine, Clean Catch Updated: 11/10/22 1003     Color, UA Yellow     Clarity, UA Turbid     Specific [place], UA 1 030     pH, UA 5 5     Leukocytes, UA Moderate     Nitrite, UA Negative     Protein,  (2+) mg/dl      Glucose, UA Negative mg/dl      Ketones, UA Negative mg/dl      Urobilinogen, UA 2 0 mg/dl      Bilirubin, UA Negative     Occult Blood, UA Moderate                 No orders to display              Procedures  Procedures         ED Course  ED Course as of 11/10/22 1554   u Nov 10, 2022   1248 After further discussion patient would rather be discharged home on oral antibiotics the same hospital   Was also discussed with the admitting service who feels that she may benefit from being discharged on IV antibiotics concern for being exposed to nosocomial infections shared decision making patient would like oral antibiotics and was given strict return precautions  Initial Sepsis Screening     Row Name 11/10/22 1253                Is the patient's history suggestive of a new or worsening infection? Yes (Proceed)  -DD        Suspected source of infection urinary tract infection  -DD        Are two or more of the following signs & symptoms of infection both present and new to the patient? No  -DD        Indicate SIRS criteria --        If the answer is yes to both questions, suspicion of sepsis is present --        If severe sepsis is present AND tissue hypoperfusion perists in the hour after fluid resuscitation or lactate > 4, the patient meets criteria for SEPTIC SHOCK --        Are any of the following organ dysfunction criteria present within 6 hours of suspected infection and SIRS criteria that are NOT considered to be chronic conditions?  No  -DD        Organ dysfunction --        Date of presentation of severe sepsis --        Time of presentation of severe sepsis --        Tissue hypoperfusion persists in the hour after crystalloid fluid administration, evidenced, by either: --        Was hypotension present within one hour of the conclusion of crystalloid fluid administration? --        Date of presentation of septic shock --        Time of presentation of septic shock --              User Key  (r) = Recorded By, (t) = Taken By, (c) = Cosigned By    [address] Name Provider Type    BELEM Christina PA-C Physician Assistant                              MDM    Disposition  Final diagnoses:   UTI (urinary tract infection)   Fever     Time reflects when diagnosis was documented in both MDM as applicable and the Disposition within this note     Time User Action Codes Description Comment    11/10/2022 12:53 PM Sharita, [address] [N39 0] UTI (urinary tract infection)     11/10/2022 12:53 PM [place] [address], [facility] [R50 9] Fever       ED Disposition     ED Disposition   Discharge    Condition   Stable    Date/Time   Thu Nov 10, 2022 12:53 PM    [address] discharge to home/self care                 Follow-up Information     Follow up With Specialties Details Why Contact Info    Yousif Johnson MD Internal Medicine   [address] [place] [id number]  [phone]            Discharge Medication List as of 11/10/2022 12:57 PM      START taking these medications    Details   cefpodoxime (VANTIN) 100 mg tablet Take 1 tablet (100 mg total) by mouth 2 (two) times a day for 7 days, Starting Thu 11/10/2022, Until Thu 11/17/2022, Normal         CONTINUE these medications which have NOT CHANGED    Details   acetaminophen (TYLENOL) 325 mg tablet Take 650 mg by mouth every 6 (six) hours as needed for mild pain, Historical Med      al mag oxide-diphenhydramine-lidocaine viscous (MAGIC MOUTHWASH) 1:1:1 suspension Swish and spit 10 mL every 4 (four) hours as needed for mouth pain or discomfort, Starting Mon 4/18/2022, Normal      baclofen 10 mg tablet TAKE 1/2 TABLET BY MOUTH 3 TIMES A DAY AS NEEDED FOR MUSCLE SPASMS, Normal      DPH-Lido-AlHydr-MgHydr-Simeth (First-Mouthwash BLM) SUSP Starting Wed 4/6/2022, Historical Med      furosemide (LASIX) 40 mg tablet Take 1 tablet (40 mg total) by mouth daily, Starting Tue 10/25/2022, Normal      lactulose (CEPHULAC) 10 g packet Take 1 packet (10 g total) by mouth 3 (three) times a day as needed (constipation), Starting Wed 4/20/2022, Normal      lisinopril (ZESTRIL) 20 mg tablet Take 1 tablet (20 mg total) by mouth daily, Starting Tue 5/31/2022, Normal      morphine (MS CONTIN) 15 mg 12 hr tablet Take 1 tablet (15 mg total) by mouth daily at bedtime Max Daily Amount: 15 mg Do not start before October 7, 2022 , Starting Fri 10/7/2022, Normal      nystatin (MYCOSTATIN) 500,000 units/5 mL suspension Apply 5 mL (500,000 Units total) to the mouth or throat 4 (four) times a day, Starting Mon 7/25/2022, Normal      nystatin (MYCOSTATIN) powder Apply topically 3 (three) times a day, Starting Tue 9/20/2022, Normal      ondansetron (ZOFRAN) 4 mg tablet Take 1 tablet (4 mg total) by mouth every 8 (eight) hours as needed for nausea or vomiting, Starting Wed 9/7/2022, Normal      oxyCODONE (ROXICODONE) 10 MG TABS Take 0 5-1 tablets (5-10 mg total) by mouth every 4 (four) hours as needed for moderate pain or severe pain Max Daily Amount: 60 mg, Starting Fri 9/30/2022, Normal      pantoprazole (PROTONIX) 40 mg tablet Take 1 tablet (40 mg total) by mouth daily in the early morning, Starting Mon 11/7/2022, Normal      polyethylene glycol (MIRALAX) 17 g packet Take 17 g by mouth daily, Starting Sun 4/3/2022, Normal      senna (SENOKOT) 8 6 mg Take 3 tablets (25 8 mg total) by mouth 2 (two) times a day Hold for loose stool , Starting Sun 4/3/2022, Normal             No discharge procedures on file      PDMP Review       Value Time User    PDMP Reviewed  Yes 9/30/2022  1:02 PM Lotus Bella MD          ED Provider  Electronically Signed by           Marylou Ortiz PA-C  11/10/22 8814

## 2022-11-10 NOTE — TELEPHONE ENCOUNTER
Pt  Has infusion scheduled this morning and she doesn't know if she should go because she is not feeling well  She had a fever of 101 4 last night and this morning its 100 7 and she woke with diarrhea  TT on call provider for her

## 2022-11-10 NOTE — SEPSIS NOTE
Sepsis Note   Estee Astudillo 49 y o  female MRN: [id number]  Unit/Bed#: ED-01 Encounter: [id number]       qSOFA     [address] Name 11/10/22 0294                Altered mental status GCS < 15 --        Respiratory Rate > / =38 0        Systolic BP < / =689 0        Q Sofa Score 0                   Initial Sepsis Screening     Row Name 11/10/22 0001                Is the patient's history suggestive of a new or worsening infection? Yes (Proceed)  -DD        Suspected source of infection urinary tract infection  -DD        Are two or more of the following signs & symptoms of infection both present and new to the patient? No  -DD        Indicate SIRS criteria --        If the answer is yes to both questions, suspicion of sepsis is present --        If severe sepsis is present AND tissue hypoperfusion perists in the hour after fluid resuscitation or lactate > 4, the patient meets criteria for SEPTIC SHOCK --        Are any of the following organ dysfunction criteria present within 6 hours of suspected infection and SIRS criteria that are NOT considered to be chronic conditions?  No  -DD        Organ dysfunction --        Date of presentation of severe sepsis --        Time of presentation of severe sepsis --        Tissue hypoperfusion persists in the hour after crystalloid fluid administration, evidenced, by either: --        Was hypotension present within one hour of the conclusion of crystalloid fluid administration? --        Date of presentation of septic shock --        Time of presentation of septic shock --              User Key  (r) = Recorded By, (t) = Taken By, (c) = Cosigned By    [address] Name Provider Type    BELEM Carroll PA-C Physician Assistant

## 2022-11-11 LAB
BACTERIA UR CULT: ABNORMAL
BACTERIA UR CULT: NORMAL

## 2022-11-13 LAB
BACTERIA BLD CULT: NORMAL
BACTERIA BLD CULT: NORMAL

## 2022-11-15 ENCOUNTER — OFFICE VISIT (OUTPATIENT)
Dept: PHYSICAL THERAPY | Facility: CLINIC | Age: 47
End: 2022-11-15

## 2022-11-15 DIAGNOSIS — S12.301A CLOSED NONDISPLACED FRACTURE OF FOURTH CERVICAL VERTEBRA, UNSPECIFIED FRACTURE MORPHOLOGY, INITIAL ENCOUNTER (HCC): Primary | ICD-10-CM

## 2022-11-15 DIAGNOSIS — M84.48XA PATHOLOGICAL FRACTURE OF CERVICAL VERTEBRA, INITIAL ENCOUNTER: ICD-10-CM

## 2022-11-15 LAB
BACTERIA BLD CULT: NORMAL
BACTERIA BLD CULT: NORMAL

## 2022-11-15 NOTE — PROGRESS NOTES
Daily Note     Today's date: 11/15/2022  Patient name: Jose Flynn  : 1975  MRN: [id number]  Referring provider: Ita Daly MD  Dx:   Encounter Diagnosis     ICD-10-CM    1  Closed nondisplaced fracture of fourth cervical vertebra, unspecified fracture morphology, initial encounter ([email or web address] )  S12 301A    2  Pathological fracture of cervical vertebra, initial encounter  M84 48XA        Start Time: 1400  Stop Time: 1440  Total time in clinic (min): 40 minutes    Subjective: Pt reports she missed PT last wk due to infection which has since resolved with antibiotics  No new concerns noted at this time  Objective: See treatment diary below      Assessment: Tolerated treatment well  Continued with current POC with good tolerance from the patient  Occasional cuing required for proper exercise technique  Continue to progress as tolerated  Patient would benefit from continued PT      Plan: Continue per plan of care  Precautions:  Active Breast CA w/ Metastasis to Spine, HTN    Access Code: [email or web address]       Manuals 10/27 11/1 11/3 11/15  10/25   STM B/L UT, LS, Rhomboids  IASTM KCB IASTM KCB IASTM KCB IASTM KCB  IASTM KCB                     Neuro Re-Ed         C/S Retract  20x 20x  20x 20x   20x    TB T 30x BTB 30x BTB 30x BTB 30x BTB  30x BTB   Scap Protract at Wall  20x at plinth    20x at [address] w/ Posture at wall 10x w/ cane  10x w/ cane  10x w/ cane  10x w/ cane  10x w/ cane   Scap Set at Wall  10x3" 10x3" 10x3" 10x3"  10x3"            Education          Ther Ex         TB Row/Ext  30x BTB 30x BTB 30x Blk TB 30x Blk TB  30x BTB   T/S Ext          T/S Rotation 10x5" seated     10x5" seated    Shrugs/Rolls 30x 7# 30x 7# 30x 7# 30x 7#  30x 7#   Bicep Curls 30x 7# 30x 7# 30x 7# 30x 7#  30x 7#   DB Rows  30x 7# 30x 7# 30x 7# 30x 7#  30x 7#    UT Stretch          Shoulder Flex/Scap 3x10 4# 3x10 4# 3x10 4# 3x10 4#  3x10 4#   Towel Rotation Stretch          Shoulder Abd 3x10 3# 3x10 3# 3x10 3# 3x10 3#  3x10 3#   OH Shoulder Press  3x10 3# 3x10 3# 3x10 3# 3x10 3#  3x10 3#   OH Towel Slide 10x  10x  10x 10x   10x             Ther Activity                           Gait Training                           Modalities

## 2022-11-17 ENCOUNTER — HOSPITAL ENCOUNTER (OUTPATIENT)
Dept: INFUSION CENTER | Facility: CLINIC | Age: 47
Discharge: HOME | End: 2022-11-17

## 2022-11-17 ENCOUNTER — OFFICE VISIT (OUTPATIENT)
Dept: PHYSICAL THERAPY | Facility: CLINIC | Age: 47
End: 2022-11-17

## 2022-11-17 VITALS
WEIGHT: 158.8 LBS | OXYGEN SATURATION: 99 % | HEART RATE: 77 BPM | DIASTOLIC BLOOD PRESSURE: 91 MMHG | SYSTOLIC BLOOD PRESSURE: 127 MMHG | BODY MASS INDEX: 27.69 KG/M2 | RESPIRATION RATE: 16 BRPM | TEMPERATURE: 96.8 F

## 2022-11-17 DIAGNOSIS — M84.48XA PATHOLOGICAL FRACTURE OF CERVICAL VERTEBRA, INITIAL ENCOUNTER: ICD-10-CM

## 2022-11-17 DIAGNOSIS — S12.301A CLOSED NONDISPLACED FRACTURE OF FOURTH CERVICAL VERTEBRA, UNSPECIFIED FRACTURE MORPHOLOGY, INITIAL ENCOUNTER (HCC): Primary | ICD-10-CM

## 2022-11-17 DIAGNOSIS — T45.1X5A CHEMOTHERAPY-INDUCED NEUTROPENIA (HCC): ICD-10-CM

## 2022-11-17 DIAGNOSIS — D70.1 CHEMOTHERAPY-INDUCED NEUTROPENIA (HCC): ICD-10-CM

## 2022-11-17 DIAGNOSIS — C50.811 MALIGNANT NEOPLASM OF OVERLAPPING SITES OF RIGHT BREAST IN FEMALE, ESTROGEN RECEPTOR NEGATIVE (HCC): ICD-10-CM

## 2022-11-17 DIAGNOSIS — C79.51 OSSEOUS METASTASIS (HCC): Primary | ICD-10-CM

## 2022-11-17 DIAGNOSIS — Z17.1 MALIGNANT NEOPLASM OF OVERLAPPING SITES OF RIGHT BREAST IN FEMALE, ESTROGEN RECEPTOR NEGATIVE (HCC): ICD-10-CM

## 2022-11-17 RX ORDER — SODIUM CHLORIDE 9 MG/ML
20 INJECTION, SOLUTION INTRAVENOUS ONCE
Status: COMPLETED | OUTPATIENT
Start: 2022-11-17 | End: 2022-11-17

## 2022-11-17 RX ADMIN — PERTUZUMAB 420 MG: 30 INJECTION, SOLUTION, CONCENTRATE INTRAVENOUS at 14:22

## 2022-11-17 RX ADMIN — SODIUM CHLORIDE 20 ML/HR: 0.9 INJECTION, SOLUTION INTRAVENOUS at 14:10

## 2022-11-17 RX ADMIN — TRASTUZUMAB 450 MG: 150 INJECTION, POWDER, LYOPHILIZED, FOR SOLUTION INTRAVENOUS at 15:00

## 2022-11-17 NOTE — PROGRESS NOTES
Daily Note     Today's date: 2022  Patient name: Giovani Wolf  : 1975  MRN: [id number]  Referring provider: Robert Pang MD  Dx:   Encounter Diagnosis     ICD-10-CM    1  Closed nondisplaced fracture of fourth cervical vertebra, unspecified fracture morphology[facility]  S12 301A       2  Pathological fracture of cervical vertebra, initial encounter  M84 48XA           Start Time: 1645  Stop Time: 1730  Total time in clinic (min): 45 minutes    Subjective: Pt reports mild stiffness at the start of her session and denies any sig medical changes  Objective: See treatment diary below      Assessment: Tanna Bokchito treatment well  Patient demonstrated fatigue post treatment, exhibited good technique with therapeutic exercises and would benefit from continued PT      Plan: Continue per plan of care  Precautions:  Active Breast CA w/ Metastasis to Spine, HTN    Access Code: [email or web address]       Manuals 10/27 11/1 11/3 11/15 11/17 10/25   STM B/L UT, LS, Rhomboids  IASTM KCB IASTM KCB IASTM KCB IASTM KCB  IASTM KCB                     Neuro Re-Ed         C/S Retract  20x 20x  20x 20x  20x 20x    TB T 30x BTB 30x BTB 30x BTB 30x BTB  30x BTB   Scap Protract at Wall  20x at plinth    20x at [address] w/ Posture at wall 10x w/ cane  10x w/ cane  10x w/ cane  10x w/ cane 10x w/ cane 10x w/ cane   Scap Set at Wall  10x3" 10x3" 10x3" 10x3" 10x 3'' 10x3"            Education          Ther Ex         TB Row/Ext  30x BTB 30x BTB 30x Blk TB 30x Blk TB 30x blk TB 30x BTB   T/S Ext          T/S Rotation 10x5" seated     10x5" seated    Shrugs/Rolls 30x 7# 30x 7# 30x 7# 30x 7# 30x 7# 30x 7#   Bicep Curls 30x 7# 30x 7# 30x 7# 30x 7# 30x 7# 30x 7#   DB Rows  30x 7# 30x 7# 30x 7# 30x 7# 30x 7# 30x 7#    UT Stretch          Shoulder Flex/Scap 3x10 4# 3x10 4# 3x10 4# 3x10 4# 3x10 4# 3x10 4#   Towel Rotation Stretch          Shoulder Abd 3x10 3# 3x10 3# 3x10 3# 3x10 3# 3x10 3# 3x10 3#   OH Shoulder Press  3x10 3# 3x10 3# 3x10 3# 3x10 3# 3x10 3# 3x10 3#   OH Towel Slide 10x  10x  10x 10x  10x  10x             Ther Activity                           Gait Training                           Modalities

## 2022-11-21 DIAGNOSIS — G89.3 CANCER ASSOCIATED PAIN: ICD-10-CM

## 2022-11-21 DIAGNOSIS — C79.49 METASTASIS TO SPINAL CORD (HCC): ICD-10-CM

## 2022-11-21 DIAGNOSIS — Z51.5 PALLIATIVE CARE PATIENT: ICD-10-CM

## 2022-11-21 NOTE — TELEPHONE ENCOUNTER
PDMP    Oxycodone 10 mg qty 120 filled 10/8/22    Morphine 15 mg 12 hr  Qty 30 filled 10/8/22    Next apt scheduled 12/2/22

## 2022-11-22 ENCOUNTER — OFFICE VISIT (OUTPATIENT)
Dept: PHYSICAL THERAPY | Facility: CLINIC | Age: 47
End: 2022-11-22

## 2022-11-22 DIAGNOSIS — S12.301A CLOSED NONDISPLACED FRACTURE OF FOURTH CERVICAL VERTEBRA, UNSPECIFIED FRACTURE MORPHOLOGY, INITIAL ENCOUNTER (HCC): Primary | ICD-10-CM

## 2022-11-22 DIAGNOSIS — M84.48XA PATHOLOGICAL FRACTURE OF CERVICAL VERTEBRA, INITIAL ENCOUNTER: ICD-10-CM

## 2022-11-22 RX ORDER — MORPHINE SULFATE 15 MG/1
15 TABLET, FILM COATED, EXTENDED RELEASE ORAL
Qty: 30 TABLET | Refills: 0 | Status: SHIPPED | OUTPATIENT
Start: 2022-11-22 | End: 2022-11-23 | Stop reason: SDUPTHER

## 2022-11-22 RX ORDER — OXYCODONE HYDROCHLORIDE 10 MG/1
5-10 TABLET ORAL EVERY 4 HOURS PRN
Qty: 120 TABLET | Refills: 0 | Status: SHIPPED | OUTPATIENT
Start: 2022-11-22 | End: 2022-11-23 | Stop reason: SDUPTHER

## 2022-11-22 NOTE — PROGRESS NOTES
Daily Note     Today's date: 2022  Patient name: eLn Andrade  : 1975  MRN: [id number]  Referring provider: Darrell Ledbetter MD  Dx:   Encounter Diagnosis     ICD-10-CM    1  Closed nondisplaced fracture of fourth cervical vertebra, unspecified fracture morphology, initial encounter ([email or web address] )  S12 301A       2  Pathological fracture of cervical vertebra, initial encounter  M84 48XA           Start Time: 1400  Stop Time: 1443  Total time in clinic (min): 43 minutes    Subjective: Pt with no new concerns noted at this time  Objective: See treatment diary below      Assessment: Tolerated treatment well  Continued with current POC with good tolerance from the patient  No pain reported with exercises  No adverse reaction noted to IASTM  Continue to progress as tolerated  Patient would benefit from continued PT      Plan: Continue per plan of care  Precautions:  Active Breast CA w/ Metastasis to Spine, HTN    Access Code: [email or web address]       Manuals 10/27 11/1 11/3 11/15 11/17 11/22   STM B/L UT, LS, Rhomboids  IASTM KCB IASTM KCB IASTM KCB IASTM KCB  IASTM KCB                     Neuro Re-Ed         C/S Retract  20x 20x  20x 20x  20x 20x   TB T 30x BTB 30x BTB 30x BTB 30x BTB  30x BTB   Scap Protract at Wall  20x at [address] [facility] w/ Posture at wall 10x w/ cane  10x w/ cane  10x w/ cane  10x w/ cane 10x w/ cane 10x w/ cane   Scap Set at Wall  10x3" 10x3" 10x3" 10x3" 10x 3'' 10x 3''            Education          Ther Ex         TB Row/Ext  30x BTB 30x BTB 30x Blk TB 30x Blk TB 30x blk TB 30x blk TB   T/S Rotation 10x5" seated        Shrugs/Rolls 30x 7# 30x 7# 30x 7# 30x 7# 30x 7# 30x 7#   Bicep Curls 30x 7# 30x 7# 30x 7# 30x 7# 30x 7# 30x 7#   DB Rows  30x 7# 30x 7# 30x 7# 30x 7# 30x 7# 30x 7#   UT Stretch          Shoulder Flex/Scap 3x10 4# 3x10 4# 3x10 4# 3x10 4# 3x10 4# 3x10 4#   Shoulder Abd 3x10 3# 3x10 3# 3x10 3# 3x10 3# 3x10 3# 3x10 3#   OH Shoulder Press  3x10 3# 3x10 3# 3x10 3# 3x10 3# 3x10 3# 3x10 3#   OH Towel Slide 10x  10x  10x 10x  10x  10x            Ther Activity                           Gait Training                           Modalities

## 2022-11-23 RX ORDER — OXYCODONE HYDROCHLORIDE 10 MG/1
5-10 TABLET ORAL EVERY 4 HOURS PRN
Qty: 120 TABLET | Refills: 0 | Status: SHIPPED | OUTPATIENT
Start: 2022-11-23

## 2022-11-23 RX ORDER — MORPHINE SULFATE 15 MG/1
15 TABLET, FILM COATED, EXTENDED RELEASE ORAL
Qty: 30 TABLET | Refills: 0 | Status: SHIPPED | OUTPATIENT
Start: 2022-11-23

## 2022-11-23 NOTE — TELEPHONE ENCOUNTER
Can the following meds pleased be resent to [facility]    Did not go through per pharmacist      Pt needs to fill tonight  Oxycodone 10 mg Ir  MS Contin 15 mg       Sent to provider and on call physician

## 2022-11-23 NOTE — TELEPHONE ENCOUNTER
Received request from Татьяна Go to refill patient oxycodone and morphine, as Rx did not go through yesterday  PDMP reviewed   Both medications refilled per request and sent to [facility] per request

## 2022-11-28 ENCOUNTER — APPOINTMENT (EMERGENCY)
Dept: CT IMAGING | Facility: HOSPITAL | Age: 47
End: 2022-11-28

## 2022-11-28 ENCOUNTER — HOSPITAL ENCOUNTER (INPATIENT)
Facility: HOSPITAL | Age: 47
LOS: 3 days | Discharge: HOME | End: 2022-12-01
Attending: EMERGENCY MEDICINE | Admitting: INTERNAL MEDICINE

## 2022-11-28 DIAGNOSIS — N12 PYELONEPHRITIS: Primary | ICD-10-CM

## 2022-11-28 DIAGNOSIS — B37.0 ORAL THRUSH: ICD-10-CM

## 2022-11-28 DIAGNOSIS — R03.0 ELEVATED BP WITHOUT DIAGNOSIS OF HYPERTENSION: ICD-10-CM

## 2022-11-28 DIAGNOSIS — K52.1 ANTIBIOTIC-ASSOCIATED DIARRHEA: ICD-10-CM

## 2022-11-28 DIAGNOSIS — T36.95XA ANTIBIOTIC-ASSOCIATED DIARRHEA: ICD-10-CM

## 2022-11-28 DIAGNOSIS — B96.20 BACTEREMIA DUE TO ESCHERICHIA COLI: ICD-10-CM

## 2022-11-28 DIAGNOSIS — T40.2X5A THERAPEUTIC OPIOID-INDUCED CONSTIPATION (OIC): ICD-10-CM

## 2022-11-28 DIAGNOSIS — R78.81 BACTEREMIA DUE TO ESCHERICHIA COLI: ICD-10-CM

## 2022-11-28 DIAGNOSIS — T40.2X5A THERAPEUTIC OPIOID INDUCED CONSTIPATION: ICD-10-CM

## 2022-11-28 DIAGNOSIS — K59.03 THERAPEUTIC OPIOID INDUCED CONSTIPATION: ICD-10-CM

## 2022-11-28 DIAGNOSIS — A41.9 ACUTE SEPSIS (HCC): ICD-10-CM

## 2022-11-28 DIAGNOSIS — K13.79 MOUTH PAIN: ICD-10-CM

## 2022-11-28 DIAGNOSIS — K59.03 THERAPEUTIC OPIOID-INDUCED CONSTIPATION (OIC): ICD-10-CM

## 2022-11-28 DIAGNOSIS — R78.81 BACTEREMIA: ICD-10-CM

## 2022-11-28 PROBLEM — I10 ESSENTIAL HYPERTENSION: Status: ACTIVE | Noted: 2022-11-28

## 2022-11-28 LAB
ALBUMIN SERPL BCP-MCNC: 4.6 G/DL (ref 3.5–5)
ALP SERPL-CCNC: 56 U/L (ref 34–104)
ALT SERPL W P-5'-P-CCNC: 16 U/L (ref 7–52)
ANION GAP SERPL CALCULATED.3IONS-SCNC: 11 MMOL/L (ref 4–13)
APTT PPP: 27 SECONDS (ref 23–37)
AST SERPL W P-5'-P-CCNC: 15 U/L (ref 13–39)
ATRIAL RATE: 108 BPM
BACTERIA UR QL AUTO: ABNORMAL /HPF
BASOPHILS # BLD AUTO: 0.02 THOUSANDS/ÂΜL (ref 0–0.1)
BASOPHILS NFR BLD AUTO: 0 % (ref 0–1)
BILIRUB SERPL-MCNC: 0.97 MG/DL (ref 0.2–1)
BILIRUB UR QL STRIP: NEGATIVE
BUN SERPL-MCNC: 17 MG/DL (ref 5–25)
CALCIUM SERPL-MCNC: 9.9 MG/DL (ref 8.4–10.2)
CHLORIDE SERPL-SCNC: 103 MMOL/L (ref 96–108)
CLARITY UR: ABNORMAL
CO2 SERPL-SCNC: 25 MMOL/L (ref 21–32)
COLOR UR: YELLOW
CREAT SERPL-MCNC: 0.76 MG/DL (ref 0.6–1.3)
EOSINOPHIL # BLD AUTO: 0 THOUSAND/ÂΜL (ref 0–0.61)
EOSINOPHIL NFR BLD AUTO: 0 % (ref 0–6)
ERYTHROCYTE [DISTWIDTH] IN BLOOD BY AUTOMATED COUNT: 13.2 % (ref 11.6–15.1)
GFR SERPL CREATININE-BSD FRML MDRD: 93 ML/MIN/1.73SQ M
GLUCOSE SERPL-MCNC: 148 MG/DL (ref 65–140)
GLUCOSE UR STRIP-MCNC: NEGATIVE MG/DL
HCT VFR BLD AUTO: 42.7 % (ref 34.8–46.1)
HGB BLD-MCNC: 13.6 G/DL (ref 11.5–15.4)
HGB UR QL STRIP.AUTO: ABNORMAL
IMM GRANULOCYTES # BLD AUTO: 0.07 THOUSAND/UL (ref 0–0.2)
IMM GRANULOCYTES NFR BLD AUTO: 1 % (ref 0–2)
INR PPP: 0.91 (ref 0.84–1.19)
KETONES UR STRIP-MCNC: NEGATIVE MG/DL
LACTATE SERPL-SCNC: 1.3 MMOL/L (ref 0.5–2)
LEUKOCYTE ESTERASE UR QL STRIP: ABNORMAL
LIPASE SERPL-CCNC: 17 U/L (ref 11–82)
LYMPHOCYTES # BLD AUTO: 0.54 THOUSANDS/ÂΜL (ref 0.6–4.47)
LYMPHOCYTES NFR BLD AUTO: 4 % (ref 14–44)
MCH RBC QN AUTO: 31 PG (ref 26.8–34.3)
MCHC RBC AUTO-ENTMCNC: 31.9 G/DL (ref 31.4–37.4)
MCV RBC AUTO: 97 FL (ref 82–98)
MONOCYTES # BLD AUTO: 0.64 THOUSAND/ÂΜL (ref 0.17–1.22)
MONOCYTES NFR BLD AUTO: 5 % (ref 4–12)
MUCOUS THREADS UR QL AUTO: ABNORMAL
NEUTROPHILS # BLD AUTO: 11.96 THOUSANDS/ÂΜL (ref 1.85–7.62)
NEUTS SEG NFR BLD AUTO: 90 % (ref 43–75)
NITRITE UR QL STRIP: POSITIVE
NON-SQ EPI CELLS URNS QL MICRO: ABNORMAL /HPF
NRBC BLD AUTO-RTO: 0 /100 WBCS
P AXIS: 40 DEGREES
PH UR STRIP.AUTO: 7.5 [PH]
PLATELET # BLD AUTO: 288 THOUSANDS/UL (ref 149–390)
PMV BLD AUTO: 10 FL (ref 8.9–12.7)
POTASSIUM SERPL-SCNC: 4.1 MMOL/L (ref 3.5–5.3)
PR INTERVAL: 150 MS
PROCALCITONIN SERPL-MCNC: <0.05 NG/ML
PROT SERPL-MCNC: 8.2 G/DL (ref 6.4–8.4)
PROT UR STRIP-MCNC: ABNORMAL MG/DL
PROTHROMBIN TIME: 12.5 SECONDS (ref 11.6–14.5)
QRS AXIS: 14 DEGREES
QRSD INTERVAL: 70 MS
QT INTERVAL: 334 MS
QTC INTERVAL: 447 MS
RBC # BLD AUTO: 4.39 MILLION/UL (ref 3.81–5.12)
RBC #/AREA URNS AUTO: ABNORMAL /HPF
SODIUM SERPL-SCNC: 139 MMOL/L (ref 135–147)
SP GR UR STRIP.AUTO: >=1.05 (ref 1–1.03)
T WAVE AXIS: 33 DEGREES
UROBILINOGEN UR STRIP-ACNC: <2 MG/DL
VENTRICULAR RATE: 108 BPM
WBC # BLD AUTO: 13.23 THOUSAND/UL (ref 4.31–10.16)
WBC #/AREA URNS AUTO: ABNORMAL /HPF

## 2022-11-28 RX ORDER — LISINOPRIL 20 MG/1
20 TABLET ORAL DAILY
Status: DISCONTINUED | OUTPATIENT
Start: 2022-11-28 | End: 2022-12-01 | Stop reason: HOSPADM

## 2022-11-28 RX ORDER — ONDANSETRON 2 MG/ML
4 INJECTION INTRAMUSCULAR; INTRAVENOUS ONCE
Status: COMPLETED | OUTPATIENT
Start: 2022-11-28 | End: 2022-11-28

## 2022-11-28 RX ORDER — ONDANSETRON 2 MG/ML
4 INJECTION INTRAMUSCULAR; INTRAVENOUS EVERY 6 HOURS PRN
Status: DISCONTINUED | OUTPATIENT
Start: 2022-11-28 | End: 2022-12-01 | Stop reason: HOSPADM

## 2022-11-28 RX ORDER — HYDROMORPHONE HCL/PF 1 MG/ML
1 SYRINGE (ML) INJECTION ONCE
Status: COMPLETED | OUTPATIENT
Start: 2022-11-28 | End: 2022-11-28

## 2022-11-28 RX ORDER — SODIUM CHLORIDE 9 MG/ML
150 INJECTION, SOLUTION INTRAVENOUS CONTINUOUS
Status: DISCONTINUED | OUTPATIENT
Start: 2022-11-28 | End: 2022-11-29

## 2022-11-28 RX ORDER — POLYETHYLENE GLYCOL 3350 17 G/17G
17 POWDER, FOR SOLUTION ORAL AS NEEDED
Status: DISCONTINUED | OUTPATIENT
Start: 2022-11-28 | End: 2022-11-28

## 2022-11-28 RX ORDER — KETOROLAC TROMETHAMINE 30 MG/ML
15 INJECTION, SOLUTION INTRAMUSCULAR; INTRAVENOUS ONCE
Status: COMPLETED | OUTPATIENT
Start: 2022-11-28 | End: 2022-11-28

## 2022-11-28 RX ORDER — POLYETHYLENE GLYCOL 3350 17 G/17G
17 POWDER, FOR SOLUTION ORAL DAILY PRN
Status: DISCONTINUED | OUTPATIENT
Start: 2022-11-28 | End: 2022-12-01 | Stop reason: HOSPADM

## 2022-11-28 RX ORDER — PANTOPRAZOLE SODIUM 40 MG/1
40 TABLET, DELAYED RELEASE ORAL
Status: DISCONTINUED | OUTPATIENT
Start: 2022-11-29 | End: 2022-12-01 | Stop reason: HOSPADM

## 2022-11-28 RX ORDER — OXYCODONE HYDROCHLORIDE 5 MG/1
5 TABLET ORAL EVERY 6 HOURS PRN
Status: DISCONTINUED | OUTPATIENT
Start: 2022-11-28 | End: 2022-12-01 | Stop reason: HOSPADM

## 2022-11-28 RX ORDER — SODIUM CHLORIDE, SODIUM LACTATE, POTASSIUM CHLORIDE, CALCIUM CHLORIDE 600; 310; 30; 20 MG/100ML; MG/100ML; MG/100ML; MG/100ML
150 INJECTION, SOLUTION INTRAVENOUS CONTINUOUS
Status: DISCONTINUED | OUTPATIENT
Start: 2022-11-28 | End: 2022-11-28

## 2022-11-28 RX ORDER — MORPHINE SULFATE 15 MG/1
15 TABLET, FILM COATED, EXTENDED RELEASE ORAL
Status: DISCONTINUED | OUTPATIENT
Start: 2022-11-28 | End: 2022-12-01 | Stop reason: HOSPADM

## 2022-11-28 RX ORDER — ENOXAPARIN SODIUM 100 MG/ML
40 INJECTION SUBCUTANEOUS DAILY
Status: DISCONTINUED | OUTPATIENT
Start: 2022-11-28 | End: 2022-12-01 | Stop reason: HOSPADM

## 2022-11-28 RX ORDER — ACETAMINOPHEN 325 MG/1
650 TABLET ORAL EVERY 6 HOURS PRN
Status: DISCONTINUED | OUTPATIENT
Start: 2022-11-28 | End: 2022-11-29

## 2022-11-28 RX ORDER — ACETAMINOPHEN 325 MG/1
975 TABLET ORAL ONCE
Status: COMPLETED | OUTPATIENT
Start: 2022-11-28 | End: 2022-11-28

## 2022-11-28 RX ORDER — CEFTRIAXONE 2 G/50ML
2000 INJECTION, SOLUTION INTRAVENOUS EVERY 24 HOURS
Status: DISCONTINUED | OUTPATIENT
Start: 2022-11-28 | End: 2022-11-30

## 2022-11-28 RX ORDER — NYSTATIN 100000 [USP'U]/G
POWDER TOPICAL 3 TIMES DAILY
Status: DISCONTINUED | OUTPATIENT
Start: 2022-11-28 | End: 2022-12-01 | Stop reason: HOSPADM

## 2022-11-28 RX ADMIN — KETOROLAC TROMETHAMINE 15 MG: 30 INJECTION, SOLUTION INTRAMUSCULAR at 06:27

## 2022-11-28 RX ADMIN — SODIUM CHLORIDE 1000 ML: 0.9 INJECTION, SOLUTION INTRAVENOUS at 16:01

## 2022-11-28 RX ADMIN — ONDANSETRON 4 MG: 2 INJECTION INTRAMUSCULAR; INTRAVENOUS at 06:27

## 2022-11-28 RX ADMIN — SODIUM CHLORIDE 1000 ML: 0.9 INJECTION, SOLUTION INTRAVENOUS at 12:21

## 2022-11-28 RX ADMIN — SODIUM CHLORIDE 1000 ML: 0.9 INJECTION, SOLUTION INTRAVENOUS at 13:02

## 2022-11-28 RX ADMIN — MORPHINE SULFATE 15 MG: 15 TABLET, EXTENDED RELEASE ORAL at 22:04

## 2022-11-28 RX ADMIN — ACETAMINOPHEN 975 MG: 325 TABLET ORAL at 09:03

## 2022-11-28 RX ADMIN — ENOXAPARIN SODIUM 40 MG: 40 INJECTION SUBCUTANEOUS at 13:56

## 2022-11-28 RX ADMIN — NYSTATIN: 100000 POWDER TOPICAL at 18:19

## 2022-11-28 RX ADMIN — SODIUM CHLORIDE 500 ML: 0.9 INJECTION, SOLUTION INTRAVENOUS at 18:15

## 2022-11-28 RX ADMIN — ACETAMINOPHEN 650 MG: 325 TABLET ORAL at 18:19

## 2022-11-28 RX ADMIN — NYSTATIN: 100000 POWDER TOPICAL at 22:05

## 2022-11-28 RX ADMIN — VANCOMYCIN HYDROCHLORIDE 1500 MG: 5 INJECTION, POWDER, LYOPHILIZED, FOR SOLUTION INTRAVENOUS at 15:54

## 2022-11-28 RX ADMIN — LISINOPRIL 20 MG: 20 TABLET ORAL at 15:47

## 2022-11-28 RX ADMIN — CEFTRIAXONE 2000 MG: 2 INJECTION, SOLUTION INTRAVENOUS at 14:13

## 2022-11-28 RX ADMIN — HYDROMORPHONE HYDROCHLORIDE 1 MG: 1 INJECTION, SOLUTION INTRAMUSCULAR; INTRAVENOUS; SUBCUTANEOUS at 06:27

## 2022-11-28 RX ADMIN — PIPERACILLIN AND TAZOBACTAM 3.38 G: 3; .375 INJECTION, POWDER, FOR SOLUTION INTRAVENOUS at 12:33

## 2022-11-28 RX ADMIN — SODIUM CHLORIDE 150 ML/HR: 0.9 INJECTION, SOLUTION INTRAVENOUS at 19:20

## 2022-11-28 RX ADMIN — IOHEXOL 85 ML: 350 INJECTION, SOLUTION INTRAVENOUS at 07:31

## 2022-11-28 RX ADMIN — SODIUM CHLORIDE, SODIUM LACTATE, POTASSIUM CHLORIDE, AND CALCIUM CHLORIDE 150 ML/HR: .6; .31; .03; .02 INJECTION, SOLUTION INTRAVENOUS at 15:54

## 2022-11-28 NOTE — ED PROVIDER NOTES
History  Chief Complaint   Patient presents with   • Flank Pain     Pt seen recently for same cc  States 12 hrs ago started with upper abdominal pain that radiates to her L flank and N/V  States she took pain medication with no relief  Sunil Victor Manuel is a 49 y o  female who presents with the chief complaint of left sided flank pain and generalized abdominal pain  Onset was 7 pm last night  She reports the pain has intensified and that she has had multiple episodes of vomiting  She has chronic pain secondary to cancer diagnosis and is prescribed morphine and oxycodone at home  She reports she took these medications but vomited them up before they could take effect  No fevers or chills  History provided by:  Patient and medical records   used: No    Flank Pain  Pain location:  L flank (and generalized anterior)  Pain quality: cramping, sharp and stabbing    Pain radiates to:  Does not radiate  Pain severity:  Severe  Onset quality:  Gradual  Duration:  10 hours  Timing:  Constant  Progression:  Worsening  Chronicity:  New  Relieved by:  Nothing  Worsened by:  Nothing  Ineffective treatments: prescription medications (morphine, oxycodone)  Associated symptoms: nausea and vomiting    Associated symptoms: no anorexia, no chest pain, no chills, no diarrhea, no dysuria, no fever and no shortness of breath    Risk factors: has not had multiple surgeries        Prior to Admission Medications   Prescriptions Last Dose Informant Patient Reported? Taking?    DPH-Lido-AlHydr-MgHydr-Simeth (First-Mouthwash BLM) SUSP   Yes No   acetaminophen (TYLENOL) 325 mg tablet   Yes No   Sig: Take 650 mg by mouth every 6 (six) hours as needed for mild pain   al mag oxide-diphenhydramine-lidocaine viscous (MAGIC MOUTHWASH) 1:1:1 suspension   No No   Sig: Swish and spit 10 mL every 4 (four) hours as needed for mouth pain or discomfort   Patient taking differently: Swish and spit 10 mL if needed for mouth pain or discomfort   baclofen 10 mg tablet   No No   Sig: TAKE 1/2 TABLET BY MOUTH 3 TIMES A DAY AS NEEDED FOR MUSCLE SPASMS   Patient taking differently: if needed   furosemide (LASIX) 40 mg tablet   No No   Sig: Take 1 tablet (40 mg total) by mouth daily   lactulose (CEPHULAC) 10 g packet   No No   Sig: Take 1 packet (10 g total) by mouth 3 (three) times a day as needed (constipation)   Patient taking differently: Take 10 g by mouth if needed (constipation)   lisinopril (ZESTRIL) 20 mg tablet   No No   Sig: Take 1 tablet (20 mg total) by mouth daily   Patient taking differently: Take 20 mg by mouth daily Only to take as needed when BP is >414 systolicallly   morphine (MS CONTIN) 15 mg 12 hr tablet   No No   Sig: Take 1 tablet (15 mg total) by mouth daily at bedtime Max Daily Amount: 15 mg   nystatin (MYCOSTATIN) 500,000 units/5 mL suspension   No No   Sig: Apply 5 mL (500,000 Units total) to the mouth or throat 4 (four) times a day   Patient taking differently: Apply 500,000 Units to the mouth or throat if needed   nystatin (MYCOSTATIN) powder   No No   Sig: Apply topically 3 (three) times a day   ondansetron (ZOFRAN) 4 mg tablet   No No   Sig: Take 1 tablet (4 mg total) by mouth every 8 (eight) hours as needed for nausea or vomiting   oxyCODONE (ROXICODONE) 10 MG TABS   No No   Sig: Take 0 5-1 tablets (5-10 mg total) by mouth every 4 (four) hours as needed for moderate pain or severe pain Max Daily Amount: 60 mg   pantoprazole (PROTONIX) 40 mg tablet   No No   Sig: Take 1 tablet (40 mg total) by mouth daily in the early morning   polyethylene glycol (MIRALAX) 17 g packet   No No   Sig: Take 17 g by mouth daily   Patient taking differently: Take 17 g by mouth if needed   senna (SENOKOT) 8 6 mg   No No   Sig: Take 3 tablets (25 8 mg total) by mouth 2 (two) times a day Hold for loose stool  Patient taking differently: Take 25 8 mg by mouth if needed Hold for loose stool        Facility-Administered Medications: None Past Medical History:   Diagnosis Date   • Breast cancer ([facility] [address] )    • Cancer ([facility] )    • Hypertension        History reviewed  No pertinent surgical history  Family History   Problem Relation Age of Onset   • Coronary artery disease Mother    • Heart attack Father    • Diabetes type II Father      I have reviewed and agree with the history as documented  E-Cigarette/Vaping   • E-Cigarette Use Never User      E-Cigarette/Vaping Substances   • Nicotine No    • THC No    • CBD No    • Flavoring No    • Other No    • Unknown No      Social History     Tobacco Use   • Smoking status: Never   • Smokeless tobacco: Never   Vaping Use   • Vaping Use: Never used   Substance Use Topics   • Alcohol use: Not Currently     Comment: rare, social    • Drug use: Never       Review of Systems   Constitutional: Negative for chills, diaphoresis and fever  Respiratory: Negative for shortness of breath  Cardiovascular: Negative for chest pain and palpitations  Gastrointestinal: Positive for nausea and vomiting  Negative for anorexia and diarrhea  Genitourinary: Positive for flank pain  Negative for dysuria and frequency  Skin: Negative for rash  All other systems reviewed and are negative  Physical Exam  Physical Exam  Vitals and nursing note reviewed  Constitutional:       General: She is in acute distress  Appearance: She is well-developed  HENT:      Head: Normocephalic and atraumatic  Eyes:      Pupils: Pupils are equal, round, and reactive to light  Neck:      Vascular: No JVD  Cardiovascular:      Rate and Rhythm: Regular rhythm  Tachycardia present  Heart sounds: Normal heart sounds  No murmur heard  No friction rub  No gallop  Pulmonary:      Effort: Pulmonary effort is normal  No respiratory distress  Breath sounds: Normal breath sounds  No wheezing or rales  Chest:      Chest wall: No tenderness  Abdominal:      General: There is distension (generalized)  Tenderness: There is left CVA tenderness  Musculoskeletal:         General: No tenderness  Normal range of motion  Cervical back: Normal range of motion  Skin:     General: Skin is warm and dry  Neurological:      General: No focal deficit present  Mental Status: She is alert and oriented to person, place, and time  Psychiatric:         Mood and Affect: Mood is anxious  Speech: Speech is rapid and pressured  Behavior: Behavior normal          Thought Content:  Thought content normal          Judgment: Judgment normal          Vital Signs  ED Triage Vitals [11/28/22 0611]   Temperature Pulse Respirations Blood Pressure SpO2   98 1 °F (36 7 °C) (!) 120 (!) 26 124/80 100 %      Temp Source Heart Rate Source Patient Position - Orthostatic VS BP Location FiO2 (%)   Oral Monitor Lying Right arm --      Pain Score       10 - Worst Possible Pain           Vitals:    11/28/22 2240 11/29/22 0221 11/29/22 0225 11/29/22 0711   BP: 104/62 169/99 144/85 127/76   Pulse: 89 103  92   Patient Position - Orthostatic VS:             Visual Acuity      ED Medications  Medications   lisinopril (ZESTRIL) tablet 20 mg (20 mg Oral Given 11/29/22 0810)   morphine (MS CONTIN) ER tablet 15 mg (15 mg Oral Given 11/28/22 2204)   nystatin (MYCOSTATIN) powder ( Topical Given 11/29/22 0811)   oxyCODONE (ROXICODONE) IR tablet 5 mg (has no administration in time range)   pantoprazole (PROTONIX) EC tablet 40 mg (40 mg Oral Given 11/29/22 0502)   enoxaparin (LOVENOX) subcutaneous injection 40 mg (40 mg Subcutaneous Given 11/29/22 0810)   cefTRIAXone (ROCEPHIN) IVPB (premix in dextrose) 2,000 mg 50 mL (2,000 mg Intravenous New Bag 11/28/22 1413)   polyethylene glycol (MIRALAX) packet 17 g (has no administration in time range)   sodium chloride 0 9 % infusion (150 mL/hr Intravenous New Bag 11/29/22 0631)   ondansetron (ZOFRAN) injection 4 mg (has no administration in time range)   acetaminophen (TYLENOL) tablet 650 mg (has no administration in time range)   ketorolac (TORADOL) injection 15 mg (15 mg Intravenous Given 11/28/22 0627)   HYDROmorphone (DILAUDID) injection 1 mg (1 mg Intravenous Given 11/28/22 0627)   ondansetron (ZOFRAN) injection 4 mg (4 mg Intravenous Given 11/28/22 0627)   iohexol (OMNIPAQUE) 350 MG/ML injection (SINGLE-DOSE) 100 mL (85 mL Intravenous Given 11/28/22 0731)   acetaminophen (TYLENOL) tablet 975 mg (975 mg Oral Given 11/28/22 0903)   sodium chloride 0 9 % bolus 1,000 mL (1,000 mL Intravenous New Bag 11/28/22 1302)     Followed by   sodium chloride 0 9 % bolus 1,000 mL (1,000 mL Intravenous New Bag 11/28/22 1601)     Followed by   sodium chloride 0 9 % bolus 1,000 mL ( Intravenous Canceled Entry 11/28/22 1603)   piperacillin-tazobactam (ZOSYN) 3 375 g in sodium chloride 0 9 % 100 mL IVPB (0 g Intravenous Stopped 11/28/22 1338)   vancomycin (VANCOCIN) 1500 mg in sodium chloride 0 9% 250 mL IVPB (1,500 mg Intravenous New Bag 11/28/22 1554)   sodium chloride 0 9 % bolus 500 mL (500 mL Intravenous New Bag 11/28/22 1815)       Diagnostic Studies  Results Reviewed     Procedure Component Value Units Date/Time    Blood culture #1 [[id number]]  (Abnormal) Collected: 11/28/22 0701    Lab Status: Preliminary result Specimen: Blood from Arm, Right Updated: 11/29/22 0817     Gram Stain Result Gram negative rods    Blood culture #2 [[id number]]  (Abnormal) Collected: 11/28/22 0701    Lab Status: Preliminary result Specimen: Blood from Arm, Left Updated: 11/29/22 0813     Blood Culture Escherichia coli     Gram Stain Result Gram negative rods    Urine culture [[id number]]  (Abnormal) Collected: 11/28/22 0753    Lab Status: Preliminary result Specimen: Urine, Clean Catch Updated: 11/29/22 0759     Urine Culture >100,000 cfu/ml Gram Negative Salo Enteric Like    Basic metabolic panel [[id number]]  (Abnormal) Collected: 11/29/22 0508    Lab Status: Final result Specimen: Blood from Arm, Left Updated: 11/29/22 5759 Sodium 139 mmol/L      Potassium 3 3 mmol/L      Chloride 111 mmol/L      CO2 22 mmol/L      ANION GAP 6 mmol/L      BUN 11 mg/dL      Creatinine 0 54 mg/dL      Glucose 106 mg/dL      Calcium 7 7 mg/dL      eGFR 111 ml/min/1 73sq m     Narrative:      [place] guidelines for Chronic Kidney Disease (CKD):   •  Stage 1 with normal or high GFR (GFR > 90 mL/min/1 73 square meters)  •  Stage 2 Mild CKD (GFR = 60-89 mL/min/1 73 square meters)  •  Stage 3A Moderate CKD (GFR = 45-59 mL/min/1 73 square meters)  •  Stage 3B Moderate CKD (GFR = 30-44 mL/min/1 73 square meters)  •  Stage 4 Severe CKD (GFR = 15-29 mL/min/1 73 square meters)  •  Stage 5 End Stage CKD (GFR <15 mL/min/1 73 square meters)  Note: GFR calculation is accurate only with a steady state creatinine    CBC and differential [[id number]]  (Abnormal) Collected: 11/29/22 0508    Lab Status: Final result Specimen: Blood from Arm, Left Updated: 11/29/22 0529     WBC 8 03 Thousand/uL      RBC 3 26 Million/uL      Hemoglobin 10 1 g/dL      Hematocrit 31 8 %      MCV 98 fL      MCH 31 0 pg      MCHC 31 8 g/dL      RDW 13 5 %      MPV 9 7 fL      Platelets 406 Thousands/uL      nRBC 0 /100 WBCs      Neutrophils Relative 87 %      Immat GRANS % 0 %      Lymphocytes Relative 5 %      Monocytes Relative 8 %      Eosinophils Relative 0 %      Basophils Relative 0 %      Neutrophils Absolute 6 93 Thousands/µL      Immature Grans Absolute 0 03 Thousand/uL      Lymphocytes Absolute 0 38 Thousands/µL      Monocytes Absolute 0 67 Thousand/µL      Eosinophils Absolute 0 01 Thousand/µL      Basophils Absolute 0 01 Thousands/µL     Blood Culture Identification Panel [[id number]]  (Abnormal) Collected: 11/28/22 0701    Lab Status: Preliminary result Specimen: Blood from Arm, Left Updated: 11/28/22 1921     Escherichia coli Detected    Narrative:      Routine culture and susceptiblity to follow for confirmation      Film Array panel tests for 11 gram positive organisms, 15 gram negative organisms, 7 yeast species and 10 resistance genes  Procalcitonin [[id number]]  (Normal) Collected: 11/28/22 0633    Lab Status: Final result Specimen: Blood from Arm, Right Updated: 11/28/22 1348     Procalcitonin <0 05 ng/ml     Lipase [[id number]]  (Normal) Collected: 11/28/22 4241    Lab Status: Final result Specimen: Blood from Arm, Right Updated: 11/28/22 1348     Lipase 17 u/L     Urine Microscopic [[id number]]  (Abnormal) Collected: 11/28/22 0753    Lab Status: Final result Specimen: Urine, Clean Catch Updated: 11/28/22 0814     RBC, UA Innumerable /hpf      WBC, UA Innumerable /hpf      Epithelial Cells None Seen /hpf      Bacteria, UA Innumerable /hpf      MUCUS THREADS Occasional    UA w Reflex to Microscopic w Reflex to Culture [[id number]]  (Abnormal) Collected: 11/28/22 0753    Lab Status: Final result Specimen: Urine, Clean Catch Updated: 11/28/22 0810     Color, UA Yellow     Clarity, UA Extra Turbid     Specific Gravity, UA >=1 050     pH, UA 7 5     Leukocytes, UA Large     Nitrite, UA Positive     Protein,  (2+) mg/dl      Glucose, UA Negative mg/dl      Ketones, UA Negative mg/dl      Urobilinogen, UA <2 0 mg/dl      Bilirubin, UA Negative     Occult Blood, UA Small    Protime-INR [[id number]]  (Normal) Collected: 11/28/22 0701    Lab Status: Final result Specimen: Blood from Arm, Right Updated: 11/28/22 0803     Protime 12 5 seconds      INR 0 91    APTT [[id number]]  (Normal) Collected: 11/28/22 0701    Lab Status: Final result Specimen: Blood from Arm, Right Updated: 11/28/22 0803     PTT 27 seconds     Lactic acid [[id number]]  (Normal) Collected: 11/28/22 0701    Lab Status: Final result Specimen: Blood from Arm, Right Updated: 11/28/22 0740     LACTIC ACID 1 3 mmol/L     Narrative:      Result may be elevated if tourniquet was used during collection      CBC and differential [[id number]]  (Abnormal) Collected: 11/28/22 0633    Lab Status: Final result Specimen: Blood from Arm, Right Updated: 11/28/22 0723     WBC 13 23 Thousand/uL      RBC 4 39 Million/uL      Hemoglobin 13 6 g/dL      Hematocrit 42 7 %      MCV 97 fL      MCH 31 0 pg      MCHC 31 9 g/dL      RDW 13 2 %      MPV 10 0 fL      Platelets 009 Thousands/uL      nRBC 0 /100 WBCs      Neutrophils Relative 90 %      Immat GRANS % 1 %      Lymphocytes Relative 4 %      Monocytes Relative 5 %      Eosinophils Relative 0 %      Basophils Relative 0 %      Neutrophils Absolute 11 96 Thousands/µL      Immature Grans Absolute 0 07 Thousand/uL      Lymphocytes Absolute 0 54 Thousands/µL      Monocytes Absolute 0 64 Thousand/µL      Eosinophils Absolute 0 00 Thousand/µL      Basophils Absolute 0 02 Thousands/µL     Narrative: This is an appended report  These results have been appended to a previously verified report      Comprehensive metabolic panel [[id number]]  (Abnormal) Collected: 11/28/22 0633    Lab Status: Final result Specimen: Blood from Arm, Right Updated: 11/28/22 0701     Sodium 139 mmol/L      Potassium 4 1 mmol/L      Chloride 103 mmol/L      CO2 25 mmol/L      ANION GAP 11 mmol/L      BUN 17 mg/dL      Creatinine 0 76 mg/dL      Glucose 148 mg/dL      Calcium 9 9 mg/dL      AST 15 U/L      ALT 16 U/L      Alkaline Phosphatase 56 U/L      Total Protein 8 2 g/dL      Albumin 4 6 g/dL      Total Bilirubin 0 97 mg/dL      eGFR 93 ml/min/1 73sq m     Narrative:      [facility] guidelines for Chronic Kidney Disease (CKD):   •  Stage 1 with normal or high GFR (GFR > 90 mL/min/1 73 square meters)  •  Stage 2 Mild CKD (GFR = 60-89 mL/min/1 73 square meters)  •  Stage 3A Moderate CKD (GFR = 45-59 mL/min/1 73 square meters)  •  Stage 3B Moderate CKD (GFR = 30-44 mL/min/1 73 square meters)  •  Stage 4 Severe CKD (GFR = 15-29 mL/min/1 73 square meters)  •  Stage 5 End Stage CKD (GFR <15 mL/min/1 73 square meters)  Note: GFR calculation is accurate only with a steady state creatinine                 CT abdomen pelvis with contrast   Final Result by Romeo Hudson MD (11/28 1001)      Worsened subacute left pyelonephritis  Persistent hyperdense material in lower calyx of left kidney, question pyonephrosis or underlying urethroileal carcinoma  Recommend urology consultation for further evaluation  4 1 cm medial right breast mass with calcifications, unchanged when remeasured by this user, likely representing primary breast cancer  Recommend correlation with recent mammograms  Subcentimeter indeterminate density lesion in upper pole of right kidney, which could represent hemorrhagic/proteinaceous renal cyst   Consider abdomen renal ultrasound for further evaluation  4 8 cm left ovarian simple cyst, similar to prior exam   If premenopausal, no follow-up recommended  If postmenopausal, recommend follow-up pelvic ultrasound in 12 months  Diffuse scattered osseous metastatic disease throughout the visualized sternum, bilateral ribs, spine, sacrum, pelvis, and bilateral visualized proximal femurs  Consider follow-up with orthopedic surgery of bilateral proximal femur osseous metastasis  0 4 cm nonobstructing renal calculus in lower pole of left kidney, unchanged  Additional chronic/incidental findings as detailed above  The study was marked in [facility] for immediate notification           Workstation performed: [id number]                    Procedures  Procedures         ED Course                                             MDM  Number of Diagnoses or Management Options  Acute sepsis [facility]): new and requires workup  Pyelonephritis: new and requires workup  Diagnosis management comments: Background: 49 y o  female with left sided flank pain radiating around to anterior abdomen    Differential DX includes but is not limited to: ureterolithiasis, pyelonephritis, musculoskeletal flank pain, costochondritis, mesenteric adenitis    Plan: cbc, bmp, urinalysis, ct study, symptomatic treatment          Amount and/or Complexity of Data Reviewed  Clinical lab tests: ordered and reviewed  Tests in the radiology section of CPT®: ordered and reviewed    Risk of Complications, Morbidity, and/or Mortality  Presenting problems: high  Diagnostic procedures: high  Management options: high    Patient Progress  Patient progress: stable      Disposition  Final diagnoses:   Pyelonephritis   Acute sepsis ([facility] [address] )     Time reflects when diagnosis was documented in both MDM as applicable and the Disposition within this note     Time User Action Codes Description Comment    11/28/2022 12:58 PM [facility] Add [N12] Pyelonephritis     11/28/2022 12:58 PM [facility] Add [A41 9] Acute sepsis ([facility] [address] )     11/29/2022  7:17 AM Giovana Davalos Add [R78 81] Bacteremia       ED Disposition     ED Disposition   Admit    Condition   Stable    Date/Time   Mon Nov 28, 2022 12:58 PM    Comment   Case was discussed with Dr Inga Bob and the patient's admission status was agreed to be Admission Status: inpatient status to the service of Dr Inga Bob             Follow-up Information    None         Current Discharge Medication List      CONTINUE these medications which have NOT CHANGED    Details   acetaminophen (TYLENOL) 325 mg tablet Take 650 mg by mouth every 6 (six) hours as needed for mild pain      al mag oxide-diphenhydramine-lidocaine viscous (MAGIC MOUTHWASH) 1:1:1 suspension Swish and spit 10 mL every 4 (four) hours as needed for mouth pain or discomfort  Qty: 500 mL, Refills: 2    Associated Diagnoses: Mouth pain      baclofen 10 mg tablet TAKE 1/2 TABLET BY MOUTH 3 TIMES A DAY AS NEEDED FOR MUSCLE SPASMS  Qty: 135 tablet, Refills: 1    Associated Diagnoses: Cancer associated pain; Muscle spasms of neck      DPH-Lido-AlHydr-MgHydr-Simeth (First-Mouthwash BLM) SUSP       furosemide (LASIX) 40 mg tablet Take 1 tablet (40 mg total) by mouth daily  Qty: 90 tablet, Refills: 0    Associated Diagnoses: Leg edema; Primary hypertension      lactulose (CEPHULAC) 10 g packet Take 1 packet (10 g total) by mouth 3 (three) times a day as needed (constipation)  Qty: 30 each, Refills: 0    Associated Diagnoses: Therapeutic opioid induced constipation      lisinopril (ZESTRIL) 20 mg tablet Take 1 tablet (20 mg total) by mouth daily  Qty: 90 tablet, Refills: 1    Associated Diagnoses: Elevated BP without diagnosis of hypertension      morphine (MS CONTIN) 15 mg 12 hr tablet Take 1 tablet (15 mg total) by mouth daily at bedtime Max Daily Amount: 15 mg  Qty: 30 tablet, Refills: 0    Comments: For chronic intractable cancer-related pain in palliative patient  Associated Diagnoses: Cancer associated pain; Palliative care patient; 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 (HCC)      nystatin (MYCOSTATIN) 500,000 units/5 mL suspension Apply 5 mL (500,000 Units total) to the mouth or throat 4 (four) times a day  Qty: 473 mL, Refills: 1    Associated Diagnoses: Oral thrush      nystatin (MYCOSTATIN) powder Apply topically 3 (three) times a day  Qty: 15 g, Refills: 0    Associated Diagnoses: Rash      ondansetron (ZOFRAN) 4 mg tablet Take 1 tablet (4 mg total) by mouth every 8 (eight) hours as needed for nausea or vomiting  Qty: 9 tablet, Refills: 20    Comments: For CINV  Associated Diagnoses: Malignant neoplasm of overlapping sites of right breast in female, estrogen receptor negative ([facility] [address] ); Osseous metastasis ([facility] [address] ); Palliative care patient; Chemotherapy-induced nausea      oxyCODONE (ROXICODONE) 10 MG TABS Take 0 5-1 tablets (5-10 mg total) by mouth every 4 (four) hours as needed for moderate pain or severe pain Max Daily Amount: 60 mg  Qty: 120 tablet, Refills: 0    Comments: Ongoing therapy for chronic intractable cancer-related pain in palliative patient    Associated Diagnoses: Cancer associated pain; Palliative care patient      pantoprazole (PROTONIX) 40 mg tablet Take 1 tablet (40 mg total) by mouth daily in the early morning  Qty: 90 tablet, Refills: 1    Associated Diagnoses: Gastroesophageal reflux disease without esophagitis      polyethylene glycol (MIRALAX) 17 g packet Take 17 g by mouth daily  Qty: 30 each, Refills: 0    Associated Diagnoses: Therapeutic opioid-induced constipation (OIC)      senna (SENOKOT) 8 6 mg Take 3 tablets (25 8 mg total) by mouth 2 (two) times a day Hold for loose stool  Qty: 180 tablet, Refills: 0    Associated Diagnoses: Therapeutic opioid-induced constipation (OIC)             No discharge procedures on file      PDMP Review       Value Time User    PDMP Reviewed  Yes 11/23/2022  5:41 PM Lotus Bella MD          ED Provider  Electronically Signed by           Nita Alvarez MD  11/29/22 1002

## 2022-11-28 NOTE — ED CARE HANDOFF
Emergency Department Sign Out Note        Sign out and transfer of care from Dr Oma Toussaint  See Separate Emergency Department note  The patient, Kevin Nicolas, was evaluated by the previous provider for flank pain  Workup Completed:  Lab workup complete    ED Course / Workup Pending (followup):  Pending CT abdomen pelvis to evaluate for possible nephrolithiasis           ECG 12 Lead Documentation Only    Date/Time: 11/28/2022 2:36 PM  Performed by: Alex Saeed MD  Authorized by: Alex Saeed MD     ECG reviewed by me, the ED Provider: yes    Patient location:  ED  Previous ECG:     Previous ECG:  Compared to current    Similarity:  No change  Interpretation:     Interpretation: normal    Rate:     ECG rate:  108    ECG rate assessment: tachycardic    Rhythm:     Rhythm: sinus tachycardia    Ectopy:     Ectopy: none    QRS:     QRS axis:  Normal    QRS intervals:  Normal  Conduction:     Conduction: normal    ST segments:     ST segments:  Normal  T waves:     T waves: normal        MDM  Number of Diagnoses or Management Options  Acute sepsis ([address] )  Pyelonephritis  Diagnosis management comments: On further investigation the patient has a history of being recently treated for pyelonephritis, with oral antibiotics, comparison of her previous UA with her current UA shows worsening despite being on antibiotics, the patient will be admitted for IV antibiotics and further management of her UTI  The patient also meets SIRS criteria so labs will be drawn and patient will be managed for possible sepsis            Disposition  Final diagnoses:   Pyelonephritis   Acute sepsis [facility])     Time reflects when diagnosis was documented in both MDM as applicable and the Disposition within this note     Time User Action Codes Description Comment    11/28/2022 12:58 PM Lennice Gabby Add [N12] Pyelonephritis     11/28/2022 12:58 PM Lennice Gabby Add [A41 9] Acute sepsis [facility])       ED Disposition     ED Disposition   Admit    Condition   Stable    Date/Time   Mon Nov 28, 2022 12:58 PM    Comment   Case was discussed with Dr Javier Bell and the patient's admission status was agreed to be Admission Status: inpatient status to the service of Dr Javier Bell  Follow-up Information    None       Patient's Medications   Discharge Prescriptions    No medications on file     No discharge procedures on file         ED Provider  Electronically Signed by     Jason Hummel MD  11/28/22 6897

## 2022-11-28 NOTE — ASSESSMENT & PLAN NOTE
Left-sided flank pain, nausea, vomiting, abnormal UA, abnormal CT abdomen and pelvis  Failed outpatient therapy with antibiotics, patient was treated with oral antibiotics 3 weeks ago, completed course  Meets sepsis criteria with tachycardia, tachypnea, leukocytosis  Receiving sepsis protocol fluid bolus, subsequently continue LR per sepsis protocol  Initially presented with low blood pressure, now improving  Lactic acid within normal limit  CT abdomen and pelvis:    Worsened subacute left pyelonephritis  Persistent hyperdense material in lower calyx of left kidney, question pyonephrosis or underlying urethroileal carcinoma  Likely the reason why patient is having recurrent infection, will consult Urology for further evaluation  Prior urine culture growing E coli, sensitive to ceftriaxone, will give ceftriaxone 2 g IV daily

## 2022-11-28 NOTE — H&P
[facility]  H&P- Kevin Nicolas 1975, 49 y o  female MRN: [id number]  Unit/Bed#: ED-03 Encounter: [id number]  Primary Care Provider: Yousif Johnson MD   Date and time admitted to hospital: 11/28/2022  6:05 AM    * Pyelonephritis  Assessment & Plan  Left-sided flank pain, nausea, vomiting, abnormal UA, abnormal CT abdomen and pelvis  Failed outpatient therapy with antibiotics, patient was treated with oral antibiotics 3 weeks ago, completed course  Meets sepsis criteria with tachycardia, tachypnea, leukocytosis  Receiving sepsis protocol fluid bolus, subsequently continue LR per sepsis protocol  Initially presented with low blood pressure, now improving  Lactic acid within normal limit  CT abdomen and pelvis:    Worsened subacute left pyelonephritis  Persistent hyperdense material in lower calyx of left kidney, question pyonephrosis or underlying urethroileal carcinoma  Likely the reason why patient is having recurrent infection, will consult Urology for further evaluation  Prior urine culture growing E coli, sensitive to ceftriaxone, will give ceftriaxone 2 g IV daily  Essential hypertension  Assessment & Plan  Patient states she had bilateral lower extremity swelling initially when she was diagnosed of cancer, was treated with Lasix and currently not taking Lasix anymore  Now to help with the blood pressure her family doctor has started the patient on lisinopril 20 mg daily, will start this medication from tomorrow with holding parameters    Candidal skin infection  Assessment & Plan  Continue nystatin powder  Cancer associated pain  Assessment & Plan  P r n  oxycodone  Takes morphine at bedtime  Osseous metastasis [address] [address] )  Assessment & Plan  Underwent radiation therapy, chemotherapy, currently pain is much better, on p r n  oxycodone      Malignant neoplasm of overlapping sites of right breast in female, estrogen receptor negative ([address] )  Assessment & Plan  Outpatient follow-up with Oncology, patient states that cancer is responding to the treatment  VTE Prophylaxis: Enoxaparin (Lovenox)  / sequential compression device   Code Status:  Full code  POLST: There is no POLST form on file for this patient (pre-hospital)  Discussion with family:  None    Anticipated Length of Stay:  Patient will be admitted on an Inpatient basis with an anticipated length of stay of  more than 2 midnights  Justification for Hospital Stay:  Sepsis, acute pyelonephritis    Total Time for Visit, including Counseling / Coordination of Care: 70 minutes  Greater than 50% of this total time spent on direct patient counseling and coordination of care  Chief Complaint:   Left flank pain    History of Present Illness:    Leia Perez is a 49 y o  female who presents with complaints of left flank, left abdominal pain started last night, multiple episodes of nausea, vomiting  Patient rates pain 9/10 in severity, no radiation, no aggravating, relieving factors, denies any fever, chills  Denies any urinary complaints  Denies any hematemesis, melena  Denies any cough, chest pain, shortness of breath, dizziness  At the time of my examination patient rates the pain 2/10 in severity  Patient was in the emergency department 3 weeks ago with similar complaints, treated with oral antibiotics in the outpatient setting, symptoms have improved up until last night  Patient has received IV pain medication Toradol and Dilaudid which relieved the pain  UA is abnormal, underwent CT abdomen which shows worsening of pyelonephritis and possible pyelo nephrosis  Currently being admitted for antibiotic management and Urology evaluation  Review of Systems:    More than 10 system review of systems was performed, pertinent positive and negative findings mentioned as per history present illness    Past Medical and Surgical History:     Past Medical History:   Diagnosis Date   • Breast cancer [facility])    • Cancer ([facility] [address] )    • Hypertension        History reviewed  No pertinent surgical history  Meds/Allergies:    Prior to Admission medications    Medication Sig Start Date End Date Taking?  Authorizing Provider   acetaminophen (TYLENOL) 325 mg tablet Take 650 mg by mouth every 6 (six) hours as needed for mild pain    Historical Provider, MD Conroy Joyce oxide-diphenhydramine-lidocaine viscous (MAGIC MOUTHWASH) 1:1:1 suspension Swish and spit 10 mL every 4 (four) hours as needed for mouth pain or discomfort  Patient taking differently: Swish and spit 10 mL if needed for mouth pain or discomfort 4/18/22   Akanksha Cai DO   baclofen 10 mg tablet TAKE 1/2 TABLET BY MOUTH 3 TIMES A DAY AS NEEDED FOR MUSCLE SPASMS  Patient taking differently: if needed 6/9/22   Ely Robin DO   DPH-Lido-AlHydr-MgHydr-Simeth (First-Mouthwash BLM) SUSP  4/6/22   Historical Provider, MD   furosemide (LASIX) 40 mg tablet Take 1 tablet (40 mg total) by mouth daily 10/25/22   Jerrod Macdonald MD   lactulose ([address]) 10 g packet Take 1 packet (10 g total) by mouth 3 (three) times a day as needed (constipation)  Patient taking differently: Take 10 g by mouth if needed (constipation) 4/20/22   Akanksha Cai DO   lisinopril (ZESTRIL) 20 mg tablet Take 1 tablet (20 mg total) by mouth daily  Patient taking differently: Take 20 mg by mouth daily Only to take as needed when BP is >241 systolicallly 6/62/53   Jerrod Macdonald MD   morphine (MS CONTIN) 15 mg 12 hr tablet Take 1 tablet (15 mg total) by mouth daily at bedtime Max Daily Amount: 15 mg 11/23/22   Eleni Sanders DO   nystatin (MYCOSTATIN) 500,000 units/5 mL suspension Apply 5 mL (500,000 Units total) to the mouth or throat 4 (four) times a day  Patient taking differently: Apply 500,000 Units to the mouth or throat if needed 7/25/22   Dandy Chisholm MD   nystatin (MYCOSTATIN) powder Apply topically 3 (three) times a day 9/20/22   Yolette Raines MD Torres   ondansetron (ZOFRAN) 4 mg tablet Take 1 tablet (4 mg total) by mouth every 8 (eight) hours as needed for nausea or vomiting 9/7/22   Rancho German MD   oxyCODONE (ROXICODONE) 10 MG TABS Take 0 5-1 tablets (5-10 mg total) by mouth every 4 (four) hours as needed for moderate pain or severe pain Max Daily Amount: 60 mg 11/23/22   Christel Sanders DO   pantoprazole (PROTONIX) 40 mg tablet Take 1 tablet (40 mg total) by mouth daily in the early morning 11/7/22   Kassi Batista MD   polyethylene glycol (MIRALAX) 17 g packet Take 17 g by mouth daily  Patient taking differently: Take 17 g by mouth if needed 4/3/22   Zach Esposito MD   senna (SENOKOT) 8 6 mg Take 3 tablets (25 8 mg total) by mouth 2 (two) times a day Hold for loose stool  Patient taking differently: Take 25 8 mg by mouth if needed Hold for loose stool  4/3/22   Zach Esposito MD     I have reviewed home medications with patient personally      Allergies: No Known Allergies    Social History:     Marital Status: Single     Social History     Substance and Sexual Activity   Alcohol Use Not Currently    Comment: rare, social      Social History     Tobacco Use   Smoking Status Never   Smokeless Tobacco Never     Social History     Substance and Sexual Activity   Drug Use Never       Family History:    non-contributory    Physical Exam:     Vitals:   Blood Pressure: 130/77 (11/28/22 1330)  Pulse: (!) 118 (11/28/22 1330)  Temperature: 98 1 °F (36 7 °C) (11/28/22 0611)  Temp Source: Oral (11/28/22 6831)  Respirations: 18 (11/28/22 0915)  Weight - Scale: 75 3 kg (166 lb 0 1 oz) (11/28/22 1224)  SpO2: 100 % (11/28/22 1330)    Physical Exam    (  General appearance:  Patient not in acute distress  Eyes:  Pupils equal reacting to light  ENT:  Moist oral mucous membranes  CVS:  S1-S2 heard, regular rate and rhythm, no pedal edema  Chest:  Bilateral air entry present, clear to auscultation  Abdomen:  Soft, nontender, bowel sounds present  CNS:  No focal neurological deficits  Genitourinary: deferred  Skin:  No acute rash   psychiatric:  No psychosis  Musculoskeletal:  No joint deformities Be Sure to Include Physical Exam: Delete this entire line when you have entered your exam)    Additional Data:     Lab Results: I have personally reviewed pertinent reports  Results from last 7 days   Lab Units 11/28/22  0633   WBC Thousand/uL 13 23*   HEMOGLOBIN g/dL 13 6   HEMATOCRIT % 42 7   PLATELETS Thousands/uL 288   NEUTROS PCT % 90*   LYMPHS PCT % 4*   MONOS PCT % 5   EOS PCT % 0     Results from last 7 days   Lab Units 11/28/22  0633   SODIUM mmol/L 139   POTASSIUM mmol/L 4 1   CHLORIDE mmol/L 103   CO2 mmol/L 25   BUN mg/dL 17   CREATININE mg/dL 0 76   ANION GAP mmol/L 11   CALCIUM mg/dL 9 9   ALBUMIN g/dL 4 6   TOTAL BILIRUBIN mg/dL 0 97   ALK PHOS U/L 56   ALT U/L 16   AST U/L 15   GLUCOSE RANDOM mg/dL 148*     Results from last 7 days   Lab Units 11/28/22  0701   INR  0 91             Results from last 7 days   Lab Units 11/28/22  0701   LACTIC ACID mmol/L 1 3       Imaging: I have personally reviewed pertinent reports  CT abdomen pelvis with contrast   Final Result by Meir Sadler MD (11/28 1001)      Worsened subacute left pyelonephritis  Persistent hyperdense material in lower calyx of left kidney, question pyonephrosis or underlying urethroileal carcinoma  Recommend urology consultation for further evaluation  4 1 cm medial right breast mass with calcifications, unchanged when remeasured by this user, likely representing primary breast cancer  Recommend correlation with recent mammograms  Subcentimeter indeterminate density lesion in upper pole of right kidney, which could represent hemorrhagic/proteinaceous renal cyst   Consider abdomen renal ultrasound for further evaluation  4 8 cm left ovarian simple cyst, similar to prior exam   If premenopausal, no follow-up recommended    If postmenopausal, recommend follow-up pelvic ultrasound in 12 months  Diffuse scattered osseous metastatic disease throughout the visualized sternum, bilateral ribs, spine, sacrum, pelvis, and bilateral visualized proximal femurs  Consider follow-up with orthopedic surgery of bilateral proximal femur osseous metastasis  0 4 cm nonobstructing renal calculus in lower pole of left kidney, unchanged  Additional chronic/incidental findings as detailed above  The study was marked in [facility] for immediate notification  Workstation performed: [id number]             EKG, Pathology, and Other Studies Reviewed on Admission:   · EKG:  EKG reviewed personally by me shows sinus tachycardia  Allscripts / Epic Records Reviewed: Yes     ** Please Note: This note has been constructed using a voice recognition system   **

## 2022-11-28 NOTE — ASSESSMENT & PLAN NOTE
Patient states she had bilateral lower extremity swelling initially when she was diagnosed of cancer, was treated with Lasix and currently not taking Lasix anymore    Now to help with the blood pressure her family doctor has started the patient on lisinopril 20 mg daily, will start this medication from tomorrow with holding parameters

## 2022-11-28 NOTE — CONSULTS
Consult - Urology   Carlos Enrique Burn 1975, 49 y o  female MRN: [id number]    Unit/Bed#: S -01 Encounter: [id number]      Assessment & Plan:  1  Pyelonephritis  - CT showing recent subacute left pyelonephritis  Persistent hyperdense material in the lower calyx of left kidney, concerning for pyonephrosis or underlying urothelial carcinoma  - Patient with left-sided flank pain, nausea, vomiting  - Patient completed an outpatient course of antibiotics, 3 weeks ago for UTI  - Prior urine culture 11/9 showing growth of E  Coli  - Recommend consult to ID for management of antibiotics  - Patient meets sepsis criteria with tachycardia, tachypnea, leukocytosis  - UA from ED, large leukocytes, large nitrites  - WBC 13 23  - Creatinine 0 76 per baseline  - Continue medical optimization and antibiosis per primary team  - No  surgical intervention at this time    Subjective:   Patient is a 54-year-old female with PMH metastatic breast cancer s/p radiation and chemotherapy who presents to the ED with left flank pain, nausea, vomiting  Patient reports pain started last night  She describes it as a sharp pain, rated as a 9/10 in severity  Associated symptoms of nausea and vomiting, reporting multiple episodes of nonbilious emesis last night  She reports decreased urine output over the past 24 hours due to dehydration  Upon arrival to ED patient received IV Toradol and Dilaudid which relieved the pain  Patient now reports flank pain as a 2/10 in severity  Patient denies hematuria, frequency, urgency  Patient is seen in the ED 3 weeks ago with similar complaints, urine culture positive for E coli  Patient was sent home and completed oral antibiotics  Symptoms improved after course of antibiotics  Patient cannot remember the name of the antibiotics  Pt reports history of kidney stone 5 years ago which she passed on her own without need for surgical intervention   She has not been to a Urologist in the past  Review of Systems   Constitutional: Negative for chills, diaphoresis and fever  Respiratory: Negative for cough, shortness of breath and wheezing  Gastrointestinal: Positive for abdominal pain, nausea and vomiting  Negative for abdominal distention, constipation and diarrhea  Genitourinary: Positive for flank pain  Negative for difficulty urinating, dysuria, frequency, hematuria and urgency  Musculoskeletal: Negative for arthralgias  Neurological: Negative for syncope and light-headedness  Objective:  Vitals: Blood pressure 147/88, pulse (!) 117, temperature 100 1 °F (37 8 °C), resp  rate 18, weight 75 3 kg (166 lb 0 1 oz), last menstrual period 03/28/2022, SpO2 97 %, not currently breastfeeding  ,Body mass index is 28 95 kg/m²  Physical Exam  Constitutional:       General: She is not in acute distress  Appearance: Normal appearance  She is normal weight  She is not ill-appearing or toxic-appearing  HENT:      Head: Normocephalic and atraumatic  Right Ear: External ear normal       Left Ear: External ear normal       Nose: Nose normal       Mouth/Throat:      Mouth: Mucous membranes are moist    Eyes:      General: No scleral icterus  Extraocular Movements: Extraocular movements intact  Conjunctiva/sclera: Conjunctivae normal    Cardiovascular:      Rate and Rhythm: Normal rate and regular rhythm  Pulses: Normal pulses  Heart sounds: Normal heart sounds  No murmur heard  No friction rub  No gallop  Pulmonary:      Effort: Pulmonary effort is normal  No respiratory distress  Breath sounds: Normal breath sounds  No stridor  No wheezing, rhonchi or rales  Abdominal:      General: Abdomen is flat  There is no distension  Palpations: Abdomen is soft  Tenderness: There is no abdominal tenderness  There is left CVA tenderness  There is no guarding  Musculoskeletal:         General: Normal range of motion        Cervical back: Normal range of motion  Skin:     General: Skin is warm  Findings: No rash  Neurological:      General: No focal deficit present  Mental Status: She is alert and oriented to person, place, and time  Mental status is at baseline  Psychiatric:         Mood and Affect: Mood normal          Behavior: Behavior normal          Thought Content: Thought content normal          Judgment: Judgment normal          Imaging:    CT ABDOMEN AND PELVIS WITH IV CONTRAST     INDICATION:   Abdominal pain, acute, nonlocalized  left sided flank and abdominal pain      COMPARISON:  CTA chest PE study abdomen pelvis November 8, 2022  Nuclear medicine bone scan whole body 20/5/2022      TECHNIQUE:  CT examination of the abdomen and pelvis was performed  Axial, sagittal, and coronal 2D reformatted images were created from the source data and submitted for interpretation      Radiation dose length product (DLP) for this visit:  436 mGy-cm   This examination, like all CT scans performed in the [facility], was performed utilizing techniques to minimize radiation dose exposure, including the use of iterative   reconstruction and automated exposure control      IV Contrast:  85 mL of iohexol (OMNIPAQUE)  Enteric Contrast:  Enteric contrast was not administered      FINDINGS:     ABDOMEN     LOWER CHEST: 4 1 cm medial right breast mass with calcifications (301:10)  Atelectatic changes in the lung bases      LIVER/BILIARY TREE:  Unremarkable      GALLBLADDER:  No calcified gallstones  No pericholecystic inflammatory change      SPLEEN:  Unremarkable      PANCREAS:  Unremarkable      ADRENAL GLANDS:  Unremarkable      KIDNEYS/URETERS:       Delayed left nephrogram   Persistent but worsened diffuse urothelial thickening and enhancement throughout the left urinary tract collecting system (worse in left ureter) with associated periureteral and perinephric fat stranding    Persistent hyperdense   material in lower calyx of left kidney (601:67)  0 4 cm nonobstructing renal calculus in lower pole of left kidney (301:46), unchanged  Left renal cyst      Subcentimeter indeterminate density lesion in upper pole of right kidney (301:39), too small to characterize  No hydronephrosis or urinary tract calculi in right kidney      STOMACH AND BOWEL:  Normal CT appearance of stomach  No small bowel obstruction  No acute inflammatory bowel change  No significant colonic diverticulosis      APPENDIX:  No findings to suggest appendicitis      ABDOMINOPELVIC CAVITY:  No ascites  No pneumoperitoneum  No lymphadenopathy      VESSELS:  Unremarkable for patient's age      PELVIS     REPRODUCTIVE ORGANS:  Fibroid uterus  4 8 cm left ovarian simple cyst (301:72), similar to prior exam   Right ovary is unremarkable      URINARY BLADDER:  Underdistended      ABDOMINAL WALL/INGUINAL REGIONS:  Small fat-containing umbilical hernia      OSSEOUS STRUCTURES:  No acute fracture or destructive osseous lesion  Diffuse scattered osseous metastatic disease throughout the visualized sternum, bilateral ribs, spine, sacrum, pelvis, and bilateral visualized proximal femurs  Multilevel spinal   degenerative changes  Mild levoscoliosis of thoracolumbar spine      IMPRESSION:     Worsened subacute left pyelonephritis  Persistent hyperdense material in lower calyx of left kidney, question pyonephrosis or underlying urethroileal carcinoma  Recommend urology consultation for further evaluation      4 1 cm medial right breast mass with calcifications, unchanged when remeasured by this user, likely representing primary breast cancer    Recommend correlation with recent mammograms      Subcentimeter indeterminate density lesion in upper pole of right kidney, which could represent hemorrhagic/proteinaceous renal cyst   Consider abdomen renal ultrasound for further evaluation      4 8 cm left ovarian simple cyst, similar to prior exam   If premenopausal, no follow-up recommended  If postmenopausal, recommend follow-up pelvic ultrasound in 12 months      Diffuse scattered osseous metastatic disease throughout the visualized sternum, bilateral ribs, spine, sacrum, pelvis, and bilateral visualized proximal femurs  Consider follow-up with orthopedic surgery of bilateral proximal femur osseous metastasis      0 4 cm nonobstructing renal calculus in lower pole of left kidney, unchanged      Additional chronic/incidental findings as detailed above  Labs:  Recent Labs     11/28/22  0633   WBC 13 23*     Recent Labs     11/28/22  0633   HGB 13 6     Recent Labs     11/28/22  0633   CREATININE 0 76       History:  Social History     Socioeconomic History   • Marital status: Single     Spouse name: None   • Number of children: None   • Years of education: None   • Highest education level: None   Occupational History   • None   Tobacco Use   • Smoking status: Never   • Smokeless tobacco: Never   Vaping Use   • Vaping Use: Never used   Substance and Sexual Activity   • Alcohol use: Not Currently     Comment: rare, social    • Drug use: Never   • Sexual activity: None   Other Topics Concern   • None   Social History Narrative    Significant other Didierlorena Rainey is very supportive  Social Determinants of Health     Financial Resource Strain: Not on file   Food Insecurity: No Food Insecurity   • Worried About [address] in the Last Year: Never true   • Ran Out of Food in the Last Year: Never true   Transportation Needs: No Transportation Needs   • Lack of Transportation (Medical): No   • Lack of Transportation (Non-Medical):  No   Physical Activity: Not on file   Stress: Not on file   Social Connections: Not on file   Intimate Partner Violence: Not on file   Housing Stability: Low Risk    • Unable to Pay for Housing in the Last Year: No   • Number of Places Lived in the Last Year: 1   • Unstable Housing in the Last Year: No     Past Medical History:   Diagnosis Date   • Breast cancer [facility])    • Cancer ([facility] [address] )    • Hypertension      History reviewed  No pertinent surgical history    Family History   Problem Relation Age of Onset   • Coronary artery disease Mother    • Heart attack Father    • Diabetes type II Father        Marshall Villasenor  Date: 11/28/2022 Time: 3:21 PM

## 2022-11-29 PROBLEM — B96.20 BACTEREMIA DUE TO ESCHERICHIA COLI: Status: ACTIVE | Noted: 2022-11-29

## 2022-11-29 PROBLEM — R78.81 BACTEREMIA DUE TO ESCHERICHIA COLI: Status: ACTIVE | Noted: 2022-11-29

## 2022-11-29 LAB
ANION GAP SERPL CALCULATED.3IONS-SCNC: 6 MMOL/L (ref 4–13)
BASOPHILS # BLD AUTO: 0.01 THOUSANDS/ÂΜL (ref 0–0.1)
BASOPHILS NFR BLD AUTO: 0 % (ref 0–1)
BUN SERPL-MCNC: 11 MG/DL (ref 5–25)
CALCIUM SERPL-MCNC: 7.7 MG/DL (ref 8.4–10.2)
CHLORIDE SERPL-SCNC: 111 MMOL/L (ref 96–108)
CO2 SERPL-SCNC: 22 MMOL/L (ref 21–32)
CREAT SERPL-MCNC: 0.54 MG/DL (ref 0.6–1.3)
EOSINOPHIL # BLD AUTO: 0.01 THOUSAND/ÂΜL (ref 0–0.61)
EOSINOPHIL NFR BLD AUTO: 0 % (ref 0–6)
ERYTHROCYTE [DISTWIDTH] IN BLOOD BY AUTOMATED COUNT: 13.5 % (ref 11.6–15.1)
GFR SERPL CREATININE-BSD FRML MDRD: 111 ML/MIN/1.73SQ M
GLUCOSE SERPL-MCNC: 106 MG/DL (ref 65–140)
HCT VFR BLD AUTO: 31.8 % (ref 34.8–46.1)
HGB BLD-MCNC: 10.1 G/DL (ref 11.5–15.4)
IMM GRANULOCYTES # BLD AUTO: 0.03 THOUSAND/UL (ref 0–0.2)
IMM GRANULOCYTES NFR BLD AUTO: 0 % (ref 0–2)
LYMPHOCYTES # BLD AUTO: 0.38 THOUSANDS/ÂΜL (ref 0.6–4.47)
LYMPHOCYTES NFR BLD AUTO: 5 % (ref 14–44)
MAGNESIUM SERPL-MCNC: 1.5 MG/DL (ref 1.9–2.7)
MCH RBC QN AUTO: 31 PG (ref 26.8–34.3)
MCHC RBC AUTO-ENTMCNC: 31.8 G/DL (ref 31.4–37.4)
MCV RBC AUTO: 98 FL (ref 82–98)
MONOCYTES # BLD AUTO: 0.67 THOUSAND/ÂΜL (ref 0.17–1.22)
MONOCYTES NFR BLD AUTO: 8 % (ref 4–12)
NEUTROPHILS # BLD AUTO: 6.93 THOUSANDS/ÂΜL (ref 1.85–7.62)
NEUTS SEG NFR BLD AUTO: 87 % (ref 43–75)
NRBC BLD AUTO-RTO: 0 /100 WBCS
PLATELET # BLD AUTO: 167 THOUSANDS/UL (ref 149–390)
PMV BLD AUTO: 9.7 FL (ref 8.9–12.7)
POTASSIUM SERPL-SCNC: 3.3 MMOL/L (ref 3.5–5.3)
RBC # BLD AUTO: 3.26 MILLION/UL (ref 3.81–5.12)
SODIUM SERPL-SCNC: 139 MMOL/L (ref 135–147)
WBC # BLD AUTO: 8.03 THOUSAND/UL (ref 4.31–10.16)

## 2022-11-29 RX ORDER — ACETAMINOPHEN 325 MG/1
650 TABLET ORAL EVERY 6 HOURS PRN
Status: DISCONTINUED | OUTPATIENT
Start: 2022-11-29 | End: 2022-12-01 | Stop reason: HOSPADM

## 2022-11-29 RX ORDER — POTASSIUM CHLORIDE 20 MEQ/1
40 TABLET, EXTENDED RELEASE ORAL ONCE
Status: COMPLETED | OUTPATIENT
Start: 2022-11-29 | End: 2022-11-29

## 2022-11-29 RX ORDER — SACCHAROMYCES BOULARDII 250 MG
250 CAPSULE ORAL 2 TIMES DAILY
Status: DISCONTINUED | OUTPATIENT
Start: 2022-11-29 | End: 2022-12-01 | Stop reason: HOSPADM

## 2022-11-29 RX ORDER — SODIUM CHLORIDE 9 MG/ML
100 INJECTION, SOLUTION INTRAVENOUS CONTINUOUS
Status: DISCONTINUED | OUTPATIENT
Start: 2022-11-29 | End: 2022-11-30

## 2022-11-29 RX ADMIN — SODIUM CHLORIDE 150 ML/HR: 0.9 INJECTION, SOLUTION INTRAVENOUS at 14:22

## 2022-11-29 RX ADMIN — POTASSIUM CHLORIDE 40 MEQ: 1500 TABLET, EXTENDED RELEASE ORAL at 14:21

## 2022-11-29 RX ADMIN — NYSTATIN: 100000 POWDER TOPICAL at 21:13

## 2022-11-29 RX ADMIN — Medication 250 MG: at 16:20

## 2022-11-29 RX ADMIN — NYSTATIN: 100000 POWDER TOPICAL at 16:20

## 2022-11-29 RX ADMIN — ACETAMINOPHEN 650 MG: 325 TABLET ORAL at 02:25

## 2022-11-29 RX ADMIN — MORPHINE SULFATE 15 MG: 15 TABLET, EXTENDED RELEASE ORAL at 21:14

## 2022-11-29 RX ADMIN — SODIUM CHLORIDE 150 ML/HR: 0.9 INJECTION, SOLUTION INTRAVENOUS at 00:01

## 2022-11-29 RX ADMIN — LISINOPRIL 20 MG: 20 TABLET ORAL at 08:10

## 2022-11-29 RX ADMIN — SODIUM CHLORIDE 150 ML/HR: 0.9 INJECTION, SOLUTION INTRAVENOUS at 06:31

## 2022-11-29 RX ADMIN — PANTOPRAZOLE SODIUM 40 MG: 40 TABLET, DELAYED RELEASE ORAL at 05:02

## 2022-11-29 RX ADMIN — CEFTRIAXONE 2000 MG: 2 INJECTION, SOLUTION INTRAVENOUS at 12:50

## 2022-11-29 RX ADMIN — ACETAMINOPHEN 650 MG: 325 TABLET ORAL at 21:16

## 2022-11-29 RX ADMIN — ONDANSETRON 4 MG: 2 INJECTION INTRAMUSCULAR; INTRAVENOUS at 19:12

## 2022-11-29 RX ADMIN — ENOXAPARIN SODIUM 40 MG: 40 INJECTION SUBCUTANEOUS at 08:10

## 2022-11-29 RX ADMIN — NYSTATIN: 100000 POWDER TOPICAL at 08:11

## 2022-11-29 NOTE — PROGRESS NOTES
Progress Note - Urology  Hieu Gee 1975, 49 y o  female MRN: [id number]    Unit/Bed#: S -01 Encounter: [id number]      Assessment & Plan:  1  Left Pyelonephritis  2  Sepsis  - Pt reporting she is feeling better today  Some L flank discomfort  No overnight N/V  A few episodes of diarrhea  - Pt reports fevers last night with a Tmax of 102 9 F  - Continue to monitor temperature trends  - Pt is urinating frequently, clear yellow urine  - Continue with hydration  - Urine culture 11/28 showing E  Coli   - WBC 8 03, improved from 13 23 yesterday  - Continue with medical optimization and antibiosis per primary team  - No  surgical intervention at this time  Urology will sign off at this time, please do not hesitate to reach out with any questions or concerns  Subjective:   HPI: Pt seen today resting comfortably in bed  Pt reports fevers last night, no chills, or diaphoresis  Pt reports a few episodes of diarrhea throughout the night  Left flank pain has improved and pt rates it as a 1/10 in severity  Review of Systems   Constitutional: Positive for fever  Negative for chills and diaphoresis  Respiratory: Negative for cough, shortness of breath and wheezing  Gastrointestinal: Positive for diarrhea  Negative for abdominal distention, abdominal pain, constipation, nausea and vomiting  Genitourinary: Positive for flank pain and frequency  Negative for difficulty urinating, dysuria, hematuria and urgency  Neurological: Negative for syncope and light-headedness  Objective:  Vitals: Blood pressure 160/94, pulse 90, temperature 99 °F (37 2 °C), resp  rate 18, weight 75 3 kg (166 lb 0 1 oz), last menstrual period 03/28/2022, SpO2 96 %, not currently breastfeeding  ,Body mass index is 28 95 kg/m²  Physical Exam  Constitutional:       General: She is not in acute distress  Appearance: Normal appearance  She is normal weight  She is not ill-appearing, toxic-appearing or diaphoretic  HENT:      Head: Normocephalic and atraumatic  Right Ear: External ear normal       Left Ear: External ear normal       Nose: Nose normal       Mouth/Throat:      Mouth: Mucous membranes are moist    Eyes:      General: No scleral icterus  Conjunctiva/sclera: Conjunctivae normal    Cardiovascular:      Rate and Rhythm: Normal rate  Pulses: Normal pulses  Pulmonary:      Effort: Pulmonary effort is normal    Abdominal:      General: Bowel sounds are normal  There is no distension  Palpations: Abdomen is soft  Tenderness: There is no abdominal tenderness  There is left CVA tenderness  There is no guarding  Comments: Pt reports mild pain L flank with palpation   Musculoskeletal:      Cervical back: Normal range of motion  Skin:     General: Skin is warm  Findings: No rash  Neurological:      General: No focal deficit present  Mental Status: She is alert and oriented to person, place, and time  Mental status is at baseline  Psychiatric:         Mood and Affect: Mood normal          Behavior: Behavior normal          Thought Content:  Thought content normal          Judgment: Judgment normal          Labs:  Recent Labs     11/28/22  0633 11/29/22  0508   WBC 13 23* 8 03     Recent Labs     11/28/22  0633 11/29/22  0508   HGB 13 6 10 1*     Recent Labs     11/28/22  0633 11/29/22  0508   HCT 42 7 31 8*     Recent Labs     11/28/22  0633 11/29/22  0508   CREATININE 0 76 0 54*       History:  Past Medical History:   Diagnosis Date   • Breast cancer ([facility] )    • Cancer ([facility] )    • Hypertension      Social History     Socioeconomic History   • Marital status: Single     Spouse name: None   • Number of children: None   • Years of education: None   • Highest education level: None   Occupational History   • None   Tobacco Use   • Smoking status: Never   • Smokeless tobacco: Never   Vaping Use   • Vaping Use: Never used   Substance and Sexual Activity   • Alcohol use: Not Currently Comment: rare, social    • Drug use: Never   • Sexual activity: None   Other Topics Concern   • None   Social History Narrative    Significant other Nimo Coffey is very supportive  Social Determinants of Health     Financial Resource Strain: Not on file   Food Insecurity: No Food Insecurity   • Worried About [address] in the Last Year: Never true   • Ran Out of Food in the Last Year: Never true   Transportation Needs: No Transportation Needs   • Lack of Transportation (Medical): No   • Lack of Transportation (Non-Medical): No   Physical Activity: Not on file   Stress: Not on file   Social Connections: Not on file   Intimate Partner Violence: Not on file   Housing Stability: Low Risk    • Unable to Pay for Housing in the Last Year: No   • Number of Places Lived in the Last Year: 1   • Unstable Housing in the Last Year: No     History reviewed  No pertinent surgical history    Family History   Problem Relation Age of Onset   • Coronary artery disease Mother    • Heart attack Father    • Diabetes type II Father        Charlette Marshall Gill  Date: 11/29/2022 Time: 2:26 PM

## 2022-11-29 NOTE — ASSESSMENT & PLAN NOTE
· Patient states she had bilateral lower extremity swelling initially when she was diagnosed of cancer, was treated with Lasix and currently not taking Lasix anymore  · Now to help with the blood pressure her family doctor has started the patient on lisinopril 20 mg daily, will start this medication today with holding parameters

## 2022-11-29 NOTE — PROGRESS NOTES
[facility]  Progress Note - Veronica Moon 1975, 49 y o  female MRN: [id number]  Unit/Bed#: S -01 Encounter: [id number]  Primary Care Provider: Perry Alcazar MD   Date and time admitted to hospital: 11/28/2022  6:05 AM    * Sepsis due to pyelonephritis  Assessment & Plan  · Left-sided flank pain, nausea, vomiting, abnormal UA, abnormal CT abdomen and pelvis  · Failed outpatient therapy with antibiotics, patient was treated with oral antibiotics 3 weeks ago, completed course  · Meets sepsis criteria with tachycardia, tachypnea, leukocytosis  · Receiving sepsis protocol fluid bolus, subsequently continue LR per sepsis protocol  · Initially presented with low blood pressure, now improving  · Lactic acid within normal limits  · CT abdomen and pelvis:  "Worsened subacute left pyelonephritis  Persistent hyperdense material in lower calyx of left kidney, question pyonephrosis or underlying urethroileal carcinoma"  · Prior urine culture growing E coli, sensitive to ceftriaxone, will continue IV ceftriaxone 2 g Q24 day #2  Bacteremia due to Escherichia coli  Assessment & Plan  · 2/2 admission BC with growth of E  Coli  · From urinary source  · ID consult pending  · Continue high dose ceftriaxone  Essential hypertension  Assessment & Plan  · Patient states she had bilateral lower extremity swelling initially when she was diagnosed of cancer, was treated with Lasix and currently not taking Lasix anymore  · Now to help with the blood pressure her family doctor has started the patient on lisinopril 20 mg daily, will start this medication today with holding parameters  Candidal skin infection  Assessment & Plan  · Continue nystatin powder  Cancer associated pain  Assessment & Plan  · P r n  oxycodone  Takes morphine at bedtime  Osseous metastasis ([facility] [address] )  Assessment & Plan  · Underwent radiation therapy, chemotherapy, currently pain is much better, on p r n  oxycodone  Malignant neoplasm of overlapping sites of right breast in female, estrogen receptor negative ([facility] [place] [address] )  Assessment & Plan  · Outpatient follow-up with Oncology, patient states that cancer is responding to the treatment  VTE Pharmacologic Prophylaxis: VTE Score: 6 High Risk (Score >/= 5) - Pharmacological DVT Prophylaxis Ordered: enoxaparin (Lovenox)  Sequential Compression Devices Ordered  Patient Centered Rounds: I performed bedside rounds with nursing staff today  Discussions with Specialists or Other Care Team Provider: sil, rn    Education and Discussions with Family / Patient: Patient declined call to   Time Spent for Care: 30 minutes  More than 50% of total time spent on counseling and coordination of care as described above  Current Length of Stay: 1 day(s)  Current Patient Status: Inpatient   Certification Statement: The patient will continue to require additional inpatient hospital stay due to IV abx for pyelo  Discharge Plan: Anticipate discharge in 48 hrs to home  Code Status: Level 1 - Full Code    Subjective:   Patient feels significantly better  Pain significantly improved  Heart rate improved  Is hungry and wants to eat  No nausea or vomiting  Had a bowel movement this morning  Objective:     Vitals:   Temp (24hrs), Av 1 °F (37 8 °C), Min:98 °F (36 7 °C), Max:102 9 °F (39 4 °C)    Temp:  [98 °F (36 7 °C)-102 9 °F (39 4 °C)] 98 °F (36 7 °C)  HR:  [] 92  Resp:  [18] 18  BP: ()/(52-99) 127/76  SpO2:  [95 %-100 %] 96 %  Body mass index is 28 95 kg/m²  Input and Output Summary (last 24 hours): Intake/Output Summary (Last 24 hours) at 2022 0829  Last data filed at 2022 0631  Gross per 24 hour   Intake 1000 ml   Output --   Net 1000 ml       Physical Exam:   Physical Exam  Vitals and nursing note reviewed  Constitutional:       General: She is not in acute distress  Appearance: Normal appearance     HENT:      Head: Normocephalic  Mouth/Throat:      Mouth: Mucous membranes are moist    Eyes:      General: No scleral icterus  Pupils: Pupils are equal, round, and reactive to light  Cardiovascular:      Rate and Rhythm: Normal rate and regular rhythm  Heart sounds: No murmur heard  Pulmonary:      Effort: Pulmonary effort is normal  No respiratory distress  Breath sounds: Normal breath sounds  No wheezing, rhonchi or rales  Abdominal:      General: Bowel sounds are normal  There is no distension  Palpations: Abdomen is soft  Tenderness: There is no abdominal tenderness  Musculoskeletal:         General: No swelling  Right lower leg: No edema  Left lower leg: No edema  Skin:     Capillary Refill: Capillary refill takes less than 2 seconds  Neurological:      General: No focal deficit present  Mental Status: She is alert and oriented to person, place, and time  Mental status is at baseline           Additional Data:     Labs:  Results from last 7 days   Lab Units 11/29/22  0508   WBC Thousand/uL 8 03   HEMOGLOBIN g/dL 10 1*   HEMATOCRIT % 31 8*   PLATELETS Thousands/uL 167   NEUTROS PCT % 87*   LYMPHS PCT % 5*   MONOS PCT % 8   EOS PCT % 0     Results from last 7 days   Lab Units 11/29/22  0508 11/28/22  0633   SODIUM mmol/L 139 139   POTASSIUM mmol/L 3 3* 4 1   CHLORIDE mmol/L 111* 103   CO2 mmol/L 22 25   BUN mg/dL 11 17   CREATININE mg/dL 0 54* 0 76   ANION GAP mmol/L 6 11   CALCIUM mg/dL 7 7* 9 9   ALBUMIN g/dL  --  4 6   TOTAL BILIRUBIN mg/dL  --  0 97   ALK PHOS U/L  --  56   ALT U/L  --  16   AST U/L  --  15   GLUCOSE RANDOM mg/dL 106 148*     Results from last 7 days   Lab Units 11/28/22  0701   INR  0 91             Results from last 7 days   Lab Units 11/28/22  0701 11/28/22  0633   LACTIC ACID mmol/L 1 3  --    PROCALCITONIN ng/ml  --  <0 05       Lines/Drains:  Invasive Devices     Peripheral Intravenous Line  Duration           Peripheral IV 11/28/22 Right;Ventral (anterior) Forearm 1 day                  Telemetry:  Telemetry Orders (From admission, onward)             48 Hour Telemetry Monitoring  Continuous x 48 hours        References:    Telemetry Guidelines   Question:  Reason for 48 Hour Telemetry  Answer:  Arrhythmias Requiring Medical Therapy (eg  SVT, Vtach/fib, Bradycardia, Uncontrolled A-fib)                 Telemetry Reviewed: Normal Sinus Rhythm  Indication for Continued Telemetry Use: No indication for continued use  Will discontinue  Imaging: No pertinent imaging reviewed  Recent Cultures (last 7 days):   Results from last 7 days   Lab Units 11/28/22  0753 11/28/22  0701   BLOOD CULTURE   --  Escherichia coli*   GRAM STAIN RESULT   --  Gram negative rods*  Gram negative rods*   URINE CULTURE  >100,000 cfu/ml Gram Negative Salo Enteric Like*  --        Last 24 Hours Medication List:   Current Facility-Administered Medications   Medication Dose Route Frequency Provider Last Rate   • acetaminophen  650 mg Oral Q6H PRN Niranjan Coats PA-C     • cefTRIAXone  2,000 mg Intravenous Q24H Nila Davis MD 2,000 mg (11/28/22 1413)   • enoxaparin  40 mg Subcutaneous Daily Nila Davis MD     • lisinopril  20 mg Oral Daily Nila Davis MD     • morphine  15 mg Oral HS Nila Davis MD     • nystatin   Topical TID Nila Davis MD     • ondansetron  4 mg Intravenous Q6H PRN Nila Davis MD     • oxyCODONE  5 mg Oral Q6H PRN Nila Davis MD     • pantoprazole  40 mg Oral Early Morning Nila Davis MD     • polyethylene glycol  17 g Oral Daily PRN Nila Davis MD     • sodium chloride  150 mL/hr Intravenous Continuous Nila Davis  mL/hr (11/29/22 0631)        Today, Patient Was Seen By: Randa Cuenca PA-C    **Please Note: This note may have been constructed using a voice recognition system  **

## 2022-11-29 NOTE — ASSESSMENT & PLAN NOTE
· Left-sided flank pain, nausea, vomiting, abnormal UA, abnormal CT abdomen and pelvis  · Failed outpatient therapy with antibiotics, patient was treated with oral antibiotics 3 weeks ago, completed course  · Meets sepsis criteria with tachycardia, tachypnea, leukocytosis  · Receiving sepsis protocol fluid bolus, subsequently continue LR per sepsis protocol  · Initially presented with low blood pressure, now improving  · Lactic acid within normal limits  · CT abdomen and pelvis:  "Worsened subacute left pyelonephritis  Persistent hyperdense material in lower calyx of left kidney, question pyonephrosis or underlying urethroileal carcinoma"  · Prior urine culture growing E coli, sensitive to ceftriaxone, will continue IV ceftriaxone 2 g Q24 day #2

## 2022-11-29 NOTE — UTILIZATION REVIEW
Initial Clinical Review    Admission: Date/Time/Statement:   Admission Orders (From admission, onward)     Ordered        11/28/22 1259  INPATIENT ADMISSION  Once                      Orders Placed This Encounter   Procedures   • INPATIENT ADMISSION     Standing Status:   Standing     Number of Occurrences:   1     Order Specific Question:   Level of Care     Answer:   Level 2 Stepdown / HOT [14]     Order Specific Question:   Estimated length of stay     Answer:   More than 2 Midnights     Order Specific Question:   Certification     Answer:   I certify that inpatient services are medically necessary for this patient for a duration of greater than two midnights  See H&P and MD Progress Notes for additional information about the patient's course of treatment  ED Arrival Information     Expected   -    Arrival   11/28/2022 05:58    Acuity   Emergent            Means of arrival   Walk-In    Escorted by   Spouse    Service   Hospitalist    Admission type   Emergency            Arrival complaint   Back Pain/Flank Pain           Chief Complaint   Patient presents with   • Flank Pain     Pt seen recently for same cc  States 12 hrs ago started with upper abdominal pain that radiates to her L flank and N/V  States she took pain medication with no relief  Initial Presentation: 49 y o  female from home to ED admitted inpatient due to Pyelonephritis  Presented due to left flank pain starting about 11 hours prior to arrival, + vomiting  Uses morphine and oxycodone for chronic pain and vomited medications  Seen in ED about 3 weeks ago with similar complaints, treated with antibiotics  On exam:  Acute distress  Tachycardic  Generalized abdominal distention  Left CVA tenderness  Anxious  Speech rapid and pressured  Urine and blood cultured done, now show Escherichia coli in blood, >100,000 gram negative luba enteric like in urine  UA innumerable WBC and bacteria, + nitrite  Wbc 13 23    K 3 3    Ct abdomen showed worsened left pyelonephritis, persistent hyperdense material in lower calyx of left kidney  In the ED given 2 liters of IVF, 2 doses of IV analgesia, Zofran, started on antibiotics  Plan includes:  Continued IVF, consult Urology, continue ceftriaxone  11/28/22 per Urology- Pyelonephritis & suspected changes on ct of left kidney is not malignancy but inflammatory/infectious change, + mild hydronephrosis  Recommend antibiotics, ID  If not improved then possible stent for obstructive pyelonephritis  Date: 11/29/22    Day 2:  Pain is improved  Hungry  Exam non focal  K 3 3  blood and urine cultures +  Continue ceftriaxone  ID consulted  Continue IVF      11/29/22 per ID - patient is septic secondary to left pyelonephritis and e coli bacteremia  Has metastatic breast cancer with diffuse osseous metastases, s/p palliative radiation to spine and on chemo  Plan is continue IV ceftriaxone  Follow cultures       ED Triage Vitals [11/28/22 0611]   Temperature Pulse Respirations Blood Pressure SpO2   98 1 °F (36 7 °C) (!) 120 (!) 26 124/80 100 %      Temp Source Heart Rate Source Patient Position - Orthostatic VS BP Location FiO2 (%)   Oral Monitor Lying Right arm --      Pain Score       10 - Worst Possible Pain          Wt Readings from Last 1 Encounters:   11/28/22 75 3 kg (166 lb 0 1 oz)     Additional Vital Signs:   11/29/22 10:45:51 99 °F (37 2 °C) 90 -- 160/94 116 96 % -- --   11/29/22 07:11:38 98 °F (36 7 °C) 92 -- 127/76 93 96 % -- --   11/29/22 0502 99 7 °F (37 6 °C) -- -- -- -- -- -- --   11/29/22 02:25:31 -- -- -- 144/85 105 -- -- --   11/29/22 02:21:23 102 9 °F (39 4 °C) Abnormal  103 -- 169/99 122 95 % -- --   11/28/22 22:40:18 98 2 °F (36 8 °C) 89 18 104/62 76 98 % -- --   11/28/22 19:21:29 100 2 °F (37 9 °C) 114 Abnormal  -- -- -- 96 % -- --   11/28/22 1759 102 8 °F (39 3 °C) Abnormal  -- 18 127/79 95 -- -- --   11/28/22 15:42:19 99 °F (37 2 °C) 117 Abnormal  -- 132/85 101 98 % -- --   11/28/22 14:55:36 100 1 °F (37 8 °C) 117 Abnormal  18 147/88 108 97 %       Pertinent Labs/Diagnostic Test Results:   CT abdomen pelvis with contrast   Final Result by Andry Parry MD (11/28 1001)      Worsened subacute left pyelonephritis  Persistent hyperdense material in lower calyx of left kidney, question pyonephrosis or underlying urethroileal carcinoma  Recommend urology consultation for further evaluation  4 1 cm medial right breast mass with calcifications, unchanged when remeasured by this user, likely representing primary breast cancer  Recommend correlation with recent mammograms  Subcentimeter indeterminate density lesion in upper pole of right kidney, which could represent hemorrhagic/proteinaceous renal cyst   Consider abdomen renal ultrasound for further evaluation  4 8 cm left ovarian simple cyst, similar to prior exam   If premenopausal, no follow-up recommended  If postmenopausal, recommend follow-up pelvic ultrasound in 12 months  Diffuse scattered osseous metastatic disease throughout the visualized sternum, bilateral ribs, spine, sacrum, pelvis, and bilateral visualized proximal femurs  Consider follow-up with orthopedic surgery of bilateral proximal femur osseous metastasis  0 4 cm nonobstructing renal calculus in lower pole of left kidney, unchanged  Additional chronic/incidental findings as detailed above  The study was marked in [facility] for immediate notification           Workstation performed: [id number]           11/28/22 ecg Sinus tachycardia  Otherwise normal ECG  When compared with ECG of 08-NOV-2022 19:21,  No significant change was found  Results from last 7 days   Lab Units 11/29/22  0508 11/28/22  0633   WBC Thousand/uL 8 03 13 23*   HEMOGLOBIN g/dL 10 1* 13 6   HEMATOCRIT % 31 8* 42 7   PLATELETS Thousands/uL 167 288   NEUTROS ABS Thousands/µL 6 93 11 96*     Results from last 7 days   Lab Units 11/29/22  0508 11/28/22  1158 SODIUM mmol/L 139 139   POTASSIUM mmol/L 3 3* 4 1   CHLORIDE mmol/L 111* 103   CO2 mmol/L 22 25   ANION GAP mmol/L 6 11   BUN mg/dL 11 17   CREATININE mg/dL 0 54* 0 76   EGFR ml/min/1 73sq m 111 93   CALCIUM mg/dL 7 7* 9 9     Results from last 7 days   Lab Units 11/28/22  0633   AST U/L 15   ALT U/L 16   ALK PHOS U/L 56   TOTAL PROTEIN g/dL 8 2   ALBUMIN g/dL 4 6   TOTAL BILIRUBIN mg/dL 0 97     Results from last 7 days   Lab Units 11/29/22  0508 11/28/22  0633   GLUCOSE RANDOM mg/dL 106 148*     Results from last 7 days   Lab Units 11/28/22  0701   PROTIME seconds 12 5   INR  0 91   PTT seconds 27     Results from last 7 days   Lab Units 11/28/22  9189   PROCALCITONIN ng/ml <0 05     Results from last 7 days   Lab Units 11/28/22  0701   LACTIC ACID mmol/L 1 3     Results from last 7 days   Lab Units 11/28/22  0633   LIPASE u/L 17     Results from last 7 days   Lab Units 11/28/22  0753   CLARITY UA  Extra Turbid   COLOR UA  Yellow   SPEC GRAV UA  >=1 050*   PH UA  7 5   GLUCOSE UA mg/dl Negative   KETONES UA mg/dl Negative   BLOOD UA  Small*   PROTEIN UA mg/dl 200 (2+)*   NITRITE UA  Positive*   BILIRUBIN UA  Negative   UROBILINOGEN UA (BE) mg/dl <2 0   LEUKOCYTES UA  Large*   WBC UA /hpf Innumerable*   RBC UA /hpf Innumerable*   BACTERIA UA /hpf Innumerable*   EPITHELIAL CELLS WET PREP /hpf None Seen   MUCUS THREADS  Occasional*     Results from last 7 days   Lab Units 11/28/22  0753 11/28/22  0701   BLOOD CULTURE   --  Escherichia coli*  Escherichia coli*   GRAM STAIN RESULT   --  Gram negative rods*  Gram negative rods*   URINE CULTURE  >100,000 cfu/ml Escherichia coli*  --        ED Treatment:   Medication Administration from 11/28/2022 0558 to 11/28/2022 1447       Date/Time Order Dose Route Action Comments     11/28/2022 0627 EST ketorolac (TORADOL) injection 15 mg 15 mg Intravenous Given --     11/28/2022 0627 EST HYDROmorphone (DILAUDID) injection 1 mg 1 mg Intravenous Given --     11/28/2022 0627 EST ondansetron (ZOFRAN) injection 4 mg 4 mg Intravenous Given --     11/28/2022 0903 EST acetaminophen (TYLENOL) tablet 975 mg 975 mg Oral Given --     11/28/2022 1302 EST sodium chloride 0 9 % bolus 1,000 mL 1,000 mL Intravenous New Bag --     11/28/2022 1221 EST sodium chloride 0 9 % bolus 1,000 mL 1,000 mL Intravenous New Bag --     11/28/2022 1233 EST piperacillin-tazobactam (ZOSYN) 3 375 g in sodium chloride 0 9 % 100 mL IVPB 3 375 g Intravenous New Bag --     11/28/2022 1356 EST enoxaparin (LOVENOX) subcutaneous injection 40 mg 40 mg Subcutaneous Given --     11/28/2022 1413 EST cefTRIAXone (ROCEPHIN) IVPB (premix in dextrose) 2,000 mg 50 mL 2,000 mg Intravenous New Bag --        Past Medical History:   Diagnosis Date   • Breast cancer ([facility] )    • Cancer ([facility] )    • Hypertension      Present on Admission:  • Osseous metastasis (HCC)  • Cancer associated pain  • Candidal skin infection  • Sepsis due to pyelonephritis  • Essential hypertension      Admitting Diagnosis: Pyelonephritis [N12]  Flank pain [R10 9]  Acute sepsis ([facility] ) [A41 9]  Age/Sex: 49 y o  female  Admission Orders: 11/28/22 1259 inpatient   Scheduled Medications:  cefTRIAXone, 2,000 mg, Intravenous, Q24H  enoxaparin, 40 mg, Subcutaneous, Daily  lisinopril, 20 mg, Oral, Daily  morphine, 15 mg, Oral, HS  nystatin, , Topical, TID  pantoprazole, 40 mg, Oral, Early Morning    sodium chloride 0 9 % bolus 1,000 mL  Dose: 1,000 mL  Freq: Once Route: IV  Last Dose: 1,000 mL (11/28/22 1601)  Start: 11/28/22 1245 End: 11/28/22 1631    sodium chloride 0 9 % bolus 500 mL  Dose: 500 mL  Freq: Once Route: IV  Last Dose: 500 mL (11/28/22 1815)  Start: 11/28/22 1815 End: 11/28/22 1915    vancomycin (VANCOCIN) 1500 mg in sodium chloride 0 9% 250 mL IVPB  Dose: 25 mg/kg  Weight Dosing Info: 61 kg (Adjusted)  Freq:  Once Route: IV  Last Dose: 1,500 mg (11/28/22 1554)  Start: 11/28/22 1230 End: 11/28/22 1724    Continuous IV Infusions:  sodium chloride, 150 mL/hr, Intravenous, Continuous      PRN Meds:  acetaminophen, 650 mg, Oral, Q6H PRN - used x 1 11/28, x 1 11/29/22   ondansetron, 4 mg, Intravenous, Q6H PRN  oxyCODONE, 5 mg, Oral, Q6H PRN  polyethylene glycol, 17 g, Oral, Daily PRN        IP CONSULT TO UROLOGY  IP CONSULT TO INFECTIOUS DISEASES    Network Utilization Review Department  ATTENTION: Please call with any questions or concerns to [phone] and carefully listen to the prompts so that you are directed to the right person  All voicemails are confidential   Stefano Keys all requests for admission clinical reviews, approved or denied determinations and any other requests to dedicated fax number below belonging to the campus where the patient is receiving treatment   List of dedicated fax numbers for the Facilities:  [address] DENIALS (Administrative/Medical Necessity) [phone]   [address] (Maternity/NICU/Pediatrics) [phone]   4 Tawanna Adams [phone]   [facility] [phone]   [facility] [address] [address] Vishnu Reyes [facility] 28 [phone]   [address] [place] [address] [place] [address] 815 [facility] [phone]

## 2022-11-29 NOTE — APP STUDENT NOTE
SANDY STUDENT  Inpatient Progress Note for TRAINING ONLY  Not Part of Legal Medical Record       Progress Note - Abel Cummins 49 y o  female MRN: [id number]    Unit/Bed#: S -01 Encounter: [id number]      Assessment and Plan:  Patient is a 50 y/o F HOD 1, antibiotic day 2 being managed inpatient with IV antibiotics and fluids for acute pyelonephritis, sepsis, and bacteremia  Patient clinically improving and doing well today  Likely to discharge tomorrow pending overnight events  1  Sepsis   - Patient met sepsis criteria due to tachycardia, tachypnea, and leukocytosis   - Patient on Ceftriaxone 2 g IV QD, continue for a total of a 14-day antibiotic course  - Continue, NaCl 0 9% continuous infusion   - Continue monitoring labs and vitals, watch for fever    2  Left Pyelonephritis   - On CT scan on 11/28, "worsened left subacute pyelonephritis with hyperdense material in the lower calyx of the left kidney"    - UA & blood cultures grew E coli    -  consulted, no  surgical intervention needed at this time as patient is clinically improving   - Continue Ceftriaxone 2 g IV QD   - Repeat blood cultures and urine cultures after antibiotic completion     3  E coli Bacteremia    - Found on blood cultures, 2/2 pyelonephritis    - Continue Ceftriaxone 2g IV QD   - ID was consulted and agrees with treatment plan    4  Hypokalemia   - K+ at 3 3 this AM   - Give KCl 40 mEq PO once    - Recheck CMP tomorrow AM     5  HTN   - Continue lisinopril 20 mg QD    6  Candida skin infection   - Continue nystatin powder application TID    7   Metastatic Breast Cancer   - Patient s/p chemotherapy & radiation   - Continue morphine 15 mg QD at bedtime    - Continue oxycodone 5mg tablet q6H PRN for moderate pain   - Patient following with outpatient oncology       Subjective:   Patient is a 50 y/o F HOD 1 with PMH significant for HTN, candida, and metastatic breast cancer s/p chemo & radiation who presented to the ED yesterday with severe L flank pain with N/V  Patient currently being managed for acute pyelonephritis, sepsis, and bacteremia with E coli  Today, patient is feeling much better  She still has some discomfort of her left flank, but she says it is nothing compared to yesterday  She notes that she has not needed her PRN oxycodone at all today  Overnight, patient reports that she had fevers and chills that required acetaminophen, but has not had any chills today  Patient states that she has been urinating very frequently but denies burning or pain with urination or any blood in her urine  Patient states that she has had 3-4 episodes of diarrhea in the past hour and the last time she had a normal BM was this morning  She also says that she feels like her right hand is slightly swollen  She is ambulatory and walking as usual with no problems, she is also on a normal diet without any issues  Patient denies N/V, SOB, HA, chest pain  Patient was seen 3 weeks ago for the same complaints of left flank pain, nausea and vomiting where her urine culture also grew E  Coli, patient was treated with PO antibiotics x 7 days  Objective:     Vitals: Blood pressure 160/94, pulse 90, temperature 99 °F (37 2 °C), resp  rate 18, weight 75 3 kg (166 lb 0 1 oz), last menstrual period 03/28/2022, SpO2 96 %, not currently breastfeeding  ,Body mass index is 28 95 kg/m²  Intake/Output Summary (Last 24 hours) at 11/29/2022 1415  Last data filed at 11/29/2022 0631  Gross per 24 hour   Intake 1000 ml   Output --   Net 1000 ml       Physical Exam: Physical Exam  Constitutional:       General: She is not in acute distress  Appearance: Normal appearance  She is not ill-appearing  HENT:      Head: Normocephalic and atraumatic  Cardiovascular:      Rate and Rhythm: Normal rate and regular rhythm  Pulses: Normal pulses  Heart sounds: Normal heart sounds  No murmur heard    Pulmonary:      Effort: Pulmonary effort is normal  Breath sounds: Normal breath sounds  No wheezing, rhonchi or rales  Abdominal:      General: Bowel sounds are normal  There is no distension  Palpations: Abdomen is soft  Tenderness: There is abdominal tenderness (mild left sided and flank tenderness to palpation )  Skin:     General: Skin is warm and dry  Coloration: Skin is not jaundiced  Findings: No erythema  Neurological:      General: No focal deficit present  Mental Status: She is alert and oriented to person, place, and time  Psychiatric:         Mood and Affect: Mood normal          Behavior: Behavior normal          Invasive Devices     Peripheral Intravenous Line  Duration           Peripheral IV 11/28/22 Right;Ventral (anterior) Forearm 1 day                Lab, Imaging and other studies: I have personally reviewed pertinent reports      VTE Pharmacologic Prophylaxis: Enoxaparin (Lovenox)  VTE Mechanical Prophylaxis: sequential compression device

## 2022-11-29 NOTE — PLAN OF CARE
Problem: PAIN - ADULT  Goal: Verbalizes/displays adequate comfort level or baseline comfort level  Description: Interventions:  - Encourage patient to monitor pain and request assistance  - Assess pain using appropriate pain scale  - Administer analgesics based on type and severity of pain and evaluate response  - Implement non-pharmacological measures as appropriate and evaluate response  - Consider cultural and social influences on pain and pain management  - Notify physician/advanced practitioner if interventions unsuccessful or patient reports new pain  Outcome: Progressing     Problem: INFECTION - ADULT  Goal: Absence of fever/infection during neutropenic period  Description: INTERVENTIONS:  - Monitor WBC    Outcome: Progressing     Problem: GASTROINTESTINAL - ADULT  Goal: Minimal or absence of nausea and/or vomiting  Description: INTERVENTIONS:  - Administer IV fluids if ordered to ensure adequate hydration  - Maintain NPO status until nausea and vomiting are resolved  - Nasogastric tube if ordered  - Administer ordered antiemetic medications as needed  - Provide nonpharmacologic comfort measures as appropriate  - Advance diet as tolerated, if ordered  - Consider nutrition services referral to assist patient with adequate nutrition and appropriate food choices  Outcome: Progressing  Goal: Maintains adequate nutritional intake  Description: INTERVENTIONS:  - Monitor percentage of each meal consumed  - Identify factors contributing to decreased intake, treat as appropriate  - Assist with meals as needed  - Monitor I&O, weight, and lab values if indicated  - Obtain nutrition services referral as needed  Outcome: Progressing     Problem: Potential for Falls  Goal: Patient will remain free of falls  Description: INTERVENTIONS:  - Educate patient/family on patient safety including physical limitations  - Instruct patient to call for assistance with activity   - Consult OT/PT to assist with strengthening/mobility - Keep Call bell within reach  - Keep bed low and locked with side rails adjusted as appropriate  - Keep care items and personal belongings within reach  - Initiate and maintain comfort rounds  - Make Fall Risk Sign visible to staff  - Apply yellow socks and bracelet for high fall risk patients  - Consider moving patient to room near nurses station  Outcome: Progressing

## 2022-11-29 NOTE — CONSULTS
Consultation - Infectious Disease   Ez Ayala 49 y o  female MRN: [id number]  Unit/Bed#: S -01 Encounter: [id number]      IMPRESSION & RECOMMENDATIONS:     1  Sepsis-POA  Fever, tachycardia, tachypnea  Due to #2 and 3 below  The patient continues to have fevers, but persistent fever is not uncommon with pyelonephritis  She is clinically improving with antibiotics  -continue IV Ceftriaxone   -follow up blood and urine cultures to adjust antibiotics as able   -monitor fever curve, WBC count  Would not repeat imaging unless she develops new symptoms or fevers persist another 48-72 hours     2  Left pyelonephritis  UA with innumerable WBC, urine culture growing >100K E  Coli, blood cultures also growing E  Coli  CT A/P with worsened subacute left pyelonephritis from CT scan 11/9  At the time, she received cefpodoxime PO 100mg BID x 7 days with improvement in symptoms  I do not think this was an oral antibiotic "failure", but rather rapid relapse due to inadequate dosing (short duration and dose of antibiotic)  Left flank pain and systemic symptoms low improving  As above, fevers can be prolonged in setting of pyelonephritis    -continue IV Ceftriaxone   -monitor symptoms   -monitor fever curve, WBC count   -urology consulted, no need for  intervention at this time but will consider stent placement if she is not improving   -follow up urine and blood cultures to adjust antibiotics as able    3  E  Coli bacteremia  POA, due to #2 above      -continue antibiotics as above   -follow up susceptibilities to adjust antibiotics     4  Metastatic breast cancer  Diffuse osseous metastases  Status post palliative XRT to the spine, currently receiving Pertuzumab + Trastuzumab chemotherapy  -pain management per primary   -outpatient oncology follow up    I have discussed the above management plan in detail with the VENANCIO PA, patient and her  at bedside  ID will follow      I have performed an extensive review of the medical records in [address] [place] [address] including review of the notes, radiographs, and laboratory results     HISTORY OF PRESENT ILLNESS:  Reason for Consult: pyelonephritis, bacteremia  HPI: Erik Jhaveri is a 49year old woman with a history of metastatic breast cancer status post XRT and current chemotherapy who presented to the [address] [place] [address] ED 11/28/22 with one day of left sided flank and abdominal pain, nausea, vomiting, chills  She denied fever at home, dysuria  The patient was seen in the ED 11/8 and 11/10 for similar symptoms  CT showed left pyelonephritis and urine culture grew >100K E  Coli  11/10 the patient was prescribed 100mg Cefpodoxime BID x 7 days, which she completed and symptoms improved  On presentation, she was tachycardic, initially afebrile but developed fever to 102 8  Labs were significant for a WBC count of 13 23, UA with innumerable WBC and bacteria  CT A/P showed worsened subacute left pyelonephritis  The patient was given a dose of vancomycin and zosyn prior to transition to Ceftriaxone  Urine and blood cultures now with growth of E  Coli  ID is consulted for further antibiotic management  Today, the patient is feeling better  Nausea and vomiting have resolved  She is still having fever  Left flank and back tenderness are still present, but pain is improving  REVIEW OF SYSTEMS:  A complete review of systems is negative other than that noted in the HPI  PAST MEDICAL HISTORY:  Past Medical History:   Diagnosis Date   • Breast cancer ([facility] [address] )    • Cancer ([facility] [address] )    • Hypertension      History reviewed  No pertinent surgical history      FAMILY HISTORY:  Non-contributory    SOCIAL HISTORY:  Social History   Social History     Substance and Sexual Activity   Alcohol Use Not Currently    Comment: rare, social      Social History     Substance and Sexual Activity   Drug Use Never     Social History     Tobacco Use   Smoking Status Never   Smokeless Tobacco Never ALLERGIES:  No Known Allergies    MEDICATIONS:  All current active medications have been reviewed  PHYSICAL EXAM:  Temp:  [98 °F (36 7 °C)-102 9 °F (39 4 °C)] 99 °F (37 2 °C)  HR:  [] 90  Resp:  [18] 18  BP: (104-169)/(62-99) 160/94  SpO2:  [95 %-100 %] 96 %  Temp (24hrs), Av °F (37 8 °C), Min:98 °F (36 7 °C), Max:102 9 °F (39 4 °C)  Current: Temperature: 99 °F (37 2 °C)    Intake/Output Summary (Last 24 hours) at 2022 1304  Last data filed at 2022 0631  Gross per 24 hour   Intake 1000 ml   Output --   Net 1000 ml       General Appearance:  Appearing well, nontoxic, and in no distress   Head:  Normocephalic, without obvious abnormality, atraumatic   Eyes:  Conjunctiva pink and sclera anicteric, both eyes   Nose: Nares normal, mucosa normal, no drainage   Throat: Oropharynx moist without lesions   Neck: Supple, symmetrical, no adenopathy, no tenderness/mass/nodules   Back:   Symmetric, no curvature, ROM normal  Mild left sided CVA tenderness    Lungs:   Clear to auscultation bilaterally, respirations unlabored   Chest Wall:  No tenderness or deformity   Heart:  RRR; no murmur, rub or gallop   Abdomen:   Soft, non-tender, non-distended, positive bowel sounds    Extremities: No cyanosis, clubbing or edema   Skin: No rashes or lesions  No draining wounds noted  Lymph nodes: Cervical, supraclavicular nodes normal   Neurologic: Alert and oriented times 3, extremity strength 5/5 and symmetric       LABS, IMAGING, & OTHER STUDIES:  Lab Results:  I have personally reviewed pertinent labs    Results from last 7 days   Lab Units 22  0508 22  0633   WBC Thousand/uL 8 03 13 23*   HEMOGLOBIN g/dL 10 1* 13 6   PLATELETS Thousands/uL 167 288     Results from last 7 days   Lab Units 22  0508 22  0633   SODIUM mmol/L 139 139   POTASSIUM mmol/L 3 3* 4 1   CHLORIDE mmol/L 111* 103   CO2 mmol/L 22 25   BUN mg/dL 11 17   CREATININE mg/dL 0 54* 0 76   EGFR ml/min/1 73sq m 111 93 CALCIUM mg/dL 7 7* 9 9   AST U/L  --  15   ALT U/L  --  16   ALK PHOS U/L  --  56     Results from last 7 days   Lab Units 11/28/22  0753 11/28/22  0701   BLOOD CULTURE   --  Escherichia coli*  Escherichia coli*   GRAM STAIN RESULT   --  Gram negative rods*  Gram negative rods*   URINE CULTURE  >100,000 cfu/ml Escherichia coli*  --      Results from last 7 days   Lab Units 11/28/22  0633   PROCALCITONIN ng/ml <0 05                   Imaging Studies:   I have personally reviewed pertinent imaging study reports and images in PACS  CT A/P-Worsened subacute left pyelonephritis, diffuse scattered osseous metastatic disease    Other Studies:   I have personally reviewed pertinent reports

## 2022-11-29 NOTE — ASSESSMENT & PLAN NOTE
· 2/2 admission BC with growth of E  Coli  · From urinary source  · ID consult pending  · Continue high dose ceftriaxone

## 2022-11-30 PROBLEM — K52.1 ANTIBIOTIC-ASSOCIATED DIARRHEA: Status: ACTIVE | Noted: 2022-11-30

## 2022-11-30 PROBLEM — T36.95XA ANTIBIOTIC-ASSOCIATED DIARRHEA: Status: ACTIVE | Noted: 2022-11-30

## 2022-11-30 LAB
ANION GAP SERPL CALCULATED.3IONS-SCNC: 7 MMOL/L (ref 4–13)
BACTERIA BLD CULT: ABNORMAL
BACTERIA BLD CULT: ABNORMAL
BACTERIA UR CULT: ABNORMAL
BASOPHILS # BLD AUTO: 0.02 THOUSANDS/ÂΜL (ref 0–0.1)
BASOPHILS NFR BLD AUTO: 0 % (ref 0–1)
BUN SERPL-MCNC: 5 MG/DL (ref 5–25)
C DIFF TOX GENS STL QL NAA+PROBE: NEGATIVE
CALCIUM SERPL-MCNC: 8.4 MG/DL (ref 8.4–10.2)
CHLORIDE SERPL-SCNC: 114 MMOL/L (ref 96–108)
CO2 SERPL-SCNC: 20 MMOL/L (ref 21–32)
CREAT SERPL-MCNC: 0.5 MG/DL (ref 0.6–1.3)
E COLI DNA BLD POS QL NAA+NON-PROBE: DETECTED
EOSINOPHIL # BLD AUTO: 0.09 THOUSAND/ÂΜL (ref 0–0.61)
EOSINOPHIL NFR BLD AUTO: 2 % (ref 0–6)
ERYTHROCYTE [DISTWIDTH] IN BLOOD BY AUTOMATED COUNT: 13.2 % (ref 11.6–15.1)
GFR SERPL CREATININE-BSD FRML MDRD: 114 ML/MIN/1.73SQ M
GLUCOSE SERPL-MCNC: 87 MG/DL (ref 65–140)
GRAM STN SPEC: ABNORMAL
GRAM STN SPEC: ABNORMAL
HCT VFR BLD AUTO: 30.9 % (ref 34.8–46.1)
HGB BLD-MCNC: 9.6 G/DL (ref 11.5–15.4)
IMM GRANULOCYTES # BLD AUTO: 0.04 THOUSAND/UL (ref 0–0.2)
IMM GRANULOCYTES NFR BLD AUTO: 1 % (ref 0–2)
LYMPHOCYTES # BLD AUTO: 0.44 THOUSANDS/ÂΜL (ref 0.6–4.47)
LYMPHOCYTES NFR BLD AUTO: 8 % (ref 14–44)
MCH RBC QN AUTO: 30.9 PG (ref 26.8–34.3)
MCHC RBC AUTO-ENTMCNC: 31.1 G/DL (ref 31.4–37.4)
MCV RBC AUTO: 99 FL (ref 82–98)
MONOCYTES # BLD AUTO: 0.65 THOUSAND/ÂΜL (ref 0.17–1.22)
MONOCYTES NFR BLD AUTO: 12 % (ref 4–12)
NEUTROPHILS # BLD AUTO: 4.41 THOUSANDS/ÂΜL (ref 1.85–7.62)
NEUTS SEG NFR BLD AUTO: 77 % (ref 43–75)
NRBC BLD AUTO-RTO: 0 /100 WBCS
PLATELET # BLD AUTO: 150 THOUSANDS/UL (ref 149–390)
PMV BLD AUTO: 9.8 FL (ref 8.9–12.7)
POTASSIUM SERPL-SCNC: 3.5 MMOL/L (ref 3.5–5.3)
RBC # BLD AUTO: 3.11 MILLION/UL (ref 3.81–5.12)
SODIUM SERPL-SCNC: 141 MMOL/L (ref 135–147)
WBC # BLD AUTO: 5.65 THOUSAND/UL (ref 4.31–10.16)

## 2022-11-30 RX ORDER — POTASSIUM CHLORIDE 20 MEQ/1
40 TABLET, EXTENDED RELEASE ORAL ONCE
Status: COMPLETED | OUTPATIENT
Start: 2022-11-30 | End: 2022-11-30

## 2022-11-30 RX ORDER — SODIUM CHLORIDE 9 MG/ML
125 INJECTION, SOLUTION INTRAVENOUS CONTINUOUS
Status: DISCONTINUED | OUTPATIENT
Start: 2022-11-30 | End: 2022-12-01 | Stop reason: HOSPADM

## 2022-11-30 RX ORDER — MAGNESIUM SULFATE HEPTAHYDRATE 40 MG/ML
2 INJECTION, SOLUTION INTRAVENOUS ONCE
Status: COMPLETED | OUTPATIENT
Start: 2022-11-30 | End: 2022-11-30

## 2022-11-30 RX ORDER — CEFAZOLIN SODIUM 2 G/50ML
2000 SOLUTION INTRAVENOUS EVERY 8 HOURS
Status: DISCONTINUED | OUTPATIENT
Start: 2022-11-30 | End: 2022-12-01 | Stop reason: HOSPADM

## 2022-11-30 RX ORDER — LOPERAMIDE HYDROCHLORIDE 2 MG/1
2 CAPSULE ORAL EVERY 4 HOURS PRN
Status: DISCONTINUED | OUTPATIENT
Start: 2022-11-30 | End: 2022-12-01 | Stop reason: HOSPADM

## 2022-11-30 RX ADMIN — MORPHINE SULFATE 15 MG: 15 TABLET, EXTENDED RELEASE ORAL at 21:50

## 2022-11-30 RX ADMIN — LOPERAMIDE HYDROCHLORIDE 2 MG: 2 CAPSULE ORAL at 08:24

## 2022-11-30 RX ADMIN — MAGNESIUM SULFATE HEPTAHYDRATE 2 G: 40 INJECTION, SOLUTION INTRAVENOUS at 14:00

## 2022-11-30 RX ADMIN — ENOXAPARIN SODIUM 40 MG: 40 INJECTION SUBCUTANEOUS at 08:23

## 2022-11-30 RX ADMIN — CEFAZOLIN SODIUM 2000 MG: 2 SOLUTION INTRAVENOUS at 21:50

## 2022-11-30 RX ADMIN — LISINOPRIL 20 MG: 20 TABLET ORAL at 08:24

## 2022-11-30 RX ADMIN — NYSTATIN: 100000 POWDER TOPICAL at 08:24

## 2022-11-30 RX ADMIN — POTASSIUM CHLORIDE 40 MEQ: 1500 TABLET, EXTENDED RELEASE ORAL at 14:00

## 2022-11-30 RX ADMIN — OXYCODONE HYDROCHLORIDE 5 MG: 5 TABLET ORAL at 16:19

## 2022-11-30 RX ADMIN — NYSTATIN: 100000 POWDER TOPICAL at 21:46

## 2022-11-30 RX ADMIN — NYSTATIN: 100000 POWDER TOPICAL at 15:00

## 2022-11-30 RX ADMIN — LOPERAMIDE HYDROCHLORIDE 2 MG: 2 CAPSULE ORAL at 12:21

## 2022-11-30 RX ADMIN — PANTOPRAZOLE SODIUM 40 MG: 40 TABLET, DELAYED RELEASE ORAL at 05:06

## 2022-11-30 RX ADMIN — CEFAZOLIN SODIUM 2000 MG: 2 SOLUTION INTRAVENOUS at 12:16

## 2022-11-30 RX ADMIN — SODIUM CHLORIDE 125 ML/HR: 0.9 INJECTION, SOLUTION INTRAVENOUS at 21:46

## 2022-11-30 RX ADMIN — Medication 250 MG: at 18:11

## 2022-11-30 RX ADMIN — SODIUM CHLORIDE 125 ML/HR: 0.9 INJECTION, SOLUTION INTRAVENOUS at 10:02

## 2022-11-30 RX ADMIN — Medication 250 MG: at 08:24

## 2022-11-30 NOTE — PROGRESS NOTES
[facility]  Progress Note - Abdiel Garcia 1975, 49 y o  female MRN: [id number]  Unit/Bed#: S -01 Encounter: [id number]  Primary Care Provider: Katlin Baker MD   Date and time admitted to hospital: 11/28/2022  6:05 AM    * Sepsis due to pyelonephritis  Assessment & Plan  · Left-sided flank pain, nausea, vomiting, abnormal UA, abnormal CT abdomen and pelvis  · Failed outpatient therapy with antibiotics, patient was treated with oral antibiotics 3 weeks ago  · Meets sepsis criteria with tachycardia, tachypnea, leukocytosis  · CT abdomen and pelvis:  "Worsened subacute left pyelonephritis  Persistent hyperdense material in lower calyx of left kidney, question pyonephrosis or underlying urethroileal carcinoma"  · Urine cultures growing E coli, received 2 days of IV ceftriaxone  Switch to IV Ancef today  Antibiotic day #3  Bacteremia due to Escherichia coli  Assessment & Plan  · 2/2 admission BC with growth of E  Coli  · From urinary source  · ID following  · Abx day #3 as above    Antibiotic-associated diarrhea  Assessment & Plan  · With complaints of profuse diarrhea throughout the night 11/29 until 11/30  Overnight team sent off C diff which was negative  · Patient reports severe abdominal discomfort after receiving IV ceftriaxone, will switch to IV Ancef  If unable to tolerate cephalosporin was switched to p o  Bactrim per ID  · Continue probiotic and Imodium p r n  Cheyanne Timmy · Place back on IV hydration today  Essential hypertension  Assessment & Plan  · Patient states she had bilateral lower extremity swelling initially when she was diagnosed of cancer, was treated with Lasix and currently not taking Lasix anymore  · Continue lisinopril  · BP stable  Candidal skin infection  Assessment & Plan  · Continue nystatin powder  Cancer associated pain  Assessment & Plan  · P r n  oxycodone  Takes morphine at bedtime      Osseous metastasis [facility])  Assessment & Plan  · Underwent radiation therapy, chemotherapy, currently pain is much better, on p r n  oxycodone  Malignant neoplasm of overlapping sites of right breast in female, estrogen receptor negative ([facility] [address] )  Assessment & Plan  · Outpatient follow-up with Oncology, patient states that cancer is responding to the treatment  VTE Pharmacologic Prophylaxis: VTE Score: 6 High Risk (Score >/= 5) - Pharmacological DVT Prophylaxis Ordered: enoxaparin (Lovenox)  Sequential Compression Devices Ordered  Patient Centered Rounds: I performed bedside rounds with nursing staff today  Discussions with Specialists or Other Care Team Provider: sil, rn    Education and Discussions with Family / Patient: Patient declined call to   Time Spent for Care: 30 minutes  More than 50% of total time spent on counseling and coordination of care as described above  Current Length of Stay: 2 day(s)  Current Patient Status: Inpatient   Certification Statement: The patient will continue to require additional inpatient hospital stay due to IV abx for bacteremia  Discharge Plan: Anticipate discharge in 24-48 hrs to home  Code Status: Level 1 - Full Code    Subjective: With complaints of profuse diarrhea overnight  Otherwise low-grade fever but no true fever today  Eating well  Objective:     Vitals:   Temp (24hrs), Av °F (37 2 °C), Min:98 7 °F (37 1 °C), Max:99 5 °F (37 5 °C)    Temp:  [98 7 °F (37 1 °C)-99 5 °F (37 5 °C)] 99 3 °F (37 4 °C)  HR:  [80-90] 85  Resp:  [17-20] 18  BP: (135-160)/(82-94) 137/85  SpO2:  [94 %-96 %] 94 %  Body mass index is 28 95 kg/m²  Input and Output Summary (last 24 hours):   No intake or output data in the 24 hours ending 22 0945    Physical Exam:   Physical Exam  Vitals and nursing note reviewed  Constitutional:       General: She is not in acute distress  Appearance: Normal appearance  HENT:      Head: Normocephalic        Mouth/Throat: Mouth: Mucous membranes are moist    Eyes:      General: No scleral icterus  Pupils: Pupils are equal, round, and reactive to light  Cardiovascular:      Rate and Rhythm: Normal rate and regular rhythm  Heart sounds: No murmur heard  Pulmonary:      Effort: Pulmonary effort is normal  No respiratory distress  Breath sounds: Normal breath sounds  No wheezing, rhonchi or rales  Abdominal:      General: Bowel sounds are normal  There is no distension  Palpations: Abdomen is soft  Tenderness: There is no abdominal tenderness  Musculoskeletal:         General: No swelling  Right lower leg: No edema  Left lower leg: No edema  Skin:     Capillary Refill: Capillary refill takes less than 2 seconds  Neurological:      General: No focal deficit present  Mental Status: She is alert and oriented to person, place, and time  Mental status is at baseline  Additional Data:     Labs:  Results from last 7 days   Lab Units 11/30/22  0519   WBC Thousand/uL 5 65   HEMOGLOBIN g/dL 9 6*   HEMATOCRIT % 30 9*   PLATELETS Thousands/uL 150   NEUTROS PCT % 77*   LYMPHS PCT % 8*   MONOS PCT % 12   EOS PCT % 2     Results from last 7 days   Lab Units 11/30/22  0519 11/29/22  0508 11/28/22  0633   SODIUM mmol/L 141   < > 139   POTASSIUM mmol/L 3 5   < > 4 1   CHLORIDE mmol/L 114*   < > 103   CO2 mmol/L 20*   < > 25   BUN mg/dL 5   < > 17   CREATININE mg/dL 0 50*   < > 0 76   ANION GAP mmol/L 7   < > 11   CALCIUM mg/dL 8 4   < > 9 9   ALBUMIN g/dL  --   --  4 6   TOTAL BILIRUBIN mg/dL  --   --  0 97   ALK PHOS U/L  --   --  56   ALT U/L  --   --  16   AST U/L  --   --  15   GLUCOSE RANDOM mg/dL 87   < > 148*    < > = values in this interval not displayed       Results from last 7 days   Lab Units 11/28/22  0701   INR  0 91             Results from last 7 days   Lab Units 11/28/22  0701 11/28/22  0633   LACTIC ACID mmol/L 1 3  --    PROCALCITONIN ng/ml  --  <0 05 Lines/Drains:  Invasive Devices     Peripheral Intravenous Line  Duration           Peripheral IV 11/29/22 Proximal;Right Antecubital <1 day                      Imaging: No pertinent imaging reviewed  Recent Cultures (last 7 days):   Results from last 7 days   Lab Units 11/29/22  2206 11/28/22  0753 11/28/22  0701   BLOOD CULTURE   --   --  Escherichia coli*  Escherichia coli*   GRAM STAIN RESULT   --   --  Gram negative rods*  Gram negative rods*   URINE CULTURE   --  >100,000 cfu/ml Escherichia coli*  --    C DIFF TOXIN B BY PCR  Negative  --   --        Last 24 Hours Medication List:   Current Facility-Administered Medications   Medication Dose Route Frequency Provider Last Rate   • acetaminophen  650 mg Oral Q6H PRN Laure Camara PA-C     • cefazolin  2,000 mg Intravenous Q8H Portia Reynoso MD     • enoxaparin  40 mg Subcutaneous Daily Azalia Shen MD     • lisinopril  20 mg Oral Daily Azalia Shen MD     • loperamide  2 mg Oral Q4H PRN Laure Camara PA-C     • morphine  15 mg Oral HS Marylu De La Cruz MD     • nystatin   Topical TID Azalia Shen MD     • ondansetron  4 mg Intravenous Q6H PRN Azalia Shen MD     • oxyCODONE  5 mg Oral Q6H PRN Azalia Shen MD     • pantoprazole  40 mg Oral Early Morning Azalia Shen MD     • polyethylene glycol  17 g Oral Daily PRN Azalia Shen MD     • saccharomyces boulardii  250 mg Oral BID Laure Camara PA-C     • sodium chloride  125 mL/hr Intravenous Continuous Laure Camara PA-C          Today, Patient Was Seen By: Belen Adam PA-C    **Please Note: This note may have been constructed using a voice recognition system  **

## 2022-11-30 NOTE — ASSESSMENT & PLAN NOTE
· Left-sided flank pain, nausea, vomiting, abnormal UA, abnormal CT abdomen and pelvis  · Failed outpatient therapy with antibiotics, patient was treated with oral antibiotics 3 weeks ago  · Meets sepsis criteria with tachycardia, tachypnea, leukocytosis  · CT abdomen and pelvis:  "Worsened subacute left pyelonephritis  Persistent hyperdense material in lower calyx of left kidney, question pyonephrosis or underlying urethroileal carcinoma"  · Urine cultures growing E coli, received 2 days of IV ceftriaxone  Switch to IV Ancef today  Antibiotic day #3

## 2022-11-30 NOTE — ASSESSMENT & PLAN NOTE
· With complaints of profuse diarrhea throughout the night 11/29 until 11/30  Overnight team sent off C diff which was negative  · Patient reports severe abdominal discomfort after receiving IV ceftriaxone, will switch to IV Ancef  If unable to tolerate cephalosporin was switched to p o  Bactrim per ID  · Continue probiotic and Imodium p r n  Margarite [place] · Place back on IV hydration today

## 2022-11-30 NOTE — PROGRESS NOTES
Progress Note - Infectious Disease   Abel Cummins 49 y o  female MRN: [id number]  Unit/Bed#: S -01 Encounter: [id number]      Impression/Plan:    1  Sepsis-POA  Fever, tachycardia, tachypnea  Due to #2 and 3 below  Patient now afebrile and clinically improving with IV antibiotics               -stop Ceftriaxone, narrow to Cefazolin 2g q8hr              -monitor fever curve, WBC count  Would not repeat imaging unless she develops new symptoms or fevers persist persist beyond another 48-72 hours                2  Left pyelonephritis  UA with innumerable WBC, urine culture growing >100K E  Coli, blood cultures also growing E  Coli  CT A/P with worsened subacute left pyelonephritis from CT scan 11/9  At the time, she received cefpodoxime PO 100mg BID x 7 days with improvement in symptoms  I do not think this was an oral antibiotic "failure", but rather rapid relapse due to inadequate dosing (short duration and dose of antibiotic)  Left flank pain and systemic symptoms low improving, fever also resolving              -IV Cefazolin as below   -if continues to improve, plan for discharge tomorrow on oral Bactrim for a 10-14 day antibiotic course              -monitor symptoms              -monitor fever curve, WBC count              -urology consulted, no need for  intervention at this time      3  E  Coli bacteremia  POA, due to #2 above                 -continue antibiotics as above     4  Metastatic breast cancer  Diffuse osseous metastases  Status post palliative XRT to the spine, currently receiving Pertuzumab + Trastuzumab chemotherapy  -pain management per primary              -outpatient oncology follow up    5  Diarrhea  C Dif negative  May be antibiotic related vs due to underlying infection    -continue to monitor symptoms, okay to give imodium PRN     I have discussed the above management plan in detail with the VENANCIO PA, patient and her  at bedside   ID will follow  Antibiotics:  Ceftriaxone day 3    Subjective:  Patient reports watery diarrhea yesterday even and last night  This was associated with some nausea and uneasiness in her stomach, but no sophia abdominal pain or nausea  No fever, chills  Left flank pain is improved  C Dif was checked last night and is negative  Diarrhea is better today  Objective:  Vitals:  Temp:  [98 7 °F (37 1 °C)-99 5 °F (37 5 °C)] 99 3 °F (37 4 °C)  HR:  [80-88] 85  Resp:  [17-20] 18  BP: (135-146)/(82-93) 137/85  SpO2:  [94 %-96 %] 94 %  Temp (24hrs), Av °F (37 2 °C), Min:98 7 °F (37 1 °C), Max:99 5 °F (37 5 °C)  Current: Temperature: 99 3 °F (37 4 °C)    Physical Exam:   General Appearance:  Alert, interactive, nontoxic, no acute distress  Throat: Oropharynx moist without lesions  Lungs:   Clear to auscultation bilaterally; no wheezes, rhonchi or rales; respirations unlabored   Heart:  RRR; no murmur, rub or gallop   Abdomen:   Soft, non-tender, non-distended, positive bowel sounds  Left CVA tenderness improved    Extremities: No clubbing, cyanosis or edema   Skin: No new rashes or lesions  No draining wounds noted  Labs:    All pertinent labs and imaging studies were personally reviewed  Results from last 7 days   Lab Units 22  0508 22  0633   WBC Thousand/uL 5 65 8 03 13 23*   HEMOGLOBIN g/dL 9 6* 10 1* 13 6   PLATELETS Thousands/uL 150 167 288     Results from last 7 days   Lab Units 22  0508 22  0633   SODIUM mmol/L 141 139 139   POTASSIUM mmol/L 3 5 3 3* 4 1   CHLORIDE mmol/L 114* 111* 103   CO2 mmol/L 20* 22 25   BUN mg/dL 5 11 17   CREATININE mg/dL 0 50* 0 54* 0 76   EGFR ml/min/1 73sq m 114 111 93   CALCIUM mg/dL 8 4 7 7* 9 9   AST U/L  --   --  15   ALT U/L  --   --  16   ALK PHOS U/L  --   --  56     Results from last 7 days   Lab Units 22  0633   PROCALCITONIN ng/ml <0 05                   Micro:  Results from last 7 days   Lab Units 22  7997 11/28/22  0753 11/28/22  0701   BLOOD CULTURE   --   --  Escherichia coli*  Escherichia coli*   GRAM STAIN RESULT   --   --  Gram negative rods*  Gram negative rods*   URINE CULTURE   --  >100,000 cfu/ml Escherichia coli*  --    C DIFF TOXIN B BY PCR  Negative  --   --        Imaging:  I have personally reviewed pertinent imaging study reports and images in PACS      CT A/P-Worsened subacute left pyelonephritis, diffuse scattered osseous metastatic disease     Other Studies:   I have personally reviewed pertinent reports

## 2022-11-30 NOTE — ASSESSMENT & PLAN NOTE
· Patient states she had bilateral lower extremity swelling initially when she was diagnosed of cancer, was treated with Lasix and currently not taking Lasix anymore  · Continue lisinopril  · BP stable

## 2022-11-30 NOTE — ASSESSMENT & PLAN NOTE
· 2/2 admission BC with growth of E  Coli  · From urinary source  · ID following    · Abx day #3 as above

## 2022-11-30 NOTE — APP STUDENT NOTE
SANDY STUDENT  Inpatient Progress Note for TRAINING ONLY  Not Part of Legal Medical Record       Progress Note - Socorro Melton 49 y o  female MRN: [id number]    Unit/Bed#: S -01 Encounter: [id number]      Assessment and Plan:  Patient is a 48 y/o F HOD 2, antibiotic day 3 being managed inpatient with IV antibiotics for acute pyelonephritis, sepsis, and bacteremia  Patient feels very well today  Likely discharge tomorrow, 12/1/22  1  Sepsis   - Patient met sepsis criteria due to tachycardia, tachypnea, and leukocytosis on admission; all clinically stable today   - Ceftriaxone changed to IV cefazolin 2 g today q8H, treating for a total of 14 days of antibiotics  Switch to a PO agent prior to discharge tomorrow  - Continue continuous NaCl infusion   - Continue monitoring labs and vitals, watch for fever    2  Left Pyelonephritis    - On CT scan on 11/28, "worsened left subacute pyelonephritis with hyperdense material in the lower calyx of the left kidney"    - UA & blood cultures grew E coli    -  consulted, no  surgical intervention needed at this time as patient is clinically improving   - Ceftriaxone changed to IV cefazolin 2 g today q8H, treating for a total of 14 days of antibiotics  Switch to a PO agent prior to discharge tomorrow  3  E coli Bacteremia   - Found on blood cultures, 2/2 pyelonephritis  Susceptibilities showed bacteria is susceptible to Augmentin, aztreonam, cefazolin, ciprofloxacin, levofloxacin, gentamicin, ertapenem, piperacillin-tazobactam, tetracycline, tobramycin, and TMP-SMX     - Ceftriaxone changed to IV cefazolin 2 g today q8H, treating for a total of 14 days of antibiotics  Switch to a PO agent prior to discharge tomorrow  - ID was consulted and agrees with treatment plan    4  Hypokalemia   - Today K+ at 3 5, increased from 3 3 yesterday after 40 mEq of KCl   - Dose KCl 40 mEq once    - Repeat CMP tomorrow AM    5  Diarrhea   - C   Diff PCR negative 11/29   - Continue Imodium 2 mg q4H PRN for diarrhea     6  HTN   - Continue lisinopril 20 mg QD    7  Candida Skin Infection    - Continue nystatin powder application TID    8  Metastatic Breast Cancer   - Patient s/p chemotherapy & radiation   - Continue morphine 15 mg QD at bedtime    - Continue oxycodone 5mg tablet q6H PRN for moderate pain   - Patient following with outpatient oncology        Subjective:   Patient is a 50 y/o F HOD2, antibiotic day 3 with PMH significant for HTN, candida, and metastatic breast cancer s/p chemo & radiation who presented to the ED yesterday with severe L flank pain with N/V  Patient currently being managed for acute pyelonephritis, sepsis, and bacteremia with E coli  Today, patient states that she feels very well overall, with her flank pain just about resolved  She does note that she's had diarrhea all night and was given a dose of Imodium this AM, unsure if it's helping yet  She was able to get about 4 1/2 hours of good sleep in between diarrhea episodes  Patient eating normal diet but notes that she did not eat very much for dinner last night to try and give her GI tract a rest  Patient denies any burning or pain with urination but is still urinating very frequently  Patient denies SOB, chest pain, N/V, fevers or chills         Past Medical History:   Diagnosis Date   • Breast cancer ([facility] )    • Cancer ([facility] )    • Hypertension      Scheduled Meds:  Current Facility-Administered Medications   Medication Dose Route Frequency Provider Last Rate   • acetaminophen  650 mg Oral Q6H PRN Tyler James PA-C     • cefTRIAXone  2,000 mg Intravenous Q24H Chen Alexis MD 2,000 mg (11/29/22 1250)   • enoxaparin  40 mg Subcutaneous Daily Chen Alexis MD     • lisinopril  20 mg Oral Daily Chen Alexis MD     • loperamide  2 mg Oral Q4H PRN Tyler James PA-C     • morphine  15 mg Oral HS Chen Alexis MD     • nystatin   Topical TID Chen Alexis MD     • ondansetron 4 mg Intravenous Q6H PRN David Powers MD     • oxyCODONE  5 mg Oral Q6H PRN David Powers MD     • pantoprazole  40 mg Oral Early Morning David Powers MD     • polyethylene glycol  17 g Oral Daily PRN David Powers MD     • saccharomyces boulardii  250 mg Oral BID Sade Wu PA-C       Continuous Infusions:   PRN Meds: •  acetaminophen  •  loperamide  •  ondansetron  •  oxyCODONE  •  polyethylene glycol     No Known Allergies  Objective:     Vitals: Blood pressure 137/85, pulse 85, temperature 99 3 °F (37 4 °C), resp  rate 18, weight 75 3 kg (166 lb 0 1 oz), last menstrual period 03/28/2022, SpO2 94 %, not currently breastfeeding  ,Body mass index is 28 95 kg/m²  No intake or output data in the 24 hours ending 11/30/22 0811    Physical Exam: Physical Exam  Constitutional:       General: She is not in acute distress  Appearance: Normal appearance  She is not ill-appearing  HENT:      Head: Normocephalic and atraumatic  Cardiovascular:      Rate and Rhythm: Normal rate and regular rhythm  Pulses: Normal pulses  Heart sounds: Normal heart sounds  No murmur heard  No friction rub  No gallop  Pulmonary:      Effort: Pulmonary effort is normal       Breath sounds: Normal breath sounds  No wheezing, rhonchi or rales  Chest:      Chest wall: No tenderness  Abdominal:      General: Abdomen is flat  Bowel sounds are normal       Palpations: Abdomen is soft  Tenderness: There is no abdominal tenderness  Skin:     General: Skin is warm and dry  Capillary Refill: Capillary refill takes less than 2 seconds  Coloration: Skin is not jaundiced  Findings: No erythema  Neurological:      General: No focal deficit present  Mental Status: She is alert and oriented to person, place, and time     Psychiatric:         Mood and Affect: Mood normal          Behavior: Behavior normal          Invasive Devices     Peripheral Intravenous Line  Duration Peripheral IV 11/29/22 Proximal;Right Antecubital <1 day                Lab, Imaging and other studies: I have personally reviewed pertinent reports      VTE Pharmacologic Prophylaxis: Enoxaparin (Lovenox)  VTE Mechanical Prophylaxis: sequential compression device

## 2022-12-01 ENCOUNTER — TRANSITIONAL CARE MANAGEMENT (OUTPATIENT)
Dept: INTERNAL MEDICINE CLINIC | Facility: CLINIC | Age: 47
End: 2022-12-01

## 2022-12-01 VITALS
OXYGEN SATURATION: 96 % | DIASTOLIC BLOOD PRESSURE: 91 MMHG | HEART RATE: 74 BPM | WEIGHT: 166.01 LBS | TEMPERATURE: 98.6 F | SYSTOLIC BLOOD PRESSURE: 135 MMHG | BODY MASS INDEX: 28.95 KG/M2 | RESPIRATION RATE: 19 BRPM

## 2022-12-01 PROBLEM — R78.81 BACTEREMIA: Status: ACTIVE | Noted: 2022-12-01

## 2022-12-01 LAB
ANION GAP SERPL CALCULATED.3IONS-SCNC: 8 MMOL/L (ref 4–13)
BASOPHILS # BLD AUTO: 0.02 THOUSANDS/ÂΜL (ref 0–0.1)
BASOPHILS NFR BLD AUTO: 0 % (ref 0–1)
BUN SERPL-MCNC: 3 MG/DL (ref 5–25)
CALCIUM SERPL-MCNC: 8.1 MG/DL (ref 8.4–10.2)
CHLORIDE SERPL-SCNC: 111 MMOL/L (ref 96–108)
CO2 SERPL-SCNC: 21 MMOL/L (ref 21–32)
CREAT SERPL-MCNC: 0.5 MG/DL (ref 0.6–1.3)
EOSINOPHIL # BLD AUTO: 0.1 THOUSAND/ÂΜL (ref 0–0.61)
EOSINOPHIL NFR BLD AUTO: 2 % (ref 0–6)
ERYTHROCYTE [DISTWIDTH] IN BLOOD BY AUTOMATED COUNT: 12.9 % (ref 11.6–15.1)
GFR SERPL CREATININE-BSD FRML MDRD: 114 ML/MIN/1.73SQ M
GLUCOSE SERPL-MCNC: 91 MG/DL (ref 65–140)
HCT VFR BLD AUTO: 30.7 % (ref 34.8–46.1)
HGB BLD-MCNC: 9.8 G/DL (ref 11.5–15.4)
IMM GRANULOCYTES # BLD AUTO: 0.02 THOUSAND/UL (ref 0–0.2)
IMM GRANULOCYTES NFR BLD AUTO: 0 % (ref 0–2)
LYMPHOCYTES # BLD AUTO: 0.4 THOUSANDS/ÂΜL (ref 0.6–4.47)
LYMPHOCYTES NFR BLD AUTO: 8 % (ref 14–44)
MAGNESIUM SERPL-MCNC: 2 MG/DL (ref 1.9–2.7)
MCH RBC QN AUTO: 30.8 PG (ref 26.8–34.3)
MCHC RBC AUTO-ENTMCNC: 31.9 G/DL (ref 31.4–37.4)
MCV RBC AUTO: 97 FL (ref 82–98)
MONOCYTES # BLD AUTO: 0.41 THOUSAND/ÂΜL (ref 0.17–1.22)
MONOCYTES NFR BLD AUTO: 8 % (ref 4–12)
NEUTROPHILS # BLD AUTO: 4.16 THOUSANDS/ÂΜL (ref 1.85–7.62)
NEUTS SEG NFR BLD AUTO: 82 % (ref 43–75)
NRBC BLD AUTO-RTO: 0 /100 WBCS
PLATELET # BLD AUTO: 170 THOUSANDS/UL (ref 149–390)
PMV BLD AUTO: 10.1 FL (ref 8.9–12.7)
POTASSIUM SERPL-SCNC: 3.4 MMOL/L (ref 3.5–5.3)
RBC # BLD AUTO: 3.18 MILLION/UL (ref 3.81–5.12)
SODIUM SERPL-SCNC: 140 MMOL/L (ref 135–147)
WBC # BLD AUTO: 5.11 THOUSAND/UL (ref 4.31–10.16)

## 2022-12-01 RX ORDER — POTASSIUM CHLORIDE 20 MEQ/1
40 TABLET, EXTENDED RELEASE ORAL ONCE
Status: COMPLETED | OUTPATIENT
Start: 2022-12-01 | End: 2022-12-01

## 2022-12-01 RX ORDER — SENNOSIDES 8.6 MG
25.8 TABLET ORAL AS NEEDED
Start: 2022-12-01

## 2022-12-01 RX ORDER — POLYETHYLENE GLYCOL 3350 17 G/17G
17 POWDER, FOR SOLUTION ORAL AS NEEDED
Start: 2022-12-01

## 2022-12-01 RX ORDER — SACCHAROMYCES BOULARDII 250 MG
250 CAPSULE ORAL 2 TIMES DAILY
Qty: 30 CAPSULE | Refills: 0 | Status: SHIPPED | OUTPATIENT
Start: 2022-12-01

## 2022-12-01 RX ORDER — SULFAMETHOXAZOLE AND TRIMETHOPRIM 800; 160 MG/1; MG/1
1 TABLET ORAL EVERY 12 HOURS SCHEDULED
Qty: 28 TABLET | Refills: 0 | Status: SHIPPED | OUTPATIENT
Start: 2022-12-01 | End: 2022-12-15

## 2022-12-01 RX ORDER — LOPERAMIDE HYDROCHLORIDE 2 MG/1
2 CAPSULE ORAL EVERY 4 HOURS PRN
Qty: 30 CAPSULE | Refills: 0 | Status: SHIPPED | OUTPATIENT
Start: 2022-12-01

## 2022-12-01 RX ORDER — LACTULOSE 10 G/10G
10 SOLUTION ORAL AS NEEDED
Start: 2022-12-01

## 2022-12-01 RX ORDER — LISINOPRIL 20 MG/1
20 TABLET ORAL DAILY
Start: 2022-12-01

## 2022-12-01 RX ADMIN — CEFAZOLIN SODIUM 2000 MG: 2 SOLUTION INTRAVENOUS at 05:19

## 2022-12-01 RX ADMIN — PANTOPRAZOLE SODIUM 40 MG: 40 TABLET, DELAYED RELEASE ORAL at 05:19

## 2022-12-01 RX ADMIN — NYSTATIN 1 APPLICATION: 100000 POWDER TOPICAL at 08:12

## 2022-12-01 RX ADMIN — ENOXAPARIN SODIUM 40 MG: 40 INJECTION SUBCUTANEOUS at 08:09

## 2022-12-01 RX ADMIN — POTASSIUM CHLORIDE 40 MEQ: 1500 TABLET, EXTENDED RELEASE ORAL at 08:10

## 2022-12-01 RX ADMIN — LOPERAMIDE HYDROCHLORIDE 2 MG: 2 CAPSULE ORAL at 08:10

## 2022-12-01 RX ADMIN — Medication 250 MG: at 08:09

## 2022-12-01 RX ADMIN — LISINOPRIL 20 MG: 20 TABLET ORAL at 08:10

## 2022-12-01 NOTE — PLAN OF CARE
Problem: PAIN - ADULT  Goal: Verbalizes/displays adequate comfort level or baseline comfort level  Description: Interventions:  - Encourage patient to monitor pain and request assistance  - Assess pain using appropriate pain scale  - Administer analgesics based on type and severity of pain and evaluate response  - Implement non-pharmacological measures as appropriate and evaluate response  - Consider cultural and social influences on pain and pain management  - Notify physician/advanced practitioner if interventions unsuccessful or patient reports new pain  Outcome: Adequate for Discharge     Problem: INFECTION - ADULT  Goal: Absence of fever/infection during neutropenic period  Description: INTERVENTIONS:  - Monitor WBC    Outcome: Adequate for Discharge     Problem: GASTROINTESTINAL - ADULT  Goal: Minimal or absence of nausea and/or vomiting  Description: INTERVENTIONS:  - Administer IV fluids if ordered to ensure adequate hydration  - Maintain NPO status until nausea and vomiting are resolved  - Nasogastric tube if ordered  - Administer ordered antiemetic medications as needed  - Provide nonpharmacologic comfort measures as appropriate  - Advance diet as tolerated, if ordered  - Consider nutrition services referral to assist patient with adequate nutrition and appropriate food choices  Outcome: Adequate for Discharge  Goal: Maintains adequate nutritional intake  Description: INTERVENTIONS:  - Monitor percentage of each meal consumed  - Identify factors contributing to decreased intake, treat as appropriate  - Assist with meals as needed  - Monitor I&O, weight, and lab values if indicated  - Obtain nutrition services referral as needed  Outcome: Adequate for Discharge     Problem: Potential for Falls  Goal: Patient will remain free of falls  Description: INTERVENTIONS:  - Educate patient/family on patient safety including physical limitations  - Instruct patient to call for assistance with activity   - Consult OT/PT to assist with strengthening/mobility   - Keep Call bell within reach  - Keep bed low and locked with side rails adjusted as appropriate  - Keep care items and personal belongings within reach  - Initiate and maintain comfort rounds  - Make Fall Risk Sign visible to staff  - Obtain necessary fall risk management equipment  - Apply yellow socks and bracelet for high fall risk patients  - Consider moving patient to room near nurses station  Outcome: Adequate for Discharge

## 2022-12-01 NOTE — ASSESSMENT & PLAN NOTE
· Left-sided flank pain, nausea, vomiting, abnormal UA, abnormal CT abdomen and pelvis  · Failure of outpatient therapy with antibiotics vs recurrence; patient was treated with oral antibiotics 3 weeks ago  · Met sepsis criteria with tachycardia, tachypnea, leukocytosis  · CT abdomen and pelvis:  "Worsened subacute left pyelonephritis  Persistent hyperdense material in lower calyx of left kidney, question pyonephrosis or underlying urethroileal carcinoma"  · Urine cultures grew E coli, received 2 days of IV ceftriaxone  Switched to IV Ancef  Antibiotic day #4   Change to po bactrim for 14 more days per ID

## 2022-12-01 NOTE — ASSESSMENT & PLAN NOTE
· With complaints of profuse diarrhea throughout the night 11/29 until 11/30  Overnight team sent off C diff which was negative  · Patient reported severe abdominal discomfort as well after receiving IV ceftriaxone, switched to IV Ancef  · Continue probiotic and Imodium p r n   Now improving  · Received IVF

## 2022-12-01 NOTE — DISCHARGE SUMMARY
[facility]  Discharge- Wayne [place] 1975, 49 y o  female MRN: [id number]  Unit/Bed#: S -01 Encounter: [id number]  Primary Care Provider: Dandy Chisholm MD   Date and time admitted to hospital: 11/28/2022  6:05 AM    * Sepsis due to pyelonephritis  Assessment & Plan  · Left-sided flank pain, nausea, vomiting, abnormal UA, abnormal CT abdomen and pelvis  · Failure of outpatient therapy with antibiotics vs recurrence; patient was treated with oral antibiotics 3 weeks ago  · Met sepsis criteria with tachycardia, tachypnea, leukocytosis  · CT abdomen and pelvis:  "Worsened subacute left pyelonephritis  Persistent hyperdense material in lower calyx of left kidney, question pyonephrosis or underlying urethroileal carcinoma"  · Urine cultures grew E coli, received 2 days of IV ceftriaxone  Switched to IV Ancef  Antibiotic day #4  Change to po bactrim for 14 more days per ID      Antibiotic-associated diarrhea  Assessment & Plan  · With complaints of profuse diarrhea throughout the night 11/29 until 11/30  Overnight team sent off C diff which was negative  · Patient reported severe abdominal discomfort as well after receiving IV ceftriaxone, switched to IV Ancef  · Continue probiotic and Imodium p r n  Now improving  · Received IVF    Bacteremia due to Escherichia coli  Assessment & Plan  · 2/2 admission BC with growth of E  Coli  · From urinary source  · ID following  · Abx day #4 as above    Malignant neoplasm of overlapping sites of right breast in female, estrogen receptor negative ([facility] )  Assessment & Plan  · Breast ca with bone mets  · Outpatient follow-up with Oncology, patient states that cancer is responding to the treatment  Cancer associated pain with continuous opioid dependence  Assessment & Plan  · P r n  oxycodone  Takes morphine at bedtime   Medical MJ  · Follows with HPM    Hypokalemia  Assessment & Plan  · K 3 4 repleted today    Essential hypertension  Assessment & Plan  · Patient states she had bilateral lower extremity swelling initially when she was diagnosed of cancer, was treated with Lasix and currently not taking Lasix anymore  · Continue lisinopril  · BP stable  Medical Problems     Resolved Problems  Date Reviewed: 12/1/2022   None       Discharging Physician / Practitioner: Johnnie Whitfield PA-C  PCP: Reynold Menendez MD  Admission Date:   Admission Orders (From admission, onward)     Ordered        11/28/22 1259  [address] [facility]  Once                      Discharge Date: 12/01/22    Consultations During Hospital Stay:  · Infectious disease  · Urology    Procedures Performed:   · none    Significant Findings / Test Results:   · CT A/P:  He worsened subacute left pyelonephritis    Incidental Findings:   · Ovarian cyst  · Kidney cyst  Test Results Pending at Discharge (will require follow up): · None     Outpatient Tests Requested:  · BMP    Complications:  None    Reason for Admission:  Flank pain, nausea    Hospital Course:   Hieu Gee is a 49 y o  female patient with history of metastatic breast cancer who originally presented to the hospital on 11/28/2022 due to fever, flank pain, nausea, vomiting and feeling ill  CT scan confirmed evidence of worsened subacute left-sided pyelonephritis and blood cultures were positive for E coli  Of note she had already just recently been treated with cefpodoxime for E coli pyelonephritis  She was initially given IV ceftriaxone but tolerated it poorly due to abdominal pain and diarrhea  She was changed to IV Ancef and followed by Infectious Disease  She was also seen in consultation by Urology due to concerns of abnormality on her CT scan but it was not felt there was any evidence of malignancy, rather inflammation from infection  She did have stool culture sent due to her diarrhea which were negative for C diff and this was felt to be antibiotic associated diarrhea    She was feeling much improved and was transitioned to oral Bactrim today to complete a 2 week course    Please see above list of diagnoses and related plan for additional information  Condition at Discharge: stable    Discharge Day Visit / Exam:   Subjective:  Patient states she is doing much better at this time and is very eager to go home  She is eating and drinking and has no fever or chills  No nausea or vomiting  Her loose stool is starting to improve  She states her abdomen is just mildly sore but no severe flank pain  Vitals: Blood Pressure: 135/91 (12/01/22 0745)  Pulse: 74 (12/01/22 0745)  Temperature: 98 6 °F (37 °C) (12/01/22 0745)  Temp Source: Oral (11/29/22 0502)  Respirations: 19 (12/01/22 0745)  Weight - Scale: 75 3 kg (166 lb 0 1 oz) (11/28/22 1224)  SpO2: 96 % (12/01/22 0745)  Exam:   Physical Exam  Vitals reviewed  Constitutional:       General: She is not in acute distress  Appearance: She is not ill-appearing, toxic-appearing or diaphoretic  Eyes:      General: No scleral icterus  Right eye: No discharge  Left eye: No discharge  Conjunctiva/sclera: Conjunctivae normal    Cardiovascular:      Rate and Rhythm: Normal rate and regular rhythm  Heart sounds: No murmur heard  Pulmonary:      Effort: No respiratory distress  Breath sounds: No stridor  No wheezing or rhonchi  Abdominal:      General: There is no distension  Palpations: Abdomen is soft  Tenderness: There is no guarding  Comments: No focal severe tenderness to palpation appreciated   Musculoskeletal:      Right lower leg: No edema  Left lower leg: No edema  Skin:     General: Skin is warm and dry  Coloration: Skin is not jaundiced or pale  Findings: No bruising, erythema, lesion or rash  Neurological:      General: No focal deficit present  Mental Status: She is alert  Mental status is at baseline     Psychiatric:         Mood and Affect: Mood normal  Thought Content: Thought content normal           Discussion with Family: Patient declined call to   Discharge instructions/Information to patient and family:   See after visit summary for information provided to patient and family  Provisions for Follow-Up Care:  See after visit summary for information related to follow-up care and any pertinent home health orders  Disposition:   Home    Planned Readmission: none     Discharge Statement:  I spent 35 minutes discharging the patient  This time was spent on the day of discharge  I had direct contact with the patient on the day of discharge  Greater than 50% of the total time was spent examining patient, answering all patient questions, arranging and discussing plan of care with patient as well as directly providing post-discharge instructions  Additional time then spent on discharge activities  Bedside nursing rounds performed  Case discussed with Infectious Disease and case management    Discharge Medications:  See after visit summary for reconciled discharge medications provided to patient and/or family        **Please Note: This note may have been constructed using a voice recognition system**

## 2022-12-01 NOTE — ASSESSMENT & PLAN NOTE
· Breast ca with bone mets  · Outpatient follow-up with Oncology, patient states that cancer is responding to the treatment

## 2022-12-01 NOTE — PROGRESS NOTES
Progress Note - Infectious Disease   Danilo Lopes 49 y o  female MRN: [id number]  Unit/Bed#: S -01 Encounter: [id number]      Impression/Recommendations:  1  Sepsis-POA  Fever, tachycardia, tachypnea  Due to #2 and 3 below  Patient now afebrile and clinically improving with IV antibiotics               -antibiotic plan as in below              -monitor fever curve, WBC count                  2  Left pyelonephritis  UA with innumerable WBC, urine culture growing >100K E  Coli, blood cultures also growing E  Coli  CT A/P with worsened subacute left pyelonephritis from CT scan 11/9  At the time, she received cefpodoxime PO 100mg BID x 7 days with improvement in symptoms  I do not think this was an oral antibiotic "failure", but rather rapid relapse due to inadequate dosing (short duration and dose of antibiotic)  Left flank pain and systemic symptoms low improving, fever also resolving              -change antibiotic to p o  Bactrim              -given relapse, treat x 14 days this time around              -monitor symptoms              -monitor fever curve, WBC count                 3  E  Coli bacteremia  POA, due to #2 above                 -continue antibiotics as above     4  Metastatic breast cancer  Diffuse osseous metastases  Status post palliative XRT to the spine, currently receiving Pertuzumab + Trastuzumab chemotherapy                -YZEN management per primary              -outpatient oncology follow up     5  Diarrhea  C Dif negative  May be antibiotic related vs due to underlying infection  Diarrhea much improved  -continue to monitor symptoms, okay to give imodium PRN     Discussed with patient in detail regarding the above plan  Discussed with slim service  Okay for discharge from ID viewpoint      Antibiotics:  Cefazolin  Antibiotic day 4     Subjective:  Patient  feels well  No urinary symptoms  No abdominal or flank pain  No further diarrhea  Temperature stays down  No chills      Objective:  Vitals:  Temp:  [98 6 °F (37 °C)-99 1 °F (37 3 °C)] 98 6 °F (37 °C)  HR:  [74-78] 74  Resp:  [19] 19  BP: (119-137)/(81-91) 135/91  SpO2:  [95 %-96 %] 96 %  Temp (24hrs), Av 8 °F (37 1 °C), Min:98 6 °F (37 °C), Max:99 1 °F (37 3 °C)  Current: Temperature: 98 6 °F (37 °C)    Physical Exam:     General: Awake, alert, cooperative, no distress  Neck:  Supple  No mass  No lymphadenopathy  Lungs: Expansion symmetric, no rales, no wheezing, respirations unlabored  Heart:  Regular rate and rhythm, S1 and S2 normal, no murmur  Abdomen: Soft, nondistended, non-tender, bowel sounds active all four quadrants, no masses, no organomegaly  Extremities: No edema  No erythema/warmth  No ulcer  Nontender to palpation  Skin:  No rash  Neuro: Moves all extremities  Invasive Devices     None                 Labs studies:   I have personally reviewed pertinent labs    Results from last 7 days   Lab Units 22  0522  0508 22  0633   POTASSIUM mmol/L 3 4* 3 5 3 3* 4 1   CHLORIDE mmol/L 111* 114* 111* 103   CO2 mmol/L 21 20* 22 25   BUN mg/dL 3* 5 11 17   CREATININE mg/dL 0 50* 0 50* 0 54* 0 76   EGFR ml/min/1 73sq m 114 114 111 93   CALCIUM mg/dL 8 1* 8 4 7 7* 9 9   AST U/L  --   --   --  15   ALT U/L  --   --   --  16   ALK PHOS U/L  --   --   --  56     Results from last 7 days   Lab Units 22  0559 22  0519 22  0508   WBC Thousand/uL 5 11 5 65 8 03   HEMOGLOBIN g/dL 9 8* 9 6* 10 1*   PLATELETS Thousands/uL 170 150 167     Results from last 7 days   Lab Units 22  2206 22  0753 22  0701   BLOOD CULTURE   --   --  Escherichia coli*  Escherichia coli*   GRAM STAIN RESULT   --   --  Gram negative rods*  Gram negative rods*   URINE CULTURE   --  >100,000 cfu/ml Escherichia coli*  --    C DIFF TOXIN B BY PCR  Negative  --   --        Imaging Studies:   I have personally reviewed pertinent imaging study reports and images in PACS     EKG, Pathology, and Other Studies:   I have personally reviewed pertinent reports

## 2022-12-01 NOTE — DISCHARGE INSTR - AVS FIRST PAGE
Dear Boo Wills,     It was our pleasure to care for you here at [facility]  It is our hope that we were always able to exceed the expected standards for your care during your stay  You were hospitalized due to E coli in the blood stream due to kidney infection  You were cared for on the 2nd floor by Joe Johnson PA-C under the service of Bluford Nyhan, MD with the [facility] who covers for your primary care physician (PCP), Taylor Cordero MD, while you were hospitalized  If you have any questions or concerns related to this hospitalization, you may contact us at [phone]  For follow up as well as any medication refills, we recommend that you follow up with your primary care physician  A registered nurse will reach out to you by phone within a few days after your discharge to answer any additional questions that you may have after going home  However, at this time we provide for you here, the most important instructions / recommendations at discharge:     Notable Medication Adjustments -   One tablet of Bactrim twice a day for 2 weeks  Testing Required after Discharge -   Routine BMP  Important follow up information -   Family doctor  Other Instructions -   Monitor for any signs that your becoming fluid overloaded such as weight gain, swelling, shortness of breath and call your doctor right away if you notice these changes  Please review this entire after visit summary as additional general instructions including medication list, appointments, activity, diet, any pertinent wound care, and other additional recommendations from your care team that may be provided for you        Sincerely,     Joe Johnson PA-C

## 2022-12-01 NOTE — ASSESSMENT & PLAN NOTE
Postpartum Vaginal Delivery Instructions    Activity       Ask family and friends for help when you need it.    Do not place anything in your vagina for 6 weeks.    You are not restricted on other activities, but take it easy for a few weeks to allow your body to recover from delivery.  You are able to do any activities you feel up to that point.    No driving until you have stopped taking your pain medications (usually two weeks after delivery).     Call your health care provider if you have any of these symptoms:       Increased pain, swelling, redness, or fluid around your stiches from an episiotomy or perineal tear.    A fever above 100.4 F (38 C) with or without chills when placing a thermometer under your tongue.    You soak a sanitary pad with blood within 1 hour, or you see blood clots larger than a golf ball.    Bleeding that lasts more than 6 weeks.    Vaginal discharge that smells bad.    Severe pain, cramping or tenderness in your lower belly area.    A need to urinate more frequently (use the toilet more often), more urgently (use the toilet very quickly), or it burns when you urinate.    Nausea and vomiting.    Redness, swelling or pain around a vein in your leg.    Problems breastfeeding or a red or painful area on your breast.    Chest pain and cough or are gasping for air.    Problems coping with sadness, anxiety, or depression.  If you have any concerns about hurting yourself or the baby, call your provider immediately.     You have questions or concerns after you return home.     Keep your hands clean:  Always wash your hands before touching your perineal area and stitches.  This helps reduce your risk of infection.  If your hands aren't dirty, you may use an alcohol hand-rub to clean your hands. Keep your nails clean and short.       · 2/2 admission BC with growth of E  Coli  · From urinary source  · ID following    · Abx day #4 as above

## 2022-12-02 ENCOUNTER — OFFICE VISIT (OUTPATIENT)
Dept: PALLIATIVE MEDICINE | Facility: CLINIC | Age: 47
End: 2022-12-02

## 2022-12-02 VITALS
DIASTOLIC BLOOD PRESSURE: 82 MMHG | WEIGHT: 159 LBS | SYSTOLIC BLOOD PRESSURE: 126 MMHG | OXYGEN SATURATION: 97 % | TEMPERATURE: 97.8 F | HEART RATE: 77 BPM | BODY MASS INDEX: 27.72 KG/M2

## 2022-12-02 DIAGNOSIS — N12 PYELONEPHRITIS: ICD-10-CM

## 2022-12-02 DIAGNOSIS — Z17.1 MALIGNANT NEOPLASM OF OVERLAPPING SITES OF RIGHT BREAST IN FEMALE, ESTROGEN RECEPTOR NEGATIVE (HCC): Primary | ICD-10-CM

## 2022-12-02 DIAGNOSIS — R53.0 NEOPLASTIC MALIGNANT RELATED FATIGUE: ICD-10-CM

## 2022-12-02 DIAGNOSIS — C79.49 METASTASIS TO SPINAL CORD (HCC): ICD-10-CM

## 2022-12-02 DIAGNOSIS — G89.3 CANCER ASSOCIATED PAIN: ICD-10-CM

## 2022-12-02 DIAGNOSIS — R19.7 DIARRHEA: ICD-10-CM

## 2022-12-02 DIAGNOSIS — Z79.899 MEDICAL MARIJUANA USE: ICD-10-CM

## 2022-12-02 DIAGNOSIS — C50.811 MALIGNANT NEOPLASM OF OVERLAPPING SITES OF RIGHT BREAST IN FEMALE, ESTROGEN RECEPTOR NEGATIVE (HCC): Primary | ICD-10-CM

## 2022-12-02 DIAGNOSIS — Z51.5 PALLIATIVE CARE PATIENT: ICD-10-CM

## 2022-12-02 DIAGNOSIS — C79.51 OSSEOUS METASTASIS (HCC): ICD-10-CM

## 2022-12-02 NOTE — PROGRESS NOTES
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    5  Neoplastic malignant related fatigue    6  Diarrhea    7  Palliative care patient    8  Medical marijuana use    9  Sepsis due to pyelonephritis          • Patient states she has been recovering well from her recent hospitalization  o Diarrhea s/t antibiotic use was a major issue; C diff testing was negative  Diarrhea  improved w/ Imodium but she has not needed to use anything for diarrhea in 1-2 days  o Counseled on bowel regimen for diarrhea, should this return  o She has had some urinary frequency w/o dysuria, s/t hydration; this has improved since discharge  • Continue ambulatory antibiotic regimen for recent infection  • Patient expresses some interest in tapering opioids, as pain is well controlled and she does not wish to be on opioids long-term  o For now, continue MSER 15mg QHS and oxyIR PRN  o She may try holding the MSER QHS dose and instead subbing in a dose of 5mg oxyIR to see if she has good overnight pain relief  o She may try replacing 5mg oxyIR PRN dosing w/ half-tab (2 5mg) dosing  o Recommended she call us w/ results of any changes made  • Otherwise, patient reports good symptom control on her current palliative regimen  o Continue TD MMJ for pain; may continue MMJ use for relief of multiple symptoms  o Should constipation return, she has been counseled on dietary changes or use ofd OTC products  o Continue Zofran PRN N/V  o Continue Continue baclofen PRN  • Send opioid refills sent to [facility] as patient has had challenges getting these filled at [facility]   • Patient has returned to work part time and is happy with this change    • PCP completed the physician portion of a handicapped placard for the patient  • ACP: Patient has not completed any advanced healthcare directives  Five Wishes had been provided along w/ counseling in 04/2022 but she had not been ready to move forward; she feels she may be able to do so now  She accepts a replacement copy of Five Wishes along with counseling today  ACP may be reviewed in detail at next visit  • Patient has received [address]  vaccinations  • Reviewed notes (DC Summary, ID, PT, PCP, Radiation Oncology), labs (11/29/22 C diff toxin PCR negative; 12/1/22 Cr 0 50, K 3 4, Cl 111, Ca 8 1, Hb 9 8; alb 4 6 on 11/28/22), imaging + procedures (11/28/22 [facility])  • Return in about 2 months (around 2/2/2023)  • Emotional support provided  • Medication safety issues addressed - no driving under the influence of narcotics, watch for adverse effects including AMS and respiratory depression, keep medications stored in a safe/locked environment  Requested Prescriptions      No prescriptions requested or ordered in this encounter       There are no discontinued medications  Representatives have queried the patient's controlled substance dispensing history in the Prescription Drug Monitoring Program in compliance with regulations before I have prescribed any controlled substances  The prescription history is consistent with prescribed therapy and our practice policies  40 minutes were spent in this ambulatory visit with greater than 50% of the time spent face to face with patient and family Spring Garcia) in counseling or coordination of care including discussions of symptom assessment and management, medication review, medication adjustment, psychosocial support, chart review, imaging review, lab review, advanced directives, medical marijuana, opioid titration, supportive listening and anticipatory guidance  All of the patient's questions were answered during this discussion      SUBJECTIVE:  Chief Complaint Patient presents with   • Cancer   • Cancer Pain   • Nausea   • Counseling   • Follow-up   • Pyelonephritis   • Diarrhea        HPI    Romna Placido is a 49 y o  female w/ ER-MN-HER2+ carcinoma of the R breast (diagnosed 3/29/22) metastatic to bone, s/p RT to spine; cancer-related pain  She follows w/ Dr Serene Holden (Medical Oncology)  Certified for [facility] in the [facility] on 4/22/22 by Dr Slim Guillaume, for cancer-related pain  Plan includes systemic therapy with pertuzumab + trastuzumab  Patient is known to [facility] clinic; seen 9/30/22 for symptom assessment and management, medication review, medication adjustment, psychosocial support, chart review, imaging review, lab review, medical marijuana, supportive listening and anticipatory guidance  Admitted 11/28-12/1/22 for sepsis d/t L pyelonephritis, E coli bacteremia  Patient reports she was eager to leave the hospital, but understands that staying for IV antibiotics, hydration, and management of sepsis was required  She had some diarrhea s/t antibiotic use, which improved w/ Imodium; this has not been an active issue since approximately Wednesday  She notes some ongoing mild nausea, for which Zofran provides relief; she is taking up to 1 dose per day  Patient feels her appetite is "normal", in that she will get appropriately hungry - but she has early satiety  Constipation (OIC or otherwise) has not been an issue in recent weeks  Her sleep is "pretty good" and her mood is stable  Her chronic pain is "totally manageable" on her current regimen and she expresses some interest in downward titration of opioids  Patient is happy to report that she has resumed working, part time  She is happy to be more active, and to keep mentally busy  She is working from home 4 hours per day  PDMP shows no concerns      The following portions of the medical history were reviewed: past medical history, surgical history, problem list, medication list, family history, and social history        Current Outpatient Medications:   •  acetaminophen (TYLENOL) 325 mg tablet, Take 650 mg by mouth every 6 (six) hours as needed for mild pain, Disp: , Rfl:   •  lisinopril (ZESTRIL) 20 mg tablet, Take 1 tablet (20 mg total) by mouth daily Only to take as needed when BP is >203 systolicallly, Disp: , Rfl:   •  loperamide (IMODIUM) 2 mg capsule, Take 1 capsule (2 mg total) by mouth every 4 (four) hours as needed for diarrhea, Disp: 30 capsule, Rfl: 0  •  morphine (MS CONTIN) 15 mg 12 hr tablet, Take 1 tablet (15 mg total) by mouth daily at bedtime Max Daily Amount: 15 mg, Disp: 30 tablet, Rfl: 0  •  nystatin (MYCOSTATIN) powder, Apply topically 3 (three) times a day, Disp: 15 g, Rfl: 0  •  oxyCODONE (ROXICODONE) 10 MG TABS, Take 0 5-1 tablets (5-10 mg total) by mouth every 4 (four) hours as needed for moderate pain or severe pain Max Daily Amount: 60 mg, Disp: 120 tablet, Rfl: 0  •  pantoprazole (PROTONIX) 40 mg tablet, Take 1 tablet (40 mg total) by mouth daily in the early morning, Disp: 90 tablet, Rfl: 1  •  saccharomyces boulardii (FLORASTOR) 250 mg capsule, Take 1 capsule (250 mg total) by mouth 2 (two) times a day, Disp: 30 capsule, Rfl: 0  •  senna (SENOKOT) 8 6 mg, Take 3 tablets (25 8 mg total) by mouth if needed for constipation Hold for loose stool , Disp: , Rfl:   •  sulfamethoxazole-trimethoprim (BACTRIM DS) 800-160 mg per tablet, Take 1 tablet by mouth every 12 (twelve) hours for 14 days, Disp: 28 tablet, Rfl: 0  •  al mag oxide-diphenhydramine-lidocaine viscous (MAGIC MOUTHWASH) 1:1:1 suspension, Swish and spit 10 mL if needed for mouth pain or discomfort (Patient not taking: Reported on 12/2/2022), Disp: , Rfl:   •  baclofen 10 mg tablet, TAKE 1/2 TABLET BY MOUTH 3 TIMES A DAY AS NEEDED FOR MUSCLE SPASMS (Patient not taking: Reported on 12/2/2022), Disp: 135 tablet, Rfl: 1  •  DPH-Lido-AlHydr-MgHydr-Simeth (First-Mouthwash BLM) SUSP, , Disp: , Rfl:   •  lactulose (CEPHULAC) 10 g packet, Take 1 packet (10 g total) by mouth if needed (constipation) (Patient not taking: Reported on 12/2/2022), Disp: , Rfl:   •  nystatin (MYCOSTATIN) 500,000 units/5 mL suspension, Apply 5 mL (500,000 Units total) to the mouth or throat if needed (as needed) (Patient not taking: Reported on 12/2/2022), Disp: , Rfl:   •  ondansetron (ZOFRAN) 4 mg tablet, Take 1 tablet (4 mg total) by mouth every 8 (eight) hours as needed for nausea or vomiting, Disp: 9 tablet, Rfl: 20  •  polyethylene glycol (MIRALAX) 17 g packet, Take 17 g by mouth if needed (constipation) (Patient not taking: Reported on 12/2/2022), Disp: , Rfl:   No current facility-administered medications for this visit  Review of Systems   Constitutional: Positive for activity change, appetite change ("normal", but early satiety noted) and fatigue  Defending her weight  Gastrointestinal: Positive for diarrhea (d/t antibiotics in the hospital, none in 1-2 days) and nausea (mild, Zofran provides relief)  Genitourinary: Positive for frequency (d/t recent hydration; "fine now")  Negative for dysuria  Musculoskeletal: Positive for back pain (managed), myalgias (managed), neck pain (managed) and neck stiffness (mild)  Allergic/Immunologic: Positive for immunocompromised state  Psychiatric/Behavioral: Negative for sleep disturbance (sleep is "pretty good" though sometimes she needs to wake to urinate)  All other systems reviewed and are negative  OBJECTIVE:  /82 (BP Location: Left arm, Patient Position: Sitting, Cuff Size: Standard)   Pulse 77   Temp 97 8 °F (36 6 °C)   Wt 72 1 kg (159 lb)   LMP 03/28/2022 (Approximate)   SpO2 97%   BMI 27 72 kg/m²   Physical Exam  Vitals reviewed  Constitutional:       General: She is not in acute distress  Appearance: She is well-developed, well-groomed and overweight  She is not toxic-appearing  HENT:      Head: Normocephalic and atraumatic        Right Ear: External ear normal       Left Ear: External ear normal    Eyes:      General: No scleral icterus  Right eye: No discharge  Left eye: No discharge  Extraocular Movements: Extraocular movements intact  Conjunctiva/sclera: Conjunctivae normal       Pupils: Pupils are equal, round, and reactive to light  Cardiovascular:      Rate and Rhythm: Normal rate  Pulmonary:      Effort: Pulmonary effort is normal  No tachypnea, bradypnea, accessory muscle usage or respiratory distress  Abdominal:      General: There is no distension  Tenderness: There is no guarding  Musculoskeletal:      Cervical back: Decreased range of motion (slightly)  Right lower leg: No edema  Left lower leg: No edema  Skin:     General: Skin is dry  Coloration: Skin is not pale  Neurological:      Mental Status: She is alert and oriented to person, place, and time  Cranial Nerves: No dysarthria or facial asymmetry  Gait: Gait is intact  Psychiatric:         Attention and Perception: Attention normal          Mood and Affect: Mood and affect normal          Speech: Speech normal          Behavior: Behavior normal  Behavior is cooperative  Thought Content: Thought content normal          Cognition and Memory: Cognition and memory normal          Judgment: Judgment normal           Stephanie Coates MD  [facility]      Portions of this document may have been created using dictation software and as such some "sound alike" terms may have been generated by the system  Do not hesitate to contact me with any questions or clarifications

## 2022-12-02 NOTE — UTILIZATION REVIEW
NOTIFICATION OF ADMISSION DISCHARGE   This is a Notification of Discharge from [address]  Please be advised that this patient has been discharge from our facility  Below you will find the admission and discharge date and time including the patient’s disposition  UTILIZATION REVIEW CONTACT:  Lori Lake MA  Utilization   Network Utilization Review Department  Phone: [phone] x carefully listen to the prompts  All voicemails are confidential   Email: [email or web address]     ADMISSION INFORMATION  PRESENTATION DATE: 11/28/2022  6:05 AM  OBERVATION ADMISSION DATE:   INPATIENT ADMISSION DATE: 11/28/22 12:59 PM   DISCHARGE DATE: 12/1/2022 11:05 AM   DISPOSITION:Home/Self Care    IMPORTANT INFORMATION:  Send all requests for admission clinical reviews, approved or denied determinations and any other requests to dedicated fax number below belonging to the campus where the patient is receiving treatment   List of dedicated fax numbers:  [address] DENIALS (Administrative/Medical Necessity) [phone]   [address] (Maternity/NICU/Pediatrics) [phone]   [facility] [phone]   [place] [address] [phone]   [address] [phone]   220 [facility] [phone] 90 [facility] [phone]   [address] [place], [place] [address] [phone]   [facility]  [phone]   [address] [facility] [phone] [facility] [address] [facility] [phone]

## 2022-12-02 NOTE — PATIENT INSTRUCTIONS
It was good to see you today  Thank you for coming in  I'm glad you're feeling better  If you are interested in streamlining or reducing opioids: You could try stopping the bedtime dose of long-acting morphine ER, and instead taking one of the as-needed doses of oxycodone (though this might not provide overnight relief)  If you do this and it works, let us know  You could also try half-tablets of oxycodone as needed, subbing in for full doses in your regimen  If diarrhea is an issue:  Try Bananatrol (banana flakes)  Use as directed / as-needed for diarrhea  This is something you may safely take every day  If you become constipated you may wish to stop using it, although it can treat both constipation and diarrhea in some patients  This available over-the-counter at some pharmacies, but you may have more success looking online ([employer])  Stay hydrated! If needed, it is okay to take Imodium for diarrhea  Use as directed, available over-the-counter  A copy of Five Wishes was provided; please review and discuss w/ your family  We can discuss it at our next visit  When complete, you may bring it in to any [facility]'s appointment where staff can witness your signature and scan it in to the system  Return in about 2 months  Call us for refills on medications that we supply, as needed  If something changes and you need to come in sooner, please call our office  PRESCRIPTION REFILL REMINDER:  All medication refills should be requested prior to [facility] on Friday  Any refill requests after noon on Friday would be addressed the following Monday

## 2022-12-08 ENCOUNTER — HOSPITAL ENCOUNTER (OUTPATIENT)
Dept: INFUSION CENTER | Facility: CLINIC | Age: 47
Discharge: HOME | End: 2022-12-08

## 2022-12-08 ENCOUNTER — OFFICE VISIT (OUTPATIENT)
Dept: INTERNAL MEDICINE CLINIC | Facility: CLINIC | Age: 47
End: 2022-12-08

## 2022-12-08 VITALS
BODY MASS INDEX: 26.38 KG/M2 | WEIGHT: 154.5 LBS | TEMPERATURE: 96.9 F | DIASTOLIC BLOOD PRESSURE: 78 MMHG | RESPIRATION RATE: 18 BRPM | HEART RATE: 95 BPM | OXYGEN SATURATION: 95 % | HEIGHT: 64 IN | SYSTOLIC BLOOD PRESSURE: 111 MMHG

## 2022-12-08 VITALS
OXYGEN SATURATION: 100 % | WEIGHT: 155 LBS | TEMPERATURE: 97.8 F | BODY MASS INDEX: 26.46 KG/M2 | HEART RATE: 99 BPM | DIASTOLIC BLOOD PRESSURE: 82 MMHG | SYSTOLIC BLOOD PRESSURE: 130 MMHG | HEIGHT: 64 IN

## 2022-12-08 DIAGNOSIS — D64.9 ANEMIA, UNSPECIFIED TYPE: ICD-10-CM

## 2022-12-08 DIAGNOSIS — I10 ESSENTIAL HYPERTENSION: ICD-10-CM

## 2022-12-08 DIAGNOSIS — T45.1X5A CHEMOTHERAPY-INDUCED NEUTROPENIA (HCC): ICD-10-CM

## 2022-12-08 DIAGNOSIS — Z76.89 ENCOUNTER FOR SUPPORT AND COORDINATION OF TRANSITION OF CARE: Primary | ICD-10-CM

## 2022-12-08 DIAGNOSIS — C50.811 MALIGNANT NEOPLASM OF OVERLAPPING SITES OF RIGHT BREAST IN FEMALE, ESTROGEN RECEPTOR NEGATIVE (HCC): ICD-10-CM

## 2022-12-08 DIAGNOSIS — Z17.1 MALIGNANT NEOPLASM OF OVERLAPPING SITES OF RIGHT BREAST IN FEMALE, ESTROGEN RECEPTOR NEGATIVE (HCC): ICD-10-CM

## 2022-12-08 DIAGNOSIS — D70.1 CHEMOTHERAPY-INDUCED NEUTROPENIA (HCC): ICD-10-CM

## 2022-12-08 DIAGNOSIS — E87.6 HYPOKALEMIA: ICD-10-CM

## 2022-12-08 DIAGNOSIS — C79.51 OSSEOUS METASTASIS (HCC): Primary | ICD-10-CM

## 2022-12-08 DIAGNOSIS — N12 PYELONEPHRITIS: ICD-10-CM

## 2022-12-08 RX ORDER — SODIUM CHLORIDE 9 MG/ML
20 INJECTION, SOLUTION INTRAVENOUS ONCE
Status: COMPLETED | OUTPATIENT
Start: 2022-12-08 | End: 2022-12-08

## 2022-12-08 RX ADMIN — PERTUZUMAB 420 MG: 30 INJECTION, SOLUTION, CONCENTRATE INTRAVENOUS at 12:43

## 2022-12-08 RX ADMIN — SODIUM CHLORIDE 20 ML/HR: 0.9 INJECTION, SOLUTION INTRAVENOUS at 12:00

## 2022-12-08 RX ADMIN — TRASTUZUMAB 450 MG: 150 INJECTION, POWDER, LYOPHILIZED, FOR SOLUTION INTRAVENOUS at 13:22

## 2022-12-08 NOTE — PROGRESS NOTES
Assessment & Plan     1  Encounter for support and coordination of transition of care    2  Hypokalemia  Comments: We will check a repeat BMP  Orders:  -     Basic metabolic panel; Future    3  Anemia, unspecified type  Comments:  Likely due to hemodilution from fluid resuscitation in the hospital   No reports of bleeding  Will check CBC  Orders:  -     CBC and differential; Future    4  Essential hypertension  Comments:  Stable, continue lisinopril    5  Pyelonephritis  Comments:  No symptoms currently  Recommend to complete Bactrim course for 14 days  6  Malignant neoplasm of overlapping sites of right breast in female, estrogen receptor negative [facility])         Subjective     Transitional Care Management Review:   Soledad Dallas is a 49 y o  female here for TCM follow up  During the TCM phone call patient stated:  TCM Call     Date and time call was made  12/1/2022  5:42 PM    Hospital care reviewed  Records reviewed    Patient was hospitialized at  [address] [place] [address]    Date of Admission  11/28/22    Date of discharge  12/01/22    Diagnosis  sepsis due to pyelonephritis    Disposition  Home    Current Symptoms  None    Cough Severity  Mild    Fatigue severity  Mild      TCM Call     Post hospital issues  None    Should patient be enrolled in anticoag monitoring? No    Scheduled for follow up?   Yes    Did you obtain your prescribed medications  Yes    Do you need help managing your prescriptions or medications  No    Why type of assitance do you need  someone helps    Is transportation to your appointment needed  No    I have advised the patient to call PCP with any new or worsening symptoms  [address] [place] [address] or [facility] other    Support System  None    Are you recieving any outpatient services  No    Are you recieving home care services  No    Are you using any community resources  No    Have you fallen in the last 12 months  No    Interperter language line needed No        Patient comes for transition of care following recent hospital stay for left-sided pyelonephritis and sepsis secondary to it  Patient currently on Bactrim p o  for 14 days  Denies any symptoms today    Review of Systems   Constitutional: Negative for appetite change, chills, diaphoresis, fatigue, fever and unexpected weight change  Respiratory: Negative for apnea, cough, choking, chest tightness, shortness of breath, wheezing and stridor  Cardiovascular: Negative for chest pain, palpitations and leg swelling  Gastrointestinal: Negative for abdominal distention, abdominal pain, anal bleeding, blood in stool, constipation, diarrhea, nausea and vomiting  Genitourinary: Negative for decreased urine volume, difficulty urinating, frequency and urgency  Musculoskeletal: Negative for arthralgias, back pain and myalgias  Neurological: Negative for dizziness, light-headedness, numbness and headaches  Objective     /82 (BP Location: Left arm, Patient Position: Sitting, Cuff Size: Standard)   Pulse 99   Temp 97 8 °F (36 6 °C) (Temporal)   Ht 5' 4" (1 626 m)   Wt 70 3 kg (155 lb)   LMP 03/28/2022 (Approximate)   SpO2 100%   BMI 26 61 kg/m²      Physical Exam  Constitutional:       General: She is not in acute distress  Appearance: Normal appearance  She is normal weight  She is not ill-appearing, toxic-appearing or diaphoretic  Cardiovascular:      Rate and Rhythm: Normal rate and regular rhythm  Pulses: Normal pulses  Heart sounds: Normal heart sounds  No murmur heard  No gallop  Pulmonary:      Effort: Pulmonary effort is normal  No respiratory distress  Breath sounds: Normal breath sounds  No stridor  No wheezing, rhonchi or rales  Chest:      Chest wall: No tenderness  Abdominal:      General: Abdomen is flat  Bowel sounds are normal  There is no distension  Palpations: Abdomen is soft  There is no mass  Tenderness:  There is no abdominal tenderness  There is no guarding  Hernia: No hernia is present  Musculoskeletal:         General: Normal range of motion  Skin:     General: Skin is warm and dry  Capillary Refill: Capillary refill takes less than 2 seconds  Neurological:      General: No focal deficit present  Mental Status: She is alert         Sarita Singh MD

## 2022-12-13 ENCOUNTER — EVALUATION (OUTPATIENT)
Dept: PHYSICAL THERAPY | Facility: CLINIC | Age: 47
End: 2022-12-13

## 2022-12-13 DIAGNOSIS — M84.48XA PATHOLOGICAL FRACTURE OF CERVICAL VERTEBRA, INITIAL ENCOUNTER: ICD-10-CM

## 2022-12-13 DIAGNOSIS — S12.301A CLOSED NONDISPLACED FRACTURE OF FOURTH CERVICAL VERTEBRA, UNSPECIFIED FRACTURE MORPHOLOGY, INITIAL ENCOUNTER (HCC): Primary | ICD-10-CM

## 2022-12-13 NOTE — PROGRESS NOTES
Progress Note    Today's date: 2022  Patient name: Kenia Nettles  : 1975  MRN: [id number]  Referring provider: Mariella Saldana MD  Dx:   Encounter Diagnosis     ICD-10-CM    1  Closed nondisplaced fracture of fourth cervical vertebra, unspecified fracture morphology, initial encounter ([facility] [address] )  S12 301A       2  Pathological fracture of cervical vertebra, initial encounter  M84 48XA           Start Time: 1445  Stop Time: 1530  Total time in clinic (min): 45 minutes    Subjective: The patient presents for re-evaluation today  The patient reports improvement in symptoms since beginning skilled physical therapy  The patient reports 3/10 pain at it's worst over the past 24 hours, and reports 80-85% improvement in overall condition since beginning formal PT care  Pt returns to PT following recent hospitalization due to infection  No significant worsesning of symptoms since last PT session  Reports she is taking less pain medication and has noticed no worsening of symptoms  No limitations reported with ADLs  Continues to have difficulty looking up and over her shoulder  The patient's chief remaining concern is improving ROM in the cervical region to return to all previous activities  Objective:      Active Range of Motion     Cervical    Flexion: 25 degrees   Extension: 40 degrees   Left lateral flexion: 25 degrees   Right lateral flexion: 45 degrees   Left rotation: 45 degrees  Right rotation: 45 degrees       Left Shoulder   Flexion: 155 degrees   External rotation BTH: T4   Internal rotation BTB: T10 with pain    Right Shoulder   Flexion: 160 degrees   External rotation BTH: T4   Internal rotation BTB: T7     Strength/Myotome Testing   Cervical Spine   Neck extension: 4+  Neck flexion: 4+    Left   Neck lateral flexion (C3): 4+    Right   Neck lateral flexion (C3): 4+    Left Shoulder     Planes of Motion   Flexion: 5  Extension: 5   Abduction: 5  External rotation at 0°: 5  Internal rotation at 0°: 5    Right Shoulder     Planes of Motion   Flexion: 5  Extension: 5   Abduction: 5  External rotation at 0°: 5  Internal rotation at 0°: 5       Assessment: Tres Sands is a pleasant 49 y o  female who has been receiving PT intervention for cervical and thoracic pain due to compression fracture 2* metastatic CA  The patient has demonstrated decreased pain, increased strength, increased ROM, improved body mechanics and increased activity tolerance since beginning treatment  Primary remaining impairments include:    1)  Decreased ROM in the cervical and thoracic region     2)  Decreased postural endurance and control     Goals  Short Term Goals 2-4 wks   1  Pt will be independent with initial HEP - MET  2  Pt will improve C/S ROM 25% in all planes -MET  3  Pt will improve deficient mm strength 1/2 grade -MET    Long Term Goals 6-8 wks  1  Pt will improve cervical ROM to WNL -Progressing   2  Pt will return to driving without limitations -MET  3  Pt will improve FOTO score to >64 by d/c- MET    Plan:   Patient would benefit from: skilled physical therapy  Planned modality interventions: Modalities PRN  Planned therapy interventions: activity modification, manual therapy, neuromuscular re-education, patient education, therapeutic activities, therapeutic exercise, graded activity, home exercise program, behavior modification, self care, abdominal trunk stabilization and postural training  Frequency: 2x week  Duration in weeks: 4  Treatment plan discussed with: patient     Precautions:  Active Breast CA w/ Metastasis to Spine, HTN    Access Code: [email or web address]       Manuals 12/13  11/3 11/15 11/17 11/22   STM B/L UT, LS, Rhomboids  IASTM KCB  IASTM KCB IASTM KCB  IASTM KCB                     Neuro Re-Ed         C/S Retract  20x   20x 20x  20x 20x   TB T 30x BTB  30x BTB 30x BTB  30x BTB   Scap Protract at [address] w/ Posture at wall 10x w/ cane  10x w/ cane  10x w/ cane 10x w/ cane 10x w/ cane Scap Set at [address]  10x3"  10x3" 10x3" 10x 3'' 10x 3''            Education          Ther Ex         TB Row/Ext  30x Blk TB  30x Blk TB 30x Blk TB 30x blk TB 30x blk TB   T/S Rotation         Shrugs/Rolls 30x 7#  30x 7# 30x 7# 30x 7# 30x 7#   Bicep Curls 30x 7#  30x 7# 30x 7# 30x 7# 30x 7#   DB Rows  30x 7#  30x 7# 30x 7# 30x 7# 30x 7#   UT Stretch          Shoulder Flex/Scap 3x10 4#  3x10 4# 3x10 4# 3x10 4# 3x10 4#   Shoulder Abd 3x10 3#  3x10 3# 3x10 3# 3x10 3# 3x10 3#   OH Shoulder Press  3x10 3#  3x10 3# 3x10 3# 3x10 3# 3x10 3#   OH Towel Slide 10x   10x 10x  10x  10x            Ther Activity                           Gait Training                           Modalities

## 2022-12-19 ENCOUNTER — LAB (OUTPATIENT)
Dept: LAB | Facility: CLINIC | Age: 47
End: 2022-12-19

## 2022-12-19 DIAGNOSIS — E87.6 HYPOKALEMIA: ICD-10-CM

## 2022-12-19 DIAGNOSIS — D64.9 ANEMIA, UNSPECIFIED TYPE: ICD-10-CM

## 2022-12-19 LAB
ANION GAP SERPL CALCULATED.3IONS-SCNC: 4 MMOL/L (ref 4–13)
BASOPHILS # BLD AUTO: 0.02 THOUSANDS/ÂΜL (ref 0–0.1)
BASOPHILS NFR BLD AUTO: 1 % (ref 0–1)
BUN SERPL-MCNC: 16 MG/DL (ref 5–25)
CALCIUM SERPL-MCNC: 9.5 MG/DL (ref 8.3–10.1)
CHLORIDE SERPL-SCNC: 108 MMOL/L (ref 96–108)
CO2 SERPL-SCNC: 29 MMOL/L (ref 21–32)
CREAT SERPL-MCNC: 0.68 MG/DL (ref 0.6–1.3)
EOSINOPHIL # BLD AUTO: 0.07 THOUSAND/ÂΜL (ref 0–0.61)
EOSINOPHIL NFR BLD AUTO: 2 % (ref 0–6)
ERYTHROCYTE [DISTWIDTH] IN BLOOD BY AUTOMATED COUNT: 13.1 % (ref 11.6–15.1)
GFR SERPL CREATININE-BSD FRML MDRD: 103 ML/MIN/1.73SQ M
GLUCOSE P FAST SERPL-MCNC: 88 MG/DL (ref 65–99)
HCT VFR BLD AUTO: 40.8 % (ref 34.8–46.1)
HGB BLD-MCNC: 12.7 G/DL (ref 11.5–15.4)
IMM GRANULOCYTES # BLD AUTO: 0.01 THOUSAND/UL (ref 0–0.2)
IMM GRANULOCYTES NFR BLD AUTO: 0 % (ref 0–2)
LYMPHOCYTES # BLD AUTO: 0.64 THOUSANDS/ÂΜL (ref 0.6–4.47)
LYMPHOCYTES NFR BLD AUTO: 18 % (ref 14–44)
MCH RBC QN AUTO: 31 PG (ref 26.8–34.3)
MCHC RBC AUTO-ENTMCNC: 31.1 G/DL (ref 31.4–37.4)
MCV RBC AUTO: 100 FL (ref 82–98)
MONOCYTES # BLD AUTO: 0.33 THOUSAND/ÂΜL (ref 0.17–1.22)
MONOCYTES NFR BLD AUTO: 9 % (ref 4–12)
NEUTROPHILS # BLD AUTO: 2.53 THOUSANDS/ÂΜL (ref 1.85–7.62)
NEUTS SEG NFR BLD AUTO: 70 % (ref 43–75)
NRBC BLD AUTO-RTO: 0 /100 WBCS
PLATELET # BLD AUTO: 262 THOUSANDS/UL (ref 149–390)
PMV BLD AUTO: 9.8 FL (ref 8.9–12.7)
POTASSIUM SERPL-SCNC: 4.4 MMOL/L (ref 3.5–5.3)
RBC # BLD AUTO: 4.1 MILLION/UL (ref 3.81–5.12)
SODIUM SERPL-SCNC: 141 MMOL/L (ref 135–147)
WBC # BLD AUTO: 3.6 THOUSAND/UL (ref 4.31–10.16)

## 2022-12-20 ENCOUNTER — OFFICE VISIT (OUTPATIENT)
Dept: PHYSICAL THERAPY | Facility: CLINIC | Age: 47
End: 2022-12-20

## 2022-12-20 DIAGNOSIS — Z51.5 PALLIATIVE CARE PATIENT: ICD-10-CM

## 2022-12-20 DIAGNOSIS — S12.301A CLOSED NONDISPLACED FRACTURE OF FOURTH CERVICAL VERTEBRA, UNSPECIFIED FRACTURE MORPHOLOGY, INITIAL ENCOUNTER (HCC): Primary | ICD-10-CM

## 2022-12-20 DIAGNOSIS — G89.3 CANCER ASSOCIATED PAIN: ICD-10-CM

## 2022-12-20 DIAGNOSIS — M84.48XA PATHOLOGICAL FRACTURE OF CERVICAL VERTEBRA, INITIAL ENCOUNTER: ICD-10-CM

## 2022-12-20 DIAGNOSIS — C79.49 METASTASIS TO SPINAL CORD (HCC): ICD-10-CM

## 2022-12-20 RX ORDER — MORPHINE SULFATE 15 MG/1
15 TABLET, FILM COATED, EXTENDED RELEASE ORAL
Qty: 30 TABLET | Refills: 0 | Status: SHIPPED | OUTPATIENT
Start: 2022-12-20

## 2022-12-20 NOTE — PROGRESS NOTES
Daily Note     Today's date: 2022  Patient name: Alek Carter  : 1975  MRN: [id number]  Referring provider: Malik Prater MD  Dx:   Encounter Diagnosis     ICD-10-CM    1  Closed nondisplaced [facility] of fourth cervical vertebra, unspecified [facility] morphology[facility]  S12 301A       2  Pathological fracture of cervical vertebra, initial encounter  M84 48XA           Start Time: 1530  Stop Time: 1615  Total time in clinic (min): 45 minutes    Subjective: Pt reports she had some soreness after resuming PT treatment last week  No significant pain reported after last PT session  Objective: See treatment diary below      Assessment: Tolerated treatment well  Added UBE to POC to encourage improved endurance of scapular musculature with good tolerance from the patient  No pain reported with exercises  Occasional cuing required for proper exercise technique  Continue to progress as tolerated  Patient would benefit from continued PT      Plan: Continue per plan of care  Precautions:  Active Breast CA w/ Metastasis to Spine, HTN    Access Code: [email or web address]       Manuals 12/13 12/20  11/15 11/17 11/22   STM B/L UT, LS, Rhomboids  IASTM KCB IASTM CIC  IASTM KCB  IASTM KCB                     Neuro Re-Ed         C/S Retract  20x  20x   20x  20x 20x   TB T 30x BTB 30x BTB  30x BTB  30x BTB   Scap Protract at Wall  20x at [facility] w/ Posture at wall 10x w/ cane 10x w/ cane  10x w/ cane 10x w/ cane 10x w/ cane   Scap Set at Wall  10x3" 10x3"  10x3" 10x 3'' 10x 3''            Education          Ther Ex         TB Row/Ext  30x Blk TB 30x Blk TB  30x Blk TB 30x blk TB 30x blk TB   T/S Rotation         Shrugs/Rolls 30x 7# 30x 7#  30x 7# 30x 7# 30x 7#   Bicep Curls 30x 7# 30x 7#  30x 7# 30x 7# 30x 7#   DB Rows  30x 7# 30x 7#  30x 7# 30x 7# 30x 7#   Shoulder Flex/Scap 3x10 4# 3x10 4#  3x10 4# 3x10 4# 3x10 4#   Shoulder Abd 3x10 3# 3x10 3#  3x10 3# 3x10 3# 3x10 3#   OH Shoulder Press  3x10 3# 3x10 3#  3x10 3# 3x10 3# 3x10 3#   OH Towel Slide 10x  10x   10x  10x  10x            UBE  2'/2'       Ther Activity                           Gait Training                           Modalities

## 2022-12-20 NOTE — TELEPHONE ENCOUNTER
Primary palliative medicine provider:   Ryan Bowman    Medication requested:   MS Contin  15 mg at [facility]      If for pain, how has the patient been taking their pain medicine? Last appointment: 12/02/22    Next scheduled appointment: 02/09/23    PDMP review:    11/23/2022 11/23/2022 oxyCODONE HCL (Tablet) 120 0 20 10 MG 90 0 [facility]       11/23/2022 11/23/2022 Morphine Sulfate (Tablet, Extended Release) 30 0 30 15 MG 15 0 DELVIN [address] Commercial Insurance     Pharmacy: [facility]        NOTE: pt leaving [facility] 12/22/22   Going [facility]   Pt will need to  by Thursday

## 2022-12-20 NOTE — TELEPHONE ENCOUNTER
Message to pharmacy includes "Last filled 11/23/22 but pls fill today as patient is traveling for the holidays  "

## 2022-12-23 ENCOUNTER — TELEPHONE (OUTPATIENT)
Dept: HEMATOLOGY ONCOLOGY | Facility: CLINIC | Age: 47
End: 2022-12-23

## 2022-12-23 NOTE — TELEPHONE ENCOUNTER
Medical Records Request Route to [facility]   Person calling in [place]   Provider Marcela Alcocer   Fax Number / Person's name (Attention:)   [phone] [facility] call back number [phone]   Patient call back number Not avail

## 2022-12-27 NOTE — TELEPHONE ENCOUNTER
Printed out both office notes after august 12th along with patient's most recent CT-CAP results  They were faxed to the number provided attention to [facility] 12/27/22

## 2022-12-29 ENCOUNTER — HOSPITAL ENCOUNTER (OUTPATIENT)
Dept: NON INVASIVE DIAGNOSTICS | Facility: CLINIC | Age: 47
Discharge: HOME | End: 2022-12-29

## 2022-12-29 ENCOUNTER — APPOINTMENT (OUTPATIENT)
Dept: PHYSICAL THERAPY | Facility: CLINIC | Age: 47
End: 2022-12-29

## 2022-12-29 ENCOUNTER — HOSPITAL ENCOUNTER (OUTPATIENT)
Dept: CT IMAGING | Facility: HOSPITAL | Age: 47
Discharge: HOME | End: 2022-12-29
Attending: INTERNAL MEDICINE

## 2022-12-29 VITALS
BODY MASS INDEX: 26.46 KG/M2 | HEIGHT: 64 IN | SYSTOLIC BLOOD PRESSURE: 130 MMHG | WEIGHT: 155 LBS | HEART RATE: 90 BPM | DIASTOLIC BLOOD PRESSURE: 82 MMHG

## 2022-12-29 DIAGNOSIS — C79.51 OSSEOUS METASTASIS (HCC): ICD-10-CM

## 2022-12-29 DIAGNOSIS — T45.1X5A CHEMOTHERAPY-INDUCED NEUTROPENIA (HCC): ICD-10-CM

## 2022-12-29 DIAGNOSIS — C50.811 MALIGNANT NEOPLASM OF OVERLAPPING SITES OF RIGHT BREAST IN FEMALE, ESTROGEN RECEPTOR NEGATIVE (HCC): ICD-10-CM

## 2022-12-29 DIAGNOSIS — Z17.1 MALIGNANT NEOPLASM OF OVERLAPPING SITES OF RIGHT BREAST IN FEMALE, ESTROGEN RECEPTOR NEGATIVE (HCC): ICD-10-CM

## 2022-12-29 DIAGNOSIS — D70.1 CHEMOTHERAPY-INDUCED NEUTROPENIA (HCC): ICD-10-CM

## 2022-12-29 LAB
AORTIC ROOT: 3.4 CM
APICAL FOUR CHAMBER EJECTION FRACTION: 60 %
ASCENDING AORTA: 3.3 CM
E WAVE DECELERATION TIME: 209 MS
FRACTIONAL SHORTENING: 43 (ref 28–44)
GLOBAL LONGITUIDAL STRAIN: -16 %
INTERVENTRICULAR SEPTUM IN DIASTOLE (PARASTERNAL SHORT AXIS VIEW): 1.1 CM
INTERVENTRICULAR SEPTUM: 1.1 CM (ref 0.6–1.1)
LAAS-AP4: 8.1 CM2
LEFT ATRIUM SIZE: 3.4 CM
LEFT INTERNAL DIMENSION IN SYSTOLE: 2 CM (ref 2.1–4)
LEFT VENTRICULAR INTERNAL DIMENSION IN DIASTOLE: 3.5 CM (ref 3.5–6)
LEFT VENTRICULAR POSTERIOR WALL IN END DIASTOLE: 1.1 CM
LEFT VENTRICULAR STROKE VOLUME: 40 ML
LVSV (TEICH): 40 ML
MV E'TISSUE VEL-SEP: 9 CM/S
MV PEAK A VEL: 0.83 M/S
MV PEAK E VEL: 69 CM/S
MV STENOSIS PRESSURE HALF TIME: 61 MS
MV VALVE AREA P 1/2 METHOD: 3.61
RIGHT ATRIUM AREA SYSTOLE A4C: 8.1 CM2
RIGHT VENTRICLE ID DIMENSION: 2.2 CM
SL CV LV EF: 55
SL CV PED ECHO LEFT VENTRICLE DIASTOLIC VOLUME (MOD BIPLANE) 2D: 52 ML
SL CV PED ECHO LEFT VENTRICLE SYSTOLIC VOLUME (MOD BIPLANE) 2D: 12 ML
TR MAX PG: 20 MMHG
TR PEAK VELOCITY: 2.2 M/S
TRICUSPID VALVE PEAK REGURGITATION VELOCITY: 2.23 M/S

## 2022-12-29 RX ADMIN — IOHEXOL 100 ML: 350 INJECTION, SOLUTION INTRAVENOUS at 08:31

## 2022-12-30 ENCOUNTER — HOSPITAL ENCOUNTER (OUTPATIENT)
Dept: INFUSION CENTER | Facility: CLINIC | Age: 47
End: 2022-12-30

## 2022-12-30 ENCOUNTER — APPOINTMENT (OUTPATIENT)
Dept: PHYSICAL THERAPY | Facility: CLINIC | Age: 47
End: 2022-12-30

## 2022-12-30 VITALS
OXYGEN SATURATION: 98 % | WEIGHT: 154 LBS | DIASTOLIC BLOOD PRESSURE: 81 MMHG | RESPIRATION RATE: 14 BRPM | HEART RATE: 72 BPM | BODY MASS INDEX: 26.29 KG/M2 | TEMPERATURE: 97.2 F | HEIGHT: 64 IN | SYSTOLIC BLOOD PRESSURE: 114 MMHG

## 2022-12-30 DIAGNOSIS — C50.811 MALIGNANT NEOPLASM OF OVERLAPPING SITES OF RIGHT BREAST IN FEMALE, ESTROGEN RECEPTOR NEGATIVE (HCC): ICD-10-CM

## 2022-12-30 DIAGNOSIS — C79.51 OSSEOUS METASTASIS (HCC): Primary | ICD-10-CM

## 2022-12-30 DIAGNOSIS — T45.1X5A CHEMOTHERAPY-INDUCED NEUTROPENIA (HCC): ICD-10-CM

## 2022-12-30 DIAGNOSIS — D70.1 CHEMOTHERAPY-INDUCED NEUTROPENIA (HCC): ICD-10-CM

## 2022-12-30 DIAGNOSIS — Z17.1 MALIGNANT NEOPLASM OF OVERLAPPING SITES OF RIGHT BREAST IN FEMALE, ESTROGEN RECEPTOR NEGATIVE (HCC): ICD-10-CM

## 2022-12-30 RX ORDER — SODIUM CHLORIDE 9 MG/ML
20 INJECTION, SOLUTION INTRAVENOUS ONCE
Status: COMPLETED | OUTPATIENT
Start: 2022-12-30 | End: 2022-12-30

## 2022-12-30 RX ADMIN — SODIUM CHLORIDE 20 ML/HR: 0.9 INJECTION, SOLUTION INTRAVENOUS at 11:45

## 2022-12-30 RX ADMIN — PERTUZUMAB 420 MG: 30 INJECTION, SOLUTION, CONCENTRATE INTRAVENOUS at 12:55

## 2022-12-30 RX ADMIN — TRASTUZUMAB 450 MG: 150 INJECTION, POWDER, LYOPHILIZED, FOR SOLUTION INTRAVENOUS at 13:30

## 2022-12-30 NOTE — PROGRESS NOTES
Patient to [facility] / Her[facility]: Offers no complaints at present time: Lab work ( 12/19/22 ) reviewed:  Within parameters to treat: Right FA PIV initiated without incident

## 2023-01-03 ENCOUNTER — APPOINTMENT (OUTPATIENT)
Dept: LAB | Facility: CLINIC | Age: 48
End: 2023-01-03

## 2023-01-03 DIAGNOSIS — C79.51 OSSEOUS METASTASIS (HCC): ICD-10-CM

## 2023-01-03 DIAGNOSIS — C50.811 MALIGNANT NEOPLASM OF OVERLAPPING SITES OF RIGHT BREAST IN FEMALE, ESTROGEN RECEPTOR NEGATIVE (HCC): ICD-10-CM

## 2023-01-03 DIAGNOSIS — Z17.1 MALIGNANT NEOPLASM OF OVERLAPPING SITES OF RIGHT BREAST IN FEMALE, ESTROGEN RECEPTOR NEGATIVE (HCC): ICD-10-CM

## 2023-01-03 LAB
BASOPHILS # BLD AUTO: 0.01 THOUSANDS/ÂΜL (ref 0–0.1)
BASOPHILS NFR BLD AUTO: 0 % (ref 0–1)
EOSINOPHIL # BLD AUTO: 0.03 THOUSAND/ÂΜL (ref 0–0.61)
EOSINOPHIL NFR BLD AUTO: 1 % (ref 0–6)
ERYTHROCYTE [DISTWIDTH] IN BLOOD BY AUTOMATED COUNT: 13.3 % (ref 11.6–15.1)
HCT VFR BLD AUTO: 41.7 % (ref 34.8–46.1)
HGB BLD-MCNC: 13 G/DL (ref 11.5–15.4)
IMM GRANULOCYTES # BLD AUTO: 0.01 THOUSAND/UL (ref 0–0.2)
IMM GRANULOCYTES NFR BLD AUTO: 0 % (ref 0–2)
LYMPHOCYTES # BLD AUTO: 0.66 THOUSANDS/ÂΜL (ref 0.6–4.47)
LYMPHOCYTES NFR BLD AUTO: 26 % (ref 14–44)
MCH RBC QN AUTO: 30.3 PG (ref 26.8–34.3)
MCHC RBC AUTO-ENTMCNC: 31.2 G/DL (ref 31.4–37.4)
MCV RBC AUTO: 97 FL (ref 82–98)
MONOCYTES # BLD AUTO: 0.25 THOUSAND/ÂΜL (ref 0.17–1.22)
MONOCYTES NFR BLD AUTO: 10 % (ref 4–12)
NEUTROPHILS # BLD AUTO: 1.62 THOUSANDS/ÂΜL (ref 1.85–7.62)
NEUTS SEG NFR BLD AUTO: 63 % (ref 43–75)
NRBC BLD AUTO-RTO: 0 /100 WBCS
PLATELET # BLD AUTO: 216 THOUSANDS/UL (ref 149–390)
PMV BLD AUTO: 9.7 FL (ref 8.9–12.7)
RBC # BLD AUTO: 4.29 MILLION/UL (ref 3.81–5.12)
WBC # BLD AUTO: 2.58 THOUSAND/UL (ref 4.31–10.16)

## 2023-01-04 ENCOUNTER — TELEPHONE (OUTPATIENT)
Dept: HEMATOLOGY ONCOLOGY | Facility: CLINIC | Age: 48
End: 2023-01-04

## 2023-01-04 ENCOUNTER — OFFICE VISIT (OUTPATIENT)
Dept: PHYSICAL THERAPY | Facility: CLINIC | Age: 48
End: 2023-01-04

## 2023-01-04 DIAGNOSIS — S12.301A CLOSED NONDISPLACED FRACTURE OF FOURTH CERVICAL VERTEBRA, UNSPECIFIED FRACTURE MORPHOLOGY, INITIAL ENCOUNTER (HCC): Primary | ICD-10-CM

## 2023-01-04 DIAGNOSIS — M84.48XA PATHOLOGICAL FRACTURE OF CERVICAL VERTEBRA, INITIAL ENCOUNTER: ICD-10-CM

## 2023-01-04 LAB
ALBUMIN SERPL BCP-MCNC: 3.6 G/DL (ref 3.5–5)
ALP SERPL-CCNC: 63 U/L (ref 46–116)
ALT SERPL W P-5'-P-CCNC: 30 U/L (ref 12–78)
ANION GAP SERPL CALCULATED.3IONS-SCNC: 7 MMOL/L (ref 4–13)
AST SERPL W P-5'-P-CCNC: 13 U/L (ref 5–45)
BILIRUB SERPL-MCNC: 0.38 MG/DL (ref 0.2–1)
BUN SERPL-MCNC: 13 MG/DL (ref 5–25)
CALCIUM SERPL-MCNC: 9.7 MG/DL (ref 8.3–10.1)
CHLORIDE SERPL-SCNC: 106 MMOL/L (ref 96–108)
CO2 SERPL-SCNC: 27 MMOL/L (ref 21–32)
CREAT SERPL-MCNC: 0.6 MG/DL (ref 0.6–1.3)
GFR SERPL CREATININE-BSD FRML MDRD: 108 ML/MIN/1.73SQ M
GLUCOSE P FAST SERPL-MCNC: 84 MG/DL (ref 65–99)
POTASSIUM SERPL-SCNC: 4.2 MMOL/L (ref 3.5–5.3)
PROT SERPL-MCNC: 7.6 G/DL (ref 6.4–8.4)
SODIUM SERPL-SCNC: 140 MMOL/L (ref 135–147)

## 2023-01-04 NOTE — TELEPHONE ENCOUNTER
Called and spoke with radiology reading room to have patient's scan read from 12/29 before her appointment tomorrow

## 2023-01-04 NOTE — PROGRESS NOTES
Daily Note     Today's date: 2023  Patient name: Camila Thompson  : 1975  MRN: [id number]  Referring provider: Shaneka Jung MD  Dx:   Encounter Diagnosis     ICD-10-CM    1  Closed nondisplaced fracture of fourth cervical vertebra, unspecified fracture morphology[facility]  S12 301A       2  Pathological fracture of cervical vertebra, initial encounter  M84 48XA                      Subjective: Patient offers no new complaints to begin session, sick with flu last week noting no increase in neck sxs  Objective: See treatment diary below    Assessment: Tolerated treatment well overall  Challenged with TB "T " UE fatigue by end of session  Positive tissue response to manual therapy as charted  Patient would benefit from continued PT  Plan: Continue per plan of care  Precautions:  Active Breast CA w/ Metastasis to Spine, HTN    Access Code: [email or web address]     Manuals    STM B/L UT, LS, Rhomboids  IASTM KCB IASTM CIC LH   IASTM KCB                     Neuro Re-Ed         C/S Retract  20x  20x    20x 20x   TB T 30x BTB 30x BTB 30x, BTB   30x BTB   Scap Protract at Wall  20x at [address] [facility] w/ Posture at wall 10x w/ cane 10x w/ cane 10x w cane  10x w/ cane 10x w/ cane   Scap Set at Wall  10x3" 10x3" 10x3"  10x 3'' 10x 3''            Education          Ther Ex         TB Row/Ext  30x Blk TB 30x Blk TB 30x ea, Black TB  30x blk TB 30x blk TB   T/S Rotation         Shrugs/Rolls 30x 7# 30x 7# 30x, 7#  30x 7# 30x 7#   Bicep Curls 30x 7# 30x 7# 30x, 7#  30x 7# 30x 7#   DB Rows  30x 7# 30x 7# 30x, 7#  30x 7# 30x 7#   Shoulder Flex/Scap 3x10 4# 3x10 4# 3x10 ea, 4#  3x10 4# 3x10 4#   Shoulder Abd 3x10 3# 3x10 3# 3x10, 3#  3x10 3# 3x10 3#   OH Shoulder Press  3x10 3# 3x10 3# 3x10, 3# seated  3x10 3# 3x10 3#   OH Towel Slide 10x  10x  10x  10x  10x            UBE  2'/2' 2'/2'      Ther Activity                           Gait Training Modalities

## 2023-01-05 ENCOUNTER — OFFICE VISIT (OUTPATIENT)
Dept: HEMATOLOGY ONCOLOGY | Facility: CLINIC | Age: 48
End: 2023-01-05

## 2023-01-05 ENCOUNTER — OFFICE VISIT (OUTPATIENT)
Dept: INTERNAL MEDICINE CLINIC | Facility: CLINIC | Age: 48
End: 2023-01-05

## 2023-01-05 ENCOUNTER — TELEPHONE (OUTPATIENT)
Dept: HEMATOLOGY ONCOLOGY | Facility: CLINIC | Age: 48
End: 2023-01-05

## 2023-01-05 VITALS
DIASTOLIC BLOOD PRESSURE: 76 MMHG | HEART RATE: 79 BPM | BODY MASS INDEX: 27.14 KG/M2 | WEIGHT: 159 LBS | SYSTOLIC BLOOD PRESSURE: 124 MMHG | OXYGEN SATURATION: 97 % | TEMPERATURE: 96.9 F | HEIGHT: 64 IN

## 2023-01-05 VITALS
WEIGHT: 153 LBS | RESPIRATION RATE: 18 BRPM | TEMPERATURE: 96.9 F | HEART RATE: 79 BPM | BODY MASS INDEX: 26.12 KG/M2 | DIASTOLIC BLOOD PRESSURE: 76 MMHG | SYSTOLIC BLOOD PRESSURE: 124 MMHG | HEIGHT: 64 IN | OXYGEN SATURATION: 97 %

## 2023-01-05 DIAGNOSIS — R09.81 SINUS CONGESTION: ICD-10-CM

## 2023-01-05 DIAGNOSIS — Z17.1 MALIGNANT NEOPLASM OF OVERLAPPING SITES OF RIGHT BREAST IN FEMALE, ESTROGEN RECEPTOR NEGATIVE (HCC): ICD-10-CM

## 2023-01-05 DIAGNOSIS — C79.51 OSSEOUS METASTASIS (HCC): Primary | ICD-10-CM

## 2023-01-05 DIAGNOSIS — C50.811 MALIGNANT NEOPLASM OF OVERLAPPING SITES OF RIGHT BREAST IN FEMALE, ESTROGEN RECEPTOR NEGATIVE (HCC): ICD-10-CM

## 2023-01-05 DIAGNOSIS — D70.1 CHEMOTHERAPY-INDUCED NEUTROPENIA (HCC): ICD-10-CM

## 2023-01-05 DIAGNOSIS — T45.1X5A CHEMOTHERAPY-INDUCED NEUTROPENIA (HCC): ICD-10-CM

## 2023-01-05 DIAGNOSIS — R19.7 DIARRHEA, UNSPECIFIED TYPE: Primary | ICD-10-CM

## 2023-01-05 LAB — CANCER AG27-29 SERPL-ACNC: 31.8 U/ML (ref 0–42.3)

## 2023-01-05 RX ORDER — SODIUM CHLORIDE 9 MG/ML
20 INJECTION, SOLUTION INTRAVENOUS ONCE
OUTPATIENT
Start: 2023-04-13

## 2023-01-05 RX ORDER — SODIUM CHLORIDE 9 MG/ML
20 INJECTION, SOLUTION INTRAVENOUS ONCE
OUTPATIENT
Start: 2023-03-23

## 2023-01-05 RX ORDER — SODIUM CHLORIDE 9 MG/ML
20 INJECTION, SOLUTION INTRAVENOUS ONCE
OUTPATIENT
Start: 2023-05-04

## 2023-01-05 RX ORDER — SODIUM CHLORIDE 9 MG/ML
20 INJECTION, SOLUTION INTRAVENOUS ONCE
OUTPATIENT
Start: 2023-03-02

## 2023-01-05 NOTE — TELEPHONE ENCOUNTER
Message left for patient that txs have been scheduled out  Advised patient to view updates on her MyChart  She can also get an updated print out at her next previously scheduled infusion appt  My TEAMS number was left for call back should she have any questions

## 2023-01-05 NOTE — PROGRESS NOTES
Assessment/Plan:    Diagnoses and all orders for this visit:    Diarrhea, unspecified type    Sinus congestion       Symptoms likely due to viral etiology  Discussed symptomatic treatment  Encourage plenty of hydration, follow-up as needed  There are no Patient Instructions on file for this visit  Subjective:      Patient ID: Pankaj Montalvo is a 49 y o  female    Patient reports of sinus congestion, occasional cough and a 2-3 loose mushy stools since last Saturday  Partner is sick with flu  Patient tested negative for COVID          Current Outpatient Medications:   •  acetaminophen (TYLENOL) 325 mg tablet, Take 650 mg by mouth every 6 (six) hours as needed for mild pain, Disp: , Rfl:   •  al mag oxide-diphenhydramine-lidocaine viscous (MAGIC MOUTHWASH) 1:1:1 suspension, Swish and spit 10 mL if needed for mouth pain or discomfort, Disp: , Rfl:   •  baclofen 10 mg tablet, TAKE 1/2 TABLET BY MOUTH 3 TIMES A DAY AS NEEDED FOR MUSCLE SPASMS, Disp: 135 tablet, Rfl: 1  •  DPH-Lido-AlHydr-MgHydr-Simeth (First-Mouthwash BLM) SUSP, , Disp: , Rfl:   •  lactulose (CEPHULAC) 10 g packet, Take 1 packet (10 g total) by mouth if needed (constipation), Disp: , Rfl:   •  lisinopril (ZESTRIL) 20 mg tablet, Take 1 tablet (20 mg total) by mouth daily Only to take as needed when BP is >649 systolicallly, Disp: , Rfl:   •  loperamide (IMODIUM) 2 mg capsule, Take 1 capsule (2 mg total) by mouth every 4 (four) hours as needed for diarrhea, Disp: 30 capsule, Rfl: 0  •  morphine (MS CONTIN) 15 mg 12 hr tablet, Take 1 tablet (15 mg total) by mouth daily at bedtime Max Daily Amount: 15 mg, Disp: 30 tablet, Rfl: 0  •  nystatin (MYCOSTATIN) 500,000 units/5 mL suspension, Apply 5 mL (500,000 Units total) to the mouth or throat if needed (as needed), Disp: , Rfl:   •  nystatin (MYCOSTATIN) powder, Apply topically 3 (three) times a day, Disp: 15 g, Rfl: 0  •  ondansetron (ZOFRAN) 4 mg tablet, Take 1 tablet (4 mg total) by mouth every 8 (eight) hours as needed for nausea or vomiting, Disp: 9 tablet, Rfl: 20  •  oxyCODONE (ROXICODONE) 10 MG TABS, Take 0 5-1 tablets (5-10 mg total) by mouth every 4 (four) hours as needed for moderate pain or severe pain Max Daily Amount: 60 mg, Disp: 120 tablet, Rfl: 0  •  pantoprazole (PROTONIX) 40 mg tablet, Take 1 tablet (40 mg total) by mouth daily in the early morning, Disp: 90 tablet, Rfl: 1  •  polyethylene glycol (MIRALAX) 17 g packet, Take 17 g by mouth if needed (constipation), Disp: , Rfl:   •  saccharomyces boulardii (FLORASTOR) 250 mg capsule, Take 1 capsule (250 mg total) by mouth 2 (two) times a day, Disp: 30 capsule, Rfl: 0  •  senna (SENOKOT) 8 6 mg, Take 3 tablets (25 8 mg total) by mouth if needed for constipation Hold for loose stool , Disp: , Rfl:      Past Medical History:   Diagnosis Date   • Breast cancer ([facility] )    • Cancer ([facility] )    • Hypertension          History reviewed  No pertinent surgical history        No Known Allergies    Recent Results (from the past 1008 hour(s))   CBC and differential    Collection Time: 11/28/22  6:33 AM   Result Value Ref Range    WBC 13 23 (H) 4 31 - 10 16 Thousand/uL    RBC 4 39 3 81 - 5 12 Million/uL    Hemoglobin 13 6 11 5 - 15 4 g/dL    Hematocrit 42 7 34 8 - 46 1 %    MCV 97 82 - 98 fL    MCH 31 0 26 8 - 34 3 pg    MCHC 31 9 31 4 - 37 4 g/dL    RDW 13 2 11 6 - 15 1 %    MPV 10 0 8 9 - 12 7 fL    Platelets 833 265 - 817 Thousands/uL    nRBC 0 /100 WBCs    Neutrophils Relative 90 (H) 43 - 75 %    Immat GRANS % 1 0 - 2 %    Lymphocytes Relative 4 (L) 14 - 44 %    Monocytes Relative 5 4 - 12 %    Eosinophils Relative 0 0 - 6 %    Basophils Relative 0 0 - 1 %    Neutrophils Absolute 11 96 (H) 1 85 - 7 62 Thousands/µL    Immature Grans Absolute 0 07 0 00 - 0 20 Thousand/uL    Lymphocytes Absolute 0 54 (L) 0 60 - 4 47 Thousands/µL    Monocytes Absolute 0 64 0 17 - 1 22 Thousand/µL    Eosinophils Absolute 0 00 0 00 - 0 61 Thousand/µL    Basophils Absolute 0 02 0 00 - 0 10 Thousands/µL   Comprehensive metabolic panel    Collection Time: 11/28/22  6:33 AM   Result Value Ref Range    Sodium 139 135 - 147 mmol/L    Potassium 4 1 3 5 - 5 3 mmol/L    Chloride 103 96 - 108 mmol/L    CO2 25 21 - 32 mmol/L    ANION GAP 11 4 - 13 mmol/L    BUN 17 5 - 25 mg/dL    Creatinine 0 76 0 60 - 1 30 mg/dL    Glucose 148 (H) 65 - 140 mg/dL    Calcium 9 9 8 4 - 10 2 mg/dL    AST 15 13 - 39 U/L    ALT 16 7 - 52 U/L    Alkaline Phosphatase 56 34 - 104 U/L    Total Protein 8 2 6 4 - 8 4 g/dL    Albumin 4 6 3 5 - 5 0 g/dL    Total Bilirubin 0 97 0 20 - 1 00 mg/dL    eGFR 93 ml/min/1 73sq m   Procalcitonin    Collection Time: 11/28/22  6:33 AM   Result Value Ref Range    Procalcitonin <0 05 <=0 25 ng/ml   Lipase    Collection Time: 11/28/22  6:33 AM   Result Value Ref Range    Lipase 17 11 - 82 u/L   Protime-INR    Collection Time: 11/28/22  7:01 AM   Result Value Ref Range    Protime 12 5 11 6 - 14 5 seconds    INR 0 91 0 84 - 1 19   APTT    Collection Time: 11/28/22  7:01 AM   Result Value Ref Range    PTT 27 23 - 37 seconds   Blood culture #1    Collection Time: 11/28/22  7:01 AM    Specimen: Arm, Right; Blood   Result Value Ref Range    Blood Culture Escherichia coli (A)     Gram Stain Result Gram negative rods (A)        Susceptibility    Escherichia coli - JULITO     ZID Performed Yes     Blood culture #2    Collection Time: 11/28/22  7:01 AM    Specimen: Arm, Left; Blood   Result Value Ref Range    Blood Culture Escherichia coli (A)     Gram Stain Result Gram negative rods (A)        Susceptibility    Escherichia coli - JULITO     Amoxicillin + Clavulanate <=8/4 Susceptible ug/ml     Ampicillin ($$) >16 00 Resistant ug/ml     Ampicillin + Sulbactam ($) 16/8 Intermediate ug/ml     Aztreonam ($$$)  <=4 Susceptible ug/ml     Cefazolin ($) <=2 00 Susceptible ug/ml     Ciprofloxacin ($)  <=0 25 Susceptible ug/ml     Ertapenem ($$$) <=0 5 Susceptible ug/ml     Gentamicin ($$) <=2 Susceptible ug/ml Levofloxacin ($) <=0 50 Susceptible ug/ml     Piperacillin + Tazobactam ($$$) <=8 Susceptible ug/ml     Tetracycline <=4 Susceptible ug/ml     Tobramycin ($) <=2 Susceptible ug/ml     Trimethoprim + Sulfamethoxazole ($$$) <=0 5/9 5 Susceptible ug/ml   Lactic acid    Collection Time: 11/28/22  7:01 AM   Result Value Ref Range    LACTIC ACID 1 3 0 5 - 2 0 mmol/L   Blood Culture Identification Panel    Collection Time: 11/28/22  7:01 AM    Specimen: Arm, Left; Blood   Result Value Ref Range    Escherichia coli Detected (A) Not Detected   UA w Reflex to Microscopic w Reflex to Culture    Collection Time: 11/28/22  7:53 AM    Specimen: Urine, Clean Catch   Result Value Ref Range    Color, UA Yellow     Clarity, UA Extra Turbid     Specific Gravity, UA >=1 050 (H) 1 003 - 1 030    pH, UA 7 5 4 5, 5 0, 5 5, 6 0, 6 5, 7 0, 7 5, 8 0    Leukocytes, UA Large (A) Negative    Nitrite, UA Positive (A) Negative    Protein,  (2+) (A) Negative mg/dl    Glucose, UA Negative Negative mg/dl    Ketones, UA Negative Negative mg/dl    Urobilinogen, UA <2 0 <2 0 mg/dl mg/dl    Bilirubin, UA Negative Negative    Occult Blood, UA Small (A) Negative   Urine Microscopic    Collection Time: 11/28/22  7:53 AM   Result Value Ref Range    RBC, UA Innumerable (A) None Seen, 1-2 /hpf    WBC, UA Innumerable (A) None Seen, 1-2 /hpf    Epithelial Cells None Seen None Seen, Occasional /hpf    Bacteria, UA Innumerable (A) None Seen, Occasional /hpf    MUCUS THREADS Occasional (A) None Seen   Urine culture    Collection Time: 11/28/22  7:53 AM    Specimen: Urine, Clean Catch   Result Value Ref Range    Urine Culture >100,000 cfu/ml Escherichia coli (A)        Susceptibility    Escherichia coli - JULITO     ZID Performed Yes       Amoxicillin + Clavulanate <=8/4 Susceptible ug/ml     Ampicillin ($$) >16 00 Resistant ug/ml     Ampicillin + Sulbactam ($) 16/8 Intermediate ug/ml     Aztreonam ($$$)  <=4 Susceptible ug/ml     Cefazolin ($) <=2 00 Susceptible ug/ml     Ciprofloxacin ($)  <=0 25 Susceptible ug/ml     Ertapenem ($$$) <=0 5 Susceptible ug/ml     Gentamicin ($$) <=2 Susceptible ug/ml     Levofloxacin ($) <=0 50 Susceptible ug/ml     Nitrofurantoin <=32 Susceptible ug/ml     Piperacillin + Tazobactam ($$$) <=8 Susceptible ug/ml     Tetracycline <=4 Susceptible ug/ml     Tobramycin ($) <=2 Susceptible ug/ml     Trimethoprim + Sulfamethoxazole ($$$) <=0 5/9 5 Susceptible ug/ml   ECG 12 lead    Collection Time: 11/28/22 12:27 PM   Result Value Ref Range    Ventricular Rate 108 BPM    Atrial Rate 108 BPM    UT Interval 150 ms    QRSD Interval 70 ms    QT Interval 334 ms    QTC Interval 447 ms    P Axis 40 degrees    QRS Axis 14 degrees    T Wave Axis 33 degrees   CBC and differential    Collection Time: 11/29/22  5:08 AM   Result Value Ref Range    WBC 8 03 4 31 - 10 16 Thousand/uL    RBC 3 26 (L) 3 81 - 5 12 Million/uL    Hemoglobin 10 1 (L) 11 5 - 15 4 g/dL    Hematocrit 31 8 (L) 34 8 - 46 1 %    MCV 98 82 - 98 fL    MCH 31 0 26 8 - 34 3 pg    MCHC 31 8 31 4 - 37 4 g/dL    RDW 13 5 11 6 - 15 1 %    MPV 9 7 8 9 - 12 7 fL    Platelets 613 288 - 368 Thousands/uL    nRBC 0 /100 WBCs    Neutrophils Relative 87 (H) 43 - 75 %    Immat GRANS % 0 0 - 2 %    Lymphocytes Relative 5 (L) 14 - 44 %    Monocytes Relative 8 4 - 12 %    Eosinophils Relative 0 0 - 6 %    Basophils Relative 0 0 - 1 %    Neutrophils Absolute 6 93 1 85 - 7 62 Thousands/µL    Immature Grans Absolute 0 03 0 00 - 0 20 Thousand/uL    Lymphocytes Absolute 0 38 (L) 0 60 - 4 47 Thousands/µL    Monocytes Absolute 0 67 0 17 - 1 22 Thousand/µL    Eosinophils Absolute 0 01 0 00 - 0 61 Thousand/µL    Basophils Absolute 0 01 0 00 - 0 10 Thousands/µL   Basic metabolic panel    Collection Time: 11/29/22  5:08 AM   Result Value Ref Range    Sodium 139 135 - 147 mmol/L    Potassium 3 3 (L) 3 5 - 5 3 mmol/L    Chloride 111 (H) 96 - 108 mmol/L    CO2 22 21 - 32 mmol/L    ANION GAP 6 4 - 13 mmol/L    BUN 11 5 - 25 mg/dL    Creatinine 0 54 (L) 0 60 - 1 30 mg/dL    Glucose 106 65 - 140 mg/dL    Calcium 7 7 (L) 8 4 - 10 2 mg/dL    eGFR 111 ml/min/1 73sq m   Magnesium    Collection Time: 11/29/22  5:08 AM   Result Value Ref Range    Magnesium 1 5 (L) 1 9 - 2 7 mg/dL   Clostridium difficile toxin by PCR with EIA    Collection Time: 11/29/22 10:06 PM    Specimen: Per Rectum; Stool   Result Value Ref Range    C difficile toxin by PCR Negative Negative   CBC and differential    Collection Time: 11/30/22  5:19 AM   Result Value Ref Range    WBC 5 65 4 31 - 10 16 Thousand/uL    RBC 3 11 (L) 3 81 - 5 12 Million/uL    Hemoglobin 9 6 (L) 11 5 - 15 4 g/dL    Hematocrit 30 9 (L) 34 8 - 46 1 %    MCV 99 (H) 82 - 98 fL    MCH 30 9 26 8 - 34 3 pg    MCHC 31 1 (L) 31 4 - 37 4 g/dL    RDW 13 2 11 6 - 15 1 %    MPV 9 8 8 9 - 12 7 fL    Platelets 089 754 - 637 Thousands/uL    nRBC 0 /100 WBCs    Neutrophils Relative 77 (H) 43 - 75 %    Immat GRANS % 1 0 - 2 %    Lymphocytes Relative 8 (L) 14 - 44 %    Monocytes Relative 12 4 - 12 %    Eosinophils Relative 2 0 - 6 %    Basophils Relative 0 0 - 1 %    Neutrophils Absolute 4 41 1 85 - 7 62 Thousands/µL    Immature Grans Absolute 0 04 0 00 - 0 20 Thousand/uL    Lymphocytes Absolute 0 44 (L) 0 60 - 4 47 Thousands/µL    Monocytes Absolute 0 65 0 17 - 1 22 Thousand/µL    Eosinophils Absolute 0 09 0 00 - 0 61 Thousand/µL    Basophils Absolute 0 02 0 00 - 0 10 Thousands/µL   Basic metabolic panel    Collection Time: 11/30/22  5:19 AM   Result Value Ref Range    Sodium 141 135 - 147 mmol/L    Potassium 3 5 3 5 - 5 3 mmol/L    Chloride 114 (H) 96 - 108 mmol/L    CO2 20 (L) 21 - 32 mmol/L    ANION GAP 7 4 - 13 mmol/L    BUN 5 5 - 25 mg/dL    Creatinine 0 50 (L) 0 60 - 1 30 mg/dL    Glucose 87 65 - 140 mg/dL    Calcium 8 4 8 4 - 10 2 mg/dL    eGFR 114 ml/min/1 73sq m   Basic metabolic panel    Collection Time: 12/01/22  5:59 AM   Result Value Ref Range    Sodium 140 135 - 147 mmol/L    Potassium 3 4 (L) 3 5 - 5 3 mmol/L    Chloride 111 (H) 96 - 108 mmol/L    CO2 21 21 - 32 mmol/L    ANION GAP 8 4 - 13 mmol/L    BUN 3 (L) 5 - 25 mg/dL    Creatinine 0 50 (L) 0 60 - 1 30 mg/dL    Glucose 91 65 - 140 mg/dL    Calcium 8 1 (L) 8 4 - 10 2 mg/dL    eGFR 114 ml/min/1 73sq m   CBC and differential    Collection Time: 12/01/22  5:59 AM   Result Value Ref Range    WBC 5 11 4 31 - 10 16 Thousand/uL    RBC 3 18 (L) 3 81 - 5 12 Million/uL    Hemoglobin 9 8 (L) 11 5 - 15 4 g/dL    Hematocrit 30 7 (L) 34 8 - 46 1 %    MCV 97 82 - 98 fL    MCH 30 8 26 8 - 34 3 pg    MCHC 31 9 31 4 - 37 4 g/dL    RDW 12 9 11 6 - 15 1 %    MPV 10 1 8 9 - 12 7 fL    Platelets 030 767 - 942 Thousands/uL    nRBC 0 /100 WBCs    Neutrophils Relative 82 (H) 43 - 75 %    Immat GRANS % 0 0 - 2 %    Lymphocytes Relative 8 (L) 14 - 44 %    Monocytes Relative 8 4 - 12 %    Eosinophils Relative 2 0 - 6 %    Basophils Relative 0 0 - 1 %    Neutrophils Absolute 4 16 1 85 - 7 62 Thousands/µL    Immature Grans Absolute 0 02 0 00 - 0 20 Thousand/uL    Lymphocytes Absolute 0 40 (L) 0 60 - 4 47 Thousands/µL    Monocytes Absolute 0 41 0 17 - 1 22 Thousand/µL    Eosinophils Absolute 0 10 0 00 - 0 61 Thousand/µL    Basophils Absolute 0 02 0 00 - 0 10 Thousands/µL   Magnesium    Collection Time: 12/01/22  5:59 AM   Result Value Ref Range    Magnesium 2 0 1 9 - 2 7 mg/dL   CBC and differential    Collection Time: 12/19/22 11:24 AM   Result Value Ref Range    WBC 3 60 (L) 4 31 - 10 16 Thousand/uL    RBC 4 10 3 81 - 5 12 Million/uL    Hemoglobin 12 7 11 5 - 15 4 g/dL    Hematocrit 40 8 34 8 - 46 1 %     (H) 82 - 98 fL    MCH 31 0 26 8 - 34 3 pg    MCHC 31 1 (L) 31 4 - 37 4 g/dL    RDW 13 1 11 6 - 15 1 %    MPV 9 8 8 9 - 12 7 fL    Platelets 241 960 - 310 Thousands/uL    nRBC 0 /100 WBCs    Neutrophils Relative 70 43 - 75 %    Immat GRANS % 0 0 - 2 %    Lymphocytes Relative 18 14 - 44 %    Monocytes Relative 9 4 - 12 %    Eosinophils Relative 2 0 - 6 %    Basophils Relative 1 0 - 1 %    Neutrophils Absolute 2 53 1 85 - 7 62 Thousands/µL    Immature Grans Absolute 0 01 0 00 - 0 20 Thousand/uL    Lymphocytes Absolute 0 64 0 60 - 4 47 Thousands/µL    Monocytes Absolute 0 33 0 17 - 1 22 Thousand/µL    Eosinophils Absolute 0 07 0 00 - 0 61 Thousand/µL    Basophils Absolute 0 02 0 00 - 0 10 Thousands/µL   Basic metabolic panel    Collection Time: 12/19/22 11:24 AM   Result Value Ref Range    Sodium 141 135 - 147 mmol/L    Potassium 4 4 3 5 - 5 3 mmol/L    Chloride 108 96 - 108 mmol/L    CO2 29 21 - 32 mmol/L    ANION GAP 4 4 - 13 mmol/L    BUN 16 5 - 25 mg/dL    Creatinine 0 68 0 60 - 1 30 mg/dL    Glucose, Fasting 88 65 - 99 mg/dL    Calcium 9 5 8 3 - 10 1 mg/dL    eGFR 103 ml/min/1 73sq m   Echo complete w/ contrast if indicated    Collection Time: 12/29/22 10:17 AM   Result Value Ref Range    A4C EF 60 %    LVIDd 3 50 cm    LVIDS 2 00 cm    IVSd 1 10 cm    LVPWd 1 10 cm    FS 43 28 - 44    MV E' Tissue Velocity Septal 9 cm/s    E wave deceleration time 209 ms    MV Peak E Spike 69 cm/s    MV Peak A Spike 0 83 m/s    RVID d 2 2 cm    LA size 3 4 cm    RAA A4C 8 1 cm2    MV stenosis pressure 1/2 time 61 ms    MV valve area p 1/2 method 3 61     TR Peak Spike 2 2 m/s    Triscuspid Valve Regurgitation Peak Gradient 20 0 mmHg    Ao root 3 40 cm    Asc Ao 3 3 cm    Tricuspid valve peak regurgitation velocity 2 23 m/s    Left ventricular stroke volume (2D) 40 00 mL    IVS 1 1 cm    LEFT VENTRICLE SYSTOLIC VOLUME (MOD BIPLANE) 2D 12 mL    LV DIASTOLIC VOLUME (MOD BIPLANE) 2D 52 mL    Left Atrium Area-systolic Four Chamber 8 1 cm2    LVSV, 2D 40 mL    GLS -16 %    LV EF 55    Comprehensive metabolic panel    Collection Time: 01/03/23 11:16 AM   Result Value Ref Range    Sodium 140 135 - 147 mmol/L    Potassium 4 2 3 5 - 5 3 mmol/L    Chloride 106 96 - 108 mmol/L    CO2 27 21 - 32 mmol/L    ANION GAP 7 4 - 13 mmol/L    BUN 13 5 - 25 mg/dL    Creatinine 0 60 0 60 - 1 30 mg/dL    Glucose, Fasting 84 65 - 99 mg/dL    Calcium 9 7 8 3 - 10 1 mg/dL    AST 13 5 - 45 U/L    ALT 30 12 - 78 U/L    Alkaline Phosphatase 63 46 - 116 U/L    Total Protein 7 6 6 4 - 8 4 g/dL    Albumin 3 6 3 5 - 5 0 g/dL    Total Bilirubin 0 38 0 20 - 1 00 mg/dL    eGFR 108 ml/min/1 73sq m   CBC and differential    Collection Time: 01/03/23 11:16 AM   Result Value Ref Range    WBC 2 58 (L) 4 31 - 10 16 Thousand/uL    RBC 4 29 3 81 - 5 12 Million/uL    Hemoglobin 13 0 11 5 - 15 4 g/dL    Hematocrit 41 7 34 8 - 46 1 %    MCV 97 82 - 98 fL    MCH 30 3 26 8 - 34 3 pg    MCHC 31 2 (L) 31 4 - 37 4 g/dL    RDW 13 3 11 6 - 15 1 %    MPV 9 7 8 9 - 12 7 fL    Platelets 943 954 - 227 Thousands/uL    nRBC 0 /100 WBCs    Neutrophils Relative 63 43 - 75 %    Immat GRANS % 0 0 - 2 %    Lymphocytes Relative 26 14 - 44 %    Monocytes Relative 10 4 - 12 %    Eosinophils Relative 1 0 - 6 %    Basophils Relative 0 0 - 1 %    Neutrophils Absolute 1 62 (L) 1 85 - 7 62 Thousands/µL    Immature Grans Absolute 0 01 0 00 - 0 20 Thousand/uL    Lymphocytes Absolute 0 66 0 60 - 4 47 Thousands/µL    Monocytes Absolute 0 25 0 17 - 1 22 Thousand/µL    Eosinophils Absolute 0 03 0 00 - 0 61 Thousand/µL    Basophils Absolute 0 01 0 00 - 0 10 Thousands/µL   Cancer antigen 27 29    Collection Time: 01/03/23 11:16 AM   Result Value Ref Range    CA 27 29 31 8 0 0 - 42 3 U/mL       The following portions of the patient's history were reviewed and updated as appropriate: allergies, current medications, past family history, past medical history, past social history, past surgical history and problem list      Review of Systems   Constitutional: Negative for activity change, appetite change, chills, diaphoresis, fatigue, fever and unexpected weight change  HENT: Positive for congestion   Negative for drooling, ear discharge, ear pain, facial swelling, hearing loss, postnasal drip, rhinorrhea, sinus pressure, sinus pain, sneezing, sore throat, tinnitus, trouble swallowing and voice change  Eyes: Negative for discharge  Respiratory: Positive for cough  Negative for apnea, choking, chest tightness, shortness of breath, wheezing and stridor  Cardiovascular: Negative for chest pain, palpitations and leg swelling  Gastrointestinal: Positive for diarrhea  Negative for abdominal distention, abdominal pain, anal bleeding, blood in stool, constipation, nausea and vomiting  Genitourinary: Negative for decreased urine volume, difficulty urinating, frequency and urgency  Musculoskeletal: Negative for arthralgias, back pain and myalgias  Skin: Negative for color change  Neurological: Negative for dizziness, light-headedness, numbness and headaches  Objective:      Vitals:    01/05/23 1334   BP: 124/76   Pulse: 79   Temp: (!) 96 9 °F (36 1 °C)   SpO2: 97%          Physical Exam  Vitals reviewed  Constitutional:       General: She is not in acute distress  Appearance: Normal appearance  She is not ill-appearing, toxic-appearing or diaphoretic  HENT:      Mouth/Throat:      Mouth: Mucous membranes are moist    Cardiovascular:      Rate and Rhythm: Normal rate and regular rhythm  Pulses: Normal pulses  Heart sounds: Normal heart sounds  No murmur heard  No friction rub  No gallop  Pulmonary:      Effort: Pulmonary effort is normal  No respiratory distress  Breath sounds: Normal breath sounds  No stridor  No wheezing, rhonchi or rales  Chest:      Chest wall: No tenderness  Musculoskeletal:      Right lower leg: No edema  Left lower leg: No edema  Skin:     General: Skin is warm and dry  Findings: No lesion or rash  Neurological:      Mental Status: She is alert

## 2023-01-05 NOTE — PROGRESS NOTES
Hematology / Oncology Outpatient Follow Up Note    [place] 49 [place]  [place] Deana Michelle SLX:[id number]         Date:  1/5/2023    Assessment / Plan:  A 70-year-old premenopausal woman with metastatic breast cancer, grade 2, ER negative, DC negative, HER2 3+ disease  Mau Lopez has large fungating mass in her right breast, palpable right axillary adenopathy as well as diffuse osseous metastasis with C4 stenosis   She completed palliative radiation therapy to the spine   She was treated with taxane trastuzumab and pertuzumab x6 cycles, resulting in good partial response  She is currently on trastuzumab and pertuzumab with excellent tolerance  Clinically as well as radiographically, she has no evidence of progressive disease  I recommended her to continue trastuzumab and pertuzumab every 3 weeks  She will stay on Zometa infusion every 3 months  I will see her again in 3 months with CBC, CMP, CA 27, 29 as well as CT scan of chest abdomen pelvis    She is in agreement with my recommendations      Subjective:      HPI:  A 70-year-old premenopausal woman who was recently hospitalized because of the progressive neck pain which radiated to the bilateral upper extremity as well as enlarging right breast mass  Mau Lopez was found to have fungating right breast mass   CT scan of chest abdomen pelvis showed diffuse bony metastasis, especially with C4 stenosis   In addition, CT scan showed 6 9 cm of right breast mass   She had right breast biopsy which showed invasive ductal carcinoma, grade 2, ER negative, DC negative, HER2 3+ disease   Her tumor marker were find to be mildly elevated   CT of the head was negative for brain metastasis   She is currently on palliative radiation to her neck, shoulder as well as lower back   She is going to complete palliative radiation therapy in early next week   She presents today with her mother and fiancee to discuss medical treatment for metastatic breast cancer   Her pain is reasonably controlled with narcotics medications   She has no recent weight loss   She denied any respiratory symptoms   She has no significant past medical history   Therefore, prior to this hospitalization, she was not on any medications   She denied family history of breast or ovarian cancer  Orestuardo Azul is a lifetime never smoker   Her performance status is 1/4 on ECOG scale            Interval History:  A 53-year-old premenopausal woman with metastatic breast cancer, grade 2, ER negative, FL negative, HER2 3+ disease   She has large fungating mass in her right breast, palpable right axillary adenopathy as well as diffuse osseous metastasis with C4 stenosis   She completed palliative radiation therapy to the spine   Subsequently, she was treated with Taxotere, trastuzumab and pertuzumab with or without atezolizumab based on a clinical trial   She received 2 cycle of treatment   She came off the study after 2 cycles of treatment due to the complication related to CXTXL-87 infection   She had infusion reaction with Taxotere   Therefore, she was subsequently treated with paclitaxel, trastuzumab and pertuzumab  She completed induction treatment in August 2022  She is currently on maintenance therapy with trastuzumab and pertuzumab   She already have good partial response, clinically as well as radiographically  She presents today for follow-up  She feels well  Her bone pain is quite well controlled with minimal narcotic requirement  She has good appetite  Her weight is stable  She has no respiratory symptoms  She no longer wear braces  She has no ambulatory dysfunction    Her performance status is 0-1 out of 4 on ECOG scale       Objective:      Primary Diagnosis:     Metastatic breast cancer, grade 2, ER negative, FL negative, HER2 3+ disease   Diagnosed in March 2022      Cancer Staging:  Cancer Staging  No matching staging information was found for the patient         Previous Hematologic/ Oncologic Treatment:       1  THP x3 cycles, completed in early June 2022     2  Weekly paclitaxel, try weekly pertuzumab trastuzumab from July 2022 through August 2022      Current Hematologic/ Oncologic Treatment:       Maintenance therapy with trastuzumab and pertuzumab since September 2022      Disease Status:      Clinical good partial response      Test Results:     Pathology:     Right breast biopsy showed invasive ductal carcinoma, grade 2 ER negative, AK negative, HER2 3+ disease      Radiology:     CT scan of chest abdomen pelvis in December 2022 showed minimally decreased calcified right breast mass as well as slightly decreased calcified right axillary adenopathy  Stable appearance of diffuse osseous metastasis  Echocardiogram in December 2022 showed normal ejection fraction with 55%      Laboratory:     See below for CBC and CMP  CA 27, 29 was 31 8 in December 2022      Physical Exam:        General Appearance:    Alert, oriented          Eyes:    PERRL   Ears:    Normal external ear canals, both ears   Nose:   Nares normal, septum midline   Throat:   Mucosa moist  Pharynx without injection  Neck:   Supple         Lungs:     Clear to auscultation bilaterally   Chest Wall:    No tenderness or deformity    Heart:    Regular rate and rhythm         Abdomen:     Soft, non-tender, bowel sounds +, no organomegaly               Extremities:   Extremities no cyanosis or edema         Skin:   no rash or icterus  Lymph nodes:   Cervical, supraclavicular, and axillary nodes normal   Neurologic:   CNII-XII intact, normal strength, sensation and reflexes     Throughout             Breast exam:  3 x 4  cm of palpable mass at in the upper quadrant of her right breast with 2 cm of shallow ulceration   Previous right axillary adenopathy is no longer palpable   Left breast exam is negative  ROS: Review of Systems   All other systems reviewed and are negative  Imaging: No results found        Labs:   Lab Results Component Value Date    WBC 2 58 (L) 01/03/2023    HGB 13 0 01/03/2023    HCT 41 7 01/03/2023    MCV 97 01/03/2023     01/03/2023     Lab Results   Component Value Date    K 4 2 01/03/2023     01/03/2023    CO2 27 01/03/2023    BUN 13 01/03/2023    CREATININE 0 60 01/03/2023    GLUF 84 01/03/2023    CALCIUM 9 7 01/03/2023    CORRECTEDCA 10 4 (H) 06/13/2022    AST 13 01/03/2023    ALT 30 01/03/2023    ALKPHOS 63 01/03/2023    EGFR 108 01/03/2023         Lab Results   Component Value Date    CEA 6 5 (H) 03/29/2022         Lab Results   Component Value Date    IRON 83 05/16/2022    TIBC 206 (L) 05/16/2022    FERRITIN 191 05/27/2022         Current Medications: Reviewed  Allergies: Reviewed  PMH/FH/SH:  Reviewed      Vital Sign:    Body surface area is 1 75 meters squared      Wt Readings from Last 3 Encounters:   01/05/23 69 4 kg (153 lb)   12/30/22 69 9 kg (154 lb)   12/29/22 70 3 kg (155 lb)        Temp Readings from Last 3 Encounters:   01/05/23 (!) 96 9 °F (36 1 °C) (Tympanic)   12/30/22 (!) 97 2 °F (36 2 °C) (Temporal)   12/08/22 (!) 96 9 °F (36 1 °C) (Temporal)        BP Readings from Last 3 Encounters:   01/05/23 124/76   12/30/22 114/81   12/29/22 130/82         Pulse Readings from Last 3 Encounters:   01/05/23 79   12/30/22 72   12/29/22 90     @LASTSAO2(3)@

## 2023-01-09 ENCOUNTER — OFFICE VISIT (OUTPATIENT)
Dept: PHYSICAL THERAPY | Facility: CLINIC | Age: 48
End: 2023-01-09

## 2023-01-09 DIAGNOSIS — S12.301A CLOSED NONDISPLACED FRACTURE OF FOURTH CERVICAL VERTEBRA, UNSPECIFIED FRACTURE MORPHOLOGY, INITIAL ENCOUNTER (HCC): Primary | ICD-10-CM

## 2023-01-09 DIAGNOSIS — M84.48XA PATHOLOGICAL FRACTURE OF CERVICAL VERTEBRA, INITIAL ENCOUNTER: ICD-10-CM

## 2023-01-09 NOTE — PROGRESS NOTES
Daily Note     Today's date: 2023  Patient name: Pankaj Montalvo  : 1975  MRN: [id number]  Referring provider: Dillon Back MD  Dx:   Encounter Diagnosis     ICD-10-CM    1  Closed nondisplaced fracture of fourth cervical vertebra, unspecified fracture morphology, initial [facility] ([facility] )  S12 301A       2  Pathological fracture of cervical vertebra, initial encounter  M84 48XA           Start Time: 1445  Stop Time: 1530  Total time in clinic (min): 45 minutes    Subjective: Pt with no new concerns noted at this time  Objective: See treatment diary below      Assessment: Tolerated treatment well  Continued with current POC with good tolerance from the patient  No pain reported with exercises  Occasional cuing required for proper exercise technique  Continue to progress as tolerated  Patient would benefit from continued PT      Plan: Continue per plan of care  Precautions:  Active Breast CA w/ Metastasis to Spine, HTN    Access Code: [email or web address]     Manuals    STM B/L UT, LS, Rhomboids  IASTM KCB IASTM CIC LH IASTM KCB  IASTM KCB                     Neuro Re-Ed         C/S Retract  20x  20x     20x   TB T 30x BTB 30x BTB 30x, BTB 30x BTB  30x BTB   Scap Protract at Wall  20x at [address] [facility] [address] w/ Posture at wall 10x w/ cane 10x w/ cane 10x w cane 10x w/ cane  10x w/ cane   Scap Set at Wall  10x3" 10x3" 10x3" 10x3"  10x 3''            Education          Ther Ex         TB Row/Ext  30x Blk TB 30x Blk TB 30x ea, Black TB 30x ea Black TB   30x blk TB   T/S Rotation         Shrugs/Rolls 30x 7# 30x 7# 30x, 7# 30x 7#  30x 7#   Bicep Curls 30x 7# 30x 7# 30x, 7# 30x 7#  30x 7#   DB Rows  30x 7# 30x 7# 30x, 7# 30x 7#  30x 7#   Shoulder Flex/Scap 3x10 4# 3x10 4# 3x10 ea, 4# 3x10 ea 4#  3x10 4#   Shoulder Abd 3x10 3# 3x10 3# 3x10, 3# 3x10 3#  3x10 3#   OH Shoulder Press  3x10 3# 3x10 3# 3x10, 3# seated 3x10 3#  3x10 3#   OH Towel Slide 10x  10x  10x 10x  10x UBE  2'/2' 2'/2' 2'/2'     Ther Activity                           Gait Training                           Modalities

## 2023-01-12 ENCOUNTER — OFFICE VISIT (OUTPATIENT)
Dept: PHYSICAL THERAPY | Facility: CLINIC | Age: 48
End: 2023-01-12

## 2023-01-12 DIAGNOSIS — S12.301A CLOSED NONDISPLACED FRACTURE OF FOURTH CERVICAL VERTEBRA, UNSPECIFIED FRACTURE MORPHOLOGY, INITIAL ENCOUNTER (HCC): Primary | ICD-10-CM

## 2023-01-12 DIAGNOSIS — M84.48XA PATHOLOGICAL FRACTURE OF CERVICAL VERTEBRA, INITIAL ENCOUNTER: ICD-10-CM

## 2023-01-12 NOTE — PROGRESS NOTES
Daily Note     Today's date: 2023  Patient name: Pop Patton  : 1975  MRN: [id number]  Referring provider: Ronny Martinez MD  Dx:   Encounter Diagnosis     ICD-10-CM    1  Closed nondisplaced fracture of fourth cervical vertebra, unspecified fracture morphology, [facility]  S12 301A       2  Pathological fracture of cervical vertebra, initial encounter  M84 48XA           Start Time: 1400  Stop Time: 1445  Total time in clinic (min): 45 minutes    Subjective: Pt with no new concerns noted at this time  Objective: See treatment diary below      Assessment: Tolerated treatment well  Progressed POC with good tolerance from the patient  Muscular fatigue noted post tx consistent with exercise progressions  No pain reported with exercises  Continue to progress as tolerated  Patient would benefit from continued PT      Plan: Continue per plan of care  Precautions:  Active Breast CA w/ Metastasis to Spine, HTN    Access Code: [email or web address]     Manuals     STM B/L UT, LS, Rhomboids  IASTM KCB IASTM CIC LH IASTM KCB IASTM KCB                      Neuro Re-Ed         C/S Retract  20x  20x        TB T 30x BTB 30x BTB 30x, BTB 30x BTB 30x BTB    Scap Protract at Wall  20x at [address] [facility] [address] w/ Posture at wall 10x w/ cane 10x w/ cane 10x w cane 10x w/ cane 20x w/ cane     Scap Set at Wall  10x3" 10x3" 10x3" 10x3" 20x3"             Education          Ther Ex         TB Row/Ext  30x Blk TB 30x Blk TB 30x ea, Black TB 30x ea Black TB  30x ea Black TB     T/S Rotation         Shrugs/Rolls 30x 7# 30x 7# 30x, 7# 30x 7# 30x 8#    Bicep Curls 30x 7# 30x 7# 30x, 7# 30x 7# 22x 8#    DB Rows  30x 7# 30x 7# 30x, 7# 30x 7# 30x 8#    Shoulder Flex/Scap 3x10 4# 3x10 4# 3x10 ea, 4# 3x10 ea 4# 3x10 5#    Shoulder Abd 3x10 3# 3x10 3# 3x10, 3# 3x10 3# 3x10 4#    OH Shoulder Press  3x10 3# 3x10 3# 3x10, 3# seated 3x10 3# 3x10 4#    OH Towel Slide 10x  10x  10x 10x 20x UBE  2'/2' 2'/2' 2'/2' 2'/2'    Ther Activity                           Gait Training                           Modalities

## 2023-01-16 ENCOUNTER — EVALUATION (OUTPATIENT)
Dept: PHYSICAL THERAPY | Facility: CLINIC | Age: 48
End: 2023-01-16

## 2023-01-16 DIAGNOSIS — M84.48XA PATHOLOGICAL FRACTURE OF CERVICAL VERTEBRA, INITIAL ENCOUNTER: ICD-10-CM

## 2023-01-16 DIAGNOSIS — S12.301A CLOSED NONDISPLACED FRACTURE OF FOURTH CERVICAL VERTEBRA, UNSPECIFIED FRACTURE MORPHOLOGY, INITIAL ENCOUNTER (HCC): Primary | ICD-10-CM

## 2023-01-16 NOTE — PROGRESS NOTES
Progress Note     Today's date: 2023  Patient name: Mukul Mcbride  : 1975  MRN: [id number]  Referring provider: Leslie Ignacio MD  Dx:   Encounter Diagnosis     ICD-10-CM    1  Closed nondisplaced fracture of fourth cervical vertebra, unspecified fracture morphology, [facility] )  S12 301A       2  Pathological fracture of cervical vertebra, initial encounter  M84 48XA           Start Time: 1615  Stop Time: 1700  Total time in clinic (min): 45 minutes    Subjective: The patient presents for re-evaluation today  The patient reports improvement in symptoms since beginning skilled physical therapy  The patient reports 3/10 pain at it's worst over the past 24 hours, and reports 85% improvement in overall condition since beginning formal PT care  No significant issues reported with current workload and schedule  Plans to increase work week from 20 hrs to 30 hrs at the beginning of February  Tolerance to work day has significantly improved over the last few weeks  The patient's chief remaining concern is further improving mobility, strength, and endurance  Objective:    Active Range of Motion     Cervical    Flexion: 25 degrees   Extension: 55 degrees   Left lateral flexion: 35 degrees   Right lateral flexion: 35 degrees   Left rotation: 50 degrees  Right rotation: 50 degrees       Left Shoulder   Flexion: 160degrees   External rotation BTH: T4   Internal rotation BTB: T7    Right Shoulder   Flexion: 160 degrees   External rotation BTH: T4   Internal rotation BTB: T7     Strength/Myotome Testing   Cervical Spine   Neck extension: 4+  Neck flexion: 4+    Left   Neck lateral flexion (C3): 5    Right   Neck lateral flexion (C3): 5    Left Shoulder     Planes of Motion   Flexion: 5  Extension: 5   Abduction: 5  External rotation at 0°: 5  Internal rotation at 0°: 5    Right Shoulder     Planes of Motion   Flexion: 5  Extension: 5   Abduction: 5  External rotation at 0°: 5  Internal Subjective:     Patient ID: Jose Barber is a 46 y.o. male.    Chief Complaint:  Follow-up right shoulder pain, follow-up MRI  History of Present Illness  Jose Barber  returns to the clinic today for follow up evaluation of right shoulder pain and review of MRI results. He is doing well over all at this point and has noticed significant improvement of strength. The patient reports minimal residual tenderness primarily localized to the anterolateral aspect of the right shoulder that he rates a 2 to 3 out of 10. He also notes experiencing mild stiffness of his shoulder that shortly dissipated and loosened up within 2- 3 days. The patient had significant improvement with Medrol Dosepak and Tylenol.     Social History     Occupational History   • Not on file   Tobacco Use   • Smoking status: Never Smoker   • Smokeless tobacco: Never Used   Vaping Use   • Vaping Use: Never used   Substance and Sexual Activity   • Alcohol use: Yes     Alcohol/week: 1.0 standard drink     Types: 1 Cans of beer per week     Comment: occasional   • Drug use: Never   • Sexual activity: Yes      Past Medical History:   Diagnosis Date   • Elevated cholesterol    • Stroke (HCC)     TIA   • TIA (transient ischemic attack)     3 years ago     Past Surgical History:   Procedure Laterality Date   • BASAL CELL CARCINOMA EXCISION     • KNEE ARTHROSCOPY W/ ACL RECONSTRUCTION     • SHOULDER ARTHROSCOPY W/ ROTATOR CUFF REPAIR Left 1/20/2021    Procedure: SHOULDER ARTHROSCOPY, rotator cuff repair with bio-inductive implant, open biceps tenodesis, extensive debridement;  Surgeon: Pete Zuñiga MD;  Location: [facility];  Service: Orthopedics;  Laterality: Left;       Family History   Problem Relation Age of Onset   • Melanoma Maternal Grandmother    • Cancer Maternal Grandmother    • Heart attack Maternal Grandfather    • Heart attack Paternal Grandfather    • Cancer Paternal Grandfather    • Migraines Mother          Review of Systems     "    Objective:  Vitals:    11/17/21 0824   Weight: 83.9 kg (185 lb)   Height: 175.3 cm (69\")         11/17/21 0824   Weight: 83.9 kg (185 lb)     Body mass index is 27.32 kg/m².  Physical Exam    Vital signs reviewed.   General: No acute distress, alert and oriented  Eyes: conjunctiva clear; pupils equally round and reactive  ENT: external ears and nose atraumatic; oropharynx clear  CV: no peripheral edema  Resp: normal respiratory effort  Skin: no rashes or wounds; normal turgor  Psych: mood and affect appropriate; recent and remote memory intact        Ortho Exam     Right shoulder-  Minimal tenderness over AC   FF- Active- 155  Strength- 4/5  ER- 55  Strength- 4+/5  IR- T12  Strength- 4+/5  Bear hug sign-Mildly positive  Cross arm test- Negative   Tenderness over AC joint- Minimal     Yergason- Mildly positive  Speeds- Mildly positive  O’Scar’s- Moderately positive  Brisk cap refill to all digits, 2+ radial pulse   Positive sensation to light in all distributions of the right hand, symmetric to left.       Imaging:  Reviewed outside MRI right shoulder from [facility] including review of images as well as radiology report indicates partial-thickness interstitial tearing of the subscapularis with some evidence of mineralization in this region, moderate subacromial bursitis appreciated, mild degenerative change the AC joint noted.  Biceps tendon appears to be grossly intact with degenerative tearing of superior labrum consistent with SLAP lesion    Assessment:        1. Incomplete tear of right rotator cuff, unspecified whether traumatic    2. Subacromial impingement of right shoulder    3. Superior glenoid labrum lesion of right shoulder, initial encounter           Plan:        Plan:  1.  Discussed results from MRI with him at length including the findings of partial thickness interstitial tear of his subscapular. We also discussed findings of the SLAP lesion.  2.  I recommended continuing with exercises to " rotation at 0°: 5     Assessment: Rebel Honeycutt is a pleasant 49 y o  female who has been receiving PT intervention for neck and thoracic region pain following compression fracture due to bone metastasis  The patient has demonstrated decreased pain, increased strength, increased ROM, increased joint mobility, improved body mechanics, improved posture  and increased activity tolerance since beginning treatment  Primary remaining impairments include:    1)  Decreased ROM in the cervical region, primarily cervical rotation and extension     2)  Decreased postural endurance     Goals  Short Term Goals 2-4 wks   1  Pt will be independent with initial HEP - MET  2  Pt will improve C/S ROM 25% in all planes -MET  3  Pt will improve deficient mm strength 1/2 grade -MET    Long Term Goals 6-8 wks  1  Pt will improve cervical ROM to WNL -Partially MET   2  Pt will return to driving without limitations -MET  3  Pt will improve FOTO score to >64 by d/c- MET    Updated Goals 4-6 wks  1  Pt will RTW with full schedule   2  Pt will improve cervical rotation 5-10* each direction   3  Pt will report no limitations with ADLs or functional activities     Plan:   Patient would benefit from: skilled physical therapy  Planned modality interventions: Modalities PRN  Planned therapy interventions: activity modification, manual therapy, neuromuscular re-education, patient education, therapeutic activities, therapeutic exercise, graded activity, home exercise program, behavior modification, self care, abdominal trunk stabilization and postural training  Frequency: 2x week  Duration in weeks: 4  Treatment plan discussed with: patient       Precautions:  Active Breast CA w/ Metastasis to Spine, HTN    Access Code: [email or web address]     Manuals 12/13 12/20 1/4 1/9 1/12 1/16   STM B/L UT, LS, Rhomboids  IASTM KCB IASTM CIC LH IASTM KCB IASTM KCB IASTM KCB                     Neuro Re-Ed   1/4      C/S Retract  20x  20x        TB T 30x BTB 30x BTB 30x, BTB 30x BTB 30x BTB 30x BTB   Scap Protract at Wall  20x at [address] [facility] w/ Posture at wall 10x w/ cane 10x w/ cane 10x w cane 10x w/ cane 20x w/ cane  20x w/ cane    Scap Set at Wall  10x3" 10x3" 10x3" 10x3" 20x3" 20x3"            Education          Ther Ex   1/4      TB Row/Ext  30x Blk TB 30x Blk TB 30x ea, Black TB 30x ea Black TB  30x ea Black TB  30x ea Black TB    T/S Rotation         Shrugs/Rolls 30x 7# 30x 7# 30x, 7# 30x 7# 30x 8# 30x 8#   Bicep Curls 30x 7# 30x 7# 30x, 7# 30x 7# 22x 8# 30x 8#   DB Rows  30x 7# 30x 7# 30x, 7# 30x 7# 30x 8# 30x 8#   Shoulder Flex/Scap 3x10 4# 3x10 4# 3x10 ea, 4# 3x10 ea 4# 3x10 5# 3x10 5#   Shoulder Abd 3x10 3# 3x10 3# 3x10, 3# 3x10 3# 3x10 4# 3x10 4#   OH Shoulder Press  3x10 3# 3x10 3# 3x10, 3# seated 3x10 3# 3x10 4# 3x10 4#   OH Towel Slide 10x  10x  10x 10x 20x 20x            UBE  2'/2' 2'/2' 2'/2' 2'/2' 2'/2'   Ther Activity                           Gait Training                           Modalities focus on improving flexibility and motion. He is cleared to perform light daily functional activity but should avoid resistant activities for about 4 - 6 weeks.   3. I will follow up with the patient as needed. If he experiences any pain or new issues develop he may contact me so that we may schedule an appointment for reevaluation.  4. Follow up: As needed     Jose Barber was in agreement with plan and had all questions answered.     Orders:  No orders of the defined types were placed in this encounter.      Medications:  No orders of the defined types were placed in this encounter.      Followup:  Return if symptoms worsen or fail to improve.    Diagnoses and all orders for this visit:    1. Incomplete tear of right rotator cuff, unspecified whether traumatic (Primary)    2. Subacromial impingement of right shoulder    3. Superior glenoid labrum lesion of right shoulder, initial encounter          Dictated utilizing Dragon dictation     Transcribed from ambient dictation for Pete Zuñiga MD by Brittany Monique.  11/17/21   11:39 EST    Patient verbalized consent to the visit recording.  I have personally performed the services described in this document as transcribed by the above individual, and it is both accurate and complete.  Pete Zuñiga MD  11/28/2021  21:31 EST

## 2023-01-19 ENCOUNTER — HOSPITAL ENCOUNTER (OUTPATIENT)
Dept: INFUSION CENTER | Facility: CLINIC | Age: 48
Discharge: HOME | End: 2023-01-19

## 2023-01-19 ENCOUNTER — OFFICE VISIT (OUTPATIENT)
Dept: PHYSICAL THERAPY | Facility: CLINIC | Age: 48
End: 2023-01-19

## 2023-01-19 VITALS
DIASTOLIC BLOOD PRESSURE: 88 MMHG | RESPIRATION RATE: 18 BRPM | HEIGHT: 64 IN | WEIGHT: 161.5 LBS | TEMPERATURE: 97.1 F | OXYGEN SATURATION: 99 % | BODY MASS INDEX: 27.57 KG/M2 | SYSTOLIC BLOOD PRESSURE: 118 MMHG | HEART RATE: 103 BPM

## 2023-01-19 DIAGNOSIS — C79.51 OSSEOUS METASTASIS (HCC): Primary | ICD-10-CM

## 2023-01-19 DIAGNOSIS — C50.811 MALIGNANT NEOPLASM OF OVERLAPPING SITES OF RIGHT BREAST IN FEMALE, ESTROGEN RECEPTOR NEGATIVE (HCC): ICD-10-CM

## 2023-01-19 DIAGNOSIS — Z17.1 MALIGNANT NEOPLASM OF OVERLAPPING SITES OF RIGHT BREAST IN FEMALE, ESTROGEN RECEPTOR NEGATIVE (HCC): ICD-10-CM

## 2023-01-19 DIAGNOSIS — T45.1X5A CHEMOTHERAPY-INDUCED NEUTROPENIA (HCC): ICD-10-CM

## 2023-01-19 DIAGNOSIS — M84.48XA PATHOLOGICAL FRACTURE OF CERVICAL VERTEBRA, INITIAL ENCOUNTER: ICD-10-CM

## 2023-01-19 DIAGNOSIS — D70.1 CHEMOTHERAPY-INDUCED NEUTROPENIA (HCC): ICD-10-CM

## 2023-01-19 DIAGNOSIS — S12.301A CLOSED NONDISPLACED FRACTURE OF FOURTH CERVICAL VERTEBRA, UNSPECIFIED FRACTURE MORPHOLOGY, INITIAL ENCOUNTER (HCC): Primary | ICD-10-CM

## 2023-01-19 RX ORDER — SODIUM CHLORIDE 9 MG/ML
20 INJECTION, SOLUTION INTRAVENOUS ONCE
Status: COMPLETED | OUTPATIENT
Start: 2023-01-19 | End: 2023-01-19

## 2023-01-19 RX ORDER — SODIUM CHLORIDE 9 MG/ML
20 INJECTION, SOLUTION INTRAVENOUS ONCE
OUTPATIENT
Start: 2023-03-28

## 2023-01-19 RX ORDER — SODIUM CHLORIDE 9 MG/ML
20 INJECTION, SOLUTION INTRAVENOUS ONCE
Status: DISCONTINUED | OUTPATIENT
Start: 2023-01-19 | End: 2023-01-22 | Stop reason: HOSPADM

## 2023-01-19 RX ADMIN — SODIUM CHLORIDE 20 ML/HR: 0.9 INJECTION, SOLUTION INTRAVENOUS at 09:07

## 2023-01-19 RX ADMIN — PERTUZUMAB 420 MG: 30 INJECTION, SOLUTION, CONCENTRATE INTRAVENOUS at 10:00

## 2023-01-19 RX ADMIN — ZOLEDRONIC ACID 4 MG: 4 SOLUTION INTRAVENOUS at 09:24

## 2023-01-19 RX ADMIN — TRASTUZUMAB 450 MG: 150 INJECTION, POWDER, LYOPHILIZED, FOR SOLUTION INTRAVENOUS at 10:35

## 2023-01-19 NOTE — PROGRESS NOTES
Patient to infusion center for Zometa, Perjeta and Herceptin  She offers no complaints  Labs reviewed from 1/3/22  Patient tolerated treatment today without incident  Next appointment confirmed    She declined AVS

## 2023-01-19 NOTE — PROGRESS NOTES
Daily Note     Today's date: 2023  Patient name: Dragan Triplett  : 1975  MRN: [id number]  Referring provider: Karen Whitfield MD  Dx:   Encounter Diagnosis     ICD-10-CM    1  Closed nondisplaced fracture of fourth cervical vertebra, unspecified fracture morphology, [facility]  S12 301A       2  Pathological fracture of cervical vertebra, initial encounter  M84 48XA           Start Time: 1400  Stop Time: 1445  Total time in clinic (min): 45 minutes    Subjective: Pt reports she had less soreness in the cervical region following the previous PT session  Objective: See treatment diary below      Assessment: Tolerated treatment well  Continued with current POC with good tolerance from the patient  No pain reported with exercises  Occasional cuing required for proper exercise technique  Continue to progress as tolerated  Patient would benefit from continued PT      Plan: Continue per plan of care  Precautions:  Active Breast CA w/ Metastasis to Spine, HTN    Access Code: [email or web address]     Manuals    STM B/L UT, LS, Rhomboids  IASTM KCB  LH IASTM KCB IASTM KCB IASTM KCB                     Neuro Re-Ed         C/S Retract          TB T 30x BTB  30x, BTB 30x BTB 30x BTB 30x BTB   Scap Protract at [address] w/ Posture at wall 20x w/ cane   10x w cane 10x w/ cane 20x w/ cane  20x w/ cane    Scap Set at Wall  20x3"  10x3" 10x3" 20x3" 20x3"            Education          Ther Ex         TB Row/Ext  30x ea Black TB   30x ea, Black TB 30x ea Black TB  30x ea Black TB  30x ea Black TB    T/S Rotation         Shrugs/Rolls 30x 8#  30x, 7# 30x 7# 30x 8# 30x 8#   Bicep Curls 30x 8#  30x, 7# 30x 7# 22x 8# 30x 8#   DB Rows  30x 8#  30x, 7# 30x 7# 30x 8# 30x 8#   Shoulder Flex/Scap 3x10 5#  3x10 ea, 4# 3x10 ea 4# 3x10 5# 3x10 5#   Shoulder Abd 3x10 4#  3x10, 3# 3x10 3# 3x10 4# 3x10 4#   OH Shoulder Press  3x10 4#  3x10, 3# seated 3x10 3# 3x10 4# 3x10 4#   OH Towel Slide 20x  10x 10x 20x 20x            UBE 2'/2'  2'/2' 2'/2' 2'/2' 2'/2'   Ther Activity                           Gait Training                           Modalities

## 2023-01-23 ENCOUNTER — OFFICE VISIT (OUTPATIENT)
Dept: PHYSICAL THERAPY | Facility: CLINIC | Age: 48
End: 2023-01-23

## 2023-01-23 DIAGNOSIS — M84.48XA PATHOLOGICAL FRACTURE OF CERVICAL VERTEBRA, INITIAL ENCOUNTER: ICD-10-CM

## 2023-01-23 DIAGNOSIS — C79.49 METASTASIS TO SPINAL CORD (HCC): ICD-10-CM

## 2023-01-23 DIAGNOSIS — Z51.5 PALLIATIVE CARE PATIENT: ICD-10-CM

## 2023-01-23 DIAGNOSIS — S12.301A CLOSED NONDISPLACED FRACTURE OF FOURTH CERVICAL VERTEBRA, UNSPECIFIED FRACTURE MORPHOLOGY, INITIAL ENCOUNTER (HCC): Primary | ICD-10-CM

## 2023-01-23 DIAGNOSIS — G89.3 CANCER ASSOCIATED PAIN: ICD-10-CM

## 2023-01-23 NOTE — PROGRESS NOTES
Daily Note     Today's date: 2023  Patient name: Africa Mccord  : 1975  MRN: [id number]  Referring provider: Erin Dodd MD  Dx:   Encounter Diagnosis     ICD-10-CM    1  Closed nondisplaced fracture of fourth cervical vertebra, unspecified fracture morphology, initial encounter ([facility] )  S12 301A       2  Pathological fracture of cervical vertebra, initial encounter  M84 48XA           Start Time: 1615  Stop Time: 1700  Total time in clinic (min): 45 minutes    Subjective: Pt with no new concerns noted at this time  Reports she had just "a little bit of soreness" following previous PT session  Objective: See treatment diary below      Assessment: Tolerated treatment well  Continued with current POC with good tolerance from the patient  No pain reported with exercises  Occasional cuing required for proper exercise technique  Continue to progress as tolerated  Patient would benefit from continued PT      Plan: Continue per plan of care  Precautions:  Active Breast CA w/ Metastasis to Spine, HTN    Access Code: [email or web address]     Manuals    STM B/L UT, LS, Rhomboids  IASTM KCB IASTM KCB  IASTM KCB IASTM KCB IASTM KCB                     Neuro Re-Ed         C/S Retract          TB T 30x BTB 30x Black TB   30x BTB 30x BTB 30x BTB   Scap Protract at [address] w/ Posture at wall 20x w/ cane  20x w/ cane   10x w/ cane 20x w/ cane  20x w/ cane    Scap Set at Wall  20x3" 20x3"  10x3" 20x3" 20x3"            Education          Ther Ex         TB Row/Ext  30x ea Black TB  30x ea Black TB   30x ea Black TB  30x ea Black TB  30x ea Black TB    T/S Rotation         Shrugs/Rolls 30x 8# 30x 8#  30x 7# 30x 8# 30x 8#   Bicep Curls 30x 8# 30x 8#  30x 7# 22x 8# 30x 8#   DB Rows  30x 8# 30x 8#  30x 7# 30x 8# 30x 8#   Shoulder Flex/Scap 3x10 5# 3x10 5#  3x10 ea 4# 3x10 5# 3x10 5#   Shoulder Abd 3x10 4# 3x10 4#  3x10 3# 3x10 4# 3x10 4#   OH Shoulder Press  3x10 4# 3x10 4# 3x10 3# 3x10 4# 3x10 4#   OH Towel Slide 20x 20x  10x 20x 20x            UBE 2'/2' 2'/2'  2'/2' 2'/2' 2'/2'   Ther Activity                           Gait Training                           Modalities

## 2023-01-23 NOTE — TELEPHONE ENCOUNTER
Primary palliative medicine provider:  Dr Ronen Mar     Medication requested: oxycodone 10 mg tablets and morphine 15 mg 12 hr tablet     If for pain, how has the patient been taking their pain medicine?      Last appointment: 12/01     Next scheduled appointment: 2/9    PDMP review:   PATIENT ID PRESCRIPTION # FILLED WRITTEN DRUG LABEL QTY DAYS STRENGTH MME** PRESCRIBER PHARMACY PAYMENT REFILL #/AUTH STATE DETAIL   1 [id number] 12/20/2022 12/20/2022 Morphine Sulfate (Tablet, Extended Release) 30 0 30 15 MG 15 0 AKRMA GARCIASHAW [facility] Commercial Insurance 0 / 0 PA    1 [id number] 11/23/2022 11/23/2022 oxyCODONE HCL (Tablet) 120 0 20 10 MG 90 0 [facility] Commercial Insurance 0 / 0 PA    1 [id number] 11/23/2022 11/23/2022 Morphine Sulfate (Tablet, Extended Release) 30 0 30 15 MG 15 0 [facility] Commercial Insurance 0 / 0 PA    1 [id number] 10/08/2022 09/30/2022 oxyCODONE HCL (Tablet) 120 0 20 10 MG 90 0 605 Amy Watson Pay 0 / 0 PA            Pharmacy:

## 2023-01-24 RX ORDER — MORPHINE SULFATE 15 MG/1
15 TABLET, FILM COATED, EXTENDED RELEASE ORAL
Qty: 30 TABLET | Refills: 0 | Status: SHIPPED | OUTPATIENT
Start: 2023-01-24

## 2023-01-24 RX ORDER — OXYCODONE HYDROCHLORIDE 10 MG/1
5-10 TABLET ORAL EVERY 4 HOURS PRN
Qty: 120 TABLET | Refills: 0 | Status: SHIPPED | OUTPATIENT
Start: 2023-01-24

## 2023-01-24 NOTE — TELEPHONE ENCOUNTER
Patient usually fills at [facility]  Does she wish to update her pharmacy of record for narcotics?

## 2023-01-26 ENCOUNTER — OFFICE VISIT (OUTPATIENT)
Dept: PHYSICAL THERAPY | Facility: CLINIC | Age: 48
End: 2023-01-26

## 2023-01-26 DIAGNOSIS — S12.301A CLOSED NONDISPLACED FRACTURE OF FOURTH CERVICAL VERTEBRA, UNSPECIFIED FRACTURE MORPHOLOGY, INITIAL ENCOUNTER (HCC): Primary | ICD-10-CM

## 2023-01-26 DIAGNOSIS — M84.48XA PATHOLOGICAL FRACTURE OF CERVICAL VERTEBRA, INITIAL ENCOUNTER: ICD-10-CM

## 2023-01-26 NOTE — PROGRESS NOTES
Daily Note     Today's date: 2023  Patient name: Estefany Kaiser  : 1975  MRN: [id number]  Referring provider: Cesia Muhammad MD  Dx:   Encounter Diagnosis     ICD-10-CM    1  Closed nondisplaced fracture of fourth cervical vertebra, unspecified fracture morphology, initial encounter ([facility] )  S12 301A       2  Pathological fracture of cervical vertebra, initial encounter  M84 48XA           Start Time: 1400  Stop Time: 1440  Total time in clinic (min): 40 minutes    Subjective: Pt with no new concerns noted at this time  Objective: See treatment diary below      Assessment: Tolerated treatment well  Continued with current POC with good tolerance from the patient  Reports recent progression of weights is starting to become a bit easier to perform  No pain reported with exercises  Continue to progress as tolerated  Patient would benefit from continued PT      Plan: Continue per plan of care  Precautions:  Active Breast CA w/ Metastasis to Spine, HTN    Access Code: [email or web address]     Manuals    STM B/L UT, LS, Rhomboids  IASTM KCB IASTM KCB IASTM KCB  IASTM KCB IASTM KCB                     Neuro Re-Ed         C/S Retract          TB T 30x BTB 30x Black TB  30x Black TB   30x BTB 30x BTB   Scap Protract at [address] w/ Posture at wall 20x w/ cane  20x w/ cane  20x w/ cane   20x w/ cane  20x w/ cane    Scap Set at Wall  20x3" 20x3" 20x3"  20x3" 20x3"            Education          Ther Ex         TB Row/Ext  30x ea Black TB  30x ea Black TB  30x ea Black TB   30x ea Black TB  30x ea Black TB    T/S Rotation         Shrugs/Rolls 30x 8# 30x 8# 30x 8#  30x 8# 30x 8#   Bicep Curls 30x 8# 30x 8# 30x 8#  22x 8# 30x 8#   DB Rows  30x 8# 30x 8# 30x 8#  30x 8# 30x 8#   Shoulder Flex/Scap 3x10 5# 3x10 5# 3x10 5#  3x10 5# 3x10 5#   Shoulder Abd 3x10 4# 3x10 4# 3x10 4#  3x10 4# 3x10 4#   OH Shoulder Press  3x10 4# 3x10 4# 3x10 4#  3x10 4# 3x10 4#   OH Towel Slide 20x 20x 20x  20x 20x            UBE 2'/2' 2'/2' 2'/2'  2'/2' 2'/2'   Ther Activity                           Gait Training                           Modalities

## 2023-01-27 DIAGNOSIS — R21 RASH: ICD-10-CM

## 2023-01-27 RX ORDER — NYSTATIN 100000 [USP'U]/G
POWDER TOPICAL 3 TIMES DAILY
Qty: 15 G | Refills: 0 | Status: SHIPPED | OUTPATIENT
Start: 2023-01-27

## 2023-01-30 ENCOUNTER — OFFICE VISIT (OUTPATIENT)
Dept: PHYSICAL THERAPY | Facility: CLINIC | Age: 48
End: 2023-01-30

## 2023-01-30 DIAGNOSIS — S12.301A CLOSED NONDISPLACED FRACTURE OF FOURTH CERVICAL VERTEBRA, UNSPECIFIED FRACTURE MORPHOLOGY, INITIAL ENCOUNTER (HCC): Primary | ICD-10-CM

## 2023-01-30 DIAGNOSIS — M84.48XA PATHOLOGICAL FRACTURE OF CERVICAL VERTEBRA, INITIAL ENCOUNTER: ICD-10-CM

## 2023-01-30 PROBLEM — T36.95XA ANTIBIOTIC-ASSOCIATED DIARRHEA: Status: RESOLVED | Noted: 2022-11-30 | Resolved: 2023-01-30

## 2023-01-30 PROBLEM — K52.1 ANTIBIOTIC-ASSOCIATED DIARRHEA: Status: RESOLVED | Noted: 2022-11-30 | Resolved: 2023-01-30

## 2023-01-30 PROBLEM — N12 PYELONEPHRITIS: Status: RESOLVED | Noted: 2022-11-28 | Resolved: 2023-01-30

## 2023-01-30 NOTE — PROGRESS NOTES
Daily Note     Today's date: 2023  Patient name: Ambrose Samson  : 1975  MRN: [id number]  Referring provider: Gen Pitts MD  Dx:   Encounter Diagnosis     ICD-10-CM    1  Closed nondisplaced fracture of fourth cervical vertebra, unspecified fracture morphology, [facility] ([facility]  S12 301A       2  Pathological fracture of cervical vertebra, initial encounter  M84 48XA           Start Time: 1615  Stop Time: 1655  Total time in clinic (min): 40 minutes    Subjective: Pt with no new concerns noted at this time  Objective: See treatment diary below      Assessment: Tolerated treatment well  Continued with current POC with good tolerance from the patient  No pain reported with exercises  Occasional cuing required for proper exercise technique  Continue to progress as tolerated  Patient would benefit from continued PT      Plan: Continue per plan of care  Precautions:  Active Breast CA w/ Metastasis to Spine, HTN    Access Code: [email or web address]     Manuals    STM B/L UT, LS, Rhomboids  IASTM KCB IASTM KCB IASTM KCB IASTM KCB  IASTM KCB                     Neuro Re-Ed         C/S Retract          TB T 30x BTB 30x Black TB  30x Black TB  30x Black TB   30x BTB   Scap Protract at [address] w/ Posture at wall 20x w/ cane  20x w/ cane  20x w/ cane  20x w/ cane   20x w/ cane    Scap Set at Wall  20x3" 20x3" 20x3" 20x3"  20x3"            Education          Ther Ex         TB Row/Ext  30x ea Black TB  30x ea Black TB  30x ea Black TB  30x ea Black TB   30x ea Black TB    T/S Rotation         Shrugs/Rolls 30x 8# 30x 8# 30x 8# 30x 8#  30x 8#   Bicep Curls 30x 8# 30x 8# 30x 8# 30x 8#  30x 8#   DB Rows  30x 8# 30x 8# 30x 8# 30x 8#  30x 8#   Shoulder Flex/Scap 3x10 5# 3x10 5# 3x10 5# 3x10 5#  3x10 5#   Shoulder Abd 3x10 4# 3x10 4# 3x10 4# 3x10 4#  3x10 4#   OH Shoulder Press  3x10 4# 3x10 4# 3x10 4# 3x10 4#  3x10 4#   OH Towel Slide 20x 20x 20x 20x   20x UBE 2'/2' 2'/2' 2'/2' 2'/2'  2'/2'   Ther Activity                           Gait Training                           Modalities

## 2023-02-01 ENCOUNTER — OFFICE VISIT (OUTPATIENT)
Dept: PHYSICAL THERAPY | Facility: CLINIC | Age: 48
End: 2023-02-01

## 2023-02-01 DIAGNOSIS — M84.48XA PATHOLOGICAL FRACTURE OF CERVICAL VERTEBRA, INITIAL ENCOUNTER: ICD-10-CM

## 2023-02-01 DIAGNOSIS — S12.301A CLOSED NONDISPLACED FRACTURE OF FOURTH CERVICAL VERTEBRA, UNSPECIFIED FRACTURE MORPHOLOGY, INITIAL ENCOUNTER (HCC): Primary | ICD-10-CM

## 2023-02-01 NOTE — PROGRESS NOTES
Daily Note     Today's date: 2023  Patient name: Dragan Triplett  : 1975  MRN: [id number]  Referring provider: Karen Whitfield MD  Dx:   Encounter Diagnosis     ICD-10-CM    1  Closed nondisplaced fracture of fourth cervical vertebra, unspecified fracture morphology, initial encounter ([facility]  S12 301A       2  Pathological fracture of cervical vertebra, initial encounter  M84 48XA           Start Time: 1400  Stop Time: 1440  Total time in clinic (min): 40 minutes    Subjective: Pt with no new concerns noted at this time  Objective: See treatment diary below      Assessment: Tolerated treatment well  Continued with current POC with good tolerance from the patient  No pain reported with exercises  Updated HEP to include exercises the patient can perform while travelling for work  Continue to progress as tolerated  Patient would benefit from continued PT      Plan: Continue per plan of care  Precautions:  Active Breast CA w/ Metastasis to Spine, HTN    Access Code: [email or web address]     Manuals     STM B/L UT, LS, Rhomboids  IASTM KCB IASTM KCB IASTM KCB IASTM KCB IASTM KCB                      Neuro Re-Ed         C/S Retract          TB T 30x BTB 30x Black TB  30x Black TB  30x Black TB  30x Black TB     Scap Protract at [address] w/ Posture at wall 20x w/ cane  20x w/ cane  20x w/ cane  20x w/ cane  20x w/ cane     Scap Set at Wall  20x3" 20x3" 20x3" 20x3" 20x3"             Education          Ther Ex         TB Row/Ext  30x ea Black TB  30x ea Black TB  30x ea Black TB  30x ea Black TB  30x ea Black TB    T/S Rotation         Shrugs/Rolls 30x 8# 30x 8# 30x 8# 30x 8# 30x 8#    Bicep Curls 30x 8# 30x 8# 30x 8# 30x 8# 30x 8#    DB Rows  30x 8# 30x 8# 30x 8# 30x 8# 30x 8#    Shoulder Flex/Scap 3x10 5# 3x10 5# 3x10 5# 3x10 5# 3x10 5#    Shoulder Abd 3x10 4# 3x10 4# 3x10 4# 3x10 4# 3x10 4#    OH Shoulder Press  3x10 4# 3x10 4# 3x10 4# 3x10 4# 3x10 4#    OH Towel Slide 20x 20x 20x 20x  20x             UBE 2'/2' 2'/2' 2'/2' 2'/2' 2'/2'    Ther Activity                           Gait Training                           Modalities

## 2023-02-02 ENCOUNTER — APPOINTMENT (OUTPATIENT)
Dept: PHYSICAL THERAPY | Facility: CLINIC | Age: 48
End: 2023-02-02

## 2023-02-03 ENCOUNTER — TELEPHONE (OUTPATIENT)
Dept: HEMATOLOGY ONCOLOGY | Facility: CLINIC | Age: 48
End: 2023-02-03

## 2023-02-03 NOTE — TELEPHONE ENCOUNTER
Called and left a voice message mentioning that there has been no paper work sent to us  Proivded them with another fax number and mentioned if there were any questions and or concerns to please give the office a call back

## 2023-02-03 NOTE — TELEPHONE ENCOUNTER
Tracey Moon from [employer] called in to see if a report was received from them  It was faxed over on 11/27 but I do not see anything in her chart for this  It was a second opinion written review by one of the physicians at [facility]  [place], can you call and let them know that this was never received      Phone number: [phone]

## 2023-02-06 ENCOUNTER — OFFICE VISIT (OUTPATIENT)
Dept: PHYSICAL THERAPY | Facility: CLINIC | Age: 48
End: 2023-02-06

## 2023-02-06 DIAGNOSIS — S12.301A CLOSED NONDISPLACED FRACTURE OF FOURTH CERVICAL VERTEBRA, UNSPECIFIED FRACTURE MORPHOLOGY, INITIAL ENCOUNTER (HCC): Primary | ICD-10-CM

## 2023-02-06 DIAGNOSIS — M84.48XA PATHOLOGICAL FRACTURE OF CERVICAL VERTEBRA, INITIAL ENCOUNTER: ICD-10-CM

## 2023-02-06 NOTE — PROGRESS NOTES
Daily Note     Today's date: 2023  Patient name: Ambrose Samson  : 1975  MRN: [id number]  Referring provider: Gen Pitts MD  Dx:   Encounter Diagnosis     ICD-10-CM    1  Closed nondisplaced fracture of fourth cervical vertebra, unspecified fracture morphology, [facility]  S12 301A       2  Pathological fracture of cervical vertebra, initial encounter  M84 48XA           Start Time: 0  Stop Time: 0  Total time in clinic (min): 40 minutes    Subjective: Pt with no new concerns noted at this time  Objective: See treatment diary below      Assessment: Tolerated treatment well  Continued with current POC with good tolerance from the patient  No pain reported with exercises  Continue to progress as tolerated  Patient would benefit from continued PT      Plan: Continue per plan of care  Precautions:  Active Breast CA w/ Metastasis to Spine, HTN    Access Code: [email or web address]     Manuals    STM B/L UT, LS, Rhomboids  IASTM KCB IASTM KCB IASTM KCB IASTM KCB IASTM KCB IASTM KCB                     Neuro Re-Ed         C/S Retract          TB T 30x BTB 30x Black TB  30x Black TB  30x Black TB  30x Black TB  30x Black TB    Scap Protract at [address] w/ Posture at wall 20x w/ cane  20x w/ cane  20x w/ cane  20x w/ cane  20x w/ cane  20x w/ cane    Scap Set at Wall  20x3" 20x3" 20x3" 20x3" 20x3" 20x3"            Education          Ther Ex         TB Row/Ext  30x ea Black TB  30x ea Black TB  30x ea Black TB  30x ea Black TB  30x ea Black TB 30x ea Black TB   T/S Rotation         Shrugs/Rolls 30x 8# 30x 8# 30x 8# 30x 8# 30x 8# 30x 8#   Bicep Curls 30x 8# 30x 8# 30x 8# 30x 8# 30x 8# 30x 8#   DB Rows  30x 8# 30x 8# 30x 8# 30x 8# 30x 8# 30x 8#   Shoulder Flex/Scap 3x10 5# 3x10 5# 3x10 5# 3x10 5# 3x10 5# 3x10 5#   Shoulder Abd 3x10 4# 3x10 4# 3x10 4# 3x10 4# 3x10 4# 3x10 4#   OH Shoulder Press  3x10 4# 3x10 4# 3x10 4# 3x10 4# 3x10 4# 3x10 4#   OH Towel Slide 20x 20x 20x 20x  20x 20x            UBE 2'/2' 2'/2' 2'/2' 2'/2' 2'/2' 2'/2'   Ther Activity                           Gait Training                           Modalities

## 2023-02-09 ENCOUNTER — OFFICE VISIT (OUTPATIENT)
Dept: PHYSICAL THERAPY | Facility: CLINIC | Age: 48
End: 2023-02-09

## 2023-02-09 ENCOUNTER — HOSPITAL ENCOUNTER (OUTPATIENT)
Dept: INFUSION CENTER | Facility: CLINIC | Age: 48
Discharge: HOME | End: 2023-02-09

## 2023-02-09 ENCOUNTER — OFFICE VISIT (OUTPATIENT)
Dept: PALLIATIVE MEDICINE | Facility: CLINIC | Age: 48
End: 2023-02-09

## 2023-02-09 VITALS
BODY MASS INDEX: 28.19 KG/M2 | HEART RATE: 92 BPM | SYSTOLIC BLOOD PRESSURE: 139 MMHG | RESPIRATION RATE: 18 BRPM | OXYGEN SATURATION: 99 % | WEIGHT: 164.24 LBS | DIASTOLIC BLOOD PRESSURE: 80 MMHG | TEMPERATURE: 97.5 F

## 2023-02-09 VITALS
HEART RATE: 95 BPM | DIASTOLIC BLOOD PRESSURE: 80 MMHG | BODY MASS INDEX: 28.67 KG/M2 | SYSTOLIC BLOOD PRESSURE: 140 MMHG | OXYGEN SATURATION: 99 % | TEMPERATURE: 97.5 F | WEIGHT: 167 LBS

## 2023-02-09 DIAGNOSIS — C50.811 MALIGNANT NEOPLASM OF OVERLAPPING SITES OF RIGHT BREAST IN FEMALE, ESTROGEN RECEPTOR NEGATIVE (HCC): Primary | ICD-10-CM

## 2023-02-09 DIAGNOSIS — Z51.5 PALLIATIVE CARE PATIENT: ICD-10-CM

## 2023-02-09 DIAGNOSIS — Z71.89 ADVANCED CARE PLANNING/COUNSELING DISCUSSION: ICD-10-CM

## 2023-02-09 DIAGNOSIS — Z79.899 MEDICAL MARIJUANA USE: ICD-10-CM

## 2023-02-09 DIAGNOSIS — C79.51 OSSEOUS METASTASIS (HCC): Primary | ICD-10-CM

## 2023-02-09 DIAGNOSIS — S12.301A CLOSED NONDISPLACED FRACTURE OF FOURTH CERVICAL VERTEBRA, UNSPECIFIED FRACTURE MORPHOLOGY, INITIAL ENCOUNTER (HCC): Primary | ICD-10-CM

## 2023-02-09 DIAGNOSIS — Z17.1 MALIGNANT NEOPLASM OF OVERLAPPING SITES OF RIGHT BREAST IN FEMALE, ESTROGEN RECEPTOR NEGATIVE (HCC): ICD-10-CM

## 2023-02-09 DIAGNOSIS — C50.811 MALIGNANT NEOPLASM OF OVERLAPPING SITES OF RIGHT BREAST IN FEMALE, ESTROGEN RECEPTOR NEGATIVE (HCC): ICD-10-CM

## 2023-02-09 DIAGNOSIS — Z17.1 MALIGNANT NEOPLASM OF OVERLAPPING SITES OF RIGHT BREAST IN FEMALE, ESTROGEN RECEPTOR NEGATIVE (HCC): Primary | ICD-10-CM

## 2023-02-09 DIAGNOSIS — C79.51 OSSEOUS METASTASIS (HCC): ICD-10-CM

## 2023-02-09 DIAGNOSIS — K21.9 GASTROESOPHAGEAL REFLUX DISEASE WITHOUT ESOPHAGITIS: ICD-10-CM

## 2023-02-09 DIAGNOSIS — G89.3 CANCER ASSOCIATED PAIN: ICD-10-CM

## 2023-02-09 DIAGNOSIS — M84.48XA PATHOLOGICAL FRACTURE OF CERVICAL VERTEBRA, INITIAL ENCOUNTER: ICD-10-CM

## 2023-02-09 DIAGNOSIS — D70.1 CHEMOTHERAPY-INDUCED NEUTROPENIA (HCC): ICD-10-CM

## 2023-02-09 DIAGNOSIS — R53.0 NEOPLASTIC MALIGNANT RELATED FATIGUE: ICD-10-CM

## 2023-02-09 DIAGNOSIS — T45.1X5A CHEMOTHERAPY-INDUCED NAUSEA: ICD-10-CM

## 2023-02-09 DIAGNOSIS — R11.0 CHEMOTHERAPY-INDUCED NAUSEA: ICD-10-CM

## 2023-02-09 DIAGNOSIS — C79.49 METASTASIS TO SPINAL CORD (HCC): ICD-10-CM

## 2023-02-09 DIAGNOSIS — T45.1X5A CHEMOTHERAPY-INDUCED NEUTROPENIA (HCC): ICD-10-CM

## 2023-02-09 RX ORDER — SODIUM CHLORIDE 9 MG/ML
20 INJECTION, SOLUTION INTRAVENOUS ONCE
Status: COMPLETED | OUTPATIENT
Start: 2023-02-09 | End: 2023-02-09

## 2023-02-09 RX ORDER — PANTOPRAZOLE SODIUM 20 MG/1
20 TABLET, DELAYED RELEASE ORAL
Qty: 30 TABLET | Refills: 2 | Status: SHIPPED | OUTPATIENT
Start: 2023-02-09

## 2023-02-09 RX ADMIN — TRASTUZUMAB 450 MG: 150 INJECTION, POWDER, LYOPHILIZED, FOR SOLUTION INTRAVENOUS at 10:07

## 2023-02-09 RX ADMIN — SODIUM CHLORIDE 20 ML/HR: 0.9 INJECTION, SOLUTION INTRAVENOUS at 08:54

## 2023-02-09 RX ADMIN — PERTUZUMAB 420 MG: 30 INJECTION, SOLUTION, CONCENTRATE INTRAVENOUS at 09:33

## 2023-02-09 NOTE — PROGRESS NOTES
Patient tolerated treatment today without incident and was discharged post, no c/o offered, AVS declined, next cycle 3/2/23

## 2023-02-09 NOTE — PATIENT INSTRUCTIONS
February 2023 Sunday Monday Tuesday Wednesday Thursday Friday Saturday                  1    PT-TREATMENT   2:00 PM   (45 min )   Iban Wood PT   Physical Therapy at [facility] 2     3     4                5     6    PT-TREATMENT   6:15 PM   (45 min )   Iban Wood PT   Physical Therapy at [facility] 7     8     9    INF ONCOLOGY TX-TREATMENT PLAN   8:30 AM   (150 min )   AN INF CHAIR [facility]   1:45 PM   (60 min )   Maye Martinez MD   [address] [facility] [address] [address]    PT-TREATMENT   3:30 PM   (45 min )   Iban Wood PT   Physical Therapy at [facility] 10     11           Cycle 14, Day 1     12     13    PT-TREATMENT   4:00 PM   (45 min )   Iban Wood PT   [facility] Therapy at [facility] 14     15     16     17     18                19     20    PT-TREATMENT   4:00 PM   (45 min )   Iban Wood PT   [facility] Therapy at [facility] 21     22     23    PT-TREATMENT   3:30 PM   (45 min )   Iban Wood PT   [facility] at [facility] 24     25                26     27    PT-TREATMENT   4:00 PM   (45 min )   Iban Wood PT   [facility] Therapy at [facility] 28                                           Treatment Details         2/9/2023 - Cycle 14, Day 1      Chemotherapy: [facility] PROVIDER COMMUNICATION2, PERTUZUMAB IVPB, ONCBCN PROVIDER COMMUNICATION2, TRASTUZUMAB INFUSION        March 2023 Sunday Monday Tuesday Wednesday Thursday Friday Saturday                  1    PT-TREATMENT   3:30 PM   (45 min )   Iban Wood PT   Physical Therapy at [facility] 2    INF ONCOLOGY TX-TREATMENT PLAN   2:00 PM   (150 min )   AN INF CHAIR [address] 3     4           Cycle 15, Day 1     5     6     7     8     9     10     11                12     13     14     15     16     17     18                19     20     21     22 23    INF ONCOLOGY TX-TREATMENT PLAN   1:00 PM   (150 min )   AN INF CHAIR [facility] 24     25           Cycle 16, Day 1     26     27     28     29     30     31                            Treatment Details         3/2/2023 - Cycle 15, Day 1      Chemotherapy: ONCBCN PROVIDER COMMUNICATION2, PERTUZUMAB IVPB, ONCBCN PROVIDER COMMUNICATION2, TRASTUZUMAB INFUSION    3/23/2023 - Cycle 16, Day 1      Chemotherapy: ONCBCN PROVIDER COMMUNICATION2, PERTUZUMAB IVPB, ONCBCN PROVIDER COMMUNICATION2, TRASTUZUMAB INFUSION

## 2023-02-09 NOTE — PROGRESS NOTES
Daily Note     Today's date: 2023  Patient name: Phyllis Wright  : 1975  MRN: [id number]  Referring provider: Parth Mercedes MD  Dx:   Encounter Diagnosis     ICD-10-CM    1  Closed nondisplaced fracture of fourth cervical vertebra, unspecified fracture morphology, [facility]  S12 301A       2  Pathological fracture of cervical vertebra, initial encounter  M84 48XA           Start Time: 1530  Stop Time: 1615  Total time in clinic (min): 45 minutes    Subjective: Pt with no new concerns noted at this time  Objective: See treatment diary below      Assessment: Tolerated treatment well  Continued with current POC with good tolerance from the patient  No pain reported with exercises  Pt reported difficulty with grabbing an object off the ground  Educated the patient on improved mechanics and trialed squats w/ TRX to improve performance  Continue to progress as tolerated  Patient would benefit from continued PT      Plan: Continue per plan of care  Precautions:  Active Breast CA w/ Metastasis to Spine, HTN    Access Code: [email or web address]     Manuals    STM B/L UT, LS, Rhomboids  IASTM KCB  IASTM KCB IASTM KCB IASTM KCB IASTM KCB                     Neuro Re-Ed         C/S Retract          TB T 30x Black TB   30x Black TB  30x Black TB  30x Black TB  30x Black TB    Scap Protract at [address] w/ Posture at wall 20x w/ cane   20x w/ cane  20x w/ cane  20x w/ cane  20x w/ cane    Scap Set at Wall  20x3"  20x3" 20x3" 20x3" 20x3"            Education          Ther Ex         TB Row/Ext  30x ea Black TB  30x ea Black TB  30x ea Black TB  30x ea Black TB 30x ea Black TB   T/S Rotation         Shrugs/Rolls 30x 8#  30x 8# 30x 8# 30x 8# 30x 8#   Bicep Curls 30x 8#  30x 8# 30x 8# 30x 8# 30x 8#   DB Rows  30x 8#  30x 8# 30x 8# 30x 8# 30x 8#   Shoulder Flex/Scap 3x10 5#  3x10 5# 3x10 5# 3x10 5# 3x10 5#   Shoulder Abd 3x10 4#  3x10 4# 3x10 4# 3x10 4# 3x10 4#   OH Shoulder Press  3x10 4#  3x10 4# 3x10 4# 3x10 4# 3x10 4#   OH Towel Slide 20x  20x 20x  20x 20x            UBE 2'/2'  2'/2' 2'/2' 2'/2' 2'/2'   Ther Activity         Squat w/ TRX 20x                          Gait Training                           Modalities

## 2023-02-09 NOTE — PROGRESS NOTES
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    6  Chemotherapy-induced nausea    7  Neoplastic malignant related fatigue    8  Medical marijuana use    9  Palliative care patient    10  Advanced care planning/counseling discussion          • Continue disease-directed cares  • Patient reports good symptom control on her current palliative regimen  o Diarrhea resolved  No longer on antibiotics  No longer needing probiotic   o Continue oxyIR PRN  o Will trial a holiday from MSER 15mg QHS; she may take oxyIR prior to bedtime of if she wakes w/ pain, as directed  If she does well w/o MSER we may consider discontinuing; she may resume QHS dosing if pain is not managed  o Recommend she call to let us know how pain changes  o Continue TDMMJ for pain  Will need to re-certify next visit  o Continue Zofran PRN N/V, may pre-medicate prior to infusion  Effective   o May use baclofen PRN; not needed recently  o Will reduce PPI dose to 20mg qAM  No active GERD, this would be for GI prophylaxis in the setting of malignancy  • Patient expresses a desire to transition pharmacy to [place]  • Patient is happy to have expanded responsibilities at work  Counseled on safe traveling w/ medications  • Separate 20 minutes spent reviewing and completing advanced directives (the [facility] Advanced Directive)  Counseling provided  She feels the [facility] was not appropriate for her needs  All questions answered  Patient wishes to review and discuss w/ family before signing document  Instructions provided   When complete, will sign in front of witnesses and can scan into the chart at a future visit  • Patient has received [address]  vaccinations  • Reviewed notes (PT, Medical Oncology, PCP), labs (1/3/23 Cr 0 60, alb 3 6, Hb 13 0, WBC 2 58, CA 28 29 31 8), imaging + procedures (12/29/22 [facility])  • Return in about 8 weeks (around 4/6/2023)  Likely MMJ re-certification at that visit  • Emotional support provided  • Medication safety issues addressed - no driving under the influence of narcotics (including opioids), watch for adverse effects including AMS or respiratory depression (slowed breathing), keep medications stored in a safe/locked environment, do not use alcohol while opioids or other narcotics are in one's system  Requested Prescriptions     Signed Prescriptions Disp Refills   • pantoprazole (PROTONIX) 20 mg tablet 30 tablet 2     Sig: Take 1 tablet (20 mg total) by mouth daily in the early morning       Medications Discontinued During This Encounter   Medication Reason   • pantoprazole (PROTONIX) 40 mg tablet Reorder   • saccharomyces boulardii (FLORASTOR) 250 mg capsule Therapy completed       Representatives have queried the patient's controlled substance dispensing history in the Prescription Drug Monitoring Program in compliance with regulations before I have prescribed any controlled substances  The prescription history is consistent with prescribed therapy and our practice policies  30+ minutes (separate from and in addition to ACP time detailed above) were spent in this ambulatory visit with greater than 50% of the time spent face to face with patient and significant other Tanner Solorio in counseling or coordination of care including  symptom assessment and management, medication review, medication adjustment, psychosocial support, chart review, imaging review, lab review, advanced directives, medical marijuana, supportive listening and anticipatory guidance  All of the patient's questions were answered during this discussion      SUBJECTIVE:  Chief Complaint Patient presents with   • Cancer   • Cancer Pain   • Fatigue   • Counseling   • Follow-up   • Nausea        HPI    Ally Navarro is a 49 y o  female w/ ER-TX-HER2+ carcinoma of the R breast (diagnosed 3/29/22) metastatic to bone, s/p RT to spine; cancer-related pain  She follows w/ Dr Cory Alvarado (Medical Oncology)  Certified for [facility] in the [facility] on 4/22/22 by Dr Kasia Merino, for cancer-related pain  Plan includes systemic therapy with pertuzumab + trastuzumab  Patient is known to [facility] clinic; seen 12/2/22 for symptom assessment and management, medication review, medication adjustment, psychosocial support, chart review, imaging review, lab review, advanced directives, medical marijuana, opioid titration, supportive listening and anticipatory guidance  Patient states she has been feeling fairly well recently  She states, "I don't live like I'm dying of cancer, I'm living with cancer"  She is more active and has been able to accept more responsibilities (and more travel) at work, which she finds rewarding  She feels her chronic cancer-related pain is well managed w/ MSER 15mg QHS and about 2 doses of oxyIR per day  Pain is 3/10 at time of visit today, focused in her shoulder / arm / spine  She is interested in seeing if she can successfully go without the MSER, as she would like to lighten her opioid burden if possible  Her mood is stable  She notes regular nausea w/ infusions but this is managed by pre-medicating w/ Zofran PO  She is moving her bowels regularly w/o medications  Patient had been working on the [facility] but found that many of the pages didn't really apply to what she wanted  She asks today if there is a more streamlined document, as she does recognize that having an advanced directive in place can be important  Tanner Solorio remains very supportive  PDMP shows no concerns      The following portions of the medical history were reviewed: past medical history, surgical history, problem list, medication list, family history, and social history        Current Outpatient Medications:   •  acetaminophen (TYLENOL) 325 mg tablet, Take 650 mg by mouth every 6 (six) hours as needed for mild pain, Disp: , Rfl:   •  morphine (MS CONTIN) 15 mg 12 hr tablet, Take 1 tablet (15 mg total) by mouth daily at bedtime Max Daily Amount: 15 mg, Disp: 30 tablet, Rfl: 0  •  ondansetron (ZOFRAN) 4 mg tablet, Take 1 tablet (4 mg total) by mouth every 8 (eight) hours as needed for nausea or vomiting, Disp: 9 tablet, Rfl: 20  •  oxyCODONE (ROXICODONE) 10 MG TABS, Take 0 5-1 tablets (5-10 mg total) by mouth every 4 (four) hours as needed for moderate pain or severe pain Max Daily Amount: 60 mg, Disp: 120 tablet, Rfl: 0  •  pantoprazole (PROTONIX) 20 mg tablet, Take 1 tablet (20 mg total) by mouth daily in the early morning, Disp: 30 tablet, Rfl: 2  •  al mag oxide-diphenhydramine-lidocaine viscous (MAGIC MOUTHWASH) 1:1:1 suspension, Swish and spit 10 mL if needed for mouth pain or discomfort, Disp: , Rfl:   •  baclofen 10 mg tablet, TAKE 1/2 TABLET BY MOUTH 3 TIMES A DAY AS NEEDED FOR MUSCLE SPASMS, Disp: 135 tablet, Rfl: 1  •  DPH-Lido-AlHydr-MgHydr-Simeth (First-Mouthwash BLM) SUSP, , Disp: , Rfl:   •  lactulose (CEPHULAC) 10 g packet, Take 1 packet (10 g total) by mouth if needed (constipation), Disp: , Rfl:   •  lisinopril (ZESTRIL) 20 mg tablet, Take 1 tablet (20 mg total) by mouth daily Only to take as needed when BP is >835 systolicallly, Disp: , Rfl:   •  loperamide (IMODIUM) 2 mg capsule, Take 1 capsule (2 mg total) by mouth every 4 (four) hours as needed for diarrhea, Disp: 30 capsule, Rfl: 0  •  nystatin (MYCOSTATIN) 500,000 units/5 mL suspension, Apply 5 mL (500,000 Units total) to the mouth or throat if needed (as needed), Disp: , Rfl:   •  nystatin (MYCOSTATIN) powder, Apply topically 3 (three) times a day, Disp: 15 g, Rfl: 0  •  polyethylene glycol (MIRALAX) 17 g packet, Take 17 g by mouth if needed (constipation), Disp: , Rfl:   •  senna (SENOKOT) 8 6 mg, Take 3 tablets (25 8 mg total) by mouth if needed for constipation Hold for loose stool , Disp: , Rfl:   No current facility-administered medications for this visit  Review of Systems   Constitutional: Positive for activity change (improved)  Negative for appetite change (appetite is "pretty good")  Gastrointestinal: Positive for nausea (with infusions; she pre-medicated w/ Zofran which is effective)  Negative for constipation and diarrhea  Genitourinary: Negative for dysuria  Musculoskeletal: Positive for back pain (at goal), myalgias (at goal) and neck pain (at goal)  Allergic/Immunologic: Positive for immunocompromised state  Psychiatric/Behavioral: Positive for sleep disturbance (d/t toileting needs)  All other systems reviewed and are negative  OBJECTIVE:  /80 (BP Location: Left arm, Cuff Size: Standard)   Pulse 95   Temp 97 5 °F (36 4 °C) (Temporal)   Wt 75 8 kg (167 lb)   LMP 03/28/2022 (Approximate)   SpO2 99%   BMI 28 67 kg/m²   Physical Exam  Vitals reviewed  Constitutional:       General: She is not in acute distress  Appearance: She is well-developed, well-groomed and normal weight  She is not toxic-appearing  HENT:      Head: Normocephalic and atraumatic  Right Ear: External ear normal       Left Ear: External ear normal    Eyes:      General: No scleral icterus  Right eye: No discharge  Left eye: No discharge  Extraocular Movements: Extraocular movements intact  Conjunctiva/sclera: Conjunctivae normal       Pupils: Pupils are equal, round, and reactive to light  Cardiovascular:      Rate and Rhythm: Normal rate  Pulmonary:      Effort: Pulmonary effort is normal  No tachypnea, bradypnea, accessory muscle usage or respiratory distress  Abdominal:      General: There is no distension  Tenderness: There is no guarding  Musculoskeletal:      Cervical back: Normal range of motion  Right lower leg: No edema  Left lower leg: No edema  Skin:     General: Skin is dry  Coloration: Skin is not pale  Neurological:      Mental Status: She is alert and oriented to person, place, and time  Cranial Nerves: No dysarthria or facial asymmetry  Gait: Gait is intact  Psychiatric:         Attention and Perception: Attention normal          Mood and Affect: Mood and affect normal          Speech: Speech normal          Behavior: Behavior normal  Behavior is cooperative  Thought Content: Thought content normal          Cognition and Memory: Cognition and memory normal          Judgment: Judgment normal       Comments: Positive mood  Rao Gonzalez MD  [facility]iative [facility]      Portions of this document may have been created using dictation software and as such some "sound alike" terms may have been generated by the system  Do not hesitate to contact me with any questions or clarifications

## 2023-02-13 ENCOUNTER — OFFICE VISIT (OUTPATIENT)
Dept: PHYSICAL THERAPY | Facility: CLINIC | Age: 48
End: 2023-02-13

## 2023-02-13 DIAGNOSIS — S12.301A CLOSED NONDISPLACED FRACTURE OF FOURTH CERVICAL VERTEBRA, UNSPECIFIED FRACTURE MORPHOLOGY, INITIAL ENCOUNTER (HCC): Primary | ICD-10-CM

## 2023-02-13 DIAGNOSIS — M84.48XA PATHOLOGICAL FRACTURE OF CERVICAL VERTEBRA, INITIAL ENCOUNTER: ICD-10-CM

## 2023-02-13 NOTE — PROGRESS NOTES
Daily Note     Today's date: 2023  Patient name: Riaz Yousif  : 1975  MRN: [id number]  Referring provider: Rizwan Catalan MD  Dx:   Encounter Diagnosis     ICD-10-CM    1  Closed nondisplaced fracture of fourth cervical vertebra, unspecified fracture morphology, [facility]  S12 301A       2  Pathological fracture of cervical vertebra, initial encounter  M84 48XA           Start Time: 1600  Stop Time: 1640  Total time in clinic (min): 40 minutes    Subjective: Pt reports she had some soreness in her thighs after last visit performing squats  No new concerns noted related to the neck  Objective: See treatment diary below      Assessment: Tolerated treatment well  Progressed weight with dumb bell exercises this visit with good tolerance from the patient  Increased challenge reported from the patient, however, no pain  Continue to progress as tolerated  Patient would benefit from continued PT      Plan: Continue per plan of care  Precautions:  Active Breast CA w/ Metastasis to Spine, HTN    Access Code: [email or web address]     Manuals    STM B/L UT, LS, Rhomboids  IASTM KCB IASTM KCB  IASTM KCB IASTM KCB IASTM KCB                     Neuro Re-Ed         C/S Retract          TB T 30x Black TB  30x Black TB   30x Black TB  30x Black TB  30x Black TB    Scap Protract at [address] w/ Posture at wall 20x w/ cane  20x w/ cane   20x w/ cane  20x w/ cane  20x w/ cane    Scap Set at Wall  20x3" 20x3"  20x3" 20x3" 20x3"            Education          Ther Ex         TB Row/Ext  30x ea Black TB 30x ea Black TB  30x ea Black TB  30x ea Black TB 30x ea Black TB   T/S Rotation         Shrugs/Rolls 30x 8# 30x 9#  30x 8# 30x 8# 30x 8#   Bicep Curls 30x 8# 30x 9#  30x 8# 30x 8# 30x 8#   DB Rows  30x 8# 30x 9#  30x 8# 30x 8# 30x 8#   Shoulder Flex/Scap 3x10 5# 3x10 6#  3x10 5# 3x10 5# 3x10 5#   Shoulder Abd 3x10 4# 3x10 5#  3x10 4# 3x10 4# 3x10 4#   OH Shoulder Press 3x10 4# 3x10 5#  3x10 4# 3x10 4# 3x10 4#   OH Towel Slide 20x 20x   20x  20x 20x            UBE 2'/2' 2'/2'  2'/2' 2'/2' 2'/2'   Ther Activity         Squat w/ TRX 20x                          Gait Training                           Modalities

## 2023-02-14 NOTE — PROGRESS NOTES
Pt was seen by Brittnee FREEMAN at [facility] onc office, due to Dr Masood Bennett being ooo  Pt will receive Aranesp due to chemo induced anemia  Adverse events included oral thrush and cutaneous candida  Pt also expierenced lower extremity edema  Conmeds have been updated at this time  Pt weight is 84 4kg today which is within range +/- 10% of baseline weight  Brittnee Cardenas reviewed and signed safety labs  Pt will receive infusion tomorrow with atezolizumab/placebo  Pt will see Dr Masood Bennett 6/8 for cycle 2 day 1  Pt will contact me with any questions  Opioid Pregnancy And Lactation Text: These medications can lead to premature delivery and should be avoided during pregnancy. These medications are also present in breast milk in small amounts.

## 2023-02-16 ENCOUNTER — APPOINTMENT (OUTPATIENT)
Dept: PHYSICAL THERAPY | Facility: CLINIC | Age: 48
End: 2023-02-16

## 2023-02-20 ENCOUNTER — OFFICE VISIT (OUTPATIENT)
Dept: PHYSICAL THERAPY | Facility: CLINIC | Age: 48
End: 2023-02-20

## 2023-02-20 DIAGNOSIS — M84.48XA PATHOLOGICAL FRACTURE OF CERVICAL VERTEBRA, INITIAL ENCOUNTER: ICD-10-CM

## 2023-02-20 DIAGNOSIS — S12.301A CLOSED NONDISPLACED FRACTURE OF FOURTH CERVICAL VERTEBRA, UNSPECIFIED FRACTURE MORPHOLOGY, INITIAL ENCOUNTER (HCC): Primary | ICD-10-CM

## 2023-02-20 NOTE — PROGRESS NOTES
Daily Note     Today's date: 2023  Patient name: Berta Duverney  : 1975  MRN: [id number]  Referring provider: Rajwinder Iverson MD  Dx:   Encounter Diagnosis     ICD-10-CM    1  Closed nondisplaced fracture of fourth cervical vertebra, unspecified fracture morphology, initial encounter ([facility]  S12 301A       2  Pathological fracture of cervical vertebra, initial encounter  M84 48XA           Start Time: 1555  Stop Time: 1640  Total time in clinic (min): 45 minutes    Subjective: Pt travelled for work last week and reported no worsening of symptoms in the neck or upper back  Continued to do exercises as instructed  Objective: See treatment diary below      Assessment: Tolerated treatment well  Continued with current POC with good tolerance from the patient  No pain reported with exercises  Muscular fatigue noted post tx consistent with exercises performed  Continue to progress as tolerated  Patient would benefit from continued PT      Plan: Continue per plan of care  Precautions:  Active Breast CA w/ Metastasis to Spine, HTN    Access Code: [email or web address]     Manuals    STM B/L UT, LS, Rhomboids  IASTM KCB IASTM KCB IASTM KCB  IASTM KCB IASTM KCB                     Neuro Re-Ed         C/S Retract          TB T 30x Black TB  30x Black TB  30x Black TB   30x Black TB  30x Black TB    Scap Protract at [address] w/ Posture at wall 20x w/ cane  20x w/ cane  20x w/ cane  20x w/ cane  20x w/ cane    Scap Set at Wall  20x3" 20x3" 20x3"  20x3" 20x3"            Education          Ther Ex         TB Row/Ext  30x ea Black TB 30x ea Black TB 30x ea Black TB  30x ea Black TB 30x ea Black TB   T/S Rotation         Shrugs/Rolls 30x 8# 30x 9# 30x 9#  30x 8# 30x 8#   Bicep Curls 30x 8# 30x 9# 30x 9#  30x 8# 30x 8#   DB Rows  30x 8# 30x 9# 30x 9#  30x 8# 30x 8#   Shoulder Flex/Scap 3x10 5# 3x10 6# 3x10 6#  3x10 5# 3x10 5#   Shoulder Abd 3x10 4# 3x10 5# 3x10 5#  3x10 4# 3x10 4# OH Shoulder Press  3x10 4# 3x10 5# 3x10 5#  3x10 4# 3x10 4#   OH Towel Slide 20x 20x  20x   20x 20x            UBE 2'/2' 2'/2' 2'/2'  2'/2' 2'/2'   Ther Activity         Squat w/ TRX 20x                          Gait Training                           Modalities

## 2023-02-21 DIAGNOSIS — Z51.5 PALLIATIVE CARE PATIENT: ICD-10-CM

## 2023-02-21 DIAGNOSIS — C79.49 METASTASIS TO SPINAL CORD (HCC): ICD-10-CM

## 2023-02-21 DIAGNOSIS — G89.3 CANCER ASSOCIATED PAIN: ICD-10-CM

## 2023-02-21 RX ORDER — MORPHINE SULFATE 15 MG/1
15 TABLET, FILM COATED, EXTENDED RELEASE ORAL
Qty: 30 TABLET | Refills: 0 | Status: SHIPPED | OUTPATIENT
Start: 2023-02-21

## 2023-02-21 NOTE — TELEPHONE ENCOUNTER
[id number] 01/24/2023 01/24/2023 oxyCODONE HCL (Tablet) 120 0 20 10 MG 90 0 [facility] Commercial Insurance 0 / 0 [place]     1 [id number] 01/24/2023 01/24/2023 Morphine Sulfate (Tablet, Extended Release) 30 0 30 15 MG 15 0 [facility] [place] Insurance 0 / 0 [place]    1                     Next apt 4/14/23

## 2023-02-23 ENCOUNTER — OFFICE VISIT (OUTPATIENT)
Dept: PHYSICAL THERAPY | Facility: CLINIC | Age: 48
End: 2023-02-23

## 2023-02-23 DIAGNOSIS — M84.48XA PATHOLOGICAL FRACTURE OF CERVICAL VERTEBRA, INITIAL ENCOUNTER: ICD-10-CM

## 2023-02-23 DIAGNOSIS — S12.301A CLOSED NONDISPLACED FRACTURE OF FOURTH CERVICAL VERTEBRA, UNSPECIFIED FRACTURE MORPHOLOGY, INITIAL ENCOUNTER (HCC): Primary | ICD-10-CM

## 2023-02-23 NOTE — PROGRESS NOTES
Daily Note     Today's date: 2023  Patient name: Joesph Maurer  : 1975  MRN: [id number]  Referring provider: Carlos Sampson MD  Dx:   Encounter Diagnosis     ICD-10-CM    1  Closed nondisplaced fracture of fourth cervical vertebra, unspecified fracture morphology, initial encounter [facility]  S12 301A       2  Pathological fracture of cervical vertebra, initial encounter  M84 48XA           Start Time: 1530  Stop Time: 1610  Total time in clinic (min): 40 minutes    Subjective: Pt with no new concerns noted at this time  No pain reported at the start of treatment  Objective: See treatment diary below      Assessment: Tolerated treatment well  Continued with current POC with good tolerance from the patient  No pain reported with exercises  Continues to report challenge with dumbbell exercises but is able to complete prescribed repetitions  Continue to progress as tolerated  Patient would benefit from continued PT      Plan: Continue per plan of care  Precautions:  Active Breast CA w/ Metastasis to Spine, HTN    Access Code: [email or web address]     Manuals    STM B/L UT, LS, Rhomboids  IASTM KCB IASTM KCB IASTM KCB IASTM KCB  IASTM KCB                     Neuro Re-Ed         C/S Retract          TB T 30x Black TB  30x Black TB  30x Black TB  30x Black TB   30x Black TB    Scap Protract at [address] w/ Posture at wall 20x w/ cane  20x w/ cane  20x w/ cane 20x w/ cane  20x w/ cane    Scap Set at Wall  20x3" 20x3" 20x3" 20x3"  20x3"            Education          Ther Ex         TB Row/Ext  30x ea Black TB 30x ea Black TB 30x ea Black TB 30x ea Black TB  30x ea Black TB   Shrugs/Rolls 30x 8# 30x 9# 30x 9# 30x 9#  30x 8#   Bicep Curls 30x 8# 30x 9# 30x 9# 30x 9#  30x 8#   DB Rows  30x 8# 30x 9# 30x 9# 30x 9#  30x 8#   Shoulder Flex/Scap 3x10 5# 3x10 6# 3x10 6# 3x10 6#  3x10 5#   Shoulder Abd 3x10 4# 3x10 5# 3x10 5# 3x10 5#  3x10 4#   OH Shoulder Press  3x10 4# 3x10 5# 3x10 5# 3x10 5#  3x10 4#   OH Towel Slide 20x 20x  20x  20x   20x            UBE 2'/2' 2'/2' 2'/2' 2'/2'  2'/2'   Ther Activity         Squat w/ TRX 20x                          Gait Training                           Modalities

## 2023-02-24 NOTE — PROGRESS NOTES
PT Evaluation     Today's date: 2022  Patient name: Durwood Kawasaki  : 1975  MRN: [id number]  Referring provider: Silver Mcclure MD  Dx:   Encounter Diagnosis     ICD-10-CM    1  C4 cervical fracture ([facility] [address] )  S12 300A Ambulatory Referral to Physical Therapy   2  Pathologic fracture of cervical vertebra  M84 48XA Ambulatory Referral to Physical Therapy       Start Time: 1500  Stop Time: 1545  Total time in clinic (min): 45 minutes    Assessment  Assessment details: Durwood Kawasaki is a pleasant 51 y o  female who presents with cervical pain and stiffness with hx of C4 compression fx  The patient's greatest concerns are the pain she is experiencing, worry over not knowing what's wrong and fear of not being able to keep active  No further referral appears necessary at this time based upon examination results  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 resulting in pathoanatomical symptoms of cervical region pain and limiting her ability to drive, exercise or recreation, lift, reach overhead and turn to look over shoulder  Primary Impairments:  1) Decreased ROM in the cervical spine   2) Decreased postural strength and endurance     Etiologic factors include none recalled by the patient  Impairments: abnormal or restricted ROM, activity intolerance, impaired physical strength, lacks appropriate home exercise program, pain with function and poor posture     Symptom irritability: moderateUnderstanding of Dx/Px/POC: good   Prognosis: good  Prognosis details: Positive prognostic indicators include positive attitude toward recovery and good understanding of diagnosis and treatment plan options  Negative prognostic indicators include chronicity of symptoms, hypertension and multiple concurrent orthopedic problems  Goals  Short Term Goals 2-4 wks   1  Pt will be independent with initial HEP   2  Pt will improve C/S ROM 25% in all planes   3   Pt will improve deficient mm strength 1/2 grade     Long Term Goals 6-8 wks  1  Pt will improve cervical ROM to WNL   2  Pt will return to driving without limitations   3  Pt will improve FOTO score to >64 by d/c      Plan  Patient would benefit from: skilled physical therapy  Planned modality interventions: Modalities PRN  Planned therapy interventions: activity modification, manual therapy, neuromuscular re-education, patient education, therapeutic activities, therapeutic exercise, graded activity, home exercise program, behavior modification, self care, abdominal trunk stabilization and postural training  Frequency: 2x week  Duration in weeks: 8  Treatment plan discussed with: patient        Subjective Evaluation    History of Present Illness  Mechanism of injury: Gina Hutton presents with c/c of neck pain with hx of cervical fracture at C4  Pt reports active breast cancer with metastasis to the spine, primarily at C4, T2, and in the sacrum  Was in spinal brace for approx 12 wks due to fx at C4  Began weaning out on 7/5 and has been out of the brace for about a week and a half  Reports stiffness, worse in the morning, and soreness t/o the cervical region and upper back  Currently receiving chemotherapy every Thursday for the next 4 wks then will transition to antibody treatment every 3 wks  Attends cancer support group classes 2x/wk  Pain location: cervical region L>R, left UT and shoulder, descriptors: dull ache, discomfort, stiff, sore   Aggravating factors: sitting for long period of time, constant    Relieving factors: change in position   24hr pain pattern: 3/10 (current), 3/10 (best), 3/10 (worst)   Imaging: x-ray (7/5/22),   Previous treatments: bracing for 12 weeks   Occupation/recreation: on medical leave (plans to return by November 1, works at [employer] in [employer], [employer] work, works from home 3 days per week);    Sleeping: no disturbed sleep due to neck   Patient concerns: return to driving, walking her dog, sitting/relaxing comfortably, prepare to return to work            Objective     Palpation   Left   Tenderness of the cervical paraspinals, levator scapulae, rhomboids and upper trapezius  Right   Tenderness of the cervical paraspinals, levator scapulae and rhomboids  Tenderness   Cervical Spine   Tenderness in the spinous process, left transverse process and right transverse process  Active Range of Motion   Cervical/Thoracic Spine       Cervical    Flexion: 20 degrees   Extension: 20 degrees     with pain  Left lateral flexion: 10 degrees     with pain  Right lateral flexion: 10 degrees      Left rotation: 30 degrees  Right rotation: 30 degrees         Left Shoulder   Flexion: 125 degrees   External rotation BTH: T4   Internal rotation BTB: T10 with pain    Right Shoulder   Flexion: 135 degrees   External rotation BTH: T4   Internal rotation BTB: T7     Strength/Myotome Testing   Cervical Spine   Neck extension: 4  Neck flexion: 4    Left   Neck lateral flexion (C3): 3-    Right   Neck lateral flexion (C3): 3-    Left Shoulder     Planes of Motion   Flexion: 4   Extension: 5   Abduction: 4-   External rotation at 0°: 4   Internal rotation at 0°: 4     Right Shoulder     Planes of Motion   Flexion: 4+   Extension: 5   Abduction: 4   External rotation at 0°: 4   Internal rotation at 0°: 4+     Left Elbow   Flexion: 4  Extension: 4+    Right Elbow   Flexion: 4  Extension: 4+             Precautions:  Active Breast CA w/ Metastasis to Spine, HTN      Manuals 8/4            STM Cervical Region                                        Neuro Re-Ed             C/S Retract              Scap Retract              TB B/L ER                                                     Education  S/S, POC, HEP             Ther Ex             TB Row/Ext              T/S Ext              T/S Rotation             Shrugs/Rolls             UT Stretch              LS Stretch              C/S AROM             C/S Isometrics Ther Activity                                       Gait Training                                       Modalities normal/midline trachea/turns head easily side to side

## 2023-02-27 ENCOUNTER — OFFICE VISIT (OUTPATIENT)
Dept: PHYSICAL THERAPY | Facility: CLINIC | Age: 48
End: 2023-02-27

## 2023-02-27 DIAGNOSIS — S12.301A CLOSED NONDISPLACED FRACTURE OF FOURTH CERVICAL VERTEBRA, UNSPECIFIED FRACTURE MORPHOLOGY, INITIAL ENCOUNTER (HCC): Primary | ICD-10-CM

## 2023-02-27 DIAGNOSIS — M84.48XA PATHOLOGICAL FRACTURE OF CERVICAL VERTEBRA, INITIAL ENCOUNTER: ICD-10-CM

## 2023-02-27 NOTE — PROGRESS NOTES
Daily Note     Today's date: 2023  Patient name: Los Grubbs  : 1975  MRN: [id number]  Referring provider: Anayeli Horvath MD  Dx:   Encounter Diagnosis     ICD-10-CM    1  Closed nondisplaced fracture of fourth cervical vertebra, unspecified fracture morphology, initial encounter ([facility] )  S12 301A       2  Pathological fracture of cervical vertebra, initial encounter  M84 48XA           Start Time: 1600  Stop Time: 1640  Total time in clinic (min): 40 minutes    Subjective: Pt with no new concerns noted at this time  No pain reported in the cervical region at the start of treatment  Objective: See treatment diary below      Assessment: Tolerated treatment well  Continued with current POC with good tolerance from the patient  No pain reported with exercises  Continue to progress as tolerated  Patient would benefit from continued PT      Plan: Continue per plan of care  Precautions:  Active Breast CA w/ Metastasis to Spine, HTN    Access Code: [email or web address]     Manuals     STM B/L UT, LS, Rhomboids  IASTM KCB IASTM KCB IASTM KCB IASTM KCB IASTM KCB                      Neuro Re-Ed         C/S Retract          TB T 30x Black TB  30x Black TB  30x Black TB  30x Black TB  30x Black TB     Scap Protract at [address] w/ Posture at wall 20x w/ cane  20x w/ cane  20x w/ cane 20x w/ cane 20x w/ cane    Scap Set at Wall  20x3" 20x3" 20x3" 20x3" 20x3"             Education          Ther Ex         TB Row/Ext  30x ea Black TB 30x ea Black TB 30x ea Black TB 30x ea Black TB 30x ea Black TB    Shrugs/Rolls 30x 8# 30x 9# 30x 9# 30x 9# 30x 9#    Bicep Curls 30x 8# 30x 9# 30x 9# 30x 9# 30x 9#    DB Rows  30x 8# 30x 9# 30x 9# 30x 9# 30x 9#    Shoulder Flex/Scap 3x10 5# 3x10 6# 3x10 6# 3x10 6# 3x10 6#    Shoulder Abd 3x10 4# 3x10 5# 3x10 5# 3x10 5# 3x10 5#    OH Shoulder Press  3x10 4# 3x10 5# 3x10 5# 3x10 5# 3x10 5#    OH Towel Slide 20x 20x  20x  20x  20x             UBE 2'/2' 2'/2' 2'/2' 2'/2' 2'/2'    Ther Activity         Squat w/ TRX 20x                          Gait Training                           Modalities

## 2023-03-01 ENCOUNTER — EVALUATION (OUTPATIENT)
Dept: PHYSICAL THERAPY | Facility: CLINIC | Age: 48
End: 2023-03-01

## 2023-03-01 DIAGNOSIS — S12.301A CLOSED NONDISPLACED FRACTURE OF FOURTH CERVICAL VERTEBRA, UNSPECIFIED FRACTURE MORPHOLOGY, INITIAL ENCOUNTER (HCC): Primary | ICD-10-CM

## 2023-03-01 DIAGNOSIS — M84.48XA PATHOLOGICAL FRACTURE OF CERVICAL VERTEBRA, INITIAL ENCOUNTER: ICD-10-CM

## 2023-03-01 NOTE — PROGRESS NOTES
Progress Note & Discharge    Today's date: 3/1/2023  Patient name: Geno Lock  : 1975  MRN: [id number]  Referring provider: Licha Zhu MD  Dx:   Encounter Diagnosis     ICD-10-CM    1  Closed nondisplaced fracture of fourth cervical vertebra, unspecified fracture morphology, initial [facility] ([facility] )  S12 301A       2  Pathological fracture of cervical vertebra, initial encounter  M84 48XA                      Subjective: The patient presents for re-evaluation today  The patient reports improvement in symptoms since beginning skilled physical therapy  The patient reports 0/10 pain at it's worst over the past 24 hours, and reports 90-95% improvement in overall condition since beginning formal PT care  The patient's chief remaining concern is stiffness in the lower cervical and upper thoracic region  Pt currently reports no limitations with daily or functional activities  Reports no difficulty turning her head side to side while driving  Occasionally reports stiffness after working longer hours but this typically resolves with rest or stretching  Objective:      Active Range of Motion     Cervical    Flexion: 25 degrees   Extension: 55 degrees   Left lateral flexion: 35 degrees   Right lateral flexion: 35 degrees   Left rotation: 50 degrees  Right rotation: 50 degrees       Left Shoulder   Flexion: 160 degrees   External rotation BTH: T4   Internal rotation BTB: T7    Right Shoulder   Flexion: 160 degrees   External rotation BTH: T4   Internal rotation BTB: T7     Strength/Myotome Testing   Cervical Spine   Neck extension: 4+  Neck flexion: 4+    Left   Neck lateral flexion (C3): 5    Right   Neck lateral flexion (C3): 5    Left Shoulder     Planes of Motion   Flexion: 5  Extension: 5   Abduction: 5  External rotation at 0°: 5  Internal rotation at 0°: 5    Right Shoulder     Planes of Motion   Flexion: 5  Extension: 5   Abduction: 5  External rotation at 0°: 5  Internal rotation at 0°: 5 Assessment: Ricardo Guerra is a pleasant 49 y o  female who has been receiving PT intervention for cervical region pain/stiffness following bone metastasis  The patient has demonstrated decreased pain, increased strength, increased ROM, improved body mechanics, improved posture  and increased activity tolerance since beginning treatment  Pt has progressed well with PT POC and returned to all previous activities without limitation  Pt has also returned to work and travelling without significant increase in symptoms  Pt agreeable to continue self management of symptoms with HEP and will contact clinic if additional PT services are required  Primary remaining impairments include:    1)  None       Goals  Short Term Goals 2-4 wks   1  Pt will be independent with initial HEP - MET  2  Pt will improve C/S ROM 25% in all planes -MET  3  Pt will improve deficient mm strength 1/2 grade -MET    Long Term Goals 6-8 wks  1  Pt will improve cervical ROM to WNL -MET   2  Pt will return to driving without limitations -MET  3  Pt will improve FOTO score to >64 by d/c- MET      Plan: D/C PT and continue with HEP  Precautions:  Active Breast CA w/ Metastasis to Spine, HTN    Access Code: [email or web address]     Manuals 2/9 2/13 2/20 2/23 2/27 3/1   STM B/L UT, LS, Rhomboids  IASTM KCB IASTM KCB IASTM KCB IASTM KCB IASTM KCB IASTM KCB                     Neuro Re-Ed         C/S Retract          TB T 30x Black TB  30x Black TB  30x Black TB  30x Black TB  30x Black TB  30x Black TB   OH Reach w/ Posture at wall 20x w/ cane  20x w/ cane  20x w/ cane 20x w/ cane 20x w/ cane 20x w/ cane   Scap Set at Wall  20x3" 20x3" 20x3" 20x3" 20x3" 20x3"            Education          Ther Ex         TB Row/Ext  30x ea Black TB 30x ea Black TB 30x ea Black TB 30x ea Black TB 30x ea Black TB 30x ea Black TB   Shrugs/Rolls 30x 8# 30x 9# 30x 9# 30x 9# 30x 9# 30x 9#   Bicep Curls 30x 8# 30x 9# 30x 9# 30x 9# 30x 9# 30x 9#   DB Rows  30x 8# 30x 9# 30x 9# 30x 9# 30x 9# 30x 9#   Shoulder Flex/Scap 3x10 5# 3x10 6# 3x10 6# 3x10 6# 3x10 6# 3x10 6#   Shoulder Abd 3x10 4# 3x10 5# 3x10 5# 3x10 5# 3x10 5# 3x10 5#   OH Shoulder Press  3x10 4# 3x10 5# 3x10 5# 3x10 5# 3x10 5# 3x10 5#   OH Towel Slide 20x 20x  20x  20x  20x 20x            UBE 2'/2' 2'/2' 2'/2' 2'/2' 2'/2' 2'/2'   Ther Activity         Squat w/ TRX 20x                          Gait Training                           Modalities

## 2023-03-02 ENCOUNTER — HOSPITAL ENCOUNTER (OUTPATIENT)
Dept: INFUSION CENTER | Facility: CLINIC | Age: 48
Discharge: HOME | End: 2023-03-02

## 2023-03-02 VITALS
SYSTOLIC BLOOD PRESSURE: 150 MMHG | TEMPERATURE: 97.4 F | DIASTOLIC BLOOD PRESSURE: 86 MMHG | HEART RATE: 94 BPM | HEIGHT: 64 IN | WEIGHT: 169 LBS | RESPIRATION RATE: 16 BRPM | BODY MASS INDEX: 28.85 KG/M2 | OXYGEN SATURATION: 97 %

## 2023-03-02 DIAGNOSIS — T45.1X5A CHEMOTHERAPY-INDUCED NEUTROPENIA (HCC): ICD-10-CM

## 2023-03-02 DIAGNOSIS — C79.51 OSSEOUS METASTASIS (HCC): Primary | ICD-10-CM

## 2023-03-02 DIAGNOSIS — C50.811 MALIGNANT NEOPLASM OF OVERLAPPING SITES OF RIGHT BREAST IN FEMALE, ESTROGEN RECEPTOR NEGATIVE (HCC): ICD-10-CM

## 2023-03-02 DIAGNOSIS — D70.1 CHEMOTHERAPY-INDUCED NEUTROPENIA (HCC): ICD-10-CM

## 2023-03-02 DIAGNOSIS — Z17.1 MALIGNANT NEOPLASM OF OVERLAPPING SITES OF RIGHT BREAST IN FEMALE, ESTROGEN RECEPTOR NEGATIVE (HCC): ICD-10-CM

## 2023-03-02 RX ORDER — SODIUM CHLORIDE 9 MG/ML
20 INJECTION, SOLUTION INTRAVENOUS ONCE
Status: COMPLETED | OUTPATIENT
Start: 2023-03-02 | End: 2023-03-02

## 2023-03-02 RX ADMIN — TRASTUZUMAB 450 MG: 150 INJECTION, POWDER, LYOPHILIZED, FOR SOLUTION INTRAVENOUS at 15:20

## 2023-03-02 RX ADMIN — SODIUM CHLORIDE 20 ML/HR: 0.9 INJECTION, SOLUTION INTRAVENOUS at 14:20

## 2023-03-02 RX ADMIN — PERTUZUMAB 420 MG: 30 INJECTION, SOLUTION, CONCENTRATE INTRAVENOUS at 14:40

## 2023-03-02 NOTE — PROGRESS NOTES
Patient is here for day 1 cycle 15 of projeta/herceptin  Patient offers no complaints at this time  No labs required   Echo 12/29/22 with EF 55%

## 2023-03-23 ENCOUNTER — HOSPITAL ENCOUNTER (OUTPATIENT)
Dept: INFUSION CENTER | Facility: CLINIC | Age: 48
Discharge: HOME | End: 2023-03-23

## 2023-03-23 VITALS
HEART RATE: 93 BPM | SYSTOLIC BLOOD PRESSURE: 109 MMHG | BODY MASS INDEX: 29.35 KG/M2 | DIASTOLIC BLOOD PRESSURE: 74 MMHG | TEMPERATURE: 97.8 F | OXYGEN SATURATION: 97 % | RESPIRATION RATE: 16 BRPM | WEIGHT: 171 LBS

## 2023-03-23 DIAGNOSIS — C79.49 METASTASIS TO SPINAL CORD (HCC): ICD-10-CM

## 2023-03-23 DIAGNOSIS — Z51.5 PALLIATIVE CARE PATIENT: ICD-10-CM

## 2023-03-23 DIAGNOSIS — D70.1 CHEMOTHERAPY-INDUCED NEUTROPENIA (HCC): ICD-10-CM

## 2023-03-23 DIAGNOSIS — T45.1X5A CHEMOTHERAPY-INDUCED NEUTROPENIA (HCC): ICD-10-CM

## 2023-03-23 DIAGNOSIS — C50.811 MALIGNANT NEOPLASM OF OVERLAPPING SITES OF RIGHT BREAST IN FEMALE, ESTROGEN RECEPTOR NEGATIVE (HCC): ICD-10-CM

## 2023-03-23 DIAGNOSIS — C79.51 OSSEOUS METASTASIS: Primary | ICD-10-CM

## 2023-03-23 DIAGNOSIS — G89.3 CANCER ASSOCIATED PAIN: ICD-10-CM

## 2023-03-23 DIAGNOSIS — Z17.1 MALIGNANT NEOPLASM OF OVERLAPPING SITES OF RIGHT BREAST IN FEMALE, ESTROGEN RECEPTOR NEGATIVE (HCC): ICD-10-CM

## 2023-03-23 RX ORDER — MORPHINE SULFATE 15 MG/1
15 TABLET, FILM COATED, EXTENDED RELEASE ORAL
Qty: 30 TABLET | Refills: 0 | Status: SHIPPED | OUTPATIENT
Start: 2023-03-23

## 2023-03-23 RX ORDER — SODIUM CHLORIDE 9 MG/ML
20 INJECTION, SOLUTION INTRAVENOUS ONCE
Status: COMPLETED | OUTPATIENT
Start: 2023-03-23 | End: 2023-03-23

## 2023-03-23 RX ORDER — OXYCODONE HYDROCHLORIDE 10 MG/1
5-10 TABLET ORAL EVERY 4 HOURS PRN
Qty: 120 TABLET | Refills: 0 | Status: SHIPPED | OUTPATIENT
Start: 2023-03-23

## 2023-03-23 RX ADMIN — PERTUZUMAB 420 MG: 30 INJECTION, SOLUTION, CONCENTRATE INTRAVENOUS at 14:16

## 2023-03-23 RX ADMIN — SODIUM CHLORIDE 20 ML/HR: 0.9 INJECTION, SOLUTION INTRAVENOUS at 14:11

## 2023-03-23 RX ADMIN — TRASTUZUMAB 450 MG: 150 INJECTION, POWDER, LYOPHILIZED, FOR SOLUTION INTRAVENOUS at 15:00

## 2023-03-23 NOTE — PROGRESS NOTES
Patient tolerated treatment without incident  Peripheral IV removed  Next appointment confirmed  AVS offered

## 2023-03-23 NOTE — TELEPHONE ENCOUNTER
entries  Search:  Patient Id Prescription #  Filled Written Drug Label Qty Days Strength MME** Prescriber Pharmacy Payment REFILL #/Auth State Detail   1  [id number] 02/21/2023 02/21/2023 Morphine Sulfate (Tablet, Extended Release)  30 0 30 15 MG 15 0 [facility], [facility] 0 / 0 [place]    1  [id number] 01/24/2023 01/24/2023 oxyCODONE HCL (Tablet)  120 0 20 10 MG 15 0 [facility]  Commercial Insurance 0 / 0 [place]    1  [id number] 01/24/2023 01/24/2023 Morphine Sulfate (Tablet, Extended Release)  30 0 30 15 MG 15 0 [facility]  Commercial Insurance 0 / 0 [place]    1  [id number] 12/20/2022 12/20/2022 Morphine Sulfate (Tablet, Extended Release)  30 0 30 15 MG 15 0 [facility]  [place] / 0 [place]    1  [id number] 11/23/2022 11/23/2022 oxyCODONE HCL (Tablet)  120 0 20 10 MG 15 0 [facility] [address]  [place] / 0 [place]    1  [id number] 11/23/2022 11/23/2022 Morphine Sulfate (Tablet, Extended Release)  30 0 30 15 MG 15 0 [facility]  [place] / [place]    1  [id number] 10/08/2022  09/30/2022 oxyCODONE HCL (Tablet)  120 0 20 10 MG 15 0 [facility]  [place] / 0 [place]    1  [id number] 10/08/2022  09/30/2022 Morphine Sulfate (Tablet, Extended Release)  30 0 30 15 MG 15 0 [facility]  [place] / [place]    1  [id number] 10/07/2022  09/30/2022 Morphine Sulfate (Tablet, Extended Release)  30 0 30 15 MG 15 0 [facility]  [place] / [place]    1  [id number] 10/07/2022  09/30/2022 oxyCODONE HCL (Tablet)  120 0 20 10 MG 15 0 [facility]           04/14/23

## 2023-03-23 NOTE — TELEPHONE ENCOUNTER
Reviewed most recent [facility] note, reviewed PDMP  Refilling medications  We had been recommending a holiday from [place] in our last visit; please ask how she is taking the morphine now  Thank you  Sending both to [facility] per [place] task, though MSER was sent to [facility] on 2/21/23

## 2023-04-04 ENCOUNTER — APPOINTMENT (OUTPATIENT)
Dept: LAB | Facility: CLINIC | Age: 48
End: 2023-04-04

## 2023-04-04 DIAGNOSIS — C50.811 MALIGNANT NEOPLASM OF OVERLAPPING SITES OF RIGHT BREAST IN FEMALE, ESTROGEN RECEPTOR NEGATIVE (HCC): ICD-10-CM

## 2023-04-04 DIAGNOSIS — Z17.1 MALIGNANT NEOPLASM OF OVERLAPPING SITES OF RIGHT BREAST IN FEMALE, ESTROGEN RECEPTOR NEGATIVE (HCC): ICD-10-CM

## 2023-04-04 DIAGNOSIS — C79.51 MALIGNANT NEOPLASM METASTATIC TO BONE (HCC): ICD-10-CM

## 2023-04-04 DIAGNOSIS — Z11.4 SCREENING FOR HIV (HUMAN IMMUNODEFICIENCY VIRUS): ICD-10-CM

## 2023-04-04 LAB
ALBUMIN SERPL BCP-MCNC: 3.6 G/DL (ref 3.5–5)
ALP SERPL-CCNC: 68 U/L (ref 46–116)
ALT SERPL W P-5'-P-CCNC: 43 U/L (ref 12–78)
ANION GAP SERPL CALCULATED.3IONS-SCNC: 1 MMOL/L (ref 4–13)
AST SERPL W P-5'-P-CCNC: 16 U/L (ref 5–45)
BASOPHILS # BLD AUTO: 0.02 THOUSANDS/ÂΜL (ref 0–0.1)
BASOPHILS NFR BLD AUTO: 0 % (ref 0–1)
BILIRUB SERPL-MCNC: 0.59 MG/DL (ref 0.2–1)
BUN SERPL-MCNC: 14 MG/DL (ref 5–25)
CALCIUM SERPL-MCNC: 10 MG/DL (ref 8.3–10.1)
CHLORIDE SERPL-SCNC: 108 MMOL/L (ref 96–108)
CO2 SERPL-SCNC: 27 MMOL/L (ref 21–32)
CREAT SERPL-MCNC: 0.68 MG/DL (ref 0.6–1.3)
EOSINOPHIL # BLD AUTO: 0.15 THOUSAND/ÂΜL (ref 0–0.61)
EOSINOPHIL NFR BLD AUTO: 3 % (ref 0–6)
ERYTHROCYTE [DISTWIDTH] IN BLOOD BY AUTOMATED COUNT: 12.7 % (ref 11.6–15.1)
GFR SERPL CREATININE-BSD FRML MDRD: 103 ML/MIN/1.73SQ M
GLUCOSE SERPL-MCNC: 126 MG/DL (ref 65–140)
HCT VFR BLD AUTO: 42.9 % (ref 34.8–46.1)
HGB BLD-MCNC: 13.8 G/DL (ref 11.5–15.4)
IMM GRANULOCYTES # BLD AUTO: 0.01 THOUSAND/UL (ref 0–0.2)
IMM GRANULOCYTES NFR BLD AUTO: 0 % (ref 0–2)
LYMPHOCYTES # BLD AUTO: 0.82 THOUSANDS/ÂΜL (ref 0.6–4.47)
LYMPHOCYTES NFR BLD AUTO: 18 % (ref 14–44)
MCH RBC QN AUTO: 30.5 PG (ref 26.8–34.3)
MCHC RBC AUTO-ENTMCNC: 32.2 G/DL (ref 31.4–37.4)
MCV RBC AUTO: 95 FL (ref 82–98)
MONOCYTES # BLD AUTO: 0.33 THOUSAND/ÂΜL (ref 0.17–1.22)
MONOCYTES NFR BLD AUTO: 7 % (ref 4–12)
NEUTROPHILS # BLD AUTO: 3.27 THOUSANDS/ÂΜL (ref 1.85–7.62)
NEUTS SEG NFR BLD AUTO: 72 % (ref 43–75)
NRBC BLD AUTO-RTO: 0 /100 WBCS
PLATELET # BLD AUTO: 249 THOUSANDS/UL (ref 149–390)
PMV BLD AUTO: 10.3 FL (ref 8.9–12.7)
POTASSIUM SERPL-SCNC: 4.1 MMOL/L (ref 3.5–5.3)
PROT SERPL-MCNC: 7.6 G/DL (ref 6.4–8.4)
RBC # BLD AUTO: 4.52 MILLION/UL (ref 3.81–5.12)
SODIUM SERPL-SCNC: 136 MMOL/L (ref 135–147)
WBC # BLD AUTO: 4.6 THOUSAND/UL (ref 4.31–10.16)

## 2023-04-05 ENCOUNTER — HOSPITAL ENCOUNTER (OUTPATIENT)
Dept: CT IMAGING | Facility: HOSPITAL | Age: 48
Discharge: HOME | End: 2023-04-05
Attending: INTERNAL MEDICINE

## 2023-04-05 DIAGNOSIS — C79.51 MALIGNANT NEOPLASM METASTATIC TO BONE (HCC): ICD-10-CM

## 2023-04-05 DIAGNOSIS — C50.811 MALIGNANT NEOPLASM OF OVERLAPPING SITES OF RIGHT BREAST IN FEMALE, ESTROGEN RECEPTOR NEGATIVE (HCC): ICD-10-CM

## 2023-04-05 DIAGNOSIS — Z17.1 MALIGNANT NEOPLASM OF OVERLAPPING SITES OF RIGHT BREAST IN FEMALE, ESTROGEN RECEPTOR NEGATIVE (HCC): ICD-10-CM

## 2023-04-05 LAB
CANCER AG27-29 SERPL-ACNC: 34.9 U/ML (ref 0–42.3)
HIV 1+2 AB+HIV1 P24 AG SERPL QL IA: NORMAL
HIV 2 AB SERPL QL IA: NORMAL
HIV1 AB SERPL QL IA: NORMAL
HIV1 P24 AG SERPL QL IA: NORMAL

## 2023-04-05 RX ADMIN — IOHEXOL 100 ML: 350 INJECTION, SOLUTION INTRAVENOUS at 08:45

## 2023-04-13 PROBLEM — F11.20 CONTINUOUS OPIOID DEPENDENCE (HCC): Status: ACTIVE | Noted: 2023-04-13

## 2023-04-13 PROBLEM — G95.20 UNSPECIFIED CORD COMPRESSION (HCC): Status: ACTIVE | Noted: 2023-04-13

## 2023-04-14 PROBLEM — K21.9 GERD (GASTROESOPHAGEAL REFLUX DISEASE): Status: ACTIVE | Noted: 2023-04-14

## 2023-05-04 ENCOUNTER — HOSPITAL ENCOUNTER (OUTPATIENT)
Dept: INFUSION CENTER | Facility: CLINIC | Age: 48
Discharge: HOME | End: 2023-05-04

## 2023-05-04 VITALS
HEART RATE: 96 BPM | RESPIRATION RATE: 18 BRPM | HEIGHT: 64 IN | WEIGHT: 178 LBS | DIASTOLIC BLOOD PRESSURE: 83 MMHG | OXYGEN SATURATION: 97 % | SYSTOLIC BLOOD PRESSURE: 118 MMHG | BODY MASS INDEX: 30.39 KG/M2 | TEMPERATURE: 97.9 F

## 2023-05-04 DIAGNOSIS — C79.51 MALIGNANT NEOPLASM METASTATIC TO BONE (HCC): ICD-10-CM

## 2023-05-04 DIAGNOSIS — D70.1 CHEMOTHERAPY-INDUCED NEUTROPENIA (HCC): Primary | ICD-10-CM

## 2023-05-04 DIAGNOSIS — Z17.1 MALIGNANT NEOPLASM OF OVERLAPPING SITES OF RIGHT BREAST IN FEMALE, ESTROGEN RECEPTOR NEGATIVE (HCC): ICD-10-CM

## 2023-05-04 DIAGNOSIS — C50.811 MALIGNANT NEOPLASM OF OVERLAPPING SITES OF RIGHT BREAST IN FEMALE, ESTROGEN RECEPTOR NEGATIVE (HCC): ICD-10-CM

## 2023-05-04 DIAGNOSIS — T45.1X5A CHEMOTHERAPY-INDUCED NEUTROPENIA (HCC): Primary | ICD-10-CM

## 2023-05-04 RX ORDER — SODIUM CHLORIDE 9 MG/ML
20 INJECTION, SOLUTION INTRAVENOUS ONCE
Status: COMPLETED | OUTPATIENT
Start: 2023-05-04 | End: 2023-05-04

## 2023-05-04 RX ADMIN — SODIUM CHLORIDE 20 ML/HR: 0.9 INJECTION, SOLUTION INTRAVENOUS at 14:17

## 2023-05-04 RX ADMIN — PERTUZUMAB 420 MG: 30 INJECTION, SOLUTION, CONCENTRATE INTRAVENOUS at 14:20

## 2023-05-04 RX ADMIN — TRASTUZUMAB 482 MG: 150 INJECTION, POWDER, LYOPHILIZED, FOR SOLUTION INTRAVENOUS at 14:51

## 2023-05-04 NOTE — PROGRESS NOTES
Pt here for Pertuzumab + Trastuzumab  Pt offered no complaints today  EF 55% 12/29/22  Within parameter for treatment today  Pt resting in chair with call bell in place

## 2023-05-22 DIAGNOSIS — Z51.5 PALLIATIVE CARE PATIENT: ICD-10-CM

## 2023-05-22 DIAGNOSIS — K21.9 GASTROESOPHAGEAL REFLUX DISEASE WITHOUT ESOPHAGITIS: ICD-10-CM

## 2023-05-22 DIAGNOSIS — C79.49 METASTASIS TO SPINAL CORD (HCC): ICD-10-CM

## 2023-05-22 DIAGNOSIS — G89.3 CANCER ASSOCIATED PAIN: ICD-10-CM

## 2023-05-22 RX ORDER — OXYCODONE HYDROCHLORIDE 10 MG/1
5-10 TABLET ORAL EVERY 4 HOURS PRN
Qty: 120 TABLET | Refills: 0 | Status: SHIPPED | OUTPATIENT
Start: 2023-05-22

## 2023-05-22 RX ORDER — MORPHINE SULFATE 15 MG/1
15 TABLET, FILM COATED, EXTENDED RELEASE ORAL
Qty: 30 TABLET | Refills: 0 | Status: SHIPPED | OUTPATIENT
Start: 2023-05-22

## 2023-05-22 RX ORDER — PANTOPRAZOLE SODIUM 20 MG/1
20 TABLET, DELAYED RELEASE ORAL
Qty: 30 TABLET | Refills: 2 | Status: SHIPPED | OUTPATIENT
Start: 2023-05-22

## 2023-05-22 NOTE — TELEPHONE ENCOUNTER
Primary palliative medicine provider: Dr Edenilson Davis    Medication requested:pantoprazole 20mg, morphine 15mg oxycodone 10mg         Next scheduled appointment:  7/14/23    [facility]    She would like to  by tomorrow because she is going away on Wednesday

## 2023-05-23 ENCOUNTER — HOSPITAL ENCOUNTER (OUTPATIENT)
Dept: INFUSION CENTER | Facility: CLINIC | Age: 48
Discharge: HOME | End: 2023-05-23

## 2023-05-23 VITALS
HEART RATE: 68 BPM | WEIGHT: 181.5 LBS | RESPIRATION RATE: 18 BRPM | DIASTOLIC BLOOD PRESSURE: 89 MMHG | SYSTOLIC BLOOD PRESSURE: 130 MMHG | TEMPERATURE: 97.5 F | BODY MASS INDEX: 31.15 KG/M2 | OXYGEN SATURATION: 97 %

## 2023-05-23 DIAGNOSIS — T45.1X5A CHEMOTHERAPY-INDUCED NEUTROPENIA (HCC): Primary | ICD-10-CM

## 2023-05-23 DIAGNOSIS — D70.1 CHEMOTHERAPY-INDUCED NEUTROPENIA (HCC): Primary | ICD-10-CM

## 2023-05-23 DIAGNOSIS — Z17.1 MALIGNANT NEOPLASM OF OVERLAPPING SITES OF RIGHT BREAST IN FEMALE, ESTROGEN RECEPTOR NEGATIVE (HCC): ICD-10-CM

## 2023-05-23 DIAGNOSIS — C79.51 MALIGNANT NEOPLASM METASTATIC TO BONE (HCC): ICD-10-CM

## 2023-05-23 DIAGNOSIS — C50.811 MALIGNANT NEOPLASM OF OVERLAPPING SITES OF RIGHT BREAST IN FEMALE, ESTROGEN RECEPTOR NEGATIVE (HCC): ICD-10-CM

## 2023-05-23 RX ORDER — SODIUM CHLORIDE 9 MG/ML
20 INJECTION, SOLUTION INTRAVENOUS ONCE
Status: COMPLETED | OUTPATIENT
Start: 2023-05-23 | End: 2023-05-23

## 2023-05-23 RX ADMIN — SODIUM CHLORIDE 20 ML/HR: 0.9 INJECTION, SOLUTION INTRAVENOUS at 13:45

## 2023-05-23 RX ADMIN — PERTUZUMAB 420 MG: 30 INJECTION, SOLUTION, CONCENTRATE INTRAVENOUS at 14:10

## 2023-05-23 RX ADMIN — TRASTUZUMAB 482 MG: 150 INJECTION, POWDER, LYOPHILIZED, FOR SOLUTION INTRAVENOUS at 14:48

## 2023-06-14 DIAGNOSIS — R03.0 ELEVATED BP WITHOUT DIAGNOSIS OF HYPERTENSION: ICD-10-CM

## 2023-06-15 ENCOUNTER — HOSPITAL ENCOUNTER (OUTPATIENT)
Dept: INFUSION CENTER | Facility: CLINIC | Age: 48
Discharge: HOME | End: 2023-06-15
Payer: COMMERCIAL

## 2023-06-15 VITALS
WEIGHT: 183 LBS | HEIGHT: 64 IN | RESPIRATION RATE: 18 BRPM | OXYGEN SATURATION: 96 % | BODY MASS INDEX: 31.24 KG/M2 | TEMPERATURE: 97.2 F | DIASTOLIC BLOOD PRESSURE: 89 MMHG | SYSTOLIC BLOOD PRESSURE: 130 MMHG | HEART RATE: 92 BPM

## 2023-06-15 DIAGNOSIS — C79.51 MALIGNANT NEOPLASM METASTATIC TO BONE (HCC): ICD-10-CM

## 2023-06-15 DIAGNOSIS — D70.1 CHEMOTHERAPY-INDUCED NEUTROPENIA (HCC): Primary | ICD-10-CM

## 2023-06-15 DIAGNOSIS — C50.811 MALIGNANT NEOPLASM OF OVERLAPPING SITES OF RIGHT BREAST IN FEMALE, ESTROGEN RECEPTOR NEGATIVE (HCC): ICD-10-CM

## 2023-06-15 DIAGNOSIS — T45.1X5A CHEMOTHERAPY-INDUCED NEUTROPENIA (HCC): Primary | ICD-10-CM

## 2023-06-15 DIAGNOSIS — Z17.1 MALIGNANT NEOPLASM OF OVERLAPPING SITES OF RIGHT BREAST IN FEMALE, ESTROGEN RECEPTOR NEGATIVE (HCC): ICD-10-CM

## 2023-06-15 PROCEDURE — 96413 CHEMO IV INFUSION 1 HR: CPT

## 2023-06-15 PROCEDURE — 96417 CHEMO IV INFUS EACH ADDL SEQ: CPT

## 2023-06-15 RX ORDER — LISINOPRIL 20 MG/1
20 TABLET ORAL DAILY
Qty: 90 TABLET | Refills: 0 | Status: SHIPPED | OUTPATIENT
Start: 2023-06-15

## 2023-06-15 RX ORDER — SODIUM CHLORIDE 9 MG/ML
20 INJECTION, SOLUTION INTRAVENOUS ONCE
Status: COMPLETED | OUTPATIENT
Start: 2023-06-15 | End: 2023-06-15

## 2023-06-15 RX ADMIN — SODIUM CHLORIDE 20 ML/HR: 0.9 INJECTION, SOLUTION INTRAVENOUS at 14:43

## 2023-06-15 RX ADMIN — TRASTUZUMAB 482 MG: 150 INJECTION, POWDER, LYOPHILIZED, FOR SOLUTION INTRAVENOUS at 15:19

## 2023-06-15 RX ADMIN — PERTUZUMAB 420 MG: 30 INJECTION, SOLUTION, CONCENTRATE INTRAVENOUS at 14:46

## 2023-06-21 DIAGNOSIS — Z51.5 PALLIATIVE CARE PATIENT: ICD-10-CM

## 2023-06-21 DIAGNOSIS — K21.9 GASTROESOPHAGEAL REFLUX DISEASE WITHOUT ESOPHAGITIS: ICD-10-CM

## 2023-06-21 DIAGNOSIS — C79.49 METASTASIS TO SPINAL CORD (HCC): ICD-10-CM

## 2023-06-21 DIAGNOSIS — G89.3 CANCER ASSOCIATED PAIN: ICD-10-CM

## 2023-06-22 RX ORDER — MORPHINE SULFATE 15 MG/1
15 TABLET, FILM COATED, EXTENDED RELEASE ORAL
Qty: 30 TABLET | Refills: 0 | Status: SHIPPED | OUTPATIENT
Start: 2023-06-22

## 2023-06-22 RX ORDER — PANTOPRAZOLE SODIUM 20 MG/1
20 TABLET, DELAYED RELEASE ORAL
Qty: 30 TABLET | Refills: 2 | Status: SHIPPED | OUTPATIENT
Start: 2023-06-22

## 2023-07-06 ENCOUNTER — HOSPITAL ENCOUNTER (OUTPATIENT)
Dept: INFUSION CENTER | Facility: CLINIC | Age: 48
Discharge: HOME | End: 2023-07-06
Payer: COMMERCIAL

## 2023-07-06 VITALS
OXYGEN SATURATION: 95 % | TEMPERATURE: 98.1 F | DIASTOLIC BLOOD PRESSURE: 84 MMHG | WEIGHT: 188 LBS | RESPIRATION RATE: 18 BRPM | BODY MASS INDEX: 32.27 KG/M2 | HEART RATE: 98 BPM | SYSTOLIC BLOOD PRESSURE: 110 MMHG

## 2023-07-06 DIAGNOSIS — Z17.1 MALIGNANT NEOPLASM OF OVERLAPPING SITES OF RIGHT BREAST IN FEMALE, ESTROGEN RECEPTOR NEGATIVE (HCC): ICD-10-CM

## 2023-07-06 DIAGNOSIS — C79.51 MALIGNANT NEOPLASM METASTATIC TO BONE (HCC): Primary | ICD-10-CM

## 2023-07-06 DIAGNOSIS — T45.1X5A CHEMOTHERAPY-INDUCED NEUTROPENIA (HCC): ICD-10-CM

## 2023-07-06 DIAGNOSIS — C50.811 MALIGNANT NEOPLASM OF OVERLAPPING SITES OF RIGHT BREAST IN FEMALE, ESTROGEN RECEPTOR NEGATIVE (HCC): ICD-10-CM

## 2023-07-06 DIAGNOSIS — D70.1 CHEMOTHERAPY-INDUCED NEUTROPENIA (HCC): ICD-10-CM

## 2023-07-06 LAB
ALBUMIN SERPL BCP-MCNC: 4.1 G/DL (ref 3.5–5)
ALP SERPL-CCNC: 54 U/L (ref 34–104)
ALT SERPL W P-5'-P-CCNC: 36 U/L (ref 7–52)
ANION GAP SERPL CALCULATED.3IONS-SCNC: 7 MMOL/L
AST SERPL W P-5'-P-CCNC: 19 U/L (ref 13–39)
BILIRUB SERPL-MCNC: 0.96 MG/DL (ref 0.2–1)
BUN SERPL-MCNC: 12 MG/DL (ref 5–25)
CALCIUM SERPL-MCNC: 9.4 MG/DL (ref 8.4–10.2)
CHLORIDE SERPL-SCNC: 108 MMOL/L (ref 96–108)
CO2 SERPL-SCNC: 23 MMOL/L (ref 21–32)
CREAT SERPL-MCNC: 0.63 MG/DL (ref 0.6–1.3)
GFR SERPL CREATININE-BSD FRML MDRD: 106 ML/MIN/1.73SQ M
GLUCOSE SERPL-MCNC: 95 MG/DL (ref 65–140)
POTASSIUM SERPL-SCNC: 4.1 MMOL/L (ref 3.5–5.3)
PROT SERPL-MCNC: 6.9 G/DL (ref 6.4–8.4)
SODIUM SERPL-SCNC: 138 MMOL/L (ref 135–147)

## 2023-07-06 PROCEDURE — 96417 CHEMO IV INFUS EACH ADDL SEQ: CPT

## 2023-07-06 PROCEDURE — 80053 COMPREHEN METABOLIC PANEL: CPT | Performed by: INTERNAL MEDICINE

## 2023-07-06 PROCEDURE — 96413 CHEMO IV INFUSION 1 HR: CPT

## 2023-07-06 PROCEDURE — 96367 TX/PROPH/DG ADDL SEQ IV INF: CPT

## 2023-07-06 RX ORDER — SODIUM CHLORIDE 9 MG/ML
20 INJECTION, SOLUTION INTRAVENOUS ONCE
OUTPATIENT
Start: 2023-09-19

## 2023-07-06 RX ORDER — SODIUM CHLORIDE 9 MG/ML
20 INJECTION, SOLUTION INTRAVENOUS ONCE
Status: DISCONTINUED | OUTPATIENT
Start: 2023-07-06 | End: 2023-07-09 | Stop reason: HOSPADM

## 2023-07-06 RX ORDER — SODIUM CHLORIDE 9 MG/ML
20 INJECTION, SOLUTION INTRAVENOUS ONCE
Status: COMPLETED | OUTPATIENT
Start: 2023-07-06 | End: 2023-07-06

## 2023-07-06 RX ADMIN — ZOLEDRONIC ACID 4 MG: 4 SOLUTION INTRAVENOUS at 13:38

## 2023-07-06 RX ADMIN — SODIUM CHLORIDE 20 ML/HR: 0.9 INJECTION, SOLUTION INTRAVENOUS at 12:15

## 2023-07-06 RX ADMIN — TRASTUZUMAB 482 MG: 150 INJECTION, POWDER, LYOPHILIZED, FOR SOLUTION INTRAVENOUS at 13:08

## 2023-07-06 RX ADMIN — PERTUZUMAB 420 MG: 30 INJECTION, SOLUTION, CONCENTRATE INTRAVENOUS at 12:36

## 2023-07-06 NOTE — DISCHARGE INSTRUCTIONS
Please return for any worsening symptoms questions comments or concerns      Follow-up with family doctor for recheck either tomorrow or next week no

## 2023-07-17 DIAGNOSIS — C79.49 METASTASIS TO SPINAL CORD (HCC): ICD-10-CM

## 2023-07-17 DIAGNOSIS — Z51.5 PALLIATIVE CARE PATIENT: ICD-10-CM

## 2023-07-17 DIAGNOSIS — G89.3 CANCER ASSOCIATED PAIN: ICD-10-CM

## 2023-07-17 RX ORDER — OXYCODONE HYDROCHLORIDE 10 MG/1
5-10 TABLET ORAL EVERY 4 HOURS PRN
Qty: 120 TABLET | Refills: 0 | Status: SHIPPED | OUTPATIENT
Start: 2023-07-17

## 2023-07-17 RX ORDER — MORPHINE SULFATE 15 MG/1
15 TABLET, FILM COATED, EXTENDED RELEASE ORAL
Qty: 30 TABLET | Refills: 0 | Status: SHIPPED | OUTPATIENT
Start: 2023-07-20

## 2023-07-17 NOTE — TELEPHONE ENCOUNTER
Reviewed most recent [address] note, reviewed PDMP. Refilling oxyIR (for today) and MSER (Fill on/after 7/20/23).

## 2023-07-25 ENCOUNTER — HOSPITAL ENCOUNTER (OUTPATIENT)
Dept: INFUSION CENTER | Facility: CLINIC | Age: 48
Discharge: HOME | End: 2023-07-25
Payer: COMMERCIAL

## 2023-07-25 VITALS
DIASTOLIC BLOOD PRESSURE: 88 MMHG | OXYGEN SATURATION: 95 % | BODY MASS INDEX: 31.76 KG/M2 | HEIGHT: 64 IN | TEMPERATURE: 97.9 F | SYSTOLIC BLOOD PRESSURE: 129 MMHG | RESPIRATION RATE: 18 BRPM | WEIGHT: 186 LBS | HEART RATE: 82 BPM

## 2023-07-25 DIAGNOSIS — C79.51 MALIGNANT NEOPLASM METASTATIC TO BONE (HCC): ICD-10-CM

## 2023-07-25 DIAGNOSIS — C50.811 MALIGNANT NEOPLASM OF OVERLAPPING SITES OF RIGHT BREAST IN FEMALE, ESTROGEN RECEPTOR NEGATIVE (HCC): ICD-10-CM

## 2023-07-25 DIAGNOSIS — T45.1X5A CHEMOTHERAPY-INDUCED NEUTROPENIA (HCC): Primary | ICD-10-CM

## 2023-07-25 DIAGNOSIS — D70.1 CHEMOTHERAPY-INDUCED NEUTROPENIA (HCC): Primary | ICD-10-CM

## 2023-07-25 DIAGNOSIS — Z17.1 MALIGNANT NEOPLASM OF OVERLAPPING SITES OF RIGHT BREAST IN FEMALE, ESTROGEN RECEPTOR NEGATIVE (HCC): ICD-10-CM

## 2023-07-25 RX ORDER — SODIUM CHLORIDE 9 MG/ML
20 INJECTION, SOLUTION INTRAVENOUS ONCE
Status: COMPLETED | OUTPATIENT
Start: 2023-07-25 | End: 2023-07-25

## 2023-07-25 RX ADMIN — PERTUZUMAB 420 MG: 30 INJECTION, SOLUTION, CONCENTRATE INTRAVENOUS at 09:15

## 2023-07-25 RX ADMIN — SODIUM CHLORIDE 20 ML/HR: 0.9 INJECTION, SOLUTION INTRAVENOUS at 09:00

## 2023-07-25 RX ADMIN — TRASTUZUMAB 482 MG: 150 INJECTION, POWDER, LYOPHILIZED, FOR SOLUTION INTRAVENOUS at 09:48

## 2023-07-25 NOTE — PROGRESS NOTES
Pt here for Pertuzumab + Trastuzumab. Pt offered no complaints today. Echo 12/29/22 55 EF and pt has next echo schedule in Oct.   No labs ordered for treatment plan today. Pt resting comfortably in chair with call bell in place.

## 2023-08-08 DIAGNOSIS — R21 RASH: ICD-10-CM

## 2023-08-08 RX ORDER — NYSTATIN 100000 [USP'U]/G
POWDER TOPICAL 3 TIMES DAILY
Qty: 15 G | Refills: 0 | Status: SHIPPED | OUTPATIENT
Start: 2023-08-08

## 2023-08-17 ENCOUNTER — HOSPITAL ENCOUNTER (OUTPATIENT)
Dept: INFUSION CENTER | Facility: CLINIC | Age: 48
Discharge: HOME | End: 2023-08-17
Payer: COMMERCIAL

## 2023-08-17 VITALS
DIASTOLIC BLOOD PRESSURE: 79 MMHG | RESPIRATION RATE: 18 BRPM | WEIGHT: 191 LBS | TEMPERATURE: 97.1 F | BODY MASS INDEX: 32.61 KG/M2 | SYSTOLIC BLOOD PRESSURE: 132 MMHG | HEART RATE: 94 BPM | HEIGHT: 64 IN

## 2023-08-17 DIAGNOSIS — T45.1X5A CHEMOTHERAPY-INDUCED NEUTROPENIA (HCC): Primary | ICD-10-CM

## 2023-08-17 DIAGNOSIS — C50.811 MALIGNANT NEOPLASM OF OVERLAPPING SITES OF RIGHT BREAST IN FEMALE, ESTROGEN RECEPTOR NEGATIVE (HCC): ICD-10-CM

## 2023-08-17 DIAGNOSIS — Z17.1 MALIGNANT NEOPLASM OF OVERLAPPING SITES OF RIGHT BREAST IN FEMALE, ESTROGEN RECEPTOR NEGATIVE (HCC): ICD-10-CM

## 2023-08-17 DIAGNOSIS — C79.51 MALIGNANT NEOPLASM METASTATIC TO BONE (HCC): ICD-10-CM

## 2023-08-17 DIAGNOSIS — D70.1 CHEMOTHERAPY-INDUCED NEUTROPENIA (HCC): Primary | ICD-10-CM

## 2023-08-17 PROCEDURE — 96413 CHEMO IV INFUSION 1 HR: CPT

## 2023-08-17 PROCEDURE — 96417 CHEMO IV INFUS EACH ADDL SEQ: CPT

## 2023-08-17 RX ORDER — SODIUM CHLORIDE 9 MG/ML
20 INJECTION, SOLUTION INTRAVENOUS ONCE
Status: COMPLETED | OUTPATIENT
Start: 2023-08-17 | End: 2023-08-17

## 2023-08-17 RX ADMIN — SODIUM CHLORIDE 20 ML/HR: 0.9 INJECTION, SOLUTION INTRAVENOUS at 14:50

## 2023-08-17 RX ADMIN — TRASTUZUMAB 482 MG: 150 INJECTION, POWDER, LYOPHILIZED, FOR SOLUTION INTRAVENOUS at 15:34

## 2023-08-17 RX ADMIN — PERTUZUMAB 420 MG: 30 INJECTION, SOLUTION, CONCENTRATE INTRAVENOUS at 14:50

## 2023-08-17 NOTE — PROGRESS NOTES
Pt here for Pertuzumab + Trastuzumab infusions. Pt offered no complaints today. Vitals stable. EF 55 on 12/29/22. Pt had next echo scheduled in October. Pt resting in chair with call bell in place.

## 2023-08-24 ENCOUNTER — OFFICE VISIT (OUTPATIENT)
Dept: PALLIATIVE MEDICINE | Facility: CLINIC | Age: 48
End: 2023-08-24
Payer: COMMERCIAL

## 2023-08-24 VITALS
OXYGEN SATURATION: 96 % | DIASTOLIC BLOOD PRESSURE: 100 MMHG | TEMPERATURE: 98 F | HEIGHT: 64 IN | HEART RATE: 102 BPM | SYSTOLIC BLOOD PRESSURE: 142 MMHG | WEIGHT: 190 LBS | BODY MASS INDEX: 32.44 KG/M2

## 2023-08-24 DIAGNOSIS — G89.3 CANCER ASSOCIATED PAIN: ICD-10-CM

## 2023-08-24 DIAGNOSIS — Z51.5 PALLIATIVE CARE PATIENT: ICD-10-CM

## 2023-08-24 DIAGNOSIS — R53.0 NEOPLASTIC MALIGNANT RELATED FATIGUE: ICD-10-CM

## 2023-08-24 DIAGNOSIS — Z17.1 MALIGNANT NEOPLASM OF OVERLAPPING SITES OF RIGHT BREAST IN FEMALE, ESTROGEN RECEPTOR NEGATIVE (HCC): Primary | ICD-10-CM

## 2023-08-24 DIAGNOSIS — Z79.899 MEDICAL MARIJUANA USE: ICD-10-CM

## 2023-08-24 DIAGNOSIS — C79.51 MALIGNANT NEOPLASM METASTATIC TO BONE (HCC): ICD-10-CM

## 2023-08-24 DIAGNOSIS — C79.49 METASTASIS TO SPINAL CORD (HCC): ICD-10-CM

## 2023-08-24 DIAGNOSIS — K21.9 GERD (GASTROESOPHAGEAL REFLUX DISEASE): ICD-10-CM

## 2023-08-24 DIAGNOSIS — C50.811 MALIGNANT NEOPLASM OF OVERLAPPING SITES OF RIGHT BREAST IN FEMALE, ESTROGEN RECEPTOR NEGATIVE (HCC): Primary | ICD-10-CM

## 2023-08-24 PROCEDURE — 99214 OFFICE O/P EST MOD 30 MIN: CPT | Performed by: INTERNAL MEDICINE

## 2023-08-24 NOTE — PROGRESS NOTES
Follow-up with Palliative and Peyman Dupont 51 y.o. female [id number]    ASSESSMENT & PLAN:  1. Malignant neoplasm of overlapping sites of right breast in female, estrogen receptor negative ([address])    2. Malignant neoplasm metastatic to bone ([address])    3. 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 GERD (gastroesophageal reflux disease)    5. Cancer associated pain    6. Neoplastic malignant related fatigue    7. Medical marijuana use    8. Palliative care patient          • Continue disease-directed cares. • Patient reports good pain control on her current regimen and asks about discontinuing MSER.  o Continue MMJ; patient is getting relief of chronic cancer-related pain w/ PO MMJ products. o Patient has been using MSER intermittently for overnight pain control (for example, when she travels for work and cannot bring MMJ products with her). Not using every day/night. Will trial stopping this med and using oxyIR PRN, using MMJ when appropriate. o Continue oxyIR PRN. o Counseled on potential withdrawal (unlikely given her decreased opioid needs in recent months). • ACP: Patient has completed advanced directives (the [facility] Advanced Directive) naming Nicole Leggett as default surrogate healthcare decision-maker(s). In EMR, reviewed today. Advanced directive counseling given. • May use Zofran PRN N/V, may pre-medicate prior to infusion. Not needed recently. • Counseled on bowel regimen for mild transient diarrhea after infusions. • Continue pantoprazole 20mg qAM. Effective for GERD. • Reviewed notes (PCP), labs (7/6/23 Cr 0.63, alb 4.1; 4/4/23 Cr 0.68, alb 3.6, CA 27.29 34.9, Hb 13.8), imaging + procedures (4/5/23 [facility]). • Return in about 3 months (around 11/24/2023). • Emotional support provided.   • Medication safety issues addressed - no driving under the influence of narcotics (including opioids), watch for adverse effects including AMS or respiratory depression (slowed breathing), keep medications stored in a safe/locked environment, do not use alcohol while opioids or other narcotics are in one's system. Requested Prescriptions      No prescriptions requested or ordered in this encounter       Medications Discontinued During This Encounter   Medication Reason   • morphine (MS CONTIN) 15 mg 12 hr tablet Therapy completed       Representatives have queried the patient's controlled substance dispensing history in the Prescription Drug Monitoring Program in compliance with regulations before I have prescribed any controlled substances. The prescription history is consistent with prescribed therapy and our practice policies. 25+ minutes were spent in this ambulatory visit with greater than 50% of the time spent face to face with patient in counseling or coordination of care including symptom assessment and management, medication review, medication adjustment, psychosocial support, chart review, imaging review, lab review, advanced directives, medical marijuana, opioid titration, supportive listening and anticipatory guidance. All of the patient's questions were answered during this discussion. SUBJECTIVE:  Chief Complaint   Patient presents with   • Follow-up   • Cancer   • Cancer Pain   • Nausea        HPI    Aron Brantley is a 51 y.o. female w/ carcinoma of the R breast (diagnosed 3/29/22) metastatic to bone, s/p RT to spine; cancer-related pain. She follows w/ Dr Kal Martinez (Medical Oncology). Initially certified for [facility] in the [facility] on 4/22/22 by Dr Francia Woods, for cancer-related pain. Plan includes systemic therapy with pertuzumab + trastuzumab. Patient is known to [facility] clinic; seen 4/14/23 for symptom assessment and management, medication review, medication adjustment, psychosocial support, chart review, imaging review, lab review, advanced directives, goals of care, medical marijuana, supportive listening and anticipatory guidance.     Patient reports some transient mild diarrhea after some infusions, but is not particularly bothered by this - considering it a "blip" that is easily handled and soon passes. She has not had recent N/V. She reports her pain is well controlled w/o regular use of MSER, only occasional use of oxyIR. She is primarily using PO MMJ products for relief of chronic cancer pain, with good results. She will use the MSER when she travels as she does not wish to travel w/ MMJ. Patient reports GERD is managed on the 20mg dose. She states she has not had xerostomia in the past year. Patient reports the 1:1 THC:CBD PO MMJ product makes her sleepy, so she avoids using it in the daytime. The 5:1 product brings no CNS effects and is effective for daytime analgesia. Patient states she is happy to have celebrated her birthday recently, as she had serious concerns about her prognosis when first diagnosed. She recognizes she has survived a great deal so far and is pleased at her current situation. PDMP shows no concerns. The following portions of the medical history were reviewed: past medical history, surgical history, problem list, medication list, family history, and social history.       Current Outpatient Medications:   •  acetaminophen (TYLENOL) 325 mg tablet, Take 650 mg by mouth every 6 (six) hours as needed for mild pain, Disp: , Rfl:   •  lisinopril (ZESTRIL) 20 mg tablet, Take 1 tablet (20 mg total) by mouth daily Only to take as needed when BP is >015 systolicallly, Disp: 90 tablet, Rfl: 0  •  nystatin (MYCOSTATIN) powder, Apply topically 3 (three) times a day, Disp: 15 g, Rfl: 0  •  ondansetron (ZOFRAN) 4 mg tablet, Take 1 tablet (4 mg total) by mouth every 8 (eight) hours as needed for nausea or vomiting, Disp: 9 tablet, Rfl: 20  •  oxyCODONE (ROXICODONE) 10 MG TABS, Take 0.5-1 tablets (5-10 mg total) by mouth every 4 (four) hours as needed for moderate pain or severe pain Max Daily Amount: 60 mg, Disp: 120 tablet, Rfl: 0  •  pantoprazole (PROTONIX) 20 mg tablet, Take 1 tablet (20 mg total) by mouth daily in the early morning, Disp: 30 tablet, Rfl: 2  •  al mag oxide-diphenhydramine-lidocaine viscous (MAGIC MOUTHWASH) 1:1:1 suspension, Swish and spit 10 mL if needed for mouth pain or discomfort (Patient not taking: Reported on 8/24/2023), Disp: , Rfl:   •  DPH-Lido-AlHydr-MgHydr-Simeth (First-Mouthwash BLM) SUSP, , Disp: , Rfl:   •  nystatin (MYCOSTATIN) 500,000 units/5 mL suspension, Apply 5 mL (500,000 Units total) to the mouth or throat if needed (as needed) (Patient not taking: Reported on 8/24/2023), Disp: , Rfl:   •  polyethylene glycol (MIRALAX) 17 g packet, Take 17 g by mouth if needed (constipation) (Patient not taking: Reported on 8/24/2023), Disp: , Rfl:   •  senna (SENOKOT) 8.6 mg, Take 3 tablets (25.8 mg total) by mouth if needed for constipation Hold for loose stool. (Patient not taking: Reported on 8/24/2023), Disp: , Rfl:     Review of Systems   Constitutional: Positive for activity change (improved) and fatigue (improved). Gastrointestinal: Positive for diarrhea (mild transient diarrhea after infusion). Musculoskeletal: Positive for back pain (managed / at goal), myalgias (managed / at goal) and neck pain (managed / at [facility]). Allergic/Immunologic: Positive for immunocompromised state. All other systems reviewed and are negative. OBJECTIVE:  /100 (BP Location: Left arm, Patient Position: Sitting, Cuff Size: Standard)   Pulse 102   Temp 98 °F (36.7 °C) (Temporal)   Ht 5' 4" (1.626 m)   Wt 86.2 kg (190 lb)   LMP 03/28/2022 (Approximate)   SpO2 96%   BMI 32.61 kg/m²   Physical Exam  Vitals reviewed. Constitutional:       General: She is not in acute distress. Appearance: She is well-groomed and overweight. She is not toxic-appearing. HENT:      Head: Normocephalic and atraumatic. Right Ear: External ear normal.      Left Ear: External ear normal.   Eyes:      General: No scleral icterus.         Right eye: No discharge. Left eye: No discharge. Extraocular Movements: Extraocular movements intact. Conjunctiva/sclera: Conjunctivae normal.      Pupils: Pupils are equal, round, and reactive to light. Cardiovascular:      Rate and Rhythm: Tachycardia present. Pulmonary:      Effort: Pulmonary effort is normal. No tachypnea, bradypnea, accessory muscle usage or respiratory distress. Comments: Able to speak comfortably in complete sentences on room air at rest.  Abdominal:      General: There is no distension. Tenderness: There is no guarding. Musculoskeletal:      Cervical back: Normal range of motion. Right lower leg: No edema. Left lower leg: No edema. Skin:     General: Skin is dry. Coloration: Skin is not pale. Neurological:      Mental Status: She is alert and oriented to person, place, and time. Cranial Nerves: No dysarthria or facial asymmetry. Gait: Gait normal.      Comments: Using no assistive devices for ambulation. Psychiatric:         Attention and Perception: Attention normal.         Mood and Affect: Mood and affect normal.         Speech: Speech normal.         Behavior: Behavior normal. Behavior is cooperative. Thought Content: Thought content normal.         Cognition and Memory: Cognition and memory normal.         Judgment: Judgment normal.      Comments: Positive mood. Patrick Rivers MD  [facility]  [phone]    Portions of this document may have been created using dictation software and as such some "sound alike" terms may have been generated by the system. Do not hesitate to contact me with any questions or clarifications.

## 2023-09-06 DIAGNOSIS — R21 RASH: ICD-10-CM

## 2023-09-06 RX ORDER — NYSTATIN 100000 [USP'U]/G
POWDER TOPICAL 3 TIMES DAILY
Qty: 15 G | Refills: 0 | Status: SHIPPED | OUTPATIENT
Start: 2023-09-06 | End: 2023-09-08 | Stop reason: SDUPTHER

## 2023-09-07 ENCOUNTER — HOSPITAL ENCOUNTER (OUTPATIENT)
Dept: INFUSION CENTER | Facility: CLINIC | Age: 48
Discharge: HOME | End: 2023-09-07
Payer: COMMERCIAL

## 2023-09-07 VITALS
TEMPERATURE: 98 F | WEIGHT: 188.5 LBS | DIASTOLIC BLOOD PRESSURE: 97 MMHG | BODY MASS INDEX: 32.36 KG/M2 | OXYGEN SATURATION: 97 % | SYSTOLIC BLOOD PRESSURE: 156 MMHG | RESPIRATION RATE: 18 BRPM | HEART RATE: 88 BPM

## 2023-09-07 DIAGNOSIS — D70.1 CHEMOTHERAPY-INDUCED NEUTROPENIA: Primary | ICD-10-CM

## 2023-09-07 DIAGNOSIS — T45.1X5A CHEMOTHERAPY-INDUCED NEUTROPENIA: Primary | ICD-10-CM

## 2023-09-07 DIAGNOSIS — C50.811 MALIGNANT NEOPLASM OF OVERLAPPING SITES OF RIGHT BREAST IN FEMALE, ESTROGEN RECEPTOR NEGATIVE: ICD-10-CM

## 2023-09-07 DIAGNOSIS — Z17.1 MALIGNANT NEOPLASM OF OVERLAPPING SITES OF RIGHT BREAST IN FEMALE, ESTROGEN RECEPTOR NEGATIVE: ICD-10-CM

## 2023-09-07 DIAGNOSIS — C79.51 MALIGNANT NEOPLASM METASTATIC TO BONE (HCC): ICD-10-CM

## 2023-09-07 PROCEDURE — 96417 CHEMO IV INFUS EACH ADDL SEQ: CPT

## 2023-09-07 PROCEDURE — 96413 CHEMO IV INFUSION 1 HR: CPT

## 2023-09-07 RX ORDER — SODIUM CHLORIDE 9 MG/ML
20 INJECTION, SOLUTION INTRAVENOUS ONCE
Status: COMPLETED | OUTPATIENT
Start: 2023-09-07 | End: 2023-09-07

## 2023-09-07 RX ADMIN — TRASTUZUMAB 482 MG: 150 INJECTION, POWDER, LYOPHILIZED, FOR SOLUTION INTRAVENOUS at 14:43

## 2023-09-07 RX ADMIN — SODIUM CHLORIDE 20 ML/HR: 0.9 INJECTION, SOLUTION INTRAVENOUS at 14:04

## 2023-09-07 RX ADMIN — PERTUZUMAB 420 MG: 30 INJECTION, SOLUTION, CONCENTRATE INTRAVENOUS at 14:08

## 2023-09-07 NOTE — PROGRESS NOTES
Patient tolerated treatment today without complications Patient confirmed upcoming appointment and declined print out

## 2023-09-08 DIAGNOSIS — R21 RASH: ICD-10-CM

## 2023-09-08 RX ORDER — NYSTATIN 100000 [USP'U]/G
POWDER TOPICAL 3 TIMES DAILY
Qty: 15 G | Refills: 0 | Status: SHIPPED | OUTPATIENT
Start: 2023-09-08

## 2023-09-16 ENCOUNTER — TELEPHONE (OUTPATIENT)
Dept: HEMATOLOGY ONCOLOGY | Facility: CLINIC | Age: 48
End: 2023-09-16

## 2023-09-16 NOTE — TELEPHONE ENCOUNTER
VANESSA  DR casimiro KNAPP   Who are you speaking with? Patient   If it is not the patient, are they listed on an active communication consent form? N/A   Is this a VANESSA or DR casimiro KNAPP VANESSA   Which provider is patient currently scheduled or established with? Dr. Nena Rocha   What is the original appointment date and time? 10/24/23 2PM   At which location is the appointment scheduled to take place? [facility]   Which provider is the patient transitioning care to? Dr. Ede Munoz   What is the new appointment date and time? 10/17/23 1:20PM   At which location is the new appointment scheduled to take place? [facility]   What is the reason for this change?  Provider

## 2023-09-28 ENCOUNTER — APPOINTMENT (OUTPATIENT)
Dept: LAB | Facility: CLINIC | Age: 48
End: 2023-09-28
Payer: COMMERCIAL

## 2023-09-28 ENCOUNTER — HOSPITAL ENCOUNTER (OUTPATIENT)
Dept: INFUSION CENTER | Facility: CLINIC | Age: 48
Discharge: HOME | End: 2023-09-28
Payer: COMMERCIAL

## 2023-09-28 VITALS
DIASTOLIC BLOOD PRESSURE: 97 MMHG | RESPIRATION RATE: 18 BRPM | TEMPERATURE: 97.6 F | SYSTOLIC BLOOD PRESSURE: 165 MMHG | OXYGEN SATURATION: 97 % | BODY MASS INDEX: 32.69 KG/M2 | HEART RATE: 94 BPM | WEIGHT: 191.5 LBS | HEIGHT: 64 IN

## 2023-09-28 DIAGNOSIS — C50.811 MALIGNANT NEOPLASM OF OVERLAPPING SITES OF RIGHT BREAST IN FEMALE, ESTROGEN RECEPTOR NEGATIVE: ICD-10-CM

## 2023-09-28 DIAGNOSIS — Z13.220 SCREENING FOR LIPID DISORDERS: ICD-10-CM

## 2023-09-28 DIAGNOSIS — Z17.1 MALIGNANT NEOPLASM OF OVERLAPPING SITES OF RIGHT BREAST IN FEMALE, ESTROGEN RECEPTOR NEGATIVE: ICD-10-CM

## 2023-09-28 DIAGNOSIS — D70.1 CHEMOTHERAPY-INDUCED NEUTROPENIA: Primary | ICD-10-CM

## 2023-09-28 DIAGNOSIS — C79.51 MALIGNANT NEOPLASM METASTATIC TO BONE (HCC): ICD-10-CM

## 2023-09-28 DIAGNOSIS — E78.5 HYPERLIPIDEMIA, UNSPECIFIED HYPERLIPIDEMIA TYPE: ICD-10-CM

## 2023-09-28 DIAGNOSIS — T45.1X5A CHEMOTHERAPY-INDUCED NEUTROPENIA: Primary | ICD-10-CM

## 2023-09-28 DIAGNOSIS — T45.1X5A CHEMOTHERAPY-INDUCED NEUTROPENIA: ICD-10-CM

## 2023-09-28 DIAGNOSIS — D70.1 CHEMOTHERAPY-INDUCED NEUTROPENIA: ICD-10-CM

## 2023-09-28 LAB
ALBUMIN SERPL BCP-MCNC: 4.6 G/DL (ref 3.5–5)
ALP SERPL-CCNC: 50 U/L (ref 34–104)
ALT SERPL W P-5'-P-CCNC: 51 U/L (ref 7–52)
ANION GAP SERPL CALCULATED.3IONS-SCNC: 6 MMOL/L
AST SERPL W P-5'-P-CCNC: 22 U/L (ref 13–39)
BILIRUB SERPL-MCNC: 1.05 MG/DL (ref 0.2–1)
BUN SERPL-MCNC: 18 MG/DL (ref 5–25)
CALCIUM SERPL-MCNC: 10.2 MG/DL (ref 8.4–10.2)
CHLORIDE SERPL-SCNC: 107 MMOL/L (ref 96–108)
CO2 SERPL-SCNC: 30 MMOL/L (ref 21–32)
CREAT SERPL-MCNC: 0.74 MG/DL (ref 0.6–1.3)
GFR SERPL CREATININE-BSD FRML MDRD: 95 ML/MIN/1.73SQ M
GLUCOSE SERPL-MCNC: 91 MG/DL (ref 65–140)
POTASSIUM SERPL-SCNC: 4.8 MMOL/L (ref 3.5–5.3)
PROT SERPL-MCNC: 7.6 G/DL (ref 6.4–8.4)
SODIUM SERPL-SCNC: 143 MMOL/L (ref 135–147)

## 2023-09-28 PROCEDURE — 36415 COLL VENOUS BLD VENIPUNCTURE: CPT

## 2023-09-28 PROCEDURE — 96417 CHEMO IV INFUS EACH ADDL SEQ: CPT

## 2023-09-28 PROCEDURE — 96413 CHEMO IV INFUSION 1 HR: CPT

## 2023-09-28 PROCEDURE — 96367 TX/PROPH/DG ADDL SEQ IV INF: CPT

## 2023-09-28 PROCEDURE — 80053 COMPREHEN METABOLIC PANEL: CPT

## 2023-09-28 RX ORDER — SODIUM CHLORIDE 9 MG/ML
20 INJECTION, SOLUTION INTRAVENOUS ONCE
Status: CANCELLED | OUTPATIENT
Start: 2023-12-21 | Stop reason: HOSPADM

## 2023-09-28 RX ORDER — SODIUM CHLORIDE 9 MG/ML
20 INJECTION, SOLUTION INTRAVENOUS ONCE
Status: DISCONTINUED | OUTPATIENT
Start: 2023-09-28 | End: 2023-10-01 | Stop reason: HOSPADM

## 2023-09-28 RX ORDER — SODIUM CHLORIDE 9 MG/ML
20 INJECTION, SOLUTION INTRAVENOUS ONCE
Status: COMPLETED | OUTPATIENT
Start: 2023-09-28 | End: 2023-09-28

## 2023-09-28 RX ADMIN — ZOLEDRONIC ACID 4 MG: 4 SOLUTION INTRAVENOUS at 13:50

## 2023-09-28 RX ADMIN — TRASTUZUMAB 482 MG: 150 INJECTION, POWDER, LYOPHILIZED, FOR SOLUTION INTRAVENOUS at 15:15

## 2023-09-28 RX ADMIN — SODIUM CHLORIDE 20 ML/HR: 0.9 INJECTION, SOLUTION INTRAVENOUS at 13:50

## 2023-09-28 RX ADMIN — PERTUZUMAB 420 MG: 30 INJECTION, SOLUTION, CONCENTRATE INTRAVENOUS at 14:41

## 2023-09-28 NOTE — PLAN OF CARE
Problem: Potential for Falls  Goal: Patient will remain free of falls  Description: INTERVENTIONS:  - Educate patient/family on patient safety including physical limitations  - Instruct patient to call for assistance with activity   - Consult OT/PT to assist with strengthening/mobility   - Keep Call bell within reach  - Keep bed low and locked with side rails adjusted as appropriate  - Keep care items and personal belongings within reach  - Initiate and maintain comfort rounds  - Make Fall Risk Sign visible to staff  -  - Consider moving patient to room near nurses station  Outcome: Completed     Problem: Knowledge Deficit  Goal: Patient/family/caregiver demonstrates understanding of disease process, treatment plan, medications, and discharge instructions  Description: Complete learning assessment and assess knowledge base.   Interventions:  - Provide teaching at level of understanding  - Provide teaching via preferred learning methods  Outcome: Completed

## 2023-09-28 NOTE — PROGRESS NOTES
Pt to clinic for zometa, perjeta, herceptin infusion, per Chivo Yoon RN ok to use labs from 7/6 for zometa, pt offers no complaints.  Call bell in reach

## 2023-10-12 ENCOUNTER — HOSPITAL ENCOUNTER (OUTPATIENT)
Dept: NON INVASIVE DIAGNOSTICS | Facility: CLINIC | Age: 48
Discharge: HOME | End: 2023-10-12
Payer: COMMERCIAL

## 2023-10-12 ENCOUNTER — HOSPITAL ENCOUNTER (OUTPATIENT)
Dept: CT IMAGING | Facility: HOSPITAL | Age: 48
Discharge: HOME | End: 2023-10-12
Attending: INTERNAL MEDICINE
Payer: COMMERCIAL

## 2023-10-12 ENCOUNTER — APPOINTMENT (OUTPATIENT)
Dept: CT IMAGING | Facility: HOSPITAL | Age: 48
End: 2023-10-12
Attending: INTERNAL MEDICINE
Payer: COMMERCIAL

## 2023-10-12 VITALS
SYSTOLIC BLOOD PRESSURE: 165 MMHG | HEART RATE: 89 BPM | WEIGHT: 191.58 LBS | BODY MASS INDEX: 32.71 KG/M2 | DIASTOLIC BLOOD PRESSURE: 97 MMHG | HEIGHT: 64 IN

## 2023-10-12 DIAGNOSIS — G89.3 CANCER ASSOCIATED PAIN: ICD-10-CM

## 2023-10-12 DIAGNOSIS — C79.51 MALIGNANT NEOPLASM METASTATIC TO BONE (HCC): ICD-10-CM

## 2023-10-12 DIAGNOSIS — C50.811 MALIGNANT NEOPLASM OF OVERLAPPING SITES OF RIGHT BREAST IN FEMALE, ESTROGEN RECEPTOR NEGATIVE: ICD-10-CM

## 2023-10-12 DIAGNOSIS — Z17.1 MALIGNANT NEOPLASM OF OVERLAPPING SITES OF RIGHT BREAST IN FEMALE, ESTROGEN RECEPTOR NEGATIVE: ICD-10-CM

## 2023-10-12 DIAGNOSIS — Z51.5 PALLIATIVE CARE PATIENT: ICD-10-CM

## 2023-10-12 DIAGNOSIS — K21.9 GASTROESOPHAGEAL REFLUX DISEASE WITHOUT ESOPHAGITIS: ICD-10-CM

## 2023-10-12 LAB
AORTIC ROOT: 2.9 CM
APICAL FOUR CHAMBER EJECTION FRACTION: 53 %
ASCENDING AORTA: 3 CM
E WAVE DECELERATION TIME: 206 MS
E/A RATIO: 0.69
FRACTIONAL SHORTENING: 37 (ref 28–44)
GLOBAL LONGITUIDAL STRAIN: -17 %
INTERVENTRICULAR SEPTUM IN DIASTOLE (PARASTERNAL SHORT AXIS VIEW): 0.7 CM
INTERVENTRICULAR SEPTUM: 0.7 CM (ref 0.6–1.1)
LAAS-AP2: 14.6 CM2
LAAS-AP4: 14.8 CM2
LEFT ATRIUM AREA SYSTOLE SINGLE PLANE A4C: 15.3 CM2
LEFT ATRIUM SIZE: 3 CM
LEFT ATRIUM VOLUME (MOD BIPLANE): 39 ML
LEFT ATRIUM VOLUME INDEX (MOD BIPLANE): 20.3 ML/M2
LEFT INTERNAL DIMENSION IN SYSTOLE: 3.1 CM (ref 2.1–4)
LEFT VENTRICULAR INTERNAL DIMENSION IN DIASTOLE: 4.9 CM (ref 3.5–6)
LEFT VENTRICULAR POSTERIOR WALL IN END DIASTOLE: 0.7 CM
LEFT VENTRICULAR STROKE VOLUME: 75 ML
LVSV (TEICH): 75 ML
MV E'TISSUE VEL-SEP: 9 CM/S
MV PEAK A VEL: 0.88 M/S
MV PEAK E VEL: 61 CM/S
MV STENOSIS PRESSURE HALF TIME: 60 MS
MV VALVE AREA P 1/2 METHOD: 3.67
RIGHT ATRIUM AREA SYSTOLE A4C: 12.2 CM2
RIGHT VENTRICLE ID DIMENSION: 2.9 CM
SL CV LEFT ATRIUM LENGTH A2C: 4.3 CM
SL CV LV EF: 70
SL CV PED ECHO LEFT VENTRICLE DIASTOLIC VOLUME (MOD BIPLANE) 2D: 113 ML
SL CV PED ECHO LEFT VENTRICLE SYSTOLIC VOLUME (MOD BIPLANE) 2D: 38 ML
TR MAX PG: 19 MMHG
TR PEAK VELOCITY: 2.2 M/S
TRICUSPID ANNULAR PLANE SYSTOLIC EXCURSION: 2.1 CM
TRICUSPID VALVE PEAK REGURGITATION VELOCITY: 2.19 M/S

## 2023-10-12 PROCEDURE — 71260 CT THORAX DX C+: CPT

## 2023-10-12 PROCEDURE — 74177 CT ABD & PELVIS W/CONTRAST: CPT

## 2023-10-12 PROCEDURE — G1004 CDSM NDSC: HCPCS

## 2023-10-12 PROCEDURE — 93306 TTE W/DOPPLER COMPLETE: CPT

## 2023-10-12 RX ADMIN — IOHEXOL 100 ML: 350 INJECTION, SOLUTION INTRAVENOUS at 14:04

## 2023-10-13 ENCOUNTER — TELEPHONE (OUTPATIENT)
Dept: HEMATOLOGY ONCOLOGY | Facility: CLINIC | Age: 48
End: 2023-10-13

## 2023-10-13 RX ORDER — OXYCODONE HYDROCHLORIDE 10 MG/1
5-10 TABLET ORAL EVERY 4 HOURS PRN
Qty: 120 TABLET | Refills: 0 | Status: SHIPPED | OUTPATIENT
Start: 2023-10-13

## 2023-10-13 RX ORDER — PANTOPRAZOLE SODIUM 20 MG/1
20 TABLET, DELAYED RELEASE ORAL
Qty: 30 TABLET | Refills: 2 | Status: SHIPPED | OUTPATIENT
Start: 2023-10-13

## 2023-10-13 NOTE — TELEPHONE ENCOUNTER
Patient scheduled for an appt with Dr. Amaury Hunt on 10/17. Dr. Amaury Hunt to discuss findings from the PET scan with the patient at that time.

## 2023-10-13 NOTE — TELEPHONE ENCOUNTER
Patient Call    Who are you speaking with? [facility]'s Radiology     If it is not the patient, are they listed on an active communication consent form? N/A   What is the reason for this call? Significant Findings on Ct 10/12/23   Does this require a call back? No   If a call back is required, please list best call back number N/a   If a call back is required, advise that a message will be forwarded to their care team and someone will return their call as soon as possible. Did you relay this information to the patient?  N/A

## 2023-10-13 NOTE — TELEPHONE ENCOUNTER
IMPRESSION:     Interval mild decrease in size of a partially calcified right breast mass and right axillary lymph node. No significant interval change in extensive bony metastases. 1.2 cm indeterminate density lesion in the right kidney, minimally increased in size compared to March 29, 2022. Recommend renal ultrasound for further evaluation. Continued enlargement of a left adnexal cyst, now 5.7 cm. Recommend characterization with pelvic ultrasound.

## 2023-10-17 ENCOUNTER — OFFICE VISIT (OUTPATIENT)
Dept: HEMATOLOGY ONCOLOGY | Facility: CLINIC | Age: 48
End: 2023-10-17

## 2023-10-17 ENCOUNTER — DOCUMENTATION (OUTPATIENT)
Dept: OTHER | Facility: HOSPITAL | Age: 48
End: 2023-10-17

## 2023-10-17 VITALS
SYSTOLIC BLOOD PRESSURE: 150 MMHG | RESPIRATION RATE: 16 BRPM | HEIGHT: 64 IN | TEMPERATURE: 98 F | DIASTOLIC BLOOD PRESSURE: 86 MMHG | BODY MASS INDEX: 32.61 KG/M2 | OXYGEN SATURATION: 98 % | WEIGHT: 191 LBS | HEART RATE: 86 BPM

## 2023-10-17 DIAGNOSIS — N28.9 RENAL LESION: ICD-10-CM

## 2023-10-17 DIAGNOSIS — C79.51 MALIGNANT NEOPLASM METASTATIC TO BONE (HCC): Primary | ICD-10-CM

## 2023-10-17 RX ORDER — SODIUM CHLORIDE 9 MG/ML
20 INJECTION, SOLUTION INTRAVENOUS ONCE
Status: CANCELLED | OUTPATIENT
Start: 2023-10-19

## 2023-10-17 NOTE — PROGRESS NOTES
Clinical Research Associate met with patient and her  in the Hem/Onc Clinic at the [facility] during her office visit with Dr. Dianelys Mcnulty. Patient is ID # [id number] on Clinical Trial  and is on Follow-Up at an every 6 month interval per protocol. Patient aware that Dr. Usha Carr left the practice and is no longer with [facility]. Per chart review, patient is currently on maintenance therapy with pertuzumab and trastuzumab. Patient denied any Adverse Events. Patient reports feeling well and is employed in [place] with [employer]. Patient agreeable to remain on Follow-Up.

## 2023-10-17 NOTE — PROGRESS NOTES
Marsha Elizondo  1975  [facility]  [address] [place] [address]    No chief complaint on file. Oncology History   Malignant neoplasm of overlapping sites of right breast in female, estrogen receptor negative    3/2022 Initial Diagnosis    Malignant neoplasm of overlapping sites of right breast in female, estrogen receptor negative ([address])     3/29/2022 Biopsy    Breast, Right, Biopsy:  - Invasive mammary carcinoma of no special type (ductal, not otherwise specified), Fabrizio Grade II (3 + 2 + 1 = 6), spanning at least 6 mm.    ER/MI negative, HER2 positive    Procedure  Needle biopsy    Specimen Laterality  Right    TUMOR   Histologic Type  Invasive carcinoma of no special type (ductal)    Histologic Grade (Fabrizio Histologic Score)     Glandular (Acinar) / Tubular Differentiation  Score 3    Nuclear Pleomorphism  Score 2    Mitotic Rate  Score 1    Overall Grade  Grade 2 (scores of 6 or 7)    Tumor Size  Greatest dimension of largest invasive focus (Millimeters): 6 mm   Ductal Carcinoma In Situ (DCIS)  Not identified    Lymphovascular Invasion  Not identified       3/31/2022 - 4/18/2022 Radiation    Course: C1    Plan ID Energy Fractions Dose per Fraction (cGy) Dose Correction (cGy) Total Dose Delivered (cGy) Elapsed Days   C1 C7 And ix 10X/6X 7 / 7 300 0 2,100 8   C1 C7 Spine 10X/6X 3 / 10 300 0 900 4   L4 Sacrum 10X 10 / 10 300 0 3,000 13   T1 T6 And ix 10X 7 / 7 300 0 2,100 8   T1 T6 Spine 10X 3 / 10 300 0 900 4      Dr. Galen Hoover     4/28/2022 -  Chemotherapy    clinical trial with THP with or without atezolizumab     4/28/2022 -  Chemotherapy    pegfilgrastim (Latrelle Mini), 6 mg, Subcutaneous, Once, 1 of 1 cycle  pertuzumab (PERJETA) IVPB, 840 mg, Intravenous, Once, 25 of 27 cycles  Administration: 420 mg (6/16/2022), 420 mg (7/7/2022), 420 mg (7/28/2022), 420 mg (8/18/2022), 420 mg (9/8/2022), 420 mg (9/29/2022), 420 mg (10/20/2022), 420 mg (11/17/2022), 420 mg (12/8/2022), 420 mg (12/30/2022), 420 mg (1/19/2023), 420 mg (2/9/2023), 420 mg (3/2/2023), 420 mg (3/23/2023), 420 mg (4/13/2023), 420 mg (5/4/2023), 420 mg (5/23/2023), 420 mg (6/15/2023), 420 mg (7/6/2023), 420 mg (7/25/2023), 420 mg (8/17/2023), 420 mg (9/7/2023), 420 mg (9/28/2023)  DOCEtaxel (TAXOTERE) chemo infusion, 75 mg/m2, Intravenous, Once, 3 of 3 cycles  Dose modification: 60 mg/m2 (original dose 75 mg/m2, Cycle 3, Reason: Anticipated Tolerance)  Administration: 112.8 mg (6/16/2022)  PACLItaxel (TAXOL) chemo IVPB, 80 mg/m2 = 150.6 mg (100 % of original dose 80 mg/m2), Intravenous, Once, 3 of 3 cycles  Dose modification: 80 mg/m2 (original dose 80 mg/m2, Cycle 4, Reason: Anticipated Tolerance)  Administration: 150.6 mg (7/7/2022), 150.6 mg (7/14/2022), 150.6 mg (7/21/2022), 150.6 mg (7/28/2022), 150.6 mg (8/4/2022), 150.6 mg (8/11/2022), 150.6 mg (8/18/2022), 150.6 mg (8/25/2022), 150.6 mg (9/1/2022)  trastuzumab (HERCEPTIN) chemo infusion, 8 mg/kg, Intravenous, Once, 23 of 25 cycles  Administration: 487 mg (7/28/2022), 487 mg (8/18/2022), 487 mg (9/8/2022), 487 mg (9/29/2022), 487 mg (10/20/2022), 450 mg (11/17/2022), 450 mg (12/8/2022), 450 mg (12/30/2022), 450 mg (1/19/2023), 450 mg (2/9/2023), 450 mg (3/2/2023), 450 mg (3/23/2023), 482 mg (4/13/2023), 482 mg (5/4/2023), 482 mg (5/23/2023), 482 mg (6/15/2023), 482 mg (7/6/2023), 482 mg (7/25/2023), 482 mg (8/17/2023), 482 mg (9/7/2023), 482 mg (9/28/2023)  INV trastuzumab infusion, 6 mg/kg = 487 mg (100 % of original dose 6 mg/kg), Intravenous, Once, 2 of 2 cycles  Dose modification: 6 mg/kg (original dose 6 mg/kg, Cycle 3, Reason: Other (Must fill in a comment), Comment: investigational drug)  Administration: 487 mg (6/16/2022), 487 mg (7/7/2022)     Malignant neoplasm metastatic to bone ([address])   3/2022 Initial Diagnosis    Osseous metastasis ([address])     3/29/2022 Biopsy    Breast, Right, Biopsy:  - Invasive mammary carcinoma of no special type (ductal, not otherwise specified), [place] Grade II (3 + 2 + 1 = 6), spanning at least 6 mm.    ER/MS negative, HER2 positive    Procedure  Needle biopsy    Specimen Laterality  Right    TUMOR   Histologic Type  Invasive carcinoma of no special type (ductal)    Histologic Grade (Fabrizio Histologic Score)     Glandular (Acinar) / Tubular Differentiation  Score 3    Nuclear Pleomorphism  Score 2    Mitotic Rate  Score 1    Overall Grade  Grade 2 (scores of 6 or 7)    Tumor Size  Greatest dimension of largest invasive focus (Millimeters): 6 mm   Ductal Carcinoma In Situ (DCIS)  Not identified    Lymphovascular Invasion  Not identified       3/31/2022 - 4/18/2022 Radiation    Course: C1    Plan ID Energy Fractions Dose per Fraction (cGy) Dose Correction (cGy) Total Dose Delivered (cGy) Elapsed Days   C1 C7 And ix 10X/6X 7 / 7 300 0 2,100 8   C1 C7 Spine 10X/6X 3 / 10 300 0 900 4   L4 Sacrum 10X 10 / 10 300 0 3,000 13   T1 T6 And ix 10X 7 / 7 300 0 2,100 8   T1 T6 Spine 10X 3 / 10 300 0 900 4      Dr. Geovanna Mcrae     4/28/2022 -  Chemotherapy    clinical trial with THP with or without atezolizumab     4/28/2022 -  Chemotherapy    pegfilgrastim (Donne Aline), 6 mg, Subcutaneous, Once, 1 of 1 cycle  pertuzumab (PERJETA) IVPB, 840 mg, Intravenous, Once, 25 of 27 cycles  Administration: 420 mg (6/16/2022), 420 mg (7/7/2022), 420 mg (7/28/2022), 420 mg (8/18/2022), 420 mg (9/8/2022), 420 mg (9/29/2022), 420 mg (10/20/2022), 420 mg (11/17/2022), 420 mg (12/8/2022), 420 mg (12/30/2022), 420 mg (1/19/2023), 420 mg (2/9/2023), 420 mg (3/2/2023), 420 mg (3/23/2023), 420 mg (4/13/2023), 420 mg (5/4/2023), 420 mg (5/23/2023), 420 mg (6/15/2023), 420 mg (7/6/2023), 420 mg (7/25/2023), 420 mg (8/17/2023), 420 mg (9/7/2023), 420 mg (9/28/2023)  DOCEtaxel (TAXOTERE) chemo infusion, 75 mg/m2, Intravenous, Once, 3 of 3 cycles  Dose modification: 60 mg/m2 (original dose 75 mg/m2, Cycle 3, Reason: Anticipated Tolerance)  Administration: 112.8 mg (6/16/2022)  PACLItaxel (TAXOL) chemo IVPB, 80 mg/m2 = 150.6 mg (100 % of original dose 80 mg/m2), Intravenous, Once, 3 of 3 cycles  Dose modification: 80 mg/m2 (original dose 80 mg/m2, Cycle 4, Reason: Anticipated Tolerance)  Administration: 150.6 mg (7/7/2022), 150.6 mg (7/14/2022), 150.6 mg (7/21/2022), 150.6 mg (7/28/2022), 150.6 mg (8/4/2022), 150.6 mg (8/11/2022), 150.6 mg (8/18/2022), 150.6 mg (8/25/2022), 150.6 mg (9/1/2022)  trastuzumab (HERCEPTIN) chemo infusion, 8 mg/kg, Intravenous, Once, 23 of 25 cycles  Administration: 487 mg (7/28/2022), 487 mg (8/18/2022), 487 mg (9/8/2022), 487 mg (9/29/2022), 487 mg (10/20/2022), 450 mg (11/17/2022), 450 mg (12/8/2022), 450 mg (12/30/2022), 450 mg (1/19/2023), 450 mg (2/9/2023), 450 mg (3/2/2023), 450 mg (3/23/2023), 482 mg (4/13/2023), 482 mg (5/4/2023), 482 mg (5/23/2023), 482 mg (6/15/2023), 482 mg (7/6/2023), 482 mg (7/25/2023), 482 mg (8/17/2023), 482 mg (9/7/2023), 482 mg (9/28/2023)  INV trastuzumab infusion, 6 mg/kg = 487 mg (100 % of original dose 6 mg/kg), Intravenous, Once, 2 of 2 cycles  Dose modification: 6 mg/kg (original dose 6 mg/kg, Cycle 3, Reason: Other (Must fill in a comment), Comment: investigational drug)  Administration: 487 mg (6/16/2022), 487 mg (7/7/2022)       Assessment/plan:     1. Metastatic breast cancer, grade 2, ER negative, CA negative, HER2 3+ disease. Diagnosed in March 2022.  2.  Metastatic disease to the bone    Zena Reddy is a 58-year-old premenopausal female with metastatic breast cancer, grade 2, ER negative, CA negative, HER2 3+ disease. She has large fungating mass in her right breast, palpable right axillary adenopathy as well as diffuse osseous metastasis with C4 stenosis. She completed palliative radiation therapy to the spine. She was treated with taxane, trastuzumab and pertuzumab x6 cycles, resulting in good partial response.   She is currently on trastuzumab and pertuzumab with excellent tolerance. Patient continues to do well without any clinical evidence of progressive disease. She will continue trastuzumab and pertuzumab every 3 weeks. She will stay on Zometa every 3 months. Her most recent CA 27.29 from 4/4/2023 was stable at 34.9. She will repeat tumor marker now. Her most recent CT scan from 10/12/2023 notes interval decrease in the size of right breast mass and axillary lymph node. Incidental finding of indeterminate density lesion in the right kidney. Will order renal ultrasound and consider urology eval pending results. Also noted to have an enlarged left adnexal cyst.  Recommend patient follow-up with her OB/GYN. She will continue with treatment and follow-up in the office in 4 months with imaging and echocardiogram prior to visit. Objective:   Primary diagnosis:  Metastatic breast cancer, grade 2, ER negative, WI negative, HER2 3+ disease. Diagnosed in March 2022. Previous Hematologic/ Oncologic Treatment:   1. THP x3 cycles, completed in early June 2022.   2. Weekly paclitaxel, try weekly pertuzumab trastuzumab from July 2022 through August 2022. Current Hematologic/ Oncologic Treatment:    Maintenance therapy with trastuzumab and pertuzumab since September 2022. Zometa 4 mg IV every 3 months     Disease Status:    Clinical good partial response. Test Results:   Pathology:   Right breast biopsy showed invasive ductal carcinoma, grade 2 ER negative, WI negative, HER2 3+ disease. Radiology:  CT scan of chest abdomen pelvis in April 2023 showed decreased calcified right breast mass as well as stable mildly enlarged right axillary lymph node. Stable osseous metastasis. Echocardiogram in December 2022 showed normal ejection fraction with 55%. History of present illness:   Anjali Lara is a 50-year-old premenopausal female with metastatic breast cancer, grade 2, ER negative, WI negative, HER2 3+ disease.   She had a large fungating mass in her right breast, palpable right axillary adenopathy as well as diffuse osseous metastasis with C4 stenosis. She completed palliative radiation therapy to the spine. Subsequently, she was treated with Taxotere, trastuzumab and pertuzumab with or without atezolizumab based on a clinical trial.  She received 2 cycle of treatment. She came off the study after 2 cycles of treatment due to the complication related to [id number] infection. She had infusion reaction with Taxotere. Therefore, she was subsequently treated with paclitaxel, trastuzumab and pertuzumab. She completed induction treatment in August 2022. She is currently on maintenance therapy with trastuzumab and pertuzumab. She had good partial response, clinically as well as radiographically. She was previously following with Dr. Neelam Menendez and last seen in the office on 4/12/2023. She presents today for transfer of care/follow-up visit. She presents today for follow-up. She feels well with no new complaints. Her pain is under control with minimal amount of pain medications. She is following with palliative care end-stage requires less pain meds. She has no respiratory symptoms. She has no ambulatory dysfunction. 10/12/2023: CT chest/abdomen/pelvis:  Interval mild decrease in size of a partially calcified right breast mass and right axillary lymph node. No significant interval change in extensive bony metastases. 1.2 cm indeterminate density lesion in the right kidney, minimally increased in size compared to March 29, 2022. Recommend renal ultrasound for further evaluation. Continued enlargement of a left adnexal cyst, now 5.7 cm. Recommend characterization with pelvic ultrasound. 10/12/2023: ECHO:  Left Ventricle: Left ventricular cavity size is normal. Wall thickness is normal. The left ventricular ejection fraction is 70%. Systolic function is normal. Global longitudinal strain is normal at -17.2 %. Wall motion is normal. Diastolic function is mildly abnormal, consistent with grade I (abnormal) relaxation. Review of systems:   Review of Systems   Constitutional:  Negative for chills and fever. Eyes:  Negative for pain and visual disturbance. Respiratory:  Negative for cough and shortness of breath. Cardiovascular:  Negative for chest pain and palpitations. Gastrointestinal:  Negative for abdominal pain and vomiting. Genitourinary:  Negative for dysuria and hematuria. Musculoskeletal:  Negative for arthralgias and back pain. Skin:  Negative for color change and rash. Neurological:  Negative for seizures and syncope. All other systems reviewed and are negative. Patient Active Problem List   Diagnosis    Malignant neoplasm of overlapping sites of right breast in female, estrogen receptor negative     Malignant neoplasm metastatic to bone (HCC)    Elevated BP without diagnosis of hypertension    Hypokalemia    Transaminitis    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 (HCC)    Cancer associated pain    Palliative care patient    HTN (hypertension)    Candida infection, esophageal ([address])    Medical marijuana use    Chemotherapy-induced nausea    Neoplastic malignant related fatigue    Xerostomia    Antineoplastic chemotherapy induced anemia    COVID-19    Chemotherapy-induced neutropenia     Candidal skin infection    Neutropenia associated with infection (HCC)    Constipation    Essential hypertension    Bacteremia due to Escherichia coli    Bacteremia    Unspecified cord compression (HCC)    Continuous opioid dependence (HCC)    GERD (gastroesophageal reflux disease)     Past Medical History:   Diagnosis Date    Breast cancer ([address])     Cancer ([address])     Hypertension      No past surgical history on file.   Family History   Problem Relation Age of Onset    Coronary artery disease Mother     Heart attack Father     Diabetes type II Father      Social History     Socioeconomic History    Marital status: Single     Spouse name: Not on file    Number of children: Not on file    Years of education: Not on file    Highest education level: Not on file   Occupational History    Not on file   Tobacco Use    Smoking status: Never    Smokeless tobacco: Never   Vaping Use    Vaping Use: Never used   Substance and Sexual Activity    Alcohol use: Not Currently     Comment: rare, social     Drug use: Never    Sexual activity: Not on file   Other Topics Concern    Not on file   Social History Narrative    Significant other Nonda Finder is very supportive. Social Determinants of Health     Financial Resource Strain: Not on file   Food Insecurity: No Food Insecurity (4/1/2022)    Hunger Vital Sign     Worried About Running Out of Food in the Last Year: Never true     Ran Out of Food in the Last Year: Never true   Transportation Needs: No Transportation Needs (4/1/2022)    PRAPARE - Transportation     Lack of Transportation (Medical): No     Lack of Transportation (Non-Medical):  No   Physical Activity: Not on file   Stress: Not on file   Social Connections: Not on file   Intimate Partner Violence: Not on file   Housing Stability: Low Risk  (4/1/2022)    Housing Stability Vital Sign     Unable to Pay for Housing in the Last Year: No     Number of Places Lived in the Last Year: 1     Unstable Housing in the Last Year: No       Current Outpatient Medications:     acetaminophen (TYLENOL) 325 mg tablet, Take 650 mg by mouth every 6 (six) hours as needed for mild pain, Disp: , Rfl:     lisinopril (ZESTRIL) 20 mg tablet, Take 1 tablet (20 mg total) by mouth daily Only to take as needed when BP is >251 systolicallly, Disp: 90 tablet, Rfl: 0    nystatin (MYCOSTATIN) powder, Apply topically 3 (three) times a day, Disp: 15 g, Rfl: 0    ondansetron (ZOFRAN) 4 mg tablet, Take 1 tablet (4 mg total) by mouth every 8 (eight) hours as needed for nausea or vomiting, Disp: 9 tablet, Rfl: 20    oxyCODONE (ROXICODONE) 10 MG TABS, Take 0.5-1 tablets (5-10 mg total) by mouth every 4 (four) hours as needed for moderate pain or severe pain Max Daily Amount: 60 mg, Disp: 120 tablet, Rfl: 0    pantoprazole (PROTONIX) 20 mg tablet, Take 1 tablet (20 mg total) by mouth daily in the early morning, Disp: 30 tablet, Rfl: 2    al mag oxide-diphenhydramine-lidocaine viscous (MAGIC MOUTHWASH) 1:1:1 suspension, Swish and spit 10 mL if needed for mouth pain or discomfort (Patient not taking: Reported on 8/24/2023), Disp: , Rfl:     DPH-Lido-AlHydr-MgHydr-Simeth (First-Mouthwash BLM) SUSP, , Disp: , Rfl:     nystatin (MYCOSTATIN) 500,000 units/5 mL suspension, Apply 5 mL (500,000 Units total) to the mouth or throat if needed (as needed) (Patient not taking: Reported on 8/24/2023), Disp: , Rfl:     polyethylene glycol (MIRALAX) 17 g packet, Take 17 g by mouth if needed (constipation) (Patient not taking: Reported on 8/24/2023), Disp: , Rfl:     senna (SENOKOT) 8.6 mg, Take 3 tablets (25.8 mg total) by mouth if needed for constipation Hold for loose stool. (Patient not taking: Reported on 8/24/2023), Disp: , Rfl:   No Known Allergies    Vitals:    10/17/23 1316   BP: 150/86   Pulse: 86   Resp: 16   Temp: 98 °F (36.7 °C)   SpO2: 98%     Wt Readings from Last 3 Encounters:   10/17/23 86.6 kg (191 lb)   10/12/23 86.9 kg (191 lb 9.3 oz)   09/28/23 86.9 kg (191 lb 8 oz)     Performance Status: ECOG/Zubrod/WHO: 0 - Asymptomatic  Physical Exam  Vitals reviewed. Constitutional:       General: She is not in acute distress. Appearance: Normal appearance. HENT:      Head: Normocephalic and atraumatic. Mouth/Throat:      Mouth: Mucous membranes are moist.   Eyes:      Extraocular Movements: Extraocular movements intact. Conjunctiva/sclera: Conjunctivae normal.   Cardiovascular:      Rate and Rhythm: Normal rate. Pulmonary:      Effort: Pulmonary effort is normal. No respiratory distress. Breath sounds: No wheezing, rhonchi or rales.    Chest:      Comments: Right breast: palpable mass in the upper quadrant of her right breast.  No skin erythema/thickening. No palpable supra/infraclavicular/axillary LAD. Left breast: No palpable abnormality. No skin erythema/thickening. No palpable supra/infraclavicular/axillary LAD. Abdominal:      General: Abdomen is flat. There is no distension. Palpations: Abdomen is soft. Musculoskeletal:      Cervical back: Normal range of motion and neck supple. Right lower leg: No edema. Left lower leg: No edema. Skin:     General: Skin is warm. Coloration: Skin is not pale. Neurological:      Mental Status: She is alert and oriented to person, place, and time. Mental status is at baseline. Cranial Nerves: No cranial nerve deficit. Psychiatric:         Thought Content: Thought content normal.         Labs:  4/4/2023: WBC: 4.60, H/H: 13.8/42.9, MCV: 95, platelets: 003  7/93/2332: Creatinine: 0.74, AST/ALT: 22/51, alk phos: 50, total protein: 7.6    CA 27.29:  3/29/2022: CA 27.29: 110.3  8/9/2022: CA 27.29: 33  9/27/2022: CA 27.29: 25.5  1/3/2023: CA 27.29: 31.8  4/4/2023: CA 27.29: 34.9    Imaging:  10/12/2023: CT chest/abdomen/pelvis:  Interval mild decrease in size of a partially calcified right breast mass and right axillary lymph node. No significant interval change in extensive bony metastases. 1.2 cm indeterminate density lesion in the right kidney, minimally increased in size compared to March 29, 2022. Recommend renal ultrasound for further evaluation. Continued enlargement of a left adnexal cyst, now 5.7 cm. Recommend characterization with pelvic ultrasound. 10/12/2023: ECHO:  Left Ventricle: Left ventricular cavity size is normal. Wall thickness is normal. The left ventricular ejection fraction is 70%. Systolic function is normal. Global longitudinal strain is normal at -17.2 %. Wall motion is normal. Diastolic function is mildly abnormal, consistent with grade I (abnormal) relaxation.      Orders Placed This Encounter   Procedures    CT chest abdomen pelvis w contrast    US kidney and bladder    CBC and differential    Cancer antigen 27.29    CBC and differential    Comprehensive metabolic panel    Cancer antigen 27.29    Echo complete w/ contrast if indicated     Return in about 4 months (around 2/17/2024) for Office Visit, Infusion - See Treatment Plan, Imaging - See orders, Labs - See Treatment Plan. 1. Renal US  2. Check ca 27.29, CBC now  3. FU in 4 months with CT CAP and ECHO prior  4. Continue perjeta and transtuzumab every 3 weeks per treatment plan  5. Continue zometa every 3 months per treatment plan.  CMP prior

## 2023-10-18 ENCOUNTER — HOSPITAL ENCOUNTER (OUTPATIENT)
Dept: ULTRASOUND IMAGING | Facility: HOSPITAL | Age: 48
Discharge: HOME | End: 2023-10-18
Attending: INTERNAL MEDICINE
Payer: COMMERCIAL

## 2023-10-18 DIAGNOSIS — N28.9 RENAL LESION: ICD-10-CM

## 2023-10-18 PROCEDURE — 76775 US EXAM ABDO BACK WALL LIM: CPT

## 2023-10-19 ENCOUNTER — HOSPITAL ENCOUNTER (OUTPATIENT)
Dept: INFUSION CENTER | Facility: CLINIC | Age: 48
Discharge: HOME | End: 2023-10-19
Payer: COMMERCIAL

## 2023-10-19 VITALS
SYSTOLIC BLOOD PRESSURE: 140 MMHG | RESPIRATION RATE: 18 BRPM | DIASTOLIC BLOOD PRESSURE: 68 MMHG | HEART RATE: 100 BPM | HEIGHT: 64 IN | BODY MASS INDEX: 32.87 KG/M2 | WEIGHT: 192.5 LBS | OXYGEN SATURATION: 100 % | TEMPERATURE: 97.8 F

## 2023-10-19 DIAGNOSIS — C50.811 MALIGNANT NEOPLASM OF OVERLAPPING SITES OF RIGHT BREAST IN FEMALE, ESTROGEN RECEPTOR NEGATIVE: Primary | ICD-10-CM

## 2023-10-19 DIAGNOSIS — D70.1 CHEMOTHERAPY-INDUCED NEUTROPENIA: ICD-10-CM

## 2023-10-19 DIAGNOSIS — T45.1X5A CHEMOTHERAPY-INDUCED NEUTROPENIA: ICD-10-CM

## 2023-10-19 DIAGNOSIS — C79.51 MALIGNANT NEOPLASM METASTATIC TO BONE (HCC): ICD-10-CM

## 2023-10-19 DIAGNOSIS — Z17.1 MALIGNANT NEOPLASM OF OVERLAPPING SITES OF RIGHT BREAST IN FEMALE, ESTROGEN RECEPTOR NEGATIVE: Primary | ICD-10-CM

## 2023-10-19 PROCEDURE — 96413 CHEMO IV INFUSION 1 HR: CPT

## 2023-10-19 PROCEDURE — 96417 CHEMO IV INFUS EACH ADDL SEQ: CPT

## 2023-10-19 RX ORDER — SODIUM CHLORIDE 9 MG/ML
20 INJECTION, SOLUTION INTRAVENOUS ONCE
Status: COMPLETED | OUTPATIENT
Start: 2023-10-19 | End: 2023-10-19

## 2023-10-19 RX ADMIN — SODIUM CHLORIDE 20 ML/HR: 0.9 INJECTION, SOLUTION INTRAVENOUS at 14:23

## 2023-10-19 RX ADMIN — PERTUZUMAB 420 MG: 30 INJECTION, SOLUTION, CONCENTRATE INTRAVENOUS at 14:51

## 2023-10-19 RX ADMIN — TRASTUZUMAB 522 MG: 150 INJECTION, POWDER, LYOPHILIZED, FOR SOLUTION INTRAVENOUS at 15:27

## 2023-10-19 NOTE — PROGRESS NOTES
Pt here for herceptin/perjeta, offers no complaints, no labs to review in plan, IV access obtained, resting comfortably in chair

## 2023-10-19 NOTE — PROGRESS NOTES
Pt tolerated infusion without issue. Aware of her next infusion.  Offers no complaints, IV removed, declined AVS

## 2023-10-31 ENCOUNTER — TELEPHONE (OUTPATIENT)
Dept: HEMATOLOGY ONCOLOGY | Facility: CLINIC | Age: 48
End: 2023-10-31

## 2023-10-31 DIAGNOSIS — N20.0 RENAL STONES: ICD-10-CM

## 2023-10-31 DIAGNOSIS — N28.1 BILATERAL RENAL CYSTS: Primary | ICD-10-CM

## 2023-10-31 NOTE — TELEPHONE ENCOUNTER
----- Message from [facility] sent at 10/28/2023  7:57 AM EDT -----  Please notify patient that her renal ultrasound shows some cysts and kidney stones. Recommend further evaluation with urology.

## 2023-10-31 NOTE — TELEPHONE ENCOUNTER
Called and left a message for the patient regarding her renal US results. Results show B/L renal cysts and small kidney stones. Dr. Jamie Beck recommending patient f/u with Urology. Consult placed.

## 2023-11-03 ENCOUNTER — TELEPHONE (OUTPATIENT)
Age: 48
End: 2023-11-03

## 2023-11-03 NOTE — TELEPHONE ENCOUNTER
Patient called today to establish care for renal cysts. Pt had ctscan and ultrasound in October, which prompted her to be referred to Urology. Pt is currently scheduled for a 12/11 appointment with [facility]. And was also added to wait list.    Pt was informed of a msg being sent to clinical staff to see if a sooner appt is needed. Pt gave verbal understanding. Please, review to see if the pt was scheduled within adequate time frame.     Call back [phone]

## 2023-11-05 ENCOUNTER — APPOINTMENT (OUTPATIENT)
Dept: LAB | Facility: CLINIC | Age: 48
End: 2023-11-05
Payer: COMMERCIAL

## 2023-11-05 DIAGNOSIS — C79.51 MALIGNANT NEOPLASM METASTATIC TO BONE (HCC): ICD-10-CM

## 2023-11-05 LAB
ALBUMIN SERPL BCP-MCNC: 4.4 G/DL (ref 3.5–5)
ALP SERPL-CCNC: 49 U/L (ref 34–104)
ALT SERPL W P-5'-P-CCNC: 41 U/L (ref 7–52)
ANION GAP SERPL CALCULATED.3IONS-SCNC: 3 MMOL/L
AST SERPL W P-5'-P-CCNC: 23 U/L (ref 13–39)
BASOPHILS # BLD AUTO: 0.03 THOUSANDS/ÂΜL (ref 0–0.1)
BASOPHILS NFR BLD AUTO: 1 % (ref 0–1)
BILIRUB SERPL-MCNC: 0.95 MG/DL (ref 0.2–1)
BUN SERPL-MCNC: 14 MG/DL (ref 5–25)
CALCIUM SERPL-MCNC: 9.2 MG/DL (ref 8.4–10.2)
CHLORIDE SERPL-SCNC: 107 MMOL/L (ref 96–108)
CHOLEST SERPL-MCNC: 165 MG/DL
CO2 SERPL-SCNC: 30 MMOL/L (ref 21–32)
CREAT SERPL-MCNC: 0.68 MG/DL (ref 0.6–1.3)
EOSINOPHIL # BLD AUTO: 0.17 THOUSAND/ÂΜL (ref 0–0.61)
EOSINOPHIL NFR BLD AUTO: 4 % (ref 0–6)
ERYTHROCYTE [DISTWIDTH] IN BLOOD BY AUTOMATED COUNT: 12.9 % (ref 11.6–15.1)
GFR SERPL CREATININE-BSD FRML MDRD: 103 ML/MIN/1.73SQ M
GLUCOSE P FAST SERPL-MCNC: 90 MG/DL (ref 65–99)
HCT VFR BLD AUTO: 44.2 % (ref 34.8–46.1)
HDLC SERPL-MCNC: 45 MG/DL
HGB BLD-MCNC: 14.2 G/DL (ref 11.5–15.4)
IMM GRANULOCYTES # BLD AUTO: 0.01 THOUSAND/UL (ref 0–0.2)
IMM GRANULOCYTES NFR BLD AUTO: 0 % (ref 0–2)
LDLC SERPL CALC-MCNC: 104 MG/DL (ref 0–100)
LYMPHOCYTES # BLD AUTO: 1.1 THOUSANDS/ÂΜL (ref 0.6–4.47)
LYMPHOCYTES NFR BLD AUTO: 28 % (ref 14–44)
MCH RBC QN AUTO: 31.6 PG (ref 26.8–34.3)
MCHC RBC AUTO-ENTMCNC: 32.1 G/DL (ref 31.4–37.4)
MCV RBC AUTO: 98 FL (ref 82–98)
MONOCYTES # BLD AUTO: 0.46 THOUSAND/ÂΜL (ref 0.17–1.22)
MONOCYTES NFR BLD AUTO: 12 % (ref 4–12)
NEUTROPHILS # BLD AUTO: 2.15 THOUSANDS/ÂΜL (ref 1.85–7.62)
NEUTS SEG NFR BLD AUTO: 55 % (ref 43–75)
NONHDLC SERPL-MCNC: 120 MG/DL
NRBC BLD AUTO-RTO: 0 /100 WBCS
PLATELET # BLD AUTO: 209 THOUSANDS/UL (ref 149–390)
PMV BLD AUTO: 10 FL (ref 8.9–12.7)
POTASSIUM SERPL-SCNC: 4.3 MMOL/L (ref 3.5–5.3)
PROT SERPL-MCNC: 7.2 G/DL (ref 6.4–8.4)
RBC # BLD AUTO: 4.5 MILLION/UL (ref 3.81–5.12)
SODIUM SERPL-SCNC: 140 MMOL/L (ref 135–147)
TRIGL SERPL-MCNC: 82 MG/DL
WBC # BLD AUTO: 3.92 THOUSAND/UL (ref 4.31–10.16)

## 2023-11-05 PROCEDURE — 36415 COLL VENOUS BLD VENIPUNCTURE: CPT

## 2023-11-05 PROCEDURE — 80053 COMPREHEN METABOLIC PANEL: CPT

## 2023-11-05 PROCEDURE — 80061 LIPID PANEL: CPT

## 2023-11-05 PROCEDURE — 86300 IMMUNOASSAY TUMOR CA 15-3: CPT

## 2023-11-05 PROCEDURE — 85025 COMPLETE CBC W/AUTO DIFF WBC: CPT

## 2023-11-06 NOTE — TELEPHONE ENCOUNTER
Timeframe acceptable. CT scan revealing indeterminant cysts and additional simple cyst.  Ultrasound was obtained there afterwards for additional clarification based off CT findings.   Reviewed as cortical cyst.

## 2023-11-07 DIAGNOSIS — C50.811 MALIGNANT NEOPLASM OF OVERLAPPING SITES OF RIGHT BREAST IN FEMALE, ESTROGEN RECEPTOR NEGATIVE: ICD-10-CM

## 2023-11-07 DIAGNOSIS — Z17.1 MALIGNANT NEOPLASM OF OVERLAPPING SITES OF RIGHT BREAST IN FEMALE, ESTROGEN RECEPTOR NEGATIVE: ICD-10-CM

## 2023-11-07 DIAGNOSIS — C79.51 MALIGNANT NEOPLASM METASTATIC TO BONE (HCC): ICD-10-CM

## 2023-11-07 DIAGNOSIS — T45.1X5A CHEMOTHERAPY-INDUCED NEUTROPENIA: Primary | ICD-10-CM

## 2023-11-07 DIAGNOSIS — D70.1 CHEMOTHERAPY-INDUCED NEUTROPENIA: Primary | ICD-10-CM

## 2023-11-07 LAB — CANCER AG27-29 SERPL-ACNC: 29.7 U/ML (ref 0–38.6)

## 2023-11-09 ENCOUNTER — OFFICE VISIT (OUTPATIENT)
Dept: INTERNAL MEDICINE CLINIC | Facility: CLINIC | Age: 48
End: 2023-11-09
Payer: COMMERCIAL

## 2023-11-09 ENCOUNTER — HOSPITAL ENCOUNTER (OUTPATIENT)
Dept: INFUSION CENTER | Facility: CLINIC | Age: 48
Discharge: HOME | End: 2023-11-09
Payer: COMMERCIAL

## 2023-11-09 VITALS
BODY MASS INDEX: 32.92 KG/M2 | RESPIRATION RATE: 18 BRPM | HEART RATE: 102 BPM | WEIGHT: 191.8 LBS | OXYGEN SATURATION: 95 % | SYSTOLIC BLOOD PRESSURE: 140 MMHG | TEMPERATURE: 98 F | DIASTOLIC BLOOD PRESSURE: 81 MMHG

## 2023-11-09 VITALS
OXYGEN SATURATION: 98 % | SYSTOLIC BLOOD PRESSURE: 138 MMHG | TEMPERATURE: 98.1 F | HEART RATE: 111 BPM | WEIGHT: 191 LBS | DIASTOLIC BLOOD PRESSURE: 86 MMHG | HEIGHT: 64 IN | BODY MASS INDEX: 32.61 KG/M2

## 2023-11-09 DIAGNOSIS — C79.51 MALIGNANT NEOPLASM METASTATIC TO BONE (HCC): ICD-10-CM

## 2023-11-09 DIAGNOSIS — Z17.1 MALIGNANT NEOPLASM OF OVERLAPPING SITES OF RIGHT BREAST IN FEMALE, ESTROGEN RECEPTOR NEGATIVE: ICD-10-CM

## 2023-11-09 DIAGNOSIS — C50.811 MALIGNANT NEOPLASM OF OVERLAPPING SITES OF RIGHT BREAST IN FEMALE, ESTROGEN RECEPTOR NEGATIVE: ICD-10-CM

## 2023-11-09 DIAGNOSIS — C50.811 MALIGNANT NEOPLASM OF OVERLAPPING SITES OF RIGHT BREAST IN FEMALE, ESTROGEN RECEPTOR NEGATIVE: Primary | ICD-10-CM

## 2023-11-09 DIAGNOSIS — Z17.1 MALIGNANT NEOPLASM OF OVERLAPPING SITES OF RIGHT BREAST IN FEMALE, ESTROGEN RECEPTOR NEGATIVE: Primary | ICD-10-CM

## 2023-11-09 DIAGNOSIS — D70.1 CHEMOTHERAPY-INDUCED NEUTROPENIA: ICD-10-CM

## 2023-11-09 DIAGNOSIS — T45.1X5A CHEMOTHERAPY-INDUCED NEUTROPENIA: ICD-10-CM

## 2023-11-09 DIAGNOSIS — Z00.00 ANNUAL PHYSICAL EXAM: Primary | ICD-10-CM

## 2023-11-09 DIAGNOSIS — E66.09 CLASS 1 OBESITY DUE TO EXCESS CALORIES WITH SERIOUS COMORBIDITY AND BODY MASS INDEX (BMI) OF 32.0 TO 32.9 IN ADULT: ICD-10-CM

## 2023-11-09 DIAGNOSIS — R03.0 ELEVATED BP WITHOUT DIAGNOSIS OF HYPERTENSION: ICD-10-CM

## 2023-11-09 DIAGNOSIS — E03.9 HYPOTHYROIDISM, UNSPECIFIED TYPE: ICD-10-CM

## 2023-11-09 DIAGNOSIS — I10 ESSENTIAL HYPERTENSION: ICD-10-CM

## 2023-11-09 PROBLEM — E66.811 CLASS 1 OBESITY DUE TO EXCESS CALORIES WITH SERIOUS COMORBIDITY AND BODY MASS INDEX (BMI) OF 32.0 TO 32.9 IN ADULT: Status: ACTIVE | Noted: 2023-11-09

## 2023-11-09 PROCEDURE — 99396 PREV VISIT EST AGE 40-64: CPT | Performed by: INTERNAL MEDICINE

## 2023-11-09 PROCEDURE — 96413 CHEMO IV INFUSION 1 HR: CPT

## 2023-11-09 PROCEDURE — 96417 CHEMO IV INFUS EACH ADDL SEQ: CPT

## 2023-11-09 RX ORDER — SODIUM CHLORIDE 9 MG/ML
20 INJECTION, SOLUTION INTRAVENOUS ONCE
Status: CANCELLED | OUTPATIENT
Start: 2023-11-09

## 2023-11-09 RX ORDER — SODIUM CHLORIDE 9 MG/ML
20 INJECTION, SOLUTION INTRAVENOUS ONCE
Status: COMPLETED | OUTPATIENT
Start: 2023-11-09 | End: 2023-11-09

## 2023-11-09 RX ORDER — LISINOPRIL 20 MG/1
10 TABLET ORAL DAILY
Qty: 90 TABLET | Refills: 0
Start: 2023-11-09

## 2023-11-09 RX ADMIN — PERTUZUMAB 420 MG: 30 INJECTION, SOLUTION, CONCENTRATE INTRAVENOUS at 14:53

## 2023-11-09 RX ADMIN — TRASTUZUMAB 523 MG: 150 INJECTION, POWDER, LYOPHILIZED, FOR SOLUTION INTRAVENOUS at 15:27

## 2023-11-09 RX ADMIN — SODIUM CHLORIDE 20 ML/HR: 0.9 INJECTION, SOLUTION INTRAVENOUS at 14:28

## 2023-11-09 NOTE — PROGRESS NOTES
[facility],[address] INTERNAL MEDICINE    NAME: Robby Roman  AGE: 51 y.o. SEX: female  : 1975     DATE: 2023     Assessment and Plan:     Problem List Items Addressed This Visit       Malignant neoplasm of overlapping sites of right breast in female, estrogen receptor negative     Relevant Orders    Ambulatory Referral to Medical Fitness Exercise Specialist    Elevated BP without diagnosis of hypertension    Relevant Medications    lisinopril (ZESTRIL) 20 mg tablet    Essential hypertension    Relevant Medications    lisinopril (ZESTRIL) 20 mg tablet    Class 1 obesity due to excess calories with serious comorbidity and body mass index (BMI) of 32.0 to 32.9 in adult    Relevant Orders    Ambulatory Referral to Medical Fitness Exercise Specialist     Other Visit Diagnoses       Annual physical exam    -  Primary    Hypothyroidism, unspecified type        Relevant Orders    TSH + Free T4            Immunizations and preventive care screenings were discussed with patient today. Appropriate education was printed on patient's after visit summary. Counseling:  Exercise: the importance of regular exercise/physical activity was discussed. Recommend exercise 3-5 times per week for at least 30 minutes. Depression Screening and Follow-up Plan: Patient was screened for depression during today's encounter. They screened negative with a PHQ-2 score of 0. Return in about 6 months (around 2024). Chief Complaint:     Chief Complaint   Patient presents with    Physical Exam    questions     Sister was recently diagnosed with Hashimoto disease     hm     Patient will make appointment for [facility] [place] and pap smear       History of Present Illness:     Adult Annual Physical   Patient here for a comprehensive physical exam. The patient reports no problems. Diet and Physical Activity  Diet/Nutrition: well balanced diet. Exercise: walking. Depression Screening  PHQ-2/9 Depression Screening    Little interest or pleasure in doing things: 0 - not at all  Feeling down, depressed, or hopeless: 0 - not at all  PHQ-2 Score: 0  PHQ-2 Interpretation: Negative depression screen       General Health  Sleep: sleeps well. Hearing: normal - bilateral.  Vision: no vision problems. Dental: regular dental visits. /GYN Health  Patient is: postmenopausal  Last menstrual period:   Contraceptive method:  none . Advanced Care Planning  Do you have an advanced directive? no  Do you have a durable medical power of ? no     Review of Systems:     Review of Systems   Constitutional:  Positive for unexpected weight change. Negative for appetite change, chills, diaphoresis, fatigue and fever. Respiratory:  Negative for apnea, cough, choking, chest tightness, shortness of breath, wheezing and stridor. Cardiovascular:  Negative for chest pain, palpitations and leg swelling. Gastrointestinal:  Negative for abdominal distention, abdominal pain, anal bleeding, blood in stool, constipation, diarrhea, nausea and vomiting. Genitourinary:  Negative for decreased urine volume, difficulty urinating, frequency and urgency. Musculoskeletal:  Negative for arthralgias, back pain and myalgias. Neurological:  Negative for dizziness, light-headedness, numbness and headaches. Past Medical History:     Past Medical History:   Diagnosis Date    Breast cancer ([address] )     Cancer ([address])     Hypertension       Past Surgical History:     History reviewed. No pertinent surgical history.    Social History:     Social History     Socioeconomic History    Marital status: Single     Spouse name: None    Number of children: None    Years of education: None    Highest education level: None   Occupational History    None   Tobacco Use    Smoking status: Never    Smokeless tobacco: Never   Vaping Use    Vaping Use: Never used   Substance and Sexual Activity    Alcohol use: Not Currently     Comment: rare, social     Drug use: Never    Sexual activity: None   Other Topics Concern    None   Social History Narrative    Significant other Vikki Suarez is very supportive. Social Determinants of Health     Financial Resource Strain: Not on file   Food Insecurity: No Food Insecurity (4/1/2022)    Hunger Vital Sign     Worried About Running Out of Food in the Last Year: Never true     Ran Out of Food in the Last Year: Never true   Transportation Needs: No Transportation Needs (4/1/2022)    PRAPARE - Transportation     Lack of Transportation (Medical): No     Lack of Transportation (Non-Medical):  No   Physical Activity: Not on file   Stress: Not on file   Social Connections: Not on file   Intimate Partner Violence: Not on file   Housing Stability: Low Risk  (4/1/2022)    Housing Stability Vital Sign     Unable to Pay for Housing in the Last Year: No     Number of Places Lived in the Last Year: 1     Unstable Housing in the Last Year: No      Family History:     Family History   Problem Relation Age of Onset    Coronary artery disease Mother     Heart attack Father     Diabetes type II Father     Hashimoto's thyroiditis Sister       Current Medications:     Current Outpatient Medications   Medication Sig Dispense Refill    acetaminophen (TYLENOL) 325 mg tablet Take 650 mg by mouth every 6 (six) hours as needed for mild pain      lisinopril (ZESTRIL) 20 mg tablet Take 0.5 tablets (10 mg total) by mouth daily Only to take as needed when BP is >413 systolicallly 90 tablet 0    nystatin (MYCOSTATIN) powder Apply topically 3 (three) times a day 15 g 0    ondansetron (ZOFRAN) 4 mg tablet Take 1 tablet (4 mg total) by mouth every 8 (eight) hours as needed for nausea or vomiting 9 tablet 20    oxyCODONE (ROXICODONE) 10 MG TABS Take 0.5-1 tablets (5-10 mg total) by mouth every 4 (four) hours as needed for moderate pain or severe pain Max Daily Amount: 60 mg 120 tablet 0    pantoprazole (PROTONIX) 20 mg tablet Take 1 tablet (20 mg total) by mouth daily in the early morning 30 tablet 2    al mag oxide-diphenhydramine-lidocaine viscous (MAGIC MOUTHWASH) 1:1:1 suspension Swish and spit 10 mL if needed for mouth pain or discomfort (Patient not taking: Reported on 8/24/2023)      DPH-Lido-AlHydr-MgHydr-Simeth (First-Mouthwash BLM) SUSP  (Patient not taking: Reported on 8/24/2023)      nystatin (MYCOSTATIN) 500,000 units/5 mL suspension Apply 5 mL (500,000 Units total) to the mouth or throat if needed (as needed) (Patient not taking: Reported on 8/24/2023)      polyethylene glycol (MIRALAX) 17 g packet Take 17 g by mouth if needed (constipation) (Patient not taking: Reported on 8/24/2023)      senna (SENOKOT) 8.6 mg Take 3 tablets (25.8 mg total) by mouth if needed for constipation Hold for loose stool. (Patient not taking: Reported on 8/24/2023)       No current facility-administered medications for this visit. Allergies:     No Known Allergies   Physical Exam:     /86 (BP Location: Right arm, Patient Position: Sitting, Cuff Size: Standard)   Pulse (!) 111   Temp 98.1 °F (36.7 °C) (Temporal)   Ht 5' 4" (1.626 m)   Wt 86.6 kg (191 lb)   LMP 03/28/2022 (Approximate)   SpO2 98% Comment: RA  BMI 32.79 kg/m²     Physical Exam  Constitutional:       General: She is not in acute distress. Appearance: Normal appearance. She is obese. She is not ill-appearing, toxic-appearing or diaphoretic. Neck:      Thyroid: No thyromegaly. Cardiovascular:      Rate and Rhythm: Normal rate and regular rhythm. Pulses: Normal pulses. Heart sounds: Normal heart sounds. No murmur heard. No gallop. Pulmonary:      Effort: Pulmonary effort is normal. No respiratory distress. Breath sounds: Normal breath sounds. No stridor. No wheezing, rhonchi or rales. Chest:      Chest wall: No tenderness. Abdominal:      Palpations: Abdomen is soft. Neurological:      Mental Status: She is alert.           Cathalene Dates Porfirio Kawasaki, [address] [place] [address] INTERNAL MEDICINE

## 2023-11-09 NOTE — PATIENT INSTRUCTIONS
Wellness Visit for Adults   AMBULATORY CARE:   A wellness visit  is when you see your healthcare provider to get screened for health problems. Your healthcare provider will also give you advice on how to stay healthy. Write down your questions so you remember to ask them. Ask your healthcare provider how often you should have a wellness visit. What happens at a wellness visit:  Your healthcare provider will ask about your health, and your family history of health problems. This includes high blood pressure, heart disease, and cancer. He or she will ask if you have symptoms that concern you, if you smoke, and about your mood. You may also be asked about your intake of medicines, supplements, food, and alcohol. Any of the following may be done: Your weight  will be checked. Your height may also be checked so your body mass index (BMI) can be calculated. Your BMI shows if you are at a healthy weight. Your blood pressure  and heart rate will be checked. Your temperature may also be checked. Blood and urine tests  may be done. Blood tests may be done to check your cholesterol levels. Abnormal cholesterol levels increase your risk for heart disease and stroke. You may also need a blood or urine test to check for diabetes if you are at increased risk. Urine tests may be done to look for signs of an infection or kidney disease. A physical exam  includes checking your heartbeat and lungs with a stethoscope. Your healthcare provider may also check your skin to look for sun damage. Screening tests  may be recommended. A screening test is done to check for diseases that may not cause symptoms. The screening tests you may need depend on your age, gender, family history, and lifestyle habits. For example, colorectal screening may be recommended if you are 48years old or older. Screening tests you need if you are a woman:   A Pap smear  is used to screen for cervical cancer.  Pap smears are usually done every 3 to 5 years depending on your age. You may need them more often if you have had abnormal Pap smear test results in the past. Ask your healthcare provider how often you should have a Pap smear. A mammogram  is an x-ray of your breasts to screen for breast cancer. Experts recommend mammograms every 2 years starting at age 48 years. You may need a mammogram at age 52 years or younger if you have an increased risk for breast cancer. Talk to your healthcare provider about when you should start having mammograms and how often you need them. Vaccines you may need:   Get an influenza vaccine  every year. The influenza vaccine protects you from the flu. Several types of viruses cause the flu. The viruses change over time, so new vaccines are made each year. Get a tetanus-diphtheria (Td) booster vaccine  every 10 years. This vaccine protects you against tetanus and diphtheria. Tetanus is a severe infection that may cause painful muscle spasms and lockjaw. Diphtheria is a severe bacterial infection that causes a thick covering in the back of your mouth and throat. Get a human papillomavirus (HPV) vaccine  if you are female and aged 23 to 32 or male 23 to 24 and never received it. This vaccine protects you from HPV infection. HPV is the most common infection spread by sexual contact. HPV may also cause vaginal, penile, and anal cancers. Get a pneumococcal vaccine  if you are aged 72 years or older. The pneumococcal vaccine is an injection given to protect you from pneumococcal disease. Pneumococcal disease is an infection caused by pneumococcal bacteria. The infection may cause pneumonia, meningitis, or an ear infection. Get a shingles vaccine  if you are 60 or older, even if you have had shingles before. The shingles vaccine is an injection to protect you from the varicella-zoster virus. This is the same virus that causes chickenpox.  Shingles is a painful rash that develops in people who had chickenpox or have been exposed to the virus. How to eat healthy:  My Plate is a model for planning healthy meals. It shows the types and amounts of foods that should go on your plate. Fruits and vegetables make up about half of your plate, and grains and protein make up the other half. A serving of dairy is included on the side of your plate. The amount of calories and serving sizes you need depends on your age, gender, weight, and height. Examples of healthy foods are listed below:  Eat a variety of vegetables  such as dark green, red, and orange vegetables. You can also include canned vegetables low in sodium (salt) and frozen vegetables without added butter or sauces. Eat a variety of fresh fruits , canned fruit in 100% juice, frozen fruit, and dried fruit. Include whole grains. At least half of the grains you eat should be whole grains. Examples include whole-wheat bread, wheat pasta, brown rice, and whole-grain cereals such as oatmeal.    Eat a variety of protein foods such as seafood (fish and shellfish), lean meat, and poultry without skin (turkey and chicken). Examples of lean meats include pork leg, shoulder, or tenderloin, and beef round, sirloin, tenderloin, and extra lean ground beef. Other protein foods include eggs and egg substitutes, beans, peas, soy products, nuts, and seeds. Choose low-fat dairy products such as skim or 1% milk or low-fat yogurt, cheese, and cottage cheese. Limit unhealthy fats  such as butter, hard margarine, and shortening. Exercise:  Exercise at least 30 minutes per day on most days of the week. Some examples of exercise include walking, biking, dancing, and swimming. You can also fit in more physical activity by taking the stairs instead of the elevator or parking farther away from stores. Include muscle strengthening activities 2 days each week. Regular exercise provides many health benefits.  It helps you manage your weight, and decreases your risk for type 2 diabetes, heart disease, stroke, and high blood pressure. Exercise can also help improve your mood. Ask your healthcare provider about the best exercise plan for you. General health and safety guidelines:   Do not smoke. Nicotine and other chemicals in cigarettes and cigars can cause lung damage. Ask your healthcare provider for information if you currently smoke and need help to quit. E-cigarettes or smokeless tobacco still contain nicotine. Talk to your healthcare provider before you use these products. Limit alcohol. A drink of alcohol is 12 ounces of beer, 5 ounces of wine, or 1½ ounces of liquor. Lose weight, if needed. Being overweight increases your risk of certain health conditions. These include heart disease, high blood pressure, type 2 diabetes, and certain types of cancer. Protect your skin. Do not sunbathe or use tanning beds. Use sunscreen with a SPF 15 or higher. Apply sunscreen at least 15 minutes before you go outside. Reapply sunscreen every 2 hours. Wear protective clothing, hats, and sunglasses when you are outside. Drive safely. Always wear your seatbelt. Make sure everyone in your car wears a seatbelt. A seatbelt can save your life if you are in an accident. Do not use your cell phone when you are driving. This could distract you and cause an accident. Pull over if you need to make a call or send a text message. Practice safe sex. Use latex condoms if are sexually active and have more than one partner. Your healthcare provider may recommend screening tests for sexually transmitted infections (STIs). Wear helmets, lifejackets, and protective gear. Always wear a helmet when you ride a bike or motorcycle, go skiing, or play sports that could cause a head injury. Wear protective equipment when you play sports. Wear a lifejacket when you are on a boat or doing water sports.     © Copyright Yashira Albvictorino 2023 Information is for End User's use only and may not be sold, redistributed or otherwise used for commercial purposes. The above information is an  only. It is not intended as medical advice for individual conditions or treatments. Talk to your doctor, nurse or pharmacist before following any medical regimen to see if it is safe and effective for you.

## 2023-11-27 DIAGNOSIS — R21 RASH: ICD-10-CM

## 2023-11-27 DIAGNOSIS — R03.0 ELEVATED BP WITHOUT DIAGNOSIS OF HYPERTENSION: ICD-10-CM

## 2023-11-27 RX ORDER — NYSTATIN 100000 [USP'U]/G
POWDER TOPICAL 3 TIMES DAILY
Qty: 15 G | Refills: 1 | Status: SHIPPED | OUTPATIENT
Start: 2023-11-27

## 2023-11-27 RX ORDER — LISINOPRIL 20 MG/1
10 TABLET ORAL DAILY
Qty: 90 TABLET | Refills: 1 | Status: SHIPPED | OUTPATIENT
Start: 2023-11-27

## 2023-11-27 NOTE — TELEPHONE ENCOUNTER
Patient called and needs a refill on her medications called into the [facility] on [address]     Last ovs 11/09/2023  Next ovs 05/07/2024

## 2023-11-28 DIAGNOSIS — D70.1 CHEMOTHERAPY-INDUCED NEUTROPENIA: Primary | ICD-10-CM

## 2023-11-28 DIAGNOSIS — C79.51 MALIGNANT NEOPLASM METASTATIC TO BONE (HCC): ICD-10-CM

## 2023-11-28 DIAGNOSIS — T45.1X5A CHEMOTHERAPY-INDUCED NEUTROPENIA: Primary | ICD-10-CM

## 2023-11-28 DIAGNOSIS — C50.811 MALIGNANT NEOPLASM OF OVERLAPPING SITES OF RIGHT BREAST IN FEMALE, ESTROGEN RECEPTOR NEGATIVE: ICD-10-CM

## 2023-11-28 DIAGNOSIS — Z17.1 MALIGNANT NEOPLASM OF OVERLAPPING SITES OF RIGHT BREAST IN FEMALE, ESTROGEN RECEPTOR NEGATIVE: ICD-10-CM

## 2023-11-29 RX ORDER — SODIUM CHLORIDE 9 MG/ML
20 INJECTION, SOLUTION INTRAVENOUS ONCE
Status: CANCELLED | OUTPATIENT
Start: 2023-11-30

## 2023-11-30 ENCOUNTER — HOSPITAL ENCOUNTER (OUTPATIENT)
Dept: INFUSION CENTER | Facility: CLINIC | Age: 48
Discharge: HOME | End: 2023-11-30
Payer: COMMERCIAL

## 2023-11-30 VITALS
WEIGHT: 192.5 LBS | OXYGEN SATURATION: 96 % | TEMPERATURE: 98 F | HEART RATE: 107 BPM | RESPIRATION RATE: 20 BRPM | BODY MASS INDEX: 32.87 KG/M2 | SYSTOLIC BLOOD PRESSURE: 132 MMHG | DIASTOLIC BLOOD PRESSURE: 90 MMHG | HEIGHT: 64 IN

## 2023-11-30 DIAGNOSIS — C79.51 MALIGNANT NEOPLASM METASTATIC TO BONE (HCC): ICD-10-CM

## 2023-11-30 DIAGNOSIS — D70.1 CHEMOTHERAPY-INDUCED NEUTROPENIA: ICD-10-CM

## 2023-11-30 DIAGNOSIS — T45.1X5A CHEMOTHERAPY-INDUCED NEUTROPENIA: ICD-10-CM

## 2023-11-30 DIAGNOSIS — C50.811 MALIGNANT NEOPLASM OF OVERLAPPING SITES OF RIGHT BREAST IN FEMALE, ESTROGEN RECEPTOR NEGATIVE: Primary | ICD-10-CM

## 2023-11-30 DIAGNOSIS — Z17.1 MALIGNANT NEOPLASM OF OVERLAPPING SITES OF RIGHT BREAST IN FEMALE, ESTROGEN RECEPTOR NEGATIVE: Primary | ICD-10-CM

## 2023-11-30 PROCEDURE — 96413 CHEMO IV INFUSION 1 HR: CPT

## 2023-11-30 PROCEDURE — 96417 CHEMO IV INFUS EACH ADDL SEQ: CPT

## 2023-11-30 RX ORDER — SODIUM CHLORIDE 9 MG/ML
20 INJECTION, SOLUTION INTRAVENOUS ONCE
Status: COMPLETED | OUTPATIENT
Start: 2023-11-30 | End: 2023-11-30

## 2023-11-30 RX ADMIN — TRASTUZUMAB 522 MG: 150 INJECTION, POWDER, LYOPHILIZED, FOR SOLUTION INTRAVENOUS at 15:37

## 2023-11-30 RX ADMIN — PERTUZUMAB 420 MG: 30 INJECTION, SOLUTION, CONCENTRATE INTRAVENOUS at 15:02

## 2023-11-30 RX ADMIN — SODIUM CHLORIDE 20 ML/HR: 0.9 INJECTION, SOLUTION INTRAVENOUS at 14:30

## 2023-11-30 NOTE — PROGRESS NOTES
Pt here for Pertuzumab + Trastuzumab . Pt offered no acute complaints today. Vitals stable. Echo 10/12/23 EF 70%. Pt resting comfortably in chair with call bell in place.

## 2023-11-30 NOTE — PROGRESS NOTES
Patient tolerated her treatment without any adverse reactions.  Next appointment confirmed and she declined avs

## 2023-12-01 ENCOUNTER — OFFICE VISIT (OUTPATIENT)
Dept: PALLIATIVE MEDICINE | Facility: CLINIC | Age: 48
End: 2023-12-01

## 2023-12-01 VITALS
RESPIRATION RATE: 18 BRPM | WEIGHT: 195 LBS | SYSTOLIC BLOOD PRESSURE: 158 MMHG | HEART RATE: 88 BPM | BODY MASS INDEX: 33.29 KG/M2 | TEMPERATURE: 98.1 F | OXYGEN SATURATION: 96 % | HEIGHT: 64 IN | DIASTOLIC BLOOD PRESSURE: 82 MMHG

## 2023-12-01 DIAGNOSIS — C79.49 METASTASIS TO SPINAL CORD (HCC): ICD-10-CM

## 2023-12-01 DIAGNOSIS — C79.51 MALIGNANT NEOPLASM METASTATIC TO BONE (HCC): ICD-10-CM

## 2023-12-01 DIAGNOSIS — Z17.1 MALIGNANT NEOPLASM OF OVERLAPPING SITES OF RIGHT BREAST IN FEMALE, ESTROGEN RECEPTOR NEGATIVE: Primary | ICD-10-CM

## 2023-12-01 DIAGNOSIS — Z79.899 MEDICAL MARIJUANA USE: ICD-10-CM

## 2023-12-01 DIAGNOSIS — K21.9 GERD (GASTROESOPHAGEAL REFLUX DISEASE): ICD-10-CM

## 2023-12-01 DIAGNOSIS — K21.9 GASTROESOPHAGEAL REFLUX DISEASE WITHOUT ESOPHAGITIS: ICD-10-CM

## 2023-12-01 DIAGNOSIS — Z51.5 PALLIATIVE CARE PATIENT: ICD-10-CM

## 2023-12-01 DIAGNOSIS — C50.811 MALIGNANT NEOPLASM OF OVERLAPPING SITES OF RIGHT BREAST IN FEMALE, ESTROGEN RECEPTOR NEGATIVE: Primary | ICD-10-CM

## 2023-12-01 DIAGNOSIS — R53.0 NEOPLASTIC MALIGNANT RELATED FATIGUE: ICD-10-CM

## 2023-12-01 DIAGNOSIS — G89.3 CANCER ASSOCIATED PAIN: ICD-10-CM

## 2023-12-01 PROBLEM — T45.1X5A CHEMOTHERAPY-INDUCED NAUSEA: Status: RESOLVED | Noted: 2022-05-18 | Resolved: 2023-12-01

## 2023-12-01 PROBLEM — K11.7 XEROSTOMIA: Status: RESOLVED | Noted: 2022-05-18 | Resolved: 2023-12-01

## 2023-12-01 PROBLEM — R11.0 CHEMOTHERAPY-INDUCED NAUSEA: Status: RESOLVED | Noted: 2022-05-18 | Resolved: 2023-12-01

## 2023-12-01 PROBLEM — K59.00 CONSTIPATION: Status: RESOLVED | Noted: 2022-06-15 | Resolved: 2023-12-01

## 2023-12-01 RX ORDER — OXYCODONE HYDROCHLORIDE 10 MG/1
5-10 TABLET ORAL EVERY 4 HOURS PRN
Qty: 120 TABLET | Refills: 0 | Status: SHIPPED | OUTPATIENT
Start: 2023-12-01

## 2023-12-01 RX ORDER — PANTOPRAZOLE SODIUM 20 MG/1
20 TABLET, DELAYED RELEASE ORAL
Qty: 30 TABLET | Refills: 2 | Status: SHIPPED | OUTPATIENT
Start: 2023-12-01

## 2023-12-01 NOTE — PROGRESS NOTES
Follow-up with Palliative and Peyman Dupont 51 y.o. female [id number]    ASSESSMENT & PLAN:  1. Malignant neoplasm of overlapping sites of right breast in female, estrogen receptor negative     2. Malignant neoplasm metastatic to bone ([address])    3. 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 GERD (gastroesophageal reflux disease)    5. Cancer associated pain    6. Neoplastic malignant related fatigue    7. Medical marijuana use    8. Palliative care patient    9. Gastroesophageal reflux disease without esophagitis          Continue disease-directed cares. Patient is tolerating her cancer therapies well and is pleased at the progress she has made in recent months. Patient reports her chronic pain is well-controlled on her current regimen. Continue MMJ; patient reports she is getting relief of chronic cancer-related pain w/ PO MMJ products. MSER was discontinued last visit and patient has not felt the need to resume  Continue oxyIR PRN. Effective. May use Zofran PRN N/V; no recent N/V. Continue pantoprazole 20mg qAM. Effective. ACP: Patient has completed advanced directives (the [facility] Advanced Directive) naming Guera Muro as default surrogate healthcare decision-maker(s). In EMR. Reviewed notes (PCP, Medical Oncology), labs (11/5/23 Cr 0.68, alb 4.4, CA 27-29 29.7, Hb 14.2), imaging + procedures (10/12/23 2d echo + CTCAP, 10/18/23 US kidney+bladder). Return in about 4 months (around 4/1/2024). Emotional support provided. Medication safety issues addressed - no driving under the influence of narcotics (including opioids), watch for adverse effects including AMS or respiratory depression (slowed breathing), keep medications stored in a safe/locked environment, do not use alcohol while opioids or other narcotics are in one's system.       Requested Prescriptions     Signed Prescriptions Disp Refills    oxyCODONE (ROXICODONE) 10 MG TABS 120 tablet 0     Sig: Take 0.5-1 tablets (5-10 mg total) by mouth every 4 (four) hours as needed for moderate pain or severe pain Max Daily Amount: 60 mg    pantoprazole (PROTONIX) 20 mg tablet 30 tablet 2     Sig: Take 1 tablet (20 mg total) by mouth daily in the early morning       Medications Discontinued During This Encounter   Medication Reason    oxyCODONE (ROXICODONE) 10 MG TABS Reorder    pantoprazole (PROTONIX) 20 mg tablet Reorder       Representatives have queried the patient's controlled substance dispensing history in the Prescription Drug Monitoring Program in compliance with regulations before I have prescribed any controlled substances. The prescription history is consistent with prescribed therapy and our practice policies. 30+ minutes were spent in this ambulatory visit with greater than 50% of the time spent face to face with patient and her significant other Burke Ku in counseling or coordination of care including symptom assessment and management, medication review, psychosocial support, chart review, imaging review, lab review, medical marijuana, supportive listening, and anticipatory guidance. All of the patient's questions were answered during this discussion. SUBJECTIVE:  Chief Complaint   Patient presents with    Follow-up    Cancer    Cancer Pain    Counseling        HPI    Jorge L Yang is a 51 y.o. female w/ carcinoma of the R breast (diagnosed 3/29/22) metastatic to bone, s/p RT to spine; cancer-related pain. She follows w/ Dr Sixto Posada (Medical Oncology). Initially certified for [facility] in the [facility] on 4/22/22 by Dr Sergio Mendoza, for cancer-related pain. Plan includes systemic therapy with pertuzumab + trastuzumab. Patient is known to [facility] clinic; seen 8/24/23 for symptom assessment and management, medication review, medication adjustment, psychosocial support, chart review, imaging review, lab review, advanced directives, medical marijuana, opioid titration, supportive listening and anticipatory guidance. Patient states, "I feel good".  She states her pain is well controlled, needing 0-1 tabs of oxycodone most days, 1-2 tabs per day when she is traveling or has increased activity. She states she is working about 30 hours/week, and is still traveling for her job. Her pain is 2/10 at time of visit today focused in her neck and "spine". She does note ongoing stiffness in her neck. She is employing medical marijuana (PO Gummies) for pain control, and to help with sleep onset, with good results noted. She is not traveling outside of [place] with medical marijuana products. The gummy she uses is a one-to-one THC to CBD product, and she will frequently take a half dose instead of a full dose. Patient reports her mood is "good". She is defending her weight. She denies recent nausea, denies recent vomiting. She states her bowel function is "fine". Patient expresses her appreciation for support from her significant other Howard Au, and her family and friends. PDMP shows no concerns. Review of Systems   All other systems reviewed and are negative. The following portions of the medical history were reviewed: past medical history, surgical history, problem list, medication list, family history, and social history.       Current Outpatient Medications:     acetaminophen (TYLENOL) 325 mg tablet, Take 650 mg by mouth every 6 (six) hours as needed for mild pain, Disp: , Rfl:     lisinopril (ZESTRIL) 20 mg tablet, Take 0.5 tablets (10 mg total) by mouth daily Only to take as needed when BP is >719 systolicallly, Disp: 90 tablet, Rfl: 1    nystatin (MYCOSTATIN) powder, Apply topically 3 (three) times a day, Disp: 15 g, Rfl: 1    ondansetron (ZOFRAN) 4 mg tablet, Take 1 tablet (4 mg total) by mouth every 8 (eight) hours as needed for nausea or vomiting, Disp: 9 tablet, Rfl: 20    oxyCODONE (ROXICODONE) 10 MG TABS, Take 0.5-1 tablets (5-10 mg total) by mouth every 4 (four) hours as needed for moderate pain or severe pain Max Daily Amount: 60 mg, Disp: 120 tablet, Rfl: 0    pantoprazole (PROTONIX) 20 mg tablet, Take 1 tablet (20 mg total) by mouth daily in the early morning, Disp: 30 tablet, Rfl: 2    al mag oxide-diphenhydramine-lidocaine viscous (MAGIC MOUTHWASH) 1:1:1 suspension, Swish and spit 10 mL if needed for mouth pain or discomfort (Patient not taking: Reported on 8/24/2023), Disp: , Rfl:     DPH-Lido-AlHydr-MgHydr-Simeth (First-Mouthwash BLM) SUSP, , Disp: , Rfl:     nystatin (MYCOSTATIN) 500,000 units/5 mL suspension, Apply 5 mL (500,000 Units total) to the mouth or throat if needed (as needed) (Patient not taking: Reported on 8/24/2023), Disp: , Rfl:     polyethylene glycol (MIRALAX) 17 g packet, Take 17 g by mouth if needed (constipation) (Patient not taking: Reported on 8/24/2023), Disp: , Rfl:     senna (SENOKOT) 8.6 mg, Take 3 tablets (25.8 mg total) by mouth if needed for constipation Hold for loose stool. (Patient not taking: Reported on 8/24/2023), Disp: , Rfl:   No current facility-administered medications for this visit. OBJECTIVE:  /82 (BP Location: Left arm, Patient Position: Sitting, Cuff Size: Standard)   Pulse 88   Temp 98.1 °F (36.7 °C) (Tympanic)   Resp 18   Ht 5' 4" (1.626 m)   Wt 88.5 kg (195 lb)   LMP 03/28/2022 (Approximate)   SpO2 96%   BMI 33.47 kg/m²   Physical Exam  Vitals reviewed. Constitutional:       General: She is not in acute distress. Appearance: She is well-groomed and overweight. She is not toxic-appearing. HENT:      Head: Normocephalic and atraumatic. Right Ear: External ear normal.      Left Ear: External ear normal.   Eyes:      General: No scleral icterus. Right eye: No discharge. Left eye: No discharge. Extraocular Movements: Extraocular movements intact. Conjunctiva/sclera: Conjunctivae normal.      Pupils: Pupils are equal, round, and reactive to light. Cardiovascular:      Rate and Rhythm: Normal rate.    Pulmonary:      Effort: Pulmonary effort is normal. No tachypnea, bradypnea, accessory muscle usage or respiratory distress. Comments: Able to speak comfortably in complete sentences on room air at rest.  Abdominal:      General: There is no distension. Tenderness: There is no guarding. Musculoskeletal:      Cervical back: Decreased range of motion. Right lower leg: No edema. Left lower leg: No edema. Skin:     General: Skin is dry. Coloration: Skin is not pale. Neurological:      Mental Status: She is alert and oriented to person, place, and time. Cranial Nerves: No dysarthria or facial asymmetry. Comments: Using no assistive devices for ambulation. Psychiatric:         Attention and Perception: Attention normal.         Mood and Affect: Mood and affect normal.         Speech: Speech normal.         Behavior: Behavior normal. Behavior is cooperative. Thought Content: Thought content normal.         Cognition and Memory: Cognition and memory normal.         Judgment: Judgment normal.      Comments: Positive mood. Jersey Hartman MD  [facility]  [phone]    Portions of this document may have been created using dictation software and as such some "sound alike" terms may have been generated by the system. Do not hesitate to contact me with any questions or clarifications.

## 2023-12-01 NOTE — PATIENT INSTRUCTIONS
It was good to see you today. Thank you for coming in. Continue current medications. Return in about 4 months (around 4/1/2024). Call us for refills on medications that we supply, as needed. If something changes and you need to come in sooner, please call our office. PRESCRIPTION REFILL REMINDER:  All medication refills should be requested prior to [facility] on Friday. Any refill requests after noon on Friday would be addressed the following Monday. MEDICATION SAFETY ISSUES:  Do not drive under the influence of narcotics (including opioids), watch for adverse effects including confusion / altered mental status / respiratory depression (slowed breathing), keep medications stored in a safe/locked environment, do not use alcohol while opioids or other narcotics are in your system.

## 2023-12-11 ENCOUNTER — OFFICE VISIT (OUTPATIENT)
Dept: UROLOGY | Facility: CLINIC | Age: 48
End: 2023-12-11

## 2023-12-11 VITALS
WEIGHT: 195 LBS | OXYGEN SATURATION: 98 % | DIASTOLIC BLOOD PRESSURE: 92 MMHG | BODY MASS INDEX: 33.47 KG/M2 | SYSTOLIC BLOOD PRESSURE: 142 MMHG | HEART RATE: 101 BPM

## 2023-12-11 DIAGNOSIS — N28.1 BILATERAL RENAL CYSTS: ICD-10-CM

## 2023-12-11 DIAGNOSIS — N20.0 RENAL STONES: Primary | ICD-10-CM

## 2023-12-11 LAB
SL AMB  POCT GLUCOSE, UA: ABNORMAL
SL AMB LEUKOCYTE ESTERASE,UA: ABNORMAL
SL AMB POCT BILIRUBIN,UA: ABNORMAL
SL AMB POCT BLOOD,UA: ABNORMAL
SL AMB POCT CLARITY,UA: CLEAR
SL AMB POCT COLOR,UA: ABNORMAL
SL AMB POCT KETONES,UA: ABNORMAL
SL AMB POCT NITRITE,UA: ABNORMAL
SL AMB POCT PH,UA: 6.5
SL AMB POCT SPECIFIC GRAVITY,UA: 1.02
SL AMB POCT URINE PROTEIN: ABNORMAL
SL AMB POCT UROBILINOGEN: 0.2

## 2023-12-11 NOTE — PROGRESS NOTES
1. Renal stones  Ambulatory referral to Urology    POCT urine dip      2. Bilateral renal cysts  Ambulatory referral to Urology    POCT urine dip          Assessment and plan:       Renal cyst  - no concerning characteristics on US  - undergoing routine imaging for metastatic breast cancer - if any changing characteristics urology should be notified. 2. Nephrolithiasis   - small bilateral nonobstructing stones - no surgical recommendations at this time  - proper hydration       Alexandra Costa PA-C      Chief Complaint     Renal cysts      History of Present Illness     Hermann Haley is a 51 y.o. female presenting today for consultation    CT of the chest abdomen pelvis (10/12/2023) with 2 mm nonobstructing right interpolar calculus. 1.2 cm right interpolar renal cyst of indeterminate density. Ultrasound of the kidney and bladder (10/18/2023) revealing bilateral simple renal cysts as well as bilateral nonobstructing intrarenal calculi. No prior  surgical manipulation. Previous history of pyelonephritis in 2022. Medical comorbidities include metastatic breast cancer. Urine dip trace leukocytes, nitrite negative, trace blood. Laboratory     Lab Results   Component Value Date    CREATININE 0.68 11/05/2023     Review of Systems     Review of Systems   Constitutional:  Negative for activity change, appetite change, chills, diaphoresis, fatigue, fever and unexpected weight change. Respiratory:  Negative for chest tightness and shortness of breath. Cardiovascular:  Negative for chest pain, palpitations and leg swelling. Gastrointestinal:  Negative for abdominal distention, abdominal pain, constipation, diarrhea, nausea and vomiting. Genitourinary:  Negative for decreased urine volume, difficulty urinating, dysuria, enuresis, flank pain, frequency, genital sores, hematuria and urgency. Musculoskeletal:  Negative for back pain, gait problem and myalgias.    Skin:  Negative for color change, pallor, rash and wound. Psychiatric/Behavioral:  Negative for behavioral problems. The patient is not nervous/anxious. Allergies     No Known Allergies    Physical Exam     Physical Exam  Constitutional:       General: She is not in acute distress. Appearance: Normal appearance. She is normal weight. She is not ill-appearing, toxic-appearing or diaphoretic. HENT:      Head: Normocephalic and atraumatic. Eyes:      General:         Right eye: No discharge. Left eye: No discharge. Conjunctiva/sclera: Conjunctivae normal.   Pulmonary:      Effort: Pulmonary effort is normal. No respiratory distress. Musculoskeletal:         General: No swelling or tenderness. Normal range of motion. Skin:     General: Skin is warm and dry. Coloration: Skin is not jaundiced or pale. Neurological:      General: No focal deficit present. Mental Status: She is alert and oriented to person, place, and time. Psychiatric:         Mood and Affect: Mood normal.         Behavior: Behavior normal.         Thought Content:  Thought content normal.         Vital Signs     Vitals:    12/11/23 1418   BP: 142/92   BP Location: Left arm   Patient Position: Sitting   Cuff Size: Large   Pulse: 101   SpO2: 98%   Weight: 88.5 kg (195 lb)       Current Medications       Current Outpatient Medications:     acetaminophen (TYLENOL) 325 mg tablet, Take 650 mg by mouth every 6 (six) hours as needed for mild pain, Disp: , Rfl:     lisinopril (ZESTRIL) 20 mg tablet, Take 0.5 tablets (10 mg total) by mouth daily Only to take as needed when BP is >128 systolicallly, Disp: 90 tablet, Rfl: 1    nystatin (MYCOSTATIN) powder, Apply topically 3 (three) times a day, Disp: 15 g, Rfl: 1    ondansetron (ZOFRAN) 4 mg tablet, Take 1 tablet (4 mg total) by mouth every 8 (eight) hours as needed for nausea or vomiting, Disp: 9 tablet, Rfl: 20    oxyCODONE (ROXICODONE) 10 MG TABS, Take 0.5-1 tablets (5-10 mg total) by mouth every 4 (four) hours as needed for moderate pain or severe pain Max Daily Amount: 60 mg, Disp: 120 tablet, Rfl: 0    pantoprazole (PROTONIX) 20 mg tablet, Take 1 tablet (20 mg total) by mouth daily in the early morning, Disp: 30 tablet, Rfl: 2    al mag oxide-diphenhydramine-lidocaine viscous (MAGIC MOUTHWASH) 1:1:1 suspension, Swish and spit 10 mL if needed for mouth pain or discomfort (Patient not taking: Reported on 8/24/2023), Disp: , Rfl:     DPH-Lido-AlHydr-MgHydr-Simeth (First-Mouthwash BLM) SUSP, , Disp: , Rfl:     nystatin (MYCOSTATIN) 500,000 units/5 mL suspension, Apply 5 mL (500,000 Units total) to the mouth or throat if needed (as needed) (Patient not taking: Reported on 8/24/2023), Disp: , Rfl:     polyethylene glycol (MIRALAX) 17 g packet, Take 17 g by mouth if needed (constipation) (Patient not taking: Reported on 8/24/2023), Disp: , Rfl:     senna (SENOKOT) 8.6 mg, Take 3 tablets (25.8 mg total) by mouth if needed for constipation Hold for loose stool.  (Patient not taking: Reported on 8/24/2023), Disp: , Rfl:       Active Problems     Patient Active Problem List   Diagnosis    Malignant neoplasm of overlapping sites of right breast in female, estrogen receptor negative     Malignant neoplasm metastatic to bone (HCC)    Elevated BP without diagnosis of hypertension    Hypokalemia    Transaminitis    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 (HCC)    Cancer associated pain    Palliative care patient    HTN (hypertension)    Candida infection, esophageal ([address])    Medical marijuana use    Neoplastic malignant related fatigue    Antineoplastic chemotherapy induced anemia    COVID-19    Chemotherapy-induced neutropenia     Candidal skin infection    Neutropenia associated with infection (HCC)    Essential hypertension    Bacteremia due to Escherichia coli    Bacteremia    Unspecified cord compression (HCC)    Continuous opioid dependence (HCC)    GERD (gastroesophageal reflux disease)    Class 1 obesity due to excess calories with serious comorbidity and body mass index (BMI) of 32.0 to 32.9 in adult         Past Medical History     Past Medical History:   Diagnosis Date    Breast cancer ([address])     Cancer ([address])     Hypertension          Surgical History     History reviewed. No pertinent surgical history.       Family History     Family History   Problem Relation Age of Onset    Coronary artery disease Mother     Heart attack Father     Diabetes type II Father     Hashimoto's thyroiditis Sister          Social History     Social History       Radiology

## 2023-12-19 ENCOUNTER — APPOINTMENT (OUTPATIENT)
Dept: LAB | Facility: CLINIC | Age: 48
End: 2023-12-19
Payer: COMMERCIAL

## 2023-12-19 ENCOUNTER — TELEPHONE (OUTPATIENT)
Dept: HEMATOLOGY ONCOLOGY | Facility: CLINIC | Age: 48
End: 2023-12-19

## 2023-12-19 DIAGNOSIS — R79.89 ELEVATED LFTS: ICD-10-CM

## 2023-12-19 DIAGNOSIS — Z17.1 MALIGNANT NEOPLASM OF OVERLAPPING SITES OF RIGHT BREAST IN FEMALE, ESTROGEN RECEPTOR NEGATIVE: ICD-10-CM

## 2023-12-19 DIAGNOSIS — C79.51 MALIGNANT NEOPLASM METASTATIC TO BONE (HCC): Primary | ICD-10-CM

## 2023-12-19 DIAGNOSIS — E03.9 HYPOTHYROIDISM, UNSPECIFIED TYPE: Primary | ICD-10-CM

## 2023-12-19 DIAGNOSIS — C50.811 MALIGNANT NEOPLASM OF OVERLAPPING SITES OF RIGHT BREAST IN FEMALE, ESTROGEN RECEPTOR NEGATIVE: ICD-10-CM

## 2023-12-19 DIAGNOSIS — C79.51 MALIGNANT NEOPLASM METASTATIC TO BONE (HCC): ICD-10-CM

## 2023-12-19 LAB
ALBUMIN SERPL BCP-MCNC: 4.2 G/DL (ref 3.5–5)
ALP SERPL-CCNC: 91 U/L (ref 34–104)
ALT SERPL W P-5'-P-CCNC: 260 U/L (ref 7–52)
ANION GAP SERPL CALCULATED.3IONS-SCNC: 6 MMOL/L
AST SERPL W P-5'-P-CCNC: 83 U/L (ref 13–39)
BASOPHILS # BLD AUTO: 0.02 THOUSANDS/ÂΜL (ref 0–0.1)
BASOPHILS NFR BLD AUTO: 1 % (ref 0–1)
BILIRUB SERPL-MCNC: 0.82 MG/DL (ref 0.2–1)
BUN SERPL-MCNC: 16 MG/DL (ref 5–25)
CALCIUM SERPL-MCNC: 8.9 MG/DL (ref 8.4–10.2)
CHLORIDE SERPL-SCNC: 106 MMOL/L (ref 96–108)
CO2 SERPL-SCNC: 29 MMOL/L (ref 21–32)
CREAT SERPL-MCNC: 0.73 MG/DL (ref 0.6–1.3)
EOSINOPHIL # BLD AUTO: 0.12 THOUSAND/ÂΜL (ref 0–0.61)
EOSINOPHIL NFR BLD AUTO: 3 % (ref 0–6)
ERYTHROCYTE [DISTWIDTH] IN BLOOD BY AUTOMATED COUNT: 12.7 % (ref 11.6–15.1)
GFR SERPL CREATININE-BSD FRML MDRD: 97 ML/MIN/1.73SQ M
GLUCOSE P FAST SERPL-MCNC: 103 MG/DL (ref 65–99)
HCT VFR BLD AUTO: 43.8 % (ref 34.8–46.1)
HGB BLD-MCNC: 13.8 G/DL (ref 11.5–15.4)
IMM GRANULOCYTES # BLD AUTO: 0.01 THOUSAND/UL (ref 0–0.2)
IMM GRANULOCYTES NFR BLD AUTO: 0 % (ref 0–2)
LYMPHOCYTES # BLD AUTO: 0.89 THOUSANDS/ÂΜL (ref 0.6–4.47)
LYMPHOCYTES NFR BLD AUTO: 23 % (ref 14–44)
MCH RBC QN AUTO: 30.7 PG (ref 26.8–34.3)
MCHC RBC AUTO-ENTMCNC: 31.5 G/DL (ref 31.4–37.4)
MCV RBC AUTO: 98 FL (ref 82–98)
MONOCYTES # BLD AUTO: 0.4 THOUSAND/ÂΜL (ref 0.17–1.22)
MONOCYTES NFR BLD AUTO: 10 % (ref 4–12)
NEUTROPHILS # BLD AUTO: 2.49 THOUSANDS/ÂΜL (ref 1.85–7.62)
NEUTS SEG NFR BLD AUTO: 63 % (ref 43–75)
NRBC BLD AUTO-RTO: 0 /100 WBCS
PLATELET # BLD AUTO: 225 THOUSANDS/UL (ref 149–390)
PMV BLD AUTO: 10.2 FL (ref 8.9–12.7)
POTASSIUM SERPL-SCNC: 4.7 MMOL/L (ref 3.5–5.3)
PROT SERPL-MCNC: 6.9 G/DL (ref 6.4–8.4)
RBC # BLD AUTO: 4.49 MILLION/UL (ref 3.81–5.12)
SODIUM SERPL-SCNC: 141 MMOL/L (ref 135–147)
T4 FREE SERPL-MCNC: 0.8 NG/DL (ref 0.61–1.12)
TSH SERPL DL<=0.05 MIU/L-ACNC: 3.91 UIU/ML (ref 0.45–4.5)
WBC # BLD AUTO: 3.93 THOUSAND/UL (ref 4.31–10.16)

## 2023-12-19 PROCEDURE — 86300 IMMUNOASSAY TUMOR CA 15-3: CPT

## 2023-12-19 PROCEDURE — 85025 COMPLETE CBC W/AUTO DIFF WBC: CPT

## 2023-12-19 PROCEDURE — 80053 COMPREHEN METABOLIC PANEL: CPT

## 2023-12-19 PROCEDURE — 84443 ASSAY THYROID STIM HORMONE: CPT

## 2023-12-19 PROCEDURE — 36415 COLL VENOUS BLD VENIPUNCTURE: CPT

## 2023-12-19 PROCEDURE — 84439 ASSAY OF FREE THYROXINE: CPT

## 2023-12-19 RX ORDER — SODIUM CHLORIDE 9 MG/ML
20 INJECTION, SOLUTION INTRAVENOUS ONCE
Status: CANCELLED | OUTPATIENT
Start: 2023-12-21

## 2023-12-19 NOTE — TELEPHONE ENCOUNTER
----- Message from Sosa Cortez DO sent at 12/19/2023  4:06 PM EST -----  Please notify patient that her LFTs have increased on most recent blood work.  Recommend she repeat CMP in 1 week.  If numbers continue to remain high will require further workup.

## 2023-12-19 NOTE — TELEPHONE ENCOUNTER
Called patient regarding her CMP results from today, 12/19. Patient's AST and ALT were both elevated. Patient denies any abdominal pain and states she has not being drinking alcohol. Dr. Cortez recommends that patient have a CMP redrawn in 1 weeks time. Patient will be out of state until the beginning of the New Year and will have the blood work then. Script for CMP placed in chart.

## 2023-12-20 DIAGNOSIS — C79.51 MALIGNANT NEOPLASM METASTATIC TO BONE (HCC): Primary | ICD-10-CM

## 2023-12-20 DIAGNOSIS — C50.811 MALIGNANT NEOPLASM OF OVERLAPPING SITES OF RIGHT BREAST IN FEMALE, ESTROGEN RECEPTOR NEGATIVE: ICD-10-CM

## 2023-12-20 DIAGNOSIS — Z17.1 MALIGNANT NEOPLASM OF OVERLAPPING SITES OF RIGHT BREAST IN FEMALE, ESTROGEN RECEPTOR NEGATIVE: ICD-10-CM

## 2023-12-20 LAB — CANCER AG27-29 SERPL-ACNC: 26.4 U/ML (ref 0–38.6)

## 2023-12-21 ENCOUNTER — HOSPITAL ENCOUNTER (OUTPATIENT)
Dept: INFUSION CENTER | Facility: CLINIC | Age: 48
Discharge: HOME | End: 2023-12-21
Payer: COMMERCIAL

## 2023-12-21 VITALS
HEIGHT: 64 IN | BODY MASS INDEX: 33.46 KG/M2 | SYSTOLIC BLOOD PRESSURE: 139 MMHG | HEART RATE: 93 BPM | RESPIRATION RATE: 18 BRPM | WEIGHT: 196 LBS | DIASTOLIC BLOOD PRESSURE: 98 MMHG | TEMPERATURE: 97.7 F | OXYGEN SATURATION: 98 %

## 2023-12-21 DIAGNOSIS — D70.1 CHEMOTHERAPY-INDUCED NEUTROPENIA: ICD-10-CM

## 2023-12-21 DIAGNOSIS — C79.51 MALIGNANT NEOPLASM METASTATIC TO BONE (HCC): ICD-10-CM

## 2023-12-21 DIAGNOSIS — Z17.1 MALIGNANT NEOPLASM OF OVERLAPPING SITES OF RIGHT BREAST IN FEMALE, ESTROGEN RECEPTOR NEGATIVE: Primary | ICD-10-CM

## 2023-12-21 DIAGNOSIS — T45.1X5A CHEMOTHERAPY-INDUCED NEUTROPENIA: ICD-10-CM

## 2023-12-21 DIAGNOSIS — C50.811 MALIGNANT NEOPLASM OF OVERLAPPING SITES OF RIGHT BREAST IN FEMALE, ESTROGEN RECEPTOR NEGATIVE: Primary | ICD-10-CM

## 2023-12-21 PROCEDURE — 96417 CHEMO IV INFUS EACH ADDL SEQ: CPT

## 2023-12-21 PROCEDURE — 96367 TX/PROPH/DG ADDL SEQ IV INF: CPT

## 2023-12-21 PROCEDURE — 96413 CHEMO IV INFUSION 1 HR: CPT

## 2023-12-21 RX ORDER — SODIUM CHLORIDE 9 MG/ML
20 INJECTION, SOLUTION INTRAVENOUS ONCE
Status: COMPLETED | OUTPATIENT
Start: 2023-12-21 | End: 2023-12-21

## 2023-12-21 RX ORDER — SODIUM CHLORIDE 9 MG/ML
20 INJECTION, SOLUTION INTRAVENOUS ONCE
OUTPATIENT
Start: 2024-03-14

## 2023-12-21 RX ADMIN — PERTUZUMAB 420 MG: 30 INJECTION, SOLUTION, CONCENTRATE INTRAVENOUS at 15:01

## 2023-12-21 RX ADMIN — TRASTUZUMAB 531 MG: 150 INJECTION, POWDER, LYOPHILIZED, FOR SOLUTION INTRAVENOUS at 15:40

## 2023-12-21 RX ADMIN — SODIUM CHLORIDE 20 ML/HR: 0.9 INJECTION, SOLUTION INTRAVENOUS at 14:00

## 2023-12-21 RX ADMIN — SODIUM CHLORIDE 20 ML/HR: 0.9 INJECTION, SOLUTION INTRAVENOUS at 13:58

## 2023-12-21 RX ADMIN — ZOLEDRONIC ACID 4 MG: 0.04 INJECTION, SOLUTION INTRAVENOUS at 14:17

## 2023-12-21 NOTE — PLAN OF CARE
Problem: Knowledge Deficit  Goal: Patient/family/caregiver demonstrates understanding of disease process, treatment plan, medications, and discharge instructions  Description: Complete learning assessment and assess knowledge base.  Interventions:  - Provide teaching at level of understanding  - Provide teaching via preferred learning methods  Outcome: Progressing      TBA

## 2023-12-27 ENCOUNTER — TELEPHONE (OUTPATIENT)
Dept: HEMATOLOGY ONCOLOGY | Facility: CLINIC | Age: 48
End: 2023-12-27

## 2023-12-27 NOTE — TELEPHONE ENCOUNTER
I called [facility] regarding an appointment that they have scheduled with Dr. Cortez scheduled on  1/29/24      I was unable to leave a voicemail on patient's phone. Another attempt will be made to reschedule

## 2024-01-03 ENCOUNTER — APPOINTMENT (OUTPATIENT)
Dept: LAB | Facility: CLINIC | Age: 49
End: 2024-01-03
Payer: COMMERCIAL

## 2024-01-03 DIAGNOSIS — C79.51 MALIGNANT NEOPLASM METASTATIC TO BONE (HCC): ICD-10-CM

## 2024-01-03 DIAGNOSIS — R79.89 ELEVATED LFTS: ICD-10-CM

## 2024-01-03 DIAGNOSIS — K21.9 GASTROESOPHAGEAL REFLUX DISEASE WITHOUT ESOPHAGITIS: ICD-10-CM

## 2024-01-03 LAB
ALBUMIN SERPL BCP-MCNC: 4.4 G/DL (ref 3.5–5)
ALP SERPL-CCNC: 77 U/L (ref 34–104)
ALT SERPL W P-5'-P-CCNC: 44 U/L (ref 7–52)
ANION GAP SERPL CALCULATED.3IONS-SCNC: 6 MMOL/L
AST SERPL W P-5'-P-CCNC: 19 U/L (ref 13–39)
BILIRUB SERPL-MCNC: 0.75 MG/DL (ref 0.2–1)
BUN SERPL-MCNC: 14 MG/DL (ref 5–25)
CALCIUM SERPL-MCNC: 9.7 MG/DL (ref 8.4–10.2)
CHLORIDE SERPL-SCNC: 104 MMOL/L (ref 96–108)
CO2 SERPL-SCNC: 29 MMOL/L (ref 21–32)
CREAT SERPL-MCNC: 0.68 MG/DL (ref 0.6–1.3)
GFR SERPL CREATININE-BSD FRML MDRD: 103 ML/MIN/1.73SQ M
GLUCOSE SERPL-MCNC: 91 MG/DL (ref 65–140)
POTASSIUM SERPL-SCNC: 4.3 MMOL/L (ref 3.5–5.3)
PROT SERPL-MCNC: 7.4 G/DL (ref 6.4–8.4)
SODIUM SERPL-SCNC: 139 MMOL/L (ref 135–147)

## 2024-01-03 PROCEDURE — 36415 COLL VENOUS BLD VENIPUNCTURE: CPT

## 2024-01-03 PROCEDURE — 80053 COMPREHEN METABOLIC PANEL: CPT

## 2024-01-04 RX ORDER — PANTOPRAZOLE SODIUM 20 MG/1
20 TABLET, DELAYED RELEASE ORAL
Qty: 30 TABLET | Refills: 1 | Status: SHIPPED | OUTPATIENT
Start: 2024-01-04

## 2024-01-09 ENCOUNTER — TELEPHONE (OUTPATIENT)
Dept: HEMATOLOGY ONCOLOGY | Facility: CLINIC | Age: 49
End: 2024-01-09

## 2024-01-09 RX ORDER — SODIUM CHLORIDE 9 MG/ML
20 INJECTION, SOLUTION INTRAVENOUS ONCE
Status: CANCELLED | OUTPATIENT
Start: 2024-01-11

## 2024-01-09 NOTE — TELEPHONE ENCOUNTER
[facility] off in the month of Feb. If pt does not want to travel to [place], she can schedule with [facility] but will have to wait for template to be opened.    VM message left requesting pt to call us back.

## 2024-01-09 NOTE — TELEPHONE ENCOUNTER
Patient is also requesting her ct-chest abdomen and echo complete be rescheduled closer to her new appt 2/13/24.    She stated the office scheduled the appointments.  She can be reached at [phone].

## 2024-01-09 NOTE — TELEPHONE ENCOUNTER
Appointment Change  Cancel, Reschedule, Change to Virtual      Who are you speaking with? Patient   If it is not the patient, is the caller listed on the communication consent form? N/A   Which provider is the appointment scheduled with? Dr. Cortez   When was the original appointment scheduled?    Please list date and time 1/29/24 @ 3pm   At which location is the appointment scheduled to take place? [facility]   Was the appointment rescheduled?     Was the appointment changed from an in person visit to a virtual visit?    If so, please list the details of the change. Yes 2/13/24 @ 3:40pm with Dr. Phillips   What is the reason for the appointment change? Dr. Cortez will be out of the office       Was STAR transport scheduled? No   Does STAR transport need to be scheduled for the new visit (if applicable) No   Does the patient need an infusion appointment rescheduled? No   Does the patient have an upcoming infusion appointment scheduled? If so, when? No   Is the patient undergoing chemotherapy? No   For appointments cancelled with less than 24 hours:  Was the no-show policy reviewed? Yes

## 2024-01-11 ENCOUNTER — HOSPITAL ENCOUNTER (OUTPATIENT)
Dept: INFUSION CENTER | Facility: CLINIC | Age: 49
Discharge: HOME | End: 2024-01-11
Payer: COMMERCIAL

## 2024-01-11 VITALS
DIASTOLIC BLOOD PRESSURE: 93 MMHG | WEIGHT: 197 LBS | HEART RATE: 86 BPM | BODY MASS INDEX: 33.82 KG/M2 | TEMPERATURE: 97.9 F | OXYGEN SATURATION: 96 % | SYSTOLIC BLOOD PRESSURE: 136 MMHG | RESPIRATION RATE: 18 BRPM

## 2024-01-11 DIAGNOSIS — C79.51 MALIGNANT NEOPLASM METASTATIC TO BONE (HCC): ICD-10-CM

## 2024-01-11 DIAGNOSIS — T45.1X5A CHEMOTHERAPY-INDUCED NEUTROPENIA: ICD-10-CM

## 2024-01-11 DIAGNOSIS — Z17.1 MALIGNANT NEOPLASM OF OVERLAPPING SITES OF RIGHT BREAST IN FEMALE, ESTROGEN RECEPTOR NEGATIVE: Primary | ICD-10-CM

## 2024-01-11 DIAGNOSIS — C50.811 MALIGNANT NEOPLASM OF OVERLAPPING SITES OF RIGHT BREAST IN FEMALE, ESTROGEN RECEPTOR NEGATIVE: Primary | ICD-10-CM

## 2024-01-11 DIAGNOSIS — D70.1 CHEMOTHERAPY-INDUCED NEUTROPENIA: ICD-10-CM

## 2024-01-11 PROCEDURE — 96417 CHEMO IV INFUS EACH ADDL SEQ: CPT

## 2024-01-11 PROCEDURE — 96413 CHEMO IV INFUSION 1 HR: CPT

## 2024-01-11 RX ORDER — SODIUM CHLORIDE 9 MG/ML
20 INJECTION, SOLUTION INTRAVENOUS ONCE
Status: COMPLETED | OUTPATIENT
Start: 2024-01-11 | End: 2024-01-11

## 2024-01-11 RX ADMIN — SODIUM CHLORIDE 20 ML/HR: 0.9 INJECTION, SOLUTION INTRAVENOUS at 13:41

## 2024-01-11 RX ADMIN — TRASTUZUMAB 534 MG: 150 INJECTION, POWDER, LYOPHILIZED, FOR SOLUTION INTRAVENOUS at 14:44

## 2024-01-11 RX ADMIN — PERTUZUMAB 420 MG: 30 INJECTION, SOLUTION, CONCENTRATE INTRAVENOUS at 14:00

## 2024-01-11 NOTE — PROGRESS NOTES
Patient tolerated her treatment without any adverse reactions. Next appointment confirmed 2/1/24 at 1400 at [place]. Patient declined avs

## 2024-01-11 NOTE — PROGRESS NOTES
Patient is here for perjeta and herceptin. She offer no complaints at this time. Echo reviewed and meet parameters. Labs reviewed but not needed for today

## 2024-01-26 RX ORDER — SODIUM CHLORIDE 9 MG/ML
20 INJECTION, SOLUTION INTRAVENOUS ONCE
Status: CANCELLED | OUTPATIENT
Start: 2024-02-01

## 2024-02-01 ENCOUNTER — HOSPITAL ENCOUNTER (OUTPATIENT)
Dept: INFUSION CENTER | Facility: CLINIC | Age: 49
Discharge: HOME | End: 2024-02-01
Payer: COMMERCIAL

## 2024-02-01 VITALS
WEIGHT: 201.9 LBS | OXYGEN SATURATION: 94 % | DIASTOLIC BLOOD PRESSURE: 79 MMHG | HEART RATE: 88 BPM | RESPIRATION RATE: 18 BRPM | BODY MASS INDEX: 34.66 KG/M2 | TEMPERATURE: 97.3 F | SYSTOLIC BLOOD PRESSURE: 116 MMHG

## 2024-02-01 DIAGNOSIS — C79.51 MALIGNANT NEOPLASM METASTATIC TO BONE (HCC): ICD-10-CM

## 2024-02-01 DIAGNOSIS — T45.1X5A CHEMOTHERAPY-INDUCED NEUTROPENIA: ICD-10-CM

## 2024-02-01 DIAGNOSIS — D70.1 CHEMOTHERAPY-INDUCED NEUTROPENIA: ICD-10-CM

## 2024-02-01 DIAGNOSIS — C50.811 MALIGNANT NEOPLASM OF OVERLAPPING SITES OF RIGHT BREAST IN FEMALE, ESTROGEN RECEPTOR NEGATIVE: Primary | ICD-10-CM

## 2024-02-01 DIAGNOSIS — Z17.1 MALIGNANT NEOPLASM OF OVERLAPPING SITES OF RIGHT BREAST IN FEMALE, ESTROGEN RECEPTOR NEGATIVE: Primary | ICD-10-CM

## 2024-02-01 PROCEDURE — 96413 CHEMO IV INFUSION 1 HR: CPT

## 2024-02-01 PROCEDURE — 96417 CHEMO IV INFUS EACH ADDL SEQ: CPT

## 2024-02-01 RX ORDER — SODIUM CHLORIDE 9 MG/ML
20 INJECTION, SOLUTION INTRAVENOUS ONCE
Status: COMPLETED | OUTPATIENT
Start: 2024-02-01 | End: 2024-02-01

## 2024-02-01 RX ADMIN — SODIUM CHLORIDE 20 ML/HR: 0.9 INJECTION, SOLUTION INTRAVENOUS at 12:50

## 2024-02-01 RX ADMIN — TRASTUZUMAB 534 MG: 150 INJECTION, POWDER, LYOPHILIZED, FOR SOLUTION INTRAVENOUS at 13:25

## 2024-02-01 RX ADMIN — PERTUZUMAB 420 MG: 30 INJECTION, SOLUTION, CONCENTRATE INTRAVENOUS at 12:52

## 2024-02-01 NOTE — PROGRESS NOTES
Received for Herceptin and Perjeta. No complaints offered. Tolerated treatment well. Next appt scheduled for 2/22 at 1pm. AVS declined.

## 2024-02-06 ENCOUNTER — HOSPITAL ENCOUNTER (OUTPATIENT)
Dept: CT IMAGING | Facility: HOSPITAL | Age: 49
Discharge: HOME | End: 2024-02-06
Attending: INTERNAL MEDICINE
Payer: COMMERCIAL

## 2024-02-06 ENCOUNTER — HOSPITAL ENCOUNTER (OUTPATIENT)
Dept: NON INVASIVE DIAGNOSTICS | Facility: CLINIC | Age: 49
Discharge: HOME | End: 2024-02-06
Payer: COMMERCIAL

## 2024-02-06 VITALS
HEART RATE: 88 BPM | SYSTOLIC BLOOD PRESSURE: 116 MMHG | HEIGHT: 64 IN | DIASTOLIC BLOOD PRESSURE: 79 MMHG | BODY MASS INDEX: 34.31 KG/M2 | WEIGHT: 201 LBS

## 2024-02-06 DIAGNOSIS — C79.51 MALIGNANT NEOPLASM METASTATIC TO BONE (HCC): ICD-10-CM

## 2024-02-06 LAB
AORTIC ROOT: 3.3 CM
APICAL FOUR CHAMBER EJECTION FRACTION: 70 %
BSA FOR ECHO PROCEDURE: 1.96 M2
E WAVE DECELERATION TIME: 353 MS
E/A RATIO: 0.93
FRACTIONAL SHORTENING: 39 (ref 28–44)
GLOBAL LONGITUIDAL STRAIN: -16 %
INTERVENTRICULAR SEPTUM IN DIASTOLE (PARASTERNAL SHORT AXIS VIEW): 1 CM
INTERVENTRICULAR SEPTUM: 1 CM (ref 0.6–1.1)
LEFT ATRIUM SIZE: 3.2 CM
LEFT INTERNAL DIMENSION IN SYSTOLE: 2.5 CM (ref 2.1–4)
LEFT VENTRICULAR INTERNAL DIMENSION IN DIASTOLE: 4.1 CM (ref 3.5–6)
LEFT VENTRICULAR POSTERIOR WALL IN END DIASTOLE: 0.7 CM
LEFT VENTRICULAR STROKE VOLUME: 50 ML
LVSV (TEICH): 50 ML
MV E'TISSUE VEL-LAT: 15 CM/S
MV E'TISSUE VEL-SEP: 11 CM/S
MV PEAK A VEL: 0.82 M/S
MV PEAK E VEL: 76 CM/S
MV STENOSIS PRESSURE HALF TIME: 102 MS
MV VALVE AREA P 1/2 METHOD: 2.16
SL CV LV EF: 65
SL CV PED ECHO LEFT VENTRICLE DIASTOLIC VOLUME (MOD BIPLANE) 2D: 73 ML
SL CV PED ECHO LEFT VENTRICLE SYSTOLIC VOLUME (MOD BIPLANE) 2D: 23 ML
TR MAX PG: 21 MMHG
TR PEAK VELOCITY: 2.3 M/S
TRICUSPID ANNULAR PLANE SYSTOLIC EXCURSION: 1.8 CM
TRICUSPID VALVE PEAK REGURGITATION VELOCITY: 2.28 M/S

## 2024-02-06 PROCEDURE — G1004 CDSM NDSC: HCPCS

## 2024-02-06 PROCEDURE — 93308 TTE F-UP OR LMTD: CPT | Performed by: INTERNAL MEDICINE

## 2024-02-06 PROCEDURE — 93321 DOPPLER ECHO F-UP/LMTD STD: CPT | Performed by: INTERNAL MEDICINE

## 2024-02-06 PROCEDURE — 93325 DOPPLER ECHO COLOR FLOW MAPG: CPT | Performed by: INTERNAL MEDICINE

## 2024-02-06 PROCEDURE — 93308 TTE F-UP OR LMTD: CPT

## 2024-02-06 PROCEDURE — 93356 MYOCRD STRAIN IMG SPCKL TRCK: CPT | Performed by: INTERNAL MEDICINE

## 2024-02-06 PROCEDURE — 74177 CT ABD & PELVIS W/CONTRAST: CPT

## 2024-02-06 PROCEDURE — 93325 DOPPLER ECHO COLOR FLOW MAPG: CPT

## 2024-02-06 PROCEDURE — 71260 CT THORAX DX C+: CPT

## 2024-02-06 PROCEDURE — 93321 DOPPLER ECHO F-UP/LMTD STD: CPT

## 2024-02-06 RX ADMIN — IOHEXOL 100 ML: 350 INJECTION, SOLUTION INTRAVENOUS at 07:41

## 2024-02-08 ENCOUNTER — PATIENT MESSAGE (OUTPATIENT)
Dept: HEMATOLOGY ONCOLOGY | Facility: CLINIC | Age: 49
End: 2024-02-08

## 2024-02-08 DIAGNOSIS — C79.51 MALIGNANT NEOPLASM METASTATIC TO BONE (HCC): Primary | ICD-10-CM

## 2024-02-09 ENCOUNTER — TELEPHONE (OUTPATIENT)
Dept: HEMATOLOGY ONCOLOGY | Facility: CLINIC | Age: 49
End: 2024-02-09

## 2024-02-12 ENCOUNTER — TELEPHONE (OUTPATIENT)
Dept: HEMATOLOGY ONCOLOGY | Facility: CLINIC | Age: 49
End: 2024-02-12

## 2024-02-12 ENCOUNTER — APPOINTMENT (OUTPATIENT)
Dept: LAB | Facility: CLINIC | Age: 49
End: 2024-02-12
Payer: COMMERCIAL

## 2024-02-12 DIAGNOSIS — C79.51 MALIGNANT NEOPLASM METASTATIC TO BONE (HCC): ICD-10-CM

## 2024-02-12 DIAGNOSIS — Z17.1 MALIGNANT NEOPLASM OF OVERLAPPING SITES OF RIGHT BREAST IN FEMALE, ESTROGEN RECEPTOR NEGATIVE: ICD-10-CM

## 2024-02-12 DIAGNOSIS — C50.811 MALIGNANT NEOPLASM OF OVERLAPPING SITES OF RIGHT BREAST IN FEMALE, ESTROGEN RECEPTOR NEGATIVE: ICD-10-CM

## 2024-02-12 LAB
ALBUMIN SERPL BCP-MCNC: 4.4 G/DL (ref 3.5–5)
ALP SERPL-CCNC: 50 U/L (ref 34–104)
ALT SERPL W P-5'-P-CCNC: 33 U/L (ref 7–52)
ANION GAP SERPL CALCULATED.3IONS-SCNC: 7 MMOL/L
AST SERPL W P-5'-P-CCNC: 18 U/L (ref 13–39)
BASOPHILS # BLD AUTO: 0.03 THOUSANDS/ÂΜL (ref 0–0.1)
BASOPHILS NFR BLD AUTO: 1 % (ref 0–1)
BILIRUB SERPL-MCNC: 0.72 MG/DL (ref 0.2–1)
BUN SERPL-MCNC: 13 MG/DL (ref 5–25)
CALCIUM SERPL-MCNC: 9.8 MG/DL (ref 8.4–10.2)
CHLORIDE SERPL-SCNC: 105 MMOL/L (ref 96–108)
CO2 SERPL-SCNC: 26 MMOL/L (ref 21–32)
CREAT SERPL-MCNC: 0.7 MG/DL (ref 0.6–1.3)
EOSINOPHIL # BLD AUTO: 0.12 THOUSAND/ÂΜL (ref 0–0.61)
EOSINOPHIL NFR BLD AUTO: 2 % (ref 0–6)
ERYTHROCYTE [DISTWIDTH] IN BLOOD BY AUTOMATED COUNT: 12.8 % (ref 11.6–15.1)
GFR SERPL CREATININE-BSD FRML MDRD: 102 ML/MIN/1.73SQ M
GLUCOSE SERPL-MCNC: 82 MG/DL (ref 65–140)
HCT VFR BLD AUTO: 44.2 % (ref 34.8–46.1)
HGB BLD-MCNC: 14.4 G/DL (ref 11.5–15.4)
IMM GRANULOCYTES # BLD AUTO: 0.01 THOUSAND/UL (ref 0–0.2)
IMM GRANULOCYTES NFR BLD AUTO: 0 % (ref 0–2)
LYMPHOCYTES # BLD AUTO: 1.47 THOUSANDS/ÂΜL (ref 0.6–4.47)
LYMPHOCYTES NFR BLD AUTO: 27 % (ref 14–44)
MCH RBC QN AUTO: 31.6 PG (ref 26.8–34.3)
MCHC RBC AUTO-ENTMCNC: 32.6 G/DL (ref 31.4–37.4)
MCV RBC AUTO: 97 FL (ref 82–98)
MONOCYTES # BLD AUTO: 0.55 THOUSAND/ÂΜL (ref 0.17–1.22)
MONOCYTES NFR BLD AUTO: 10 % (ref 4–12)
NEUTROPHILS # BLD AUTO: 3.25 THOUSANDS/ÂΜL (ref 1.85–7.62)
NEUTS SEG NFR BLD AUTO: 60 % (ref 43–75)
NRBC BLD AUTO-RTO: 0 /100 WBCS
PLATELET # BLD AUTO: 256 THOUSANDS/UL (ref 149–390)
PMV BLD AUTO: 10.1 FL (ref 8.9–12.7)
POTASSIUM SERPL-SCNC: 4.6 MMOL/L (ref 3.5–5.3)
PROT SERPL-MCNC: 7.5 G/DL (ref 6.4–8.4)
RBC # BLD AUTO: 4.56 MILLION/UL (ref 3.81–5.12)
SODIUM SERPL-SCNC: 138 MMOL/L (ref 135–147)
WBC # BLD AUTO: 5.43 THOUSAND/UL (ref 4.31–10.16)

## 2024-02-12 PROCEDURE — 85025 COMPLETE CBC W/AUTO DIFF WBC: CPT

## 2024-02-12 PROCEDURE — 80053 COMPREHEN METABOLIC PANEL: CPT

## 2024-02-12 PROCEDURE — 36415 COLL VENOUS BLD VENIPUNCTURE: CPT

## 2024-02-12 NOTE — TELEPHONE ENCOUNTER
Left message informing patient we are switching all visits tomorrow to virtual appointments due to the snow.  Provided hope line number to call if patient is not agreeable to a virtual appointment.

## 2024-02-13 ENCOUNTER — TELEMEDICINE (OUTPATIENT)
Dept: HEMATOLOGY ONCOLOGY | Facility: CLINIC | Age: 49
End: 2024-02-13

## 2024-02-13 DIAGNOSIS — Z17.1 MALIGNANT NEOPLASM OF OVERLAPPING SITES OF RIGHT BREAST IN FEMALE, ESTROGEN RECEPTOR NEGATIVE: Primary | ICD-10-CM

## 2024-02-13 DIAGNOSIS — C50.811 MALIGNANT NEOPLASM OF OVERLAPPING SITES OF RIGHT BREAST IN FEMALE, ESTROGEN RECEPTOR NEGATIVE: Primary | ICD-10-CM

## 2024-02-14 ENCOUNTER — TELEPHONE (OUTPATIENT)
Dept: HEMATOLOGY ONCOLOGY | Facility: CLINIC | Age: 49
End: 2024-02-14

## 2024-02-14 DIAGNOSIS — C50.811 MALIGNANT NEOPLASM OF OVERLAPPING SITES OF RIGHT BREAST IN FEMALE, ESTROGEN RECEPTOR NEGATIVE: ICD-10-CM

## 2024-02-14 DIAGNOSIS — C79.51 MALIGNANT NEOPLASM METASTATIC TO BONE (HCC): Primary | ICD-10-CM

## 2024-02-14 DIAGNOSIS — Z17.1 MALIGNANT NEOPLASM OF OVERLAPPING SITES OF RIGHT BREAST IN FEMALE, ESTROGEN RECEPTOR NEGATIVE: ICD-10-CM

## 2024-02-14 DIAGNOSIS — D70.1 CHEMOTHERAPY-INDUCED NEUTROPENIA: Primary | ICD-10-CM

## 2024-02-14 DIAGNOSIS — C79.51 MALIGNANT NEOPLASM METASTATIC TO BONE (HCC): ICD-10-CM

## 2024-02-14 DIAGNOSIS — T45.1X5A CHEMOTHERAPY-INDUCED NEUTROPENIA: Primary | ICD-10-CM

## 2024-02-14 NOTE — PROGRESS NOTES
Virtual Brief Visit    This Visit is being completed by telephone. The Patient is located at Home and in the following state in which I hold an active license [place]    The patient was identified by name and date of birth. Nayeli Love was informed that this is a telemedicine visit and that the visit is being conducted through the Epic Embedded platform. She agrees to proceed..  My office door was closed. No one else was in the room.  She acknowledged consent and understanding of privacy and security of the video platform. The patient has agreed to participate and understands they can discontinue the visit at any time.    Patient is aware this is a billable service.       Assessment/Plan:      1. Metastatic breast cancer, grade 2, ER negative, DE negative, HER2 3+ disease.  Diagnosed in March 2022.  2.  Metastatic disease to the bone     Nayeli Love is a 48-year-old premenopausal female with metastatic breast cancer, grade 2, ER negative, DE negative, HER2 3+ disease.  She has large fungating mass in her right breast, palpable right axillary adenopathy as well as diffuse osseous metastasis with C4 stenosis.  She completed palliative radiation therapy to the spine.  She was treated with taxane, trastuzumab and pertuzumab x6 cycles, resulting in good partial response.  She is currently on trastuzumab and pertuzumab with excellent tolerance.       At her last visit 3 months ago, the patient continued to do well without any clinical evidence of progressive disease.  She will continue trastuzumab and pertuzumab every 3 weeks.  She will stay on Zometa every 3 months. Her most recent CA 27.29 from 4/4/2023 was stable at 34.9.  She will repeat tumor marker now.  Her most recent CT scan from 10/12/2023 notes interval decrease in the size of right breast mass and axillary lymph node.  Incidental finding of indeterminate density lesion in the right kidney.      CT CAP 2/6/2024 is stable; ECHO unchanged with LVEF  65%    Continue with Herceptin/Perjeta every 3 weeks; zometa every 3 months and follow up 3 months                    Oncology History   Malignant neoplasm of overlapping sites of right breast in female, estrogen receptor negative    3/2022 Initial Diagnosis     Malignant neoplasm of overlapping sites of right breast in female, estrogen receptor negative (HCC)      3/29/2022 Biopsy     Breast, Right, Biopsy:  - Invasive mammary carcinoma of no special type (ductal, not otherwise specified), [place] Grade II (3 + 2 + 1 = 6), spanning at least 6 mm.   ER/NM negative, HER2 positive     Procedure   Needle biopsy    Specimen Laterality   Right    TUMOR   Histologic Type   Invasive carcinoma of no special type (ductal)    Histologic Grade ([place] Histologic Score)       Glandular (Acinar) / Tubular Differentiation   Score 3    Nuclear Pleomorphism   Score 2    Mitotic Rate   Score 1    Overall Grade   Grade 2 (scores of 6 or 7)    Tumor Size   Greatest dimension of largest invasive focus (Millimeters): 6 mm   Ductal Carcinoma In Situ (DCIS)   Not identified    Lymphovascular Invasion   Not identified       3/31/2022 - 4/18/2022 Radiation     Course: C1     Plan ID Energy Fractions Dose per Fraction (cGy) Dose Correction (cGy) Total Dose Delivered (cGy) Elapsed Days   C1 C7 And ix 10X/6X 7 / 7 300 0 2,100 8   C1 C7 Spine 10X/6X 3 / 10 300 0 900 4   L4 Sacrum 10X 10 / 10 300 0 3,000 13   T1 T6 And ix 10X 7 / 7 300 0 2,100 8   T1 T6 Spine 10X 3 / 10 300 0 900 4      Dr. Rutledge      4/28/2022 -  Chemotherapy     clinical trial with THP with or without atezolizumab      4/28/2022 -  Chemotherapy     pegfilgrastim (NEULASTA ONPRO), 6 mg, Subcutaneous, Once, 1 of 1 cycle  pertuzumab (PERJETA) IVPB, 840 mg, Intravenous, Once, 25 of 27 cycles  Administration: 420 mg (6/16/2022), 420 mg (7/7/2022), 420 mg (7/28/2022), 420 mg (8/18/2022), 420 mg (9/8/2022), 420 mg (9/29/2022), 420 mg (10/20/2022), 420 mg (11/17/2022), 420 mg  (12/8/2022), 420 mg (12/30/2022), 420 mg (1/19/2023), 420 mg (2/9/2023), 420 mg (3/2/2023), 420 mg (3/23/2023), 420 mg (4/13/2023), 420 mg (5/4/2023), 420 mg (5/23/2023), 420 mg (6/15/2023), 420 mg (7/6/2023), 420 mg (7/25/2023), 420 mg (8/17/2023), 420 mg (9/7/2023), 420 mg (9/28/2023)  DOCEtaxel (TAXOTERE) chemo infusion, 75 mg/m2, Intravenous, Once, 3 of 3 cycles  Dose modification: 60 mg/m2 (original dose 75 mg/m2, Cycle 3, Reason: Anticipated Tolerance)  Administration: 112.8 mg (6/16/2022)  PACLItaxel (TAXOL) chemo IVPB, 80 mg/m2 = 150.6 mg (100 % of original dose 80 mg/m2), Intravenous, Once, 3 of 3 cycles  Dose modification: 80 mg/m2 (original dose 80 mg/m2, Cycle 4, Reason: Anticipated Tolerance)  Administration: 150.6 mg (7/7/2022), 150.6 mg (7/14/2022), 150.6 mg (7/21/2022), 150.6 mg (7/28/2022), 150.6 mg (8/4/2022), 150.6 mg (8/11/2022), 150.6 mg (8/18/2022), 150.6 mg (8/25/2022), 150.6 mg (9/1/2022)  trastuzumab (HERCEPTIN) chemo infusion, 8 mg/kg, Intravenous, Once, 23 of 25 cycles  Administration: 487 mg (7/28/2022), 487 mg (8/18/2022), 487 mg (9/8/2022), 487 mg (9/29/2022), 487 mg (10/20/2022), 450 mg (11/17/2022), 450 mg (12/8/2022), 450 mg (12/30/2022), 450 mg (1/19/2023), 450 mg (2/9/2023), 450 mg (3/2/2023), 450 mg (3/23/2023), 482 mg (4/13/2023), 482 mg (5/4/2023), 482 mg (5/23/2023), 482 mg (6/15/2023), 482 mg (7/6/2023), 482 mg (7/25/2023), 482 mg (8/17/2023), 482 mg (9/7/2023), 482 mg (9/28/2023)  INV trastuzumab infusion, 6 mg/kg = 487 mg (100 % of original dose 6 mg/kg), Intravenous, Once, 2 of 2 cycles  Dose modification: 6 mg/kg (original dose 6 mg/kg, Cycle 3, Reason: Other (Must fill in a comment), Comment: investigational drug)  Administration: 487 mg (6/16/2022), 487 mg (7/7/2022)      Malignant neoplasm metastatic to bone (HCC)   3/2022 Initial Diagnosis     Osseous metastasis (HCC)      3/29/2022 Biopsy     Breast, Right, Biopsy:  - Invasive mammary carcinoma of no special type  (ductal, not otherwise specified), [place] Grade II (3 + 2 + 1 = 6), spanning at least 6 mm.   ER/CO negative, HER2 positive     Procedure   Needle biopsy    Specimen Laterality   Right    TUMOR   Histologic Type   Invasive carcinoma of no special type (ductal)    Histologic Grade ([place] Histologic Score)       Glandular (Acinar) / Tubular Differentiation   Score 3    Nuclear Pleomorphism   Score 2    Mitotic Rate   Score 1    Overall Grade   Grade 2 (scores of 6 or 7)    Tumor Size   Greatest dimension of largest invasive focus (Millimeters): 6 mm   Ductal Carcinoma In Situ (DCIS)   Not identified    Lymphovascular Invasion   Not identified       3/31/2022 - 4/18/2022 Radiation     Course: C1     Plan ID Energy Fractions Dose per Fraction (cGy) Dose Correction (cGy) Total Dose Delivered (cGy) Elapsed Days   C1 C7 And ix 10X/6X 7 / 7 300 0 2,100 8   C1 C7 Spine 10X/6X 3 / 10 300 0 900 4   L4 Sacrum 10X 10 / 10 300 0 3,000 13   T1 T6 And ix 10X 7 / 7 300 0 2,100 8   T1 T6 Spine 10X 3 / 10 300 0 900 4      Dr. Rutledge      4/28/2022 -  Chemotherapy     clinical trial with THP with or without atezolizumab      4/28/2022 -  Chemotherapy     pegfilgrastim (NEULASTA ONPRO), 6 mg, Subcutaneous, Once, 1 of 1 cycle  pertuzumab (PERJETA) IVPB, 840 mg, Intravenous, Once, 25 of 27 cycles  Administration: 420 mg (6/16/2022), 420 mg (7/7/2022), 420 mg (7/28/2022), 420 mg (8/18/2022), 420 mg (9/8/2022), 420 mg (9/29/2022), 420 mg (10/20/2022), 420 mg (11/17/2022), 420 mg (12/8/2022), 420 mg (12/30/2022), 420 mg (1/19/2023), 420 mg (2/9/2023), 420 mg (3/2/2023), 420 mg (3/23/2023), 420 mg (4/13/2023), 420 mg (5/4/2023), 420 mg (5/23/2023), 420 mg (6/15/2023), 420 mg (7/6/2023), 420 mg (7/25/2023), 420 mg (8/17/2023), 420 mg (9/7/2023), 420 mg (9/28/2023)  DOCEtaxel (TAXOTERE) chemo infusion, 75 mg/m2, Intravenous, Once, 3 of 3 cycles  Dose modification: 60 mg/m2 (original dose 75 mg/m2, Cycle 3, Reason: Anticipated  Tolerance)  Administration: 112.8 mg (6/16/2022)  PACLItaxel (TAXOL) chemo IVPB, 80 mg/m2 = 150.6 mg (100 % of original dose 80 mg/m2), Intravenous, Once, 3 of 3 cycles  Dose modification: 80 mg/m2 (original dose 80 mg/m2, Cycle 4, Reason: Anticipated Tolerance)  Administration: 150.6 mg (7/7/2022), 150.6 mg (7/14/2022), 150.6 mg (7/21/2022), 150.6 mg (7/28/2022), 150.6 mg (8/4/2022), 150.6 mg (8/11/2022), 150.6 mg (8/18/2022), 150.6 mg (8/25/2022), 150.6 mg (9/1/2022)  trastuzumab (HERCEPTIN) chemo infusion, 8 mg/kg, Intravenous, Once, 23 of 25 cycles  Administration: 487 mg (7/28/2022), 487 mg (8/18/2022), 487 mg (9/8/2022), 487 mg (9/29/2022), 487 mg (10/20/2022), 450 mg (11/17/2022), 450 mg (12/8/2022), 450 mg (12/30/2022), 450 mg (1/19/2023), 450 mg (2/9/2023), 450 mg (3/2/2023), 450 mg (3/23/2023), 482 mg (4/13/2023), 482 mg (5/4/2023), 482 mg (5/23/2023), 482 mg (6/15/2023), 482 mg (7/6/2023), 482 mg (7/25/2023), 482 mg (8/17/2023), 482 mg (9/7/2023), 482 mg (9/28/2023)  INV trastuzumab infusion, 6 mg/kg = 487 mg (100 % of original dose 6 mg/kg), Intravenous, Once, 2 of 2 cycles  Dose modification: 6 mg/kg (original dose 6 mg/kg, Cycle 3, Reason: Other (Must fill in a comment), Comment: investigational drug)  Administration: 487 mg (6/16/2022), 487 mg (7/7/2022)                        Objective:   Primary diagnosis:  Metastatic breast cancer, grade 2, ER negative, [place] negative, HER2 3+ disease.  Diagnosed in March 2022.      Previous Hematologic/ Oncologic Treatment:   1. THP x3 cycles, completed in early June 2022.   2. Weekly paclitaxel, try weekly pertuzumab trastuzumab from July 2022 through August 2022.     Current Hematologic/ Oncologic Treatment:    Maintenance therapy with trastuzumab and pertuzumab since September 2022.  Zometa 4 mg IV every 3 months     Disease Status:    Clinical good partial response.     Test Results:   Pathology:   Right breast biopsy showed invasive ductal carcinoma, grade 2 ER  negative, [place] negative, HER2 3+ disease.     Radiology:  CT scan of chest abdomen pelvis in April 2023 showed decreased calcified right breast mass as well as stable mildly enlarged right axillary lymph node.  Stable osseous metastasis.     Echocardiogram in December 2022 showed normal ejection fraction with 55%.     History of present illness:   Nayeli Love is a 48-year-old premenopausal female with metastatic breast cancer, grade 2, ER negative, [place] negative, HER2 3+ disease.  She had a large fungating mass in her right breast, palpable right axillary adenopathy as well as diffuse osseous metastasis with C4 stenosis.  She completed palliative radiation therapy to the spine.  Subsequently, she was treated with Taxotere, trastuzumab and pertuzumab with or without atezolizumab based on a clinical trial.  She received 2 cycle of treatment.  She came off the study after 2 cycles of treatment due to the complication related to COVID-19 infection.  She had infusion reaction with Taxotere. Therefore, she was subsequently treated with paclitaxel, trastuzumab and pertuzumab.  She completed induction treatment in August 2022.  She is currently on maintenance therapy with trastuzumab and pertuzumab.  She had good partial response, clinically as well as radiographically.  She was previously following with Dr. Payne and last seen in the office on 4/12/2023.  She presents today for transfer of care/follow-up visit.       Follow up 10/17/2023    She presents today for follow-up.  She feels well with no new complaints.  Her pain is under control with minimal amount of pain medications.  She is following with palliative care end-stage requires less pain meds.  She has no respiratory symptoms.  She has no ambulatory dysfunction.       10/12/2023: CT chest/abdomen/pelvis:  Interval mild decrease in size of a partially calcified right breast mass and right axillary lymph node.    No significant interval change in extensive bony  metastases.   1.2 cm indeterminate density lesion in the right kidney, minimally increased in size compared to March 29, 2022. Recommend renal ultrasound for further evaluation.   Continued enlargement of a left adnexal cyst, now 5.7 cm. Recommend characterization with pelvic ultrasound.     10/12/2023: ECHO:  Left Ventricle: Left ventricular cavity size is normal. Wall thickness is normal. The left ventricular ejection fraction is 70%. Systolic function is normal. Global longitudinal strain is normal at -17.2 %. Wall motion is normal. Diastolic function is mildly abnormal, consistent with grade I (abnormal) relaxation.     Follow up 2/13/2024    Ms Love continues to tolerated herceptin/pertuzumab without issues.  She has no toxicities.  Labs are stable.  CT CAP and ECHO are below with unchanged/stable disease as well as LVEF 65%.  Her ROS is negative      2/6/2024  ECHO      Left Ventricle: Left ventricular cavity size is normal. Wall thickness is normal. The left ventricular ejection fraction is 65%. Systolic function is normal. Global longitudinal strain is borderline at -16%. Wall motion is normal. Diastolic function is mildly abnormal, consistent with grade I (abnormal) relaxation.    Right Ventricle: Right ventricular cavity size is normal. Systolic function is normal.    Tricuspid Valve: There is trace regurgitation.    Prior TTE study available for comparison. Prior study date: 10/12/2023. No significant changes noted compared to the prior study.     Restaging CT CAP 2/6/2024  arrative & Impression   CT CHEST, ABDOMEN AND PELVIS WITH IV CONTRAST     INDICATION: C79.51: Secondary malignant neoplasm of bone.     COMPARISON: Multiple prior examinations including most recent CT performed October 12, 2023     TECHNIQUE: CT examination of the chest, abdomen and pelvis was performed. Multiplanar 2D reformatted images were created from the source data.     This examination, like all CT scans performed in the [facility]  [facility], was performed utilizing techniques to minimize radiation dose exposure, including the use of iterative reconstruction and automated exposure control. Radiation dose length   product (DLP) for this visit: 992.85 mGy-cm     IV Contrast: 100 mL of iohexol (OMNIPAQUE)  Enteric Contrast: Administered.     FINDINGS:     CHEST     LUNGS: Scarring in the posterior medial right upper and lower chest, unchanged. No suspicious pulmonary nodule. Relative elevation of right hemidiaphragm reidentified.     PLEURA: Unremarkable.     HEART/GREAT VESSELS: Heart is unremarkable for patient's age. No thoracic aortic aneurysm.     MEDIASTINUM AND ALISON: Unremarkable.     CHEST WALL AND LOWER NECK: Partly calcified mass in the medial right breast, 2.6 x 2.4 cm on image 59 of series 2, when allowing for differences in measuring technique not significantly changed from previous examination. No axillary lymphadenopathy.     ABDOMEN     LIVER/BILIARY TREE: Unremarkable.     GALLBLADDER: No calcified gallstones. No pericholecystic inflammatory change.     SPLEEN: Unremarkable.     PANCREAS: Unremarkable.     ADRENAL GLANDS: Unremarkable.     KIDNEYS/URETERS: Scarring in the mid and upper poles of both kidneys. Circumscribed 11 mm partly exophytic lesion at the posterior interpolar right kidney, unchanged from most recent prior examination, not quite simple fluid but likely to represent   minimally complex cyst. Bilateral renal cortical cysts, unchanged, largest exophytic in the interpolar left kidney, 2.2 cm. Nonobstructing 4 mm calculus at the lower pole of the left kidney. No solid renal mass, ureteral calculus, or hydronephrosis.     STOMACH AND BOWEL: Unremarkable.     APPENDIX: No findings to suggest appendicitis.     ABDOMINOPELVIC CAVITY: No ascites. No pneumoperitoneum. No lymphadenopathy.     VESSELS: Unremarkable for patient's age.     PELVIS     REPRODUCTIVE ORGANS: Simple appearing left adnexal cyst, 6.5 x  5.6 cm increased from 5.7 x 5.4 cm on previous examination. No associated solid tissue. Calcified fundal fibroid, approximately 1.9 cm increased from 1.6 cm on October 12, 2023.     URINARY BLADDER: Unremarkable.     ABDOMINAL WALL/INGUINAL REGIONS: Unremarkable.     BONES: Widespread sclerotic metastatic disease, unchanged from October 12, 2023. As on previous examination there are multiple lesions throughout the spine with associated Schmorl's nodes. Again noted is an expansile sclerotic lesion in the anterior   right fourth rib. No extraskeletal soft tissue tumor mass or developing lytic lesion noted. Pathologic compression fracture of T2, unchanged. 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 No acute pathologic fracture. Reidentified linear   sclerosis in the right femoral head consistent with avascular necrosis.     IMPRESSION:     Widespread sclerotic osseous metastatic disease without significant interval change from October 12, 2023.     Partly calcified right breast mass not significantly changed.     Increasing size of simple appearing left adnexal cystic mass, now up to 6.5 cm. Further characterization with pelvic ultrasound is recommended. Slight increase in the size of a partly calcified uterine fibroid now 1.9 cm. This can also be reevaluated at   the time of pelvic sonography.     No findings to suggest soft tissue metastatic disease in the chest, abdomen or pelvis.              Review of systems:   Review of Systems   Constitutional:  Negative for chills and fever.   Eyes:  Negative for pain and visual disturbance.   Respiratory:  Negative for cough and shortness of breath.    Cardiovascular:  Negative for chest pain and palpitations.   Gastrointestinal:  Negative for abdominal pain and vomiting.   Genitourinary:  Negative for dysuria and hematuria.   Musculoskeletal:  Negative for arthralgias and back pain.   Skin:  Negative for color change and rash.   Neurological:  Negative for  seizures and syncope.   All other systems reviewed and are negative.            Patient Active Problem List   Diagnosis    Malignant neoplasm of overlapping sites of right breast in female, estrogen receptor negative     Malignant neoplasm metastatic to bone (HCC)    Elevated BP without diagnosis of hypertension    Hypokalemia    Transaminitis    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 (HCC)    Cancer associated pain    Palliative care patient    HTN (hypertension)    Candida infection, esophageal (HCC)    Medical marijuana use    Chemotherapy-induced nausea    Neoplastic malignant related fatigue    Xerostomia    Antineoplastic chemotherapy induced anemia    COVID-19    Chemotherapy-induced neutropenia     Candidal skin infection    Neutropenia associated with infection (HCC)    Constipation    Essential hypertension    Bacteremia due to Escherichia coli    Bacteremia    Unspecified cord compression (HCC)    Continuous opioid dependence (HCC)    GERD (gastroesophageal reflux disease)      Medical History        Past Medical History:   Diagnosis Date    Breast cancer (HCC)      Cancer (HCC)      Hypertension           Surgical History   No past surgical history on file.     Family History         Family History   Problem Relation Age of Onset    Coronary artery disease Mother      Heart attack Father      Diabetes type II Father           Social History   []Expand by Default            Socioeconomic History    Marital status: Single       Spouse name: Not on file    Number of children: Not on file    Years of education: Not on file    Highest education level: Not on file   Occupational History    Not on file   Tobacco Use    Smoking status: Never    Smokeless tobacco: Never   Vaping Use    Vaping Use: Never used   Substance and Sexual Activity    Alcohol use: Not Currently       Comment: rare, social     Drug use: Never    Sexual activity: Not on file   Other Topics Concern    Not on file   Social History Narrative      Significant other Red is very supportive.      Social Determinants of Health           Financial Resource Strain: Not on file   Food Insecurity: No Food Insecurity (4/1/2022)     Hunger Vital Sign      Worried About Running Out of Food in the Last Year: Never true      Ran Out of Food in the Last Year: Never true   Transportation Needs: No Transportation Needs (4/1/2022)     PRAPARE - Transportation      Lack of Transportation (Medical): No      Lack of Transportation (Non-Medical): No   Physical Activity: Not on file   Stress: Not on file   Social Connections: Not on file   Intimate Partner Violence: Not on file   Housing Stability: Low Risk  (4/1/2022)     Housing Stability Vital Sign      Unable to Pay for Housing in the Last Year: No      Number of Places Lived in the Last Year: 1      Unstable Housing in the Last Year: No            Current Outpatient Medications:     acetaminophen (TYLENOL) 325 mg tablet, Take 650 mg by mouth every 6 (six) hours as needed for mild pain, Disp: , Rfl:     lisinopril (ZESTRIL) 20 mg tablet, Take 1 tablet (20 mg total) by mouth daily Only to take as needed when BP is >150 systolicallly, Disp: 90 tablet, Rfl: 0    nystatin (MYCOSTATIN) powder, Apply topically 3 (three) times a day, Disp: 15 g, Rfl: 0    ondansetron (ZOFRAN) 4 mg tablet, Take 1 tablet (4 mg total) by mouth every 8 (eight) hours as needed for nausea or vomiting, Disp: 9 tablet, Rfl: 20    oxyCODONE (ROXICODONE) 10 MG TABS, Take 0.5-1 tablets (5-10 mg total) by mouth every 4 (four) hours as needed for moderate pain or severe pain Max Daily Amount: 60 mg, Disp: 120 tablet, Rfl: 0    pantoprazole (PROTONIX) 20 mg tablet, Take 1 tablet (20 mg total) by mouth daily in the early morning, Disp: 30 tablet, Rfl: 2    al mag oxide-diphenhydramine-lidocaine viscous (MAGIC MOUTHWASH) 1:1:1 suspension, Swish and spit 10 mL if needed for mouth pain or discomfort (Patient not taking: Reported on 8/24/2023), Disp: , Rfl:      DPH-Lido-AlHydr-MgHydr-Simeth (First-Mouthwash BLM) SUSP, , Disp: , Rfl:     nystatin (MYCOSTATIN) 500,000 units/5 mL suspension, Apply 5 mL (500,000 Units total) to the mouth or throat if needed (as needed) (Patient not taking: Reported on 8/24/2023), Disp: , Rfl:     polyethylene glycol (MIRALAX) 17 g packet, Take 17 g by mouth if needed (constipation) (Patient not taking: Reported on 8/24/2023), Disp: , Rfl:     senna (SENOKOT) 8.6 mg, Take 3 tablets (25.8 mg total) by mouth if needed for constipation Hold for loose stool. (Patient not taking: Reported on 8/24/2023), Disp: , Rfl:   No Known Allergies      Labs  2/12/2024       Latest Reference Range & Units 02/12/24 16:52   Sodium 135 - 147 mmol/L 138   Potassium 3.5 - 5.3 mmol/L 4.6   Chloride 96 - 108 mmol/L 105   Carbon Dioxide 21 - 32 mmol/L 26   ANION GAP mmol/L 7   BUN 5 - 25 mg/dL 13   Creatinine 0.60 - 1.30 mg/dL 0.70   GLUCOSE 65 - 140 mg/dL 82   Calcium 8.4 - 10.2 mg/dL 9.8   AST 13 - 39 U/L 18   ALT 7 - 52 U/L 33   ALK PHOS 34 - 104 U/L 50   Total Protein 6.4 - 8.4 g/dL 7.5   Albumin 3.5 - 5.0 g/dL 4.4   Total Bilirubin 0.20 - 1.00 mg/dL 0.72   GFR, Calculated ml/min/1.73sq m 102   WBC 4.31 - 10.16 Thousand/uL 5.43   RBC 3.81 - 5.12 Million/uL 4.56   Hemoglobin 11.5 - 15.4 g/dL 14.4   Hematocrit 34.8 - 46.1 % 44.2   MCV 82 - 98 fL 97   MCH 26.8 - 34.3 pg 31.6   MCHC 31.4 - 37.4 g/dL 32.6   RDW 11.6 - 15.1 % 12.8   Platelet Count 149 - 390 Thousands/uL 256   MPV 8.9 - 12.7 fL 10.1   nRBC /100 WBCs 0   Neutrophils % 43 - 75 % 60   Immat GRANS % 0 - 2 % 0   Lymphocytes Relative 14 - 44 % 27   Monocytes Relative 4 - 12 % 10   Eosinophils 0 - 6 % 2   Basophils Relative 0 - 1 % 1   Immature Grans Absolute 0.00 - 0.20 Thousand/uL 0.01   Absolute Neutrophils 1.85 - 7.62 Thousands/µL 3.25   Lymphocytes Absolute 0.60 - 4.47 Thousands/µL 1.47   Absolute Monocytes 0.17 - 1.22 Thousand/µL 0.55   Absolute Eosinophils 0.00 - 0.61 Thousand/µL 0.12   Basophils Absolute  0.00 - 0.10 Thousands/µL 0.03       Problem List Items Addressed This Visit    None      Recent Visits  Date Type Provider Dept   02/13/24 Telemedicine Lois Phillips MD [facility] [place]   02/12/24 Telephone Veronika Brown [facility] [place]   02/09/24 Telephone Jyoti Smith MA [facility] [place]   Showing recent visits within past 7 days and meeting all other requirements  Future Appointments  No visits were found meeting these conditions.  Showing future appointments within next 150 days and meeting all other requirements         Visit Time  Total Visit Duration: 15minutes    Lois Phillips MD PhD

## 2024-02-15 RX ORDER — SODIUM CHLORIDE 9 MG/ML
20 INJECTION, SOLUTION INTRAVENOUS ONCE
Status: CANCELLED | OUTPATIENT
Start: 2024-02-22

## 2024-02-22 ENCOUNTER — TELEPHONE (OUTPATIENT)
Dept: HEMATOLOGY ONCOLOGY | Facility: CLINIC | Age: 49
End: 2024-02-22

## 2024-02-22 ENCOUNTER — HOSPITAL ENCOUNTER (OUTPATIENT)
Dept: INFUSION CENTER | Facility: CLINIC | Age: 49
Discharge: HOME | End: 2024-02-22
Payer: COMMERCIAL

## 2024-02-22 VITALS
SYSTOLIC BLOOD PRESSURE: 129 MMHG | RESPIRATION RATE: 18 BRPM | DIASTOLIC BLOOD PRESSURE: 85 MMHG | OXYGEN SATURATION: 97 % | WEIGHT: 205 LBS | BODY MASS INDEX: 35.19 KG/M2 | TEMPERATURE: 98.4 F | HEART RATE: 92 BPM

## 2024-02-22 DIAGNOSIS — C50.811 MALIGNANT NEOPLASM OF OVERLAPPING SITES OF RIGHT BREAST IN FEMALE, ESTROGEN RECEPTOR NEGATIVE: Primary | ICD-10-CM

## 2024-02-22 DIAGNOSIS — Z17.1 MALIGNANT NEOPLASM OF OVERLAPPING SITES OF RIGHT BREAST IN FEMALE, ESTROGEN RECEPTOR NEGATIVE: Primary | ICD-10-CM

## 2024-02-22 DIAGNOSIS — T45.1X5A CHEMOTHERAPY-INDUCED NEUTROPENIA: ICD-10-CM

## 2024-02-22 DIAGNOSIS — D70.1 CHEMOTHERAPY-INDUCED NEUTROPENIA: ICD-10-CM

## 2024-02-22 DIAGNOSIS — C79.51 MALIGNANT NEOPLASM METASTATIC TO BONE (HCC): ICD-10-CM

## 2024-02-22 PROCEDURE — 96413 CHEMO IV INFUSION 1 HR: CPT

## 2024-02-22 PROCEDURE — 96417 CHEMO IV INFUS EACH ADDL SEQ: CPT

## 2024-02-22 RX ORDER — SODIUM CHLORIDE 9 MG/ML
20 INJECTION, SOLUTION INTRAVENOUS ONCE
Status: COMPLETED | OUTPATIENT
Start: 2024-02-22 | End: 2024-02-22

## 2024-02-22 RX ADMIN — TRASTUZUMAB 548 MG: 150 INJECTION, POWDER, LYOPHILIZED, FOR SOLUTION INTRAVENOUS at 15:00

## 2024-02-22 RX ADMIN — PERTUZUMAB 420 MG: 30 INJECTION, SOLUTION, CONCENTRATE INTRAVENOUS at 14:24

## 2024-02-22 RX ADMIN — SODIUM CHLORIDE 20 ML/HR: 0.9 INJECTION, SOLUTION INTRAVENOUS at 14:01

## 2024-02-22 NOTE — PATIENT INSTRUCTIONS
February 2024 Sunday Monday Tuesday Wednesday Thursday Friday Saturday                       1    INF ONCOLOGY TX-TREATMENT PLAN  12:00 PM   (150 min.)   AN INF BED 1   [facility] 2     3           Cycle 31, Day 1     4     5     6    CT CHEST ABDOMEN PELVIS W CON   7:15 AM   (30 min.)   AN CT 1   [facility] CAT Scan    ECHO COMPLETE   8:00 AM   (60 min.)   AN HV ECHO 2   [facility] 7     8     9     10                11     12    LAB WALK IN   4:35 PM   (5 min.)   AN MOB PHLEB CHAIR 4   [facility] 13    VIRTUAL VISIT PG   3:25 PM   (20 min.)   Lois Phillips MD   [facility] [place] 14     15     16     17                18     19     20     21     22    INF ONCOLOGY TX-TREATMENT PLAN   1:00 PM   (150 min.)   AN INF CHAIR 12   [facility] 23     24           Cycle 32, Day 1     25     26     27     28     29                                 Treatment Details         2/1/2024 - Cycle 31, Day 1      Chemotherapy: ONCBCN PROVIDER COMMUNICATION2, PERTUZUMAB IVPB, ONCBCN PROVIDER COMMUNICATION2, TRASTUZUMAB INFUSION    2/22/2024 - Cycle 32, Day 1      Chemotherapy: ONCBCN PROVIDER COMMUNICATION2, PERTUZUMAB IVPB, ONCBCN PROVIDER COMMUNICATION2, TRASTUZUMAB INFUSION        March 2024 Sunday Monday Tuesday Wednesday Thursday Friday Saturday                            1     2                3     4     5     6     7     8     9                10     11     12     13     14    INF ONCOLOGY TX-TREATMENT PLAN   2:00 PM   (180 min.)   AN INF CHAIR 13   [facility] 15     16           Cycle 33, Day 1  + Add'l treatments     17     18     19     20     21     22     23                24     25     26     27     28     29     30                31                                                      Treatment Details         3/14/2024 - Cycle 33, Day 1 + Additional treatments      Chemotherapy: ONCBCN PROVIDER COMMUNICATION2, PERTUZUMAB IVPB, ONCBCN PROVIDER COMMUNICATION2, TRASTUZUMAB INFUSION

## 2024-02-22 NOTE — PROGRESS NOTES
Patient tolerated treatment without incident and was discharged post, no c/o at this time, next dosing 3/14/24.

## 2024-02-22 NOTE — PROGRESS NOTES
Patient arrives for D1C32 Perjeta and Herceptin. EF 65% on 2/6, labs reviewd from 2/12, no lab parameters for treatment today.  PIV placed L hand, brisk blood return, flushed without resistance. Patient resting comfortably on recliner, call bell within reach.

## 2024-02-29 DIAGNOSIS — Z51.5 PALLIATIVE CARE PATIENT: ICD-10-CM

## 2024-02-29 DIAGNOSIS — K21.9 GASTROESOPHAGEAL REFLUX DISEASE WITHOUT ESOPHAGITIS: ICD-10-CM

## 2024-02-29 DIAGNOSIS — G89.3 CANCER ASSOCIATED PAIN: ICD-10-CM

## 2024-02-29 RX ORDER — OXYCODONE HYDROCHLORIDE 10 MG/1
5-10 TABLET ORAL EVERY 4 HOURS PRN
Qty: 120 TABLET | Refills: 0 | Status: SHIPPED | OUTPATIENT
Start: 2024-02-29

## 2024-02-29 RX ORDER — PANTOPRAZOLE SODIUM 20 MG/1
20 TABLET, DELAYED RELEASE ORAL
Qty: 30 TABLET | Refills: 11 | Status: SHIPPED | OUTPATIENT
Start: 2024-02-29

## 2024-03-12 RX ORDER — SODIUM CHLORIDE 9 MG/ML
20 INJECTION, SOLUTION INTRAVENOUS ONCE
Status: CANCELLED | OUTPATIENT
Start: 2024-03-14

## 2024-03-14 ENCOUNTER — HOSPITAL ENCOUNTER (OUTPATIENT)
Dept: INFUSION CENTER | Facility: CLINIC | Age: 49
Discharge: HOME | End: 2024-03-14
Payer: COMMERCIAL

## 2024-03-14 ENCOUNTER — TELEPHONE (OUTPATIENT)
Dept: HEMATOLOGY ONCOLOGY | Facility: CLINIC | Age: 49
End: 2024-03-14

## 2024-03-14 ENCOUNTER — APPOINTMENT (OUTPATIENT)
Dept: LAB | Facility: CLINIC | Age: 49
End: 2024-03-14
Payer: COMMERCIAL

## 2024-03-14 VITALS
WEIGHT: 208 LBS | TEMPERATURE: 97.8 F | RESPIRATION RATE: 18 BRPM | DIASTOLIC BLOOD PRESSURE: 76 MMHG | OXYGEN SATURATION: 97 % | BODY MASS INDEX: 35.7 KG/M2 | SYSTOLIC BLOOD PRESSURE: 142 MMHG | HEART RATE: 84 BPM

## 2024-03-14 DIAGNOSIS — Z17.1 MALIGNANT NEOPLASM OF OVERLAPPING SITES OF RIGHT BREAST IN FEMALE, ESTROGEN RECEPTOR NEGATIVE: Primary | ICD-10-CM

## 2024-03-14 DIAGNOSIS — Z17.1 MALIGNANT NEOPLASM OF OVERLAPPING SITES OF RIGHT BREAST IN FEMALE, ESTROGEN RECEPTOR NEGATIVE (HCC): Primary | ICD-10-CM

## 2024-03-14 DIAGNOSIS — D70.1 CHEMOTHERAPY-INDUCED NEUTROPENIA (HCC): ICD-10-CM

## 2024-03-14 DIAGNOSIS — Z17.1 MALIGNANT NEOPLASM OF OVERLAPPING SITES OF RIGHT BREAST IN FEMALE, ESTROGEN RECEPTOR NEGATIVE (HCC): ICD-10-CM

## 2024-03-14 DIAGNOSIS — T45.1X5A CHEMOTHERAPY-INDUCED NEUTROPENIA (HCC): ICD-10-CM

## 2024-03-14 DIAGNOSIS — C79.51 MALIGNANT NEOPLASM METASTATIC TO BONE (HCC): ICD-10-CM

## 2024-03-14 DIAGNOSIS — C50.811 MALIGNANT NEOPLASM OF OVERLAPPING SITES OF RIGHT BREAST IN FEMALE, ESTROGEN RECEPTOR NEGATIVE (HCC): ICD-10-CM

## 2024-03-14 DIAGNOSIS — C50.811 MALIGNANT NEOPLASM OF OVERLAPPING SITES OF RIGHT BREAST IN FEMALE, ESTROGEN RECEPTOR NEGATIVE: Primary | ICD-10-CM

## 2024-03-14 DIAGNOSIS — C50.811 MALIGNANT NEOPLASM OF OVERLAPPING SITES OF RIGHT BREAST IN FEMALE, ESTROGEN RECEPTOR NEGATIVE (HCC): Primary | ICD-10-CM

## 2024-03-14 LAB
ALBUMIN SERPL BCP-MCNC: 4.1 G/DL (ref 3.5–5)
ALP SERPL-CCNC: 44 U/L (ref 34–104)
ALT SERPL W P-5'-P-CCNC: 34 U/L (ref 7–52)
ANION GAP SERPL CALCULATED.3IONS-SCNC: 4 MMOL/L (ref 4–13)
AST SERPL W P-5'-P-CCNC: 21 U/L (ref 13–39)
BASOPHILS # BLD AUTO: 0.02 THOUSANDS/ÂΜL (ref 0–0.1)
BASOPHILS NFR BLD AUTO: 0 % (ref 0–1)
BILIRUB SERPL-MCNC: 0.78 MG/DL (ref 0.2–1)
BUN SERPL-MCNC: 15 MG/DL (ref 5–25)
CALCIUM SERPL-MCNC: 9.4 MG/DL (ref 8.4–10.2)
CHLORIDE SERPL-SCNC: 108 MMOL/L (ref 96–108)
CO2 SERPL-SCNC: 29 MMOL/L (ref 21–32)
CREAT SERPL-MCNC: 0.69 MG/DL (ref 0.6–1.3)
EOSINOPHIL # BLD AUTO: 0.13 THOUSAND/ÂΜL (ref 0–0.61)
EOSINOPHIL NFR BLD AUTO: 2 % (ref 0–6)
ERYTHROCYTE [DISTWIDTH] IN BLOOD BY AUTOMATED COUNT: 12.8 % (ref 11.6–15.1)
GFR SERPL CREATININE-BSD FRML MDRD: 102 ML/MIN/1.73SQ M
GLUCOSE SERPL-MCNC: 97 MG/DL (ref 65–140)
HCT VFR BLD AUTO: 42.5 % (ref 34.8–46.1)
HGB BLD-MCNC: 13.5 G/DL (ref 11.5–15.4)
IMM GRANULOCYTES # BLD AUTO: 0.06 THOUSAND/UL (ref 0–0.2)
IMM GRANULOCYTES NFR BLD AUTO: 1 % (ref 0–2)
LYMPHOCYTES # BLD AUTO: 1.05 THOUSANDS/ÂΜL (ref 0.6–4.47)
LYMPHOCYTES NFR BLD AUTO: 20 % (ref 14–44)
MCH RBC QN AUTO: 31.3 PG (ref 26.8–34.3)
MCHC RBC AUTO-ENTMCNC: 31.8 G/DL (ref 31.4–37.4)
MCV RBC AUTO: 99 FL (ref 82–98)
MONOCYTES # BLD AUTO: 0.45 THOUSAND/ÂΜL (ref 0.17–1.22)
MONOCYTES NFR BLD AUTO: 8 % (ref 4–12)
NEUTROPHILS # BLD AUTO: 3.68 THOUSANDS/ÂΜL (ref 1.85–7.62)
NEUTS SEG NFR BLD AUTO: 69 % (ref 43–75)
NRBC BLD AUTO-RTO: 0 /100 WBCS
PLATELET # BLD AUTO: 232 THOUSANDS/UL (ref 149–390)
PMV BLD AUTO: 9.9 FL (ref 8.9–12.7)
POTASSIUM SERPL-SCNC: 4.7 MMOL/L (ref 3.5–5.3)
PROT SERPL-MCNC: 6.9 G/DL (ref 6.4–8.4)
RBC # BLD AUTO: 4.31 MILLION/UL (ref 3.81–5.12)
SODIUM SERPL-SCNC: 141 MMOL/L (ref 135–147)
WBC # BLD AUTO: 5.39 THOUSAND/UL (ref 4.31–10.16)

## 2024-03-14 PROCEDURE — 96413 CHEMO IV INFUSION 1 HR: CPT

## 2024-03-14 PROCEDURE — 86300 IMMUNOASSAY TUMOR CA 15-3: CPT

## 2024-03-14 PROCEDURE — 85025 COMPLETE CBC W/AUTO DIFF WBC: CPT

## 2024-03-14 PROCEDURE — 96417 CHEMO IV INFUS EACH ADDL SEQ: CPT

## 2024-03-14 PROCEDURE — 96367 TX/PROPH/DG ADDL SEQ IV INF: CPT

## 2024-03-14 PROCEDURE — 36415 COLL VENOUS BLD VENIPUNCTURE: CPT

## 2024-03-14 PROCEDURE — 80053 COMPREHEN METABOLIC PANEL: CPT

## 2024-03-14 RX ORDER — SODIUM CHLORIDE 9 MG/ML
20 INJECTION, SOLUTION INTRAVENOUS ONCE
OUTPATIENT
Start: 2024-05-06

## 2024-03-14 RX ORDER — SODIUM CHLORIDE 9 MG/ML
20 INJECTION, SOLUTION INTRAVENOUS ONCE
Status: DISCONTINUED | OUTPATIENT
Start: 2024-03-14 | End: 2024-03-17 | Stop reason: HOSPADM

## 2024-03-14 RX ORDER — SODIUM CHLORIDE 9 MG/ML
20 INJECTION, SOLUTION INTRAVENOUS ONCE
Status: COMPLETED | OUTPATIENT
Start: 2024-03-14 | End: 2024-03-14

## 2024-03-14 RX ADMIN — SODIUM CHLORIDE 20 ML/HR: 0.9 INJECTION, SOLUTION INTRAVENOUS at 14:48

## 2024-03-14 RX ADMIN — TRASTUZUMAB 558 MG: 150 INJECTION, POWDER, LYOPHILIZED, FOR SOLUTION INTRAVENOUS at 16:11

## 2024-03-14 RX ADMIN — PERTUZUMAB 420 MG: 30 INJECTION, SOLUTION, CONCENTRATE INTRAVENOUS at 15:34

## 2024-03-14 RX ADMIN — ZOLEDRONIC ACID 4 MG: 0.04 INJECTION, SOLUTION INTRAVENOUS at 14:47

## 2024-03-14 NOTE — PROGRESS NOTES
Pt resting with no complaints,  labs within parameters for treatment, call bell within reach. All questions answered and emotional support given. All needs met at this time.

## 2024-03-14 NOTE — PLAN OF CARE
Problem: Potential for Falls  Goal: Patient will remain free of falls  Description: INTERVENTIONS:  - Educate patient/family on patient safety including physical limitations  - Instruct patient to call for assistance with activity   - Consult OT/PT to assist with strengthening/mobility   - Keep Call bell within reach  - Keep bed low and locked with side rails adjusted as appropriate  - Keep care items and personal belongings within reach  - Initiate and maintain comfort rounds  - Make Fall Risk Sign visible to staff  - Offer Toileting every *** Hours, in advance of need  - Initiate/Maintain ***alarm  - Obtain necessary fall risk management equipment: ***  - Apply yellow socks and bracelet for high fall risk patients  - Consider moving patient to room near nurses station  3/14/2024 1433 by Renuka Goodson, RN  Outcome: Progressing  3/14/2024 1432 by Renuka Goodson, RN  Outcome: Progressing

## 2024-03-14 NOTE — TELEPHONE ENCOUNTER
Lab Inquiry   Who are you speaking with? Patient     If it is not the patient, are they listed on an active communication consent form? Yes   Name of ordering provider Dr Lois Phillips   What is being requested? Lab orders need to be entered   Lab draw location [facility]   What is the best call back number? [phone]    If patient at the lab, Was a live attempts to contact the team made? Yes, FARIDEH Crouch 9:50am

## 2024-03-15 LAB — CANCER AG27-29 SERPL-ACNC: 23.6 U/ML (ref 0–38.6)

## 2024-03-28 RX ORDER — SODIUM CHLORIDE 9 MG/ML
20 INJECTION, SOLUTION INTRAVENOUS ONCE
OUTPATIENT
Start: 2024-04-04

## 2024-04-04 ENCOUNTER — OFFICE VISIT (OUTPATIENT)
Dept: PALLIATIVE MEDICINE | Facility: CLINIC | Age: 49
End: 2024-04-04

## 2024-04-04 ENCOUNTER — HOSPITAL ENCOUNTER (OUTPATIENT)
Dept: INFUSION CENTER | Facility: CLINIC | Age: 49
Discharge: HOME | End: 2024-04-04
Payer: COMMERCIAL

## 2024-04-04 VITALS
WEIGHT: 206.5 LBS | HEART RATE: 97 BPM | SYSTOLIC BLOOD PRESSURE: 130 MMHG | DIASTOLIC BLOOD PRESSURE: 72 MMHG | BODY MASS INDEX: 35.45 KG/M2 | OXYGEN SATURATION: 97 %

## 2024-04-04 VITALS
SYSTOLIC BLOOD PRESSURE: 112 MMHG | DIASTOLIC BLOOD PRESSURE: 77 MMHG | WEIGHT: 205 LBS | OXYGEN SATURATION: 98 % | TEMPERATURE: 98 F | BODY MASS INDEX: 35 KG/M2 | HEART RATE: 71 BPM | HEIGHT: 64 IN | RESPIRATION RATE: 18 BRPM

## 2024-04-04 DIAGNOSIS — C50.811 MALIGNANT NEOPLASM OF OVERLAPPING SITES OF RIGHT BREAST IN FEMALE, ESTROGEN RECEPTOR NEGATIVE (HCC): Primary | ICD-10-CM

## 2024-04-04 DIAGNOSIS — Z79.899 MEDICAL MARIJUANA USE: ICD-10-CM

## 2024-04-04 DIAGNOSIS — D70.1 CHEMOTHERAPY-INDUCED NEUTROPENIA (HCC): ICD-10-CM

## 2024-04-04 DIAGNOSIS — Z17.1 MALIGNANT NEOPLASM OF OVERLAPPING SITES OF RIGHT BREAST IN FEMALE, ESTROGEN RECEPTOR NEGATIVE (HCC): Primary | ICD-10-CM

## 2024-04-04 DIAGNOSIS — Z51.5 PALLIATIVE CARE PATIENT: ICD-10-CM

## 2024-04-04 DIAGNOSIS — T45.1X5A CHEMOTHERAPY-INDUCED NEUTROPENIA (HCC): ICD-10-CM

## 2024-04-04 DIAGNOSIS — C79.49 METASTASIS TO SPINAL CORD (HCC): ICD-10-CM

## 2024-04-04 DIAGNOSIS — G89.3 CANCER ASSOCIATED PAIN: ICD-10-CM

## 2024-04-04 DIAGNOSIS — C79.51 MALIGNANT NEOPLASM METASTATIC TO BONE (HCC): ICD-10-CM

## 2024-04-04 RX ORDER — SODIUM CHLORIDE 9 MG/ML
20 INJECTION, SOLUTION INTRAVENOUS ONCE
Status: COMPLETED | OUTPATIENT
Start: 2024-04-04 | End: 2024-04-04

## 2024-04-04 RX ADMIN — TRASTUZUMAB 558 MG: 150 INJECTION, POWDER, LYOPHILIZED, FOR SOLUTION INTRAVENOUS at 13:20

## 2024-04-04 RX ADMIN — PERTUZUMAB 420 MG: 30 INJECTION, SOLUTION, CONCENTRATE INTRAVENOUS at 12:42

## 2024-04-04 RX ADMIN — SODIUM CHLORIDE 20 ML/HR: 0.9 INJECTION, SOLUTION INTRAVENOUS at 12:21

## 2024-04-04 NOTE — ASSESSMENT & PLAN NOTE
Patient reports her chronic cancer-related pain remains well-controlled on her current regimen.  MMJ continues to be a useful tool for managing her chronic pain. Continue.  Continue oxycodone IR PRN. This remains quite effective, she is taking once or twice per day.  Morphine ER was used in the past, though was discontinued and the patient has not yet felt the need to resume.

## 2024-04-04 NOTE — ASSESSMENT & PLAN NOTE
Carcinoma of the R breast (diagnosed 3/29/22) metastatic to bone, s/p RT to spine. Plan includes systemic therapy with pertuzumab + trastuzumab. Patient tolerating her therapy very well.  Continue disease-directed cares.

## 2024-04-04 NOTE — PROGRESS NOTES
Pt tolerated treatment without incident. Confirmed pts next appt for 4/25/24 @12pm @ [place]. Bethanie LARAS.

## 2024-04-04 NOTE — PROGRESS NOTES
Follow-up with Palliative and Supportive Care  Nayeli Love 48 y.o. female [id number]    ASSESSMENT & PLAN:    1. Malignant neoplasm of overlapping sites of right breast in female, estrogen receptor negative (HCC)  Assessment & Plan:  Carcinoma of the R breast (diagnosed 3/29/22) metastatic to bone, s/p RT to spine. Plan includes systemic therapy with pertuzumab + trastuzumab. Patient tolerating her therapy very well.  Continue disease-directed cares.      2. Malignant neoplasm metastatic to bone (HCC)    3. 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 Cancer associated pain  Assessment & Plan:  Patient reports her chronic cancer-related pain remains well-controlled on her current regimen.  MMJ continues to be a useful tool for managing her chronic pain. Continue.  Continue oxycodone IR PRN. This remains quite effective, she is taking once or twice per day.  Morphine ER was used in the past, though was discontinued and the patient has not yet felt the need to resume.      5. Medical marijuana use  Assessment & Plan:  Initially certified for MMJ in the state [facility] on 4/22/22 by Dr. Ulrich, for cancer-related pain.  The patient qualifies for use of MMJ in the [facility] by having the following medical condition: metastatic breast cancer.  From a palliative care standpoint the patient is suffering with chronic cancer-related pain. These symptoms and side effects might be alleviated with use of MMJ products. The patient renewed their registration online. A medical re-certification was completed on this date (certification ID #[id number]). The patient was given a copy of the medical certification renewal.      6. Palliative care patient  Assessment & Plan:  Emotional / psychosocial support provided today.  Refills processed prior to today's visit.  ACP: Patient has completed advanced directives ([facility]) naming Red as default surrogate healthcare decision-maker(s). In EMR.  Reviewed notes (Urology,  "Medical Oncology), labs (3/14/24 Cr 0.69, alb 4.1, CA 27-29 23.6, Hb 13.5), imaging + procedures (2/6/24 CTCAP). See below for more data.  Return in about 6 months (around 10/4/2024).  Medication safety issues addressed - no driving under the influence of narcotics (including opioids), watch for adverse effects including AMS or respiratory depression (slowed breathing), keep medications stored in a safe/locked environment, do not use alcohol while opioids or other narcotics are in one's system.  I have personally queried the patient's controlled substance dispensing history in the Prescription Drug Monitoring Program in compliance with regulations before I have prescribed any controlled substances. The prescription history is consistent with prescribed therapy and our practice policies.         20 minutes were spent in this ambulatory visit with greater than 50% of the time spent face to face with patient and her  Red in counseling or coordination of care including symptom assessment and management, medication review, psychosocial support, chart review, imaging review, lab review, medical marijuana, supportive listening, and anticipatory guidance. All of the patient's questions were answered during this discussion.    SUBJECTIVE:  Chief Complaint   Patient presents with    Cancer    Pain    Counseling    Follow-up        HPI    Nayeli Love is a 48 y.o. female w/ carcinoma of the R breast (diagnosed 3/29/22) metastatic to bone, s/p RT to spine; cancer-related pain. She follows w/ Dr Phillips (Medical Oncology). Plan includes systemic therapy with pertuzumab + trastuzumab.    Patient states she continues to tolerate her cancer directed therapies very well. She denies nausea, denies vomiting. Her bowel functions have been \"regular since 2022\" without needing assistance for medications. Her sleep is \"good\". Her appetite is adequate and she has not had any weight loss recently. Her mood is positive. She has " "strong support from her family and friends, especially her  Red.    Patient reports her pain control has been \"great\". She is using 1 tablet of oxycodone IR 10 mg/day on average, occasionally using a second dose when pain requires. She is satisfied with her analgesic regimen. [facility] also provides some help with pain control. She has variable potency PO Gummies (anywhere from 1-1 THC to CBD to 5-1).    Review of Systems   All other systems reviewed and are negative.    The following portions of the medical history were reviewed: past medical history, surgical history, problem list, medication list, family history, and social history.      Current Outpatient Medications:     acetaminophen (TYLENOL) 325 mg tablet, Take 650 mg by mouth every 6 (six) hours as needed for mild pain, Disp: , Rfl:     al mag oxide-diphenhydramine-lidocaine viscous (MAGIC MOUTHWASH) 1:1:1 suspension, Swish and spit 10 mL if needed for mouth pain or discomfort, Disp: , Rfl:     lisinopril (ZESTRIL) 20 mg tablet, Take 0.5 tablets (10 mg total) by mouth daily Only to take as needed when BP is >150 systolicallly, Disp: 90 tablet, Rfl: 1    nystatin (MYCOSTATIN) powder, Apply topically 3 (three) times a day, Disp: 15 g, Rfl: 1    ondansetron (ZOFRAN) 4 mg tablet, Take 1 tablet (4 mg total) by mouth every 8 (eight) hours as needed for nausea or vomiting, Disp: 9 tablet, Rfl: 20    oxyCODONE (ROXICODONE) 10 MG TABS, Take 0.5-1 tablets (5-10 mg total) by mouth every 4 (four) hours as needed for moderate pain or severe pain Max Daily Amount: 60 mg, Disp: 120 tablet, Rfl: 0    pantoprazole (PROTONIX) 20 mg tablet, Take 1 tablet (20 mg total) by mouth daily in the early morning, Disp: 30 tablet, Rfl: 11    DPH-Lido-AlHydr-MgHydr-Simeth (First-Mouthwash BLM) SUSP, , Disp: , Rfl:     nystatin (MYCOSTATIN) 500,000 units/5 mL suspension, Apply 5 mL (500,000 Units total) to the mouth or throat if needed (as needed) (Patient not taking: Reported on " 8/24/2023), Disp: , Rfl:     polyethylene glycol (MIRALAX) 17 g packet, Take 17 g by mouth if needed (constipation) (Patient not taking: Reported on 8/24/2023), Disp: , Rfl:     senna (SENOKOT) 8.6 mg, Take 3 tablets (25.8 mg total) by mouth if needed for constipation Hold for loose stool. (Patient not taking: Reported on 8/24/2023), Disp: , Rfl:   No current facility-administered medications for this visit.    OBJECTIVE:  /72 (BP Location: Right arm, Patient Position: Sitting, Cuff Size: Standard)   Pulse 97   Wt 93.7 kg (206 lb 8 oz)   LMP 03/28/2022 (Approximate)   SpO2 97%   BMI 35.45 kg/m²   Physical Exam  Vitals reviewed.   Constitutional:       General: She is not in acute distress.     Appearance: She is well-groomed and overweight. She is not toxic-appearing.   HENT:      Head: Normocephalic and atraumatic.      Right Ear: External ear normal.      Left Ear: External ear normal.   Eyes:      General: No scleral icterus.        Right eye: No discharge.         Left eye: No discharge.      Extraocular Movements: Extraocular movements intact.      Conjunctiva/sclera: Conjunctivae normal.      Pupils: Pupils are equal, round, and reactive to light.   Cardiovascular:      Rate and Rhythm: Normal rate.   Pulmonary:      Effort: Pulmonary effort is normal. No tachypnea, bradypnea, accessory muscle usage or respiratory distress.      Comments: Able to speak comfortably in complete sentences on room air at rest.  Abdominal:      General: There is no distension.      Tenderness: There is no guarding.   Musculoskeletal:      Cervical back: Normal range of motion.      Right lower leg: No edema.      Left lower leg: No edema.   Skin:     General: Skin is dry.      Coloration: Skin is not pale.   Neurological:      Mental Status: She is alert and oriented to person, place, and time.      Cranial Nerves: No dysarthria or facial asymmetry.      Comments: Using no assistive devices for ambulation.   Psychiatric:          Attention and Perception: Attention normal.         Mood and Affect: Mood and affect normal.         Speech: Speech normal.         Behavior: Behavior normal. Behavior is cooperative.         Thought Content: Thought content normal.         Cognition and Memory: Cognition and memory normal.         Judgment: Judgment normal.      Comments: Positive mood.          Recent labs:  Lab Results   Component Value Date/Time    SODIUM 141 03/14/2024 10:20 AM    SODIUM 138 10/12/2018 10:18 PM    K 4.7 03/14/2024 10:20 AM    K 4.4 10/12/2018 10:18 PM    BUN 15 03/14/2024 10:20 AM    BUN 11 10/12/2018 10:18 PM    CREATININE 0.69 03/14/2024 10:20 AM    CREATININE 1.03 10/12/2018 10:18 PM    GLUC 97 03/14/2024 10:20 AM    GLUC 95 10/12/2018 10:18 PM    CALCIUM 9.4 03/14/2024 10:20 AM    CALCIUM 9.1 10/12/2018 10:18 PM    AST 21 03/14/2024 10:20 AM    ALT 34 03/14/2024 10:20 AM    ALB 4.1 03/14/2024 10:20 AM    TP 6.9 03/14/2024 10:20 AM    EGFR 102 03/14/2024 10:20 AM    EGFR 66 10/12/2018 10:18 PM     Lab Results   Component Value Date/Time    HGB 13.5 03/14/2024 10:20 AM    WBC 5.39 03/14/2024 10:20 AM     03/14/2024 10:20 AM    INR 0.91 11/28/2022 07:01 AM    PTT 27 11/28/2022 07:01 AM     Lab Results   Component Value Date/Time    WHW4OGMTUNZN 3.910 12/19/2023 07:22 AM       Recent Imaging:  Procedure: Echo follow up/limited w/ contrast if indicated    Result Date: 2/6/2024  Narrative:   Left Ventricle: Left ventricular cavity size is normal. Wall thickness is normal. The left ventricular ejection fraction is 65%. Systolic function is normal. Global longitudinal strain is borderline at -16%. Wall motion is normal. Diastolic function is mildly abnormal, consistent with grade I (abnormal) relaxation.   Right Ventricle: Right ventricular cavity size is normal. Systolic function is normal.   Tricuspid Valve: There is trace regurgitation.   Prior TTE study available for comparison. Prior study date: 10/12/2023. No  significant changes noted compared to the prior study.     Procedure: CT chest abdomen pelvis w contrast    Result Date: 2/6/2024  Narrative: CT CHEST, ABDOMEN AND PELVIS WITH IV CONTRAST INDICATION: C79.51: Secondary malignant neoplasm of bone. COMPARISON: Multiple prior examinations including most recent CT performed October 12, 2023 TECHNIQUE: CT examination of the chest, abdomen and pelvis was performed. Multiplanar 2D reformatted images were created from the source data. This examination, like all CT scans performed in the [facility], was performed utilizing techniques to minimize radiation dose exposure, including the use of iterative reconstruction and automated exposure control. Radiation dose length product (DLP) for this visit: 992.85 mGy-cm IV Contrast: 100 mL of iohexol (OMNIPAQUE) Enteric Contrast: Administered. FINDINGS: CHEST LUNGS: Scarring in the posterior medial right upper and lower chest, unchanged. No suspicious pulmonary nodule. Relative elevation of right hemidiaphragm reidentified. PLEURA: Unremarkable. HEART/GREAT VESSELS: Heart is unremarkable for patient's age. No thoracic aortic aneurysm. MEDIASTINUM AND ALISON: Unremarkable. CHEST WALL AND LOWER NECK: Partly calcified mass in the medial right breast, 2.6 x 2.4 cm on image 59 of series 2, when allowing for differences in measuring technique not significantly changed from previous examination. No axillary lymphadenopathy. ABDOMEN LIVER/BILIARY TREE: Unremarkable. GALLBLADDER: No calcified gallstones. No pericholecystic inflammatory change. SPLEEN: Unremarkable. PANCREAS: Unremarkable. ADRENAL GLANDS: Unremarkable. KIDNEYS/URETERS: Scarring in the mid and upper poles of both kidneys. Circumscribed 11 mm partly exophytic lesion at the posterior interpolar right kidney, unchanged from most recent prior examination, not quite simple fluid but likely to represent minimally complex cyst. Bilateral renal cortical cysts, unchanged,  largest exophytic in the interpolar left kidney, 2.2 cm. Nonobstructing 4 mm calculus at the lower pole of the left kidney. No solid renal mass, ureteral calculus, or hydronephrosis. STOMACH AND BOWEL: Unremarkable. APPENDIX: No findings to suggest appendicitis. ABDOMINOPELVIC CAVITY: No ascites. No pneumoperitoneum. No lymphadenopathy. VESSELS: Unremarkable for patient's age. PELVIS REPRODUCTIVE ORGANS: Simple appearing left adnexal cyst, 6.5 x 5.6 cm increased from 5.7 x 5.4 cm on previous examination. No associated solid tissue. Calcified fundal fibroid, approximately 1.9 cm increased from 1.6 cm on October 12, 2023. URINARY BLADDER: Unremarkable. ABDOMINAL WALL/INGUINAL REGIONS: Unremarkable. BONES: Widespread sclerotic metastatic disease, unchanged from October 12, 2023. As on previous examination there are multiple lesions throughout the spine with associated Schmorl's nodes. Again noted is an expansile sclerotic lesion in the anterior right fourth rib. No extraskeletal soft tissue tumor mass or developing lytic lesion noted. Pathologic compression fracture of T2, unchanged. 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 No acute pathologic fracture. Reidentified linear sclerosis in the right femoral head consistent with avascular necrosis.     Impression: Widespread sclerotic osseous metastatic disease without significant interval change from October 12, 2023. Partly calcified right breast mass not significantly changed. Increasing size of simple appearing left adnexal cystic mass, now up to 6.5 cm. Further characterization with pelvic ultrasound is recommended. Slight increase in the size of a partly calcified uterine fibroid now 1.9 cm. This can also be reevaluated at the time of pelvic sonography. No findings to suggest soft tissue metastatic disease in the chest, abdomen or pelvis. Workstation performed: [id number]      Pratik Garcia MD  [facility] Palliative and Supportive  "[facility]  [phone]    Portions of this document may have been created using dictation software and as such some \"sound alike\" terms may have been generated by the system. Do not hesitate to contact me with any questions or clarifications.   "

## 2024-04-04 NOTE — ASSESSMENT & PLAN NOTE
Initially certified for MMJ in the [facility] on 4/22/22 by Dr. Ulrich, for cancer-related pain.  The patient qualifies for use of MMJ in the [facility] by having the following medical condition: metastatic breast cancer.  From a palliative care standpoint the patient is suffering with chronic cancer-related pain. These symptoms and side effects might be alleviated with use of MMJ products. The patient renewed their registration online. A medical re-certification was completed on this date (certification ID #[id number]). The patient was given a copy of the medical certification renewal.

## 2024-04-04 NOTE — PATIENT INSTRUCTIONS
It was good to see you today. Thank you for coming in.    We discussed medical marijuana (MMJ) today and I have issued a re-certification for its use. You should expect an e-mail from the [place] Dept of Health verifying this.  You and I reviewed the MMJ informed consent document today. We are providing you with a copy of the signed document.  Return in about 6 months (around 10/4/2024).  Call us for refills on medications that we supply, as needed.  If something changes and you need to come in sooner, please call our office.    PRESCRIPTION REFILL REMINDER:  All medication refills should be requested prior to Noon on Friday. Any refill requests after noon on Friday would be addressed the following Monday.    MEDICATION SAFETY ISSUES:  Do not drive under the influence of narcotics (including opioids), watch for adverse effects including confusion / altered mental status / respiratory depression (slowed breathing), keep medications stored in a safe/locked environment, do not use alcohol while opioids or other narcotics are in your system.

## 2024-04-04 NOTE — PROGRESS NOTES
Pt offers no complaints, per treatment plan no labs required. Ejection fraction reviewed from 2/6/24.

## 2024-04-04 NOTE — ASSESSMENT & PLAN NOTE
Emotional / psychosocial support provided today.  Refills processed prior to today's visit.  ACP: Patient has completed advanced directives (the [facility] Advanced Directive) naming Red as default surrogate healthcare decision-maker(s). In EMR.  Reviewed notes (Urology, Medical Oncology), labs (3/14/24 Cr 0.69, alb 4.1, CA 27-29 23.6, Hb 13.5), imaging + procedures (2/6/24 CTCAP). See below for more data.  Return in about 6 months (around 10/4/2024).  Medication safety issues addressed - no driving under the influence of narcotics (including opioids), watch for adverse effects including AMS or respiratory depression (slowed breathing), keep medications stored in a safe/locked environment, do not use alcohol while opioids or other narcotics are in one's system.  I have personally queried the patient's controlled substance dispensing history in the Prescription Drug Monitoring Program in compliance with regulations before I have prescribed any controlled substances. The prescription history is consistent with prescribed therapy and our practice policies.

## 2024-04-18 ENCOUNTER — TELEPHONE (OUTPATIENT)
Dept: HEMATOLOGY ONCOLOGY | Facility: CLINIC | Age: 49
End: 2024-04-18

## 2024-04-18 DIAGNOSIS — C50.811 MALIGNANT NEOPLASM OF OVERLAPPING SITES OF RIGHT BREAST IN FEMALE, ESTROGEN RECEPTOR NEGATIVE (HCC): Primary | ICD-10-CM

## 2024-04-18 DIAGNOSIS — Z17.1 MALIGNANT NEOPLASM OF OVERLAPPING SITES OF RIGHT BREAST IN FEMALE, ESTROGEN RECEPTOR NEGATIVE (HCC): Primary | ICD-10-CM

## 2024-04-18 RX ORDER — SODIUM CHLORIDE 9 MG/ML
20 INJECTION, SOLUTION INTRAVENOUS ONCE
Status: CANCELLED | OUTPATIENT
Start: 2024-04-25

## 2024-04-25 ENCOUNTER — HOSPITAL ENCOUNTER (OUTPATIENT)
Dept: INFUSION CENTER | Facility: CLINIC | Age: 49
Discharge: HOME | End: 2024-04-25
Payer: COMMERCIAL

## 2024-04-25 VITALS
OXYGEN SATURATION: 98 % | WEIGHT: 207 LBS | RESPIRATION RATE: 18 BRPM | TEMPERATURE: 98 F | HEART RATE: 76 BPM | SYSTOLIC BLOOD PRESSURE: 134 MMHG | DIASTOLIC BLOOD PRESSURE: 92 MMHG | BODY MASS INDEX: 35.53 KG/M2

## 2024-04-25 DIAGNOSIS — Z17.1 MALIGNANT NEOPLASM OF OVERLAPPING SITES OF RIGHT BREAST IN FEMALE, ESTROGEN RECEPTOR NEGATIVE (HCC): Primary | ICD-10-CM

## 2024-04-25 DIAGNOSIS — C50.811 MALIGNANT NEOPLASM OF OVERLAPPING SITES OF RIGHT BREAST IN FEMALE, ESTROGEN RECEPTOR NEGATIVE (HCC): Primary | ICD-10-CM

## 2024-04-25 DIAGNOSIS — T45.1X5A CHEMOTHERAPY-INDUCED NEUTROPENIA (HCC): ICD-10-CM

## 2024-04-25 DIAGNOSIS — D70.1 CHEMOTHERAPY-INDUCED NEUTROPENIA (HCC): ICD-10-CM

## 2024-04-25 DIAGNOSIS — C79.51 MALIGNANT NEOPLASM METASTATIC TO BONE (HCC): ICD-10-CM

## 2024-04-25 PROCEDURE — 96417 CHEMO IV INFUS EACH ADDL SEQ: CPT

## 2024-04-25 PROCEDURE — 96413 CHEMO IV INFUSION 1 HR: CPT

## 2024-04-25 RX ORDER — SODIUM CHLORIDE 9 MG/ML
20 INJECTION, SOLUTION INTRAVENOUS ONCE
Status: COMPLETED | OUTPATIENT
Start: 2024-04-25 | End: 2024-04-25

## 2024-04-25 RX ADMIN — TRASTUZUMAB 558 MG: 150 INJECTION, POWDER, LYOPHILIZED, FOR SOLUTION INTRAVENOUS at 13:34

## 2024-04-25 RX ADMIN — SODIUM CHLORIDE 20 ML/HR: 0.9 INJECTION, SOLUTION INTRAVENOUS at 12:45

## 2024-04-25 RX ADMIN — PERTUZUMAB 420 MG: 30 INJECTION, SOLUTION, CONCENTRATE INTRAVENOUS at 12:52

## 2024-04-25 NOTE — PROGRESS NOTES
Received for Herceptin/Perjeta. No complaints offered. Pt tolerated treatment without issues. Next appt is 5/16 at 1230pm. AVS declined.

## 2024-05-02 ENCOUNTER — TELEPHONE (OUTPATIENT)
Age: 49
End: 2024-05-02

## 2024-05-02 ENCOUNTER — TELEPHONE (OUTPATIENT)
Dept: HEMATOLOGY ONCOLOGY | Facility: CLINIC | Age: 49
End: 2024-05-02

## 2024-05-02 NOTE — TELEPHONE ENCOUNTER
Patient called to check if she has any labs to be completed before her appointment with Dr Arreguin. Advised patient 1 lab is active/ metabolic panel.

## 2024-05-02 NOTE — TELEPHONE ENCOUNTER
Patient Call    Who are you speaking with? Patient    If it is not the patient, are they listed on an active communication consent form? N/A   What is the reason for this call? She asked if she needed to drink the barium. Advised there aren't instructions to  or drink barium   Does this require a call back? N/A   If a call back is required, please list best call back number N/a   If a call back is required, advise that a message will be forwarded to their care team and someone will return their call as soon as possible.   Did you relay this information to the patient? N/A

## 2024-05-03 ENCOUNTER — PATIENT MESSAGE (OUTPATIENT)
Age: 49
End: 2024-05-03

## 2024-05-03 DIAGNOSIS — M25.572 ACUTE LEFT ANKLE PAIN: ICD-10-CM

## 2024-05-03 DIAGNOSIS — C50.811 MALIGNANT NEOPLASM OF OVERLAPPING SITES OF RIGHT BREAST IN FEMALE, ESTROGEN RECEPTOR NEGATIVE (HCC): Primary | ICD-10-CM

## 2024-05-03 DIAGNOSIS — Z17.1 MALIGNANT NEOPLASM OF OVERLAPPING SITES OF RIGHT BREAST IN FEMALE, ESTROGEN RECEPTOR NEGATIVE (HCC): Primary | ICD-10-CM

## 2024-05-03 DIAGNOSIS — M79.672 ACUTE FOOT PAIN, LEFT: ICD-10-CM

## 2024-05-03 DIAGNOSIS — C79.51 MALIGNANT NEOPLASM METASTATIC TO BONE (HCC): ICD-10-CM

## 2024-05-06 ENCOUNTER — HOSPITAL ENCOUNTER (OUTPATIENT)
Dept: NON INVASIVE DIAGNOSTICS | Facility: HOSPITAL | Age: 49
Discharge: HOME | End: 2024-05-06
Attending: INTERNAL MEDICINE
Payer: COMMERCIAL

## 2024-05-06 ENCOUNTER — HOSPITAL ENCOUNTER (OUTPATIENT)
Dept: CT IMAGING | Facility: HOSPITAL | Age: 49
Discharge: HOME | End: 2024-05-06
Attending: INTERNAL MEDICINE
Payer: COMMERCIAL

## 2024-05-06 ENCOUNTER — APPOINTMENT (OUTPATIENT)
Dept: NON INVASIVE DIAGNOSTICS | Facility: CLINIC | Age: 49
End: 2024-05-06
Payer: COMMERCIAL

## 2024-05-06 VITALS
HEART RATE: 76 BPM | WEIGHT: 207 LBS | BODY MASS INDEX: 35.34 KG/M2 | DIASTOLIC BLOOD PRESSURE: 92 MMHG | HEIGHT: 64 IN | SYSTOLIC BLOOD PRESSURE: 134 MMHG

## 2024-05-06 DIAGNOSIS — C79.51 MALIGNANT NEOPLASM METASTATIC TO BONE (HCC): ICD-10-CM

## 2024-05-06 DIAGNOSIS — Z17.1 MALIGNANT NEOPLASM OF OVERLAPPING SITES OF RIGHT BREAST IN FEMALE, ESTROGEN RECEPTOR NEGATIVE (HCC): ICD-10-CM

## 2024-05-06 DIAGNOSIS — C50.811 MALIGNANT NEOPLASM OF OVERLAPPING SITES OF RIGHT BREAST IN FEMALE, ESTROGEN RECEPTOR NEGATIVE (HCC): ICD-10-CM

## 2024-05-06 PROCEDURE — 71260 CT THORAX DX C+: CPT

## 2024-05-06 PROCEDURE — 93321 DOPPLER ECHO F-UP/LMTD STD: CPT

## 2024-05-06 PROCEDURE — 93308 TTE F-UP OR LMTD: CPT

## 2024-05-06 PROCEDURE — 93325 DOPPLER ECHO COLOR FLOW MAPG: CPT

## 2024-05-06 PROCEDURE — 74177 CT ABD & PELVIS W/CONTRAST: CPT

## 2024-05-06 PROCEDURE — G1004 CDSM NDSC: HCPCS

## 2024-05-06 RX ADMIN — IOHEXOL 100 ML: 350 INJECTION, SOLUTION INTRAVENOUS at 07:25

## 2024-05-06 NOTE — TELEPHONE ENCOUNTER
Patient wanted to go get labs done...Went yesterday 5/6 7isham and they told her nothing on her chart.  Told her now there is as of yesterday 8:23am

## 2024-05-07 ENCOUNTER — OFFICE VISIT (OUTPATIENT)
Dept: INTERNAL MEDICINE CLINIC | Facility: CLINIC | Age: 49
End: 2024-05-07

## 2024-05-07 ENCOUNTER — APPOINTMENT (OUTPATIENT)
Dept: LAB | Facility: CLINIC | Age: 49
End: 2024-05-07
Payer: COMMERCIAL

## 2024-05-07 VITALS
HEART RATE: 102 BPM | HEIGHT: 64 IN | DIASTOLIC BLOOD PRESSURE: 91 MMHG | OXYGEN SATURATION: 96 % | TEMPERATURE: 98.7 F | BODY MASS INDEX: 35.17 KG/M2 | WEIGHT: 206 LBS | SYSTOLIC BLOOD PRESSURE: 135 MMHG

## 2024-05-07 DIAGNOSIS — R21 RASH: ICD-10-CM

## 2024-05-07 DIAGNOSIS — C79.52 SECONDARY MALIGNANT NEOPLASM OF BONE AND BONE MARROW (HCC): ICD-10-CM

## 2024-05-07 DIAGNOSIS — C79.49 METASTASIS TO SPINAL CORD (HCC): ICD-10-CM

## 2024-05-07 DIAGNOSIS — C79.51 SECONDARY MALIGNANT NEOPLASM OF BONE AND BONE MARROW (HCC): ICD-10-CM

## 2024-05-07 DIAGNOSIS — C50.811 MALIGNANT NEOPLASM OF OVERLAPPING SITES OF RIGHT BREAST IN FEMALE, ESTROGEN RECEPTOR NEGATIVE (HCC): ICD-10-CM

## 2024-05-07 DIAGNOSIS — C50.811 MALIGNANT NEOPLASM OF OVERLAPPING SITES OF RIGHT FEMALE BREAST, UNSPECIFIED ESTROGEN RECEPTOR STATUS (HCC): ICD-10-CM

## 2024-05-07 DIAGNOSIS — I10 ESSENTIAL HYPERTENSION: Primary | ICD-10-CM

## 2024-05-07 DIAGNOSIS — Z17.1 MALIGNANT NEOPLASM OF OVERLAPPING SITES OF RIGHT BREAST IN FEMALE, ESTROGEN RECEPTOR NEGATIVE (HCC): ICD-10-CM

## 2024-05-07 DIAGNOSIS — Z17.1 ESTROGEN RECEPTOR NEGATIVE STATUS (ER-): ICD-10-CM

## 2024-05-07 DIAGNOSIS — G95.20 UNSPECIFIED CORD COMPRESSION (HCC): ICD-10-CM

## 2024-05-07 DIAGNOSIS — F11.20 CONTINUOUS OPIOID DEPENDENCE (HCC): ICD-10-CM

## 2024-05-07 LAB
ALBUMIN SERPL BCP-MCNC: 4.1 G/DL (ref 3.5–5)
ALP SERPL-CCNC: 58 U/L (ref 34–104)
ALT SERPL W P-5'-P-CCNC: 110 U/L (ref 7–52)
ANION GAP SERPL CALCULATED.3IONS-SCNC: 4 MMOL/L (ref 4–13)
AST SERPL W P-5'-P-CCNC: 31 U/L (ref 13–39)
BILIRUB SERPL-MCNC: 0.99 MG/DL (ref 0.2–1)
BSA FOR ECHO PROCEDURE: 1.98 M2
BUN SERPL-MCNC: 11 MG/DL (ref 5–25)
CALCIUM SERPL-MCNC: 9.4 MG/DL (ref 8.4–10.2)
CHLORIDE SERPL-SCNC: 107 MMOL/L (ref 96–108)
CO2 SERPL-SCNC: 29 MMOL/L (ref 21–32)
CREAT SERPL-MCNC: 0.67 MG/DL (ref 0.6–1.3)
E WAVE DECELERATION TIME: 216 MS
E/A RATIO: 1.49
GFR SERPL CREATININE-BSD FRML MDRD: 103 ML/MIN/1.73SQ M
GLOBAL LONGITUIDAL STRAIN: -17 %
GLUCOSE SERPL-MCNC: 108 MG/DL (ref 65–140)
MV E'TISSUE VEL-SEP: 9 CM/S
MV PEAK A VEL: 0.57 M/S
MV PEAK E VEL: 85 CM/S
MV STENOSIS PRESSURE HALF TIME: 63 MS
MV VALVE AREA P 1/2 METHOD: 3.49
POTASSIUM SERPL-SCNC: 4.4 MMOL/L (ref 3.5–5.3)
PROT SERPL-MCNC: 6.7 G/DL (ref 6.4–8.4)
SL CV LV EF: 65
SODIUM SERPL-SCNC: 140 MMOL/L (ref 135–147)
TR MAX PG: 20 MMHG
TR PEAK VELOCITY: 2.3 M/S
TRICUSPID ANNULAR PLANE SYSTOLIC EXCURSION: 1.4 CM
TRICUSPID VALVE PEAK REGURGITATION VELOCITY: 2.25 M/S

## 2024-05-07 PROCEDURE — 36415 COLL VENOUS BLD VENIPUNCTURE: CPT

## 2024-05-07 PROCEDURE — 80053 COMPREHEN METABOLIC PANEL: CPT

## 2024-05-07 RX ORDER — NYSTATIN 100000 [USP'U]/G
POWDER TOPICAL 3 TIMES DAILY
Qty: 15 G | Refills: 1 | Status: SHIPPED | OUTPATIENT
Start: 2024-05-07

## 2024-05-07 NOTE — PROGRESS NOTES
Assessment/Plan:    Diagnoses and all orders for this visit:    Essential hypertension    Rash  -     nystatin (MYCOSTATIN) powder; Apply topically 3 (three) times a day    Unspecified cord compression (HCC)    Continuous opioid dependence (HCC)    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 (HCC)    Malignant neoplasm of overlapping sites of right breast in female, estrogen receptor negative (HCC)       Medical conditions are stable, continues to receive trastuzumab infusions up from oncology tolerating well  Continues to be on opioids to help with the cancer related pain.  Symptoms improving  Blood pressure stable recent labs reviewed and discussed with the patient  No changes in medications today  Follow-up in 6 months       There are no Patient Instructions on file for this visit.    Subjective:      Patient ID: Nayeli Love is a 48 y.o. female    HPI      Current Outpatient Medications:     acetaminophen (TYLENOL) 325 mg tablet, Take 650 mg by mouth every 6 (six) hours as needed for mild pain, Disp: , Rfl:     al mag oxide-diphenhydramine-lidocaine viscous (MAGIC MOUTHWASH) 1:1:1 suspension, Swish and spit 10 mL if needed for mouth pain or discomfort, Disp: , Rfl:     lisinopril (ZESTRIL) 20 mg tablet, Take 0.5 tablets (10 mg total) by mouth daily Only to take as needed when BP is >150 systolicallly, Disp: 90 tablet, Rfl: 1    nystatin (MYCOSTATIN) powder, Apply topically 3 (three) times a day, Disp: 15 g, Rfl: 1    ondansetron (ZOFRAN) 4 mg tablet, Take 1 tablet (4 mg total) by mouth every 8 (eight) hours as needed for nausea or vomiting, Disp: 9 tablet, Rfl: 20    oxyCODONE (ROXICODONE) 10 MG TABS, Take 0.5-1 tablets (5-10 mg total) by mouth every 4 (four) hours as needed for moderate pain or severe pain Max Daily Amount: 60 mg, Disp: 120 tablet, Rfl: 0    pantoprazole (PROTONIX) 20 mg tablet, Take 1 tablet (20 mg total) by mouth daily in the early morning, Disp: 30 tablet, Rfl: 11    DPH-Lido-AlHydr-MgHydr-Simeth  (First-Mouthwash BLM) SUSP, , Disp: , Rfl:     nystatin (MYCOSTATIN) 500,000 units/5 mL suspension, Apply 5 mL (500,000 Units total) to the mouth or throat if needed (as needed) (Patient not taking: Reported on 8/24/2023), Disp: , Rfl:     polyethylene glycol (MIRALAX) 17 g packet, Take 17 g by mouth if needed (constipation) (Patient not taking: Reported on 8/24/2023), Disp: , Rfl:     senna (SENOKOT) 8.6 mg, Take 3 tablets (25.8 mg total) by mouth if needed for constipation Hold for loose stool. (Patient not taking: Reported on 8/24/2023), Disp: , Rfl:      Past Medical History:   Diagnosis Date    Breast cancer (HCC)     Cancer (HCC)     Hypertension          History reviewed. No pertinent surgical history.      No Known Allergies    Recent Results (from the past 1008 hour(s))   Echo follow up/limited w/ contrast if indicated    Collection Time: 05/06/24  2:30 PM   Result Value Ref Range    BSA 1.98 m2    MV E' Tissue Velocity Septal 9 cm/s    E/A ratio 1.49     E wave deceleration time 216 ms    MV Peak E Spike 85 cm/s    MV Peak A Spike 0.57 m/s    Tricuspid annular plane systolic excursion 1.40 cm    MV stenosis pressure 1/2 time 63 ms    MV valve area p 1/2 method 3.49     TR Peak Spike 2.3 m/s    Triscuspid Valve Regurgitation Peak Gradient 20.0 mmHg    Tricuspid valve peak regurgitation velocity 2.25 m/s    GLS -17 %    LV EF 65    Comprehensive metabolic panel    Collection Time: 05/07/24  7:36 AM   Result Value Ref Range    Sodium 140 135 - 147 mmol/L    Potassium 4.4 3.5 - 5.3 mmol/L    Chloride 107 96 - 108 mmol/L    CO2 29 21 - 32 mmol/L    ANION GAP 4 4 - 13 mmol/L    BUN 11 5 - 25 mg/dL    Creatinine 0.67 0.60 - 1.30 mg/dL    Glucose 108 65 - 140 mg/dL    Calcium 9.4 8.4 - 10.2 mg/dL    AST 31 13 - 39 U/L     (H) 7 - 52 U/L    Alkaline Phosphatase 58 34 - 104 U/L    Total Protein 6.7 6.4 - 8.4 g/dL    Albumin 4.1 3.5 - 5.0 g/dL    Total Bilirubin 0.99 0.20 - 1.00 mg/dL    eGFR 103 ml/min/1.73sq m        The following portions of the patient's history were reviewed and updated as appropriate: allergies, current medications, past family history, past medical history, past social history, past surgical history and problem list.     Review of Systems   Constitutional:  Negative for appetite change, chills, diaphoresis, fatigue, fever and unexpected weight change.   Respiratory:  Negative for apnea, cough, choking, chest tightness, shortness of breath, wheezing and stridor.    Cardiovascular:  Negative for chest pain, palpitations and leg swelling.   Gastrointestinal:  Negative for abdominal distention, abdominal pain, anal bleeding, blood in stool, constipation, diarrhea, nausea and vomiting.   Genitourinary:  Negative for decreased urine volume, difficulty urinating, frequency and urgency.   Musculoskeletal:  Negative for arthralgias, back pain and myalgias.   Neurological:  Negative for dizziness, light-headedness, numbness and headaches.         Objective:      Vitals:    05/07/24 1509   BP: 135/91   Pulse: 102   Temp: 98.7 °F (37.1 °C)   SpO2: 96%          Physical Exam  Vitals reviewed.   Constitutional:       General: She is not in acute distress.     Appearance: Normal appearance. She is not ill-appearing, toxic-appearing or diaphoretic.   HENT:      Mouth/Throat:      Mouth: Mucous membranes are moist.   Cardiovascular:      Rate and Rhythm: Normal rate and regular rhythm.      Pulses: Normal pulses.      Heart sounds: Normal heart sounds. No murmur heard.     No friction rub. No gallop.   Pulmonary:      Effort: Pulmonary effort is normal. No respiratory distress.      Breath sounds: Normal breath sounds. No stridor. No wheezing, rhonchi or rales.   Chest:      Chest wall: No tenderness.   Abdominal:      Palpations: Abdomen is soft.   Musculoskeletal:      Right lower leg: No edema.      Left lower leg: No edema.   Skin:     General: Skin is warm and dry.      Findings: No lesion or rash.    Neurological:      Mental Status: She is alert and oriented to person, place, and time.

## 2024-05-08 ENCOUNTER — TELEPHONE (OUTPATIENT)
Dept: PALLIATIVE MEDICINE | Facility: CLINIC | Age: 49
End: 2024-05-08

## 2024-05-08 NOTE — TELEPHONE ENCOUNTER
Left a message for patient to call office back to reschedule 8/4 appointment with Palliative Care.

## 2024-05-09 DIAGNOSIS — I42.7 DRUG-INDUCED CARDIOMYOPATHY (HCC): Primary | ICD-10-CM

## 2024-05-09 RX ORDER — SODIUM CHLORIDE 9 MG/ML
20 INJECTION, SOLUTION INTRAVENOUS ONCE
Status: CANCELLED | OUTPATIENT
Start: 2024-05-16

## 2024-05-11 NOTE — PROGRESS NOTES
HEMATOLOGY / ONCOLOGY CLINIC FOLLOW UP NOTE    Patient Nayeli Love  MRN: [id number]  : 1975  Date of Encounter 2024      Referring Provider:      Reason for Encounter: follow up  metastatic HER 2 breast cancer        Oncology History   Malignant neoplasm of overlapping sites of right breast in female, estrogen receptor negative (HCC)   3/2022 Initial Diagnosis    Malignant neoplasm of overlapping sites of right breast in female, estrogen receptor negative (HCC)     3/29/2022 Biopsy    Breast, Right, Biopsy:  - Invasive mammary carcinoma of no special type (ductal, not otherwise specified), [place] Grade II (3 + 2 + 1 = 6), spanning at least 6 mm.   ER/[place] negative, HER2 positive    Procedure  Needle biopsy    Specimen Laterality  Right    TUMOR   Histologic Type  Invasive carcinoma of no special type (ductal)    Histologic Grade (Fabrizio Histologic Score)     Glandular (Acinar) / Tubular Differentiation  Score 3    Nuclear Pleomorphism  Score 2    Mitotic Rate  Score 1    Overall Grade  Grade 2 (scores of 6 or 7)    Tumor Size  Greatest dimension of largest invasive focus (Millimeters): 6 mm   Ductal Carcinoma In Situ (DCIS)  Not identified    Lymphovascular Invasion  Not identified         3/31/2022 - 2022 Radiation    Course: C1    Plan ID Energy Fractions Dose per Fraction (cGy) Dose Correction (cGy) Total Dose Delivered (cGy) Elapsed Days   C1 C7 And ix 10X/6X 7 / 7 300 0 2,100 8   C1 C7 Spine 10X/6X 3 / 10 300 0 900 4   L4 Sacrum 10X 10 / 10 300 0 3,000 13   T1 T6 And ix 10X 7 / 7 300 0 2,100 8   T1 T6 Spine 10X 3 / 10 300 0 900 4      Dr. Rutledge     2022 -  Chemotherapy    clinical trial with THP with or without atezolizumab     2022 -  Chemotherapy    pegfilgrastim (NEULASTA ONPRO), 6 mg, Subcutaneous, Once, 1 of 1 cycle  pertuzumab (PERJETA) IVPB, 840 mg, Intravenous, Once, 35 of 36 cycles  Administration: 420 mg (2022), 420 mg (2022), 420 mg (2022), 420  mg (8/18/2022), 420 mg (9/8/2022), 420 mg (9/29/2022), 420 mg (10/20/2022), 420 mg (11/17/2022), 420 mg (12/8/2022), 420 mg (12/30/2022), 420 mg (1/19/2023), 420 mg (2/9/2023), 420 mg (3/2/2023), 420 mg (3/23/2023), 420 mg (4/13/2023), 420 mg (5/4/2023), 420 mg (5/23/2023), 420 mg (6/15/2023), 420 mg (7/6/2023), 420 mg (7/25/2023), 420 mg (8/17/2023), 420 mg (9/7/2023), 420 mg (9/28/2023), 420 mg (10/19/2023), 420 mg (11/9/2023), 420 mg (1/11/2024), 420 mg (12/21/2023), 420 mg (11/30/2023), 420 mg (2/1/2024), 420 mg (2/22/2024), 420 mg (3/14/2024), 420 mg (4/4/2024), 420 mg (4/25/2024)  DOCEtaxel (TAXOTERE) chemo infusion, 75 mg/m2, Intravenous, Once, 3 of 3 cycles  Dose modification: 60 mg/m2 (original dose 75 mg/m2, Cycle 3, Reason: Anticipated Tolerance)  Administration: 112.8 mg (6/16/2022)  PACLItaxel (TAXOL) chemo IVPB, 80 mg/m2 = 150.6 mg (100 % of original dose 80 mg/m2), Intravenous, Once, 3 of 3 cycles  Dose modification: 80 mg/m2 (original dose 80 mg/m2, Cycle 4, Reason: Anticipated Tolerance)  Administration: 150.6 mg (7/7/2022), 150.6 mg (7/14/2022), 150.6 mg (7/21/2022), 150.6 mg (7/28/2022), 150.6 mg (8/4/2022), 150.6 mg (8/11/2022), 150.6 mg (8/18/2022), 150.6 mg (8/25/2022), 150.6 mg (9/1/2022)  trastuzumab (HERCEPTIN) chemo infusion, 8 mg/kg, Intravenous, Once, 33 of 34 cycles  Administration: 487 mg (7/28/2022), 487 mg (8/18/2022), 487 mg (9/8/2022), 487 mg (9/29/2022), 487 mg (10/20/2022), 450 mg (11/17/2022), 450 mg (12/8/2022), 450 mg (12/30/2022), 450 mg (1/19/2023), 450 mg (2/9/2023), 450 mg (3/2/2023), 450 mg (3/23/2023), 482 mg (4/13/2023), 482 mg (5/4/2023), 482 mg (5/23/2023), 482 mg (6/15/2023), 482 mg (7/6/2023), 482 mg (7/25/2023), 482 mg (8/17/2023), 482 mg (9/7/2023), 482 mg (9/28/2023), 522 mg (10/19/2023), 523 mg (11/9/2023), 534 mg (1/11/2024), 531 mg (12/21/2023), 522 mg (11/30/2023), 534 mg (2/1/2024), 548 mg (2/22/2024), 558 mg (3/14/2024), 558 mg (4/4/2024), 558 mg  (4/25/2024)  INV trastuzumab infusion, 6 mg/kg = 487 mg (100 % of original dose 6 mg/kg), Intravenous, Once, 2 of 2 cycles  Dose modification: 6 mg/kg (original dose 6 mg/kg, Cycle 3, Reason: Other (Must fill in a comment), Comment: investigational drug)  Administration: 487 mg (6/16/2022), 487 mg (7/7/2022)     Malignant neoplasm metastatic to bone (HCC)   3/2022 Initial Diagnosis    Osseous metastasis (HCC)     3/29/2022 Biopsy    Breast, Right, Biopsy:  - Invasive mammary carcinoma of no special type (ductal, not otherwise specified), [place] Grade II (3 + 2 + 1 = 6), spanning at least 6 mm.   ER/[place] negative, HER2 positive    Procedure  Needle biopsy    Specimen Laterality  Right    TUMOR   Histologic Type  Invasive carcinoma of no special type (ductal)    Histologic Grade ([place] Histologic Score)     Glandular (Acinar) / Tubular Differentiation  Score 3    Nuclear Pleomorphism  Score 2    Mitotic Rate  Score 1    Overall Grade  Grade 2 (scores of 6 or 7)    Tumor Size  Greatest dimension of largest invasive focus (Millimeters): 6 mm   Ductal Carcinoma In Situ (DCIS)  Not identified    Lymphovascular Invasion  Not identified         3/31/2022 - 4/18/2022 Radiation    Course: C1    Plan ID Energy Fractions Dose per Fraction (cGy) Dose Correction (cGy) Total Dose Delivered (cGy) Elapsed Days   C1 C7 And ix 10X/6X 7 / 7 300 0 2,100 8   C1 C7 Spine 10X/6X 3 / 10 300 0 900 4   L4 Sacrum 10X 10 / 10 300 0 3,000 13   T1 T6 And ix 10X 7 / 7 300 0 2,100 8   T1 T6 Spine 10X 3 / 10 300 0 900 4      Dr. Rutledge     4/28/2022 -  Chemotherapy    clinical trial with THP with or without atezolizumab     4/28/2022 -  Chemotherapy    pegfilgrastim (NEULASTA ONPRO), 6 mg, Subcutaneous, Once, 1 of 1 cycle  pertuzumab (PERJETA) IVPB, 840 mg, Intravenous, Once, 35 of 36 cycles  Administration: 420 mg (6/16/2022), 420 mg (7/7/2022), 420 mg (7/28/2022), 420 mg (8/18/2022), 420 mg (9/8/2022), 420 mg (9/29/2022), 420 mg  (10/20/2022), 420 mg (11/17/2022), 420 mg (12/8/2022), 420 mg (12/30/2022), 420 mg (1/19/2023), 420 mg (2/9/2023), 420 mg (3/2/2023), 420 mg (3/23/2023), 420 mg (4/13/2023), 420 mg (5/4/2023), 420 mg (5/23/2023), 420 mg (6/15/2023), 420 mg (7/6/2023), 420 mg (7/25/2023), 420 mg (8/17/2023), 420 mg (9/7/2023), 420 mg (9/28/2023), 420 mg (10/19/2023), 420 mg (11/9/2023), 420 mg (1/11/2024), 420 mg (12/21/2023), 420 mg (11/30/2023), 420 mg (2/1/2024), 420 mg (2/22/2024), 420 mg (3/14/2024), 420 mg (4/4/2024), 420 mg (4/25/2024)  DOCEtaxel (TAXOTERE) chemo infusion, 75 mg/m2, Intravenous, Once, 3 of 3 cycles  Dose modification: 60 mg/m2 (original dose 75 mg/m2, Cycle 3, Reason: Anticipated Tolerance)  Administration: 112.8 mg (6/16/2022)  PACLItaxel (TAXOL) chemo IVPB, 80 mg/m2 = 150.6 mg (100 % of original dose 80 mg/m2), Intravenous, Once, 3 of 3 cycles  Dose modification: 80 mg/m2 (original dose 80 mg/m2, Cycle 4, Reason: Anticipated Tolerance)  Administration: 150.6 mg (7/7/2022), 150.6 mg (7/14/2022), 150.6 mg (7/21/2022), 150.6 mg (7/28/2022), 150.6 mg (8/4/2022), 150.6 mg (8/11/2022), 150.6 mg (8/18/2022), 150.6 mg (8/25/2022), 150.6 mg (9/1/2022)  trastuzumab (HERCEPTIN) chemo infusion, 8 mg/kg, Intravenous, Once, 33 of 34 cycles  Administration: 487 mg (7/28/2022), 487 mg (8/18/2022), 487 mg (9/8/2022), 487 mg (9/29/2022), 487 mg (10/20/2022), 450 mg (11/17/2022), 450 mg (12/8/2022), 450 mg (12/30/2022), 450 mg (1/19/2023), 450 mg (2/9/2023), 450 mg (3/2/2023), 450 mg (3/23/2023), 482 mg (4/13/2023), 482 mg (5/4/2023), 482 mg (5/23/2023), 482 mg (6/15/2023), 482 mg (7/6/2023), 482 mg (7/25/2023), 482 mg (8/17/2023), 482 mg (9/7/2023), 482 mg (9/28/2023), 522 mg (10/19/2023), 523 mg (11/9/2023), 534 mg (1/11/2024), 531 mg (12/21/2023), 522 mg (11/30/2023), 534 mg (2/1/2024), 548 mg (2/22/2024), 558 mg (3/14/2024), 558 mg (4/4/2024), 558 mg (4/25/2024)  INV trastuzumab infusion, 6 mg/kg = 487 mg (100 % of  original dose 6 mg/kg), Intravenous, Once, 2 of 2 cycles  Dose modification: 6 mg/kg (original dose 6 mg/kg, Cycle 3, Reason: Other (Must fill in a comment), Comment: investigational drug)  Administration: 487 mg (6/16/2022), 487 mg (7/7/2022)             Assessment / Plan:     Metastatic breast cancer, grade 2, ER negative, [place] negative, HER2 3+ disease.  Diagnosed in March 2022.     Metastatic disease to the bone     Nayeli Love is a 48-year-old premenopausal female with metastatic breast cancer, grade 2, ER negative, [place] negative, HER2 3+ disease.  She has large fungating mass in her right breast, palpable right axillary adenopathy as well as diffuse osseous metastasis with C4 stenosis.  She completed palliative radiation therapy to the spine.  She was treated with taxane, trastuzumab and pertuzumab x6 cycles, resulting in good partial response.  She is currently on trastuzumab and pertuzumab with excellent tolerance.       At her last visit 3 months ago, the patient continued to do well without any clinical evidence of progressive disease.  She will continue trastuzumab and pertuzumab every 3 weeks.  She will stay on Zometa every 3 months. Her most recent CA 27.29 from 4/4/2023 was stable at 34.9.  She will repeat tumor marker now.  Her most recent CT scan from 10/12/2023 notes interval decrease in the size of right breast mass and axillary lymph node.  Incidental finding of indeterminate density lesion in the right kidney.       CT CAP 2/6/2024 is stable; ECHO unchanged with LVEF 65%     Continue with Herceptin/Perjeta every 3 weeks; zometa every 3 months and follow up 3 months    5/14/2024    Continue Herceptin/Perjeta every 3 weeks    CT 5/6/2024 stable without progression    US pelvis to be done based on adnexal left nodule noted on scan    ECHO done 5/6/2024 with LVEF 65%    Labs are unchanged    Repeat CT CAP in 3 months; labs in 3 months    ECHO in 4-6 months    Follow up    3 months          CRISTHIAN Love is a 48-year-old premenopausal female with metastatic breast cancer, grade 2, ER negative, [place] negative, HER2 3+ disease.  She had a large fungating mass in her right breast, palpable right axillary adenopathy as well as diffuse osseous metastasis with C4 stenosis.  She completed palliative radiation therapy to the spine.  Subsequently, she was treated with Taxotere, trastuzumab and pertuzumab with or without atezolizumab based on a clinical trial.  She received 2 cycle of treatment.  She came off the study after 2 cycles of treatment due to the complication related to COVID-19 infection.  She had infusion reaction with Taxotere. Therefore, she was subsequently treated with paclitaxel, trastuzumab and pertuzumab.  She completed induction treatment in August 2022.  She is currently on maintenance therapy with trastuzumab and pertuzumab.  She had good partial response, clinically as well as radiographically.  She was previously following with Dr. Payne and last seen in the office on 4/12/2023.  She presents today for transfer of care/follow-up visit.        Follow up 10/17/2023     She presents today for follow-up.  She feels well with no new complaints.  Her pain is under control with minimal amount of pain medications.  She is following with palliative care end-stage requires less pain meds.  She has no respiratory symptoms.  She has no ambulatory dysfunction.       10/12/2023: CT chest/abdomen/pelvis:  Interval mild decrease in size of a partially calcified right breast mass and right axillary lymph node.    No significant interval change in extensive bony metastases.   1.2 cm indeterminate density lesion in the right kidney, minimally increased in size compared to March 29, 2022. Recommend renal ultrasound for further evaluation.   Continued enlargement of a left adnexal cyst, now 5.7 cm. Recommend characterization with pelvic ultrasound.     10/12/2023: ECHO:  Left Ventricle: Left  ventricular cavity size is normal. Wall thickness is normal. The left ventricular ejection fraction is 70%. Systolic function is normal. Global longitudinal strain is normal at -17.2 %. Wall motion is normal. Diastolic function is mildly abnormal, consistent with grade I (abnormal) relaxation.      Follow up 2/13/2024     Ms Love continues to tolerated herceptin/pertuzumab without issues.  She has no toxicities.  Labs are stable.  CT CAP and ECHO are below with unchanged/stable disease as well as LVEF 65%.  Her ROS is negative        2/6/2024  ECHO      Left Ventricle: Left ventricular cavity size is normal. Wall thickness is normal. The left ventricular ejection fraction is 65%. Systolic function is normal. Global longitudinal strain is borderline at -16%. Wall motion is normal. Diastolic function is mildly abnormal, consistent with grade I (abnormal) relaxation.    Right Ventricle: Right ventricular cavity size is normal. Systolic function is normal.    Tricuspid Valve: There is trace regurgitation.    Prior TTE study available for comparison. Prior study date: 10/12/2023. No significant changes noted compared to the prior study.     Restaging CT CAP 2/6/2024  arrative & Impression   CT CHEST, ABDOMEN AND PELVIS WITH IV CONTRAST   BONES: Widespread sclerotic metastatic disease, unchanged from October 12, 2023. As on previous examination there are multiple lesions throughout the spine with associated Schmorl's nodes. Again noted is an expansile sclerotic lesion in the anterior   right fourth rib. No extraskeletal soft tissue tumor mass or developing lytic lesion noted. Pathologic compression fracture of T2, unchanged. 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 No acute pathologic fracture. Reidentified linear   sclerosis in the right femoral head consistent with avascular necrosis.     IMPRESSION:     Widespread sclerotic osseous metastatic disease without significant interval change from October 12,  2023.     Partly calcified right breast mass not significantly changed.     Increasing size of simple appearing left adnexal cystic mass, now up to 6.5 cm. Further characterization with pelvic ultrasound is recommended. Slight increase in the size of a partly calcified uterine fibroid now 1.9 cm. This can also be reevaluated at   the time of pelvic sonography.     No findings to suggest soft tissue metastatic disease in the chest, abdomen or pelvis.                  Interval History:   5/14/2024    Ms Love is doing well without issues today.  She did complain of pain in the left ankle which resolved; she will though go for Xray ordered.  She had repeat CT CAP 5/6/2024 with stable disease; her ECHO 5/6/2024 with LVEF 65%. She has some occasional rashes from HER2 directed therapy, occasional diarrhea but no other issues.  She is tolerating this regimen well and will not change at this time with stable nonprogressive disease.            ECHO 5/6/2024       Left Ventricle: Left ventricular cavity size is normal. Wall thickness is normal. The left ventricular ejection fraction is 65% by biplane measurement. Systolic function is normal. Global longitudinal strain is borderline at -17%. Wall motion is normal.    Right Ventricle: Right ventricular cavity size is normal. Systolic function is normal.    Prior TTE study available for comparison. Prior study date: 2/6/2024. No significant changes noted compared to the prior study.     CT 5/6/2024      1. Widespread sclerotic osseous metastatic disease similar to the prior study.     2. Stable appearance of calcified right breast mass.     3. Simple appearing left adnexal cyst has further increased in size now measuring up to 6.8 cm. Recommend further work-up with pelvic ultrasound or MRI if not recently performed as clinically appropriate.     4. A couple of small foci of gas are seen in the urinary bladder. Correlate for history of recent instrumentation. Otherwise, consider  the possibility of a fistula or infectious process.       REVIEW OF SYSTEMS:  Please note that a 14-point review of systems was performed to include Constitutional, HEENT, Respiratory, CVS, GI, , Musculoskeletal, Integumentary, Neurologic, Rheumatologic, Endocrinologic, Psychiatric, Lymphatic, and Hematologic/Oncologic systems were reviewed and are negative unless otherwise stated in HPI. Positive and negative findings pertinent to this evaluation are incorporated into the history of present illness.       Primary diagnosis:  Metastatic breast cancer, grade 2, ER negative, [place] negative, HER2 3+ disease.  Diagnosed in March 2022.      Previous Hematologic/ Oncologic Treatment:   1. THP x3 cycles, completed in early June 2022.   2. Weekly paclitaxel, try weekly pertuzumab trastuzumab from July 2022 through August 2022.     Current Hematologic/ Oncologic Treatment:    Maintenance therapy with trastuzumab and pertuzumab since September 2022.  Zometa 4 mg IV every 3 months     Disease Status:    Clinical good partial response.     Test Results:   Pathology:   Right breast biopsy showed invasive ductal carcinoma, grade 2 ER negative, [place] negative, HER2 3+ disease.     Radiology:  CT scan of chest abdomen pelvis in April 2023 showed decreased calcified right breast mass as well as stable mildly enlarged right axillary lymph node.  Stable osseous metastasis.     Echocardiogram in December 2022 showed normal ejection fraction with 55%.       ECOG PS: 0    PROBLEM LIST:  Patient Active Problem List   Diagnosis    Malignant neoplasm of overlapping sites of right breast in female, estrogen receptor negative (HCC)    Malignant neoplasm metastatic to bone (HCC)    Elevated BP without diagnosis of hypertension    Hypokalemia    Transaminitis    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 (HCC)    Cancer associated pain    Palliative care patient    HTN (hypertension)    Candida infection, esophageal (HCC)    Medical marijuana use    Neoplastic  malignant related fatigue    Antineoplastic chemotherapy induced anemia    COVID-19    Chemotherapy-induced neutropenia (HCC)    Candidal skin infection    Neutropenia associated with infection (HCC)    Essential hypertension    Bacteremia due to Escherichia coli    Bacteremia    Unspecified cord compression (HCC)    Continuous opioid dependence (HCC)    GERD (gastroesophageal reflux disease)    Class 1 obesity due to excess calories with serious comorbidity and body mass index (BMI) of 32.0 to 32.9 in adult       Past Medical History:   has a past medical history of Breast cancer (HCC), Cancer (HCC), and Hypertension.    PAST SURGICAL HISTORY:   has no past surgical history on file.    CURRENT MEDICATIONS  Current Outpatient Medications   Medication Sig Dispense Refill    acetaminophen (TYLENOL) 325 mg tablet Take 650 mg by mouth every 6 (six) hours as needed for mild pain      al mag oxide-diphenhydramine-lidocaine viscous (MAGIC MOUTHWASH) 1:1:1 suspension Swish and spit 10 mL if needed for mouth pain or discomfort      DPH-Lido-AlHydr-MgHydr-Simeth (First-Mouthwash BLM) SUSP  (Patient not taking: Reported on 8/24/2023)      lisinopril (ZESTRIL) 20 mg tablet Take 0.5 tablets (10 mg total) by mouth daily Only to take as needed when BP is >150 systolicallly 90 tablet 1    nystatin (MYCOSTATIN) 500,000 units/5 mL suspension Apply 5 mL (500,000 Units total) to the mouth or throat if needed (as needed) (Patient not taking: Reported on 8/24/2023)      nystatin (MYCOSTATIN) powder Apply topically 3 (three) times a day 15 g 1    ondansetron (ZOFRAN) 4 mg tablet Take 1 tablet (4 mg total) by mouth every 8 (eight) hours as needed for nausea or vomiting 9 tablet 20    oxyCODONE (ROXICODONE) 10 MG TABS Take 0.5-1 tablets (5-10 mg total) by mouth every 4 (four) hours as needed for moderate pain or severe pain Max Daily Amount: 60 mg 120 tablet 0    pantoprazole (PROTONIX) 20 mg tablet Take 1 tablet (20 mg total) by mouth  daily in the early morning 30 tablet 11    polyethylene glycol (MIRALAX) 17 g packet Take 17 g by mouth if needed (constipation) (Patient not taking: Reported on 8/24/2023)      senna (SENOKOT) 8.6 mg Take 3 tablets (25.8 mg total) by mouth if needed for constipation Hold for loose stool. (Patient not taking: Reported on 8/24/2023)       No current facility-administered medications for this visit.     [unfilled]    SOCIAL HISTORY:   reports that she has never smoked. She has never used smokeless tobacco. She reports that she does not currently use alcohol. She reports that she does not use drugs.     FAMILY HISTORY:  family history includes Coronary artery disease in her mother; Diabetes type II in her father; Hashimoto's thyroiditis in her sister; Heart attack in her father.     ALLERGIES:  has No Known Allergies.      Physical Exam:  Vital Signs:   Visit Vitals  LMP 03/28/2022 (Approximate)   OB Status Postmenopausal   Smoking Status Never     There is no height or weight on file to calculate BMI.  There is no height or weight on file to calculate BSA.    GEN: Alert, awake oriented x3, in no acute distress  HEENT- No pallor, icterus, cyanosis, no oral mucosal lesions,   LAD - no palpable cervical, clavicle, axillary, inguinal LAD  Heart- normal S1 S2, regular rate and rhythm, No murmur, rubs.   Lungs- clear breathing sound bilateral.   Abdomen- soft, Non tender, bowel sounds present  Extremities- No cyanosis, clubbing, edema  Neuro- No focal neurological deficit    Labs:  Lab Results   Component Value Date    WBC 5.39 03/14/2024    HGB 13.5 03/14/2024    HCT 42.5 03/14/2024    MCV 99 (H) 03/14/2024     03/14/2024     Lab Results   Component Value Date    SODIUM 140 05/07/2024    K 4.4 05/07/2024     05/07/2024    CO2 29 05/07/2024    AGAP 4 05/07/2024    BUN 11 05/07/2024    CREATININE 0.67 05/07/2024    GLUC 108 05/07/2024    GLUF 103 (H) 12/19/2023    CALCIUM 9.4 05/07/2024    AST 31 05/07/2024    ALT  110 (H) 05/07/2024    ALKPHOS 58 05/07/2024    TP 6.7 05/07/2024    TBILI 0.99 05/07/2024    EGFR 103 05/07/2024     CT CHEST, ABDOMEN AND PELVIS WITH IV CONTRAST  5/6/2024     INDICATION: C79.51: Secondary malignant neoplasm of bone  C50.811: Malignant neoplasm of overlapping sites of right female breast  Z17.1: Estrogen receptor negative status (ER-).     COMPARISON: CT chest abdomen pelvis 2/6/2024     TECHNIQUE: CT examination of the chest, abdomen and pelvis was performed. Multiplanar 2D reformatted images were created from the source data.     This examination, like all CT scans performed in the [facility], was performed utilizing techniques to minimize radiation dose exposure, including the use of iterative reconstruction and automated exposure control. Radiation dose length   product (DLP) for this visit: 1019 mGy-cm     IV Contrast: 100 mL of iohexol (OMNIPAQUE)  Enteric Contrast: Not administered.     FINDINGS:     CHEST     LUNGS: Stable scarring in the medial right upper and lower lobe and in the medial left upper lobe. No suspicious pulmonary nodules. Central airways are clear. Stable elevation of the right hemidiaphragm.     PLEURA: Unremarkable.     HEART/GREAT VESSELS: Heart is unremarkable for patient's age. No thoracic aortic aneurysm.     MEDIASTINUM AND ALISON: Unremarkable.     CHEST WALL AND LOWER NECK: Stable partially calcified mass in the medial right breast measuring 2.6 x 2.4 cm image 2/55. No enlarged axillary adenopathy.     ABDOMEN     LIVER/BILIARY TREE: Unremarkable.     GALLBLADDER: No calcified gallstones. No pericholecystic inflammatory change.     SPLEEN: Unremarkable.     PANCREAS: Unremarkable.     ADRENAL GLANDS: Unremarkable.     KIDNEYS/URETERS: Stable areas of scarring in the upper to midportion of both kidneys. Stable 11 mm exophytic lesion from the posterior midportion of the right kidney likely represents a complex cyst. Stable left lower pole cyst  exophytic cyst and 4 mm   stone in the lower pole of the left kidney. No hydronephrosis.     STOMACH AND BOWEL: Unremarkable.     APPENDIX: No findings to suggest appendicitis.     ABDOMINOPELVIC CAVITY: No ascites. No pneumoperitoneum. No lymphadenopathy.     VESSELS: Unremarkable for patient's age.     PELVIS     REPRODUCTIVE ORGANS: Similar appearance of calcified fundal fibroid. Left adnexal cyst measures 6.8 x 6.0 cm which has increased from the prior study where it was 6.5 x 5.6 cm.     URINARY BLADDER: Decompressed, limiting evaluation. There are a couple small foci of gas within.     ABDOMINAL WALL/INGUINAL REGIONS: Small fat-containing umbilical hernia.     BONES: Widespread sclerotic osseous metastases are unchanged from the prior study. Similar appearance of T2 compression fracture. 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 AVN of the right femoral head.     IMPRESSION:     1. Widespread sclerotic osseous metastatic disease similar to the prior study.     2. Stable appearance of calcified right breast mass.     3. Simple appearing left adnexal cyst has further increased in size now measuring up to 6.8 cm. Recommend further work-up with pelvic ultrasound or MRI if not recently performed as clinically appropriate.     4. A couple of small foci of gas are seen in the urinary bladder. Correlate for history of recent instrumentation. Otherwise, consider the possibility of a fistula or infectious process.     The study was marked in EPIC for significant notification.         I spent 40 minutes on chart review, face to face counseling time, coordination of care and documentation.    Lois Phillips MD PhD

## 2024-05-14 ENCOUNTER — DOCUMENTATION (OUTPATIENT)
Dept: OTHER | Facility: HOSPITAL | Age: 49
End: 2024-05-14

## 2024-05-14 ENCOUNTER — OFFICE VISIT (OUTPATIENT)
Dept: HEMATOLOGY ONCOLOGY | Facility: CLINIC | Age: 49
End: 2024-05-14
Payer: COMMERCIAL

## 2024-05-14 ENCOUNTER — TELEPHONE (OUTPATIENT)
Dept: INFUSION CENTER | Facility: CLINIC | Age: 49
End: 2024-05-14

## 2024-05-14 VITALS
WEIGHT: 207 LBS | DIASTOLIC BLOOD PRESSURE: 90 MMHG | BODY MASS INDEX: 35.34 KG/M2 | TEMPERATURE: 98 F | RESPIRATION RATE: 18 BRPM | HEART RATE: 102 BPM | HEIGHT: 64 IN | SYSTOLIC BLOOD PRESSURE: 140 MMHG | OXYGEN SATURATION: 96 %

## 2024-05-14 DIAGNOSIS — Z17.1 MALIGNANT NEOPLASM OF OVERLAPPING SITES OF RIGHT BREAST IN FEMALE, ESTROGEN RECEPTOR NEGATIVE (HCC): ICD-10-CM

## 2024-05-14 DIAGNOSIS — C79.51 MALIGNANT NEOPLASM METASTATIC TO BONE (HCC): ICD-10-CM

## 2024-05-14 DIAGNOSIS — D70.1 CHEMOTHERAPY-INDUCED NEUTROPENIA (HCC): Primary | ICD-10-CM

## 2024-05-14 DIAGNOSIS — T45.1X5A CHEMOTHERAPY-INDUCED NEUTROPENIA (HCC): Primary | ICD-10-CM

## 2024-05-14 DIAGNOSIS — C50.811 MALIGNANT NEOPLASM OF OVERLAPPING SITES OF RIGHT BREAST IN FEMALE, ESTROGEN RECEPTOR NEGATIVE (HCC): ICD-10-CM

## 2024-05-14 DIAGNOSIS — C50.811 MALIGNANT NEOPLASM OF OVERLAPPING SITES OF RIGHT BREAST IN FEMALE, ESTROGEN RECEPTOR NEGATIVE (HCC): Primary | ICD-10-CM

## 2024-05-14 DIAGNOSIS — Z17.1 MALIGNANT NEOPLASM OF OVERLAPPING SITES OF RIGHT BREAST IN FEMALE, ESTROGEN RECEPTOR NEGATIVE (HCC): Primary | ICD-10-CM

## 2024-05-14 PROCEDURE — 99215 OFFICE O/P EST HI 40 MIN: CPT | Performed by: INTERNAL MEDICINE

## 2024-05-14 NOTE — PROGRESS NOTES
Clinical Research Coordinator met with pt and her  today at [facility] during her visit with Dr. Phillips. Today is pt's 6 mo f/u visit on  trial, pt ID #--[id number]. Pt reports feeling well with no new AEs, and informed that study data is being collected through April 2024; today will be pt's last visit data collected per protocol. Pt expressed understanding and had no further questions at this time. Pt given my contact card should any future questions arise.

## 2024-05-14 NOTE — TELEPHONE ENCOUNTER
Spoke with patient to schedule next appts for her treatment, on 6/6 going to [place] for lack of availability at [facility].

## 2024-05-16 ENCOUNTER — HOSPITAL ENCOUNTER (OUTPATIENT)
Dept: INFUSION CENTER | Facility: CLINIC | Age: 49
Discharge: HOME | End: 2024-05-16
Payer: COMMERCIAL

## 2024-05-16 VITALS
HEART RATE: 84 BPM | WEIGHT: 206 LBS | SYSTOLIC BLOOD PRESSURE: 110 MMHG | OXYGEN SATURATION: 94 % | TEMPERATURE: 97 F | DIASTOLIC BLOOD PRESSURE: 77 MMHG | BODY MASS INDEX: 35.36 KG/M2

## 2024-05-16 DIAGNOSIS — T45.1X5A CHEMOTHERAPY-INDUCED NEUTROPENIA (HCC): Primary | ICD-10-CM

## 2024-05-16 DIAGNOSIS — C79.51 MALIGNANT NEOPLASM METASTATIC TO BONE (HCC): ICD-10-CM

## 2024-05-16 DIAGNOSIS — Z17.1 MALIGNANT NEOPLASM OF OVERLAPPING SITES OF RIGHT BREAST IN FEMALE, ESTROGEN RECEPTOR NEGATIVE (HCC): ICD-10-CM

## 2024-05-16 DIAGNOSIS — D70.1 CHEMOTHERAPY-INDUCED NEUTROPENIA (HCC): Primary | ICD-10-CM

## 2024-05-16 DIAGNOSIS — C50.811 MALIGNANT NEOPLASM OF OVERLAPPING SITES OF RIGHT BREAST IN FEMALE, ESTROGEN RECEPTOR NEGATIVE (HCC): ICD-10-CM

## 2024-05-16 PROCEDURE — 96413 CHEMO IV INFUSION 1 HR: CPT

## 2024-05-16 PROCEDURE — 96417 CHEMO IV INFUS EACH ADDL SEQ: CPT

## 2024-05-16 RX ORDER — SODIUM CHLORIDE 9 MG/ML
20 INJECTION, SOLUTION INTRAVENOUS ONCE
Status: COMPLETED | OUTPATIENT
Start: 2024-05-16 | End: 2024-05-16

## 2024-05-16 RX ADMIN — PERTUZUMAB 420 MG: 30 INJECTION, SOLUTION, CONCENTRATE INTRAVENOUS at 13:26

## 2024-05-16 RX ADMIN — SODIUM CHLORIDE 20 ML/HR: 0.9 INJECTION, SOLUTION INTRAVENOUS at 13:02

## 2024-05-16 RX ADMIN — TRASTUZUMAB 558 MG: 150 INJECTION, POWDER, LYOPHILIZED, FOR SOLUTION INTRAVENOUS at 14:01

## 2024-05-16 NOTE — PROGRESS NOTES
Patient tolerated treatment today without complications. Patient confirmed next appointment on 6/6 at 0800 at the [facility]. Print out declined.

## 2024-05-16 NOTE — PROGRESS NOTES
Patient arrives for Perjeta/Herceptin. EF 65% from 5/6 which is within parameters for treatment today. PIV placed by KENNA RN. Patient resting on recliner chair, call bell within reach.

## 2024-05-29 ENCOUNTER — HOSPITAL ENCOUNTER (OUTPATIENT)
Dept: ULTRASOUND IMAGING | Facility: HOSPITAL | Age: 49
Discharge: HOME | End: 2024-05-29
Attending: INTERNAL MEDICINE
Payer: COMMERCIAL

## 2024-05-29 DIAGNOSIS — C50.811 MALIGNANT NEOPLASM OF OVERLAPPING SITES OF RIGHT BREAST IN FEMALE, ESTROGEN RECEPTOR NEGATIVE (HCC): ICD-10-CM

## 2024-05-29 DIAGNOSIS — Z17.1 MALIGNANT NEOPLASM OF OVERLAPPING SITES OF RIGHT BREAST IN FEMALE, ESTROGEN RECEPTOR NEGATIVE (HCC): ICD-10-CM

## 2024-05-29 PROCEDURE — 76856 US EXAM PELVIC COMPLETE: CPT

## 2024-05-29 PROCEDURE — 76830 TRANSVAGINAL US NON-OB: CPT

## 2024-05-31 DIAGNOSIS — Z51.5 PALLIATIVE CARE PATIENT: ICD-10-CM

## 2024-05-31 DIAGNOSIS — G89.3 CANCER ASSOCIATED PAIN: ICD-10-CM

## 2024-05-31 RX ORDER — OXYCODONE HYDROCHLORIDE 10 MG/1
5-10 TABLET ORAL EVERY 4 HOURS PRN
Qty: 120 TABLET | Refills: 0 | Status: SHIPPED | OUTPATIENT
Start: 2024-05-31

## 2024-05-31 RX ORDER — SODIUM CHLORIDE 9 MG/ML
20 INJECTION, SOLUTION INTRAVENOUS ONCE
Status: CANCELLED | OUTPATIENT
Start: 2024-06-06

## 2024-06-06 ENCOUNTER — HOSPITAL ENCOUNTER (OUTPATIENT)
Dept: INFUSION CENTER | Facility: HOSPITAL | Age: 49
Discharge: HOME | End: 2024-06-06
Attending: INTERNAL MEDICINE
Payer: COMMERCIAL

## 2024-06-06 VITALS
OXYGEN SATURATION: 94 % | RESPIRATION RATE: 18 BRPM | TEMPERATURE: 98.7 F | HEART RATE: 91 BPM | WEIGHT: 205.91 LBS | HEIGHT: 64 IN | BODY MASS INDEX: 35.15 KG/M2 | DIASTOLIC BLOOD PRESSURE: 80 MMHG | SYSTOLIC BLOOD PRESSURE: 131 MMHG

## 2024-06-06 DIAGNOSIS — D70.1 CHEMOTHERAPY-INDUCED NEUTROPENIA (HCC): ICD-10-CM

## 2024-06-06 DIAGNOSIS — C50.811 MALIGNANT NEOPLASM OF OVERLAPPING SITES OF RIGHT BREAST IN FEMALE, ESTROGEN RECEPTOR NEGATIVE (HCC): ICD-10-CM

## 2024-06-06 DIAGNOSIS — C79.51 MALIGNANT NEOPLASM METASTATIC TO BONE (HCC): Primary | ICD-10-CM

## 2024-06-06 DIAGNOSIS — T45.1X5A CHEMOTHERAPY-INDUCED NEUTROPENIA (HCC): ICD-10-CM

## 2024-06-06 DIAGNOSIS — Z17.1 MALIGNANT NEOPLASM OF OVERLAPPING SITES OF RIGHT BREAST IN FEMALE, ESTROGEN RECEPTOR NEGATIVE (HCC): ICD-10-CM

## 2024-06-06 PROCEDURE — 96413 CHEMO IV INFUSION 1 HR: CPT

## 2024-06-06 PROCEDURE — 96367 TX/PROPH/DG ADDL SEQ IV INF: CPT

## 2024-06-06 PROCEDURE — 96417 CHEMO IV INFUS EACH ADDL SEQ: CPT

## 2024-06-06 RX ORDER — SODIUM CHLORIDE 9 MG/ML
20 INJECTION, SOLUTION INTRAVENOUS ONCE
Status: COMPLETED | OUTPATIENT
Start: 2024-06-06 | End: 2024-06-06

## 2024-06-06 RX ORDER — SODIUM CHLORIDE 9 MG/ML
20 INJECTION, SOLUTION INTRAVENOUS ONCE
OUTPATIENT
Start: 2024-07-28

## 2024-06-06 RX ADMIN — PERTUZUMAB 420 MG: 30 INJECTION, SOLUTION, CONCENTRATE INTRAVENOUS at 08:44

## 2024-06-06 RX ADMIN — SODIUM CHLORIDE 20 ML/HR: 9 INJECTION, SOLUTION INTRAVENOUS at 08:44

## 2024-06-06 RX ADMIN — TRASTUZUMAB 558 MG: 150 INJECTION, POWDER, LYOPHILIZED, FOR SOLUTION INTRAVENOUS at 09:22

## 2024-06-06 RX ADMIN — ZOLEDRONIC ACID 4 MG: 4 INJECTION, SOLUTION, CONCENTRATE INTRAVENOUS at 09:59

## 2024-06-06 RX ADMIN — SODIUM CHLORIDE 20 ML/HR: 0.9 INJECTION, SOLUTION INTRAVENOUS at 08:44

## 2024-06-06 NOTE — PLAN OF CARE
Problem: Potential for Falls  Goal: Patient will remain free of falls  Description: INTERVENTIONS:  - Educate patient/family on patient safety including physical limitations  - Instruct patient to call for assistance with activity   - Keep Call bell within reach  Outcome: Progressing     Problem: Knowledge Deficit  Goal: Patient/family/caregiver demonstrates understanding of disease process, treatment plan, medications, and discharge instructions  Description: Complete learning assessment and assess knowledge base.  Interventions:  - Provide teaching at level of understanding  - Provide teaching via preferred learning methods  Outcome: Progressing

## 2024-06-11 ENCOUNTER — TELEPHONE (OUTPATIENT)
Dept: HEMATOLOGY ONCOLOGY | Facility: CLINIC | Age: 49
End: 2024-06-11

## 2024-06-20 RX ORDER — SODIUM CHLORIDE 9 MG/ML
20 INJECTION, SOLUTION INTRAVENOUS ONCE
Status: CANCELLED | OUTPATIENT
Start: 2024-06-27

## 2024-06-27 ENCOUNTER — HOSPITAL ENCOUNTER (OUTPATIENT)
Dept: INFUSION CENTER | Facility: CLINIC | Age: 49
Discharge: HOME | End: 2024-06-27
Payer: COMMERCIAL

## 2024-06-27 VITALS
HEART RATE: 107 BPM | SYSTOLIC BLOOD PRESSURE: 124 MMHG | BODY MASS INDEX: 35.08 KG/M2 | HEIGHT: 64 IN | RESPIRATION RATE: 18 BRPM | OXYGEN SATURATION: 96 % | DIASTOLIC BLOOD PRESSURE: 70 MMHG | WEIGHT: 205.5 LBS | TEMPERATURE: 98.3 F

## 2024-06-27 DIAGNOSIS — C79.51 MALIGNANT NEOPLASM METASTATIC TO BONE (HCC): ICD-10-CM

## 2024-06-27 DIAGNOSIS — C50.811 MALIGNANT NEOPLASM OF OVERLAPPING SITES OF RIGHT BREAST IN FEMALE, ESTROGEN RECEPTOR NEGATIVE (HCC): ICD-10-CM

## 2024-06-27 DIAGNOSIS — T45.1X5A CHEMOTHERAPY-INDUCED NEUTROPENIA (HCC): Primary | ICD-10-CM

## 2024-06-27 DIAGNOSIS — Z17.1 MALIGNANT NEOPLASM OF OVERLAPPING SITES OF RIGHT BREAST IN FEMALE, ESTROGEN RECEPTOR NEGATIVE (HCC): ICD-10-CM

## 2024-06-27 DIAGNOSIS — D70.1 CHEMOTHERAPY-INDUCED NEUTROPENIA (HCC): Primary | ICD-10-CM

## 2024-06-27 PROCEDURE — 96417 CHEMO IV INFUS EACH ADDL SEQ: CPT

## 2024-06-27 PROCEDURE — 96413 CHEMO IV INFUSION 1 HR: CPT

## 2024-06-27 RX ORDER — SODIUM CHLORIDE 9 MG/ML
20 INJECTION, SOLUTION INTRAVENOUS ONCE
Status: COMPLETED | OUTPATIENT
Start: 2024-06-27 | End: 2024-06-27

## 2024-06-27 RX ADMIN — TRASTUZUMAB 558 MG: 150 INJECTION, POWDER, LYOPHILIZED, FOR SOLUTION INTRAVENOUS at 16:14

## 2024-06-27 RX ADMIN — SODIUM CHLORIDE 20 ML/HR: 0.9 INJECTION, SOLUTION INTRAVENOUS at 14:42

## 2024-06-27 RX ADMIN — PERTUZUMAB 420 MG: 30 INJECTION, SOLUTION, CONCENTRATE INTRAVENOUS at 15:41

## 2024-06-27 NOTE — PROGRESS NOTES
Pt at [facility] for Perjeta and Herceptin. PIV 22 gauge to right forearm infusing per mar. Pt call bell within reach.

## 2024-07-11 RX ORDER — SODIUM CHLORIDE 9 MG/ML
20 INJECTION, SOLUTION INTRAVENOUS ONCE
Status: CANCELLED | OUTPATIENT
Start: 2024-07-18

## 2024-07-16 ENCOUNTER — OFFICE VISIT (OUTPATIENT)
Dept: INTERNAL MEDICINE CLINIC | Facility: CLINIC | Age: 49
End: 2024-07-16

## 2024-07-16 VITALS
BODY MASS INDEX: 35 KG/M2 | OXYGEN SATURATION: 96 % | HEIGHT: 64 IN | DIASTOLIC BLOOD PRESSURE: 82 MMHG | WEIGHT: 205 LBS | SYSTOLIC BLOOD PRESSURE: 132 MMHG | TEMPERATURE: 98.9 F

## 2024-07-16 DIAGNOSIS — Z12.11 SCREENING FOR COLON CANCER: ICD-10-CM

## 2024-07-16 DIAGNOSIS — H16.001 CORNEAL ULCER OF RIGHT EYE: ICD-10-CM

## 2024-07-16 DIAGNOSIS — Z12.31 ENCOUNTER FOR SCREENING MAMMOGRAM FOR MALIGNANT NEOPLASM OF BREAST: Primary | ICD-10-CM

## 2024-07-16 NOTE — PROGRESS NOTES
Assessment/Plan:    Diagnoses and all orders for this visit:    Encounter for screening mammogram for malignant neoplasm of breast  -     Mammo screening bilateral w 3d & cad; Future    Screening for colon cancer  -     Ambulatory Referral to Gastroenterology; Future    Corneal ulcer of right eye  -     Ambulatory Referral to Ophthalmology; Future       Patient exposed to prior work of smoke and dust on Saturday, complains of tearing redness of the eye along with soreness since Sunday.  Denies any headache fever chills eye discharge or photophobia.  Has abstain from wearing contact lenses since then and is unable to comment on her vision.    Our office will help facilitate an appointment with her eye doctors for ASAP evaluation       There are no Patient Instructions on file for this visit.    Subjective:      Patient ID: Nayeli Love is a 48 y.o. female    HPI      Current Outpatient Medications:     acetaminophen (TYLENOL) 325 mg tablet, Take 650 mg by mouth every 6 (six) hours as needed for mild pain, Disp: , Rfl:     al mag oxide-diphenhydramine-lidocaine viscous (MAGIC MOUTHWASH) 1:1:1 suspension, Swish and spit 10 mL if needed for mouth pain or discomfort, Disp: , Rfl:     lisinopril (ZESTRIL) 20 mg tablet, Take 0.5 tablets (10 mg total) by mouth daily Only to take as needed when BP is >150 systolicallly, Disp: 90 tablet, Rfl: 1    nystatin (MYCOSTATIN) powder, Apply topically 3 (three) times a day, Disp: 15 g, Rfl: 1    oxyCODONE (ROXICODONE) 10 MG TABS, Take 0.5-1 tablets (5-10 mg total) by mouth every 4 (four) hours as needed for moderate pain or severe pain Max Daily Amount: 60 mg, Disp: 120 tablet, Rfl: 0    pantoprazole (PROTONIX) 20 mg tablet, Take 1 tablet (20 mg total) by mouth daily in the early morning, Disp: 30 tablet, Rfl: 11    DPH-Lido-AlHydr-MgHydr-Simeth (First-Mouthwash BLM) SUSP, , Disp: , Rfl:     nystatin (MYCOSTATIN) 500,000 units/5 mL suspension, Apply 5 mL (500,000 Units total) to  the mouth or throat if needed (as needed) (Patient not taking: Reported on 8/24/2023), Disp: , Rfl:     ondansetron (ZOFRAN) 4 mg tablet, Take 1 tablet (4 mg total) by mouth every 8 (eight) hours as needed for nausea or vomiting (Patient not taking: Reported on 7/16/2024), Disp: 9 tablet, Rfl: 20    polyethylene glycol (MIRALAX) 17 g packet, Take 17 g by mouth if needed (constipation) (Patient not taking: Reported on 8/24/2023), Disp: , Rfl:     senna (SENOKOT) 8.6 mg, Take 3 tablets (25.8 mg total) by mouth if needed for constipation Hold for loose stool. (Patient not taking: Reported on 8/24/2023), Disp: , Rfl:      Past Medical History:   Diagnosis Date    Breast cancer (HCC)     Cancer (HCC)     Hypertension          No past surgical history on file.      No Known Allergies    No results found for this or any previous visit (from the past 1008 hour(s)).    The following portions of the patient's history were reviewed and updated as appropriate: allergies, current medications, past family history, past medical history, past social history, past surgical history and problem list.     Review of Systems   Constitutional:  Negative for appetite change, chills, diaphoresis, fatigue, fever and unexpected weight change.   Eyes:  Positive for pain and redness. Negative for photophobia, discharge, itching and visual disturbance.   Respiratory:  Negative for apnea, cough, choking, chest tightness, shortness of breath, wheezing and stridor.    Cardiovascular:  Negative for chest pain, palpitations and leg swelling.   Gastrointestinal:  Negative for abdominal distention, abdominal pain, anal bleeding, blood in stool, constipation, diarrhea, nausea and vomiting.   Genitourinary:  Negative for decreased urine volume, difficulty urinating, frequency and urgency.   Musculoskeletal:  Negative for arthralgias, back pain and myalgias.   Neurological:  Negative for dizziness, light-headedness, numbness and headaches.          Objective:      Vitals:    07/16/24 1123   BP: 132/82   Temp: 98.9 °F (37.2 °C)   SpO2: 96%          Physical Exam  Vitals reviewed.   Constitutional:       General: She is not in acute distress.     Appearance: Normal appearance. She is not ill-appearing, toxic-appearing or diaphoretic.   HENT:      Mouth/Throat:      Mouth: Mucous membranes are moist.   Eyes:      General:         Right eye: Foreign body present. No discharge or hordeolum.      Extraocular Movements:      Right eye: Normal extraocular motion and no nystagmus.      Left eye: Normal extraocular motion and no nystagmus.      Conjunctiva/sclera:      Right eye: Right conjunctiva is injected. No chemosis, exudate or hemorrhage.     Left eye: Left conjunctiva is not injected. No chemosis, exudate or hemorrhage.       Comments: Possible corneal ulcer on the right eye 1 o'clock position.   Cardiovascular:      Rate and Rhythm: Normal rate and regular rhythm.      Pulses: Normal pulses.      Heart sounds: Normal heart sounds. No murmur heard.     No friction rub. No gallop.   Pulmonary:      Effort: Pulmonary effort is normal. No respiratory distress.      Breath sounds: Normal breath sounds. No stridor. No wheezing, rhonchi or rales.   Chest:      Chest wall: No tenderness.   Musculoskeletal:      Right lower leg: No edema.      Left lower leg: No edema.   Skin:     General: Skin is warm and dry.      Findings: No lesion or rash.   Neurological:      Mental Status: She is alert.

## 2024-07-17 ENCOUNTER — TELEPHONE (OUTPATIENT)
Dept: INTERNAL MEDICINE CLINIC | Facility: CLINIC | Age: 49
End: 2024-07-17

## 2024-07-18 ENCOUNTER — HOSPITAL ENCOUNTER (OUTPATIENT)
Dept: INFUSION CENTER | Facility: CLINIC | Age: 49
Discharge: HOME | End: 2024-07-18
Payer: COMMERCIAL

## 2024-07-18 VITALS
DIASTOLIC BLOOD PRESSURE: 84 MMHG | SYSTOLIC BLOOD PRESSURE: 116 MMHG | RESPIRATION RATE: 18 BRPM | WEIGHT: 205 LBS | TEMPERATURE: 98.1 F | BODY MASS INDEX: 35 KG/M2 | HEART RATE: 84 BPM | OXYGEN SATURATION: 94 % | HEIGHT: 64 IN

## 2024-07-18 DIAGNOSIS — D70.1 CHEMOTHERAPY-INDUCED NEUTROPENIA (HCC): Primary | ICD-10-CM

## 2024-07-18 DIAGNOSIS — C50.811 MALIGNANT NEOPLASM OF OVERLAPPING SITES OF RIGHT BREAST IN FEMALE, ESTROGEN RECEPTOR NEGATIVE (HCC): ICD-10-CM

## 2024-07-18 DIAGNOSIS — C79.51 MALIGNANT NEOPLASM METASTATIC TO BONE (HCC): ICD-10-CM

## 2024-07-18 DIAGNOSIS — T45.1X5A CHEMOTHERAPY-INDUCED NEUTROPENIA (HCC): Primary | ICD-10-CM

## 2024-07-18 DIAGNOSIS — Z17.1 MALIGNANT NEOPLASM OF OVERLAPPING SITES OF RIGHT BREAST IN FEMALE, ESTROGEN RECEPTOR NEGATIVE (HCC): ICD-10-CM

## 2024-07-18 PROCEDURE — 96417 CHEMO IV INFUS EACH ADDL SEQ: CPT

## 2024-07-18 PROCEDURE — 96413 CHEMO IV INFUSION 1 HR: CPT

## 2024-07-18 RX ORDER — SODIUM CHLORIDE 9 MG/ML
20 INJECTION, SOLUTION INTRAVENOUS ONCE
Status: COMPLETED | OUTPATIENT
Start: 2024-07-18 | End: 2024-07-18

## 2024-07-18 RX ADMIN — SODIUM CHLORIDE 20 ML/HR: 0.9 INJECTION, SOLUTION INTRAVENOUS at 12:30

## 2024-07-18 RX ADMIN — PERTUZUMAB 420 MG: 30 INJECTION, SOLUTION, CONCENTRATE INTRAVENOUS at 12:38

## 2024-07-18 RX ADMIN — TRASTUZUMAB 558 MG: 150 INJECTION, POWDER, LYOPHILIZED, FOR SOLUTION INTRAVENOUS at 13:16

## 2024-07-18 NOTE — PROGRESS NOTES
Received for herceptin/perjeta. No complaints offered. Tolerated treatment without issue. To return 8/9 830am. AVS declined.

## 2024-07-22 ENCOUNTER — TELEPHONE (OUTPATIENT)
Dept: INTERNAL MEDICINE CLINIC | Facility: CLINIC | Age: 49
End: 2024-07-22

## 2024-07-22 DIAGNOSIS — R21 RASH: ICD-10-CM

## 2024-07-22 RX ORDER — NYSTATIN 100000 [USP'U]/G
POWDER TOPICAL
Qty: 15 G | Refills: 1 | Status: SHIPPED | OUTPATIENT
Start: 2024-07-22

## 2024-07-23 ENCOUNTER — TELEPHONE (OUTPATIENT)
Dept: ADMINISTRATIVE | Facility: OTHER | Age: 49
End: 2024-07-23

## 2024-07-23 NOTE — TELEPHONE ENCOUNTER
Upon review of the In Basket request we have found/obtained the documentation. After careful review of the document we are unable to complete this request for Diabetic Eye Exam because the documentation does not have the proper verbiage (wording) needed to close the requested care gap(s) and the documentation does not have the result(s) needed to close the requested care gap(s).    Any additional questions or concerns should be emailed to the Practice Liaisons via the appropriate education email address, please do not reply via In Basket.    Thank you  Amaury Hernandez MA   [facility]

## 2024-07-23 NOTE — TELEPHONE ENCOUNTER
----- Message from Osbaldo CHAVARRIA sent at 7/22/2024  2:32 PM EDT -----  07/22/24 2:32 PM    Hello, our patient Nayeli Love has had Diabetic Eye Exam completed/performed. Please assist in updating the patient chart by pulling the document from the Media Tab. The date of service is 07/22/24.     Thank you,  Osbaldo Joseph [facility] [address]

## 2024-08-02 RX ORDER — SODIUM CHLORIDE 9 MG/ML
20 INJECTION, SOLUTION INTRAVENOUS ONCE
OUTPATIENT
Start: 2024-08-08

## 2024-08-06 DIAGNOSIS — Z17.1 MALIGNANT NEOPLASM OF OVERLAPPING SITES OF RIGHT BREAST IN FEMALE, ESTROGEN RECEPTOR NEGATIVE (HCC): ICD-10-CM

## 2024-08-06 DIAGNOSIS — T45.1X5A CHEMOTHERAPY-INDUCED NEUTROPENIA (HCC): Primary | ICD-10-CM

## 2024-08-06 DIAGNOSIS — C50.811 MALIGNANT NEOPLASM OF OVERLAPPING SITES OF RIGHT BREAST IN FEMALE, ESTROGEN RECEPTOR NEGATIVE (HCC): ICD-10-CM

## 2024-08-06 DIAGNOSIS — C79.51 MALIGNANT NEOPLASM METASTATIC TO BONE (HCC): ICD-10-CM

## 2024-08-06 DIAGNOSIS — D70.1 CHEMOTHERAPY-INDUCED NEUTROPENIA (HCC): Primary | ICD-10-CM

## 2024-08-09 ENCOUNTER — APPOINTMENT (OUTPATIENT)
Dept: LAB | Facility: CLINIC | Age: 49
End: 2024-08-09
Payer: COMMERCIAL

## 2024-08-09 ENCOUNTER — HOSPITAL ENCOUNTER (OUTPATIENT)
Dept: NON INVASIVE DIAGNOSTICS | Facility: CLINIC | Age: 49
Discharge: HOME | End: 2024-08-09
Payer: COMMERCIAL

## 2024-08-09 ENCOUNTER — HOSPITAL ENCOUNTER (OUTPATIENT)
Dept: INFUSION CENTER | Facility: CLINIC | Age: 49
End: 2024-08-09
Payer: COMMERCIAL

## 2024-08-09 VITALS
RESPIRATION RATE: 16 BRPM | HEIGHT: 64 IN | HEART RATE: 89 BPM | OXYGEN SATURATION: 97 % | SYSTOLIC BLOOD PRESSURE: 133 MMHG | WEIGHT: 207.67 LBS | BODY MASS INDEX: 35.45 KG/M2 | DIASTOLIC BLOOD PRESSURE: 77 MMHG | TEMPERATURE: 97.6 F

## 2024-08-09 VITALS
BODY MASS INDEX: 35 KG/M2 | DIASTOLIC BLOOD PRESSURE: 84 MMHG | HEART RATE: 84 BPM | WEIGHT: 205 LBS | HEIGHT: 64 IN | SYSTOLIC BLOOD PRESSURE: 116 MMHG

## 2024-08-09 DIAGNOSIS — D70.1 CHEMOTHERAPY-INDUCED NEUTROPENIA (HCC): Primary | ICD-10-CM

## 2024-08-09 DIAGNOSIS — C50.811 MALIGNANT NEOPLASM OF OVERLAPPING SITES OF RIGHT BREAST IN FEMALE, ESTROGEN RECEPTOR NEGATIVE (HCC): ICD-10-CM

## 2024-08-09 DIAGNOSIS — T45.1X5A CHEMOTHERAPY-INDUCED NEUTROPENIA (HCC): Primary | ICD-10-CM

## 2024-08-09 DIAGNOSIS — Z17.1 MALIGNANT NEOPLASM OF OVERLAPPING SITES OF RIGHT BREAST IN FEMALE, ESTROGEN RECEPTOR NEGATIVE (HCC): ICD-10-CM

## 2024-08-09 DIAGNOSIS — I42.7 DRUG-INDUCED CARDIOMYOPATHY (HCC): ICD-10-CM

## 2024-08-09 DIAGNOSIS — C79.51 MALIGNANT NEOPLASM METASTATIC TO BONE (HCC): ICD-10-CM

## 2024-08-09 LAB
AORTIC ROOT: 3.2 CM
APICAL FOUR CHAMBER EJECTION FRACTION: 71 %
AV LVOT MEAN GRADIENT: 2 MMHG
AV LVOT PEAK GRADIENT: 4 MMHG
BSA FOR ECHO PROCEDURE: 1.98 M2
DOP CALC LVOT PEAK VEL VTI: 19.23 CM
DOP CALC LVOT PEAK VEL: 0.98 M/S
E WAVE DECELERATION TIME: 185 MS
E/A RATIO: 0.88
FRACTIONAL SHORTENING: 29 (ref 28–44)
GLOBAL LONGITUIDAL STRAIN: -20 %
INTERVENTRICULAR SEPTUM IN DIASTOLE (PARASTERNAL SHORT AXIS VIEW): 0.8 CM
INTERVENTRICULAR SEPTUM: 0.8 CM (ref 0.6–1.1)
LEFT ATRIUM SIZE: 3.5 CM
LEFT INTERNAL DIMENSION IN SYSTOLE: 2.9 CM (ref 2.1–4)
LEFT VENTRICLE DIASTOLIC VOLUME (MOD BIPLANE): 83 ML
LEFT VENTRICLE DIASTOLIC VOLUME INDEX (MOD BIPLANE): 41.9 ML/M2
LEFT VENTRICLE SYSTOLIC VOLUME (MOD BIPLANE): 29 ML
LEFT VENTRICLE SYSTOLIC VOLUME INDEX (MOD BIPLANE): 14.6 ML/M2
LEFT VENTRICULAR INTERNAL DIMENSION IN DIASTOLE: 4.1 CM (ref 3.5–6)
LEFT VENTRICULAR POSTERIOR WALL IN END DIASTOLE: 0.9 CM
LEFT VENTRICULAR STROKE VOLUME: 41 ML
LV EF: 65 %
LVSV (TEICH): 41 ML
MV PEAK A VEL: 0.78 M/S
MV PEAK E VEL: 69 CM/S
MV STENOSIS PRESSURE HALF TIME: 54 MS
MV VALVE AREA P 1/2 METHOD: 4.07
SL CV LV EF: 65
SL CV PED ECHO LEFT VENTRICLE DIASTOLIC VOLUME (MOD BIPLANE) 2D: 73 ML
SL CV PED ECHO LEFT VENTRICLE SYSTOLIC VOLUME (MOD BIPLANE) 2D: 33 ML
TR MAX PG: 16 MMHG
TR PEAK VELOCITY: 2 M/S
TRICUSPID VALVE PEAK REGURGITATION VELOCITY: 2.02 M/S

## 2024-08-09 PROCEDURE — 96417 CHEMO IV INFUS EACH ADDL SEQ: CPT

## 2024-08-09 PROCEDURE — 96413 CHEMO IV INFUSION 1 HR: CPT

## 2024-08-09 PROCEDURE — 93308 TTE F-UP OR LMTD: CPT

## 2024-08-09 RX ORDER — SODIUM CHLORIDE 9 MG/ML
20 INJECTION, SOLUTION INTRAVENOUS ONCE
Status: COMPLETED | OUTPATIENT
Start: 2024-08-09 | End: 2024-08-09

## 2024-08-09 RX ADMIN — SODIUM CHLORIDE 20 ML/HR: 0.9 INJECTION, SOLUTION INTRAVENOUS at 09:15

## 2024-08-09 RX ADMIN — TRASTUZUMAB 558 MG: 150 INJECTION, POWDER, LYOPHILIZED, FOR SOLUTION INTRAVENOUS at 10:06

## 2024-08-09 RX ADMIN — PERTUZUMAB 420 MG: 30 INJECTION, SOLUTION, CONCENTRATE INTRAVENOUS at 09:21

## 2024-08-09 NOTE — PROGRESS NOTES
Pt tolerated immunotherapy treatment well. Aware of next appt 8/30 at 0800 for both immunotherapy and zometa. AVS declined. Pt has mychart.

## 2024-08-13 ENCOUNTER — APPOINTMENT (OUTPATIENT)
Dept: LAB | Facility: CLINIC | Age: 49
End: 2024-08-13
Payer: COMMERCIAL

## 2024-08-13 DIAGNOSIS — Z17.1 MALIGNANT NEOPLASM OF OVERLAPPING SITES OF RIGHT BREAST IN FEMALE, ESTROGEN RECEPTOR NEGATIVE (HCC): ICD-10-CM

## 2024-08-13 DIAGNOSIS — C50.811 MALIGNANT NEOPLASM OF OVERLAPPING SITES OF RIGHT BREAST IN FEMALE, ESTROGEN RECEPTOR NEGATIVE (HCC): ICD-10-CM

## 2024-08-13 DIAGNOSIS — C79.51 MALIGNANT NEOPLASM METASTATIC TO BONE (HCC): ICD-10-CM

## 2024-08-13 LAB
BASOPHILS # BLD AUTO: 0.03 THOUSANDS/ÂΜL (ref 0–0.1)
BASOPHILS NFR BLD AUTO: 1 % (ref 0–1)
EOSINOPHIL # BLD AUTO: 0.09 THOUSAND/ÂΜL (ref 0–0.61)
EOSINOPHIL NFR BLD AUTO: 2 % (ref 0–6)
ERYTHROCYTE [DISTWIDTH] IN BLOOD BY AUTOMATED COUNT: 12.8 % (ref 11.6–15.1)
HCT VFR BLD AUTO: 43 % (ref 34.8–46.1)
HGB BLD-MCNC: 14 G/DL (ref 11.5–15.4)
IMM GRANULOCYTES # BLD AUTO: 0.01 THOUSAND/UL (ref 0–0.2)
IMM GRANULOCYTES NFR BLD AUTO: 0 % (ref 0–2)
LDH SERPL-CCNC: 162 U/L (ref 140–271)
LYMPHOCYTES # BLD AUTO: 1.2 THOUSANDS/ÂΜL (ref 0.6–4.47)
LYMPHOCYTES NFR BLD AUTO: 29 % (ref 14–44)
MAGNESIUM SERPL-MCNC: 1.9 MG/DL (ref 1.9–2.7)
MCH RBC QN AUTO: 32.3 PG (ref 26.8–34.3)
MCHC RBC AUTO-ENTMCNC: 32.6 G/DL (ref 31.4–37.4)
MCV RBC AUTO: 99 FL (ref 82–98)
MONOCYTES # BLD AUTO: 0.35 THOUSAND/ÂΜL (ref 0.17–1.22)
MONOCYTES NFR BLD AUTO: 9 % (ref 4–12)
NEUTROPHILS # BLD AUTO: 2.4 THOUSANDS/ÂΜL (ref 1.85–7.62)
NEUTS SEG NFR BLD AUTO: 59 % (ref 43–75)
NRBC BLD AUTO-RTO: 0 /100 WBCS
PLATELET # BLD AUTO: 219 THOUSANDS/UL (ref 149–390)
PMV BLD AUTO: 10.7 FL (ref 8.9–12.7)
RBC # BLD AUTO: 4.33 MILLION/UL (ref 3.81–5.12)
WBC # BLD AUTO: 4.08 THOUSAND/UL (ref 4.31–10.16)

## 2024-08-13 PROCEDURE — 83735 ASSAY OF MAGNESIUM: CPT

## 2024-08-13 PROCEDURE — 83615 LACTATE (LD) (LDH) ENZYME: CPT

## 2024-08-13 PROCEDURE — 85025 COMPLETE CBC W/AUTO DIFF WBC: CPT

## 2024-08-14 ENCOUNTER — HOSPITAL ENCOUNTER (OUTPATIENT)
Dept: CT IMAGING | Facility: HOSPITAL | Age: 49
Discharge: HOME | End: 2024-08-14
Attending: INTERNAL MEDICINE
Payer: COMMERCIAL

## 2024-08-14 DIAGNOSIS — C50.811 MALIGNANT NEOPLASM OF OVERLAPPING SITES OF RIGHT BREAST IN FEMALE, ESTROGEN RECEPTOR NEGATIVE (HCC): ICD-10-CM

## 2024-08-14 DIAGNOSIS — Z17.1 MALIGNANT NEOPLASM OF OVERLAPPING SITES OF RIGHT BREAST IN FEMALE, ESTROGEN RECEPTOR NEGATIVE (HCC): ICD-10-CM

## 2024-08-14 PROCEDURE — 71260 CT THORAX DX C+: CPT

## 2024-08-14 PROCEDURE — 74177 CT ABD & PELVIS W/CONTRAST: CPT

## 2024-08-14 RX ADMIN — IOHEXOL 100 ML: 350 INJECTION, SOLUTION INTRAVENOUS at 07:29

## 2024-08-18 NOTE — PROGRESS NOTES
HEMATOLOGY / ONCOLOGY CLINIC FOLLOW UP NOTE    Patient Nayeli Love  MRN: [id number]  : 1975  Date of Encounter 2024         Referring Provider:       Reason for Encounter: follow up  metastatic HER 2 breast cancer      Perjeta/Herceptin     C41 2024       Oncology History   Malignant neoplasm of overlapping sites of right breast in female, estrogen receptor negative (HCC)   3/2022 Initial Diagnosis    Malignant neoplasm of overlapping sites of right breast in female, estrogen receptor negative (HCC)     3/29/2022 Biopsy    Breast, Right, Biopsy:  - Invasive mammary carcinoma of no special type (ductal, not otherwise specified), Amboy Grade II (3 + 2 + 1 = 6), spanning at least 6 mm.   ER/WY negative, HER2 positive    Procedure  Needle biopsy    Specimen Laterality  Right    TUMOR   Histologic Type  Invasive carcinoma of no special type (ductal)    Histologic Grade (Amboy Histologic Score)     Glandular (Acinar) / Tubular Differentiation  Score 3    Nuclear Pleomorphism  Score 2    Mitotic Rate  Score 1    Overall Grade  Grade 2 (scores of 6 or 7)    Tumor Size  Greatest dimension of largest invasive focus (Millimeters): 6 mm   Ductal Carcinoma In Situ (DCIS)  Not identified    Lymphovascular Invasion  Not identified         3/31/2022 - 2022 Radiation    Course: C1    Plan ID Energy Fractions Dose per Fraction (cGy) Dose Correction (cGy) Total Dose Delivered (cGy) Elapsed Days   C1 C7 And ix 10X/6X 7 / 7 300 0 2,100 8   C1 C7 Spine 10X/6X 3 / 10 300 0 900 4   L4 Sacrum 10X 10 / 10 300 0 3,000 13   T1 T6 And ix 10X 7 / 7 300 0 2,100 8   T1 T6 Spine 10X 3 / 10 300 0 900 4      Dr. Rutledge     2022 -  Chemotherapy    clinical trial with THP with or without atezolizumab     2022 -  Chemotherapy    pegfilgrastim (NEULASTA ONPRO), 6 mg, Subcutaneous, Once, 1 of 1 cycle  pertuzumab (PERJETA) IVPB, 840 mg, Intravenous, Once, 40 of 42 cycles  Administration: 420 mg (2022),  420 mg (7/7/2022), 420 mg (7/28/2022), 420 mg (8/18/2022), 420 mg (9/8/2022), 420 mg (9/29/2022), 420 mg (10/20/2022), 420 mg (11/17/2022), 420 mg (12/8/2022), 420 mg (12/30/2022), 420 mg (1/19/2023), 420 mg (2/9/2023), 420 mg (3/2/2023), 420 mg (3/23/2023), 420 mg (4/13/2023), 420 mg (5/4/2023), 420 mg (5/23/2023), 420 mg (6/15/2023), 420 mg (7/6/2023), 420 mg (7/25/2023), 420 mg (8/17/2023), 420 mg (9/7/2023), 420 mg (9/28/2023), 420 mg (10/19/2023), 420 mg (11/9/2023), 420 mg (1/11/2024), 420 mg (12/21/2023), 420 mg (11/30/2023), 420 mg (2/1/2024), 420 mg (2/22/2024), 420 mg (3/14/2024), 420 mg (4/4/2024), 420 mg (4/25/2024), 420 mg (5/16/2024), 420 mg (8/9/2024), 420 mg (7/18/2024), 420 mg (6/27/2024), 420 mg (6/6/2024)  DOCEtaxel (TAXOTERE) chemo infusion, 75 mg/m2, Intravenous, Once, 3 of 3 cycles  Dose modification: 60 mg/m2 (original dose 75 mg/m2, Cycle 3, Reason: Anticipated Tolerance)  Administration: 112.8 mg (6/16/2022)  PACLItaxel (TAXOL) chemo IVPB, 80 mg/m2 = 150.6 mg (100 % of original dose 80 mg/m2), Intravenous, Once, 3 of 3 cycles  Dose modification: 80 mg/m2 (original dose 80 mg/m2, Cycle 4, Reason: Anticipated Tolerance)  Administration: 150.6 mg (7/7/2022), 150.6 mg (7/14/2022), 150.6 mg (7/21/2022), 150.6 mg (7/28/2022), 150.6 mg (8/4/2022), 150.6 mg (8/11/2022), 150.6 mg (8/18/2022), 150.6 mg (8/25/2022), 150.6 mg (9/1/2022)  trastuzumab (HERCEPTIN) chemo infusion, 8 mg/kg, Intravenous, Once, 38 of 40 cycles  Administration: 487 mg (7/28/2022), 487 mg (8/18/2022), 487 mg (9/8/2022), 487 mg (9/29/2022), 487 mg (10/20/2022), 450 mg (11/17/2022), 450 mg (12/8/2022), 450 mg (12/30/2022), 450 mg (1/19/2023), 450 mg (2/9/2023), 450 mg (3/2/2023), 450 mg (3/23/2023), 482 mg (4/13/2023), 482 mg (5/4/2023), 482 mg (5/23/2023), 482 mg (6/15/2023), 482 mg (7/6/2023), 482 mg (7/25/2023), 482 mg (8/17/2023), 482 mg (9/7/2023), 482 mg (9/28/2023), 522 mg (10/19/2023), 523 mg (11/9/2023), 534 mg  (1/11/2024), 531 mg (12/21/2023), 522 mg (11/30/2023), 534 mg (2/1/2024), 548 mg (2/22/2024), 558 mg (3/14/2024), 558 mg (4/4/2024), 558 mg (4/25/2024), 558 mg (5/16/2024), 558 mg (8/9/2024), 558 mg (7/18/2024), 558 mg (6/27/2024), 558 mg (6/6/2024)  INV trastuzumab infusion, 6 mg/kg = 487 mg (100 % of original dose 6 mg/kg), Intravenous, Once, 2 of 2 cycles  Dose modification: 6 mg/kg (original dose 6 mg/kg, Cycle 3, Reason: Other (Must fill in a comment), Comment: investigational drug)  Administration: 487 mg (6/16/2022), 487 mg (7/7/2022)     Malignant neoplasm metastatic to bone (HCC)   3/2022 Initial Diagnosis    Osseous metastasis (HCC)     3/29/2022 Biopsy    Breast, Right, Biopsy:  - Invasive mammary carcinoma of no special type (ductal, not otherwise specified), Fabrizio Grade II (3 + 2 + 1 = 6), spanning at least 6 mm.   ER/WY negative, HER2 positive    Procedure  Needle biopsy    Specimen Laterality  Right    TUMOR   Histologic Type  Invasive carcinoma of no special type (ductal)    Histologic Grade (Fabrizio Histologic Score)     Glandular (Acinar) / Tubular Differentiation  Score 3    Nuclear Pleomorphism  Score 2    Mitotic Rate  Score 1    Overall Grade  Grade 2 (scores of 6 or 7)    Tumor Size  Greatest dimension of largest invasive focus (Millimeters): 6 mm   Ductal Carcinoma In Situ (DCIS)  Not identified    Lymphovascular Invasion  Not identified         3/31/2022 - 4/18/2022 Radiation    Course: C1    Plan ID Energy Fractions Dose per Fraction (cGy) Dose Correction (cGy) Total Dose Delivered (cGy) Elapsed Days   C1 C7 And ix 10X/6X 7 / 7 300 0 2,100 8   C1 C7 Spine 10X/6X 3 / 10 300 0 900 4   L4 Sacrum 10X 10 / 10 300 0 3,000 13   T1 T6 And ix 10X 7 / 7 300 0 2,100 8   T1 T6 Spine 10X 3 / 10 300 0 900 4      Dr. Rutledge     4/28/2022 -  Chemotherapy    clinical trial with THP with or without atezolizumab     4/28/2022 -  Chemotherapy    pegfilgrastim (NEULASTA ONPRO), 6 mg, Subcutaneous, Once,  1 of 1 cycle  pertuzumab (PERJETA) IVPB, 840 mg, Intravenous, Once, 40 of 42 cycles  Administration: 420 mg (6/16/2022), 420 mg (7/7/2022), 420 mg (7/28/2022), 420 mg (8/18/2022), 420 mg (9/8/2022), 420 mg (9/29/2022), 420 mg (10/20/2022), 420 mg (11/17/2022), 420 mg (12/8/2022), 420 mg (12/30/2022), 420 mg (1/19/2023), 420 mg (2/9/2023), 420 mg (3/2/2023), 420 mg (3/23/2023), 420 mg (4/13/2023), 420 mg (5/4/2023), 420 mg (5/23/2023), 420 mg (6/15/2023), 420 mg (7/6/2023), 420 mg (7/25/2023), 420 mg (8/17/2023), 420 mg (9/7/2023), 420 mg (9/28/2023), 420 mg (10/19/2023), 420 mg (11/9/2023), 420 mg (1/11/2024), 420 mg (12/21/2023), 420 mg (11/30/2023), 420 mg (2/1/2024), 420 mg (2/22/2024), 420 mg (3/14/2024), 420 mg (4/4/2024), 420 mg (4/25/2024), 420 mg (5/16/2024), 420 mg (8/9/2024), 420 mg (7/18/2024), 420 mg (6/27/2024), 420 mg (6/6/2024)  DOCEtaxel (TAXOTERE) chemo infusion, 75 mg/m2, Intravenous, Once, 3 of 3 cycles  Dose modification: 60 mg/m2 (original dose 75 mg/m2, Cycle 3, Reason: Anticipated Tolerance)  Administration: 112.8 mg (6/16/2022)  PACLItaxel (TAXOL) chemo IVPB, 80 mg/m2 = 150.6 mg (100 % of original dose 80 mg/m2), Intravenous, Once, 3 of 3 cycles  Dose modification: 80 mg/m2 (original dose 80 mg/m2, Cycle 4, Reason: Anticipated Tolerance)  Administration: 150.6 mg (7/7/2022), 150.6 mg (7/14/2022), 150.6 mg (7/21/2022), 150.6 mg (7/28/2022), 150.6 mg (8/4/2022), 150.6 mg (8/11/2022), 150.6 mg (8/18/2022), 150.6 mg (8/25/2022), 150.6 mg (9/1/2022)  trastuzumab (HERCEPTIN) chemo infusion, 8 mg/kg, Intravenous, Once, 38 of 40 cycles  Administration: 487 mg (7/28/2022), 487 mg (8/18/2022), 487 mg (9/8/2022), 487 mg (9/29/2022), 487 mg (10/20/2022), 450 mg (11/17/2022), 450 mg (12/8/2022), 450 mg (12/30/2022), 450 mg (1/19/2023), 450 mg (2/9/2023), 450 mg (3/2/2023), 450 mg (3/23/2023), 482 mg (4/13/2023), 482 mg (5/4/2023), 482 mg (5/23/2023), 482 mg (6/15/2023), 482 mg (7/6/2023), 482 mg  (7/25/2023), 482 mg (8/17/2023), 482 mg (9/7/2023), 482 mg (9/28/2023), 522 mg (10/19/2023), 523 mg (11/9/2023), 534 mg (1/11/2024), 531 mg (12/21/2023), 522 mg (11/30/2023), 534 mg (2/1/2024), 548 mg (2/22/2024), 558 mg (3/14/2024), 558 mg (4/4/2024), 558 mg (4/25/2024), 558 mg (5/16/2024), 558 mg (8/9/2024), 558 mg (7/18/2024), 558 mg (6/27/2024), 558 mg (6/6/2024)  INV trastuzumab infusion, 6 mg/kg = 487 mg (100 % of original dose 6 mg/kg), Intravenous, Once, 2 of 2 cycles  Dose modification: 6 mg/kg (original dose 6 mg/kg, Cycle 3, Reason: Other (Must fill in a comment), Comment: investigational drug)  Administration: 487 mg (6/16/2022), 487 mg (7/7/2022)            Assessment / Plan:     Metastatic breast cancer, grade 2, ER negative, OK negative, HER2 3+ disease.  Diagnosed in March 2022.      Metastatic disease to the bone     Nayeli Love is a 48-year-old premenopausal female with metastatic breast cancer, grade 2, ER negative, OK negative, HER2 3+ disease.  She has large fungating mass in her right breast, palpable right axillary adenopathy as well as diffuse osseous metastasis with C4 stenosis.  She completed palliative radiation therapy to the spine.  She was treated with taxane, trastuzumab and pertuzumab x6 cycles, resulting in good partial response.  She is currently on trastuzumab and pertuzumab with excellent tolerance.       At her last visit 3 months ago, the patient continued to do well without any clinical evidence of progressive disease.  She will continue trastuzumab and pertuzumab every 3 weeks.  She will stay on Zometa every 3 months. Her most recent CA 27.29 from 4/4/2023 was stable at 34.9.  She will repeat tumor marker now.  Her most recent CT scan from 10/12/2023 notes interval decrease in the size of right breast mass and axillary lymph node.  Incidental finding of indeterminate density lesion in the right kidney.       CT CAP 2/6/2024 is stable; ECHO unchanged with LVEF 65%      Continue with Herceptin/Perjeta every 3 weeks; zometa every 3 months and follow up 3 months     5/14/2024     Continue Herceptin/Perjeta every 3 weeks     CT 5/6/2024 stable without progression     US pelvis to be done based on adnexal left nodule noted on scan     ECHO done 5/6/2024 with LVEF 65%     Labs are unchanged     Repeat CT CAP in 3 months; labs in 3 months     ECHO in 4-6 months    8/20/2024    Doing well    CT CAP 8/14/2024 stable    ECHO LVEF 65%  8/9/2024    Labs stable    Continues to tolerate treatment      Follow up     3 months          HPI     Nayeli Love is a 48-year-old premenopausal female with metastatic breast cancer, grade 2, ER negative, [place] negative, HER2 3+ disease.  She had a large fungating mass in her right breast, palpable right axillary adenopathy as well as diffuse osseous metastasis with C4 stenosis.  She completed palliative radiation therapy to the spine.  Subsequently, she was treated with Taxotere, trastuzumab and pertuzumab with or without atezolizumab based on a clinical trial.  She received 2 cycle of treatment.  She came off the study after 2 cycles of treatment due to the complication related to COVID-19 infection.  She had infusion reaction with Taxotere. Therefore, she was subsequently treated with paclitaxel, trastuzumab and pertuzumab.  She completed induction treatment in August 2022.  She is currently on maintenance therapy with trastuzumab and pertuzumab.  She had good partial response, clinically as well as radiographically.  She was previously following with Dr. Payne and last seen in the office on 4/12/2023.  She presents today for transfer of care/follow-up visit.        Follow up 10/17/2023     She presents today for follow-up.  She feels well with no new complaints.  Her pain is under control with minimal amount of pain medications.  She is following with palliative care end-stage requires less pain meds.  She has no respiratory symptoms.  She has no  ambulatory dysfunction.       10/12/2023: CT chest/abdomen/pelvis:  Interval mild decrease in size of a partially calcified right breast mass and right axillary lymph node.    No significant interval change in extensive bony metastases.   1.2 cm indeterminate density lesion in the right kidney, minimally increased in size compared to March 29, 2022. Recommend renal ultrasound for further evaluation.   Continued enlargement of a left adnexal cyst, now 5.7 cm. Recommend characterization with pelvic ultrasound.     10/12/2023: ECHO:  Left Ventricle: Left ventricular cavity size is normal. Wall thickness is normal. The left ventricular ejection fraction is 70%. Systolic function is normal. Global longitudinal strain is normal at -17.2 %. Wall motion is normal. Diastolic function is mildly abnormal, consistent with grade I (abnormal) relaxation.      Follow up 2/13/2024     Ms Love continues to tolerated herceptin/pertuzumab without issues.  She has no toxicities.  Labs are stable.  CT CAP and ECHO are below with unchanged/stable disease as well as LVEF 65%.  Her ROS is negative        2/6/2024  ECHO      Left Ventricle: Left ventricular cavity size is normal. Wall thickness is normal. The left ventricular ejection fraction is 65%. Systolic function is normal. Global longitudinal strain is borderline at -16%. Wall motion is normal. Diastolic function is mildly abnormal, consistent with grade I (abnormal) relaxation.    Right Ventricle: Right ventricular cavity size is normal. Systolic function is normal.    Tricuspid Valve: There is trace regurgitation.    Prior TTE study available for comparison. Prior study date: 10/12/2023. No significant changes noted compared to the prior study.     Restaging CT CAP 2/6/2024  arrative & Impression   CT CHEST, ABDOMEN AND PELVIS WITH IV CONTRAST   BONES: Widespread sclerotic metastatic disease, unchanged from October 12, 2023. As on previous examination there are multiple lesions  throughout the spine with associated Schmorl's nodes. Again noted is an expansile sclerotic lesion in the anterior   right fourth rib. No extraskeletal soft tissue tumor mass or developing lytic lesion noted. Pathologic compression fracture of T2, unchanged. 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 No acute pathologic fracture. Reidentified linear   sclerosis in the right femoral head consistent with avascular necrosis.     IMPRESSION:     Widespread sclerotic osseous metastatic disease without significant interval change from October 12, 2023.     Partly calcified right breast mass not significantly changed.     Increasing size of simple appearing left adnexal cystic mass, now up to 6.5 cm. Further characterization with pelvic ultrasound is recommended. Slight increase in the size of a partly calcified uterine fibroid now 1.9 cm. This can also be reevaluated at   the time of pelvic sonography.     No findings to suggest soft tissue metastatic disease in the chest, abdomen or pelvis.                  Interval History:   5/14/2024     Ms Love is doing well without issues today.  She did complain of pain in the left ankle which resolved; she will though go for Xray ordered.  She had repeat CT CAP 5/6/2024 with stable disease; her ECHO 5/6/2024 with LVEF 65%. She has some occasional rashes from HER2 directed therapy, occasional diarrhea but no other issues.  She is tolerating this regimen well and will not change at this time with stable nonprogressive disease.              ECHO 5/6/2024         Left Ventricle: Left ventricular cavity size is normal. Wall thickness is normal. The left ventricular ejection fraction is 65% by biplane measurement. Systolic function is normal. Global longitudinal strain is borderline at -17%. Wall motion is normal.    Right Ventricle: Right ventricular cavity size is normal. Systolic function is normal.    Prior TTE study available for comparison. Prior study date:  2/6/2024. No significant changes noted compared to the prior study.     CT 5/6/2024        1. Widespread sclerotic osseous metastatic disease similar to the prior study.     2. Stable appearance of calcified right breast mass.     3. Simple appearing left adnexal cyst has further increased in size now measuring up to 6.8 cm. Recommend further work-up with pelvic ultrasound or MRI if not recently performed as clinically appropriate.     4. A couple of small foci of gas are seen in the urinary bladder. Correlate for history of recent instrumentation. Otherwise, consider the possibility of a fistula or infectious process.         Interval History:  8/20/2024    Doing well.  Occasional rash in the bilateral axilla resolves with local therapies.  CT 8/14/2024 stable , no progression.  Labs stable.  No issues    Labs 8/13/2024    Na 144 K 4.5 Cr 0.71 ALT/AST 36/20 TB 0.68 Mg 1.9 WBC 4.1 Hgb 14 MCV 99 plts 219 ANC 2400       Patient presents for follow up of      CT CAP 8/14/2024       1. No significant change since previous study. Stable osseous metastatic disease. No thoracic or intra-abdominal lymphadenopathy.     2. Stable thin-walled left ovarian cystic lesion without mural nodularity. Leading differential diagnosis includes cystadenoma     3. Bilateral nonobstructing renal stones.        REVIEW OF SYSTEMS:  As above   Please note that a 14-point review of systems was performed to include Constitutional, HEENT, Respiratory, CVS, GI, , Musculoskeletal, Integumentary, Neurologic, Rheumatologic, Endocrinologic, Psychiatric, Lymphatic, and Hematologic/Oncologic systems were reviewed and are negative unless otherwise stated in HPI. Positive and negative findings pertinent to this evaluation are incorporated into the history of present illness.      ECOG PS: 0       Primary diagnosis:  Metastatic breast cancer, grade 2, ER negative, SC negative, HER2 3+ disease.  Diagnosed in March 2022.      Previous Hematologic/  Oncologic Treatment:   1. THP x3 cycles, completed in early June 2022.   2. Weekly paclitaxel, try weekly pertuzumab trastuzumab from July 2022 through August 2022.     Current Hematologic/ Oncologic Treatment:    Maintenance therapy with trastuzumab and pertuzumab since September 2022.  Zometa 4 mg IV every 3 months     Disease Status:    Clinical good partial response.     Test Results:   Pathology:   Right breast biopsy showed invasive ductal carcinoma, grade 2 ER negative, WI negative, HER2 3+ disease.     Radiology:  CT scan of chest abdomen pelvis in April 2023 showed decreased calcified right breast mass as well as stable mildly enlarged right axillary lymph node.  Stable osseous metastasis.     Echocardiogram in December 2022 showed normal ejection fraction with 55%.   PROBLEM LIST:  Patient Active Problem List   Diagnosis    Malignant neoplasm of overlapping sites of right breast in female, estrogen receptor negative (HCC)    Malignant neoplasm metastatic to bone (HCC)    Elevated BP without diagnosis of hypertension    Hypokalemia    Transaminitis    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 (HCC)    Cancer associated pain    Palliative care patient    HTN (hypertension)    Candida infection, esophageal (HCC)    Medical marijuana use    Neoplastic malignant related fatigue    Antineoplastic chemotherapy induced anemia    COVID-19    Chemotherapy-induced neutropenia (HCC)    Candidal skin infection    Neutropenia associated with infection (HCC)    Essential hypertension    Bacteremia due to Escherichia coli    Bacteremia    Unspecified cord compression (HCC)    Continuous opioid dependence (HCC)    GERD (gastroesophageal reflux disease)    Class 1 obesity due to excess calories with serious comorbidity and body mass index (BMI) of 32.0 to 32.9 in adult       Past Medical History:   has a past medical history of Breast cancer (HCC), Cancer (HCC), and Hypertension.    PAST SURGICAL HISTORY:   has no past surgical history on  file.    CURRENT MEDICATIONS  Current Outpatient Medications   Medication Sig Dispense Refill    acetaminophen (TYLENOL) 325 mg tablet Take 650 mg by mouth every 6 (six) hours as needed for mild pain      al mag oxide-diphenhydramine-lidocaine viscous (MAGIC MOUTHWASH) 1:1:1 suspension Swish and spit 10 mL if needed for mouth pain or discomfort      DPH-Lido-AlHydr-MgHydr-Simeth (First-Mouthwash BLM) SUSP  (Patient not taking: Reported on 8/24/2023)      lisinopril (ZESTRIL) 20 mg tablet Take 0.5 tablets (10 mg total) by mouth daily Only to take as needed when BP is >150 systolicallly 90 tablet 1    nystatin (MYCOSTATIN) 500,000 units/5 mL suspension Apply 5 mL (500,000 Units total) to the mouth or throat if needed (as needed) (Patient not taking: Reported on 8/24/2023)      nystatin powder APPLY TOPICALLY THREE TIMES DAILY 15 g 1    ondansetron (ZOFRAN) 4 mg tablet Take 1 tablet (4 mg total) by mouth every 8 (eight) hours as needed for nausea or vomiting (Patient not taking: Reported on 7/16/2024) 9 tablet 20    oxyCODONE (ROXICODONE) 10 MG TABS Take 0.5-1 tablets (5-10 mg total) by mouth every 4 (four) hours as needed for moderate pain or severe pain Max Daily Amount: 60 mg 120 tablet 0    pantoprazole (PROTONIX) 20 mg tablet Take 1 tablet (20 mg total) by mouth daily in the early morning 30 tablet 11    polyethylene glycol (MIRALAX) 17 g packet Take 17 g by mouth if needed (constipation) (Patient not taking: Reported on 8/24/2023)      senna (SENOKOT) 8.6 mg Take 3 tablets (25.8 mg total) by mouth if needed for constipation Hold for loose stool. (Patient not taking: Reported on 8/24/2023)       No current facility-administered medications for this visit.     [unfilled]    SOCIAL HISTORY:   reports that she has never smoked. She has never used smokeless tobacco. She reports that she does not currently use alcohol. She reports that she does not use drugs.     FAMILY HISTORY:  family history includes Coronary artery  disease in her mother; Diabetes type II in her father; Hashimoto's thyroiditis in her sister; Heart attack in her father.     ALLERGIES:  has No Known Allergies.      Physical Exam:  Vital Signs:   Visit Vitals  LMP 03/28/2022 (Approximate)   OB Status Postmenopausal   Smoking Status Never     There is no height or weight on file to calculate BMI.  There is no height or weight on file to calculate BSA.    GEN: Alert, awake oriented x3, in no acute distress  HEENT- No pallor, icterus, cyanosis, no oral mucosal lesions,   LAD - no palpable cervical, clavicle, axillary, inguinal LAD  Heart- normal S1 S2, regular rate and rhythm, No murmur, rubs.   Lungs- clear breathing sound bilateral.   Abdomen- soft, Non tender, bowel sounds present  Extremities- No cyanosis, clubbing, edema  Neuro- No focal neurological deficit    Labs:  Lab Results   Component Value Date    WBC 4.08 (L) 08/13/2024    HGB 14.0 08/13/2024    HCT 43.0 08/13/2024    MCV 99 (H) 08/13/2024     08/13/2024     Lab Results   Component Value Date    SODIUM 144 08/13/2024    K 4.5 08/13/2024     08/13/2024    CO2 27 08/13/2024    AGAP 9 08/13/2024    BUN 13 08/13/2024    CREATININE 0.71 08/13/2024    GLUC 108 05/07/2024    GLUF 116 (H) 08/13/2024    CALCIUM 10.0 08/13/2024    AST 20 08/13/2024    ALT 36 08/13/2024    ALKPHOS 48 08/13/2024    TP 7.0 08/13/2024    TBILI 0.68 08/13/2024    EGFR 99 08/13/2024           I spent 40 minutes on chart review, face to face counseling time, coordination of care and documentation.    Lois Phillips MD PhD

## 2024-08-20 ENCOUNTER — OFFICE VISIT (OUTPATIENT)
Dept: HEMATOLOGY ONCOLOGY | Facility: CLINIC | Age: 49
End: 2024-08-20

## 2024-08-20 ENCOUNTER — TELEPHONE (OUTPATIENT)
Dept: HEMATOLOGY ONCOLOGY | Facility: CLINIC | Age: 49
End: 2024-08-20

## 2024-08-20 VITALS
TEMPERATURE: 98.3 F | OXYGEN SATURATION: 97 % | BODY MASS INDEX: 35.17 KG/M2 | RESPIRATION RATE: 18 BRPM | SYSTOLIC BLOOD PRESSURE: 134 MMHG | HEART RATE: 90 BPM | DIASTOLIC BLOOD PRESSURE: 78 MMHG | HEIGHT: 64 IN | WEIGHT: 206 LBS

## 2024-08-20 DIAGNOSIS — C79.51 MALIGNANT NEOPLASM METASTATIC TO BONE (HCC): Primary | ICD-10-CM

## 2024-08-20 DIAGNOSIS — T45.1X5A CHEMOTHERAPY-INDUCED NEUTROPENIA (HCC): Primary | ICD-10-CM

## 2024-08-20 DIAGNOSIS — C79.51 MALIGNANT NEOPLASM METASTATIC TO BONE (HCC): ICD-10-CM

## 2024-08-20 DIAGNOSIS — C50.811 MALIGNANT NEOPLASM OF OVERLAPPING SITES OF RIGHT BREAST IN FEMALE, ESTROGEN RECEPTOR NEGATIVE (HCC): ICD-10-CM

## 2024-08-20 DIAGNOSIS — Z17.1 MALIGNANT NEOPLASM OF OVERLAPPING SITES OF RIGHT BREAST IN FEMALE, ESTROGEN RECEPTOR NEGATIVE (HCC): ICD-10-CM

## 2024-08-20 DIAGNOSIS — D70.1 CHEMOTHERAPY-INDUCED NEUTROPENIA (HCC): Primary | ICD-10-CM

## 2024-08-21 DIAGNOSIS — Z51.5 PALLIATIVE CARE PATIENT: ICD-10-CM

## 2024-08-21 DIAGNOSIS — G89.3 CANCER ASSOCIATED PAIN: ICD-10-CM

## 2024-08-21 RX ORDER — OXYCODONE HYDROCHLORIDE 10 MG/1
5-10 TABLET ORAL EVERY 4 HOURS PRN
Qty: 120 TABLET | Refills: 0 | Status: SHIPPED | OUTPATIENT
Start: 2024-08-21

## 2024-08-21 NOTE — TELEPHONE ENCOUNTER
[id number] 06/01/2024 05/31/2024 oxyCODONE HCL (Tablet) 120.0 20 10 MG 90.0 [facility] Commercial Insurance 0 / 0 PA   1 [id number] 02/29/2024 02/29/2024 oxyCODONE HCL IR 10 MG TAB (Tablet) 120.0 20 10 MG 90.0 [facility] Commercial Insurance 0 / 0

## 2024-08-21 NOTE — TELEPHONE ENCOUNTER
Patient's next appointment w/ me is scheduled on 10/7/24 at [facility]. Unfortunately I will no longer be seeing patients at that campus after 9/3/24. She will need her appointment changed to [facility], or see a different provider such as Dr Garcia at [place].     Reviewed most recent [facility] note, reviewed PDMP, reviewed medications. Refilling medication ( oxycodone ).

## 2024-08-24 RX ORDER — SODIUM CHLORIDE 9 MG/ML
20 INJECTION, SOLUTION INTRAVENOUS ONCE
Status: CANCELLED | OUTPATIENT
Start: 2024-08-30

## 2024-08-27 ENCOUNTER — TELEPHONE (OUTPATIENT)
Dept: PALLIATIVE MEDICINE | Facility: CLINIC | Age: 49
End: 2024-08-27

## 2024-08-27 NOTE — TELEPHONE ENCOUNTER
Patient has appt with Dr Garcia who will no longer be at the [facility] he will be in our [facility] office. Patient needs to reschedule with Dr Garcia if he wants to stay at the [facility] and Dr Garcia if he is willing to travel to [facility].

## 2024-08-30 ENCOUNTER — HOSPITAL ENCOUNTER (OUTPATIENT)
Dept: INFUSION CENTER | Facility: CLINIC | Age: 49
End: 2024-08-30
Payer: COMMERCIAL

## 2024-08-30 VITALS
DIASTOLIC BLOOD PRESSURE: 84 MMHG | HEART RATE: 87 BPM | WEIGHT: 208 LBS | SYSTOLIC BLOOD PRESSURE: 142 MMHG | BODY MASS INDEX: 35.51 KG/M2 | OXYGEN SATURATION: 96 % | RESPIRATION RATE: 18 BRPM | TEMPERATURE: 98.4 F | HEIGHT: 64 IN

## 2024-08-30 DIAGNOSIS — Z17.1 MALIGNANT NEOPLASM OF OVERLAPPING SITES OF RIGHT BREAST IN FEMALE, ESTROGEN RECEPTOR NEGATIVE (HCC): ICD-10-CM

## 2024-08-30 DIAGNOSIS — D70.1 CHEMOTHERAPY-INDUCED NEUTROPENIA (HCC): ICD-10-CM

## 2024-08-30 DIAGNOSIS — C79.51 MALIGNANT NEOPLASM METASTATIC TO BONE (HCC): Primary | ICD-10-CM

## 2024-08-30 DIAGNOSIS — C50.811 MALIGNANT NEOPLASM OF OVERLAPPING SITES OF RIGHT BREAST IN FEMALE, ESTROGEN RECEPTOR NEGATIVE (HCC): ICD-10-CM

## 2024-08-30 DIAGNOSIS — T45.1X5A CHEMOTHERAPY-INDUCED NEUTROPENIA (HCC): ICD-10-CM

## 2024-08-30 PROCEDURE — 96413 CHEMO IV INFUSION 1 HR: CPT

## 2024-08-30 PROCEDURE — 96417 CHEMO IV INFUS EACH ADDL SEQ: CPT

## 2024-08-30 PROCEDURE — 96367 TX/PROPH/DG ADDL SEQ IV INF: CPT

## 2024-08-30 RX ORDER — SODIUM CHLORIDE 9 MG/ML
20 INJECTION, SOLUTION INTRAVENOUS ONCE
OUTPATIENT
Start: 2024-11-05

## 2024-08-30 RX ORDER — SODIUM CHLORIDE 9 MG/ML
20 INJECTION, SOLUTION INTRAVENOUS ONCE
Status: COMPLETED | OUTPATIENT
Start: 2024-08-30 | End: 2024-08-30

## 2024-08-30 RX ORDER — SODIUM CHLORIDE 9 MG/ML
20 INJECTION, SOLUTION INTRAVENOUS ONCE
Status: DISPENSED | OUTPATIENT
Start: 2024-08-30

## 2024-08-30 RX ADMIN — ZOLEDRONIC ACID 4 MG: 0.04 INJECTION, SOLUTION INTRAVENOUS at 08:35

## 2024-08-30 RX ADMIN — TRASTUZUMAB 558 MG: 150 INJECTION, POWDER, LYOPHILIZED, FOR SOLUTION INTRAVENOUS at 10:03

## 2024-08-30 RX ADMIN — PERTUZUMAB 420 MG: 30 INJECTION, SOLUTION, CONCENTRATE INTRAVENOUS at 09:23

## 2024-08-30 RX ADMIN — SODIUM CHLORIDE 20 ML/HR: 0.9 INJECTION, SOLUTION INTRAVENOUS at 08:14

## 2024-08-30 NOTE — PLAN OF CARE
Problem: Knowledge Deficit  Goal: Patient/family/caregiver demonstrates understanding of disease process, treatment plan, medications, and discharge instructions  Description: Complete learning assessment and assess knowledge base.  Interventions:  - Provide teaching at level of understanding  - Provide teaching via preferred learning methods  8/30/2024 0913 by Flynn Elias RN  Outcome: Progressing  8/30/2024 0912 by Flynn Elias RN  Outcome: Progressing

## 2024-08-30 NOTE — PROGRESS NOTES
Patient tolerated treatment without incident. Next appt confirmed for 9/20/24 at 0730, declined AVS.

## 2024-09-06 NOTE — TELEPHONE ENCOUNTER
2nd message left.  Patient has appt with Dr Garcia who will no longer be at the [facility] he will be in our [facility] office. Patient needs to reschedule with Dr Garcia if he wants to stay at the [facility] and Dr Garcia if he is willing to travel to [facility].

## 2024-09-13 RX ORDER — SODIUM CHLORIDE 9 MG/ML
20 INJECTION, SOLUTION INTRAVENOUS ONCE
Status: CANCELLED | OUTPATIENT
Start: 2024-09-20

## 2024-09-20 ENCOUNTER — HOSPITAL ENCOUNTER (OUTPATIENT)
Dept: INFUSION CENTER | Facility: CLINIC | Age: 49
End: 2024-09-20
Payer: COMMERCIAL

## 2024-09-20 VITALS
DIASTOLIC BLOOD PRESSURE: 84 MMHG | OXYGEN SATURATION: 96 % | HEART RATE: 89 BPM | WEIGHT: 208 LBS | BODY MASS INDEX: 35.7 KG/M2 | SYSTOLIC BLOOD PRESSURE: 125 MMHG | TEMPERATURE: 98.4 F | RESPIRATION RATE: 18 BRPM

## 2024-09-20 DIAGNOSIS — T45.1X5A CHEMOTHERAPY-INDUCED NEUTROPENIA (HCC): Primary | ICD-10-CM

## 2024-09-20 DIAGNOSIS — D70.1 CHEMOTHERAPY-INDUCED NEUTROPENIA (HCC): Primary | ICD-10-CM

## 2024-09-20 DIAGNOSIS — C79.51 MALIGNANT NEOPLASM METASTATIC TO BONE (HCC): ICD-10-CM

## 2024-09-20 DIAGNOSIS — C50.811 MALIGNANT NEOPLASM OF OVERLAPPING SITES OF RIGHT BREAST IN FEMALE, ESTROGEN RECEPTOR NEGATIVE (HCC): ICD-10-CM

## 2024-09-20 DIAGNOSIS — Z17.1 MALIGNANT NEOPLASM OF OVERLAPPING SITES OF RIGHT BREAST IN FEMALE, ESTROGEN RECEPTOR NEGATIVE (HCC): ICD-10-CM

## 2024-09-20 PROCEDURE — 96417 CHEMO IV INFUS EACH ADDL SEQ: CPT

## 2024-09-20 PROCEDURE — 96413 CHEMO IV INFUSION 1 HR: CPT

## 2024-09-20 RX ORDER — SODIUM CHLORIDE 9 MG/ML
20 INJECTION, SOLUTION INTRAVENOUS ONCE
Status: COMPLETED | OUTPATIENT
Start: 2024-09-20 | End: 2024-09-20

## 2024-09-20 RX ADMIN — TRASTUZUMAB 558 MG: 150 INJECTION, POWDER, LYOPHILIZED, FOR SOLUTION INTRAVENOUS at 16:59

## 2024-09-20 RX ADMIN — PERTUZUMAB 420 MG: 30 INJECTION, SOLUTION, CONCENTRATE INTRAVENOUS at 16:23

## 2024-09-20 RX ADMIN — SODIUM CHLORIDE 20 ML/HR: 0.9 INJECTION, SOLUTION INTRAVENOUS at 15:50

## 2024-09-20 NOTE — PROGRESS NOTES
Patient tolerated treatment without incident. Next appt confirmed with patient for 10/11 at 08:30 at [place]. Patient declined AVS.

## 2024-10-05 NOTE — PROGRESS NOTES
Pt here for chemo treatment  Pt offers not complaints today  ANC 1 17 on 7/25/22 order in treatment plan ok to treat today with this value  Other labs are within parameters  Pt vitals stable and pt resting comfortably in recliner 
Pt tolerated infusions well  No adverse reactions noted   Pt aware of next treatment and declined AVS 
5

## 2024-10-06 RX ORDER — SODIUM CHLORIDE 9 MG/ML
20 INJECTION, SOLUTION INTRAVENOUS ONCE
Status: CANCELLED | OUTPATIENT
Start: 2024-10-11

## 2024-10-09 ENCOUNTER — OFFICE VISIT (OUTPATIENT)
Dept: PALLIATIVE MEDICINE | Facility: CLINIC | Age: 49
End: 2024-10-09

## 2024-10-09 VITALS
HEIGHT: 64 IN | HEART RATE: 102 BPM | WEIGHT: 208.8 LBS | BODY MASS INDEX: 35.65 KG/M2 | SYSTOLIC BLOOD PRESSURE: 120 MMHG | DIASTOLIC BLOOD PRESSURE: 62 MMHG | TEMPERATURE: 98.6 F | OXYGEN SATURATION: 97 %

## 2024-10-09 DIAGNOSIS — C79.51 MALIGNANT NEOPLASM METASTATIC TO BONE (HCC): ICD-10-CM

## 2024-10-09 DIAGNOSIS — R53.0 NEOPLASTIC MALIGNANT RELATED FATIGUE: ICD-10-CM

## 2024-10-09 DIAGNOSIS — Z17.1 MALIGNANT NEOPLASM OF OVERLAPPING SITES OF RIGHT BREAST IN FEMALE, ESTROGEN RECEPTOR NEGATIVE (HCC): Primary | ICD-10-CM

## 2024-10-09 DIAGNOSIS — F11.90 CHRONIC, CONTINUOUS USE OF OPIOIDS: ICD-10-CM

## 2024-10-09 DIAGNOSIS — Z79.899 MEDICAL MARIJUANA USE: ICD-10-CM

## 2024-10-09 DIAGNOSIS — G89.3 CANCER ASSOCIATED PAIN: ICD-10-CM

## 2024-10-09 DIAGNOSIS — Z51.5 PALLIATIVE CARE PATIENT: ICD-10-CM

## 2024-10-09 DIAGNOSIS — C50.811 MALIGNANT NEOPLASM OF OVERLAPPING SITES OF RIGHT BREAST IN FEMALE, ESTROGEN RECEPTOR NEGATIVE (HCC): Primary | ICD-10-CM

## 2024-10-09 NOTE — ASSESSMENT & PLAN NOTE
Initially certified for MMJ in the [facility] on 4/22/22 by Dr. Ulrich, for cancer-related pain. Mostly simply recertified by me on 4/4/24 for assistance with chronic cancer-related pain, insomnia. May continue MMJ use, she finds it helpful for symptom control.

## 2024-10-09 NOTE — PATIENT INSTRUCTIONS
It was good to see you today. Thank you for coming in.    I am glad you're not experiencing any nausea. You never know when he may return, however. Please check the expiration date on your ondansetron (Zofran). If it is  call us and let us know and we'll send a new prescription.  Return in about 6 months (around 2025).  Call us for refills on medications that we supply, as needed.   If something changes and you need to come in sooner, please call our office.    PRESCRIPTION REFILL REMINDER:  All medication refills should be requested prior to Noon on Friday. Any refill requests after noon on Friday would be addressed the following Monday.    MEDICATION SAFETY ISSUES:   Do not drive under the influence of narcotics (including opioids), watch for adverse effects including confusion / altered mental status / respiratory depression (slowed breathing), keep medications stored in a safe/locked environment, do not use alcohol while opioids or other narcotics are in your system. Do not travel with more than the minimum number of tablets or capsules required for the trip.

## 2024-10-09 NOTE — ASSESSMENT & PLAN NOTE
Emotional / psychosocial support provided today.  ACP: [facility] Advanced Directive on file, reviewed today.  Reviewed notes (Medical Oncology, PCP), labs (8/13/24  Cr 0.71, alb 4.2, , Hb 14.0), imaging + procedures (8/14/24 [facility]). See below for more data.  Return in about 6 months (around 4/9/2025). Will need [place] MMJ re-certification at that time.  Medication safety issues addressed - no driving under the influence of narcotics (including opioids), watch for adverse effects including AMS or respiratory depression (slowed breathing), keep medications stored in a safe/locked environment, do not use alcohol while opioids or other narcotics are in one's system, do not travel with more than the minimum number of tablets or capsules required for the trip.  I have personally queried the patient's controlled substance dispensing history in the Prescription Drug Monitoring Program in compliance with regulations before I have prescribed any controlled substances. The prescription history is consistent with prescribed therapy and our practice policies.

## 2024-10-09 NOTE — ASSESSMENT & PLAN NOTE
Patient continues to report episodic but regular cancer-related pain, focused in her spinal area, occasionally radiating into her left shoulder. She is using oxycodone approximately once per day and tolerating this therapy well.  May use MMJ to help with chronic pain.  Continue oxycodone IR PRN.  Morphine ER was used in the past, though was discontinued and the patient has not yet felt the need to resume.

## 2024-10-09 NOTE — ASSESSMENT & PLAN NOTE
Carcinoma of the R breast (diagnosed 3/29/22) metastatic to bone, s/p RT to spine. Ongoing therapy with pertuzumab + trastuzumab. Tolerating treatments well. Continue disease-directed cares.

## 2024-10-09 NOTE — PROGRESS NOTES
Ambulatory Visit  Name: Nayeli Love      : 1975      MRN: [id number]  Encounter Provider: Pratik Garcia MD  Encounter Date: 10/9/2024   Encounter department: [facility]    Assessment & Plan   1. Malignant neoplasm of overlapping sites of right breast in female, estrogen receptor negative (HCC)  Assessment & Plan:  Carcinoma of the R breast (diagnosed 3/29/22) metastatic to bone, s/p RT to spine. Ongoing therapy with pertuzumab + trastuzumab. Tolerating treatments well. Continue disease-directed cares.  2. Malignant neoplasm metastatic to bone (HCC)  3. Cancer associated pain  Assessment & Plan:  Patient continues to report episodic but regular cancer-related pain, focused in her spinal area, occasionally radiating into her left shoulder. She is using oxycodone approximately once per day and tolerating this therapy well.  May use MMJ to help with chronic pain.  Continue oxycodone IR PRN.  Morphine ER was used in the past, though was discontinued and the patient has not yet felt the need to resume.  4. Medical marijuana use  Assessment & Plan:  Initially certified for MMJ in the state [facility] on 22 by Dr. Ulrich, for cancer-related pain. Mostly simply recertified by me on 24 for assistance with chronic cancer-related pain, insomnia. May continue MMJ use, she finds it helpful for symptom control.  5. Palliative care patient  Assessment & Plan:  Emotional / psychosocial support provided today.  ACP: [facility] Advanced Directive on file, reviewed today.  Reviewed notes (Medical Oncology, PCP), labs (24  Cr 0.71, alb 4.2, , Hb 14.0), imaging + procedures (24 [facility]AP). See below for more data.  Return in about 6 months (around 2025). Will need PA MMJ re-certification at that time.  Medication safety issues addressed - no driving under the influence of narcotics (including opioids), watch for adverse effects including AMS or respiratory depression (slowed  "breathing), keep medications stored in a safe/locked environment, do not use alcohol while opioids or other narcotics are in one's system, do not travel with more than the minimum number of tablets or capsules required for the trip.  I have personally queried the patient's controlled substance dispensing history in the Prescription Drug Monitoring Program in compliance with regulations before I have prescribed any controlled substances. The prescription history is consistent with prescribed therapy and our practice policies.    6. Chronic, continuous use of opioids  -     naloxone (NARCAN) 4 mg/0.1 mL nasal spray; Administer 1 spray into a nostril. If no response after 2-3 minutes, give another dose in the other nostril using a new spray.  7. Neoplastic malignant related fatigue  Assessment & Plan:  Patient reports her energy levels and exercise tolerance have improved.      Subjective   Chief Complaint   Patient presents with    Follow-up    Cancer    Pain    Counseling        History of Present Illness     Nayeli Love is a 49 y.o. female w/ carcinoma of the R breast (diagnosed 3/29/22) metastatic to bone, s/p RT to spine; cancer-related pain. She follows w/ Dr Phillips (Medical Oncology). Plan includes systemic therapy with pertuzumab + trastuzumab.    Patient states she has been doing well in recent months. She reports MMJ gummies (at half dose) are helpful for sleep. She is also used MMJ for pain control in the past with good results. She states \"I sleep good\". She reports regular bowel movements without medications. She denies recent nausea, denies recent vomiting. Her mood is positive. She feels her energy levels have improved and she walks about 1 mile with her dog once or twice daily.    Patient states that she has ongoing cancer related pain in her cervical spine, occasionally radiating into her left shoulder. This pain increases with activity. 2/10 in intensity at time of visit today. She states she " "is taking 1 full tab of oxycodone IR 10 mg about once per day on average, with good results. She reports opioid use improves her quality of life.      Current Outpatient Medications:     acetaminophen (TYLENOL) 325 mg tablet, Take 650 mg by mouth every 6 (six) hours as needed for mild pain, Disp: , Rfl:     al mag oxide-diphenhydramine-lidocaine viscous (MAGIC MOUTHWASH) 1:1:1 suspension, Swish and spit 10 mL if needed for mouth pain or discomfort, Disp: , Rfl:     lisinopril (ZESTRIL) 20 mg tablet, Take 0.5 tablets (10 mg total) by mouth daily Only to take as needed when BP is >150 systolicallly, Disp: 90 tablet, Rfl: 1    moxifloxacin (VIGAMOX) 0.5 % ophthalmic solution, INSTILL 1 DROP IN RIGHT EYE EVERY HOUR, Disp: , Rfl:     naloxone (NARCAN) 4 mg/0.1 mL nasal spray, Administer 1 spray into a nostril. If no response after 2-3 minutes, give another dose in the other nostril using a new spray., Disp: 1 each, Rfl: 1    nystatin powder, APPLY TOPICALLY THREE TIMES DAILY, Disp: 15 g, Rfl: 1    oxyCODONE (ROXICODONE) 10 MG TABS, Take 0.5-1 tablets (5-10 mg total) by mouth every 4 (four) hours as needed for moderate pain or severe pain Max Daily Amount: 60 mg, Disp: 120 tablet, Rfl: 0    pantoprazole (PROTONIX) 20 mg tablet, Take 1 tablet (20 mg total) by mouth daily in the early morning, Disp: 30 tablet, Rfl: 11    ondansetron (ZOFRAN) 4 mg tablet, Take 1 tablet (4 mg total) by mouth every 8 (eight) hours as needed for nausea or vomiting (Patient not taking: Reported on 7/16/2024), Disp: 9 tablet, Rfl: 20    Objective   /62 (BP Location: Left arm, Patient Position: Sitting, Cuff Size: Standard)   Pulse 102   Temp 98.6 °F (37 °C) (Temporal)   Ht 5' 4\" (1.626 m)   Wt 94.7 kg (208 lb 12.8 oz)   LMP 03/28/2022 (Approximate)   SpO2 97%   BMI 35.84 kg/m²   Physical Exam  Vitals reviewed.   Constitutional:       General: She is awake. She is not in acute distress.     Appearance: She is well-groomed and " overweight. She is not toxic-appearing.   HENT:      Head: Normocephalic and atraumatic.      Right Ear: External ear normal.      Left Ear: External ear normal.   Eyes:      General: No scleral icterus.        Right eye: No discharge.         Left eye: No discharge.      Extraocular Movements: Extraocular movements intact.      Conjunctiva/sclera: Conjunctivae normal.      Pupils: Pupils are equal, round, and reactive to light.   Cardiovascular:      Rate and Rhythm: Tachycardia present.   Pulmonary:      Effort: Pulmonary effort is normal. No tachypnea, bradypnea, accessory muscle usage or respiratory distress.      Comments: Able to speak comfortably in complete sentences on room air at rest.  Abdominal:      General: There is no distension.      Tenderness: There is no guarding.   Musculoskeletal:         General: Swelling (Mild dependent edema to bilateral ankles.) present.      Cervical back: Decreased range of motion (chronically).   Skin:     General: Skin is dry.      Coloration: Skin is not pale.   Neurological:      Mental Status: She is alert and oriented to person, place, and time.      Cranial Nerves: No dysarthria or facial asymmetry.      Comments: Using no assistive devices for ambulation.   Psychiatric:         Attention and Perception: Attention normal.         Mood and Affect: Mood and affect normal.         Speech: Speech normal.         Behavior: Behavior normal. Behavior is cooperative.         Thought Content: Thought content normal.         Cognition and Memory: Cognition and memory normal.         Judgment: Judgment normal.      Comments: Positive mood.          Recent labs:  Lab Results   Component Value Date/Time    SODIUM 144 08/13/2024 08:18 AM    SODIUM 138 10/12/2018 10:18 PM    K 4.5 08/13/2024 08:18 AM    K 4.4 10/12/2018 10:18 PM    BUN 13 08/13/2024 08:18 AM    BUN 11 10/12/2018 10:18 PM    CREATININE 0.71 08/13/2024 08:18 AM    CREATININE 1.03 10/12/2018 10:18 PM    GLUC 108  05/07/2024 07:36 AM    GLUC 95 10/12/2018 10:18 PM    CALCIUM 10.0 08/13/2024 08:18 AM    CALCIUM 9.1 10/12/2018 10:18 PM    AST 20 08/13/2024 08:18 AM    ALT 36 08/13/2024 08:18 AM    ALB 4.2 08/13/2024 08:18 AM    TP 7.0 08/13/2024 08:18 AM    EGFR 99 08/13/2024 08:18 AM    EGFR 66 10/12/2018 10:18 PM     Lab Results   Component Value Date/Time    HGB 14.0 08/13/2024 08:18 AM    WBC 4.08 (L) 08/13/2024 08:18 AM     08/13/2024 08:18 AM    INR 0.91 11/28/2022 07:01 AM    PTT 27 11/28/2022 07:01 AM     Lab Results   Component Value Date/Time    UFB2UWAKJAUS 3.910 12/19/2023 07:22 AM       Recent Imaging:  Procedure: CT chest abdomen pelvis w contrast    Result Date: 8/15/2024  Narrative: CT CHEST, ABDOMEN AND PELVIS WITH IV CONTRAST INDICATION:   Malignant neoplasm of overlapping sites of right female breast. Estrogen receptor negative status (ER-). . COMPARISON: 5/6/2024. TECHNIQUE: CT examination of the chest, abdomen and pelvis was performed. Multiplanar 2D reformatted images were created from the source data. This examination, like all CT scans performed in the [facility], was performed utilizing techniques to minimize radiation dose exposure, including the use of iterative reconstruction and automated exposure control. Radiation dose length product (DLP) for this visit: IV Contrast: Enteric Contrast: Enteric contrast was not administered. FINDINGS: CHEST LUNGS: Stable subpleural scarring in the medial right upper lobe. No discrete pulmonary nodules. There is no tracheal or endobronchial lesion. PLEURA:  Unremarkable. Mildly elevated right hemidiaphragm. HEART/GREAT VESSELS: Heart is unremarkable for patient's age.  No thoracic aortic aneurysm. MEDIASTINUM AND ALISON:  Unremarkable. CHEST WALL AND LOWER NECK: Stable partially calcified mass in the medial right breast measures two 2.6 x 1.8 cm image 51 series 2. No axillary lymphadenopathy ABDOMEN LIVER/BILIARY TREE:  Unremarkable.  GALLBLADDER:  No calcified gallstones. No pericholecystic inflammatory change. SPLEEN:  Unremarkable. PANCREAS:  Unremarkable. ADRENAL GLANDS:  Unremarkable. KIDNEYS/URETERS: Stable renal cortical scarring bilaterally. One or more simple renal cyst(s) is noted. Stable bilateral nonobstructing renal stones.  No hydronephrosis. STOMACH AND BOWEL:  There is colonic diverticulosis without evidence of acute diverticulitis. APPENDIX:  No findings to suggest appendicitis. ABDOMINOPELVIC CAVITY:  No ascites.  No pneumoperitoneum.  No lymphadenopathy. VESSELS:  Unremarkable for patient's age. PELVIS REPRODUCTIVE ORGANS: Stable uterine fibroid. Stable thin-walled left ovarian cyst measuring 6.8 x 6.1 cm without mural nodularity or septations URINARY BLADDER:  Unremarkable. ABDOMINAL WALL/INGUINAL REGIONS:  There is a small fat-containing umbilical hernia. OSSEOUS STRUCTURES: Stable sclerotic lesions throughout the appendicular and axial skeleton.     Impression: 1. No significant change since previous study. Stable osseous metastatic disease. No thoracic or intra-abdominal lymphadenopathy. 2. Stable thin-walled left ovarian cystic lesion without mural nodularity. Leading differential diagnosis includes cystadenoma 3. Bilateral nonobstructing renal stones. Electronically signed: 08/15/2024 06:58 AM Carlin Fuentes MD    Procedure: Echo follow up/limited w/ contrast if indicated    Result Date: 8/9/2024  Narrative:   Left Ventricle: The left ventricular ejection fraction is 65%. Global longitudinal strain is normal at -20% at  a rate of 81 b/m.   Right Ventricle: Right ventricular cavity size is dilated. Systolic function is normal.      23 minutes total time spent on 10/9/2024 in caring for this patient including obtaining/reviewing history, symptom assessment and management, medication review / adjustment, psychosocial support, reviewing / ordering tests, medicines, imaging, procedures, advanced directives, medical marijuana,  "supportive listening, anticipatory guidance, risks/benefits of treatment(s), risk factor reduction, and documenting in the medical record. All patient/family questions were answered during this discussion.    Pratik Garcia MD  [facility]  [phone]    Portions of this document may have been created using dictation software and as such some \"sound alike\" terms may have been generated by the system. Do not hesitate to contact me with any questions or clarifications.   "

## 2024-10-11 ENCOUNTER — HOSPITAL ENCOUNTER (OUTPATIENT)
Dept: INFUSION CENTER | Facility: CLINIC | Age: 49
End: 2024-10-11
Payer: COMMERCIAL

## 2024-10-11 VITALS
SYSTOLIC BLOOD PRESSURE: 123 MMHG | WEIGHT: 207.5 LBS | BODY MASS INDEX: 35.42 KG/M2 | DIASTOLIC BLOOD PRESSURE: 85 MMHG | OXYGEN SATURATION: 95 % | TEMPERATURE: 97.7 F | HEART RATE: 83 BPM | HEIGHT: 64 IN

## 2024-10-11 DIAGNOSIS — C79.51 MALIGNANT NEOPLASM METASTATIC TO BONE (HCC): ICD-10-CM

## 2024-10-11 DIAGNOSIS — C50.811 MALIGNANT NEOPLASM OF OVERLAPPING SITES OF RIGHT BREAST IN FEMALE, ESTROGEN RECEPTOR NEGATIVE (HCC): ICD-10-CM

## 2024-10-11 DIAGNOSIS — Z17.1 MALIGNANT NEOPLASM OF OVERLAPPING SITES OF RIGHT BREAST IN FEMALE, ESTROGEN RECEPTOR NEGATIVE (HCC): ICD-10-CM

## 2024-10-11 DIAGNOSIS — D70.1 CHEMOTHERAPY-INDUCED NEUTROPENIA (HCC): Primary | ICD-10-CM

## 2024-10-11 DIAGNOSIS — T45.1X5A CHEMOTHERAPY-INDUCED NEUTROPENIA (HCC): Primary | ICD-10-CM

## 2024-10-11 PROCEDURE — 96413 CHEMO IV INFUSION 1 HR: CPT

## 2024-10-11 PROCEDURE — 96417 CHEMO IV INFUS EACH ADDL SEQ: CPT

## 2024-10-11 RX ORDER — SODIUM CHLORIDE 9 MG/ML
20 INJECTION, SOLUTION INTRAVENOUS ONCE
Status: COMPLETED | OUTPATIENT
Start: 2024-10-11 | End: 2024-10-11

## 2024-10-11 RX ADMIN — PERTUZUMAB 420 MG: 30 INJECTION, SOLUTION, CONCENTRATE INTRAVENOUS at 09:04

## 2024-10-11 RX ADMIN — TRASTUZUMAB 558 MG: 150 INJECTION, POWDER, LYOPHILIZED, FOR SOLUTION INTRAVENOUS at 09:47

## 2024-10-11 RX ADMIN — SODIUM CHLORIDE 20 ML/HR: 0.9 INJECTION, SOLUTION INTRAVENOUS at 08:45

## 2024-10-26 RX ORDER — SODIUM CHLORIDE 9 MG/ML
20 INJECTION, SOLUTION INTRAVENOUS ONCE
OUTPATIENT
Start: 2024-10-31

## 2024-11-01 ENCOUNTER — HOSPITAL ENCOUNTER (OUTPATIENT)
Dept: INFUSION CENTER | Facility: CLINIC | Age: 49
End: 2024-11-01
Payer: COMMERCIAL

## 2024-11-01 VITALS
SYSTOLIC BLOOD PRESSURE: 115 MMHG | HEART RATE: 87 BPM | TEMPERATURE: 98.4 F | HEIGHT: 64 IN | WEIGHT: 204.5 LBS | BODY MASS INDEX: 34.91 KG/M2 | RESPIRATION RATE: 18 BRPM | DIASTOLIC BLOOD PRESSURE: 79 MMHG | OXYGEN SATURATION: 96 %

## 2024-11-01 DIAGNOSIS — C79.51 MALIGNANT NEOPLASM METASTATIC TO BONE (HCC): ICD-10-CM

## 2024-11-01 DIAGNOSIS — C50.811 MALIGNANT NEOPLASM OF OVERLAPPING SITES OF RIGHT BREAST IN FEMALE, ESTROGEN RECEPTOR NEGATIVE (HCC): ICD-10-CM

## 2024-11-01 DIAGNOSIS — Z17.1 MALIGNANT NEOPLASM OF OVERLAPPING SITES OF RIGHT BREAST IN FEMALE, ESTROGEN RECEPTOR NEGATIVE (HCC): ICD-10-CM

## 2024-11-01 DIAGNOSIS — T45.1X5A CHEMOTHERAPY-INDUCED NEUTROPENIA (HCC): Primary | ICD-10-CM

## 2024-11-01 DIAGNOSIS — D70.1 CHEMOTHERAPY-INDUCED NEUTROPENIA (HCC): Primary | ICD-10-CM

## 2024-11-01 PROCEDURE — 96413 CHEMO IV INFUSION 1 HR: CPT

## 2024-11-01 PROCEDURE — 96417 CHEMO IV INFUS EACH ADDL SEQ: CPT

## 2024-11-01 RX ORDER — SODIUM CHLORIDE 9 MG/ML
20 INJECTION, SOLUTION INTRAVENOUS ONCE
Status: COMPLETED | OUTPATIENT
Start: 2024-11-01 | End: 2024-11-01

## 2024-11-01 RX ADMIN — PERTUZUMAB 420 MG: 30 INJECTION, SOLUTION, CONCENTRATE INTRAVENOUS at 08:24

## 2024-11-01 RX ADMIN — SODIUM CHLORIDE 20 ML/HR: 9 INJECTION, SOLUTION INTRAVENOUS at 08:00

## 2024-11-01 RX ADMIN — TRASTUZUMAB 558 MG: 150 INJECTION, POWDER, LYOPHILIZED, FOR SOLUTION INTRAVENOUS at 08:59

## 2024-11-01 NOTE — PROGRESS NOTES
Pt here for perjeta/herceptin, offers no complaints, no labs ordered for treatment, resting in chair

## 2024-11-01 NOTE — PROGRESS NOTES
Pt tolerated infusion, offers no complaints, next treatment scheduled for 11/22 at 0800, declined AVS

## 2024-11-11 ENCOUNTER — TELEPHONE (OUTPATIENT)
Age: 49
End: 2024-11-11

## 2024-11-11 DIAGNOSIS — G89.3 CANCER ASSOCIATED PAIN: ICD-10-CM

## 2024-11-11 DIAGNOSIS — Z51.5 PALLIATIVE CARE PATIENT: ICD-10-CM

## 2024-11-11 DIAGNOSIS — C50.811 MALIGNANT NEOPLASM OF OVERLAPPING SITES OF RIGHT BREAST IN FEMALE, ESTROGEN RECEPTOR NEGATIVE (HCC): Primary | ICD-10-CM

## 2024-11-11 DIAGNOSIS — Z17.1 MALIGNANT NEOPLASM OF OVERLAPPING SITES OF RIGHT BREAST IN FEMALE, ESTROGEN RECEPTOR NEGATIVE (HCC): Primary | ICD-10-CM

## 2024-11-11 DIAGNOSIS — C79.51 MALIGNANT NEOPLASM METASTATIC TO BONE (HCC): Primary | ICD-10-CM

## 2024-11-11 RX ORDER — OXYCODONE HYDROCHLORIDE 10 MG/1
5-10 TABLET ORAL EVERY 4 HOURS PRN
Qty: 120 TABLET | Refills: 0 | Status: SHIPPED | OUTPATIENT
Start: 2024-11-11

## 2024-11-11 NOTE — TELEPHONE ENCOUNTER
Pt calling asking if you want her to have a   Ca 27.29 lab ordered for her next visit?    If so, please add as she will be going to the lab [place].    Thank you,  JOSELUIS Killian

## 2024-11-11 NOTE — TELEPHONE ENCOUNTER
Medication: oxycodone 10mg  PDMP   08/21/2024 08/21/2024 oxyCODONE HCL IR 10 MG TAB (Tablet) 120.0 20 10 MG 90.0 KARMA ARZATE  06/01/2024 05/31/2024 oxyCODONE HCL (Tablet) 120.0 20 10 MG 90.0 KARMA ARZATE

## 2024-11-11 NOTE — TELEPHONE ENCOUNTER
Reason for call:   [x] Refill   [] Prior Auth  [] Other:     Office:   [] PCP/Provider -   [x] Specialty/Provider -     oxyCODONE (ROXICODONE) 10 MG TABS 5-10 mg, Oral, Every 4 hours PRN       Quantity: 30    Pharmacy: [facility]    Does the patient have enough for 3 days?   [] Yes   [x] No - Send as HP to POD

## 2024-11-11 NOTE — TELEPHONE ENCOUNTER
Patient called because she has her AWV tomorrow and she wants to know if her provider needs her to get any lab work done before she comes in or if she is ok with the previous lab work she had done. Please call patient to advise if orders will be placed. Thank you.

## 2024-11-12 ENCOUNTER — OFFICE VISIT (OUTPATIENT)
Age: 49
End: 2024-11-12
Payer: COMMERCIAL

## 2024-11-12 VITALS
HEART RATE: 96 BPM | WEIGHT: 208 LBS | DIASTOLIC BLOOD PRESSURE: 80 MMHG | RESPIRATION RATE: 18 BRPM | BODY MASS INDEX: 38.28 KG/M2 | TEMPERATURE: 98 F | OXYGEN SATURATION: 96 % | HEIGHT: 62 IN | SYSTOLIC BLOOD PRESSURE: 126 MMHG

## 2024-11-12 DIAGNOSIS — Z00.00 ANNUAL PHYSICAL EXAM: Primary | ICD-10-CM

## 2024-11-12 DIAGNOSIS — E66.811 CLASS 1 OBESITY DUE TO EXCESS CALORIES WITH SERIOUS COMORBIDITY AND BODY MASS INDEX (BMI) OF 32.0 TO 32.9 IN ADULT: ICD-10-CM

## 2024-11-12 DIAGNOSIS — R21 RASH: ICD-10-CM

## 2024-11-12 DIAGNOSIS — C50.811 MALIGNANT NEOPLASM OF OVERLAPPING SITES OF RIGHT BREAST IN FEMALE, ESTROGEN RECEPTOR NEGATIVE (HCC): ICD-10-CM

## 2024-11-12 DIAGNOSIS — K21.9 GASTROESOPHAGEAL REFLUX DISEASE WITHOUT ESOPHAGITIS: ICD-10-CM

## 2024-11-12 DIAGNOSIS — R03.0 ELEVATED BP WITHOUT DIAGNOSIS OF HYPERTENSION: ICD-10-CM

## 2024-11-12 DIAGNOSIS — K90.0 TRANSIENT GLUTEN SENSITIVITY: ICD-10-CM

## 2024-11-12 DIAGNOSIS — Z17.1 MALIGNANT NEOPLASM OF OVERLAPPING SITES OF RIGHT BREAST IN FEMALE, ESTROGEN RECEPTOR NEGATIVE (HCC): ICD-10-CM

## 2024-11-12 DIAGNOSIS — E66.09 CLASS 1 OBESITY DUE TO EXCESS CALORIES WITH SERIOUS COMORBIDITY AND BODY MASS INDEX (BMI) OF 32.0 TO 32.9 IN ADULT: ICD-10-CM

## 2024-11-12 DIAGNOSIS — I10 ESSENTIAL HYPERTENSION: ICD-10-CM

## 2024-11-12 DIAGNOSIS — E78.5 DYSLIPIDEMIA: ICD-10-CM

## 2024-11-12 DIAGNOSIS — Z12.11 SCREENING FOR COLON CANCER: ICD-10-CM

## 2024-11-12 PROCEDURE — 99396 PREV VISIT EST AGE 40-64: CPT | Performed by: INTERNAL MEDICINE

## 2024-11-12 RX ORDER — NYSTATIN 100000 [USP'U]/G
POWDER TOPICAL 2 TIMES DAILY
Qty: 15 G | Refills: 1 | Status: SHIPPED | OUTPATIENT
Start: 2024-11-12

## 2024-11-12 RX ORDER — PANTOPRAZOLE SODIUM 20 MG/1
20 TABLET, DELAYED RELEASE ORAL
Qty: 30 TABLET | Refills: 11 | Status: SHIPPED | OUTPATIENT
Start: 2024-11-12

## 2024-11-12 RX ORDER — LISINOPRIL 20 MG/1
10 TABLET ORAL DAILY
Qty: 90 TABLET | Refills: 1 | Status: SHIPPED | OUTPATIENT
Start: 2024-11-12

## 2024-11-12 NOTE — PROGRESS NOTES
Adult Annual Physical  Name: Nayeli Love      : 1975      MRN: [id number]  Encounter Provider: Jerrod Arreguin MD  Encounter Date: 2024   Encounter department: [facility]    Assessment & Plan  Annual physical exam         Essential hypertension         Malignant neoplasm of overlapping sites of right breast in female, estrogen receptor negative (HCC)         Class 1 obesity due to excess calories with serious comorbidity and body mass index (BMI) of 32.0 to 32.9 in adult           Dyslipidemia    Orders:    Lipid panel; Future    Elevated BP without diagnosis of hypertension    Orders:    lisinopril (ZESTRIL) 20 mg tablet; Take 0.5 tablets (10 mg total) by mouth daily Only to take as needed when BP is >150 systolicallly    Gastroesophageal reflux disease without esophagitis    Orders:    pantoprazole (PROTONIX) 20 mg tablet; Take 1 tablet (20 mg total) by mouth daily in the early morning    Rash    Orders:    nystatin powder; Apply topically 2 (two) times a day    Screening for colon cancer    Orders:    Ambulatory Referral to Gastroenterology; Future    Transient gluten sensitivity    Orders:    Celiac Disease Panel; Future    Immunizations and preventive care screenings were discussed with patient today. Appropriate education was printed on patient's after visit summary.    Counseling:  Exercise: the importance of regular exercise/physical activity was discussed. Recommend exercise 3-5 times per week for at least 30 minutes.       Depression Screening and Follow-up Plan: Patient was screened for depression during today's encounter. They screened negative with a PHQ-2 score of 0.        History of Present Illness     Adult Annual Physical:  Patient presents for annual physical.     Diet and Physical Activity:  - Diet/Nutrition: well balanced diet and consuming 3-5 servings of fruits/vegetables daily.  - Exercise: no formal exercise, walking, 30-60 minutes on  average and 1-2 times a week on average.    Depression Screening:  - PHQ-2 Score: 0    General Health:  - Sleep: sleeps well and 7-8 hours of sleep on average.  - Hearing: normal hearing bilateral ears.  - Vision: no vision problems and goes for regular eye exams.  - Dental: brushes teeth twice daily and regular dental visits.    /GYN Health:  - Follows with GYN: yes.   - History of STDs: no    Advanced Care Planning:  - Has an advanced directive?: no    - Has a durable medical POA?: no    - ACP document given to patient?: no      Review of Systems   Constitutional:  Negative for appetite change, chills, diaphoresis, fatigue, fever and unexpected weight change.   Respiratory:  Negative for apnea, cough, choking, chest tightness, shortness of breath, wheezing and stridor.    Cardiovascular:  Negative for chest pain, palpitations and leg swelling.   Gastrointestinal:  Negative for abdominal distention, abdominal pain, anal bleeding, blood in stool, constipation, diarrhea, nausea and vomiting.   Genitourinary:  Negative for decreased urine volume, difficulty urinating, frequency and urgency.   Musculoskeletal:  Negative for arthralgias, back pain and myalgias.   Neurological:  Negative for dizziness, light-headedness, numbness and headaches.     Medical History Reviewed by provider this encounter:  Tobacco  Allergies  Meds  Problems  Med Hx  Surg Hx  Fam Hx       Past Medical History   Past Medical History:   Diagnosis Date    Breast cancer (HCC)     Cancer (HCC)     Hypertension      History reviewed. No pertinent surgical history.  Family History   Problem Relation Age of Onset    Coronary artery disease Mother     Heart attack Father     Diabetes type II Father     Diabetes Father     Hashimoto's thyroiditis Sister      Current Outpatient Medications on File Prior to Visit   Medication Sig Dispense Refill    acetaminophen (TYLENOL) 325 mg tablet Take 650 mg by mouth every 6 (six) hours as needed for mild  pain      ondansetron (ZOFRAN) 4 mg tablet Take 1 tablet (4 mg total) by mouth every 8 (eight) hours as needed for nausea or vomiting 9 tablet 20    oxyCODONE (ROXICODONE) 10 MG TABS Take 0.5-1 tablets (5-10 mg total) by mouth every 4 (four) hours as needed for moderate pain or severe pain Max Daily Amount: 60 mg 120 tablet 0    [DISCONTINUED] lisinopril (ZESTRIL) 20 mg tablet Take 0.5 tablets (10 mg total) by mouth daily Only to take as needed when BP is >150 systolicallly 90 tablet 1    [DISCONTINUED] nystatin powder APPLY TOPICALLY THREE TIMES DAILY 15 g 1    [DISCONTINUED] pantoprazole (PROTONIX) 20 mg tablet Take 1 tablet (20 mg total) by mouth daily in the early morning 30 tablet 11    al mag oxide-diphenhydramine-lidocaine viscous (MAGIC MOUTHWASH) 1:1:1 suspension Swish and spit 10 mL if needed for mouth pain or discomfort (Patient not taking: Reported on 11/12/2024)      moxifloxacin (VIGAMOX) 0.5 % ophthalmic solution INSTILL 1 DROP IN RIGHT EYE EVERY HOUR (Patient not taking: Reported on 11/12/2024)      naloxone (NARCAN) 4 mg/0.1 mL nasal spray Administer 1 spray into a nostril. If no response after 2-3 minutes, give another dose in the other nostril using a new spray. (Patient not taking: Reported on 11/12/2024) 1 each 1     No current facility-administered medications on file prior to visit.   No Known Allergies   Current Outpatient Medications on File Prior to Visit   Medication Sig Dispense Refill    acetaminophen (TYLENOL) 325 mg tablet Take 650 mg by mouth every 6 (six) hours as needed for mild pain      ondansetron (ZOFRAN) 4 mg tablet Take 1 tablet (4 mg total) by mouth every 8 (eight) hours as needed for nausea or vomiting 9 tablet 20    oxyCODONE (ROXICODONE) 10 MG TABS Take 0.5-1 tablets (5-10 mg total) by mouth every 4 (four) hours as needed for moderate pain or severe pain Max Daily Amount: 60 mg 120 tablet 0    [DISCONTINUED] lisinopril (ZESTRIL) 20 mg tablet Take 0.5 tablets (10 mg total)  "by mouth daily Only to take as needed when BP is >150 systolicallly 90 tablet 1    [DISCONTINUED] nystatin powder APPLY TOPICALLY THREE TIMES DAILY 15 g 1    [DISCONTINUED] pantoprazole (PROTONIX) 20 mg tablet Take 1 tablet (20 mg total) by mouth daily in the early morning 30 tablet 11    al mag oxide-diphenhydramine-lidocaine viscous (MAGIC MOUTHWASH) 1:1:1 suspension Swish and spit 10 mL if needed for mouth pain or discomfort (Patient not taking: Reported on 11/12/2024)      moxifloxacin (VIGAMOX) 0.5 % ophthalmic solution INSTILL 1 DROP IN RIGHT EYE EVERY HOUR (Patient not taking: Reported on 11/12/2024)      naloxone (NARCAN) 4 mg/0.1 mL nasal spray Administer 1 spray into a nostril. If no response after 2-3 minutes, give another dose in the other nostril using a new spray. (Patient not taking: Reported on 11/12/2024) 1 each 1     No current facility-administered medications on file prior to visit.      Social History     Tobacco Use    Smoking status: Never    Smokeless tobacco: Never   Vaping Use    Vaping status: Never Used   Substance and Sexual Activity    Alcohol use: Yes     Comment: Less than 1 drink every month    Drug use: Never    Sexual activity: Not Currently       Objective     /80 (BP Location: Left arm, Patient Position: Sitting, Cuff Size: Large)   Pulse 96   Temp 98 °F (36.7 °C) (Tympanic)   Resp 18   Ht 5' 2\" (1.575 m)   Wt 94.3 kg (208 lb)   LMP 03/28/2022 (Approximate)   SpO2 96%   BMI 38.04 kg/m²     Physical Exam  Constitutional:       General: She is not in acute distress.     Appearance: Normal appearance. She is normal weight. She is not ill-appearing, toxic-appearing or diaphoretic.   Cardiovascular:      Rate and Rhythm: Normal rate and regular rhythm.      Pulses: Normal pulses.      Heart sounds: Normal heart sounds. No murmur heard.     No gallop.   Pulmonary:      Effort: Pulmonary effort is normal. No respiratory distress.      Breath sounds: Normal breath sounds. " No stridor. No wheezing, rhonchi or rales.   Chest:      Chest wall: No tenderness.   Abdominal:      General: There is no distension.      Palpations: Abdomen is soft.      Tenderness: There is no abdominal tenderness. There is no guarding.   Musculoskeletal:      Right lower leg: No edema.      Left lower leg: No edema.   Neurological:      Mental Status: She is alert and oriented to person, place, and time.

## 2024-11-12 NOTE — ASSESSMENT & PLAN NOTE
Orders:    lisinopril (ZESTRIL) 20 mg tablet; Take 0.5 tablets (10 mg total) by mouth daily Only to take as needed when BP is >150 systolicallly

## 2024-11-12 NOTE — ASSESSMENT & PLAN NOTE
Orders:    pantoprazole (PROTONIX) 20 mg tablet; Take 1 tablet (20 mg total) by mouth daily in the early morning

## 2024-11-12 NOTE — PATIENT INSTRUCTIONS
"Patient Education     Routine physical for adults   The Basics   Written by the doctors and editors at [facility]   What is a physical? -- A physical is a routine visit, or \"check-up,\" with your doctor. You might also hear it called a \"wellness visit\" or \"preventive visit.\"  During each visit, the doctor will:   Ask about your physical and mental health   Ask about your habits, behaviors, and lifestyle   Do an exam   Give you vaccines if needed   Talk to you about any medicines you take   Give advice about your health   Answer your questions  Getting regular check-ups is an important part of taking care of your health. It can help your doctor find and treat any problems you have. But it's also important for preventing health problems.  A routine physical is different from a \"sick visit.\" A sick visit is when you see a doctor because of a health concern or problem. Since physicals are scheduled ahead of time, you can think about what you want to ask the doctor.  How often should I get a physical? -- It depends on your age and health. In general, for people age 21 years and older:   If you are younger than 50 years, you might be able to get a physical every 3 years.   If you are 50 years or older, your doctor might recommend a physical every year.  If you have an ongoing health condition, like diabetes or high blood pressure, your doctor will probably want to see you more often.  What happens during a physical? -- In general, each visit will include:   Physical exam - The doctor or nurse will check your height, weight, heart rate, and blood pressure. They will also look at your eyes and ears. They will ask about how you are feeling and whether you have any symptoms that bother you.   Medicines - It's a good idea to bring a list of all the medicines you take to each doctor visit. Your doctor will talk to you about your medicines and answer any questions. Tell them if you are having any side effects that bother you. You " "should also tell them if you are having trouble paying for any of your medicines.   Habits and behaviors - This includes:   Your diet   Your exercise habits   Whether you smoke, drink alcohol, or use drugs   Whether you are sexually active   Whether you feel safe at home  Your doctor will talk to you about things you can do to improve your health and lower your risk of health problems. They will also offer help and support. For example, if you want to quit smoking, they can give you advice and might prescribe medicines. If you want to improve your diet or get more physical activity, they can help you with this, too.   Lab tests, if needed - The tests you get will depend on your age and situation. For example, your doctor might want to check your:   Cholesterol   Blood sugar   Iron level   Vaccines - The recommended vaccines will depend on your age, health, and what vaccines you already had. Vaccines are very important because they can prevent certain serious or deadly infections.   Discussion of screening - \"Screening\" means checking for diseases or other health problems before they cause symptoms. Your doctor can recommend screening based on your age, risk, and preferences. This might include tests to check for:   Cancer, such as breast, prostate, cervical, ovarian, colorectal, prostate, lung, or skin cancer   Sexually transmitted infections, such as chlamydia and gonorrhea   Mental health conditions like depression and anxiety  Your doctor will talk to you about the different types of screening tests. They can help you decide which screenings to have. They can also explain what the results might mean.   Answering questions - The physical is a good time to ask the doctor or nurse questions about your health. If needed, they can refer you to other doctors or specialists, too.  Adults older than 65 years often need other care, too. As you get older, your doctor will talk to you about:   How to prevent falling at " home   Hearing or vision tests   Memory testing   How to take your medicines safely   Making sure that you have the help and support you need at home  All topics are updated as new evidence becomes available and our peer review process is complete.  This topic retrieved from [employer] on: May 02, 2024.  Topic 360686 Version 1.0  Release: 32.4.3 - C32.122  © 2024 UpToDate, Inc. and/or its affiliates. All rights reserved.  Consumer Information Use and Disclaimer   Disclaimer: This generalized information is a limited summary of diagnosis, treatment, and/or medication information. It is not meant to be comprehensive and should be used as a tool to help the user understand and/or assess potential diagnostic and treatment options. It does NOT include all information about conditions, treatments, medications, side effects, or risks that may apply to a specific patient. It is not intended to be medical advice or a substitute for the medical advice, diagnosis, or treatment of a health care provider based on the health care provider's examination and assessment of a patient's specific and unique circumstances. Patients must speak with a health care provider for complete information about their health, medical questions, and treatment options, including any risks or benefits regarding use of medications. This information does not endorse any treatments or medications as safe, effective, or approved for treating a specific patient. UpToDate, Inc. and its affiliates disclaim any warranty or liability relating to this information or the use thereof.The use of this information is governed by the Terms of Use, available at https://www.[employer].com/en/know/clinical-effectiveness-terms. 2024© UpToDate, Inc. and its affiliates and/or licensors. All rights reserved.  Copyright   © 2024 UpToDate, Inc. and/or its affiliates. All rights reserved.

## 2024-11-14 ENCOUNTER — APPOINTMENT (OUTPATIENT)
Dept: LAB | Facility: CLINIC | Age: 49
End: 2024-11-14
Payer: COMMERCIAL

## 2024-11-14 DIAGNOSIS — E78.5 DYSLIPIDEMIA: ICD-10-CM

## 2024-11-14 DIAGNOSIS — Z17.1 MALIGNANT NEOPLASM OF OVERLAPPING SITES OF RIGHT BREAST IN FEMALE, ESTROGEN RECEPTOR NEGATIVE (HCC): ICD-10-CM

## 2024-11-14 DIAGNOSIS — K90.0 TRANSIENT GLUTEN SENSITIVITY: ICD-10-CM

## 2024-11-14 DIAGNOSIS — C50.811 MALIGNANT NEOPLASM OF OVERLAPPING SITES OF RIGHT BREAST IN FEMALE, ESTROGEN RECEPTOR NEGATIVE (HCC): ICD-10-CM

## 2024-11-14 DIAGNOSIS — C79.51 MALIGNANT NEOPLASM METASTATIC TO BONE (HCC): ICD-10-CM

## 2024-11-14 LAB
BASOPHILS # BLD AUTO: 0.02 THOUSANDS/ÂΜL (ref 0–0.1)
BASOPHILS NFR BLD AUTO: 1 % (ref 0–1)
CHOLEST SERPL-MCNC: 168 MG/DL (ref ?–200)
EOSINOPHIL # BLD AUTO: 0.09 THOUSAND/ÂΜL (ref 0–0.61)
EOSINOPHIL NFR BLD AUTO: 2 % (ref 0–6)
ERYTHROCYTE [DISTWIDTH] IN BLOOD BY AUTOMATED COUNT: 12.3 % (ref 11.6–15.1)
GLIADIN PEPTIDE IGA SER-ACNC: <0.2 U/ML
GLIADIN PEPTIDE IGA SER-ACNC: NEGATIVE
GLIADIN PEPTIDE IGG SER-ACNC: <0.4 U/ML
GLIADIN PEPTIDE IGG SER-ACNC: NEGATIVE
HCT VFR BLD AUTO: 42.2 % (ref 34.8–46.1)
HDLC SERPL-MCNC: 41 MG/DL
HGB BLD-MCNC: 13.8 G/DL (ref 11.5–15.4)
IGA SERPL-MCNC: 104 MG/DL (ref 66–433)
IMM GRANULOCYTES # BLD AUTO: 0.01 THOUSAND/UL (ref 0–0.2)
IMM GRANULOCYTES NFR BLD AUTO: 0 % (ref 0–2)
LDLC SERPL CALC-MCNC: 106 MG/DL (ref 0–100)
LYMPHOCYTES # BLD AUTO: 1.13 THOUSANDS/ÂΜL (ref 0.6–4.47)
LYMPHOCYTES NFR BLD AUTO: 28 % (ref 14–44)
MCH RBC QN AUTO: 32.1 PG (ref 26.8–34.3)
MCHC RBC AUTO-ENTMCNC: 32.7 G/DL (ref 31.4–37.4)
MCV RBC AUTO: 98 FL (ref 82–98)
MONOCYTES # BLD AUTO: 0.43 THOUSAND/ÂΜL (ref 0.17–1.22)
MONOCYTES NFR BLD AUTO: 11 % (ref 4–12)
NEUTROPHILS # BLD AUTO: 2.38 THOUSANDS/ÂΜL (ref 1.85–7.62)
NEUTS SEG NFR BLD AUTO: 58 % (ref 43–75)
NONHDLC SERPL-MCNC: 127 MG/DL
NRBC BLD AUTO-RTO: 0 /100 WBCS
PLATELET # BLD AUTO: 217 THOUSANDS/UL (ref 149–390)
PMV BLD AUTO: 9.7 FL (ref 8.9–12.7)
RBC # BLD AUTO: 4.3 MILLION/UL (ref 3.81–5.12)
TRIGL SERPL-MCNC: 103 MG/DL (ref ?–150)
TTG IGA SER-ACNC: <0.5 U/ML
TTG IGA SER-ACNC: NEGATIVE
TTG IGG SER-ACNC: 0.9 U/ML
TTG IGG SER-ACNC: NEGATIVE
WBC # BLD AUTO: 4.06 THOUSAND/UL (ref 4.31–10.16)

## 2024-11-14 PROCEDURE — 80061 LIPID PANEL: CPT

## 2024-11-14 PROCEDURE — 85025 COMPLETE CBC W/AUTO DIFF WBC: CPT

## 2024-11-14 PROCEDURE — 36415 COLL VENOUS BLD VENIPUNCTURE: CPT

## 2024-11-14 PROCEDURE — 86364 TISS TRNSGLTMNASE EA IG CLAS: CPT

## 2024-11-14 PROCEDURE — 86258 DGP ANTIBODY EACH IG CLASS: CPT

## 2024-11-14 PROCEDURE — 86300 IMMUNOASSAY TUMOR CA 15-3: CPT

## 2024-11-14 PROCEDURE — 82784 ASSAY IGA/IGD/IGG/IGM EACH: CPT

## 2024-11-15 ENCOUNTER — RESULTS FOLLOW-UP (OUTPATIENT)
Age: 49
End: 2024-11-15

## 2024-11-15 ENCOUNTER — HOSPITAL ENCOUNTER (OUTPATIENT)
Dept: CT IMAGING | Facility: HOSPITAL | Age: 49
Discharge: HOME | End: 2024-11-15
Attending: INTERNAL MEDICINE
Payer: COMMERCIAL

## 2024-11-15 ENCOUNTER — HOSPITAL ENCOUNTER (OUTPATIENT)
Dept: NON INVASIVE DIAGNOSTICS | Facility: CLINIC | Age: 49
Discharge: HOME | End: 2024-11-15
Payer: COMMERCIAL

## 2024-11-15 VITALS
DIASTOLIC BLOOD PRESSURE: 80 MMHG | SYSTOLIC BLOOD PRESSURE: 126 MMHG | HEIGHT: 62 IN | HEART RATE: 76 BPM | BODY MASS INDEX: 38.26 KG/M2 | WEIGHT: 207.89 LBS

## 2024-11-15 DIAGNOSIS — C79.51 MALIGNANT NEOPLASM METASTATIC TO BONE (HCC): ICD-10-CM

## 2024-11-15 LAB
AORTIC ROOT: 3.1 CM
APICAL FOUR CHAMBER EJECTION FRACTION: 63 %
ASCENDING AORTA: 3 CM
BSA FOR ECHO PROCEDURE: 1.94 M2
CANCER AG27-29 SERPL-ACNC: 28.6 U/ML (ref 0–38.6)
E WAVE DECELERATION TIME: 190 MS
E/A RATIO: 0.92
FRACTIONAL SHORTENING: 36 (ref 28–44)
GLOBAL LONGITUIDAL STRAIN: 18 %
INTERVENTRICULAR SEPTUM IN DIASTOLE (PARASTERNAL SHORT AXIS VIEW): 0.9 CM
INTERVENTRICULAR SEPTUM: 0.9 CM (ref 0.6–1.1)
LAAS-AP2: 10.2 CM2
LAAS-AP4: 14.1 CM2
LEFT ATRIUM AREA SYSTOLE SINGLE PLANE A4C: 13.6 CM2
LEFT ATRIUM SIZE: 3.3 CM
LEFT ATRIUM VOLUME (MOD BIPLANE): 37 ML
LEFT ATRIUM VOLUME INDEX (MOD BIPLANE): 19.1 ML/M2
LEFT INTERNAL DIMENSION IN SYSTOLE: 2.5 CM (ref 2.1–4)
LEFT VENTRICULAR INTERNAL DIMENSION IN DIASTOLE: 3.9 CM (ref 3.5–6)
LEFT VENTRICULAR POSTERIOR WALL IN END DIASTOLE: 0.9 CM
LEFT VENTRICULAR STROKE VOLUME: 42 ML
LVSV (TEICH): 42 ML
MV E'TISSUE VEL-SEP: 10 CM/S
MV PEAK A VEL: 0.78 M/S
MV PEAK E VEL: 72 CM/S
MV STENOSIS PRESSURE HALF TIME: 55 MS
MV VALVE AREA P 1/2 METHOD: 4
RA PRESSURE ESTIMATED: 3 MMHG
RIGHT ATRIUM AREA SYSTOLE A4C: 11.3 CM2
RIGHT VENTRICLE ID DIMENSION: 2.8 CM
RV PSP: 26 MMHG
SL CV LEFT ATRIUM LENGTH A2C: 3 CM
SL CV LV EF: 62
SL CV PED ECHO LEFT VENTRICLE DIASTOLIC VOLUME (MOD BIPLANE) 2D: 64 ML
SL CV PED ECHO LEFT VENTRICLE SYSTOLIC VOLUME (MOD BIPLANE) 2D: 23 ML
TR MAX PG: 23 MMHG
TR PEAK VELOCITY: 2.4 M/S
TRICUSPID ANNULAR PLANE SYSTOLIC EXCURSION: 2 CM
TRICUSPID VALVE PEAK REGURGITATION VELOCITY: 2.42 M/S

## 2024-11-15 PROCEDURE — 71260 CT THORAX DX C+: CPT

## 2024-11-15 PROCEDURE — 93325 DOPPLER ECHO COLOR FLOW MAPG: CPT

## 2024-11-15 PROCEDURE — 74177 CT ABD & PELVIS W/CONTRAST: CPT

## 2024-11-15 PROCEDURE — 93308 TTE F-UP OR LMTD: CPT

## 2024-11-15 PROCEDURE — 93321 DOPPLER ECHO F-UP/LMTD STD: CPT | Performed by: STUDENT IN AN ORGANIZED HEALTH CARE EDUCATION/TRAINING PROGRAM

## 2024-11-15 PROCEDURE — 93356 MYOCRD STRAIN IMG SPCKL TRCK: CPT | Performed by: STUDENT IN AN ORGANIZED HEALTH CARE EDUCATION/TRAINING PROGRAM

## 2024-11-15 PROCEDURE — 93321 DOPPLER ECHO F-UP/LMTD STD: CPT

## 2024-11-15 PROCEDURE — 93325 DOPPLER ECHO COLOR FLOW MAPG: CPT | Performed by: STUDENT IN AN ORGANIZED HEALTH CARE EDUCATION/TRAINING PROGRAM

## 2024-11-15 PROCEDURE — 93308 TTE F-UP OR LMTD: CPT | Performed by: STUDENT IN AN ORGANIZED HEALTH CARE EDUCATION/TRAINING PROGRAM

## 2024-11-15 RX ORDER — SODIUM CHLORIDE 9 MG/ML
20 INJECTION, SOLUTION INTRAVENOUS ONCE
Status: CANCELLED | OUTPATIENT
Start: 2024-11-22

## 2024-11-15 RX ADMIN — IOHEXOL 100 ML: 350 INJECTION, SOLUTION INTRAVENOUS at 09:16

## 2024-11-22 ENCOUNTER — HOSPITAL ENCOUNTER (OUTPATIENT)
Dept: INFUSION CENTER | Facility: CLINIC | Age: 49
End: 2024-11-22
Payer: COMMERCIAL

## 2024-11-22 VITALS
HEART RATE: 89 BPM | HEIGHT: 62 IN | TEMPERATURE: 97.3 F | SYSTOLIC BLOOD PRESSURE: 150 MMHG | BODY MASS INDEX: 38.28 KG/M2 | OXYGEN SATURATION: 97 % | DIASTOLIC BLOOD PRESSURE: 82 MMHG | RESPIRATION RATE: 18 BRPM | WEIGHT: 208 LBS

## 2024-11-22 DIAGNOSIS — T45.1X5A CHEMOTHERAPY-INDUCED NEUTROPENIA (HCC): Primary | ICD-10-CM

## 2024-11-22 DIAGNOSIS — C50.811 MALIGNANT NEOPLASM OF OVERLAPPING SITES OF RIGHT BREAST IN FEMALE, ESTROGEN RECEPTOR NEGATIVE (HCC): ICD-10-CM

## 2024-11-22 DIAGNOSIS — C79.51 MALIGNANT NEOPLASM METASTATIC TO BONE (HCC): ICD-10-CM

## 2024-11-22 DIAGNOSIS — D70.1 CHEMOTHERAPY-INDUCED NEUTROPENIA (HCC): Primary | ICD-10-CM

## 2024-11-22 DIAGNOSIS — Z17.1 MALIGNANT NEOPLASM OF OVERLAPPING SITES OF RIGHT BREAST IN FEMALE, ESTROGEN RECEPTOR NEGATIVE (HCC): ICD-10-CM

## 2024-11-22 LAB
ALBUMIN SERPL BCG-MCNC: 4.3 G/DL (ref 3.5–5)
ALP SERPL-CCNC: 55 U/L (ref 34–104)
ALT SERPL W P-5'-P-CCNC: 33 U/L (ref 7–52)
ANION GAP SERPL CALCULATED.3IONS-SCNC: 7 MMOL/L (ref 4–13)
AST SERPL W P-5'-P-CCNC: 20 U/L (ref 13–39)
BILIRUB SERPL-MCNC: 0.78 MG/DL (ref 0.2–1)
BUN SERPL-MCNC: 19 MG/DL (ref 5–25)
CALCIUM SERPL-MCNC: 9.8 MG/DL (ref 8.4–10.2)
CHLORIDE SERPL-SCNC: 107 MMOL/L (ref 96–108)
CO2 SERPL-SCNC: 24 MMOL/L (ref 21–32)
CREAT SERPL-MCNC: 0.65 MG/DL (ref 0.6–1.3)
GFR SERPL CREATININE-BSD FRML MDRD: 103 ML/MIN/1.73SQ M
GLUCOSE SERPL-MCNC: 100 MG/DL (ref 65–140)
POTASSIUM SERPL-SCNC: 4.5 MMOL/L (ref 3.5–5.3)
PROT SERPL-MCNC: 7.3 G/DL (ref 6.4–8.4)
SODIUM SERPL-SCNC: 138 MMOL/L (ref 135–147)

## 2024-11-22 PROCEDURE — 80053 COMPREHEN METABOLIC PANEL: CPT | Performed by: INTERNAL MEDICINE

## 2024-11-22 PROCEDURE — 96413 CHEMO IV INFUSION 1 HR: CPT

## 2024-11-22 PROCEDURE — 96367 TX/PROPH/DG ADDL SEQ IV INF: CPT

## 2024-11-22 PROCEDURE — 96417 CHEMO IV INFUS EACH ADDL SEQ: CPT

## 2024-11-22 RX ORDER — SODIUM CHLORIDE 9 MG/ML
20 INJECTION, SOLUTION INTRAVENOUS ONCE
Status: DISPENSED | OUTPATIENT
Start: 2024-11-22

## 2024-11-22 RX ORDER — SODIUM CHLORIDE 9 MG/ML
20 INJECTION, SOLUTION INTRAVENOUS ONCE
OUTPATIENT
Start: 2025-02-14

## 2024-11-22 RX ORDER — SODIUM CHLORIDE 9 MG/ML
20 INJECTION, SOLUTION INTRAVENOUS ONCE
Status: COMPLETED | OUTPATIENT
Start: 2024-11-22 | End: 2024-11-22

## 2024-11-22 RX ADMIN — PERTUZUMAB 420 MG: 30 INJECTION, SOLUTION, CONCENTRATE INTRAVENOUS at 09:28

## 2024-11-22 RX ADMIN — ZOLEDRONIC ACID 4 MG: 0.04 INJECTION, SOLUTION INTRAVENOUS at 10:44

## 2024-11-22 RX ADMIN — TRASTUZUMAB 558 MG: 150 INJECTION, POWDER, LYOPHILIZED, FOR SOLUTION INTRAVENOUS at 10:07

## 2024-11-22 RX ADMIN — SODIUM CHLORIDE 20 ML/HR: 0.9 INJECTION, SOLUTION INTRAVENOUS at 08:32

## 2024-11-22 NOTE — PROGRESS NOTES
Patient presents [facility] the [facility] for the treatment of Perjeta / Herceptin / Zometa . PIV placed in her Right hand with good blood return- labs drawn at this time. Labs reviewed from 11/22/2024 and within parameters for treatment. EF% is at 62% from 11/15/2024. Patient offers no concerns at this time. Patient is resting comfortably in the chair, call bell within reach.

## 2024-11-22 NOTE — PROGRESS NOTES
Patient tolerated treatment well without incident. Declined AVS. Confirmed next appointment with ordering provider for 11/25/2024 @ 8027.

## 2024-11-23 RX ORDER — SODIUM CHLORIDE 9 MG/ML
20 INJECTION, SOLUTION INTRAVENOUS ONCE
OUTPATIENT
Start: 2024-12-12

## 2024-11-23 NOTE — PROGRESS NOTES
HEMATOLOGY / ONCOLOGY CLINIC FOLLOW UP NOTE    Patient Nayeli Love  MRN: [id number]  : 1975  Date of Encounter 2024      Referring Provider:       Reason for Encounter: follow up  metastatic HER 2 breast cancer      Perjeta/Herceptin      C41 2024    C 45 2024          Oncology History   Malignant neoplasm of overlapping sites of right breast in female, estrogen receptor negative (HCC)   3/2022 Initial Diagnosis    Malignant neoplasm of overlapping sites of right breast in female, estrogen receptor negative (HCC)     3/29/2022 Biopsy    Breast, Right, Biopsy:  - Invasive mammary carcinoma of no special type (ductal, not otherwise specified), [place] Grade II (3 + 2 + 1 = 6), spanning at least 6 mm.   ER/IN negative, HER2 positive    Procedure  Needle biopsy    Specimen Laterality  Right    TUMOR   Histologic Type  Invasive carcinoma of no special type (ductal)    Histologic Grade ([place] Histologic Score)     Glandular (Acinar) / Tubular Differentiation  Score 3    Nuclear Pleomorphism  Score 2    Mitotic Rate  Score 1    Overall Grade  Grade 2 (scores of 6 or 7)    Tumor Size  Greatest dimension of largest invasive focus (Millimeters): 6 mm   Ductal Carcinoma In Situ (DCIS)  Not identified    Lymphovascular Invasion  Not identified         3/31/2022 - 2022 Radiation    Course: C1    Plan ID Energy Fractions Dose per Fraction (cGy) Dose Correction (cGy) Total Dose Delivered (cGy) Elapsed Days   C1 C7 And ix 10X/6X 7 / 7 300 0 2,100 8   C1 C7 Spine 10X/6X 3 / 10 300 0 900 4   L4 Sacrum 10X 10 / 10 300 0 3,000 13   T1 T6 And ix 10X 7 / 7 300 0 2,100 8   T1 T6 Spine 10X 3 / 10 300 0 900 4      Dr. Rutledge     2022 -  Chemotherapy    clinical trial with THP with or without atezolizumab     2022 -  Chemotherapy    pegfilgrastim (NEULASTA ONPRO), 6 mg, Subcutaneous, Once, 1 of 1 cycle  pertuzumab (PERJETA) IVPB, 840 mg, Intravenous, Once, 45 of 45 cycles  Administration:  420 mg (6/16/2022), 420 mg (7/7/2022), 420 mg (7/28/2022), 420 mg (8/18/2022), 420 mg (9/8/2022), 420 mg (9/29/2022), 420 mg (10/20/2022), 420 mg (11/17/2022), 420 mg (12/8/2022), 420 mg (12/30/2022), 420 mg (1/19/2023), 420 mg (2/9/2023), 420 mg (3/2/2023), 420 mg (3/23/2023), 420 mg (4/13/2023), 420 mg (5/4/2023), 420 mg (5/23/2023), 420 mg (6/15/2023), 420 mg (7/6/2023), 420 mg (7/25/2023), 420 mg (8/17/2023), 420 mg (9/7/2023), 420 mg (9/28/2023), 420 mg (10/19/2023), 420 mg (11/9/2023), 420 mg (1/11/2024), 420 mg (12/21/2023), 420 mg (11/30/2023), 420 mg (2/1/2024), 420 mg (2/22/2024), 420 mg (3/14/2024), 420 mg (4/4/2024), 420 mg (4/25/2024), 420 mg (5/16/2024), 420 mg (8/9/2024), 420 mg (7/18/2024), 420 mg (6/27/2024), 420 mg (6/6/2024), 420 mg (9/20/2024), 420 mg (8/30/2024), 420 mg (10/11/2024), 420 mg (11/1/2024), 420 mg (11/22/2024)  DOCEtaxel (TAXOTERE) chemo infusion, 75 mg/m2, Intravenous, Once, 3 of 3 cycles  Dose modification: 60 mg/m2 (original dose 75 mg/m2, Cycle 3, Reason: Anticipated Tolerance)  Administration: 112.8 mg (6/16/2022)  PACLItaxel (TAXOL) chemo IVPB, 80 mg/m2 = 150.6 mg (100 % of original dose 80 mg/m2), Intravenous, Once, 3 of 3 cycles  Dose modification: 80 mg/m2 (original dose 80 mg/m2, Cycle 4, Reason: Anticipated Tolerance)  Administration: 150.6 mg (7/7/2022), 150.6 mg (7/14/2022), 150.6 mg (7/21/2022), 150.6 mg (7/28/2022), 150.6 mg (8/4/2022), 150.6 mg (8/11/2022), 150.6 mg (8/18/2022), 150.6 mg (8/25/2022), 150.6 mg (9/1/2022)  trastuzumab (HERCEPTIN) chemo infusion, 8 mg/kg, Intravenous, Once, 43 of 43 cycles  Administration: 487 mg (7/28/2022), 487 mg (8/18/2022), 487 mg (9/8/2022), 487 mg (9/29/2022), 487 mg (10/20/2022), 450 mg (11/17/2022), 450 mg (12/8/2022), 450 mg (12/30/2022), 450 mg (1/19/2023), 450 mg (2/9/2023), 450 mg (3/2/2023), 450 mg (3/23/2023), 482 mg (4/13/2023), 482 mg (5/4/2023), 482 mg (5/23/2023), 482 mg (6/15/2023), 482 mg (7/6/2023), 482 mg  (7/25/2023), 482 mg (8/17/2023), 482 mg (9/7/2023), 482 mg (9/28/2023), 522 mg (10/19/2023), 523 mg (11/9/2023), 534 mg (1/11/2024), 531 mg (12/21/2023), 522 mg (11/30/2023), 534 mg (2/1/2024), 548 mg (2/22/2024), 558 mg (3/14/2024), 558 mg (4/4/2024), 558 mg (4/25/2024), 558 mg (5/16/2024), 558 mg (8/9/2024), 558 mg (7/18/2024), 558 mg (6/27/2024), 558 mg (6/6/2024), 558 mg (9/20/2024), 558 mg (8/30/2024), 558 mg (10/11/2024), 558 mg (11/1/2024), 558 mg (11/22/2024)  INV trastuzumab infusion, 6 mg/kg = 487 mg (100 % of original dose 6 mg/kg), Intravenous, Once, 2 of 2 cycles  Dose modification: 6 mg/kg (original dose 6 mg/kg, Cycle 3, Reason: Other (Must fill in a comment), Comment: investigational drug)  Administration: 487 mg (6/16/2022), 487 mg (7/7/2022)     Malignant neoplasm metastatic to bone (HCC)   3/2022 Initial Diagnosis    Osseous metastasis (HCC)     3/29/2022 Biopsy    Breast, Right, Biopsy:  - Invasive mammary carcinoma of no special type (ductal, not otherwise specified), [place] Grade II (3 + 2 + 1 = 6), spanning at least 6 mm.   ER/MN negative, HER2 positive    Procedure  Needle biopsy    Specimen Laterality  Right    TUMOR   Histologic Type  Invasive carcinoma of no special type (ductal)    Histologic Grade ([place] Histologic Score)     Glandular (Acinar) / Tubular Differentiation  Score 3    Nuclear Pleomorphism  Score 2    Mitotic Rate  Score 1    Overall Grade  Grade 2 (scores of 6 or 7)    Tumor Size  Greatest dimension of largest invasive focus (Millimeters): 6 mm   Ductal Carcinoma In Situ (DCIS)  Not identified    Lymphovascular Invasion  Not identified         3/31/2022 - 4/18/2022 Radiation    Course: C1    Plan ID Energy Fractions Dose per Fraction (cGy) Dose Correction (cGy) Total Dose Delivered (cGy) Elapsed Days   C1 C7 And ix 10X/6X 7 / 7 300 0 2,100 8   C1 C7 Spine 10X/6X 3 / 10 300 0 900 4   L4 Sacrum 10X 10 / 10 300 0 3,000 13   T1 T6 And ix 10X 7 / 7 300 0 2,100 8   T1 T6  Spine 10X 3 / 10 300 0 900 4      Dr. Rutledge     4/28/2022 -  Chemotherapy    clinical trial with THP with or without atezolizumab     4/28/2022 -  Chemotherapy    pegfilgrastim (NEULASTA ONPRO), 6 mg, Subcutaneous, Once, 1 of 1 cycle  pertuzumab (PERJETA) IVPB, 840 mg, Intravenous, Once, 45 of 45 cycles  Administration: 420 mg (6/16/2022), 420 mg (7/7/2022), 420 mg (7/28/2022), 420 mg (8/18/2022), 420 mg (9/8/2022), 420 mg (9/29/2022), 420 mg (10/20/2022), 420 mg (11/17/2022), 420 mg (12/8/2022), 420 mg (12/30/2022), 420 mg (1/19/2023), 420 mg (2/9/2023), 420 mg (3/2/2023), 420 mg (3/23/2023), 420 mg (4/13/2023), 420 mg (5/4/2023), 420 mg (5/23/2023), 420 mg (6/15/2023), 420 mg (7/6/2023), 420 mg (7/25/2023), 420 mg (8/17/2023), 420 mg (9/7/2023), 420 mg (9/28/2023), 420 mg (10/19/2023), 420 mg (11/9/2023), 420 mg (1/11/2024), 420 mg (12/21/2023), 420 mg (11/30/2023), 420 mg (2/1/2024), 420 mg (2/22/2024), 420 mg (3/14/2024), 420 mg (4/4/2024), 420 mg (4/25/2024), 420 mg (5/16/2024), 420 mg (8/9/2024), 420 mg (7/18/2024), 420 mg (6/27/2024), 420 mg (6/6/2024), 420 mg (9/20/2024), 420 mg (8/30/2024), 420 mg (10/11/2024), 420 mg (11/1/2024), 420 mg (11/22/2024)  DOCEtaxel (TAXOTERE) chemo infusion, 75 mg/m2, Intravenous, Once, 3 of 3 cycles  Dose modification: 60 mg/m2 (original dose 75 mg/m2, Cycle 3, Reason: Anticipated Tolerance)  Administration: 112.8 mg (6/16/2022)  PACLItaxel (TAXOL) chemo IVPB, 80 mg/m2 = 150.6 mg (100 % of original dose 80 mg/m2), Intravenous, Once, 3 of 3 cycles  Dose modification: 80 mg/m2 (original dose 80 mg/m2, Cycle 4, Reason: Anticipated Tolerance)  Administration: 150.6 mg (7/7/2022), 150.6 mg (7/14/2022), 150.6 mg (7/21/2022), 150.6 mg (7/28/2022), 150.6 mg (8/4/2022), 150.6 mg (8/11/2022), 150.6 mg (8/18/2022), 150.6 mg (8/25/2022), 150.6 mg (9/1/2022)  trastuzumab (HERCEPTIN) chemo infusion, 8 mg/kg, Intravenous, Once, 43 of 43 cycles  Administration: 487 mg (7/28/2022), 487 mg  (8/18/2022), 487 mg (9/8/2022), 487 mg (9/29/2022), 487 mg (10/20/2022), 450 mg (11/17/2022), 450 mg (12/8/2022), 450 mg (12/30/2022), 450 mg (1/19/2023), 450 mg (2/9/2023), 450 mg (3/2/2023), 450 mg (3/23/2023), 482 mg (4/13/2023), 482 mg (5/4/2023), 482 mg (5/23/2023), 482 mg (6/15/2023), 482 mg (7/6/2023), 482 mg (7/25/2023), 482 mg (8/17/2023), 482 mg (9/7/2023), 482 mg (9/28/2023), 522 mg (10/19/2023), 523 mg (11/9/2023), 534 mg (1/11/2024), 531 mg (12/21/2023), 522 mg (11/30/2023), 534 mg (2/1/2024), 548 mg (2/22/2024), 558 mg (3/14/2024), 558 mg (4/4/2024), 558 mg (4/25/2024), 558 mg (5/16/2024), 558 mg (8/9/2024), 558 mg (7/18/2024), 558 mg (6/27/2024), 558 mg (6/6/2024), 558 mg (9/20/2024), 558 mg (8/30/2024), 558 mg (10/11/2024), 558 mg (11/1/2024), 558 mg (11/22/2024)  INV trastuzumab infusion, 6 mg/kg = 487 mg (100 % of original dose 6 mg/kg), Intravenous, Once, 2 of 2 cycles  Dose modification: 6 mg/kg (original dose 6 mg/kg, Cycle 3, Reason: Other (Must fill in a comment), Comment: investigational drug)  Administration: 487 mg (6/16/2022), 487 mg (7/7/2022)           Assessment / Plan:     Metastatic breast cancer, grade 2, ER negative, DE negative, HER2 3+ disease.  Diagnosed in March 2022.      Metastatic disease to the bone     Nayeli Love is a 48-year-old premenopausal female with metastatic breast cancer, grade 2, ER negative, DE negative, HER2 3+ disease.  She has large fungating mass in her right breast, palpable right axillary adenopathy as well as diffuse osseous metastasis with C4 stenosis.  She completed palliative radiation therapy to the spine.  She was treated with taxane, trastuzumab and pertuzumab x6 cycles, resulting in good partial response.  She is currently on trastuzumab and pertuzumab with excellent tolerance.       At her last visit 3 months ago, the patient continued to do well without any clinical evidence of progressive disease.  She will continue trastuzumab and  pertuzumab every 3 weeks.  She will stay on Zometa every 3 months. Her most recent CA 27.29 from 4/4/2023 was stable at 34.9.  She will repeat tumor marker now.  Her most recent CT scan from 10/12/2023 notes interval decrease in the size of right breast mass and axillary lymph node.  Incidental finding of indeterminate density lesion in the right kidney.       CT CAP 2/6/2024 is stable; ECHO unchanged with LVEF 65%     Continue with Herceptin/Perjeta every 3 weeks; zometa every 3 months and follow up 3 months     5/14/2024     Continue Herceptin/Perjeta every 3 weeks     CT 5/6/2024 stable without progression     US pelvis to be done based on adnexal left nodule noted on scan     ECHO done 5/6/2024 with LVEF 65%     Labs are unchanged     Repeat CT CAP in 3 months; labs in 3 months     ECHO in 4-6 months     8/20/2024     Doing well     CT CAP 8/14/2024 stable     ECHO LVEF 65%  8/9/2024     Labs stable     Continues to tolerate treatment     11/25/2024    ECHO 11/15/2024 LEVF 62%    CT CAP 11/15/2024    1. No significant change since previous study. Stable osseous metastatic disease. No thoracic or intra-abdominal lymphadenopathy.     2. Slight interval enlargement of left ovarian cystic lesion without mural nodularity or septation. Leading differential diagnosis remains ovarian cystadenoma     3. Bilateral nonobstructing renal stones.    Continues to respond to Herceptin/Trastuzumab    Labs unremarkable    Continue with zometa        Follow up     3 months          HPI     Nayeli Love is a 48-year-old premenopausal female with metastatic breast cancer, grade 2, ER negative, ME negative, HER2 3+ disease.  She had a large fungating mass in her right breast, palpable right axillary adenopathy as well as diffuse osseous metastasis with C4 stenosis.  She completed palliative radiation therapy to the spine.  Subsequently, she was treated with Taxotere, trastuzumab and pertuzumab with or without atezolizumab based on  a clinical trial.  She received 2 cycle of treatment.  She came off the study after 2 cycles of treatment due to the complication related to COVID-19 infection.  She had infusion reaction with Taxotere. Therefore, she was subsequently treated with paclitaxel, trastuzumab and pertuzumab.  She completed induction treatment in August 2022.  She is currently on maintenance therapy with trastuzumab and pertuzumab.  She had good partial response, clinically as well as radiographically.  She was previously following with Dr. Payne and last seen in the office on 4/12/2023.  She presents today for transfer of care/follow-up visit.        Follow up 10/17/2023     She presents today for follow-up.  She feels well with no new complaints.  Her pain is under control with minimal amount of pain medications.  She is following with palliative care end-stage requires less pain meds.  She has no respiratory symptoms.  She has no ambulatory dysfunction.       10/12/2023: CT chest/abdomen/pelvis:  Interval mild decrease in size of a partially calcified right breast mass and right axillary lymph node.    No significant interval change in extensive bony metastases.   1.2 cm indeterminate density lesion in the right kidney, minimally increased in size compared to March 29, 2022. Recommend renal ultrasound for further evaluation.   Continued enlargement of a left adnexal cyst, now 5.7 cm. Recommend characterization with pelvic ultrasound.     10/12/2023: ECHO:  Left Ventricle: Left ventricular cavity size is normal. Wall thickness is normal. The left ventricular ejection fraction is 70%. Systolic function is normal. Global longitudinal strain is normal at -17.2 %. Wall motion is normal. Diastolic function is mildly abnormal, consistent with grade I (abnormal) relaxation.      Follow up 2/13/2024     Ms Love continues to tolerated herceptin/pertuzumab without issues.  She has no toxicities.  Labs are stable.  CT CAP and ECHO are below  with unchanged/stable disease as well as LVEF 65%.  Her ROS is negative        2/6/2024  ECHO      Left Ventricle: Left ventricular cavity size is normal. Wall thickness is normal. The left ventricular ejection fraction is 65%. Systolic function is normal. Global longitudinal strain is borderline at -16%. Wall motion is normal. Diastolic function is mildly abnormal, consistent with grade I (abnormal) relaxation.    Right Ventricle: Right ventricular cavity size is normal. Systolic function is normal.    Tricuspid Valve: There is trace regurgitation.    Prior TTE study available for comparison. Prior study date: 10/12/2023. No significant changes noted compared to the prior study.     Restaging CT CAP 2/6/2024  arrative & Impression   CT CHEST, ABDOMEN AND PELVIS WITH IV CONTRAST   BONES: Widespread sclerotic metastatic disease, unchanged from October 12, 2023. As on previous examination there are multiple lesions throughout the spine with associated Schmorl's nodes. Again noted is an expansile sclerotic lesion in the anterior   right fourth rib. No extraskeletal soft tissue tumor mass or developing lytic lesion noted. Pathologic compression fracture of T2, unchanged. 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 No acute pathologic fracture. Reidentified linear   sclerosis in the right femoral head consistent with avascular necrosis.     IMPRESSION:     Widespread sclerotic osseous metastatic disease without significant interval change from October 12, 2023.     Partly calcified right breast mass not significantly changed.     Increasing size of simple appearing left adnexal cystic mass, now up to 6.5 cm. Further characterization with pelvic ultrasound is recommended. Slight increase in the size of a partly calcified uterine fibroid now 1.9 cm. This can also be reevaluated at   the time of pelvic sonography.     No findings to suggest soft tissue metastatic disease in the chest, abdomen or pelvis.             Interval History:   5/14/2024     Ms Love is doing well without issues today.  She did complain of pain in the left ankle which resolved; she will though go for Xray ordered.  She had repeat CT CAP 5/6/2024 with stable disease; her ECHO 5/6/2024 with LVEF 65%. She has some occasional rashes from HER2 directed therapy, occasional diarrhea but no other issues.  She is tolerating this regimen well and will not change at this time with stable nonprogressive disease.              ECHO 5/6/2024         Left Ventricle: Left ventricular cavity size is normal. Wall thickness is normal. The left ventricular ejection fraction is 65% by biplane measurement. Systolic function is normal. Global longitudinal strain is borderline at -17%. Wall motion is normal.    Right Ventricle: Right ventricular cavity size is normal. Systolic function is normal.    Prior TTE study available for comparison. Prior study date: 2/6/2024. No significant changes noted compared to the prior study.     CT 5/6/2024        1. Widespread sclerotic osseous metastatic disease similar to the prior study.     2. Stable appearance of calcified right breast mass.     3. Simple appearing left adnexal cyst has further increased in size now measuring up to 6.8 cm. Recommend further work-up with pelvic ultrasound or MRI if not recently performed as clinically appropriate.     4. A couple of small foci of gas are seen in the urinary bladder. Correlate for history of recent instrumentation. Otherwise, consider the possibility of a fistula or infectious process.         Interval History:  8/20/2024     Doing well.  Occasional rash in the bilateral axilla resolves with local therapies.  CT 8/14/2024 stable , no progression.  Labs stable.  No issues     Labs 8/13/2024     Na 144 K 4.5 Cr 0.71 ALT/AST 36/20 TB 0.68 Mg 1.9 WBC 4.1 Hgb 14 MCV 99 plts 219 ANC 2400      CT CAP 8/14/2024        1. No significant change since previous study. Stable osseous metastatic  disease. No thoracic or intra-abdominal lymphadenopathy.     2. Stable thin-walled left ovarian cystic lesion without mural nodularity. Leading differential diagnosis includes cystadenoma     3. Bilateral nonobstructing renal stones.        Interval History: 11/25/2024    Doing well.  No issues,  Tolerating Herceptin/Perjeta.  No rash, C/D.  Has rash under breasts intermittently, using Nystatin powder with resolution in 1-2 days.  No issues with zometa.  Wants to pursue low level exercise which encouraged to do.     Labs 11/22/2024 with Na 138 K 4.5 Cr 0.65 Ca 9.8 ALT/AST 33/20 WBC 4.1 Hgb 13.8 plts 217 MCV 98 ANC 2380      ECHO 11/15/2024  \Left Ventricle: Left ventricular cavity size is normal. Wall thickness is normal. The left ventricular ejection fraction is 62%. Systolic function is normal. Global longitudinal strain is borderline at 18%. Wall motion is normal. Diastolic function is normal.    CT CAP 11/15/2024      LUNGS: Stable subpleural scarring in the medial right upper lobe. Right lower lobe compressive atelectasis. No discrete pulmonary nodules. There is no tracheal or endobronchial lesion.     PLEURA:  Unremarkable. Mildly elevated right hemidiaphragm.     HEART/GREAT VESSELS: Heart is unremarkable for patient's age.  No thoracic aortic aneurysm.     MEDIASTINUM AND ALISON:  Unremarkable.     CHEST WALL AND LOWER NECK: Stable partially calcified mass in the medial right breast measures 2.6 x 1.8 cm image 54 series 2. No axillary lymphadenopathy     ABDOMEN     LIVER/BILIARY TREE:  Unremarkable.     GALLBLADDER:  No calcified gallstones. No pericholecystic inflammatory change.     SPLEEN:  Unremarkable.     PANCREAS:  Unremarkable.     ADRENAL GLANDS:  Unremarkable.     KIDNEYS/URETERS: Stable renal cortical scarring bilaterally. One or more simple renal cyst(s) is noted. Stable bilateral nonobstructing renal stones.  No hydronephrosis.     STOMACH AND BOWEL:  There is colonic diverticulosis without  evidence of acute diverticulitis.     APPENDIX:  No findings to suggest appendicitis.     ABDOMINOPELVIC CAVITY:  No ascites.  No pneumoperitoneum.  No lymphadenopathy.     VESSELS:  Unremarkable for patient's age.     PELVIS     REPRODUCTIVE ORGANS: Stable leiomyomatous uterus. Slight interval enlargement of thin-walled left ovarian cystic lesion without mural nodularity or septations measuring 6.6 x 7.4 cm compared to 7.1 x 6.5 cm image 196 series 2     URINARY BLADDER:  Unremarkable.     ABDOMINAL WALL/INGUINAL REGIONS:  There is a small fat-containing umbilical hernia. Small fat-containing inguinal hernias right greater than left     OSSEOUS STRUCTURES: Stable sclerotic lesions throughout the appendicular and axial skeleton.           REVIEW OF SYSTEMS:  As above   Please note that a 14-point review of systems was performed to include Constitutional, HEENT, Respiratory, CVS, GI, , Musculoskeletal, Integumentary, Neurologic, Rheumatologic, Endocrinologic, Psychiatric, Lymphatic, and Hematologic/Oncologic systems were reviewed and are negative unless otherwise stated in HPI. Positive and negative findings pertinent to this evaluation are incorporated into the history of present illness.        ECOG PS: 0        Primary diagnosis:  Metastatic breast cancer, grade 2, ER negative, SC negative, HER2 3+ disease.  Diagnosed in March 2022.      Previous Hematologic/ Oncologic Treatment:   1. THP x3 cycles, completed in early June 2022.   2. Weekly paclitaxel, try weekly pertuzumab trastuzumab from July 2022 through August 2022.     Current Hematologic/ Oncologic Treatment:    Maintenance therapy with trastuzumab and pertuzumab since September 2022.  Zometa 4 mg IV every 3 months     Disease Status:    Clinical good partial response.     Test Results:   Pathology:   Right breast biopsy showed invasive ductal carcinoma, grade 2 ER negative, SC negative, HER2 3+ disease.     Radiology:  CT scan of chest abdomen pelvis in  April 2023 showed decreased calcified right breast mass as well as stable mildly enlarged right axillary lymph node.  Stable osseous metastasis.     Echocardiogram in December 2022 showed normal ejection fraction with 55%.   PROBLEM LIST:  Problem List       Patient Active Problem List   Diagnosis    Malignant neoplasm of overlapping sites of right breast in female, estrogen receptor negative (HCC)    Malignant neoplasm metastatic to bone (HCC)    Elevated BP without diagnosis of hypertension    Hypokalemia    Transaminitis    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 (HCC)    Cancer associated pain    Palliative care patient    HTN (hypertension)    Candida infection, esophageal (HCC)    Medical marijuana use    Neoplastic malignant related fatigue    Antineoplastic chemotherapy induced anemia    COVID-19    Chemotherapy-induced neutropenia (HCC)    Candidal skin infection    Neutropenia associated with infection (HCC)    Essential hypertension    Bacteremia due to Escherichia coli    Bacteremia    Unspecified cord compression (HCC)    Continuous opioid dependence (HCC)    GERD (gastroesophageal reflux disease)    Class 1 obesity due to excess calories with serious comorbidity and body mass index (BMI) of 32.0 to 32.9 in adult         PROBLEM LIST:  Patient Active Problem List   Diagnosis    Malignant neoplasm of overlapping sites of right breast in female, estrogen receptor negative (HCC)    Malignant neoplasm metastatic to bone (HCC)    Elevated BP without diagnosis of hypertension    Hypokalemia    Transaminitis    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 (HCC)    Cancer associated pain    Palliative care patient    HTN (hypertension)    Candida infection, esophageal (HCC)    Medical marijuana use    Neoplastic malignant related fatigue    Antineoplastic chemotherapy induced anemia    COVID-19    Chemotherapy-induced neutropenia (HCC)    Candidal skin infection    Neutropenia associated with infection (HCC)    Essential hypertension     Bacteremia due to Escherichia coli    Bacteremia    Unspecified cord compression (HCC)    Chronic, continuous use of opioids    GERD (gastroesophageal reflux disease)    Class 1 obesity due to excess calories with serious comorbidity and body mass index (BMI) of 32.0 to 32.9 in adult       Past Medical History:   has a past medical history of Breast cancer (HCC), Cancer (HCC), and Hypertension.    PAST SURGICAL HISTORY:   has no past surgical history on file.    CURRENT MEDICATIONS  Current Outpatient Medications   Medication Sig Dispense Refill    acetaminophen (TYLENOL) 325 mg tablet Take 650 mg by mouth every 6 (six) hours as needed for mild pain      al mag oxide-diphenhydramine-lidocaine viscous (MAGIC MOUTHWASH) 1:1:1 suspension Swish and spit 10 mL if needed for mouth pain or discomfort (Patient not taking: Reported on 11/12/2024)      lisinopril (ZESTRIL) 20 mg tablet Take 0.5 tablets (10 mg total) by mouth daily Only to take as needed when BP is >150 systolicallly 90 tablet 1    moxifloxacin (VIGAMOX) 0.5 % ophthalmic solution INSTILL 1 DROP IN RIGHT EYE EVERY HOUR (Patient not taking: Reported on 11/12/2024)      naloxone (NARCAN) 4 mg/0.1 mL nasal spray Administer 1 spray into a nostril. If no response after 2-3 minutes, give another dose in the other nostril using a new spray. (Patient not taking: Reported on 11/12/2024) 1 each 1    nystatin powder Apply topically 2 (two) times a day 15 g 1    ondansetron (ZOFRAN) 4 mg tablet Take 1 tablet (4 mg total) by mouth every 8 (eight) hours as needed for nausea or vomiting 9 tablet 20    oxyCODONE (ROXICODONE) 10 MG TABS Take 0.5-1 tablets (5-10 mg total) by mouth every 4 (four) hours as needed for moderate pain or severe pain Max Daily Amount: 60 mg 120 tablet 0    pantoprazole (PROTONIX) 20 mg tablet Take 1 tablet (20 mg total) by mouth daily in the early morning 30 tablet 11     No current facility-administered medications for this visit.      Facility-Administered Medications Ordered in Other Visits   Medication Dose Route Frequency Provider Last Rate Last Admin    sodium chloride 0.9 % infusion  20 mL/hr Intravenous Once Sosa Cortez, DO         [unfilled]    SOCIAL HISTORY:   reports that she has never smoked. She has never used smokeless tobacco. She reports current alcohol use. She reports that she does not use drugs.     FAMILY HISTORY:  family history includes Coronary artery disease in her mother; Diabetes in her father; Diabetes type II in her father; Hashimoto's thyroiditis in her sister; Heart attack in her father.     ALLERGIES:  has no known allergies.      Physical Exam:  Vital Signs:   Visit Vitals  LMP 03/28/2022 (Approximate)   OB Status Postmenopausal   Smoking Status Never     There is no height or weight on file to calculate BMI.  There is no height or weight on file to calculate BSA.    GEN: Alert, awake oriented x3, in no acute distress  HEENT- No pallor, icterus, cyanosis, no oral mucosal lesions,   LAD - no palpable cervical, clavicle, axillary, inguinal LAD  Heart- normal S1 S2, regular rate and rhythm, No murmur, rubs.   Lungs- clear breathing sound bilateral.   Abdomen- soft, Non tender, bowel sounds present  Extremities- No cyanosis, clubbing, edema  Neuro- No focal neurological deficit    Labs:  Lab Results   Component Value Date    WBC 4.06 (L) 11/14/2024    HGB 13.8 11/14/2024    HCT 42.2 11/14/2024    MCV 98 11/14/2024     11/14/2024     Lab Results   Component Value Date    SODIUM 138 11/22/2024    K 4.5 11/22/2024     11/22/2024    CO2 24 11/22/2024    AGAP 7 11/22/2024    BUN 19 11/22/2024    CREATININE 0.65 11/22/2024    GLUC 100 11/22/2024    GLUF 116 (H) 08/13/2024    CALCIUM 9.8 11/22/2024    AST 20 11/22/2024    ALT 33 11/22/2024    ALKPHOS 55 11/22/2024    TP 7.3 11/22/2024    TBILI 0.78 11/22/2024    EGFR 103 11/22/2024           I spent 30 minutes on chart review, face to face counseling time,  coordination of care and documentation.    Lois Phillips MD PhD

## 2024-11-25 ENCOUNTER — TELEPHONE (OUTPATIENT)
Dept: HEMATOLOGY ONCOLOGY | Facility: CLINIC | Age: 49
End: 2024-11-25

## 2024-11-25 ENCOUNTER — OFFICE VISIT (OUTPATIENT)
Dept: HEMATOLOGY ONCOLOGY | Facility: CLINIC | Age: 49
End: 2024-11-25
Payer: COMMERCIAL

## 2024-11-25 VITALS
HEART RATE: 101 BPM | SYSTOLIC BLOOD PRESSURE: 130 MMHG | HEIGHT: 62 IN | OXYGEN SATURATION: 96 % | RESPIRATION RATE: 18 BRPM | DIASTOLIC BLOOD PRESSURE: 80 MMHG | BODY MASS INDEX: 38 KG/M2 | WEIGHT: 206.5 LBS | TEMPERATURE: 97.4 F

## 2024-11-25 DIAGNOSIS — D70.1 CHEMOTHERAPY-INDUCED NEUTROPENIA (HCC): Primary | ICD-10-CM

## 2024-11-25 DIAGNOSIS — Z17.1 MALIGNANT NEOPLASM OF OVERLAPPING SITES OF RIGHT BREAST IN FEMALE, ESTROGEN RECEPTOR NEGATIVE (HCC): ICD-10-CM

## 2024-11-25 DIAGNOSIS — C79.51 MALIGNANT NEOPLASM METASTATIC TO BONE (HCC): ICD-10-CM

## 2024-11-25 DIAGNOSIS — T45.1X5A CHEMOTHERAPY-INDUCED NEUTROPENIA (HCC): Primary | ICD-10-CM

## 2024-11-25 DIAGNOSIS — C50.811 MALIGNANT NEOPLASM OF OVERLAPPING SITES OF RIGHT BREAST IN FEMALE, ESTROGEN RECEPTOR NEGATIVE (HCC): ICD-10-CM

## 2024-11-25 PROCEDURE — 99214 OFFICE O/P EST MOD 30 MIN: CPT | Performed by: INTERNAL MEDICINE

## 2024-11-25 NOTE — TELEPHONE ENCOUNTER
Called and spoke with patient - scheduled next four cycles. Pt verbalized understanding of appt dates and times. [facility] will provide an updated calendar for pt at her 12/13 appt.

## 2024-11-29 RX ORDER — SODIUM CHLORIDE 9 MG/ML
20 INJECTION, SOLUTION INTRAVENOUS ONCE
OUTPATIENT
Start: 2025-01-02

## 2024-12-13 ENCOUNTER — HOSPITAL ENCOUNTER (OUTPATIENT)
Dept: INFUSION CENTER | Facility: CLINIC | Age: 49
End: 2024-12-13
Payer: COMMERCIAL

## 2024-12-13 VITALS
SYSTOLIC BLOOD PRESSURE: 131 MMHG | TEMPERATURE: 97.9 F | HEIGHT: 62 IN | OXYGEN SATURATION: 96 % | WEIGHT: 209 LBS | DIASTOLIC BLOOD PRESSURE: 80 MMHG | RESPIRATION RATE: 18 BRPM | BODY MASS INDEX: 38.46 KG/M2 | HEART RATE: 82 BPM

## 2024-12-13 DIAGNOSIS — Z17.1 MALIGNANT NEOPLASM OF OVERLAPPING SITES OF RIGHT BREAST IN FEMALE, ESTROGEN RECEPTOR NEGATIVE (HCC): ICD-10-CM

## 2024-12-13 DIAGNOSIS — C50.811 MALIGNANT NEOPLASM OF OVERLAPPING SITES OF RIGHT BREAST IN FEMALE, ESTROGEN RECEPTOR NEGATIVE (HCC): ICD-10-CM

## 2024-12-13 DIAGNOSIS — T45.1X5A CHEMOTHERAPY-INDUCED NEUTROPENIA (HCC): Primary | ICD-10-CM

## 2024-12-13 DIAGNOSIS — D70.1 CHEMOTHERAPY-INDUCED NEUTROPENIA (HCC): Primary | ICD-10-CM

## 2024-12-13 DIAGNOSIS — C79.51 MALIGNANT NEOPLASM METASTATIC TO BONE (HCC): ICD-10-CM

## 2024-12-13 PROCEDURE — 96417 CHEMO IV INFUS EACH ADDL SEQ: CPT

## 2024-12-13 PROCEDURE — 96413 CHEMO IV INFUSION 1 HR: CPT

## 2024-12-13 RX ORDER — SODIUM CHLORIDE 9 MG/ML
20 INJECTION, SOLUTION INTRAVENOUS ONCE
Status: COMPLETED | OUTPATIENT
Start: 2024-12-13 | End: 2024-12-13

## 2024-12-13 RX ADMIN — TRASTUZUMAB 558 MG: 150 INJECTION, POWDER, LYOPHILIZED, FOR SOLUTION INTRAVENOUS at 08:46

## 2024-12-13 RX ADMIN — PERTUZUMAB 420 MG: 30 INJECTION, SOLUTION, CONCENTRATE INTRAVENOUS at 08:10

## 2024-12-13 RX ADMIN — SODIUM CHLORIDE 20 ML/HR: 9 INJECTION, SOLUTION INTRAVENOUS at 07:47

## 2024-12-13 NOTE — PROGRESS NOTES
Patient arrives to infusion center for D1 C46 Perjeta/Herceptin. Patient offers no acute complaints. Labs reviewed - CBC 11/14 and 11/22 CMP, but no parameters ordered for treatment today. ECHO from 11/15/24 = 62%, within parameters for treatment today. PIV placed by Arleth BYRNE RN without issue, patient tolerated well. Patient resting on recliner chair, call bell within reach.

## 2024-12-13 NOTE — PROGRESS NOTES
Patient tolerated treatment today without issue. PIV removed intact by Arleth BYRNE RN. Patient confirms next appt at [facility] on 1/3/25 at 0730, declines AVS.

## 2025-01-03 ENCOUNTER — HOSPITAL ENCOUNTER (OUTPATIENT)
Dept: INFUSION CENTER | Facility: CLINIC | Age: 50
End: 2025-01-03
Payer: COMMERCIAL

## 2025-01-03 VITALS
SYSTOLIC BLOOD PRESSURE: 148 MMHG | BODY MASS INDEX: 38.74 KG/M2 | WEIGHT: 210.5 LBS | TEMPERATURE: 97.6 F | HEART RATE: 96 BPM | OXYGEN SATURATION: 97 % | DIASTOLIC BLOOD PRESSURE: 99 MMHG | HEIGHT: 62 IN

## 2025-01-03 DIAGNOSIS — C79.51 MALIGNANT NEOPLASM METASTATIC TO BONE (HCC): ICD-10-CM

## 2025-01-03 DIAGNOSIS — C50.811 MALIGNANT NEOPLASM OF OVERLAPPING SITES OF RIGHT BREAST IN FEMALE, ESTROGEN RECEPTOR NEGATIVE (HCC): ICD-10-CM

## 2025-01-03 DIAGNOSIS — T45.1X5A CHEMOTHERAPY-INDUCED NEUTROPENIA (HCC): Primary | ICD-10-CM

## 2025-01-03 DIAGNOSIS — Z17.1 MALIGNANT NEOPLASM OF OVERLAPPING SITES OF RIGHT BREAST IN FEMALE, ESTROGEN RECEPTOR NEGATIVE (HCC): ICD-10-CM

## 2025-01-03 DIAGNOSIS — D70.1 CHEMOTHERAPY-INDUCED NEUTROPENIA (HCC): Primary | ICD-10-CM

## 2025-01-03 PROCEDURE — 96413 CHEMO IV INFUSION 1 HR: CPT

## 2025-01-03 PROCEDURE — 96417 CHEMO IV INFUS EACH ADDL SEQ: CPT

## 2025-01-03 RX ORDER — SODIUM CHLORIDE 9 MG/ML
20 INJECTION, SOLUTION INTRAVENOUS ONCE
Status: COMPLETED | OUTPATIENT
Start: 2025-01-03 | End: 2025-01-03

## 2025-01-03 RX ADMIN — SODIUM CHLORIDE 20 ML/HR: 0.9 INJECTION, SOLUTION INTRAVENOUS at 08:35

## 2025-01-03 RX ADMIN — PERTUZUMAB 420 MG: 30 INJECTION, SOLUTION, CONCENTRATE INTRAVENOUS at 08:40

## 2025-01-03 RX ADMIN — TRASTUZUMAB 558 MG: 150 INJECTION, POWDER, LYOPHILIZED, FOR SOLUTION INTRAVENOUS at 09:15

## 2025-01-03 NOTE — PROGRESS NOTES
Patient to infuison for D1C47 Perjeta and Herceptin.  She offers no complaints.  Labs reviewed from 11/22 - no labs required for treatment today.  Call bell within reach.

## 2025-01-14 ENCOUNTER — TELEPHONE (OUTPATIENT)
Dept: PALLIATIVE MEDICINE | Facility: CLINIC | Age: 50
End: 2025-01-14

## 2025-01-17 RX ORDER — SODIUM CHLORIDE 9 MG/ML
20 INJECTION, SOLUTION INTRAVENOUS ONCE
Status: CANCELLED | OUTPATIENT
Start: 2025-01-24

## 2025-01-24 ENCOUNTER — HOSPITAL ENCOUNTER (OUTPATIENT)
Dept: INFUSION CENTER | Facility: CLINIC | Age: 50
End: 2025-01-24
Payer: COMMERCIAL

## 2025-01-24 VITALS
WEIGHT: 212.5 LBS | HEIGHT: 62 IN | DIASTOLIC BLOOD PRESSURE: 85 MMHG | BODY MASS INDEX: 39.11 KG/M2 | SYSTOLIC BLOOD PRESSURE: 127 MMHG | OXYGEN SATURATION: 96 % | HEART RATE: 70 BPM | TEMPERATURE: 97.9 F

## 2025-01-24 DIAGNOSIS — C50.811 MALIGNANT NEOPLASM OF OVERLAPPING SITES OF RIGHT BREAST IN FEMALE, ESTROGEN RECEPTOR NEGATIVE (HCC): ICD-10-CM

## 2025-01-24 DIAGNOSIS — C79.51 MALIGNANT NEOPLASM METASTATIC TO BONE (HCC): ICD-10-CM

## 2025-01-24 DIAGNOSIS — T45.1X5A CHEMOTHERAPY-INDUCED NEUTROPENIA (HCC): Primary | ICD-10-CM

## 2025-01-24 DIAGNOSIS — Z17.1 MALIGNANT NEOPLASM OF OVERLAPPING SITES OF RIGHT BREAST IN FEMALE, ESTROGEN RECEPTOR NEGATIVE (HCC): ICD-10-CM

## 2025-01-24 DIAGNOSIS — D70.1 CHEMOTHERAPY-INDUCED NEUTROPENIA (HCC): Primary | ICD-10-CM

## 2025-01-24 PROCEDURE — 96413 CHEMO IV INFUSION 1 HR: CPT

## 2025-01-24 PROCEDURE — 96417 CHEMO IV INFUS EACH ADDL SEQ: CPT

## 2025-01-24 RX ORDER — SODIUM CHLORIDE 9 MG/ML
20 INJECTION, SOLUTION INTRAVENOUS ONCE
Status: COMPLETED | OUTPATIENT
Start: 2025-01-24 | End: 2025-01-24

## 2025-01-24 RX ADMIN — PERTUZUMAB 420 MG: 30 INJECTION, SOLUTION, CONCENTRATE INTRAVENOUS at 08:29

## 2025-01-24 RX ADMIN — SODIUM CHLORIDE 20 ML/HR: 0.9 INJECTION, SOLUTION INTRAVENOUS at 08:26

## 2025-01-24 RX ADMIN — TRASTUZUMAB 558 MG: 150 INJECTION, POWDER, LYOPHILIZED, FOR SOLUTION INTRAVENOUS at 09:06

## 2025-01-24 NOTE — PROGRESS NOTES
Pt arrives to infusion center for Perjeta and Herceptin. Offers no complaints. No lab parameters for this tx. PIV accessed without issue. Pt sitting comfortably in chair, wheels locked, call bell within reach.

## 2025-01-24 NOTE — PROGRESS NOTES
Pt tolerated tx without issue. PIV removed. Pt confirmed next appointment on 2/14 @0962 [facility]. AVS declined.

## 2025-02-10 RX ORDER — SODIUM CHLORIDE 9 MG/ML
20 INJECTION, SOLUTION INTRAVENOUS ONCE
Status: CANCELLED | OUTPATIENT
Start: 2025-02-14

## 2025-02-13 ENCOUNTER — APPOINTMENT (OUTPATIENT)
Dept: LAB | Facility: CLINIC | Age: 50
End: 2025-02-13
Payer: COMMERCIAL

## 2025-02-13 DIAGNOSIS — C50.811 MALIGNANT NEOPLASM OF OVERLAPPING SITES OF RIGHT BREAST IN FEMALE, ESTROGEN RECEPTOR NEGATIVE (HCC): ICD-10-CM

## 2025-02-13 DIAGNOSIS — Z17.1 MALIGNANT NEOPLASM OF OVERLAPPING SITES OF RIGHT BREAST IN FEMALE, ESTROGEN RECEPTOR NEGATIVE (HCC): ICD-10-CM

## 2025-02-13 DIAGNOSIS — C79.51 MALIGNANT NEOPLASM METASTATIC TO BONE (HCC): ICD-10-CM

## 2025-02-13 LAB
ALBUMIN SERPL BCG-MCNC: 4.6 G/DL (ref 3.5–5)
ALP SERPL-CCNC: 48 U/L (ref 34–104)
ALT SERPL W P-5'-P-CCNC: 29 U/L (ref 7–52)
ANION GAP SERPL CALCULATED.3IONS-SCNC: 8 MMOL/L (ref 4–13)
AST SERPL W P-5'-P-CCNC: 18 U/L (ref 13–39)
BASOPHILS # BLD AUTO: 0.02 THOUSANDS/ÂΜL (ref 0–0.1)
BASOPHILS NFR BLD AUTO: 0 % (ref 0–1)
BILIRUB SERPL-MCNC: 1.09 MG/DL (ref 0.2–1)
BUN SERPL-MCNC: 12 MG/DL (ref 5–25)
CALCIUM SERPL-MCNC: 9.4 MG/DL (ref 8.4–10.2)
CHLORIDE SERPL-SCNC: 100 MMOL/L (ref 96–108)
CO2 SERPL-SCNC: 28 MMOL/L (ref 21–32)
CREAT SERPL-MCNC: 0.86 MG/DL (ref 0.6–1.3)
EOSINOPHIL # BLD AUTO: 0.02 THOUSAND/ÂΜL (ref 0–0.61)
EOSINOPHIL NFR BLD AUTO: 0 % (ref 0–6)
ERYTHROCYTE [DISTWIDTH] IN BLOOD BY AUTOMATED COUNT: 12.5 % (ref 11.6–15.1)
GFR SERPL CREATININE-BSD FRML MDRD: 79 ML/MIN/1.73SQ M
GLUCOSE SERPL-MCNC: 91 MG/DL (ref 65–140)
HCT VFR BLD AUTO: 42.8 % (ref 34.8–46.1)
HGB BLD-MCNC: 14 G/DL (ref 11.5–15.4)
IMM GRANULOCYTES # BLD AUTO: 0.01 THOUSAND/UL (ref 0–0.2)
IMM GRANULOCYTES NFR BLD AUTO: 0 % (ref 0–2)
LDH SERPL-CCNC: 176 U/L (ref 140–271)
LYMPHOCYTES # BLD AUTO: 1.43 THOUSANDS/ÂΜL (ref 0.6–4.47)
LYMPHOCYTES NFR BLD AUTO: 30 % (ref 14–44)
MAGNESIUM SERPL-MCNC: 1.8 MG/DL (ref 1.9–2.7)
MCH RBC QN AUTO: 31.7 PG (ref 26.8–34.3)
MCHC RBC AUTO-ENTMCNC: 32.7 G/DL (ref 31.4–37.4)
MCV RBC AUTO: 97 FL (ref 82–98)
MONOCYTES # BLD AUTO: 0.66 THOUSAND/ÂΜL (ref 0.17–1.22)
MONOCYTES NFR BLD AUTO: 14 % (ref 4–12)
NEUTROPHILS # BLD AUTO: 2.63 THOUSANDS/ÂΜL (ref 1.85–7.62)
NEUTS SEG NFR BLD AUTO: 56 % (ref 43–75)
NRBC BLD AUTO-RTO: 0 /100 WBCS
PLATELET # BLD AUTO: 183 THOUSANDS/UL (ref 149–390)
PMV BLD AUTO: 10 FL (ref 8.9–12.7)
POTASSIUM SERPL-SCNC: 3.7 MMOL/L (ref 3.5–5.3)
PROT SERPL-MCNC: 7.6 G/DL (ref 6.4–8.4)
RBC # BLD AUTO: 4.41 MILLION/UL (ref 3.81–5.12)
SODIUM SERPL-SCNC: 136 MMOL/L (ref 135–147)
WBC # BLD AUTO: 4.77 THOUSAND/UL (ref 4.31–10.16)

## 2025-02-13 PROCEDURE — 85025 COMPLETE CBC W/AUTO DIFF WBC: CPT

## 2025-02-13 PROCEDURE — 83615 LACTATE (LD) (LDH) ENZYME: CPT

## 2025-02-13 PROCEDURE — 36415 COLL VENOUS BLD VENIPUNCTURE: CPT

## 2025-02-13 PROCEDURE — 80053 COMPREHEN METABOLIC PANEL: CPT

## 2025-02-13 PROCEDURE — 83735 ASSAY OF MAGNESIUM: CPT

## 2025-02-14 ENCOUNTER — HOSPITAL ENCOUNTER (OUTPATIENT)
Dept: INFUSION CENTER | Facility: CLINIC | Age: 50
End: 2025-02-14
Payer: COMMERCIAL

## 2025-02-14 VITALS
OXYGEN SATURATION: 91 % | TEMPERATURE: 97 F | HEIGHT: 62 IN | DIASTOLIC BLOOD PRESSURE: 77 MMHG | WEIGHT: 207.5 LBS | BODY MASS INDEX: 38.18 KG/M2 | HEART RATE: 94 BPM | SYSTOLIC BLOOD PRESSURE: 114 MMHG

## 2025-02-14 DIAGNOSIS — Z51.5 PALLIATIVE CARE PATIENT: ICD-10-CM

## 2025-02-14 DIAGNOSIS — Z17.1 MALIGNANT NEOPLASM OF OVERLAPPING SITES OF RIGHT BREAST IN FEMALE, ESTROGEN RECEPTOR NEGATIVE (HCC): ICD-10-CM

## 2025-02-14 DIAGNOSIS — C79.51 MALIGNANT NEOPLASM METASTATIC TO BONE (HCC): Primary | ICD-10-CM

## 2025-02-14 DIAGNOSIS — G89.3 CANCER ASSOCIATED PAIN: ICD-10-CM

## 2025-02-14 DIAGNOSIS — D70.1 CHEMOTHERAPY-INDUCED NEUTROPENIA (HCC): ICD-10-CM

## 2025-02-14 DIAGNOSIS — C50.811 MALIGNANT NEOPLASM OF OVERLAPPING SITES OF RIGHT BREAST IN FEMALE, ESTROGEN RECEPTOR NEGATIVE (HCC): ICD-10-CM

## 2025-02-14 DIAGNOSIS — T45.1X5A CHEMOTHERAPY-INDUCED NEUTROPENIA (HCC): ICD-10-CM

## 2025-02-14 PROCEDURE — 96413 CHEMO IV INFUSION 1 HR: CPT

## 2025-02-14 PROCEDURE — 96417 CHEMO IV INFUS EACH ADDL SEQ: CPT

## 2025-02-14 PROCEDURE — 96367 TX/PROPH/DG ADDL SEQ IV INF: CPT

## 2025-02-14 RX ORDER — SODIUM CHLORIDE 9 MG/ML
20 INJECTION, SOLUTION INTRAVENOUS ONCE
Status: COMPLETED | OUTPATIENT
Start: 2025-02-14 | End: 2025-02-14

## 2025-02-14 RX ORDER — OXYCODONE HYDROCHLORIDE 10 MG/1
5-10 TABLET ORAL EVERY 4 HOURS PRN
Qty: 60 TABLET | Refills: 0 | Status: SHIPPED | OUTPATIENT
Start: 2025-02-14

## 2025-02-14 RX ORDER — SODIUM CHLORIDE 9 MG/ML
20 INJECTION, SOLUTION INTRAVENOUS ONCE
OUTPATIENT
Start: 2025-05-08

## 2025-02-14 RX ADMIN — SODIUM CHLORIDE 20 ML/HR: 0.9 INJECTION, SOLUTION INTRAVENOUS at 07:30

## 2025-02-14 RX ADMIN — TRASTUZUMAB 558 MG: 150 INJECTION, POWDER, LYOPHILIZED, FOR SOLUTION INTRAVENOUS at 09:30

## 2025-02-14 RX ADMIN — SODIUM CHLORIDE 20 ML/HR: 0.9 INJECTION, SOLUTION INTRAVENOUS at 08:45

## 2025-02-14 RX ADMIN — ZOLEDRONIC ACID 4 MG: 0.04 INJECTION, SOLUTION INTRAVENOUS at 08:05

## 2025-02-14 RX ADMIN — PERTUZUMAB 420 MG: 30 INJECTION, SOLUTION, CONCENTRATE INTRAVENOUS at 08:55

## 2025-02-14 NOTE — TELEPHONE ENCOUNTER
Reason for call:   [x] Refill   [] Prior Auth  [] Other: pt used to see Dr Garcia, has upcoming [facility] appt and I put that dr's name on encounter     Office:   [] PCP/Provider -   [x] Specialty/Provider - palliative care     Medication: oxycodone     Dose/Frequency: 10 mg take 0.5-1 talbets every 4 hours as needed     Quantity: 120    [facility]: [facility]    Does the patient have enough for 3 days?   [x] Yes   [] No - Send as HP to POD

## 2025-02-14 NOTE — PROGRESS NOTES
Tolerated infusion without incident: No adverse reactions noted: Verified follow up appt with patient ( 03/06/25 ): AVS offered and declined

## 2025-02-14 NOTE — TELEPHONE ENCOUNTER
Refill must be reviewed and completed by the office or provider. The refill is unable to be approved or denied by the medication management team.      Patient Id Prescription # Filled Written Drug Label Qty Days Strength MME** Prescriber Pharmacy Payment REFILL #/Auth State Detail   1 [id number] 11/11/2024 11/11/2024 oxyCODONE HCL IR 10 MG TAB (Tablet) 120.0 20 10 MG 90.0 CRISTIAN HENRY [employer], [employer] [facility] [employer]. Commercial Insurance 0 / 0 [place]    1 [id number] 08/21/2024 08/21/2024 oxyCODONE HCL IR 10 MG TAB (Tablet) 120.0 20 10 MG 90.0 KARMA [facility]ag[facility], [employer] [facility] [employer]. Commercial Insurance 0 / 0 [place]    1 [id number] 06/01/2024 05/31/2024 oxyCODONE HCL (Tablet) 120.0 20 10 MG 90.0 [facility], [employer] [facility] [employer]. Commercial Insurance 0 / 0 [place]    1 [id number] 02/29/2024 02/29/2024 oxyCODONE HCL IR 10 MG TAB (Tablet) 120.0 20 10 MG 90.0 [facility], [employer] [facility] [employer]. Commercial Insurance 0 / 0 [place]

## 2025-02-14 NOTE — PROGRESS NOTES
Patient to Infusion Center for Perjeta / Herceptin / Zometa: Offers no complaints at present time: Lab work ( 02/13/25 ) reviewed: Calcium - 9.4: CrCl - 101.7: Within parameters to treat: Confirms taking an oral Multi-Vitamin daily: Right FA PIV initiated without incident

## 2025-02-25 ENCOUNTER — HOSPITAL ENCOUNTER (OUTPATIENT)
Dept: CT IMAGING | Facility: HOSPITAL | Age: 50
Discharge: HOME | End: 2025-02-25
Attending: INTERNAL MEDICINE
Payer: COMMERCIAL

## 2025-02-25 ENCOUNTER — HOSPITAL ENCOUNTER (OUTPATIENT)
Dept: NON INVASIVE DIAGNOSTICS | Facility: CLINIC | Age: 50
Discharge: HOME | End: 2025-02-25
Payer: COMMERCIAL

## 2025-02-25 VITALS
WEIGHT: 207 LBS | DIASTOLIC BLOOD PRESSURE: 77 MMHG | SYSTOLIC BLOOD PRESSURE: 114 MMHG | HEART RATE: 94 BPM | BODY MASS INDEX: 38.09 KG/M2 | HEIGHT: 62 IN

## 2025-02-25 DIAGNOSIS — C50.811 MALIGNANT NEOPLASM OF OVERLAPPING SITES OF RIGHT BREAST IN FEMALE, ESTROGEN RECEPTOR NEGATIVE (HCC): ICD-10-CM

## 2025-02-25 DIAGNOSIS — Z17.1 MALIGNANT NEOPLASM OF OVERLAPPING SITES OF RIGHT BREAST IN FEMALE, ESTROGEN RECEPTOR NEGATIVE (HCC): ICD-10-CM

## 2025-02-25 LAB
AORTIC ROOT: 2.8 CM
ASCENDING AORTA: 3.1 CM
BSA FOR ECHO PROCEDURE: 1.94 M2
E WAVE DECELERATION TIME: 240 MS
E/A RATIO: 0.97
FRACTIONAL SHORTENING: 38 (ref 28–44)
GLOBAL LONGITUIDAL STRAIN: -20 %
INTERVENTRICULAR SEPTUM IN DIASTOLE (PARASTERNAL SHORT AXIS VIEW): 0.9 CM
INTERVENTRICULAR SEPTUM: 0.9 CM (ref 0.6–1.1)
LAAS-AP2: 8 CM2
LAAS-AP4: 7.8 CM2
LEFT ATRIUM SIZE: 3.5 CM
LEFT ATRIUM VOLUME (MOD BIPLANE): 16 ML
LEFT ATRIUM VOLUME INDEX (MOD BIPLANE): 8.2 ML/M2
LEFT INTERNAL DIMENSION IN SYSTOLE: 2.4 CM (ref 2.1–4)
LEFT VENTRICULAR INTERNAL DIMENSION IN DIASTOLE: 3.9 CM (ref 3.5–6)
LEFT VENTRICULAR POSTERIOR WALL IN END DIASTOLE: 0.9 CM
LEFT VENTRICULAR STROKE VOLUME: 45 ML
LV EF BIPLANE MOD: 63 %
LVSV (TEICH): 45 ML
MV E'TISSUE VEL-LAT: 9 CM/S
MV E'TISSUE VEL-SEP: 12 CM/S
MV PEAK A VEL: 0.99 M/S
MV PEAK E VEL: 96 CM/S
MV STENOSIS PRESSURE HALF TIME: 70 MS
MV VALVE AREA P 1/2 METHOD: 3.14
RIGHT ATRIUM AREA SYSTOLE A4C: 9.1 CM2
RIGHT VENTRICLE ID DIMENSION: 4 CM
SL CV LEFT ATRIUM LENGTH A2C: 3.1 CM
SL CV LV EF: 65
SL CV PED ECHO LEFT VENTRICLE DIASTOLIC VOLUME (MOD BIPLANE) 2D: 65 ML
SL CV PED ECHO LEFT VENTRICLE SYSTOLIC VOLUME (MOD BIPLANE) 2D: 21 ML
TR MAX PG: 25 MMHG
TR PEAK VELOCITY: 2.5 M/S
TRICUSPID ANNULAR PLANE SYSTOLIC EXCURSION: 2 CM
TRICUSPID VALVE PEAK REGURGITATION VELOCITY: 2.52 M/S

## 2025-02-25 PROCEDURE — 71260 CT THORAX DX C+: CPT

## 2025-02-25 PROCEDURE — 93306 TTE W/DOPPLER COMPLETE: CPT | Performed by: INTERNAL MEDICINE

## 2025-02-25 PROCEDURE — 93306 TTE W/DOPPLER COMPLETE: CPT

## 2025-02-25 PROCEDURE — 74177 CT ABD & PELVIS W/CONTRAST: CPT

## 2025-02-25 RX ADMIN — IOHEXOL 100 ML: 350 INJECTION, SOLUTION INTRAVENOUS at 07:34

## 2025-02-28 ENCOUNTER — PREP FOR PROCEDURE (OUTPATIENT)
Age: 50
End: 2025-02-28

## 2025-02-28 ENCOUNTER — TELEPHONE (OUTPATIENT)
Age: 50
End: 2025-02-28

## 2025-02-28 DIAGNOSIS — Z12.11 SCREENING FOR COLON CANCER: Primary | ICD-10-CM

## 2025-02-28 NOTE — TELEPHONE ENCOUNTER
02/28/25  Screened by: Leonela Neal MA    Referring Provider Dr. Arreguin    Pre- Screening:     There is no height or weight on file to calculate BMI. 37.85  Has patient been referred for a routine screening Colonoscopy? yes  Is the patient between 45-75 years old? yes      Previous Colonoscopy no   If yes:    Date:     Facility:     Reason:         Does the patient want to see a Gastroenterologist prior to their procedure OR are they having any GI symptoms? no    Has the patient been hospitalized or had abdominal surgery in the past 6 months? no    Does the patient use supplemental oxygen? no    Does the patient take Coumadin, Lovenox, Plavix, Elliquis, Xarelto, or other blood thinning medication? no    Has the patient had a stroke, cardiac event, or stent placed in the past year? no      If patient is between 45yrs - 49yrs, please advise patient that we will have to confirm benefits & coverage with their insurance company for a routine screening colonoscopy.

## 2025-02-28 NOTE — LETTER
Mare Tyler,    Attached are your prep instructions for your upcoming Colonoscopy scheduled on 4/8/25 with Dr. Apple. The GI lab will be calling the day before between 2pm and 6pm with your arrival time for your procedure. If you have any questions, please feel free to contact our office at [phone].    Address to our location:  [facility], [address], [place], [address]        Thank you for choosing [facility]!                                                              COLONOSCOPY  MIRALAX/Dulcolax Bowel Preparation Instructions    The OR/GI Lab will contact you the evening prior to your procedure with your exact arrival time.    Our practice requires a 1 week notice for any cancellations or rescheduling. We kindly ask that you immediately notify us of any changes including any new medications that are prescribed. Thank you for your cooperation.     WEEK BEFORE YOUR PROCEDURE:  Stop taking Iron tablets.  5 days prior, AVOID vegetables and fruits with skins or seeds, nuts, corn, popcorn and whole grain breads.   Purchase: One (1) 238-gram container of Miralax (polyethylene glycol 3350), four (4) 5 mg Dulcolax (bisacodyl) tablets, and one (1) 64-ounce bottle of Gatorade (sports drink) - no red, orange, or purple. These may be purchased at any pharmacy without a prescription. Generic products are permissible.   Arrange responsible transportation for day of the procedure.     DAY BEFORE THE PROCEDURE:   CLEAR liquids only for entire day prior. Nothing red, orange or purple.    You MAY have:                                                               Soda  Water  Broth Gatorade  Jello  Popsicles Coffee/tea without milk/creamer     YOU MAY NOT HAVE:  Solid foods   Milk and milk products    Juice with pulp    BOWEL PREPARATION:  Includes: One (1) 238-gram container of Miralax (polyethylene glycol  3350), four (4) 5 mg Dulcolax (bisacodyl) tablets, and one (1) 64-ounce bottle of Gatorade (sports drink).  Preparation may be refrigerated.  Entire bowel prep should be completed.     Afternoon before the procedure (2:00 pm - 5:00 pm):    Take two (2) 5 mg Dulcolax laxative tablets.     Evening before the procedure (6:00 pm):  Mix entire container of Miralax with one (1) 64-ounce bottle of Gatorade and shake until all medication is dissolved.   Begin drinking solution. Drink an eight (8) ounce cup every 10-15 minutes until you have consumed half (32 ounces) of the solution.  Refrigerate remaining solution.    Night before the procedure (8:00 pm):  Take two (2) 5 mg Dulcolax laxative tablets.     Beginning 5 hours before your procedure:  Drink the remaining amount of prepared solution (32 ounces).  Drink an eight (8) ounce cup every 10-15 minutes until you have consumed the remaining solution.     Bowel prep should be completed 4 hours prior to procedure time.    NOTHING TO EAT OR DRINK AFTER MIDNIGHT- EXCEPT FOR YOUR PREP    DAY OF THE PROCEDURE:  You may brush your teeth.  Leave all jewelry at home.  Please arrive for your procedure as indicated by the OR / GI Lab / Endoscopy Unit. The hospital will contact you the day before with your exact arrival time.   Make sure you have arranged ahead of time for a responsible adult (18 or older) to accompany and drive you home after the procedure.  Please discuss any transportation concerns with our staff prior to your procedure.    The effects of the anesthesia can persist for 24 hours.  After receiving the sedation, you must exercise caution before engaging in any activity that could harm yourself and others (such as driving a car).  Do not make any important decisions or do not drink any alcoholic beverages during this time period.  After your procedure, you may have anything you'd like to eat or drink.  You will probably want to start with something light.  Please  include plenty of fluids.  Avoid items that cause gas such as sodas and salads.    SPECIAL INSTRUCTIONS:    For patients currently taking blood thinners and/or antiplatelet therapy our office will contact the prescribing provider.  Our office will contact you with any required changes to your medication regimen.     Blood thinner (i.e. - Coumadin, Pradaxa, Lovenox, Xarelto, Eliquis)  ?  Continue (Do Not Stop)  ? Stop______________for_____________days prior to the procedure.    Antiplatelet (i.e. - Plavix, Aggrenox, Effient, Brilinta)  ?  Continue (Do Not Stop)  ? Stop______________for_____________days prior to the procedure.       Diabetes:   If you are Diabetic, please see separate Diabetic Instruction Sheet.          Prescribed medications:  Do not stop your aspirin, or any of your other medications (unless instructed otherwise).    Take the rest of your prescribed medications with small sips of water at least 2 hours prior to your procedure.      For any questions or concerns related to your bowel preparation or pre-procedure instructions, please contact our office at [phone].  Thank you for choosing [facility]!

## 2025-02-28 NOTE — TELEPHONE ENCOUNTER
Scheduled date of colonoscopy (as of today): 4/8/25    Physician performing colonoscopy: Dr. Apple    [facility] of colonoscopy: [facility]    Bowel prep reviewed with patient: John/Belkis    Instructions reviewed with patient by: harley

## 2025-03-02 RX ORDER — SODIUM CHLORIDE 9 MG/ML
20 INJECTION, SOLUTION INTRAVENOUS ONCE
Status: CANCELLED | OUTPATIENT
Start: 2025-03-12

## 2025-03-02 NOTE — ASSESSMENT & PLAN NOTE
In summary patient is a 49-year-old female diagnosed with metastatic breast cancer, grade 2 ER ME negative, HER2 positive in March 2023.  S/p palliative radiation to C-spine 3/31/22 - 4/18/2022.  Patient completed 6 cycles of THP in August 2022.  Currently on maintenance pertuzumab plus trastuzumab every 21 days.  Has been tolerating well without any anticipated side effects.  Recent CT chest abdomen pelvis without any evidence of metastatic disease or new lesion.  It does shows enlarging ovarian cystadenoma, she already has appointment with GYN in May, she remains completely asymptomatic from above-mentioned findings.  Echocardiogram reviewed, LVEF of 65%, will need repeat echocardiogram in 3 months.  Patient was scheduled for her next infusion with pertuzumab and trastuzumab on 3/6/2025, that will be moved to be scheduled after 3/10/2025 as patient is traveling to [address].    She will be seen in office in 3 months, but knows to call office if she develops any symptoms or have any questions prior to next scheduled appointment.    Orders:    Echo complete w/ contrast if indicated; Future    CT chest abdomen pelvis w contrast; Future    CBC and differential; Future    Comprehensive metabolic panel; Future    LD,Blood; Future    Magnesium; Future

## 2025-03-02 NOTE — ASSESSMENT & PLAN NOTE
Recent CT chest abdomen pelvis from 2/25/2025, reviewed no evidence of new osseous metastasis.  Patient continues to receive Zometa every 3 months, next treatment due on 5/8/2025.

## 2025-03-02 NOTE — PROGRESS NOTES
Name: Nayeli Love      : 1975      MRN: [id number]  Encounter Provider: Lois Phillips MD  Encounter Date: 3/3/2025   Encounter department: [facility]  :  Assessment & Plan  Malignant neoplasm of overlapping sites of right breast in female, estrogen receptor negative (HCC)  In summary patient is a 49-year-old female diagnosed with metastatic breast cancer, grade 2 ER TX negative, HER2 positive in 2023.  S/p palliative radiation to C-spine 3/31/22 - 2022.  Patient completed 6 cycles of THP in 2022.  Currently on maintenance pertuzumab plus trastuzumab every 21 days.  Has been tolerating well without any anticipated side effects.  Recent CT chest abdomen pelvis without any evidence of metastatic disease or new lesion.  It does shows enlarging ovarian cystadenoma, she already has appointment with GYN in May, she remains completely asymptomatic from above-mentioned findings.  Echocardiogram reviewed, LVEF of 65%, will need repeat echocardiogram in 3 months.  Patient was scheduled for her next infusion with pertuzumab and trastuzumab on 3/6/2025, that will be moved to be scheduled after 3/10/2025 as patient is traveling to [place].    She will be seen in office in 3 months, but knows to call office if she develops any symptoms or have any questions prior to next scheduled appointment.    Orders:    Echo complete w/ contrast if indicated; Future    CT chest abdomen pelvis w contrast; Future    CBC and differential; Future    Comprehensive metabolic panel; Future    LD,Blood; Future    Magnesium; Future    Malignant neoplasm metastatic to bone (HCC)  Recent CT chest abdomen pelvis from 2025, reviewed no evidence of new osseous metastasis.  Patient continues to receive Zometa every 3 months, next treatment due on 2025.       High risk medication use  Patient continues to tolerate pertuzumab plus trastuzumab very well.  Last echocardiogram from  2/25/2025 reviewed, LVEF of 65%.  Due to concerns of drug-induced cardiomyopathy, we will follow-up cardiac function closely with echocardiogram every 3 months.  Orders:    Echo complete w/ contrast if indicated; Future    Magnesium; Future        Return in about 3 months (around 6/3/2025) for Office Visit.    History of Present Illness   Chief Complaint   Patient presents with    Follow-up     Nayeli Love is a 49-year-old premenopausal female with metastatic breast cancer, grade 2, ER negative, MS negative, HER2 3+ disease diagnosed in March 2022.  She had a large fungating mass in her right breast, palpable right axillary adenopathy as well as diffuse osseous metastasis with C4 stenosis.  She completed palliative radiation therapy to the spine.  Subsequently, she was treated with Taxotere, trastuzumab and pertuzumab with or without atezolizumab based on a clinical trial.  She received 2 cycle of treatment.  She came off the study after 2 cycles of treatment due to the complication related to COVID-19 infection.Therefore, she was subsequently treated with paclitaxel, trastuzumab and pertuzumab.  She completed induction treatment total 6 cycles of chemotherapy in August 2022.  She is currently on maintenance therapy with trastuzumab and pertuzumab.  She had good partial response, clinically as well as radiographically.     Oncology History   Cancer Staging   No matching staging information was found for the patient.  Oncology History   Malignant neoplasm of overlapping sites of right breast in female, estrogen receptor negative (HCC)   3/2022 Initial Diagnosis    Malignant neoplasm of overlapping sites of right breast in female, estrogen receptor negative (HCC)     3/29/2022 Biopsy    Breast, Right, Biopsy:  - Invasive mammary carcinoma of no special type (ductal, not otherwise specified), [place] Grade II (3 + 2 + 1 = 6), spanning at least 6 mm.   ER/MS negative, HER2 positive    Procedure  Needle biopsy     Specimen Laterality  Right    TUMOR   Histologic Type  Invasive carcinoma of no special type (ductal)    Histologic Grade (Fabrizio Histologic Score)     Glandular (Acinar) / Tubular Differentiation  Score 3    Nuclear Pleomorphism  Score 2    Mitotic Rate  Score 1    Overall Grade  Grade 2 (scores of 6 or 7)    Tumor Size  Greatest dimension of largest invasive focus (Millimeters): 6 mm   Ductal Carcinoma In Situ (DCIS)  Not identified    Lymphovascular Invasion  Not identified         3/31/2022 - 4/18/2022 Radiation    Course: C1    Plan ID Energy Fractions Dose per Fraction (cGy) Dose Correction (cGy) Total Dose Delivered (cGy) Elapsed Days   C1 C7 And ix 10X/6X 7 / 7 300 0 2,100 8   C1 C7 Spine 10X/6X 3 / 10 300 0 900 4   L4 Sacrum 10X 10 / 10 300 0 3,000 13   T1 T6 And ix 10X 7 / 7 300 0 2,100 8   T1 T6 Spine 10X 3 / 10 300 0 900 4      Dr. Rutledge     4/28/2022 -  Chemotherapy    clinical trial with THP with or without atezolizumab     4/28/2022 -  Chemotherapy    pegfilgrastim (NEULASTA ONPRO), 6 mg, Subcutaneous, Once, 1 of 1 cycle  pertuzumab (PERJETA) IVPB, 840 mg, Intravenous, Once, 49 of 51 cycles  Administration: 420 mg (6/16/2022), 420 mg (7/7/2022), 420 mg (7/28/2022), 420 mg (8/18/2022), 420 mg (9/8/2022), 420 mg (9/29/2022), 420 mg (10/20/2022), 420 mg (11/17/2022), 420 mg (12/8/2022), 420 mg (12/30/2022), 420 mg (1/19/2023), 420 mg (2/9/2023), 420 mg (3/2/2023), 420 mg (3/23/2023), 420 mg (4/13/2023), 420 mg (5/4/2023), 420 mg (5/23/2023), 420 mg (6/15/2023), 420 mg (7/6/2023), 420 mg (7/25/2023), 420 mg (8/17/2023), 420 mg (9/7/2023), 420 mg (9/28/2023), 420 mg (10/19/2023), 420 mg (11/9/2023), 420 mg (1/11/2024), 420 mg (12/21/2023), 420 mg (11/30/2023), 420 mg (2/1/2024), 420 mg (2/22/2024), 420 mg (3/14/2024), 420 mg (4/4/2024), 420 mg (4/25/2024), 420 mg (5/16/2024), 420 mg (8/9/2024), 420 mg (7/18/2024), 420 mg (6/27/2024), 420 mg (6/6/2024), 420 mg (9/20/2024), 420 mg (8/30/2024), 420 mg  (10/11/2024), 420 mg (11/1/2024), 420 mg (11/22/2024), 420 mg (12/13/2024), 420 mg (1/3/2025), 420 mg (1/24/2025), 420 mg (2/14/2025)  DOCEtaxel (TAXOTERE) chemo infusion, 75 mg/m2, Intravenous, Once, 3 of 3 cycles  Dose modification: 60 mg/m2 (original dose 75 mg/m2, Cycle 3, Reason: Anticipated Tolerance)  Administration: 112.8 mg (6/16/2022)  PACLItaxel (TAXOL) chemo IVPB, 80 mg/m2 = 150.6 mg (100 % of original dose 80 mg/m2), Intravenous, Once, 3 of 3 cycles  Dose modification: 80 mg/m2 (original dose 80 mg/m2, Cycle 4, Reason: Anticipated Tolerance)  Administration: 150.6 mg (7/7/2022), 150.6 mg (7/14/2022), 150.6 mg (7/21/2022), 150.6 mg (7/28/2022), 150.6 mg (8/4/2022), 150.6 mg (8/11/2022), 150.6 mg (8/18/2022), 150.6 mg (8/25/2022), 150.6 mg (9/1/2022)  trastuzumab (HERCEPTIN) chemo infusion, 8 mg/kg, Intravenous, Once, 47 of 49 cycles  Administration: 487 mg (7/28/2022), 487 mg (8/18/2022), 487 mg (9/8/2022), 487 mg (9/29/2022), 487 mg (10/20/2022), 450 mg (11/17/2022), 450 mg (12/8/2022), 450 mg (12/30/2022), 450 mg (1/19/2023), 450 mg (2/9/2023), 450 mg (3/2/2023), 450 mg (3/23/2023), 482 mg (4/13/2023), 482 mg (5/4/2023), 482 mg (5/23/2023), 482 mg (6/15/2023), 482 mg (7/6/2023), 482 mg (7/25/2023), 482 mg (8/17/2023), 482 mg (9/7/2023), 482 mg (9/28/2023), 522 mg (10/19/2023), 523 mg (11/9/2023), 534 mg (1/11/2024), 531 mg (12/21/2023), 522 mg (11/30/2023), 534 mg (2/1/2024), 548 mg (2/22/2024), 558 mg (3/14/2024), 558 mg (4/4/2024), 558 mg (4/25/2024), 558 mg (5/16/2024), 558 mg (8/9/2024), 558 mg (7/18/2024), 558 mg (6/27/2024), 558 mg (6/6/2024), 558 mg (9/20/2024), 558 mg (8/30/2024), 558 mg (10/11/2024), 558 mg (11/1/2024), 558 mg (11/22/2024), 558 mg (12/13/2024), 558 mg (1/3/2025), 558 mg (1/24/2025), 558 mg (2/14/2025)  INV trastuzumab infusion, 6 mg/kg = 487 mg (100 % of original dose 6 mg/kg), Intravenous, Once, 2 of 2 cycles  Dose modification: 6 mg/kg (original dose 6 mg/kg, Cycle 3,  Reason: Other (Must fill in a comment), Comment: investigational drug)  Administration: 487 mg (6/16/2022), 487 mg (7/7/2022)     Malignant neoplasm metastatic to bone (HCC)   3/2022 Initial Diagnosis    Osseous metastasis (HCC)     3/29/2022 Biopsy    Breast, Right, Biopsy:  - Invasive mammary carcinoma of no special type (ductal, not otherwise specified), [place] Grade II (3 + 2 + 1 = 6), spanning at least 6 mm.   ER/AZ negative, HER2 positive    Procedure  Needle biopsy    Specimen Laterality  Right    TUMOR   Histologic Type  Invasive carcinoma of no special type (ductal)    Histologic Grade (Fabrizio Histologic Score)     Glandular (Acinar) / Tubular Differentiation  Score 3    Nuclear Pleomorphism  Score 2    Mitotic Rate  Score 1    Overall Grade  Grade 2 (scores of 6 or 7)    Tumor Size  Greatest dimension of largest invasive focus (Millimeters): 6 mm   Ductal Carcinoma In Situ (DCIS)  Not identified    Lymphovascular Invasion  Not identified         3/31/2022 - 4/18/2022 Radiation    Course: C1    Plan ID Energy Fractions Dose per Fraction (cGy) Dose Correction (cGy) Total Dose Delivered (cGy) Elapsed Days   C1 C7 And ix 10X/6X 7 / 7 300 0 2,100 8   C1 C7 Spine 10X/6X 3 / 10 300 0 900 4   L4 Sacrum 10X 10 / 10 300 0 3,000 13   T1 T6 And ix 10X 7 / 7 300 0 2,100 8   T1 T6 Spine 10X 3 / 10 300 0 900 4      Dr. Rutledge     4/28/2022 -  Chemotherapy    clinical trial with THP with or without atezolizumab     4/28/2022 -  Chemotherapy    pegfilgrastim (NEULASTA ONPRO), 6 mg, Subcutaneous, Once, 1 of 1 cycle  pertuzumab (PERJETA) IVPB, 840 mg, Intravenous, Once, 49 of 51 cycles  Administration: 420 mg (6/16/2022), 420 mg (7/7/2022), 420 mg (7/28/2022), 420 mg (8/18/2022), 420 mg (9/8/2022), 420 mg (9/29/2022), 420 mg (10/20/2022), 420 mg (11/17/2022), 420 mg (12/8/2022), 420 mg (12/30/2022), 420 mg (1/19/2023), 420 mg (2/9/2023), 420 mg (3/2/2023), 420 mg (3/23/2023), 420 mg (4/13/2023), 420 mg (5/4/2023),  420 mg (5/23/2023), 420 mg (6/15/2023), 420 mg (7/6/2023), 420 mg (7/25/2023), 420 mg (8/17/2023), 420 mg (9/7/2023), 420 mg (9/28/2023), 420 mg (10/19/2023), 420 mg (11/9/2023), 420 mg (1/11/2024), 420 mg (12/21/2023), 420 mg (11/30/2023), 420 mg (2/1/2024), 420 mg (2/22/2024), 420 mg (3/14/2024), 420 mg (4/4/2024), 420 mg (4/25/2024), 420 mg (5/16/2024), 420 mg (8/9/2024), 420 mg (7/18/2024), 420 mg (6/27/2024), 420 mg (6/6/2024), 420 mg (9/20/2024), 420 mg (8/30/2024), 420 mg (10/11/2024), 420 mg (11/1/2024), 420 mg (11/22/2024), 420 mg (12/13/2024), 420 mg (1/3/2025), 420 mg (1/24/2025), 420 mg (2/14/2025)  DOCEtaxel (TAXOTERE) chemo infusion, 75 mg/m2, Intravenous, Once, 3 of 3 cycles  Dose modification: 60 mg/m2 (original dose 75 mg/m2, Cycle 3, Reason: Anticipated Tolerance)  Administration: 112.8 mg (6/16/2022)  PACLItaxel (TAXOL) chemo IVPB, 80 mg/m2 = 150.6 mg (100 % of original dose 80 mg/m2), Intravenous, Once, 3 of 3 cycles  Dose modification: 80 mg/m2 (original dose 80 mg/m2, Cycle 4, Reason: Anticipated Tolerance)  Administration: 150.6 mg (7/7/2022), 150.6 mg (7/14/2022), 150.6 mg (7/21/2022), 150.6 mg (7/28/2022), 150.6 mg (8/4/2022), 150.6 mg (8/11/2022), 150.6 mg (8/18/2022), 150.6 mg (8/25/2022), 150.6 mg (9/1/2022)  trastuzumab (HERCEPTIN) chemo infusion, 8 mg/kg, Intravenous, Once, 47 of 49 cycles  Administration: 487 mg (7/28/2022), 487 mg (8/18/2022), 487 mg (9/8/2022), 487 mg (9/29/2022), 487 mg (10/20/2022), 450 mg (11/17/2022), 450 mg (12/8/2022), 450 mg (12/30/2022), 450 mg (1/19/2023), 450 mg (2/9/2023), 450 mg (3/2/2023), 450 mg (3/23/2023), 482 mg (4/13/2023), 482 mg (5/4/2023), 482 mg (5/23/2023), 482 mg (6/15/2023), 482 mg (7/6/2023), 482 mg (7/25/2023), 482 mg (8/17/2023), 482 mg (9/7/2023), 482 mg (9/28/2023), 522 mg (10/19/2023), 523 mg (11/9/2023), 534 mg (1/11/2024), 531 mg (12/21/2023), 522 mg (11/30/2023), 534 mg (2/1/2024), 548 mg (2/22/2024), 558 mg (3/14/2024), 558 mg  "(4/4/2024), 558 mg (4/25/2024), 558 mg (5/16/2024), 558 mg (8/9/2024), 558 mg (7/18/2024), 558 mg (6/27/2024), 558 mg (6/6/2024), 558 mg (9/20/2024), 558 mg (8/30/2024), 558 mg (10/11/2024), 558 mg (11/1/2024), 558 mg (11/22/2024), 558 mg (12/13/2024), 558 mg (1/3/2025), 558 mg (1/24/2025), 558 mg (2/14/2025)  INV trastuzumab infusion, 6 mg/kg = 487 mg (100 % of original dose 6 mg/kg), Intravenous, Once, 2 of 2 cycles  Dose modification: 6 mg/kg (original dose 6 mg/kg, Cycle 3, Reason: Other (Must fill in a comment), Comment: investigational drug)  Administration: 487 mg (6/16/2022), 487 mg (7/7/2022)        Pertinent Medical History     03/03/25: Patient is seen in office for continuity of care and follow-up for hormone negative HER2 positive breast cancer.  Patient tolerated last cycle of pertuzumab and trastuzumab very well.  She also receives Zometa and reports gets on and off diarrhea every time she receives Zometa, symptoms not significant to affect her daily living activities.     Review of Systems   Constitutional:  Negative for activity change, fatigue and unexpected weight change.   Eyes:  Negative for visual disturbance.   Respiratory:  Negative for shortness of breath.    Cardiovascular:  Negative for chest pain and palpitations.   Gastrointestinal:  Negative for abdominal pain, diarrhea and vomiting.   Musculoskeletal:  Negative for arthralgias and myalgias.   Neurological:  Negative for headaches.   All other systems reviewed and are negative.    Social History     Tobacco Use    Smoking status: Never    Smokeless tobacco: Never   Vaping Use    Vaping status: Never Used   Substance and Sexual Activity    Alcohol use: Yes     Comment: Less than 1 drink every month    Drug use: Never    Sexual activity: Not Currently         Objective   /84 (BP Location: Left arm, Patient Position: Sitting, Cuff Size: Adult)   Pulse 101   Temp 99.1 °F (37.3 °C) (Temporal)   Resp 18   Ht 5' 2.01\" (1.575 m)   Wt " 95.3 kg (210 lb)   LMP 03/28/2022 (Approximate)   SpO2 98%   BMI 38.40 kg/m²     Pain Screening:  Pain Score: 0-No pain  ECOG   0  Physical Exam  Constitutional:       Appearance: Normal appearance.   HENT:      Head: Normocephalic and atraumatic.   Eyes:      Conjunctiva/sclera: Conjunctivae normal.      Pupils: Pupils are equal, round, and reactive to light.   Cardiovascular:      Rate and Rhythm: Normal rate and regular rhythm.      Pulses: Normal pulses.      Heart sounds: Normal heart sounds.   Pulmonary:      Effort: Pulmonary effort is normal.      Breath sounds: Normal breath sounds.   Abdominal:      General: Abdomen is flat. Bowel sounds are normal.   Musculoskeletal:      Right lower leg: No edema.      Left lower leg: No edema.   Skin:     General: Skin is warm and dry.   Neurological:      General: No focal deficit present.      Mental Status: She is alert and oriented to person, place, and time.       Labs: I have reviewed the following labs:  Lab Results   Component Value Date/Time    WBC 4.77 02/13/2025 02:34 PM    RBC 4.41 02/13/2025 02:34 PM    Hemoglobin 14.0 02/13/2025 02:34 PM    Hematocrit 42.8 02/13/2025 02:34 PM    MCV 97 02/13/2025 02:34 PM    MCH 31.7 02/13/2025 02:34 PM    RDW 12.5 02/13/2025 02:34 PM    Platelets 183 02/13/2025 02:34 PM    Segmented % 56 02/13/2025 02:34 PM    Lymphocytes % 30 02/13/2025 02:34 PM    Monocytes % 14 (H) 02/13/2025 02:34 PM    Eosinophils Relative 0 02/13/2025 02:34 PM    Basophils Relative 0 02/13/2025 02:34 PM    Immature Grans % 0 02/13/2025 02:34 PM    Absolute Neutrophils 2.63 02/13/2025 02:34 PM     Lab Results   Component Value Date/Time    Potassium 3.7 02/13/2025 02:34 PM    Chloride 100 02/13/2025 02:34 PM    CO2 28 02/13/2025 02:34 PM    BUN 12 02/13/2025 02:34 PM    Creatinine 0.86 02/13/2025 02:34 PM    Glucose, Fasting 116 (H) 08/13/2024 08:18 AM    Calcium 9.4 02/13/2025 02:34 PM    AST 18 02/13/2025 02:34 PM    ALT 29 02/13/2025 02:34 PM     Alkaline Phosphatase 48 02/13/2025 02:34 PM    Total Protein 7.6 02/13/2025 02:34 PM    Albumin 4.6 02/13/2025 02:34 PM    Total Bilirubin 1.09 (H) 02/13/2025 02:34 PM    eGFR 79 02/13/2025 02:34 PM     Lab Results   Component Value Date/Time     02/13/2025 02:34 PM    CA 27-29 28.6 11/14/2024 08:05 AM        Radiology Results Review: I have reviewed the following images/report studies in PACS: CT chest abdomen pelvis w contrast  Result Date: 2/26/2025  1. Stable osseous metastatic disease. No thoracic or intra-abdominal lymphadenopathy. 2. Continued interval enlargement of left ovarian cystic lesion without mural nodularity or septation. Leading differential diagnosis remains ovarian cystadenoma 3. Bilateral nonobstructing renal stones. Electronically signed: 02/26/2025 03:45 AM Carlin Fuentes MD     Echocardiogram 2/25/25:    Interpretation Summary  Show Result Comparison     Left Ventricle: Left ventricular cavity size is normal. Wall thickness is normal. The left ventricular ejection fraction is 60-65%. Systolic function is normal. Global longitudinal strain is normal at -20%. Wall motion is normal. Diastolic function is normal.    Prior TTE study available for comparison. Prior study date: 11/15/2024. No significant changes noted compared to the prior study.

## 2025-03-03 ENCOUNTER — TELEPHONE (OUTPATIENT)
Dept: HEMATOLOGY ONCOLOGY | Facility: CLINIC | Age: 50
End: 2025-03-03

## 2025-03-03 ENCOUNTER — OFFICE VISIT (OUTPATIENT)
Dept: HEMATOLOGY ONCOLOGY | Facility: CLINIC | Age: 50
End: 2025-03-03
Payer: COMMERCIAL

## 2025-03-03 VITALS
DIASTOLIC BLOOD PRESSURE: 84 MMHG | RESPIRATION RATE: 18 BRPM | BODY MASS INDEX: 38.64 KG/M2 | TEMPERATURE: 99.1 F | SYSTOLIC BLOOD PRESSURE: 130 MMHG | OXYGEN SATURATION: 98 % | HEART RATE: 101 BPM | HEIGHT: 62 IN | WEIGHT: 210 LBS

## 2025-03-03 DIAGNOSIS — Z17.1 MALIGNANT NEOPLASM OF OVERLAPPING SITES OF RIGHT BREAST IN FEMALE, ESTROGEN RECEPTOR NEGATIVE (HCC): Primary | ICD-10-CM

## 2025-03-03 DIAGNOSIS — Z79.899 HIGH RISK MEDICATION USE: ICD-10-CM

## 2025-03-03 DIAGNOSIS — C79.51 MALIGNANT NEOPLASM METASTATIC TO BONE (HCC): ICD-10-CM

## 2025-03-03 DIAGNOSIS — C50.811 MALIGNANT NEOPLASM OF OVERLAPPING SITES OF RIGHT BREAST IN FEMALE, ESTROGEN RECEPTOR NEGATIVE (HCC): Primary | ICD-10-CM

## 2025-03-03 PROCEDURE — 99215 OFFICE O/P EST HI 40 MIN: CPT | Performed by: INTERNAL MEDICINE

## 2025-03-03 NOTE — TELEPHONE ENCOUNTER
Attempted to call patient, left message with call back number in order to reschedule appointments. Cancelled 3/6 and further appts.

## 2025-03-03 NOTE — TELEPHONE ENCOUNTER
Patient saw Dr. Phillips in office today. Requested next treatment be pushed out from 3/6 to 3/11 [place].

## 2025-03-04 NOTE — TELEPHONE ENCOUNTER
Attempted to call patient, left message with call back number in order to reschedule appointments.

## 2025-03-06 ENCOUNTER — HOSPITAL ENCOUNTER (OUTPATIENT)
Dept: INFUSION CENTER | Facility: CLINIC | Age: 50
End: 2025-03-06

## 2025-03-11 ENCOUNTER — TELEPHONE (OUTPATIENT)
Dept: INFUSION CENTER | Facility: CLINIC | Age: 50
End: 2025-03-11

## 2025-03-12 ENCOUNTER — HOSPITAL ENCOUNTER (OUTPATIENT)
Dept: INFUSION CENTER | Facility: CLINIC | Age: 50
Discharge: HOME | End: 2025-03-12
Payer: COMMERCIAL

## 2025-03-12 VITALS
OXYGEN SATURATION: 96 % | RESPIRATION RATE: 17 BRPM | HEART RATE: 98 BPM | DIASTOLIC BLOOD PRESSURE: 86 MMHG | BODY MASS INDEX: 38.64 KG/M2 | HEIGHT: 62 IN | SYSTOLIC BLOOD PRESSURE: 133 MMHG | TEMPERATURE: 98.3 F | WEIGHT: 210 LBS

## 2025-03-12 DIAGNOSIS — C79.51 MALIGNANT NEOPLASM METASTATIC TO BONE (HCC): ICD-10-CM

## 2025-03-12 DIAGNOSIS — Z17.1 MALIGNANT NEOPLASM OF OVERLAPPING SITES OF RIGHT BREAST IN FEMALE, ESTROGEN RECEPTOR NEGATIVE (HCC): ICD-10-CM

## 2025-03-12 DIAGNOSIS — C50.811 MALIGNANT NEOPLASM OF OVERLAPPING SITES OF RIGHT BREAST IN FEMALE, ESTROGEN RECEPTOR NEGATIVE (HCC): ICD-10-CM

## 2025-03-12 DIAGNOSIS — T45.1X5A CHEMOTHERAPY-INDUCED NEUTROPENIA (HCC): Primary | ICD-10-CM

## 2025-03-12 DIAGNOSIS — D70.1 CHEMOTHERAPY-INDUCED NEUTROPENIA (HCC): Primary | ICD-10-CM

## 2025-03-12 PROCEDURE — 96413 CHEMO IV INFUSION 1 HR: CPT

## 2025-03-12 PROCEDURE — 96417 CHEMO IV INFUS EACH ADDL SEQ: CPT

## 2025-03-12 RX ORDER — SODIUM CHLORIDE 9 MG/ML
20 INJECTION, SOLUTION INTRAVENOUS ONCE
Status: COMPLETED | OUTPATIENT
Start: 2025-03-12 | End: 2025-03-12

## 2025-03-12 RX ADMIN — SODIUM CHLORIDE 20 ML/HR: 9 INJECTION, SOLUTION INTRAVENOUS at 10:43

## 2025-03-12 RX ADMIN — TRASTUZUMAB 558 MG: 150 INJECTION, POWDER, LYOPHILIZED, FOR SOLUTION INTRAVENOUS at 11:32

## 2025-03-12 RX ADMIN — PERTUZUMAB 420 MG: 30 INJECTION, SOLUTION, CONCENTRATE INTRAVENOUS at 10:52

## 2025-03-12 NOTE — PROGRESS NOTES
Patient to infusion for herceptin perjeta.  She offers no complaints.  She tolerated treatment today.  Next appointment confirmed 4/2 0730, she declined AVS

## 2025-03-17 ENCOUNTER — OFFICE VISIT (OUTPATIENT)
Age: 50
End: 2025-03-17
Payer: COMMERCIAL

## 2025-03-17 VITALS
HEART RATE: 105 BPM | DIASTOLIC BLOOD PRESSURE: 66 MMHG | BODY MASS INDEX: 38.06 KG/M2 | WEIGHT: 208.2 LBS | TEMPERATURE: 96.1 F | OXYGEN SATURATION: 94 % | RESPIRATION RATE: 16 BRPM | SYSTOLIC BLOOD PRESSURE: 152 MMHG

## 2025-03-17 DIAGNOSIS — I42.7 DRUG-INDUCED CARDIOMYOPATHY (HCC): ICD-10-CM

## 2025-03-17 DIAGNOSIS — F11.20 CONTINUOUS OPIOID DEPENDENCE (HCC): ICD-10-CM

## 2025-03-17 DIAGNOSIS — G95.20 UNSPECIFIED CORD COMPRESSION (HCC): ICD-10-CM

## 2025-03-17 DIAGNOSIS — Z51.5 PALLIATIVE CARE PATIENT: Primary | ICD-10-CM

## 2025-03-17 DIAGNOSIS — C79.51 MALIGNANT NEOPLASM METASTATIC TO BONE (HCC): ICD-10-CM

## 2025-03-17 DIAGNOSIS — J02.9 PHARYNGITIS, UNSPECIFIED ETIOLOGY: ICD-10-CM

## 2025-03-17 DIAGNOSIS — R06.2 WHEEZE: ICD-10-CM

## 2025-03-17 PROCEDURE — 99214 OFFICE O/P EST MOD 30 MIN: CPT | Performed by: INTERNAL MEDICINE

## 2025-03-17 RX ORDER — AZITHROMYCIN 250 MG/1
TABLET, FILM COATED ORAL
Qty: 6 TABLET | Refills: 0 | Status: SHIPPED | OUTPATIENT
Start: 2025-03-17 | End: 2025-03-21

## 2025-03-17 RX ORDER — ALBUTEROL SULFATE 90 UG/1
2 INHALANT RESPIRATORY (INHALATION) EVERY 6 HOURS PRN
Qty: 8.5 G | Refills: 0 | Status: SHIPPED | OUTPATIENT
Start: 2025-03-17

## 2025-03-17 NOTE — ASSESSMENT & PLAN NOTE
Patient is being monitored with echocardiogram due to being on current cancer therapy.  Reports stable findings.

## 2025-03-17 NOTE — PROGRESS NOTES
Name: Nayeli Love      : 1975      MRN: [id number]  Encounter Provider: Jerrod Arreguin MD  Encounter Date: 3/17/2025   Encounter department: [facility] PR[facility]  :  Assessment & Plan  Palliative care patient  Noted       Malignant neoplasm metastatic to bone (HCC)  Secondary to breast cancer.  Continues follow-up with oncology currently on pertuzumab plus trastuzumab infusions and also on Zometa.       Pharyngitis, unspecified etiology  Office flu and COVID test was negative  Patient had a sick contacts were also negative for COVID and flu  Cussed supportive management, recommend antibiotic course as below, along with inhalers for wheezing, follow-up for persistent symptoms  Orders:    azithromycin (Zithromax) 250 mg tablet; Take 2 tablets (500 mg total) by mouth daily for 1 day, THEN 1 tablet (250 mg total) daily for 4 days.    Wheeze  Likely secondary to ongoing infection, will give albuterol hold off on any steroid as patient is on Zometa infusions.  Orders:    albuterol (ProAir HFA) 90 mcg/act inhaler; Inhale 2 puffs every 6 (six) hours as needed for wheezing    Drug-induced cardiomyopathy (HCC)  Patient is being monitored with echocardiogram due to being on current cancer therapy.  Reports stable findings.       Unspecified cord compression (HCC)  This was present with the cervical metastatic cyst which has now resolved       Continuous opioid dependence (HCC)  Continues with oxycodone as needed, being followed up with palliative care medicine              History of Present Illness   HPI    Patient complains of pressure-like sensation in a year, sore throat body aches over the weekend.  Denies any fever chills nausea vomiting  Review of Systems   Constitutional:  Negative for activity change, appetite change, chills, diaphoresis, fatigue, fever and unexpected weight change.   HENT:  Positive for congestion and sore throat. Negative for drooling, ear discharge, ear pain,  facial swelling, hearing loss, postnasal drip, rhinorrhea, sinus pressure, sinus pain, sneezing, tinnitus, trouble swallowing and voice change.    Eyes:  Negative for discharge.   Respiratory:  Positive for cough. Negative for apnea, choking, chest tightness, shortness of breath, wheezing and stridor.    Cardiovascular:  Negative for chest pain, palpitations and leg swelling.   Gastrointestinal:  Negative for abdominal distention, abdominal pain, anal bleeding, blood in stool, constipation, diarrhea, nausea and vomiting.   Genitourinary:  Negative for decreased urine volume, difficulty urinating, frequency and urgency.   Musculoskeletal:  Negative for arthralgias, back pain and myalgias.   Skin:  Negative for color change.   Neurological:  Positive for headaches. Negative for dizziness, light-headedness and numbness.         Past Medical History   Past Medical History:   Diagnosis Date    Breast cancer (HCC)     Cancer (HCC)     Hypertension      History reviewed. No pertinent surgical history.  Family History   Problem Relation Age of Onset    Coronary artery disease Mother     Heart attack Father     Diabetes type II Father     Diabetes Father     Hashimoto's thyroiditis Sister       reports that she has never smoked. She has never used smokeless tobacco. She reports current alcohol use. She reports that she does not use drugs.  Current Outpatient Medications   Medication Instructions    acetaminophen (TYLENOL) 650 mg, Every 6 hours PRN    al mag oxide-diphenhydramine-lidocaine viscous (MAGIC MOUTHWASH) 1:1:1 suspension 10 mL, Swish & Spit, As needed    albuterol (ProAir HFA) 90 mcg/act inhaler 2 puffs, Inhalation, Every 6 hours PRN    azithromycin (Zithromax) 250 mg tablet Take 2 tablets (500 mg total) by mouth daily for 1 day, THEN 1 tablet (250 mg total) daily for 4 days.    lisinopril (ZESTRIL) 10 mg, Oral, Daily, Only to take as needed when BP is >150 systolicallly    moxifloxacin (VIGAMOX) 0.5 % ophthalmic  solution INSTILL 1 DROP IN RIGHT EYE EVERY HOUR    naloxone (NARCAN) 4 mg/0.1 mL nasal spray Administer 1 spray into a nostril. If no response after 2-3 minutes, give another dose in the other nostril using a new spray.    nystatin powder Topical, 2 times daily    ondansetron (ZOFRAN) 4 mg, Oral, Every 8 hours PRN    oxyCODONE (ROXICODONE) 5-10 mg, Oral, Every 4 hours PRN, No further refills without visit to Palliative provider.  Please call office to make appointment.    pantoprazole (PROTONIX) 20 mg, Oral, Daily (early morning)   No Known Allergies   Objective   /66 (Patient Position: Sitting, Cuff Size: Large)   Pulse 105   Temp (!) 96.1 °F (35.6 °C) (Tympanic)   Resp 16   Wt 94.4 kg (208 lb 3.2 oz)   LMP 03/28/2022 (Approximate)   SpO2 94%   BMI 38.06 kg/m²      Physical Exam  Constitutional:       General: She is not in acute distress.     Appearance: Normal appearance. She is normal weight. She is not ill-appearing, toxic-appearing or diaphoretic.   HENT:      Right Ear: There is impacted cerumen.      Left Ear: There is impacted cerumen.      Mouth/Throat:      Pharynx: Posterior oropharyngeal erythema present. No oropharyngeal exudate.   Cardiovascular:      Rate and Rhythm: Normal rate and regular rhythm.      Pulses: Normal pulses.      Heart sounds: Normal heart sounds. No murmur heard.     No gallop.   Pulmonary:      Effort: Pulmonary effort is normal. No respiratory distress.      Breath sounds: Normal breath sounds. No stridor. No wheezing, rhonchi or rales.   Chest:      Chest wall: No tenderness.   Musculoskeletal:      Right lower leg: No edema.      Left lower leg: No edema.   Neurological:      Mental Status: She is alert and oriented to person, place, and time.

## 2025-03-17 NOTE — ASSESSMENT & PLAN NOTE
Secondary to breast cancer.  Continues follow-up with oncology currently on pertuzumab plus trastuzumab infusions and also on Zometa.

## 2025-03-21 DIAGNOSIS — R21 RASH: ICD-10-CM

## 2025-03-21 RX ORDER — NYSTATIN 100000 [USP'U]/G
POWDER TOPICAL
Qty: 15 G | Refills: 1 | Status: SHIPPED | OUTPATIENT
Start: 2025-03-21

## 2025-03-21 NOTE — TELEPHONE ENCOUNTER
Patient called Rx refill line back and states she received a call from office if she actually did want this Rx and she states yes she requested it be refilled - please send to pharmacy for pt

## 2025-03-25 ENCOUNTER — ANESTHESIA (OUTPATIENT)
Dept: ANESTHESIOLOGY | Facility: HOSPITAL | Age: 50
End: 2025-03-25

## 2025-03-25 ENCOUNTER — ANESTHESIA EVENT (OUTPATIENT)
Dept: ANESTHESIOLOGY | Facility: HOSPITAL | Age: 50
End: 2025-03-25

## 2025-03-26 RX ORDER — SODIUM CHLORIDE 9 MG/ML
20 INJECTION, SOLUTION INTRAVENOUS ONCE
OUTPATIENT
Start: 2025-04-02

## 2025-04-02 ENCOUNTER — HOSPITAL ENCOUNTER (OUTPATIENT)
Dept: INFUSION CENTER | Facility: CLINIC | Age: 50
Discharge: HOME | End: 2025-04-02
Payer: COMMERCIAL

## 2025-04-02 ENCOUNTER — TELEPHONE (OUTPATIENT)
Dept: INFUSION CENTER | Facility: CLINIC | Age: 50
End: 2025-04-02

## 2025-04-02 VITALS
DIASTOLIC BLOOD PRESSURE: 81 MMHG | WEIGHT: 207 LBS | TEMPERATURE: 98 F | SYSTOLIC BLOOD PRESSURE: 114 MMHG | HEART RATE: 87 BPM | OXYGEN SATURATION: 97 % | BODY MASS INDEX: 38.09 KG/M2 | HEIGHT: 62 IN | RESPIRATION RATE: 18 BRPM

## 2025-04-02 DIAGNOSIS — C50.811 MALIGNANT NEOPLASM OF OVERLAPPING SITES OF RIGHT BREAST IN FEMALE, ESTROGEN RECEPTOR NEGATIVE (HCC): ICD-10-CM

## 2025-04-02 DIAGNOSIS — D70.1 CHEMOTHERAPY-INDUCED NEUTROPENIA (HCC): Primary | ICD-10-CM

## 2025-04-02 DIAGNOSIS — T45.1X5A CHEMOTHERAPY-INDUCED NEUTROPENIA (HCC): Primary | ICD-10-CM

## 2025-04-02 DIAGNOSIS — C79.51 MALIGNANT NEOPLASM METASTATIC TO BONE (HCC): ICD-10-CM

## 2025-04-02 DIAGNOSIS — Z17.1 MALIGNANT NEOPLASM OF OVERLAPPING SITES OF RIGHT BREAST IN FEMALE, ESTROGEN RECEPTOR NEGATIVE (HCC): ICD-10-CM

## 2025-04-02 PROCEDURE — 96417 CHEMO IV INFUS EACH ADDL SEQ: CPT

## 2025-04-02 PROCEDURE — 96413 CHEMO IV INFUSION 1 HR: CPT

## 2025-04-02 RX ORDER — SODIUM CHLORIDE 9 MG/ML
20 INJECTION, SOLUTION INTRAVENOUS ONCE
Status: COMPLETED | OUTPATIENT
Start: 2025-04-02 | End: 2025-04-02

## 2025-04-02 RX ADMIN — PERTUZUMAB 420 MG: 30 INJECTION, SOLUTION, CONCENTRATE INTRAVENOUS at 08:58

## 2025-04-02 RX ADMIN — TRASTUZUMAB 558 MG: 150 INJECTION, POWDER, LYOPHILIZED, FOR SOLUTION INTRAVENOUS at 09:36

## 2025-04-02 RX ADMIN — SODIUM CHLORIDE 20 ML/HR: 0.9 INJECTION, SOLUTION INTRAVENOUS at 08:51

## 2025-04-02 NOTE — TELEPHONE ENCOUNTER
Upon completion of treatment today, no more cycles left to schedule. Office did add more appt requests at this time. Patient scheduled for next appt at [facility] 4/23 at 0800. Patient scheduled out to 5/14 at 0800 as well. Zometa currently scheduled 5/9, I can adjust Zometa to 5/14 to go with treatment if patient would like as there is a tolerance. Called patient,  left with request to call back.

## 2025-04-02 NOTE — PROGRESS NOTES
Patient arrived for D1C51 Perjeta & herceptin feeling well with no complaints. PIV inserted with brisk blood return. EF from 2/25/25 60-60%. No lab parameters to treat today. Patient is resting in recliner chair with call bell in reach

## 2025-04-02 NOTE — PROGRESS NOTES
Patient tolerated treatment well with no adverse reactions. PIV removed, intact. Confirmed next appointment for 5/9 @ 1300 [facility]. Declined AVS

## 2025-04-08 ENCOUNTER — ANESTHESIA (OUTPATIENT)
Dept: GASTROENTEROLOGY | Facility: AMBULARY SURGERY CENTER | Age: 50
End: 2025-04-08
Payer: COMMERCIAL

## 2025-04-08 ENCOUNTER — HOSPITAL ENCOUNTER (OUTPATIENT)
Dept: GASTROENTEROLOGY | Facility: AMBULARY SURGERY CENTER | Age: 50
Setting detail: OUTPATIENT SURGERY
Discharge: HOME | End: 2025-04-08
Attending: INTERNAL MEDICINE
Payer: COMMERCIAL

## 2025-04-08 VITALS
BODY MASS INDEX: 35 KG/M2 | SYSTOLIC BLOOD PRESSURE: 117 MMHG | WEIGHT: 205 LBS | TEMPERATURE: 97.7 F | HEART RATE: 89 BPM | DIASTOLIC BLOOD PRESSURE: 59 MMHG | OXYGEN SATURATION: 95 % | RESPIRATION RATE: 22 BRPM | HEIGHT: 64 IN

## 2025-04-08 DIAGNOSIS — Z12.11 SCREENING FOR COLON CANCER: ICD-10-CM

## 2025-04-08 RX ORDER — PROPOFOL 10 MG/ML
INJECTION, EMULSION INTRAVENOUS CONTINUOUS PRN
Status: DISCONTINUED | OUTPATIENT
Start: 2025-04-08 | End: 2025-04-08

## 2025-04-08 RX ORDER — SODIUM CHLORIDE, SODIUM LACTATE, POTASSIUM CHLORIDE, CALCIUM CHLORIDE 600; 310; 30; 20 MG/100ML; MG/100ML; MG/100ML; MG/100ML
INJECTION, SOLUTION INTRAVENOUS CONTINUOUS PRN
Status: DISCONTINUED | OUTPATIENT
Start: 2025-04-08 | End: 2025-04-08

## 2025-04-08 RX ORDER — PROPOFOL 10 MG/ML
INJECTION, EMULSION INTRAVENOUS AS NEEDED
Status: DISCONTINUED | OUTPATIENT
Start: 2025-04-08 | End: 2025-04-08

## 2025-04-08 RX ORDER — LIDOCAINE HYDROCHLORIDE 10 MG/ML
INJECTION, SOLUTION EPIDURAL; INFILTRATION; INTRACAUDAL; PERINEURAL AS NEEDED
Status: DISCONTINUED | OUTPATIENT
Start: 2025-04-08 | End: 2025-04-08

## 2025-04-08 RX ADMIN — PROPOFOL 120 MG: 10 INJECTION, EMULSION INTRAVENOUS at 12:44

## 2025-04-08 RX ADMIN — PROPOFOL 100 MCG/KG/MIN: 10 INJECTION, EMULSION INTRAVENOUS at 12:45

## 2025-04-08 RX ADMIN — LIDOCAINE HYDROCHLORIDE 50 MG: 10 INJECTION, SOLUTION EPIDURAL; INFILTRATION; INTRACAUDAL at 12:44

## 2025-04-08 RX ADMIN — Medication 40 MG: at 12:48

## 2025-04-08 RX ADMIN — SODIUM CHLORIDE, SODIUM LACTATE, POTASSIUM CHLORIDE, AND CALCIUM CHLORIDE: .6; .31; .03; .02 INJECTION, SOLUTION INTRAVENOUS at 12:10

## 2025-04-08 NOTE — ANESTHESIA PREPROCEDURE EVALUATION
Procedure:  COLONOSCOPY    Relevant Problems   CARDIO   (+) Essential hypertension   (+) HTN (hypertension)      GI/HEPATIC   (+) GERD (gastroesophageal reflux disease)      GYN   (+) Malignant neoplasm of overlapping sites of right breast in female, estrogen receptor negative (HCC)      HEMATOLOGY   (+) Antineoplastic chemotherapy induced anemia      NEURO/PSYCH   (+) Continuous opioid dependence (HCC)   (+) 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 (HCC)   (+) Unspecified cord compression (HCC)      Left Ventricle Left ventricular cavity size is normal. Wall thickness is normal. The left ventricular ejection fraction is 60-65%. Systolic function is normal. Global longitudinal strain is normal at -20%.  Wall motion is normal. Diastolic function is normal.   Right Ventricle Right ventricular cavity size is mildly dilated. Systolic function is normal.   Left Atrium The atrium is normal in size.   Right Atrium The atrium is normal in size.   Aortic Valve The aortic valve is trileaflet. The leaflets are not thickened. The leaflets are not calcified. The leaflets exhibit normal mobility. There is no evidence of regurgitation. The aortic valve has no significant stenosis.   Mitral Valve Mitral valve structure is normal.  There is trace regurgitation. There is no evidence of stenosis.   Tricuspid Valve Tricuspid valve structure is normal. There is trace regurgitation. There is no evidence of stenosis.   Pulmonic Valve Pulmonic valve structure is normal. There is no evidence of regurgitation. There is no evidence of stenosis.   Ascending Aorta The aortic root is normal in size.   Pericardium There is no pericardial effusion.     Physical Exam    Airway    Mallampati score: III  TM Distance: >3 FB  Neck ROM: full     Dental       Cardiovascular  Rhythm: regular, No weak pulses    Pulmonary   No stridor    Other Findings  post-pubertal.    Pt wears contacts lenses 24 hrs a day.  I recommended removing them for her procedure  Anesthesia  Plan  ASA Score- 2     Anesthesia Type- IV sedation with anesthesia with ASA Monitors.         Additional Monitors:     Airway Plan:            Plan Factors-    Chart reviewed.   Existing labs reviewed. Patient summary reviewed.                  Induction- intravenous.    Postoperative Plan-         Informed Consent- Anesthetic plan and risks discussed with patient.  I personally reviewed this patient with the CRNA. Discussed and agreed on the Anesthesia Plan with the CRNA..      NPO Status:  Vitals Value Taken Time   Date of last liquid 04/08/25 04/08/25 1205   Time of last liquid 0630 04/08/25 1205   Date of last solid 04/06/25 04/08/25 1205   Time of last solid 2000 04/08/25 1205

## 2025-04-08 NOTE — H&P
"History and Physical -  [facility]  Nayeli Love 49 y.o. female MRN: [id number]                  HPI: Nayeli Love is a 49 y.o. year old female who presents for colonoscopy      REVIEW OF SYSTEMS: Per the HPI, and otherwise unremarkable.    Historical Information   Past Medical History:   Diagnosis Date    Breast cancer (HCC)     Cancer (HCC)     Hypertension      No past surgical history on file.  Social History   Social History     Substance and Sexual Activity   Alcohol Use Yes    Comment: Less than 1 drink every month     Social History     Substance and Sexual Activity   Drug Use Never     Social History     Tobacco Use   Smoking Status Never   Smokeless Tobacco Never     Family History   Problem Relation Age of Onset    Coronary artery disease Mother     Heart attack Father     Diabetes type II Father     Diabetes Father     Hashimoto's thyroiditis Sister        Meds/Allergies       Current Outpatient Medications:     acetaminophen (TYLENOL) 325 mg tablet    al mag oxide-diphenhydramine-lidocaine viscous (MAGIC MOUTHWASH) 1:1:1 suspension    albuterol (ProAir HFA) 90 mcg/act inhaler    lisinopril (ZESTRIL) 20 mg tablet    moxifloxacin (VIGAMOX) 0.5 % ophthalmic solution    naloxone (NARCAN) 4 mg/0.1 mL nasal spray    nystatin powder    ondansetron (ZOFRAN) 4 mg tablet    oxyCODONE (ROXICODONE) 10 MG TABS    pantoprazole (PROTONIX) 20 mg tablet    No Known Allergies    Objective     /79   Pulse (!) 107   Temp 97.7 °F (36.5 °C) (Temporal)   Resp 16   Ht 5' 4\" (1.626 m)   Wt 93 kg (205 lb)   LMP 03/28/2022 (Approximate)   SpO2 97%   BMI 35.19 kg/m²       PHYSICAL EXAM    Gen: NAD  Head: NCAT  CV: RRR  CHEST: Clear  ABD: soft, NT/ND  EXT: no edema      ASSESSMENT/PLAN:  This is a 49 y.o. year old female here for colonoscopy, and she is stable and optimized for her procedure.       "

## 2025-04-09 ENCOUNTER — OFFICE VISIT (OUTPATIENT)
Dept: PALLIATIVE MEDICINE | Facility: CLINIC | Age: 50
End: 2025-04-09
Payer: COMMERCIAL

## 2025-04-09 VITALS
HEIGHT: 64 IN | BODY MASS INDEX: 35.34 KG/M2 | SYSTOLIC BLOOD PRESSURE: 122 MMHG | RESPIRATION RATE: 18 BRPM | OXYGEN SATURATION: 95 % | TEMPERATURE: 98.2 F | WEIGHT: 207 LBS | HEART RATE: 95 BPM | DIASTOLIC BLOOD PRESSURE: 90 MMHG

## 2025-04-09 DIAGNOSIS — C79.51 MALIGNANT NEOPLASM METASTATIC TO BONE (HCC): ICD-10-CM

## 2025-04-09 DIAGNOSIS — Z17.1 MALIGNANT NEOPLASM OF OVERLAPPING SITES OF RIGHT BREAST IN FEMALE, ESTROGEN RECEPTOR NEGATIVE (HCC): Primary | ICD-10-CM

## 2025-04-09 DIAGNOSIS — Z79.899 MEDICAL MARIJUANA USE: ICD-10-CM

## 2025-04-09 DIAGNOSIS — Z51.5 PALLIATIVE CARE PATIENT: ICD-10-CM

## 2025-04-09 DIAGNOSIS — C50.811 MALIGNANT NEOPLASM OF OVERLAPPING SITES OF RIGHT BREAST IN FEMALE, ESTROGEN RECEPTOR NEGATIVE (HCC): Primary | ICD-10-CM

## 2025-04-09 DIAGNOSIS — G89.3 CANCER ASSOCIATED PAIN: ICD-10-CM

## 2025-04-09 PROCEDURE — 99214 OFFICE O/P EST MOD 30 MIN: CPT | Performed by: STUDENT IN AN ORGANIZED HEALTH CARE EDUCATION/TRAINING PROGRAM

## 2025-04-09 RX ORDER — OXYCODONE HYDROCHLORIDE 10 MG/1
5-10 TABLET ORAL 2 TIMES DAILY PRN
Qty: 60 TABLET | Refills: 0 | Status: SHIPPED | OUTPATIENT
Start: 2025-04-09

## 2025-04-09 NOTE — ASSESSMENT & PLAN NOTE
Psychosocial   Supportive listening provided  Normalized experience of patient/family  Provided anxiety containment     Referrals Placed / Medical Equipment Ordered  None     Follow-Up Recommendations  -Follow-up with PCP and current medical specialists  -Follow-up with palliative care: 2 months    Orders:    oxyCODONE (ROXICODONE) 10 MG TABS; Take 0.5-1 tablets (5-10 mg total) by mouth 2 (two) times a day as needed for moderate pain or severe pain No further refills without visit to Palliative provider.  Please call office to make appointment. Max Daily Amount: 20 mg

## 2025-04-09 NOTE — PATIENT INSTRUCTIONS
It was a pleasure speaking with you today.    As discussed:  -Continue your medications as prescribed.  -Continue with a bowel regimen to avoid constipation.    Follow-up in: 2 months    Please do not hesitate to call me sooner should you have any questions/concerns or needs.    Medication safety issues - Do not drive under the influence of narcotics (including opioids), watch for adverse effects including confusion / altered mental status / respiratory depression (slowed breathing), keep medications stored in a safe/locked environment, do not use alcohol while opioids or other narcotics are in your system. Do not travel with more than the minimum number of tablets or capsules required for the trip.    ER Precautions  Watch for red flag symptoms including, but not limited to fevers, chills, chest pain, shortness of breath, intractable nausea/vomiting/diarrhea, or acute intractable pain (especially if pain is new or has changed).

## 2025-04-09 NOTE — ASSESSMENT & PLAN NOTE
Following Dr. Phillips of Medical Oncology  On Pertuzumab and Trastuzumab q21 days  Next cycle 4/23/2025.    Tolerating treatments well without issues.  PO intake stable.  No nausea or vomiting.  No bowel issues.

## 2025-04-09 NOTE — ASSESSMENT & PLAN NOTE
Stable and finding relief with 0.5 to 1 tablet of OxyIR 10mg once to twice a day as needed.    Overall doing well from this standpoint.  She did inquire about other potential options in which we discussed neuropathic agents vs muscle relaxes as an option to try to see if this would help her pain with the hopes of being able to taper opioids if possible.     Will revisit these other multi-modal options.    Plan for now:  1) Continue OxyIR 5mg - 10mg BID PRN  2) Bowel regimen to avoid OIC (not an issue at this time).    Medication safety issues - Do not drive under the influence of narcotics (including opioids), watch for adverse effects including confusion / altered mental status / respiratory depression (slowed breathing), keep medications stored in a safe/locked environment, do not use alcohol while opioids or other narcotics are in your system. Do not travel with more than the minimum number of tablets or capsules required for the trip.      Orders:    oxyCODONE (ROXICODONE) 10 MG TABS; Take 0.5-1 tablets (5-10 mg total) by mouth 2 (two) times a day as needed for moderate pain or severe pain No further refills without visit to Palliative provider.  Please call office to make appointment. Max Daily Amount: 20 mg

## 2025-04-09 NOTE — PROGRESS NOTES
Name: Nayeli Love      : 1975      MRN: [id number]  Encounter Provider: Con Garcia DO  Encounter Date: 2025   Encounter department: [facility]  :  Assessment & Plan  Malignant neoplasm of overlapping sites of right breast in female, estrogen receptor negative (HCC)  Following Dr. Phillips of Medical Oncology  On Pertuzumab and Trastuzumab q21 days  Next cycle 2025.    Tolerating treatments well without issues.  PO intake stable.  No nausea or vomiting.  No bowel issues.       Malignant neoplasm metastatic to bone (HCC)         Cancer associated pain  Stable and finding relief with 0.5 to 1 tablet of OxyIR 10mg once to twice a day as needed.    Overall doing well from this standpoint.  She did inquire about other potential options in which we discussed neuropathic agents vs muscle relaxes as an option to try to see if this would help her pain with the hopes of being able to taper opioids if possible.     Will revisit these other multi-modal options.    Plan for now:  1) Continue OxyIR 5mg - 10mg BID PRN  2) Bowel regimen to avoid OIC (not an issue at this time).    Medication safety issues - Do not drive under the influence of narcotics (including opioids), watch for adverse effects including confusion / altered mental status / respiratory depression (slowed breathing), keep medications stored in a safe/locked environment, do not use alcohol while opioids or other narcotics are in your system. Do not travel with more than the minimum number of tablets or capsules required for the trip.      Orders:    oxyCODONE (ROXICODONE) 10 MG TABS; Take 0.5-1 tablets (5-10 mg total) by mouth 2 (two) times a day as needed for moderate pain or severe pain No further refills without visit to Palliative provider.  Please call office to make appointment. Max Daily Amount: 20 mg    Palliative care patient  Psychosocial   Supportive listening provided  Normalized experience of  patient/family  Provided anxiety containment     Referrals Placed / Medical Equipment Ordered  None     Follow-Up Recommendations  -Follow-up with PCP and current medical specialists  -Follow-up with palliative care: 2 months    Orders:    oxyCODONE (ROXICODONE) 10 MG TABS; Take 0.5-1 tablets (5-10 mg total) by mouth 2 (two) times a day as needed for moderate pain or severe pain No further refills without visit to Palliative provider.  Please call office to make appointment. Max Daily Amount: 20 mg    Medical marijuana use  Expires in May 2025, will have our office assist with re-certification with our team. As MMJ has been helpful with pain and sleep along with requiring less of the oxycodone because of the effects she gains with the MMJ for pain.           PDMP Review: I have reviewed the patient's controlled substance dispensing history in the Prescription Drug Monitoring Program in compliance with the [facility] regulations before prescribing any controlled substances.    History of Present Illness   Nayeli Love is a 49 y.o. female who presents for follow-up.    Patient is seen today in office. Alert, oriented, pleasantly conversant.  Patient is seen in NAD.    Appetite has been stable.    Pain stable.        CT CAP 2/25/2025  IMPRESSION:  1. Stable osseous metastatic disease. No thoracic or intra-abdominal lymphadenopathy.  2. Continued interval enlargement of left ovarian cystic lesion without mural nodularity or septation. Leading differential diagnosis remains ovarian cystadenoma  3. Bilateral nonobstructing renal stones    Medical History Reviewed by provider this encounter:  Tobacco  Allergies  Meds  Problems  Med Hx  Surg Hx  Fam Hx     .  Past Medical History   Past Medical History:   Diagnosis Date    Breast cancer (HCC)     Cancer (HCC)     Hypertension      History reviewed. No pertinent surgical history.  Family History   Problem Relation Age of Onset    Coronary artery disease Mother     Heart  attack Father     Diabetes type II Father     Diabetes Father     Hashimoto's thyroiditis Sister       reports that she has never smoked. She has never used smokeless tobacco. She reports current alcohol use. She reports that she does not use drugs.  Current Outpatient Medications   Medication Instructions    acetaminophen (TYLENOL) 650 mg, Every 6 hours PRN    albuterol (ProAir HFA) 90 mcg/act inhaler 2 puffs, Inhalation, Every 6 hours PRN    lisinopril (ZESTRIL) 10 mg, Oral, Daily, Only to take as needed when BP is >150 systolicallly    naloxone (NARCAN) 4 mg/0.1 mL nasal spray Administer 1 spray into a nostril. If no response after 2-3 minutes, give another dose in the other nostril using a new spray.    nystatin powder APPLY TOPICALLY TO THE AFFECTED AREA(S) TWICE DAILY AS DIRECTED    ondansetron (ZOFRAN) 4 mg, Oral, Every 8 hours PRN    oxyCODONE (ROXICODONE) 5-10 mg, Oral, 2 times daily PRN, No further refills without visit to Palliative provider.  Please call office to make appointment.    pantoprazole (PROTONIX) 20 mg, Oral, Daily (early morning)   No Known Allergies   Current Outpatient Medications on File Prior to Visit   Medication Sig Dispense Refill    acetaminophen (TYLENOL) 325 mg tablet Take 650 mg by mouth every 6 (six) hours as needed for mild pain      lisinopril (ZESTRIL) 20 mg tablet Take 0.5 tablets (10 mg total) by mouth daily Only to take as needed when BP is >150 systolicallly 90 tablet 1    nystatin powder APPLY TOPICALLY TO THE AFFECTED AREA(S) TWICE DAILY AS DIRECTED 15 g 1    ondansetron (ZOFRAN) 4 mg tablet Take 1 tablet (4 mg total) by mouth every 8 (eight) hours as needed for nausea or vomiting 9 tablet 20    pantoprazole (PROTONIX) 20 mg tablet Take 1 tablet (20 mg total) by mouth daily in the early morning 30 tablet 11    [DISCONTINUED] oxyCODONE (ROXICODONE) 10 MG TABS Take 0.5-1 tablets (5-10 mg total) by mouth every 4 (four) hours as needed for moderate pain or severe pain No  "further refills without visit to Palliative provider.  Please call office to make appointment. Max Daily Amount: 60 mg 60 tablet 0    albuterol (ProAir HFA) 90 mcg/act inhaler Inhale 2 puffs every 6 (six) hours as needed for wheezing (Patient not taking: Reported on 4/9/2025) 8.5 g 0    naloxone (NARCAN) 4 mg/0.1 mL nasal spray Administer 1 spray into a nostril. If no response after 2-3 minutes, give another dose in the other nostril using a new spray. (Patient not taking: Reported on 4/9/2025) 1 each 1     Current Facility-Administered Medications on File Prior to Visit   Medication Dose Route Frequency Provider Last Rate Last Admin    [COMPLETED] simethicone (MYLICON) 40 mg in sterile water 250 mL   Irrigation PRN Trish Apple DO   40 mg at 04/08/25 1248    [DISCONTINUED] lactated ringers infusion   Intravenous Continuous PRN Sayda Nick, CRNA   New Bag at 04/08/25 1210    [DISCONTINUED] lidocaine (PF) (XYLOCAINE-MPF) 1 % injection   Intravenous PRN Sayda Nick, CRNA   50 mg at 04/08/25 1244    [DISCONTINUED] propofol (DIPRIVAN) 1000 mg in 100 mL infusion (premix)   Intravenous Continuous PRN Sayda Nick, CRNA   Stopped at 04/08/25 1257    [DISCONTINUED] propofol (DIPRIVAN) 200 MG/20ML bolus injection   Intravenous PRN Sayda Scottow, CRNA   120 mg at 04/08/25 1244      Social History     Tobacco Use    Smoking status: Never    Smokeless tobacco: Never   Vaping Use    Vaping status: Never Used   Substance and Sexual Activity    Alcohol use: Yes     Comment: Less than 1 drink every month    Drug use: Never    Sexual activity: Not Currently        Objective   /90 (BP Location: Left arm, Patient Position: Sitting, Cuff Size: Large)   Pulse 95   Temp 98.2 °F (36.8 °C) (Temporal)   Resp 18   Ht 5' 4\" (1.626 m)   Wt 93.9 kg (207 lb)   LMP 03/28/2022 (Approximate)   SpO2 95%   BMI 35.53 kg/m²     Physical Exam  Vitals reviewed.   Constitutional:       General: She is not in acute distress.     " Appearance: She is not ill-appearing, toxic-appearing or diaphoretic.   HENT:      Head: Normocephalic and atraumatic.      Nose: Nose normal.   Eyes:      General:         Right eye: No discharge.         Left eye: No discharge.   Cardiovascular:      Rate and Rhythm: Normal rate.   Pulmonary:      Effort: Pulmonary effort is normal. No respiratory distress.   Abdominal:      General: Abdomen is flat. There is no distension.   Musculoskeletal:         General: No swelling.      Right lower leg: No edema.      Left lower leg: No edema.   Skin:     General: Skin is warm and dry.      Coloration: Skin is not jaundiced or pale.   Neurological:      General: No focal deficit present.      Mental Status: She is alert. Mental status is at baseline.   Psychiatric:         Mood and Affect: Mood normal.         Behavior: Behavior normal.         Thought Content: Thought content normal.         Judgment: Judgment normal.         Recent labs:  Lab Results   Component Value Date/Time    SODIUM 136 02/13/2025 02:34 PM    SODIUM 138 10/12/2018 10:18 PM    K 3.7 02/13/2025 02:34 PM    K 4.4 10/12/2018 10:18 PM    BUN 12 02/13/2025 02:34 PM    BUN 11 10/12/2018 10:18 PM    CREATININE 0.86 02/13/2025 02:34 PM    CREATININE 1.03 10/12/2018 10:18 PM    GLUC 91 02/13/2025 02:34 PM    GLUC 95 10/12/2018 10:18 PM    CALCIUM 9.4 02/13/2025 02:34 PM    CALCIUM 9.1 10/12/2018 10:18 PM    AST 18 02/13/2025 02:34 PM    ALT 29 02/13/2025 02:34 PM    ALB 4.6 02/13/2025 02:34 PM    TP 7.6 02/13/2025 02:34 PM    EGFR 79 02/13/2025 02:34 PM    EGFR 66 10/12/2018 10:18 PM     Lab Results   Component Value Date/Time    HGB 14.0 02/13/2025 02:34 PM    WBC 4.77 02/13/2025 02:34 PM     02/13/2025 02:34 PM    INR 0.91 11/28/2022 07:01 AM    PTT 27 11/28/2022 07:01 AM     Lab Results   Component Value Date/Time    DVH9RRMWTKBB 3.910 12/19/2023 07:22 AM       Recent Imaging:  Procedure: Colonoscopy  Result Date: 4/8/2025  Impression: The entire  colon appeared normal. RECOMMENDATION: Repeat screening colonoscopy in 10 years, due: 4/6/2035   Trish Apple DO     Procedure: CT chest abdomen pelvis w contrast  Result Date: 2/26/2025  Impression: 1. Stable osseous metastatic disease. No thoracic or intra-abdominal lymphadenopathy. 2. Continued interval enlargement of left ovarian cystic lesion without mural nodularity or septation. Leading differential diagnosis remains ovarian cystadenoma 3. Bilateral nonobstructing renal stones. Electronically signed: 02/26/2025 03:45 AM Carlin Fuentes MD      Administrative Statements   I have spent a total time of 32 minutes in caring for this patient on the day of the visit/encounter including Risks and benefits of tx options, Instructions for management, Patient and family education, Importance of tx compliance, Risk factor reductions, Impressions, Counseling / Coordination of care, Documenting in the medical record, Reviewing/placing orders in the medical record (including tests, medications, and/or procedures), and Obtaining or reviewing history  .   Topics discussed with the patient / family include symptom assessment and management, medication review, psychosocial support, supportive listening, and anticipatory guidance.

## 2025-04-09 NOTE — ASSESSMENT & PLAN NOTE
Expires in May 2025, will have our office assist with re-certification with our team. As MMJ has been helpful with pain and sleep along with requiring less of the oxycodone because of the effects she gains with the MMJ for pain.

## 2025-04-11 ENCOUNTER — TELEPHONE (OUTPATIENT)
Dept: PALLIATIVE MEDICINE | Facility: CLINIC | Age: 50
End: 2025-04-11

## 2025-04-11 NOTE — TELEPHONE ENCOUNTER
Forwarding to PA team for review.     Reason for call:   [x] Prior Auth  [] Other:     Caller:  [x] Patient  [] Pharmacy  Name: [id number]  [place] - [id number]      Callback Number: [phone]     Medication: oxyCODONE (ROXICODONE) 10 MG TABS Take 0.5-1 tablets (5-10 mg total) by mouth 2 (two) times a day as needed for moderate pain or severe pain       Ordering Provider:   [] PCP/Provider -   [x] Speciality/Provider - Palliative

## 2025-04-11 NOTE — TELEPHONE ENCOUNTER
PA for OXY 10 MG QTY 60 FOR 30 DAYS SUBMITTED to [employer]    via    [x]CMM-KEY: [id number]      [x]PA sent as URGENT    All office notes, labs and other pertaining documents and studies sent. Clinical questions answered. Awaiting determination from insurance company.     Turnaround time for your insurance to make a decision on your Prior Authorization can take 7-21 business days.

## 2025-04-14 NOTE — TELEPHONE ENCOUNTER
PA for OXY 10 MG  APPROVED     Date(s) approved UNTIL 04/12/2026    Case #    Patient advised by          [x]MyChart Message  []Phone call   [x]LMOM  []L/M to call office as no active Communication consent on file  []Unable to leave detailed message as VM not approved on Communication consent       Pharmacy advised by    [x]Fax  []Phone call  []Secure Chat    Specialty Pharmacy    []     Approval letter scanned into Media Yes

## 2025-04-17 RX ORDER — SODIUM CHLORIDE 9 MG/ML
20 INJECTION, SOLUTION INTRAVENOUS ONCE
Status: CANCELLED | OUTPATIENT
Start: 2025-04-23

## 2025-04-23 ENCOUNTER — HOSPITAL ENCOUNTER (OUTPATIENT)
Dept: INFUSION CENTER | Facility: CLINIC | Age: 50
Discharge: HOME | End: 2025-04-23
Payer: COMMERCIAL

## 2025-04-23 VITALS
HEART RATE: 84 BPM | HEIGHT: 64 IN | OXYGEN SATURATION: 94 % | BODY MASS INDEX: 35.77 KG/M2 | TEMPERATURE: 98.1 F | SYSTOLIC BLOOD PRESSURE: 119 MMHG | DIASTOLIC BLOOD PRESSURE: 84 MMHG | RESPIRATION RATE: 18 BRPM | WEIGHT: 209.5 LBS

## 2025-04-23 DIAGNOSIS — C79.51 MALIGNANT NEOPLASM METASTATIC TO BONE (HCC): ICD-10-CM

## 2025-04-23 DIAGNOSIS — D70.1 CHEMOTHERAPY-INDUCED NEUTROPENIA (HCC): Primary | ICD-10-CM

## 2025-04-23 DIAGNOSIS — Z17.1 MALIGNANT NEOPLASM OF OVERLAPPING SITES OF RIGHT BREAST IN FEMALE, ESTROGEN RECEPTOR NEGATIVE (HCC): ICD-10-CM

## 2025-04-23 DIAGNOSIS — C50.811 MALIGNANT NEOPLASM OF OVERLAPPING SITES OF RIGHT BREAST IN FEMALE, ESTROGEN RECEPTOR NEGATIVE (HCC): ICD-10-CM

## 2025-04-23 DIAGNOSIS — T45.1X5A CHEMOTHERAPY-INDUCED NEUTROPENIA (HCC): Primary | ICD-10-CM

## 2025-04-23 PROCEDURE — 96413 CHEMO IV INFUSION 1 HR: CPT

## 2025-04-23 PROCEDURE — 96417 CHEMO IV INFUS EACH ADDL SEQ: CPT

## 2025-04-23 RX ORDER — SODIUM CHLORIDE 9 MG/ML
20 INJECTION, SOLUTION INTRAVENOUS ONCE
Status: COMPLETED | OUTPATIENT
Start: 2025-04-23 | End: 2025-04-23

## 2025-04-23 RX ADMIN — SODIUM CHLORIDE 20 ML/HR: 9 INJECTION, SOLUTION INTRAVENOUS at 08:17

## 2025-04-23 RX ADMIN — TRASTUZUMAB 558 MG: 150 INJECTION, POWDER, LYOPHILIZED, FOR SOLUTION INTRAVENOUS at 09:35

## 2025-04-23 RX ADMIN — PERTUZUMAB 420 MG: 30 INJECTION, SOLUTION, CONCENTRATE INTRAVENOUS at 08:57

## 2025-04-23 NOTE — PROGRESS NOTES
Pt arrives to infusion center for D1 Perjeta and Herceptin. Offers no complaints. No labs required for tx today. PIV accessed without issue. Pt sitting comfortably in chair, wheels locked, call bell within reach.

## 2025-04-23 NOTE — PROGRESS NOTES
Pt tolerated tx without issue. PIV removed. Pt confirmed next appointment on 5/09 @1300 AN. AVS declined.

## 2025-04-28 ENCOUNTER — TELEMEDICINE (OUTPATIENT)
Age: 50
End: 2025-04-28
Payer: COMMERCIAL

## 2025-04-28 DIAGNOSIS — C50.911 BREAST CANCER METASTASIZED TO BONE, RIGHT (HCC): Primary | ICD-10-CM

## 2025-04-28 DIAGNOSIS — Z51.5 PALLIATIVE CARE PATIENT: ICD-10-CM

## 2025-04-28 DIAGNOSIS — Z79.899 MEDICAL MARIJUANA USE: ICD-10-CM

## 2025-04-28 DIAGNOSIS — C79.51 BREAST CANCER METASTASIZED TO BONE, RIGHT (HCC): Primary | ICD-10-CM

## 2025-04-28 PROCEDURE — 99214 OFFICE O/P EST MOD 30 MIN: CPT | Performed by: STUDENT IN AN ORGANIZED HEALTH CARE EDUCATION/TRAINING PROGRAM

## 2025-04-28 NOTE — PROGRESS NOTES
Video Visit    Name: Nayeli Love      : 1975      MRN: [id number]  Encounter Provider: Justine Correa MD  Encounter Date: 2025   Encounter department: [facility] [place]  :  Assessment & Plan  Breast cancer metastasized to bone, right (HCC)  Following with onc and PSC, cancer pain managed with opioids and MMJ        Medical marijuana use  The patient qualifies for use of MMJ in the State [facility] by having the following medical conditions: metastatic cancer    From a palliative care standpoint the patient is suffering with the above conditions. Associated symptoms and side effects have been alleviated with use of MMJ products. The patient renewed their registration online. A medical re-certification was completed on this date.    MMJ ID#: [id number]  MMJ Cert #: [id number]       Palliative care patient  Follow up with Dr. Garcia  after onc visit       PDMP Review: I have reviewed the patient's controlled substance dispensing history in the Prescription Drug Monitoring Program in compliance with the [facility] regulations before prescribing any controlled substances.    History of Present Illness     Nayeli Love is a 48 yo F with metastatic breast cancer who presents for MMJ re-certification. She follows with Dr. Garcia for regular PSC follow up.    She primarily uses MMJ via oral tincture for pain management and finds it effective. We reviewed logistics and regulations regarding MMJ certification, all questions answered.     Objective   LMP 2022 (Approximate)     Physical Exam  Constitutional:       General: She is not in acute distress.     Appearance: She is not ill-appearing.   Pulmonary:      Effort: Pulmonary effort is normal. No respiratory distress.   Neurological:      General: No focal deficit present.      Mental Status: She is alert.   Psychiatric:         Mood and Affect: Mood normal.         Behavior: Behavior normal.         Recent labs:  Lab Results    Component Value Date/Time    SODIUM 136 02/13/2025 02:34 PM    SODIUM 138 10/12/2018 10:18 PM    K 3.7 02/13/2025 02:34 PM    K 4.4 10/12/2018 10:18 PM    BUN 12 02/13/2025 02:34 PM    BUN 11 10/12/2018 10:18 PM    CREATININE 0.86 02/13/2025 02:34 PM    CREATININE 1.03 10/12/2018 10:18 PM    GLUC 91 02/13/2025 02:34 PM    GLUC 95 10/12/2018 10:18 PM    CALCIUM 9.4 02/13/2025 02:34 PM    CALCIUM 9.1 10/12/2018 10:18 PM    AST 18 02/13/2025 02:34 PM    ALT 29 02/13/2025 02:34 PM    ALB 4.6 02/13/2025 02:34 PM    TP 7.6 02/13/2025 02:34 PM    EGFR 79 02/13/2025 02:34 PM    EGFR 66 10/12/2018 10:18 PM     Lab Results   Component Value Date/Time    HGB 14.0 02/13/2025 02:34 PM    WBC 4.77 02/13/2025 02:34 PM     02/13/2025 02:34 PM    INR 0.91 11/28/2022 07:01 AM    PTT 27 11/28/2022 07:01 AM     Lab Results   Component Value Date/Time    RLV5MSPWVSFI 3.910 12/19/2023 07:22 AM       Recent Imaging:  Procedure: Colonoscopy  Result Date: 4/8/2025  Impression: The entire colon appeared normal. RECOMMENDATION: Repeat screening colonoscopy in 10 years, due: 4/6/2035   Trish Apple DO     Procedure: CT chest abdomen pelvis w contrast  Result Date: 2/26/2025  Impression: 1. Stable osseous metastatic disease. No thoracic or intra-abdominal lymphadenopathy. 2. Continued interval enlargement of left ovarian cystic lesion without mural nodularity or septation. Leading differential diagnosis remains ovarian cystadenoma 3. Bilateral nonobstructing renal stones. Electronically signed: 02/26/2025 03:45 AM Carlin Fuentes MD      Administrative Statements   I have spent a total time of 30 minutes in caring for this patient on the day of the visit/encounter including Risks and benefits of tx options, Instructions for management, Patient and family education, Impressions, Counseling / Coordination of care, Documenting in the medical record, Reviewing/placing orders in the medical record (including tests, medications, and/or  procedures), and Obtaining or reviewing history  .   Topics discussed with the patient / family include symptom assessment and management, medication review, psychosocial support, medical marijuana, supportive listening, and anticipatory guidance    Encounter provider Justine Correa MD    The Patient is located at Home and in the following state in which I hold an active license [place].    The patient was identified by name and date of birth.  Patient was informed that this is a telemedicine visit and that the visit is being conducted through the Epic Embedded platform. Patient agrees to proceed.  My office door was closed. No one else was in the room.  Patient acknowledged consent and understanding of privacy and security of the video platform. The patient has agreed to participate and understands they can discontinue the visit at any time.    Patient is aware this is a billable service.

## 2025-04-28 NOTE — ASSESSMENT & PLAN NOTE
The patient qualifies for use of MMJ in the State [facility] by having the following medical conditions: metastatic cancer    From a palliative care standpoint the patient is suffering with the above conditions. Associated symptoms and side effects have been alleviated with use of MMJ products. The patient renewed their registration online. A medical re-certification was completed on this date.    MMJ ID#: [id number]  MMJ Cert #: [id number]

## 2025-05-08 ENCOUNTER — APPOINTMENT (OUTPATIENT)
Dept: LAB | Facility: CLINIC | Age: 50
End: 2025-05-08
Payer: COMMERCIAL

## 2025-05-08 DIAGNOSIS — C50.811 MALIGNANT NEOPLASM OF OVERLAPPING SITES OF RIGHT BREAST IN FEMALE, ESTROGEN RECEPTOR NEGATIVE (HCC): ICD-10-CM

## 2025-05-08 DIAGNOSIS — C79.51 MALIGNANT NEOPLASM METASTATIC TO BONE (HCC): ICD-10-CM

## 2025-05-08 DIAGNOSIS — Z17.1 MALIGNANT NEOPLASM OF OVERLAPPING SITES OF RIGHT BREAST IN FEMALE, ESTROGEN RECEPTOR NEGATIVE (HCC): ICD-10-CM

## 2025-05-08 LAB
ALBUMIN SERPL BCG-MCNC: 4.3 G/DL (ref 3.5–5)
ALP SERPL-CCNC: 48 U/L (ref 34–104)
ALT SERPL W P-5'-P-CCNC: 40 U/L (ref 7–52)
ANION GAP SERPL CALCULATED.3IONS-SCNC: 8 MMOL/L (ref 4–13)
AST SERPL W P-5'-P-CCNC: 25 U/L (ref 13–39)
BILIRUB SERPL-MCNC: 0.95 MG/DL (ref 0.2–1)
BUN SERPL-MCNC: 15 MG/DL (ref 5–25)
CALCIUM SERPL-MCNC: 9.9 MG/DL (ref 8.4–10.2)
CHLORIDE SERPL-SCNC: 107 MMOL/L (ref 96–108)
CO2 SERPL-SCNC: 27 MMOL/L (ref 21–32)
CREAT SERPL-MCNC: 0.76 MG/DL (ref 0.6–1.3)
GFR SERPL CREATININE-BSD FRML MDRD: 91 ML/MIN/1.73SQ M
GLUCOSE SERPL-MCNC: 88 MG/DL (ref 65–140)
POTASSIUM SERPL-SCNC: 4.5 MMOL/L (ref 3.5–5.3)
PROT SERPL-MCNC: 7 G/DL (ref 6.4–8.4)
SODIUM SERPL-SCNC: 142 MMOL/L (ref 135–147)

## 2025-05-08 PROCEDURE — 80053 COMPREHEN METABOLIC PANEL: CPT

## 2025-05-08 PROCEDURE — 36415 COLL VENOUS BLD VENIPUNCTURE: CPT

## 2025-05-08 RX ORDER — SODIUM CHLORIDE 9 MG/ML
20 INJECTION, SOLUTION INTRAVENOUS ONCE
OUTPATIENT
Start: 2025-05-19

## 2025-05-09 ENCOUNTER — TELEPHONE (OUTPATIENT)
Dept: HEMATOLOGY ONCOLOGY | Facility: CLINIC | Age: 50
End: 2025-05-09

## 2025-05-09 ENCOUNTER — HOSPITAL ENCOUNTER (OUTPATIENT)
Dept: INFUSION CENTER | Facility: CLINIC | Age: 50
End: 2025-05-09
Attending: INTERNAL MEDICINE
Payer: COMMERCIAL

## 2025-05-09 VITALS
BODY MASS INDEX: 36.02 KG/M2 | WEIGHT: 211 LBS | HEIGHT: 64 IN | HEART RATE: 88 BPM | OXYGEN SATURATION: 95 % | TEMPERATURE: 97.7 F | DIASTOLIC BLOOD PRESSURE: 83 MMHG | SYSTOLIC BLOOD PRESSURE: 129 MMHG

## 2025-05-09 DIAGNOSIS — Z17.1 MALIGNANT NEOPLASM OF OVERLAPPING SITES OF RIGHT BREAST IN FEMALE, ESTROGEN RECEPTOR NEGATIVE (HCC): ICD-10-CM

## 2025-05-09 DIAGNOSIS — C79.51 MALIGNANT NEOPLASM METASTATIC TO BONE (HCC): Primary | ICD-10-CM

## 2025-05-09 DIAGNOSIS — C50.811 MALIGNANT NEOPLASM OF OVERLAPPING SITES OF RIGHT BREAST IN FEMALE, ESTROGEN RECEPTOR NEGATIVE (HCC): ICD-10-CM

## 2025-05-09 PROCEDURE — 96365 THER/PROPH/DIAG IV INF INIT: CPT

## 2025-05-09 RX ORDER — SODIUM CHLORIDE 9 MG/ML
20 INJECTION, SOLUTION INTRAVENOUS ONCE
OUTPATIENT
Start: 2025-07-31

## 2025-05-09 RX ORDER — SODIUM CHLORIDE 9 MG/ML
20 INJECTION, SOLUTION INTRAVENOUS ONCE
Status: COMPLETED | OUTPATIENT
Start: 2025-05-09 | End: 2025-05-09

## 2025-05-09 RX ADMIN — ZOLEDRONIC ACID 4 MG: 0.04 INJECTION, SOLUTION INTRAVENOUS at 13:35

## 2025-05-09 RX ADMIN — SODIUM CHLORIDE 20 ML/HR: 0.9 INJECTION, SOLUTION INTRAVENOUS at 13:32

## 2025-05-09 NOTE — PROGRESS NOTES
Patient tolerated treatment today without complications. Patient confirmed next appointment for 5/19 at 0730. Print out declined.

## 2025-05-19 ENCOUNTER — HOSPITAL ENCOUNTER (OUTPATIENT)
Dept: INFUSION CENTER | Facility: CLINIC | Age: 50
Discharge: HOME | End: 2025-05-19
Attending: INTERNAL MEDICINE
Payer: COMMERCIAL

## 2025-05-19 VITALS
OXYGEN SATURATION: 96 % | WEIGHT: 206 LBS | HEART RATE: 79 BPM | DIASTOLIC BLOOD PRESSURE: 73 MMHG | BODY MASS INDEX: 35.36 KG/M2 | TEMPERATURE: 97.2 F | SYSTOLIC BLOOD PRESSURE: 106 MMHG

## 2025-05-19 DIAGNOSIS — D70.1 CHEMOTHERAPY-INDUCED NEUTROPENIA (HCC): Primary | ICD-10-CM

## 2025-05-19 DIAGNOSIS — Z17.1 MALIGNANT NEOPLASM OF OVERLAPPING SITES OF RIGHT BREAST IN FEMALE, ESTROGEN RECEPTOR NEGATIVE (HCC): ICD-10-CM

## 2025-05-19 DIAGNOSIS — C79.51 MALIGNANT NEOPLASM METASTATIC TO BONE (HCC): ICD-10-CM

## 2025-05-19 DIAGNOSIS — C50.811 MALIGNANT NEOPLASM OF OVERLAPPING SITES OF RIGHT BREAST IN FEMALE, ESTROGEN RECEPTOR NEGATIVE (HCC): ICD-10-CM

## 2025-05-19 DIAGNOSIS — T45.1X5A CHEMOTHERAPY-INDUCED NEUTROPENIA (HCC): Primary | ICD-10-CM

## 2025-05-19 RX ORDER — SODIUM CHLORIDE 9 MG/ML
20 INJECTION, SOLUTION INTRAVENOUS ONCE
Status: COMPLETED | OUTPATIENT
Start: 2025-05-19 | End: 2025-05-19

## 2025-05-19 RX ADMIN — PERTUZUMAB 420 MG: 30 INJECTION, SOLUTION, CONCENTRATE INTRAVENOUS at 08:37

## 2025-05-19 RX ADMIN — SODIUM CHLORIDE 20 ML/HR: 0.9 INJECTION, SOLUTION INTRAVENOUS at 07:58

## 2025-05-19 RX ADMIN — TRASTUZUMAB 558 MG: 150 INJECTION, POWDER, LYOPHILIZED, FOR SOLUTION INTRAVENOUS at 09:15

## 2025-05-19 NOTE — PROGRESS NOTES
Patient is here for D1C53 of perjeta and herceptin. She offers no complaints at this time   independent

## 2025-05-19 NOTE — PROGRESS NOTES
Patient tolerated her treatment without any adverse reactions. Next appointment confirmed 6/9/25 0730 at [facility]. Patient declined avs

## 2025-05-27 ENCOUNTER — HOSPITAL ENCOUNTER (OUTPATIENT)
Dept: CT IMAGING | Facility: HOSPITAL | Age: 50
Discharge: HOME | End: 2025-05-27
Attending: INTERNAL MEDICINE
Payer: COMMERCIAL

## 2025-05-27 ENCOUNTER — HOSPITAL ENCOUNTER (OUTPATIENT)
Dept: NON INVASIVE DIAGNOSTICS | Facility: CLINIC | Age: 50
Discharge: HOME | End: 2025-05-27
Payer: COMMERCIAL

## 2025-05-27 ENCOUNTER — APPOINTMENT (OUTPATIENT)
Dept: LAB | Facility: CLINIC | Age: 50
End: 2025-05-27
Attending: INTERNAL MEDICINE
Payer: COMMERCIAL

## 2025-05-27 VITALS
WEIGHT: 206 LBS | HEART RATE: 79 BPM | BODY MASS INDEX: 35.17 KG/M2 | SYSTOLIC BLOOD PRESSURE: 106 MMHG | HEIGHT: 64 IN | DIASTOLIC BLOOD PRESSURE: 73 MMHG

## 2025-05-27 DIAGNOSIS — Z17.1 MALIGNANT NEOPLASM OF OVERLAPPING SITES OF RIGHT BREAST IN FEMALE, ESTROGEN RECEPTOR NEGATIVE (HCC): ICD-10-CM

## 2025-05-27 DIAGNOSIS — C50.811 MALIGNANT NEOPLASM OF OVERLAPPING SITES OF RIGHT BREAST IN FEMALE, ESTROGEN RECEPTOR NEGATIVE (HCC): ICD-10-CM

## 2025-05-27 DIAGNOSIS — G89.3 CANCER ASSOCIATED PAIN: ICD-10-CM

## 2025-05-27 DIAGNOSIS — Z79.899 HIGH RISK MEDICATION USE: ICD-10-CM

## 2025-05-27 DIAGNOSIS — Z51.5 PALLIATIVE CARE PATIENT: ICD-10-CM

## 2025-05-27 LAB
ALBUMIN SERPL BCG-MCNC: 4.1 G/DL (ref 3.5–5)
ALP SERPL-CCNC: 43 U/L (ref 34–104)
ALT SERPL W P-5'-P-CCNC: 30 U/L (ref 7–52)
ANION GAP SERPL CALCULATED.3IONS-SCNC: 0 MMOL/L (ref 4–13)
AORTIC ROOT: 3.5 CM
ASCENDING AORTA: 3.2 CM
AST SERPL W P-5'-P-CCNC: 16 U/L (ref 13–39)
AV LVOT MEAN GRADIENT: 3 MMHG
AV LVOT PEAK GRADIENT: 5 MMHG
BASOPHILS # BLD AUTO: 0.02 THOUSANDS/ÂΜL (ref 0–0.1)
BASOPHILS NFR BLD AUTO: 0 % (ref 0–1)
BILIRUB SERPL-MCNC: 0.83 MG/DL (ref 0.2–1)
BSA FOR ECHO PROCEDURE: 1.98 M2
BUN SERPL-MCNC: 16 MG/DL (ref 5–25)
CALCIUM SERPL-MCNC: 9.2 MG/DL (ref 8.4–10.2)
CHLORIDE SERPL-SCNC: 106 MMOL/L (ref 96–108)
CO2 SERPL-SCNC: 30 MMOL/L (ref 21–32)
CREAT SERPL-MCNC: 0.75 MG/DL (ref 0.6–1.3)
DOP CALC LVOT PEAK VEL VTI: 23.93 CM
DOP CALC LVOT PEAK VEL: 1.1 M/S
E WAVE DECELERATION TIME: 184 MS
E/A RATIO: 1.1
EOSINOPHIL # BLD AUTO: 0.06 THOUSAND/ÂΜL (ref 0–0.61)
EOSINOPHIL NFR BLD AUTO: 1 % (ref 0–6)
ERYTHROCYTE [DISTWIDTH] IN BLOOD BY AUTOMATED COUNT: 12.9 % (ref 11.6–15.1)
FRACTIONAL SHORTENING: 28 (ref 28–44)
GFR SERPL CREATININE-BSD FRML MDRD: 93 ML/MIN/1.73SQ M
GLOBAL LONGITUIDAL STRAIN: 21 %
GLUCOSE P FAST SERPL-MCNC: 99 MG/DL (ref 65–99)
HCT VFR BLD AUTO: 41.1 % (ref 34.8–46.1)
HGB BLD-MCNC: 13.1 G/DL (ref 11.5–15.4)
IMM GRANULOCYTES # BLD AUTO: 0.01 THOUSAND/UL (ref 0–0.2)
IMM GRANULOCYTES NFR BLD AUTO: 0 % (ref 0–2)
INTERVENTRICULAR SEPTUM IN DIASTOLE (PARASTERNAL SHORT AXIS VIEW): 0.9 CM
INTERVENTRICULAR SEPTUM: 0.9 CM (ref 0.6–1.1)
LAAS-AP2: 23.1 CM2
LAAS-AP4: 19 CM2
LDH SERPL-CCNC: 160 U/L (ref 140–271)
LEFT ATRIUM SIZE: 3.4 CM
LEFT ATRIUM VOLUME (MOD BIPLANE): 62 ML
LEFT ATRIUM VOLUME INDEX (MOD BIPLANE): 31.3 ML/M2
LEFT INTERNAL DIMENSION IN SYSTOLE: 2.9 CM (ref 2.1–4)
LEFT VENTRICULAR INTERNAL DIMENSION IN DIASTOLE: 4 CM (ref 3.5–6)
LEFT VENTRICULAR POSTERIOR WALL IN END DIASTOLE: 1 CM
LEFT VENTRICULAR STROKE VOLUME: 39 ML
LV EF US.2D.A4C+ESTIMATED: 67 %
LVSV (TEICH): 39 ML
LYMPHOCYTES # BLD AUTO: 1.37 THOUSANDS/ÂΜL (ref 0.6–4.47)
LYMPHOCYTES NFR BLD AUTO: 31 % (ref 14–44)
MAGNESIUM SERPL-MCNC: 2 MG/DL (ref 1.9–2.7)
MCH RBC QN AUTO: 31.9 PG (ref 26.8–34.3)
MCHC RBC AUTO-ENTMCNC: 31.9 G/DL (ref 31.4–37.4)
MCV RBC AUTO: 100 FL (ref 82–98)
MONOCYTES # BLD AUTO: 0.44 THOUSAND/ÂΜL (ref 0.17–1.22)
MONOCYTES NFR BLD AUTO: 10 % (ref 4–12)
MV E'TISSUE VEL-SEP: 12 CM/S
MV PEAK A VEL: 0.77 M/S
MV PEAK E VEL: 85 CM/S
MV STENOSIS PRESSURE HALF TIME: 53 MS
MV VALVE AREA P 1/2 METHOD: 4.15
NEUTROPHILS # BLD AUTO: 2.59 THOUSANDS/ÂΜL (ref 1.85–7.62)
NEUTS SEG NFR BLD AUTO: 58 % (ref 43–75)
NRBC BLD AUTO-RTO: 0 /100 WBCS
PLATELET # BLD AUTO: 200 THOUSANDS/UL (ref 149–390)
PMV BLD AUTO: 9.9 FL (ref 8.9–12.7)
POTASSIUM SERPL-SCNC: 4.6 MMOL/L (ref 3.5–5.3)
PROT SERPL-MCNC: 6.8 G/DL (ref 6.4–8.4)
RA PRESSURE ESTIMATED: 5 MMHG
RBC # BLD AUTO: 4.11 MILLION/UL (ref 3.81–5.12)
RIGHT ATRIUM AREA SYSTOLE A4C: 11 CM2
RIGHT VENTRICLE ID DIMENSION: 2.6 CM
RV PSP: 31 MMHG
SL CV LEFT ATRIUM LENGTH A2C: 5.9 CM
SL CV LV EF: 65
SL CV PED ECHO LEFT VENTRICLE DIASTOLIC VOLUME (MOD BIPLANE) 2D: 71 ML
SL CV PED ECHO LEFT VENTRICLE SYSTOLIC VOLUME (MOD BIPLANE) 2D: 32 ML
SODIUM SERPL-SCNC: 136 MMOL/L (ref 135–147)
TR MAX PG: 26 MMHG
TR PEAK VELOCITY: 2.5 M/S
TRICUSPID ANNULAR PLANE SYSTOLIC EXCURSION: 1.7 CM
TRICUSPID VALVE PEAK REGURGITATION VELOCITY: 2.54 M/S
WBC # BLD AUTO: 4.49 THOUSAND/UL (ref 4.31–10.16)

## 2025-05-27 PROCEDURE — 71260 CT THORAX DX C+: CPT

## 2025-05-27 PROCEDURE — 93306 TTE W/DOPPLER COMPLETE: CPT

## 2025-05-27 PROCEDURE — 80053 COMPREHEN METABOLIC PANEL: CPT

## 2025-05-27 PROCEDURE — 83615 LACTATE (LD) (LDH) ENZYME: CPT

## 2025-05-27 PROCEDURE — 93306 TTE W/DOPPLER COMPLETE: CPT | Performed by: INTERNAL MEDICINE

## 2025-05-27 PROCEDURE — 36415 COLL VENOUS BLD VENIPUNCTURE: CPT

## 2025-05-27 PROCEDURE — 83735 ASSAY OF MAGNESIUM: CPT

## 2025-05-27 PROCEDURE — 85025 COMPLETE CBC W/AUTO DIFF WBC: CPT

## 2025-05-27 PROCEDURE — 93356 MYOCRD STRAIN IMG SPCKL TRCK: CPT | Performed by: INTERNAL MEDICINE

## 2025-05-27 PROCEDURE — 74177 CT ABD & PELVIS W/CONTRAST: CPT

## 2025-05-27 PROCEDURE — 93356 MYOCRD STRAIN IMG SPCKL TRCK: CPT

## 2025-05-27 RX ORDER — OXYCODONE HYDROCHLORIDE 10 MG/1
5-10 TABLET ORAL 2 TIMES DAILY PRN
Qty: 60 TABLET | Refills: 0 | Status: SHIPPED | OUTPATIENT
Start: 2025-05-27

## 2025-05-27 RX ADMIN — IOHEXOL 85 ML: 350 INJECTION, SOLUTION INTRAVENOUS at 07:34

## 2025-05-27 NOTE — TELEPHONE ENCOUNTER
[id number] - [place], [place] - [address]     oxyCODONE (ROXICODONE) 10 MG TABS

## 2025-05-30 ENCOUNTER — TELEPHONE (OUTPATIENT)
Age: 50
End: 2025-05-30

## 2025-05-30 NOTE — TELEPHONE ENCOUNTER
Phone call to [facility]'s Reading room spoke to Madelyn regarding having CT  results read for upcoming appointment.

## 2025-05-31 NOTE — PROGRESS NOTES
Name: Nayeli Love      : 1975      MRN: [id number]  Encounter Provider: Lois Phillips MD  Encounter Date: 6/3/2025   Encounter department: [facility]AT[facility]  :  Assessment & Plan  Malignant neoplasm metastatic to bone (HCC)    Orders:    CBC and differential; Future    Comprehensive metabolic panel; Future    LD,Blood; Future    Magnesium; Future    CT chest abdomen pelvis w contrast; Future    Echo complete w/ contrast if indicated; Future    Malignant neoplasm of overlapping sites of right breast in female, estrogen receptor negative (HCC)           Malignant neoplasm of overlapping sites of right breast in female, estrogen receptor negative (HCC)  In summary patient is a 49-year-old female diagnosed with metastatic breast cancer, grade 2 ER MS negative, HER2 positive in 2023.  S/p palliative radiation to C-spine 3/31/22 - 2022.  Patient completed 6 cycles of THP in 2022.  Currently on maintenance pertuzumab plus trastuzumab every 21 days.  Has been tolerating well without any anticipated side effects.  Recent CT chest abdomen pelvis without any evidence of metastatic disease or new lesion.  It does shows enlarging ovarian cystadenoma, she already has appointment with GYN in May, she remains completely asymptomatic from above-mentioned findings.  Echocardiogram reviewed, LVEF of 65%, will need repeat echocardiogram in 3 months.  Patient was scheduled for her next infusion with pertuzumab and trastuzumab on 3/6/2025, that will be moved to be scheduled after 3/10/2025 as patient is traveling to [place].      6/3/2025    Doing well; intermittent pain, using 1-1.5 oxycodone per day    Discussed Flexeril with palliative-no DDI concerns so can try    For C54 of Herceptin 2025    No issues related to tolerance of Herceptin; does not want to stop treatment, remains with stable disease    CT CAP 2025        Extensive osseous  metastatic disease has not significantly changed from prior exam. No new or progressive metastatic disease to the chest, abdomen, or pelvis.     Left adnexal cystic lesion has not significantly changed in size from prior exam. Again this could represent ovarian cystadenoma.     ECHO  5/27/20256      Left Ventricle: Left ventricular cavity size is normal. Wall thickness is normal. The left ventricular ejection fraction is 65%. Systolic function is normal. Global longitudinal strain is normal at 21% at a heart rate of 65 to 70 bpm. Wall motion is normal. Diastolic function is normal.     Labs 5/27/2025 Na 136 k 4.6 Cr 0.75  ALT/Ast 30/16 TB 0.83 [id number] WBC 4.5 Hgb 13.1  plts 200 ANC 2590       Malignant neoplasm metastatic to bone (HCC)  Recent CT chest abdomen pelvis from 2/25/2025, reviewed no evidence of new osseous metastasis.  Patient continues to receive Zometa every 3 months, next treatment due on 5/8/2025.     High risk medication use  Patient continues to tolerate pertuzumab plus trastuzumab very well.  Last echocardiogram from 2/25/2025 reviewed, LVEF of 65%.  Due to concerns of drug-induced cardiomyopathy, we will follow-up cardiac function closely with echocardiogram every 3 months.  Orders:    Echo complete w/ contrast if indicated; Future    Magnesium; Future      Summary/Recommendations    Continue follow up palliative    Scans in 3 months    Continue on Herceptin C 54 on 6/9/20205    ECHO in 3 months    Labs in 3 months    Follow up at [facility] in person 3 months       History of Present Illness   No chief complaint on file.    Nayeli Love is a 49-year-old premenopausal female with metastatic breast cancer, grade 2, ER negative, IN negative, HER2 3+ disease diagnosed in March 2022.  She had a large fungating mass in her right breast, palpable right axillary adenopathy as well as diffuse osseous metastasis with C4 stenosis.  She completed palliative radiation therapy to the spine.   Subsequently, she was treated with Taxotere, trastuzumab and pertuzumab with or without atezolizumab based on a clinical trial.  She received 2 cycle of treatment.  She came off the study after 2 cycles of treatment due to the complication related to COVID-19 infection.Therefore, she was subsequently treated with paclitaxel, trastuzumab and pertuzumab.  She completed induction treatment total 6 cycles of chemotherapy in August 2022.  She is currently on maintenance therapy with trastuzumab and pertuzumab.  She had good partial response, clinically as well as radiographically       CT CAP 5/27/2025    CHEST     LUNGS: No suspicious pulmonary nodules. Unchanged medial right upper and lower lobe paramediastinal scarring with minimal bronchiolectasis. No suspicious endobronchial filling defects. Similar elevated right hemidiaphragm.     PLEURA: Unremarkable.     HEART/GREAT VESSELS: Heart is unremarkable for patient's age. No thoracic aortic aneurysm.     MEDIASTINUM AND ALISON: Unremarkable.     CHEST WALL AND LOWER NECK: Partially calcified medial right breast subdermal masslike focus has not increased in size from prior exam. This measures 2.2 x 2.1 cm (2-62).     ABDOMEN     LIVER/BILIARY TREE: Unremarkable.     GALLBLADDER: No calcified gallstones. No pericholecystic inflammatory change.     SPLEEN: Unremarkable. Unchanged findings about the spleen.     PANCREAS: Unremarkable.     ADRENAL GLANDS: Unremarkable.     KIDNEYS/URETERS: Unchanged inferior left renal stone. No ureteral stones. No hydronephrosis. Unchanged exophytic left renal cyst. 14 mm partially exophytic posterior right renal lesion has not changed in size from prior exam. This measures 57 Hounsfield   units. This was previously shown to represent a cyst.     STOMACH AND BOWEL: Unremarkable.     APPENDIX: No findings to suggest appendicitis.     ABDOMINOPELVIC CAVITY: No ascites. No pneumoperitoneum. No lymphadenopathy.     VESSELS: Unremarkable for  patient's age.     PELVIS     REPRODUCTIVE ORGANS: Unchanged calcified uterine fibroid. Left adnexal cystic lesion is not significantly changed measuring 7.3 x 7.3 cm. This previously measured 7.5 x 7.1 cm. No mural nodularity is evident.     URINARY BLADDER: Unremarkable.     ABDOMINAL WALL/INGUINAL REGIONS: Similar fat-containing right inguinal hernia.     BONES: Predominantly sclerotic metastatic disease throughout the imaged appendicular and axial skeleton has not significantly changed. 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 Scattered compression fractures are not changed.   Bilateral L5 pars defects. Left inferior sacral bony remodeling is unchanged and could signify underlying metastatic focus or sequela of prior fracture.     IMPRESSION:     Extensive osseous metastatic disease has not significantly changed from prior exam. No new or progressive metastatic disease to the chest, abdomen, or pelvis.     Left adnexal cystic lesion has not significantly changed in size from prior exam. Again this could represent ovarian cystadenoma.          Oncology History   Cancer Staging   No matching staging information was found for the patient.  Oncology History   Malignant neoplasm of overlapping sites of right breast in female, estrogen receptor negative (HCC)   3/2022 Initial Diagnosis    Malignant neoplasm of overlapping sites of right breast in female, estrogen receptor negative (HCC)     3/29/2022 Biopsy    Breast, Right, Biopsy:  - Invasive mammary carcinoma of no special type (ductal, not otherwise specified), [place] Grade II (3 + 2 + 1 = 6), spanning at least 6 mm.   ER/AL negative, HER2 positive    Procedure  Needle biopsy    Specimen Laterality  Right    TUMOR   Histologic Type  Invasive carcinoma of no special type (ductal)    Histologic Grade ([place]oy Histologic Score)     Glandular (Acinar) / Tubular Differentiation  Score 3    Nuclear Pleomorphism  Score 2    Mitotic Rate   Score 1    Overall Grade  Grade 2 (scores of 6 or 7)    Tumor Size  Greatest dimension of largest invasive focus (Millimeters): 6 mm   Ductal Carcinoma In Situ (DCIS)  Not identified    Lymphovascular Invasion  Not identified         3/31/2022 - 4/18/2022 Radiation    Course: C1    Plan ID Energy Fractions Dose per Fraction (cGy) Dose Correction (cGy) Total Dose Delivered (cGy) Elapsed Days   C1 C7 And ix 10X/6X 7 / 7 300 0 2,100 8   C1 C7 Spine 10X/6X 3 / 10 300 0 900 4   L4 Sacrum 10X 10 / 10 300 0 3,000 13   T1 T6 And ix 10X 7 / 7 300 0 2,100 8   T1 T6 Spine 10X 3 / 10 300 0 900 4      Dr. Rutledge     4/28/2022 -  Chemotherapy    clinical trial with THP with or without atezolizumab     4/28/2022 -  Chemotherapy    pertuzumab (PERJETA) IVPB, 840 mg, 53 of 54 cycles  Administration: 420 mg (6/16/2022), 420 mg (7/7/2022), 420 mg (7/28/2022), 420 mg (8/18/2022), 420 mg (9/8/2022), 420 mg (9/29/2022), 420 mg (10/20/2022), 420 mg (11/17/2022), 420 mg (12/8/2022), 420 mg (12/30/2022), 420 mg (1/19/2023), 420 mg (2/9/2023), 420 mg (3/2/2023), 420 mg (3/23/2023), 420 mg (4/13/2023), 420 mg (5/4/2023), 420 mg (5/23/2023), 420 mg (6/15/2023), 420 mg (7/6/2023), 420 mg (7/25/2023), 420 mg (8/17/2023), 420 mg (9/7/2023), 420 mg (9/28/2023), 420 mg (10/19/2023), 420 mg (11/9/2023), 420 mg (1/11/2024), 420 mg (12/21/2023), 420 mg (11/30/2023), 420 mg (2/1/2024), 420 mg (2/22/2024), 420 mg (3/14/2024), 420 mg (4/4/2024), 420 mg (4/25/2024), 420 mg (5/16/2024), 420 mg (8/9/2024), 420 mg (7/18/2024), 420 mg (6/27/2024), 420 mg (6/6/2024), 420 mg (9/20/2024), 420 mg (8/30/2024), 420 mg (10/11/2024), 420 mg (11/1/2024), 420 mg (11/22/2024), 420 mg (12/13/2024), 420 mg (1/3/2025), 420 mg (1/24/2025), 420 mg (2/14/2025), 420 mg (3/12/2025), 420 mg (4/2/2025), 420 mg (4/23/2025), 420 mg (5/19/2025)  DOCEtaxel (TAXOTERE) chemo infusion, 75 mg/m2, 3 of 3 cycles  Dose modification: 60 mg/m2 (original dose 75 mg/m2, Cycle 3, Reason:  Anticipated Tolerance)  Administration: 112.8 mg (6/16/2022)  PACLItaxel (TAXOL) chemo IVPB, 80 mg/m2 = 150.6 mg (100 % of original dose 80 mg/m2), 3 of 3 cycles  Dose modification: 80 mg/m2 (original dose 80 mg/m2, Cycle 4, Reason: Anticipated Tolerance)  Administration: 150.6 mg (7/7/2022), 150.6 mg (7/14/2022), 150.6 mg (7/21/2022), 150.6 mg (7/28/2022), 150.6 mg (8/4/2022), 150.6 mg (8/11/2022), 150.6 mg (8/18/2022), 150.6 mg (8/25/2022), 150.6 mg (9/1/2022)  trastuzumab (HERCEPTIN) chemo infusion, 8 mg/kg, 51 of 52 cycles  Administration: 487 mg (7/28/2022), 487 mg (8/18/2022), 487 mg (9/8/2022), 487 mg (9/29/2022), 487 mg (10/20/2022), 450 mg (11/17/2022), 450 mg (12/8/2022), 450 mg (12/30/2022), 450 mg (1/19/2023), 450 mg (2/9/2023), 450 mg (3/2/2023), 450 mg (3/23/2023), 482 mg (4/13/2023), 482 mg (5/4/2023), 482 mg (5/23/2023), 482 mg (6/15/2023), 482 mg (7/6/2023), 482 mg (7/25/2023), 482 mg (8/17/2023), 482 mg (9/7/2023), 482 mg (9/28/2023), 522 mg (10/19/2023), 523 mg (11/9/2023), 534 mg (1/11/2024), 531 mg (12/21/2023), 522 mg (11/30/2023), 534 mg (2/1/2024), 548 mg (2/22/2024), 558 mg (3/14/2024), 558 mg (4/4/2024), 558 mg (4/25/2024), 558 mg (5/16/2024), 558 mg (8/9/2024), 558 mg (7/18/2024), 558 mg (6/27/2024), 558 mg (6/6/2024), 558 mg (9/20/2024), 558 mg (8/30/2024), 558 mg (10/11/2024), 558 mg (11/1/2024), 558 mg (11/22/2024), 558 mg (12/13/2024), 558 mg (1/3/2025), 558 mg (1/24/2025), 558 mg (2/14/2025), 558 mg (3/12/2025), 558 mg (4/2/2025), 558 mg (4/23/2025), 558 mg (5/19/2025)  INV trastuzumab infusion, 6 mg/kg = 487 mg (100 % of original dose 6 mg/kg), 2 of 2 cycles  Dose modification: 6 mg/kg (original dose 6 mg/kg, Cycle 3, Reason: Other (Must fill in a comment), Comment: investigational drug)  Administration: 487 mg (6/16/2022), 487 mg (7/7/2022)     Malignant neoplasm metastatic to bone (HCC)   3/2022 Initial Diagnosis    Osseous metastasis (HCC)     3/29/2022 Biopsy    Breast, Right,  Biopsy:  - Invasive mammary carcinoma of no special type (ductal, not otherwise specified), [place] Grade II (3 + 2 + 1 = 6), spanning at least 6 mm.   ER/ID negative, HER2 positive    Procedure  Needle biopsy    Specimen Laterality  Right    TUMOR   Histologic Type  Invasive carcinoma of no special type (ductal)    Histologic Grade (Fabrizio Histologic Score)     Glandular (Acinar) / Tubular Differentiation  Score 3    Nuclear Pleomorphism  Score 2    Mitotic Rate  Score 1    Overall Grade  Grade 2 (scores of 6 or 7)    Tumor Size  Greatest dimension of largest invasive focus (Millimeters): 6 mm   Ductal Carcinoma In Situ (DCIS)  Not identified    Lymphovascular Invasion  Not identified         3/31/2022 - 4/18/2022 Radiation    Course: C1    Plan ID Energy Fractions Dose per Fraction (cGy) Dose Correction (cGy) Total Dose Delivered (cGy) Elapsed Days   C1 C7 And ix 10X/6X 7 / 7 300 0 2,100 8   C1 C7 Spine 10X/6X 3 / 10 300 0 900 4   L4 Sacrum 10X 10 / 10 300 0 3,000 13   T1 T6 And ix 10X 7 / 7 300 0 2,100 8   T1 T6 Spine 10X 3 / 10 300 0 900 4      Dr. Rutledge     4/28/2022 -  Chemotherapy    clinical trial with THP with or without atezolizumab     4/28/2022 -  Chemotherapy    pertuzumab (PERJETA) IVPB, 840 mg, 53 of 54 cycles  Administration: 420 mg (6/16/2022), 420 mg (7/7/2022), 420 mg (7/28/2022), 420 mg (8/18/2022), 420 mg (9/8/2022), 420 mg (9/29/2022), 420 mg (10/20/2022), 420 mg (11/17/2022), 420 mg (12/8/2022), 420 mg (12/30/2022), 420 mg (1/19/2023), 420 mg (2/9/2023), 420 mg (3/2/2023), 420 mg (3/23/2023), 420 mg (4/13/2023), 420 mg (5/4/2023), 420 mg (5/23/2023), 420 mg (6/15/2023), 420 mg (7/6/2023), 420 mg (7/25/2023), 420 mg (8/17/2023), 420 mg (9/7/2023), 420 mg (9/28/2023), 420 mg (10/19/2023), 420 mg (11/9/2023), 420 mg (1/11/2024), 420 mg (12/21/2023), 420 mg (11/30/2023), 420 mg (2/1/2024), 420 mg (2/22/2024), 420 mg (3/14/2024), 420 mg (4/4/2024), 420 mg (4/25/2024), 420 mg (5/16/2024),  420 mg (8/9/2024), 420 mg (7/18/2024), 420 mg (6/27/2024), 420 mg (6/6/2024), 420 mg (9/20/2024), 420 mg (8/30/2024), 420 mg (10/11/2024), 420 mg (11/1/2024), 420 mg (11/22/2024), 420 mg (12/13/2024), 420 mg (1/3/2025), 420 mg (1/24/2025), 420 mg (2/14/2025), 420 mg (3/12/2025), 420 mg (4/2/2025), 420 mg (4/23/2025), 420 mg (5/19/2025)  DOCEtaxel (TAXOTERE) chemo infusion, 75 mg/m2, 3 of 3 cycles  Dose modification: 60 mg/m2 (original dose 75 mg/m2, Cycle 3, Reason: Anticipated Tolerance)  Administration: 112.8 mg (6/16/2022)  PACLItaxel (TAXOL) chemo IVPB, 80 mg/m2 = 150.6 mg (100 % of original dose 80 mg/m2), 3 of 3 cycles  Dose modification: 80 mg/m2 (original dose 80 mg/m2, Cycle 4, Reason: Anticipated Tolerance)  Administration: 150.6 mg (7/7/2022), 150.6 mg (7/14/2022), 150.6 mg (7/21/2022), 150.6 mg (7/28/2022), 150.6 mg (8/4/2022), 150.6 mg (8/11/2022), 150.6 mg (8/18/2022), 150.6 mg (8/25/2022), 150.6 mg (9/1/2022)  trastuzumab (HERCEPTIN) chemo infusion, 8 mg/kg, 51 of 52 cycles  Administration: 487 mg (7/28/2022), 487 mg (8/18/2022), 487 mg (9/8/2022), 487 mg (9/29/2022), 487 mg (10/20/2022), 450 mg (11/17/2022), 450 mg (12/8/2022), 450 mg (12/30/2022), 450 mg (1/19/2023), 450 mg (2/9/2023), 450 mg (3/2/2023), 450 mg (3/23/2023), 482 mg (4/13/2023), 482 mg (5/4/2023), 482 mg (5/23/2023), 482 mg (6/15/2023), 482 mg (7/6/2023), 482 mg (7/25/2023), 482 mg (8/17/2023), 482 mg (9/7/2023), 482 mg (9/28/2023), 522 mg (10/19/2023), 523 mg (11/9/2023), 534 mg (1/11/2024), 531 mg (12/21/2023), 522 mg (11/30/2023), 534 mg (2/1/2024), 548 mg (2/22/2024), 558 mg (3/14/2024), 558 mg (4/4/2024), 558 mg (4/25/2024), 558 mg (5/16/2024), 558 mg (8/9/2024), 558 mg (7/18/2024), 558 mg (6/27/2024), 558 mg (6/6/2024), 558 mg (9/20/2024), 558 mg (8/30/2024), 558 mg (10/11/2024), 558 mg (11/1/2024), 558 mg (11/22/2024), 558 mg (12/13/2024), 558 mg (1/3/2025), 558 mg (1/24/2025), 558 mg (2/14/2025), 558 mg (3/12/2025), 558 mg  (4/2/2025), 558 mg (4/23/2025), 558 mg (5/19/2025)  INV trastuzumab infusion, 6 mg/kg = 487 mg (100 % of original dose 6 mg/kg), 2 of 2 cycles  Dose modification: 6 mg/kg (original dose 6 mg/kg, Cycle 3, Reason: Other (Must fill in a comment), Comment: investigational drug)  Administration: 487 mg (6/16/2022), 487 mg (7/7/2022)        Pertinent Medical History      Review of Systems   Constitutional: Negative.    All other systems reviewed and are negative.          Objective   LMP 03/28/2022 (Approximate)     Pain Screening:     ECOG   0  Physical Exam no exam telemedicine     Labs: I have reviewed the following labs:  Lab Results   Component Value Date/Time    WBC 4.49 05/27/2025 07:41 AM    RBC 4.11 05/27/2025 07:41 AM    Hemoglobin 13.1 05/27/2025 07:41 AM    Hematocrit 41.1 05/27/2025 07:41 AM     (H) 05/27/2025 07:41 AM    MCH 31.9 05/27/2025 07:41 AM    RDW 12.9 05/27/2025 07:41 AM    Platelets 200 05/27/2025 07:41 AM    Segmented % 58 05/27/2025 07:41 AM    Lymphocytes % 31 05/27/2025 07:41 AM    Monocytes % 10 05/27/2025 07:41 AM    Eosinophils Relative 1 05/27/2025 07:41 AM    Basophils Relative 0 05/27/2025 07:41 AM    Immature Grans % 0 05/27/2025 07:41 AM    Absolute Neutrophils 2.59 05/27/2025 07:41 AM     Lab Results   Component Value Date/Time    Potassium 4.6 05/27/2025 07:41 AM    Chloride 106 05/27/2025 07:41 AM    CO2 30 05/27/2025 07:41 AM    BUN 16 05/27/2025 07:41 AM    Creatinine 0.75 05/27/2025 07:41 AM    Glucose, Fasting 99 05/27/2025 07:41 AM    Calcium 9.2 05/27/2025 07:41 AM    AST 16 05/27/2025 07:41 AM    ALT 30 05/27/2025 07:41 AM    Alkaline Phosphatase 43 05/27/2025 07:41 AM    Total Protein 6.8 05/27/2025 07:41 AM    Albumin 4.1 05/27/2025 07:41 AM    Total Bilirubin 0.83 05/27/2025 07:41 AM    eGFR 93 05/27/2025 07:41 AM           Administrative Statements   Encounter provider Lois Phillips MD    The Patient is located at Home and in the following state in which I  hold an active license [place].    The patient was identified by name and date of birth. Nayeli Love was informed that this is a telemedicine visit and that the visit is being conducted through the Epic Embedded platform. She agrees to proceed..  My office door was closed. No one else was in the room.  She acknowledged consent and understanding of privacy and security of the video platform. The patient has agreed to participate and understands they can discontinue the visit at any time.    I have spent a total time of 30 minutes in caring for this patient on the day of the visit/encounter including Diagnostic results, Impressions, Counseling / Coordination of care, Documenting in the medical record, Reviewing/placing orders in the medical record (including tests, medications, and/or procedures), and Obtaining or reviewing history  , not including the time spent for establishing the audio/video connection.

## 2025-06-03 ENCOUNTER — TELEPHONE (OUTPATIENT)
Age: 50
End: 2025-06-03

## 2025-06-03 ENCOUNTER — TELEMEDICINE (OUTPATIENT)
Age: 50
End: 2025-06-03
Payer: COMMERCIAL

## 2025-06-03 DIAGNOSIS — Z17.1 MALIGNANT NEOPLASM OF OVERLAPPING SITES OF RIGHT BREAST IN FEMALE, ESTROGEN RECEPTOR NEGATIVE (HCC): ICD-10-CM

## 2025-06-03 DIAGNOSIS — C79.51 MALIGNANT NEOPLASM METASTATIC TO BONE (HCC): Primary | ICD-10-CM

## 2025-06-03 DIAGNOSIS — C50.811 MALIGNANT NEOPLASM OF OVERLAPPING SITES OF RIGHT BREAST IN FEMALE, ESTROGEN RECEPTOR NEGATIVE (HCC): ICD-10-CM

## 2025-06-03 PROCEDURE — 99214 OFFICE O/P EST MOD 30 MIN: CPT | Performed by: INTERNAL MEDICINE

## 2025-06-03 NOTE — TELEPHONE ENCOUNTER
Called patient to advise of FU, ECHO and CT scan that have been scheduled and patient would prefer the ECHO the same week as the CT.  Advised patient I would reschedule the ECHO to first week of Sept.  Mailed AVS to patient.

## 2025-06-03 NOTE — ASSESSMENT & PLAN NOTE
Orders:    CBC and differential; Future    Comprehensive metabolic panel; Future    LD,Blood; Future    Magnesium; Future    CT chest abdomen pelvis w contrast; Future    Echo complete w/ contrast if indicated; Future

## 2025-06-03 NOTE — TELEPHONE ENCOUNTER
Called patient following virtual appt today.  Left a message that FU appt at [facility] has been scheduled for 9/8 at 1240 pm.  Also said office will schedule CT and Echo up at [place] and call back with dates and times.

## 2025-06-05 ENCOUNTER — TELEPHONE (OUTPATIENT)
Age: 50
End: 2025-06-05

## 2025-06-05 RX ORDER — SODIUM CHLORIDE 9 MG/ML
20 INJECTION, SOLUTION INTRAVENOUS ONCE
Status: CANCELLED | OUTPATIENT
Start: 2025-06-09

## 2025-06-05 NOTE — TELEPHONE ENCOUNTER
"Called and spoke to Nayeli. Her insurance denied her herceptin for treatment Monday, 6/9, due to it being \"not medically necessary\". She will only be getting the perjeta, which is the more potent medication. Recommended that she follow up with her insurance to get more information on why it was denied. I will follow up again with Dr. Phillips tomorrow to see if she wants to do a P2P to try and get it approved. Either way, we will not have time to get it approved for treatment on Monday, but can see if it will be approved for future treatments. Nayeli voiced understanding.  "

## 2025-06-05 NOTE — TELEPHONE ENCOUNTER
Patient is calling regarding her herceptin being denied by her insurance.  She called the insurance company and they said the reason for hte denial was because not all of the correct medical documentation was submitted.  They are requesting all her herceptin medical records, how long she has been on it.  They also suggested the fastest way to get this matter taken care of is a peer to peer review by calling [phone].  Fax number is [phone].

## 2025-06-06 NOTE — TELEPHONE ENCOUNTER
Called and left voicemail for patient's insurance to set up a P2P with Dr. Phillips. Called [phone]. The case number is [id number]. Call back number provided.    Called and spoke to Nayeli to let her know we are working on setting up a P2P. Plan is still to do just perjeta on Monday unless we hear otherwise. Nayeli asked that we submit documents to insurance while working on the P2P so that they have it. I will ask our prior auth team to do so. Nayeli appreciated the update

## 2025-06-06 NOTE — TELEPHONE ENCOUNTER
Spoke to patient's insurance. Unable to set up a P2P today because they are booked. I will have to call back Monday to schedule

## 2025-06-09 ENCOUNTER — HOSPITAL ENCOUNTER (OUTPATIENT)
Dept: INFUSION CENTER | Facility: CLINIC | Age: 50
Discharge: HOME | End: 2025-06-09
Attending: INTERNAL MEDICINE
Payer: COMMERCIAL

## 2025-06-09 VITALS
TEMPERATURE: 97.5 F | RESPIRATION RATE: 18 BRPM | SYSTOLIC BLOOD PRESSURE: 142 MMHG | OXYGEN SATURATION: 95 % | HEART RATE: 74 BPM | DIASTOLIC BLOOD PRESSURE: 86 MMHG

## 2025-06-09 DIAGNOSIS — Z17.1 MALIGNANT NEOPLASM OF OVERLAPPING SITES OF RIGHT BREAST IN FEMALE, ESTROGEN RECEPTOR NEGATIVE (HCC): ICD-10-CM

## 2025-06-09 DIAGNOSIS — C79.51 MALIGNANT NEOPLASM METASTATIC TO BONE (HCC): ICD-10-CM

## 2025-06-09 DIAGNOSIS — C50.811 MALIGNANT NEOPLASM OF OVERLAPPING SITES OF RIGHT BREAST IN FEMALE, ESTROGEN RECEPTOR NEGATIVE (HCC): ICD-10-CM

## 2025-06-09 DIAGNOSIS — D70.1 CHEMOTHERAPY-INDUCED NEUTROPENIA (HCC): Primary | ICD-10-CM

## 2025-06-09 DIAGNOSIS — T45.1X5A CHEMOTHERAPY-INDUCED NEUTROPENIA (HCC): Primary | ICD-10-CM

## 2025-06-09 PROCEDURE — 96413 CHEMO IV INFUSION 1 HR: CPT

## 2025-06-09 RX ORDER — SODIUM CHLORIDE 9 MG/ML
20 INJECTION, SOLUTION INTRAVENOUS ONCE
Status: COMPLETED | OUTPATIENT
Start: 2025-06-09 | End: 2025-06-09

## 2025-06-09 RX ADMIN — PERTUZUMAB 420 MG: 30 INJECTION, SOLUTION, CONCENTRATE INTRAVENOUS at 08:33

## 2025-06-09 RX ADMIN — SODIUM CHLORIDE 20 ML/HR: 0.9 INJECTION, SOLUTION INTRAVENOUS at 07:50

## 2025-06-09 NOTE — PROGRESS NOTES
Pt tolerated infusion without complications. No further chemo appointments scheduled at this time till after she speaks with provider. Zometa scheduled for 8/1 at 7:30

## 2025-06-09 NOTE — PLAN OF CARE
Problem: Potential for Falls  Goal: Patient will remain free of falls  Description: INTERVENTIONS:  - Educate patient/family on patient safety including physical limitations  - Instruct patient to call for assistance with activity   - Consider consulting OT/PT to assist with strengthening/mobility based on AM PAC & JH-HLM score  - Consult OT/PT to assist with strengthening/mobility   - Keep Call bell within reach  - Keep bed low and locked with side rails adjusted as appropriate  - Keep care items and personal belongings within reach  - Initiate and maintain comfort rounds  - Make Fall Risk Sign visible to staff  Outcome: Completed     Problem: Knowledge Deficit  Goal: Patient/family/caregiver demonstrates understanding of disease process, treatment plan, medications, and discharge instructions  Description: Complete learning assessment and assess knowledge base.  Interventions:  - Provide teaching at level of understanding  - Provide teaching via preferred learning methods  Outcome: Completed

## 2025-06-09 NOTE — TELEPHONE ENCOUNTER
Pt stated the insurance provided her another phone number of [phone]. She stated that insurance told her that no one reached out to them to set up a P2P. Did make pt aware that outreach was made on 6/6 but they were book out and that outreach was to be made again today to attempt to set up P2P. She was looking for an update on this matter and can be reached at [phone]. Thank you.

## 2025-06-10 ENCOUNTER — TELEPHONE (OUTPATIENT)
Age: 50
End: 2025-06-10

## 2025-06-10 ENCOUNTER — OFFICE VISIT (OUTPATIENT)
Dept: PALLIATIVE MEDICINE | Facility: CLINIC | Age: 50
End: 2025-06-10
Payer: COMMERCIAL

## 2025-06-10 VITALS
RESPIRATION RATE: 18 BRPM | BODY MASS INDEX: 35.34 KG/M2 | OXYGEN SATURATION: 97 % | TEMPERATURE: 97.2 F | WEIGHT: 207 LBS | HEIGHT: 64 IN | DIASTOLIC BLOOD PRESSURE: 86 MMHG | SYSTOLIC BLOOD PRESSURE: 148 MMHG | HEART RATE: 97 BPM

## 2025-06-10 DIAGNOSIS — D70.1 CHEMOTHERAPY-INDUCED NEUTROPENIA (HCC): Primary | ICD-10-CM

## 2025-06-10 DIAGNOSIS — M54.9 BACK PAIN: ICD-10-CM

## 2025-06-10 DIAGNOSIS — G89.3 CANCER ASSOCIATED PAIN: ICD-10-CM

## 2025-06-10 DIAGNOSIS — C79.51 MALIGNANT NEOPLASM METASTATIC TO BONE (HCC): ICD-10-CM

## 2025-06-10 DIAGNOSIS — Z17.1 MALIGNANT NEOPLASM OF OVERLAPPING SITES OF RIGHT BREAST IN FEMALE, ESTROGEN RECEPTOR NEGATIVE (HCC): ICD-10-CM

## 2025-06-10 DIAGNOSIS — C50.811 MALIGNANT NEOPLASM OF OVERLAPPING SITES OF RIGHT BREAST IN FEMALE, ESTROGEN RECEPTOR NEGATIVE (HCC): ICD-10-CM

## 2025-06-10 DIAGNOSIS — C50.911 BREAST CANCER METASTASIZED TO BONE, RIGHT (HCC): ICD-10-CM

## 2025-06-10 DIAGNOSIS — C79.51 BREAST CANCER METASTASIZED TO BONE, RIGHT (HCC): ICD-10-CM

## 2025-06-10 DIAGNOSIS — Z51.5 PALLIATIVE CARE PATIENT: Primary | ICD-10-CM

## 2025-06-10 DIAGNOSIS — T45.1X5A CHEMOTHERAPY-INDUCED NEUTROPENIA (HCC): Primary | ICD-10-CM

## 2025-06-10 PROCEDURE — 99214 OFFICE O/P EST MOD 30 MIN: CPT | Performed by: STUDENT IN AN ORGANIZED HEALTH CARE EDUCATION/TRAINING PROGRAM

## 2025-06-10 RX ORDER — METHOCARBAMOL 500 MG/1
500 TABLET, FILM COATED ORAL 3 TIMES DAILY PRN
Qty: 30 TABLET | Refills: 0 | Status: SHIPPED | OUTPATIENT
Start: 2025-06-10

## 2025-06-10 NOTE — TELEPHONE ENCOUNTER
Missed call back from patient's insurance. Call returned and left voicemail to confirm P2P on Thurs, 6/12, at noon with Dr. Phillips. Provided Dr. Phillips's direct number for appointment

## 2025-06-10 NOTE — TELEPHONE ENCOUNTER
Called and left voicemail for Nayeli. I have left another voicemail with her insurance to try and set up a P2P. Will try and get it scheduled for Thursday, 6/12, if able.

## 2025-06-10 NOTE — TELEPHONE ENCOUNTER
Pt is calling in regards to more chemo treatments. She was at infusion 6/9 and they couldn't schedule her out every 3 weeks due to no orders. Pt would like a call to get that scheduled. Thank you

## 2025-06-10 NOTE — ASSESSMENT & PLAN NOTE
On OxyIR 10mg, up to 2 times daily as needed.  This has been helpful for her pain.  She typically needs 1 tablet in a day, however, on days where she needs to travel or requires a more physically demanding day, she may need a second dose of 10mg OxyIR.    We discussed options to try to see if can further wean from the opioids as patient would also like to try to wean so as to not have to depend on these long term.  We discussed option of a muscle relaxer given she has had neck/back pain that has been ongoing chronically. Potentially between muscular tension, can trial Robaxin to see if this will provide relief so that she may be able to further come off of opioids as able.    Plan:  1) Trial Robaxin 500mg TID PRN  2) OxyIR 10mg, up to 2 tablets daily as needed.  3) Bowel regimen to avoid OIC    Medication safety issues - Do not drive under the influence of narcotics (including opioids), watch for adverse effects including confusion / altered mental status / respiratory depression (slowed breathing), keep medications stored in a safe/locked environment, do not use alcohol while opioids or other narcotics are in your system. Do not travel with more than the minimum number of tablets or capsules required for the trip.    Orders:    methocarbamol (ROBAXIN) 500 mg tablet; Take 1 tablet (500 mg total) by mouth 3 (three) times a day as needed for muscle spasms

## 2025-06-10 NOTE — TELEPHONE ENCOUNTER
Called and left another voicemail with patient's insurance to see up a P2P for this Thursday, 6/12, with Dr. Phillips

## 2025-06-10 NOTE — ASSESSMENT & PLAN NOTE
Psychosocial   Supportive listening provided  Normalized experience of patient/family  Provided anxiety containment     Referrals Placed / Medical Equipment Ordered  -None    Follow-Up Recommendations  -Follow-up with PCP and current medical specialists  -Follow-up with palliative care: 5-6 weeks for recheck on medication adjustment check    Orders:    methocarbamol (ROBAXIN) 500 mg tablet; Take 1 tablet (500 mg total) by mouth 3 (three) times a day as needed for muscle spasms

## 2025-06-10 NOTE — PROGRESS NOTES
Name: Nayeli Love      : 1975      MRN: [id number]  Encounter Provider: Con Garcia DO  Encounter Date: 6/10/2025   Enc[facility]: [facility] [place]  :  Assessment & Plan  Malignant neoplasm of overlapping sites of right breast in female, estrogen receptor negative (HCC)  Following  Medical Oncology  -ongoing treatment with Herceptin next cycle 2025  Tolerating treatments.  Tolerating PO intake.  No intractable nausea or vomiting issues.       Cancer associated pain  On OxyIR 10mg, up to 2 times daily as needed.  This has been helpful for her pain.  She typically needs 1 tablet in a day, however, on days where she needs to travel or requires a more physically demanding day, she may need a second dose of 10mg OxyIR.    We discussed options to try to see if can further wean from the opioids as patient would also like to try to wean so as to not have to depend on these long term.  We discussed option of a muscle relaxer given she has had neck/back pain that has been ongoing chronically. Potentially between muscular tension, can trial Robaxin to see if this will provide relief so that she may be able to further come off of opioids as able.    Plan:  1) Trial Robaxin 500mg TID PRN  2) OxyIR 10mg, up to 2 tablets daily as needed.  3) Bowel regimen to avoid OIC    Medication safety issues - Do not drive under the influence of narcotics (including opioids), watch for adverse effects including confusion / altered mental status / respiratory depression (slowed breathing), keep medications stored in a safe/locked environment, do not use alcohol while opioids or other narcotics are in your system. Do not travel with more than the minimum number of tablets or capsules required for the trip.    Orders:    methocarbamol (ROBAXIN) 500 mg tablet; Take 1 tablet (500 mg total) by mouth 3 (three) times a day as needed for muscle spasms    Palliative care patient  Psychosocial   Supportive  listening provided  Normalized experience of patient/family  Provided anxiety containment     Referrals Placed / Medical Equipment Ordered  -None    Follow-Up Recommendations  -Follow-up with PCP and current medical specialists  -Follow-up with palliative care: 5-6 weeks for recheck on medication adjustment check    Orders:    methocarbamol (ROBAXIN) 500 mg tablet; Take 1 tablet (500 mg total) by mouth 3 (three) times a day as needed for muscle spasms    Breast cancer metastasized to bone, right (HCC)  Currently on Zometa   Orders:    methocarbamol (ROBAXIN) 500 mg tablet; Take 1 tablet (500 mg total) by mouth 3 (three) times a day as needed for muscle spasms    Malignant neoplasm metastatic to bone (HCC)    Orders:    methocarbamol (ROBAXIN) 500 mg tablet; Take 1 tablet (500 mg total) by mouth 3 (three) times a day as needed for muscle spasms    Back pain    Orders:    methocarbamol (ROBAXIN) 500 mg tablet; Take 1 tablet (500 mg total) by mouth 3 (three) times a day as needed for muscle spasms      PDMP Review: I have reviewed the patient's controlled substance dispensing history in the Prescription Drug Monitoring Program in compliance with the [facility] regulations before prescribing any controlled substances.    History of Present Illness   Nayeli Love is a 49 y.o. female who presents for follow-up.    Patient is seen today in office. Alert, oriented, pleasantly conversant.  Patient is seen in NAD.  Has remained stable overall. She is tolerating Herceptin and PO intake is good. No intractable nausea or vomiting. She continues to travel and able to remain active in her activities of daily living. Mood is stable as she is coping well.    Appetite has been stable.    Pain - as per plan. Will trial Robaxin for muscular tension/spasms.    Patient is well supported by family.      Medical History Reviewed by provider this encounter:  Tobacco  Allergies  Meds  Problems  Med Hx  Surg Hx  Fam Hx     .  Past  "Medical History   Past Medical History[1]  Past Surgical History[2]  Family History[3]   reports that she has never smoked. She has never used smokeless tobacco. She reports current alcohol use. She reports that she does not use drugs.  Current Outpatient Medications   Medication Instructions    acetaminophen (TYLENOL) 650 mg, Every 6 hours PRN    albuterol (ProAir HFA) 90 mcg/act inhaler 2 puffs, Inhalation, Every 6 hours PRN    lisinopril (ZESTRIL) 10 mg, Oral, Daily, Only to take as needed when BP is >150 systolicallly    methocarbamol (ROBAXIN) 500 mg, Oral, 3 times daily PRN    naloxone (NARCAN) 4 mg/0.1 mL nasal spray Administer 1 spray into a nostril. If no response after 2-3 minutes, give another dose in the other nostril using a new spray.    nystatin powder APPLY TOPICALLY TO THE AFFECTED AREA(S) TWICE DAILY AS DIRECTED    ondansetron (ZOFRAN) 4 mg, Oral, Every 8 hours PRN    oxyCODONE (ROXICODONE) 5-10 mg, Oral, 2 times daily PRN, No further refills without visit to Palliative provider.  Please call office to make appointment.    pantoprazole (PROTONIX) 20 mg, Oral, Daily (early morning)   Allergies[4]   Medications Ordered Prior to Encounter[5]   Social History[6]     Objective   /86 (BP Location: Left arm, Patient Position: Sitting, Cuff Size: Standard)   Pulse 97   Temp (!) 97.2 °F (36.2 °C) (Tympanic)   Resp 18   Ht 5' 4\" (1.626 m)   Wt 93.9 kg (207 lb)   LMP 03/28/2022 (Approximate)   SpO2 97%   BMI 35.53 kg/m²     Physical Exam  Vitals reviewed.   Constitutional:       General: She is not in acute distress.     Appearance: She is not ill-appearing, toxic-appearing or diaphoretic.   HENT:      Head: Normocephalic and atraumatic.      Nose: Nose normal.     Eyes:      General:         Right eye: No discharge.         Left eye: No discharge.       Cardiovascular:      Rate and Rhythm: Normal rate.   Pulmonary:      Effort: Pulmonary effort is normal. No respiratory distress.   Abdominal: "      General: Abdomen is flat. There is no distension.     Musculoskeletal:         General: No swelling.     Skin:     General: Skin is warm and dry.      Coloration: Skin is not jaundiced or pale.     Neurological:      General: No focal deficit present.      Mental Status: She is alert. Mental status is at baseline.     Psychiatric:         Mood and Affect: Mood normal.         Behavior: Behavior normal.         Thought Content: Thought content normal.         Judgment: Judgment normal.         Recent labs:  Lab Results   Component Value Date/Time    SODIUM 136 05/27/2025 07:41 AM    SODIUM 138 10/12/2018 10:18 PM    K 4.6 05/27/2025 07:41 AM    K 4.4 10/12/2018 10:18 PM    BUN 16 05/27/2025 07:41 AM    BUN 11 10/12/2018 10:18 PM    CREATININE 0.75 05/27/2025 07:41 AM    CREATININE 1.03 10/12/2018 10:18 PM    GLUC 88 05/08/2025 08:15 AM    GLUC 95 10/12/2018 10:18 PM    CALCIUM 9.2 05/27/2025 07:41 AM    CALCIUM 9.1 10/12/2018 10:18 PM    AST 16 05/27/2025 07:41 AM    ALT 30 05/27/2025 07:41 AM    ALB 4.1 05/27/2025 07:41 AM    TP 6.8 05/27/2025 07:41 AM    EGFR 93 05/27/2025 07:41 AM    EGFR 66 10/12/2018 10:18 PM     Lab Results   Component Value Date/Time    HGB 13.1 05/27/2025 07:41 AM    WBC 4.49 05/27/2025 07:41 AM     05/27/2025 07:41 AM    INR 0.91 11/28/2022 07:01 AM    PTT 27 11/28/2022 07:01 AM     Lab Results   Component Value Date/Time    YCV0OAATDTXW 3.910 12/19/2023 07:22 AM       Recent Imaging:  Procedure: CT chest abdomen pelvis w contrast  Result Date: 5/30/2025  Impression: Extensive osseous metastatic disease has not significantly changed from prior exam. No new or progressive metastatic disease to the chest, abdomen, or pelvis. Left adnexal cystic lesion has not significantly changed in size from prior exam. Again this could represent ovarian cystadenoma. Computerized Assisted Algorithm (CAA) may have aided analysis of applicable images. Workstation performed: [id number]     Procedure:  Colonoscopy  Result Date: 4/8/2025  Impression: The entire colon appeared normal. RECOMMENDATION: Repeat screening colonoscopy in 10 years, due: 4/6/2035   Trish Apple DO     Procedure: CT chest abdomen pelvis w contrast  Result Date: 2/26/2025  Impression: 1. Stable osseous metastatic disease. No thoracic or intra-abdominal lymphadenopathy. 2. Continued interval enlargement of left ovarian cystic lesion without mural nodularity or septation. Leading differential diagnosis remains ovarian cystadenoma 3. Bilateral nonobstructing renal stones. Electronically signed: 02/26/2025 03:45 AM Carlin Fuentes MD      Administrative Statements   I have spent a total time of 33 minutes in caring for this patient on the day of the visit/encounter including Risks and benefits of tx options, Instructions for management, Patient and family education, Importance of tx compliance, Risk factor reductions, Impressions, Counseling / Coordination of care, Documenting in the medical record, Reviewing/placing orders in the medical record (including tests, medications, and/or procedures), and Obtaining or reviewing history  .   Topics discussed with the patient / family include symptom assessment and management, medication review, medication adjustment, psychosocial support, opioid titration, supportive listening, and anticipatory guidance.         [1]   Past Medical History:  Diagnosis Date    Breast cancer (HCC)     Cancer (HCC)     Hypertension    [2] No past surgical history on file.  [3]   Family History  Problem Relation Name Age of Onset    Coronary artery disease Mother      Heart attack Father Bryn Love     Diabetes type II Father Bryn Love     Diabetes Father Bryn Love     Hashimoto's thyroiditis Sister     [4] No Known Allergies  [5]   Current Outpatient Medications on File Prior to Visit   Medication Sig Dispense Refill    acetaminophen (TYLENOL) 325 mg tablet Take 650 mg by mouth every 6 (six) hours as needed  for mild pain      lisinopril (ZESTRIL) 20 mg tablet Take 0.5 tablets (10 mg total) by mouth daily Only to take as needed when BP is >150 systolicallly 90 tablet 1    nystatin powder APPLY TOPICALLY TO THE AFFECTED AREA(S) TWICE DAILY AS DIRECTED 15 g 1    ondansetron (ZOFRAN) 4 mg tablet Take 1 tablet (4 mg total) by mouth every 8 (eight) hours as needed for nausea or vomiting 9 tablet 20    oxyCODONE (ROXICODONE) 10 MG TABS Take 0.5-1 tablets (5-10 mg total) by mouth 2 (two) times a day as needed for moderate pain or severe pain No further refills without visit to Palliative provider.  Please call office to make appointment. Max Daily Amount: 20 mg 60 tablet 0    pantoprazole (PROTONIX) 20 mg tablet Take 1 tablet (20 mg total) by mouth daily in the early morning 30 tablet 11    albuterol (ProAir HFA) 90 mcg/act inhaler Inhale 2 puffs every 6 (six) hours as needed for wheezing (Patient not taking: Reported on 6/3/2025) 8.5 g 0    naloxone (NARCAN) 4 mg/0.1 mL nasal spray Administer 1 spray into a nostril. If no response after 2-3 minutes, give another dose in the other nostril using a new spray. (Patient not taking: Reported on 11/12/2024) 1 each 1     No current facility-administered medications on file prior to visit.   [6]   Social History  Tobacco Use    Smoking status: Never    Smokeless tobacco: Never   Vaping Use    Vaping status: Never Used   Substance and Sexual Activity    Alcohol use: Yes     Comment: Less than 1 drink every month    Drug use: Never    Sexual activity: Not Currently

## 2025-06-12 NOTE — PATIENT INSTRUCTIONS
It was a pleasure speaking with you today.    As discussed:  -Continue your medications as prescribed.  -Continue with a bowel regimen to avoid constipation.    Follow-up in: 4-5 weeks for medication recheck    Please do not hesitate to call me sooner should you have any questions/concerns or needs.    Medication safety issues - Do not drive under the influence of narcotics (including opioids), watch for adverse effects including confusion / altered mental status / respiratory depression (slowed breathing), keep medications stored in a safe/locked environment, do not use alcohol while opioids or other narcotics are in your system. Do not travel with more than the minimum number of tablets or capsules required for the trip.    ER Precautions  Watch for red flag symptoms including, but not limited to fevers, chills, chest pain, shortness of breath, intractable nausea/vomiting/diarrhea, or acute intractable pain (especially if pain is new or has changed).

## 2025-06-12 NOTE — ASSESSMENT & PLAN NOTE
Following [facility]  -ongoing treatment with Herceptin next cycle 7/9/2025  Tolerating treatments.  Tolerating PO intake.  No intractable nausea or vomiting issues.

## 2025-06-12 NOTE — TELEPHONE ENCOUNTER
Dr. Phillips never received a call today from patient's insurance to do a P2P. I had not received a call back after the voicemail I left on 6/10 to state there were any issues regarding appointment time.      I called and left another voicemail for patient's insurance today at 12:30 pm. This time, I did get a call back around 2:30 pm. I was told that they are only able to make same day appointments and that I have to physically talk to someone to schedule, even though that was never relayed to me. So, my voicemail left on 6/10 was not sufficient enough to schedule even though I had received a voicemail from the insurance prior stating that today at noon would be available. Patient's insurance operates on pacific time and only availability today would be between 6 and 7 pm our time. Otherwise, I would have to call again to reschedule. At this point, will just have finance submit an appeal with correct documentation since patient already received her treatment on Monday and it is too hard to get a hold of them to schedule a P2P.    Called and left voicemail for Nayeli to relay above and let her know we are just going to have finance submit an appeal and not do the P2P. Call back number provided if she has questions.

## 2025-06-13 ENCOUNTER — TELEPHONE (OUTPATIENT)
Age: 50
End: 2025-06-13

## 2025-06-13 NOTE — TELEPHONE ENCOUNTER
Received call from Juaquin from [facility] regarding denial for patient's Herceptin. Dr. Phillips is not able to try and schedule another P2P due to rounding and packed schedule the next two weeks. Juaquin advised that we are able to submit new documents for a re evaluation prior to having to do an appeal. This would be the quickest way to get the treatment approved without doing a P2P. I had asked the prior auth team last week to fax in new supporting documents, but Juaquin does not see anything that was recently sent over. I will have them fax over as soon as possible for the re evaluation. Juaquin provided me with his direct number and reference number for the call in case there are any questions. [phone] Ref #[id number]

## 2025-06-20 NOTE — PROGRESS NOTES
Pt reports that she is feeling back to her baseline  Will re-challenge at this time  cannula RLE, Bilateral UE/LE grossly at least 3+/5 throughout/grossly assessed due to

## 2025-06-26 ENCOUNTER — TELEPHONE (OUTPATIENT)
Age: 50
End: 2025-06-26

## 2025-06-26 NOTE — TELEPHONE ENCOUNTER
Pt called and she is very frustrated about the appeal which has not been done and or the peer to peer. As of today per insurance they have no had any responses from the office. Pt now feels that her care is not a priority. Pt stated this has been going on now for 2 weeks and information was given 6/5 via telephone encounter with numbers to call. Pt wants a call back in the am on 6/27. Thank you

## 2025-06-27 NOTE — TELEPHONE ENCOUNTER
"Called and left a voicemail for Nayeli. Michelle, our , was finally able to get in touch with her insurance. Michelle stated, \"I spoke to Jacey at [facility] who stated that Herceptin was denied because they had an update on their medical policy stating the preferred drugs are Kanjinti and Trazimera now.\" It was never an issue of the wrong documentation being submitted, but just that the patient needed the Herceptin changed to one of their preferred medications to meet criteria. Insurance stated that Nayeli was made aware of this and does not want to switch due to the medication working for her. Dr. Phillips will be back in office on Tuesday, 7/1, and I can discuss with her then. We can attempt to appeal, but cannot guarantee that it will get approved due to needing to try their preferred medications first. Call back number provided.     "

## 2025-06-27 NOTE — TELEPHONE ENCOUNTER
Called and spoke to Nayeli. I will fill out the appeal paperwork and have Dr. Phillips sign when she is back in office on 7/1. I will then upload it into her chart so that she can go to [place] to sign her portion. Once Nayeli signs the paperwork I will submit the appeal to her insurance. I will have our  keep an eye on the appeal to let her know if her Herceptin is approved or not for her next treatment on 7/9. Nayeli voiced understanding.

## 2025-06-27 NOTE — TELEPHONE ENCOUNTER
Called and spoke to Nayeli. I told her that I had tried multiple times myself to get in touch with her insurance, with little luck. Our finance team has also tried multiple times to get in touch with them to try and figure out what other documentation needs to be submitted, because it does not make sense that her Perjeta was approved and not her Herceptin when she has been on this treatment for years. Nayeli said that they have no record of us calling since 6/12, which is not true. I have been in touch with Michelle MTZ with [employer], and she has tried multiple times to get in touch with them. I provided Nayeli with Michelle's Teams number, so that if she talks to them, she can provide them with it. I also gave Michelle the number that Nayeli calls as a patient. I assured her that we have been trying to get in touch with her insurance for weeks and have been taking this seriously. I will call her back later today with any updates. Nayeli voiced understanding.

## 2025-07-01 ENCOUNTER — DOCUMENTATION (OUTPATIENT)
Dept: HEMATOLOGY ONCOLOGY | Facility: CLINIC | Age: 50
End: 2025-07-01

## 2025-07-01 NOTE — TELEPHONE ENCOUNTER
Called and spoke to Nayeli. I have uploaded the appeal form into her chart it. She can go to the [place] office and sign her portion of the form. Once signed, the office can upload that along with her personal letter into her chart so that I can submit everything in to her insurance. Nayeli voiced understanding and will stop by the office in about an hour.

## 2025-07-01 NOTE — TELEPHONE ENCOUNTER
Appeal form, personal letter, and last office notes faxed to [facility] Clinical Appeals at [phone]

## 2025-07-02 ENCOUNTER — TELEPHONE (OUTPATIENT)
Age: 50
End: 2025-07-02

## 2025-07-02 NOTE — TELEPHONE ENCOUNTER
Title: Med Time Out    Date patient scheduled: 7/9    Original medication ordered: Herceptin 6 mg/kg    New Medication ordered: Kanjinti 6 mg/kg    Office RN notified patient?? Yes

## 2025-07-02 NOTE — TELEPHONE ENCOUNTER
Patient calling.    Dropped off appeal insurance form.    Has it been sent?  Patient needs copy.    Please call after 2pm.    ALSO, need alternative medication to the one denied by insurance.Has this been discussed with Dr Phillips?

## 2025-07-02 NOTE — TELEPHONE ENCOUNTER
Called and spoke to Nayeli. I will send her a copy of the appeal form in a My Chart message.     I also discussed the alternative medications with Dr. Phillips. The two recommended by her insurance are biosimilars to Herceptin. The effects should be comparable to Herceptin and Dr. Phillips  does not have concerns about her switching. I will order the alternative medication for her treatment next week and we can always change back to Herceptin if her appeal is approved. Nayeli voiced understanding and appreciated the call.

## 2025-07-03 RX ORDER — SODIUM CHLORIDE 9 MG/ML
20 INJECTION, SOLUTION INTRAVENOUS ONCE
Status: CANCELLED | OUTPATIENT
Start: 2025-07-09

## 2025-07-09 ENCOUNTER — HOSPITAL ENCOUNTER (OUTPATIENT)
Dept: INFUSION CENTER | Facility: CLINIC | Age: 50
Discharge: HOME | End: 2025-07-09
Attending: INTERNAL MEDICINE
Payer: COMMERCIAL

## 2025-07-09 VITALS
SYSTOLIC BLOOD PRESSURE: 134 MMHG | OXYGEN SATURATION: 98 % | HEART RATE: 85 BPM | DIASTOLIC BLOOD PRESSURE: 84 MMHG | TEMPERATURE: 98.4 F | BODY MASS INDEX: 35.96 KG/M2 | WEIGHT: 209.5 LBS | RESPIRATION RATE: 18 BRPM

## 2025-07-09 DIAGNOSIS — Z17.1 MALIGNANT NEOPLASM OF OVERLAPPING SITES OF RIGHT BREAST IN FEMALE, ESTROGEN RECEPTOR NEGATIVE (HCC): ICD-10-CM

## 2025-07-09 DIAGNOSIS — C50.811 MALIGNANT NEOPLASM OF OVERLAPPING SITES OF RIGHT BREAST IN FEMALE, ESTROGEN RECEPTOR NEGATIVE (HCC): ICD-10-CM

## 2025-07-09 DIAGNOSIS — D70.1 CHEMOTHERAPY-INDUCED NEUTROPENIA (HCC): Primary | ICD-10-CM

## 2025-07-09 DIAGNOSIS — T45.1X5A CHEMOTHERAPY-INDUCED NEUTROPENIA (HCC): Primary | ICD-10-CM

## 2025-07-09 DIAGNOSIS — C79.51 MALIGNANT NEOPLASM METASTATIC TO BONE (HCC): ICD-10-CM

## 2025-07-09 PROCEDURE — 96417 CHEMO IV INFUS EACH ADDL SEQ: CPT

## 2025-07-09 PROCEDURE — 96413 CHEMO IV INFUSION 1 HR: CPT

## 2025-07-09 RX ORDER — SODIUM CHLORIDE 9 MG/ML
20 INJECTION, SOLUTION INTRAVENOUS ONCE
Status: COMPLETED | OUTPATIENT
Start: 2025-07-09 | End: 2025-07-09

## 2025-07-09 RX ADMIN — TRASTUZUMAB-ANNS 558 MG: 150 INJECTION, POWDER, LYOPHILIZED, FOR SOLUTION INTRAVENOUS at 11:06

## 2025-07-09 RX ADMIN — SODIUM CHLORIDE 20 ML/HR: 0.9 INJECTION, SOLUTION INTRAVENOUS at 10:23

## 2025-07-09 RX ADMIN — PERTUZUMAB 420 MG: 30 INJECTION, SOLUTION, CONCENTRATE INTRAVENOUS at 11:43

## 2025-07-09 NOTE — PROGRESS NOTES
Patient tolerated her treatment without any adverse reactions. Next appointment confirmed 7/30/25 at 0830 at [place]. Patient declined avs

## 2025-07-09 NOTE — PROGRESS NOTES
Patient is here for D1C55 of kanjinti and perjeta. No labs required. Patient offers no complaints at this time

## 2025-07-10 RX ORDER — SODIUM CHLORIDE 9 MG/ML
20 INJECTION, SOLUTION INTRAVENOUS ONCE
OUTPATIENT
Start: 2025-08-04

## 2025-07-16 ENCOUNTER — TELEPHONE (OUTPATIENT)
Age: 50
End: 2025-07-16

## 2025-07-16 NOTE — TELEPHONE ENCOUNTER
[employer] work accommodation forms completed and faxed out to   fax# [phone] along with last office visit note, treatment plan and calendars.  Received faxed confirmation.    Patient aware.

## 2025-07-21 ENCOUNTER — TELEPHONE (OUTPATIENT)
Age: 50
End: 2025-07-21

## 2025-07-21 RX ORDER — NEOMYCIN POLYMYXIN B SULFATES AND DEXAMETHASONE 3.5; 10000; 1 MG/ML; [USP'U]/ML; MG/ML
1 SUSPENSION/ DROPS OPHTHALMIC
COMMUNITY
Start: 2025-06-17

## 2025-07-21 NOTE — TELEPHONE ENCOUNTER
Called and left a voicemail for Nayeli. We will not be doing an additional appeal for the herceptin. She will just continue getting the biosimilar, Kanjinti. Call back number provided.

## 2025-07-21 NOTE — TELEPHONE ENCOUNTER
Pt stated there was an appeal on a medication she would get as an infusion and she said the appeal was denied. Her question is are they going to do an additional appeal, and what kind of involvement is needed from pt. Her other question is in the meantime the insurance said about a generic which was used for her 7/9/25 infusion and if this was a one time only thing or will the approval be for all future infusions,pt would like a call back. Thank you

## 2025-07-21 NOTE — TELEPHONE ENCOUNTER
Returned call to Nayeli, I left a message on her vm explaining the reason for my call and the number to the hope line for call back left on vm.

## 2025-07-22 ENCOUNTER — OFFICE VISIT (OUTPATIENT)
Dept: PALLIATIVE MEDICINE | Facility: CLINIC | Age: 50
End: 2025-07-22
Payer: COMMERCIAL

## 2025-07-22 VITALS
HEIGHT: 64 IN | BODY MASS INDEX: 35.68 KG/M2 | TEMPERATURE: 98.2 F | HEART RATE: 96 BPM | DIASTOLIC BLOOD PRESSURE: 86 MMHG | RESPIRATION RATE: 18 BRPM | WEIGHT: 209 LBS | OXYGEN SATURATION: 97 % | SYSTOLIC BLOOD PRESSURE: 122 MMHG

## 2025-07-22 DIAGNOSIS — C50.811 MALIGNANT NEOPLASM OF OVERLAPPING SITES OF RIGHT BREAST IN FEMALE, ESTROGEN RECEPTOR NEGATIVE (HCC): ICD-10-CM

## 2025-07-22 DIAGNOSIS — Z51.5 PALLIATIVE CARE PATIENT: ICD-10-CM

## 2025-07-22 DIAGNOSIS — Z17.1 MALIGNANT NEOPLASM OF OVERLAPPING SITES OF RIGHT BREAST IN FEMALE, ESTROGEN RECEPTOR NEGATIVE (HCC): ICD-10-CM

## 2025-07-22 DIAGNOSIS — F11.20 CONTINUOUS OPIOID DEPENDENCE (HCC): Primary | ICD-10-CM

## 2025-07-22 DIAGNOSIS — G89.3 CANCER ASSOCIATED PAIN: ICD-10-CM

## 2025-07-22 PROCEDURE — 99214 OFFICE O/P EST MOD 30 MIN: CPT | Performed by: STUDENT IN AN ORGANIZED HEALTH CARE EDUCATION/TRAINING PROGRAM

## 2025-07-22 RX ORDER — OXYCODONE HYDROCHLORIDE 10 MG/1
10 TABLET ORAL 2 TIMES DAILY PRN
Qty: 60 TABLET | Refills: 0 | Status: SHIPPED | OUTPATIENT
Start: 2025-07-22

## 2025-07-22 NOTE — ASSESSMENT & PLAN NOTE
Felt muscle relaxer (Robaxin) was not effective for her pain. It did make her tired, caused daytime sleepiness and therefore, has discontinued. Agree with discontinuation of Robaxin given unwanted effects.    She feels the OxyIR has remained helpful with her pain and uses up to 2 times a day. When her pain is not severe, she uses 5mg dosing which is enough to help manage her pain. However, does need to use 10mg dosing at times for when pain is escalated.    Summary of Plan:  1) OxyIR 5mg BID PRN for moderate pain  -if pain is severe, may instead use 10mg dosing BID PRN  2) bowel regimen to avoid OIC  3) Medication safety issues - Do not drive under the influence of narcotics (including opioids), watch for adverse effects including confusion / altered mental status / respiratory depression (slowed breathing), keep medications stored in a safe/locked environment, do not use alcohol while opioids or other narcotics are in your system. Do not travel with more than the minimum number of tablets or capsules required for the trip.      Orders:    oxyCODONE (ROXICODONE) 10 MG TABS; Take 1 tablet (10 mg total) by mouth 2 (two) times a day as needed for moderate pain or severe pain Max Daily Amount: 20 mg

## 2025-07-22 NOTE — ASSESSMENT & PLAN NOTE
Psychosocial   Supportive listening provided  Normalized experience of patient/family  Provided anxiety containment     Referrals Placed / Medical Equipment Ordered  -None    Follow-Up Recommendations  -Follow-up with PCP and current medical specialists  -Follow-up with palliative care: 8 weeks or sooner as needed    Orders:    oxyCODONE (ROXICODONE) 10 MG TABS; Take 1 tablet (10 mg total) by mouth 2 (two) times a day as needed for moderate pain or severe pain Max Daily Amount: 20 mg

## 2025-07-22 NOTE — PROGRESS NOTES
Name: Nayeli Love      : 1975      MRN: [id number]  Encounter Provider: Con Garcia DO  Encounter Date: 2025   Encounter department: [facility]  :  Assessment & Plan  Malignant neoplasm of overlapping sites of right breast in female, estrogen receptor negative (HCC)  Following Medical Oncology  -treatments with Perjeta and Kanjinti  Tolerating treatments at this time.  Appetite is stable.  No intractable nausea or vomiting.       Cancer associated pain  Felt muscle relaxer (Robaxin) was not effective for her pain. It did make her tired, caused daytime sleepiness and therefore, has discontinued. Agree with discontinuation of Robaxin given unwanted effects.    She feels the OxyIR has remained helpful with her pain and uses up to 2 times a day. When her pain is not severe, she uses 5mg dosing which is enough to help manage her pain. However, does need to use 10mg dosing at times for when pain is escalated.    Summary of Plan:  1) OxyIR 5mg BID PRN for moderate pain  -if pain is severe, may instead use 10mg dosing BID PRN  2) bowel regimen to avoid OIC  3) Medication safety issues - Do not drive under the influence of narcotics (including opioids), watch for adverse effects including confusion / altered mental status / respiratory depression (slowed breathing), keep medications stored in a safe/locked environment, do not use alcohol while opioids or other narcotics are in your system. Do not travel with more than the minimum number of tablets or capsules required for the trip.      Orders:    oxyCODONE (ROXICODONE) 10 MG TABS; Take 1 tablet (10 mg total) by mouth 2 (two) times a day as needed for moderate pain or severe pain Max Daily Amount: 20 mg    Continuous opioid dependence (HCC)         Palliative care patient  Psychosocial   Supportive listening provided  Normalized experience of patient/family  Provided anxiety containment     Referrals Placed / Medical Equipment  Ordered  -None    Follow-Up Recommendations  -Follow-up with PCP and current medical specialists  -Follow-up with palliative care: 8 weeks or sooner as needed    Orders:    oxyCODONE (ROXICODONE) 10 MG TABS; Take 1 tablet (10 mg total) by mouth 2 (two) times a day as needed for moderate pain or severe pain Max Daily Amount: 20 mg      PDMP Review: I have reviewed the patient's controlled substance dispensing history in the Prescription Drug Monitoring Program in compliance with the [facility] regulations before prescribing any controlled substances.    History of Present Illness   Nayeli Love is a 49 y.o. female who presents for follow-up.    Patient is seen today in office. Alert, oriented, pleasantly conversant.  Patient is seen in NAD.    Appetite has been good.    Pain - well managed with OxyIR. Did not tolerate robaxin PRN due to fatigue and no benefit for her pain.      Medical History Reviewed by provider this encounter:  Tobacco  Allergies  Meds  Problems  Med Hx  Surg Hx  Fam Hx     .  Past Medical History   Past Medical History[1]  Past Surgical History[2]  Family History[3]   reports that she has never smoked. She has never used smokeless tobacco. She reports current alcohol use. She reports that she does not use drugs.  Current Outpatient Medications   Medication Instructions    acetaminophen (TYLENOL) 650 mg, Every 6 hours PRN    albuterol (ProAir HFA) 90 mcg/act inhaler 2 puffs, Inhalation, Every 6 hours PRN    lisinopril (ZESTRIL) 10 mg, Oral, Daily, Only to take as needed when BP is >150 systolicallly    methocarbamol (ROBAXIN) 500 mg, Oral, 3 times daily PRN    naloxone (NARCAN) 4 mg/0.1 mL nasal spray Administer 1 spray into a nostril. If no response after 2-3 minutes, give another dose in the other nostril using a new spray.    neomycin-polymyxin-dexamethasone (MAXITROL) 0.1 % ophthalmic suspension 1 drop    nystatin powder APPLY TOPICALLY TO THE AFFECTED AREA(S) TWICE DAILY AS DIRECTED     "ondansetron (ZOFRAN) 4 mg, Oral, Every 8 hours PRN    oxyCODONE (ROXICODONE) 10 mg, Oral, 2 times daily PRN    pantoprazole (PROTONIX) 20 mg, Oral, Daily (early morning)   Allergies[4]   Medications Ordered Prior to Encounter[5]   Social History[6]     Objective   /86 (BP Location: Left arm, Patient Position: Sitting, Cuff Size: Standard)   Pulse 96   Temp 98.2 °F (36.8 °C) (Temporal)   Resp 18   Ht 5' 4\" (1.626 m)   Wt 94.8 kg (209 lb)   LMP 03/28/2022 (Approximate)   SpO2 97%   BMI 35.87 kg/m²     Physical Exam  Vitals reviewed.   Constitutional:       General: She is not in acute distress.     Appearance: She is not ill-appearing, toxic-appearing or diaphoretic.   HENT:      Head: Normocephalic and atraumatic.      Nose: Nose normal.     Eyes:      General:         Right eye: No discharge.         Left eye: No discharge.       Cardiovascular:      Rate and Rhythm: Normal rate.   Pulmonary:      Effort: Pulmonary effort is normal. No respiratory distress.   Abdominal:      General: There is no distension.     Musculoskeletal:         General: No swelling.     Skin:     General: Skin is warm and dry.      Coloration: Skin is not jaundiced or pale.     Neurological:      General: No focal deficit present.      Mental Status: She is alert. Mental status is at baseline.     Psychiatric:         Mood and Affect: Mood normal.         Behavior: Behavior normal.         Thought Content: Thought content normal.         Judgment: Judgment normal.         Recent labs:  Lab Results   Component Value Date/Time    SODIUM 136 05/27/2025 07:41 AM    SODIUM 138 10/12/2018 10:18 PM    K 4.6 05/27/2025 07:41 AM    K 4.4 10/12/2018 10:18 PM    BUN 16 05/27/2025 07:41 AM    BUN 11 10/12/2018 10:18 PM    CREATININE 0.75 05/27/2025 07:41 AM    CREATININE 1.03 10/12/2018 10:18 PM    GLUC 88 05/08/2025 08:15 AM    GLUC 95 10/12/2018 10:18 PM    CALCIUM 9.2 05/27/2025 07:41 AM    CALCIUM 9.1 10/12/2018 10:18 PM    AST 16 " 05/27/2025 07:41 AM    ALT 30 05/27/2025 07:41 AM    ALB 4.1 05/27/2025 07:41 AM    TP 6.8 05/27/2025 07:41 AM    EGFR 93 05/27/2025 07:41 AM    EGFR 66 10/12/2018 10:18 PM     Lab Results   Component Value Date/Time    HGB 13.1 05/27/2025 07:41 AM    WBC 4.49 05/27/2025 07:41 AM     05/27/2025 07:41 AM    INR 0.91 11/28/2022 07:01 AM    PTT 27 11/28/2022 07:01 AM     Lab Results   Component Value Date/Time    NTR3YRNFIJNK 3.910 12/19/2023 07:22 AM       Recent Imaging:  Procedure: CT chest abdomen pelvis w contrast  Result Date: 5/30/2025  Impression: Extensive osseous metastatic disease has not significantly changed from prior exam. No new or progressive metastatic disease to the chest, abdomen, or pelvis. Left adnexal cystic lesion has not significantly changed in size from prior exam. Again this could represent ovarian cystadenoma. Computerized Assisted Algorithm (CAA) may have aided analysis of applicable images. Workstation performed: [id number]     Procedure: Colonoscopy  Result Date: 4/8/2025  Impression: The entire colon appeared normal. RECOMMENDATION: Repeat screening colonoscopy in 10 years, due: 4/6/2035   Trish Apple, DO       Administrative Statements   I have spent a total time of 25 minutes in caring for this patient on the day of the visit/encounter including Risks and benefits of tx options, Instructions for management, Patient and family education, Importance of tx compliance, Risk factor reductions, Impressions, Counseling / Coordination of care, Documenting in the medical record, Reviewing/placing orders in the medical record (including tests, medications, and/or procedures), and Obtaining or reviewing history  .   Topics discussed with the patient / family include symptom assessment and management, medication review, psychosocial support, opioid titration, supportive listening, and anticipatory guidance.       [1]   Past Medical History:  Diagnosis Date    Breast cancer (HCC)      Cancer (HCC)     Hypertension    [2] No past surgical history on file.  [3]   Family History  Problem Relation Name Age of Onset    Coronary artery disease Mother      Heart attack Father Bryn Love     Diabetes type II Father Bryn Love     Diabetes Father Bryn Love     Hashimoto's thyroiditis Sister     [4] No Known Allergies  [5]   Current Outpatient Medications on File Prior to Visit   Medication Sig Dispense Refill    acetaminophen (TYLENOL) 325 mg tablet Take 650 mg by mouth every 6 (six) hours as needed for mild pain      lisinopril (ZESTRIL) 20 mg tablet Take 0.5 tablets (10 mg total) by mouth daily Only to take as needed when BP is >150 systolicallly 90 tablet 1    methocarbamol (ROBAXIN) 500 mg tablet Take 1 tablet (500 mg total) by mouth 3 (three) times a day as needed for muscle spasms 30 tablet 0    naloxone (NARCAN) 4 mg/0.1 mL nasal spray Administer 1 spray into a nostril. If no response after 2-3 minutes, give another dose in the other nostril using a new spray. 1 each 1    nystatin powder APPLY TOPICALLY TO THE AFFECTED AREA(S) TWICE DAILY AS DIRECTED 15 g 1    ondansetron (ZOFRAN) 4 mg tablet Take 1 tablet (4 mg total) by mouth every 8 (eight) hours as needed for nausea or vomiting 9 tablet 20    pantoprazole (PROTONIX) 20 mg tablet Take 1 tablet (20 mg total) by mouth daily in the early morning 30 tablet 11    albuterol (ProAir HFA) 90 mcg/act inhaler Inhale 2 puffs every 6 (six) hours as needed for wheezing (Patient not taking: Reported on 4/9/2025) 8.5 g 0    neomycin-polymyxin-dexamethasone (MAXITROL) 0.1 % ophthalmic suspension Administer 1 drop into the left eye (Patient not taking: Reported on 7/22/2025)       No current facility-administered medications on file prior to visit.   [6]   Social History  Tobacco Use    Smoking status: Never    Smokeless tobacco: Never   Vaping Use    Vaping status: Never Used   Substance and Sexual Activity    Alcohol use: Yes     Comment: Less than 1  drink every month    Drug use: Never    Sexual activity: Not Currently

## 2025-07-22 NOTE — PATIENT INSTRUCTIONS
It was a pleasure speaking with you today.    As discussed:  -Continue your medications as prescribed.  -Continue with a bowel regimen to avoid constipation.    Follow-up in: 8 weeks    Please do not hesitate to call me sooner should you have any questions/concerns or needs.    Medication safety issues - Do not drive under the influence of narcotics (including opioids), watch for adverse effects including confusion / altered mental status / respiratory depression (slowed breathing), keep medications stored in a safe/locked environment, do not use alcohol while opioids or other narcotics are in your system. Do not travel with more than the minimum number of tablets or capsules required for the trip.    ER Precautions  Watch for red flag symptoms including, but not limited to fevers, chills, chest pain, shortness of breath, intractable nausea/vomiting/diarrhea, or acute intractable pain (especially if pain is new or has changed).

## 2025-07-23 NOTE — ASSESSMENT & PLAN NOTE
Following Medical Oncology  -treatments with Perjeta and Kanjinti  Tolerating treatments at this time.  Appetite is stable.  No intractable nausea or vomiting.

## 2025-07-29 ENCOUNTER — APPOINTMENT (OUTPATIENT)
Dept: LAB | Facility: CLINIC | Age: 50
End: 2025-07-29
Payer: COMMERCIAL

## 2025-07-29 DIAGNOSIS — C79.51 MALIGNANT NEOPLASM METASTATIC TO BONE (HCC): ICD-10-CM

## 2025-07-29 DIAGNOSIS — C50.811 MALIGNANT NEOPLASM OF OVERLAPPING SITES OF RIGHT BREAST IN FEMALE, ESTROGEN RECEPTOR NEGATIVE (HCC): ICD-10-CM

## 2025-07-29 DIAGNOSIS — Z17.1 MALIGNANT NEOPLASM OF OVERLAPPING SITES OF RIGHT BREAST IN FEMALE, ESTROGEN RECEPTOR NEGATIVE (HCC): ICD-10-CM

## 2025-07-29 LAB
ALBUMIN SERPL BCG-MCNC: 4.5 G/DL (ref 3.5–5)
ALP SERPL-CCNC: 48 U/L (ref 34–104)
ALT SERPL W P-5'-P-CCNC: 28 U/L (ref 7–52)
ANION GAP SERPL CALCULATED.3IONS-SCNC: 6 MMOL/L (ref 4–13)
AST SERPL W P-5'-P-CCNC: 16 U/L (ref 13–39)
BILIRUB SERPL-MCNC: 0.86 MG/DL (ref 0.2–1)
BUN SERPL-MCNC: 17 MG/DL (ref 5–25)
CALCIUM SERPL-MCNC: 9.5 MG/DL (ref 8.4–10.2)
CHLORIDE SERPL-SCNC: 107 MMOL/L (ref 96–108)
CO2 SERPL-SCNC: 27 MMOL/L (ref 21–32)
CREAT SERPL-MCNC: 0.79 MG/DL (ref 0.6–1.3)
GFR SERPL CREATININE-BSD FRML MDRD: 87 ML/MIN/1.73SQ M
GLUCOSE SERPL-MCNC: 136 MG/DL (ref 65–140)
POTASSIUM SERPL-SCNC: 4.4 MMOL/L (ref 3.5–5.3)
PROT SERPL-MCNC: 7.2 G/DL (ref 6.4–8.4)
SODIUM SERPL-SCNC: 140 MMOL/L (ref 135–147)

## 2025-07-29 PROCEDURE — 36415 COLL VENOUS BLD VENIPUNCTURE: CPT

## 2025-07-29 PROCEDURE — 80053 COMPREHEN METABOLIC PANEL: CPT

## 2025-07-30 ENCOUNTER — HOSPITAL ENCOUNTER (OUTPATIENT)
Dept: INFUSION CENTER | Facility: CLINIC | Age: 50
Discharge: HOME | End: 2025-07-30
Attending: INTERNAL MEDICINE
Payer: COMMERCIAL

## 2025-07-30 VITALS
RESPIRATION RATE: 18 BRPM | BODY MASS INDEX: 35.51 KG/M2 | OXYGEN SATURATION: 98 % | WEIGHT: 208 LBS | SYSTOLIC BLOOD PRESSURE: 118 MMHG | HEART RATE: 79 BPM | HEIGHT: 64 IN | DIASTOLIC BLOOD PRESSURE: 78 MMHG | TEMPERATURE: 97.2 F

## 2025-07-30 DIAGNOSIS — T45.1X5A CHEMOTHERAPY-INDUCED NEUTROPENIA (HCC): Primary | ICD-10-CM

## 2025-07-30 DIAGNOSIS — C50.811 MALIGNANT NEOPLASM OF OVERLAPPING SITES OF RIGHT BREAST IN FEMALE, ESTROGEN RECEPTOR NEGATIVE (HCC): ICD-10-CM

## 2025-07-30 DIAGNOSIS — D70.1 CHEMOTHERAPY-INDUCED NEUTROPENIA (HCC): Primary | ICD-10-CM

## 2025-07-30 DIAGNOSIS — C79.51 MALIGNANT NEOPLASM METASTATIC TO BONE (HCC): ICD-10-CM

## 2025-07-30 DIAGNOSIS — Z17.1 MALIGNANT NEOPLASM OF OVERLAPPING SITES OF RIGHT BREAST IN FEMALE, ESTROGEN RECEPTOR NEGATIVE (HCC): ICD-10-CM

## 2025-07-30 PROCEDURE — 96367 TX/PROPH/DG ADDL SEQ IV INF: CPT

## 2025-07-30 PROCEDURE — 96417 CHEMO IV INFUS EACH ADDL SEQ: CPT

## 2025-07-30 PROCEDURE — 96413 CHEMO IV INFUSION 1 HR: CPT

## 2025-07-30 RX ORDER — SODIUM CHLORIDE 9 MG/ML
20 INJECTION, SOLUTION INTRAVENOUS ONCE
Status: DISCONTINUED | OUTPATIENT
Start: 2025-07-30 | End: 2025-08-02 | Stop reason: HOSPADM

## 2025-07-30 RX ORDER — SODIUM CHLORIDE 9 MG/ML
20 INJECTION, SOLUTION INTRAVENOUS ONCE
Status: COMPLETED | OUTPATIENT
Start: 2025-07-30 | End: 2025-07-30

## 2025-07-30 RX ORDER — SODIUM CHLORIDE 9 MG/ML
20 INJECTION, SOLUTION INTRAVENOUS ONCE
OUTPATIENT
Start: 2025-08-01

## 2025-07-30 RX ADMIN — PERTUZUMAB 420 MG: 30 INJECTION, SOLUTION, CONCENTRATE INTRAVENOUS at 10:52

## 2025-07-30 RX ADMIN — TRASTUZUMAB-ANNS 558 MG: 150 INJECTION, POWDER, LYOPHILIZED, FOR SOLUTION INTRAVENOUS at 10:12

## 2025-07-30 RX ADMIN — ZOLEDRONIC ACID 4 MG: 0.04 INJECTION, SOLUTION INTRAVENOUS at 09:21

## 2025-07-30 RX ADMIN — SODIUM CHLORIDE 20 ML/HR: 0.9 INJECTION, SOLUTION INTRAVENOUS at 09:14

## 2025-08-05 ENCOUNTER — TELEPHONE (OUTPATIENT)
Age: 50
End: 2025-08-05

## 2025-08-06 ENCOUNTER — TELEPHONE (OUTPATIENT)
Age: 50
End: 2025-08-06

## 2025-08-06 DIAGNOSIS — R03.0 ELEVATED BP WITHOUT DIAGNOSIS OF HYPERTENSION: ICD-10-CM

## 2025-08-06 DIAGNOSIS — R21 RASH: ICD-10-CM

## 2025-08-07 RX ORDER — LISINOPRIL 20 MG/1
10 TABLET ORAL DAILY
Qty: 45 TABLET | Refills: 1 | Status: SHIPPED | OUTPATIENT
Start: 2025-08-07

## 2025-08-08 RX ORDER — NYSTATIN 100000 [USP'U]/G
POWDER TOPICAL 2 TIMES DAILY
Qty: 60 G | Refills: 2 | Status: SHIPPED | OUTPATIENT
Start: 2025-08-08